# Patient Record
Sex: FEMALE | Race: WHITE | NOT HISPANIC OR LATINO | Employment: OTHER | URBAN - METROPOLITAN AREA
[De-identification: names, ages, dates, MRNs, and addresses within clinical notes are randomized per-mention and may not be internally consistent; named-entity substitution may affect disease eponyms.]

---

## 2017-01-03 ENCOUNTER — PATIENT MESSAGE (OUTPATIENT)
Dept: INTERNAL MEDICINE | Facility: CLINIC | Age: 62
End: 2017-01-03

## 2017-01-04 ENCOUNTER — OFFICE VISIT (OUTPATIENT)
Dept: INTERNAL MEDICINE | Facility: CLINIC | Age: 62
End: 2017-01-04
Payer: COMMERCIAL

## 2017-01-04 VITALS
DIASTOLIC BLOOD PRESSURE: 72 MMHG | BODY MASS INDEX: 32.63 KG/M2 | HEIGHT: 64 IN | OXYGEN SATURATION: 97 % | TEMPERATURE: 99 F | HEART RATE: 82 BPM | WEIGHT: 191.13 LBS | SYSTOLIC BLOOD PRESSURE: 120 MMHG

## 2017-01-04 DIAGNOSIS — R05.9 COUGH: Primary | ICD-10-CM

## 2017-01-04 DIAGNOSIS — J98.01 BRONCHOSPASM: ICD-10-CM

## 2017-01-04 PROCEDURE — 3074F SYST BP LT 130 MM HG: CPT | Mod: S$GLB,,, | Performed by: INTERNAL MEDICINE

## 2017-01-04 PROCEDURE — 99999 PR PBB SHADOW E&M-EST. PATIENT-LVL IV: CPT | Mod: PBBFAC,,, | Performed by: INTERNAL MEDICINE

## 2017-01-04 PROCEDURE — 99213 OFFICE O/P EST LOW 20 MIN: CPT | Mod: S$GLB,,, | Performed by: INTERNAL MEDICINE

## 2017-01-04 PROCEDURE — 1159F MED LIST DOCD IN RCRD: CPT | Mod: S$GLB,,, | Performed by: INTERNAL MEDICINE

## 2017-01-04 PROCEDURE — 3078F DIAST BP <80 MM HG: CPT | Mod: S$GLB,,, | Performed by: INTERNAL MEDICINE

## 2017-01-04 RX ORDER — PROMETHAZINE HYDROCHLORIDE AND CODEINE PHOSPHATE 6.25; 1 MG/5ML; MG/5ML
5 SOLUTION ORAL EVERY 4 HOURS PRN
Qty: 120 ML | Refills: 0 | Status: SHIPPED | OUTPATIENT
Start: 2017-01-04 | End: 2017-01-10

## 2017-01-04 RX ORDER — ALBUTEROL SULFATE 90 UG/1
2 AEROSOL, METERED RESPIRATORY (INHALATION) EVERY 6 HOURS PRN
Qty: 1 EACH | Refills: 2 | Status: SHIPPED | OUTPATIENT
Start: 2017-01-04 | End: 2018-01-11 | Stop reason: SDUPTHER

## 2017-01-08 ENCOUNTER — PATIENT MESSAGE (OUTPATIENT)
Dept: INTERNAL MEDICINE | Facility: CLINIC | Age: 62
End: 2017-01-08

## 2017-01-09 RX ORDER — AMOXICILLIN AND CLAVULANATE POTASSIUM 875; 125 MG/1; MG/1
1 TABLET, FILM COATED ORAL 2 TIMES DAILY
Qty: 20 TABLET | Refills: 0 | Status: SHIPPED | OUTPATIENT
Start: 2017-01-09 | End: 2017-01-19

## 2017-01-10 ENCOUNTER — OFFICE VISIT (OUTPATIENT)
Dept: INTERNAL MEDICINE | Facility: CLINIC | Age: 62
End: 2017-01-10
Payer: COMMERCIAL

## 2017-01-10 ENCOUNTER — PATIENT MESSAGE (OUTPATIENT)
Dept: INTERNAL MEDICINE | Facility: CLINIC | Age: 62
End: 2017-01-10

## 2017-01-10 ENCOUNTER — HOSPITAL ENCOUNTER (OUTPATIENT)
Dept: RADIOLOGY | Facility: HOSPITAL | Age: 62
Discharge: HOME | End: 2017-01-10
Attending: INTERNAL MEDICINE
Payer: COMMERCIAL

## 2017-01-10 VITALS
OXYGEN SATURATION: 97 % | HEIGHT: 64 IN | SYSTOLIC BLOOD PRESSURE: 108 MMHG | BODY MASS INDEX: 31.91 KG/M2 | HEART RATE: 80 BPM | TEMPERATURE: 99 F | WEIGHT: 186.94 LBS | DIASTOLIC BLOOD PRESSURE: 74 MMHG

## 2017-01-10 DIAGNOSIS — Z72.820 SLEEP DEPRIVATION: ICD-10-CM

## 2017-01-10 DIAGNOSIS — R05.9 COUGH: ICD-10-CM

## 2017-01-10 DIAGNOSIS — R25.1 TREMORS OF NERVOUS SYSTEM: ICD-10-CM

## 2017-01-10 DIAGNOSIS — R05.9 COUGH: Primary | ICD-10-CM

## 2017-01-10 DIAGNOSIS — F41.9 ANXIETY DISORDER, UNSPECIFIED TYPE: ICD-10-CM

## 2017-01-10 DIAGNOSIS — J32.9 SINUSITIS, UNSPECIFIED CHRONICITY, UNSPECIFIED LOCATION: ICD-10-CM

## 2017-01-10 DIAGNOSIS — T50.905A MEDICATION SIDE EFFECT, INITIAL ENCOUNTER: ICD-10-CM

## 2017-01-10 DIAGNOSIS — J38.5 LARYNGOSPASM: ICD-10-CM

## 2017-01-10 PROCEDURE — 1159F MED LIST DOCD IN RCRD: CPT | Mod: S$GLB,,, | Performed by: INTERNAL MEDICINE

## 2017-01-10 PROCEDURE — 3074F SYST BP LT 130 MM HG: CPT | Mod: S$GLB,,, | Performed by: INTERNAL MEDICINE

## 2017-01-10 PROCEDURE — 99999 PR PBB SHADOW E&M-EST. PATIENT-LVL V: CPT | Mod: PBBFAC,,, | Performed by: INTERNAL MEDICINE

## 2017-01-10 PROCEDURE — 71020 XR CHEST PA AND LATERAL: CPT | Mod: 26,,, | Performed by: RADIOLOGY

## 2017-01-10 PROCEDURE — 99214 OFFICE O/P EST MOD 30 MIN: CPT | Mod: S$GLB,,, | Performed by: INTERNAL MEDICINE

## 2017-01-10 PROCEDURE — 3078F DIAST BP <80 MM HG: CPT | Mod: S$GLB,,, | Performed by: INTERNAL MEDICINE

## 2017-01-10 PROCEDURE — 71020 XR CHEST PA AND LATERAL: CPT | Mod: TC

## 2017-01-10 NOTE — PROGRESS NOTES
Subjective:       Patient ID: Tiarra Norton is a 61 y.o. female.    Chief Complaint: Follow-up (Cough )    HPI   contuinued sinus congestion and cough.  Lots of drainage.  Trouble sleeping and a fine tremor x 1 week.  Tremor has made her anxious.  She is out of phenergan/cpdeine and albuterol.    She c/o wheezing, sob, chest tightness.    Lightheaded.  Sinus presure and cough.  Subjective fever.  Hurts all over.    Head congestion persists, but green drainage persists.  Some wheezing when supine.  Still whistleing at night.  Temp to 100.4.        Review of Systems   Constitutional: Negative for fever and unexpected weight change.   HENT: Positive for congestion, postnasal drip, rhinorrhea and sinus pressure.    Eyes: Negative for pain, discharge and visual disturbance.   Respiratory: Positive for cough and wheezing. Negative for chest tightness and shortness of breath.    Cardiovascular: Negative for chest pain and leg swelling.   Gastrointestinal: Negative for abdominal pain, constipation, diarrhea and nausea.   Genitourinary: Negative for difficulty urinating, dysuria and hematuria.   Skin: Negative for rash.   Neurological: Negative for headaches.   Psychiatric/Behavioral: Negative for dysphoric mood and sleep disturbance. The patient is not nervous/anxious.        Objective:      Physical Exam   Constitutional: She is oriented to person, place, and time. She appears well-developed and well-nourished. She appears distressed (looks exhausted and anxious).   HENT:   Head: Normocephalic and atraumatic.   Mouth/Throat: Oropharynx is clear and moist. No oropharyngeal exudate.   Tm's clear   Neck: Neck supple.   Cardiovascular: Normal rate and regular rhythm.    Pulmonary/Chest: Effort normal and breath sounds normal. No respiratory distress. She has no wheezes. She has no rales.   Intermittent croupy cough   Lymphadenopathy:     She has no cervical adenopathy.   Neurological: She is alert and oriented to person,  place, and time. Coordination (mild hand tremor bilat) abnormal.   Psychiatric: She has a normal mood and affect. Her behavior is normal.       Assessment:       1. Cough    2. Sinusitis, unspecified chronicity, unspecified location    3. Tremors of nervous system - due to excessive use of albuterol and possible phenergan   4. Medication side effect, initial encounter    5. Anxiety disorder, unspecified type    6. Laryngospasm    7. Sleep deprivation        Plan:       Tiarra was seen today for follow-up.    Diagnoses and all orders for this visit:    Cough  -     X-Ray Chest PA And Lateral; Future    Sinusitis, unspecified chronicity, unspecified location    Tremors of nervous system    Medication side effect, initial encounter    Anxiety disorder, unspecified type    Laryngospasm    Sleep deprivation    Stop phenergan/codeine and albuterol     Will notify Dr Clemens, her psychiatrist, of over use of xanax and uncontrolled anxiety.  She should call him also.      25 min spent with pt and , more than half in counseling.      Reassurance.  RTC 48 h.

## 2017-01-10 NOTE — MR AVS SNAPSHOT
[facility]  [address]  [facility] [address]  Phone: [phone]  Fax: [phone]                  Tiarra Norton   1/10/2017 3:00 PM   Office Visit    Description:  Female : 1955   Provider:  Kaykay Perales MD   Department:  [facility]           Reason for Visit     Follow-up           Diagnoses this Visit        Comments    Cough    -  Primary            To Do List           Future Appointments        Provider Department Dept Phone    1/10/2017 4:00 PM [facility] [phone]    2017 10:40 AM Kaykay Perales MD [facility] [phone]      Goals (5 Years of Data)     None      [facility]     [facility] Care [facility] -  Assistance  Registered nurses in the [facility] provide clinical advisement, health education, appointment booking, and other advisory services.  Call for this free service at [phone].             Medications           Message regarding Medications     Verify the changes and/or additions to your medication regime listed below are the same as discussed with your clinician today.  If any of these changes or additions are incorrect, please notify your healthcare provider.        STOP taking these medications     promethazine-codeine 6.25-10 mg/5 ml (PHENERGAN WITH CODEINE) 6.25-10 mg/5 mL syrup Take 5 mLs by mouth every 4 (four) hours as needed for Cough.           Verify that the below list of medications is an accurate representation of the medications you are currently taking.  If none reported, the list may be blank. If incorrect, please contact your healthcare provider. Carry this list with you in case of emergency.           Current Medications     albuterol 90 mcg/actuation inhaler Inhale 2 puffs into the lungs every 6 (six) hours as needed for Wheezing.    alprazolam (XANAX) 2 MG Tab Take 2 mg by mouth once daily.     amlodipine  "(NORVASC) 5 MG tablet Take 1 tablet (5 mg total) by mouth once daily.    amoxicillin-clavulanate 875-125mg (AUGMENTIN) 875-125 mg per tablet Take 1 tablet by mouth 2 (two) times daily.    aspirin 325 MG tablet Take 1 tablet (325 mg total) by mouth 2 (two) times daily.    buPROPion (WELLBUTRIN XL) 150 MG TB24 tablet Take 150 mg by mouth once daily.     ergocalciferol (VITAMIN D2) 50,000 unit Cap Take 1 capsule (50,000 Units total) by mouth every 30 days.    levocetirizine (XYZAL) 5 MG tablet Take 1 tablet (5 mg total) by mouth every evening.    levothyroxine (SYNTHROID) 112 MCG tablet take 1 tablet by mouth every morning    lisinopril (PRINIVIL,ZESTRIL) 40 MG tablet take 1 tablet by mouth once daily    methylPREDNISolone (MEDROL, HERNANDO,) 4 mg tablet use as directed    oxycodone-acetaminophen (PERCOCET)  mg per tablet Take 1 tablet by mouth every 4 (four) hours as needed for Pain.    pravastatin (PRAVACHOL) 20 MG tablet Take 20 mg by mouth once daily.    promethazine (PHENERGAN) 25 MG tablet Take 1 tablet (25 mg total) by mouth every 6 (six) hours as needed for Nausea.    RESTASIS 0.05 % ophthalmic emulsion Place 1 drop into both eyes 2 (two) times daily.    REXULTI 0.5 mg Tab     sumatriptan (IMITREX) 20 mg/actuation nasal spray     trazodone (DESYREL) 100 MG tablet take 1 to 2 tablets by mouth at bedtime for sleep    venlafaxine (EFFEXOR-XR) 150 MG Cp24 1 tab po q day    venlafaxine (EFFEXOR-XR) 75 MG 24 hr capsule Take 75 mg by mouth nightly.            Clinical Reference Information           Vital Signs - Last Recorded  Most recent update: 1/10/2017  2:59 PM by Gene Espinoza MA    BP Pulse Temp Ht Wt SpO2    108/74 (BP Location: Left arm, Patient Position: Sitting, BP Method: Manual) 80 99.4 °F (37.4 °C) (Oral) 5' 4" (1.626 m) 84.8 kg (186 lb 15.2 oz) 97%    BMI                32.09 kg/m2          Blood Pressure          Most Recent Value    BP  108/74      Allergies as of 1/10/2017     No Known Allergies "      Immunizations Administered on Date of Encounter - 1/10/2017     None      Orders Placed During Today's Visit     Future Labs/Procedures Expected by Expires    X-Ray Chest PA And Lateral  1/10/2017 1/10/2018

## 2017-01-11 ENCOUNTER — TELEPHONE (OUTPATIENT)
Dept: INTERNAL MEDICINE | Facility: CLINIC | Age: 62
End: 2017-01-11

## 2017-01-11 NOTE — TELEPHONE ENCOUNTER
----- Message from Nikki Palomo sent at 1/10/2017  4:41 PM CST -----  Contact: dr guerra [phone]  Dr calderon called in regards to a missed call from the office.      Please advise

## 2017-01-12 ENCOUNTER — OFFICE VISIT (OUTPATIENT)
Dept: INTERNAL MEDICINE | Facility: CLINIC | Age: 62
End: 2017-01-12
Payer: COMMERCIAL

## 2017-01-12 VITALS
OXYGEN SATURATION: 98 % | BODY MASS INDEX: 31.91 KG/M2 | TEMPERATURE: 98 F | HEART RATE: 79 BPM | SYSTOLIC BLOOD PRESSURE: 108 MMHG | DIASTOLIC BLOOD PRESSURE: 80 MMHG | HEIGHT: 64 IN | WEIGHT: 186.94 LBS

## 2017-01-12 DIAGNOSIS — R25.1 TREMOR: ICD-10-CM

## 2017-01-12 DIAGNOSIS — F43.20 ADJUSTMENT DISORDER, UNSPECIFIED TYPE: ICD-10-CM

## 2017-01-12 DIAGNOSIS — J20.8 VIRAL BRONCHITIS: ICD-10-CM

## 2017-01-12 DIAGNOSIS — J98.01 BRONCHOSPASM: ICD-10-CM

## 2017-01-12 DIAGNOSIS — J32.9 SINUSITIS, UNSPECIFIED CHRONICITY, UNSPECIFIED LOCATION: Primary | ICD-10-CM

## 2017-01-12 PROCEDURE — 3079F DIAST BP 80-89 MM HG: CPT | Mod: S$GLB,,, | Performed by: INTERNAL MEDICINE

## 2017-01-12 PROCEDURE — 99213 OFFICE O/P EST LOW 20 MIN: CPT | Mod: S$GLB,,, | Performed by: INTERNAL MEDICINE

## 2017-01-12 PROCEDURE — 99999 PR PBB SHADOW E&M-EST. PATIENT-LVL IV: CPT | Mod: PBBFAC,,, | Performed by: INTERNAL MEDICINE

## 2017-01-12 PROCEDURE — 3074F SYST BP LT 130 MM HG: CPT | Mod: S$GLB,,, | Performed by: INTERNAL MEDICINE

## 2017-01-12 PROCEDURE — 1159F MED LIST DOCD IN RCRD: CPT | Mod: S$GLB,,, | Performed by: INTERNAL MEDICINE

## 2017-01-12 RX ORDER — PREDNISONE 20 MG/1
TABLET ORAL
Qty: 10 TABLET | Refills: 0 | Status: SHIPPED | OUTPATIENT
Start: 2017-01-12 | End: 2018-01-11

## 2017-01-12 NOTE — PROGRESS NOTES
Subjective:       Patient ID: Tiarra Norton is a 61 y.o. female.    Chief Complaint: Follow-up (Cough)    HPI   Cough and tremors better.  Finally slept last night.   Wheezing persists, but better, worse at night.  She refilled xanax, on time.  She didn't call her psychiatrist, but has appt with him soon.  Continues with green nasal drainage.  Augmentin started a few days ago.  She has compeleted course of Doxy, medrol dose pack.  Side effects with albuterol (tremor).    She is frustrated by her continued weakness, cough, and inability to return to work as .    Cxr last visit - clear.  Review of Systems   Constitutional: Positive for chills and fever.   HENT: Positive for ear pain, postnasal drip, rhinorrhea and sore throat.    Respiratory: Positive for cough, shortness of breath and wheezing.    Cardiovascular: Positive for chest pain.   Musculoskeletal: Positive for myalgias.   Skin: Negative for rash.   Allergic/Immunologic: Negative for environmental allergies.   Neurological: Positive for headaches.       Objective:      Physical Exam   Constitutional: She is oriented to person, place, and time. She appears well-developed and well-nourished. No distress.   HENT:   Head: Normocephalic and atraumatic.   Mouth/Throat: Oropharynx is clear and moist. No oropharyngeal exudate.   Tm's clear   Neck: Neck supple.   Cardiovascular: Normal rate and regular rhythm.    Pulmonary/Chest: Effort normal. No respiratory distress. She has wheezes (expiratory, with forced expiration only). She has no rales.   Lymphadenopathy:     She has no cervical adenopathy.   Neurological: She is alert and oriented to person, place, and time.   Psychiatric: Her behavior is normal.   Depressed affect       Assessment:       1. Sinusitis, unspecified chronicity, unspecified location    2. Viral bronchitis    3. Bronchospasm    4. Adjustment disorder, unspecified type    5. Tremor                   - resolved.  Plan:       Tiarra was  seen today for follow-up.    Diagnoses and all orders for this visit:    Sinusitis, unspecified chronicity, unspecified location    Viral bronchitis    Bronchospasm    Adjustment disorder, unspecified type    Tremor    Other orders  -     predniSONE (DELTASONE) 20 MG tablet; 2 tabs po q day x 5 days.  -     ipratropium (ATROVENT HFA) 17 mcg/actuation inhaler; 2 puffs tid       As still with cough and bronchospasm will tx with prednisone and atrovent.  Albuterol ineffective and caused tremor.      Long discussion about limitations of treatment.      She recently had pneumovax and has had fluvax.      Self care:  Rest, fluids, control anxiety.  F/u with her  psychiatrist.      Complete augmentin course.      Call if no better.

## 2017-01-13 RX ORDER — TRAZODONE HYDROCHLORIDE 100 MG/1
TABLET ORAL
Qty: 180 TABLET | Refills: 3 | Status: SHIPPED | OUTPATIENT
Start: 2017-01-13 | End: 2018-03-28 | Stop reason: SDUPTHER

## 2017-01-29 RX ORDER — AMLODIPINE BESYLATE 5 MG/1
TABLET ORAL
Qty: 90 TABLET | Refills: 3 | Status: SHIPPED | OUTPATIENT
Start: 2017-01-29 | End: 2018-01-11 | Stop reason: SDUPTHER

## 2017-01-29 RX ORDER — PRAVASTATIN SODIUM 20 MG/1
TABLET ORAL
Qty: 90 TABLET | Refills: 3 | Status: SHIPPED | OUTPATIENT
Start: 2017-01-29 | End: 2018-01-11 | Stop reason: SDUPTHER

## 2017-02-01 ENCOUNTER — PATIENT MESSAGE (OUTPATIENT)
Dept: INTERNAL MEDICINE | Facility: CLINIC | Age: 62
End: 2017-02-01

## 2017-02-21 ENCOUNTER — PATIENT MESSAGE (OUTPATIENT)
Dept: INTERNAL MEDICINE | Facility: CLINIC | Age: 62
End: 2017-02-21

## 2017-03-07 RX ORDER — ERGOCALCIFEROL 1.25 MG/1
CAPSULE ORAL
Qty: 3 CAPSULE | Refills: 3 | Status: SHIPPED | OUTPATIENT
Start: 2017-03-07 | End: 2018-01-11 | Stop reason: SDUPTHER

## 2017-06-07 ENCOUNTER — DOCUMENTATION ONLY (OUTPATIENT)
Dept: RESEARCH | Facility: HOSPITAL | Age: 62
End: 2017-06-07

## 2017-06-07 NOTE — RESEARCH
Documentation of Informed Consent Obtained for Research by Non-[facility] Personnel    Study: PROmoting Successful Weight Loss in Primary CarE in [place] (PROP)  IRB Number: #[id number]; [facility] IRB ID: [id number]  : Ian Man, PhD.  Coordinating Site: [facility] Co-Investigators: Josephine Albarran MD and Elpidio Roberts MD  [facility] Approval Date: 11/6/15  This study is reviewed and acknowledged by the [facility] IRB. The IRB of Record is the [facility] ([facility]) IRB.    Is the volunteer currently enrolled in any other research trials (per Epic)? [ ] Yes  [X] No  [facility] staff administering informed consent: Beti Meléndez  Date:   5/8/2017  Does the volunteer agree to participate in the trial? [X] Yes  [ ] No  If no, document the reason here:       [X] I acknowledge that I have reviewed the informed consent form and the volunteer signed and dated the signature section of the consent forms.    Name: ([facility] personnel reviewing consent form):           Dimas Del Toro  Review and Scan Date (if different than date of note): 6/6/2017, 6/7/2017  Comments: See  for Consent Forms    [facility]-trained recruiters will obtain informed consent form all participants as well as HIPAA authorization.  Study protocol states all questions and concerns are clarified before any forms are signed. The following elements of the informed consent document were to be discussed:  · What you should know about a research study (e.g. purpose of the consent form; right to refuse or agree and change your mind later; participation is voluntary)?  · Who is doing the study?    · Where is the study being conducted?    · What is the purpose of this study?  · Who is eligible to participate in the study?     · What will happen to you if you take part in the study (e.g. Screening, Measurement visits, Lifestyle change meetings activities)?  · What  are the possible risks and discomforts? Benefits?  · If you do not want to take part in the study, are there other choices?  · If you have any questions or problems, whom can you call?  · What information will be kept private?  · Can your taking part in the study end early?  · What if information becomes available that might affect your decision to stay in the study?  · What charges will you have to pay? What payment will you receive?  · Will you be compensated for a study-related injury or medical illness?

## 2017-08-07 ENCOUNTER — PATIENT MESSAGE (OUTPATIENT)
Dept: INTERNAL MEDICINE | Facility: CLINIC | Age: 62
End: 2017-08-07

## 2017-10-31 RX ORDER — SUMATRIPTAN 20 MG/1
SPRAY NASAL
Qty: 1 EACH | Refills: 11 | Status: SHIPPED | OUTPATIENT
Start: 2017-10-31 | End: 2019-01-21 | Stop reason: SDUPTHER

## 2017-12-21 ENCOUNTER — PATIENT MESSAGE (OUTPATIENT)
Dept: INTERNAL MEDICINE | Facility: CLINIC | Age: 62
End: 2017-12-21

## 2017-12-21 ENCOUNTER — PATIENT MESSAGE (OUTPATIENT)
Dept: OBSTETRICS AND GYNECOLOGY | Facility: CLINIC | Age: 62
End: 2017-12-21

## 2018-01-03 DIAGNOSIS — Z00.00 ROUTINE GENERAL MEDICAL EXAMINATION AT A HEALTH CARE FACILITY: Primary | ICD-10-CM

## 2018-01-11 ENCOUNTER — CLINICAL SUPPORT (OUTPATIENT)
Dept: INTERNAL MEDICINE | Facility: CLINIC | Age: 63
End: 2018-01-11
Payer: COMMERCIAL

## 2018-01-11 ENCOUNTER — HOSPITAL ENCOUNTER (OUTPATIENT)
Dept: RADIOLOGY | Facility: HOSPITAL | Age: 63
Discharge: HOME | End: 2018-01-11
Attending: INTERNAL MEDICINE
Payer: COMMERCIAL

## 2018-01-11 ENCOUNTER — HOSPITAL ENCOUNTER (OUTPATIENT)
Dept: CARDIOLOGY | Facility: CLINIC | Age: 63
Discharge: HOME | End: 2018-01-11
Payer: COMMERCIAL

## 2018-01-11 ENCOUNTER — OFFICE VISIT (OUTPATIENT)
Dept: INTERNAL MEDICINE | Facility: CLINIC | Age: 63
End: 2018-01-11
Payer: COMMERCIAL

## 2018-01-11 VITALS
TEMPERATURE: 99 F | HEART RATE: 77 BPM | OXYGEN SATURATION: 97 % | DIASTOLIC BLOOD PRESSURE: 66 MMHG | HEIGHT: 64 IN | SYSTOLIC BLOOD PRESSURE: 108 MMHG | BODY MASS INDEX: 32.58 KG/M2 | WEIGHT: 190.81 LBS

## 2018-01-11 VITALS — WEIGHT: 186 LBS | HEIGHT: 64 IN | BODY MASS INDEX: 31.76 KG/M2

## 2018-01-11 DIAGNOSIS — G43.909 MIGRAINE WITHOUT STATUS MIGRAINOSUS, NOT INTRACTABLE, UNSPECIFIED MIGRAINE TYPE: ICD-10-CM

## 2018-01-11 DIAGNOSIS — Z00.00 ROUTINE GENERAL MEDICAL EXAMINATION AT A HEALTH CARE FACILITY: ICD-10-CM

## 2018-01-11 DIAGNOSIS — I10 ESSENTIAL HYPERTENSION: ICD-10-CM

## 2018-01-11 DIAGNOSIS — E03.4 HYPOTHYROIDISM DUE TO ACQUIRED ATROPHY OF THYROID: ICD-10-CM

## 2018-01-11 DIAGNOSIS — Z00.00 ROUTINE GENERAL MEDICAL EXAMINATION AT A HEALTH CARE FACILITY: Primary | ICD-10-CM

## 2018-01-11 DIAGNOSIS — Z00.00 ANNUAL PHYSICAL EXAM: Primary | ICD-10-CM

## 2018-01-11 DIAGNOSIS — K76.0 FATTY LIVER: ICD-10-CM

## 2018-01-11 DIAGNOSIS — R19.7 INTERMITTENT DIARRHEA: ICD-10-CM

## 2018-01-11 DIAGNOSIS — E78.5 HYPERLIPIDEMIA, UNSPECIFIED HYPERLIPIDEMIA TYPE: ICD-10-CM

## 2018-01-11 DIAGNOSIS — F33.41 RECURRENT MAJOR DEPRESSIVE DISORDER, IN PARTIAL REMISSION: ICD-10-CM

## 2018-01-11 DIAGNOSIS — R74.8 LIVER ENZYME ELEVATION: ICD-10-CM

## 2018-01-11 DIAGNOSIS — Z12.31 VISIT FOR SCREENING MAMMOGRAM: ICD-10-CM

## 2018-01-11 LAB
ALBUMIN SERPL BCP-MCNC: 3.7 G/DL
ALP SERPL-CCNC: 99 U/L
ALT SERPL W/O P-5'-P-CCNC: 100 U/L
ANION GAP SERPL CALC-SCNC: 9 MMOL/L
AST SERPL-CCNC: 66 U/L
BILIRUB SERPL-MCNC: 0.4 MG/DL
BUN SERPL-MCNC: 11 MG/DL
CALCIUM SERPL-MCNC: 10.9 MG/DL
CHLORIDE SERPL-SCNC: 104 MMOL/L
CHOLEST SERPL-MCNC: 204 MG/DL
CHOLEST/HDLC SERPL: 3.8 {RATIO}
CO2 SERPL-SCNC: 27 MMOL/L
CREAT SERPL-MCNC: 0.9 MG/DL
ERYTHROCYTE [DISTWIDTH] IN BLOOD BY AUTOMATED COUNT: 12.6 %
EST. GFR  (AFRICAN AMERICAN): >60 ML/MIN/1.73 M^2
EST. GFR  (NON AFRICAN AMERICAN): >60 ML/MIN/1.73 M^2
ESTIMATED AVG GLUCOSE: 105 MG/DL
GLUCOSE SERPL-MCNC: 103 MG/DL
HBA1C MFR BLD HPLC: 5.3 %
HCT VFR BLD AUTO: 37.9 %
HDLC SERPL-MCNC: 53 MG/DL
HDLC SERPL: 26 %
HGB BLD-MCNC: 12.2 G/DL
LDLC SERPL CALC-MCNC: 120.2 MG/DL
MCH RBC QN AUTO: 30 PG
MCHC RBC AUTO-ENTMCNC: 32.2 G/DL
MCV RBC AUTO: 93 FL
NONHDLC SERPL-MCNC: 151 MG/DL
PLATELET # BLD AUTO: 239 K/UL
PMV BLD AUTO: 10.2 FL
POTASSIUM SERPL-SCNC: 4.4 MMOL/L
PROT SERPL-MCNC: 7 G/DL
RBC # BLD AUTO: 4.06 M/UL
SODIUM SERPL-SCNC: 140 MMOL/L
TRIGL SERPL-MCNC: 154 MG/DL
TSH SERPL DL<=0.005 MIU/L-ACNC: 1.03 UIU/ML
WBC # BLD AUTO: 5.45 K/UL

## 2018-01-11 PROCEDURE — 80061 LIPID PANEL: CPT

## 2018-01-11 PROCEDURE — 80053 COMPREHEN METABOLIC PANEL: CPT

## 2018-01-11 PROCEDURE — 77067 SCR MAMMO BI INCL CAD: CPT | Mod: 26,,, | Performed by: RADIOLOGY

## 2018-01-11 PROCEDURE — 84443 ASSAY THYROID STIM HORMONE: CPT

## 2018-01-11 PROCEDURE — 93000 ELECTROCARDIOGRAM COMPLETE: CPT | Mod: S$GLB,,, | Performed by: INTERNAL MEDICINE

## 2018-01-11 PROCEDURE — 83036 HEMOGLOBIN GLYCOSYLATED A1C: CPT

## 2018-01-11 PROCEDURE — 85027 COMPLETE CBC AUTOMATED: CPT

## 2018-01-11 PROCEDURE — 99999 PR PBB SHADOW E&M-EST. PATIENT-LVL III: CPT | Mod: PBBFAC,,, | Performed by: INTERNAL MEDICINE

## 2018-01-11 PROCEDURE — 36415 COLL VENOUS BLD VENIPUNCTURE: CPT

## 2018-01-11 PROCEDURE — 77067 SCR MAMMO BI INCL CAD: CPT | Mod: TC

## 2018-01-11 PROCEDURE — 99396 PREV VISIT EST AGE 40-64: CPT | Mod: S$GLB,,, | Performed by: INTERNAL MEDICINE

## 2018-01-11 PROCEDURE — 77063 BREAST TOMOSYNTHESIS BI: CPT | Mod: 26,,, | Performed by: RADIOLOGY

## 2018-01-11 RX ORDER — ALBUTEROL SULFATE 90 UG/1
2 AEROSOL, METERED RESPIRATORY (INHALATION) EVERY 6 HOURS PRN
Qty: 1 EACH | Refills: 2 | Status: SHIPPED | OUTPATIENT
Start: 2018-01-11 | End: 2018-07-24

## 2018-01-11 RX ORDER — BUPROPION HYDROCHLORIDE 300 MG/1
300 TABLET ORAL DAILY
COMMUNITY
End: 2022-02-02

## 2018-01-11 RX ORDER — ERGOCALCIFEROL 1.25 MG/1
CAPSULE ORAL
Qty: 3 CAPSULE | Refills: 3 | Status: SHIPPED | OUTPATIENT
Start: 2018-01-11 | End: 2019-01-21

## 2018-01-11 RX ORDER — LEVOTHYROXINE SODIUM 112 UG/1
112 TABLET ORAL EVERY MORNING
Qty: 90 TABLET | Refills: 3 | Status: SHIPPED | OUTPATIENT
Start: 2018-01-11 | End: 2018-08-27 | Stop reason: SDUPTHER

## 2018-01-11 RX ORDER — OXYCODONE AND ACETAMINOPHEN 10; 325 MG/1; MG/1
1 TABLET ORAL EVERY 4 HOURS PRN
Qty: 60 TABLET | Refills: 0 | Status: SHIPPED | OUTPATIENT
Start: 2018-01-11 | End: 2019-01-21 | Stop reason: SDUPTHER

## 2018-01-11 RX ORDER — PRAVASTATIN SODIUM 20 MG/1
20 TABLET ORAL DAILY
Qty: 90 TABLET | Refills: 3 | Status: SHIPPED | OUTPATIENT
Start: 2018-01-11 | End: 2018-03-15 | Stop reason: SDUPTHER

## 2018-01-11 RX ORDER — LISINOPRIL 40 MG/1
40 TABLET ORAL DAILY
Qty: 90 TABLET | Refills: 3 | Status: SHIPPED | OUTPATIENT
Start: 2018-01-11 | End: 2018-03-26 | Stop reason: SDUPTHER

## 2018-01-11 RX ORDER — AMLODIPINE BESYLATE 5 MG/1
5 TABLET ORAL DAILY
Qty: 90 TABLET | Refills: 3 | Status: SHIPPED | OUTPATIENT
Start: 2018-01-11 | End: 2018-04-09 | Stop reason: SDUPTHER

## 2018-01-11 RX ORDER — PROMETHAZINE HYDROCHLORIDE 25 MG/1
25 TABLET ORAL EVERY 6 HOURS PRN
Qty: 60 TABLET | Refills: 1 | Status: SHIPPED | OUTPATIENT
Start: 2018-01-11 | End: 2019-01-21 | Stop reason: SDUPTHER

## 2018-01-11 NOTE — PROGRESS NOTES
Subjective:       Patient ID: Tiarra Norton is a 62 y.o. female.    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  [facility] in [place], [place].    Long h/o htn, hyperlipidemia, hypothyroidism. Vit D defic.    Migraines stable -twice a week to monthly.  Takes phenergan and oxycodone as needed.    Continues with chronic diarrhea, attributed to IBS.    Depression persists, but improved.  Insomnia controlled with desyrel.    lft's elevated again.  Evaluation several years ago.  bx showed minimal fatty liver, as did u/s.  Enzymes spontaneously normalized.  Review of Systems   Constitutional: Negative for fever and unexpected weight change.   HENT: Negative for congestion and postnasal drip.    Respiratory: Negative for chest tightness, shortness of breath and wheezing.    Cardiovascular: Positive for chest pain (only with panic). Negative for leg swelling.   Gastrointestinal: Positive for diarrhea. Negative for abdominal pain, anal bleeding, constipation, nausea and vomiting.   Genitourinary: Negative for dysuria and urgency.   Musculoskeletal: Positive for arthralgias (r knee, manageable.).   Skin: Negative for rash.   Neurological: Positive for headaches (migraines).   Psychiatric/Behavioral: Positive for dysphoric mood and sleep disturbance (controlled with trazadone.). The patient is nervous/anxious.        Objective:      Physical Exam   Constitutional: She is oriented to person, place, and time. She appears well-developed and well-nourished.   Eyes: No scleral icterus.   Neck: No JVD present. No thyromegaly present.   Cardiovascular: Normal rate, regular rhythm and normal heart sounds.    Pulmonary/Chest: Effort normal and breath sounds normal. No respiratory distress. She has no wheezes. She has no rales.   Abdominal: Soft. She exhibits no mass. There is no tenderness.   Musculoskeletal: She exhibits no edema.   Neurological: She is  alert and oriented to person, place, and time.   Psychiatric: She has a normal mood and affect. Her behavior is normal.       Results for orders placed or performed in visit on 01/11/18   Comprehensive metabolic panel   Result Value Ref Range    Sodium 140 136 - 145 mmol/L    Potassium 4.4 3.5 - 5.1 mmol/L    Chloride 104 95 - 110 mmol/L    CO2 27 23 - 29 mmol/L    Glucose 103 70 - 110 mg/dL    BUN, Bld 11 8 - 23 mg/dL    Creatinine 0.9 0.5 - 1.4 mg/dL    Calcium 10.9 (H) 8.7 - 10.5 mg/dL    Total Protein 7.0 6.0 - 8.4 g/dL    Albumin 3.7 3.5 - 5.2 g/dL    Total Bilirubin 0.4 0.1 - 1.0 mg/dL    Alkaline Phosphatase 99 55 - 135 U/L    AST 66 (H) 10 - 40 U/L     (H) 10 - 44 U/L    Anion Gap 9 8 - 16 mmol/L    eGFR if African American >60.0 >60 mL/min/1.73 m^2    eGFR if non African American >60.0 >60 mL/min/1.73 m^2   CBC Without Differential   Result Value Ref Range    WBC 5.45 3.90 - 12.70 K/uL    RBC 4.06 4.00 - 5.40 M/uL    Hemoglobin 12.2 12.0 - 16.0 g/dL    Hematocrit 37.9 37.0 - 48.5 %    MCV 93 82 - 98 fL    MCH 30.0 27.0 - 31.0 pg    MCHC 32.2 32.0 - 36.0 g/dL    RDW 12.6 11.5 - 14.5 %    Platelets 239 150 - 350 K/uL    MPV 10.2 9.2 - 12.9 fL   Lipid panel   Result Value Ref Range    Cholesterol 204 (H) 120 - 199 mg/dL    Triglycerides 154 (H) 30 - 150 mg/dL    HDL 53 40 - 75 mg/dL    LDL Cholesterol 120.2 63.0 - 159.0 mg/dL    HDL/Chol Ratio 26.0 20.0 - 50.0 %    Total Cholesterol/HDL Ratio 3.8 2.0 - 5.0    Non-HDL Cholesterol 151 mg/dL   TSH   Result Value Ref Range    TSH 1.026 0.400 - 4.000 uIU/mL   Hemoglobin A1c   Result Value Ref Range    Hemoglobin A1C 5.3 4.0 - 5.6 %    Estimated Avg Glucose 105 68 - 131 mg/dL     Assessment:       1. Annual physical exam    2. Migraine without status migrainosus, not intractable, unspecified migraine type    3. Intermittent diarrhea    4. Hypothyroidism due to acquired atrophy of thyroid    5. Hyperlipidemia, unspecified hyperlipidemia type    6. Essential  hypertension    7. Liver enzyme elevation    8. Fatty liver    9. Recurrent major depressive disorder, in partial remission        Plan:       Tiarra was seen today for [facility].    Diagnoses and all orders for this visit:    Annual physical exam    Migraine without status migrainosus, not intractable, unspecified migraine type    Intermittent diarrhea    Hypothyroidism due to acquired atrophy of thyroid    Hyperlipidemia, unspecified hyperlipidemia type    Essential hypertension    Liver enzyme elevation    Fatty liver    Recurrent major depressive disorder, in partial remission       Exercise regularly.  Weight loss diet reviewed.

## 2018-01-11 NOTE — LETTER
2018    Tiarra Norton  [address] [facility] [address]             [facility] Medicine  [address] [facility] [address]  Phone: [phone]  Fax: [phone] Dear Ms. Norton:    Thank you for allowing me to serve you and perform your Executive Health exam on 2018.  This letter will serve a brief summary of the history, physical findings, and laboratory/studies performed and recommendations at that time.    Reason for Visit: Executive Health Preventive Physical Examination    Past Medical History:   Diagnosis Date    Cataract     Depression     Diabetes mellitus     gestational diabetes 21 years ago    Hyperlipidemia     Hypertension     Thyroid disease        Past Surgical History:   Procedure Laterality Date    ADENOIDECTOMY       SECTION      CHOLECYSTECTOMY      COLONOSCOPY N/A 2016    Procedure: COLONOSCOPY;  Surgeon: Heri Segura MD;  Location: [address];  Service: Endoscopy;  Laterality: N/A;    EYE SURGERY      cataract resection right    r hand surgery      TONSILLECTOMY      TOTAL KNEE ARTHROPLASTY      partial knee replacement    TUBAL LIGATION         Family History   Problem Relation Age of Onset    Hypertension Mother     Irritable bowel syndrome Mother     Heart disease Father      mi    Hypertension Father     Cancer Father      prostate    Alcohol abuse Sister     Depression Sister     Irritable bowel syndrome Sister     Alcohol abuse Sister     Ovarian cancer Maternal Aunt     Breast cancer Paternal Aunt     Melanoma Neg Hx     Colon cancer Neg Hx     Stomach cancer Neg Hx     Esophageal cancer Neg Hx     Liver disease Neg Hx     Crohn's disease Neg Hx     Ulcerative colitis Neg Hx     Celiac disease Neg Hx        Social History     Social History    Marital status:      Spouse name: N/A    Number of children: 1    Years of education: N/A     Occupational History       [employer]     Social History Main Topics    Smoking status: Never Smoker    Smokeless tobacco: Never Used    Alcohol use 4.2 oz/week     7 Glasses of wine per week    Drug use: No    Sexual activity: Yes     Partners: Male     Other Topics Concern    Not on file     Social History Narrative    To retire feb 2018.  After then plans to exercise.      Works with with a  - 1-2 times a week.             Review of patient's allergies indicates:  No Known Allergies      Current Outpatient Prescriptions:     buPROPion (WELLBUTRIN XL) 300 MG 24 hr tablet, Take 300 mg by mouth once daily., Disp: , Rfl:     RESTASIS 0.05 % ophthalmic emulsion, Place 1 drop into both eyes 2 (two) times daily., Disp: , Rfl: 0    trazodone (DESYREL) 100 MG tablet, take 1 to 2 tablets by mouth at bedtime for sleep, Disp: 180 tablet, Rfl: 3    venlafaxine (EFFEXOR-XR) 150 MG Cp24, 1 tab po q day (Patient taking differently: Take 150 mg by mouth once daily. 150 mg morning and 50 mg at night), Disp: 30 capsule, Rfl: 11    venlafaxine (EFFEXOR-XR) 75 MG 24 hr capsule, Take 75 mg by mouth nightly. , Disp: , Rfl: 0    albuterol 90 mcg/actuation inhaler, Inhale 2 puffs into the lungs every 6 (six) hours as needed for Wheezing., Disp: 1 each, Rfl: 2    alprazolam (XANAX) 2 MG Tab, Take 2 mg by mouth once daily. , Disp: , Rfl: 0    amLODIPine (NORVASC) 5 MG tablet, Take 1 tablet (5 mg total) by mouth once daily., Disp: 90 tablet, Rfl: 3    aspirin 325 MG tablet, Take 1 tablet (325 mg total) by mouth 2 (two) times daily. (Patient taking differently: Take 325 mg by mouth once. ), Disp: 60 tablet, Rfl: 0    ergocalciferol (ERGOCALCIFEROL) 50,000 unit Cap, take 1 capsule by mouth every 30 DAYS, Disp: 3 capsule, Rfl: 3    ipratropium (ATROVENT HFA) 17 mcg/actuation inhaler, 2 puffs tid, Disp: 1 Inhaler, Rfl: 1    levothyroxine (SYNTHROID) 112 MCG tablet, Take 1 tablet (112 mcg total) by mouth every morning., Disp: 90 tablet, Rfl: 3     lisinopril (PRINIVIL,ZESTRIL) 40 MG tablet, Take 1 tablet (40 mg total) by mouth once daily., Disp: 90 tablet, Rfl: 3    oxyCODONE-acetaminophen (PERCOCET)  mg per tablet, Take 1 tablet by mouth every 4 (four) hours as needed for Pain., Disp: 60 tablet, Rfl: 0    pravastatin (PRAVACHOL) 20 MG tablet, Take 1 tablet (20 mg total) by mouth once daily., Disp: 90 tablet, Rfl: 3    promethazine (PHENERGAN) 25 MG tablet, Take 1 tablet (25 mg total) by mouth every 6 (six) hours as needed for Nausea., Disp: 60 tablet, Rfl: 1    REXULTI 0.5 mg Tab, , Disp: , Rfl: 0    SUMAtriptan (IMITREX) 20 mg/actuation nasal spray, instill 1 spray in 1 nostril ONCE, Disp: 1 each, Rfl: 11     Review of Systems  Review of Systems - positive for chronic diarrhea and depression.  Your migraines frequency has been stable at twice a week to once a month.  Your reported chest pain when feeling panic. R knee pain is manageable.      Physical Exam:  General: General appearance: alert, well appearing, and in no distress.   Skin: Skin exam - normal coloration and turgor, no rashes, no suspicious skin lesions noted.  HEENT: Ears - bilateral TM's and external ear canals normal. , ENT exam reveals - ENT exam normal, no neck nodes or sinus tenderness.   Lungs: Chest: clear to auscultation, no wheezes, rales or rhonchi, symmetric air entry.   Heart: CVS exam: normal rate, regular rhythm, normal S1, S2, no murmurs, rubs, clicks or gallops.   Extremities: Exam of extremities: peripheral pulses normal, no pedal edema, no clubbing or cyanosis    Labs:  Results for orders placed or performed in visit on 01/11/18   Comprehensive metabolic panel   Result Value Ref Range    Sodium 140 136 - 145 mmol/L    Potassium 4.4 3.5 - 5.1 mmol/L    Chloride 104 95 - 110 mmol/L    CO2 27 23 - 29 mmol/L    Glucose 103 70 - 110 mg/dL    BUN, Bld 11 8 - 23 mg/dL    Creatinine 0.9 0.5 - 1.4 mg/dL    Calcium 10.9 (H) 8.7 - 10.5 mg/dL    Total Protein 7.0 6.0 - 8.4 g/dL     Albumin 3.7 3.5 - 5.2 g/dL    Total Bilirubin 0.4 0.1 - 1.0 mg/dL    Alkaline Phosphatase 99 55 - 135 U/L    AST 66 (H) 10 - 40 U/L     (H) 10 - 44 U/L    Anion Gap 9 8 - 16 mmol/L    eGFR if African American >60.0 >60 mL/min/1.73 m^2    eGFR if non African American >60.0 >60 mL/min/1.73 m^2   CBC Without Differential   Result Value Ref Range    WBC 5.45 3.90 - 12.70 K/uL    RBC 4.06 4.00 - 5.40 M/uL    Hemoglobin 12.2 12.0 - 16.0 g/dL    Hematocrit 37.9 37.0 - 48.5 %    MCV 93 82 - 98 fL    MCH 30.0 27.0 - 31.0 pg    MCHC 32.2 32.0 - 36.0 g/dL    RDW 12.6 11.5 - 14.5 %    Platelets 239 150 - 350 K/uL    MPV 10.2 9.2 - 12.9 fL   Lipid panel   Result Value Ref Range    Cholesterol 204 (H) 120 - 199 mg/dL    Triglycerides 154 (H) 30 - 150 mg/dL    HDL 53 40 - 75 mg/dL    LDL Cholesterol 120.2 63.0 - 159.0 mg/dL    HDL/Chol Ratio 26.0 20.0 - 50.0 %    Total Cholesterol/HDL Ratio 3.8 2.0 - 5.0    Non-HDL Cholesterol 151 mg/dL   TSH   Result Value Ref Range    TSH 1.026 0.400 - 4.000 uIU/mL   Hemoglobin A1c   Result Value Ref Range    Hemoglobin A1C 5.3 4.0 - 5.6 %    Estimated Avg Glucose 105 68 - 131 mg/dL        Assessment/Recommendations:  Routine Health Maintenance    We reviewed your labs, which were all normal except for mild liver enzyme elevations.  The cause for this is unknown.  We discussed the importance of rechecking these levels in a few weeks.  You anticipated having labs drawn at your upcoming visit to the [facility].  Please forward those labs to me.    Your EKG showed nonspecific T wave abnormalities.  I reviewed your ekgs going back to 2011 and you have had similar changes in the past, and a negative stress test in 2014, so I do not think any further evaluation is warranted at this time.  Let me know if you wave worrisome chest pain or shortness of breath.     I look forward to seeing you again next year.  Please contact me should you have any questions or  concerns regarding physical findings, or my recommendations.    If you have any questions or concerns, please don't hesitate to call.    Sincerely,    Kaykay Perales MD

## 2018-01-12 PROBLEM — F33.41 RECURRENT MAJOR DEPRESSIVE DISORDER, IN PARTIAL REMISSION: Status: ACTIVE | Noted: 2018-01-12

## 2018-01-19 ENCOUNTER — PATIENT MESSAGE (OUTPATIENT)
Dept: INTERNAL MEDICINE | Facility: CLINIC | Age: 63
End: 2018-01-19

## 2018-01-23 ENCOUNTER — PATIENT MESSAGE (OUTPATIENT)
Dept: INTERNAL MEDICINE | Facility: CLINIC | Age: 63
End: 2018-01-23

## 2018-01-24 ENCOUNTER — TELEPHONE (OUTPATIENT)
Dept: OBSTETRICS AND GYNECOLOGY | Facility: CLINIC | Age: 63
End: 2018-01-24

## 2018-01-24 ENCOUNTER — OFFICE VISIT (OUTPATIENT)
Dept: OBSTETRICS AND GYNECOLOGY | Facility: CLINIC | Age: 63
End: 2018-01-24
Payer: COMMERCIAL

## 2018-01-24 VITALS
SYSTOLIC BLOOD PRESSURE: 132 MMHG | BODY MASS INDEX: 32.74 KG/M2 | DIASTOLIC BLOOD PRESSURE: 80 MMHG | HEIGHT: 64 IN | WEIGHT: 191.81 LBS

## 2018-01-24 DIAGNOSIS — Z79.890 HORMONE REPLACEMENT THERAPY (HRT): ICD-10-CM

## 2018-01-24 DIAGNOSIS — Z12.4 PAP SMEAR FOR CERVICAL CANCER SCREENING: ICD-10-CM

## 2018-01-24 DIAGNOSIS — Z01.419 WELL WOMAN EXAM WITH ROUTINE GYNECOLOGICAL EXAM: Primary | ICD-10-CM

## 2018-01-24 PROCEDURE — 99999 PR PBB SHADOW E&M-EST. PATIENT-LVL III: CPT | Mod: PBBFAC,,, | Performed by: OBSTETRICS & GYNECOLOGY

## 2018-01-24 PROCEDURE — 88175 CYTOPATH C/V AUTO FLUID REDO: CPT

## 2018-01-24 PROCEDURE — 99396 PREV VISIT EST AGE 40-64: CPT | Mod: S$GLB,,, | Performed by: OBSTETRICS & GYNECOLOGY

## 2018-01-24 NOTE — PROGRESS NOTES
Subjective:       Patient ID: Tiarra Norton is a 62 y.o. female.    Chief Complaint:  Well Woman      History of Present Illness  HPI  This 62 yr old P2 female is here for routine exam.  She is due for pap and mammogram done this month and phill. She again has issues with libido and we discussed and will call in vaginal testosterone cream to [facility].  She used injections in the past and did not work    GYN & OB History  No LMP recorded. Patient is postmenopausal.   Date of Last Pap: 2014    OB History    Para Term  AB Living   2 2 2         SAB TAB Ectopic Multiple Live Births                  # Outcome Date GA Lbr Karl/2nd Weight Sex Delivery Anes PTL Lv   2 Term            1 Term                   Review of Systems  Review of Systems   Constitutional: Negative for chills and fever.   Respiratory: Negative for shortness of breath.    Cardiovascular: Negative for chest pain.   Gastrointestinal: Negative for abdominal pain, nausea and vomiting.   Genitourinary: Negative for difficulty urinating, dyspareunia, genital sores, menstrual problem, pelvic pain, vaginal bleeding, vaginal discharge and vaginal pain.   Skin: Negative for wound.   Hematological: Negative for adenopathy.           Objective:    Physical Exam:   Constitutional: She is oriented to person, place, and time. She appears well-developed and well-nourished.    HENT:   Head: Normocephalic.    Eyes: EOM are normal.    Neck: Normal range of motion.    Cardiovascular: Normal rate.     Pulmonary/Chest: Effort normal. She exhibits no mass and no tenderness. Right breast exhibits no inverted nipple, no mass, no skin change and no tenderness. Left breast exhibits no inverted nipple, no mass, no skin change and no tenderness.        Abdominal: Soft. She exhibits no distension. There is no tenderness.     Genitourinary: Uterus normal. There is no rash, tenderness or lesion on the right labia. There is no rash, tenderness or lesion on the  left labia. Uterus is not tender. Cervix is normal. Right adnexum displays no mass, no tenderness and no fullness. Left adnexum displays no mass, no tenderness and no fullness. Cervix exhibits no discharge.   Genitourinary Comments: Vaginal atrophy           Musculoskeletal: Normal range of motion.       Neurological: She is alert and oriented to person, place, and time.    Skin: Skin is warm and dry.    Psychiatric: She has a normal mood and affect.          Assessment:        1. Well woman exam with routine gynecological exam    2. Pap smear for cervical cancer screening    3. Hormone replacement therapy (HRT)               Plan:      Pap today  Mammogram yearly  Will call in testosterone cream to [facility] and follow up labs in 4 weeks.

## 2018-01-24 NOTE — TELEPHONE ENCOUNTER
----- Message from Nataliia Judd MD sent at 1/24/2018  3:52 PM CST -----  Please call into [facility]  Testosterone 20 mg /gm  Apply 1/4 gm per vagina Qhs  Disp enough for month with 5 refils.  thanks

## 2018-02-08 ENCOUNTER — PATIENT MESSAGE (OUTPATIENT)
Dept: INTERNAL MEDICINE | Facility: CLINIC | Age: 63
End: 2018-02-08

## 2018-02-14 ENCOUNTER — PATIENT MESSAGE (OUTPATIENT)
Dept: INTERNAL MEDICINE | Facility: CLINIC | Age: 63
End: 2018-02-14

## 2018-02-16 ENCOUNTER — PATIENT MESSAGE (OUTPATIENT)
Dept: INTERNAL MEDICINE | Facility: CLINIC | Age: 63
End: 2018-02-16

## 2018-02-21 ENCOUNTER — LAB VISIT (OUTPATIENT)
Dept: LAB | Facility: HOSPITAL | Age: 63
End: 2018-02-21
Attending: OBSTETRICS & GYNECOLOGY
Payer: COMMERCIAL

## 2018-02-21 DIAGNOSIS — Z79.890 HORMONE REPLACEMENT THERAPY (HRT): ICD-10-CM

## 2018-02-21 PROCEDURE — 84402 ASSAY OF FREE TESTOSTERONE: CPT

## 2018-02-21 PROCEDURE — 36415 COLL VENOUS BLD VENIPUNCTURE: CPT | Mod: PO

## 2018-02-22 ENCOUNTER — PATIENT MESSAGE (OUTPATIENT)
Dept: INTERNAL MEDICINE | Facility: CLINIC | Age: 63
End: 2018-02-22

## 2018-02-23 LAB — TESTOST FREE SERPL-MCNC: 5 PG/ML

## 2018-03-02 ENCOUNTER — PATIENT MESSAGE (OUTPATIENT)
Dept: INTERNAL MEDICINE | Facility: CLINIC | Age: 63
End: 2018-03-02

## 2018-03-06 ENCOUNTER — PATIENT MESSAGE (OUTPATIENT)
Dept: OBSTETRICS AND GYNECOLOGY | Facility: CLINIC | Age: 63
End: 2018-03-06

## 2018-03-06 DIAGNOSIS — Z78.0 MENOPAUSE: Primary | ICD-10-CM

## 2018-03-07 ENCOUNTER — TELEPHONE (OUTPATIENT)
Dept: OBSTETRICS AND GYNECOLOGY | Facility: CLINIC | Age: 63
End: 2018-03-07

## 2018-03-07 NOTE — TELEPHONE ENCOUNTER
----- Message from Nataliia Judd MD sent at 3/6/2018  5:27 PM CST -----  Please call this pt for lab in 6 weeks. thanks

## 2018-03-15 RX ORDER — ZIPRASIDONE HYDROCHLORIDE 20 MG/1
20 CAPSULE ORAL 2 TIMES DAILY
Qty: 60 CAPSULE | Refills: 11 | Status: CANCELLED | OUTPATIENT
Start: 2018-03-15 | End: 2019-03-15

## 2018-03-15 RX ORDER — TRAZODONE HYDROCHLORIDE 100 MG/1
TABLET ORAL
Qty: 180 TABLET | Refills: 3 | Status: CANCELLED | OUTPATIENT
Start: 2018-03-15

## 2018-03-15 RX ORDER — BUPROPION HYDROCHLORIDE 300 MG/1
300 TABLET ORAL DAILY
Status: CANCELLED | OUTPATIENT
Start: 2018-03-15

## 2018-03-15 RX ORDER — PRAVASTATIN SODIUM 20 MG/1
20 TABLET ORAL DAILY
Qty: 90 TABLET | Refills: 3 | Status: SHIPPED | OUTPATIENT
Start: 2018-03-15 | End: 2018-03-26 | Stop reason: SDUPTHER

## 2018-03-15 RX ORDER — VENLAFAXINE HYDROCHLORIDE 75 MG/1
75 CAPSULE, EXTENDED RELEASE ORAL NIGHTLY
Refills: 0 | Status: CANCELLED | OUTPATIENT
Start: 2018-03-15

## 2018-03-15 RX ORDER — ALPRAZOLAM 2 MG/1
2 TABLET ORAL DAILY
Refills: 0 | Status: CANCELLED | OUTPATIENT
Start: 2018-03-15

## 2018-03-15 RX ORDER — VENLAFAXINE HYDROCHLORIDE 150 MG/1
CAPSULE, EXTENDED RELEASE ORAL
Qty: 30 CAPSULE | Refills: 11 | Status: CANCELLED | OUTPATIENT
Start: 2018-03-15

## 2018-03-25 ENCOUNTER — PATIENT MESSAGE (OUTPATIENT)
Dept: OBSTETRICS AND GYNECOLOGY | Facility: CLINIC | Age: 63
End: 2018-03-25

## 2018-03-26 RX ORDER — LISINOPRIL 40 MG/1
40 TABLET ORAL DAILY
Qty: 90 TABLET | Refills: 3 | Status: SHIPPED | OUTPATIENT
Start: 2018-03-26 | End: 2018-03-28 | Stop reason: SDUPTHER

## 2018-03-26 RX ORDER — VENLAFAXINE HYDROCHLORIDE 150 MG/1
CAPSULE, EXTENDED RELEASE ORAL
Qty: 90 CAPSULE | Refills: 3 | Status: CANCELLED | OUTPATIENT
Start: 2018-03-26

## 2018-03-26 RX ORDER — BUPROPION HYDROCHLORIDE 300 MG/1
300 TABLET ORAL DAILY
Qty: 90 TABLET | Refills: 3 | Status: CANCELLED | OUTPATIENT
Start: 2018-03-26

## 2018-03-26 RX ORDER — PRAVASTATIN SODIUM 20 MG/1
20 TABLET ORAL DAILY
Qty: 90 TABLET | Refills: 3 | Status: SHIPPED | OUTPATIENT
Start: 2018-03-26 | End: 2018-07-24

## 2018-03-26 RX ORDER — TRAZODONE HYDROCHLORIDE 100 MG/1
TABLET ORAL
Qty: 180 TABLET | Refills: 3 | Status: CANCELLED | OUTPATIENT
Start: 2018-03-26

## 2018-03-26 RX ORDER — ZIPRASIDONE HYDROCHLORIDE 20 MG/1
20 CAPSULE ORAL 2 TIMES DAILY
Qty: 180 CAPSULE | Refills: 3 | Status: CANCELLED | OUTPATIENT
Start: 2018-03-26 | End: 2019-03-26

## 2018-03-26 RX ORDER — VENLAFAXINE HYDROCHLORIDE 75 MG/1
75 CAPSULE, EXTENDED RELEASE ORAL NIGHTLY
Qty: 90 CAPSULE | Refills: 3 | Status: CANCELLED | OUTPATIENT
Start: 2018-03-26

## 2018-03-27 ENCOUNTER — PATIENT MESSAGE (OUTPATIENT)
Dept: INTERNAL MEDICINE | Facility: CLINIC | Age: 63
End: 2018-03-27

## 2018-03-28 RX ORDER — ZIPRASIDONE HYDROCHLORIDE 20 MG/1
20 CAPSULE ORAL 2 TIMES DAILY
Qty: 180 CAPSULE | Refills: 3 | Status: CANCELLED | OUTPATIENT
Start: 2018-03-28 | End: 2019-03-28

## 2018-03-28 RX ORDER — VENLAFAXINE HYDROCHLORIDE 75 MG/1
75 CAPSULE, EXTENDED RELEASE ORAL NIGHTLY
Qty: 90 CAPSULE | Refills: 3 | Status: CANCELLED | OUTPATIENT
Start: 2018-03-28

## 2018-03-28 RX ORDER — BUPROPION HYDROCHLORIDE 300 MG/1
300 TABLET ORAL DAILY
Qty: 90 TABLET | Refills: 3 | Status: CANCELLED | OUTPATIENT
Start: 2018-03-28

## 2018-03-28 RX ORDER — LISINOPRIL 40 MG/1
40 TABLET ORAL DAILY
Qty: 90 TABLET | Refills: 3 | Status: SHIPPED | OUTPATIENT
Start: 2018-03-28 | End: 2019-01-21 | Stop reason: SDUPTHER

## 2018-03-28 RX ORDER — PRAVASTATIN SODIUM 40 MG/1
40 TABLET ORAL DAILY
Qty: 90 TABLET | Refills: 3 | Status: SHIPPED | OUTPATIENT
Start: 2018-03-28 | End: 2019-01-21

## 2018-03-28 RX ORDER — TRAZODONE HYDROCHLORIDE 100 MG/1
TABLET ORAL
Qty: 180 TABLET | Refills: 3 | Status: CANCELLED | OUTPATIENT
Start: 2018-03-28

## 2018-03-28 RX ORDER — VENLAFAXINE HYDROCHLORIDE 150 MG/1
CAPSULE, EXTENDED RELEASE ORAL
Qty: 90 CAPSULE | Refills: 3 | Status: CANCELLED | OUTPATIENT
Start: 2018-03-28

## 2018-03-28 RX ORDER — TRAZODONE HYDROCHLORIDE 100 MG/1
TABLET ORAL
Qty: 180 TABLET | Refills: 3 | Status: SHIPPED | OUTPATIENT
Start: 2018-03-28 | End: 2019-02-04 | Stop reason: SDUPTHER

## 2018-04-08 ENCOUNTER — PATIENT MESSAGE (OUTPATIENT)
Dept: INTERNAL MEDICINE | Facility: CLINIC | Age: 63
End: 2018-04-08

## 2018-04-09 ENCOUNTER — OFFICE VISIT (OUTPATIENT)
Dept: DERMATOLOGY | Facility: CLINIC | Age: 63
End: 2018-04-09

## 2018-04-09 ENCOUNTER — OFFICE VISIT (OUTPATIENT)
Dept: DERMATOLOGY | Facility: CLINIC | Age: 63
End: 2018-04-09
Payer: MEDICARE

## 2018-04-09 DIAGNOSIS — B35.1 ONYCHOMYCOSIS: ICD-10-CM

## 2018-04-09 DIAGNOSIS — L60.3 ONYCHODYSTROPHY: ICD-10-CM

## 2018-04-09 DIAGNOSIS — L82.1 SEBORRHEIC KERATOSES: Primary | ICD-10-CM

## 2018-04-09 DIAGNOSIS — Z41.1 ELECTIVE PROCEDURE FOR UNACCEPTABLE COSMETIC APPEARANCE: Primary | ICD-10-CM

## 2018-04-09 PROCEDURE — 99214 OFFICE O/P EST MOD 30 MIN: CPT | Mod: S$GLB,,, | Performed by: DERMATOLOGY

## 2018-04-09 PROCEDURE — 99499 UNLISTED E&M SERVICE: CPT | Mod: CSM,S$GLB,, | Performed by: DERMATOLOGY

## 2018-04-09 PROCEDURE — 87101 SKIN FUNGI CULTURE: CPT

## 2018-04-09 PROCEDURE — 17110 DESTRUCTION B9 LES UP TO 14: CPT | Mod: CSM,S$GLB,, | Performed by: DERMATOLOGY

## 2018-04-09 RX ORDER — ZIPRASIDONE HYDROCHLORIDE 20 MG/1
40 CAPSULE ORAL 2 TIMES DAILY
COMMUNITY
Start: 2018-04-05 | End: 2020-10-20

## 2018-04-09 RX ORDER — VENLAFAXINE HYDROCHLORIDE 75 MG/1
TABLET, EXTENDED RELEASE ORAL
Refills: 0 | COMMUNITY
Start: 2018-01-16 | End: 2018-07-24 | Stop reason: SDUPTHER

## 2018-04-09 RX ORDER — LEVOTHYROXINE SODIUM 125 UG/1
TABLET ORAL
COMMUNITY
Start: 2018-04-02 | End: 2018-07-24 | Stop reason: SDUPTHER

## 2018-04-09 RX ORDER — HYDROQUINONE 40 MG/G
CREAM TOPICAL
Refills: 1 | COMMUNITY
Start: 2018-01-16 | End: 2018-07-24

## 2018-04-09 RX ORDER — AMLODIPINE BESYLATE 5 MG/1
5 TABLET ORAL DAILY
Qty: 90 TABLET | Refills: 3 | Status: SHIPPED | OUTPATIENT
Start: 2018-04-09 | End: 2019-01-21 | Stop reason: SDUPTHER

## 2018-04-09 RX ORDER — RIFAXIMIN 550 MG/1
TABLET ORAL
Refills: 0 | COMMUNITY
Start: 2018-02-26 | End: 2018-07-24

## 2018-04-09 NOTE — PROGRESS NOTES
Patient here for cosmetic removal of seborrheic keratoses on posterior neck and upper back.    Cryosurgery procedure note:  Verbal consent obtained from patient. Liquid nitrogen cryosurgery applied to 3 lesion(s) to produce a freeze injury. Counseled patient that blisters may form and instructed patient on wound care with gentle cleansing and use of Vaseline ointment to keep moist until healed. Handout was provided, and patient was instructed to return to clinic in 1-2 months if lesions do not completely resolve.

## 2018-04-09 NOTE — PROGRESS NOTES
Subjective:       Patient ID:  Tiarra Norton is a 63 y.o. female who presents for   Chief Complaint   Patient presents with    Fungus     toenails    Growth     neck and upper back     Fungus  - Initial  Affected locations: right toes and left toes  Duration: 6 months  Signs / symptoms: asymptomatic  Severity: moderate  Timing: constant  Aggravated by: nothing  Treatments tried: Jublia x 3 months; tried and failed 2 courses of PO Lamisil.  Improvement on treatment: no relief (worsening)    Growth  - Initial  Affected locations: neck and back  Duration: 4 months  Signs / symptoms: itching  Severity: moderate  Timing: constant  Aggravated by: nothing  Relieving factors/Treatments tried: nothing  Improvement on treatment: no relief        Review of Systems   Constitutional: Positive for weight loss (due to special diet). Negative for fever, chills, weight gain (due to special diet), fatigue, night sweats and malaise.   Skin: Positive for daily sunscreen use (in daily moisturizer). Negative for recent sunburn.   Hematologic/Lymphatic: Does not bruise/bleed easily.        Objective:    Physical Exam   Constitutional: She appears well-developed and well-nourished.   Neurological: She is alert and oriented to person, place, and time.   Psychiatric: She has a normal mood and affect.   Skin:   Areas Examined (abnormalities noted in diagram):   Head / Face Inspection Performed  Neck Inspection Performed  Chest / Axilla Inspection Performed  Back Inspection Performed  RLE Inspected  LLE Inspection Performed  Nails and Digits Inspection Performed                  Diagram Legend     Erythematous scaling macule/papule c/w actinic keratosis       Vascular papule c/w angioma      Pigmented verrucoid papule/plaque c/w seborrheic keratosis      Yellow umbilicated papule c/w sebaceous hyperplasia      Irregularly shaped tan macule c/w lentigo     1-2 mm smooth white papules consistent with Milia      Movable subcutaneous cyst  with punctum c/w epidermal inclusion cyst      Subcutaneous movable cyst c/w pilar cyst      Firm pink to brown papule c/w dermatofibroma      Pedunculated fleshy papule(s) c/w skin tag(s)      Evenly pigmented macule c/w junctional nevus     Mildly variegated pigmented, slightly irregular-bordered macule c/w mildly atypical nevus      Flesh colored to evenly pigmented papule c/w intradermal nevus       Pink pearly papule/plaque c/w basal cell carcinoma      Erythematous hyperkeratotic cursted plaque c/w SCC      Surgical scar with no sign of skin cancer recurrence      Open and closed comedones      Inflammatory papules and pustules      Verrucoid papule consistent consistent with wart     Erythematous eczematous patches and plaques     Dystrophic onycholytic nail with subungual debris c/w onychomycosis     Umbilicated papule    Erythematous-base heme-crusted tan verrucoid plaque consistent with inflamed seborrheic keratosis     Erythematous Silvery Scaling Plaque c/w Psoriasis     See annotation      Assessment / Plan:        Seborrheic keratoses  These are benign, inherited growths without a malignant potential. Reassurance given to patient. No treatment is necessary. Handout was provided.   Treatment of benign, asymptomatic lesions may be considered cosmetic. Warned about risk of hypo- or hyperpigmentation with treatment and risk of recurrence.    Onychodystrophy + suspected Onychomycosis  Fungal culture was performed today on affected nails.  Continue Jublia for now until fungal culture results.  Discussed that she may need itraconazole, since she previously failed PO Lamisil.    Follow-up in about 4 weeks (around 5/7/2018).

## 2018-04-12 ENCOUNTER — PATIENT MESSAGE (OUTPATIENT)
Dept: DERMATOLOGY | Facility: CLINIC | Age: 63
End: 2018-04-12

## 2018-05-10 LAB — FUNGUS BLD CULT: NORMAL

## 2018-05-11 ENCOUNTER — PATIENT MESSAGE (OUTPATIENT)
Dept: DERMATOLOGY | Facility: CLINIC | Age: 63
End: 2018-05-11

## 2018-05-14 ENCOUNTER — PATIENT MESSAGE (OUTPATIENT)
Dept: INTERNAL MEDICINE | Facility: CLINIC | Age: 63
End: 2018-05-14

## 2018-06-04 ENCOUNTER — LAB VISIT (OUTPATIENT)
Dept: LAB | Facility: HOSPITAL | Age: 63
End: 2018-06-04
Attending: PREVENTIVE MEDICINE
Payer: MEDICARE

## 2018-06-04 DIAGNOSIS — I51.9 MYXEDEMA HEART DISEASE: Primary | ICD-10-CM

## 2018-06-04 DIAGNOSIS — E03.9 MYXEDEMA HEART DISEASE: Primary | ICD-10-CM

## 2018-06-04 LAB
T3FREE SERPL-MCNC: 2.1 PG/ML
T4 FREE SERPL-MCNC: 0.95 NG/DL
TSH SERPL DL<=0.005 MIU/L-ACNC: 2.81 UIU/ML

## 2018-06-04 PROCEDURE — 84439 ASSAY OF FREE THYROXINE: CPT

## 2018-06-04 PROCEDURE — 84481 FREE ASSAY (FT-3): CPT

## 2018-06-04 PROCEDURE — 36415 COLL VENOUS BLD VENIPUNCTURE: CPT

## 2018-06-04 PROCEDURE — 80053 COMPREHEN METABOLIC PANEL: CPT

## 2018-06-04 PROCEDURE — 84443 ASSAY THYROID STIM HORMONE: CPT

## 2018-06-06 LAB
ALBUMIN SERPL BCP-MCNC: 4.3 G/DL
ALP SERPL-CCNC: 92 U/L
ALT SERPL W/O P-5'-P-CCNC: 23 U/L
ANION GAP SERPL CALC-SCNC: 13 MMOL/L
AST SERPL-CCNC: 26 U/L
BILIRUB SERPL-MCNC: 0.4 MG/DL
BUN SERPL-MCNC: 10 MG/DL
CALCIUM SERPL-MCNC: 9.9 MG/DL
CHLORIDE SERPL-SCNC: 106 MMOL/L
CO2 SERPL-SCNC: 23 MMOL/L
CREAT SERPL-MCNC: 1.1 MG/DL
EST. GFR  (AFRICAN AMERICAN): >60 ML/MIN/1.73 M^2
EST. GFR  (NON AFRICAN AMERICAN): 52.8 ML/MIN/1.73 M^2
GLUCOSE SERPL-MCNC: 82 MG/DL
POTASSIUM SERPL-SCNC: 4.6 MMOL/L
PROT SERPL-MCNC: 7.5 G/DL
SODIUM SERPL-SCNC: 142 MMOL/L

## 2018-06-11 ENCOUNTER — OFFICE VISIT (OUTPATIENT)
Dept: DERMATOLOGY | Facility: CLINIC | Age: 63
End: 2018-06-11
Payer: MEDICARE

## 2018-06-11 DIAGNOSIS — L81.4 LENTIGO: ICD-10-CM

## 2018-06-11 DIAGNOSIS — L60.3 ONYCHODYSTROPHY: Primary | ICD-10-CM

## 2018-06-11 DIAGNOSIS — L82.0 INFLAMED SEBORRHEIC KERATOSIS: ICD-10-CM

## 2018-06-11 PROCEDURE — 87101 SKIN FUNGI CULTURE: CPT

## 2018-06-11 PROCEDURE — 99213 OFFICE O/P EST LOW 20 MIN: CPT | Mod: 25,S$GLB,, | Performed by: DERMATOLOGY

## 2018-06-11 PROCEDURE — 17110 DESTRUCTION B9 LES UP TO 14: CPT | Mod: S$GLB,,, | Performed by: DERMATOLOGY

## 2018-06-11 NOTE — PROGRESS NOTES
Subjective:       Patient ID:  Tiarra Norton is a 63 y.o. female who presents for   Chief Complaint   Patient presents with    Nail Problem     toes, not better    Lesion     right ankle, 6 months, itchy, scaly     Pt is here today for follow up on fungus on her toes. Pt states that her toes are the same. She also complains of lesion to her right ankle that has been present for about 6 months. She states that the lesion is itchy. No prev TX      Nail Problem  - Follow-up  Diagnosis: onychodystrophy.  Symptom course: stable  Currently using: nothing, but previously used Jublia for almost a year with no change.  Affected locations: right toes and left toes  Signs / symptoms: asymptomatic  Severity: mild to moderate    Lesion  - Initial  Affected locations: right ankle  Duration: 6 months  Signs / symptoms: itching  Severity: mild  Timing: constant  Aggravated by: nothing  Relieving factors/Treatments tried: nothing  Improvement on treatment: no relief      Review of Systems   Skin: Positive for itching. Negative for rash.   Hematologic/Lymphatic: Does not bruise/bleed easily.        Objective:    Physical Exam   Constitutional: She appears well-developed and well-nourished. No distress.   Neurological: She is alert and oriented to person, place, and time. She is not disoriented.   Psychiatric: She has a normal mood and affect.   Skin:   Areas Examined (abnormalities noted in diagram):   Head / Face Inspection Performed  Neck Inspection Performed  Chest / Axilla Inspection Performed  RLE Inspected  LLE Inspection Performed  Nails and Digits Inspection Performed                  Diagram Legend     Erythematous scaling macule/papule c/w actinic keratosis       Vascular papule c/w angioma      Pigmented verrucoid papule/plaque c/w seborrheic keratosis      Yellow umbilicated papule c/w sebaceous hyperplasia      Irregularly shaped tan macule c/w lentigo     1-2 mm smooth white papules consistent with Milia       Movable subcutaneous cyst with punctum c/w epidermal inclusion cyst      Subcutaneous movable cyst c/w pilar cyst      Firm pink to brown papule c/w dermatofibroma      Pedunculated fleshy papule(s) c/w skin tag(s)      Evenly pigmented macule c/w junctional nevus     Mildly variegated pigmented, slightly irregular-bordered macule c/w mildly atypical nevus      Flesh colored to evenly pigmented papule c/w intradermal nevus       Pink pearly papule/plaque c/w basal cell carcinoma      Erythematous hyperkeratotic cursted plaque c/w SCC      Surgical scar with no sign of skin cancer recurrence      Open and closed comedones      Inflammatory papules and pustules      Verrucoid papule consistent consistent with wart     Erythematous eczematous patches and plaques     Dystrophic onycholytic nail with subungual debris c/w onychomycosis     Umbilicated papule    Erythematous-base heme-crusted tan verrucoid plaque consistent with inflamed seborrheic keratosis     Erythematous Silvery Scaling Plaque c/w Psoriasis     See annotation      Assessment / Plan:        Onychodystrophy  -     Fungal culture , skin, hair, or nails    Inflamed seborrheic keratosis  Cryosurgery procedure note:  Risk, benefits, and alternatives of cryosurgery are discussed with the patient, including risk of hypo- or hyperpigmentation, scar, infection, recurrence, and need for additional treatment of site. Verbal consent obtained from patient. Liquid nitrogen cryosurgery applied to 2 lesion(s) to produce a freeze injury. Counseled patient that blisters may form and instructed patient on wound care with gentle cleansing and use of Vaseline ointment to keep moist until healed. Handout was provided, and patient was instructed to return to clinic in 1-2 months if lesions do not completely resolve.    Lentigo  These are benign sun spots which should be monitored for changes. Patient instructed in importance of daily broad-spectrum sunscreen use with SPF of  at least 30. Sun avoidance and topical protection/protective clothing discussed.    Follow-up in about 4 weeks (around 7/9/2018).

## 2018-07-11 ENCOUNTER — PATIENT MESSAGE (OUTPATIENT)
Dept: DERMATOLOGY | Facility: CLINIC | Age: 63
End: 2018-07-11

## 2018-07-12 LAB — FUNGUS BLD CULT: NORMAL

## 2018-07-13 ENCOUNTER — LAB VISIT (OUTPATIENT)
Dept: LAB | Facility: HOSPITAL | Age: 63
End: 2018-07-13
Attending: PREVENTIVE MEDICINE
Payer: MEDICARE

## 2018-07-13 DIAGNOSIS — E78.00 PURE HYPERCHOLESTEROLEMIA: Primary | ICD-10-CM

## 2018-07-13 DIAGNOSIS — E03.9 PRIMARY HYPOTHYROIDISM: ICD-10-CM

## 2018-07-13 LAB
CRP SERPL-MCNC: 4.52 MG/L
T3FREE SERPL-MCNC: 2.1 PG/ML
T4 FREE SERPL-MCNC: 0.97 NG/DL
TSH SERPL DL<=0.005 MIU/L-ACNC: 1.09 UIU/ML

## 2018-07-13 PROCEDURE — 36415 COLL VENOUS BLD VENIPUNCTURE: CPT

## 2018-07-13 PROCEDURE — 84439 ASSAY OF FREE THYROXINE: CPT

## 2018-07-13 PROCEDURE — 84481 FREE ASSAY (FT-3): CPT

## 2018-07-13 PROCEDURE — 84443 ASSAY THYROID STIM HORMONE: CPT

## 2018-07-13 PROCEDURE — 86141 C-REACTIVE PROTEIN HS: CPT

## 2018-07-16 ENCOUNTER — PROCEDURE VISIT (OUTPATIENT)
Dept: DERMATOLOGY | Facility: CLINIC | Age: 63
End: 2018-07-16

## 2018-07-16 DIAGNOSIS — L82.1 SEBORRHEIC KERATOSES: Primary | ICD-10-CM

## 2018-07-16 PROCEDURE — 99499 UNLISTED E&M SERVICE: CPT | Mod: S$GLB,,, | Performed by: DERMATOLOGY

## 2018-07-16 NOTE — PROGRESS NOTES
Subjective:       Patient ID:  Tiarra Norton is a 63 y.o. female who presents for   Chief Complaint   Patient presents with    Growth     Patient presents today for trial of Eskata for the treatment of seborrheic keratoses on her trunk and extremities.        Review of Systems     Objective:    Physical Exam   Constitutional: She appears well-developed and well-nourished. No distress.   Neurological: She is alert and oriented to person, place, and time. She is not disoriented.   Psychiatric: She has a normal mood and affect.   Skin:   Areas Examined (abnormalities noted in diagram):   Head / Face Inspection Performed  Neck Inspection Performed  Chest / Axilla Inspection Performed  Abdomen Inspection Performed  Genitals / Buttocks / Groin Inspection Performed  Back Inspection Performed  RUE Inspected  LUE Inspection Performed  RLE Inspected  LLE Inspection Performed              Diagram Legend     Erythematous scaling macule/papule c/w actinic keratosis       Vascular papule c/w angioma      Pigmented verrucoid papule/plaque c/w seborrheic keratosis      Yellow umbilicated papule c/w sebaceous hyperplasia      Irregularly shaped tan macule c/w lentigo     1-2 mm smooth white papules consistent with Milia      Movable subcutaneous cyst with punctum c/w epidermal inclusion cyst      Subcutaneous movable cyst c/w pilar cyst      Firm pink to brown papule c/w dermatofibroma      Pedunculated fleshy papule(s) c/w skin tag(s)      Evenly pigmented macule c/w junctional nevus     Mildly variegated pigmented, slightly irregular-bordered macule c/w mildly atypical nevus      Flesh colored to evenly pigmented papule c/w intradermal nevus       Pink pearly papule/plaque c/w basal cell carcinoma      Erythematous hyperkeratotic cursted plaque c/w SCC      Surgical scar with no sign of skin cancer recurrence      Open and closed comedones      Inflammatory papules and pustules      Verrucoid papule consistent consistent  with wart     Erythematous eczematous patches and plaques     Dystrophic onycholytic nail with subungual debris c/w onychomycosis     Umbilicated papule    Erythematous-base heme-crusted tan verrucoid plaque consistent with inflamed seborrheic keratosis     Erythematous Silvery Scaling Plaque c/w Psoriasis     See annotation      Assessment / Plan:        Seborrheic keratoses    Procedure note for destruction via application of Eskata:    Discussed risks, benefits, alternatives, and side effects of Eskata, including redness, swelling, stinging, crusting, discoloration, ulceration, scarring, incomplete removal, and recurrence. Verbal consent was obtained.   5 lesions were cleaned with alcohol, followed by the application of Eskata x 3-4 passes. No complications. Patient tolerated the procedure well.  Areas were dressed with Vaseline jelly and bandage, and wound care was discussed.    Eskata NDC: 50908-594-01    Follow-up in about 2 weeks (around 7/30/2018) for or sooner if symptoms worsening or not improving.

## 2018-07-19 ENCOUNTER — PATIENT MESSAGE (OUTPATIENT)
Dept: OBSTETRICS AND GYNECOLOGY | Facility: CLINIC | Age: 63
End: 2018-07-19

## 2018-07-19 ENCOUNTER — TELEPHONE (OUTPATIENT)
Dept: OBSTETRICS AND GYNECOLOGY | Facility: CLINIC | Age: 63
End: 2018-07-19

## 2018-07-19 NOTE — TELEPHONE ENCOUNTER
----- Message from Dorene Carson sent at 7/19/2018 10:22 AM CDT -----            Name of Who is Calling: Josy([facility])      What is the request in detail:Pharmacist states the pt was trying to get them to do a testosterone compound for one percent instead of two percent. Please call to discuss.      Can the clinic reply by [facility]: no      What Number to Call Back if not in [facility]: [phone]

## 2018-07-23 ENCOUNTER — TELEPHONE (OUTPATIENT)
Dept: INTERNAL MEDICINE | Facility: CLINIC | Age: 63
End: 2018-07-23

## 2018-07-23 NOTE — TELEPHONE ENCOUNTER
----- Message from Ayla Roman sent at 7/23/2018  4:31 PM CDT -----  Contact: pt [phone]  Pt was stung by a wasp about a week ago around her eyebrow and she states that it is still tinder and would like to talk to someone about this. Please advise.

## 2018-07-23 NOTE — TELEPHONE ENCOUNTER
Called and spoke to pt. Pt stated she got stung by a wasp about a week ago on her eye brow, was swollen for a few days and went down, pt is still having pain in the area and pt states the bone on eyebrow is tender. Pls advise what pt should do. Should she come in or sched for optho or with you?

## 2018-07-23 NOTE — TELEPHONE ENCOUNTER
----- Message from Ayla Roman sent at 7/23/2018  4:31 PM CDT -----  Contact: pt [phone]  Pt was stung by a wasp about a week ago around her eyebrow and she states that it is still tinder and would like to talk to someone about this. Please advise.

## 2018-07-23 NOTE — TELEPHONE ENCOUNTER
Pt states she was stung by a wasp a week ago on her right eyebrow. Pt states it is still swollen, really sore and itching her. Pt denies any other symptoms but would like to know what PCP recommends. Pt would like to know if pcp suggest a cream that can be used or Should she be seen by optometry? (her vision is not affected, just swollen and sore)  Please advise

## 2018-07-24 ENCOUNTER — OFFICE VISIT (OUTPATIENT)
Dept: INTERNAL MEDICINE | Facility: CLINIC | Age: 63
End: 2018-07-24
Payer: MEDICARE

## 2018-07-24 VITALS
HEART RATE: 73 BPM | WEIGHT: 184.5 LBS | SYSTOLIC BLOOD PRESSURE: 126 MMHG | DIASTOLIC BLOOD PRESSURE: 72 MMHG | OXYGEN SATURATION: 97 % | HEIGHT: 63 IN | BODY MASS INDEX: 32.69 KG/M2

## 2018-07-24 DIAGNOSIS — E03.4 HYPOTHYROIDISM DUE TO ACQUIRED ATROPHY OF THYROID: ICD-10-CM

## 2018-07-24 DIAGNOSIS — T63.481A INSECT STINGS, ACCIDENTAL OR UNINTENTIONAL, INITIAL ENCOUNTER: Primary | ICD-10-CM

## 2018-07-24 DIAGNOSIS — M89.8X9 BONE PAIN: ICD-10-CM

## 2018-07-24 PROCEDURE — 99999 PR PBB SHADOW E&M-EST. PATIENT-LVL IV: CPT | Mod: PBBFAC,,, | Performed by: INTERNAL MEDICINE

## 2018-07-24 PROCEDURE — 99213 OFFICE O/P EST LOW 20 MIN: CPT | Mod: S$GLB,,, | Performed by: INTERNAL MEDICINE

## 2018-07-24 PROCEDURE — 3078F DIAST BP <80 MM HG: CPT | Mod: CPTII,S$GLB,, | Performed by: INTERNAL MEDICINE

## 2018-07-24 PROCEDURE — 3074F SYST BP LT 130 MM HG: CPT | Mod: CPTII,S$GLB,, | Performed by: INTERNAL MEDICINE

## 2018-07-24 PROCEDURE — 3008F BODY MASS INDEX DOCD: CPT | Mod: CPTII,S$GLB,, | Performed by: INTERNAL MEDICINE

## 2018-07-24 NOTE — PROGRESS NOTES
Subjective:       Patient ID: Tiarra Norton is a 63 y.o. female.    Chief Complaint: Insect Bite (over right eye)    HPI   Was stung over R eyebrow about 1 week ago. r side of face wnet numb and was puffy for 1-2 days.  Are of sting is irritated by swim goggles.  Itching has resolved.      She is seeing a NP who has incr synthroid to 325 mcg daily? Since end of February.  She had a TSH this month  which was normal.      Also taking q evail, gluatgenics powder, strovera, healthy nickolas, optique eye crops, ultrclear renew protein powder, advaclear,  Vit D 5000 iu dialy, b supreme, cod liver oil.    New psychiatrist is Rahel Prabhakar.    Review of Systems   Constitutional: Negative for fever and unexpected weight change.   HENT: Negative for congestion and postnasal drip.    Eyes: Negative for pain, discharge and visual disturbance.   Respiratory: Negative for cough, chest tightness, shortness of breath and wheezing.    Cardiovascular: Negative for chest pain and leg swelling.   Gastrointestinal: Negative for abdominal pain, constipation, diarrhea and nausea.   Genitourinary: Negative for difficulty urinating, dysuria and hematuria.   Skin: Negative for rash.   Neurological: Negative for headaches.   Psychiatric/Behavioral: Negative for dysphoric mood and sleep disturbance. The patient is not nervous/anxious.        Objective:      Physical Exam   Constitutional: She is oriented to person, place, and time. She appears well-developed and well-nourished. No distress.   Neurological: She is alert and oriented to person, place, and time.   Skin:   Tiny tash r lower brow where she was stung.  Area of pain superior orbital bone - no significant tenderness; no erythema, swelling.         Assessment:         Tiarra was seen today for insect bite.    Diagnoses and all orders for this visit:    Insect stings, accidental or unintentional, initial encounter    Bone pain       - may be due to googles  Hypothyroidism due to  acquired atrophy of thyroid      Plan:       Tiarra was seen today for insect bite.    Diagnoses and all orders for this visit:    Insect stings, accidental or unintentional, initial encounter    Bone pain    Hypothyroidism due to acquired atrophy of thyroid         Call  Nataliia Santana [phone] - to verify synthroid dose.  She may be at risk for iatrogenic hyperthyroidism.

## 2018-08-22 ENCOUNTER — PATIENT MESSAGE (OUTPATIENT)
Dept: INTERNAL MEDICINE | Facility: CLINIC | Age: 63
End: 2018-08-22

## 2018-08-24 ENCOUNTER — PATIENT MESSAGE (OUTPATIENT)
Dept: INTERNAL MEDICINE | Facility: CLINIC | Age: 63
End: 2018-08-24

## 2018-08-27 RX ORDER — LEVOTHYROXINE SODIUM 137 UG/1
137 TABLET ORAL EVERY MORNING
Qty: 30 TABLET | Refills: 0 | Status: SHIPPED | OUTPATIENT
Start: 2018-08-27 | End: 2019-01-21 | Stop reason: SDUPTHER

## 2018-10-22 ENCOUNTER — LAB VISIT (OUTPATIENT)
Dept: LAB | Facility: HOSPITAL | Age: 63
End: 2018-10-22
Payer: MEDICARE

## 2018-10-22 DIAGNOSIS — R94.5 NONSPECIFIC ABNORMAL RESULTS OF LIVER FUNCTION STUDY: ICD-10-CM

## 2018-10-22 DIAGNOSIS — E78.00 PURE HYPERCHOLESTEROLEMIA: Primary | ICD-10-CM

## 2018-10-22 DIAGNOSIS — R79.89 HYPOURICEMIA: ICD-10-CM

## 2018-10-22 LAB
ALBUMIN SERPL BCP-MCNC: 3.8 G/DL
ALP SERPL-CCNC: 87 U/L
ALT SERPL W/O P-5'-P-CCNC: 35 U/L
ANION GAP SERPL CALC-SCNC: 8 MMOL/L
AST SERPL-CCNC: 28 U/L
BILIRUB SERPL-MCNC: 0.4 MG/DL
BUN SERPL-MCNC: 14 MG/DL
CALCIUM SERPL-MCNC: 9.9 MG/DL
CHLORIDE SERPL-SCNC: 103 MMOL/L
CO2 SERPL-SCNC: 27 MMOL/L
CREAT SERPL-MCNC: 0.9 MG/DL
CRP SERPL-MCNC: 4.08 MG/L
EST. GFR  (AFRICAN AMERICAN): >60 ML/MIN/1.73 M^2
EST. GFR  (NON AFRICAN AMERICAN): >60 ML/MIN/1.73 M^2
ESTIMATED AVG GLUCOSE: 111 MG/DL
GLUCOSE SERPL-MCNC: 122 MG/DL
HBA1C MFR BLD HPLC: 5.5 %
INSULIN COLLECTION INTERVAL: NORMAL
INSULIN SERPL-ACNC: 19.4 UU/ML
POTASSIUM SERPL-SCNC: 4.7 MMOL/L
PROT SERPL-MCNC: 7 G/DL
SODIUM SERPL-SCNC: 138 MMOL/L
T3FREE SERPL-MCNC: 2.1 PG/ML
T4 FREE SERPL-MCNC: 0.78 NG/DL
TSH SERPL DL<=0.005 MIU/L-ACNC: 3.35 UIU/ML

## 2018-10-22 PROCEDURE — 36415 COLL VENOUS BLD VENIPUNCTURE: CPT | Mod: HCNC

## 2018-10-22 PROCEDURE — 80053 COMPREHEN METABOLIC PANEL: CPT | Mod: HCNC

## 2018-10-22 PROCEDURE — 83036 HEMOGLOBIN GLYCOSYLATED A1C: CPT | Mod: HCNC

## 2018-10-22 PROCEDURE — 84439 ASSAY OF FREE THYROXINE: CPT | Mod: HCNC

## 2018-10-22 PROCEDURE — 83525 ASSAY OF INSULIN: CPT | Mod: HCNC

## 2018-10-22 PROCEDURE — 84443 ASSAY THYROID STIM HORMONE: CPT | Mod: HCNC

## 2018-10-22 PROCEDURE — 86141 C-REACTIVE PROTEIN HS: CPT | Mod: HCNC

## 2018-10-22 PROCEDURE — 84481 FREE ASSAY (FT-3): CPT | Mod: HCNC

## 2018-11-19 ENCOUNTER — PATIENT MESSAGE (OUTPATIENT)
Dept: INTERNAL MEDICINE | Facility: CLINIC | Age: 63
End: 2018-11-19

## 2018-11-30 ENCOUNTER — TELEPHONE (OUTPATIENT)
Dept: DERMATOLOGY | Facility: CLINIC | Age: 63
End: 2018-11-30

## 2018-11-30 NOTE — TELEPHONE ENCOUNTER
L/M on pt.'s cell, she's to call to schedule.    ----- Message from Arminda Mattson sent at 11/30/2018 12:59 PM CST -----  Contact: MARION NIETO [[id number]]            Name of Who is Calling: MARION NIETO [[id number]]    What is the request in detail: Patient called for an appointment she would like to be seen as soon as possible.       Can the clinic reply by [facility]: no      What Number to Call Back if not in [facility]: [phone]

## 2018-12-03 ENCOUNTER — OFFICE VISIT (OUTPATIENT)
Dept: DERMATOLOGY | Facility: CLINIC | Age: 63
End: 2018-12-03
Payer: MEDICARE

## 2018-12-03 DIAGNOSIS — L82.0 INFLAMED SEBORRHEIC KERATOSIS: Primary | ICD-10-CM

## 2018-12-03 DIAGNOSIS — R58 BLEEDING: ICD-10-CM

## 2018-12-03 PROCEDURE — 99499 UNLISTED E&M SERVICE: CPT | Mod: S$GLB,,, | Performed by: DERMATOLOGY

## 2018-12-03 PROCEDURE — 17110 DESTRUCTION B9 LES UP TO 14: CPT | Mod: S$GLB,,, | Performed by: DERMATOLOGY

## 2018-12-03 NOTE — PROGRESS NOTES
Subjective:       Patient ID:  Tiarra Norton is a 63 y.o. female who presents for   Chief Complaint   Patient presents with    Lesion     left hairline. 3 months      Pt is here today with a c/o of lesion to her left hairline. Pt states that she noticed lesion a few months ago and it has been growing. Pt also states that lesion bleeds when she brushes her hair.       Lesion  - Initial  Affected locations: face (left hairline)  Duration: 3 months  Signs / symptoms: growing and bleeding  Severity: mild  Timing: constant  Aggravated by: friction (hair brush)  Relieving factors/Treatments tried: nothing      Review of Systems   Skin: Negative for itching and rash.        Objective:    Physical Exam   Constitutional: She appears well-developed and well-nourished. No distress.   Neurological: She is alert and oriented to person, place, and time. She is not disoriented.   Psychiatric: She has a normal mood and affect.   Skin:   Areas Examined (abnormalities noted in diagram):   Head / Face Inspection Performed              Diagram Legend     Erythematous scaling macule/papule c/w actinic keratosis       Vascular papule c/w angioma      Pigmented verrucoid papule/plaque c/w seborrheic keratosis      Yellow umbilicated papule c/w sebaceous hyperplasia      Irregularly shaped tan macule c/w lentigo     1-2 mm smooth white papules consistent with Milia      Movable subcutaneous cyst with punctum c/w epidermal inclusion cyst      Subcutaneous movable cyst c/w pilar cyst      Firm pink to brown papule c/w dermatofibroma      Pedunculated fleshy papule(s) c/w skin tag(s)      Evenly pigmented macule c/w junctional nevus     Mildly variegated pigmented, slightly irregular-bordered macule c/w mildly atypical nevus      Flesh colored to evenly pigmented papule c/w intradermal nevus       Pink pearly papule/plaque c/w basal cell carcinoma      Erythematous hyperkeratotic cursted plaque c/w SCC      Surgical scar with no sign  of skin cancer recurrence      Open and closed comedones      Inflammatory papules and pustules      Verrucoid papule consistent consistent with wart     Erythematous eczematous patches and plaques     Dystrophic onycholytic nail with subungual debris c/w onychomycosis     Umbilicated papule    Erythematous-base heme-crusted tan verrucoid plaque consistent with inflamed seborrheic keratosis     Erythematous Silvery Scaling Plaque c/w Psoriasis     See annotation      Assessment / Plan:        Inflamed seborrheic keratosis  Bleeding  Cryosurgery procedure note:  Risk, benefits, and alternatives of cryosurgery are discussed with the patient, including risk of hypo- or hyperpigmentation, scar, infection, recurrence, and need for additional treatment of site. Verbal consent obtained from patient. Liquid nitrogen cryosurgery applied to 1 lesion(s) to produce a freeze injury. Counseled patient that blisters may form and instructed patient on wound care with gentle cleansing and use of Vaseline ointment to keep moist until healed. Handout was provided, and patient was instructed to return to clinic in 1-2 months if lesions do not completely resolve.    Follow-up if symptoms worsen or fail to improve.

## 2019-01-08 ENCOUNTER — TELEPHONE (OUTPATIENT)
Dept: ADMINISTRATIVE | Facility: HOSPITAL | Age: 64
End: 2019-01-08

## 2019-01-08 DIAGNOSIS — E03.9 HYPOTHYROIDISM, UNSPECIFIED TYPE: ICD-10-CM

## 2019-01-08 DIAGNOSIS — E78.5 HYPERLIPIDEMIA, UNSPECIFIED HYPERLIPIDEMIA TYPE: ICD-10-CM

## 2019-01-08 DIAGNOSIS — Z00.00 HEALTH MAINTENANCE EXAMINATION: Primary | ICD-10-CM

## 2019-01-08 DIAGNOSIS — I10 HYPERTENSION, UNSPECIFIED TYPE: ICD-10-CM

## 2019-01-10 ENCOUNTER — TELEPHONE (OUTPATIENT)
Dept: INTERNAL MEDICINE | Facility: CLINIC | Age: 64
End: 2019-01-10

## 2019-01-10 DIAGNOSIS — E03.9 HYPOTHYROIDISM, UNSPECIFIED TYPE: Primary | ICD-10-CM

## 2019-01-14 ENCOUNTER — LAB VISIT (OUTPATIENT)
Dept: LAB | Facility: HOSPITAL | Age: 64
End: 2019-01-14
Attending: INTERNAL MEDICINE
Payer: MEDICARE

## 2019-01-14 DIAGNOSIS — E78.5 HYPERLIPIDEMIA, UNSPECIFIED HYPERLIPIDEMIA TYPE: ICD-10-CM

## 2019-01-14 DIAGNOSIS — I10 HYPERTENSION, UNSPECIFIED TYPE: ICD-10-CM

## 2019-01-14 DIAGNOSIS — E03.9 HYPOTHYROIDISM, UNSPECIFIED TYPE: ICD-10-CM

## 2019-01-14 DIAGNOSIS — Z00.00 HEALTH MAINTENANCE EXAMINATION: ICD-10-CM

## 2019-01-14 LAB
ALBUMIN SERPL BCP-MCNC: 4 G/DL
ALP SERPL-CCNC: 96 U/L
ALT SERPL W/O P-5'-P-CCNC: 126 U/L
ANION GAP SERPL CALC-SCNC: 8 MMOL/L
AST SERPL-CCNC: 52 U/L
BASOPHILS # BLD AUTO: 0.07 K/UL
BASOPHILS NFR BLD: 1.3 %
BILIRUB SERPL-MCNC: 0.4 MG/DL
BUN SERPL-MCNC: 10 MG/DL
CALCIUM SERPL-MCNC: 9.8 MG/DL
CHLORIDE SERPL-SCNC: 107 MMOL/L
CHOLEST SERPL-MCNC: 203 MG/DL
CHOLEST/HDLC SERPL: 3.4 {RATIO}
CO2 SERPL-SCNC: 26 MMOL/L
CREAT SERPL-MCNC: 1 MG/DL
DIFFERENTIAL METHOD: ABNORMAL
EOSINOPHIL # BLD AUTO: 0.3 K/UL
EOSINOPHIL NFR BLD: 5.7 %
ERYTHROCYTE [DISTWIDTH] IN BLOOD BY AUTOMATED COUNT: 12.6 %
EST. GFR  (AFRICAN AMERICAN): >60 ML/MIN/1.73 M^2
EST. GFR  (NON AFRICAN AMERICAN): 59 ML/MIN/1.73 M^2
GLUCOSE SERPL-MCNC: 123 MG/DL
HCT VFR BLD AUTO: 41.9 %
HDLC SERPL-MCNC: 59 MG/DL
HDLC SERPL: 29.1 %
HGB BLD-MCNC: 12.9 G/DL
LDLC SERPL CALC-MCNC: 102.2 MG/DL
LYMPHOCYTES # BLD AUTO: 1.9 K/UL
LYMPHOCYTES NFR BLD: 35.3 %
MCH RBC QN AUTO: 30.1 PG
MCHC RBC AUTO-ENTMCNC: 30.8 G/DL
MCV RBC AUTO: 98 FL
MONOCYTES # BLD AUTO: 0.4 K/UL
MONOCYTES NFR BLD: 7.4 %
NEUTROPHILS # BLD AUTO: 2.7 K/UL
NEUTROPHILS NFR BLD: 50.1 %
NONHDLC SERPL-MCNC: 144 MG/DL
PLATELET # BLD AUTO: 242 K/UL
PMV BLD AUTO: 9.8 FL
POTASSIUM SERPL-SCNC: 5.2 MMOL/L
PROT SERPL-MCNC: 6.7 G/DL
RBC # BLD AUTO: 4.29 M/UL
SODIUM SERPL-SCNC: 141 MMOL/L
T3 SERPL-MCNC: 83 NG/DL
TRIGL SERPL-MCNC: 209 MG/DL
TSH SERPL DL<=0.005 MIU/L-ACNC: 3.3 UIU/ML
WBC # BLD AUTO: 5.3 K/UL

## 2019-01-14 PROCEDURE — 85025 COMPLETE CBC W/AUTO DIFF WBC: CPT | Mod: HCNC

## 2019-01-14 PROCEDURE — 80053 COMPREHEN METABOLIC PANEL: CPT | Mod: HCNC

## 2019-01-14 PROCEDURE — 36415 COLL VENOUS BLD VENIPUNCTURE: CPT | Mod: HCNC

## 2019-01-14 PROCEDURE — 84480 ASSAY TRIIODOTHYRONINE (T3): CPT | Mod: HCNC

## 2019-01-14 PROCEDURE — 80061 LIPID PANEL: CPT | Mod: HCNC

## 2019-01-14 PROCEDURE — 84443 ASSAY THYROID STIM HORMONE: CPT | Mod: HCNC

## 2019-01-21 ENCOUNTER — OFFICE VISIT (OUTPATIENT)
Dept: INTERNAL MEDICINE | Facility: CLINIC | Age: 64
End: 2019-01-21
Payer: MEDICARE

## 2019-01-21 ENCOUNTER — IMMUNIZATION (OUTPATIENT)
Dept: INTERNAL MEDICINE | Facility: CLINIC | Age: 64
End: 2019-01-21
Payer: MEDICARE

## 2019-01-21 VITALS
WEIGHT: 191 LBS | DIASTOLIC BLOOD PRESSURE: 70 MMHG | HEART RATE: 79 BPM | OXYGEN SATURATION: 98 % | SYSTOLIC BLOOD PRESSURE: 134 MMHG | HEIGHT: 63 IN | BODY MASS INDEX: 33.84 KG/M2

## 2019-01-21 DIAGNOSIS — N95.9 MENOPAUSAL DISORDER: ICD-10-CM

## 2019-01-21 DIAGNOSIS — I10 ESSENTIAL HYPERTENSION: ICD-10-CM

## 2019-01-21 DIAGNOSIS — G43.909 MIGRAINE WITHOUT STATUS MIGRAINOSUS, NOT INTRACTABLE, UNSPECIFIED MIGRAINE TYPE: ICD-10-CM

## 2019-01-21 DIAGNOSIS — E78.5 HYPERLIPIDEMIA, UNSPECIFIED HYPERLIPIDEMIA TYPE: ICD-10-CM

## 2019-01-21 DIAGNOSIS — Z12.31 ENCOUNTER FOR SCREENING MAMMOGRAM FOR MALIGNANT NEOPLASM OF BREAST: ICD-10-CM

## 2019-01-21 DIAGNOSIS — Z23 NEED FOR PNEUMOCOCCAL VACCINATION: Primary | ICD-10-CM

## 2019-01-21 DIAGNOSIS — R74.8 ELEVATED LIVER ENZYMES: ICD-10-CM

## 2019-01-21 DIAGNOSIS — Z00.00 ANNUAL PHYSICAL EXAM: ICD-10-CM

## 2019-01-21 DIAGNOSIS — G47.33 OSA (OBSTRUCTIVE SLEEP APNEA): ICD-10-CM

## 2019-01-21 PROCEDURE — 3078F PR MOST RECENT DIASTOLIC BLOOD PRESSURE < 80 MM HG: ICD-10-PCS | Mod: CPTII,HCNC,S$GLB, | Performed by: INTERNAL MEDICINE

## 2019-01-21 PROCEDURE — 90662 IIV NO PRSV INCREASED AG IM: CPT | Mod: HCNC,S$GLB,, | Performed by: INTERNAL MEDICINE

## 2019-01-21 PROCEDURE — 99999 PR PBB SHADOW E&M-EST. PATIENT-LVL IV: ICD-10-PCS | Mod: PBBFAC,HCNC,, | Performed by: INTERNAL MEDICINE

## 2019-01-21 PROCEDURE — 90670 PCV13 VACCINE IM: CPT | Mod: HCNC,S$GLB,, | Performed by: INTERNAL MEDICINE

## 2019-01-21 PROCEDURE — 99397 PR PREVENTIVE VISIT,EST,65 & OVER: ICD-10-PCS | Mod: 25,HCNC,S$GLB, | Performed by: INTERNAL MEDICINE

## 2019-01-21 PROCEDURE — 90662 FLU VACCINE - HIGH DOSE (65+) PRESERVATIVE FREE IM: ICD-10-PCS | Mod: HCNC,S$GLB,, | Performed by: INTERNAL MEDICINE

## 2019-01-21 PROCEDURE — 3075F PR MOST RECENT SYSTOLIC BLOOD PRESS GE 130-139MM HG: ICD-10-PCS | Mod: CPTII,HCNC,S$GLB, | Performed by: INTERNAL MEDICINE

## 2019-01-21 PROCEDURE — 3078F DIAST BP <80 MM HG: CPT | Mod: CPTII,HCNC,S$GLB, | Performed by: INTERNAL MEDICINE

## 2019-01-21 PROCEDURE — G0009 PNEUMOCOCCAL CONJUGATE VACCINE 13-VALENT LESS THAN 5YO & GREATER THAN: ICD-10-PCS | Mod: HCNC,S$GLB,, | Performed by: INTERNAL MEDICINE

## 2019-01-21 PROCEDURE — G0008 ADMIN INFLUENZA VIRUS VAC: HCPCS | Mod: HCNC,S$GLB,, | Performed by: INTERNAL MEDICINE

## 2019-01-21 PROCEDURE — 3075F SYST BP GE 130 - 139MM HG: CPT | Mod: CPTII,HCNC,S$GLB, | Performed by: INTERNAL MEDICINE

## 2019-01-21 PROCEDURE — G0009 ADMIN PNEUMOCOCCAL VACCINE: HCPCS | Mod: HCNC,S$GLB,, | Performed by: INTERNAL MEDICINE

## 2019-01-21 PROCEDURE — 90670 PNEUMOCOCCAL CONJUGATE VACCINE 13-VALENT LESS THAN 5YO & GREATER THAN: ICD-10-PCS | Mod: HCNC,S$GLB,, | Performed by: INTERNAL MEDICINE

## 2019-01-21 PROCEDURE — 99999 PR PBB SHADOW E&M-EST. PATIENT-LVL IV: CPT | Mod: PBBFAC,HCNC,, | Performed by: INTERNAL MEDICINE

## 2019-01-21 PROCEDURE — 99397 PER PM REEVAL EST PAT 65+ YR: CPT | Mod: 25,HCNC,S$GLB, | Performed by: INTERNAL MEDICINE

## 2019-01-21 PROCEDURE — G0008 FLU VACCINE - HIGH DOSE (65+) PRESERVATIVE FREE IM: ICD-10-PCS | Mod: HCNC,S$GLB,, | Performed by: INTERNAL MEDICINE

## 2019-01-21 RX ORDER — LIOTHYRONINE SODIUM 5 UG/1
TABLET ORAL
COMMUNITY
Start: 2018-12-12 | End: 2019-01-21 | Stop reason: SDUPTHER

## 2019-01-21 RX ORDER — LISINOPRIL 40 MG/1
40 TABLET ORAL DAILY
Qty: 90 TABLET | Refills: 3 | Status: SHIPPED | OUTPATIENT
Start: 2019-01-21 | End: 2019-11-29

## 2019-01-21 RX ORDER — OXYCODONE AND ACETAMINOPHEN 10; 325 MG/1; MG/1
1 TABLET ORAL EVERY 4 HOURS PRN
Qty: 60 TABLET | Refills: 0 | Status: SHIPPED | OUTPATIENT
Start: 2019-01-21 | End: 2019-01-23 | Stop reason: SDUPTHER

## 2019-01-21 RX ORDER — AMLODIPINE BESYLATE 5 MG/1
5 TABLET ORAL DAILY
Qty: 90 TABLET | Refills: 3 | Status: SHIPPED | OUTPATIENT
Start: 2019-01-21 | End: 2020-04-08 | Stop reason: SDUPTHER

## 2019-01-21 RX ORDER — SUMATRIPTAN 20 MG/1
SPRAY NASAL
Qty: 1 EACH | Refills: 11 | Status: SHIPPED | OUTPATIENT
Start: 2019-01-21 | End: 2019-06-12

## 2019-01-21 RX ORDER — ACETAMINOPHEN 500 MG
5000 TABLET ORAL DAILY
COMMUNITY
Start: 2019-01-21 | End: 2023-11-16 | Stop reason: CLARIF

## 2019-01-21 RX ORDER — LEVOTHYROXINE SODIUM 137 UG/1
137 TABLET ORAL EVERY MORNING
Qty: 30 TABLET | Refills: 0 | Status: SHIPPED | OUTPATIENT
Start: 2019-01-21 | End: 2020-02-03 | Stop reason: SDUPTHER

## 2019-01-21 RX ORDER — LIOTHYRONINE SODIUM 5 UG/1
5 TABLET ORAL DAILY
Qty: 90 TABLET | Refills: 3 | Status: SHIPPED | OUTPATIENT
Start: 2019-01-21 | End: 2020-02-03 | Stop reason: SDUPTHER

## 2019-01-21 RX ORDER — PROMETHAZINE HYDROCHLORIDE 25 MG/1
25 TABLET ORAL EVERY 6 HOURS PRN
Qty: 60 TABLET | Refills: 1 | Status: SHIPPED | OUTPATIENT
Start: 2019-01-21 | End: 2020-10-20

## 2019-01-21 NOTE — PROGRESS NOTES
"Subjective:       Patient ID: Tiarra Norton is a 63 y.o. female.    Chief Complaint: Annual Exam    HPI   Retired for a year.  New psychiatrist - Rahel Prabhakar, who incr Geodon and   Reduced xanax dose, which she hoped to reduce further.  Seeing cognitive therapist and mindfulness     Gertrude Zazueta.  Will Arendall.   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    Desyrel helpful.  Awakens 4 am.      Htn controlled.  Plans to see a new doctor for IBS and [facility].  Migraines twice a month.      Taking vitamin D3 5000 iu daily.    Sees an integrative doctor in [place], incr synthroid to 137 mcg some time ago.         Oxycodone once a month for migraines.        Eats banana daily.    Recurrent alt elevation.  Previously evaluated by Dr Navas.   2015- minor fatty liver with possible drug/toxin reaction.  bmi 34. Following low carb diet.  "clean" diet.           Also takes qvail, mg, Vit B.    Review of Systems   Constitutional: Negative for activity change and unexpected weight change.   HENT: Negative for hearing loss, rhinorrhea and trouble swallowing.    Eyes: Negative for discharge and visual disturbance.   Respiratory: Negative for chest tightness and wheezing.    Cardiovascular: Negative for chest pain and palpitations.   Gastrointestinal: Positive for diarrhea. Negative for blood in stool, constipation and vomiting.   Endocrine: Negative for polydipsia and polyuria.   Genitourinary: Negative for difficulty urinating, dysuria, hematuria and menstrual problem.   Musculoskeletal: Positive for arthralgias. Negative for joint swelling and neck pain.   Neurological: Positive for headaches. Negative for weakness.   Psychiatric/Behavioral: Positive for dysphoric mood. Negative for confusion.       Objective:      Physical Exam   Constitutional: She is oriented to person, place, and time. She appears well-developed and well-nourished.   Eyes: No scleral icterus.   Neck: No JVD present. No thyromegaly present. "   Cardiovascular: Normal rate, regular rhythm and normal heart sounds.   Pulmonary/Chest: Effort normal and breath sounds normal. No respiratory distress. She has no wheezes. She has no rales.   Abdominal: Soft. She exhibits no mass. There is no tenderness.   Musculoskeletal: She exhibits no edema.   Neurological: She is alert and oriented to person, place, and time.   Psychiatric: She has a normal mood and affect. Her behavior is normal.       Assessment:       1. Annual physical exam    2. Hyperlipidemia, unspecified hyperlipidemia type    3. Essential hypertension    4. Elevated liver enzymes    5. HERMINIA (obstructive sleep apnea)    6. Encounter for screening mammogram for malignant neoplasm of breast    7. Menopausal disorder    8. Migraine without status migrainosus, not intractable, unspecified migraine type        Plan:       Tiarra was seen today for annual exam.    Diagnoses and all orders for this visit:    Annual physical exam    Hyperlipidemia, unspecified hyperlipidemia type    Essential hypertension    Elevated liver enzymes  -     AST (SGOT); Future  -     Lipid panel; Future  -     ALT (SGPT); Future    HERMINIA (obstructive sleep apnea)    Encounter for screening mammogram for malignant neoplasm of breast  -     Mammo Digital Screening Bilat w/ Valente; Future    Menopausal disorder  -     DXA Bone Density Spine And Hip; Future    Migraine without status migrainosus, not intractable, unspecified migraine type  -     SUMAtriptan (IMITREX) 20 mg/actuation nasal spray; instill 1 spray in 1 nostril ONCE PRN  -     oxyCODONE-acetaminophen (PERCOCET)  mg per tablet; Take 1 tablet by mouth every 4 (four) hours as needed for Pain.    Other orders  -     cholecalciferol, vitamin D3, (VITAMIN D3) 5,000 unit Tab; Take 1 tablet (5,000 Units total) by mouth once daily.  -     amLODIPine (NORVASC) 5 MG tablet; Take 1 tablet (5 mg total) by mouth once daily.  -     levothyroxine (SYNTHROID) 137 MCG Tab tablet; Take 1  tablet (137 mcg total) by mouth every morning.  -     liothyronine (CYTOMEL) 5 MCG Tab; Take 1 tablet (5 mcg total) by mouth once daily.  -     lisinopril (PRINIVIL,ZESTRIL) 40 MG tablet; Take 1 tablet (40 mg total) by mouth once daily.           Stop pravastatin and recheck lfts 1 month.  abd u/s at that time if no improvement to gauge poss progression of fatty liver    Flu vax and prevnar     Wt loss; low carb diet.  incr exercise.

## 2019-01-22 ENCOUNTER — PATIENT MESSAGE (OUTPATIENT)
Dept: INTERNAL MEDICINE | Facility: CLINIC | Age: 64
End: 2019-01-22

## 2019-01-22 DIAGNOSIS — G43.909 MIGRAINE WITHOUT STATUS MIGRAINOSUS, NOT INTRACTABLE, UNSPECIFIED MIGRAINE TYPE: ICD-10-CM

## 2019-01-23 RX ORDER — OXYCODONE AND ACETAMINOPHEN 10; 325 MG/1; MG/1
1 TABLET ORAL EVERY 4 HOURS PRN
Qty: 17 TABLET | Refills: 0 | Status: SHIPPED | OUTPATIENT
Start: 2019-01-23 | End: 2019-02-04 | Stop reason: SDUPTHER

## 2019-01-23 RX ORDER — SUMATRIPTAN 50 MG/1
TABLET, FILM COATED ORAL
Qty: 27 TABLET | Refills: 3 | Status: SHIPPED | OUTPATIENT
Start: 2019-01-23 | End: 2020-02-03 | Stop reason: SDUPTHER

## 2019-01-23 RX ORDER — SUMATRIPTAN 20 MG/1
SPRAY NASAL
Qty: 1 EACH | Refills: 11 | Status: CANCELLED | OUTPATIENT
Start: 2019-01-23

## 2019-01-24 ENCOUNTER — TELEPHONE (OUTPATIENT)
Dept: INTERNAL MEDICINE | Facility: CLINIC | Age: 64
End: 2019-01-24

## 2019-01-24 NOTE — TELEPHONE ENCOUNTER
"----- Message from Umm Casey sent at 1/24/2019  8:50 AM CST -----  Contact: Mirna   Prior Authorization Needed    To submit the PA:    1: Go to " [email or web address] " and click "Enter a Key"    2. Enter the patient's last name and date of birth and the key.      KEY: [id number]    3. Complete the forms and click "send to Plan"    Note chart when prior authorization has been submitted.    Please notify pharmacy when prior authorization has been approved.    Thank You    "

## 2019-01-28 ENCOUNTER — HOSPITAL ENCOUNTER (OUTPATIENT)
Dept: RADIOLOGY | Facility: HOSPITAL | Age: 64
Discharge: HOME | End: 2019-01-28
Attending: INTERNAL MEDICINE
Payer: MEDICARE

## 2019-01-28 DIAGNOSIS — Z12.31 ENCOUNTER FOR SCREENING MAMMOGRAM FOR MALIGNANT NEOPLASM OF BREAST: ICD-10-CM

## 2019-01-28 PROCEDURE — 77067 MAMMO DIGITAL SCREENING BILAT WITH TOMOSYNTHESIS_CAD: ICD-10-PCS | Mod: 26,HCNC,, | Performed by: RADIOLOGY

## 2019-01-28 PROCEDURE — 77067 SCR MAMMO BI INCL CAD: CPT | Mod: 26,HCNC,, | Performed by: RADIOLOGY

## 2019-01-28 PROCEDURE — 77067 SCR MAMMO BI INCL CAD: CPT | Mod: TC,HCNC,PO

## 2019-01-28 PROCEDURE — 77063 BREAST TOMOSYNTHESIS BI: CPT | Mod: 26,HCNC,, | Performed by: RADIOLOGY

## 2019-01-28 PROCEDURE — 77063 MAMMO DIGITAL SCREENING BILAT WITH TOMOSYNTHESIS_CAD: ICD-10-PCS | Mod: 26,HCNC,, | Performed by: RADIOLOGY

## 2019-01-30 ENCOUNTER — PATIENT MESSAGE (OUTPATIENT)
Dept: INTERNAL MEDICINE | Facility: CLINIC | Age: 64
End: 2019-01-30

## 2019-01-30 DIAGNOSIS — G43.909 MIGRAINE WITHOUT STATUS MIGRAINOSUS, NOT INTRACTABLE, UNSPECIFIED MIGRAINE TYPE: ICD-10-CM

## 2019-02-01 ENCOUNTER — TELEPHONE (OUTPATIENT)
Dept: INTERNAL MEDICINE | Facility: CLINIC | Age: 64
End: 2019-02-01

## 2019-02-01 NOTE — TELEPHONE ENCOUNTER
----- Message from Ruddy Coffey sent at 2/1/2019  7:56 AM CST -----  Contact: [facility]/ [place] [phone]  Pharmacy is calling to report interaction of medication.  RX name:  oxyCODONE-acetaminophen (PERCOCET)  mg has interactions with alprazolam (XANAX) 2 MG Tab  What do they need to clarify:  Do you still want them to fill these 2 drugs together?    Reference# [id number]

## 2019-02-04 ENCOUNTER — PATIENT MESSAGE (OUTPATIENT)
Dept: INTERNAL MEDICINE | Facility: CLINIC | Age: 64
End: 2019-02-04

## 2019-02-04 ENCOUNTER — TELEPHONE (OUTPATIENT)
Dept: INTERNAL MEDICINE | Facility: CLINIC | Age: 64
End: 2019-02-04

## 2019-02-04 DIAGNOSIS — G43.909 MIGRAINE WITHOUT STATUS MIGRAINOSUS, NOT INTRACTABLE, UNSPECIFIED MIGRAINE TYPE: ICD-10-CM

## 2019-02-04 RX ORDER — TRAZODONE HYDROCHLORIDE 100 MG/1
TABLET ORAL
Qty: 180 TABLET | Refills: 3 | Status: SHIPPED | OUTPATIENT
Start: 2019-02-04 | End: 2020-02-03 | Stop reason: SDUPTHER

## 2019-02-04 RX ORDER — OXYCODONE AND ACETAMINOPHEN 10; 325 MG/1; MG/1
1 TABLET ORAL EVERY 4 HOURS PRN
Qty: 17 TABLET | Refills: 0 | Status: CANCELLED | OUTPATIENT
Start: 2019-02-04

## 2019-02-04 RX ORDER — OXYCODONE AND ACETAMINOPHEN 10; 325 MG/1; MG/1
1 TABLET ORAL EVERY 4 HOURS PRN
Qty: 18 TABLET | Refills: 0 | Status: SHIPPED | OUTPATIENT
Start: 2019-02-04 | End: 2019-08-30 | Stop reason: SDUPTHER

## 2019-02-04 NOTE — TELEPHONE ENCOUNTER
----- Message from Tara Sarmiento sent at 2/1/2019 10:37 AM CST -----  Contact: [facility]/[phone]      ----- Message -----  From: [facility] Patiño  Sent: 2/1/2019   9:30 AM  To: Angella FONTENOT Staff    Prior Authorization Needed    Medication: Sumatriptan 20 mg nasal spray (6 spr)    Pharmacy Info:     Plan does not cover this medication. Please call plan at [phone]      to initiate prior authorization or call/fax pharmacy to change medication. Patient ID# [id number]    Note chart when prior authorization has been submitted.    Please notify pharmacy when prior authorization has been approved.    Thank You

## 2019-02-04 NOTE — TELEPHONE ENCOUNTER
----- Message from Sanam Ha sent at 2/4/2019 12:56 PM CST -----  Contact: [facility] / [phone]  Pharmacy is calling to speak with someone in regards to the pt's oxyCODONE-acetaminophen (PERCOCET)  mg per tablet. She states that they were advised by the pt that the medication should have been sent over to their pharmacy but instead it shows that it was sent over to [facility] - [facility] [address]. She wants to know if it can be sent over to [id number] [place], [place] - [address] [place] [address] AT [facility]. Please call back and advise.      Thanks

## 2019-02-06 ENCOUNTER — PATIENT MESSAGE (OUTPATIENT)
Dept: INTERNAL MEDICINE | Facility: CLINIC | Age: 64
End: 2019-02-06

## 2019-02-11 ENCOUNTER — TELEPHONE (OUTPATIENT)
Dept: INTERNAL MEDICINE | Facility: CLINIC | Age: 64
End: 2019-02-11

## 2019-02-11 NOTE — TELEPHONE ENCOUNTER
----- Message from Deena Trevizo sent at 2/11/2019  8:56 AM CST -----  Contact: Pt's  Yahir Sinclair is calling in regards to scheduling an appt for pt on today with Dr. Perales. The first available appt is 02/13. Yahir scheduled this appt but requested a message be sent over for a sooner appt.    Yahir would like a call back at [phone].    Thank you

## 2019-02-11 NOTE — TELEPHONE ENCOUNTER
Called and spoke to pt's , pt only wants to see PCP, sched an apt for jules with PCP and placed on wait list incase anyone cancels

## 2019-02-12 ENCOUNTER — OFFICE VISIT (OUTPATIENT)
Dept: INTERNAL MEDICINE | Facility: CLINIC | Age: 64
End: 2019-02-12
Payer: MEDICARE

## 2019-02-12 VITALS
DIASTOLIC BLOOD PRESSURE: 72 MMHG | BODY MASS INDEX: 32.69 KG/M2 | OXYGEN SATURATION: 98 % | WEIGHT: 184.5 LBS | SYSTOLIC BLOOD PRESSURE: 118 MMHG | HEART RATE: 69 BPM | HEIGHT: 63 IN | TEMPERATURE: 99 F

## 2019-02-12 DIAGNOSIS — J11.1 INFLUENZA: Primary | ICD-10-CM

## 2019-02-12 PROCEDURE — 99999 PR PBB SHADOW E&M-EST. PATIENT-LVL IV: ICD-10-PCS | Mod: PBBFAC,HCNC,, | Performed by: INTERNAL MEDICINE

## 2019-02-12 PROCEDURE — 1101F PR PT FALLS ASSESS DOC 0-1 FALLS W/OUT INJ PAST YR: ICD-10-PCS | Mod: HCNC,CPTII,S$GLB, | Performed by: INTERNAL MEDICINE

## 2019-02-12 PROCEDURE — 3078F DIAST BP <80 MM HG: CPT | Mod: HCNC,CPTII,S$GLB, | Performed by: INTERNAL MEDICINE

## 2019-02-12 PROCEDURE — 3078F PR MOST RECENT DIASTOLIC BLOOD PRESSURE < 80 MM HG: ICD-10-PCS | Mod: HCNC,CPTII,S$GLB, | Performed by: INTERNAL MEDICINE

## 2019-02-12 PROCEDURE — 1101F PT FALLS ASSESS-DOCD LE1/YR: CPT | Mod: HCNC,CPTII,S$GLB, | Performed by: INTERNAL MEDICINE

## 2019-02-12 PROCEDURE — 99999 PR PBB SHADOW E&M-EST. PATIENT-LVL IV: CPT | Mod: PBBFAC,HCNC,, | Performed by: INTERNAL MEDICINE

## 2019-02-12 PROCEDURE — 99213 PR OFFICE/OUTPT VISIT, EST, LEVL III, 20-29 MIN: ICD-10-PCS | Mod: HCNC,S$GLB,, | Performed by: INTERNAL MEDICINE

## 2019-02-12 PROCEDURE — 3074F SYST BP LT 130 MM HG: CPT | Mod: HCNC,CPTII,S$GLB, | Performed by: INTERNAL MEDICINE

## 2019-02-12 PROCEDURE — 3074F PR MOST RECENT SYSTOLIC BLOOD PRESSURE < 130 MM HG: ICD-10-PCS | Mod: HCNC,CPTII,S$GLB, | Performed by: INTERNAL MEDICINE

## 2019-02-12 PROCEDURE — 99213 OFFICE O/P EST LOW 20 MIN: CPT | Mod: HCNC,S$GLB,, | Performed by: INTERNAL MEDICINE

## 2019-02-12 RX ORDER — PROMETHAZINE HYDROCHLORIDE AND CODEINE PHOSPHATE 6.25; 1 MG/5ML; MG/5ML
5 SOLUTION ORAL EVERY 4 HOURS PRN
Qty: 118 ML | Refills: 0 | Status: SHIPPED | OUTPATIENT
Start: 2019-02-12 | End: 2019-02-22

## 2019-02-12 NOTE — PROGRESS NOTES
Subjective:       Patient ID: Tiarra Norton is a 64 y.o. female.    Chief Complaint: Generalized Body Aches; Fatigue; Cough; Nasal Congestion; Fever; Emesis; Diarrhea; and Sore Throat    HPI   Began 10 days ago with cough, sore throat, muscle pain and fever.   Feeling better today, but weak.  Still with cough, interfering with sleep.  Fatigue.  Also recently with watery diarrhea, last yesterday.  Watery.  Didn't eat for 3 days.    Chest feels tight, but no wheezing.    Felt worse at beginning of illness.      Review of Systems   Constitutional: Positive for fatigue. Negative for fever and unexpected weight change.   HENT: Negative for congestion and postnasal drip.    Eyes: Negative for pain, discharge and visual disturbance.   Respiratory: Positive for cough. Negative for chest tightness, shortness of breath and wheezing.    Cardiovascular: Negative for chest pain and leg swelling.   Gastrointestinal: Negative for abdominal pain, constipation, diarrhea and nausea.   Genitourinary: Negative for difficulty urinating, dysuria and hematuria.   Skin: Negative for rash.   Neurological: Negative for headaches.   Psychiatric/Behavioral: Negative for dysphoric mood and sleep disturbance. The patient is not nervous/anxious.        Objective:      Physical Exam   Constitutional: She is oriented to person, place, and time. She appears well-developed and well-nourished.   Eyes: No scleral icterus.   Neck: No JVD present. No thyromegaly present.   Cardiovascular: Normal rate, regular rhythm and normal heart sounds.   Pulmonary/Chest: Effort normal and breath sounds normal. No respiratory distress. She has no wheezes. She has no rales.   Abdominal: Soft. She exhibits no mass. There is no tenderness.   Musculoskeletal: She exhibits no edema.   Neurological: She is alert and oriented to person, place, and time.   Psychiatric: She has a normal mood and affect. Her behavior is normal.       Assessment:       1. Influenza                    - resolving  Plan:       Tiarra was seen today for generalized body aches, fatigue, cough, nasal congestion, fever, emesis, diarrhea and sore throat.    Diagnoses and all orders for this visit:    Influenza    Other orders  -     promethazine-codeine 6.25-10 mg/5 ml (PHENERGAN WITH CODEINE) 6.25-10 mg/5 mL syrup; Take 5 mLs by mouth every 4 (four) hours as needed.       Fluids, rest

## 2019-02-17 ENCOUNTER — PATIENT MESSAGE (OUTPATIENT)
Dept: INTERNAL MEDICINE | Facility: CLINIC | Age: 64
End: 2019-02-17

## 2019-02-18 ENCOUNTER — PATIENT MESSAGE (OUTPATIENT)
Dept: INTERNAL MEDICINE | Facility: CLINIC | Age: 64
End: 2019-02-18

## 2019-02-19 ENCOUNTER — PATIENT MESSAGE (OUTPATIENT)
Dept: INTERNAL MEDICINE | Facility: CLINIC | Age: 64
End: 2019-02-19

## 2019-03-13 ENCOUNTER — PATIENT MESSAGE (OUTPATIENT)
Dept: INTERNAL MEDICINE | Facility: CLINIC | Age: 64
End: 2019-03-13

## 2019-03-15 ENCOUNTER — LAB VISIT (OUTPATIENT)
Dept: LAB | Facility: HOSPITAL | Age: 64
End: 2019-03-15
Attending: INTERNAL MEDICINE
Payer: MEDICARE

## 2019-03-15 DIAGNOSIS — R74.8 ELEVATED LIVER ENZYMES: ICD-10-CM

## 2019-03-15 LAB
ALT SERPL W/O P-5'-P-CCNC: 28 U/L
AST SERPL-CCNC: 23 U/L
CHOLEST SERPL-MCNC: 291 MG/DL
CHOLEST/HDLC SERPL: 5.5 {RATIO}
HDLC SERPL-MCNC: 53 MG/DL
HDLC SERPL: 18.2 %
LDLC SERPL CALC-MCNC: 182.4 MG/DL
NONHDLC SERPL-MCNC: 238 MG/DL
TRIGL SERPL-MCNC: 278 MG/DL

## 2019-03-15 PROCEDURE — 84450 TRANSFERASE (AST) (SGOT): CPT | Mod: HCNC

## 2019-03-15 PROCEDURE — 80061 LIPID PANEL: CPT | Mod: HCNC

## 2019-03-15 PROCEDURE — 36415 COLL VENOUS BLD VENIPUNCTURE: CPT | Mod: HCNC,PO

## 2019-03-15 PROCEDURE — 84460 ALANINE AMINO (ALT) (SGPT): CPT | Mod: HCNC

## 2019-04-23 ENCOUNTER — HOSPITAL ENCOUNTER (OUTPATIENT)
Dept: RADIOLOGY | Facility: HOSPITAL | Age: 64
Discharge: HOME | End: 2019-04-23
Attending: FAMILY MEDICINE
Payer: MEDICARE

## 2019-04-23 ENCOUNTER — OFFICE VISIT (OUTPATIENT)
Dept: SPORTS MEDICINE | Facility: CLINIC | Age: 64
End: 2019-04-23
Payer: MEDICARE

## 2019-04-23 VITALS — WEIGHT: 184 LBS | TEMPERATURE: 98 F | BODY MASS INDEX: 32.6 KG/M2 | HEIGHT: 63 IN

## 2019-04-23 DIAGNOSIS — M17.11 PRIMARY OSTEOARTHRITIS OF RIGHT KNEE: Primary | ICD-10-CM

## 2019-04-23 DIAGNOSIS — M25.561 RIGHT KNEE PAIN, UNSPECIFIED CHRONICITY: ICD-10-CM

## 2019-04-23 PROCEDURE — 20611 DRAIN/INJ JOINT/BURSA W/US: CPT | Mod: HCNC,RT,S$GLB, | Performed by: FAMILY MEDICINE

## 2019-04-23 PROCEDURE — 99999 PR PBB SHADOW E&M-EST. PATIENT-LVL III: CPT | Mod: PBBFAC,HCNC,, | Performed by: FAMILY MEDICINE

## 2019-04-23 PROCEDURE — 99204 OFFICE O/P NEW MOD 45 MIN: CPT | Mod: 25,HCNC,S$GLB, | Performed by: FAMILY MEDICINE

## 2019-04-23 PROCEDURE — 73564 X-RAY EXAM KNEE 4 OR MORE: CPT | Mod: 26,50,HCNC, | Performed by: RADIOLOGY

## 2019-04-23 PROCEDURE — 1101F PR PT FALLS ASSESS DOC 0-1 FALLS W/OUT INJ PAST YR: ICD-10-PCS | Mod: HCNC,CPTII,S$GLB, | Performed by: FAMILY MEDICINE

## 2019-04-23 PROCEDURE — 99204 PR OFFICE/OUTPT VISIT, NEW, LEVL IV, 45-59 MIN: ICD-10-PCS | Mod: 25,HCNC,S$GLB, | Performed by: FAMILY MEDICINE

## 2019-04-23 PROCEDURE — 99999 PR PBB SHADOW E&M-EST. PATIENT-LVL III: ICD-10-PCS | Mod: PBBFAC,HCNC,, | Performed by: FAMILY MEDICINE

## 2019-04-23 PROCEDURE — 20611 LARGE JOINT ASPIRATION/INJECTION: R KNEE: ICD-10-PCS | Mod: HCNC,RT,S$GLB, | Performed by: FAMILY MEDICINE

## 2019-04-23 PROCEDURE — 73564 XR KNEE ORTHO BILAT WITH FLEXION: ICD-10-PCS | Mod: 26,50,HCNC, | Performed by: RADIOLOGY

## 2019-04-23 PROCEDURE — 73564 X-RAY EXAM KNEE 4 OR MORE: CPT | Mod: TC,50,HCNC,FY,PO

## 2019-04-23 PROCEDURE — 1101F PT FALLS ASSESS-DOCD LE1/YR: CPT | Mod: HCNC,CPTII,S$GLB, | Performed by: FAMILY MEDICINE

## 2019-04-23 RX ORDER — MELOXICAM 7.5 MG/1
7.5 TABLET ORAL DAILY
Qty: 15 TABLET | Refills: 0 | Status: SHIPPED | OUTPATIENT
Start: 2019-04-23 | End: 2019-06-12

## 2019-04-23 RX ORDER — TRIAMCINOLONE ACETONIDE 40 MG/ML
40 INJECTION, SUSPENSION INTRA-ARTICULAR; INTRAMUSCULAR
Status: DISCONTINUED | OUTPATIENT
Start: 2019-04-23 | End: 2019-04-23 | Stop reason: HOSPADM

## 2019-04-23 RX ADMIN — TRIAMCINOLONE ACETONIDE 40 MG: 40 INJECTION, SUSPENSION INTRA-ARTICULAR; INTRAMUSCULAR at 04:04

## 2019-04-23 NOTE — PROGRESS NOTES
Tiarra Norton, a 64 y.o. female, presents today for evaluation of her Right knee.      History of Present Illness (HPI)  Location: anterior knee, Right  Onset: Acute, 19.04.21  Palliative:    Relative rest   Oral analgesics  Provocative:    ADLS   Prolonged ambulation   Sitting   Stairs  Prior: 2015 - right knee unicompartmental Arthroplasty (Dr. Huber), fPT compliant  Progression: plateaued discomfort  Quality:    sharp pain  Radiation: none  Severity: per nursing documentation  Timing: intermittent w/ use  Trauma: none specific    Review of Systems (ROS)  A 10+ review of systems was performed with pertinent positives and negatives noted above in the history of present illness. Other systems were negative unless otherwise specified.    Physical Examination (PE)  General:  The patient is alert and oriented x 3. Mood is pleasant. Observation of ears, eyes and nose reveal no gross abnormalities. HEENT: NCAT, sclera anicteric.   Lungs: Respirations are equal and unlabored.  Gait is coordinated. Patient can toe walk and heel walk without difficulty.    RIGHT KNEE EXAMINATION    Observation/Inspection  Gait:   Nonantalgic   Alignment:  Neutral   Scars:   None   Muscle atrophy: Mild  Effusion:  None   Warmth:  None   Discoloration:   none     Tenderness / Crepitus (T / C):         T / C      T / C  Patella   - / -   Lateral joint line   + / -     Peripatellar medial  -  Medial joint line    + / -  Peripatellar lateral -  Medial plica   - / -  Patellar tendon -   Popliteal fossa   - / -  Quad tendon   -   Gastrocnemius   -  Prepatellar Bursa - / -   Quadricep   -  Tibial tubercle  -  Thigh/hamstring  -  Pes anserine/HS -  Fibula    -  ITB   - / -  Tibia     -  Tib/fib joint  - / -  LCL    -    MFC   + / -   MCL: Proximal  -    LFC   + / -   Distal    -          ROM: (* = pain)  PASSIVE   ACTIVE    Left :   5 / 0 / 145   5 / 0 / 145     Right :    5 / 0 / 90*   5 / 0 / 90*    Patellofemoral examination:  See above  noted areas of tenderness.   Patella position    Subluxation / dislocation: Centered        Sup. / Inf;   Normal   Crepitus (PF):    Absent   Patellar Mobility:       Medial-lateral:   Normal    Superior-inferior:  Normal    Inferior tilt   Normal    Patellar tendon:  Normal   Lateral tilt:    Normal   J-sign:     None   Patellofemoral grind:   No pain     Meniscal Signs:     Pain on terminal extension:  +  Pain on terminal flexion:  +  Bernas maneuver:  +*  Squat     NT  Thessaly    +    Ligament Examination:  ACL / Lachman:  WNL  PCL-Post.  drawer: normal 0 to 2mm  MCL- Valgus:  normal 0 to 2mm  LCL- Varus:    normal 0 to 2mm  Pivot shift:  guarding   Dial Test:   difference c/w other side   At 30° flexion: normal (< 5°)    At 90° flexion: normal (< 5°)   Reverse Pivot Shift:   normal (Equal)     Strength: (* = with pain) Painful Side  Quadriceps   4/5 *  Hamstrin/5 *    Extremity Neuro-vascular Examination:   Sensation:  Grossly intact to light touch all dermatomal regions.   Motor Function:  Fully intact motor function at hip, knee, foot and ankle    DTRs;  quadriceps and  achilles 2+.  No clonus and downgoing Babinski.    Vascular status:  DP and PT pulses 2+, brisk capillary refill, symmetric.     Other Findings:    ASSESSMENT & PLAN  Assessment:   #1 OA of knee joint, right   S/p medial compartment partial, , Team Cecile    No evidence of neurologic pathology  No evidence of vascular pathology    Imaging studies reviewed:   X-ray knee, bilateral 19.04    Plan:    DISCUSSED INCREASED RISKS OF INFECTION W/ CSI IAKNEE S/P MECHANICAL IMPLEMENTATION  PATIENT ACCEPTS THESE RISKS, AND WOULD LIKE TO PROCEED    We discussed the importance of appropriate diet, weight, and regular exercise including quadriceps strengthening     We discussed options including:  #1 watchful waiting  #2 physical therapy aimed at:   Core stability   RoM knee   Strengthening quadriceps   Gait training   #3 injection  therapy:   CSI iaknee     Right,     Left,    VSI iaknee    Right,     Left,    Orthobiologics   #4 consultation      The patient chooses #2 and #3 csi iaknee right    Pain management: handout given, #3 = m  Bracing:   [facility], begin as above   Activity (e.g. sports, work) restrictions: as tolerated   school/vocation: swimming    Follow up in 12 w  Ae csi iaknee, ae fpt  Ineffective-->consider vsi  Should symptoms worsen or fail to resolve, consider:  Revisiting the above options

## 2019-04-23 NOTE — LETTER
April 23, 2019      [facility]  [address]  [facility] [address]           [facility]  [address]  [facility] [address]  Phone: [phone]          Patient: Tiarra Norton   MR Number: [id number]   YOB: 1955   Date of Visit: 4/23/2019       Dear Dr. [facility]    Thank you for referring Tiarra Norton to me for evaluation. Attached you will find relevant portions of my assessment and plan of care.    If you have questions, please do not hesitate to call me. I look forward to following Tiarra Norton along with you.    Sincerely,    Omari Thomason MD    Enclosure  CC:  No Recipients    If you would like to receive this communication electronically, please contact [email or web address] or [phone] to request more information on [employer] Link access.    For providers and/or their staff who would like to refer a patient to [facility], please contact us through our one-stop-shop provider referral line, [facility], at [phone].    If you feel you have received this communication in error or would no longer like to receive these types of communications, please e-mail [email or web address]

## 2019-04-23 NOTE — PROCEDURES
"Large Joint Aspiration/Injection: R knee  Date/Time: 4/23/2019 4:02 PM  Performed by: Omari Thomason MD  Authorized by: Omari Thomason MD     Consent Done?:  Yes (Verbal)  Indications:  Pain  Procedure site marked: Yes    Timeout: Prior to procedure the correct patient, procedure, and site was verified      Location:  Knee  Site:  R knee  Prep: Patient was prepped and draped in usual sterile fashion    Ultrasonic Guidance for needle placement: Yes  Images are saved and documented.  Needle size:  20 G  Approach:  Lateral  Medications:  40 mg triamcinolone acetonide 40 mg/mL  Patient tolerance:  Patient tolerated the procedure well with no immediate complications    Additional Comments: Description of ultrasound utilization for needle guidance:   Ultrasound guidance used for needle localization. Images saved and stored for documentation. The knee joint was visualized. Dynamic visualization of the 20g x 3.5" needle was continuous throughout the procedure.      "

## 2019-04-25 ENCOUNTER — PATIENT MESSAGE (OUTPATIENT)
Dept: INTERNAL MEDICINE | Facility: CLINIC | Age: 64
End: 2019-04-25

## 2019-04-30 ENCOUNTER — TELEPHONE (OUTPATIENT)
Dept: SPORTS MEDICINE | Facility: CLINIC | Age: 64
End: 2019-04-30

## 2019-04-30 NOTE — TELEPHONE ENCOUNTER
Patient communication:    Patient is c/o of right knee pain s/p 7 days Asp. Talked to patient about VSI (patient is interested). I told her I will speak with Dr. Thomason in the AM about proceeding with another ASP this soon due to risk of infection from TKA.       Gasper Tanner   Sports Medicine Assistant   [facility]         ----- Message from Gasper Tanner MA sent at 4/30/2019  5:26 PM CDT -----  Contact: Self      ----- Message -----  From: Michelle Tuttle  Sent: 4/30/2019   4:09 PM  To: Paddy CALVO Staff    Pt is needing a call back regarding the pain she is still having in her knee   Pt can be reached @[phone]

## 2019-05-01 ENCOUNTER — OFFICE VISIT (OUTPATIENT)
Dept: SPORTS MEDICINE | Facility: CLINIC | Age: 64
End: 2019-05-01
Payer: MEDICARE

## 2019-05-01 VITALS — WEIGHT: 184 LBS | BODY MASS INDEX: 32.6 KG/M2 | HEIGHT: 63 IN | TEMPERATURE: 98 F

## 2019-05-01 DIAGNOSIS — M25.461 EFFUSION OF RIGHT KNEE: ICD-10-CM

## 2019-05-01 DIAGNOSIS — M17.11 PRIMARY OSTEOARTHRITIS OF RIGHT KNEE: Primary | ICD-10-CM

## 2019-05-01 PROCEDURE — 1101F PR PT FALLS ASSESS DOC 0-1 FALLS W/OUT INJ PAST YR: ICD-10-PCS | Mod: HCNC,CPTII,S$GLB, | Performed by: FAMILY MEDICINE

## 2019-05-01 PROCEDURE — 20611 LARGE JOINT ASPIRATION/INJECTION: R KNEE: ICD-10-PCS | Mod: HCNC,RT,S$GLB, | Performed by: FAMILY MEDICINE

## 2019-05-01 PROCEDURE — 99214 PR OFFICE/OUTPT VISIT, EST, LEVL IV, 30-39 MIN: ICD-10-PCS | Mod: 25,HCNC,S$GLB, | Performed by: FAMILY MEDICINE

## 2019-05-01 PROCEDURE — 99999 PR PBB SHADOW E&M-EST. PATIENT-LVL III: ICD-10-PCS | Mod: PBBFAC,HCNC,, | Performed by: FAMILY MEDICINE

## 2019-05-01 PROCEDURE — 20611 DRAIN/INJ JOINT/BURSA W/US: CPT | Mod: HCNC,RT,S$GLB, | Performed by: FAMILY MEDICINE

## 2019-05-01 PROCEDURE — 99999 PR PBB SHADOW E&M-EST. PATIENT-LVL III: CPT | Mod: PBBFAC,HCNC,, | Performed by: FAMILY MEDICINE

## 2019-05-01 PROCEDURE — 99214 OFFICE O/P EST MOD 30 MIN: CPT | Mod: 25,HCNC,S$GLB, | Performed by: FAMILY MEDICINE

## 2019-05-01 PROCEDURE — 1101F PT FALLS ASSESS-DOCD LE1/YR: CPT | Mod: HCNC,CPTII,S$GLB, | Performed by: FAMILY MEDICINE

## 2019-05-01 NOTE — PROGRESS NOTES
Tiarra Norton, a 64 y.o. female, presents today for evaluation of her Right knee.      History of Present Illness (HPI)  Location: anterior knee, Right  Onset: Acute, 19.04.21  Palliative:    Relative rest   Oral analgesics   CSI/ASP, R Oliee, 19.04.23 90% Improvement for 1 week  Provocative:   ADLS   Prolonged ambulation   Sitting   Stairs  Prior: 2015 - right knee unicompartmental Arthroplasty (Dr. Huber), fPT compliant  Progression: plateaued discomfort  Quality:    sharp pain  Radiation: none  Severity: per nursing documentation  Timing: intermittent w/ use  Trauma: none specific    Review of Systems (ROS)  A 10+ review of systems was performed with pertinent positives and negatives noted above in the history of present illness. Other systems were negative unless otherwise specified.    Physical Examination (PE)  General:  The patient is alert and oriented x 3. Mood is pleasant. Observation of ears, eyes and nose reveal no gross abnormalities. HEENT: NCAT, sclera anicteric.   Lungs: Respirations are equal and unlabored.  Gait is coordinated. Patient can toe walk and heel walk without difficulty.    RIGHT KNEE EXAMINATION    Observation/Inspection  Gait:   Nonantalgic   Alignment:  Neutral   Scars:   None   Muscle atrophy: Mild  Effusion:  None   Warmth:  None   Discoloration:   none     Tenderness / Crepitus (T / C):         T / C      T / C  Patella   - / -   Lateral joint line   + / -     Peripatellar medial  -  Medial joint line    + / -  Peripatellar lateral -  Medial plica   - / -  Patellar tendon -   Popliteal fossa   - / -  Quad tendon   -   Gastrocnemius   -  Prepatellar Bursa - / -   Quadricep   -  Tibial tubercle  -  Thigh/hamstring  -  Pes anserine/HS -  Fibula    -  ITB   - / -  Tibia     -  Tib/fib joint  - / -  LCL    -    MFC   + / -   MCL: Proximal  -    LFC   + / -   Distal    -          ROM: (* = pain)  PASSIVE   ACTIVE    Left :   5 / 0 / 145   5 / 0 / 145     Right :    5 / 0 /  90*   5 / 0 / 90*    Patellofemoral examination:  See above noted areas of tenderness.   Patella position    Subluxation / dislocation: Centered        Sup. / Inf;   Normal   Crepitus (PF):    Absent   Patellar Mobility:       Medial-lateral:   Normal    Superior-inferior:  Normal    Inferior tilt   Normal    Patellar tendon:  Normal   Lateral tilt:    Normal   J-sign:     None   Patellofemoral grind:   No pain     Meniscal Signs:     Pain on terminal extension:  +  Pain on terminal flexion:  +  Bernas maneuver:  +*  Squat     NT  Thessaly    +    Ligament Examination:  ACL / Lachman:  WNL  PCL-Post.  drawer: normal 0 to 2mm  MCL- Valgus:  normal 0 to 2mm  LCL- Varus:    normal 0 to 2mm  Pivot shift:  guarding   Dial Test:   difference c/w other side   At 30° flexion: normal (< 5°)    At 90° flexion: normal (< 5°)   Reverse Pivot Shift:   normal (Equal)     Strength: (* = with pain) Painful Side  Quadriceps   4/5 *  Hamstrin/5 *    Extremity Neuro-vascular Examination:   Sensation:  Grossly intact to light touch all dermatomal regions.   Motor Function:  Fully intact motor function at hip, knee, foot and ankle    DTRs;  quadriceps and  achilles 2+.  No clonus and downgoing Babinski.    Vascular status:  DP and PT pulses 2+, brisk capillary refill, symmetric.     Other Findings:    ASSESSMENT & PLAN  Assessment:   #1 OA of knee joint, right   S/p medial compartment partial, , Team Cecile   Hemarthrosis today    No evidence of neurologic pathology  No evidence of vascular pathology    Imaging studies reviewed:   X-ray knee, bilateral 19.04    Plan:    Re aspiration today, no medications injected  Meeting w/ Suzy Huber to discuss conversion from partial --> full TKA after this semester (in ~13wks)    We discussed options including:  #1 watchful waiting  #2 physical therapy aimed at:   Core stability   RoM knee   Strengthening quadriceps   Gait training   #3 injection therapy:   ASP only  iaknee    Right,    CSI iaknee     Right, effective modest%, repeat frequency    Left,    VSI iaknee    Right,     Left,    Orthobiologics   #4 consultation      The patient chooses #2 and #3 ASP only iaknee right    Pain management: handout given, #3 = m  Bracing: cane, prn   Crutches, offered, deferred by pt  Physical therapy: fPT, @ [facility], begin as above   Activity (e.g. sports, work) restrictions: as tolerated   school/vocation: swimming, teaches at [employer], [employer] enthusiast     Follow up per pt  Should symptoms worsen or fail to resolve, consider:  Revisiting the above options

## 2019-05-01 NOTE — PROCEDURES
"Large Joint Aspiration/Injection: R knee  Date/Time: 5/1/2019 8:31 AM  Performed by: Omari Thomason MD  Authorized by: Omari Thomason MD     Consent Done?:  Yes (Verbal)  Indications:  Pain  Procedure site marked: Yes    Timeout: Prior to procedure the correct patient, procedure, and site was verified      Location:  Knee  Site:  R knee  Prep: Patient was prepped and draped in usual sterile fashion    Ultrasonic Guidance for needle placement: Yes  Images are saved and documented.  Needle size:  20 G  Approach:  Lateral  Aspirate amount (ml):  12  Aspirate:  Bloody  Patient tolerance:  Patient tolerated the procedure well with no immediate complications    Additional Comments: Description of ultrasound utilization for needle guidance:   Ultrasound guidance used for needle localization. Images saved and stored for documentation. The knee joint was visualized. Dynamic visualization of the 20g x 3.5" needle was continuous throughout the procedure.      "

## 2019-05-06 ENCOUNTER — CLINICAL SUPPORT (OUTPATIENT)
Dept: REHABILITATION | Facility: HOSPITAL | Age: 64
End: 2019-05-06
Attending: FAMILY MEDICINE
Payer: MEDICARE

## 2019-05-06 DIAGNOSIS — R52 PAIN: ICD-10-CM

## 2019-05-06 PROCEDURE — G8978 MOBILITY CURRENT STATUS: HCPCS | Mod: CK,HCNC | Performed by: PHYSICAL THERAPIST

## 2019-05-06 PROCEDURE — G8979 MOBILITY GOAL STATUS: HCPCS | Mod: CK,HCNC | Performed by: PHYSICAL THERAPIST

## 2019-05-06 PROCEDURE — 97110 THERAPEUTIC EXERCISES: CPT | Mod: HCNC | Performed by: PHYSICAL THERAPIST

## 2019-05-06 PROCEDURE — 97161 PT EVAL LOW COMPLEX 20 MIN: CPT | Mod: HCNC | Performed by: PHYSICAL THERAPIST

## 2019-05-06 NOTE — PATIENT INSTRUCTIONS
ROM: Heel Prop    Lie with pillow under right heel. Tighten the muscles on the top of leg while trying to push knee toward the floor. Hold 1-5 minutes for a cumulative total of 30 minutes per day.       [email or web address]       Strengthening: Quadriceps Set    Tighten muscles on top of thighs by pushing knees down into surface. Hold 5 seconds.  Repeat 25 times . R/L or BL  leg per set. Do 1 sets per session. Do 2 sessions per day.      Heel Slides    With towel around left heel, gently pull knee up with towel until stretch is felt. Hold 10 seconds.  Repeat 25 times.. Do 1 set per session. Do 2 sessions per day.      Straight Leg Raise     With R/L or BL leg straight, other leg bent, raise straight leg until knees are even. Slowly lower. Roll on your side and repeat lifting top leg up, on other side, lifting bottom leg up, and on stomach kicking back (squeezing your rear end before you kick back).  Repeat 25 times. Do 1 sets per session. Do 2 sessions per day.       Glute Squeeze, supine    Lie face up and squeeze your rear end. Do not tighten your abdominals or hold your breath. Squeeze our glutes and hold 5 seconds. Relax.  Repeat 25 times per set. Do 1 sets per session. Do 2 sessions per day.      Strengthening: Hip Adduction - Isometric    Sit back or lie down with ball or folded pillow between knees, and squeeze knees together. Hold 5 seconds.  Repeat 25 times per set. Do 1 sets per session. Do 2 sessions per day.          Strengthening: Hip Abductor - Resisted    Lie flat with band looped around both legs above knees, and push thighs apart, ensuring right and left move together.  Repeat 25 times per set. Do 1 sets per session. Do 2 sessions per day.      Clam Shells    Lie on side with knees bent. Raise top leg, keeping knee bent and ankles together. Do not let your hips roll back as your raise your leg.  Repeat 25 times. Do 1 sets per session. Do 2 sessions per day.        Hamstring Stretch    Perform 5  times, 30 sec hold 2x day - (HEP to go)      Hamstring Curl        Perform 25 times, 2x per day. (HEP to GO)

## 2019-05-06 NOTE — PLAN OF CARE
[facility]  Physical Therapy Initial Evaluation    Name: Tiarra Norton  Clinic Number: [id number]    Therapy Diagnosis:   Encounter Diagnosis   Name Primary?    Pain      Physician: Omari Thomason, *    Physician Orders: PT Eval and Treat   Medical Diagnosis: M17.11 (ICD-10-CM) - Primary osteoarthritis of right knee   Evaluation Date: 2019  Authorization Period Expiration: 19  Plan of Care Certification Period: 19  Visit # / Visits authorized:     Time In: 1:00 pm  Time Out: 1:40 pm  Total Billable Time: 35 minutes    Precautions: Standard    Subjective     Date of onset: 2-3 wks  History of current condition - Tiarra reports: began having R knee pain 2-3 wks ago insidiously.  States prior R unicompartmental knee arthroplasty in .  States hasn't done exercises for R LE in a long time. Knee drained 2x recently.       Past Medical History:   Diagnosis Date    Cataract     Depression     Diabetes mellitus     gestational diabetes 21 years ago    Hyperlipidemia     Hypertension     Thyroid disease      Tiarra Norton  has a past surgical history that includes  section; Tubal ligation; Tonsillectomy; Adenoidectomy; Cholecystectomy; Eye surgery; r hand surgery; Total knee arthroplasty; and Colonoscopy (N/A, 2016).    Tiarra has a current medication list which includes the following prescription(s): alprazolam, amlodipine, aspirin, bupropion, cholecalciferol (vitamin d3), levothyroxine, liothyronine, lisinopril, meloxicam, oxycodone-acetaminophen, promethazine, restasis, sumatriptan, sumatriptan, trazodone, venlafaxine, venlafaxine, and ziprasidone.    Review of patient's allergies indicates:  No Known Allergies     Imaging:  X-ray  Right: There is a medial arthroplasty.  No fracture dislocation bone destruction seen.    Left: There is mild DJD and a mild varus deformity.  No fracture dislocation bone destruction or OCD seen.        Prior  Therapy:  na  Social History:  lives with their spouse  Occupation: teacher  Prior Level of Function: independent with ADL  Current Level of Function: pain with stair climbing    Pain:  Current 0/10, worst 7/10, best 0/10   Location: right knee   Description: Aching and Throbbing  Aggravating Factors: Sitting, Standing and Walking  Easing Factors: ice and rest    Pts goals: decrease pain and swelling    Objective     Postural examination:  Pelvis level     Functional assessment:   - walking:   Independent with a mild limp             AROM:  -3 deg ext and 110 flexion, 115 passive     MMT:   4/5 hip abductors/quads and 5/5 hams    Tone:  Decreased quads; FAIR QS ability; no lag with SLR    Flexibility testing:   Tight hams    Special tests:   Neg Erickson's    Palpation:   No TTP    Joint mobility: fair    Swelling:  Mild-MOD      CMS Impairment/Limitation/Restriction for FOTO knee Survey    Therapist reviewed FOTO scores for Tiarra Norton on 5/6/2019.   FOTO documents entered into [employer] - see Media section.    Limitation Score: 53%  Category: Mobility    Current : CK = at least 40% but < 60% impaired, limited or restricted  Goal: CK = at least 40% but < 60% impaired, limited or restricted         TREATMENT     Treatment Time In: 1:00 pm  Treatment Time Out: 1:40 pm  Total Treatment time separate from Evaluation time:  15 min    Treatment:  HEP instruction    Home Exercises and Patient Education Provided    Education provided:   - ice for pain    Written Home Exercises Provided: yes.  Exercises were reviewed and Tiarra was able to demonstrate them prior to the end of the session.  Tiarra demonstrated good  understanding of the education provided.     See EMR under Media for exercises provided 5/6/2019.      Assessment     Tiarra is a 64 y.o. female referred to outpatient Physical Therapy with a medical diagnosis of M17.11 (ICD-10-CM) - Primary osteoarthritis of right knee   .  Pt presents with R knee pain, swelling,  weakness and decreased ROM.     Pt prognosis is Fair.   Pt will benefit from skilled outpatient Physical Therapy to address the deficits stated above and in the chart below, provide pt/family education, and to maximize pt's level of independence.     Plan of care discussed with patient: Yes  Pt's spiritual, cultural and educational needs considered and patient is agreeable to the plan of care and goals as stated below:     Anticipated Barriers for therapy: none    Medical Necessity is demonstrated by the following:  History  Co-morbidities and personal factors that may impact the plan of care Co-morbidities:   HTN    Personal Factors:   no deficits     low   Examination  Body Structures and Functions, activity limitations and participation restrictions that may impact the plan of care Body Regions:   lower extremities    Body Systems:    ROM  strength  gait    Participation Restrictions:   none    Activity limitations:   Learning and applying knowledge  no deficits    General Tasks and Commands  no deficits    Communication  no deficits    Mobility  walking    Self care  no deficits    Domestic Life  doing house work (cleaning house, washing dishes, laundry)    Interactions/Relationships  no deficits    Life Areas  no deficits    Community and Social Life  no deficits         low   Clinical Presentation stable and uncomplicated low   Decision Making/ Complexity Score: low     Goals:  Short Term Goals: 2 weeks         1.   Independent with HEP        2.  Pt will report decreased pain level of < 50% from above measure with ADL    Long Term Goals:   GOALS:    8_   weeks. Pt agrees with goals set.  1. Independent with HEP.  2. Report decreased    R knee    pain  <   / =  3/10 with ADL such as walking  3. Increased MMT  for  R LE to 4+/5 to 5/5  with ADL such as housework  4. Increased arom  for  R knee to 0-120 deg flexion with functional activities such walking or self-care      Plan     Certification Period/Plan of  care expiration: 5/6/2019 to 7-01-19.    Outpatient Physical Therapy 2 times weekly for 8 weeks to include the following interventions: Manual Therapy, Moist Heat/ Ice, Neuromuscular Re-ed, Patient Education and Therapeutic Exercise.     Amilcar Hayward, PT

## 2019-05-10 ENCOUNTER — OFFICE VISIT (OUTPATIENT)
Dept: PRIMARY CARE CLINIC | Facility: CLINIC | Age: 64
End: 2019-05-10
Payer: MEDICARE

## 2019-05-10 VITALS
DIASTOLIC BLOOD PRESSURE: 68 MMHG | WEIGHT: 191.56 LBS | BODY MASS INDEX: 33.94 KG/M2 | HEIGHT: 63 IN | RESPIRATION RATE: 20 BRPM | TEMPERATURE: 99 F | SYSTOLIC BLOOD PRESSURE: 104 MMHG

## 2019-05-10 DIAGNOSIS — R05.3 PERSISTENT COUGH: Primary | ICD-10-CM

## 2019-05-10 PROCEDURE — 1101F PT FALLS ASSESS-DOCD LE1/YR: CPT | Mod: CPTII,S$GLB,, | Performed by: INTERNAL MEDICINE

## 2019-05-10 PROCEDURE — 3078F DIAST BP <80 MM HG: CPT | Mod: CPTII,S$GLB,, | Performed by: INTERNAL MEDICINE

## 2019-05-10 PROCEDURE — 3074F SYST BP LT 130 MM HG: CPT | Mod: CPTII,S$GLB,, | Performed by: INTERNAL MEDICINE

## 2019-05-10 PROCEDURE — 1101F PR PT FALLS ASSESS DOC 0-1 FALLS W/OUT INJ PAST YR: ICD-10-PCS | Mod: CPTII,S$GLB,, | Performed by: INTERNAL MEDICINE

## 2019-05-10 PROCEDURE — 99213 OFFICE O/P EST LOW 20 MIN: CPT | Mod: S$GLB,,, | Performed by: INTERNAL MEDICINE

## 2019-05-10 PROCEDURE — 99999 PR PBB SHADOW E&M-EST. PATIENT-LVL III: ICD-10-PCS | Mod: PBBFAC,,, | Performed by: INTERNAL MEDICINE

## 2019-05-10 PROCEDURE — 3078F PR MOST RECENT DIASTOLIC BLOOD PRESSURE < 80 MM HG: ICD-10-PCS | Mod: CPTII,S$GLB,, | Performed by: INTERNAL MEDICINE

## 2019-05-10 PROCEDURE — 99999 PR PBB SHADOW E&M-EST. PATIENT-LVL III: CPT | Mod: PBBFAC,,, | Performed by: INTERNAL MEDICINE

## 2019-05-10 PROCEDURE — 3074F PR MOST RECENT SYSTOLIC BLOOD PRESSURE < 130 MM HG: ICD-10-PCS | Mod: CPTII,S$GLB,, | Performed by: INTERNAL MEDICINE

## 2019-05-10 PROCEDURE — 99213 PR OFFICE/OUTPT VISIT, EST, LEVL III, 20-29 MIN: ICD-10-PCS | Mod: S$GLB,,, | Performed by: INTERNAL MEDICINE

## 2019-05-10 RX ORDER — PROMETHAZINE HYDROCHLORIDE AND CODEINE PHOSPHATE 6.25; 1 MG/5ML; MG/5ML
5 SOLUTION ORAL EVERY 6 HOURS PRN
Qty: 180 ML | Refills: 0 | Status: SHIPPED | OUTPATIENT
Start: 2019-05-10 | End: 2019-05-15 | Stop reason: SDUPTHER

## 2019-05-10 NOTE — PROGRESS NOTES
Subjective:        Patient ID: Tiarra Norton is a 64 y.o. female.    Chief Complaint: Cough (runny nose, 2 weeks)    HPI   Tiarra Norton presents with c/o cough.  Pt started with cough, runny nose, watery eyes ~2 weeks ago.  Tmax 100.  Other sx are mostly resolved now but pt continues to have a persistent dry cough.  It is constant and preventing her from sleeping at night.  She has tried OTC cough suppressants like Robitussin but nothing really helps.  She has taken promethazine/codeine in the past and that did work for her.    Review of Systems  as per HPI      Objective:        Vitals:    05/10/19 1032   BP: 104/68   Resp: 20   Temp: 98.5 °F (36.9 °C)     Physical Exam   Constitutional: She is oriented to person, place, and time. She appears well-developed and well-nourished. No distress.   HENT:   Head: Normocephalic and atraumatic.   Right Ear: External ear normal.   Left Ear: External ear normal.   Nose: Nose normal.   Mouth/Throat: Oropharynx is clear and moist. No oropharyngeal exudate.   - bilateral ear canals clear, tympanic membranes visualized - normal color and light reflex   Eyes: Conjunctivae and EOM are normal.   Neck: Neck supple.   Cardiovascular: Normal rate and regular rhythm.   Pulmonary/Chest: Effort normal and breath sounds normal. No stridor. No respiratory distress. She has no wheezes. She has no rales.   - good air movement in all lung fields  - no bronchial breath sounds or ronchi   Lymphadenopathy:     She has no cervical adenopathy.   Neurological: She is alert and oriented to person, place, and time.   Skin: Skin is warm and dry. She is not diaphoretic.   Vitals reviewed.          Assessment:         1. Persistent cough              Plan:         Tiarra was seen today for cough.    Diagnoses and all orders for this visit:    Persistent cough: counseled pt this is likely persistent cough after viral URI, which can linger for a few weeks  - rx for cough syrup  - f/u if sx get  worse or change  -     promethazine-codeine 6.25-10 mg/5 ml (PHENERGAN WITH CODEINE) 6.25-10 mg/5 mL syrup; Take 5 mLs by mouth every 6 (six) hours as needed for Cough. May cause sedation.        Follow up as needed.

## 2019-05-13 ENCOUNTER — CLINICAL SUPPORT (OUTPATIENT)
Dept: REHABILITATION | Facility: HOSPITAL | Age: 64
End: 2019-05-13
Attending: FAMILY MEDICINE
Payer: MEDICARE

## 2019-05-13 DIAGNOSIS — R52 PAIN: ICD-10-CM

## 2019-05-13 PROCEDURE — 97110 THERAPEUTIC EXERCISES: CPT | Mod: HCNC

## 2019-05-13 NOTE — PROGRESS NOTES
"                                                    Physical Therapy Daily Note     Name: Tiarra Chang [facility] Number: [id number]  Diagnosis:   Encounter Diagnosis   Name Primary?    Pain      Physician: Omari Thomason, *  Precautions: standard  Visit #: 2 of 20  PTA Visit #: 1  Time In: 2:00  Time Out: 2:44  Total Treatment Time 1:1: 23    Physician Orders: PT Eval and Treat   Medical Diagnosis: M17.11 (ICD-10-CM) - Primary osteoarthritis of right knee   Evaluation Date: 5/6/2019  Authorization Period Expiration: 12-31-19  Plan of Care Certification Period: 7-01-19        Subjective     Pt reports: R knee is sore  Pain Scale: Tiarra rates pain at R knee on a scale of 0-10 to be 5 currently.    Objective     Tiarra received individual therapeutic exercises to develop strength, endurance, ROM, flexibility and core stabilization for 44 minutes including:  Recumbent bike x 8 min  HS S 2x1'  Heel slides 20x5" hold  QS 30x 5" hold  SLR 2x10  SAQ 2x10  Bridge w/ abd 2x15 OTB  SL R hip abd 2x10  Clams 2x15    Standing TKE GTB 2x15    Tiarra received the following manual therapy techniques: NT    Written Home Exercises Provided: as per eval  Pt demo good understanding of the education provided. Tiarra demonstrated good return demonstration of activities.     Education provided re: cont HEP  Tiarra verbalized good understanding of education provided.   No spiritual or educational barriers to learning provided    Assessment     Patient tolerated nuria well w/o adverse reaction. R knee felt better after performing bike. Good quad activation w/ QS today.Cont to advance as shalini.  This is a 64 y.o. female referred to outpatient physical therapy and presents with a medical diagnosis of R knee pain and demonstrates limitations as described in the problem list. Pt prognosis is Fair. Pt will continue to benefit from skilled outpatient physical therapy to address the deficits listed in the problem list, provide pt/family education " and to maximize pt's level of independence in the home and community environment.     Goals as follows:  Short Term Goals: 2 weeks         1.   Independent with HEP        2.  Pt will report decreased pain level of < 50% from above measure with ADL     Long Term Goals:   GOALS:    8_   weeks. Pt agrees with goals set.  1. Independent with HEP.  2. Report decreased    R knee    pain  <   / =  3/10 with ADL such as walking  3. Increased MMT  for  R LE to 4+/5 to 5/5  with ADL such as housework  4. Increased arom  for  R knee to 0-120 deg flexion with functional activities such walking or self-care          Plan     Continue with established Plan of Care towards PT goals.    Therapist: Michelle Ochoa, PTA  5/13/2019

## 2019-05-15 DIAGNOSIS — R05.3 PERSISTENT COUGH: ICD-10-CM

## 2019-05-15 RX ORDER — PROMETHAZINE HYDROCHLORIDE AND CODEINE PHOSPHATE 6.25; 1 MG/5ML; MG/5ML
SOLUTION ORAL
Qty: 180 ML | Refills: 0 | Status: SHIPPED | OUTPATIENT
Start: 2019-05-15 | End: 2019-06-12

## 2019-05-16 ENCOUNTER — CLINICAL SUPPORT (OUTPATIENT)
Dept: REHABILITATION | Facility: HOSPITAL | Age: 64
End: 2019-05-16
Attending: FAMILY MEDICINE
Payer: MEDICARE

## 2019-05-16 DIAGNOSIS — R52 PAIN: ICD-10-CM

## 2019-05-16 PROCEDURE — 97110 THERAPEUTIC EXERCISES: CPT | Mod: HCNC

## 2019-05-16 NOTE — PROGRESS NOTES
"                                                    Physical Therapy Daily Note     Name: Tiarra Chang [facility] Number: [id number]  Diagnosis:   Encounter Diagnosis   Name Primary?    Pain      Physician: Omari Thomason, *  Precautions: standard  Visit #: 3 of 20  PTA Visit #: 2  Time In: 1:00  Time Out: 2:00  Total Treatment Time 1:1: 23    Physician Orders: PT Eval and Treat   Medical Diagnosis: M17.11 (ICD-10-CM) - Primary osteoarthritis of right knee   Evaluation Date: 5/6/2019  Authorization Period Expiration: 12-31-19  Plan of Care Certification Period: 7-01-19        Subjective     Pt reports: no complaints of knee pain  Pain Scale: Tiarra rates pain at R knee on a scale of 0-10 to be 0 currently.    Objective     Tiarra received individual therapeutic exercises to develop strength, endurance, ROM, flexibility and core stabilization for 46 minutes including:  Recumbent bike x 8 min  HS S 2x1'  Heel slides 20x5" hold  QS 30x 5" hold  SLR 3x10  SAQ 3x10  Bridge w/ abd 2x10 OTB  SL R hip abd 3x10  Clams 2x15    Standing TKE GTB 2x15  Step up/lat stairs 2x10  Shuttle 2 blk 2x10    Tiarra received the following manual therapy techniques: NT    Written Home Exercises Provided: as per eval  Pt demo good understanding of the education provided. Tiarra demonstrated good return demonstration of activities.     Education provided re: cont HEP  Tiarra verbalized good understanding of education provided.   No spiritual or educational barriers to learning provided    Assessment     Patient tolerated nuria well w/o adverse reaction. Added new nuria w/ good shalini. Cont to advance as shalini  This is a 64 y.o. female referred to outpatient physical therapy and presents with a medical diagnosis of R knee pain and demonstrates limitations as described in the problem list. Pt prognosis is Fair. Pt will continue to benefit from skilled outpatient physical therapy to address the deficits listed in the problem list, provide pt/family " education and to maximize pt's level of independence in the home and community environment.     Goals as follows:  Short Term Goals: 2 weeks         1.   Independent with HEP        2.  Pt will report decreased pain level of < 50% from above measure with ADL     Long Term Goals:   GOALS:    8_   weeks. Pt agrees with goals set.  1. Independent with HEP.  2. Report decreased    R knee    pain  <   / =  3/10 with ADL such as walking  3. Increased MMT  for  R LE to 4+/5 to 5/5  with ADL such as housework  4. Increased arom  for  R knee to 0-120 deg flexion with functional activities such walking or self-care          Plan     Continue with established Plan of Care towards PT goals.    Therapist: Michelle Ochoa, PTA  5/16/2019

## 2019-05-20 ENCOUNTER — CLINICAL SUPPORT (OUTPATIENT)
Dept: REHABILITATION | Facility: HOSPITAL | Age: 64
End: 2019-05-20
Attending: FAMILY MEDICINE
Payer: MEDICARE

## 2019-05-20 DIAGNOSIS — R52 PAIN: ICD-10-CM

## 2019-05-20 PROCEDURE — 97110 THERAPEUTIC EXERCISES: CPT | Mod: HCNC | Performed by: PHYSICAL THERAPIST

## 2019-05-20 NOTE — PROGRESS NOTES
"                                                    Physical Therapy Daily Note     Name: Tiarra Chang [facility] Number: [id number]  Diagnosis:   Encounter Diagnosis   Name Primary?    Pain      Physician: Omari Thomason, *  Precautions: standard  Visit #: 4 of 20  Time In: 2:00 pm  Time Out: 2:50 pm    Physician Orders: PT Eval and Treat   Medical Diagnosis: M17.11 (ICD-10-CM) - Primary osteoarthritis of right knee   Evaluation Date: 5/6/2019  Authorization Period Expiration: 12-31-19  Plan of Care Certification Period: 7-01-19        Subjective     Pt reports: R knee feels "much better"  Pain Scale: Tiarra rates pain at R knee on a scale of 0-10 to be 0 currently.    Objective     AROM is WNL R knee.  QS ability increased.    Tiarra received individual therapeutic exercises to develop strength, endurance, ROM, flexibility and core stabilization for 40 minutes including:  bike x 10 min  HS S 2x1'  Heel slides 20x5" hold  QS 30x 5" hold  SLR 3x10  SAQ 3x10  Bridge w/ abd 2x10 OTB  SL R hip abd 3x10  Clams 2x15  Standing TKE GTB 2x15  Step up/lat stairs 2x10  Shuttle 2 blk 2x10    Tiarra received the following manual therapy techniques: NT    Written Home Exercises Provided: as per eval  Pt demo good understanding of the education provided. Tiarra demonstrated good return demonstration of activities.     Education provided re: cont HEP  Tiarra verbalized good understanding of education provided.   No spiritual or educational barriers to learning provided    Assessment     Patient tolerated treatment well and without pain.  This is a 64 y.o. female referred to outpatient physical therapy and presents with a medical diagnosis of R knee pain and demonstrates limitations as described in the problem list. Pt prognosis is Fair. Pt will continue to benefit from skilled outpatient physical therapy to address the deficits listed in the problem list, provide pt/family education and to maximize pt's level of independence in " the home and community environment.     Goals as follows:  Short Term Goals: 2 weeks         1.   Independent with HEP        2.  Pt will report decreased pain level of < 50% from above measure with ADL     Long Term Goals:   GOALS:    8_   weeks. Pt agrees with goals set.  1. Independent with HEP.  2. Report decreased    R knee    pain  <   / =  3/10 with ADL such as walking  3. Increased MMT  for  R LE to 4+/5 to 5/5  with ADL such as housework  4. Increased arom  for  R knee to 0-120 deg flexion with functional activities such walking or self-care          Plan     Continue with established Plan of Care towards PT goals.    Therapist: Amilcar Hayward, PT  5/20/2019

## 2019-05-27 ENCOUNTER — CLINICAL SUPPORT (OUTPATIENT)
Dept: REHABILITATION | Facility: HOSPITAL | Age: 64
End: 2019-05-27
Attending: FAMILY MEDICINE
Payer: MEDICARE

## 2019-05-27 DIAGNOSIS — R52 PAIN: ICD-10-CM

## 2019-05-27 PROCEDURE — 97110 THERAPEUTIC EXERCISES: CPT | Mod: HCNC

## 2019-05-27 NOTE — PROGRESS NOTES
"                                                    Physical Therapy Daily Note     Name: Tiarra Chang [facility] Number: [id number]  Diagnosis:   Encounter Diagnosis   Name Primary?    Pain      Physician: Omari Thomason, *  Precautions: standard  Visit #: 5 of 20  Time In: 2:13 pm  Time Out: 3:00 pm    Physician Orders: PT Eval and Treat   Medical Diagnosis: M17.11 (ICD-10-CM) - Primary osteoarthritis of right knee   Evaluation Date: 5/6/2019  Authorization Period Expiration: 12-31-19  Plan of Care Certification Period: 7-01-19        Subjective     Pt reports: R knee is feeling better  Pain Scale: Tiarra rates pain at R knee on a scale of 0-10 to be 0 currently.    Objective     AROM is WNL R knee.  QS ability increased.    Tiarra received individual therapeutic exercises to develop strength, endurance, ROM, flexibility and core stabilization for 53 minutes including:  bike x 10 min  HS S 2x1'  Heel slides 20x5" hold  QS 30x 5" hold  SLR 3x10 #2  SAQ 3x10 #2  Bridge w/ abd 3x10: 3" hold GTB  SL R hip abd 3x10 #2  Clams 2x15 GTB  Standing TKE GTB w/ step up 4" 2x15  Lat Step up stairs 2x15  Shuttle 2 blk 3x10    Tiarra received the following manual therapy techniques: NT    Written Home Exercises Provided: as per eval  Pt demo good understanding of the education provided. Tiarra demonstrated good return demonstration of activities.     Education provided re: cont HEP  Tiarra verbalized good understanding of education provided.   No spiritual or educational barriers to learning provided    Assessment     Pt is overall feeling better. Added resistance to nuria above w/ good shalini. Cont to advance as shalini.  This is a 64 y.o. female referred to outpatient physical therapy and presents with a medical diagnosis of R knee pain and demonstrates limitations as described in the problem list. Pt prognosis is Fair. Pt will continue to benefit from skilled outpatient physical therapy to address the deficits listed in the problem " list, provide pt/family education and to maximize pt's level of independence in the home and community environment.     Goals as follows:  Short Term Goals: 2 weeks         1.   Independent with HEP        2.  Pt will report decreased pain level of < 50% from above measure with ADL     Long Term Goals:   GOALS:    8_   weeks. Pt agrees with goals set.  1. Independent with HEP.  2. Report decreased    R knee    pain  <   / =  3/10 with ADL such as walking  3. Increased MMT  for  R LE to 4+/5 to 5/5  with ADL such as housework  4. Increased arom  for  R knee to 0-120 deg flexion with functional activities such walking or self-care          Plan     Continue with established Plan of Care towards PT goals.    Therapist: Michelle Ochoa, PTA  5/27/2019

## 2019-05-30 ENCOUNTER — CLINICAL SUPPORT (OUTPATIENT)
Dept: REHABILITATION | Facility: HOSPITAL | Age: 64
End: 2019-05-30
Attending: FAMILY MEDICINE
Payer: MEDICARE

## 2019-05-30 DIAGNOSIS — R52 PAIN: ICD-10-CM

## 2019-05-30 PROCEDURE — 97110 THERAPEUTIC EXERCISES: CPT | Mod: HCNC

## 2019-05-30 NOTE — PROGRESS NOTES
"                                                    Physical Therapy Daily Note     Name: Tiarra Chang [facility] Number: [id number]  Diagnosis:   Encounter Diagnosis   Name Primary?    Pain      Physician: Omari Thomason, *  Precautions: standard  Visit #: 6 of 20  Time In: 2:00 pm  Time Out: 3:00 pm    Physician Orders: PT Eval and Treat   Medical Diagnosis: M17.11 (ICD-10-CM) - Primary osteoarthritis of right knee   Evaluation Date: 5/6/2019  Authorization Period Expiration: 12-31-19  Plan of Care Certification Period: 7-01-19        Subjective     Pt reports: knee is feeling better  Pain Scale: Tiarra rates pain at R knee on a scale of 0-10 to be 2 currently.    Objective     AROM is WNL R knee.  QS ability increased.    Tiarra received individual therapeutic exercises to develop strength, endurance, ROM, flexibility and core stabilization for 60 minutes including:  bike x 10 min  HS S 2x1'  Heel slides 20x5" hold-NT  SLR 3x10 #2 w/QS  SAQ 3x10 #2  Bridge w/ abd 3x10: 3" hold GTB  SL R hip abd 3x10 #2  Clams 2x15 GTB: 3" hold   Standing TKE GTB w/ step up 4" 2x15  Lat Step up stairs 2x15  Shuttle 2 blk 3x10  Crabwalks OTB 1 lap    Tiarra received the following manual therapy techniques: NT    Written Home Exercises Provided: as per eval  Pt demo good understanding of the education provided. Tiarra demonstrated good return demonstration of activities.     Education provided re: cont HEP  Tiarra verbalized good understanding of education provided.   No spiritual or educational barriers to learning provided    Assessment     Pt is overall feeling better. Advanced nuria as above. Hip fatigue w/ crabwalks. Cont to advance per shalini.  This is a 64 y.o. female referred to outpatient physical therapy and presents with a medical diagnosis of R knee pain and demonstrates limitations as described in the problem list. Pt prognosis is Fair. Pt will continue to benefit from skilled outpatient physical therapy to address the " deficits listed in the problem list, provide pt/family education and to maximize pt's level of independence in the home and community environment.     Goals as follows:  Short Term Goals: 2 weeks         1.   Independent with HEP        2.  Pt will report decreased pain level of < 50% from above measure with ADL     Long Term Goals:   GOALS:    8_   weeks. Pt agrees with goals set.  1. Independent with HEP.  2. Report decreased    R knee    pain  <   / =  3/10 with ADL such as walking  3. Increased MMT  for  R LE to 4+/5 to 5/5  with ADL such as housework  4. Increased arom  for  R knee to 0-120 deg flexion with functional activities such walking or self-care          Plan     Continue with established Plan of Care towards PT goals.    Therapist: Michelle Ochoa, PTA  5/30/2019

## 2019-06-12 ENCOUNTER — OFFICE VISIT (OUTPATIENT)
Dept: ORTHOPEDICS | Facility: CLINIC | Age: 64
End: 2019-06-12
Payer: MEDICARE

## 2019-06-12 VITALS — BODY MASS INDEX: 33.81 KG/M2 | HEIGHT: 63 IN | WEIGHT: 190.81 LBS

## 2019-06-12 DIAGNOSIS — M25.561 ACUTE PAIN OF RIGHT KNEE: Primary | ICD-10-CM

## 2019-06-12 DIAGNOSIS — Z96.651 S/P RIGHT UNICOMPARTMENTAL KNEE REPLACEMENT: ICD-10-CM

## 2019-06-12 DIAGNOSIS — M17.10 PATELLOFEMORAL ARTHRITIS: ICD-10-CM

## 2019-06-12 PROCEDURE — 99203 OFFICE O/P NEW LOW 30 MIN: CPT | Mod: HCNC,S$GLB,, | Performed by: ORTHOPAEDIC SURGERY

## 2019-06-12 PROCEDURE — 99999 PR PBB SHADOW E&M-EST. PATIENT-LVL III: CPT | Mod: PBBFAC,HCNC,, | Performed by: ORTHOPAEDIC SURGERY

## 2019-06-12 PROCEDURE — 99999 PR PBB SHADOW E&M-EST. PATIENT-LVL III: ICD-10-PCS | Mod: PBBFAC,HCNC,, | Performed by: ORTHOPAEDIC SURGERY

## 2019-06-12 PROCEDURE — 1101F PT FALLS ASSESS-DOCD LE1/YR: CPT | Mod: HCNC,CPTII,S$GLB, | Performed by: ORTHOPAEDIC SURGERY

## 2019-06-12 PROCEDURE — 1101F PR PT FALLS ASSESS DOC 0-1 FALLS W/OUT INJ PAST YR: ICD-10-PCS | Mod: HCNC,CPTII,S$GLB, | Performed by: ORTHOPAEDIC SURGERY

## 2019-06-12 PROCEDURE — 99203 PR OFFICE/OUTPT VISIT, NEW, LEVL III, 30-44 MIN: ICD-10-PCS | Mod: HCNC,S$GLB,, | Performed by: ORTHOPAEDIC SURGERY

## 2019-06-12 NOTE — PROGRESS NOTES
Subjective:      Patient ID: Tiarra Norton is a 64 y.o. female.    Chief Complaint: Pain of the Right Knee    HPI  Tiarra Norton is a 64 year old female here with a 6 week  history of {right knee pain. 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 Medial BASSAM by me . ^ weeks ago acute pain and effusion treated by Dr. Thomason. There was not a history of trauma.  The pain is moderate The pain is located in the global aspect of the knee.  There is is not radiation.  T There are not feelings of instability.  The knee does not give away. The pain is described as achy.  It is aggravated by standing and walking.    It is alleviated by rest. There is not numbness or tingling of the lower extremity.    The patient has not had an infection work up. There is not locking or catching.  There is not popping of the kneecap.  The patient  has tried medications and/or injections. The patient does have difficulty getting in or out of a car, getting dressed, or going up or down stairs.         Past Medical History:   Diagnosis Date    Cataract     Depression     Diabetes mellitus     gestational diabetes 21 years ago    Hyperlipidemia     Hypertension     Thyroid disease      Past Surgical History:   Procedure Laterality Date    ADENOIDECTOMY      BIOPSY LIVER AND ULTRASOUND N/A 2015    Performed by [facility] at [facility] OR 2ND FLR     SECTION      CHOLECYSTECTOMY      COLONOSCOPY N/A 2016    Performed by Heri Segura MD at [facility] ENDO (4TH FLR)    EYE SURGERY      cataract resection right    r hand surgery      REPLACEMENT-KNEE WITH [id number]-SDG-MEDIAL Right 2015    Performed by Trevin Huber MD at [facility] OR 2ND FLR    TONSILLECTOMY      TOTAL KNEE ARTHROPLASTY      partial knee replacement    TUBAL LIGATION       Family History   Problem Relation Age of Onset    Hypertension Mother     Irritable bowel syndrome Mother      Heart disease Father         mi    Hypertension Father     Cancer Father         prostate    Alcohol abuse Sister     Depression Sister     Irritable bowel syndrome Sister     Alcohol abuse Sister     Ovarian cancer Maternal Aunt     Breast cancer Paternal Aunt     Melanoma Neg Hx     Colon cancer Neg Hx     Stomach cancer Neg Hx     Esophageal cancer Neg Hx     Liver disease Neg Hx     Crohn's disease Neg Hx     Ulcerative colitis Neg Hx     Celiac disease Neg Hx      Social History     Socioeconomic History    Marital status:      Spouse name: Not on file    Number of children: 1    Years of education: Not on file    Highest education level: Not on file   Occupational History    Occupation:      Employer: [employer]   Social Needs    Financial resource strain: Not on file    Food insecurity:     Worry: Not on file     Inability: Not on file    Transportation needs:     Medical: Not on file     Non-medical: Not on file   Tobacco Use    Smoking status: Never Smoker    Smokeless tobacco: Never Used   Substance and Sexual Activity    Alcohol use: Yes     Alcohol/week: 4.2 oz     Types: 7 Glasses of wine per week    Drug use: No    Sexual activity: Yes     Partners: Male   Lifestyle    Physical activity:     Days per week: Not on file     Minutes per session: Not on file    Stress: Not on file   Relationships    Social connections:     Talks on phone: Not on file     Gets together: Not on file     Attends Mandaeism service: Not on file     Active member of club or organization: Not on file     Attends meetings of clubs or organizations: Not on file     Relationship status: Not on file   Other Topics Concern    Are you pregnant or think you may be? No    Breast-feeding No   Social History Narrative    To retire feb 2018.  After then plans to exercise.      Works with with a  - 1-2 times a week.          Current Outpatient Medications on File Prior to Visit    Medication Sig Dispense Refill    alprazolam (XANAX) 2 MG Tab Take 2 mg by mouth 2 (two) times daily as needed.   0    amLODIPine (NORVASC) 5 MG tablet Take 1 tablet (5 mg total) by mouth once daily. 90 tablet 3    buPROPion (WELLBUTRIN XL) 300 MG 24 hr tablet Take 300 mg by mouth once daily.      cholecalciferol, vitamin D3, (VITAMIN D3) 5,000 unit Tab Take 1 tablet (5,000 Units total) by mouth once daily.      levothyroxine (SYNTHROID) 137 MCG Tab tablet Take 1 tablet (137 mcg total) by mouth every morning. 30 tablet 0    liothyronine (CYTOMEL) 5 MCG Tab Take 1 tablet (5 mcg total) by mouth once daily. 90 tablet 3    lisinopril (PRINIVIL,ZESTRIL) 40 MG tablet Take 1 tablet (40 mg total) by mouth once daily. 90 tablet 3    oxyCODONE-acetaminophen (PERCOCET)  mg per tablet Take 1 tablet by mouth every 4 (four) hours as needed for Pain. 18 tablet 0    promethazine (PHENERGAN) 25 MG tablet Take 1 tablet (25 mg total) by mouth every 6 (six) hours as needed for Nausea. 60 tablet 1    sumatriptan (IMITREX) 50 MG tablet 1 tab po at start of headache.  Repeat in 2 h prn 27 tablet 3    traZODone (DESYREL) 100 MG tablet take 1 to 2 tablets by mouth at bedtime for sleep 180 tablet 3    venlafaxine (EFFEXOR-XR) 150 MG Cp24 1 tab po q day (Patient taking differently: Take 150 mg by mouth once daily. 150 mg morning and 50 mg at night) 30 capsule 11    ziprasidone (GEODON) 20 MG Cap 40 mg 2 (two) times daily.       [DISCONTINUED] aspirin 325 MG tablet Take 1 tablet (325 mg total) by mouth 2 (two) times daily. (Patient taking differently: Take 81 mg by mouth once. ) 60 tablet 0    [DISCONTINUED] meloxicam (MOBIC) 7.5 MG tablet Take 1 tablet (7.5 mg total) by mouth once daily. 15 tablet 0    [DISCONTINUED] promethazine-codeine 6.25-10 mg/5 ml (PHENERGAN WITH CODEINE) 6.25-10 mg/5 mL syrup TAKE 5 MILLILITERS BY MOUTH EVERY 6 HOURS AS NEEDED FOR COUGH- MAY CAUSE SEDATION 180 mL 0    [DISCONTINUED] SUMAtriptan  "(IMITREX) 20 mg/actuation nasal spray instill 1 spray in 1 nostril ONCE PRN 1 each 11    [DISCONTINUED] venlafaxine (EFFEXOR-XR) 75 MG 24 hr capsule Take 75 mg by mouth nightly.   0     No current facility-administered medications on file prior to visit.      Review of patient's allergies indicates:  No Known Allergies    Review of Systems   Constitution: Negative for chills, fever and night sweats.   HENT: Negative for hearing loss.    Eyes: Negative for blurred vision and double vision.   Cardiovascular: Negative for chest pain, claudication and leg swelling.   Respiratory: Negative for shortness of breath.    Endocrine: Negative for polydipsia, polyphagia and polyuria.   Hematologic/Lymphatic: Negative for adenopathy and bleeding problem. Does not bruise/bleed easily.   Skin: Negative for poor wound healing.   Musculoskeletal: Positive for joint pain and joint swelling.   Gastrointestinal: Negative for diarrhea and heartburn.   Genitourinary: Negative for bladder incontinence.   Neurological: Negative for focal weakness, headaches, numbness, paresthesias and sensory change.   Psychiatric/Behavioral: The patient is not nervous/anxious.    Allergic/Immunologic: Negative for persistent infections.         Objective:      Body mass index is 34.02 kg/m².  Vitals:    06/12/19 1412   Weight: 86.5 kg (190 lb 12.9 oz)   Height: 5' 2.8" (1.595 m)         General    Constitutional: She is oriented to person, place, and time. She appears well-developed and well-nourished.   HENT:   Head: Normocephalic and atraumatic.   Eyes: EOM are normal.   Cardiovascular: Normal rate.    Pulmonary/Chest: Effort normal.   Neurological: She is alert and oriented to person, place, and time.   Psychiatric: She has a normal mood and affect. Her behavior is normal.     General Musculoskeletal Exam   Gait: normal       Right Knee Exam     Inspection   Erythema: absent  Scars: present  Swelling: absent  Effusion: absent  Deformity: " absent  Bruising: absent    Tenderness   The patient is tender to palpation of the medial joint line and patella.    Crepitus   The patient has crepitus of the patella.    Range of Motion   Extension: 0   Flexion: 130     Tests   Ligament Examination Lachman: normal (-1 to 2mm)   MCL - Valgus: normal (0 to 2mm)  LCL - Varus: normal  Patella   Passive Patellar Tilt: neutral    Other   Sensation: normal    Left Knee Exam     Inspection   Erythema: absent  Scars: absent  Swelling: absent  Effusion: absent  Deformity: absent  Bruising: absent    Tenderness   The patient is experiencing no tenderness.     Range of Motion   Extension: 0   Flexion: 130     Tests   Stability Lachman: normal (-1 to 2mm)   MCL - Valgus: normal (0 to 2mm)  LCL - Varus: normal (0 to 2mm)  Patella   Passive Patellar Tilt: neutral    Other   Sensation: normal    Muscle Strength   Right Lower Extremity   Hip Abduction: 5/5   Quadriceps:  5/5   Hamstrin/5   Left Lower Extremity   Hip Abduction: 5/5   Quadriceps:  5/5   Hamstrin/5     Reflexes     Left Side  Quadriceps:  2+    Right Side   Quadriceps:  2+    Vascular Exam     Right Pulses  Dorsalis Pedis:      2+          Left Pulses  Dorsalis Pedis:      2+          Edema  Right Lower Leg: absent  Left Lower Leg: absent      Radiographs taken recently were reviewed by me.  There is a prosthetic replacement of the right knee(s).  Right medial uni.  Lateral patella tile and mild lateral djd.  Moderate left sised disease        Assessment:       Encounter Diagnoses   Name Primary?    Acute pain of right knee Yes    S/P right unicompartmental knee replacement     Patellofemoral arthritis           Plan:       Tiarra was seen today for pain.    Diagnoses and all orders for this visit:    Acute pain of right knee  -     Prior Authorization Order    S/P right unicompartmental knee replacement  -     Prior Authorization Order    Patellofemoral arthritis  -     Prior Authorization Order    Other  orders  -     sodium hyaluronate (EUFLEXXA) 10 mg/mL(mw 2.4 -3.6 million) Syrg 20 mg        Treatment options have been discussed.  We have decided to proceed with right knee Euflexxa injections.  The risk of pseudoseptic reactions were discussed, as was the minimal risk of infection.  Tiarra Norton will return for her first injection..

## 2019-06-17 ENCOUNTER — OFFICE VISIT (OUTPATIENT)
Dept: ORTHOPEDICS | Facility: CLINIC | Age: 64
End: 2019-06-17
Payer: MEDICARE

## 2019-06-17 VITALS — HEIGHT: 63 IN | BODY MASS INDEX: 33.4 KG/M2 | WEIGHT: 188.5 LBS

## 2019-06-17 DIAGNOSIS — M17.10 PATELLOFEMORAL ARTHRITIS: Primary | ICD-10-CM

## 2019-06-17 DIAGNOSIS — Z96.651 S/P RIGHT UNICOMPARTMENTAL KNEE REPLACEMENT: ICD-10-CM

## 2019-06-17 PROCEDURE — 99499 UNLISTED E&M SERVICE: CPT | Mod: HCNC,S$GLB,, | Performed by: PHYSICIAN ASSISTANT

## 2019-06-17 PROCEDURE — 99499 NO LOS: ICD-10-PCS | Mod: HCNC,S$GLB,, | Performed by: PHYSICIAN ASSISTANT

## 2019-06-17 PROCEDURE — 20610 DRAIN/INJ JOINT/BURSA W/O US: CPT | Mod: HCNC,RT,S$GLB, | Performed by: PHYSICIAN ASSISTANT

## 2019-06-17 PROCEDURE — 20610 PR DRAIN/INJECT LARGE JOINT/BURSA: ICD-10-PCS | Mod: HCNC,RT,S$GLB, | Performed by: PHYSICIAN ASSISTANT

## 2019-06-17 PROCEDURE — 99999 PR PBB SHADOW E&M-EST. PATIENT-LVL III: CPT | Mod: PBBFAC,HCNC,, | Performed by: PHYSICIAN ASSISTANT

## 2019-06-17 PROCEDURE — 99999 PR PBB SHADOW E&M-EST. PATIENT-LVL III: ICD-10-PCS | Mod: PBBFAC,HCNC,, | Performed by: PHYSICIAN ASSISTANT

## 2019-06-17 RX ORDER — CEPHALEXIN 250 MG/1
250 CAPSULE ORAL 4 TIMES DAILY
COMMUNITY
End: 2020-02-03

## 2019-06-17 NOTE — PROGRESS NOTES
Tiarra Norton is a 64 y.o. year old, she is here today for her 1st Euflexxa injection for degenerative changes of her right knee. she was last seen and treated in the clinic on 6/12/19. There has been no significant change in her medical status since her last visit. No Fever, chills, malaise, or unexplained weight change.      Allergies, Medications, past medical and surgical history were reviewed .    Examination of the knee demonstrates  No evidence of edema, erythema , echymosis strength and range of motion are unchanged from previous visit.    The injection site was identified and the skin was prepared with a betadine solution. The  right knee joint was injected with 2 ml of Euflexxa solution under sterile technique. Tiarra Norton tolerated the procedure well, she was advised to rest the knee today, ice and elevation. It may take 3-6 weeks following the last injection to get the full benefit of the medication.  I will see her back in 1 week for her 2nd Euflexxa injection in her right knee. Sooner if she has any problems or concerns.

## 2019-06-17 NOTE — LETTER
June 17, 2019      Trevin Huber MD  [address]  [facility] [address]           [facility]  [address], [address]  [facility] [address]  Phone: [phone]          Patient: Tiarra Nroton   MR Number: [id number]   YOB: 1955   Date of Visit: 6/17/2019       Dear Dr. Trevin Huber:    Thank you for referring Tiarra Norton to me for evaluation. Attached you will find relevant portions of my assessment and plan of care.    If you have questions, please do not hesitate to call me. I look forward to following Tiarra Norton along with you.    Sincerely,    Nemesio Gonzalez PA-C    Enclosure  CC:  No Recipients    If you would like to receive this communication electronically, please contact [email or web address] or [phone] to request more information on [facility] access.    For providers and/or their staff who would like to refer a patient to [facility], please contact us through our one-stop-shop provider referral line, [facility], at [phone].    If you feel you have received this communication in error or would no longer like to receive these types of communications, please e-mail [email or web address]

## 2019-06-24 ENCOUNTER — OFFICE VISIT (OUTPATIENT)
Dept: ORTHOPEDICS | Facility: CLINIC | Age: 64
End: 2019-06-24
Payer: MEDICARE

## 2019-06-24 VITALS
SYSTOLIC BLOOD PRESSURE: 124 MMHG | WEIGHT: 188 LBS | HEIGHT: 63 IN | BODY MASS INDEX: 33.31 KG/M2 | HEART RATE: 72 BPM | DIASTOLIC BLOOD PRESSURE: 76 MMHG

## 2019-06-24 DIAGNOSIS — Z96.651 S/P RIGHT UNICOMPARTMENTAL KNEE REPLACEMENT: Primary | ICD-10-CM

## 2019-06-24 DIAGNOSIS — M17.10 PATELLOFEMORAL ARTHRITIS: ICD-10-CM

## 2019-06-24 PROCEDURE — 99499 UNLISTED E&M SERVICE: CPT | Mod: HCNC,S$GLB,, | Performed by: PHYSICIAN ASSISTANT

## 2019-06-24 PROCEDURE — 99999 PR PBB SHADOW E&M-EST. PATIENT-LVL III: CPT | Mod: PBBFAC,HCNC,, | Performed by: PHYSICIAN ASSISTANT

## 2019-06-24 PROCEDURE — 20610 PR DRAIN/INJECT LARGE JOINT/BURSA: ICD-10-PCS | Mod: HCNC,RT,S$GLB, | Performed by: PHYSICIAN ASSISTANT

## 2019-06-24 PROCEDURE — 20610 DRAIN/INJ JOINT/BURSA W/O US: CPT | Mod: HCNC,RT,S$GLB, | Performed by: PHYSICIAN ASSISTANT

## 2019-06-24 PROCEDURE — 99499 NO LOS: ICD-10-PCS | Mod: HCNC,S$GLB,, | Performed by: PHYSICIAN ASSISTANT

## 2019-06-24 PROCEDURE — 99999 PR PBB SHADOW E&M-EST. PATIENT-LVL III: ICD-10-PCS | Mod: PBBFAC,HCNC,, | Performed by: PHYSICIAN ASSISTANT

## 2019-06-24 NOTE — LETTER
June 24, 2019      Trevin Huber MD  [address]  [facility] [address]           [facility]  [address]  [facility] [address]  Phone: [phone]          Patient: Tiarra Norton   MR Number: [id number]   YOB: 1955   Date of Visit: 6/24/2019       Dear Dr. Trevin Huber:    Thank you for referring Tiarra Norton to me for evaluation. Attached you will find relevant portions of my assessment and plan of care.    If you have questions, please do not hesitate to call me. I look forward to following Tiarra Norton along with you.    Sincerely,    Nemesio Gonzalez PA-C    Enclosure  CC:  No Recipients    If you would like to receive this communication electronically, please contact [email or web address] or [phone] to request more information on [facility] Link access.    For providers and/or their staff who would like to refer a patient to [facility], please contact us through our one-stop-shop provider referral line, [facility] , at [phone].    If you feel you have received this communication in error or would no longer like to receive these types of communications, please e-mail [email or web address]

## 2019-06-24 NOTE — PROGRESS NOTES
Tiarra Norton is a 64 y.o. year old, she is here today for her 2nd Euflexxa injection for degenerative changes of her right knee. she was last seen and treated in the clinic on 6/17/2019. There has been no significant change in her medical status since her last visit. No Fever, chills, malaise, or unexplained weight change.      Allergies, Medications, past medical and surgical history were reviewed .    Examination of the knee demonstrates  No evidence of edema, erythema , echymosis strength and range of motion are unchanged from previous visit.    The injection site was identified and the skin was prepared with a betadine solution. The  right knee joint was injected with 2 ml of Euflexxa solution under sterile technique. Tiarra Norton tolerated the procedure well, she was advised to rest the knee today, ice and elevation. It may take 3-6 weeks following the last injection to get the full benefit of the medication.  I will see her back in 1 week for her 3rd Euflexxa injection in her right knee. Sooner if she has any problems or concerns.

## 2019-07-01 ENCOUNTER — OFFICE VISIT (OUTPATIENT)
Dept: ORTHOPEDICS | Facility: CLINIC | Age: 64
End: 2019-07-01
Payer: MEDICARE

## 2019-07-01 VITALS
HEIGHT: 63 IN | WEIGHT: 188 LBS | DIASTOLIC BLOOD PRESSURE: 76 MMHG | BODY MASS INDEX: 33.31 KG/M2 | HEART RATE: 86 BPM | SYSTOLIC BLOOD PRESSURE: 123 MMHG

## 2019-07-01 DIAGNOSIS — Z96.651 S/P RIGHT UNICOMPARTMENTAL KNEE REPLACEMENT: Primary | ICD-10-CM

## 2019-07-01 DIAGNOSIS — M17.10 PATELLOFEMORAL ARTHRITIS: ICD-10-CM

## 2019-07-01 PROCEDURE — 99499 NO LOS: ICD-10-PCS | Mod: HCNC,S$GLB,, | Performed by: PHYSICIAN ASSISTANT

## 2019-07-01 PROCEDURE — 99499 UNLISTED E&M SERVICE: CPT | Mod: HCNC,S$GLB,, | Performed by: PHYSICIAN ASSISTANT

## 2019-07-01 PROCEDURE — 20610 PR DRAIN/INJECT LARGE JOINT/BURSA: ICD-10-PCS | Mod: HCNC,RT,S$GLB, | Performed by: PHYSICIAN ASSISTANT

## 2019-07-01 PROCEDURE — 20610 DRAIN/INJ JOINT/BURSA W/O US: CPT | Mod: HCNC,RT,S$GLB, | Performed by: PHYSICIAN ASSISTANT

## 2019-07-01 PROCEDURE — 99999 PR PBB SHADOW E&M-EST. PATIENT-LVL III: CPT | Mod: PBBFAC,HCNC,, | Performed by: PHYSICIAN ASSISTANT

## 2019-07-01 PROCEDURE — 99999 PR PBB SHADOW E&M-EST. PATIENT-LVL III: ICD-10-PCS | Mod: PBBFAC,HCNC,, | Performed by: PHYSICIAN ASSISTANT

## 2019-07-01 NOTE — PROGRESS NOTES
Tiarra Norton is a 64 y.o. year old, she is here today for her 3rd Euflexxa injection for degenerative changes of her right knee. she was last seen and treated in the clinic on 6/24/2019. There has been no significant change in her medical status since her last visit. No Fever, chills, malaise, or unexplained weight change.      Allergies, Medications, past medical and surgical history were reviewed .    Examination of the knee demonstrates  No evidence of edema, erythema , echymosis strength and range of motion are unchanged from previous visit.    The injection site was identified and the skin was prepared with a betadine solution. The  right knee joint was injected with 2 ml of Euflexxa solution under sterile technique. Tiarra Norton tolerated the procedure well, she was advised to rest the knee today, ice and elevation. It may take 3-6 weeks following the last injection to get the full benefit of the medication.  I will see her back in 6 months. Sooner if she has any problems or concerns.

## 2019-07-01 NOTE — LETTER
July 1, 2019      Trevin Huber MD  [address]  [facility] [address]           [facility]  [address], [address]  [facility] [address]  Phone: [phone]          Patient: Tiarra Norton   MR Number: [id number]   YOB: 1955   Date of Visit: 7/1/2019       Dear Dr. Trevin Huber:    Thank you for referring Tiarra Norton to me for evaluation. Attached you will find relevant portions of my assessment and plan of care.    If you have questions, please do not hesitate to call me. I look forward to following Tiarra Norton along with you.    Sincerely,    Nemesio Gonzalez PA-C    Enclosure  CC:  No Recipients    If you would like to receive this communication electronically, please contact [email or web address] or [phone] to request more information on [employer] Link access.    For providers and/or their staff who would like to refer a patient to [facility], please contact us through our one-stop-shop provider referral line, [facility], at [phone].    If you feel you have received this communication in error or would no longer like to receive these types of communications, please e-mail [email or web address]

## 2019-07-05 ENCOUNTER — PATIENT MESSAGE (OUTPATIENT)
Dept: INTERNAL MEDICINE | Facility: CLINIC | Age: 64
End: 2019-07-05

## 2019-07-17 ENCOUNTER — TELEPHONE (OUTPATIENT)
Dept: ORTHOPEDICS | Facility: CLINIC | Age: 64
End: 2019-07-17

## 2019-07-17 DIAGNOSIS — M51.36 DDD (DEGENERATIVE DISC DISEASE), LUMBAR: Primary | ICD-10-CM

## 2019-07-23 ENCOUNTER — OFFICE VISIT (OUTPATIENT)
Dept: ORTHOPEDICS | Facility: CLINIC | Age: 64
End: 2019-07-23
Payer: MEDICARE

## 2019-07-23 ENCOUNTER — HOSPITAL ENCOUNTER (OUTPATIENT)
Dept: RADIOLOGY | Facility: HOSPITAL | Age: 64
Discharge: HOME | End: 2019-07-23
Attending: PHYSICIAN ASSISTANT
Payer: MEDICARE

## 2019-07-23 VITALS — WEIGHT: 189.81 LBS | BODY MASS INDEX: 32.41 KG/M2 | HEIGHT: 64 IN

## 2019-07-23 DIAGNOSIS — M51.36 DDD (DEGENERATIVE DISC DISEASE), LUMBAR: Primary | ICD-10-CM

## 2019-07-23 DIAGNOSIS — M41.9 SCOLIOSIS, UNSPECIFIED SCOLIOSIS TYPE, UNSPECIFIED SPINAL REGION: ICD-10-CM

## 2019-07-23 DIAGNOSIS — M51.36 DDD (DEGENERATIVE DISC DISEASE), LUMBAR: ICD-10-CM

## 2019-07-23 PROCEDURE — 1101F PT FALLS ASSESS-DOCD LE1/YR: CPT | Mod: HCNC,CPTII,S$GLB, | Performed by: PHYSICIAN ASSISTANT

## 2019-07-23 PROCEDURE — 99214 PR OFFICE/OUTPT VISIT, EST, LEVL IV, 30-39 MIN: ICD-10-PCS | Mod: HCNC,S$GLB,, | Performed by: PHYSICIAN ASSISTANT

## 2019-07-23 PROCEDURE — 1101F PR PT FALLS ASSESS DOC 0-1 FALLS W/OUT INJ PAST YR: ICD-10-PCS | Mod: HCNC,CPTII,S$GLB, | Performed by: PHYSICIAN ASSISTANT

## 2019-07-23 PROCEDURE — 72100 X-RAY EXAM L-S SPINE 2/3 VWS: CPT | Mod: 26,HCNC,, | Performed by: RADIOLOGY

## 2019-07-23 PROCEDURE — 72120 X-RAY BEND ONLY L-S SPINE: CPT | Mod: 26,HCNC,, | Performed by: RADIOLOGY

## 2019-07-23 PROCEDURE — 72100 XR LUMBAR SPINE AP AND LAT WITH FLEX/EXT: ICD-10-PCS | Mod: 26,HCNC,, | Performed by: RADIOLOGY

## 2019-07-23 PROCEDURE — 72120 X-RAY BEND ONLY L-S SPINE: CPT | Mod: TC,HCNC

## 2019-07-23 PROCEDURE — 99999 PR PBB SHADOW E&M-EST. PATIENT-LVL III: CPT | Mod: PBBFAC,HCNC,, | Performed by: PHYSICIAN ASSISTANT

## 2019-07-23 PROCEDURE — 99214 OFFICE O/P EST MOD 30 MIN: CPT | Mod: HCNC,S$GLB,, | Performed by: PHYSICIAN ASSISTANT

## 2019-07-23 PROCEDURE — 72120 XR LUMBAR SPINE AP AND LAT WITH FLEX/EXT: ICD-10-PCS | Mod: 26,HCNC,, | Performed by: RADIOLOGY

## 2019-07-23 PROCEDURE — 99999 PR PBB SHADOW E&M-EST. PATIENT-LVL III: ICD-10-PCS | Mod: PBBFAC,HCNC,, | Performed by: PHYSICIAN ASSISTANT

## 2019-07-23 RX ORDER — MELOXICAM 15 MG/1
15 TABLET ORAL DAILY
Qty: 30 TABLET | Refills: 0 | Status: SHIPPED | OUTPATIENT
Start: 2019-07-23 | End: 2019-08-17 | Stop reason: SDUPTHER

## 2019-07-23 RX ORDER — METHOCARBAMOL 750 MG/1
750 TABLET, FILM COATED ORAL 3 TIMES DAILY
Qty: 60 TABLET | Refills: 0 | Status: SHIPPED | OUTPATIENT
Start: 2019-07-23 | End: 2019-08-09 | Stop reason: SDUPTHER

## 2019-07-23 NOTE — PROGRESS NOTES
DATE: 2019  PATIENT: Tiarra Norton    Supervising Physician: Fadi Stone M.D.    CHIEF COMPLAINT: back pain    HISTORY:  Tiarra Norton is a 64 y.o. female here for initial evaluation of low back pain (Back - 7). The pain has been present for about a year. The patient describes the pain as spasms.  The pain is worse with bending, leaning forward and walking and improved by sitting. There is no associated numbness and tingling. There is no subjective weakness. Prior treatments have included heat, OTC NSAIDs, but no PT, ESIs or surgery.    The patient denies myelopathic symptoms such as handwriting changes or difficulty with buttons/coins/keys. Denies perineal paresthesias, bowel/bladder dysfunction.    PAST MEDICAL/SURGICAL HISTORY:  Past Medical History:   Diagnosis Date    Cataract     Depression     Diabetes mellitus     gestational diabetes 21 years ago    Hyperlipidemia     Hypertension     Thyroid disease      Past Surgical History:   Procedure Laterality Date    ADENOIDECTOMY      BIOPSY LIVER AND ULTRASOUND N/A 2015    Performed by [facility] Provider at [facility] OR 2ND FLR     SECTION      CHOLECYSTECTOMY      COLONOSCOPY N/A 2016    Performed by Heri Segura MD at [facility] ENDO (4TH FLR)    EYE SURGERY      cataract resection right    r hand surgery      REPLACEMENT-KNEE WITH [id number]-VIE-MEDIAL Right 2015    Performed by Trevin Huber MD at [facility] OR 2ND FLR    TONSILLECTOMY      TOTAL KNEE ARTHROPLASTY      partial knee replacement    TUBAL LIGATION         Current Medications:   Current Outpatient Medications:     alprazolam (XANAX) 2 MG Tab, Take 2 mg by mouth 2 (two) times daily as needed. , Disp: , Rfl: 0    amLODIPine (NORVASC) 5 MG tablet, Take 1 tablet (5 mg total) by mouth once daily., Disp: 90 tablet, Rfl: 3    buPROPion (WELLBUTRIN XL) 300 MG 24 hr tablet, Take 300 mg by mouth once daily., Disp: , Rfl:     cephALEXin  (KEFLEX) 250 MG capsule, Take 250 mg by mouth 4 (four) times daily., Disp: , Rfl:     cholecalciferol, vitamin D3, (VITAMIN D3) 5,000 unit Tab, Take 1 tablet (5,000 Units total) by mouth once daily., Disp: , Rfl:     levothyroxine (SYNTHROID) 137 MCG Tab tablet, Take 1 tablet (137 mcg total) by mouth every morning., Disp: 30 tablet, Rfl: 0    liothyronine (CYTOMEL) 5 MCG Tab, Take 1 tablet (5 mcg total) by mouth once daily., Disp: 90 tablet, Rfl: 3    lisinopril (PRINIVIL,ZESTRIL) 40 MG tablet, Take 1 tablet (40 mg total) by mouth once daily., Disp: 90 tablet, Rfl: 3    oxyCODONE-acetaminophen (PERCOCET)  mg per tablet, Take 1 tablet by mouth every 4 (four) hours as needed for Pain., Disp: 18 tablet, Rfl: 0    promethazine (PHENERGAN) 25 MG tablet, Take 1 tablet (25 mg total) by mouth every 6 (six) hours as needed for Nausea., Disp: 60 tablet, Rfl: 1    sumatriptan (IMITREX) 50 MG tablet, 1 tab po at start of headache.  Repeat in 2 h prn, Disp: 27 tablet, Rfl: 3    traZODone (DESYREL) 100 MG tablet, take 1 to 2 tablets by mouth at bedtime for sleep, Disp: 180 tablet, Rfl: 3    venlafaxine (EFFEXOR-XR) 150 MG Cp24, 1 tab po q day (Patient taking differently: Take 150 mg by mouth once daily. 150 mg morning and 50 mg at night), Disp: 30 capsule, Rfl: 11    ziprasidone (GEODON) 20 MG Cap, 40 mg 2 (two) times daily. , Disp: , Rfl:     meloxicam (MOBIC) 15 MG tablet, Take 1 tablet (15 mg total) by mouth once daily., Disp: 30 tablet, Rfl: 0    methocarbamol (ROBAXIN) 750 MG Tab, Take 1 tablet (750 mg total) by mouth 3 (three) times daily. As needed for muscle spasms for 60 doses, Disp: 60 tablet, Rfl: 0    Current Facility-Administered Medications:     sodium hyaluronate (EUFLEXXA) 10 mg/mL(mw 2.4 -3.6 million) Syrg 20 mg, 20 mg, Intra-articular, Weekly, Trevin Huber MD, 20 mg at 07/01/19 1012    Social History:   Social History     Socioeconomic History    Marital status:      Spouse  name: Not on file    Number of children: 1    Years of education: Not on file    Highest education level: Not on file   Occupational History    Occupation:      Employer: [employer]   Social Needs    Financial resource strain: Not on file    Food insecurity:     Worry: Not on file     Inability: Not on file    Transportation needs:     Medical: Not on file     Non-medical: Not on file   Tobacco Use    Smoking status: Never Smoker    Smokeless tobacco: Never Used   Substance and Sexual Activity    Alcohol use: Yes     Alcohol/week: 4.2 oz     Types: 7 Glasses of wine per week    Drug use: No    Sexual activity: Yes     Partners: Male   Lifestyle    Physical activity:     Days per week: Not on file     Minutes per session: Not on file    Stress: Not on file   Relationships    Social connections:     Talks on phone: Not on file     Gets together: Not on file     Attends [employer] service: Not on file     Active member of club or organization: Not on file     Attends meetings of clubs or organizations: Not on file     Relationship status: Not on file   Other Topics Concern    Are you pregnant or think you may be? No    Breast-feeding No   Social History Narrative    To retire feb 2018.  After then plans to exercise.      Works with with a  - 1-2 times a week.            REVIEW OF SYSTEMS:  Constitution: Negative. Negative for chills, fever and night sweats.   Cardiovascular: Negative for chest pain and syncope.   Respiratory: Negative for cough and shortness of breath.   Gastrointestinal: See HPI. Negative for nausea/vomiting. Negative for abdominal pain.  Genitourinary: See HPI. Negative for discoloration or dysuria.  Skin: Negative for dry skin, itching and rash.   Hematologic/Lymphatic: Negative for bleeding problem. Does not bruise/bleed easily.   Musculoskeletal: Negative for falls and muscle weakness.   Neurological: See HPI. No seizures.   Endocrine: Negative for polydipsia,  "polyphagia and polyuria.   Allergic/Immunologic: Negative for hives and persistent infections.    PHYSICAL EXAMINATION:    Ht 5' 3.5" (1.613 m)   Wt 86.1 kg (189 lb 13.1 oz)   BMI 33.10 kg/m²     General: The patient is a very pleasant 64 y.o. female in no apparent distress, the patient is oriented to person, place and time.   Psych: Normal mood and affect  HEENT: Vision grossly intact, hearing intact to the spoken word.  Lungs: Respirations unlabored.  Gait: Normal station and gait, no difficulty with toe or heel walk.   Skin: Dorsal lumbar skin negative for rashes, lesions, hairy patches and surgical scars.  Range of motion: Lumbar range of motion is acceptable. There is mild lumbar tenderness to palpation.  Spinal Balance: Global saggital and coronal spinal balance acceptable, no significant for scoliosis and kyphosis.  Musculoskeletal: No pain with the range of motion of the bilateral hips. No trochanteric tenderness to palpation.  Vascular: Bilateral lower extremities warm and well perfused, Dorsalis pedis pulses 2+ bilaterally.  Neurological: Normal strength and tone in all major motor groups in the bilateral lower extremities. Normal sensation to light touch in the L2-S1 dermatomes bilaterally.  Deep tendon reflexes symmetric 2+ in the bilateral lower extremities.  Negative Babinski bilaterally.  Straight leg raise negative bilaterally.     IMAGING:   Today I personally reviewed AP, Lat and Flex/Ex upright L-spine films that demonstrate degenerative scoliosis with severe disc degeneration at L1/2/3.       Body mass index is 33.1 kg/m².    Hemoglobin A1C   Date Value Ref Range Status   10/22/2018 5.5 4.0 - 5.6 % Final     Comment:     ADA Screening Guidelines:  5.7-6.4%  Consistent with prediabetes  >or=6.5%  Consistent with diabetes  High levels of fetal hemoglobin interfere with the HbA1C  assay. Heterozygous hemoglobin variants (HbS, HgC, etc)do  not significantly interfere with this assay.   However, " presence of multiple variants may affect accuracy.     01/11/2018 5.3 4.0 - 5.6 % Final     Comment:     According to ADA guidelines, hemoglobin A1c <7.0% represents  optimal control in non-pregnant diabetic patients. Different  metrics may apply to specific patient populations.   Standards of Medical Care in Diabetes-2016.  For the purpose of screening for the presence of diabetes:  <5.7%     Consistent with the absence of diabetes  5.7-6.4%  Consistent with increasing risk for diabetes   (prediabetes)  >or=6.5%  Consistent with diabetes  Currently, no consensus exists for use of hemoglobin A1c  for diagnosis of diabetes for children.  This Hemoglobin A1c assay has significant interference with fetal   hemoglobin   (HbF). The results are invalid for patients with abnormal amounts of   HbF,   including those with known Hereditary Persistence   of Fetal Hemoglobin. Heterozygous hemoglobin variants (HbAS, HbAC,   HbAD, HbAE, HbA2) do not significantly interfere with this assay;   however, presence of multiple variants in a sample may impact the %   interference.     11/15/2016 5.5 4.5 - 6.2 % Final     Comment:     According to ADA guidelines, hemoglobin A1C <7.0% represents  optimal control in non-pregnant diabetic patients.  Different  metrics may apply to specific populations.   Standards of Medical Care in Diabetes - 2016.  For the purpose of screening for the presence of diabetes:  <5.7%     Consistent with the absence of diabetes  5.7-6.4%  Consistent with increasing risk for diabetes   (prediabetes)  >or=6.5%  Consistent with diabetes  Currently no consensus exists for use of hemoglobin A1C  for diagnosis of diabetes for children.           ASSESSMENT/PLAN:    Tiarra was seen today for low-back pain.    Diagnoses and all orders for this visit:    DDD (degenerative disc disease), lumbar    Scoliosis, unspecified scoliosis type, unspecified spinal region    Other orders  -     meloxicam (MOBIC) 15 MG tablet; Take  1 tablet (15 mg total) by mouth once daily.  -     methocarbamol (ROBAXIN) 750 MG Tab; Take 1 tablet (750 mg total) by mouth 3 (three) times daily. As needed for muscle spasms for 60 doses        The patient is having low back pain without radicular or myelopathic symptoms.  All treatment options discussed.  She would like to try some medications and home exercises.  She was given a handout with strengthening and stretching exercises to be done at home.  Prescriptions sent to the pharmacy.  She is moving her [place] to [place] next week.  She will follow up when she returns if she would like to go to physical therapy or discuss getting an MRI.       Follow up if symptoms worsen or fail to improve.

## 2019-08-09 ENCOUNTER — PATIENT MESSAGE (OUTPATIENT)
Dept: ORTHOPEDICS | Facility: CLINIC | Age: 64
End: 2019-08-09

## 2019-08-09 RX ORDER — METHOCARBAMOL 750 MG/1
750 TABLET, FILM COATED ORAL 3 TIMES DAILY
Qty: 60 TABLET | Refills: 0 | Status: SHIPPED | OUTPATIENT
Start: 2019-08-09 | End: 2019-08-30 | Stop reason: SDUPTHER

## 2019-08-19 RX ORDER — MELOXICAM 15 MG/1
TABLET ORAL
Qty: 30 TABLET | Refills: 0 | Status: SHIPPED | OUTPATIENT
Start: 2019-08-19 | End: 2020-02-03

## 2019-08-29 ENCOUNTER — PATIENT MESSAGE (OUTPATIENT)
Dept: INTERNAL MEDICINE | Facility: CLINIC | Age: 64
End: 2019-08-29

## 2019-08-29 DIAGNOSIS — G43.909 MIGRAINE WITHOUT STATUS MIGRAINOSUS, NOT INTRACTABLE, UNSPECIFIED MIGRAINE TYPE: ICD-10-CM

## 2019-08-30 RX ORDER — SUMATRIPTAN SUCCINATE 4 MG/.5ML
INJECTION, SOLUTION SUBCUTANEOUS
Qty: 1 EACH | Refills: 2 | Status: SHIPPED | OUTPATIENT
Start: 2019-08-30 | End: 2020-02-03

## 2019-08-30 RX ORDER — OXYCODONE AND ACETAMINOPHEN 10; 325 MG/1; MG/1
1 TABLET ORAL EVERY 6 HOURS PRN
Qty: 30 TABLET | Refills: 0 | Status: SHIPPED | OUTPATIENT
Start: 2019-08-30 | End: 2020-09-16

## 2019-08-30 RX ORDER — METHOCARBAMOL 750 MG/1
750 TABLET, FILM COATED ORAL 3 TIMES DAILY
Qty: 60 TABLET | Refills: 0 | Status: SHIPPED | OUTPATIENT
Start: 2019-08-30 | End: 2019-10-10 | Stop reason: SDUPTHER

## 2019-09-03 ENCOUNTER — PATIENT MESSAGE (OUTPATIENT)
Dept: INTERNAL MEDICINE | Facility: CLINIC | Age: 64
End: 2019-09-03

## 2019-09-03 ENCOUNTER — PATIENT MESSAGE (OUTPATIENT)
Dept: ORTHOPEDICS | Facility: CLINIC | Age: 64
End: 2019-09-03

## 2019-09-03 DIAGNOSIS — Z96.651 S/P RIGHT UNICOMPARTMENTAL KNEE REPLACEMENT: Primary | ICD-10-CM

## 2019-09-03 NOTE — PROGRESS NOTES
Patient would like to attend PT for right knee pain with hx of right knee unicompartmental replacement in 2015.

## 2019-09-27 ENCOUNTER — CLINICAL SUPPORT (OUTPATIENT)
Dept: REHABILITATION | Facility: HOSPITAL | Age: 64
End: 2019-09-27
Payer: MEDICARE

## 2019-09-27 DIAGNOSIS — M25.561 ACUTE PAIN OF RIGHT KNEE: ICD-10-CM

## 2019-09-27 PROCEDURE — 97161 PT EVAL LOW COMPLEX 20 MIN: CPT | Mod: HCNC,PO

## 2019-09-29 PROBLEM — M25.561 ACUTE PAIN OF RIGHT KNEE: Status: ACTIVE | Noted: 2019-09-29

## 2019-09-29 NOTE — PLAN OF CARE
[facility]  Physical Therapy Initial Evaluation    Name: Tiarra Norton  Clinic Number: [id number]    Therapy Diagnosis:   Encounter Diagnosis   Name Primary?    Acute pain of right knee      Physician: Nemesio Gonzalez PA*    Physician Orders: PT Eval and Treat   Medical Diagnosis from Referral: s/p R unicompartmental knee replacement   Evaluation Date: 2019  Authorization Period Expiration: 2019  Plan of Care Expiration: 2019  Visit # / Visits authorized:     Time In: 11:00am  Time Out: 11:30am  Total Billable Time: 0 minutes    Precautions: Standard    Subjective   Date of onset: couple weeks ago   History of current condition - Tiarra reports: that she recently moved her son up to [facility] and did a ton of walking. She states that at the time of return she was having a bit of pain in the R knee. She says that since then the pain has resolved and she thinks it was due to the increase in activity. She states that she had recently did a bout of therapy at the [facility] and found that it helped. Pt states that she lost her HEP papers that were given to her by the [facility] and would appreciate another one.       Medical History:   Past Medical History:   Diagnosis Date    Cataract     Depression     Diabetes mellitus     gestational diabetes 21 years ago    Hyperlipidemia     Hypertension     Thyroid disease        Surgical History:   Tiarra Norton  has a past surgical history that includes  section; Tubal ligation; Tonsillectomy; Adenoidectomy; Cholecystectomy; Eye surgery; r hand surgery; Total knee arthroplasty; and Colonoscopy (N/A, 2016).    Medications:   Tiarra has a current medication list which includes the following prescription(s): alprazolam, amlodipine, bupropion, cephalexin, cholecalciferol (vitamin d3), levothyroxine, liothyronine, lisinopril, meloxicam, oxycodone-acetaminophen, promethazine, sumatriptan,  sumatriptan succinate, trazodone, venlafaxine, and ziprasidone, and the following Facility-Administered Medications: sodium hyaluronate (euflexxa).    Allergies:   Review of patient's allergies indicates:  No Known Allergies     Imaging, none recently     Prior Therapy: yes, May at [facility]   Social History:  lives with their family  Occupation: retired   Prior Level of Function: independent   Current Level of Function: independent     Pain:  Current 0/10, worst 6/10, best 0/10   Location: right knee   Description: Aching  Aggravating Factors: Walking long distances   Easing Factors: rest    Pts goals: N/A    Objective   N/A due to PT and pt decision to eval and d/c with HEP       Home Exercises and Patient Education Provided    Education provided:   - Yes, HEP     Written Home Exercises Provided: yes.  Exercises were reviewed and Tiarra was able to demonstrate them prior to the end of the session.  Tiarra demonstrated good  understanding of the education provided.     See EMR under Patient Instructions for exercises provided 9/27/2019.    Assessment   Tiarra is a 64 y.o. female referred to outpatient Physical Therapy with a medical diagnosis of s/p R TKA. Pt presents with no complaints, with resolved pain since initial referral was given. Pt was given the option of one f/u appt to make sure that the HEP was still being performed correctly. PT and pt agreed upon discharge from PT services.     Pt prognosis is Excellent.   Pt will benefit from skilled outpatient Physical Therapy to address the deficits stated above and in the chart below, provide pt/family education, and to maximize pt's level of independence.     Plan of care discussed with patient: Yes  Pt's spiritual, cultural and educational needs considered and patient is agreeable to the plan of care and goals as stated below:     Anticipated Barriers for therapy: none    Medical Necessity is demonstrated by the following  History  Co-morbidities and personal  factors that may impact the plan of care Co-morbidities:   anxiety and HTN    Personal Factors:   no deficits     moderate   Examination  Body Structures and Functions, activity limitations and participation restrictions that may impact the plan of care Body Regions:   lower extremities    Body Systems:    none    Participation Restrictions:   none    Activity limitations:   Learning and applying knowledge  no deficits    General Tasks and Commands  no deficits    Communication  no deficits    Mobility  no deficits    Self care  no deficits    Domestic Life  no deficits    Interactions/Relationships  no deficits    Life Areas  no deficits    Community and Social Life  no deficits         low   Clinical Presentation stable and uncomplicated low   Decision Making/ Complexity Score: low     CMS Impairment/Limitation/Restriction for FOTO K Survey    Therapist reviewed FOTO scores for Tiarra Norton on 9/27/2019.   FOTO documents entered into [email or web address] - see Media section.    Limitation Score: 50%  Category: Mobility    Current : CK = at least 40% but < 60% impaired, limited or restricted  Goal: CK = at least 40% but < 60% impaired, limited or restricted         Goals: In three weeks   1. Pt will be independent with HEP upon single f/u visit.   2. Pt will report 0/10 pain in the R knee during daily activities   3. Pt FOTO score will improve by 5 % from initial eval to show improved perceived function  Plan   Plan of care Certification: 9/27/2019 to 12/27/2019.    Outpatient Physical Therapy 1 time in 3 weeks  to include the following interventions: Patient Education and Therapeutic Exercise.     Oneil Prakash, PT    Nemesio Gonzalez PA-C  9/30/19

## 2019-10-11 RX ORDER — METHOCARBAMOL 750 MG/1
TABLET, FILM COATED ORAL
Qty: 60 TABLET | Refills: 0 | Status: SHIPPED | OUTPATIENT
Start: 2019-10-11 | End: 2020-02-03

## 2019-10-16 ENCOUNTER — DOCUMENTATION ONLY (OUTPATIENT)
Dept: REHABILITATION | Facility: HOSPITAL | Age: 64
End: 2019-10-16

## 2019-10-16 PROBLEM — M25.561 ACUTE PAIN OF RIGHT KNEE: Status: RESOLVED | Noted: 2019-09-29 | Resolved: 2019-10-16

## 2019-10-16 NOTE — PROGRESS NOTES
Pt did not show up for her single scheduled follow up and will be discharged from therapy at this time.

## 2019-10-24 ENCOUNTER — PATIENT MESSAGE (OUTPATIENT)
Dept: INTERNAL MEDICINE | Facility: CLINIC | Age: 64
End: 2019-10-24

## 2019-10-24 DIAGNOSIS — Z86.010 HISTORY OF ADENOMATOUS POLYP OF COLON: ICD-10-CM

## 2019-10-24 DIAGNOSIS — F32.A DEPRESSION, UNSPECIFIED DEPRESSION TYPE: Primary | ICD-10-CM

## 2019-10-25 ENCOUNTER — PATIENT MESSAGE (OUTPATIENT)
Dept: INTERNAL MEDICINE | Facility: CLINIC | Age: 64
End: 2019-10-25

## 2019-10-29 ENCOUNTER — PATIENT MESSAGE (OUTPATIENT)
Dept: INTERNAL MEDICINE | Facility: CLINIC | Age: 64
End: 2019-10-29

## 2019-10-30 DIAGNOSIS — Z12.11 SPECIAL SCREENING FOR MALIGNANT NEOPLASMS, COLON: Primary | ICD-10-CM

## 2019-10-30 RX ORDER — POLYETHYLENE GLYCOL 3350, SODIUM SULFATE ANHYDROUS, SODIUM BICARBONATE, SODIUM CHLORIDE, POTASSIUM CHLORIDE 236; 22.74; 6.74; 5.86; 2.97 G/4L; G/4L; G/4L; G/4L; G/4L
4 POWDER, FOR SOLUTION ORAL ONCE
Qty: 4000 ML | Refills: 0 | Status: SHIPPED | OUTPATIENT
Start: 2019-10-30 | End: 2019-10-30

## 2019-11-01 ENCOUNTER — CLINICAL SUPPORT (OUTPATIENT)
Dept: REHABILITATION | Facility: HOSPITAL | Age: 64
End: 2019-11-01
Payer: MEDICARE

## 2019-11-01 DIAGNOSIS — R52 PAIN: ICD-10-CM

## 2019-11-01 PROCEDURE — G8980 MOBILITY D/C STATUS: HCPCS | Mod: CJ,HCNC,PO

## 2019-11-01 PROCEDURE — G8979 MOBILITY GOAL STATUS: HCPCS | Mod: CJ,HCNC,PO

## 2019-11-01 PROCEDURE — 97110 THERAPEUTIC EXERCISES: CPT | Mod: HCNC,PO

## 2019-11-01 NOTE — PROGRESS NOTES
Physical Therapy DISCHARGE NOTE     Name: Tiarra Norton  Clinic Number: [id number]    Therapy Diagnosis:   Encounter Diagnosis   Name Primary?    Pain      Physician: Nemesio Gonzalez PA*    Visit Date: 11/1/2019    Physician Orders: PT Eval and Treat   Medical Diagnosis from Referral: s/p R unicompartmental knee replacement   Evaluation Date: 9/27/2019  Authorization Period Expiration: 12/31/2019  Plan of Care Expiration: 12/27/2019  Visit # / Visits authorized: 2/ 20     Time In: 11:00am  Time Out: 12:00pm  Total Billable Time: 30 minutes     Precautions: Standard    Subjective     Pt reports: that she had to cancel her last appt and then rescheduled for this one.She states she has been doing exercises  She was compliant with home exercise program.  Response to previous treatment: no adverse effects  Functional change: none    Pain: 1/10 attributes it to the cold weather   Location: right knee      Objective     Pt gross strength and functional mobility:   4+/5 globally ; pt performs all transfers and bed mobility without pain    Gait: no abnormalities with gait     Improved hamstring length (minimal tightness)     Tiarra received therapeutic exercises to develop strength, endurance, ROM, flexibility and core stabilization for 60 minutes including:    Supine:  - Hamstring stretch 3x30 seconds each   - Bridges with BTB 3x10  - Straight Leg Raises 3x10 with pause on way down   - Clamshells SIdelying BTB 3x10    Standing/Machines:  - Calf Stretch 3x30 seconds each   - Monster walks 3 laps BTB  - Lateral walks BTB 3 laps   - Heel Raises 3x10  - Mini Squats 2x10    CMS Impairment/Limitation/Restriction for FOTO Knee Survey    Therapist reviewed FOTO scores for Tiarra Norton on 11/1/2019.   FOTO documents entered into [employer] - see Media section.    Intake Limitation Score: 50%  Discharge Limitation Score: 40%    Category: Mobility    Goal: CK = at least 40% but < 60% impaired, limited or restricted  Discharge:  CK = at least 40% but < 60% impaired, limited or restricted         Home Exercises Provided and Patient Education Provided     Education provided:   - HEP     Written Home Exercises Provided: yes.  Exercises were reviewed and Tiarra was able to demonstrate them prior to the end of the session.  Tiarra demonstrated good  understanding of the education provided.     See EMR under Patient Instructions for exercises provided initial eval and additional from 11/1.    Assessment     Pt presents to therapy for final discharge. Pt met 3/3 goals and showed 10% improvement on FOTO self perceived limitation score. Pt able to perform HEP independently, and is challenged appropriately. Pt also experiences 0/10 pain during all performed exercises.       Tiarra is progressing well towards her goals.   Pt prognosis is Excellent.     Pt will continue to benefit from skilled outpatient physical therapy to address the deficits listed in the problem list box on initial evaluation, provide pt/family education and to maximize pt's level of independence in the home and community environment.     Pt's spiritual, cultural and educational needs considered and pt agreeable to plan of care and goals.     Anticipated barriers to physical therapy: none     Goals: In three weeks   1. Pt will be independent with HEP upon single f/u visit. (MET 11/1)  2. Pt will report 0/10 pain in the R knee during daily activities. (MET 11/1)  3. Pt FOTO score will improve by 5 % from initial eval to show improved perceived function. (MET 11/1)    Plan     Pt, due to the above improvements in function and achievement of goals, will be discharged today from skilled PT services.     Oneil Prakash, PT

## 2019-11-27 ENCOUNTER — PATIENT MESSAGE (OUTPATIENT)
Dept: INTERNAL MEDICINE | Facility: CLINIC | Age: 64
End: 2019-11-27

## 2019-11-27 DIAGNOSIS — E78.5 HYPERLIPIDEMIA, UNSPECIFIED HYPERLIPIDEMIA TYPE: ICD-10-CM

## 2019-11-27 DIAGNOSIS — E03.9 HYPOTHYROIDISM, UNSPECIFIED TYPE: ICD-10-CM

## 2019-11-27 DIAGNOSIS — I10 ESSENTIAL HYPERTENSION: Primary | ICD-10-CM

## 2019-11-29 RX ORDER — VALSARTAN 320 MG/1
320 TABLET ORAL DAILY
Qty: 90 TABLET | Refills: 3 | Status: SHIPPED | OUTPATIENT
Start: 2019-11-29 | End: 2020-02-03 | Stop reason: SDUPTHER

## 2019-12-01 ENCOUNTER — PATIENT MESSAGE (OUTPATIENT)
Dept: INTERNAL MEDICINE | Facility: CLINIC | Age: 64
End: 2019-12-01

## 2019-12-03 ENCOUNTER — PATIENT MESSAGE (OUTPATIENT)
Dept: INTERNAL MEDICINE | Facility: CLINIC | Age: 64
End: 2019-12-03

## 2019-12-03 DIAGNOSIS — R11.2 NAUSEA AND VOMITING, INTRACTABILITY OF VOMITING NOT SPECIFIED, UNSPECIFIED VOMITING TYPE: Primary | ICD-10-CM

## 2019-12-05 ENCOUNTER — PATIENT MESSAGE (OUTPATIENT)
Dept: DERMATOLOGY | Facility: CLINIC | Age: 64
End: 2019-12-05

## 2019-12-06 ENCOUNTER — HOSPITAL ENCOUNTER (OUTPATIENT)
Facility: HOSPITAL | Age: 64
Discharge: HOME | End: 2019-12-06
Attending: INTERNAL MEDICINE | Admitting: INTERNAL MEDICINE
Payer: MEDICARE

## 2019-12-06 ENCOUNTER — ANESTHESIA (OUTPATIENT)
Dept: ENDOSCOPY | Facility: HOSPITAL | Age: 64
End: 2019-12-06
Payer: MEDICARE

## 2019-12-06 ENCOUNTER — ANESTHESIA EVENT (OUTPATIENT)
Dept: ENDOSCOPY | Facility: HOSPITAL | Age: 64
End: 2019-12-06
Payer: MEDICARE

## 2019-12-06 VITALS
RESPIRATION RATE: 14 BRPM | OXYGEN SATURATION: 99 % | HEART RATE: 72 BPM | SYSTOLIC BLOOD PRESSURE: 136 MMHG | TEMPERATURE: 97 F | BODY MASS INDEX: 31.54 KG/M2 | WEIGHT: 178 LBS | HEIGHT: 63 IN | DIASTOLIC BLOOD PRESSURE: 72 MMHG

## 2019-12-06 DIAGNOSIS — D12.6 COLON ADENOMAS: ICD-10-CM

## 2019-12-06 PROCEDURE — 88305 TISSUE EXAM BY PATHOLOGIST: CPT | Mod: HCNC | Performed by: PATHOLOGY

## 2019-12-06 PROCEDURE — 45385 PR COLONOSCOPY,REMV LESN,SNARE: ICD-10-PCS | Mod: PT,HCNC,, | Performed by: INTERNAL MEDICINE

## 2019-12-06 PROCEDURE — 88305 TISSUE EXAM BY PATHOLOGIST: CPT | Mod: 26,HCNC,, | Performed by: PATHOLOGY

## 2019-12-06 PROCEDURE — E9220 PRA ENDO ANESTHESIA: HCPCS | Mod: PT,HCNC,, | Performed by: NURSE ANESTHETIST, CERTIFIED REGISTERED

## 2019-12-06 PROCEDURE — 63600175 PHARM REV CODE 636 W HCPCS: Mod: HCNC | Performed by: INTERNAL MEDICINE

## 2019-12-06 PROCEDURE — 37000009 HC ANESTHESIA EA ADD 15 MINS: Mod: HCNC | Performed by: INTERNAL MEDICINE

## 2019-12-06 PROCEDURE — 27201089 HC SNARE, DISP (ANY): Mod: HCNC | Performed by: INTERNAL MEDICINE

## 2019-12-06 PROCEDURE — 45385 COLONOSCOPY W/LESION REMOVAL: CPT | Mod: HCNC | Performed by: INTERNAL MEDICINE

## 2019-12-06 PROCEDURE — E9220 PRA ENDO ANESTHESIA: ICD-10-PCS | Mod: PT,HCNC,, | Performed by: NURSE ANESTHETIST, CERTIFIED REGISTERED

## 2019-12-06 PROCEDURE — 63600175 PHARM REV CODE 636 W HCPCS: Mod: HCNC | Performed by: NURSE ANESTHETIST, CERTIFIED REGISTERED

## 2019-12-06 PROCEDURE — 88305 TISSUE EXAM BY PATHOLOGIST: ICD-10-PCS | Mod: 26,HCNC,, | Performed by: PATHOLOGY

## 2019-12-06 PROCEDURE — 45385 COLONOSCOPY W/LESION REMOVAL: CPT | Mod: PT,HCNC,, | Performed by: INTERNAL MEDICINE

## 2019-12-06 PROCEDURE — 37000008 HC ANESTHESIA 1ST 15 MINUTES: Mod: HCNC | Performed by: INTERNAL MEDICINE

## 2019-12-06 RX ORDER — SODIUM CHLORIDE 9 MG/ML
INJECTION, SOLUTION INTRAVENOUS CONTINUOUS
Status: DISCONTINUED | OUTPATIENT
Start: 2019-12-06 | End: 2019-12-06 | Stop reason: HOSPADM

## 2019-12-06 RX ORDER — PROPOFOL 10 MG/ML
VIAL (ML) INTRAVENOUS CONTINUOUS PRN
Status: DISCONTINUED | OUTPATIENT
Start: 2019-12-06 | End: 2019-12-06

## 2019-12-06 RX ORDER — SODIUM CHLORIDE 0.9 % (FLUSH) 0.9 %
10 SYRINGE (ML) INJECTION
Status: DISCONTINUED | OUTPATIENT
Start: 2019-12-06 | End: 2019-12-06 | Stop reason: HOSPADM

## 2019-12-06 RX ORDER — PROPOFOL 10 MG/ML
VIAL (ML) INTRAVENOUS
Status: DISCONTINUED | OUTPATIENT
Start: 2019-12-06 | End: 2019-12-06

## 2019-12-06 RX ORDER — LIDOCAINE HCL/PF 100 MG/5ML
SYRINGE (ML) INTRAVENOUS
Status: DISCONTINUED | OUTPATIENT
Start: 2019-12-06 | End: 2019-12-06

## 2019-12-06 RX ADMIN — SODIUM CHLORIDE: 0.9 INJECTION, SOLUTION INTRAVENOUS at 10:12

## 2019-12-06 RX ADMIN — PROPOFOL 70 MG: 10 INJECTION, EMULSION INTRAVENOUS at 11:12

## 2019-12-06 RX ADMIN — LIDOCAINE HYDROCHLORIDE 40 MG: 20 INJECTION, SOLUTION INTRAVENOUS at 11:12

## 2019-12-06 RX ADMIN — PROPOFOL 150 MCG/KG/MIN: 10 INJECTION, EMULSION INTRAVENOUS at 11:12

## 2019-12-06 NOTE — ANESTHESIA POSTPROCEDURE EVALUATION
Anesthesia Post Evaluation    Patient: Tiarra Norton    Procedure(s) Performed: Procedure(s) (LRB):  COLONOSCOPY (N/A)    Final Anesthesia Type: general    Patient location during evaluation: GI PACU  Patient participation: Yes- Able to Participate  Level of consciousness: awake and alert  Post-procedure vital signs: reviewed and stable  Pain management: adequate  Airway patency: patent    PONV status at discharge: No PONV  Anesthetic complications: no      Cardiovascular status: blood pressure returned to baseline  Respiratory status: unassisted  Hydration status: euvolemic  Follow-up not needed.          Vitals Value Taken Time   /72 12/6/2019 12:06 PM   Temp 36.3 °C (97.4 °F) 12/6/2019 12:06 PM   Pulse 72 12/6/2019 12:06 PM   Resp 14 12/6/2019 12:06 PM   SpO2 99 % 12/6/2019 12:06 PM         No case tracking events are documented in the log.      Pain/Arianne Score: Arianne Score: 10 (12/6/2019 12:06 PM)

## 2019-12-06 NOTE — H&P
[facility]  History & Physical    Subjective:      Chief Complaint/Reason for Admission:    Colonoscopy    Tiarra Norton is a 64 y.o. female.    Past Medical History:   Diagnosis Date    Cataract     Depression     Diabetes mellitus     gestational diabetes 21 years ago    Hyperlipidemia     Hypertension     Thyroid disease      Past Surgical History:   Procedure Laterality Date    ADENOIDECTOMY       SECTION      CHOLECYSTECTOMY      COLONOSCOPY N/A 2016    Procedure: COLONOSCOPY;  Surgeon: Heri Segura MD;  Location: [address]);  Service: Endoscopy;  Laterality: N/A;    EYE SURGERY      cataract resection right    r hand surgery      TONSILLECTOMY      TOTAL KNEE ARTHROPLASTY      partial knee replacement    TUBAL LIGATION       Family History   Problem Relation Age of Onset    Hypertension Mother     Irritable bowel syndrome Mother     Heart disease Father         mi    Hypertension Father     Cancer Father         prostate    Alcohol abuse Sister     Depression Sister     Irritable bowel syndrome Sister     Alcohol abuse Sister     Ovarian cancer Maternal Aunt     Breast cancer Paternal Aunt     Melanoma Neg Hx     Colon cancer Neg Hx     Stomach cancer Neg Hx     Esophageal cancer Neg Hx     Liver disease Neg Hx     Crohn's disease Neg Hx     Ulcerative colitis Neg Hx     Celiac disease Neg Hx      Social History     Tobacco Use    Smoking status: Never Smoker    Smokeless tobacco: Never Used   Substance Use Topics    Alcohol use: Yes     Alcohol/week: 7.0 standard drinks     Types: 7 Glasses of wine per week    Drug use: No       Facility-Administered Medications Prior to Admission   Medication    sodium hyaluronate (EUFLEXXA) 10 mg/mL(mw 2.4 -3.6 million) Syrg 20 mg     PTA Medications   Medication Sig    alprazolam (XANAX) 2 MG Tab Take 2 mg by mouth 2 (two) times daily as needed.     amLODIPine (NORVASC) 5 MG tablet  Take 1 tablet (5 mg total) by mouth once daily.    buPROPion (WELLBUTRIN XL) 300 MG 24 hr tablet Take 300 mg by mouth once daily.    cholecalciferol, vitamin D3, (VITAMIN D3) 5,000 unit Tab Take 1 tablet (5,000 Units total) by mouth once daily.    levothyroxine (SYNTHROID) 137 MCG Tab tablet Take 1 tablet (137 mcg total) by mouth every morning.    liothyronine (CYTOMEL) 5 MCG Tab Take 1 tablet (5 mcg total) by mouth once daily.    meloxicam (MOBIC) 15 MG tablet TAKE 1 TABLET(15 MG) BY MOUTH EVERY DAY    methocarbamol (ROBAXIN) 750 MG Tab TAKE 1 TABLET(750 MG) BY MOUTH THREE TIMES DAILY FOR 60 DOSES AS NEEDED FOR MUSCLE SPASMS    traZODone (DESYREL) 100 MG tablet take 1 to 2 tablets by mouth at bedtime for sleep    valsartan (DIOVAN) 320 MG tablet Take 1 tablet (320 mg total) by mouth once daily.    venlafaxine (EFFEXOR-XR) 150 MG Cp24 1 tab po q day (Patient taking differently: Take 150 mg by mouth once daily. 150 mg morning and 50 mg at night)    ziprasidone (GEODON) 20 MG Cap 40 mg 2 (two) times daily.     cephALEXin (KEFLEX) 250 MG capsule Take 250 mg by mouth 4 (four) times daily.    oxyCODONE-acetaminophen (PERCOCET)  mg per tablet Take 1 tablet by mouth every 6 (six) hours as needed for Pain.    promethazine (PHENERGAN) 25 MG tablet Take 1 tablet (25 mg total) by mouth every 6 (six) hours as needed for Nausea.    sumatriptan (IMITREX) 50 MG tablet 1 tab po at start of headache.  Repeat in 2 h prn    SUMAtriptan succinate (IMITREX STATDOSE PEN) 4 mg/0.5 mL PnIj One injection at onset of migraine     Review of patient's allergies indicates:  No Known Allergies     Review of Systems   Constitutional: Negative for chills, fever and weight loss.   Respiratory: Negative for sputum production, shortness of breath and wheezing.    Cardiovascular: Negative for chest pain.   Gastrointestinal: Negative for abdominal pain, blood in stool, diarrhea and melena.       Objective:      Vital Signs (Most  Recent)  Temp: 98.8 °F (37.1 °C) (12/06/19 0948)  Pulse: 78 (12/06/19 0948)  Resp: 18 (12/06/19 0948)  BP: 120/77 (12/06/19 0948)  SpO2: 97 % (12/06/19 0948)    Vital Signs Range (Last 24H):  Temp:  [98.8 °F (37.1 °C)]   Pulse:  [78]   Resp:  [18]   BP: (120)/(77)   SpO2:  [97 %]     Physical Exam   Constitutional: She is oriented to person, place, and time. She appears well-developed and well-nourished.   Cardiovascular: Normal rate.   Pulmonary/Chest: Effort normal.   Abdominal: Soft.   Neurological: She is alert and oriented to person, place, and time.   Skin: Skin is warm and dry.   Psychiatric: She has a normal mood and affect. Her behavior is normal. Judgment and thought content normal.     .     Assessment:      Active Hospital Problems    Diagnosis  POA    Colon adenomas [D12.6]  Yes      Resolved Hospital Problems   No resolved problems to display.       Plan:    Direct surveillance colonoscopy for history of adenomas.

## 2019-12-06 NOTE — [employer] PATIENT INSTRUCTIONS
Discharge Summary/Instructions after an Endoscopic Procedure  Patient Name: Tiarra Norton  Patient MRN: [id number]  Patient YOB: 1955 Friday, December 06, 2019  Joe Pitts MD  RESTRICTIONS:  During your procedure today, you received medications for sedation.  These   medications may affect your judgment, balance and coordination.  Therefore,   for 24 hours, you have the following restrictions:   - DO NOT drive a car, operate machinery, make legal/financial decisions,   sign important papers or drink alcohol.    ACTIVITY:  Today: no heavy lifting, straining or running due to procedural   sedation/anesthesia.  The following day: return to full activity including work.  DIET:  Eat and drink normally unless instructed otherwise.     TREATMENT FOR COMMON SIDE EFFECTS:  - Mild abdominal pain, nausea, belching, bloating or excessive gas:  rest,   eat lightly and use a heating pad.  - Sore Throat: treat with throat lozenges and/or gargle with warm salt   water.  - Because air was used during the procedure, expelling large amounts of air   from your rectum or belching is normal.  - If a bowel prep was taken, you may not have a bowel movement for 1-3 days.    This is normal.  SYMPTOMS TO WATCH FOR AND REPORT TO YOUR PHYSICIAN:  1. Abdominal pain or bloating, other than gas cramps.  2. Chest pain.  3. Back pain.  4. Signs of infection such as: chills or fever occurring within 24 hours   after the procedure.  5. Rectal bleeding, which would show as bright red, maroon, or black stools.   (A tablespoon of blood from the rectum is not serious, especially if   hemorrhoids are present.)  6. Vomiting.  7. Weakness or dizziness.  GO DIRECTLY TO THE NEAREST EMERGENCY ROOM IF YOU HAVE ANY OF THE FOLLOWING:      Difficulty breathing              Chills and/or fever over 101 F   Persistent vomiting and/or vomiting blood   Severe abdominal pain   Severe chest pain   Black, tarry stools   Bleeding- more than one  tablespoon   Any other symptom or condition that you feel may need urgent attention  Your doctor recommends these additional instructions:  If any biopsies were taken, your doctors clinic will contact you in 1 to 2   weeks with any results.  - Discharge patient to home.   - Await pathology results.   - Telephone endoscopist for pathology results in 2 weeks.   - Repeat colonoscopy in 5 years for surveillance based on pathology results.     - The findings and recommendations were discussed with the patient.   - Return to referring physician.  For questions, problems or results please call your physician - Joe Pitts MD at Work:  [phone].  [facility] [place], EMERGENCY ROOM PHONE NUMBER: [phone]  IF A COMPLICATION OR EMERGENCY SITUATION ARISES AND YOU ARE UNABLE TO REACH   YOUR PHYSICIAN - GO DIRECTLY TO THE EMERGENCY ROOM.  Joe Pitts MD  12/6/2019 11:34:12 AM  This report has been verified and signed electronically.  [employer]

## 2019-12-06 NOTE — TRANSFER OF CARE
"Anesthesia Transfer of Care Note    Patient: Tiarra Norton    Procedure(s) Performed: Procedure(s) (LRB):  COLONOSCOPY (N/A)    Patient location: PACU    Anesthesia Type: general    Transport from OR: Transported from OR on room air with adequate spontaneous ventilation    Post pain: adequate analgesia    Post assessment: no apparent anesthetic complications    Post vital signs: stable    Level of consciousness: awake    Nausea/Vomiting: no nausea/vomiting    Complications: none    Transfer of care protocol was followed      Last vitals:   Visit Vitals  /84   Pulse 71   Temp 36.3 °C (97.3 °F)   Resp 20   Ht 5' 3" (1.6 m)   Wt 80.7 kg (178 lb)   SpO2 98%   Breastfeeding? No   BMI 31.53 kg/m²     "

## 2019-12-06 NOTE — ANESTHESIA PREPROCEDURE EVALUATION
12/06/2019  Tiarra Norton is a 64 y.o., female.    Anesthesia Evaluation    I have reviewed the Patient Summary Reports.     I have reviewed the Medications.     Review of Systems  Anesthesia Hx:   Denies Personal Hx of Anesthesia complications.   Hematology/Oncology:  Hematology Normal   Oncology Normal     EENT/Dental:EENT/Dental Normal   Cardiovascular:   Hypertension    Pulmonary:   Sleep Apnea    Renal/:  Renal/ Normal     Musculoskeletal:   Arthritis     Neurological:   Headaches    Endocrine:   Diabetes Hypothyroidism    Dermatological:  Skin Normal    Psych:   Psychiatric History          Physical Exam  General:  Well nourished    Airway/Jaw/Neck:  Airway Findings: Mouth Opening: Normal Tongue: Normal  General Airway Assessment: Adult  Mallampati: II  Jaw/Neck Findings:     Neck ROM: Normal ROM     Eyes/Ears/Nose:  EYES/EARS/NOSE FINDINGS: Normal   Dental:  Dental Findings: In tact   Chest/Lungs:  Chest/Lungs Clear    Heart/Vascular:  Heart Findings: Normal Heart murmur: negative Vascular Findings: Normal    Abdomen:  Abdomen Findings: Normal    Musculoskeletal:  Musculoskeletal Findings: Normal   Skin:  Skin Findings: Normal    Mental Status:  Mental Status Findings: Normal        Anesthesia Plan  Type of Anesthesia, risks & benefits discussed:  Anesthesia Type:  general  Patient's Preference:   Intra-op Monitoring Plan: standard ASA monitors  Intra-op Monitoring Plan Comments:   Post Op Pain Control Plan: multimodal analgesia  Post Op Pain Control Plan Comments:   Induction:   IV  Beta Blocker:  Patient is not currently on a Beta-Blocker (No further documentation required).       Informed Consent: Patient understands risks and agrees with Anesthesia plan.  Questions answered. Anesthesia consent signed with patient.  ASA Score: 2     Day of Surgery Review of History & Physical:    H&P update  referred to the provider.         Ready For Surgery From Anesthesia Perspective.

## 2019-12-11 ENCOUNTER — PATIENT MESSAGE (OUTPATIENT)
Dept: INTERNAL MEDICINE | Facility: CLINIC | Age: 64
End: 2019-12-11

## 2019-12-12 ENCOUNTER — TELEPHONE (OUTPATIENT)
Dept: GASTROENTEROLOGY | Facility: CLINIC | Age: 64
End: 2019-12-12

## 2019-12-12 ENCOUNTER — PATIENT MESSAGE (OUTPATIENT)
Dept: INTERNAL MEDICINE | Facility: CLINIC | Age: 64
End: 2019-12-12

## 2019-12-12 NOTE — TELEPHONE ENCOUNTER
MA spoke to pt,     Pt id requesting to be seen by Dr. Rivas. Offer pt 1/6/20 at 8 am. Pt confirmed apt and thank MA     Message routed to Luisa to schedule (7daychange) slot     ----- Message from Paulette Jackson sent at 12/12/2019  2:04 PM CST -----  Contact: patient   [phone]-please call above patient being referred to office call number in message thanks.

## 2019-12-13 ENCOUNTER — TELEPHONE (OUTPATIENT)
Dept: ENDOSCOPY | Facility: HOSPITAL | Age: 64
End: 2019-12-13

## 2019-12-16 ENCOUNTER — PATIENT MESSAGE (OUTPATIENT)
Dept: INTERNAL MEDICINE | Facility: CLINIC | Age: 64
End: 2019-12-16

## 2019-12-16 LAB
FINAL PATHOLOGIC DIAGNOSIS: NORMAL
GROSS: NORMAL

## 2019-12-17 ENCOUNTER — PATIENT MESSAGE (OUTPATIENT)
Dept: INTERNAL MEDICINE | Facility: CLINIC | Age: 64
End: 2019-12-17

## 2020-01-02 ENCOUNTER — PATIENT OUTREACH (OUTPATIENT)
Dept: ADMINISTRATIVE | Facility: OTHER | Age: 65
End: 2020-01-02

## 2020-01-06 ENCOUNTER — OFFICE VISIT (OUTPATIENT)
Dept: GASTROENTEROLOGY | Facility: CLINIC | Age: 65
End: 2020-01-06
Payer: MEDICARE

## 2020-01-06 VITALS
BODY MASS INDEX: 31.25 KG/M2 | SYSTOLIC BLOOD PRESSURE: 126 MMHG | WEIGHT: 176.38 LBS | HEIGHT: 63 IN | DIASTOLIC BLOOD PRESSURE: 88 MMHG | HEART RATE: 82 BPM

## 2020-01-06 DIAGNOSIS — K58.0 IRRITABLE BOWEL SYNDROME WITH DIARRHEA: Primary | ICD-10-CM

## 2020-01-06 DIAGNOSIS — R10.13 EPIGASTRIC PAIN: ICD-10-CM

## 2020-01-06 PROCEDURE — 99214 OFFICE O/P EST MOD 30 MIN: CPT | Mod: HCNC,S$GLB,, | Performed by: INTERNAL MEDICINE

## 2020-01-06 PROCEDURE — 3074F SYST BP LT 130 MM HG: CPT | Mod: HCNC,CPTII,S$GLB, | Performed by: INTERNAL MEDICINE

## 2020-01-06 PROCEDURE — 1101F PT FALLS ASSESS-DOCD LE1/YR: CPT | Mod: HCNC,CPTII,S$GLB, | Performed by: INTERNAL MEDICINE

## 2020-01-06 PROCEDURE — 99999 PR PBB SHADOW E&M-EST. PATIENT-LVL III: ICD-10-PCS | Mod: PBBFAC,HCNC,, | Performed by: INTERNAL MEDICINE

## 2020-01-06 PROCEDURE — 1126F PR PAIN SEVERITY QUANTIFIED, NO PAIN PRESENT: ICD-10-PCS | Mod: HCNC,S$GLB,, | Performed by: INTERNAL MEDICINE

## 2020-01-06 PROCEDURE — 3079F DIAST BP 80-89 MM HG: CPT | Mod: HCNC,CPTII,S$GLB, | Performed by: INTERNAL MEDICINE

## 2020-01-06 PROCEDURE — 1126F AMNT PAIN NOTED NONE PRSNT: CPT | Mod: HCNC,S$GLB,, | Performed by: INTERNAL MEDICINE

## 2020-01-06 PROCEDURE — 1159F MED LIST DOCD IN RCRD: CPT | Mod: HCNC,S$GLB,, | Performed by: INTERNAL MEDICINE

## 2020-01-06 PROCEDURE — 1159F PR MEDICATION LIST DOCUMENTED IN MEDICAL RECORD: ICD-10-PCS | Mod: HCNC,S$GLB,, | Performed by: INTERNAL MEDICINE

## 2020-01-06 PROCEDURE — 3079F PR MOST RECENT DIASTOLIC BLOOD PRESSURE 80-89 MM HG: ICD-10-PCS | Mod: HCNC,CPTII,S$GLB, | Performed by: INTERNAL MEDICINE

## 2020-01-06 PROCEDURE — 3074F PR MOST RECENT SYSTOLIC BLOOD PRESSURE < 130 MM HG: ICD-10-PCS | Mod: HCNC,CPTII,S$GLB, | Performed by: INTERNAL MEDICINE

## 2020-01-06 PROCEDURE — 1101F PR PT FALLS ASSESS DOC 0-1 FALLS W/OUT INJ PAST YR: ICD-10-PCS | Mod: HCNC,CPTII,S$GLB, | Performed by: INTERNAL MEDICINE

## 2020-01-06 PROCEDURE — 99214 PR OFFICE/OUTPT VISIT, EST, LEVL IV, 30-39 MIN: ICD-10-PCS | Mod: HCNC,S$GLB,, | Performed by: INTERNAL MEDICINE

## 2020-01-06 PROCEDURE — 99999 PR PBB SHADOW E&M-EST. PATIENT-LVL III: CPT | Mod: PBBFAC,HCNC,, | Performed by: INTERNAL MEDICINE

## 2020-01-06 NOTE — PROGRESS NOTES
Subjective:       Patient ID: Tiarra Norton is a 64 y.o. female.    Chief Complaint: Nausea and Abdominal Pain    HPI  Review of Systems   Musculoskeletal: Positive for back pain.   Psychiatric/Behavioral: Positive for dysphoric mood. The patient is nervous/anxious.        Objective:      Physical Exam   Abdominal: Soft. Normal appearance and bowel sounds are normal. She exhibits no shifting dullness, no distension, no fluid wave, no abdominal bruit and no mass. There is no hepatosplenomegaly. There is no tenderness.       Assessment:       1. Irritable bowel syndrome with diarrhea    2. Epigastric pain        Plan:         Tiarra norton is a 64-year-old lady but as a new patient but in actuality this is an established patient.  She has recently had a colonoscopy.  She was seen in our department over 3 years ago for irritable bowel syndrome diarrhea predominant.  Back then she was tried on a number of things the same which seemed to help her including rifaximin, Colestid, Bentyl, fiber, and probiotics.  She was ruled out for celiac disease in ruled out for microscopic colitis in 2016.    She comes in today and there has been a change in her symptoms.  Her chief complaint now is that of epigastric pain followed by nausea and vomiting. This been present for about 9 months.  It is focal epigastric pain without radiation.  When she has an episode it can last a half a day.  The typical pattern is she wakes up in the morning with that and it can happen even without eating breakfast.  Typically when that happens she does not throw up old food.  It never wakes her up in the middle the night.  There is no particular pattern happens 1 to 3 times a week.  Later that day she can't eat in a normal fashion.  Phenergan does not seem to help.  No particular foods have led to these episodes.  She feels like she has lost possibly 10 or 15 lb this past year.    She continues with the diarrhea.  Actually the diarrhea has not been  present since her diarrhea last month.  But in general over the past 3 years she has continued to have it.  The diarrhea actually can wake her up at night which is atypical.  Rarely does she complained of bloating or gas.  It is variable in its intensity.    Assessment  1.  IBS with diarrhea.  This is a longstanding symptom. I am  going get a lactulose breath test to confirm whether bacterial overgrowth is present.  I am anticipating the diarrhea will return.  We might consider cholestyramine as opposed to Colestid in the future.  She has remote history of cholecystectomy.  I clearly can't use viberzi because of the cholecystectomy.    2.  Epigastric pain. These are unusual episodes.  I would expect persistent symptoms with peptic ulcer disease. But I think we need to rule this out.  Also any sort of pancreatic inflammation I would anticipate more persistent symptoms.  Sphincter of Oddi dysfunction could cause this.  I recommended we get labs immediately after an episode pain. I would like to do an EGD.  If all that is negative I am considering a CT of the abdomen because of the weight loss.    30 min appointment greater half time face-to-face counseling

## 2020-01-06 NOTE — LETTER
January 6, 2020      Kaykay Perales MD  [address]  [place] [address]           [facility]  [address]  [place] [address]  Phone: [phone]  Fax: [phone]          Patient: Tiarra Norton   MR Number: [id number]   YOB: 1955   Date of Visit: 1/6/2020       Dear Dr. Kaykay Perales:    Thank you for referring Tiarra Norton to me for evaluation. Attached you will find relevant portions of my assessment and plan of care.    If you have questions, please do not hesitate to call me. I look forward to following Tiarra Norton along with you.    Sincerely,    Tolu Rivas MD    Enclosure  CC:  No Recipients    If you would like to receive this communication electronically, please contact [email or web address] or [phone] to request more information on [facility] access.    For providers and/or their staff who would like to refer a patient to [facility], please contact us through our one-stop-shop provider referral line, [facility] [place], at [phone].    If you feel you have received this communication in error or would no longer like to receive these types of communications, please e-mail [email or web address]

## 2020-01-07 ENCOUNTER — TELEPHONE (OUTPATIENT)
Dept: GASTROENTEROLOGY | Facility: CLINIC | Age: 65
End: 2020-01-07

## 2020-01-07 NOTE — TELEPHONE ENCOUNTER
Spoke with patient.  Scheduled for Lactulose Breath test on 1/13 for 7:30.  Confirmation mailed.  Instructions previously given to patient.

## 2020-01-07 NOTE — TELEPHONE ENCOUNTER
Spoke with patient.  Aware the blood work  ordered needs to be done when she is having pain.  Aware she is to call me to set up lab work.  She would like to have labs done at [facility].

## 2020-01-07 NOTE — TELEPHONE ENCOUNTER
----- Message from Paulette Jackson sent at 1/7/2020  8:13 AM CST -----  Contact: patient  [phone]-please call above patient at number in message need to schedule breath test waiting on a call from the nurse thanks.

## 2020-01-07 NOTE — TELEPHONE ENCOUNTER
----- Message from Esperanza Cordova sent at 1/7/2020  8:41 AM CST -----  Contact: pt: [phone]  Pt called to speak with MA, has a question     Please contact pt: [phone]

## 2020-01-13 ENCOUNTER — TELEPHONE (OUTPATIENT)
Dept: GASTROENTEROLOGY | Facility: CLINIC | Age: 65
End: 2020-01-13

## 2020-01-13 NOTE — TELEPHONE ENCOUNTER
----- Message from Radha Flores sent at 1/13/2020  8:17 AM CST -----  Contact: pt #[phone]  Pt is calling regarding labs. Please call

## 2020-01-13 NOTE — TELEPHONE ENCOUNTER
Spoke with patient.  Scheduled for Lactulose Breath test on 1/17 for 8:30.  Verbal and written instructions reviewed with patient.  Confirmation mailed.

## 2020-01-15 ENCOUNTER — LAB VISIT (OUTPATIENT)
Dept: LAB | Facility: HOSPITAL | Age: 65
End: 2020-01-15
Attending: INTERNAL MEDICINE
Payer: MEDICARE

## 2020-01-15 DIAGNOSIS — R10.13 EPIGASTRIC PAIN: ICD-10-CM

## 2020-01-15 LAB
ALBUMIN SERPL BCP-MCNC: 4.1 G/DL (ref 3.5–5.2)
ALP SERPL-CCNC: 86 U/L (ref 55–135)
ALT SERPL W/O P-5'-P-CCNC: 29 U/L (ref 10–44)
AMYLASE SERPL-CCNC: 46 U/L (ref 20–110)
ANION GAP SERPL CALC-SCNC: 9 MMOL/L (ref 8–16)
AST SERPL-CCNC: 23 U/L (ref 10–40)
BASOPHILS # BLD AUTO: 0.1 K/UL (ref 0–0.2)
BASOPHILS NFR BLD: 1.7 % (ref 0–1.9)
BILIRUB SERPL-MCNC: 0.3 MG/DL (ref 0.1–1)
BUN SERPL-MCNC: 10 MG/DL (ref 8–23)
CALCIUM SERPL-MCNC: 9.5 MG/DL (ref 8.7–10.5)
CHLORIDE SERPL-SCNC: 104 MMOL/L (ref 95–110)
CO2 SERPL-SCNC: 25 MMOL/L (ref 23–29)
CREAT SERPL-MCNC: 0.9 MG/DL (ref 0.5–1.4)
DIFFERENTIAL METHOD: ABNORMAL
EOSINOPHIL # BLD AUTO: 0 K/UL (ref 0–0.5)
EOSINOPHIL NFR BLD: 0 % (ref 0–8)
ERYTHROCYTE [DISTWIDTH] IN BLOOD BY AUTOMATED COUNT: 12 % (ref 11.5–14.5)
EST. GFR  (AFRICAN AMERICAN): >60 ML/MIN/1.73 M^2
EST. GFR  (NON AFRICAN AMERICAN): >60 ML/MIN/1.73 M^2
GLUCOSE SERPL-MCNC: 94 MG/DL (ref 70–110)
HCT VFR BLD AUTO: 43.5 % (ref 37–48.5)
HGB BLD-MCNC: 13.7 G/DL (ref 12–16)
IMM GRANULOCYTES # BLD AUTO: 0.01 K/UL (ref 0–0.04)
IMM GRANULOCYTES NFR BLD AUTO: 0.2 % (ref 0–0.5)
LIPASE SERPL-CCNC: 21 U/L (ref 4–60)
LYMPHOCYTES # BLD AUTO: 2.4 K/UL (ref 1–4.8)
LYMPHOCYTES NFR BLD: 39.8 % (ref 18–48)
MCH RBC QN AUTO: 30.4 PG (ref 27–31)
MCHC RBC AUTO-ENTMCNC: 31.5 G/DL (ref 32–36)
MCV RBC AUTO: 97 FL (ref 82–98)
MONOCYTES # BLD AUTO: 0.4 K/UL (ref 0.3–1)
MONOCYTES NFR BLD: 6.4 % (ref 4–15)
NEUTROPHILS # BLD AUTO: 3.1 K/UL (ref 1.8–7.7)
NEUTROPHILS NFR BLD: 51.9 % (ref 38–73)
NRBC BLD-RTO: 0 /100 WBC
PLATELET # BLD AUTO: 313 K/UL (ref 150–350)
PMV BLD AUTO: 10.6 FL (ref 9.2–12.9)
POTASSIUM SERPL-SCNC: 4.1 MMOL/L (ref 3.5–5.1)
PROT SERPL-MCNC: 7 G/DL (ref 6–8.4)
RBC # BLD AUTO: 4.5 M/UL (ref 4–5.4)
SODIUM SERPL-SCNC: 138 MMOL/L (ref 136–145)
WBC # BLD AUTO: 5.96 K/UL (ref 3.9–12.7)

## 2020-01-15 PROCEDURE — 83690 ASSAY OF LIPASE: CPT | Mod: HCNC

## 2020-01-15 PROCEDURE — 36415 COLL VENOUS BLD VENIPUNCTURE: CPT | Mod: HCNC,PN

## 2020-01-15 PROCEDURE — 85025 COMPLETE CBC W/AUTO DIFF WBC: CPT | Mod: HCNC

## 2020-01-15 PROCEDURE — 82150 ASSAY OF AMYLASE: CPT | Mod: HCNC

## 2020-01-15 PROCEDURE — 80053 COMPREHEN METABOLIC PANEL: CPT | Mod: HCNC

## 2020-01-16 ENCOUNTER — PATIENT MESSAGE (OUTPATIENT)
Dept: ENDOSCOPY | Facility: HOSPITAL | Age: 65
End: 2020-01-16

## 2020-01-17 ENCOUNTER — LAB VISIT (OUTPATIENT)
Dept: LAB | Facility: HOSPITAL | Age: 65
End: 2020-01-17
Attending: INTERNAL MEDICINE
Payer: MEDICARE

## 2020-01-17 DIAGNOSIS — K58.0 IRRITABLE BOWEL SYNDROME WITH DIARRHEA: ICD-10-CM

## 2020-01-17 LAB
BREATH H2 1.5H P LAC: 47 PPM
BREATH H2 1H P LAC: 10 PPM
BREATH H2 30M P LAC: 9 PPM
BREATH H2 P 10 G LACTULOSE PO: 5 PPM
BREATH H2 P 10 G LACTULOSE PO: 7 PPM
BREATH H2 PRE LAC: 2 PPM
LACTULOSE BT COL PERIOD: NORMAL HR
LACTULOSE BT INTERPRETATION: NORMAL

## 2020-01-17 PROCEDURE — 91065 BREATH HYDROGEN/METHANE TEST: CPT | Mod: HCNC

## 2020-01-18 ENCOUNTER — PATIENT MESSAGE (OUTPATIENT)
Dept: ENDOSCOPY | Facility: HOSPITAL | Age: 65
End: 2020-01-18

## 2020-01-20 ENCOUNTER — PATIENT MESSAGE (OUTPATIENT)
Dept: ENDOSCOPY | Facility: HOSPITAL | Age: 65
End: 2020-01-20

## 2020-01-20 ENCOUNTER — PATIENT OUTREACH (OUTPATIENT)
Dept: ADMINISTRATIVE | Facility: HOSPITAL | Age: 65
End: 2020-01-20

## 2020-01-21 ENCOUNTER — PATIENT MESSAGE (OUTPATIENT)
Dept: ADMINISTRATIVE | Facility: HOSPITAL | Age: 65
End: 2020-01-21

## 2020-01-27 ENCOUNTER — PATIENT OUTREACH (OUTPATIENT)
Dept: ADMINISTRATIVE | Facility: OTHER | Age: 65
End: 2020-01-27

## 2020-01-27 ENCOUNTER — PATIENT OUTREACH (OUTPATIENT)
Dept: ADMINISTRATIVE | Facility: HOSPITAL | Age: 65
End: 2020-01-27

## 2020-01-27 ENCOUNTER — LAB VISIT (OUTPATIENT)
Dept: LAB | Facility: HOSPITAL | Age: 65
End: 2020-01-27
Attending: INTERNAL MEDICINE
Payer: MEDICARE

## 2020-01-27 DIAGNOSIS — Z12.39 SCREENING FOR BREAST CANCER: Primary | ICD-10-CM

## 2020-01-27 DIAGNOSIS — E03.9 HYPOTHYROIDISM, UNSPECIFIED TYPE: ICD-10-CM

## 2020-01-27 DIAGNOSIS — E78.5 HYPERLIPIDEMIA, UNSPECIFIED HYPERLIPIDEMIA TYPE: ICD-10-CM

## 2020-01-27 DIAGNOSIS — Z12.31 ENCOUNTER FOR SCREENING MAMMOGRAM FOR BREAST CANCER: ICD-10-CM

## 2020-01-27 LAB
ALBUMIN SERPL BCP-MCNC: 3.9 G/DL (ref 3.5–5.2)
ALP SERPL-CCNC: 84 U/L (ref 55–135)
ALT SERPL W/O P-5'-P-CCNC: 21 U/L (ref 10–44)
ANION GAP SERPL CALC-SCNC: 9 MMOL/L (ref 8–16)
AST SERPL-CCNC: 20 U/L (ref 10–40)
BASOPHILS # BLD AUTO: 0.1 K/UL (ref 0–0.2)
BASOPHILS NFR BLD: 1.5 % (ref 0–1.9)
BILIRUB SERPL-MCNC: 0.3 MG/DL (ref 0.1–1)
BUN SERPL-MCNC: 13 MG/DL (ref 8–23)
CALCIUM SERPL-MCNC: 9.7 MG/DL (ref 8.7–10.5)
CHLORIDE SERPL-SCNC: 106 MMOL/L (ref 95–110)
CHOLEST SERPL-MCNC: 243 MG/DL (ref 120–199)
CHOLEST/HDLC SERPL: 4.7 {RATIO} (ref 2–5)
CO2 SERPL-SCNC: 27 MMOL/L (ref 23–29)
CREAT SERPL-MCNC: 0.9 MG/DL (ref 0.5–1.4)
DIFFERENTIAL METHOD: ABNORMAL
EOSINOPHIL # BLD AUTO: 0 K/UL (ref 0–0.5)
EOSINOPHIL NFR BLD: 0 % (ref 0–8)
ERYTHROCYTE [DISTWIDTH] IN BLOOD BY AUTOMATED COUNT: 12.5 % (ref 11.5–14.5)
EST. GFR  (AFRICAN AMERICAN): >60 ML/MIN/1.73 M^2
EST. GFR  (NON AFRICAN AMERICAN): >60 ML/MIN/1.73 M^2
GLUCOSE SERPL-MCNC: 112 MG/DL (ref 70–110)
HCT VFR BLD AUTO: 42.2 % (ref 37–48.5)
HDLC SERPL-MCNC: 52 MG/DL (ref 40–75)
HDLC SERPL: 21.4 % (ref 20–50)
HGB BLD-MCNC: 13 G/DL (ref 12–16)
IMM GRANULOCYTES # BLD AUTO: 0.01 K/UL (ref 0–0.04)
IMM GRANULOCYTES NFR BLD AUTO: 0.2 % (ref 0–0.5)
LDLC SERPL CALC-MCNC: 156 MG/DL (ref 63–159)
LYMPHOCYTES # BLD AUTO: 2.9 K/UL (ref 1–4.8)
LYMPHOCYTES NFR BLD: 43.8 % (ref 18–48)
MCH RBC QN AUTO: 30.2 PG (ref 27–31)
MCHC RBC AUTO-ENTMCNC: 30.8 G/DL (ref 32–36)
MCV RBC AUTO: 98 FL (ref 82–98)
MONOCYTES # BLD AUTO: 0.5 K/UL (ref 0.3–1)
MONOCYTES NFR BLD: 7.3 % (ref 4–15)
NEUTROPHILS # BLD AUTO: 3.1 K/UL (ref 1.8–7.7)
NEUTROPHILS NFR BLD: 47.2 % (ref 38–73)
NONHDLC SERPL-MCNC: 191 MG/DL
NRBC BLD-RTO: 0 /100 WBC
PLATELET # BLD AUTO: 254 K/UL (ref 150–350)
PMV BLD AUTO: 10.8 FL (ref 9.2–12.9)
POTASSIUM SERPL-SCNC: 4.6 MMOL/L (ref 3.5–5.1)
PROT SERPL-MCNC: 6.8 G/DL (ref 6–8.4)
RBC # BLD AUTO: 4.3 M/UL (ref 4–5.4)
SODIUM SERPL-SCNC: 142 MMOL/L (ref 136–145)
T3 SERPL-MCNC: 73 NG/DL (ref 60–180)
TRIGL SERPL-MCNC: 175 MG/DL (ref 30–150)
TSH SERPL DL<=0.005 MIU/L-ACNC: 0.53 UIU/ML (ref 0.4–4)
WBC # BLD AUTO: 6.56 K/UL (ref 3.9–12.7)

## 2020-01-27 PROCEDURE — 80061 LIPID PANEL: CPT | Mod: HCNC

## 2020-01-27 PROCEDURE — 84480 ASSAY TRIIODOTHYRONINE (T3): CPT | Mod: HCNC

## 2020-01-27 PROCEDURE — 36415 COLL VENOUS BLD VENIPUNCTURE: CPT | Mod: HCNC,PN

## 2020-01-27 PROCEDURE — 84443 ASSAY THYROID STIM HORMONE: CPT | Mod: HCNC

## 2020-01-27 PROCEDURE — 80053 COMPREHEN METABOLIC PANEL: CPT | Mod: HCNC

## 2020-01-27 PROCEDURE — 85025 COMPLETE CBC W/AUTO DIFF WBC: CPT | Mod: HCNC

## 2020-01-27 NOTE — PROGRESS NOTES
Care Everywhere: n/a  Immunization: updated  Health Maintenance: updated  Media Review: n/a  Legacy Review: n/a  Order placed: n/a  Upcoming appts:jennifer méndez 2/3/2020

## 2020-02-03 ENCOUNTER — PATIENT MESSAGE (OUTPATIENT)
Dept: PHARMACY | Facility: CLINIC | Age: 65
End: 2020-02-03

## 2020-02-03 ENCOUNTER — IMMUNIZATION (OUTPATIENT)
Dept: PHARMACY | Facility: CLINIC | Age: 65
End: 2020-02-03

## 2020-02-03 ENCOUNTER — HOSPITAL ENCOUNTER (OUTPATIENT)
Dept: RADIOLOGY | Facility: HOSPITAL | Age: 65
Discharge: HOME | End: 2020-02-03
Attending: INTERNAL MEDICINE
Payer: MEDICARE

## 2020-02-03 ENCOUNTER — IMMUNIZATION (OUTPATIENT)
Dept: PHARMACY | Facility: CLINIC | Age: 65
End: 2020-02-03
Payer: MEDICARE

## 2020-02-03 ENCOUNTER — OFFICE VISIT (OUTPATIENT)
Dept: INTERNAL MEDICINE | Facility: CLINIC | Age: 65
End: 2020-02-03
Payer: MEDICARE

## 2020-02-03 VITALS
OXYGEN SATURATION: 97 % | DIASTOLIC BLOOD PRESSURE: 78 MMHG | HEART RATE: 84 BPM | HEIGHT: 63 IN | BODY MASS INDEX: 32.27 KG/M2 | SYSTOLIC BLOOD PRESSURE: 128 MMHG | WEIGHT: 182.13 LBS

## 2020-02-03 DIAGNOSIS — E78.5 HYPERLIPIDEMIA, UNSPECIFIED HYPERLIPIDEMIA TYPE: ICD-10-CM

## 2020-02-03 DIAGNOSIS — I10 ESSENTIAL HYPERTENSION: ICD-10-CM

## 2020-02-03 DIAGNOSIS — D12.6 COLON ADENOMAS: ICD-10-CM

## 2020-02-03 DIAGNOSIS — G43.909 MIGRAINE WITHOUT STATUS MIGRAINOSUS, NOT INTRACTABLE, UNSPECIFIED MIGRAINE TYPE: ICD-10-CM

## 2020-02-03 DIAGNOSIS — R19.7 INTERMITTENT DIARRHEA: ICD-10-CM

## 2020-02-03 DIAGNOSIS — Z00.00 ANNUAL PHYSICAL EXAM: Primary | ICD-10-CM

## 2020-02-03 DIAGNOSIS — F33.41 RECURRENT MAJOR DEPRESSIVE DISORDER, IN PARTIAL REMISSION: ICD-10-CM

## 2020-02-03 DIAGNOSIS — Z12.31 ENCOUNTER FOR SCREENING MAMMOGRAM FOR BREAST CANCER: ICD-10-CM

## 2020-02-03 DIAGNOSIS — E03.4 HYPOTHYROIDISM DUE TO ACQUIRED ATROPHY OF THYROID: ICD-10-CM

## 2020-02-03 PROCEDURE — 77063 MAMMO DIGITAL SCREENING BILAT WITH TOMOSYNTHESIS_CAD: ICD-10-PCS | Mod: 26,HCNC,, | Performed by: RADIOLOGY

## 2020-02-03 PROCEDURE — 99999 PR PBB SHADOW E&M-EST. PATIENT-LVL III: ICD-10-PCS | Mod: PBBFAC,HCNC,, | Performed by: INTERNAL MEDICINE

## 2020-02-03 PROCEDURE — 77067 SCR MAMMO BI INCL CAD: CPT | Mod: TC,HCNC

## 2020-02-03 PROCEDURE — 3078F DIAST BP <80 MM HG: CPT | Mod: HCNC,CPTII,S$GLB, | Performed by: INTERNAL MEDICINE

## 2020-02-03 PROCEDURE — 77067 MAMMO DIGITAL SCREENING BILAT WITH TOMOSYNTHESIS_CAD: ICD-10-PCS | Mod: 26,HCNC,, | Performed by: RADIOLOGY

## 2020-02-03 PROCEDURE — 3074F SYST BP LT 130 MM HG: CPT | Mod: HCNC,CPTII,S$GLB, | Performed by: INTERNAL MEDICINE

## 2020-02-03 PROCEDURE — 3078F PR MOST RECENT DIASTOLIC BLOOD PRESSURE < 80 MM HG: ICD-10-PCS | Mod: HCNC,CPTII,S$GLB, | Performed by: INTERNAL MEDICINE

## 2020-02-03 PROCEDURE — 99397 PER PM REEVAL EST PAT 65+ YR: CPT | Mod: HCNC,S$GLB,, | Performed by: INTERNAL MEDICINE

## 2020-02-03 PROCEDURE — 77063 BREAST TOMOSYNTHESIS BI: CPT | Mod: 26,HCNC,, | Performed by: RADIOLOGY

## 2020-02-03 PROCEDURE — 99999 PR PBB SHADOW E&M-EST. PATIENT-LVL III: CPT | Mod: PBBFAC,HCNC,, | Performed by: INTERNAL MEDICINE

## 2020-02-03 PROCEDURE — 99499 UNLISTED E&M SERVICE: CPT | Mod: HCNC,S$GLB,, | Performed by: INTERNAL MEDICINE

## 2020-02-03 PROCEDURE — 99499 RISK ADDL DX/OHS AUDIT: ICD-10-PCS | Mod: HCNC,S$GLB,, | Performed by: INTERNAL MEDICINE

## 2020-02-03 PROCEDURE — 77067 SCR MAMMO BI INCL CAD: CPT | Mod: 26,HCNC,, | Performed by: RADIOLOGY

## 2020-02-03 PROCEDURE — 99397 PR PREVENTIVE VISIT,EST,65 & OVER: ICD-10-PCS | Mod: HCNC,S$GLB,, | Performed by: INTERNAL MEDICINE

## 2020-02-03 PROCEDURE — 3074F PR MOST RECENT SYSTOLIC BLOOD PRESSURE < 130 MM HG: ICD-10-PCS | Mod: HCNC,CPTII,S$GLB, | Performed by: INTERNAL MEDICINE

## 2020-02-03 RX ORDER — LIOTHYRONINE SODIUM 5 UG/1
5 TABLET ORAL DAILY
Qty: 90 TABLET | Refills: 3 | Status: SHIPPED | OUTPATIENT
Start: 2020-02-03 | End: 2020-11-18 | Stop reason: SDUPTHER

## 2020-02-03 RX ORDER — ONDANSETRON 4 MG/1
4 TABLET, FILM COATED ORAL EVERY 8 HOURS PRN
Qty: 30 TABLET | Refills: 1 | Status: SHIPPED | OUTPATIENT
Start: 2020-02-03 | End: 2020-10-27

## 2020-02-03 RX ORDER — VENLAFAXINE HYDROCHLORIDE 150 MG/1
CAPSULE, EXTENDED RELEASE ORAL
Qty: 30 CAPSULE | Refills: 11
Start: 2020-02-03 | End: 2021-08-05 | Stop reason: SDUPTHER

## 2020-02-03 RX ORDER — TRAZODONE HYDROCHLORIDE 100 MG/1
TABLET ORAL
Qty: 180 TABLET | Refills: 3 | Status: SHIPPED | OUTPATIENT
Start: 2020-02-03 | End: 2021-01-22

## 2020-02-03 RX ORDER — VALSARTAN 320 MG/1
320 TABLET ORAL DAILY
Qty: 90 TABLET | Refills: 3 | Status: SHIPPED | OUTPATIENT
Start: 2020-02-03 | End: 2021-01-21

## 2020-02-03 RX ORDER — SUMATRIPTAN 50 MG/1
TABLET, FILM COATED ORAL
Qty: 27 TABLET | Refills: 3 | Status: SHIPPED | OUTPATIENT
Start: 2020-02-03 | End: 2021-01-27 | Stop reason: SDUPTHER

## 2020-02-03 RX ORDER — ATORVASTATIN CALCIUM 10 MG/1
10 TABLET, FILM COATED ORAL DAILY
Qty: 90 TABLET | Refills: 3 | Status: SHIPPED | OUTPATIENT
Start: 2020-02-03 | End: 2020-11-18 | Stop reason: SDUPTHER

## 2020-02-03 RX ORDER — HYDROCODONE BITARTRATE AND ACETAMINOPHEN 5; 325 MG/1; MG/1
TABLET ORAL
Qty: 30 TABLET | Refills: 0 | Status: SHIPPED | OUTPATIENT
Start: 2020-02-03 | End: 2020-05-01 | Stop reason: SDUPTHER

## 2020-02-03 RX ORDER — LEVOTHYROXINE SODIUM 137 UG/1
137 TABLET ORAL EVERY MORNING
Qty: 30 TABLET | Refills: 3 | Status: SHIPPED | OUTPATIENT
Start: 2020-02-03 | End: 2020-05-07 | Stop reason: SDUPTHER

## 2020-02-03 NOTE — PROGRESS NOTES
Subjective:       Patient ID: Tiarra Norton is a 65 y.o. female.    Chief Complaint: Annual Exam    HPI   GI note reviewed.  EGD upcoming.  Prominent symptom now is nausea.  She has taken phenergan several times, but causes fatigue.  Diarrhea has improved.    Seeing Dr Prabhakar.  Tx with transcranial magnetic stimulation.  Continues to see therapist.  Lacking motivation     Sleeping well most nights with half a trazadone.       Migraines twice a month at a minimium.    No longer seeing integrative doctor in [place].  Also was seeing Dr Guerrero at [facility].      She sees DR Judd in gyn        Review of Systems   Constitutional: Negative for activity change and unexpected weight change.   HENT: Negative for hearing loss, rhinorrhea and trouble swallowing.    Eyes: Negative for discharge and visual disturbance.   Respiratory: Negative for chest tightness and wheezing.    Cardiovascular: Negative for chest pain and palpitations.   Gastrointestinal: Positive for diarrhea and vomiting. Negative for blood in stool and constipation.   Endocrine: Negative for polydipsia and polyuria.   Genitourinary: Negative for difficulty urinating, dysuria, hematuria and menstrual problem.   Musculoskeletal: Positive for arthralgias and joint swelling (knee pain, now improved.). Negative for neck pain.   Neurological: Positive for headaches. Negative for weakness.   Psychiatric/Behavioral: Positive for dysphoric mood. Negative for confusion.       Objective:      Physical Exam   Constitutional: She is oriented to person, place, and time. She appears well-developed and well-nourished. No distress.   HENT:   Head: Normocephalic and atraumatic.   Right Ear: External ear normal.   Left Ear: External ear normal.   Nose: Nose normal.   Mouth/Throat: Oropharynx is clear and moist.   Eyes: Pupils are equal, round, and reactive to light. Conjunctivae and EOM are normal. Right eye exhibits no discharge. Left eye exhibits no discharge. No scleral icterus.    Neck: Normal range of motion. Neck supple. No JVD present. No thyromegaly present.   Cardiovascular: Normal rate, regular rhythm and normal heart sounds. Exam reveals no gallop.   No murmur heard.  Pulmonary/Chest: Effort normal and breath sounds normal. No respiratory distress. She has no wheezes. She has no rales.   Abdominal: Soft. Bowel sounds are normal. She exhibits no distension and no mass. There is no tenderness. There is no rebound and no guarding.   Musculoskeletal: Normal range of motion. She exhibits no edema or tenderness.   Lymphadenopathy:     She has no cervical adenopathy.   Neurological: She is alert and oriented to person, place, and time. No cranial nerve deficit. Coordination normal.   Skin: Skin is warm and dry. No rash noted.   Psychiatric: She has a normal mood and affect. Her behavior is normal. Judgment and thought content normal.       Results for orders placed or performed in visit on 01/27/20   CBC auto differential   Result Value Ref Range    WBC 6.56 3.90 - 12.70 K/uL    RBC 4.30 4.00 - 5.40 M/uL    Hemoglobin 13.0 12.0 - 16.0 g/dL    Hematocrit 42.2 37.0 - 48.5 %    Mean Corpuscular Volume 98 82 - 98 fL    Mean Corpuscular Hemoglobin 30.2 27.0 - 31.0 pg    Mean Corpuscular Hemoglobin Conc 30.8 (L) 32.0 - 36.0 g/dL    RDW 12.5 11.5 - 14.5 %    Platelets 254 150 - 350 K/uL    MPV 10.8 9.2 - 12.9 fL    Immature Granulocytes 0.2 0.0 - 0.5 %    Gran # (ANC) 3.1 1.8 - 7.7 K/uL    Immature Grans (Abs) 0.01 0.00 - 0.04 K/uL    Lymph # 2.9 1.0 - 4.8 K/uL    Mono # 0.5 0.3 - 1.0 K/uL    Eos # 0.0 0.0 - 0.5 K/uL    Baso # 0.10 0.00 - 0.20 K/uL    nRBC 0 0 /100 WBC    Gran% 47.2 38.0 - 73.0 %    Lymph% 43.8 18.0 - 48.0 %    Mono% 7.3 4.0 - 15.0 %    Eosinophil% 0.0 0.0 - 8.0 %    Basophil% 1.5 0.0 - 1.9 %    Differential Method Automated    Comprehensive metabolic panel   Result Value Ref Range    Sodium 142 136 - 145 mmol/L    Potassium 4.6 3.5 - 5.1 mmol/L    Chloride 106 95 - 110 mmol/L     CO2 27 23 - 29 mmol/L    Glucose 112 (H) 70 - 110 mg/dL    BUN, Bld 13 8 - 23 mg/dL    Creatinine 0.9 0.5 - 1.4 mg/dL    Calcium 9.7 8.7 - 10.5 mg/dL    Total Protein 6.8 6.0 - 8.4 g/dL    Albumin 3.9 3.5 - 5.2 g/dL    Total Bilirubin 0.3 0.1 - 1.0 mg/dL    Alkaline Phosphatase 84 55 - 135 U/L    AST 20 10 - 40 U/L    ALT 21 10 - 44 U/L    Anion Gap 9 8 - 16 mmol/L    eGFR if African American >60.0 >60 mL/min/1.73 m^2    eGFR if non African American >60.0 >60 mL/min/1.73 m^2   Lipid panel   Result Value Ref Range    Cholesterol 243 (H) 120 - 199 mg/dL    Triglycerides 175 (H) 30 - 150 mg/dL    HDL 52 40 - 75 mg/dL    LDL Cholesterol 156.0 63.0 - 159.0 mg/dL    Hdl/Cholesterol Ratio 21.4 20.0 - 50.0 %    Total Cholesterol/HDL Ratio 4.7 2.0 - 5.0    Non-HDL Cholesterol 191 mg/dL   TSH   Result Value Ref Range    TSH 0.528 0.400 - 4.000 uIU/mL   T3   Result Value Ref Range    T3, Total 73 60 - 180 ng/dL   The 10-year ASCVD risk score (Aracelis BLANCO Jr., et al., 2013) is: 10.2%    Values used to calculate the score:      Age: 65 years      Sex: Female      Is Non- : No      Diabetic: No      Tobacco smoker: No      Systolic Blood Pressure: 128 mmHg      Is BP treated: Yes      HDL Cholesterol: 52 mg/dL      Total Cholesterol: 243 mg/dL  Assessment:       1. Annual physical exam    2. Hyperlipidemia, unspecified hyperlipidemia type    3. Migraine without status migrainosus, not intractable, unspecified migraine type    4. Intermittent diarrhea    5. Hypothyroidism due to acquired atrophy of thyroid    6. Essential hypertension    7. Colon adenomas    8. Recurrent major depressive disorder, in partial remission        Plan:       Tiarra was seen today for annual exam.    Diagnoses and all orders for this visit:    Annual physical exam    Hyperlipidemia, unspecified hyperlipidemia type  -     Lipid panel; Future  -     AST (SGOT); Future  -     ALT (SGPT); Future    Migraine without status migrainosus, not  intractable, unspecified migraine type    Intermittent diarrhea    Hypothyroidism due to acquired atrophy of thyroid    Essential hypertension    Colon adenomas    Recurrent major depressive disorder, in partial remission    Other orders  -     venlafaxine (EFFEXOR-XR) 150 MG Cp24; 1 tab po q day  -     atorvastatin (LIPITOR) 10 MG tablet; Take 1 tablet (10 mg total) by mouth once daily.         pneumo 23, shingles    Start atorvastatin     Labs 4 weeks - watch liver closely.    Zofran trial, instead of phenergan     F/U GI    Encouraged to exercise.

## 2020-02-03 NOTE — H&P (VIEW-ONLY)
Subjective:       Patient ID: Tiarra Norton is a 65 y.o. female.    Chief Complaint: Annual Exam    HPI   GI note reviewed.  EGD upcoming.  Prominent symptom now is nausea.  She has taken phenergan several times, but causes fatigue.  Diarrhea has improved.    Seeing Dr Prabhakar.  Tx with transcranial magnetic stimulation.  Continues to see therapist.  Lacking motivation     Sleeping well most nights with half a trazadone.       Migraines twice a month at a minimium.    No longer seeing integrative doctor in [place].  Also was seeing [place] Guerrero [place] [facility]      She sees DR Judd in gyn        Review of Systems   Constitutional: Negative for activity change and unexpected weight change.   HENT: Negative for hearing loss, rhinorrhea and trouble swallowing.    Eyes: Negative for discharge and visual disturbance.   Respiratory: Negative for chest tightness and wheezing.    Cardiovascular: Negative for chest pain and palpitations.   Gastrointestinal: Positive for diarrhea and vomiting. Negative for blood in stool and constipation.   Endocrine: Negative for polydipsia and polyuria.   Genitourinary: Negative for difficulty urinating, dysuria, hematuria and menstrual problem.   Musculoskeletal: Positive for arthralgias and joint swelling (knee pain, now improved.). Negative for neck pain.   Neurological: Positive for headaches. Negative for weakness.   Psychiatric/Behavioral: Positive for dysphoric mood. Negative for confusion.       Objective:      Physical Exam   Constitutional: She is oriented to person, place, and time. She appears well-developed and well-nourished. No distress.   HENT:   Head: Normocephalic and atraumatic.   Right Ear: External ear normal.   Left Ear: External ear normal.   Nose: Nose normal.   Mouth/Throat: Oropharynx is clear and moist.   Eyes: Pupils are equal, round, and reactive to light. Conjunctivae and EOM are normal. Right eye exhibits no discharge. Left eye exhibits no discharge. No scleral icterus.    Neck: Normal range of motion. Neck supple. No JVD present. No thyromegaly present.   Cardiovascular: Normal rate, regular rhythm and normal heart sounds. Exam reveals no gallop.   No murmur heard.  Pulmonary/Chest: Effort normal and breath sounds normal. No respiratory distress. She has no wheezes. She has no rales.   Abdominal: Soft. Bowel sounds are normal. She exhibits no distension and no mass. There is no tenderness. There is no rebound and no guarding.   Musculoskeletal: Normal range of motion. She exhibits no edema or tenderness.   Lymphadenopathy:     She has no cervical adenopathy.   Neurological: She is alert and oriented to person, place, and time. No cranial nerve deficit. Coordination normal.   Skin: Skin is warm and dry. No rash noted.   Psychiatric: She has a normal mood and affect. Her behavior is normal. Judgment and thought content normal.       Results for orders placed or performed in visit on 01/27/20   CBC auto differential   Result Value Ref Range    WBC 6.56 3.90 - 12.70 K/uL    RBC 4.30 4.00 - 5.40 M/uL    Hemoglobin 13.0 12.0 - 16.0 g/dL    Hematocrit 42.2 37.0 - 48.5 %    Mean Corpuscular Volume 98 82 - 98 fL    Mean Corpuscular Hemoglobin 30.2 27.0 - 31.0 pg    Mean Corpuscular Hemoglobin Conc 30.8 (L) 32.0 - 36.0 g/dL    RDW 12.5 11.5 - 14.5 %    Platelets 254 150 - 350 K/uL    MPV 10.8 9.2 - 12.9 fL    Immature Granulocytes 0.2 0.0 - 0.5 %    Gran # (ANC) 3.1 1.8 - 7.7 K/uL    Immature Grans (Abs) 0.01 0.00 - 0.04 K/uL    Lymph # 2.9 1.0 - 4.8 K/uL    Mono # 0.5 0.3 - 1.0 K/uL    Eos # 0.0 0.0 - 0.5 K/uL    Baso # 0.10 0.00 - 0.20 K/uL    nRBC 0 0 /100 WBC    Gran% 47.2 38.0 - 73.0 %    Lymph% 43.8 18.0 - 48.0 %    Mono% 7.3 4.0 - 15.0 %    Eosinophil% 0.0 0.0 - 8.0 %    Basophil% 1.5 0.0 - 1.9 %    Differential Method Automated    Comprehensive metabolic panel   Result Value Ref Range    Sodium 142 136 - 145 mmol/L    Potassium 4.6 3.5 - 5.1 mmol/L    Chloride 106 95 - 110 mmol/L     CO2 27 23 - 29 mmol/L    Glucose 112 (H) 70 - 110 mg/dL    BUN, Bld 13 8 - 23 mg/dL    Creatinine 0.9 0.5 - 1.4 mg/dL    Calcium 9.7 8.7 - 10.5 mg/dL    Total Protein 6.8 6.0 - 8.4 g/dL    Albumin 3.9 3.5 - 5.2 g/dL    Total Bilirubin 0.3 0.1 - 1.0 mg/dL    Alkaline Phosphatase 84 55 - 135 U/L    AST 20 10 - 40 U/L    ALT 21 10 - 44 U/L    Anion Gap 9 8 - 16 mmol/L    eGFR if African American >60.0 >60 mL/min/1.73 m^2    eGFR if non African American >60.0 >60 mL/min/1.73 m^2   Lipid panel   Result Value Ref Range    Cholesterol 243 (H) 120 - 199 mg/dL    Triglycerides 175 (H) 30 - 150 mg/dL    HDL 52 40 - 75 mg/dL    LDL Cholesterol 156.0 63.0 - 159.0 mg/dL    Hdl/Cholesterol Ratio 21.4 20.0 - 50.0 %    Total Cholesterol/HDL Ratio 4.7 2.0 - 5.0    Non-HDL Cholesterol 191 mg/dL   TSH   Result Value Ref Range    TSH 0.528 0.400 - 4.000 uIU/mL   T3   Result Value Ref Range    T3, Total 73 60 - 180 ng/dL   The 10-year ASCVD risk score (Aracelis BLANCO Jr., et al., 2013) is: 10.2%    Values used to calculate the score:      Age: 65 years      Sex: Female      Is Non- : No      Diabetic: No      Tobacco smoker: No      Systolic Blood Pressure: 128 mmHg      Is BP treated: Yes      HDL Cholesterol: 52 mg/dL      Total Cholesterol: 243 mg/dL  Assessment:       1. Annual physical exam    2. Hyperlipidemia, unspecified hyperlipidemia type    3. Migraine without status migrainosus, not intractable, unspecified migraine type    4. Intermittent diarrhea    5. Hypothyroidism due to acquired atrophy of thyroid    6. Essential hypertension    7. Colon adenomas    8. Recurrent major depressive disorder, in partial remission        Plan:       Tiarra was seen today for annual exam.    Diagnoses and all orders for this visit:    Annual physical exam    Hyperlipidemia, unspecified hyperlipidemia type  -     Lipid panel; Future  -     AST (SGOT); Future  -     ALT (SGPT); Future    Migraine without status migrainosus, not  intractable, unspecified migraine type    Intermittent diarrhea    Hypothyroidism due to acquired atrophy of thyroid    Essential hypertension    Colon adenomas    Recurrent major depressive disorder, in partial remission    Other orders  -     venlafaxine (EFFEXOR-XR) 150 MG Cp24; 1 tab po q day  -     atorvastatin (LIPITOR) 10 MG tablet; Take 1 tablet (10 mg total) by mouth once daily.         pneumo 23, shingles    Start atorvastatin     Labs 4 weeks - watch liver closely.    Zofran trial, instead of phenergan     F/U GI    Encouraged to exercise.

## 2020-02-05 ENCOUNTER — PATIENT MESSAGE (OUTPATIENT)
Dept: ENDOSCOPY | Facility: HOSPITAL | Age: 65
End: 2020-02-05

## 2020-02-13 ENCOUNTER — ANESTHESIA EVENT (OUTPATIENT)
Dept: ENDOSCOPY | Facility: HOSPITAL | Age: 65
End: 2020-02-13
Payer: MEDICARE

## 2020-02-13 ENCOUNTER — HOSPITAL ENCOUNTER (OUTPATIENT)
Facility: HOSPITAL | Age: 65
Discharge: HOME | End: 2020-02-13
Attending: INTERNAL MEDICINE | Admitting: INTERNAL MEDICINE
Payer: MEDICARE

## 2020-02-13 ENCOUNTER — ANESTHESIA (OUTPATIENT)
Dept: ENDOSCOPY | Facility: HOSPITAL | Age: 65
End: 2020-02-13
Payer: MEDICARE

## 2020-02-13 VITALS
OXYGEN SATURATION: 96 % | WEIGHT: 175 LBS | TEMPERATURE: 98 F | DIASTOLIC BLOOD PRESSURE: 81 MMHG | HEIGHT: 63 IN | BODY MASS INDEX: 31.01 KG/M2 | RESPIRATION RATE: 16 BRPM | SYSTOLIC BLOOD PRESSURE: 131 MMHG | HEART RATE: 77 BPM

## 2020-02-13 DIAGNOSIS — R10.9 ABDOMINAL PAIN: ICD-10-CM

## 2020-02-13 DIAGNOSIS — R10.84 ABDOMINAL PAIN, GENERALIZED: Primary | ICD-10-CM

## 2020-02-13 DIAGNOSIS — R63.4 UNINTENTIONAL WEIGHT LOSS: ICD-10-CM

## 2020-02-13 PROCEDURE — 88312 SPECIAL STAINS GROUP 1: CPT | Mod: HCNC | Performed by: PATHOLOGY

## 2020-02-13 PROCEDURE — E9220 PRA ENDO ANESTHESIA: ICD-10-PCS | Mod: HCNC,,, | Performed by: NURSE ANESTHETIST, CERTIFIED REGISTERED

## 2020-02-13 PROCEDURE — E9220 PRA ENDO ANESTHESIA: HCPCS | Mod: HCNC,,, | Performed by: NURSE ANESTHETIST, CERTIFIED REGISTERED

## 2020-02-13 PROCEDURE — 27201012 HC FORCEPS, HOT/COLD, DISP: Mod: HCNC | Performed by: INTERNAL MEDICINE

## 2020-02-13 PROCEDURE — 88305 TISSUE EXAM BY PATHOLOGIST: CPT | Mod: 26,HCNC,, | Performed by: PATHOLOGY

## 2020-02-13 PROCEDURE — 43239 EGD BIOPSY SINGLE/MULTIPLE: CPT | Mod: HCNC,,, | Performed by: INTERNAL MEDICINE

## 2020-02-13 PROCEDURE — 88305 TISSUE EXAM BY PATHOLOGIST: ICD-10-PCS | Mod: 26,HCNC,, | Performed by: PATHOLOGY

## 2020-02-13 PROCEDURE — 43239 PR EGD, FLEX, W/BIOPSY, SGL/MULTI: ICD-10-PCS | Mod: HCNC,,, | Performed by: INTERNAL MEDICINE

## 2020-02-13 PROCEDURE — 63600175 PHARM REV CODE 636 W HCPCS: Mod: HCNC | Performed by: NURSE ANESTHETIST, CERTIFIED REGISTERED

## 2020-02-13 PROCEDURE — 88342 CHG IMMUNOCYTOCHEMISTRY: ICD-10-PCS | Mod: 26,HCNC,, | Performed by: PATHOLOGY

## 2020-02-13 PROCEDURE — 88312 PR  SPECIAL STAINS,GROUP I: ICD-10-PCS | Mod: 26,HCNC,, | Performed by: PATHOLOGY

## 2020-02-13 PROCEDURE — 25000003 PHARM REV CODE 250: Mod: HCNC | Performed by: NURSE ANESTHETIST, CERTIFIED REGISTERED

## 2020-02-13 PROCEDURE — 43239 EGD BIOPSY SINGLE/MULTIPLE: CPT | Mod: HCNC | Performed by: INTERNAL MEDICINE

## 2020-02-13 PROCEDURE — 63600175 PHARM REV CODE 636 W HCPCS: Mod: HCNC | Performed by: INTERNAL MEDICINE

## 2020-02-13 PROCEDURE — 88342 IMHCHEM/IMCYTCHM 1ST ANTB: CPT | Mod: HCNC | Performed by: PATHOLOGY

## 2020-02-13 PROCEDURE — 88305 TISSUE EXAM BY PATHOLOGIST: CPT | Mod: 59,HCNC | Performed by: PATHOLOGY

## 2020-02-13 PROCEDURE — 37000008 HC ANESTHESIA 1ST 15 MINUTES: Mod: HCNC | Performed by: INTERNAL MEDICINE

## 2020-02-13 PROCEDURE — 37000009 HC ANESTHESIA EA ADD 15 MINS: Mod: HCNC | Performed by: INTERNAL MEDICINE

## 2020-02-13 PROCEDURE — 88342 IMHCHEM/IMCYTCHM 1ST ANTB: CPT | Mod: 26,HCNC,, | Performed by: PATHOLOGY

## 2020-02-13 PROCEDURE — 88312 SPECIAL STAINS GROUP 1: CPT | Mod: 26,HCNC,, | Performed by: PATHOLOGY

## 2020-02-13 RX ORDER — PROPOFOL 10 MG/ML
VIAL (ML) INTRAVENOUS
Status: DISCONTINUED | OUTPATIENT
Start: 2020-02-13 | End: 2020-02-13

## 2020-02-13 RX ORDER — PROPOFOL 10 MG/ML
VIAL (ML) INTRAVENOUS CONTINUOUS PRN
Status: DISCONTINUED | OUTPATIENT
Start: 2020-02-13 | End: 2020-02-13

## 2020-02-13 RX ORDER — SODIUM CHLORIDE 9 MG/ML
INJECTION, SOLUTION INTRAVENOUS CONTINUOUS
Status: DISCONTINUED | OUTPATIENT
Start: 2020-02-13 | End: 2020-02-13 | Stop reason: HOSPADM

## 2020-02-13 RX ORDER — GLYCOPYRROLATE 0.2 MG/ML
INJECTION INTRAMUSCULAR; INTRAVENOUS
Status: DISCONTINUED | OUTPATIENT
Start: 2020-02-13 | End: 2020-02-13

## 2020-02-13 RX ORDER — LIDOCAINE HYDROCHLORIDE 20 MG/ML
INJECTION INTRAVENOUS
Status: DISCONTINUED | OUTPATIENT
Start: 2020-02-13 | End: 2020-02-13

## 2020-02-13 RX ADMIN — PROPOFOL 70 MG: 10 INJECTION, EMULSION INTRAVENOUS at 10:02

## 2020-02-13 RX ADMIN — SODIUM CHLORIDE: 0.9 INJECTION, SOLUTION INTRAVENOUS at 10:02

## 2020-02-13 RX ADMIN — PROPOFOL 200 MCG/KG/MIN: 10 INJECTION, EMULSION INTRAVENOUS at 10:02

## 2020-02-13 RX ADMIN — LIDOCAINE HYDROCHLORIDE 100 MG: 20 INJECTION, SOLUTION INTRAVENOUS at 10:02

## 2020-02-13 RX ADMIN — GLYCOPYRROLATE 0.2 MG: 0.2 INJECTION, SOLUTION INTRAMUSCULAR; INTRAVENOUS at 10:02

## 2020-02-13 NOTE — ANESTHESIA PREPROCEDURE EVALUATION
02/13/2020  Tiarra Norton is a 65 y.o., female scheduled for EGD.     Anesthesia Evaluation    I have reviewed the Patient Summary Reports.     I have reviewed the Medications.     Review of Systems  Anesthesia Hx:  No problems with previous Anesthesia  History of prior surgery of interest to airway management or planning:  Denies Personal Hx of Anesthesia complications.   Hematology/Oncology:  Hematology Normal   Oncology Normal     EENT/Dental:EENT/Dental Normal   Cardiovascular:   Hypertension    Pulmonary:   Sleep Apnea    Renal/:  Renal/ Normal     Musculoskeletal:   Arthritis     Neurological:   Headaches    Endocrine:   Diabetes Hypothyroidism    Dermatological:  Skin Normal    Psych:   Psychiatric History          Physical Exam  General:  Well nourished    Airway/Jaw/Neck:  Airway Findings: Mouth Opening: Normal Tongue: Normal  General Airway Assessment: Adult  Mallampati: II  Jaw/Neck Findings:     Neck ROM: Normal ROM     Eyes/Ears/Nose:  EYES/EARS/NOSE FINDINGS: Normal   Dental:  Dental Findings: In tact   Chest/Lungs:  Chest/Lungs Clear    Heart/Vascular:  Heart Findings: Normal Heart murmur: negative Vascular Findings: Normal    Abdomen:  Abdomen Findings: Normal    Musculoskeletal:  Musculoskeletal Findings: Normal   Skin:  Skin Findings: Normal    Mental Status:  Mental Status Findings: Normal        Anesthesia Plan  Type of Anesthesia, risks & benefits discussed:  Anesthesia Type:  general  Patient's Preference:   Intra-op Monitoring Plan: standard ASA monitors  Intra-op Monitoring Plan Comments:   Post Op Pain Control Plan: multimodal analgesia  Post Op Pain Control Plan Comments:   Induction:   IV  Beta Blocker:  Patient is not currently on a Beta-Blocker (No further documentation required).       Informed Consent: Patient understands risks and agrees with Anesthesia plan.   Questions answered. Anesthesia consent signed with patient.  ASA Score: 2     Day of Surgery Review of History & Physical: I have interviewed and examined the patient. I have reviewed the patient's H&P dated:  There are no significant changes.  H&P update referred to the provider.         Ready For Surgery From Anesthesia Perspective.

## 2020-02-13 NOTE — DISCHARGE INSTRUCTIONS

## 2020-02-13 NOTE — ANESTHESIA POSTPROCEDURE EVALUATION
Anesthesia Post Evaluation    Patient: Tiarra Norton    Procedure(s) Performed: Procedure(s) (LRB):  EGD (ESOPHAGOGASTRODUODENOSCOPY) (N/A)    Final Anesthesia Type: general    Patient location during evaluation: GI PACU  Patient participation: Yes- Able to Participate  Level of consciousness: awake and alert and oriented  Post-procedure vital signs: reviewed and stable  Pain management: adequate  Airway patency: patent    PONV status at discharge: No PONV  Anesthetic complications: no      Cardiovascular status: blood pressure returned to baseline and hemodynamically stable  Respiratory status: unassisted  Hydration status: euvolemic  Follow-up not needed.          Vitals Value Taken Time   /81 2/13/2020 11:01 AM   Temp 36.7 °C (98.1 °F) 2/13/2020 10:36 AM   Pulse 77 2/13/2020 11:01 AM   Resp 16 2/13/2020 11:01 AM   SpO2 96 % 2/13/2020 11:01 AM         Event Time     Out of Recovery 11:06:32          Pain/Arianne Score: Arianne Score: 10 (2/13/2020 10:53 AM)

## 2020-02-13 NOTE — [employer] PATIENT INSTRUCTIONS
Discharge Summary/Instructions after an Endoscopic Procedure  Patient Name: Tiarra Norton  Patient MRN: [id number]  Patient YOB: 1955 Thursday, February 13, 2020  Tolu Rivas MD  RESTRICTIONS:  During your procedure today, you received medications for sedation.  These   medications may affect your judgment, balance and coordination.  Therefore,   for 24 hours, you have the following restrictions:   - DO NOT drive a car, operate machinery, make legal/financial decisions,   sign important papers or drink alcohol.    ACTIVITY:  Today: no heavy lifting, straining or running due to procedural   sedation/anesthesia.  The following day: return to full activity including work.  DIET:  Eat and drink normally unless instructed otherwise.     TREATMENT FOR COMMON SIDE EFFECTS:  - Mild abdominal pain, nausea, belching, bloating or excessive gas:  rest,   eat lightly and use a heating pad.  - Sore Throat: treat with throat lozenges and/or gargle with warm salt   water.  - Because air was used during the procedure, expelling large amounts of air   from your rectum or belching is normal.  - If a bowel prep was taken, you may not have a bowel movement for 1-3 days.    This is normal.  SYMPTOMS TO WATCH FOR AND REPORT TO YOUR PHYSICIAN:  1. Abdominal pain or bloating, other than gas cramps.  2. Chest pain.  3. Back pain.  4. Signs of infection such as: chills or fever occurring within 24 hours   after the procedure.  5. Rectal bleeding, which would show as bright red, maroon, or black stools.   (A tablespoon of blood from the rectum is not serious, especially if   hemorrhoids are present.)  6. Vomiting.  7. Weakness or dizziness.  GO DIRECTLY TO THE NEAREST EMERGENCY ROOM IF YOU HAVE ANY OF THE FOLLOWING:      Difficulty breathing              Chills and/or fever over 101 F   Persistent vomiting and/or vomiting blood   Severe abdominal pain   Severe chest pain   Black, tarry stools   Bleeding- more than one tablespoon   Any  other symptom or condition that you feel may need urgent attention  Your doctor recommends these additional instructions:  If any biopsies were taken, your doctors clinic will contact you in 1 to 2   weeks with any results.  - Patient has a contact number available for emergencies.  The signs and   symptoms of potential delayed complications were discussed with the   patient.  Return to normal activities tomorrow.  Written discharge   instructions were provided to the patient.   - Discharge patient to home (ambulatory).   - Resume previous diet.   - Continue present medications.   - Await pathology results.   - Perform CT scan (computed tomography) of the abdomen with contrast at   appointment to be scheduled.  For questions, problems or results please call your physician - Tolu Rivas MD at Work:  [phone].  [facility] [place], EMERGENCY ROOM PHONE NUMBER: [phone]  IF A COMPLICATION OR EMERGENCY SITUATION ARISES AND YOU ARE UNABLE TO REACH   YOUR PHYSICIAN - GO DIRECTLY TO THE EMERGENCY ROOM.  Tolu Rivas MD  2/13/2020 10:37:16 AM  This report has been verified and signed electronically.  [employer]

## 2020-02-13 NOTE — TRANSFER OF CARE
"Anesthesia Transfer of Care Note    Patient: Tiarra Norton    Procedure(s) Performed: Procedure(s) (LRB):  EGD (ESOPHAGOGASTRODUODENOSCOPY) (N/A)    Patient location: PACU    Anesthesia Type: general    Transport from OR: Transported from OR on room air with adequate spontaneous ventilation    Post pain: adequate analgesia    Post assessment: no apparent anesthetic complications and tolerated procedure well    Post vital signs: stable    Level of consciousness: sedated and responds to stimulation    Nausea/Vomiting: no nausea/vomiting    Complications: none    Transfer of care protocol was followed      Last vitals:   Visit Vitals  /65 (BP Location: Left arm, Patient Position: Lying)   Pulse 78   Temp 36.7 °C (98.1 °F) (Oral)   Resp 16   Ht 5' 3" (1.6 m)   Wt 79.4 kg (175 lb)   SpO2 95%   Breastfeeding? No   BMI 31.00 kg/m²     "

## 2020-02-20 ENCOUNTER — PATIENT MESSAGE (OUTPATIENT)
Dept: GASTROENTEROLOGY | Facility: CLINIC | Age: 65
End: 2020-02-20

## 2020-02-20 ENCOUNTER — TELEPHONE (OUTPATIENT)
Dept: ENDOSCOPY | Facility: HOSPITAL | Age: 65
End: 2020-02-20

## 2020-02-20 LAB
COMMENT: NORMAL
FINAL PATHOLOGIC DIAGNOSIS: NORMAL
GROSS: NORMAL
MICROSCOPIC EXAM: NORMAL

## 2020-02-21 NOTE — TELEPHONE ENCOUNTER
Spoke with patient. CT scheduled for tomorrow at 12:15. Informed patient that Dr Rivas out of office today and will return on Monday. Instructed patient that I will message Dr Rivas to review biopsy results on Monday and then we will call her with results. Patient verbalized understanding.

## 2020-02-22 ENCOUNTER — HOSPITAL ENCOUNTER (OUTPATIENT)
Dept: RADIOLOGY | Facility: HOSPITAL | Age: 65
Discharge: HOME | End: 2020-02-22
Attending: INTERNAL MEDICINE
Payer: MEDICARE

## 2020-02-22 DIAGNOSIS — R63.4 UNINTENTIONAL WEIGHT LOSS: ICD-10-CM

## 2020-02-22 PROCEDURE — 74177 CT ABD & PELVIS W/CONTRAST: CPT | Mod: TC,HCNC

## 2020-02-22 PROCEDURE — 74177 CT ABDOMEN PELVIS WITH CONTRAST: ICD-10-PCS | Mod: 26,HCNC,, | Performed by: RADIOLOGY

## 2020-02-22 PROCEDURE — 74177 CT ABD & PELVIS W/CONTRAST: CPT | Mod: 26,HCNC,, | Performed by: RADIOLOGY

## 2020-02-22 PROCEDURE — 25500020 PHARM REV CODE 255: Mod: HCNC | Performed by: INTERNAL MEDICINE

## 2020-02-22 RX ADMIN — IOHEXOL 15 ML: 350 INJECTION, SOLUTION INTRAVENOUS at 01:02

## 2020-02-22 RX ADMIN — IOHEXOL 75 ML: 350 INJECTION, SOLUTION INTRAVENOUS at 01:02

## 2020-02-28 ENCOUNTER — PATIENT MESSAGE (OUTPATIENT)
Dept: INTERNAL MEDICINE | Facility: CLINIC | Age: 65
End: 2020-02-28

## 2020-02-28 DIAGNOSIS — L60.0 INGROWN NAIL: Primary | ICD-10-CM

## 2020-03-01 ENCOUNTER — PATIENT MESSAGE (OUTPATIENT)
Dept: PODIATRY | Facility: CLINIC | Age: 65
End: 2020-03-01

## 2020-03-03 ENCOUNTER — TELEPHONE (OUTPATIENT)
Dept: PODIATRY | Facility: CLINIC | Age: 65
End: 2020-03-03

## 2020-03-03 ENCOUNTER — OFFICE VISIT (OUTPATIENT)
Dept: PODIATRY | Facility: CLINIC | Age: 65
End: 2020-03-03
Payer: MEDICARE

## 2020-03-03 VITALS
HEART RATE: 73 BPM | HEIGHT: 63 IN | WEIGHT: 175.25 LBS | DIASTOLIC BLOOD PRESSURE: 61 MMHG | SYSTOLIC BLOOD PRESSURE: 120 MMHG | BODY MASS INDEX: 31.05 KG/M2

## 2020-03-03 DIAGNOSIS — M79.675 TOE PAIN, BILATERAL: ICD-10-CM

## 2020-03-03 DIAGNOSIS — M79.674 TOE PAIN, BILATERAL: ICD-10-CM

## 2020-03-03 DIAGNOSIS — L03.039 PARONYCHIA OF TOE, UNSPECIFIED LATERALITY: Primary | ICD-10-CM

## 2020-03-03 DIAGNOSIS — L60.0 INGROWN NAIL: ICD-10-CM

## 2020-03-03 PROCEDURE — 3074F PR MOST RECENT SYSTOLIC BLOOD PRESSURE < 130 MM HG: ICD-10-PCS | Mod: HCNC,CPTII,S$GLB, | Performed by: PODIATRIST

## 2020-03-03 PROCEDURE — 99203 OFFICE O/P NEW LOW 30 MIN: CPT | Mod: HCNC,S$GLB,, | Performed by: PODIATRIST

## 2020-03-03 PROCEDURE — 1101F PR PT FALLS ASSESS DOC 0-1 FALLS W/OUT INJ PAST YR: ICD-10-PCS | Mod: HCNC,CPTII,S$GLB, | Performed by: PODIATRIST

## 2020-03-03 PROCEDURE — 3074F SYST BP LT 130 MM HG: CPT | Mod: HCNC,CPTII,S$GLB, | Performed by: PODIATRIST

## 2020-03-03 PROCEDURE — 3078F PR MOST RECENT DIASTOLIC BLOOD PRESSURE < 80 MM HG: ICD-10-PCS | Mod: HCNC,CPTII,S$GLB, | Performed by: PODIATRIST

## 2020-03-03 PROCEDURE — 1125F AMNT PAIN NOTED PAIN PRSNT: CPT | Mod: HCNC,S$GLB,, | Performed by: PODIATRIST

## 2020-03-03 PROCEDURE — 1125F PR PAIN SEVERITY QUANTIFIED, PAIN PRESENT: ICD-10-PCS | Mod: HCNC,S$GLB,, | Performed by: PODIATRIST

## 2020-03-03 PROCEDURE — 1101F PT FALLS ASSESS-DOCD LE1/YR: CPT | Mod: HCNC,CPTII,S$GLB, | Performed by: PODIATRIST

## 2020-03-03 PROCEDURE — 3078F DIAST BP <80 MM HG: CPT | Mod: HCNC,CPTII,S$GLB, | Performed by: PODIATRIST

## 2020-03-03 PROCEDURE — 1159F PR MEDICATION LIST DOCUMENTED IN MEDICAL RECORD: ICD-10-PCS | Mod: HCNC,S$GLB,, | Performed by: PODIATRIST

## 2020-03-03 PROCEDURE — 1159F MED LIST DOCD IN RCRD: CPT | Mod: HCNC,S$GLB,, | Performed by: PODIATRIST

## 2020-03-03 PROCEDURE — 99999 PR PBB SHADOW E&M-EST. PATIENT-LVL III: CPT | Mod: PBBFAC,HCNC,, | Performed by: PODIATRIST

## 2020-03-03 PROCEDURE — 99203 PR OFFICE/OUTPT VISIT, NEW, LEVL III, 30-44 MIN: ICD-10-PCS | Mod: HCNC,S$GLB,, | Performed by: PODIATRIST

## 2020-03-03 PROCEDURE — 99999 PR PBB SHADOW E&M-EST. PATIENT-LVL III: ICD-10-PCS | Mod: PBBFAC,HCNC,, | Performed by: PODIATRIST

## 2020-03-03 RX ORDER — TOBRAMYCIN 3 MG/ML
SOLUTION/ DROPS OPHTHALMIC
Qty: 5 ML | Refills: 3 | Status: SHIPPED | OUTPATIENT
Start: 2020-03-03 | End: 2020-09-29

## 2020-03-03 NOTE — LETTER
March 3, 2020      Kaykay Perales MD  [address] [facility] [address]           [facility]  [address] [address]  Phone: [phone]  Fax: [phone]          Patient: Tiarra Norton   MR Number: [id number]   YOB: 1955   Date of Visit: 3/3/2020       Dear Dr. Kaykay Perales:    Thank you for referring iTarra Norton to me for evaluation. Attached you will find relevant portions of my assessment and plan of care.    If you have questions, please do not hesitate to call me. I look forward to following Tiarra Norton along with you.    Sincerely,    Darrick Cox, DPESTELLA    Enclosure  CC:  No Recipients    If you would like to receive this communication electronically, please contact [email or web address] or [phone] to request more information on [employer] access.    For providers and/or their staff who would like to refer a patient to [facility], please contact us through our one-stop-shop provider referral line, [facility], at [phone].    If you feel you have received this communication in error or would no longer like to receive these types of communications, please e-mail [email or web address]

## 2020-03-03 NOTE — TELEPHONE ENCOUNTER
----- Message from Guzman Rivas sent at 3/3/2020 10:57 AM CST -----  Contact: Mirna Borrero   Name of Who is Calling: Mirna Borrero      What is the request in detail:Mirna Borrero  Is requesting a call back in regards to RX tobramycin sulfate 0.3% (TOBREX) 0.3 % ophthalmic solution  Being used for eyes and not foot ..  Please contact to further discuss and advise      Can the clinic reply by [facility]: no     What Number [facility] Back [facility]: Mirna Borrero   [phone]

## 2020-03-03 NOTE — PROGRESS NOTES
Subjective:      Patient ID: Tiarra Norton is a 65 y.o. female.    Chief Complaint: Ingrown Toenail (bilateral great toes)    Short deep throbbing pain at both sides of both great toenails.  Gradual onset worsening over the past few days.  Neither improved nor worsened over the past few  days.by increased soft tissues pressure and ambulation.  No prior medical treatment. No self-treatment.  Denies trauma,and surgery both feet..    Review of Systems   Constitution: Negative for chills, diaphoresis, fever, malaise/fatigue and night sweats.   Cardiovascular: Negative for claudication, cyanosis, leg swelling and syncope.   Skin: Positive for nail changes. Negative for color change, dry skin, rash, suspicious lesions and unusual hair distribution.   Musculoskeletal: Negative for falls, joint pain, joint swelling, muscle cramps, muscle weakness and stiffness.   Gastrointestinal: Negative for constipation, diarrhea, nausea and vomiting.   Neurological: Negative for brief paralysis, disturbances in coordination, focal weakness, numbness, paresthesias, sensory change and tremors.           Objective:      Physical Exam   Constitutional: She is oriented to person, place, and time. She appears well-developed and well-nourished. She is cooperative. No distress.   Cardiovascular:   Pulses:       Popliteal pulses are 2+ on the right side, and 2+ on the left side.        Dorsalis pedis pulses are 2+ on the right side, and 2+ on the left side.        Posterior tibial pulses are 2+ on the right side, and 2+ on the left side.   Capillary refill 3 seconds all toes/distal feet, all toes/both feet warm to touch.      Negative lymphadenopathy bilateral popliteal fossa and tarsal tunnel.      Negavie lower extremity edema bilateral.     Musculoskeletal:        Right ankle: She exhibits normal range of motion, no swelling, no ecchymosis, no deformity, no laceration and normal pulse. Achilles tendon normal. Achilles tendon exhibits no  pain, no defect and normal Vega's test results.   Normal angle, base, station of gait. All ten toes without clubbing, cyanosis, or signs of ischemia.  No pain to palpation bilateral lower extremities.  Range of motion, stability, muscle strength, and muscle tone normal bilateral feet and legs.    Lymphadenopathy: No inguinal adenopathy noted on the right or left side.   Negative lymphadenopathy bilateral popliteal fossa and tarsal tunnel.    Negative lymphangitic streaking bilateral feet/ankles/legs.   Neurological: She is alert and oriented to person, place, and time. She has normal strength. She displays no atrophy and no tremor. No sensory deficit. She exhibits normal muscle tone. Gait normal.   Reflex Scores:       Patellar reflexes are 2+ on the right side and 2+ on the left side.       Achilles reflexes are 2+ on the right side and 2+ on the left side.  Negative tinel sign to percussion sural, superficial peroneal, deep peroneal, saphenous, and posterior tibial nerves right and left ankles and feet.     Skin: Skin is warm, dry and intact. Capillary refill takes 2 to 3 seconds. No abrasion, no bruising, no burn, no ecchymosis, no laceration, no lesion and no rash noted. She is not diaphoretic. No cyanosis or erythema. No pallor. Nails show no clubbing.     Skin is normal age and health appropriate color, turgor, texture, and temperature bilateral lower extremities without ulceration, hyperpigmentation, discoloration, masses nodules or cords palpated.  No ecchymosis, erythema, edema, or cardinal signs of infection bilateral lower extremities.    Visible and palpable ingrowth of toenail bilateral borders left and right hallux with pain to palpation, and focal localized erythema and edema,  without ulceration, drainage, pus, tracking, fluctuance, malodor, or cardinal signs infection.     Psychiatric: She has a normal mood and affect.             Assessment:       Encounter Diagnoses   Name Primary?    Ingrown  nail     Paronychia of toe, unspecified laterality Yes    Toe pain, bilateral          Plan:       Tiarra was seen today for ingrown toenail.    Diagnoses and all orders for this visit:    Paronychia of toe, unspecified laterality    Ingrown nail  -     Ambulatory referral/consult to Podiatry    Toe pain, bilateral    Other orders  -     tobramycin sulfate 0.3% (TOBREX) 0.3 % ophthalmic solution; 1-2 drops topically twice daily to affected toe(s).      I counseled the patient on her conditions, their implications and medical management.    Cover both hallux toenails with Band-Aid dressings changing daily until completely resolved.    Discussed conservative treatment with shoes of adequate dimensions, material, and style to alleviate symptoms and delay or prevent surgical intervention.     Utilizing sterile toenail clippers I aggressively debrided the offending nail border approximately 3 mm from its edge and carried the nail plate incision down at an angle in order to wedge out the offending cryptotic portion of the nail plate. The offending border was then removed in toto. The area was cleansed with alcohol. Patient tolerated the procedure well and related significant relief.          No follow-ups on file.

## 2020-03-03 NOTE — TELEPHONE ENCOUNTER
Informed pt pharmacy that Dr. Cox does want the Torbramycin 0.3% to be used for the toes. She voiced understanding and call ended.

## 2020-03-24 ENCOUNTER — PATIENT MESSAGE (OUTPATIENT)
Dept: GASTROENTEROLOGY | Facility: CLINIC | Age: 65
End: 2020-03-24

## 2020-03-24 ENCOUNTER — PATIENT MESSAGE (OUTPATIENT)
Dept: ADMINISTRATIVE | Facility: OTHER | Age: 65
End: 2020-03-24

## 2020-03-27 RX ORDER — METHOCARBAMOL 750 MG/1
TABLET, FILM COATED ORAL
Qty: 60 TABLET | Refills: 0 | OUTPATIENT
Start: 2020-03-27

## 2020-03-30 ENCOUNTER — PATIENT MESSAGE (OUTPATIENT)
Dept: GASTROENTEROLOGY | Facility: CLINIC | Age: 65
End: 2020-03-30

## 2020-04-06 ENCOUNTER — OFFICE VISIT (OUTPATIENT)
Dept: GASTROENTEROLOGY | Facility: CLINIC | Age: 65
End: 2020-04-06
Payer: MEDICARE

## 2020-04-06 DIAGNOSIS — K58.0 IRRITABLE BOWEL SYNDROME WITH DIARRHEA: Primary | ICD-10-CM

## 2020-04-06 PROCEDURE — 1101F PR PT FALLS ASSESS DOC 0-1 FALLS W/OUT INJ PAST YR: ICD-10-PCS | Mod: HCNC,CPTII,95, | Performed by: INTERNAL MEDICINE

## 2020-04-06 PROCEDURE — 99499 UNLISTED E&M SERVICE: CPT | Mod: HCNC,95,, | Performed by: INTERNAL MEDICINE

## 2020-04-06 PROCEDURE — 99213 PR OFFICE/OUTPT VISIT, EST, LEVL III, 20-29 MIN: ICD-10-PCS | Mod: HCNC,95,, | Performed by: INTERNAL MEDICINE

## 2020-04-06 PROCEDURE — 1159F PR MEDICATION LIST DOCUMENTED IN MEDICAL RECORD: ICD-10-PCS | Mod: HCNC,95,, | Performed by: INTERNAL MEDICINE

## 2020-04-06 PROCEDURE — 1159F MED LIST DOCD IN RCRD: CPT | Mod: HCNC,95,, | Performed by: INTERNAL MEDICINE

## 2020-04-06 PROCEDURE — 99213 OFFICE O/P EST LOW 20 MIN: CPT | Mod: HCNC,95,, | Performed by: INTERNAL MEDICINE

## 2020-04-06 PROCEDURE — 99499 RISK ADDL DX/OHS AUDIT: ICD-10-PCS | Mod: HCNC,95,, | Performed by: INTERNAL MEDICINE

## 2020-04-06 PROCEDURE — 1101F PT FALLS ASSESS-DOCD LE1/YR: CPT | Mod: HCNC,CPTII,95, | Performed by: INTERNAL MEDICINE

## 2020-04-06 NOTE — LETTER
April 6, 2020      Kaykay Perales MD  [address]  [place] [address]           [facility]  [address]  [place] [address]  Phone: [phone]  Fax: [phone]          Patient: Tiarra Norton   MR Number: [id number]   YOB: 1955   Date of Visit: 4/6/2020       Dear Dr. Kaykay Perales:    Thank you for referring Tiarra Norton to me for evaluation. Attached you will find relevant portions of my assessment and plan of care.    If you have questions, please do not hesitate to call me. I look forward to following Tiarra Norton along with you.    Sincerely,    Tolu Rivas MD    Enclosure  CC:  No Recipients    If you would like to receive this communication electronically, please contact [email or web address] or [phone] to request more information on [employer] access.    For providers and/or their staff who would like to refer a patient to [facility], please contact us through our one-stop-shop provider referral line, [facility], at [phone].    If you feel you have received this communication in error or would no longer like to receive these types of communications, please e-mail [email or web address]

## 2020-04-06 NOTE — PROGRESS NOTES
The patient location is: home  The chief complaint leading to consultation is: IBS  Visit type: Virtual visit with synchronous audio and video  Total time spent with patient: 10 min  Each patient to whom he or she provides medical services by telemedicine is:  (1) informed of the relationship between the physician and patient and the respective role of any other health care provider with respect to management of the patient; and (2) notified that he or she may decline to receive medical services by telemedicine and may withdraw from such care at any time.    Notes:  Tiarra is seen today as a followup tele med visit.  I saw her for the 1st time in January.  My impression was IBS diarrhea predominant.  Previously she had trials of rifaximin Bentyl and Colestid.  She had a new epigastric pain therefore I ordered a CT scan which was essentially normal.  She also sees a holistic medicine doctor who ran on abundance of stool test on her.  The stool test suggested increased fat and I believe reduced it last days.  That would be compatible with pancreatic insufficiency.  Based on that I started her on Creon.  Also she was given a very restrictive functional diet.  She is avoiding all fruit and carbohydrates.  She has a possibly 1 month program of GI detoxification along with some specialized protein shakes.    She reports that she is doing better.  She is definitely having less diarrhea there has been no abdominal pain.    She has some generalized anxiety about the COVID-19 situation.  We talked about general measures that can hopefully reduce spread or chances of catching it.  She does have the ability to be able to talk to her therapist and she does have an upcoming session scheduled.    I asked her to stay in touch with me.    Total time 18 min with greater than half face-to-face counseling.  Answers for HPI/ROS submitted by the patient on 4/2/2020   fever: No  chills: No  weight loss: Yes  eye pain: Yes  eye redness:  Yes  trouble swallowing: No  chest pain: No  shortness of breath: No  cough: No  rash: No  abdominal pain: No  nausea: No  vomiting: No  Feeling depressed?: Yes  nervous/ anxious: Yes  heartburn: No  Joint pain? : Yes  dysuria: No  hematuria: No  back pain: Yes

## 2020-04-08 ENCOUNTER — PATIENT MESSAGE (OUTPATIENT)
Dept: INTERNAL MEDICINE | Facility: CLINIC | Age: 65
End: 2020-04-08

## 2020-04-08 ENCOUNTER — IMMUNIZATION (OUTPATIENT)
Dept: PHARMACY | Facility: CLINIC | Age: 65
End: 2020-04-08
Payer: MEDICARE

## 2020-04-08 RX ORDER — AMLODIPINE BESYLATE 5 MG/1
5 TABLET ORAL DAILY
Qty: 90 TABLET | Refills: 3 | Status: SHIPPED | OUTPATIENT
Start: 2020-04-08 | End: 2021-01-27 | Stop reason: SDUPTHER

## 2020-04-09 ENCOUNTER — PATIENT MESSAGE (OUTPATIENT)
Dept: INTERNAL MEDICINE | Facility: CLINIC | Age: 65
End: 2020-04-09

## 2020-04-15 ENCOUNTER — PATIENT MESSAGE (OUTPATIENT)
Dept: GASTROENTEROLOGY | Facility: CLINIC | Age: 65
End: 2020-04-15

## 2020-04-20 ENCOUNTER — PATIENT MESSAGE (OUTPATIENT)
Dept: INTERNAL MEDICINE | Facility: CLINIC | Age: 65
End: 2020-04-20

## 2020-04-22 ENCOUNTER — PATIENT MESSAGE (OUTPATIENT)
Dept: ADMINISTRATIVE | Facility: OTHER | Age: 65
End: 2020-04-22

## 2020-04-22 ENCOUNTER — PATIENT MESSAGE (OUTPATIENT)
Dept: INTERNAL MEDICINE | Facility: CLINIC | Age: 65
End: 2020-04-22

## 2020-04-24 ENCOUNTER — PATIENT MESSAGE (OUTPATIENT)
Dept: INTERNAL MEDICINE | Facility: CLINIC | Age: 65
End: 2020-04-24

## 2020-04-27 ENCOUNTER — PATIENT OUTREACH (OUTPATIENT)
Dept: OTHER | Facility: OTHER | Age: 65
End: 2020-04-27

## 2020-04-27 NOTE — LETTER
April 27, 2020     Tiarra Norton  [address] [facility] [address]       Dear Tiarra,    Welcome to [facility]! Our goal is to make care effective, proactive and convenient by using data you send us from home to better treat your chronic conditions.              My name is Patricia Lowe, and I am your dedicated Digital Medicine clinician. As an expert in medication management, I will help ensure that the medications you are taking continue to provide the intended benefits and help you reach your goals. You can reach me directly at [phone] or by sending me a message directly through your [facility] account.      I am Desirae Serra and I will be your health . My job is to help you identify lifestyle changes to improve your disease control. We will talk about nutrition, exercise, and other ways you may be able to adjust your current habits to better your health. Additionally, we will help ensure you are completing the tests and screenings that are necessary to help manage your conditions. You can reach me directly at [phone] or by sending me a message directly through your [facility] account.    Most importantly, YOU are at the center of this team. Together, we will work to improve your overall health and encourage you to meet your goals for a healthier lifestyle.     What we expect from YOU:  · Please take frequent home blood pressure measurements. We ask that you take at least 1 blood pressure reading per week, but more information will better help us get you know you. Be sure you rest for a few minutes before taking the reading in a quiet, comfortable place.     Be available to receive phone calls or Beelinet messages, when appropriate, from your care team. Please let us know if there are any specific days or times that work best for us to reach you via phone.     Complete routine tests and screenings. Dont worry, we will help keep you on track!           What you should  expect from your Digital Medicine Care Team:   We will work with you to create a personalized plan of care and provide you with encouragement and education, including regarding lifestyle changes, that could help you manage your disease states.     We will adjust your current medications, if needed, and continue to monitor your long-term progress.     We will provide you and your physician with monthly progress reports after you have been in the program for more than 30 days.     We will send you reminders through [employer] and text messages to help ensure you do not miss any testing deadlines to help manage your disease states.    You will be able to reach us by phone or through your [facility] account by clicking our names under Care Team on the right side of the home screen.    I look forward to working with you to achieve your blood pressure goals!    We look forward to working with you to help manage your health,    Sincerely,    Your Digital Medicine Team    Please visit our websites to learn more:   · Hypertension: [email or web address]      Remember, we are not available for emergencies. If you have an emergency, please contact your doctors office directly or call [facility] ([phone] or [phone]) or [phone].

## 2020-04-27 NOTE — PROGRESS NOTES
Digital Medicine: Health  Introduction    Introduced Tiarrauri Norton to Digital Medicine. Discussed health  role and recommended lifestyle modifications.    Pt is concerned that BP monitor is not holding a charge. She charged it yesterday and it is now blinking green. I encouraged her to try taking a reading and to contact me if she is unable or the monitor turns red.     Encouraged frequent readings. Pt is amenable.     The history is provided by the patient.     HYPERTENSION  Our goal is to get BP to consistently below 130/80mmHg and make the process convenient so patient can avoid extra trips to the office. Getting your blood pressure below 130/80mmHg (definition of control) will reduce your risk for heart attack, kidney failure, stroke and death (as well as kidney failure, eye disease, & dementia)      Reviewed that the Digital Medicine care team - consisting of a clinician and a health  - will follow the most current evidence-based national guidelines for treating your condition.  The health  will focus on lifestyle modifications and motivation while the clinician will focus on medication therapy.  The care team will review all data on a regular basis and reach out as needed.      Explained that one of the key parts of the program is communication with the care team.  Asked patient to respond to outreach attempts and complete questionnaires.  Stressed importance of medication adherence.    Reviewed non-pharmacologic therapies and impact on BP.      Explained that we expect patient to obtain several blood pressures per week at random times of day.  Instructed patient not to allow anyone else to use phone and monitoring device.  Confirmed appropriate BP monitoring technique.      Explained to patient that the digital medicine team is not available for emergencies.  Patient will call [facility] on[facility]call ([phone] or [phone]) or [phone] if needed.      Patient's BP goal is 130/80.Patient's BP  average is 120/72 mmHg, which is above goal, per 2017 ACC/AHA Hypertension Guidelines.          Last 5 Patient Entered Readings                                      Current 30 Day Average: 120/72     Recent Readings 4/26/2020    SBP (mmHg) 120    DBP (mmHg) 72    Pulse 68            INTERVENTION(S)  reviewed monitoring technique and encouragement/support    PLAN  patient verbalizes understanding, patient amenable to changes and additional monitoring needed      There are no preventive care reminders to display for this patient.    Reviewed the importance of self-monitoring, medication adherence, and that the health  can be used as a resource for lifestyle modifications to help reduce or maintain a healthy lifestyle.    Sent link to [facility] webpages and my contact information via Pathable for future questions. Follow up scheduled.             Diet Screening       She has coffee ocassionally, no soda. Coffee causes upset stomach so she has mostly been drinking herbal tea for the past month.     She sees a functional medicine doctor. She has been on a restricted diet and has lost weight.      SDOH

## 2020-04-29 ENCOUNTER — PATIENT MESSAGE (OUTPATIENT)
Dept: OTHER | Facility: OTHER | Age: 65
End: 2020-04-29

## 2020-04-29 ENCOUNTER — PATIENT OUTREACH (OUTPATIENT)
Dept: OTHER | Facility: OTHER | Age: 65
End: 2020-04-29

## 2020-04-29 NOTE — PROGRESS NOTES
Digital Medicine: Health  Follow-Up    Pt called because the cuff is not holding a charge. She charged it last night and it  today before she could take a reading. She is agreeable to cuff exchange by mail if needed. Placed CRM. Encouraged her to call with any questions/concerns.     The history is provided by the patient.     Follow Up  Follow-up reason(s): reading review      Readings are missing.   tech support needed and cuff exchange.      Intervention/Plan    There are no preventive care reminders to display for this patient.    Last 5 Patient Entered Readings                                      Current 30 Day Average: 125/74     Recent Readings 2020    SBP (mmHg) 134 120 120    DBP (mmHg) 79 71 72    Pulse 79 70 68                  Screenings    SDOH

## 2020-04-30 ENCOUNTER — PATIENT MESSAGE (OUTPATIENT)
Dept: INTERNAL MEDICINE | Facility: CLINIC | Age: 65
End: 2020-04-30

## 2020-05-01 RX ORDER — HYDROCODONE BITARTRATE AND ACETAMINOPHEN 5; 325 MG/1; MG/1
TABLET ORAL
Qty: 30 TABLET | Refills: 0 | Status: SHIPPED | OUTPATIENT
Start: 2020-05-01 | End: 2020-09-16 | Stop reason: SDUPTHER

## 2020-05-07 ENCOUNTER — PATIENT MESSAGE (OUTPATIENT)
Dept: INTERNAL MEDICINE | Facility: CLINIC | Age: 65
End: 2020-05-07

## 2020-05-07 RX ORDER — LEVOTHYROXINE SODIUM 137 UG/1
137 TABLET ORAL EVERY MORNING
Qty: 30 TABLET | Refills: 3 | Status: SHIPPED | OUTPATIENT
Start: 2020-05-07 | End: 2020-08-11

## 2020-05-22 ENCOUNTER — PATIENT OUTREACH (OUTPATIENT)
Dept: OTHER | Facility: OTHER | Age: 65
End: 2020-05-22

## 2020-05-22 NOTE — PROGRESS NOTES
Digital Medicine: Clinician Introduction    Tiarra Notron is a 65 y.o. female who is newly enrolled in the Digital Medicine Clinic.    The following information was reviewed and updated:  Preferred pharmacy   [facility] [place] [address]. - [place], [place] - [address] [place] [id number]  Phone: [phone] Fax: [phone]    [id number] - [place], [place] - [address] [place] [address] AT [facility] & [address] [address]  Phone: [phone] Fax: [phone]    [facility] - [facility] [address] [facility]  [address] [facility] [address]  Phone: [phone] Fax: [phone]      Review of patient's allergies indicates:  No Known Allergies    Patient returned missed phone call for introduction as clinical pharmacist in the Hypertension Digital Medicine program. She reports compliance with her BP medications and has not missed any doses in the past 2 weeks. She does express a concern about her [employer] BP device. She has to keep it plugged into outlet at all times because the cuff is not holding a charge. She also states that she uses her CPAP device infrequently due to not being able to keep it on at night. When used, it is about 4-5 hours. She is also awaiting some replacement parts from the pulmonology department.     The history is provided by the patient. No  was used.     HYPERTENSION  Our goal is to get BP to consistently below 130/80mmHg and make the process convenient so patient can avoid extra trips to the office. Getting your blood pressure below 130/80mmHg (definition of control) will reduce your risk for heart attack, kidney failure, stroke and death (as well as kidney failure, eye disease, & dementia)      Reviewed non-pharmacologic therapies and impact on BP      Explained that we expect patient to obtain several blood pressures per week at random times of  day.  Instructed patient not to allow anyone else to use phone and monitoring device.  Confirmed appropriate BP monitoring technique.      Explained to patient that the digital medicine team is not available for emergencies.  Patient will call [facility] on-call ([phone] or [phone]) or [phone] if needed.    Patient's BP goal is 130/80. Patients BP average is 121/73 mmHg, which is above goal, per 2017 ACC/AHA Hypertension Guidelines.      Med Review complete.    Allergies reviewed.      Last 5 Patient Entered Readings                                      Current 30 Day Average: 121/73     Recent Readings 5/22/2020 5/21/2020 5/20/2020 5/17/2020 5/14/2020    SBP (mmHg) 111 107 113 121 128    DBP (mmHg) 71 69 73 76 76    Pulse 80 77 64 66 64            INTERVENTION(S)  encouragement/support    PLAN  patient verbalizes understanding, Health  follow up and continue monitoring    Current BP avg of 121/73 meets BP goal of <130/80.  Reviewed readings: trending downwards, recently well-controlled.   Request Health  follow-up on BP device.     Continue current regimen.    Follow-up in 6 weeks.       There are no preventive care reminders to display for this patient.    Current Medication Regimen:  Hypertension Medications             amLODIPine (NORVASC) 5 MG tablet Take 1 tablet (5 mg total) by mouth once daily.    valsartan (DIOVAN) 320 MG tablet Take 1 tablet (320 mg total) by mouth once daily.            Reviewed the importance of self-monitoring, medication adherence, and that the health  can be used as a resource for lifestyle modifications to help reduce or maintain a healthy lifestyle.    Sent link to [facility] webpages and my contact information via Gliknikt for future questions. Follow up scheduled.             Sleep Apnea Screening  Patient previously diagnosed with HERMINIA     She reports she is currently using CPAP for 4 hour(s) per night.         Medication Adherence Screening    She did not miss a dose this month.

## 2020-05-29 ENCOUNTER — PATIENT MESSAGE (OUTPATIENT)
Dept: INTERNAL MEDICINE | Facility: CLINIC | Age: 65
End: 2020-05-29

## 2020-05-31 PROCEDURE — 99454 REM MNTR PHYSIOL PARAM 16-30: CPT | Mod: S$GLB,,, | Performed by: INTERNAL MEDICINE

## 2020-05-31 PROCEDURE — 99454 PR REMOTE MNTR, PHYS PARAM, INITIAL, EA 30 DAYS: ICD-10-PCS | Mod: S$GLB,,, | Performed by: INTERNAL MEDICINE

## 2020-06-03 ENCOUNTER — PATIENT OUTREACH (OUTPATIENT)
Dept: OTHER | Facility: OTHER | Age: 65
End: 2020-06-03

## 2020-06-03 NOTE — PROGRESS NOTES
Digital Medicine: Health  Follow-Up    Pt is doing well. She received her new cuff and it is working.    The history is provided by the patient.     Follow Up  Follow-up reason(s): routine education          INTERVENTION(S)  recommend physical activity, encouragement/support and goal setting    PLAN  patient verbalizes understanding, patient amenable to changes and additional monitoring needed      There are no preventive care reminders to display for this patient.    Last 5 Patient Entered Readings                                      Current 30 Day Average: 120/72     Recent Readings 5/29/2020 5/24/2020 5/23/2020 5/22/2020 5/21/2020    SBP (mmHg) 101 124 135 111 107    DBP (mmHg) 63 77 70 71 69    Pulse 77 72 65 80 77                      Diet Screening       She follows diet prescribed by her functional medicine doctor. This is working for her currently.    Physical Activity Screening       She has been trying to swim but pool is too crowded. She has to be there at 8 AM to get a jarrod. She has a pool at home but it's small and she prefers to swim laps.  She [place] near a park and can walk or ride her bike there. Hasn't biked in a few weeks.     Set goal to do an activity every day, rotating between swimming, biking, walking, and gardening. She will put it on her calendar to help her track.         Intervention(s): goal setting       SDOH

## 2020-06-09 ENCOUNTER — TELEPHONE (OUTPATIENT)
Dept: INTERNAL MEDICINE | Facility: CLINIC | Age: 65
End: 2020-06-09

## 2020-06-09 NOTE — TELEPHONE ENCOUNTER
Hazel is aware to fax it to [phone]   

## 2020-06-09 NOTE — TELEPHONE ENCOUNTER
----- Message from Maryam Del Rio sent at 6/9/2020  2:02 PM CDT -----  Contact: [facility]--[phone]  Needs Advice    Reason for call:      Hazel states that she faxed over a Pt Rx form for the pt  C-pep supply for 4 times to fax number [phone] and have not gotten a respond from anyone yet. Evaluation form is also being sent over as well.    Communication Preference: [facility]--[phone]    Additional Information:    Please call Hazel is requesting to have the forms signed and completed and fax to her [phone].

## 2020-06-24 ENCOUNTER — PATIENT OUTREACH (OUTPATIENT)
Dept: OTHER | Facility: OTHER | Age: 65
End: 2020-06-24

## 2020-07-08 NOTE — PROGRESS NOTES
Digital Medicine: Health  Follow-Up    The history is provided by the patient.   Follow Up  Follow-up reason(s): goal follow-up      Routine Education Topics: physical activity        INTERVENTION(S)  encouragement/support    PLAN  patient verbalizes understanding, await MD intervention and continue monitoring      There are no preventive care reminders to display for this patient.    Last 5 Patient Entered Readings                                      Current 30 Day Average: 122/75     Recent Readings 7/8/2020 7/4/2020 7/2/2020 6/24/2020 6/23/2020    SBP (mmHg) 121 118 131 126 117    DBP (mmHg) 76 79 79 71 73    Pulse 69 71 72 73 75                      Physical Activity Screening   When asked if exercising, patient responded: no    She identified the following barriers to physical activity: motivation and lack of energy    Pt states she is not motivated to exercise. She states she has been in a period of low energy lately. She is working with her doctor on this. She would like to focus on that before starting up exercise.    Intervention(s): goal tracking       SDOH

## 2020-07-09 ENCOUNTER — PATIENT MESSAGE (OUTPATIENT)
Dept: GASTROENTEROLOGY | Facility: CLINIC | Age: 65
End: 2020-07-09

## 2020-07-30 ENCOUNTER — LAB VISIT (OUTPATIENT)
Dept: LAB | Facility: HOSPITAL | Age: 65
End: 2020-07-30
Attending: INTERNAL MEDICINE
Payer: MEDICARE

## 2020-07-30 ENCOUNTER — OFFICE VISIT (OUTPATIENT)
Dept: GASTROENTEROLOGY | Facility: CLINIC | Age: 65
End: 2020-07-30
Payer: MEDICARE

## 2020-07-30 VITALS
HEIGHT: 64 IN | DIASTOLIC BLOOD PRESSURE: 72 MMHG | SYSTOLIC BLOOD PRESSURE: 109 MMHG | BODY MASS INDEX: 27.89 KG/M2 | WEIGHT: 163.38 LBS | HEART RATE: 83 BPM

## 2020-07-30 DIAGNOSIS — R11.2 NAUSEA AND VOMITING, INTRACTABILITY OF VOMITING NOT SPECIFIED, UNSPECIFIED VOMITING TYPE: ICD-10-CM

## 2020-07-30 DIAGNOSIS — R11.2 NAUSEA AND VOMITING, INTRACTABILITY OF VOMITING NOT SPECIFIED, UNSPECIFIED VOMITING TYPE: Primary | ICD-10-CM

## 2020-07-30 LAB
ALBUMIN SERPL BCP-MCNC: 4.1 G/DL (ref 3.5–5.2)
ALP SERPL-CCNC: 76 U/L (ref 55–135)
ALT SERPL W/O P-5'-P-CCNC: 24 U/L (ref 10–44)
ANION GAP SERPL CALC-SCNC: 7 MMOL/L (ref 8–16)
AST SERPL-CCNC: 28 U/L (ref 10–40)
BASOPHILS # BLD AUTO: 0.08 K/UL (ref 0–0.2)
BASOPHILS NFR BLD: 1 % (ref 0–1.9)
BILIRUB SERPL-MCNC: 0.4 MG/DL (ref 0.1–1)
BUN SERPL-MCNC: 10 MG/DL (ref 8–23)
CALCIUM SERPL-MCNC: 9.7 MG/DL (ref 8.7–10.5)
CHLORIDE SERPL-SCNC: 104 MMOL/L (ref 95–110)
CO2 SERPL-SCNC: 27 MMOL/L (ref 23–29)
CREAT SERPL-MCNC: 0.8 MG/DL (ref 0.5–1.4)
DIFFERENTIAL METHOD: NORMAL
EOSINOPHIL # BLD AUTO: 0 K/UL (ref 0–0.5)
EOSINOPHIL NFR BLD: 0 % (ref 0–8)
ERYTHROCYTE [DISTWIDTH] IN BLOOD BY AUTOMATED COUNT: 12.7 % (ref 11.5–14.5)
EST. GFR  (AFRICAN AMERICAN): >60 ML/MIN/1.73 M^2
EST. GFR  (NON AFRICAN AMERICAN): >60 ML/MIN/1.73 M^2
GLUCOSE SERPL-MCNC: 100 MG/DL (ref 70–110)
HCT VFR BLD AUTO: 43.4 % (ref 37–48.5)
HGB BLD-MCNC: 14 G/DL (ref 12–16)
IMM GRANULOCYTES # BLD AUTO: 0.01 K/UL (ref 0–0.04)
IMM GRANULOCYTES NFR BLD AUTO: 0.1 % (ref 0–0.5)
LIPASE SERPL-CCNC: 21 U/L (ref 4–60)
LYMPHOCYTES # BLD AUTO: 2.7 K/UL (ref 1–4.8)
LYMPHOCYTES NFR BLD: 31.8 % (ref 18–48)
MCH RBC QN AUTO: 29.7 PG (ref 27–31)
MCHC RBC AUTO-ENTMCNC: 32.3 G/DL (ref 32–36)
MCV RBC AUTO: 92 FL (ref 82–98)
MONOCYTES # BLD AUTO: 0.5 K/UL (ref 0.3–1)
MONOCYTES NFR BLD: 5.5 % (ref 4–15)
NEUTROPHILS # BLD AUTO: 5.2 K/UL (ref 1.8–7.7)
NEUTROPHILS NFR BLD: 61.6 % (ref 38–73)
NRBC BLD-RTO: 0 /100 WBC
PLATELET # BLD AUTO: 272 K/UL (ref 150–350)
PMV BLD AUTO: 10.3 FL (ref 9.2–12.9)
POTASSIUM SERPL-SCNC: 4.5 MMOL/L (ref 3.5–5.1)
PROT SERPL-MCNC: 7 G/DL (ref 6–8.4)
RBC # BLD AUTO: 4.71 M/UL (ref 4–5.4)
SODIUM SERPL-SCNC: 138 MMOL/L (ref 136–145)
WBC # BLD AUTO: 8.39 K/UL (ref 3.9–12.7)

## 2020-07-30 PROCEDURE — 1126F AMNT PAIN NOTED NONE PRSNT: CPT | Mod: HCNC,S$GLB,, | Performed by: INTERNAL MEDICINE

## 2020-07-30 PROCEDURE — 1159F MED LIST DOCD IN RCRD: CPT | Mod: HCNC,S$GLB,, | Performed by: INTERNAL MEDICINE

## 2020-07-30 PROCEDURE — 3008F PR BODY MASS INDEX (BMI) DOCUMENTED: ICD-10-PCS | Mod: HCNC,CPTII,S$GLB, | Performed by: INTERNAL MEDICINE

## 2020-07-30 PROCEDURE — 80053 COMPREHEN METABOLIC PANEL: CPT | Mod: HCNC

## 2020-07-30 PROCEDURE — 99999 PR PBB SHADOW E&M-EST. PATIENT-LVL V: CPT | Mod: PBBFAC,HCNC,, | Performed by: INTERNAL MEDICINE

## 2020-07-30 PROCEDURE — 99999 PR PBB SHADOW E&M-EST. PATIENT-LVL V: ICD-10-PCS | Mod: PBBFAC,HCNC,, | Performed by: INTERNAL MEDICINE

## 2020-07-30 PROCEDURE — 1126F PR PAIN SEVERITY QUANTIFIED, NO PAIN PRESENT: ICD-10-PCS | Mod: HCNC,S$GLB,, | Performed by: INTERNAL MEDICINE

## 2020-07-30 PROCEDURE — 3078F PR MOST RECENT DIASTOLIC BLOOD PRESSURE < 80 MM HG: ICD-10-PCS | Mod: HCNC,CPTII,S$GLB, | Performed by: INTERNAL MEDICINE

## 2020-07-30 PROCEDURE — 3074F PR MOST RECENT SYSTOLIC BLOOD PRESSURE < 130 MM HG: ICD-10-PCS | Mod: HCNC,CPTII,S$GLB, | Performed by: INTERNAL MEDICINE

## 2020-07-30 PROCEDURE — 99214 OFFICE O/P EST MOD 30 MIN: CPT | Mod: HCNC,S$GLB,, | Performed by: INTERNAL MEDICINE

## 2020-07-30 PROCEDURE — 3008F BODY MASS INDEX DOCD: CPT | Mod: HCNC,CPTII,S$GLB, | Performed by: INTERNAL MEDICINE

## 2020-07-30 PROCEDURE — 3074F SYST BP LT 130 MM HG: CPT | Mod: HCNC,CPTII,S$GLB, | Performed by: INTERNAL MEDICINE

## 2020-07-30 PROCEDURE — 1159F PR MEDICATION LIST DOCUMENTED IN MEDICAL RECORD: ICD-10-PCS | Mod: HCNC,S$GLB,, | Performed by: INTERNAL MEDICINE

## 2020-07-30 PROCEDURE — 83690 ASSAY OF LIPASE: CPT | Mod: HCNC

## 2020-07-30 PROCEDURE — 1101F PR PT FALLS ASSESS DOC 0-1 FALLS W/OUT INJ PAST YR: ICD-10-PCS | Mod: HCNC,CPTII,S$GLB, | Performed by: INTERNAL MEDICINE

## 2020-07-30 PROCEDURE — 85025 COMPLETE CBC W/AUTO DIFF WBC: CPT | Mod: HCNC

## 2020-07-30 PROCEDURE — 36415 COLL VENOUS BLD VENIPUNCTURE: CPT | Mod: HCNC

## 2020-07-30 PROCEDURE — 3078F DIAST BP <80 MM HG: CPT | Mod: HCNC,CPTII,S$GLB, | Performed by: INTERNAL MEDICINE

## 2020-07-30 PROCEDURE — 99214 PR OFFICE/OUTPT VISIT, EST, LEVL IV, 30-39 MIN: ICD-10-PCS | Mod: HCNC,S$GLB,, | Performed by: INTERNAL MEDICINE

## 2020-07-30 PROCEDURE — 1101F PT FALLS ASSESS-DOCD LE1/YR: CPT | Mod: HCNC,CPTII,S$GLB, | Performed by: INTERNAL MEDICINE

## 2020-07-30 NOTE — PROGRESS NOTES
Subjective:       Patient ID: Tiarra Norton is a 65 y.o. female.    Chief Complaint: GI Problem (nausea/indigestion)    HPI  Review of Systems   Constitutional: Positive for activity change, appetite change and fatigue. Negative for chills, diaphoresis, fever and unexpected weight change.   HENT: Negative for nasal congestion, ear pain, mouth sores, nosebleeds, postnasal drip, rhinorrhea, sinus pressure/congestion, sore throat, trouble swallowing and voice change.    Eyes: Negative for pain.   Respiratory: Negative for cough, shortness of breath and wheezing.    Cardiovascular: Negative for chest pain, palpitations and leg swelling.   Genitourinary: Negative for difficulty urinating, dysuria, flank pain, hematuria and menstrual problem.   Musculoskeletal: Negative for arthralgias, back pain, gait problem, joint swelling, myalgias and neck pain.   Integumentary:  Negative for rash.   Neurological: Positive for headaches. Negative for dizziness, tremors, syncope and numbness.   Hematological: Negative for adenopathy. Does not bruise/bleed easily.   Psychiatric/Behavioral: Negative for agitation, behavioral problems, confusion, decreased concentration and dysphoric mood. The patient is not nervous/anxious.          Objective:      Physical Exam  Constitutional:       General: She is not in acute distress.     Appearance: She is well-developed.   HENT:      Head: Normocephalic and atraumatic.      Right Ear: External ear normal.      Left Ear: External ear normal.      Nose: Nose normal.      Mouth/Throat:      Pharynx: No oropharyngeal exudate.   Eyes:      General: No scleral icterus.     Conjunctiva/sclera: Conjunctivae normal.      Pupils: Pupils are equal, round, and reactive to light.   Neck:      Musculoskeletal: Normal range of motion and neck supple.      Thyroid: No thyromegaly.   Cardiovascular:      Rate and Rhythm: Normal rate and regular rhythm.      Heart sounds: Normal heart sounds. No murmur. No  gallop.    Pulmonary:      Effort: Pulmonary effort is normal.      Breath sounds: Normal breath sounds. No wheezing or rales.   Abdominal:      General: Abdomen is flat. Bowel sounds are normal. There is no distension.      Palpations: Abdomen is soft.      Tenderness: There is abdominal tenderness in the epigastric area.   Musculoskeletal: Normal range of motion.         General: No tenderness.   Lymphadenopathy:      Cervical: No cervical adenopathy.   Skin:     General: Skin is warm and dry.      Findings: No rash.   Neurological:      Mental Status: She is alert and oriented to person, place, and time.      Cranial Nerves: No cranial nerve deficit.   Psychiatric:         Behavior: Behavior normal.         Thought Content: Thought content normal.         Judgment: Judgment normal.         Assessment:       1. Nausea and vomiting, intractability of vomiting not specified, unspecified vomiting type        Plan:         this is a follow-up visit.  I saw her in January for IBS diarrhea predominant.  At the time she was having epigastric pain.  She has a long history of IBS and has been tried on a number of different preparations and medications.  I really EGD which was essentially normal in February abdominal CT was normal in February.    In April I had a video visit with her and I got the impression things were better and the IBS was under control.    She comes in today with a different set of symptoms.  Her symptoms now are that of nausea and indigestion.  These been present for several months they are not present every day but seem to be on most days now they can be present 1st thing in the morning.  She can wake up in the morning with these symptoms.  She will have some epigastric discomfort but not severe pain.  She feels the urge to Demarco does belch.  She possibly might have some pyrosis that time.  She describes early satiety when she tries to eat.  She tried taking Pepcid that did help very much.  The  diarrhea has resolved.  She can identify foods that make it worse.  From last year to now she lost 25 lb although she has been stable for the past few months.  There is no vomiting.    Her treatment so far has included Phenergan which does help because of sedation and Zofran which did not seem to help.    Assessment  1.  Nausea.  She feels like her depression is under control right now.  She is seeing someone and being treated for that.  Normally I would wonder whether depression is a contributing factor to this.  I do want to check labs today.  I will order a gastric emptying scan.  I do not see the need to repeat the EGD we just did 5 months ago.  From an over-the-counter standpoint she can try FD guard or armand.  And I wonder if we are to consider a CNS source for symptoms, and possibly do a head CT.  In chronic functional nausea, sometimes acupuncture can be of benefit too.    30 min appointment greater half time face-to-face counseling

## 2020-08-07 ENCOUNTER — HOSPITAL ENCOUNTER (OUTPATIENT)
Dept: RADIOLOGY | Facility: HOSPITAL | Age: 65
Discharge: HOME | End: 2020-08-07
Attending: INTERNAL MEDICINE
Payer: MEDICARE

## 2020-08-07 DIAGNOSIS — R11.2 NAUSEA AND VOMITING, INTRACTABILITY OF VOMITING NOT SPECIFIED, UNSPECIFIED VOMITING TYPE: ICD-10-CM

## 2020-08-07 PROCEDURE — 78264 GASTRIC EMPTYING IMG STUDY: CPT | Mod: TC,HCNC

## 2020-08-07 PROCEDURE — 78264 NM GASTRIC EMPTYING: ICD-10-PCS | Mod: 26,HCNC,, | Performed by: RADIOLOGY

## 2020-08-07 PROCEDURE — A9541 TC99M SULFUR COLLOID: HCPCS | Mod: HCNC

## 2020-08-07 PROCEDURE — 78264 GASTRIC EMPTYING IMG STUDY: CPT | Mod: 26,HCNC,, | Performed by: RADIOLOGY

## 2020-08-09 NOTE — TELEPHONE ENCOUNTER
Care Due:                  Date            Visit Type   Department     Provider  --------------------------------------------------------------------------------                                MYCHART                              ANNUAL                              CHECKUP/PHY  [facility] INTERNAL  Last Visit: 02-      S            TERESA CANELA  Next Visit: None Scheduled  None         None Found                                                            Last  Test          Frequency    Reason                     Performed    Due Date  --------------------------------------------------------------------------------    Vitamin D...  12 months..  cholecalciferol,.........  Not Found    Overdue    Powered by [employer]arest[employer]. Reference number: [id number]. 8/09/2020 2:41:34 AM CDT

## 2020-08-11 RX ORDER — LEVOTHYROXINE SODIUM 137 UG/1
TABLET ORAL
Qty: 90 TABLET | Refills: 1 | Status: SHIPPED | OUTPATIENT
Start: 2020-08-11 | End: 2021-01-21

## 2020-08-11 NOTE — PROGRESS NOTES
Refill Routing Note   Medication(s) are not appropriate for processing by [facility]:       - Drug-Drug Interaction (SYNTHROID AND CYTOMEL)     Medication-related problems identified: Drug-drug interaction  Medication Therapy Plan: TSH-WNL; DDI(SYNTHROID AND CYTOMEL); CDMR, DEFER TO YOU  Medication reconciliation completed: No      Automatic Epic Generated Protocol Data:        Requested Prescriptions   Pending Prescriptions Disp Refills    levothyroxine (SYNTHROID) 137 MCG Tab tablet [Pharmacy Med Name: LEVOTHYROXINE SODIUM 137 MCG Tablet] 90 tablet 1     Sig: TAKE 1 TABLET EVERY MORNING       Endocrinology:  Hypothyroid Agents Failed - 8/11/2020  9:52 AM        Failed - Manual Review: FT4 is not required if last TSH is WNL.        Passed - Patient is at least 18 years old        Passed - Office visit in past 12 months or future 90 days.     Recent Outpatient Visits            1 week ago Nausea and vomiting, intractability of vomiting not specified, unspecified vomiting type    [facility] Tolu Rivas MD    4 months ago Irritable bowel syndrome with diarrhea    [facility] Tolu Rivas MD    5 months ago Paronychia of toe, unspecified laterality    [place] - [facility] Darrick Cox DPM    6 months ago Annual physical exam    [facility] Kaykay Perales MD    7 months ago Irritable bowel syndrome with diarrhea    [facility] Tolu Rivas MD                    Passed - TSH in normal range and within 360 days     TSH   Date Value Ref Range Status   01/27/2020 0.528 0.400 - 4.000 uIU/mL Final   01/14/2019 3.299 0.400 - 4.000 uIU/mL Final   10/22/2018 3.354 0.400 - 4.000 uIU/mL Final              Passed - T4 free within 1080 days     Free T4   Date Value Ref Range Status   10/22/2018 0.78 0.71 - 1.51 ng/dL Final   07/13/2018 0.97 0.71 - 1.51 ng/dL Final   06/04/2018 0.95 0.71 - 1.51 ng/dL Final                    Appointments  past  12m or future 3m with PCP    Date Provider   Last Visit   2/3/2020 Kaykay Perales MD   Next Visit   Visit date not found Kaykay Perales MD   ED visits in past 90 days: 0     Note composed:9:56 AM 08/11/2020

## 2020-08-24 ENCOUNTER — PATIENT OUTREACH (OUTPATIENT)
Dept: OTHER | Facility: OTHER | Age: 65
End: 2020-08-24

## 2020-08-30 ENCOUNTER — PATIENT MESSAGE (OUTPATIENT)
Dept: INTERNAL MEDICINE | Facility: CLINIC | Age: 65
End: 2020-08-30

## 2020-09-03 ENCOUNTER — TELEPHONE (OUTPATIENT)
Dept: GASTROENTEROLOGY | Facility: CLINIC | Age: 65
End: 2020-09-03

## 2020-09-03 ENCOUNTER — PATIENT MESSAGE (OUTPATIENT)
Dept: GASTROENTEROLOGY | Facility: CLINIC | Age: 65
End: 2020-09-03

## 2020-09-03 NOTE — TELEPHONE ENCOUNTER
Spoke with patient.  She is aware  does not have any appointment time available during the times she requested.  Aware  has received her My Chart message she sent earlier.  Will wait for him to respond.  Mara

## 2020-09-03 NOTE — TELEPHONE ENCOUNTER
Spoke with patient.  Scheduled virtual visit to see Dr.James Rivas on 9/29 for 4:30.  Confirmation mailed.  Mara

## 2020-09-11 NOTE — PROGRESS NOTES
Digital Medicine: Health  Follow-Up    The history is provided by the patient.             Reason for review: Blood pressure at goal        Topics Covered on Call: physical activity    Additional Follow-up details: She states her digestive problems have flared up recently but she saw her doctor and thinks she is on the right track to recover.     She is no longer struggling with fatigue.     I encouraged readings at least once per week.         Diet-Not assessed          Physical Activity-Change      She added biking, gardening to Her physical activity routine.        Additional physical activity details: She rode bike once this week, volunteered at animal shelter one day, and gardened once this week. She would like to ride her bike more regularly. She set a goal for 30 minutes 3x/week.      Medication Adherence-Medication Adherence not addressed.      Substance, Sleep, Stress-Not assessed      Additional monitoring needed.  Continue current diet/physical activity routine. Increase activity by biking 3x/week.       Addressed any questions or concerns and patient has my contact information if needed prior to next outreach. Patient verbalizes understanding.      Explained the importance of self-monitoring and medication adherence. Encouraged the patient to communicate with their health  for lifestyle modifications to help improve or maintain a healthy lifestyle.            There are no preventive care reminders to display for this patient.    Last 5 Patient Entered Readings                                      Current 30 Day Average: 121/73     Recent Readings 9/8/2020 8/25/2020 8/18/2020 8/11/2020 8/9/2020    SBP (mmHg) 110 133 121 121 125    DBP (mmHg) 66 80 72 72 78    Pulse 71 69 70 73 72

## 2020-09-12 ENCOUNTER — PATIENT MESSAGE (OUTPATIENT)
Dept: INTERNAL MEDICINE | Facility: CLINIC | Age: 65
End: 2020-09-12

## 2020-09-12 DIAGNOSIS — R30.0 DYSURIA: Primary | ICD-10-CM

## 2020-09-14 ENCOUNTER — PATIENT MESSAGE (OUTPATIENT)
Dept: INTERNAL MEDICINE | Facility: CLINIC | Age: 65
End: 2020-09-14

## 2020-09-15 ENCOUNTER — OFFICE VISIT (OUTPATIENT)
Dept: INTERNAL MEDICINE | Facility: CLINIC | Age: 65
End: 2020-09-15
Payer: MEDICARE

## 2020-09-15 ENCOUNTER — PATIENT OUTREACH (OUTPATIENT)
Dept: OTHER | Facility: OTHER | Age: 65
End: 2020-09-15

## 2020-09-15 ENCOUNTER — PATIENT MESSAGE (OUTPATIENT)
Dept: INTERNAL MEDICINE | Facility: CLINIC | Age: 65
End: 2020-09-15

## 2020-09-15 VITALS
HEIGHT: 63 IN | HEART RATE: 74 BPM | WEIGHT: 160.25 LBS | SYSTOLIC BLOOD PRESSURE: 114 MMHG | DIASTOLIC BLOOD PRESSURE: 72 MMHG | OXYGEN SATURATION: 98 % | BODY MASS INDEX: 28.39 KG/M2

## 2020-09-15 DIAGNOSIS — G47.33 OSA (OBSTRUCTIVE SLEEP APNEA): ICD-10-CM

## 2020-09-15 DIAGNOSIS — K58.9 IRRITABLE BOWEL SYNDROME, UNSPECIFIED TYPE: ICD-10-CM

## 2020-09-15 DIAGNOSIS — R30.0 DYSURIA: Primary | ICD-10-CM

## 2020-09-15 LAB
BILIRUB SERPL-MCNC: ABNORMAL MG/DL
BLOOD URINE, POC: ABNORMAL
CLARITY, POC UA: ABNORMAL
COLOR, POC UA: YELLOW
GLUCOSE UR QL STRIP: NORMAL
KETONES UR QL STRIP: NEGATIVE
LEUKOCYTE ESTERASE URINE, POC: ABNORMAL
NITRITE, POC UA: NEGATIVE
PH, POC UA: 5
PROTEIN, POC: ABNORMAL
SPECIFIC GRAVITY, POC UA: 1.01
UROBILINOGEN, POC UA: NORMAL

## 2020-09-15 PROCEDURE — 3008F BODY MASS INDEX DOCD: CPT | Mod: HCNC,CPTII,S$GLB, | Performed by: INTERNAL MEDICINE

## 2020-09-15 PROCEDURE — 3074F SYST BP LT 130 MM HG: CPT | Mod: HCNC,CPTII,S$GLB, | Performed by: INTERNAL MEDICINE

## 2020-09-15 PROCEDURE — 87088 URINE BACTERIA CULTURE: CPT | Mod: HCNC

## 2020-09-15 PROCEDURE — 87077 CULTURE AEROBIC IDENTIFY: CPT | Mod: HCNC

## 2020-09-15 PROCEDURE — 99999 PR PBB SHADOW E&M-EST. PATIENT-LVL V: ICD-10-PCS | Mod: PBBFAC,HCNC,, | Performed by: INTERNAL MEDICINE

## 2020-09-15 PROCEDURE — 3078F DIAST BP <80 MM HG: CPT | Mod: HCNC,CPTII,S$GLB, | Performed by: INTERNAL MEDICINE

## 2020-09-15 PROCEDURE — 3078F PR MOST RECENT DIASTOLIC BLOOD PRESSURE < 80 MM HG: ICD-10-PCS | Mod: HCNC,CPTII,S$GLB, | Performed by: INTERNAL MEDICINE

## 2020-09-15 PROCEDURE — 87086 URINE CULTURE/COLONY COUNT: CPT | Mod: HCNC

## 2020-09-15 PROCEDURE — 99213 OFFICE O/P EST LOW 20 MIN: CPT | Mod: 25,HCNC,S$GLB, | Performed by: INTERNAL MEDICINE

## 2020-09-15 PROCEDURE — 1101F PT FALLS ASSESS-DOCD LE1/YR: CPT | Mod: HCNC,CPTII,S$GLB, | Performed by: INTERNAL MEDICINE

## 2020-09-15 PROCEDURE — 99999 PR PBB SHADOW E&M-EST. PATIENT-LVL V: CPT | Mod: PBBFAC,HCNC,, | Performed by: INTERNAL MEDICINE

## 2020-09-15 PROCEDURE — 1159F MED LIST DOCD IN RCRD: CPT | Mod: HCNC,S$GLB,, | Performed by: INTERNAL MEDICINE

## 2020-09-15 PROCEDURE — 1101F PR PT FALLS ASSESS DOC 0-1 FALLS W/OUT INJ PAST YR: ICD-10-PCS | Mod: HCNC,CPTII,S$GLB, | Performed by: INTERNAL MEDICINE

## 2020-09-15 PROCEDURE — 1126F AMNT PAIN NOTED NONE PRSNT: CPT | Mod: HCNC,S$GLB,, | Performed by: INTERNAL MEDICINE

## 2020-09-15 PROCEDURE — 3074F PR MOST RECENT SYSTOLIC BLOOD PRESSURE < 130 MM HG: ICD-10-PCS | Mod: HCNC,CPTII,S$GLB, | Performed by: INTERNAL MEDICINE

## 2020-09-15 PROCEDURE — 1126F PR PAIN SEVERITY QUANTIFIED, NO PAIN PRESENT: ICD-10-PCS | Mod: HCNC,S$GLB,, | Performed by: INTERNAL MEDICINE

## 2020-09-15 PROCEDURE — 3008F PR BODY MASS INDEX (BMI) DOCUMENTED: ICD-10-PCS | Mod: HCNC,CPTII,S$GLB, | Performed by: INTERNAL MEDICINE

## 2020-09-15 PROCEDURE — 1159F PR MEDICATION LIST DOCUMENTED IN MEDICAL RECORD: ICD-10-PCS | Mod: HCNC,S$GLB,, | Performed by: INTERNAL MEDICINE

## 2020-09-15 PROCEDURE — 81002 POCT URINE DIPSTICK WITHOUT MICROSCOPE: ICD-10-PCS | Mod: HCNC,S$GLB,, | Performed by: INTERNAL MEDICINE

## 2020-09-15 PROCEDURE — 81002 URINALYSIS NONAUTO W/O SCOPE: CPT | Mod: HCNC,S$GLB,, | Performed by: INTERNAL MEDICINE

## 2020-09-15 PROCEDURE — 99213 PR OFFICE/OUTPT VISIT, EST, LEVL III, 20-29 MIN: ICD-10-PCS | Mod: 25,HCNC,S$GLB, | Performed by: INTERNAL MEDICINE

## 2020-09-15 PROCEDURE — 87186 SC STD MICRODIL/AGAR DIL: CPT | Mod: HCNC

## 2020-09-15 NOTE — PROGRESS NOTES
Digital Medicine: Clinician Follow-Up    Called Tiarra Norton for hypertension follow-up: BP avg 121/73.    The history is provided by the patient.      Review of patient's allergies indicates:  No Known Allergies  Follow-up reason(s): routine follow up.     Hypertension    Readings are trending down   Additional Follow-up details: Patient reports doing fine. She has no questions/concerns at this time.        Last 5 Patient Entered Readings                                      Current 30 Day Average: 121/73     Recent Readings 9/8/2020 8/25/2020 8/18/2020 8/11/2020 8/9/2020    SBP (mmHg) 110 133 121 121 125    DBP (mmHg) 66 80 72 72 78    Pulse 71 69 70 73 72               Screenings    ASSESSMENT(S)  Patients BP average is 121/73 mmHg, which is at goal. Patient's BP goal is less than or equal to 130/80 per 2017 ACC/AHA Hypertension Guidelines.    Home BP readings meeting goal 67% of the time. No medication changes recommended at this time.       Hypertension Plan  Continue current therapy.  Continue current diet/physical activity routine.       Addressed patient questions and patient has my contact information if needed prior to next outreach. Patient verbalizes understanding.            There are no preventive care reminders to display for this patient.  There are no preventive care reminders to display for this patient.    Hypertension Medications             amLODIPine (NORVASC) 5 MG tablet Take 1 tablet (5 mg total) by mouth once daily.    valsartan (DIOVAN) 320 MG tablet Take 1 tablet (320 mg total) by mouth once daily.

## 2020-09-15 NOTE — PROGRESS NOTES
Subjective:       Patient ID: Tiarra Norton is a 65 y.o. female.    Chief Complaint: Urinary Frequency    HPI   C/o dysuria offa dn on for 2 months.  Urgency but also hesitancy.  Awakens 4 am to urinate.  No incontinence.  No hematuria.    Frustrated by ibs.  Alternates between diarrhea and constipation.  Would like to see nutritionist.    She would like  nasal prong mask for cpap machine, ask mask over nose is not comfortable.    Review of Systems   Constitutional: Negative for fever and unexpected weight change.   HENT: Negative for nasal congestion and postnasal drip.    Eyes: Negative for pain, discharge and visual disturbance.   Respiratory: Negative for cough, chest tightness, shortness of breath and wheezing.    Cardiovascular: Negative for chest pain and leg swelling.   Gastrointestinal: Positive for diarrhea. Negative for abdominal pain, constipation and nausea.   Genitourinary: Negative for difficulty urinating, dysuria and hematuria.   Integumentary:  Negative for rash.   Neurological: Negative for headaches.   Psychiatric/Behavioral: Negative for dysphoric mood and sleep disturbance. The patient is not nervous/anxious.          Objective:      Physical Exam  Constitutional:       Appearance: Normal appearance. She is obese.   Eyes:      General: No scleral icterus.  Neurological:      General: No focal deficit present.      Mental Status: She is alert and oriented to person, place, and time.   Psychiatric:         Mood and Affect: Mood normal.         Behavior: Behavior normal.       UA - 2+ white cells and BR.    CMP 7/30- normal bilirubin, alk phos.  Assessment:       1. Dysuria    2. Irritable bowel syndrome, unspecified type    3. HERMINIA (obstructive sleep apnea)        Plan:       Tiarra was seen today for urinary frequency.    Diagnoses and all orders for this visit:    Dysuria  -     POCT URINE DIPSTICK WITHOUT MICROSCOPE  -     Urine culture; Future    Irritable bowel syndrome, unspecified  type  -     Ambulatory referral/consult to Nutrition - [facility]; Future    HERMINIA (obstructive sleep apnea)  -     CPAP/BIPAP SUPPLIES

## 2020-09-16 ENCOUNTER — PATIENT MESSAGE (OUTPATIENT)
Dept: INTERNAL MEDICINE | Facility: CLINIC | Age: 65
End: 2020-09-16

## 2020-09-16 RX ORDER — HYDROCODONE BITARTRATE AND ACETAMINOPHEN 5; 325 MG/1; MG/1
TABLET ORAL
Qty: 30 TABLET | Refills: 0 | Status: SHIPPED | OUTPATIENT
Start: 2020-09-16 | End: 2021-01-05 | Stop reason: SDUPTHER

## 2020-09-16 RX ORDER — NITROFURANTOIN 25; 75 MG/1; MG/1
100 CAPSULE ORAL 2 TIMES DAILY
Qty: 14 CAPSULE | Refills: 0 | Status: SHIPPED | OUTPATIENT
Start: 2020-09-16 | End: 2020-09-23

## 2020-09-17 ENCOUNTER — PATIENT MESSAGE (OUTPATIENT)
Dept: INTERNAL MEDICINE | Facility: CLINIC | Age: 65
End: 2020-09-17

## 2020-09-17 ENCOUNTER — TELEPHONE (OUTPATIENT)
Dept: INTERNAL MEDICINE | Facility: CLINIC | Age: 65
End: 2020-09-17

## 2020-09-17 DIAGNOSIS — G47.33 OSA (OBSTRUCTIVE SLEEP APNEA): Primary | ICD-10-CM

## 2020-09-17 LAB — BACTERIA UR CULT: ABNORMAL

## 2020-09-17 NOTE — TELEPHONE ENCOUNTER
----- Message from Ramona Chang sent at 9/17/2020  2:26 PM CDT -----  Regarding: Ramona with [facility]  Ms. Norton called regarding her cpap supplies, but the order that was placed was for the mask only, please answer yes to all the pap supplies, as this order is good for a year.  Thank you

## 2020-09-29 ENCOUNTER — OFFICE VISIT (OUTPATIENT)
Dept: PODIATRY | Facility: CLINIC | Age: 65
End: 2020-09-29
Payer: MEDICARE

## 2020-09-29 VITALS
HEART RATE: 72 BPM | HEIGHT: 63 IN | WEIGHT: 160 LBS | BODY MASS INDEX: 28.35 KG/M2 | DIASTOLIC BLOOD PRESSURE: 68 MMHG | SYSTOLIC BLOOD PRESSURE: 139 MMHG

## 2020-09-29 DIAGNOSIS — M79.674 TOE PAIN, BILATERAL: ICD-10-CM

## 2020-09-29 DIAGNOSIS — L60.0 INGROWN NAIL: Primary | ICD-10-CM

## 2020-09-29 DIAGNOSIS — M79.675 TOE PAIN, BILATERAL: ICD-10-CM

## 2020-09-29 DIAGNOSIS — L03.039 PARONYCHIA OF TOE, UNSPECIFIED LATERALITY: ICD-10-CM

## 2020-09-29 PROCEDURE — 3078F PR MOST RECENT DIASTOLIC BLOOD PRESSURE < 80 MM HG: ICD-10-PCS | Mod: HCNC,CPTII,S$GLB, | Performed by: PODIATRIST

## 2020-09-29 PROCEDURE — 99999 PR PBB SHADOW E&M-EST. PATIENT-LVL IV: CPT | Mod: PBBFAC,HCNC,, | Performed by: PODIATRIST

## 2020-09-29 PROCEDURE — 1101F PT FALLS ASSESS-DOCD LE1/YR: CPT | Mod: HCNC,CPTII,S$GLB, | Performed by: PODIATRIST

## 2020-09-29 PROCEDURE — 3075F PR MOST RECENT SYSTOLIC BLOOD PRESS GE 130-139MM HG: ICD-10-PCS | Mod: HCNC,CPTII,S$GLB, | Performed by: PODIATRIST

## 2020-09-29 PROCEDURE — 99213 PR OFFICE/OUTPT VISIT, EST, LEVL III, 20-29 MIN: ICD-10-PCS | Mod: HCNC,S$GLB,, | Performed by: PODIATRIST

## 2020-09-29 PROCEDURE — 1125F PR PAIN SEVERITY QUANTIFIED, PAIN PRESENT: ICD-10-PCS | Mod: HCNC,S$GLB,, | Performed by: PODIATRIST

## 2020-09-29 PROCEDURE — 1159F PR MEDICATION LIST DOCUMENTED IN MEDICAL RECORD: ICD-10-PCS | Mod: HCNC,S$GLB,, | Performed by: PODIATRIST

## 2020-09-29 PROCEDURE — 99213 OFFICE O/P EST LOW 20 MIN: CPT | Mod: HCNC,S$GLB,, | Performed by: PODIATRIST

## 2020-09-29 PROCEDURE — 1101F PR PT FALLS ASSESS DOC 0-1 FALLS W/OUT INJ PAST YR: ICD-10-PCS | Mod: HCNC,CPTII,S$GLB, | Performed by: PODIATRIST

## 2020-09-29 PROCEDURE — 99999 PR PBB SHADOW E&M-EST. PATIENT-LVL IV: ICD-10-PCS | Mod: PBBFAC,HCNC,, | Performed by: PODIATRIST

## 2020-09-29 PROCEDURE — 1159F MED LIST DOCD IN RCRD: CPT | Mod: HCNC,S$GLB,, | Performed by: PODIATRIST

## 2020-09-29 PROCEDURE — 3075F SYST BP GE 130 - 139MM HG: CPT | Mod: HCNC,CPTII,S$GLB, | Performed by: PODIATRIST

## 2020-09-29 PROCEDURE — 3078F DIAST BP <80 MM HG: CPT | Mod: HCNC,CPTII,S$GLB, | Performed by: PODIATRIST

## 2020-09-29 PROCEDURE — 3008F PR BODY MASS INDEX (BMI) DOCUMENTED: ICD-10-PCS | Mod: HCNC,CPTII,S$GLB, | Performed by: PODIATRIST

## 2020-09-29 PROCEDURE — 3008F BODY MASS INDEX DOCD: CPT | Mod: HCNC,CPTII,S$GLB, | Performed by: PODIATRIST

## 2020-09-29 PROCEDURE — 1125F AMNT PAIN NOTED PAIN PRSNT: CPT | Mod: HCNC,S$GLB,, | Performed by: PODIATRIST

## 2020-09-29 RX ORDER — TOBRAMYCIN 3 MG/ML
SOLUTION/ DROPS OPHTHALMIC
Qty: 5 ML | Refills: 3 | Status: SHIPPED | OUTPATIENT
Start: 2020-09-29 | End: 2021-01-27

## 2020-09-29 NOTE — PROGRESS NOTES
Subjective:      Patient ID: Tiarra Norton is a 65 y.o. female.    Chief Complaint: Ingrown Toenail (Great toenail bilateral)    Short deep throbbing pain at both sides of both great toenails.  Gradual onset worsening over the past few days.  Neither improved nor worsened over the past few  days.by increased soft tissues pressure and ambulation.  No prior medical treatment. No self-treatment.  Denies trauma,and surgery both feet..    Review of Systems   Constitution: Negative for chills, diaphoresis, fever, malaise/fatigue and night sweats.   Cardiovascular: Negative for claudication, cyanosis, leg swelling and syncope.   Skin: Positive for nail changes. Negative for color change, dry skin, rash, suspicious lesions and unusual hair distribution.   Musculoskeletal: Negative for falls, joint pain, joint swelling, muscle cramps, muscle weakness and stiffness.   Gastrointestinal: Negative for constipation, diarrhea, nausea and vomiting.   Neurological: Negative for brief paralysis, disturbances in coordination, focal weakness, numbness, paresthesias, sensory change and tremors.           Objective:      Physical Exam  Constitutional:       General: She is not in acute distress.     Appearance: She is well-developed. She is not diaphoretic.   Cardiovascular:      Pulses:           Popliteal pulses are 2+ on the right side and 2+ on the left side.        Dorsalis pedis pulses are 2+ on the right side and 2+ on the left side.        Posterior tibial pulses are 2+ on the right side and 2+ on the left side.      Comments: Capillary refill 3 seconds all toes/distal feet, all toes/both feet warm to touch.      Negative lymphadenopathy bilateral popliteal fossa and tarsal tunnel.      Negavie lower extremity edema bilateral.    Musculoskeletal:      Right ankle: She exhibits normal range of motion, no swelling, no ecchymosis, no deformity, no laceration and normal pulse. Achilles tendon normal. Achilles tendon exhibits no  pain, no defect and normal Vega's test results.      Comments: Normal angle, base, station of gait. All ten toes without clubbing, cyanosis, or signs of ischemia.  No pain to palpation bilateral lower extremities.  Range of motion, stability, muscle strength, and muscle tone normal bilateral feet and legs.    Lymphadenopathy:      Lower Body: No right inguinal adenopathy. No left inguinal adenopathy.      Comments: Negative lymphadenopathy bilateral popliteal fossa and tarsal tunnel.    Negative lymphangitic streaking bilateral feet/ankles/legs.   Skin:     General: Skin is warm and dry.      Capillary Refill: Capillary refill takes 2 to 3 seconds.      Coloration: Skin is not pale.      Findings: No abrasion, bruising, burn, ecchymosis, erythema, laceration, lesion or rash.      Nails: There is no clubbing.        Comments:   Skin is normal age and health appropriate color, turgor, texture, and temperature bilateral lower extremities without ulceration, hyperpigmentation, discoloration, masses nodules or cords palpated.  No ecchymosis, erythema, edema, or cardinal signs of infection bilateral lower extremities.    Visible and palpable ingrowth of toenail bilateral borders left and right hallux with pain to palpation, and focal localized erythema and edema,  without ulceration, drainage, pus, tracking, fluctuance, malodor, or cardinal signs infection.     Neurological:      Mental Status: She is alert and oriented to person, place, and time.      Sensory: No sensory deficit.      Motor: No tremor, atrophy or abnormal muscle tone.      Gait: Gait normal.      Deep Tendon Reflexes:      Reflex Scores:       Patellar reflexes are 2+ on the right side and 2+ on the left side.       Achilles reflexes are 2+ on the right side and 2+ on the left side.     Comments: Negative tinel sign to percussion sural, superficial peroneal, deep peroneal, saphenous, and posterior tibial nerves right and left ankles and feet.      Psychiatric:         Behavior: Behavior is cooperative.               Assessment:       Encounter Diagnoses   Name Primary?    Ingrown nail Yes    Paronychia of toe, unspecified laterality     Toe pain, bilateral          Plan:       Tiarra was seen today for ingrown toenail.    Diagnoses and all orders for this visit:    Ingrown nail    Paronychia of toe, unspecified laterality    Toe pain, bilateral    Other orders  -     tobramycin sulfate 0.3% (TOBREX) 0.3 % ophthalmic solution; 1-2 drops topically twice daily to affected toe(s).      I counseled the patient on her conditions, their implications and medical management.    Cover both hallux toenails with Band-Aid dressings changing daily until completely resolved.    Discussed conservative treatment with shoes of adequate dimensions, material, and style to alleviate symptoms and delay or prevent surgical intervention.     Utilizing sterile toenail clippers I aggressively debrided the offending nail border approximately 3 mm from its edge and carried the nail plate incision down at an angle in order to wedge out the offending cryptotic portion of the nail plate. The offending border was then removed in toto. The area was cleansed with alcohol. Patient tolerated the procedure well and related significant relief.      tobramycin drops bid.    Follow up in about 1 week (around 10/6/2020) for matrixectomy medial and lateral left and right hallux.

## 2020-09-30 ENCOUNTER — OFFICE VISIT (OUTPATIENT)
Dept: OBSTETRICS AND GYNECOLOGY | Facility: CLINIC | Age: 65
End: 2020-09-30
Payer: MEDICARE

## 2020-09-30 ENCOUNTER — NUTRITION (OUTPATIENT)
Dept: NUTRITION | Facility: CLINIC | Age: 65
End: 2020-09-30
Payer: MEDICARE

## 2020-09-30 ENCOUNTER — PATIENT MESSAGE (OUTPATIENT)
Dept: OBSTETRICS AND GYNECOLOGY | Facility: CLINIC | Age: 65
End: 2020-09-30

## 2020-09-30 VITALS
DIASTOLIC BLOOD PRESSURE: 78 MMHG | SYSTOLIC BLOOD PRESSURE: 130 MMHG | WEIGHT: 164.25 LBS | HEIGHT: 63 IN | BODY MASS INDEX: 29.1 KG/M2

## 2020-09-30 DIAGNOSIS — N95.1 MENOPAUSAL VAGINAL DRYNESS: ICD-10-CM

## 2020-09-30 DIAGNOSIS — F52.0 HYPOACTIVE SEXUAL DESIRE DISORDER: ICD-10-CM

## 2020-09-30 DIAGNOSIS — K58.9 IRRITABLE BOWEL SYNDROME, UNSPECIFIED TYPE: Primary | ICD-10-CM

## 2020-09-30 DIAGNOSIS — Z01.419 ENCOUNTER FOR WELL WOMAN EXAM WITH ROUTINE GYNECOLOGICAL EXAM: Primary | ICD-10-CM

## 2020-09-30 DIAGNOSIS — Z71.3 NUTRITIONAL COUNSELING: ICD-10-CM

## 2020-09-30 PROCEDURE — 1126F PR PAIN SEVERITY QUANTIFIED, NO PAIN PRESENT: ICD-10-PCS | Mod: HCNC,S$GLB,, | Performed by: OBSTETRICS & GYNECOLOGY

## 2020-09-30 PROCEDURE — 97802 PR MED NUTR THER, 1ST, INDIV, EA 15 MIN: ICD-10-PCS | Mod: HCNC,S$GLB,, | Performed by: DIETITIAN, REGISTERED

## 2020-09-30 PROCEDURE — 3008F PR BODY MASS INDEX (BMI) DOCUMENTED: ICD-10-PCS | Mod: HCNC,CPTII,S$GLB, | Performed by: OBSTETRICS & GYNECOLOGY

## 2020-09-30 PROCEDURE — 1101F PR PT FALLS ASSESS DOC 0-1 FALLS W/OUT INJ PAST YR: ICD-10-PCS | Mod: HCNC,CPTII,S$GLB, | Performed by: OBSTETRICS & GYNECOLOGY

## 2020-09-30 PROCEDURE — 3078F PR MOST RECENT DIASTOLIC BLOOD PRESSURE < 80 MM HG: ICD-10-PCS | Mod: HCNC,CPTII,S$GLB, | Performed by: OBSTETRICS & GYNECOLOGY

## 2020-09-30 PROCEDURE — 1126F AMNT PAIN NOTED NONE PRSNT: CPT | Mod: HCNC,S$GLB,, | Performed by: OBSTETRICS & GYNECOLOGY

## 2020-09-30 PROCEDURE — 99999 PR PBB SHADOW E&M-EST. PATIENT-LVL IV: CPT | Mod: PBBFAC,HCNC,, | Performed by: OBSTETRICS & GYNECOLOGY

## 2020-09-30 PROCEDURE — 3075F PR MOST RECENT SYSTOLIC BLOOD PRESS GE 130-139MM HG: ICD-10-PCS | Mod: HCNC,CPTII,S$GLB, | Performed by: OBSTETRICS & GYNECOLOGY

## 2020-09-30 PROCEDURE — 1159F MED LIST DOCD IN RCRD: CPT | Mod: HCNC,S$GLB,, | Performed by: OBSTETRICS & GYNECOLOGY

## 2020-09-30 PROCEDURE — 99999 PR PBB SHADOW E&M-EST. PATIENT-LVL IV: ICD-10-PCS | Mod: PBBFAC,HCNC,, | Performed by: OBSTETRICS & GYNECOLOGY

## 2020-09-30 PROCEDURE — 3008F BODY MASS INDEX DOCD: CPT | Mod: HCNC,CPTII,S$GLB, | Performed by: OBSTETRICS & GYNECOLOGY

## 2020-09-30 PROCEDURE — 99999 PR PBB SHADOW E&M-EST. PATIENT-LVL II: ICD-10-PCS | Mod: PBBFAC,HCNC,, | Performed by: DIETITIAN, REGISTERED

## 2020-09-30 PROCEDURE — 1101F PT FALLS ASSESS-DOCD LE1/YR: CPT | Mod: HCNC,CPTII,S$GLB, | Performed by: OBSTETRICS & GYNECOLOGY

## 2020-09-30 PROCEDURE — 3075F SYST BP GE 130 - 139MM HG: CPT | Mod: HCNC,CPTII,S$GLB, | Performed by: OBSTETRICS & GYNECOLOGY

## 2020-09-30 PROCEDURE — 97802 MEDICAL NUTRITION INDIV IN: CPT | Mod: HCNC,S$GLB,, | Performed by: DIETITIAN, REGISTERED

## 2020-09-30 PROCEDURE — 1159F PR MEDICATION LIST DOCUMENTED IN MEDICAL RECORD: ICD-10-PCS | Mod: HCNC,S$GLB,, | Performed by: OBSTETRICS & GYNECOLOGY

## 2020-09-30 PROCEDURE — 3078F DIAST BP <80 MM HG: CPT | Mod: HCNC,CPTII,S$GLB, | Performed by: OBSTETRICS & GYNECOLOGY

## 2020-09-30 PROCEDURE — G0101 CA SCREEN;PELVIC/BREAST EXAM: HCPCS | Mod: HCNC,S$GLB,, | Performed by: OBSTETRICS & GYNECOLOGY

## 2020-09-30 PROCEDURE — G0101 PR CA SCREEN;PELVIC/BREAST EXAM: ICD-10-PCS | Mod: HCNC,S$GLB,, | Performed by: OBSTETRICS & GYNECOLOGY

## 2020-09-30 PROCEDURE — 99999 PR PBB SHADOW E&M-EST. PATIENT-LVL II: CPT | Mod: PBBFAC,HCNC,, | Performed by: DIETITIAN, REGISTERED

## 2020-09-30 RX ORDER — CONJUGATED ESTROGENS 0.62 MG/G
CREAM VAGINAL
Qty: 90 G | Refills: 4 | Status: SHIPPED | OUTPATIENT
Start: 2020-09-30 | End: 2022-07-14

## 2020-09-30 RX ORDER — BREMELANOTIDE 1.75 MG/.3ML
1 INJECTION SUBCUTANEOUS
Qty: 4 SYRINGE | Refills: 12 | Status: SHIPPED | OUTPATIENT
Start: 2020-09-30 | End: 2020-10-20

## 2020-09-30 RX ORDER — BREMELANOTIDE 1.75 MG/.3ML
1 INJECTION SUBCUTANEOUS
Qty: 4 SYRINGE | Refills: 12 | Status: CANCELLED | OUTPATIENT
Start: 2020-09-30

## 2020-09-30 NOTE — PROGRESS NOTES
History & Physical  Gynecology      SUBJECTIVE:     Chief Complaint: Well Woman       History of Present Illness:    Tiarra Norton is a 65 y.o. female  (C/S x1)  for annual routine Pap and checkup. No LMP recorded. Patient is postmenopausal..  She complains of a spot on her right labia.  Does not hurt.  Has grown in size.  Overall small in size.  Unsure if has skin changes.  No drainage from the area. Complains of spotting after intercourse.  Reports vaginal dryness but uses lubrication.  Discomfort with intercourse.  Also reports decreased libido.  Unsure if from discomfort from intercourse or because of just decreased libido.    denies vaginal itching or irritation.  denies vaginal discharge.    She is sexually active with 1 partner.    History of abnormal pap: No  Last Pap: (18)  Last MMG: Yes - 18  Last Colonoscopy:  Yes - 2019      Review of patient's allergies indicates:  No Known Allergies    Past Medical History:   Diagnosis Date    Cataract     Depression     Hyperlipidemia     Hypertension     IBS (irritable bowel syndrome)     Thyroid disease      Past Surgical History:   Procedure Laterality Date    ADENOIDECTOMY       SECTION      CHOLECYSTECTOMY      COLONOSCOPY N/A 2016    Procedure: COLONOSCOPY;  Surgeon: Heri Segura MD;  Location: [address]);  Service: Endoscopy;  Laterality: N/A;    COLONOSCOPY N/A 2019    Procedure: COLONOSCOPY;  Surgeon: Joe Pitts MD;  Location: [address]);  Service: Endoscopy;  Laterality: N/A;    ESOPHAGOGASTRODUODENOSCOPY N/A 2020    Procedure: EGD (ESOPHAGOGASTRODUODENOSCOPY);  Surgeon: Tolu Rivas MD;  Location: [address]);  Service: Endoscopy;  Laterality: N/A;  Pt. moved to 9:15am arrival time.. Instructed to not have anything after midnight.EC    EYE SURGERY      cataract resection right    r hand surgery      TONSILLECTOMY      TOTAL KNEE ARTHROPLASTY      partial  knee replacement    TUBAL LIGATION       OB History        2    Para   2    Term   2            AB        Living           SAB        TAB        Ectopic        Multiple        Live Births                   Family History   Problem Relation Age of Onset    Hypertension Mother     Irritable bowel syndrome Mother     Heart disease Father         mi    Hypertension Father     Cancer Father         prostate    Alcohol abuse Sister     Depression Sister     Irritable bowel syndrome Sister     Alcohol abuse Sister     Ovarian cancer Maternal Aunt     Breast cancer Paternal Aunt     Melanoma Neg Hx     Colon cancer Neg Hx     Stomach cancer Neg Hx     Esophageal cancer Neg Hx     Liver disease Neg Hx     Crohn's disease Neg Hx     Ulcerative colitis Neg Hx     Celiac disease Neg Hx      Social History     Tobacco Use    Smoking status: Never Smoker    Smokeless tobacco: Never Used   Substance Use Topics    Alcohol use: Yes     Alcohol/week: 4.0 standard drinks     Types: 4 Glasses of wine per week     Frequency: 2-3 times a week     Drinks per session: 1 or 2     Binge frequency: Never    Drug use: No       Current Outpatient Medications   Medication Sig    alprazolam (XANAX) 2 MG Tab Take 2 mg by mouth 2 (two) times daily as needed.     atorvastatin (LIPITOR) 10 MG tablet Take 1 tablet (10 mg total) by mouth once daily.    buPROPion (WELLBUTRIN XL) 300 MG 24 hr tablet Take 300 mg by mouth once daily.    cholecalciferol, vitamin D3, (VITAMIN D3) 5,000 unit Tab Take 1 tablet (5,000 Units total) by mouth once daily.    levothyroxine (SYNTHROID) 137 MCG Tab tablet TAKE 1 TABLET EVERY MORNING    liothyronine (CYTOMEL) 5 MCG Tab Take 1 tablet (5 mcg total) by mouth once daily.    lipase-protease-amylase 24,000-76,000-120,000 units (CREON) 24,000-76,000 -120,000 unit capsule Take 1 capsule by mouth 3 (three) times daily with meals.    ondansetron (ZOFRAN) 4 MG tablet Take 1 tablet (4  mg total) by mouth every 8 (eight) hours as needed for Nausea.    promethazine (PHENERGAN) 25 MG tablet Take 1 tablet (25 mg total) by mouth every 6 (six) hours as needed for Nausea.    sumatriptan (IMITREX) 50 MG tablet 1 tab po at start of headache.  Repeat in 2 h prn    tobramycin sulfate 0.3% (TOBREX) 0.3 % ophthalmic solution 1-2 drops topically twice daily to affected toe(s).    traZODone (DESYREL) 100 MG tablet take 1 to 2 tablets by mouth at bedtime for sleep    valsartan (DIOVAN) 320 MG tablet Take 1 tablet (320 mg total) by mouth once daily.    varicella-zoster gE-AS01B, PF, (SHINGRIX) 50 mcg/0.5 mL injection Inject 0.5 mLs into the muscle.    venlafaxine (EFFEXOR-XR) 150 MG Cp24 1 tab po q day    amLODIPine (NORVASC) 5 MG tablet Take 1 tablet (5 mg total) by mouth once daily. (Patient not taking: Reported on 9/29/2020)    bremelanotide (VYLEESI) 1.75 mg/0.3 mL AtIn Inject 1 Syringe into the skin as needed.    conjugated estrogens (PREMARIN) vaginal cream Place a pea-sized amount in vagina every night for 4 weeks, then use 2-3 nights a week    HYDROcodone-acetaminophen (NORCO) 5-325 mg per tablet 1-2 tabs po q 8 h prn migraine (Patient not taking: Reported on 9/29/2020)    ziprasidone (GEODON) 20 MG Cap 40 mg 2 (two) times daily.      Current Facility-Administered Medications   Medication    sodium hyaluronate (EUFLEXXA) 10 mg/mL(mw 2.4 -3.6 million) Syrg 20 mg         Review of Systems:  Review of Systems   Constitutional: Negative for activity change, appetite change, chills, fatigue, fever and unexpected weight change.   Respiratory: Negative for cough, shortness of breath and wheezing.    Cardiovascular: Negative for chest pain and leg swelling.   Gastrointestinal: Negative for abdominal pain, blood in stool, constipation, diarrhea, nausea and vomiting.   Endocrine: Negative for diabetes, hair loss and hot flashes.   Genitourinary: Positive for decreased libido, vaginal pain and vaginal  dryness. Negative for dyspareunia, dysuria, frequency, menstrual problem, pelvic pain, vaginal bleeding, vaginal discharge, postcoital bleeding and postmenopausal bleeding.   Musculoskeletal: Negative for back pain.   Integumentary:  Negative for acne, hair changes, breast mass, nipple discharge and breast skin changes.   Neurological: Negative for headaches.   Psychiatric/Behavioral: Negative for sleep disturbance. The patient is not nervous/anxious.    Breast: Negative for mass, mastodynia, nipple discharge and skin changes       OBJECTIVE:     Physical Exam:  Physical Exam  Constitutional:       Appearance: She is well-developed.   HENT:      Head: Normocephalic and atraumatic.   Eyes:      General: No scleral icterus.        Right eye: No discharge.         Left eye: No discharge.      Conjunctiva/sclera: Conjunctivae normal.   Pulmonary:      Effort: Pulmonary effort is normal.      Breath sounds: No stridor.   Chest:      Chest wall: No mass or tenderness.      Breasts: Breasts are symmetrical.         Right: No inverted nipple, mass, nipple discharge, skin change or tenderness.         Left: No inverted nipple, mass, nipple discharge, skin change or tenderness.   Abdominal:      General: There is no distension.      Palpations: Abdomen is soft.      Tenderness: There is no abdominal tenderness.   Genitourinary:     Labia:         Right: No rash, tenderness, lesion or injury.         Left: No rash, tenderness, lesion or injury.       Vagina: Normal.      Cervix: No cervical motion tenderness, discharge or friability.      Adnexa:         Right: No mass, tenderness or fullness.          Left: No mass, tenderness or fullness.        Comments: Normal external genitalia.  Normal hair distribution.  Urethral meatus normal. No cervical lesions or masses.  No vaginal bleeding noted. + vaginal atrophy.  No adnexal or uterine tenderness.  No palpable adnexal masses.  Musculoskeletal: Normal range of motion.   Skin:      General: Skin is warm and dry.   Neurological:      Mental Status: She is alert and oriented to person, place, and time.   Psychiatric:         Behavior: Behavior normal.         Thought Content: Thought content normal.         Judgment: Judgment normal.           ASSESSMENT:       ICD-10-CM ICD-9-CM    1. Encounter for well woman exam with routine gynecological exam  Z01.419 V72.31    2. Menopausal vaginal dryness  N95.1 627.2 conjugated estrogens (PREMARIN) vaginal cream   3. Hypoactive sexual desire disorder  F52.0 302.71 bremelanotide (VYLEESI) 1.75 mg/0.3 mL AtIn          Plan:      Tiarra was seen today for well woman.    Diagnoses and all orders for this visit:    Encounter for well woman exam with routine gynecological exam  - No hx of abnormal pap smears.  Pt no longer needs pap smears  - Up to date on mammogram  - Cscope up to date    Menopausal vaginal dryness  - Vaginal atrophy on exam.  Adding to discomfort during intercourse.  - Will Rx premarin  -     conjugated estrogens (PREMARIN) vaginal cream; Place a pea-sized amount in vagina every night for 4 weeks, then use 2-3 nights a week    Libido, decreased  - Hypoactive sexual desire disorder.  Talked about vyleesi as an option.   - Pt would like to try medication.  Will send Rx  -  bremelanotide (VYLEESI) 1.75 mg/0.3 mL AtIn; Inject 1 Syringe into the skin as needed.        No orders of the defined types were placed in this encounter.      Follow up in about 1 year (around 9/30/2021) for annual.    Counseling time: 30 minutes    Tiny Cook

## 2020-09-30 NOTE — LETTER
September 30, 2020      Kaykay Perales MD  [address]  [facility] [address]           [facility]  [address]  [facility] [address]  Phone: [phone]  Fax: [phone]          Patient: Tiarra Norton   MR Number: [id number]   YOB: 1955   Date of Visit: 9/30/2020       Dear Dr. Kaykay Perales:    Thank you for referring Tiarra Norton to me for evaluation. Attached you will find relevant portions of my assessment and plan of care.    If you have questions, please do not hesitate to call me. I look forward to following Tiarra Norton along with you.    Sincerely,    Tiny Cook MD    Enclosure  CC:  No Recipients    If you would like to receive this communication electronically, please contact [email or web address] or [phone] to request more information on [employer] access.    For providers and/or their staff who would like to refer a patient to [facility], please contact us through our one-stop-shop provider referral line, [facility], at [phone].    If you feel you have received this communication in error or would no longer like to receive these types of communications, please e-mail [email or web address]

## 2020-10-01 ENCOUNTER — PATIENT MESSAGE (OUTPATIENT)
Dept: OBSTETRICS AND GYNECOLOGY | Facility: CLINIC | Age: 65
End: 2020-10-01

## 2020-10-01 VITALS — BODY MASS INDEX: 28.34 KG/M2 | WEIGHT: 160 LBS

## 2020-10-01 DIAGNOSIS — N95.1 MENOPAUSAL SYMPTOMS: Primary | ICD-10-CM

## 2020-10-01 NOTE — PATIENT INSTRUCTIONS
Name:  Tiarra Norton  Date:  9/30/2020      Recommended Daily Energy Requirements:   1400 calories   105-120 grams  protein   120-130 grams carbohydrates   45 grams fat  80 fluid oz per day      Nutrition Tips & Goals:     ? Aim to eat or drink within 1-2 hours of waking; especially to contain a protein source  ? Frequent fueling: Aim for small meal or snack every 3 or so hours   ? Metabolism, energy, curb cravings  ? Emphasis on lean protein + fiber-rich carb (veg, fruit or whole grain) + plant-based fat   ? If/when choose grains and starches, choose 100% whole grains + fiber-rich starches such as legumes, quinoa, whole wheat pasta, sweet potatoes, 100% whole grain bread, etc.   ? Nutrition bars + snacks: Look for products with <7 grams added sugar and 7+ grams protein (Think Everett #7 for aisle products)    Note: One serving (15-20 grams carbs)  =  1 cup fruit, cow's milk or plain yogurt,   ½ cup cooked grains, ½ cup starchy veggies (corn, peas, potatoes), 1 slice bread      ? Hydration: Drink at least ½ of your body weight in ounces of fluids daily + addition 16-24 ounces per pound of sweat lost via exercise.  [[employer] leana for water reminder]  ? Exercise: Aim for 60 minutes most days or aim to obtain 10,000 steps per day throughout work day - ideally starting day with 30-45 minutes of exercise  ? Aim for a minimum of 7-8 hours sleep nightly  ? Keep a daily food record       Restaurant Tips:        Pick 2 out of the 5 Rule:  o Instead of eating bread (or tortilla chips), an appetizer, alcoholic drink and dessert, choose just one to have with your entrée  ? Focus on lean proteins: Refer to lean protein page in meal plan guidebook      Select items grilled, baked, broiled, braised, poached or roasted      Avoid crispy, crunchy, breaded, paneed or stuffed items      Avoid items that are cream based, au gratin or buttered      Order sauces, dressings and gravies on the side. This way  you can add 1-2 Tbsp yourself  instead of the dish being 'drowned' in the sauce  = portion control + saving calories while still enjoying the dish      Watch out for high calorie salad additives à   o A better-for-you salad consists of unlimited non-starchy veggies, lean protein and 2-3 salad additions, each additive being ~2 Tbsp (See below in notes for examples)       Hold the starchy side dish - request extra non-starchy vegetables instead      Order vegetables steamed or stir-fried instead of sautéed to avoid excess oils. Ask for olive oil on the side and drizzle over veggies instead      Don't drink your calories: avoid sugary soft drinks, juices and mixers   Note: even 100% fruit juice contains the same sugar as a soft drink     [facility] Eat Fit [facility]EN[facility] à Designed to take the guesswork out of dining out healthfully, to make the healthy choice the easy choice   www.eatfitnola.com   Eat Fit Cookbook   [facility] Eat Fit leana à Free leana for smartphones  o [employer] and [employer]ats offer Eat Fit options  o Provides a list of all Eat Fit restaurants & dishes by location  - Lists what dishes are Eat Fit at each restaurant  - Lists the nutrition facts for each Eat Fit dish  - Provides 200 + Eat Fit approved recipes  - Grocery shopping guides + community wellness resources    -Salad additives: Pick 2-3, each being ~2 Tbsp:  1. Dressing  2. Cheese  3. Nuts  4. Saxena  5. Dried fruit  6. Avocado  7. Guacamole  8. Eggs    How to make a healthy smoothie:   Choose a protein source:  o At least 6 oz Plain Greek yogurt or 2% cottage cheese  o Examples of plant-based protein powders:  - Orgain  - SunWarrior  - Stratton  - Garden of Life RAW  o 100% whey protein powder  o 2 scoops Vital Proteins Collagen Peptides   Add a piece of fruit -or- 1 cup chopped fresh or frozen unsweetened fruit   Add any non-starchy veggies   Choose a fat source (1-2 Tbsp)  o Nut Butter (except Nutella)  o ½ Avocado   o Avocado oil   o Extra Virgin Olive  Oil   Choose a liquid:  o Unsweetened almond milk  hemp milk  cashew milk  coconut milk  o Fairlife lactose free milk (skim, 1% or 2%) -or- Organic Valley ULTRA reduced fat milk (lactose free)  o Water    Healthy add-ins for extra nutritional boost:  o 1-2 Tbsp Flaxseeds or James seeds   Add ice and blend!   o To step it up a notch, use frozen PLAIN cauliflower florets in place of ice to provide more nutrients    What may cause inflammation/flare ups in our body/decrease our Energy?   White/refined carbohydrates   Sugar   Fried food   Alcohol      Sample Meal Plan:    Breakfast: 1-2 servings of carbs = 30-40 grams + at least 15 grams lean protein/heart healthy fat  ? 1 slice 100% whole grain bread (Ex: Prem)+ ½  small avocado + 1 egg  ? 1 slice 100% whole grain bread + 1-2 Tbsp PB/nut butter  ? 100% whole wheat English muffin + 2 eggs with a sprinkle of cheese on top   ? ½ to 1 cup plain cooked oatmeal + 1-2 Tbsp PB stirred in + 1 Tbsp flaxseed  ? 1 packet weight control oatmeal + ½ -1 scoop protein powder  ? Ramsey's [employer] Mill GF Oatmeal with Flax & James (grab and go oatmeal cup)  ? Evol frozen breakfast sandwich  ? Omelet: 2 eggs + sprinkle of cheese + ¼- ½ avocado + non-starchy veggies + fruit  ? High protein, fiber rich cereals: Nutritious living hi-lo, kasha go lean, Natures path optimum, special K protein    Snack: 1 serving of carbs = 15-20 grams + at least 10-15 grams lean protein/heart healthy fat  ? Needed if going >3-4 hours between meals (See below snack for options)    Lunch: 1-2 servings of carbs =20-40 grams = ½ cup to 1 cup cooked starch + palm size thickness lean protein + non-starchy veggies  ? See picture below in notes as a guide  ? Refer to meal plan guide book for list of lean proteins, whole grain carbohydrates and non-starchy veggies  ?     Snack: 1 serving of carbs = 15-20 grams + at least 10-15 grams lean protein/heart healthy fat  ? Fruit of choice (1 piece or size of a tennis  ball, i.e. orange, pear, peach, etc or 1 cup melon/berries) + protein/healthy fat:  o Nuts (~ ¼ cup)  o Nut butter (1-2 Tablespoons)  o Cheese (reduced fat, light, part-skim, 2% cheese options)  o Jerky (see grocery list for recommended brands)  o hard boiled egg/s (1 yolk)   ? Veggies + ½ cup chicken or tuna salad  ? Flavored Greek Yogurt (no added sugar): Shavon haqs triple zero, Chobani Less Sugar, Two Good,   ? Gisele Barry unsweetened no sugar added 10 gram protein yogurt (plant based yogurt option)  ? Plain Greek yogurt + Truvia or Swerve (for sweetness) + flavor ( ¾ cup fruit, 2 Tbsp granola, ¼ - ½ cup Kashi Go Lean, extracts, etc)  ? Protein bar (less than 7 grams added sugar; 10-20 grams protein; ~ 150-200 calories)  o Nature Valley Protein Chewy Bar  o Powercrunch  o Oatmega  o Rx  o Quest  o Kind PROTEIN  o EPIC bar  ? Ready to Drink Protein Drink (less than 7 grams sugar + 20-30 grams protein)   o Iconic  o Premier  o Orgain  ? 1 serving of Beanito chips + 1, 100-calorie wholly guacamole packet or 1/4 cup shredded cheese  ? Apex: 1-2 slices 100% whole grain bread + smear of lawrence + 3 oz lean protein (refer to meal plan guide book)  ? Sargento balance break  ? Any breakfast can be a snack    Dinner: Limit carbs for dinner  ? Aim for palm size/thickness lean protein + at least 1 cup non-starchy veggies + small amount of heart healthy fats  ? Kabobs, stuffed bell peppers with no rice, Cauliflower 'rice' stir vasquez  ? Carb swaps:  o House Foods Tofu Shirataki noodles (found in produce section of grocery stores)  o Spaghetti Squash  o Hearts of palm 'noodles'  o Cauliflower rice  Broccoli Rice  o Zoodles  Beet Zoodles  o Crepini mini egg white thins (protein egg wrap)

## 2020-10-01 NOTE — PROGRESS NOTES
"Nutrition Assessment for Initial Medical Nutrition Therapy    Reason for MNT visit: Pt in for education and nutrition counseling regarding IBS symptoms and desired weight loss.       ASSESSMENT    Age: 65 y.o.  Wt: Pt reports her weight being 160 lbs on her home scale  Wt Readings from Last 1 Encounters:   10/01/20 72.6 kg (160 lb)     Ht:   Ht Readings from Last 1 Encounters:   09/30/20 5' 3" (1.6 m)     BMI:   BMI Readings from Last 1 Encounters:   10/01/20 28.34 kg/m²       Clinical signs/symptoms: Pt reports having zero energy, flare ups of diarrhea or constipation with her IBS    Medical History:   Past Medical History:   Diagnosis Date    Cataract     Depression     Hyperlipidemia     Hypertension     IBS (irritable bowel syndrome)     Thyroid disease      Problem List           Resolved    HTN (hypertension)         Hypothyroid         Unspecified vitamin D deficiency         Elevated liver enzymes         Primary osteoarthritis of right knee         BMI 31.0-31.9,adult         Fatty liver         Hyperlipidemia         Migraine without status migrainosus, not intractable         Glucose intolerance (impaired glucose tolerance)         Hyponatremia         Anxiety         Essential hypertension         Fatigue         Intermittent diarrhea         HERMINIA (obstructive sleep apnea)         Decreased libido         Sleep arousal disorder         Recurrent major depressive disorder, in partial remission         Pain         Colon adenomas         Abdominal pain               Medications:   Current Outpatient Medications:     alprazolam (XANAX) 2 MG Tab, Take 2 mg by mouth 2 (two) times daily as needed. , Disp: , Rfl: 0    amLODIPine (NORVASC) 5 MG tablet, Take 1 tablet (5 mg total) by mouth once daily. (Patient not taking: Reported on 9/29/2020), Disp: 90 tablet, Rfl: 3    atorvastatin (LIPITOR) 10 MG tablet, Take 1 tablet (10 mg total) by mouth once daily., Disp: 90 tablet, Rfl: 3    bremelanotide " (VYLEESI) 1.75 mg/0.3 mL AtIn, Inject 1 Syringe into the skin as needed., Disp: 4 Syringe, Rfl: 12    buPROPion (WELLBUTRIN XL) 300 MG 24 hr tablet, Take 300 mg by mouth once daily., Disp: , Rfl:     cholecalciferol, vitamin D3, (VITAMIN D3) 5,000 unit Tab, Take 1 tablet (5,000 Units total) by mouth once daily., Disp: , Rfl:     conjugated estrogens (PREMARIN) vaginal cream, Place a pea-sized amount in vagina every night for 4 weeks, then use 2-3 nights a week, Disp: 90 g, Rfl: 4    HYDROcodone-acetaminophen (NORCO) 5-325 mg per tablet, 1-2 tabs po q 8 h prn migraine (Patient not taking: Reported on 9/29/2020), Disp: 30 tablet, Rfl: 0    levothyroxine (SYNTHROID) 137 MCG Tab tablet, TAKE 1 TABLET EVERY MORNING, Disp: 90 tablet, Rfl: 1    liothyronine (CYTOMEL) 5 MCG Tab, Take 1 tablet (5 mcg total) by mouth once daily., Disp: 90 tablet, Rfl: 3    lipase-protease-amylase 24,000-76,000-120,000 units (CREON) 24,000-76,000 -120,000 unit capsule, Take 1 capsule by mouth 3 (three) times daily with meals., Disp: 90 capsule, Rfl: 11    ondansetron (ZOFRAN) 4 MG tablet, Take 1 tablet (4 mg total) by mouth every 8 (eight) hours as needed for Nausea., Disp: 30 tablet, Rfl: 1    promethazine (PHENERGAN) 25 MG tablet, Take 1 tablet (25 mg total) by mouth every 6 (six) hours as needed for Nausea., Disp: 60 tablet, Rfl: 1    sumatriptan (IMITREX) 50 MG tablet, 1 tab po at start of headache.  Repeat in 2 h prn, Disp: 27 tablet, Rfl: 3    tobramycin sulfate 0.3% (TOBREX) 0.3 % ophthalmic solution, 1-2 drops topically twice daily to affected toe(s)., Disp: 5 mL, Rfl: 3    traZODone (DESYREL) 100 MG tablet, take 1 to 2 tablets by mouth at bedtime for sleep, Disp: 180 tablet, Rfl: 3    valsartan (DIOVAN) 320 MG tablet, Take 1 tablet (320 mg total) by mouth once daily., Disp: 90 tablet, Rfl: 3    varicella-zoster gE-AS01B, PF, (SHINGRIX) 50 mcg/0.5 mL injection, Inject 0.5 mLs into the muscle., Disp: 0.5 mL, Rfl: 1     venlafaxine (EFFEXOR-XR) 150 MG Cp24, 1 tab po q day, Disp: 30 capsule, Rfl: 11    ziprasidone (GEODON) 20 MG Cap, 40 mg 2 (two) times daily. , Disp: , Rfl:     Current Facility-Administered Medications:     sodium hyaluronate (EUFLEXXA) 10 mg/mL(mw 2.4 -3.6 million) Syrg 20 mg, 20 mg, Intra-articular, Weekly, Trevin Huber MD, 20 mg at 07/01/19 1012    Supplements: Pt reports going to an integrative doctor and she takes many supplements. Pt stated she will be sending them to me.    Food allergies  intolerances: [facility]    Labs:  Reviewed   Hemoglobin A1C   Date Value Ref Range Status   10/22/2018 5.5 4.0 - 5.6 % Final     Comment:     ADA Screening Guidelines:  5.7-6.4%  Consistent with prediabetes  >or=6.5%  Consistent with diabetes  High levels of fetal hemoglobin interfere with the HbA1C  assay. Heterozygous hemoglobin variants (HbS, HgC, etc)do  not significantly interfere with this assay.   However, presence of multiple variants may affect accuracy.     01/11/2018 5.3 4.0 - 5.6 % Final     Comment:     According to ADA guidelines, hemoglobin A1c <7.0% represents  optimal control in non-pregnant diabetic patients. Different  metrics may apply to specific patient populations.   Standards of Medical Care in Diabetes-2016.  For the purpose of screening for the presence of diabetes:  <5.7%     Consistent with the absence of diabetes  5.7-6.4%  Consistent with increasing risk for diabetes   (prediabetes)  >or=6.5%  Consistent with diabetes  Currently, no consensus exists for use of hemoglobin A1c  for diagnosis of diabetes for children.  This Hemoglobin A1c assay has significant interference with fetal   hemoglobin   (HbF). The results are invalid for patients with abnormal amounts of   HbF,   including those with known Hereditary Persistence   of Fetal Hemoglobin. Heterozygous hemoglobin variants (HbAS, HbAC,   HbAD, HbAE, HbA2) do not significantly interfere with this assay;   however, presence of multiple  variants in a sample may impact the %   interference.     11/15/2016 5.5 4.5 - 6.2 % Final     Comment:     According to ADA guidelines, hemoglobin A1C <7.0% represents  optimal control in non-pregnant diabetic patients.  Different  metrics may apply to specific populations.   Standards of Medical Care in Diabetes - 2016.  For the purpose of screening for the presence of diabetes:  <5.7%     Consistent with the absence of diabetes  5.7-6.4%  Consistent with increasing risk for diabetes   (prediabetes)  >or=6.5%  Consistent with diabetes  Currently no consensus exists for use of hemoglobin A1C  for diagnosis of diabetes for children.       Cholesterol   Date Value Ref Range Status   01/27/2020 243 (H) 120 - 199 mg/dL Final     Comment:     The National Cholesterol Education Program (NCEP) has set the  following guidelines (reference ranges) for Cholesterol:  Optimal.....................<200 mg/dL  Borderline High.............200-239 mg/dL  High........................> or = 240 mg/dL     03/15/2019 291 (H) 120 - 199 mg/dL Final     Comment:     The National Cholesterol Education Program (NCEP) has set the  following guidelines (reference ranges) for Cholesterol:  Optimal.....................<200 mg/dL  Borderline High.............200-239 mg/dL  High........................> or = 240 mg/dL     01/14/2019 203 (H) 120 - 199 mg/dL Final     Comment:     The National Cholesterol Education Program (NCEP) has set the  following guidelines (reference ranges) for Cholesterol:  Optimal.....................<200 mg/dL  Borderline High.............200-239 mg/dL  High........................> or = 240 mg/dL       Triglycerides   Date Value Ref Range Status   01/27/2020 175 (H) 30 - 150 mg/dL Final     Comment:     The National Cholesterol Education Program (NCEP) has set the  following guidelines (reference values) for triglycerides:  Normal......................<150 mg/dL  Borderline High.............150-199  mg/dL  High........................200-499 mg/dL     03/15/2019 278 (H) 30 - 150 mg/dL Final     Comment:     The National Cholesterol Education Program (NCEP) has set the  following guidelines (reference values) for triglycerides:  Normal......................<150 mg/dL  Borderline High.............150-199 mg/dL  High........................200-499 mg/dL     01/14/2019 209 (H) 30 - 150 mg/dL Final     Comment:     The National Cholesterol Education Program (NCEP) has set the  following guidelines (reference values) for triglycerides:  Normal......................<150 mg/dL  Borderline High.............150-199 mg/dL  High........................200-499 mg/dL       HDL   Date Value Ref Range Status   01/27/2020 52 40 - 75 mg/dL Final     Comment:     The National Cholesterol Education Program (NCEP) has set the  following guidelines (reference values) for HDL Cholesterol:  Low...............<40 mg/dL  Optimal...........>60 mg/dL     03/15/2019 53 40 - 75 mg/dL Final     Comment:     The National Cholesterol Education Program (NCEP) has set the  following guidelines (reference values) for HDL Cholesterol:  Low...............<40 mg/dL  Optimal...........>60 mg/dL     01/14/2019 59 40 - 75 mg/dL Final     Comment:     The National Cholesterol Education Program (NCEP) has set the  following guidelines (reference values) for HDL Cholesterol:  Low...............<40 mg/dL  Optimal...........>60 mg/dL       LDL Cholesterol   Date Value Ref Range Status   01/27/2020 156.0 63.0 - 159.0 mg/dL Final     Comment:     The National Cholesterol Education Program (NCEP) has set the  following guidelines (reference values) for LDL Cholesterol:  Optimal.......................<130 mg/dL  Borderline High...............130-159 mg/dL  High..........................160-189 mg/dL  Very High.....................>190 mg/dL     03/15/2019 182.4 (H) 63.0 - 159.0 mg/dL Final     Comment:     The National Cholesterol Education Program (NCEP) has  set the  following guidelines (reference values) for LDL Cholesterol:  Optimal.......................<130 mg/dL  Borderline High...............130-159 mg/dL  High..........................160-189 mg/dL  Very High.....................>190 mg/dL     01/14/2019 102.2 63.0 - 159.0 mg/dL Final     Comment:     The National Cholesterol Education Program (NCEP) has set the  following guidelines (reference values) for LDL Cholesterol:  Optimal.......................<130 mg/dL  Borderline High...............130-159 mg/dL  High..........................160-189 mg/dL  Very High.....................>190 mg/dL       Hdl/Cholesterol Ratio   Date Value Ref Range Status   01/27/2020 21.4 20.0 - 50.0 % Final   03/15/2019 18.2 (L) 20.0 - 50.0 % Final   01/14/2019 29.1 20.0 - 50.0 % Final     Non-HDL Cholesterol   Date Value Ref Range Status   01/27/2020 191 mg/dL Final     Comment:     Risk category and Non-HDL cholesterol goals:  Coronary heart disease (CHD)or equivalent (10-year risk of CHD >20%):  Non-HDL cholesterol goal     <130 mg/dL  Two or more CHD risk factors and 10-year risk of CHD <= 20%:  Non-HDL cholesterol goal     <160 mg/dL  0 to 1 CHD risk factor:  Non-HDL cholesterol goal     <190 mg/dL     03/15/2019 238 mg/dL Final     Comment:     Risk category and Non-HDL cholesterol goals:  Coronary heart disease (CHD)or equivalent (10-year risk of CHD >20%):  Non-HDL cholesterol goal     <130 mg/dL  Two or more CHD risk factors and 10-year risk of CHD <= 20%:  Non-HDL cholesterol goal     <160 mg/dL  0 to 1 CHD risk factor:  Non-HDL cholesterol goal     <190 mg/dL     01/14/2019 144 mg/dL Final     Comment:     Risk category and Non-HDL cholesterol goals:  Coronary heart disease (CHD)or equivalent (10-year risk of CHD >20%):  Non-HDL cholesterol goal     <130 mg/dL  Two or more CHD risk factors and 10-year risk of CHD <= 20%:  Non-HDL cholesterol goal     <160 mg/dL  0 to 1 CHD risk factor:  Non-HDL cholesterol goal     <190  mg/dL       Vitamin B-12   Date Value Ref Range Status   11/22/2015 849 210 - 950 pg/mL Final     TSH   Date Value Ref Range Status   01/27/2020 0.528 0.400 - 4.000 uIU/mL Final         Typical day recall: Reviewed and noted during consultation. Pt reported only eating one real meal. She decreased the amount of added sugar she is consuming. Pt is now retired  Wake 5:00am  5:15am- Herbal tea + stevia or a leCroix  8:00/8:30am- Bike ride or pool exercise  Read the paper/ books/ garden  8:00am Sometimes Snack/BF- fruit   11:30am- [employer] Fiber shake with almond milk, fruit, nuts  3pm- sometimes snack- dried mangos, watermelon nuts, cheese and saltines  6/6:30pm- Dinner:  cooks, gumbo, beans, chicken, salad, impossible burger, asparagus, green beans, pt tries to stay away from potatoes bc she loves them too much  7pm- sometimes snack- greek yogurt+ stevia, fruit  10pm- bed, sleeping 10:30pm    Beverages: Pt reports drinking sparkling water throughout the day 5 cans, 1-2 glasses of water. And an occasional seltzer from her soda stream    Dining out: Pt reports only dinning out 4-5 times since the pandemic     Alcohol: nonsmoker, Pt will drink on the weekends, 1 glass at home 3 days a week or if going to friends house pt will drink 2-3 glasses of wine a night    Lifestyle Influences  Support System: Self, , son in [place]- pt has a lot of stress for her son    Meal preparation/shopping: self, spouse    Current Activity Level: Pt tries to workout, reports will do better now that it is getting cooler    Patient motivation, anticipated barriers, expected compliance: Patient is motivated and has verbalized understanding and intent to comply    DIAGNOSIS   Problem: Overweight   Etiology: RT stress + excessive calories before bed  Signs/Symptoms: AEB food recall + BMI>25    INTERVENTION  Nutrition prescription: estimated energy requirements:   Calories: 1400  Protein: 105-120 g  Carbohydrates: 120-130 g  Focus on  plant-based, heart healthy fats  Total Fat: 45 g  Baseline for fluids: 80 fl ounces    Recommendations & Goals:  Patient goals and recommendations are tailored to the specific patient's needs, readiness to change, lifestyle, culture, skills, resources, & abilities. Strategies to help achieve these nutrition-related goals were discussed which can include but are not limited to SMART goal setting & mindful eating.     Aim for a minimum of 7 hours sleep   Exercise 60 minutes most days  Eat breakfast within 1-2 hours of waking up  Try not to skip any meals or snacks, not going more than 3-4 hours without eating.   At each meal and snack, try to include a source of fiber + lean protein + healthy fat.     Written Materials Provided  These resources are intended to assist the patient in making it easier to choose recommended options when eating out & to identify better-for-you brands at the grocery store:     Meal Planning Guide with recommendations discussed along with portion sizes and a customized meal plan    Eat Fit leana card as a reminder to download the leana to ones smart phone which provides: current Eat Fit partners, approved menu options, food labels for carb counting, & recipes    On-the-Go Food Guide   Brand Specific Eat Fit Grocery Product list    RD contact information- for patient to contact regarding any questions, needs, and/or concerns that may arise     MONITORING & EVALUATION    Communicated with healthcare provider    Documented plan for referral to appropriate agency/healthcare provider as needed    Comprehension: good     Motivation to change: high    Follow-up: Yes, in 1 month    Counseling time: 2 Hours

## 2020-10-06 ENCOUNTER — TELEPHONE (OUTPATIENT)
Dept: PODIATRY | Facility: CLINIC | Age: 65
End: 2020-10-06

## 2020-10-06 ENCOUNTER — OFFICE VISIT (OUTPATIENT)
Dept: PODIATRY | Facility: CLINIC | Age: 65
End: 2020-10-06
Payer: MEDICARE

## 2020-10-06 ENCOUNTER — NURSE TRIAGE (OUTPATIENT)
Dept: ADMINISTRATIVE | Facility: CLINIC | Age: 65
End: 2020-10-06

## 2020-10-06 VITALS
WEIGHT: 160 LBS | SYSTOLIC BLOOD PRESSURE: 139 MMHG | HEART RATE: 64 BPM | BODY MASS INDEX: 28.35 KG/M2 | HEIGHT: 63 IN | DIASTOLIC BLOOD PRESSURE: 64 MMHG

## 2020-10-06 DIAGNOSIS — L03.039 PARONYCHIA OF TOE, UNSPECIFIED LATERALITY: ICD-10-CM

## 2020-10-06 DIAGNOSIS — Z98.890 POST-OPERATIVE STATE: Primary | ICD-10-CM

## 2020-10-06 DIAGNOSIS — M79.675 TOE PAIN, BILATERAL: ICD-10-CM

## 2020-10-06 DIAGNOSIS — M79.674 TOE PAIN, BILATERAL: ICD-10-CM

## 2020-10-06 DIAGNOSIS — L60.0 INGROWN NAIL: Primary | ICD-10-CM

## 2020-10-06 PROCEDURE — 99499 NO LOS: ICD-10-PCS | Mod: HCNC,S$GLB,, | Performed by: PODIATRIST

## 2020-10-06 PROCEDURE — 99999 PR PBB SHADOW E&M-EST. PATIENT-LVL III: ICD-10-PCS | Mod: PBBFAC,HCNC,, | Performed by: PODIATRIST

## 2020-10-06 PROCEDURE — 99999 PR PBB SHADOW E&M-EST. PATIENT-LVL IV: ICD-10-PCS | Mod: PBBFAC,HCNC,, | Performed by: PODIATRIST

## 2020-10-06 PROCEDURE — 99499 UNLISTED E&M SERVICE: CPT | Mod: HCNC,S$GLB,, | Performed by: PODIATRIST

## 2020-10-06 PROCEDURE — 11750 NAIL REMOVAL: ICD-10-PCS | Mod: T5,HCNC,S$GLB, | Performed by: PODIATRIST

## 2020-10-06 PROCEDURE — 11750 EXCISION NAIL&NAIL MATRIX: CPT | Mod: 51,TA,HCNC,S$GLB | Performed by: PODIATRIST

## 2020-10-06 PROCEDURE — 99999 PR PBB SHADOW E&M-EST. PATIENT-LVL III: CPT | Mod: PBBFAC,HCNC,, | Performed by: PODIATRIST

## 2020-10-06 PROCEDURE — 99999 PR PBB SHADOW E&M-EST. PATIENT-LVL IV: CPT | Mod: PBBFAC,HCNC,, | Performed by: PODIATRIST

## 2020-10-06 RX ORDER — MELOXICAM 15 MG/1
15 TABLET ORAL DAILY
Qty: 30 TABLET | Refills: 0 | Status: SHIPPED | OUTPATIENT
Start: 2020-10-06 | End: 2020-10-27

## 2020-10-06 NOTE — PROGRESS NOTES
Subjective:      Patient ID: Tiarra Norton is a 65 y.o. female.    Chief Complaint: Ingrown Toenail (Bilateral brders)    Short deep throbbing pain at both sides of both great toenails.  Gradual onset worsening over the past few days.  Neither improved nor worsened over the past few  days.by increased soft tissues pressure and ambulation.  She has been applying tobramycin drops to both sides of both great toenails with mild improvement in pain. Denies trauma,and surgery both feet..    Review of Systems   Constitution: Negative for chills, diaphoresis, fever, malaise/fatigue and night sweats.   Cardiovascular: Negative for claudication, cyanosis, leg swelling and syncope.   Skin: Positive for nail changes. Negative for color change, dry skin, rash, suspicious lesions and unusual hair distribution.   Musculoskeletal: Negative for falls, joint pain, joint swelling, muscle cramps, muscle weakness and stiffness.   Gastrointestinal: Negative for constipation, diarrhea, nausea and vomiting.   Neurological: Negative for brief paralysis, disturbances in coordination, focal weakness, numbness, paresthesias, sensory change and tremors.           Objective:      Physical Exam  Constitutional:       General: She is not in acute distress.     Appearance: She is well-developed. She is not diaphoretic.   Cardiovascular:      Pulses:           Popliteal pulses are 2+ on the right side and 2+ on the left side.        Dorsalis pedis pulses are 2+ on the right side and 2+ on the left side.        Posterior tibial pulses are 2+ on the right side and 2+ on the left side.      Comments: Capillary refill 3 seconds all toes/distal feet, all toes/both feet warm to touch.      Negative lymphadenopathy bilateral popliteal fossa and tarsal tunnel.      Negavie lower extremity edema bilateral.    Musculoskeletal:      Right ankle: She exhibits normal range of motion, no swelling, no ecchymosis, no deformity, no laceration and normal pulse.  Achilles tendon normal. Achilles tendon exhibits no pain, no defect and normal Vega's test results.      Comments: Normal angle, base, station of gait. All ten toes without clubbing, cyanosis, or signs of ischemia.  No pain to palpation bilateral lower extremities.  Range of motion, stability, muscle strength, and muscle tone normal bilateral feet and legs.    Lymphadenopathy:      Lower Body: No right inguinal adenopathy. No left inguinal adenopathy.      Comments: Negative lymphadenopathy bilateral popliteal fossa and tarsal tunnel.    Negative lymphangitic streaking bilateral feet/ankles/legs.   Skin:     General: Skin is warm and dry.      Capillary Refill: Capillary refill takes 2 to 3 seconds.      Coloration: Skin is not pale.      Findings: No abrasion, bruising, burn, ecchymosis, erythema, laceration, lesion or rash.      Nails: There is no clubbing.        Comments:   Skin is normal age and health appropriate color, turgor, texture, and temperature bilateral lower extremities without ulceration, hyperpigmentation, discoloration, masses nodules or cords palpated.  No ecchymosis, erythema, edema, or cardinal signs of infection bilateral lower extremities.    Visible and palpable ingrowth of toenail bilateral borders left and right hallux with pain to palpation, and focal localized erythema and edema,  without ulceration, drainage, pus, tracking, fluctuance, malodor, or cardinal signs infection.     Neurological:      Mental Status: She is alert and oriented to person, place, and time.      Sensory: No sensory deficit.      Motor: No tremor, atrophy or abnormal muscle tone.      Gait: Gait normal.      Deep Tendon Reflexes:      Reflex Scores:       Patellar reflexes are 2+ on the right side and 2+ on the left side.       Achilles reflexes are 2+ on the right side and 2+ on the left side.     Comments: Negative tinel sign to percussion sural, superficial peroneal, deep peroneal, saphenous, and posterior  tibial nerves right and left ankles and feet.     Psychiatric:         Behavior: Behavior is cooperative.               Assessment:       Encounter Diagnoses   Name Primary?    Ingrown nail Yes    Toe pain, bilateral     Paronychia of toe, unspecified laterality          Plan:       Tiarra was seen today for ingrown toenail.    Diagnoses and all orders for this visit:    Ingrown nail  -     Nail Removal  -     Nail Removal    Toe pain, bilateral    Paronychia of toe, unspecified laterality    Nail Removal    Date/Time: 10/6/2020 8:45 AM  Performed by: Darrick Cox DPM  Authorized by: Darrick Cox DPM     Consent Done?:  Yes (Written)    Location:  Right foot  Location detail:  Right big toe  Anesthesia:  Local infiltration  Local anesthetic: lidocaine 2% without epinephrine  Anesthetic total (ml):  4  Preparation:  Skin prepped with alcohol    Amount removed:  Partial  Wedge excision of skin of nail fold: No    Nail bed sutured?: No    Nail matrix removed:  Partial  Removed nail replaced and anchored: No    Dressing applied:  4x4, antibiotic ointment and gauze roll  Patient tolerance:  Patient tolerated the procedure well with no immediate complicationsNail Removal    Date/Time: 10/6/2020 8:45 AM  Performed by: Darrick Cox DPM  Authorized by: Darrick Cox DPM     Consent Done?:  Yes (Written)    Location:  Left foot  Location detail:  Left big toe  Anesthesia:  Local infiltration  Local anesthetic: lidocaine 2% without epinephrine  Anesthetic total (ml):  4  Preparation:  Skin prepped with alcohol    Amount removed:  Partial  Wedge excision of skin of nail fold: No    Nail bed sutured?: No    Nail matrix removed:  Partial  Removed nail replaced and anchored: No    Dressing applied:  4x4, antibiotic ointment and gauze roll  Patient tolerance:  Patient tolerated the procedure well with no immediate complications          I counseled the patient on her conditions, their implications and medical  management.       Discussed conservative treatment with shoes of adequate dimensions, material, and style to alleviate symptoms and delay or prevent surgical intervention.     Dress bilateral hallux at all times with dry sterile dressing changing daily.     Discussed treatment options including anti fungal, antibiotics, observation and symptomatic management, and nail avulsion.  After discussing specifics of each treatment the patient decided to go forward with nail avulsion with phenol matrixectomy medial and lateral nail border, bilateral hallux.      Avulsion bilateral hallux, medial and lateral nail border         Follow up in about 2 weeks (around 10/20/2020) for post op.       Patient seen and assisted by resident Jerrica Issa PGY2 under the direct supervision of attending Dr. Cox.

## 2020-10-06 NOTE — TELEPHONE ENCOUNTER
patient called back, still has not heard from Dr Cox, Triage done, dispo to be seen in office, but unable to reach Dr. Cox, asked patient re his dressing instructions, states she was told not to remove it for 24 hours, advised to follow those instructions, talked about reinforcing bandage, but it is still seeping through. Advised to go to the Urgent Care for dressing assessment so to be seen today. Verb understanding.   Reason for Disposition   Dressing soaked with blood or body fluid (e.g., drainage)    Additional Information   Negative: Major abdominal surgical incision and wound gaping open with visible internal organs   Negative: Sounds like a life-threatening emergency to the triager   Negative: Bleeding from incision and won't stop after 10 minutes of direct pressure   Negative: Widespread rash and bright red, sunburn-like   Negative: Severe pain in the incision   Negative: Incision gaping open and < 2 days (48 hours) since wound re-opened   Negative: Incision gaping open and length of opening > 2 inches (5 cm)   Negative: Patient sounds very sick or weak to the triager   Negative: Sounds like a serious complication to the triager   Negative: Pus or bad-smelling fluid draining from incision   Negative: Red streak runs from the incision and longer than 1 inch (2.5 cm)   Negative: Incision looks infected (spreading redness, pain)    Protocols used: POST-OP INCISION SYMPTOMS AND TIBPDGUAX-S-EC

## 2020-10-06 NOTE — TELEPHONE ENCOUNTER
Patient had toenail removed, she is now bleeding through the bandage, would like to know what to do. Message sent to office,  state she has sent message also and attempted to reach Dr. Cox also. She has supplies but would like to speak to Dr. Cox. Will attempt to reach

## 2020-10-06 NOTE — PROGRESS NOTES
Strict through right and left hallux dressings.    Wounds clean dry clotted with good hemostasis right and left hallux medial and lateral borders of each.    Replaced dressing with triple antibiotic 4x4s 2 in gauze and Coban.  Prescribed meloxicam at patient request for pain management.  RICE p.r.n..  Activity to tolerance.  Follow-up 2 weeks sooner p.r.n.

## 2020-10-06 NOTE — TELEPHONE ENCOUNTER
Spoke with patient and she coming back in to be rebandaged.             ----- Message from She Henry sent at 10/6/2020 12:03 PM CDT -----  Contact: MARION NIETO [[id number]]  Name of Who is Calling: MARION NIETO [[id number]]      What is the request in detail: Patient would like to speak with staff states she is bleeding through bandage placed 3 hrs ago. Please call       Can the clinic reply by [facility]: no      What Number to Call Back [facility] not [facility][phone]

## 2020-10-08 ENCOUNTER — LAB VISIT (OUTPATIENT)
Dept: LAB | Facility: HOSPITAL | Age: 65
End: 2020-10-08
Attending: OBSTETRICS & GYNECOLOGY
Payer: MEDICARE

## 2020-10-08 DIAGNOSIS — N95.1 MENOPAUSAL SYMPTOMS: ICD-10-CM

## 2020-10-08 PROCEDURE — 36415 COLL VENOUS BLD VENIPUNCTURE: CPT | Mod: HCNC,PN

## 2020-10-08 PROCEDURE — 84403 ASSAY OF TOTAL TESTOSTERONE: CPT | Mod: HCNC

## 2020-10-08 PROCEDURE — 84402 ASSAY OF FREE TESTOSTERONE: CPT | Mod: HCNC

## 2020-10-09 LAB — TESTOST SERPL-MCNC: 14 NG/DL (ref 5–73)

## 2020-10-12 ENCOUNTER — PATIENT MESSAGE (OUTPATIENT)
Dept: INTERNAL MEDICINE | Facility: CLINIC | Age: 65
End: 2020-10-12

## 2020-10-12 LAB — TESTOST FREE SERPL-MCNC: 0.5 PG/ML

## 2020-10-14 ENCOUNTER — PATIENT OUTREACH (OUTPATIENT)
Dept: OTHER | Facility: OTHER | Age: 65
End: 2020-10-14

## 2020-10-14 NOTE — PROGRESS NOTES
Digital Medicine: Health  Follow-Up    The history is provided by the patient.             Reason for review: Blood pressure at goal        Topics Covered on Call: physical activity, Diet and device use    Additional Follow-up details: She states that she has been taking readings regularly and was receiving messages stating there was an issue with data transmission. We did receive most recent reading. I encouraged her to contact me if she receives this message again.             Diet-no change to diet    No change to diet.  Patient reports eating or drinking the following: She went to see a dietitian a few weeks ago but states she hasn't made changes yet but is thinking about it and preparing to do so.       Physical Activity-Change      She added biking, walking to Her physical activity routine.        Additional physical activity details: She has been biking more often and has also been volunteering more at the [facility]. She is thinking about getting a [place] bike or a bike stand so she can exercise indoors in the winter.       Medication Adherence-Medication Adherence not addressed.      Substance, Sleep, Stress-Not assessed      Additional monitoring needed.  Continue current diet/physical activity routine.       Addressed patient questions and patient has my contact information if needed prior to next outreach. Patient verbalizes understanding.      Explained the importance of self-monitoring and medication adherence. Encouraged the patient to communicate with their health  for lifestyle modifications to help improve or maintain a healthy lifestyle.               There are no preventive care reminders to display for this patient.      Last 5 Patient Entered Readings                                      Current 30 Day Average: 118/71     Recent Readings 10/13/2020 9/17/2020 9/8/2020 8/25/2020 8/18/2020    SBP (mmHg) 107 129 110 133 121    DBP (mmHg) 61 81 66 80 72    Pulse 79 72 71 69 70

## 2020-10-15 ENCOUNTER — PATIENT MESSAGE (OUTPATIENT)
Dept: OBSTETRICS AND GYNECOLOGY | Facility: CLINIC | Age: 65
End: 2020-10-15

## 2020-10-20 ENCOUNTER — OFFICE VISIT (OUTPATIENT)
Dept: CARDIOLOGY | Facility: CLINIC | Age: 65
End: 2020-10-20
Payer: MEDICARE

## 2020-10-20 ENCOUNTER — OFFICE VISIT (OUTPATIENT)
Dept: PODIATRY | Facility: CLINIC | Age: 65
End: 2020-10-20
Payer: MEDICARE

## 2020-10-20 VITALS
BODY MASS INDEX: 28.35 KG/M2 | WEIGHT: 160 LBS | DIASTOLIC BLOOD PRESSURE: 60 MMHG | SYSTOLIC BLOOD PRESSURE: 116 MMHG | HEIGHT: 63 IN | HEART RATE: 67 BPM

## 2020-10-20 VITALS
HEART RATE: 72 BPM | DIASTOLIC BLOOD PRESSURE: 57 MMHG | BODY MASS INDEX: 28.77 KG/M2 | WEIGHT: 162.38 LBS | HEIGHT: 63 IN | SYSTOLIC BLOOD PRESSURE: 116 MMHG

## 2020-10-20 DIAGNOSIS — K76.0 FATTY LIVER: ICD-10-CM

## 2020-10-20 DIAGNOSIS — E03.4 HYPOTHYROIDISM DUE TO ACQUIRED ATROPHY OF THYROID: ICD-10-CM

## 2020-10-20 DIAGNOSIS — Z98.890 POST-OPERATIVE STATE: Primary | ICD-10-CM

## 2020-10-20 DIAGNOSIS — B35.1 ONYCHOMYCOSIS DUE TO DERMATOPHYTE: ICD-10-CM

## 2020-10-20 DIAGNOSIS — I10 ESSENTIAL HYPERTENSION: Primary | ICD-10-CM

## 2020-10-20 DIAGNOSIS — R07.89 OTHER CHEST PAIN: ICD-10-CM

## 2020-10-20 DIAGNOSIS — G43.909 MIGRAINE WITHOUT STATUS MIGRAINOSUS, NOT INTRACTABLE, UNSPECIFIED MIGRAINE TYPE: ICD-10-CM

## 2020-10-20 DIAGNOSIS — R10.9 ABDOMINAL PAIN, UNSPECIFIED ABDOMINAL LOCATION: ICD-10-CM

## 2020-10-20 DIAGNOSIS — F41.9 ANXIETY: ICD-10-CM

## 2020-10-20 DIAGNOSIS — E78.2 MIXED HYPERLIPIDEMIA: ICD-10-CM

## 2020-10-20 DIAGNOSIS — R69 DIAGNOSIS DEFERRED: ICD-10-CM

## 2020-10-20 PROCEDURE — 3008F PR BODY MASS INDEX (BMI) DOCUMENTED: ICD-10-PCS | Mod: HCNC,CPTII,S$GLB, | Performed by: PODIATRIST

## 2020-10-20 PROCEDURE — 3078F PR MOST RECENT DIASTOLIC BLOOD PRESSURE < 80 MM HG: ICD-10-PCS | Mod: HCNC,CPTII,S$GLB, | Performed by: INTERNAL MEDICINE

## 2020-10-20 PROCEDURE — 93005 ELECTROCARDIOGRAM TRACING: CPT | Mod: HCNC,S$GLB,, | Performed by: INTERNAL MEDICINE

## 2020-10-20 PROCEDURE — 99999 PR PBB SHADOW E&M-EST. PATIENT-LVL V: CPT | Mod: PBBFAC,HCNC,, | Performed by: INTERNAL MEDICINE

## 2020-10-20 PROCEDURE — 1159F MED LIST DOCD IN RCRD: CPT | Mod: HCNC,S$GLB,, | Performed by: INTERNAL MEDICINE

## 2020-10-20 PROCEDURE — 1159F PR MEDICATION LIST DOCUMENTED IN MEDICAL RECORD: ICD-10-PCS | Mod: HCNC,S$GLB,, | Performed by: INTERNAL MEDICINE

## 2020-10-20 PROCEDURE — 3008F PR BODY MASS INDEX (BMI) DOCUMENTED: ICD-10-PCS | Mod: HCNC,CPTII,S$GLB, | Performed by: INTERNAL MEDICINE

## 2020-10-20 PROCEDURE — 93010 EKG 12-LEAD: ICD-10-PCS | Mod: HCNC,S$GLB,, | Performed by: INTERNAL MEDICINE

## 2020-10-20 PROCEDURE — 99213 PR OFFICE/OUTPT VISIT, EST, LEVL III, 20-29 MIN: ICD-10-PCS | Mod: HCNC,S$GLB,, | Performed by: PODIATRIST

## 2020-10-20 PROCEDURE — 3074F PR MOST RECENT SYSTOLIC BLOOD PRESSURE < 130 MM HG: ICD-10-PCS | Mod: HCNC,CPTII,S$GLB, | Performed by: INTERNAL MEDICINE

## 2020-10-20 PROCEDURE — 93005 EKG 12-LEAD: ICD-10-PCS | Mod: HCNC,S$GLB,, | Performed by: INTERNAL MEDICINE

## 2020-10-20 PROCEDURE — 99204 PR OFFICE/OUTPT VISIT, NEW, LEVL IV, 45-59 MIN: ICD-10-PCS | Mod: HCNC,S$GLB,, | Performed by: INTERNAL MEDICINE

## 2020-10-20 PROCEDURE — 1159F PR MEDICATION LIST DOCUMENTED IN MEDICAL RECORD: ICD-10-PCS | Mod: HCNC,S$GLB,, | Performed by: PODIATRIST

## 2020-10-20 PROCEDURE — 99204 OFFICE O/P NEW MOD 45 MIN: CPT | Mod: HCNC,S$GLB,, | Performed by: INTERNAL MEDICINE

## 2020-10-20 PROCEDURE — 1101F PT FALLS ASSESS-DOCD LE1/YR: CPT | Mod: HCNC,CPTII,S$GLB, | Performed by: PODIATRIST

## 2020-10-20 PROCEDURE — 3008F BODY MASS INDEX DOCD: CPT | Mod: HCNC,CPTII,S$GLB, | Performed by: INTERNAL MEDICINE

## 2020-10-20 PROCEDURE — 1126F PR PAIN SEVERITY QUANTIFIED, NO PAIN PRESENT: ICD-10-PCS | Mod: HCNC,S$GLB,, | Performed by: INTERNAL MEDICINE

## 2020-10-20 PROCEDURE — 3074F SYST BP LT 130 MM HG: CPT | Mod: HCNC,CPTII,S$GLB, | Performed by: INTERNAL MEDICINE

## 2020-10-20 PROCEDURE — 3078F DIAST BP <80 MM HG: CPT | Mod: HCNC,CPTII,S$GLB, | Performed by: PODIATRIST

## 2020-10-20 PROCEDURE — 3078F DIAST BP <80 MM HG: CPT | Mod: HCNC,CPTII,S$GLB, | Performed by: INTERNAL MEDICINE

## 2020-10-20 PROCEDURE — 3078F PR MOST RECENT DIASTOLIC BLOOD PRESSURE < 80 MM HG: ICD-10-PCS | Mod: HCNC,CPTII,S$GLB, | Performed by: PODIATRIST

## 2020-10-20 PROCEDURE — 1126F AMNT PAIN NOTED NONE PRSNT: CPT | Mod: HCNC,S$GLB,, | Performed by: INTERNAL MEDICINE

## 2020-10-20 PROCEDURE — 1159F MED LIST DOCD IN RCRD: CPT | Mod: HCNC,S$GLB,, | Performed by: PODIATRIST

## 2020-10-20 PROCEDURE — 1126F PR PAIN SEVERITY QUANTIFIED, NO PAIN PRESENT: ICD-10-PCS | Mod: HCNC,S$GLB,, | Performed by: PODIATRIST

## 2020-10-20 PROCEDURE — 99999 PR PBB SHADOW E&M-EST. PATIENT-LVL IV: CPT | Mod: PBBFAC,HCNC,, | Performed by: PODIATRIST

## 2020-10-20 PROCEDURE — 99999 PR PBB SHADOW E&M-EST. PATIENT-LVL IV: ICD-10-PCS | Mod: PBBFAC,HCNC,, | Performed by: PODIATRIST

## 2020-10-20 PROCEDURE — 1101F PR PT FALLS ASSESS DOC 0-1 FALLS W/OUT INJ PAST YR: ICD-10-PCS | Mod: HCNC,CPTII,S$GLB, | Performed by: PODIATRIST

## 2020-10-20 PROCEDURE — 3008F BODY MASS INDEX DOCD: CPT | Mod: HCNC,CPTII,S$GLB, | Performed by: PODIATRIST

## 2020-10-20 PROCEDURE — 1126F AMNT PAIN NOTED NONE PRSNT: CPT | Mod: HCNC,S$GLB,, | Performed by: PODIATRIST

## 2020-10-20 PROCEDURE — 3074F SYST BP LT 130 MM HG: CPT | Mod: HCNC,CPTII,S$GLB, | Performed by: PODIATRIST

## 2020-10-20 PROCEDURE — 1101F PT FALLS ASSESS-DOCD LE1/YR: CPT | Mod: HCNC,CPTII,S$GLB, | Performed by: INTERNAL MEDICINE

## 2020-10-20 PROCEDURE — 93010 ELECTROCARDIOGRAM REPORT: CPT | Mod: HCNC,S$GLB,, | Performed by: INTERNAL MEDICINE

## 2020-10-20 PROCEDURE — 1101F PR PT FALLS ASSESS DOC 0-1 FALLS W/OUT INJ PAST YR: ICD-10-PCS | Mod: HCNC,CPTII,S$GLB, | Performed by: INTERNAL MEDICINE

## 2020-10-20 PROCEDURE — 99999 PR PBB SHADOW E&M-EST. PATIENT-LVL V: ICD-10-PCS | Mod: PBBFAC,HCNC,, | Performed by: INTERNAL MEDICINE

## 2020-10-20 PROCEDURE — 3074F PR MOST RECENT SYSTOLIC BLOOD PRESSURE < 130 MM HG: ICD-10-PCS | Mod: HCNC,CPTII,S$GLB, | Performed by: PODIATRIST

## 2020-10-20 PROCEDURE — 99213 OFFICE O/P EST LOW 20 MIN: CPT | Mod: HCNC,S$GLB,, | Performed by: PODIATRIST

## 2020-10-20 RX ORDER — METHYLPHENIDATE HYDROCHLORIDE 20 MG/1
20 TABLET ORAL
COMMUNITY
Start: 2020-10-12 | End: 2021-01-27

## 2020-10-20 RX ORDER — CYCLOSPORINE 0.5 MG/ML
EMULSION OPHTHALMIC
COMMUNITY
Start: 2020-10-01

## 2020-10-20 RX ORDER — CICLOPIROX 80 MG/ML
SOLUTION TOPICAL NIGHTLY
Qty: 6.6 ML | Refills: 11 | Status: SHIPPED | OUTPATIENT
Start: 2020-10-20 | End: 2022-07-19

## 2020-10-20 RX ORDER — TESTOSTERONE 25 MG/2.5G
GEL TRANSDERMAL
Status: ON HOLD | COMMUNITY
Start: 2020-10-16 | End: 2021-10-19 | Stop reason: HOSPADM

## 2020-10-20 NOTE — PROGRESS NOTES
Subjective:      Patient ID: Tiarra Norton is a 65 y.o. female.    Chief Complaint: Post-op Evaluation (bilateral hallux)    Two weeks status post matricectomy medial lateral borders of the right and left hallux.  Using topical antibiotics.  Covering daily with Band-Aid dressings.  Activity to tolerance.  Denies signs of infection.    CC2:  Thick discolored misshapen toenails all ten toes.  Gradual onset, worsening over past several weeks, aggravated by increased weight bearing, shoe gear, pressure.  No previous medical treatment.  No self-treatment    Review of Systems   Constitution: Negative for chills, decreased appetite, diaphoresis, fever and malaise/fatigue.   Skin: Positive for nail changes. Negative for dry skin, poor wound healing and suspicious lesions.   Musculoskeletal: Negative for joint pain, joint swelling and stiffness.   Neurological: Negative for numbness, paresthesias, seizures and sensory change.           Objective:      Physical Exam  Constitutional:       General: She is not in acute distress.     Appearance: She is well-developed. She is not diaphoretic.   Cardiovascular:      Pulses:           Popliteal pulses are 2+ on the right side and 2+ on the left side.        Dorsalis pedis pulses are 2+ on the right side and 2+ on the left side.        Posterior tibial pulses are 2+ on the right side and 2+ on the left side.      Comments: Capillary refill 3 seconds all toes/distal feet, all toes/both feet warm to touch.      Negative lymphadenopathy bilateral popliteal fossa and tarsal tunnel.      Negavie lower extremity edema bilateral.    Musculoskeletal:      Right ankle: She exhibits normal range of motion, no swelling, no ecchymosis, no deformity, no laceration and normal pulse. Achilles tendon normal. Achilles tendon exhibits no pain, no defect and normal Vega's test results.      Comments: Normal angle, base, station of gait. All ten toes without clubbing, cyanosis, or signs of  ischemia.  No pain to palpation bilateral lower extremities.  Range of motion, stability, muscle strength, and muscle tone normal bilateral feet and legs.    Lymphadenopathy:      Lower Body: No right inguinal adenopathy. No left inguinal adenopathy.      Comments: Negative lymphadenopathy bilateral popliteal fossa and tarsal tunnel.    Negative lymphangitic streaking bilateral feet/ankles/legs.   Skin:     General: Skin is warm and dry.      Capillary Refill: Capillary refill takes 2 to 3 seconds.      Coloration: Skin is not pale.      Findings: No abrasion, bruising, burn, ecchymosis, erythema, laceration, lesion or rash.      Nails: There is no clubbing.        Comments: Surgical wounds medial lateral borders of the right and left hallux clean, granular, without ulceration, drainage, pus, tracking, fluctuance, malodor, or cardinal signs infection.     Otherwise, Skin is normal age and health appropriate color, turgor, texture, and temperature bilateral lower extremities without ulceration, hyperpigmentation, discoloration, masses nodules or cords palpated.  No ecchymosis, erythema, edema, or cardinal signs of infection bilateral lower extremities.    Toenails 1st, 2nd, 3rd, 4th, 5th  bilateral are hypertrophic thickened 2-3 mm, dystrophic, discolored tanish brown with tan, gray crumbly subungual debris.  Tender to distal nail plate pressure, without periungual skin abnormality of each.  Approximately 50% involvement of each nail.     Neurological:      Mental Status: She is alert and oriented to person, place, and time.      Sensory: No sensory deficit.      Motor: No tremor, atrophy or abnormal muscle tone.      Gait: Gait normal.      Deep Tendon Reflexes:      Reflex Scores:       Patellar reflexes are 2+ on the right side and 2+ on the left side.       Achilles reflexes are 2+ on the right side and 2+ on the left side.     Comments: Negative allodynia.   Psychiatric:         Behavior: Behavior is  cooperative.               Assessment:       Encounter Diagnoses   Name Primary?    Post-operative state Yes    Onychomycosis due to dermatophyte          Plan:       Tiarra was seen today for post-op evaluation.    Diagnoses and all orders for this visit:    Post-operative state    Onychomycosis due to dermatophyte    Other orders  -     ciclopirox (PENLAC) 8 % Soln; Apply topically nightly.      I counseled the patient on her conditions, their implications and medical management.        Discussed conservative treatment with shoes of adequate dimensions, material, and style to alleviate symptoms and delay or prevent surgical intervention.    penlac    Continue current wound care for bilateral hallux surgical wounds and to completely heal.          No follow-ups on file.

## 2020-10-20 NOTE — PROGRESS NOTES
"Subjective:   Patient ID:  Tiarra Norton is a 65 y.o. female who presents for evaulation of Chest Pain      HPI: I had a pleasure seeing this patient ) wife of another patient of mine) in .  At that similarly to today she presented with atypical chest pain.  She reports mid epigastric pain associated with nausea.  IT is relieved with belching.  There is an early satiety. Patient lost 25 lbs in the recent past.  She was evaluated by GI with normal gastric emptying, normal EGD, normal colonoscopy and normal abdominal CT. Blood work including amylase and lipase was normal.  She was advised to start PPI , but did not do it yet.    In  symptoms were similar. [facility] was negative for ischemia. We did coronary calcium score and it was "zero" indicating low probability of atherosclerotic disease.    She has no other symptoms.  Patient is sedentary, she is not a smoker, she is a social drinker.    ECG -NSR, nonspecific ST-T wave changes.      Past Medical History:   Diagnosis Date    Cataract     Depression     Hyperlipidemia     Hypertension     IBS (irritable bowel syndrome)     Thyroid disease        Past Surgical History:   Procedure Laterality Date    ADENOIDECTOMY       SECTION      CHOLECYSTECTOMY      COLONOSCOPY N/A 2016    Procedure: COLONOSCOPY;  Surgeon: Heri Segura MD;  Location: [address]);  Service: Endoscopy;  Laterality: N/A;    COLONOSCOPY N/A 2019    Procedure: COLONOSCOPY;  Surgeon: Joe Pitts MD;  Location: [address]);  Service: Endoscopy;  Laterality: N/A;    ESOPHAGOGASTRODUODENOSCOPY N/A 2020    Procedure: EGD (ESOPHAGOGASTRODUODENOSCOPY);  Surgeon: Tolu Rivas MD;  Location: [address]);  Service: Endoscopy;  Laterality: N/A;  Pt. moved to 9:15am arrival time.. Instructed to not have anything after midnight.EC    EYE SURGERY      cataract resection right    r hand surgery      TONSILLECTOMY      " TOTAL KNEE ARTHROPLASTY      partial knee replacement    TUBAL LIGATION         Social History     Socioeconomic History    Marital status:      Spouse name: Not on file    Number of children: 1    Years of education: Not on file    Highest education level: Not on file   Occupational History    Occupation:      Employer: [employer]     Comment: retired   Social Needs    Financial resource strain: Not hard at all    Food insecurity     Worry: Never true     Inability: Never true    Transportation needs     Medical: No     Non-medical: No   Tobacco Use    Smoking status: Never Smoker    Smokeless tobacco: Never Used   Substance and Sexual Activity    Alcohol use: Yes     Alcohol/week: 4.0 standard drinks     Types: 4 Glasses of wine per week     Frequency: 2-3 times a week     Drinks per session: 1 or 2     Binge frequency: Never     Comment: occ    Drug use: No    Sexual activity: Yes     Partners: Male   Lifestyle    Physical activity     Days per week: 2 days     Minutes per session: 20 min    Stress: Rather much   Relationships    Social connections     Talks on phone: More than three times a week     Gets together: Once a week     Attends [employer] service: Never     Active member of club or organization: Yes     Attends meetings of clubs or organizations: More than 4 times per year     Relationship status:    Other Topics Concern    Are you pregnant or think you may be? No    Breast-feeding No   Social History Narrative    Retired        [place].            Review of patient's allergies indicates:  No Known Allergies    Lab Results   Component Value Date     07/30/2020    K 4.5 07/30/2020     07/30/2020    CO2 27 07/30/2020    BUN 10 07/30/2020    CREATININE 0.8 07/30/2020     07/30/2020    HGBA1C 5.5 10/22/2018    AST 28 07/30/2020    ALT 24 07/30/2020    ALBUMIN 4.1 07/30/2020    PROT 7.0 07/30/2020    BILITOT 0.4 07/30/2020    WBC 8.39  "07/30/2020    HGB 14.0 07/30/2020    HCT 43.4 07/30/2020    MCV 92 07/30/2020     07/30/2020    INR 0.9 11/22/2015    TSH 0.528 01/27/2020    CHOL 243 (H) 01/27/2020    HDL 52 01/27/2020    LDLCALC 156.0 01/27/2020    TRIG 175 (H) 01/27/2020         Review of Systems   Constitution: Positive for malaise/fatigue and weight loss.   HENT: Negative.    Eyes: Negative.    Cardiovascular: Negative.  Negative for chest pain, claudication, dyspnea on exertion, irregular heartbeat, leg swelling, near-syncope, palpitations and syncope.   Respiratory: Negative.  Negative for cough, shortness of breath, snoring and wheezing.    Endocrine: Negative.  Negative for cold intolerance, heat intolerance, polydipsia, polyphagia and polyuria.   Skin: Negative.    Musculoskeletal: Positive for arthritis, back pain and joint pain.   Gastrointestinal: Positive for abdominal pain, constipation, diarrhea, heartburn and nausea.   Genitourinary: Negative.    Neurological: Positive for excessive daytime sleepiness and headaches.   Psychiatric/Behavioral: Positive for depression. The patient is nervous/anxious.        Objective:   Physical Exam   Constitutional: She is oriented to person, place, and time. She appears well-developed and well-nourished.   BP (!) 116/57   Pulse 72   Ht 5' 3" (1.6 m)   Wt 73.6 kg (162 lb 5.9 oz)   BMI 28.76 kg/m²      HENT:   Head: Normocephalic.   Eyes: Pupils are equal, round, and reactive to light.   Neck: Normal range of motion. Neck supple. Carotid bruit is not present. No thyromegaly present.   Cardiovascular: Normal rate, regular rhythm, normal heart sounds and intact distal pulses. Exam reveals no gallop and no friction rub.   No murmur heard.  Pulses:       Carotid pulses are 2+ on the right side and 2+ on the left side.       Radial pulses are 2+ on the right side and 2+ on the left side.        Femoral pulses are 2+ on the right side and 2+ on the left side.       Popliteal pulses are 2+ on the " right side and 2+ on the left side.        Dorsalis pedis pulses are 2+ on the right side and 2+ on the left side.        Posterior tibial pulses are 2+ on the right side and 2+ on the left side.   Pulmonary/Chest: Effort normal and breath sounds normal. No respiratory distress. She has no wheezes. She has no rales. She exhibits no tenderness.   Abdominal: Soft. Bowel sounds are normal.   Musculoskeletal: Normal range of motion.         General: No edema.   Neurological: She is alert and oriented to person, place, and time.   Skin: Skin is warm and dry.   Psychiatric: She has a normal mood and affect.   Nursing note and vitals reviewed.      Current Outpatient Medications   Medication Sig    alprazolam (XANAX) 2 MG Tab Take 2 mg by mouth 2 (two) times daily as needed.     amLODIPine (NORVASC) 5 MG tablet Take 1 tablet (5 mg total) by mouth once daily.    atorvastatin (LIPITOR) 10 MG tablet Take 1 tablet (10 mg total) by mouth once daily.    buPROPion (WELLBUTRIN XL) 300 MG 24 hr tablet Take 300 mg by mouth once daily.    cholecalciferol, vitamin D3, (VITAMIN D3) 5,000 unit Tab Take 1 tablet (5,000 Units total) by mouth once daily.    ciclopirox (PENLAC) 8 % Soln Apply topically nightly.    conjugated estrogens (PREMARIN) vaginal cream Place a pea-sized amount in vagina every night for 4 weeks, then use 2-3 nights a week    HYDROcodone-acetaminophen (NORCO) 5-325 mg per tablet 1-2 tabs po q 8 h prn migraine    levothyroxine (SYNTHROID) 137 MCG Tab tablet TAKE 1 TABLET EVERY MORNING    liothyronine (CYTOMEL) 5 MCG Tab Take 1 tablet (5 mcg total) by mouth once daily.    lipase-protease-amylase 24,000-76,000-120,000 units (CREON) 24,000-76,000 -120,000 unit capsule Take 1 capsule by mouth 3 (three) times daily with meals.    meloxicam (MOBIC) 15 MG tablet Take 1 tablet (15 mg total) by mouth once daily.    methylphenidate HCl (RITALIN) 20 MG tablet Take 20 mg by mouth as needed.    ondansetron (ZOFRAN) 4  MG tablet Take 1 tablet (4 mg total) by mouth every 8 (eight) hours as needed for Nausea.    RESTASIS 0.05 % ophthalmic emulsion INT 1 GTT IN OU BID    sumatriptan (IMITREX) 50 MG tablet 1 tab po at start of headache.  Repeat in 2 h prn    testosterone 1 % (25 mg/2.5gram) GlPk APPLY TWO - 3 PUMPS THREE TIMES WEEKLY    tobramycin sulfate 0.3% (TOBREX) 0.3 % ophthalmic solution 1-2 drops topically twice daily to affected toe(s).    traZODone (DESYREL) 100 MG tablet take 1 to 2 tablets by mouth at bedtime for sleep    valsartan (DIOVAN) 320 MG tablet Take 1 tablet (320 mg total) by mouth once daily.    varicella-zoster gE-AS01B, PF, (SHINGRIX) 50 mcg/0.5 mL injection Inject 0.5 mLs into the muscle.    venlafaxine (EFFEXOR-XR) 150 MG Cp24 1 tab po q day     No current facility-administered medications for this visit.        Assessment and Plan:     Problem List Items Addressed This Visit        Cardiology Problems    Hyperlipidemia    Migraine without status migrainosus, not intractable    Essential hypertension - Primary    Relevant Orders    IN OFFICE EKG 12-LEAD (to [facility])    Stress Echo Which stress agent will be used? Exercise       Other    Hypothyroid    Relevant Orders    IN OFFICE EKG 12-LEAD (to [facility])    Stress Echo Which stress agent will be used? Exercise    Fatty liver    Anxiety    Abdominal pain      Other Visit Diagnoses     Diagnosis deferred        Relevant Orders    IN OFFICE EKG 12-LEAD (to [place])          Patient's Medications   New Prescriptions    No medications on file   Previous Medications    ALPRAZOLAM (XANAX) 2 MG TAB    Take 2 mg by mouth 2 (two) times daily as needed.     AMLODIPINE (NORVASC) 5 MG TABLET    Take 1 tablet (5 mg total) by mouth once daily.    ATORVASTATIN (LIPITOR) 10 MG TABLET    Take 1 tablet (10 mg total) by mouth once daily.    BUPROPION (WELLBUTRIN XL) 300 MG 24 HR TABLET    Take 300 mg by mouth once daily.    CHOLECALCIFEROL, VITAMIN D3, (VITAMIN D3) 5,000 UNIT  TAB    Take 1 tablet (5,000 Units total) by mouth once daily.    CICLOPIROX (PENLAC) 8 % SOLN    Apply topically nightly.    CONJUGATED ESTROGENS (PREMARIN) VAGINAL CREAM    Place a pea-sized amount in vagina every night for 4 weeks, then use 2-3 nights a week    HYDROCODONE-ACETAMINOPHEN (NORCO) 5-325 MG PER TABLET    1-2 tabs po q 8 h prn migraine    LEVOTHYROXINE (SYNTHROID) 137 MCG TAB TABLET    TAKE 1 TABLET EVERY MORNING    LIOTHYRONINE (CYTOMEL) 5 MCG TAB    Take 1 tablet (5 mcg total) by mouth once daily.    LIPASE-PROTEASE-AMYLASE 24,000-76,000-120,000 UNITS (CREON) 24,000-76,000 -120,000 UNIT CAPSULE    Take 1 capsule by mouth 3 (three) times daily with meals.    MELOXICAM (MOBIC) 15 MG TABLET    Take 1 tablet (15 mg total) by mouth once daily.    METHYLPHENIDATE HCL (RITALIN) 20 MG TABLET    Take 20 mg by mouth as needed.    ONDANSETRON (ZOFRAN) 4 MG TABLET    Take 1 tablet (4 mg total) by mouth every 8 (eight) hours as needed for Nausea.    RESTASIS 0.05 % OPHTHALMIC EMULSION    INT 1 GTT IN OU BID    SUMATRIPTAN (IMITREX) 50 MG TABLET    1 tab po at start of headache.  Repeat in 2 h prn    TESTOSTERONE 1 % (25 MG/2.5GRAM) GLPK    APPLY TWO - 3 PUMPS THREE TIMES WEEKLY    TOBRAMYCIN SULFATE 0.3% (TOBREX) 0.3 % OPHTHALMIC SOLUTION    1-2 drops topically twice daily to affected toe(s).    TRAZODONE (DESYREL) 100 MG TABLET    take 1 to 2 tablets by mouth at bedtime for sleep    VALSARTAN (DIOVAN) 320 MG TABLET    Take 1 tablet (320 mg total) by mouth once daily.    VARICELLA-ZOSTER GE-AS01B, PF, (SHINGRIX) 50 MCG/0.5 ML INJECTION    Inject 0.5 mLs into the muscle.    VENLAFAXINE (EFFEXOR-XR) 150 MG CP24    1 tab po q day   Modified Medications    No medications on file   Discontinued Medications    BREMELANOTIDE (VYLEESI) 1.75 MG/0.3 ML ATIN    Inject 1 Syringe into the skin as needed.    PROMETHAZINE (PHENERGAN) 25 MG TABLET    Take 1 tablet (25 mg total) by mouth every 6 (six) hours as needed for Nausea.     ZIPRASIDONE (GEODON) 20 MG CAP    40 mg 2 (two) times daily.      Will proceed with stress echo and CD to rule out cardiac etiology of her symptoms.  Encouraged to start PPI.  In case that there might be a possibility of gi distress due to medications she is taking I also advised her to stop Lipitor for a month and reassess her symptoms, then restart and reassess again.  In the meantime follow low fat and low carb diet.  If stress echo normal follow up with PCP and cardiology as needed.  Thank you for allowing me to participate in the care of this patient. Please do not hesitate to contact me with any questions or concerns.

## 2020-10-25 ENCOUNTER — PATIENT MESSAGE (OUTPATIENT)
Dept: INTERNAL MEDICINE | Facility: CLINIC | Age: 65
End: 2020-10-25

## 2020-10-27 ENCOUNTER — OFFICE VISIT (OUTPATIENT)
Dept: INTERNAL MEDICINE | Facility: CLINIC | Age: 65
End: 2020-10-27
Payer: MEDICARE

## 2020-10-27 ENCOUNTER — PATIENT MESSAGE (OUTPATIENT)
Dept: PODIATRY | Facility: CLINIC | Age: 65
End: 2020-10-27

## 2020-10-27 ENCOUNTER — PATIENT MESSAGE (OUTPATIENT)
Dept: DERMATOLOGY | Facility: CLINIC | Age: 65
End: 2020-10-27

## 2020-10-27 VITALS
DIASTOLIC BLOOD PRESSURE: 68 MMHG | OXYGEN SATURATION: 96 % | SYSTOLIC BLOOD PRESSURE: 110 MMHG | WEIGHT: 162.25 LBS | HEIGHT: 63 IN | HEART RATE: 73 BPM | BODY MASS INDEX: 28.75 KG/M2

## 2020-10-27 DIAGNOSIS — R11.0 NAUSEA: Primary | ICD-10-CM

## 2020-10-27 DIAGNOSIS — K76.0 FATTY LIVER: ICD-10-CM

## 2020-10-27 DIAGNOSIS — F33.41 RECURRENT MAJOR DEPRESSIVE DISORDER, IN PARTIAL REMISSION: ICD-10-CM

## 2020-10-27 DIAGNOSIS — R10.13 DYSPEPSIA: ICD-10-CM

## 2020-10-27 DIAGNOSIS — I10 ESSENTIAL HYPERTENSION: ICD-10-CM

## 2020-10-27 PROCEDURE — 1101F PT FALLS ASSESS-DOCD LE1/YR: CPT | Mod: HCNC,CPTII,S$GLB, | Performed by: INTERNAL MEDICINE

## 2020-10-27 PROCEDURE — 3074F SYST BP LT 130 MM HG: CPT | Mod: HCNC,CPTII,S$GLB, | Performed by: INTERNAL MEDICINE

## 2020-10-27 PROCEDURE — 99214 OFFICE O/P EST MOD 30 MIN: CPT | Mod: HCNC,S$GLB,, | Performed by: INTERNAL MEDICINE

## 2020-10-27 PROCEDURE — 1159F PR MEDICATION LIST DOCUMENTED IN MEDICAL RECORD: ICD-10-PCS | Mod: HCNC,S$GLB,, | Performed by: INTERNAL MEDICINE

## 2020-10-27 PROCEDURE — 1159F MED LIST DOCD IN RCRD: CPT | Mod: HCNC,S$GLB,, | Performed by: INTERNAL MEDICINE

## 2020-10-27 PROCEDURE — 3078F DIAST BP <80 MM HG: CPT | Mod: HCNC,CPTII,S$GLB, | Performed by: INTERNAL MEDICINE

## 2020-10-27 PROCEDURE — 3008F PR BODY MASS INDEX (BMI) DOCUMENTED: ICD-10-PCS | Mod: HCNC,CPTII,S$GLB, | Performed by: INTERNAL MEDICINE

## 2020-10-27 PROCEDURE — 3008F BODY MASS INDEX DOCD: CPT | Mod: HCNC,CPTII,S$GLB, | Performed by: INTERNAL MEDICINE

## 2020-10-27 PROCEDURE — 1126F PR PAIN SEVERITY QUANTIFIED, NO PAIN PRESENT: ICD-10-PCS | Mod: HCNC,S$GLB,, | Performed by: INTERNAL MEDICINE

## 2020-10-27 PROCEDURE — 99214 PR OFFICE/OUTPT VISIT, EST, LEVL IV, 30-39 MIN: ICD-10-PCS | Mod: HCNC,S$GLB,, | Performed by: INTERNAL MEDICINE

## 2020-10-27 PROCEDURE — 1126F AMNT PAIN NOTED NONE PRSNT: CPT | Mod: HCNC,S$GLB,, | Performed by: INTERNAL MEDICINE

## 2020-10-27 PROCEDURE — 1101F PR PT FALLS ASSESS DOC 0-1 FALLS W/OUT INJ PAST YR: ICD-10-PCS | Mod: HCNC,CPTII,S$GLB, | Performed by: INTERNAL MEDICINE

## 2020-10-27 PROCEDURE — 3074F PR MOST RECENT SYSTOLIC BLOOD PRESSURE < 130 MM HG: ICD-10-PCS | Mod: HCNC,CPTII,S$GLB, | Performed by: INTERNAL MEDICINE

## 2020-10-27 PROCEDURE — 99999 PR PBB SHADOW E&M-EST. PATIENT-LVL IV: ICD-10-PCS | Mod: PBBFAC,HCNC,, | Performed by: INTERNAL MEDICINE

## 2020-10-27 PROCEDURE — 99999 PR PBB SHADOW E&M-EST. PATIENT-LVL IV: CPT | Mod: PBBFAC,HCNC,, | Performed by: INTERNAL MEDICINE

## 2020-10-27 PROCEDURE — 3078F PR MOST RECENT DIASTOLIC BLOOD PRESSURE < 80 MM HG: ICD-10-PCS | Mod: HCNC,CPTII,S$GLB, | Performed by: INTERNAL MEDICINE

## 2020-10-27 RX ORDER — PANTOPRAZOLE SODIUM 40 MG/1
40 TABLET, DELAYED RELEASE ORAL DAILY
Qty: 30 TABLET | Refills: 2 | Status: SHIPPED | OUTPATIENT
Start: 2020-10-27 | End: 2021-01-27 | Stop reason: SDUPTHER

## 2020-10-27 RX ORDER — PROMETHAZINE HYDROCHLORIDE 12.5 MG/1
12.5 TABLET ORAL EVERY 6 HOURS PRN
Qty: 30 TABLET | Refills: 2 | Status: SHIPPED | OUTPATIENT
Start: 2020-10-27 | End: 2021-11-08 | Stop reason: SDUPTHER

## 2020-10-27 NOTE — PROGRESS NOTES
Subjective:       Patient ID: Tiarra Norton is a 65 y.o. female.    Chief Complaint: Fatigue    HPI   Diarrhea fairly well controlled with her integrative doctor.    Few months ago had upper gi complaints.  Dr Newby has nydia managing. Severe nausea at times along with epigastric pain and feeling bloated.      She was worried about her heart, so saw Dr Kenyon, who felt heart was fine.  She suggested daily nexium x3 weeks.    She was hypnotized by therapist.      She has lost 25-30 lbs spontaneously.    We reviewed normal egd. CT - fatty liver.  Amylase, cmp, cbc normal.    .           Review of Systems   Constitutional: Negative for fever and unexpected weight change.   HENT: Negative for nasal congestion and postnasal drip.    Eyes: Negative for pain, discharge and visual disturbance.   Respiratory: Negative for cough, chest tightness, shortness of breath and wheezing.    Cardiovascular: Negative for chest pain and leg swelling.   Gastrointestinal: Negative for abdominal pain, constipation, diarrhea and nausea.   Genitourinary: Negative for difficulty urinating, dysuria and hematuria.   Integumentary:  Negative for rash.   Neurological: Negative for headaches.   Psychiatric/Behavioral: Negative for dysphoric mood and sleep disturbance. The patient is not nervous/anxious.          Objective:      Physical Exam  Constitutional:       Appearance: She is well-developed.   Eyes:      General: No scleral icterus.  Neck:      Thyroid: No thyromegaly.      Vascular: No JVD.   Cardiovascular:      Rate and Rhythm: Normal rate and regular rhythm.      Heart sounds: Normal heart sounds.   Pulmonary:      Effort: Pulmonary effort is normal. No respiratory distress.      Breath sounds: Normal breath sounds. No wheezing or rales.   Abdominal:      Palpations: Abdomen is soft. There is no mass.      Tenderness: There is abdominal tenderness (epigastric). There is no rebound.      Hernia: No hernia is present.    Neurological:      Mental Status: She is alert and oriented to person, place, and time.   Psychiatric:         Behavior: Behavior normal.         Assessment:       1. Nausea    2. Dyspepsia    3. Essential hypertension    4. Recurrent major depressive disorder, in partial remission    5. Fatty liver        Plan:       Tiarra was seen today for fatigue.    Diagnoses and all orders for this visit:    Nausea    Dyspepsia    Essential hypertension    Recurrent major depressive disorder, in partial remission    Fatty liver    Other orders  -     promethazine (PHENERGAN) 12.5 MG Tab; Take 1 tablet (12.5 mg total) by mouth every 6 (six) hours as needed.  -     pantoprazole (PROTONIX) 40 MG tablet; Take 1 tablet (40 mg total) by mouth once daily.          protonix trial    Stop supplements, as may be contributing to nausea     Reassured - stable symptoms sine beginning of year.  EGD,CT, labs all fine.  All reviewed with her.      Message progress report

## 2020-10-28 ENCOUNTER — OFFICE VISIT (OUTPATIENT)
Dept: DERMATOLOGY | Facility: CLINIC | Age: 65
End: 2020-10-28
Payer: MEDICARE

## 2020-10-28 ENCOUNTER — OFFICE VISIT (OUTPATIENT)
Dept: PODIATRY | Facility: CLINIC | Age: 65
End: 2020-10-28
Payer: MEDICARE

## 2020-10-28 VITALS
DIASTOLIC BLOOD PRESSURE: 62 MMHG | SYSTOLIC BLOOD PRESSURE: 131 MMHG | WEIGHT: 162 LBS | HEIGHT: 63 IN | HEART RATE: 77 BPM | BODY MASS INDEX: 28.7 KG/M2

## 2020-10-28 VITALS — TEMPERATURE: 98 F

## 2020-10-28 DIAGNOSIS — B07.9 VERRUCA VULGARIS: Primary | ICD-10-CM

## 2020-10-28 DIAGNOSIS — L82.0 INFLAMED SEBORRHEIC KERATOSIS: ICD-10-CM

## 2020-10-28 DIAGNOSIS — Z98.890 POST-OPERATIVE STATE: Primary | ICD-10-CM

## 2020-10-28 PROCEDURE — 99999 PR PBB SHADOW E&M-EST. PATIENT-LVL V: CPT | Mod: PBBFAC,HCNC,, | Performed by: PODIATRIST

## 2020-10-28 PROCEDURE — 99999 PR PBB SHADOW E&M-EST. PATIENT-LVL V: ICD-10-PCS | Mod: PBBFAC,HCNC,, | Performed by: PODIATRIST

## 2020-10-28 PROCEDURE — 99024 PR POST-OP FOLLOW-UP VISIT: ICD-10-PCS | Mod: HCNC,S$GLB,, | Performed by: PODIATRIST

## 2020-10-28 PROCEDURE — 99499 UNLISTED E&M SERVICE: CPT | Mod: S$GLB,,, | Performed by: DERMATOLOGY

## 2020-10-28 PROCEDURE — 99499 NO LOS: ICD-10-PCS | Mod: S$GLB,,, | Performed by: DERMATOLOGY

## 2020-10-28 PROCEDURE — 17110 DESTRUCTION B9 LES UP TO 14: CPT | Mod: S$GLB,,, | Performed by: DERMATOLOGY

## 2020-10-28 PROCEDURE — 99024 POSTOP FOLLOW-UP VISIT: CPT | Mod: HCNC,S$GLB,, | Performed by: PODIATRIST

## 2020-10-28 PROCEDURE — 17110 PR DESTRUCTION BENIGN LESIONS UP TO 14: ICD-10-PCS | Mod: S$GLB,,, | Performed by: DERMATOLOGY

## 2020-10-28 NOTE — PROGRESS NOTES
About 3 weeks status post matricectomy bilateral borders right and left hallux.  Using topical antibiotic drops.  Covering with Band-Aids daily.  Patient wearing very small pointed slip-on shoes today.  Denies signs of infection and pain.  Concern for occasional cracking and bleeding at the medial border left hallux.  Not present today.    Denies fever chills night sweats nausea vomiting and pain.  Abnormal nails postoperatively bilateral hallux.    Wounds at the medial lateral borders of the right left hallux have thin tan eschar without open skin drainage pus tracking malodor cardinal signs of infection right and left hallux at any border.  Otherwise the skin is age inappropriate normal color turgor texture and temperature bilateral feet.  DP and PT pulses are palpable bilateral capillary refills less than 3 sec all toe tips, all toes are warm.    Assessment normal healing 3 weeks postop matricectomy bilateral hallux without infection or complication apparent.    Patient continue current care until wounds heal entirely.  Activity to tolerance and shoes of choice.  Follow a p.r.n.

## 2020-10-28 NOTE — PROGRESS NOTES
Subjective:       Patient ID:  Tiarra Norton is a 65 y.o. female who presents for   Chief Complaint   Patient presents with    Spot     Spot - Initial  Affected locations: chest  Duration: 3 years  Signs / symptoms: bleeding, rough and growing  Severity: moderate  Timing: constant  Aggravated by: picking  Relieving factors/Treatments tried: nothing      Review of Systems   Constitutional: Negative for fever.   Gastrointestinal: Negative for nausea and vomiting.   Skin: Negative for rash and recent sunburn.   Hematologic/Lymphatic: Does not bruise/bleed easily.        Objective:    Physical Exam   Constitutional: She appears well-developed and well-nourished. No distress.   Neurological: She is alert and oriented to person, place, and time. She is not disoriented.   Psychiatric: She has a normal mood and affect.   Skin:   Areas Examined (abnormalities noted in diagram):   Neck Inspection Performed  Chest / Axilla Inspection Performed              Diagram Legend     Erythematous scaling macule/papule c/w actinic keratosis       Vascular papule c/w angioma      Pigmented verrucoid papule/plaque c/w seborrheic keratosis      Yellow umbilicated papule c/w sebaceous hyperplasia      Irregularly shaped tan macule c/w lentigo     1-2 mm smooth white papules consistent with Milia      Movable subcutaneous cyst with punctum c/w epidermal inclusion cyst      Subcutaneous movable cyst c/w pilar cyst      Firm pink to brown papule c/w dermatofibroma      Pedunculated fleshy papule(s) c/w skin tag(s)      Evenly pigmented macule c/w junctional nevus     Mildly variegated pigmented, slightly irregular-bordered macule c/w mildly atypical nevus      Flesh colored to evenly pigmented papule c/w intradermal nevus       Pink pearly papule/plaque c/w basal cell carcinoma      Erythematous hyperkeratotic cursted plaque c/w SCC      Surgical scar with no sign of skin cancer recurrence      Open and closed comedones       Inflammatory papules and pustules      Verrucoid papule consistent consistent with wart     Erythematous eczematous patches and plaques     Dystrophic onycholytic nail with subungual debris c/w onychomycosis     Umbilicated papule    Erythematous-base heme-crusted tan verrucoid plaque consistent with inflamed seborrheic keratosis     Erythematous Silvery Scaling Plaque c/w Psoriasis     See annotation      Assessment / Plan:        Verruca vulgaris  Inflamed seborrheic keratosis  Cryosurgery procedure note:  Risk, benefits, and alternatives of cryosurgery are discussed with the patient, including but not limited to the risks of hypopigmentation, hyperpigmentation, scar, infection, recurrence of lesion(s), development of new lesion(s), and need for additional treatment of the lesion(s). Verbal consent obtained from patient. Liquid nitrogen cryosurgery applied to 3 lesion(s) to produce a freeze injury. Counseled patient that blisters may form, and instructed patient on wound care with gentle cleansing and use of Vaseline ointment to keep moist until healed. Handout was provided, and patient was instructed to return to clinic in 1-2 months if lesions do not completely resolve.    Follow up if symptoms worsen or fail to improve.

## 2020-10-30 ENCOUNTER — HOSPITAL ENCOUNTER (OUTPATIENT)
Dept: CARDIOLOGY | Facility: HOSPITAL | Age: 65
Discharge: HOME | End: 2020-10-30
Attending: INTERNAL MEDICINE
Payer: MEDICARE

## 2020-10-30 DIAGNOSIS — E03.4 HYPOTHYROIDISM DUE TO ACQUIRED ATROPHY OF THYROID: ICD-10-CM

## 2020-10-30 DIAGNOSIS — I10 ESSENTIAL HYPERTENSION: ICD-10-CM

## 2020-10-30 LAB
ASCENDING AORTA: 3.01 CM
CV ECHO LV RWT: 0.29 CM
CV STRESS BASE HR: 75 BPM
DIASTOLIC BLOOD PRESSURE: 72 MMHG
DOP CALC LVOT AREA: 2.8 CM2
DOP CALC LVOT DIAMETER: 1.9 CM
DOP CALC LVOT PEAK VEL: 0.95 M/S
DOP CALC LVOT STROKE VOLUME: 64.05 CM3
DOP CALCLVOT PEAK VEL VTI: 22.6 CM
E WAVE DECELERATION TIME: 228.39 MSEC
E/A RATIO: 0.73
E/E' RATIO: 7.06 M/S
ECHO LV POSTERIOR WALL: 0.62 CM (ref 0.6–1.1)
FRACTIONAL SHORTENING: 40 % (ref 28–44)
INTERVENTRICULAR SEPTUM: 0.78 CM (ref 0.6–1.1)
LA MAJOR: 4.56 CM
LA MINOR: 5.26 CM
LA WIDTH: 3.62 CM
LEFT ATRIUM SIZE: 2.86 CM
LEFT ATRIUM VOLUME: 42.99 CM3
LEFT INTERNAL DIMENSION IN SYSTOLE: 2.57 CM (ref 2.1–4)
LEFT VENTRICLE DIASTOLIC VOLUME: 82.82 ML
LEFT VENTRICLE SYSTOLIC VOLUME: 23.98 ML
LEFT VENTRICULAR INTERNAL DIMENSION IN DIASTOLE: 4.29 CM (ref 3.5–6)
LEFT VENTRICULAR MASS: 88.18 G
LV LATERAL E/E' RATIO: 6.67 M/S
LV SEPTAL E/E' RATIO: 7.5 M/S
MV PEAK A VEL: 0.82 M/S
MV PEAK E VEL: 0.6 M/S
MV STENOSIS PRESSURE HALF TIME: 66.23 MS
MV VALVE AREA P 1/2 METHOD: 3.32 CM2
OHS CV CPX 1 MINUTE RECOVERY HEART RATE: 126 BPM
OHS CV CPX 85 PERCENT MAX PREDICTED HEART RATE MALE: 125
OHS CV CPX ESTIMATED METS: 11
OHS CV CPX MAX PREDICTED HEART RATE: 147
OHS CV CPX PATIENT IS FEMALE: 1
OHS CV CPX PATIENT IS MALE: 0
OHS CV CPX PEAK DIASTOLIC BLOOD PRESSURE: 68 MMHG
OHS CV CPX PEAK HEAR RATE: 153 BPM
OHS CV CPX PEAK RATE PRESSURE PRODUCT: NORMAL
OHS CV CPX PEAK SYSTOLIC BLOOD PRESSURE: 168 MMHG
OHS CV CPX PERCENT MAX PREDICTED HEART RATE ACHIEVED: 104
OHS CV CPX RATE PRESSURE PRODUCT PRESENTING: 9975
RA MAJOR: 4.22 CM
RA PRESSURE: 3 MMHG
RA WIDTH: 3.76 CM
RV TISSUE DOPPLER FREE WALL SYSTOLIC VELOCITY 1 (APICAL 4 CHAMBER VIEW): 15.72 CM/S
SINUS: 3.28 CM
STJ: 2.86 CM
STRESS ECHO POST EXERCISE DUR MIN: 6 MINUTES
STRESS ECHO POST EXERCISE DUR SEC: 37 SECONDS
STRESS ST DEPRESSION: 1 MM
SYSTOLIC BLOOD PRESSURE: 133 MMHG
TDI LATERAL: 0.09 M/S
TDI SEPTAL: 0.08 M/S
TDI: 0.09 M/S
TRICUSPID ANNULAR PLANE SYSTOLIC EXCURSION: 1.99 CM

## 2020-10-30 PROCEDURE — 93351 STRESS ECHO (CUPID ONLY): ICD-10-PCS | Mod: 26,HCNC,, | Performed by: INTERNAL MEDICINE

## 2020-10-30 PROCEDURE — 93351 STRESS TTE COMPLETE: CPT | Mod: HCNC

## 2020-10-30 PROCEDURE — 93351 STRESS TTE COMPLETE: CPT | Mod: 26,HCNC,, | Performed by: INTERNAL MEDICINE

## 2020-11-02 ENCOUNTER — TELEPHONE (OUTPATIENT)
Dept: CARDIOLOGY | Facility: CLINIC | Age: 65
End: 2020-11-02

## 2020-11-02 NOTE — TELEPHONE ENCOUNTER
----- Message from Karen Kenyon MD sent at 11/2/2020  8:22 AM CST -----  Please inform the patient that her Stress echo was normal and the heart function was normal as well. Great results.  No other cardiac tests would be indicated at this time.

## 2020-11-05 ENCOUNTER — PATIENT MESSAGE (OUTPATIENT)
Dept: DERMATOLOGY | Facility: CLINIC | Age: 65
End: 2020-11-05

## 2020-11-12 ENCOUNTER — PATIENT MESSAGE (OUTPATIENT)
Dept: INTERNAL MEDICINE | Facility: CLINIC | Age: 65
End: 2020-11-12

## 2020-11-15 ENCOUNTER — PATIENT MESSAGE (OUTPATIENT)
Dept: INTERNAL MEDICINE | Facility: CLINIC | Age: 65
End: 2020-11-15

## 2020-11-17 ENCOUNTER — PATIENT MESSAGE (OUTPATIENT)
Dept: GASTROENTEROLOGY | Facility: CLINIC | Age: 65
End: 2020-11-17

## 2020-11-18 ENCOUNTER — PATIENT MESSAGE (OUTPATIENT)
Dept: GASTROENTEROLOGY | Facility: CLINIC | Age: 65
End: 2020-11-18

## 2020-11-18 ENCOUNTER — PATIENT MESSAGE (OUTPATIENT)
Dept: INTERNAL MEDICINE | Facility: CLINIC | Age: 65
End: 2020-11-18

## 2020-11-18 RX ORDER — LIOTHYRONINE SODIUM 5 UG/1
5 TABLET ORAL DAILY
Qty: 90 TABLET | Refills: 3 | Status: SHIPPED | OUTPATIENT
Start: 2020-11-18 | End: 2023-02-27 | Stop reason: SDUPTHER

## 2020-11-18 RX ORDER — ATORVASTATIN CALCIUM 10 MG/1
10 TABLET, FILM COATED ORAL DAILY
Qty: 90 TABLET | Refills: 3 | Status: SHIPPED | OUTPATIENT
Start: 2020-11-18 | End: 2022-02-02 | Stop reason: SDUPTHER

## 2020-11-18 NOTE — TELEPHONE ENCOUNTER
Appointment scheduled for patient to see Dr.James Rivas on 01/25/2021 for 4:30.  Confirmation mailed.   Mara

## 2020-11-18 NOTE — TELEPHONE ENCOUNTER
Care Due:                  Date            Visit Type   Department     Provider  --------------------------------------------------------------------------------                                             [facility]  Last Visit: 10-      None         MEDICINE       [facility]  Next Visit: None Scheduled  None         None Found                                                            Last  Test          Frequency    Reason                     Performed    Due Date  --------------------------------------------------------------------------------    Lipid Panel.  12 months..  atorvastatin.............  01- 01-    Powered by [employer]. Reference number: [id number]. 11/18/2020 2:56:34 PM   CST

## 2020-12-07 ENCOUNTER — PATIENT MESSAGE (OUTPATIENT)
Dept: GASTROENTEROLOGY | Facility: CLINIC | Age: 65
End: 2020-12-07

## 2020-12-07 ENCOUNTER — PATIENT MESSAGE (OUTPATIENT)
Dept: INTERNAL MEDICINE | Facility: CLINIC | Age: 65
End: 2020-12-07

## 2020-12-08 NOTE — TELEPHONE ENCOUNTER
Spoke with patient.  Scheduled to see Lashaun Rosas NP on 12/18 for 3:40.  Confirmation mailed.  Mara

## 2020-12-13 ENCOUNTER — PATIENT MESSAGE (OUTPATIENT)
Dept: INTERNAL MEDICINE | Facility: CLINIC | Age: 65
End: 2020-12-13

## 2020-12-15 ENCOUNTER — PATIENT OUTREACH (OUTPATIENT)
Dept: OTHER | Facility: OTHER | Age: 65
End: 2020-12-15

## 2020-12-15 NOTE — PROGRESS NOTES
[facility]: Health  Follow-Up    The history is provided by the patient.             Reason for review: Blood pressure at goal        Topics Covered on Call: physical activity and Diet    Additional Follow-up details: She has been struggling with IBS and dyspepsia this week and also reports having an emotional week, as her son is a photojournalist in [place] covering protests there. Also states COVID is making her weary. She has been volunteering at her local [employer] 2-3x/week which helps take her mind off things.            Diet-Change      Additional diet details: Reports eating very little due to IBS flare up. She has an upcoming appointment with gastroenterology to discuss.     Physical Activity-no change to routine  No change to exercise routine.       Additional physical activity details: Her  has put together her bike stand so she can begin biking indoors. She states she plans to find some videos to ride to. She is agreeable for me to send some, will send via [employer].         Medication Adherence-Medication Adherence not addressed.        Substance, Sleep, Stress-change  stress-assessed  Details:Patient expresses concern about her health and her son's involvement in election protests.  Intervention(s):    Sleep-not assessed  Details:  Intervention(s):    Alcohol -not assessed  Details:  Intervention(s):    Tobacco-Not Assessed  Details:  Intervention(s):          Additional monitoring needed.  Continue current diet/physical activity routine.  Provided patient education.       Addressed patient questions and patient has my contact information if needed prior to next outreach. Patient verbalizes understanding.      Explained the importance of self-monitoring and medication adherence. Encouraged the patient to communicate with their health  for lifestyle modifications to help improve or maintain a healthy lifestyle.               There are no preventive care reminders to display for  this patient.      Last 5 Patient Entered Readings                                      Current 30 Day Average: 122/71     Recent Readings 12/10/2020 12/2/2020 11/20/2020 11/17/2020 11/5/2020    SBP (mmHg) 118 115 137 118 123    DBP (mmHg) 66 74 75 70 77    Pulse 73 71 83 72 78

## 2020-12-16 ENCOUNTER — PATIENT MESSAGE (OUTPATIENT)
Dept: OTHER | Facility: OTHER | Age: 65
End: 2020-12-16

## 2020-12-18 ENCOUNTER — PATIENT MESSAGE (OUTPATIENT)
Dept: PODIATRY | Facility: CLINIC | Age: 65
End: 2020-12-18

## 2020-12-18 ENCOUNTER — OFFICE VISIT (OUTPATIENT)
Dept: GASTROENTEROLOGY | Facility: CLINIC | Age: 65
End: 2020-12-18
Payer: MEDICARE

## 2020-12-18 VITALS
DIASTOLIC BLOOD PRESSURE: 75 MMHG | SYSTOLIC BLOOD PRESSURE: 130 MMHG | HEART RATE: 65 BPM | WEIGHT: 161.38 LBS | BODY MASS INDEX: 28.59 KG/M2 | HEIGHT: 63 IN

## 2020-12-18 DIAGNOSIS — R15.1 FECAL SMEARING: Primary | ICD-10-CM

## 2020-12-18 DIAGNOSIS — K58.0 IRRITABLE BOWEL SYNDROME WITH DIARRHEA: ICD-10-CM

## 2020-12-18 DIAGNOSIS — R15.9 INCONTINENCE OF FECES, UNSPECIFIED FECAL INCONTINENCE TYPE: ICD-10-CM

## 2020-12-18 PROCEDURE — 1101F PT FALLS ASSESS-DOCD LE1/YR: CPT | Mod: HCNC,CPTII,S$GLB, | Performed by: NURSE PRACTITIONER

## 2020-12-18 PROCEDURE — 3288F PR FALLS RISK ASSESSMENT DOCUMENTED: ICD-10-PCS | Mod: HCNC,CPTII,S$GLB, | Performed by: NURSE PRACTITIONER

## 2020-12-18 PROCEDURE — 3078F DIAST BP <80 MM HG: CPT | Mod: HCNC,CPTII,S$GLB, | Performed by: NURSE PRACTITIONER

## 2020-12-18 PROCEDURE — 1126F AMNT PAIN NOTED NONE PRSNT: CPT | Mod: HCNC,S$GLB,, | Performed by: NURSE PRACTITIONER

## 2020-12-18 PROCEDURE — 1101F PR PT FALLS ASSESS DOC 0-1 FALLS W/OUT INJ PAST YR: ICD-10-PCS | Mod: HCNC,CPTII,S$GLB, | Performed by: NURSE PRACTITIONER

## 2020-12-18 PROCEDURE — 3075F SYST BP GE 130 - 139MM HG: CPT | Mod: HCNC,CPTII,S$GLB, | Performed by: NURSE PRACTITIONER

## 2020-12-18 PROCEDURE — 99215 PR OFFICE/OUTPT VISIT, EST, LEVL V, 40-54 MIN: ICD-10-PCS | Mod: HCNC,S$GLB,, | Performed by: NURSE PRACTITIONER

## 2020-12-18 PROCEDURE — 3288F FALL RISK ASSESSMENT DOCD: CPT | Mod: HCNC,CPTII,S$GLB, | Performed by: NURSE PRACTITIONER

## 2020-12-18 PROCEDURE — 99999 PR PBB SHADOW E&M-EST. PATIENT-LVL V: ICD-10-PCS | Mod: PBBFAC,HCNC,, | Performed by: NURSE PRACTITIONER

## 2020-12-18 PROCEDURE — 99999 PR PBB SHADOW E&M-EST. PATIENT-LVL V: CPT | Mod: PBBFAC,HCNC,, | Performed by: NURSE PRACTITIONER

## 2020-12-18 PROCEDURE — 1159F MED LIST DOCD IN RCRD: CPT | Mod: HCNC,S$GLB,, | Performed by: NURSE PRACTITIONER

## 2020-12-18 PROCEDURE — 3075F PR MOST RECENT SYSTOLIC BLOOD PRESS GE 130-139MM HG: ICD-10-PCS | Mod: HCNC,CPTII,S$GLB, | Performed by: NURSE PRACTITIONER

## 2020-12-18 PROCEDURE — 3008F BODY MASS INDEX DOCD: CPT | Mod: HCNC,CPTII,S$GLB, | Performed by: NURSE PRACTITIONER

## 2020-12-18 PROCEDURE — 3008F PR BODY MASS INDEX (BMI) DOCUMENTED: ICD-10-PCS | Mod: HCNC,CPTII,S$GLB, | Performed by: NURSE PRACTITIONER

## 2020-12-18 PROCEDURE — 1159F PR MEDICATION LIST DOCUMENTED IN MEDICAL RECORD: ICD-10-PCS | Mod: HCNC,S$GLB,, | Performed by: NURSE PRACTITIONER

## 2020-12-18 PROCEDURE — 3078F PR MOST RECENT DIASTOLIC BLOOD PRESSURE < 80 MM HG: ICD-10-PCS | Mod: HCNC,CPTII,S$GLB, | Performed by: NURSE PRACTITIONER

## 2020-12-18 PROCEDURE — 99215 OFFICE O/P EST HI 40 MIN: CPT | Mod: HCNC,S$GLB,, | Performed by: NURSE PRACTITIONER

## 2020-12-18 PROCEDURE — 1126F PR PAIN SEVERITY QUANTIFIED, NO PAIN PRESENT: ICD-10-PCS | Mod: HCNC,S$GLB,, | Performed by: NURSE PRACTITIONER

## 2020-12-18 RX ORDER — DICYCLOMINE HYDROCHLORIDE 20 MG/1
20 TABLET ORAL 3 TIMES DAILY
Qty: 270 TABLET | Refills: 3 | Status: SHIPPED | OUTPATIENT
Start: 2020-12-18 | End: 2021-12-18

## 2020-12-18 RX ORDER — DICYCLOMINE HYDROCHLORIDE 20 MG/1
20 TABLET ORAL 3 TIMES DAILY PRN
Qty: 90 TABLET | Refills: 3 | Status: SHIPPED | OUTPATIENT
Start: 2020-12-18 | End: 2020-12-18

## 2020-12-18 RX ORDER — CHOLESTYRAMINE 4 G/9G
4 POWDER, FOR SUSPENSION ORAL 2 TIMES DAILY
Qty: 20 PACKET | Refills: 0 | Status: SHIPPED | OUTPATIENT
Start: 2020-12-18 | End: 2020-12-28

## 2020-12-18 RX ORDER — CHOLESTYRAMINE 4 G/9G
4 POWDER, FOR SUSPENSION ORAL 2 TIMES DAILY
Qty: 180 PACKET | Refills: 3 | Status: SHIPPED | OUTPATIENT
Start: 2020-12-18 | End: 2022-08-10

## 2020-12-18 RX ORDER — INFLUENZA A VIRUS A/MICHIGAN/45/2015 X-275 (H1N1) ANTIGEN (FORMALDEHYDE INACTIVATED), INFLUENZA A VIRUS A/SINGAPORE/INFIMH-16-0019/2016 IVR-186 (H3N2) ANTIGEN (FORMALDEHYDE INACTIVATED), INFLUENZA B VIRUS B/PHUKET/3073/2013 ANTIGEN (FORMALDEHYDE INACTIVATED), AND INFLUENZA B VIRUS B/MARYLAND/15/2016 BX-69A ANTIGEN (FORMALDEHYDE INACTIVATED) 60; 60; 60; 60 UG/.7ML; UG/.7ML; UG/.7ML; UG/.7ML
INJECTION, SUSPENSION INTRAMUSCULAR
COMMUNITY
Start: 2020-09-10 | End: 2021-01-27

## 2020-12-18 RX ORDER — DICYCLOMINE HYDROCHLORIDE 20 MG/1
20 TABLET ORAL 3 TIMES DAILY
Qty: 30 TABLET | Refills: 0 | Status: SHIPPED | OUTPATIENT
Start: 2020-12-18 | End: 2020-12-28

## 2020-12-18 NOTE — PROGRESS NOTES
[facility] Consultation Note    Reason for Consult:  The primary encounter diagnosis was Fecal smearing. Diagnoses of Irritable bowel syndrome with diarrhea and Incontinence of feces, unspecified fecal incontinence type were also pertinent to this visit.    PCP:   Kaykay Perales       Referring MD:  No referring provider defined for this encounter.    Initial History of Present Illness (HPI):  This is a 65 y.o. female here for evaluation of IBS mixed.  She is an established patient of Dr. Rivas.  He has followed her for IBS with diarrhea.  Prior to that she saw all another NP in GI in 2016 for IBS with diarrhea. Very complex case. The reason she was absent from our clinic between [id number] is because she found an alternative medicine doctor that was managing her sx holisitically, this worked for a while but stopped. This is why she returned to see Dr. Rivas in early 2020.   She has predominant diarrhea, usually has about 15 BMs in a day. But these are not full BMs. Sometimes it can just be a fecal smearing. Can have mucous in the stool. No blood in the stool.   But sometime Has fecal incontinence.  She can have constipation sometimes too, this occurs a couple times every couple of weeks. She does not take anything for the constipation, she eventually returns to the diarrhea episodes.   There is cramping with a BM, and it does center around a BM. Improves a little bit with the BM.    Has dyspepsia as well. She gets epigastric discomfort, gets a knot, not relieved with a burp. Feels nauseated, does not always throw up.   No regurgitation, but does have pyrosis.     Taking creon once before dinner, has not improved anything at all.    She is curious if she has fibromyalgia. Has a lot of body aches.       Treatments tried in the past:  Creon  Rifaximin - according to Bruce note 2016 this did help her symptoms, but patient is unsure if it helped her symptoms  Josemanuel Castillo - 2016 clinic  note reports this did help with her symptoms mildly  probiotics  Fiber 9 tablets per day.      ROS:  Constitutional: No fevers, chills, No weight loss  ENT:  No sore throat, no PND  CV: No chest pain, no palpitation  Pulm: No cough, No shortness of breath, no wheezing  Ophtho: No vision changes  GI: see HPI  Derm: No rash, no itching  Heme: No easy bruising  MSK: No significant arthritis  : No dysuria, No hematuria  Neuro: No syncope, No seizure  Psych: No uncontrolled anxiety, + depression    Medical History:  has a past medical history of Cataract, Depression, Hyperlipidemia, Hypertension, IBS (irritable bowel syndrome), and Thyroid disease.    Surgical History:  has a past surgical history that includes  section; Tubal ligation; Tonsillectomy; Adenoidectomy; Cholecystectomy; Eye surgery; r hand surgery; Total knee arthroplasty; Colonoscopy (N/A, 2016); Colonoscopy (N/A, 2019); and Esophagogastroduodenoscopy (N/A, 2020).    Family History: family history includes Alcohol abuse in her sister and sister; Breast cancer in her paternal aunt; Cancer in her father; Depression in her sister; Heart attack in her father; Heart disease in her father; Heart failure in her father; Hyperlipidemia in her father and mother; Hypertension in her father and mother; Irritable bowel syndrome in her mother and sister; Ovarian cancer in her maternal aunt..     Social History:  reports that she has never smoked. She has never used smokeless tobacco. She reports current alcohol use of about 4.0 standard drinks of alcohol per week. She reports that she does not use drugs.    Review of patient's allergies indicates:  No Known Allergies    Medication List with Changes/Refills   New Medications    CHOLESTYRAMINE (QUESTRAN) 4 GRAM PACKET    Take 1 packet (4 g total) by mouth 2 (two) times daily.    CHOLESTYRAMINE (QUESTRAN) 4 GRAM PACKET    Take 1 packet (4 g total) by mouth 2 (two) times daily. for 10 days     DICYCLOMINE (BENTYL) 20 MG TABLET    Take 1 tablet (20 mg total) by mouth 3 (three) times daily. for 10 days    DICYCLOMINE (BENTYL) 20 MG TABLET    Take 1 tablet (20 mg total) by mouth 3 (three) times daily.   Current Medications    ALPRAZOLAM (XANAX) 2 MG TAB    Take 2 mg by mouth 2 (two) times daily as needed.     AMLODIPINE (NORVASC) 5 MG TABLET    Take 1 tablet (5 mg total) by mouth once daily.    ATORVASTATIN (LIPITOR) 10 MG TABLET    Take 1 tablet (10 mg total) by mouth once daily.    BUPROPION (WELLBUTRIN XL) 300 MG 24 HR TABLET    Take 300 mg by mouth once daily.    CHOLECALCIFEROL, VITAMIN D3, (VITAMIN D3) 5,000 UNIT TAB    Take 1 tablet (5,000 Units total) by mouth once daily.    CICLOPIROX (PENLAC) 8 % SOLN    Apply topically nightly.    CONJUGATED ESTROGENS (PREMARIN) VAGINAL CREAM    Place a pea-sized amount in vagina every night for 4 weeks, then use 2-3 nights a week    FLUZONE HIGHDOSE QUAD 20-21  MCG/0.7 ML SYRG    ADM 0.7ML IM UTD    HYDROCODONE-ACETAMINOPHEN (NORCO) 5-325 MG PER TABLET    1-2 tabs po q 8 h prn migraine    LEVOTHYROXINE (SYNTHROID) 137 MCG TAB TABLET    TAKE 1 TABLET EVERY MORNING    LIOTHYRONINE (CYTOMEL) 5 MCG TAB    Take 1 tablet (5 mcg total) by mouth once daily.    LIPASE-PROTEASE-AMYLASE 24,000-76,000-120,000 UNITS (CREON) 24,000-76,000 -120,000 UNIT CAPSULE    Take 1 capsule by mouth 3 (three) times daily with meals.    METHYLPHENIDATE HCL (RITALIN) 20 MG TABLET    Take 20 mg by mouth as needed.    PANTOPRAZOLE (PROTONIX) 40 MG TABLET    Take 1 tablet (40 mg total) by mouth once daily.    PROMETHAZINE (PHENERGAN) 12.5 MG TAB    Take 1 tablet (12.5 mg total) by mouth every 6 (six) hours as needed.    RESTASIS 0.05 % OPHTHALMIC EMULSION    INT 1 GTT IN OU BID    SUMATRIPTAN (IMITREX) 50 MG TABLET    1 tab po at start of headache.  Repeat in 2 h prn    TESTOSTERONE 1 % (25 MG/2.5GRAM) GLPK    APPLY TWO - 3 PUMPS THREE TIMES WEEKLY    TOBRAMYCIN SULFATE 0.3% (TOBREX) 0.3  "% OPHTHALMIC SOLUTION    1-2 drops topically twice daily to affected toe(s).    TRAZODONE (DESYREL) 100 MG TABLET    take 1 to 2 tablets by mouth at bedtime for sleep    VALSARTAN (DIOVAN) 320 MG TABLET    Take 1 tablet (320 mg total) by mouth once daily.    VARICELLA-ZOSTER GE-AS01B, PF, (SHINGRIX) 50 MCG/0.5 ML INJECTION    Inject 0.5 mLs into the muscle.    VENLAFAXINE (EFFEXOR-XR) 150 MG CP24    1 tab po q day         Objective Findings:    Vital Signs:  /75 (BP Location: Right arm)   Pulse 65   Ht 5' 3" (1.6 m)   Wt 73.2 kg (161 lb 6.4 oz)   BMI 28.59 kg/m²   Body mass index is 28.59 kg/m².    Physical Exam:  General Appearance: Well appearing, NAD noted  Eyes:    No scleral icterus  ENT:  No lesions or masses   Musculoskeletal:  No major joint deformities  Skin: No petechiae or rash on exposed skin areas  Neurologic:  Alert and oriented x4  Psychiatric:  Normal speech mentation and affect    Labs reviewed:  Lab Results   Component Value Date    WBC 8.39 07/30/2020    HGB 14.0 07/30/2020    HCT 43.4 07/30/2020     07/30/2020    CHOL 243 (H) 01/27/2020    TRIG 175 (H) 01/27/2020    HDL 52 01/27/2020    ALT 24 07/30/2020    AST 28 07/30/2020     07/30/2020    K 4.5 07/30/2020     07/30/2020    CREATININE 0.8 07/30/2020    BUN 10 07/30/2020    CO2 27 07/30/2020    TSH 0.528 01/27/2020    INR 0.9 11/22/2015    HGBA1C 5.5 10/22/2018           Imaging reviewed:  Normal gastric emptying scan August 2020    Unremarkable CT scan February 2020 for weight loss    Endoscopy reviewed:    EGD 02/2020 White plaques mucosa in the esophagus. Biopsied. WNL                        this does not look like reflux esophagitis. some                         features of stasis, but not circumferential                         - Erythematous mucosa in the stomach. Biopsied. WNL                         - Normal examined duodenum. Biopsied. WNL    Screening colonoscopy in 2019 with polyp removal  Colonoscopy for " diarrhea in 2016, biopsies negative for diarrhea. Polyps removed as well    Medical Decision Making:    Assessment:    Tiarra Norton is a 65 y.o. female here for:    1. Fecal smearing    2. Irritable bowel syndrome with diarrhea    3. Incontinence of feces, unspecified fecal incontinence type      Chronic GI symptoms that have worsened recently.  In 2016 she had negative stool studies.  Recent EGD unremarkable.  In gastric emptying scan and CT scan unremarkable. Colonoscopy for diarrhea in 2016, biopsies negative for diarrhea     Recommendations:  1. Stool studies  2. PFT for fecal incontinence  3. Bentyl 20 mg TID. I preferred to prescribe the 10 mg dose but pt persistent that she prefers 20 mg dose  4. Cholestyramine 1-2 x per day for the diarrhea  5.  Trial Heathers tummy tamers fiber and peppermint oil capsules  6.  We did discuss neuro modulating therapy today.  She is already on effects or it seems to help with her depression.  However if the above symptoms are not controlled with my recommendations today then we will try Xifaxan.  If Xifaxan does not work we may consider switching her antidepressant to a TCA such as Elavil    We had extensive discussion today discussing her previous workup and her IBS symptoms.  [place] [facility] [place] and this was discussed and detail today.    Follow up in about 8 weeks (around 2/12/2021).      Order summary:  Orders Placed This Encounter    Calprotectin, Stool    Fecal fat, qualitative    Pancreatic elastase, fecal    Ambulatory referral/consult to Physical/Occupational Therapy    dicyclomine (BENTYL) 20 mg tablet    dicyclomine (BENTYL) 20 mg tablet    cholestyramine (QUESTRAN) 4 gram packet    cholestyramine (QUESTRAN) 4 gram packet     55 MINUTES TOTAL FACE TO FACE >50% SPENT IN COUNSELING AND COORDINATION OF CARE       Thank you for allowing me to participate in the care of Tirara Norton    Lashaun Rosas, CHRISTOPHERC

## 2020-12-19 ENCOUNTER — LAB VISIT (OUTPATIENT)
Dept: LAB | Facility: HOSPITAL | Age: 65
End: 2020-12-19
Attending: INTERNAL MEDICINE
Payer: MEDICARE

## 2020-12-19 DIAGNOSIS — K58.0 IRRITABLE BOWEL SYNDROME WITH DIARRHEA: ICD-10-CM

## 2020-12-19 DIAGNOSIS — R15.1 FECAL SMEARING: ICD-10-CM

## 2020-12-19 PROCEDURE — 89125 SPECIMEN FAT STAIN: CPT | Mod: HCNC

## 2020-12-19 PROCEDURE — 82656 EL-1 FECAL QUAL/SEMIQ: CPT | Mod: HCNC

## 2020-12-19 PROCEDURE — 83993 ASSAY FOR CALPROTECTIN FECAL: CPT | Mod: HCNC

## 2020-12-20 ENCOUNTER — PATIENT MESSAGE (OUTPATIENT)
Dept: GASTROENTEROLOGY | Facility: CLINIC | Age: 65
End: 2020-12-20

## 2020-12-22 ENCOUNTER — PATIENT MESSAGE (OUTPATIENT)
Dept: GASTROENTEROLOGY | Facility: CLINIC | Age: 65
End: 2020-12-22

## 2020-12-22 ENCOUNTER — OFFICE VISIT (OUTPATIENT)
Dept: PODIATRY | Facility: CLINIC | Age: 65
End: 2020-12-22
Payer: MEDICARE

## 2020-12-22 VITALS
BODY MASS INDEX: 28.53 KG/M2 | DIASTOLIC BLOOD PRESSURE: 64 MMHG | WEIGHT: 161 LBS | SYSTOLIC BLOOD PRESSURE: 136 MMHG | HEIGHT: 63 IN | HEART RATE: 72 BPM

## 2020-12-22 DIAGNOSIS — M79.676 PAIN AROUND TOENAIL: Primary | ICD-10-CM

## 2020-12-22 LAB — FAT STL SUDAN IV STN: NORMAL

## 2020-12-22 PROCEDURE — 3075F PR MOST RECENT SYSTOLIC BLOOD PRESS GE 130-139MM HG: ICD-10-PCS | Mod: HCNC,CPTII,S$GLB, | Performed by: PODIATRIST

## 2020-12-22 PROCEDURE — 3008F PR BODY MASS INDEX (BMI) DOCUMENTED: ICD-10-PCS | Mod: HCNC,CPTII,S$GLB, | Performed by: PODIATRIST

## 2020-12-22 PROCEDURE — 1101F PR PT FALLS ASSESS DOC 0-1 FALLS W/OUT INJ PAST YR: ICD-10-PCS | Mod: HCNC,CPTII,S$GLB, | Performed by: PODIATRIST

## 2020-12-22 PROCEDURE — 1159F MED LIST DOCD IN RCRD: CPT | Mod: HCNC,S$GLB,, | Performed by: PODIATRIST

## 2020-12-22 PROCEDURE — 3075F SYST BP GE 130 - 139MM HG: CPT | Mod: HCNC,CPTII,S$GLB, | Performed by: PODIATRIST

## 2020-12-22 PROCEDURE — 3078F DIAST BP <80 MM HG: CPT | Mod: HCNC,CPTII,S$GLB, | Performed by: PODIATRIST

## 2020-12-22 PROCEDURE — 99212 OFFICE O/P EST SF 10 MIN: CPT | Mod: HCNC,S$GLB,, | Performed by: PODIATRIST

## 2020-12-22 PROCEDURE — 3078F PR MOST RECENT DIASTOLIC BLOOD PRESSURE < 80 MM HG: ICD-10-PCS | Mod: HCNC,CPTII,S$GLB, | Performed by: PODIATRIST

## 2020-12-22 PROCEDURE — 3288F FALL RISK ASSESSMENT DOCD: CPT | Mod: HCNC,CPTII,S$GLB, | Performed by: PODIATRIST

## 2020-12-22 PROCEDURE — 1125F PR PAIN SEVERITY QUANTIFIED, PAIN PRESENT: ICD-10-PCS | Mod: HCNC,S$GLB,, | Performed by: PODIATRIST

## 2020-12-22 PROCEDURE — 1125F AMNT PAIN NOTED PAIN PRSNT: CPT | Mod: HCNC,S$GLB,, | Performed by: PODIATRIST

## 2020-12-22 PROCEDURE — 99999 PR PBB SHADOW E&M-EST. PATIENT-LVL V: ICD-10-PCS | Mod: PBBFAC,HCNC,, | Performed by: PODIATRIST

## 2020-12-22 PROCEDURE — 99999 PR PBB SHADOW E&M-EST. PATIENT-LVL V: CPT | Mod: PBBFAC,HCNC,, | Performed by: PODIATRIST

## 2020-12-22 PROCEDURE — 3008F BODY MASS INDEX DOCD: CPT | Mod: HCNC,CPTII,S$GLB, | Performed by: PODIATRIST

## 2020-12-22 PROCEDURE — 3288F PR FALLS RISK ASSESSMENT DOCUMENTED: ICD-10-PCS | Mod: HCNC,CPTII,S$GLB, | Performed by: PODIATRIST

## 2020-12-22 PROCEDURE — 1101F PT FALLS ASSESS-DOCD LE1/YR: CPT | Mod: HCNC,CPTII,S$GLB, | Performed by: PODIATRIST

## 2020-12-22 PROCEDURE — 99212 PR OFFICE/OUTPT VISIT, EST, LEVL II, 10-19 MIN: ICD-10-PCS | Mod: HCNC,S$GLB,, | Performed by: PODIATRIST

## 2020-12-22 PROCEDURE — 1159F PR MEDICATION LIST DOCUMENTED IN MEDICAL RECORD: ICD-10-PCS | Mod: HCNC,S$GLB,, | Performed by: PODIATRIST

## 2020-12-22 NOTE — PROGRESS NOTES
Subjective:      Patient ID: Tiarra Norton is a 65 y.o. female.    Chief Complaint: Toe Pain (Left Great toenail)    Soreness and burning pain on the left great toenail.Gradual onset, worsening over past several weeks, aggravated by increased weight bearing, shoe gear, pressure.  No previous medical treatment.  OTC pain med not helping. Denies trauma, relates surgery 2 months or so ago on both big toenails with me here. Denies signs infection.    Review of Systems   Constitution: Negative for chills, diaphoresis, fever, malaise/fatigue and night sweats.   Cardiovascular: Negative for claudication, cyanosis, leg swelling and syncope.   Skin: Positive for nail changes. Negative for color change, dry skin, rash, suspicious lesions and unusual hair distribution.   Musculoskeletal: Negative for falls, joint pain, joint swelling, muscle cramps, muscle weakness and stiffness.   Gastrointestinal: Negative for constipation, diarrhea, nausea and vomiting.   Neurological: Negative for brief paralysis, disturbances in coordination, focal weakness, numbness, paresthesias, sensory change and tremors.           Objective:      Physical Exam  Constitutional:       General: She is not in acute distress.     Appearance: She is well-developed. She is not diaphoretic.   Cardiovascular:      Pulses:           Popliteal pulses are 2+ on the right side and 2+ on the left side.        Dorsalis pedis pulses are 2+ on the right side and 2+ on the left side.        Posterior tibial pulses are 2+ on the right side and 2+ on the left side.      Comments: Capillary refill 3 seconds all toes/distal feet, all toes/both feet warm to touch.      Negative lymphadenopathy bilateral popliteal fossa and tarsal tunnel.      Negavie lower extremity edema bilateral.    Musculoskeletal:      Right ankle: She exhibits normal range of motion, no swelling, no ecchymosis, no deformity, no laceration and normal pulse. Achilles tendon normal. Achilles  tendon exhibits no pain, no defect and normal Vega's test results.      Comments: Normal angle, base, station of gait. All ten toes without clubbing, cyanosis, or signs of ischemia.  No pain to palpation bilateral lower extremities.  Range of motion, stability, muscle strength, and muscle tone normal bilateral feet and legs.    Lymphadenopathy:      Lower Body: No right inguinal adenopathy. No left inguinal adenopathy.      Comments: Negative lymphadenopathy bilateral popliteal fossa and tarsal tunnel.    Negative lymphangitic streaking bilateral feet/ankles/legs.   Skin:     General: Skin is warm and dry.      Capillary Refill: Capillary refill takes 2 to 3 seconds.      Coloration: Skin is not pale.      Findings: No abrasion, bruising, burn, ecchymosis, erythema, laceration, lesion or rash.      Nails: There is no clubbing.        Comments:   Thick toenail with mildly incurvated edges of the mediolateral borders left hallux with mild tenderness to palpation but  without ulceration, drainage, pus, tracking, fluctuance, malodor, or cardinal signs infection.     Otherwise, Skin is normal age and health appropriate color, turgor, texture, and temperature bilateral lower extremities without ulceration, hyperpigmentation, discoloration, masses nodules or cords palpated.  No ecchymosis, erythema, edema, or cardinal signs of infection bilateral lower extremities.     Neurological:      Mental Status: She is alert and oriented to person, place, and time.      Sensory: No sensory deficit.      Motor: No tremor, atrophy or abnormal muscle tone.      Gait: Gait normal.      Deep Tendon Reflexes:      Reflex Scores:       Patellar reflexes are 2+ on the right side and 2+ on the left side.       Achilles reflexes are 2+ on the right side and 2+ on the left side.     Comments: Negative tinel sign to percussion sural, superficial peroneal, deep peroneal, saphenous, and posterior tibial nerves right and left ankles and  feet.    Negative allodynia   Psychiatric:         Behavior: Behavior is cooperative.               Assessment:       Encounter Diagnosis   Name Primary?    Pain around toenail Yes         Plan:       Tiarra was seen today for toe pain.    Diagnoses and all orders for this visit:    Pain around toenail      I counseled the patient on her conditions, their implications and medical management.        Recommend filing nail to reduce thickness, wearing shoes of adequate length with depth, soft upper, void seems over great toenails.  Recommend stretching the shoes over the area the great toenail on the top surface.    Declines discussion nail removal at this time.    P.r.n.          Follow up if symptoms worsen or fail to improve.

## 2020-12-23 LAB — ELASTASE 1, FECAL: 146 MCG/G

## 2020-12-24 ENCOUNTER — TELEPHONE (OUTPATIENT)
Dept: GASTROENTEROLOGY | Facility: CLINIC | Age: 65
End: 2020-12-24

## 2020-12-24 NOTE — TELEPHONE ENCOUNTER
MA contacted pt to give results per Lashaun ,Elastase mildly low, meaning her pancreas may not secrete enough enzymes for food absorption, she should take the creon carcamo prescribed with every meal. pt mentioned she was only taking with dinner at our visit. Pt is aware she needs to take Creon medication eith every meal and not just at dinner time.     Pt verbalized all understanding and will start Creon with every meal.          ----- Message from Lashaun Rosas NP sent at 12/24/2020  8:48 AM CST -----  She has an outside lab with this finding, so it shouldn't be new news to her. Elastase mildly low, meaning her pancreas may not secrete enough enzymes for food absorption, she should take the creon carcamo prescribed with every meal. I believe she ment_  ioned she was only taking with dinner at our visit.

## 2020-12-24 NOTE — PROGRESS NOTES
She has an outside lab with this finding, so it shouldn't be new news to her. Elastase mildly low, meaning her pancreas may not secrete enough enzymes for food absorption, she should take the creon carcamo prescribed with every meal. I believe she mentioned she was only taking with dinner at our visit.

## 2020-12-29 ENCOUNTER — TELEPHONE (OUTPATIENT)
Dept: GASTROENTEROLOGY | Facility: CLINIC | Age: 65
End: 2020-12-29

## 2020-12-29 LAB — CALPROTECTIN STL-MCNT: 56 MCG/G

## 2020-12-29 NOTE — TELEPHONE ENCOUNTER
MA contacted pt to give results per Lashaun, Her calprotectin very mildly elevated. Do not think this is clinically significant given how low of an elevation it is . Pt verbalized all understanding.

## 2020-12-29 NOTE — PROGRESS NOTES
Her calprotectin very mildly elevated. Do not think this is clinically significant given how low of an elevation it is

## 2021-01-05 ENCOUNTER — PATIENT MESSAGE (OUTPATIENT)
Dept: INTERNAL MEDICINE | Facility: CLINIC | Age: 66
End: 2021-01-05

## 2021-01-05 RX ORDER — HYDROCODONE BITARTRATE AND ACETAMINOPHEN 5; 325 MG/1; MG/1
TABLET ORAL
Qty: 30 TABLET | Refills: 0 | Status: SHIPPED | OUTPATIENT
Start: 2021-01-05 | End: 2021-10-18 | Stop reason: HOSPADM

## 2021-01-08 ENCOUNTER — CLINICAL SUPPORT (OUTPATIENT)
Dept: REHABILITATION | Facility: OTHER | Age: 66
End: 2021-01-08
Payer: MEDICARE

## 2021-01-08 DIAGNOSIS — R27.9 LACK OF COORDINATION: ICD-10-CM

## 2021-01-08 DIAGNOSIS — M79.10 MYALGIA: ICD-10-CM

## 2021-01-08 DIAGNOSIS — R15.9 INCONTINENCE OF FECES, UNSPECIFIED FECAL INCONTINENCE TYPE: ICD-10-CM

## 2021-01-08 DIAGNOSIS — K58.0 IRRITABLE BOWEL SYNDROME WITH DIARRHEA: ICD-10-CM

## 2021-01-08 DIAGNOSIS — R15.1 FECAL SMEARING: ICD-10-CM

## 2021-01-08 PROCEDURE — 97161 PT EVAL LOW COMPLEX 20 MIN: CPT | Mod: HCNC,PN

## 2021-01-08 PROCEDURE — 97112 NEUROMUSCULAR REEDUCATION: CPT | Mod: HCNC,PN

## 2021-01-08 PROCEDURE — 97530 THERAPEUTIC ACTIVITIES: CPT | Mod: HCNC,PN

## 2021-01-13 ENCOUNTER — PATIENT MESSAGE (OUTPATIENT)
Dept: PODIATRY | Facility: CLINIC | Age: 66
End: 2021-01-13

## 2021-01-13 ENCOUNTER — PATIENT MESSAGE (OUTPATIENT)
Dept: INTERNAL MEDICINE | Facility: CLINIC | Age: 66
End: 2021-01-13

## 2021-01-15 ENCOUNTER — CLINICAL SUPPORT (OUTPATIENT)
Dept: REHABILITATION | Facility: OTHER | Age: 66
End: 2021-01-15
Payer: MEDICARE

## 2021-01-15 DIAGNOSIS — M79.10 MYALGIA: Primary | ICD-10-CM

## 2021-01-15 DIAGNOSIS — R27.9 LACK OF COORDINATION: ICD-10-CM

## 2021-01-15 PROCEDURE — 97110 THERAPEUTIC EXERCISES: CPT | Mod: HCNC,PN

## 2021-01-15 PROCEDURE — 97112 NEUROMUSCULAR REEDUCATION: CPT | Mod: HCNC,PN

## 2021-01-15 PROCEDURE — 97140 MANUAL THERAPY 1/> REGIONS: CPT | Mod: HCNC,PN

## 2021-01-17 ENCOUNTER — PATIENT MESSAGE (OUTPATIENT)
Dept: GASTROENTEROLOGY | Facility: CLINIC | Age: 66
End: 2021-01-17

## 2021-01-20 DIAGNOSIS — E78.2 MIXED HYPERLIPIDEMIA: Primary | ICD-10-CM

## 2021-01-20 DIAGNOSIS — E03.4 HYPOTHYROIDISM DUE TO ACQUIRED ATROPHY OF THYROID: ICD-10-CM

## 2021-01-21 ENCOUNTER — PATIENT MESSAGE (OUTPATIENT)
Dept: REHABILITATION | Facility: OTHER | Age: 66
End: 2021-01-21

## 2021-01-22 ENCOUNTER — PATIENT MESSAGE (OUTPATIENT)
Dept: INTERNAL MEDICINE | Facility: CLINIC | Age: 66
End: 2021-01-22

## 2021-01-22 ENCOUNTER — TELEPHONE (OUTPATIENT)
Dept: INTERNAL MEDICINE | Facility: CLINIC | Age: 66
End: 2021-01-22

## 2021-01-22 DIAGNOSIS — E03.4 HYPOTHYROIDISM DUE TO ACQUIRED ATROPHY OF THYROID: Primary | ICD-10-CM

## 2021-01-22 DIAGNOSIS — E78.2 MIXED HYPERLIPIDEMIA: ICD-10-CM

## 2021-01-22 RX ORDER — TRAZODONE HYDROCHLORIDE 100 MG/1
TABLET ORAL
Qty: 180 TABLET | Refills: 3 | Status: SHIPPED | OUTPATIENT
Start: 2021-01-22 | End: 2022-02-07

## 2021-01-22 RX ORDER — LEVOTHYROXINE SODIUM 137 UG/1
TABLET ORAL
Qty: 90 TABLET | Refills: 0 | Status: SHIPPED | OUTPATIENT
Start: 2021-01-22 | End: 2021-01-27 | Stop reason: SDUPTHER

## 2021-01-22 RX ORDER — VALSARTAN 320 MG/1
TABLET ORAL
Qty: 90 TABLET | Refills: 2 | Status: SHIPPED | OUTPATIENT
Start: 2021-01-22 | End: 2021-10-21

## 2021-01-24 ENCOUNTER — PATIENT MESSAGE (OUTPATIENT)
Dept: INTERNAL MEDICINE | Facility: CLINIC | Age: 66
End: 2021-01-24

## 2021-01-25 ENCOUNTER — PATIENT MESSAGE (OUTPATIENT)
Dept: INTERNAL MEDICINE | Facility: CLINIC | Age: 66
End: 2021-01-25

## 2021-01-25 ENCOUNTER — PATIENT MESSAGE (OUTPATIENT)
Dept: GASTROENTEROLOGY | Facility: CLINIC | Age: 66
End: 2021-01-25

## 2021-01-27 ENCOUNTER — OFFICE VISIT (OUTPATIENT)
Dept: INTERNAL MEDICINE | Facility: CLINIC | Age: 66
End: 2021-01-27
Payer: MEDICARE

## 2021-01-27 ENCOUNTER — LAB VISIT (OUTPATIENT)
Dept: LAB | Facility: HOSPITAL | Age: 66
End: 2021-01-27
Attending: INTERNAL MEDICINE
Payer: MEDICARE

## 2021-01-27 VITALS
HEART RATE: 77 BPM | OXYGEN SATURATION: 98 % | HEIGHT: 63 IN | DIASTOLIC BLOOD PRESSURE: 84 MMHG | WEIGHT: 162.5 LBS | SYSTOLIC BLOOD PRESSURE: 136 MMHG | BODY MASS INDEX: 28.79 KG/M2

## 2021-01-27 DIAGNOSIS — E78.2 MIXED HYPERLIPIDEMIA: ICD-10-CM

## 2021-01-27 DIAGNOSIS — E03.4 HYPOTHYROIDISM DUE TO ACQUIRED ATROPHY OF THYROID: ICD-10-CM

## 2021-01-27 DIAGNOSIS — F33.41 RECURRENT MAJOR DEPRESSIVE DISORDER, IN PARTIAL REMISSION: ICD-10-CM

## 2021-01-27 DIAGNOSIS — K86.81 EXOCRINE PANCREATIC INSUFFICIENCY: ICD-10-CM

## 2021-01-27 DIAGNOSIS — G47.33 OSA (OBSTRUCTIVE SLEEP APNEA): ICD-10-CM

## 2021-01-27 DIAGNOSIS — K58.9 IRRITABLE BOWEL SYNDROME, UNSPECIFIED TYPE: ICD-10-CM

## 2021-01-27 DIAGNOSIS — Z00.00 ANNUAL PHYSICAL EXAM: Primary | ICD-10-CM

## 2021-01-27 DIAGNOSIS — R11.0 NAUSEA: ICD-10-CM

## 2021-01-27 DIAGNOSIS — I10 ESSENTIAL HYPERTENSION: ICD-10-CM

## 2021-01-27 LAB
CHOLEST SERPL-MCNC: 151 MG/DL (ref 120–199)
CHOLEST/HDLC SERPL: 2.6 {RATIO} (ref 2–5)
HDLC SERPL-MCNC: 58 MG/DL (ref 40–75)
HDLC SERPL: 38.4 % (ref 20–50)
LDLC SERPL CALC-MCNC: 66.6 MG/DL (ref 63–159)
NONHDLC SERPL-MCNC: 93 MG/DL
T3 SERPL-MCNC: 79 NG/DL (ref 60–180)
TRIGL SERPL-MCNC: 132 MG/DL (ref 30–150)
TSH SERPL DL<=0.005 MIU/L-ACNC: 2.51 UIU/ML (ref 0.4–4)
TSH SERPL DL<=0.005 MIU/L-ACNC: 2.51 UIU/ML (ref 0.4–4)

## 2021-01-27 PROCEDURE — 99499 RISK ADDL DX/OHS AUDIT: ICD-10-PCS | Mod: S$GLB,,, | Performed by: INTERNAL MEDICINE

## 2021-01-27 PROCEDURE — 3075F PR MOST RECENT SYSTOLIC BLOOD PRESS GE 130-139MM HG: ICD-10-PCS | Mod: CPTII,S$GLB,, | Performed by: INTERNAL MEDICINE

## 2021-01-27 PROCEDURE — 1101F PR PT FALLS ASSESS DOC 0-1 FALLS W/OUT INJ PAST YR: ICD-10-PCS | Mod: CPTII,S$GLB,, | Performed by: INTERNAL MEDICINE

## 2021-01-27 PROCEDURE — 3288F PR FALLS RISK ASSESSMENT DOCUMENTED: ICD-10-PCS | Mod: CPTII,S$GLB,, | Performed by: INTERNAL MEDICINE

## 2021-01-27 PROCEDURE — 3079F DIAST BP 80-89 MM HG: CPT | Mod: CPTII,S$GLB,, | Performed by: INTERNAL MEDICINE

## 2021-01-27 PROCEDURE — 99397 PER PM REEVAL EST PAT 65+ YR: CPT | Mod: S$GLB,,, | Performed by: INTERNAL MEDICINE

## 2021-01-27 PROCEDURE — 84480 ASSAY TRIIODOTHYRONINE (T3): CPT

## 2021-01-27 PROCEDURE — 1101F PT FALLS ASSESS-DOCD LE1/YR: CPT | Mod: CPTII,S$GLB,, | Performed by: INTERNAL MEDICINE

## 2021-01-27 PROCEDURE — 80061 LIPID PANEL: CPT

## 2021-01-27 PROCEDURE — 99999 PR PBB SHADOW E&M-EST. PATIENT-LVL III: CPT | Mod: PBBFAC,,, | Performed by: INTERNAL MEDICINE

## 2021-01-27 PROCEDURE — 3008F BODY MASS INDEX DOCD: CPT | Mod: CPTII,S$GLB,, | Performed by: INTERNAL MEDICINE

## 2021-01-27 PROCEDURE — 84443 ASSAY THYROID STIM HORMONE: CPT

## 2021-01-27 PROCEDURE — 3288F FALL RISK ASSESSMENT DOCD: CPT | Mod: CPTII,S$GLB,, | Performed by: INTERNAL MEDICINE

## 2021-01-27 PROCEDURE — 3075F SYST BP GE 130 - 139MM HG: CPT | Mod: CPTII,S$GLB,, | Performed by: INTERNAL MEDICINE

## 2021-01-27 PROCEDURE — 99499 UNLISTED E&M SERVICE: CPT | Mod: S$GLB,,, | Performed by: INTERNAL MEDICINE

## 2021-01-27 PROCEDURE — 99397 PR PREVENTIVE VISIT,EST,65 & OVER: ICD-10-PCS | Mod: S$GLB,,, | Performed by: INTERNAL MEDICINE

## 2021-01-27 PROCEDURE — 3008F PR BODY MASS INDEX (BMI) DOCUMENTED: ICD-10-PCS | Mod: CPTII,S$GLB,, | Performed by: INTERNAL MEDICINE

## 2021-01-27 PROCEDURE — 36415 COLL VENOUS BLD VENIPUNCTURE: CPT

## 2021-01-27 PROCEDURE — 99999 PR PBB SHADOW E&M-EST. PATIENT-LVL III: ICD-10-PCS | Mod: PBBFAC,,, | Performed by: INTERNAL MEDICINE

## 2021-01-27 PROCEDURE — 3079F PR MOST RECENT DIASTOLIC BLOOD PRESSURE 80-89 MM HG: ICD-10-PCS | Mod: CPTII,S$GLB,, | Performed by: INTERNAL MEDICINE

## 2021-01-27 RX ORDER — LEVOTHYROXINE SODIUM 137 UG/1
137 TABLET ORAL EVERY MORNING
Qty: 90 TABLET | Refills: 3 | Status: SHIPPED | OUTPATIENT
Start: 2021-01-27 | End: 2022-02-03 | Stop reason: SDUPTHER

## 2021-01-27 RX ORDER — PANTOPRAZOLE SODIUM 40 MG/1
TABLET, DELAYED RELEASE ORAL
Qty: 90 TABLET | Refills: 2 | Status: SHIPPED | OUTPATIENT
Start: 2021-01-27 | End: 2022-05-18 | Stop reason: SDUPTHER

## 2021-01-27 RX ORDER — PANTOPRAZOLE SODIUM 40 MG/1
40 TABLET, DELAYED RELEASE ORAL DAILY
Qty: 90 TABLET | Refills: 3 | Status: SHIPPED | OUTPATIENT
Start: 2021-01-27 | End: 2021-01-27 | Stop reason: SDUPTHER

## 2021-01-27 RX ORDER — AMLODIPINE BESYLATE 5 MG/1
5 TABLET ORAL DAILY
Qty: 90 TABLET | Refills: 3 | Status: SHIPPED | OUTPATIENT
Start: 2021-01-27 | End: 2022-11-22 | Stop reason: SDUPTHER

## 2021-01-27 RX ORDER — SUMATRIPTAN 50 MG/1
TABLET, FILM COATED ORAL
Qty: 27 TABLET | Refills: 3 | Status: SHIPPED | OUTPATIENT
Start: 2021-01-27 | End: 2022-02-02 | Stop reason: SDUPTHER

## 2021-01-27 RX ORDER — PANTOPRAZOLE SODIUM 40 MG/1
40 TABLET, DELAYED RELEASE ORAL DAILY
Qty: 90 TABLET | Refills: 3 | Status: SHIPPED | OUTPATIENT
Start: 2021-01-27 | End: 2021-01-27

## 2021-01-27 RX ORDER — PROMETHAZINE HYDROCHLORIDE 12.5 MG/1
12.5 TABLET ORAL EVERY 6 HOURS PRN
Qty: 30 TABLET | Refills: 2 | Status: CANCELLED | OUTPATIENT
Start: 2021-01-27

## 2021-02-01 ENCOUNTER — CLINICAL SUPPORT (OUTPATIENT)
Dept: REHABILITATION | Facility: OTHER | Age: 66
End: 2021-02-01
Payer: MEDICARE

## 2021-02-01 DIAGNOSIS — M79.10 MYALGIA: Primary | ICD-10-CM

## 2021-02-01 DIAGNOSIS — R27.9 LACK OF COORDINATION: ICD-10-CM

## 2021-02-01 PROCEDURE — 97140 MANUAL THERAPY 1/> REGIONS: CPT | Mod: PN

## 2021-02-01 PROCEDURE — 97110 THERAPEUTIC EXERCISES: CPT | Mod: PN

## 2021-02-01 PROCEDURE — 97112 NEUROMUSCULAR REEDUCATION: CPT | Mod: PN

## 2021-02-02 ENCOUNTER — PATIENT MESSAGE (OUTPATIENT)
Dept: REHABILITATION | Facility: OTHER | Age: 66
End: 2021-02-02

## 2021-02-02 ENCOUNTER — PATIENT MESSAGE (OUTPATIENT)
Dept: OBSTETRICS AND GYNECOLOGY | Facility: CLINIC | Age: 66
End: 2021-02-02

## 2021-02-02 DIAGNOSIS — Z12.31 VISIT FOR SCREENING MAMMOGRAM: Primary | ICD-10-CM

## 2021-02-04 ENCOUNTER — PATIENT MESSAGE (OUTPATIENT)
Dept: INTERNAL MEDICINE | Facility: CLINIC | Age: 66
End: 2021-02-04

## 2021-02-05 ENCOUNTER — CLINICAL SUPPORT (OUTPATIENT)
Dept: REHABILITATION | Facility: OTHER | Age: 66
End: 2021-02-05
Payer: MEDICARE

## 2021-02-05 DIAGNOSIS — M79.10 MYALGIA: Primary | ICD-10-CM

## 2021-02-05 DIAGNOSIS — R27.9 LACK OF COORDINATION: ICD-10-CM

## 2021-02-05 PROCEDURE — 97110 THERAPEUTIC EXERCISES: CPT | Mod: PN

## 2021-02-05 PROCEDURE — 97112 NEUROMUSCULAR REEDUCATION: CPT | Mod: PN

## 2021-02-06 ENCOUNTER — PATIENT MESSAGE (OUTPATIENT)
Dept: OBSTETRICS AND GYNECOLOGY | Facility: CLINIC | Age: 66
End: 2021-02-06

## 2021-02-07 ENCOUNTER — PATIENT MESSAGE (OUTPATIENT)
Dept: GASTROENTEROLOGY | Facility: CLINIC | Age: 66
End: 2021-02-07

## 2021-02-08 ENCOUNTER — HOSPITAL ENCOUNTER (OUTPATIENT)
Dept: RADIOLOGY | Facility: HOSPITAL | Age: 66
Discharge: HOME | End: 2021-02-08
Attending: OBSTETRICS & GYNECOLOGY
Payer: MEDICARE

## 2021-02-08 DIAGNOSIS — Z12.31 VISIT FOR SCREENING MAMMOGRAM: ICD-10-CM

## 2021-02-08 PROCEDURE — 77063 BREAST TOMOSYNTHESIS BI: CPT | Mod: 26,,, | Performed by: RADIOLOGY

## 2021-02-08 PROCEDURE — 77067 MAMMO DIGITAL SCREENING BILAT WITH TOMO: ICD-10-PCS | Mod: 26,,, | Performed by: RADIOLOGY

## 2021-02-08 PROCEDURE — 77063 MAMMO DIGITAL SCREENING BILAT WITH TOMO: ICD-10-PCS | Mod: 26,,, | Performed by: RADIOLOGY

## 2021-02-08 PROCEDURE — 77067 SCR MAMMO BI INCL CAD: CPT | Mod: 26,,, | Performed by: RADIOLOGY

## 2021-02-08 PROCEDURE — 77067 SCR MAMMO BI INCL CAD: CPT | Mod: TC,PN

## 2021-02-12 ENCOUNTER — CLINICAL SUPPORT (OUTPATIENT)
Dept: REHABILITATION | Facility: OTHER | Age: 66
End: 2021-02-12
Payer: MEDICARE

## 2021-02-12 DIAGNOSIS — M79.10 MYALGIA: Primary | ICD-10-CM

## 2021-02-12 DIAGNOSIS — R27.9 LACK OF COORDINATION: ICD-10-CM

## 2021-02-12 PROCEDURE — 97110 THERAPEUTIC EXERCISES: CPT | Mod: PN

## 2021-02-13 ENCOUNTER — PATIENT MESSAGE (OUTPATIENT)
Dept: PODIATRY | Facility: CLINIC | Age: 66
End: 2021-02-13

## 2021-02-15 ENCOUNTER — OFFICE VISIT (OUTPATIENT)
Dept: GASTROENTEROLOGY | Facility: CLINIC | Age: 66
End: 2021-02-15
Payer: MEDICARE

## 2021-02-15 VITALS
WEIGHT: 162.69 LBS | BODY MASS INDEX: 28.82 KG/M2 | SYSTOLIC BLOOD PRESSURE: 134 MMHG | DIASTOLIC BLOOD PRESSURE: 77 MMHG | HEART RATE: 64 BPM

## 2021-02-15 DIAGNOSIS — K30 FUNCTIONAL DYSPEPSIA: ICD-10-CM

## 2021-02-15 DIAGNOSIS — R10.13 EPIGASTRIC PAIN: ICD-10-CM

## 2021-02-15 DIAGNOSIS — K58.0 IRRITABLE BOWEL SYNDROME WITH DIARRHEA: ICD-10-CM

## 2021-02-15 DIAGNOSIS — R15.1 FECAL SMEARING: Primary | ICD-10-CM

## 2021-02-15 PROCEDURE — 1126F AMNT PAIN NOTED NONE PRSNT: CPT | Mod: S$GLB,,, | Performed by: NURSE PRACTITIONER

## 2021-02-15 PROCEDURE — 1126F PR PAIN SEVERITY QUANTIFIED, NO PAIN PRESENT: ICD-10-PCS | Mod: S$GLB,,, | Performed by: NURSE PRACTITIONER

## 2021-02-15 PROCEDURE — 99213 OFFICE O/P EST LOW 20 MIN: CPT | Mod: S$GLB,,, | Performed by: NURSE PRACTITIONER

## 2021-02-15 PROCEDURE — 3008F BODY MASS INDEX DOCD: CPT | Mod: CPTII,S$GLB,, | Performed by: NURSE PRACTITIONER

## 2021-02-15 PROCEDURE — 3078F DIAST BP <80 MM HG: CPT | Mod: CPTII,S$GLB,, | Performed by: NURSE PRACTITIONER

## 2021-02-15 PROCEDURE — 3078F PR MOST RECENT DIASTOLIC BLOOD PRESSURE < 80 MM HG: ICD-10-PCS | Mod: CPTII,S$GLB,, | Performed by: NURSE PRACTITIONER

## 2021-02-15 PROCEDURE — 1159F PR MEDICATION LIST DOCUMENTED IN MEDICAL RECORD: ICD-10-PCS | Mod: S$GLB,,, | Performed by: NURSE PRACTITIONER

## 2021-02-15 PROCEDURE — 1101F PR PT FALLS ASSESS DOC 0-1 FALLS W/OUT INJ PAST YR: ICD-10-PCS | Mod: CPTII,S$GLB,, | Performed by: NURSE PRACTITIONER

## 2021-02-15 PROCEDURE — 3075F PR MOST RECENT SYSTOLIC BLOOD PRESS GE 130-139MM HG: ICD-10-PCS | Mod: CPTII,S$GLB,, | Performed by: NURSE PRACTITIONER

## 2021-02-15 PROCEDURE — 99999 PR PBB SHADOW E&M-EST. PATIENT-LVL IV: ICD-10-PCS | Mod: PBBFAC,,, | Performed by: NURSE PRACTITIONER

## 2021-02-15 PROCEDURE — 99999 PR PBB SHADOW E&M-EST. PATIENT-LVL IV: CPT | Mod: PBBFAC,,, | Performed by: NURSE PRACTITIONER

## 2021-02-15 PROCEDURE — 3008F PR BODY MASS INDEX (BMI) DOCUMENTED: ICD-10-PCS | Mod: CPTII,S$GLB,, | Performed by: NURSE PRACTITIONER

## 2021-02-15 PROCEDURE — 1101F PT FALLS ASSESS-DOCD LE1/YR: CPT | Mod: CPTII,S$GLB,, | Performed by: NURSE PRACTITIONER

## 2021-02-15 PROCEDURE — 3288F FALL RISK ASSESSMENT DOCD: CPT | Mod: CPTII,S$GLB,, | Performed by: NURSE PRACTITIONER

## 2021-02-15 PROCEDURE — 3075F SYST BP GE 130 - 139MM HG: CPT | Mod: CPTII,S$GLB,, | Performed by: NURSE PRACTITIONER

## 2021-02-15 PROCEDURE — 99213 PR OFFICE/OUTPT VISIT, EST, LEVL III, 20-29 MIN: ICD-10-PCS | Mod: S$GLB,,, | Performed by: NURSE PRACTITIONER

## 2021-02-15 PROCEDURE — 1159F MED LIST DOCD IN RCRD: CPT | Mod: S$GLB,,, | Performed by: NURSE PRACTITIONER

## 2021-02-15 PROCEDURE — 3288F PR FALLS RISK ASSESSMENT DOCUMENTED: ICD-10-PCS | Mod: CPTII,S$GLB,, | Performed by: NURSE PRACTITIONER

## 2021-02-15 RX ORDER — VITAMIN B COMPLEX
1 CAPSULE ORAL DAILY
COMMUNITY
End: 2023-11-16 | Stop reason: CLARIF

## 2021-02-17 ENCOUNTER — PATIENT MESSAGE (OUTPATIENT)
Dept: GASTROENTEROLOGY | Facility: CLINIC | Age: 66
End: 2021-02-17

## 2021-02-19 ENCOUNTER — CLINICAL SUPPORT (OUTPATIENT)
Dept: REHABILITATION | Facility: OTHER | Age: 66
End: 2021-02-19
Payer: MEDICARE

## 2021-02-19 ENCOUNTER — OFFICE VISIT (OUTPATIENT)
Dept: PODIATRY | Facility: CLINIC | Age: 66
End: 2021-02-19
Payer: MEDICARE

## 2021-02-19 VITALS
HEIGHT: 63 IN | BODY MASS INDEX: 28.7 KG/M2 | HEART RATE: 74 BPM | DIASTOLIC BLOOD PRESSURE: 68 MMHG | WEIGHT: 162 LBS | SYSTOLIC BLOOD PRESSURE: 143 MMHG

## 2021-02-19 DIAGNOSIS — B35.1 ONYCHOMYCOSIS DUE TO DERMATOPHYTE: Primary | ICD-10-CM

## 2021-02-19 DIAGNOSIS — M79.10 MYALGIA: Primary | ICD-10-CM

## 2021-02-19 DIAGNOSIS — R27.9 LACK OF COORDINATION: ICD-10-CM

## 2021-02-19 PROCEDURE — 99212 OFFICE O/P EST SF 10 MIN: CPT | Mod: S$GLB,,, | Performed by: PODIATRIST

## 2021-02-19 PROCEDURE — 97140 MANUAL THERAPY 1/> REGIONS: CPT | Mod: PN

## 2021-02-19 PROCEDURE — 3288F FALL RISK ASSESSMENT DOCD: CPT | Mod: CPTII,S$GLB,, | Performed by: PODIATRIST

## 2021-02-19 PROCEDURE — 1159F MED LIST DOCD IN RCRD: CPT | Mod: S$GLB,,, | Performed by: PODIATRIST

## 2021-02-19 PROCEDURE — 97112 NEUROMUSCULAR REEDUCATION: CPT | Mod: PN

## 2021-02-19 PROCEDURE — 1126F AMNT PAIN NOTED NONE PRSNT: CPT | Mod: S$GLB,,, | Performed by: PODIATRIST

## 2021-02-19 PROCEDURE — 3077F SYST BP >= 140 MM HG: CPT | Mod: CPTII,S$GLB,, | Performed by: PODIATRIST

## 2021-02-19 PROCEDURE — 99999 PR PBB SHADOW E&M-EST. PATIENT-LVL IV: ICD-10-PCS | Mod: PBBFAC,,, | Performed by: PODIATRIST

## 2021-02-19 PROCEDURE — 1159F PR MEDICATION LIST DOCUMENTED IN MEDICAL RECORD: ICD-10-PCS | Mod: S$GLB,,, | Performed by: PODIATRIST

## 2021-02-19 PROCEDURE — 3078F PR MOST RECENT DIASTOLIC BLOOD PRESSURE < 80 MM HG: ICD-10-PCS | Mod: CPTII,S$GLB,, | Performed by: PODIATRIST

## 2021-02-19 PROCEDURE — 99212 PR OFFICE/OUTPT VISIT, EST, LEVL II, 10-19 MIN: ICD-10-PCS | Mod: S$GLB,,, | Performed by: PODIATRIST

## 2021-02-19 PROCEDURE — 1101F PT FALLS ASSESS-DOCD LE1/YR: CPT | Mod: CPTII,S$GLB,, | Performed by: PODIATRIST

## 2021-02-19 PROCEDURE — 3008F PR BODY MASS INDEX (BMI) DOCUMENTED: ICD-10-PCS | Mod: CPTII,S$GLB,, | Performed by: PODIATRIST

## 2021-02-19 PROCEDURE — 1101F PR PT FALLS ASSESS DOC 0-1 FALLS W/OUT INJ PAST YR: ICD-10-PCS | Mod: CPTII,S$GLB,, | Performed by: PODIATRIST

## 2021-02-19 PROCEDURE — 99999 PR PBB SHADOW E&M-EST. PATIENT-LVL IV: CPT | Mod: PBBFAC,,, | Performed by: PODIATRIST

## 2021-02-19 PROCEDURE — 1126F PR PAIN SEVERITY QUANTIFIED, NO PAIN PRESENT: ICD-10-PCS | Mod: S$GLB,,, | Performed by: PODIATRIST

## 2021-02-19 PROCEDURE — 3077F PR MOST RECENT SYSTOLIC BLOOD PRESSURE >= 140 MM HG: ICD-10-PCS | Mod: CPTII,S$GLB,, | Performed by: PODIATRIST

## 2021-02-19 PROCEDURE — 3288F PR FALLS RISK ASSESSMENT DOCUMENTED: ICD-10-PCS | Mod: CPTII,S$GLB,, | Performed by: PODIATRIST

## 2021-02-19 PROCEDURE — 3078F DIAST BP <80 MM HG: CPT | Mod: CPTII,S$GLB,, | Performed by: PODIATRIST

## 2021-02-19 PROCEDURE — 3008F BODY MASS INDEX DOCD: CPT | Mod: CPTII,S$GLB,, | Performed by: PODIATRIST

## 2021-03-03 ENCOUNTER — PATIENT MESSAGE (OUTPATIENT)
Dept: INTERNAL MEDICINE | Facility: CLINIC | Age: 66
End: 2021-03-03

## 2021-03-03 DIAGNOSIS — E07.89 OTHER SPECIFIED DISORDERS OF THYROID: ICD-10-CM

## 2021-03-03 DIAGNOSIS — Z01.818 PRE-OP EVALUATION: Primary | ICD-10-CM

## 2021-03-05 ENCOUNTER — PATIENT MESSAGE (OUTPATIENT)
Dept: REHABILITATION | Facility: OTHER | Age: 66
End: 2021-03-05

## 2021-03-05 ENCOUNTER — PATIENT MESSAGE (OUTPATIENT)
Dept: INTERNAL MEDICINE | Facility: CLINIC | Age: 66
End: 2021-03-05

## 2021-03-08 ENCOUNTER — PES CALL (OUTPATIENT)
Dept: ADMINISTRATIVE | Facility: CLINIC | Age: 66
End: 2021-03-08

## 2021-03-19 ENCOUNTER — PATIENT MESSAGE (OUTPATIENT)
Dept: REHABILITATION | Facility: OTHER | Age: 66
End: 2021-03-19

## 2021-03-27 ENCOUNTER — PATIENT MESSAGE (OUTPATIENT)
Dept: INTERNAL MEDICINE | Facility: CLINIC | Age: 66
End: 2021-03-27

## 2021-03-27 ENCOUNTER — PATIENT MESSAGE (OUTPATIENT)
Dept: REHABILITATION | Facility: OTHER | Age: 66
End: 2021-03-27

## 2021-03-27 DIAGNOSIS — M54.9 BACK PAIN, UNSPECIFIED BACK LOCATION, UNSPECIFIED BACK PAIN LATERALITY, UNSPECIFIED CHRONICITY: Primary | ICD-10-CM

## 2021-03-29 ENCOUNTER — CLINICAL SUPPORT (OUTPATIENT)
Dept: REHABILITATION | Facility: OTHER | Age: 66
End: 2021-03-29
Payer: MEDICARE

## 2021-03-29 DIAGNOSIS — R27.9 LACK OF COORDINATION: ICD-10-CM

## 2021-03-29 DIAGNOSIS — M79.10 MYALGIA: Primary | ICD-10-CM

## 2021-03-29 PROCEDURE — 97140 MANUAL THERAPY 1/> REGIONS: CPT | Mod: PN

## 2021-03-29 PROCEDURE — 97110 THERAPEUTIC EXERCISES: CPT | Mod: PN

## 2021-04-01 ENCOUNTER — PATIENT MESSAGE (OUTPATIENT)
Dept: REHABILITATION | Facility: OTHER | Age: 66
End: 2021-04-01

## 2021-04-09 ENCOUNTER — CLINICAL SUPPORT (OUTPATIENT)
Dept: REHABILITATION | Facility: OTHER | Age: 66
End: 2021-04-09
Payer: MEDICARE

## 2021-04-09 DIAGNOSIS — M79.10 MYALGIA: Primary | ICD-10-CM

## 2021-04-09 DIAGNOSIS — R27.9 LACK OF COORDINATION: ICD-10-CM

## 2021-04-09 PROCEDURE — 97110 THERAPEUTIC EXERCISES: CPT | Mod: PN

## 2021-04-09 PROCEDURE — 97140 MANUAL THERAPY 1/> REGIONS: CPT | Mod: PN

## 2021-04-14 ENCOUNTER — OFFICE VISIT (OUTPATIENT)
Dept: INTERNAL MEDICINE | Facility: CLINIC | Age: 66
End: 2021-04-14
Payer: MEDICARE

## 2021-04-14 ENCOUNTER — PATIENT MESSAGE (OUTPATIENT)
Dept: INTERNAL MEDICINE | Facility: CLINIC | Age: 66
End: 2021-04-14

## 2021-04-14 VITALS
HEART RATE: 64 BPM | SYSTOLIC BLOOD PRESSURE: 142 MMHG | DIASTOLIC BLOOD PRESSURE: 76 MMHG | WEIGHT: 166.13 LBS | HEIGHT: 63 IN | BODY MASS INDEX: 29.44 KG/M2

## 2021-04-14 DIAGNOSIS — F33.41 RECURRENT MAJOR DEPRESSIVE DISORDER, IN PARTIAL REMISSION: ICD-10-CM

## 2021-04-14 DIAGNOSIS — Z00.00 ENCOUNTER FOR PREVENTIVE HEALTH EXAMINATION: Primary | ICD-10-CM

## 2021-04-14 DIAGNOSIS — E03.4 HYPOTHYROIDISM DUE TO ACQUIRED ATROPHY OF THYROID: ICD-10-CM

## 2021-04-14 DIAGNOSIS — K58.0 IRRITABLE BOWEL SYNDROME WITH DIARRHEA: Primary | ICD-10-CM

## 2021-04-14 DIAGNOSIS — K58.0 IRRITABLE BOWEL SYNDROME WITH DIARRHEA: ICD-10-CM

## 2021-04-14 DIAGNOSIS — E66.3 OVERWEIGHT (BMI 25.0-29.9): ICD-10-CM

## 2021-04-14 DIAGNOSIS — R73.02 GLUCOSE INTOLERANCE (IMPAIRED GLUCOSE TOLERANCE): ICD-10-CM

## 2021-04-14 DIAGNOSIS — G47.33 OSA (OBSTRUCTIVE SLEEP APNEA): ICD-10-CM

## 2021-04-14 DIAGNOSIS — E55.9 VITAMIN D DEFICIENCY: ICD-10-CM

## 2021-04-14 DIAGNOSIS — E78.2 MIXED HYPERLIPIDEMIA: ICD-10-CM

## 2021-04-14 DIAGNOSIS — G43.909 MIGRAINE WITHOUT STATUS MIGRAINOSUS, NOT INTRACTABLE, UNSPECIFIED MIGRAINE TYPE: ICD-10-CM

## 2021-04-14 DIAGNOSIS — K86.81 EXOCRINE PANCREATIC INSUFFICIENCY: ICD-10-CM

## 2021-04-14 DIAGNOSIS — F41.9 ANXIETY: ICD-10-CM

## 2021-04-14 DIAGNOSIS — I10 ESSENTIAL HYPERTENSION: ICD-10-CM

## 2021-04-14 PROCEDURE — 99499 RISK ADDL DX/OHS AUDIT: ICD-10-PCS | Mod: S$GLB,,, | Performed by: NURSE PRACTITIONER

## 2021-04-14 PROCEDURE — 99999 PR PBB SHADOW E&M-EST. PATIENT-LVL IV: ICD-10-PCS | Mod: PBBFAC,,, | Performed by: NURSE PRACTITIONER

## 2021-04-14 PROCEDURE — 99999 PR PBB SHADOW E&M-EST. PATIENT-LVL IV: CPT | Mod: PBBFAC,,, | Performed by: NURSE PRACTITIONER

## 2021-04-14 PROCEDURE — 1101F PR PT FALLS ASSESS DOC 0-1 FALLS W/OUT INJ PAST YR: ICD-10-PCS | Mod: CPTII,S$GLB,, | Performed by: NURSE PRACTITIONER

## 2021-04-14 PROCEDURE — 1125F AMNT PAIN NOTED PAIN PRSNT: CPT | Mod: S$GLB,,, | Performed by: NURSE PRACTITIONER

## 2021-04-14 PROCEDURE — 1158F PR ADVANCE CARE PLANNING DISCUSS DOCUMENTED IN MEDICAL RECORD: ICD-10-PCS | Mod: S$GLB,,, | Performed by: NURSE PRACTITIONER

## 2021-04-14 PROCEDURE — 1158F ADVNC CARE PLAN TLK DOCD: CPT | Mod: S$GLB,,, | Performed by: NURSE PRACTITIONER

## 2021-04-14 PROCEDURE — 99499 UNLISTED E&M SERVICE: CPT | Mod: S$GLB,,, | Performed by: NURSE PRACTITIONER

## 2021-04-14 PROCEDURE — 1101F PT FALLS ASSESS-DOCD LE1/YR: CPT | Mod: CPTII,S$GLB,, | Performed by: NURSE PRACTITIONER

## 2021-04-14 PROCEDURE — 3288F FALL RISK ASSESSMENT DOCD: CPT | Mod: CPTII,S$GLB,, | Performed by: NURSE PRACTITIONER

## 2021-04-14 PROCEDURE — 3008F BODY MASS INDEX DOCD: CPT | Mod: CPTII,S$GLB,, | Performed by: NURSE PRACTITIONER

## 2021-04-14 PROCEDURE — G0439 PPPS, SUBSEQ VISIT: HCPCS | Mod: S$GLB,,, | Performed by: NURSE PRACTITIONER

## 2021-04-14 PROCEDURE — 1125F PR PAIN SEVERITY QUANTIFIED, PAIN PRESENT: ICD-10-PCS | Mod: S$GLB,,, | Performed by: NURSE PRACTITIONER

## 2021-04-14 PROCEDURE — 3288F PR FALLS RISK ASSESSMENT DOCUMENTED: ICD-10-PCS | Mod: CPTII,S$GLB,, | Performed by: NURSE PRACTITIONER

## 2021-04-14 PROCEDURE — 3008F PR BODY MASS INDEX (BMI) DOCUMENTED: ICD-10-PCS | Mod: CPTII,S$GLB,, | Performed by: NURSE PRACTITIONER

## 2021-04-14 PROCEDURE — G0439 PR MEDICARE ANNUAL WELLNESS SUBSEQUENT VISIT: ICD-10-PCS | Mod: S$GLB,,, | Performed by: NURSE PRACTITIONER

## 2021-04-15 ENCOUNTER — PATIENT MESSAGE (OUTPATIENT)
Dept: INTERNAL MEDICINE | Facility: CLINIC | Age: 66
End: 2021-04-15

## 2021-04-15 ENCOUNTER — PATIENT MESSAGE (OUTPATIENT)
Dept: GASTROENTEROLOGY | Facility: CLINIC | Age: 66
End: 2021-04-15

## 2021-04-15 RX ORDER — DIPHENOXYLATE HYDROCHLORIDE AND ATROPINE SULFATE 2.5; .025 MG/1; MG/1
TABLET ORAL
Qty: 30 TABLET | Refills: 0 | Status: SHIPPED | OUTPATIENT
Start: 2021-04-15 | End: 2021-05-17

## 2021-04-16 ENCOUNTER — PATIENT MESSAGE (OUTPATIENT)
Dept: INTERNAL MEDICINE | Facility: CLINIC | Age: 66
End: 2021-04-16

## 2021-04-19 ENCOUNTER — PATIENT MESSAGE (OUTPATIENT)
Dept: INTERNAL MEDICINE | Facility: CLINIC | Age: 66
End: 2021-04-19

## 2021-04-19 DIAGNOSIS — K58.0 IRRITABLE BOWEL SYNDROME WITH DIARRHEA: Primary | ICD-10-CM

## 2021-04-20 ENCOUNTER — OFFICE VISIT (OUTPATIENT)
Dept: GASTROENTEROLOGY | Facility: CLINIC | Age: 66
End: 2021-04-20
Payer: MEDICARE

## 2021-04-20 ENCOUNTER — PATIENT MESSAGE (OUTPATIENT)
Dept: GASTROENTEROLOGY | Facility: CLINIC | Age: 66
End: 2021-04-20

## 2021-04-20 VITALS
BODY MASS INDEX: 29.61 KG/M2 | SYSTOLIC BLOOD PRESSURE: 108 MMHG | HEIGHT: 63 IN | HEART RATE: 75 BPM | DIASTOLIC BLOOD PRESSURE: 71 MMHG | WEIGHT: 167.13 LBS

## 2021-04-20 DIAGNOSIS — K58.0 IRRITABLE BOWEL SYNDROME WITH DIARRHEA: ICD-10-CM

## 2021-04-20 PROCEDURE — 3288F PR FALLS RISK ASSESSMENT DOCUMENTED: ICD-10-PCS | Mod: CPTII,S$GLB,, | Performed by: INTERNAL MEDICINE

## 2021-04-20 PROCEDURE — 3008F BODY MASS INDEX DOCD: CPT | Mod: CPTII,S$GLB,, | Performed by: INTERNAL MEDICINE

## 2021-04-20 PROCEDURE — 99999 PR PBB SHADOW E&M-EST. PATIENT-LVL IV: CPT | Mod: PBBFAC,,, | Performed by: INTERNAL MEDICINE

## 2021-04-20 PROCEDURE — 99214 PR OFFICE/OUTPT VISIT, EST, LEVL IV, 30-39 MIN: ICD-10-PCS | Mod: S$GLB,,, | Performed by: INTERNAL MEDICINE

## 2021-04-20 PROCEDURE — 3008F PR BODY MASS INDEX (BMI) DOCUMENTED: ICD-10-PCS | Mod: CPTII,S$GLB,, | Performed by: INTERNAL MEDICINE

## 2021-04-20 PROCEDURE — 99214 OFFICE O/P EST MOD 30 MIN: CPT | Mod: S$GLB,,, | Performed by: INTERNAL MEDICINE

## 2021-04-20 PROCEDURE — 3288F FALL RISK ASSESSMENT DOCD: CPT | Mod: CPTII,S$GLB,, | Performed by: INTERNAL MEDICINE

## 2021-04-20 PROCEDURE — 1101F PR PT FALLS ASSESS DOC 0-1 FALLS W/OUT INJ PAST YR: ICD-10-PCS | Mod: CPTII,S$GLB,, | Performed by: INTERNAL MEDICINE

## 2021-04-20 PROCEDURE — 1126F PR PAIN SEVERITY QUANTIFIED, NO PAIN PRESENT: ICD-10-PCS | Mod: S$GLB,,, | Performed by: INTERNAL MEDICINE

## 2021-04-20 PROCEDURE — 99999 PR PBB SHADOW E&M-EST. PATIENT-LVL IV: ICD-10-PCS | Mod: PBBFAC,,, | Performed by: INTERNAL MEDICINE

## 2021-04-20 PROCEDURE — 1101F PT FALLS ASSESS-DOCD LE1/YR: CPT | Mod: CPTII,S$GLB,, | Performed by: INTERNAL MEDICINE

## 2021-04-20 PROCEDURE — 1159F MED LIST DOCD IN RCRD: CPT | Mod: S$GLB,,, | Performed by: INTERNAL MEDICINE

## 2021-04-20 PROCEDURE — 1126F AMNT PAIN NOTED NONE PRSNT: CPT | Mod: S$GLB,,, | Performed by: INTERNAL MEDICINE

## 2021-04-20 PROCEDURE — 1159F PR MEDICATION LIST DOCUMENTED IN MEDICAL RECORD: ICD-10-PCS | Mod: S$GLB,,, | Performed by: INTERNAL MEDICINE

## 2021-04-23 ENCOUNTER — CLINICAL SUPPORT (OUTPATIENT)
Dept: REHABILITATION | Facility: OTHER | Age: 66
End: 2021-04-23
Payer: MEDICARE

## 2021-04-23 DIAGNOSIS — R27.9 LACK OF COORDINATION: ICD-10-CM

## 2021-04-23 DIAGNOSIS — M79.10 MYALGIA: Primary | ICD-10-CM

## 2021-04-23 PROCEDURE — 97110 THERAPEUTIC EXERCISES: CPT | Mod: PN

## 2021-04-23 PROCEDURE — 97140 MANUAL THERAPY 1/> REGIONS: CPT | Mod: PN

## 2021-04-28 ENCOUNTER — PATIENT MESSAGE (OUTPATIENT)
Dept: GASTROENTEROLOGY | Facility: CLINIC | Age: 66
End: 2021-04-28

## 2021-04-30 ENCOUNTER — CLINICAL SUPPORT (OUTPATIENT)
Dept: REHABILITATION | Facility: OTHER | Age: 66
End: 2021-04-30
Payer: MEDICARE

## 2021-04-30 DIAGNOSIS — R27.9 LACK OF COORDINATION: ICD-10-CM

## 2021-04-30 DIAGNOSIS — M79.10 MYALGIA: Primary | ICD-10-CM

## 2021-04-30 PROCEDURE — 97110 THERAPEUTIC EXERCISES: CPT | Mod: PN

## 2021-04-30 PROCEDURE — 97140 MANUAL THERAPY 1/> REGIONS: CPT | Mod: PN

## 2021-05-04 ENCOUNTER — CLINICAL SUPPORT (OUTPATIENT)
Dept: REHABILITATION | Facility: OTHER | Age: 66
End: 2021-05-04
Payer: MEDICARE

## 2021-05-04 DIAGNOSIS — M25.69 DECREASED RANGE OF MOTION OF TRUNK AND BACK: ICD-10-CM

## 2021-05-04 DIAGNOSIS — R29.3 POOR POSTURE: ICD-10-CM

## 2021-05-04 DIAGNOSIS — M54.9 BACK PAIN, UNSPECIFIED BACK LOCATION, UNSPECIFIED BACK PAIN LATERALITY, UNSPECIFIED CHRONICITY: ICD-10-CM

## 2021-05-04 PROCEDURE — 97110 THERAPEUTIC EXERCISES: CPT | Mod: PN

## 2021-05-04 PROCEDURE — 97161 PT EVAL LOW COMPLEX 20 MIN: CPT | Mod: PN

## 2021-05-05 PROBLEM — R29.3 POOR POSTURE: Status: ACTIVE | Noted: 2021-05-05

## 2021-05-05 PROBLEM — M25.69 DECREASED RANGE OF MOTION OF TRUNK AND BACK: Status: ACTIVE | Noted: 2021-05-05

## 2021-05-06 ENCOUNTER — CLINICAL SUPPORT (OUTPATIENT)
Dept: REHABILITATION | Facility: OTHER | Age: 66
End: 2021-05-06
Payer: MEDICARE

## 2021-05-06 DIAGNOSIS — M25.69 DECREASED RANGE OF MOTION OF TRUNK AND BACK: ICD-10-CM

## 2021-05-06 DIAGNOSIS — R29.3 POOR POSTURE: ICD-10-CM

## 2021-05-06 PROCEDURE — 97110 THERAPEUTIC EXERCISES: CPT | Mod: PN,CQ

## 2021-05-14 ENCOUNTER — PATIENT MESSAGE (OUTPATIENT)
Dept: DERMATOLOGY | Facility: CLINIC | Age: 66
End: 2021-05-14

## 2021-05-17 ENCOUNTER — OFFICE VISIT (OUTPATIENT)
Dept: DERMATOLOGY | Facility: CLINIC | Age: 66
End: 2021-05-17
Payer: MEDICARE

## 2021-05-17 DIAGNOSIS — R20.9 DISTURBANCE OF SKIN SENSATION: ICD-10-CM

## 2021-05-17 DIAGNOSIS — L82.0 INFLAMED SEBORRHEIC KERATOSIS: Primary | ICD-10-CM

## 2021-05-17 PROCEDURE — 99499 UNLISTED E&M SERVICE: CPT | Mod: S$GLB,,, | Performed by: DERMATOLOGY

## 2021-05-17 PROCEDURE — 17110 DESTRUCTION B9 LES UP TO 14: CPT | Mod: S$GLB,,, | Performed by: DERMATOLOGY

## 2021-05-17 PROCEDURE — 1101F PT FALLS ASSESS-DOCD LE1/YR: CPT | Mod: CPTII,S$GLB,, | Performed by: DERMATOLOGY

## 2021-05-17 PROCEDURE — 99499 NO LOS: ICD-10-PCS | Mod: S$GLB,,, | Performed by: DERMATOLOGY

## 2021-05-17 PROCEDURE — 1126F AMNT PAIN NOTED NONE PRSNT: CPT | Mod: S$GLB,,, | Performed by: DERMATOLOGY

## 2021-05-17 PROCEDURE — 3288F PR FALLS RISK ASSESSMENT DOCUMENTED: ICD-10-PCS | Mod: CPTII,S$GLB,, | Performed by: DERMATOLOGY

## 2021-05-17 PROCEDURE — 1101F PR PT FALLS ASSESS DOC 0-1 FALLS W/OUT INJ PAST YR: ICD-10-PCS | Mod: CPTII,S$GLB,, | Performed by: DERMATOLOGY

## 2021-05-17 PROCEDURE — 1126F PR PAIN SEVERITY QUANTIFIED, NO PAIN PRESENT: ICD-10-PCS | Mod: S$GLB,,, | Performed by: DERMATOLOGY

## 2021-05-17 PROCEDURE — 3288F FALL RISK ASSESSMENT DOCD: CPT | Mod: CPTII,S$GLB,, | Performed by: DERMATOLOGY

## 2021-05-17 PROCEDURE — 17110 PR DESTRUCTION BENIGN LESIONS UP TO 14: ICD-10-PCS | Mod: S$GLB,,, | Performed by: DERMATOLOGY

## 2021-05-31 ENCOUNTER — TELEPHONE (OUTPATIENT)
Dept: SPORTS MEDICINE | Facility: CLINIC | Age: 66
End: 2021-05-31

## 2021-06-01 ENCOUNTER — OFFICE VISIT (OUTPATIENT)
Dept: ORTHOPEDICS | Facility: CLINIC | Age: 66
End: 2021-06-01
Payer: MEDICARE

## 2021-06-01 ENCOUNTER — HOSPITAL ENCOUNTER (OUTPATIENT)
Dept: RADIOLOGY | Facility: HOSPITAL | Age: 66
Discharge: HOME | End: 2021-06-01
Attending: NURSE PRACTITIONER
Payer: MEDICARE

## 2021-06-01 VITALS
DIASTOLIC BLOOD PRESSURE: 76 MMHG | SYSTOLIC BLOOD PRESSURE: 128 MMHG | BODY MASS INDEX: 29.72 KG/M2 | HEART RATE: 75 BPM | HEIGHT: 63 IN | WEIGHT: 167.75 LBS

## 2021-06-01 DIAGNOSIS — M25.461 PAIN AND SWELLING OF RIGHT KNEE: Primary | ICD-10-CM

## 2021-06-01 DIAGNOSIS — M25.561 ACUTE PAIN OF RIGHT KNEE: ICD-10-CM

## 2021-06-01 DIAGNOSIS — M25.561 PAIN AND SWELLING OF RIGHT KNEE: Primary | ICD-10-CM

## 2021-06-01 DIAGNOSIS — M25.561 ACUTE PAIN OF RIGHT KNEE: Primary | ICD-10-CM

## 2021-06-01 PROCEDURE — 3288F PR FALLS RISK ASSESSMENT DOCUMENTED: ICD-10-PCS | Mod: CPTII,S$GLB,, | Performed by: NURSE PRACTITIONER

## 2021-06-01 PROCEDURE — 1101F PT FALLS ASSESS-DOCD LE1/YR: CPT | Mod: CPTII,S$GLB,, | Performed by: NURSE PRACTITIONER

## 2021-06-01 PROCEDURE — 3288F FALL RISK ASSESSMENT DOCD: CPT | Mod: CPTII,S$GLB,, | Performed by: NURSE PRACTITIONER

## 2021-06-01 PROCEDURE — 73564 X-RAY EXAM KNEE 4 OR MORE: CPT | Mod: TC,RT

## 2021-06-01 PROCEDURE — 1159F MED LIST DOCD IN RCRD: CPT | Mod: S$GLB,,, | Performed by: NURSE PRACTITIONER

## 2021-06-01 PROCEDURE — 99999 PR PBB SHADOW E&M-EST. PATIENT-LVL IV: ICD-10-PCS | Mod: PBBFAC,,, | Performed by: NURSE PRACTITIONER

## 2021-06-01 PROCEDURE — 3008F PR BODY MASS INDEX (BMI) DOCUMENTED: ICD-10-PCS | Mod: CPTII,S$GLB,, | Performed by: NURSE PRACTITIONER

## 2021-06-01 PROCEDURE — 1125F AMNT PAIN NOTED PAIN PRSNT: CPT | Mod: S$GLB,,, | Performed by: NURSE PRACTITIONER

## 2021-06-01 PROCEDURE — 1125F PR PAIN SEVERITY QUANTIFIED, PAIN PRESENT: ICD-10-PCS | Mod: S$GLB,,, | Performed by: NURSE PRACTITIONER

## 2021-06-01 PROCEDURE — 3008F BODY MASS INDEX DOCD: CPT | Mod: CPTII,S$GLB,, | Performed by: NURSE PRACTITIONER

## 2021-06-01 PROCEDURE — 99214 OFFICE O/P EST MOD 30 MIN: CPT | Mod: S$GLB,,, | Performed by: NURSE PRACTITIONER

## 2021-06-01 PROCEDURE — 99214 PR OFFICE/OUTPT VISIT, EST, LEVL IV, 30-39 MIN: ICD-10-PCS | Mod: S$GLB,,, | Performed by: NURSE PRACTITIONER

## 2021-06-01 PROCEDURE — 73564 XR KNEE ORTHO RIGHT WITH FLEXION: ICD-10-PCS | Mod: 26,RT,, | Performed by: RADIOLOGY

## 2021-06-01 PROCEDURE — 73564 X-RAY EXAM KNEE 4 OR MORE: CPT | Mod: 26,RT,, | Performed by: RADIOLOGY

## 2021-06-01 PROCEDURE — 1101F PR PT FALLS ASSESS DOC 0-1 FALLS W/OUT INJ PAST YR: ICD-10-PCS | Mod: CPTII,S$GLB,, | Performed by: NURSE PRACTITIONER

## 2021-06-01 PROCEDURE — 1159F PR MEDICATION LIST DOCUMENTED IN MEDICAL RECORD: ICD-10-PCS | Mod: S$GLB,,, | Performed by: NURSE PRACTITIONER

## 2021-06-01 PROCEDURE — 99999 PR PBB SHADOW E&M-EST. PATIENT-LVL IV: CPT | Mod: PBBFAC,,, | Performed by: NURSE PRACTITIONER

## 2021-06-03 ENCOUNTER — PATIENT MESSAGE (OUTPATIENT)
Dept: PODIATRY | Facility: CLINIC | Age: 66
End: 2021-06-03

## 2021-06-03 ENCOUNTER — OFFICE VISIT (OUTPATIENT)
Dept: PODIATRY | Facility: CLINIC | Age: 66
End: 2021-06-03
Payer: MEDICARE

## 2021-06-03 ENCOUNTER — CLINICAL SUPPORT (OUTPATIENT)
Dept: REHABILITATION | Facility: OTHER | Age: 66
End: 2021-06-03
Payer: MEDICARE

## 2021-06-03 VITALS
DIASTOLIC BLOOD PRESSURE: 70 MMHG | HEART RATE: 75 BPM | HEIGHT: 63 IN | WEIGHT: 167 LBS | BODY MASS INDEX: 29.59 KG/M2 | SYSTOLIC BLOOD PRESSURE: 155 MMHG

## 2021-06-03 DIAGNOSIS — B35.1 ONYCHOMYCOSIS DUE TO DERMATOPHYTE: Primary | ICD-10-CM

## 2021-06-03 DIAGNOSIS — R29.3 POOR POSTURE: ICD-10-CM

## 2021-06-03 DIAGNOSIS — M25.69 DECREASED RANGE OF MOTION OF TRUNK AND BACK: Primary | ICD-10-CM

## 2021-06-03 PROCEDURE — 1101F PR PT FALLS ASSESS DOC 0-1 FALLS W/OUT INJ PAST YR: ICD-10-PCS | Mod: CPTII,S$GLB,, | Performed by: PODIATRIST

## 2021-06-03 PROCEDURE — 3008F PR BODY MASS INDEX (BMI) DOCUMENTED: ICD-10-PCS | Mod: CPTII,S$GLB,, | Performed by: PODIATRIST

## 2021-06-03 PROCEDURE — 3008F BODY MASS INDEX DOCD: CPT | Mod: CPTII,S$GLB,, | Performed by: PODIATRIST

## 2021-06-03 PROCEDURE — 99212 PR OFFICE/OUTPT VISIT, EST, LEVL II, 10-19 MIN: ICD-10-PCS | Mod: S$GLB,,, | Performed by: PODIATRIST

## 2021-06-03 PROCEDURE — 99999 PR PBB SHADOW E&M-EST. PATIENT-LVL IV: ICD-10-PCS | Mod: PBBFAC,,, | Performed by: PODIATRIST

## 2021-06-03 PROCEDURE — 99999 PR PBB SHADOW E&M-EST. PATIENT-LVL IV: CPT | Mod: PBBFAC,,, | Performed by: PODIATRIST

## 2021-06-03 PROCEDURE — 1159F PR MEDICATION LIST DOCUMENTED IN MEDICAL RECORD: ICD-10-PCS | Mod: S$GLB,,, | Performed by: PODIATRIST

## 2021-06-03 PROCEDURE — 97110 THERAPEUTIC EXERCISES: CPT | Mod: PN

## 2021-06-03 PROCEDURE — 3288F FALL RISK ASSESSMENT DOCD: CPT | Mod: CPTII,S$GLB,, | Performed by: PODIATRIST

## 2021-06-03 PROCEDURE — 3288F PR FALLS RISK ASSESSMENT DOCUMENTED: ICD-10-PCS | Mod: CPTII,S$GLB,, | Performed by: PODIATRIST

## 2021-06-03 PROCEDURE — 1126F PR PAIN SEVERITY QUANTIFIED, NO PAIN PRESENT: ICD-10-PCS | Mod: S$GLB,,, | Performed by: PODIATRIST

## 2021-06-03 PROCEDURE — 1101F PT FALLS ASSESS-DOCD LE1/YR: CPT | Mod: CPTII,S$GLB,, | Performed by: PODIATRIST

## 2021-06-03 PROCEDURE — 97140 MANUAL THERAPY 1/> REGIONS: CPT | Mod: PN

## 2021-06-03 PROCEDURE — 99212 OFFICE O/P EST SF 10 MIN: CPT | Mod: S$GLB,,, | Performed by: PODIATRIST

## 2021-06-03 PROCEDURE — 1159F MED LIST DOCD IN RCRD: CPT | Mod: S$GLB,,, | Performed by: PODIATRIST

## 2021-06-03 PROCEDURE — 1126F AMNT PAIN NOTED NONE PRSNT: CPT | Mod: S$GLB,,, | Performed by: PODIATRIST

## 2021-06-08 ENCOUNTER — CLINICAL SUPPORT (OUTPATIENT)
Dept: REHABILITATION | Facility: OTHER | Age: 66
End: 2021-06-08
Payer: MEDICARE

## 2021-06-08 DIAGNOSIS — R29.3 POOR POSTURE: ICD-10-CM

## 2021-06-08 DIAGNOSIS — M25.69 DECREASED RANGE OF MOTION OF TRUNK AND BACK: Primary | ICD-10-CM

## 2021-06-08 PROCEDURE — 97140 MANUAL THERAPY 1/> REGIONS: CPT | Mod: PN

## 2021-06-08 PROCEDURE — 97110 THERAPEUTIC EXERCISES: CPT | Mod: PN

## 2021-06-10 ENCOUNTER — CLINICAL SUPPORT (OUTPATIENT)
Dept: REHABILITATION | Facility: OTHER | Age: 66
End: 2021-06-10
Payer: MEDICARE

## 2021-06-10 DIAGNOSIS — M25.69 DECREASED RANGE OF MOTION OF TRUNK AND BACK: Primary | ICD-10-CM

## 2021-06-10 DIAGNOSIS — R29.3 POOR POSTURE: ICD-10-CM

## 2021-06-10 PROCEDURE — 97110 THERAPEUTIC EXERCISES: CPT | Mod: PN

## 2021-06-22 ENCOUNTER — CLINICAL SUPPORT (OUTPATIENT)
Dept: AUDIOLOGY | Facility: CLINIC | Age: 66
End: 2021-06-22
Payer: MEDICARE

## 2021-06-22 ENCOUNTER — CLINICAL SUPPORT (OUTPATIENT)
Dept: REHABILITATION | Facility: OTHER | Age: 66
End: 2021-06-22
Payer: MEDICARE

## 2021-06-22 DIAGNOSIS — H91.90 HEARING LOSS, UNSPECIFIED HEARING LOSS TYPE, UNSPECIFIED LATERALITY: ICD-10-CM

## 2021-06-22 DIAGNOSIS — M25.69 DECREASED RANGE OF MOTION OF TRUNK AND BACK: Primary | ICD-10-CM

## 2021-06-22 DIAGNOSIS — H90.3 SENSORINEURAL HEARING LOSS, BILATERAL: Primary | ICD-10-CM

## 2021-06-22 DIAGNOSIS — R29.3 POOR POSTURE: ICD-10-CM

## 2021-06-22 PROCEDURE — 92557 PR COMPREHENSIVE HEARING TEST: ICD-10-PCS | Mod: S$GLB,,, | Performed by: AUDIOLOGIST

## 2021-06-22 PROCEDURE — 92567 PR TYMPA2METRY: ICD-10-PCS | Mod: S$GLB,,, | Performed by: AUDIOLOGIST

## 2021-06-22 PROCEDURE — 97110 THERAPEUTIC EXERCISES: CPT | Mod: PN

## 2021-06-22 PROCEDURE — 92557 COMPREHENSIVE HEARING TEST: CPT | Mod: S$GLB,,, | Performed by: AUDIOLOGIST

## 2021-06-22 PROCEDURE — 92567 TYMPANOMETRY: CPT | Mod: S$GLB,,, | Performed by: AUDIOLOGIST

## 2021-06-24 ENCOUNTER — CLINICAL SUPPORT (OUTPATIENT)
Dept: REHABILITATION | Facility: OTHER | Age: 66
End: 2021-06-24
Payer: MEDICARE

## 2021-06-24 DIAGNOSIS — R29.3 POOR POSTURE: ICD-10-CM

## 2021-06-24 DIAGNOSIS — M25.69 DECREASED RANGE OF MOTION OF TRUNK AND BACK: Primary | ICD-10-CM

## 2021-06-24 PROCEDURE — 97530 THERAPEUTIC ACTIVITIES: CPT | Mod: PN

## 2021-06-24 PROCEDURE — 97110 THERAPEUTIC EXERCISES: CPT | Mod: PN

## 2021-06-25 ENCOUNTER — CLINICAL SUPPORT (OUTPATIENT)
Dept: REHABILITATION | Facility: OTHER | Age: 66
End: 2021-06-25
Payer: MEDICARE

## 2021-06-25 DIAGNOSIS — M79.10 MYALGIA: Primary | ICD-10-CM

## 2021-06-25 DIAGNOSIS — R27.9 LACK OF COORDINATION: ICD-10-CM

## 2021-06-25 PROCEDURE — 97530 THERAPEUTIC ACTIVITIES: CPT | Mod: PN

## 2021-06-25 PROCEDURE — 97140 MANUAL THERAPY 1/> REGIONS: CPT | Mod: PN

## 2021-06-25 PROCEDURE — 97110 THERAPEUTIC EXERCISES: CPT | Mod: PN

## 2021-06-29 ENCOUNTER — CLINICAL SUPPORT (OUTPATIENT)
Dept: REHABILITATION | Facility: OTHER | Age: 66
End: 2021-06-29
Payer: MEDICARE

## 2021-06-29 DIAGNOSIS — R29.3 POOR POSTURE: ICD-10-CM

## 2021-06-29 DIAGNOSIS — M25.69 DECREASED RANGE OF MOTION OF TRUNK AND BACK: Primary | ICD-10-CM

## 2021-06-29 PROCEDURE — 97110 THERAPEUTIC EXERCISES: CPT | Mod: PN

## 2021-06-29 PROCEDURE — 97530 THERAPEUTIC ACTIVITIES: CPT | Mod: PN

## 2021-06-30 ENCOUNTER — LAB VISIT (OUTPATIENT)
Dept: LAB | Facility: HOSPITAL | Age: 66
End: 2021-06-30
Attending: ORTHOPAEDIC SURGERY
Payer: MEDICARE

## 2021-06-30 ENCOUNTER — OFFICE VISIT (OUTPATIENT)
Dept: ORTHOPEDICS | Facility: CLINIC | Age: 66
End: 2021-06-30
Payer: MEDICARE

## 2021-06-30 VITALS — WEIGHT: 165.25 LBS | HEIGHT: 63 IN | BODY MASS INDEX: 29.28 KG/M2

## 2021-06-30 DIAGNOSIS — M25.561 PAIN AND SWELLING OF RIGHT KNEE: Primary | ICD-10-CM

## 2021-06-30 DIAGNOSIS — M25.461 PAIN AND SWELLING OF RIGHT KNEE: ICD-10-CM

## 2021-06-30 DIAGNOSIS — M25.461 PAIN AND SWELLING OF RIGHT KNEE: Primary | ICD-10-CM

## 2021-06-30 DIAGNOSIS — Z96.651 S/P RIGHT UNICOMPARTMENTAL KNEE REPLACEMENT: ICD-10-CM

## 2021-06-30 DIAGNOSIS — M25.561 PAIN AND SWELLING OF RIGHT KNEE: ICD-10-CM

## 2021-06-30 LAB
CRP SERPL-MCNC: 2.1 MG/L (ref 0–8.2)
ERYTHROCYTE [SEDIMENTATION RATE] IN BLOOD BY WESTERGREN METHOD: 7 MM/HR (ref 0–36)

## 2021-06-30 PROCEDURE — 1159F PR MEDICATION LIST DOCUMENTED IN MEDICAL RECORD: ICD-10-PCS | Mod: S$GLB,,, | Performed by: ORTHOPAEDIC SURGERY

## 2021-06-30 PROCEDURE — 36415 COLL VENOUS BLD VENIPUNCTURE: CPT | Performed by: ORTHOPAEDIC SURGERY

## 2021-06-30 PROCEDURE — 3008F PR BODY MASS INDEX (BMI) DOCUMENTED: ICD-10-PCS | Mod: CPTII,S$GLB,, | Performed by: ORTHOPAEDIC SURGERY

## 2021-06-30 PROCEDURE — 99999 PR PBB SHADOW E&M-EST. PATIENT-LVL IV: ICD-10-PCS | Mod: PBBFAC,,, | Performed by: ORTHOPAEDIC SURGERY

## 2021-06-30 PROCEDURE — 1126F AMNT PAIN NOTED NONE PRSNT: CPT | Mod: S$GLB,,, | Performed by: ORTHOPAEDIC SURGERY

## 2021-06-30 PROCEDURE — 85652 RBC SED RATE AUTOMATED: CPT | Performed by: ORTHOPAEDIC SURGERY

## 2021-06-30 PROCEDURE — 1126F PR PAIN SEVERITY QUANTIFIED, NO PAIN PRESENT: ICD-10-PCS | Mod: S$GLB,,, | Performed by: ORTHOPAEDIC SURGERY

## 2021-06-30 PROCEDURE — 99213 PR OFFICE/OUTPT VISIT, EST, LEVL III, 20-29 MIN: ICD-10-PCS | Mod: S$GLB,,, | Performed by: ORTHOPAEDIC SURGERY

## 2021-06-30 PROCEDURE — 3008F BODY MASS INDEX DOCD: CPT | Mod: CPTII,S$GLB,, | Performed by: ORTHOPAEDIC SURGERY

## 2021-06-30 PROCEDURE — 86140 C-REACTIVE PROTEIN: CPT | Performed by: ORTHOPAEDIC SURGERY

## 2021-06-30 PROCEDURE — 99999 PR PBB SHADOW E&M-EST. PATIENT-LVL IV: CPT | Mod: PBBFAC,,, | Performed by: ORTHOPAEDIC SURGERY

## 2021-06-30 PROCEDURE — 1159F MED LIST DOCD IN RCRD: CPT | Mod: S$GLB,,, | Performed by: ORTHOPAEDIC SURGERY

## 2021-06-30 PROCEDURE — 99213 OFFICE O/P EST LOW 20 MIN: CPT | Mod: S$GLB,,, | Performed by: ORTHOPAEDIC SURGERY

## 2021-07-01 ENCOUNTER — TELEPHONE (OUTPATIENT)
Dept: ORTHOPEDICS | Facility: CLINIC | Age: 66
End: 2021-07-01

## 2021-07-13 ENCOUNTER — PATIENT MESSAGE (OUTPATIENT)
Dept: GASTROENTEROLOGY | Facility: CLINIC | Age: 66
End: 2021-07-13

## 2021-07-14 RX ORDER — PANCRELIPASE 24000; 76000; 120000 [USP'U]/1; [USP'U]/1; [USP'U]/1
1 CAPSULE, DELAYED RELEASE PELLETS ORAL
Qty: 90 CAPSULE | Refills: 11 | Status: SHIPPED | OUTPATIENT
Start: 2021-07-14 | End: 2022-07-19

## 2021-07-15 ENCOUNTER — CLINICAL SUPPORT (OUTPATIENT)
Dept: REHABILITATION | Facility: OTHER | Age: 66
End: 2021-07-15
Payer: MEDICARE

## 2021-07-15 ENCOUNTER — PATIENT MESSAGE (OUTPATIENT)
Dept: GASTROENTEROLOGY | Facility: CLINIC | Age: 66
End: 2021-07-15

## 2021-07-15 DIAGNOSIS — M25.69 DECREASED RANGE OF MOTION OF TRUNK AND BACK: Primary | ICD-10-CM

## 2021-07-15 DIAGNOSIS — R29.3 POOR POSTURE: ICD-10-CM

## 2021-07-15 PROCEDURE — 97110 THERAPEUTIC EXERCISES: CPT | Mod: PN

## 2021-07-15 PROCEDURE — 97140 MANUAL THERAPY 1/> REGIONS: CPT | Mod: PN

## 2021-07-19 ENCOUNTER — HOSPITAL ENCOUNTER (OUTPATIENT)
Dept: RADIOLOGY | Facility: HOSPITAL | Age: 66
Discharge: HOME | End: 2021-07-19
Attending: ORTHOPAEDIC SURGERY
Payer: MEDICARE

## 2021-07-19 DIAGNOSIS — M25.461 PAIN AND SWELLING OF RIGHT KNEE: ICD-10-CM

## 2021-07-19 DIAGNOSIS — M25.561 PAIN AND SWELLING OF RIGHT KNEE: ICD-10-CM

## 2021-07-19 DIAGNOSIS — Z96.651 S/P RIGHT UNICOMPARTMENTAL KNEE REPLACEMENT: ICD-10-CM

## 2021-07-19 PROCEDURE — 73721 MRI KNEE WITHOUT CONTRAST RIGHT: ICD-10-PCS | Mod: 26,RT,, | Performed by: RADIOLOGY

## 2021-07-19 PROCEDURE — 73721 MRI JNT OF LWR EXTRE W/O DYE: CPT | Mod: 26,RT,, | Performed by: RADIOLOGY

## 2021-07-19 PROCEDURE — 73721 MRI JNT OF LWR EXTRE W/O DYE: CPT | Mod: TC,RT

## 2021-07-20 ENCOUNTER — TELEPHONE (OUTPATIENT)
Dept: ORTHOPEDICS | Facility: CLINIC | Age: 66
End: 2021-07-20

## 2021-07-23 ENCOUNTER — PATIENT MESSAGE (OUTPATIENT)
Dept: REHABILITATION | Facility: OTHER | Age: 66
End: 2021-07-23

## 2021-07-26 ENCOUNTER — PATIENT MESSAGE (OUTPATIENT)
Dept: INTERNAL MEDICINE | Facility: CLINIC | Age: 66
End: 2021-07-26

## 2021-08-01 ENCOUNTER — PATIENT MESSAGE (OUTPATIENT)
Dept: INTERNAL MEDICINE | Facility: CLINIC | Age: 66
End: 2021-08-01

## 2021-08-02 ENCOUNTER — OFFICE VISIT (OUTPATIENT)
Dept: ORTHOPEDICS | Facility: CLINIC | Age: 66
End: 2021-08-02
Payer: MEDICARE

## 2021-08-02 ENCOUNTER — TELEPHONE (OUTPATIENT)
Dept: INTERNAL MEDICINE | Facility: CLINIC | Age: 66
End: 2021-08-02

## 2021-08-02 VITALS — WEIGHT: 168.75 LBS | BODY MASS INDEX: 29.9 KG/M2 | HEIGHT: 63 IN

## 2021-08-02 DIAGNOSIS — M25.461 PAIN AND SWELLING OF RIGHT KNEE: ICD-10-CM

## 2021-08-02 DIAGNOSIS — S83.271A COMPLEX TEAR OF LATERAL MENISCUS OF RIGHT KNEE AS CURRENT INJURY, INITIAL ENCOUNTER: Primary | ICD-10-CM

## 2021-08-02 DIAGNOSIS — M25.561 PAIN AND SWELLING OF RIGHT KNEE: ICD-10-CM

## 2021-08-02 DIAGNOSIS — Z96.651 S/P RIGHT UNICOMPARTMENTAL KNEE REPLACEMENT: ICD-10-CM

## 2021-08-02 PROCEDURE — 99213 OFFICE O/P EST LOW 20 MIN: CPT | Mod: 25,S$GLB,, | Performed by: ORTHOPAEDIC SURGERY

## 2021-08-02 PROCEDURE — 20610 PR DRAIN/INJECT LARGE JOINT/BURSA: ICD-10-PCS | Mod: RT,S$GLB,, | Performed by: ORTHOPAEDIC SURGERY

## 2021-08-02 PROCEDURE — 1159F MED LIST DOCD IN RCRD: CPT | Mod: CPTII,S$GLB,, | Performed by: ORTHOPAEDIC SURGERY

## 2021-08-02 PROCEDURE — 1160F PR REVIEW ALL MEDS BY PRESCRIBER/CLIN PHARMACIST DOCUMENTED: ICD-10-PCS | Mod: CPTII,S$GLB,, | Performed by: ORTHOPAEDIC SURGERY

## 2021-08-02 PROCEDURE — 1160F RVW MEDS BY RX/DR IN RCRD: CPT | Mod: CPTII,S$GLB,, | Performed by: ORTHOPAEDIC SURGERY

## 2021-08-02 PROCEDURE — 99999 PR PBB SHADOW E&M-EST. PATIENT-LVL II: ICD-10-PCS | Mod: PBBFAC,,, | Performed by: ORTHOPAEDIC SURGERY

## 2021-08-02 PROCEDURE — 3008F BODY MASS INDEX DOCD: CPT | Mod: CPTII,S$GLB,, | Performed by: ORTHOPAEDIC SURGERY

## 2021-08-02 PROCEDURE — 3008F PR BODY MASS INDEX (BMI) DOCUMENTED: ICD-10-PCS | Mod: CPTII,S$GLB,, | Performed by: ORTHOPAEDIC SURGERY

## 2021-08-02 PROCEDURE — 1125F PR PAIN SEVERITY QUANTIFIED, PAIN PRESENT: ICD-10-PCS | Mod: CPTII,S$GLB,, | Performed by: ORTHOPAEDIC SURGERY

## 2021-08-02 PROCEDURE — 99999 PR PBB SHADOW E&M-EST. PATIENT-LVL II: CPT | Mod: PBBFAC,,, | Performed by: ORTHOPAEDIC SURGERY

## 2021-08-02 PROCEDURE — 20610 DRAIN/INJ JOINT/BURSA W/O US: CPT | Mod: RT,S$GLB,, | Performed by: ORTHOPAEDIC SURGERY

## 2021-08-02 PROCEDURE — 1159F PR MEDICATION LIST DOCUMENTED IN MEDICAL RECORD: ICD-10-PCS | Mod: CPTII,S$GLB,, | Performed by: ORTHOPAEDIC SURGERY

## 2021-08-02 PROCEDURE — 99213 PR OFFICE/OUTPT VISIT, EST, LEVL III, 20-29 MIN: ICD-10-PCS | Mod: 25,S$GLB,, | Performed by: ORTHOPAEDIC SURGERY

## 2021-08-02 PROCEDURE — 1125F AMNT PAIN NOTED PAIN PRSNT: CPT | Mod: CPTII,S$GLB,, | Performed by: ORTHOPAEDIC SURGERY

## 2021-08-02 RX ORDER — TRIAMCINOLONE ACETONIDE 40 MG/ML
40 INJECTION, SUSPENSION INTRA-ARTICULAR; INTRAMUSCULAR
Status: COMPLETED | OUTPATIENT
Start: 2021-08-02 | End: 2021-08-02

## 2021-08-02 RX ADMIN — TRIAMCINOLONE ACETONIDE 40 MG: 40 INJECTION, SUSPENSION INTRA-ARTICULAR; INTRAMUSCULAR at 10:08

## 2021-08-03 ENCOUNTER — PATIENT MESSAGE (OUTPATIENT)
Dept: INTERNAL MEDICINE | Facility: CLINIC | Age: 66
End: 2021-08-03

## 2021-08-03 ENCOUNTER — PATIENT MESSAGE (OUTPATIENT)
Dept: ORTHOPEDICS | Facility: CLINIC | Age: 66
End: 2021-08-03

## 2021-08-03 ENCOUNTER — CLINICAL SUPPORT (OUTPATIENT)
Dept: REHABILITATION | Facility: OTHER | Age: 66
End: 2021-08-03
Payer: MEDICARE

## 2021-08-03 DIAGNOSIS — R29.3 POOR POSTURE: ICD-10-CM

## 2021-08-03 DIAGNOSIS — M25.69 DECREASED RANGE OF MOTION OF TRUNK AND BACK: Primary | ICD-10-CM

## 2021-08-03 PROCEDURE — 97110 THERAPEUTIC EXERCISES: CPT | Mod: PN

## 2021-08-05 ENCOUNTER — OFFICE VISIT (OUTPATIENT)
Dept: INTERNAL MEDICINE | Facility: CLINIC | Age: 66
End: 2021-08-05
Payer: MEDICARE

## 2021-08-05 DIAGNOSIS — R19.7 DIARRHEA, UNSPECIFIED TYPE: Primary | ICD-10-CM

## 2021-08-05 DIAGNOSIS — F33.41 RECURRENT MAJOR DEPRESSIVE DISORDER, IN PARTIAL REMISSION: ICD-10-CM

## 2021-08-05 DIAGNOSIS — R68.89 WITHDRAWAL COMPLAINT: ICD-10-CM

## 2021-08-05 PROCEDURE — 1159F PR MEDICATION LIST DOCUMENTED IN MEDICAL RECORD: ICD-10-PCS | Mod: CPTII,95,, | Performed by: INTERNAL MEDICINE

## 2021-08-05 PROCEDURE — 99499 RISK ADDL DX/OHS AUDIT: ICD-10-PCS | Mod: HCNC,95,, | Performed by: INTERNAL MEDICINE

## 2021-08-05 PROCEDURE — 1159F MED LIST DOCD IN RCRD: CPT | Mod: CPTII,95,, | Performed by: INTERNAL MEDICINE

## 2021-08-05 PROCEDURE — 99213 OFFICE O/P EST LOW 20 MIN: CPT | Mod: 95,,, | Performed by: INTERNAL MEDICINE

## 2021-08-05 PROCEDURE — 1160F PR REVIEW ALL MEDS BY PRESCRIBER/CLIN PHARMACIST DOCUMENTED: ICD-10-PCS | Mod: CPTII,95,, | Performed by: INTERNAL MEDICINE

## 2021-08-05 PROCEDURE — 1160F RVW MEDS BY RX/DR IN RCRD: CPT | Mod: CPTII,95,, | Performed by: INTERNAL MEDICINE

## 2021-08-05 PROCEDURE — 99213 PR OFFICE/OUTPT VISIT, EST, LEVL III, 20-29 MIN: ICD-10-PCS | Mod: 95,,, | Performed by: INTERNAL MEDICINE

## 2021-08-05 PROCEDURE — 99499 UNLISTED E&M SERVICE: CPT | Mod: HCNC,95,, | Performed by: INTERNAL MEDICINE

## 2021-08-05 RX ORDER — VENLAFAXINE HYDROCHLORIDE 37.5 MG/1
CAPSULE, EXTENDED RELEASE ORAL
Qty: 1 CAPSULE | Refills: 0 | Status: ON HOLD
Start: 2021-08-05 | End: 2021-10-19 | Stop reason: HOSPADM

## 2021-08-10 ENCOUNTER — CLINICAL SUPPORT (OUTPATIENT)
Dept: REHABILITATION | Facility: OTHER | Age: 66
End: 2021-08-10
Payer: MEDICARE

## 2021-08-10 DIAGNOSIS — R29.3 POOR POSTURE: ICD-10-CM

## 2021-08-10 DIAGNOSIS — M25.69 DECREASED RANGE OF MOTION OF TRUNK AND BACK: Primary | ICD-10-CM

## 2021-08-10 PROCEDURE — 97110 THERAPEUTIC EXERCISES: CPT | Mod: PN

## 2021-08-17 ENCOUNTER — PATIENT MESSAGE (OUTPATIENT)
Dept: ORTHOPEDICS | Facility: CLINIC | Age: 66
End: 2021-08-17

## 2021-08-17 RX ORDER — MELOXICAM 15 MG/1
15 TABLET ORAL DAILY
Qty: 30 TABLET | Refills: 1 | Status: SHIPPED | OUTPATIENT
Start: 2021-08-17 | End: 2021-09-16

## 2021-08-22 ENCOUNTER — PATIENT MESSAGE (OUTPATIENT)
Dept: ORTHOPEDICS | Facility: CLINIC | Age: 66
End: 2021-08-22

## 2021-08-23 ENCOUNTER — PATIENT MESSAGE (OUTPATIENT)
Dept: INTERNAL MEDICINE | Facility: CLINIC | Age: 66
End: 2021-08-23

## 2021-08-23 RX ORDER — DIPHENOXYLATE HYDROCHLORIDE AND ATROPINE SULFATE 2.5; .025 MG/1; MG/1
1 TABLET ORAL 4 TIMES DAILY PRN
Qty: 40 TABLET | Refills: 0 | Status: SHIPPED | OUTPATIENT
Start: 2021-08-23 | End: 2021-10-07

## 2021-08-24 ENCOUNTER — PATIENT MESSAGE (OUTPATIENT)
Dept: REHABILITATION | Facility: OTHER | Age: 66
End: 2021-08-24

## 2021-09-08 ENCOUNTER — PATIENT OUTREACH (OUTPATIENT)
Dept: ADMINISTRATIVE | Facility: OTHER | Age: 66
End: 2021-09-08

## 2021-09-08 ENCOUNTER — OFFICE VISIT (OUTPATIENT)
Dept: ORTHOPEDICS | Facility: CLINIC | Age: 66
End: 2021-09-08
Payer: MEDICARE

## 2021-09-08 VITALS — WEIGHT: 168.31 LBS | HEIGHT: 63 IN | BODY MASS INDEX: 29.82 KG/M2

## 2021-09-08 DIAGNOSIS — M25.561 PAIN AND SWELLING OF RIGHT KNEE: ICD-10-CM

## 2021-09-08 DIAGNOSIS — S83.271A COMPLEX TEAR OF LATERAL MENISCUS OF RIGHT KNEE AS CURRENT INJURY, INITIAL ENCOUNTER: Primary | ICD-10-CM

## 2021-09-08 DIAGNOSIS — M25.461 PAIN AND SWELLING OF RIGHT KNEE: ICD-10-CM

## 2021-09-08 PROCEDURE — 4010F ACE/ARB THERAPY RXD/TAKEN: CPT | Mod: CPTII,S$GLB,, | Performed by: ORTHOPAEDIC SURGERY

## 2021-09-08 PROCEDURE — 99999 PR PBB SHADOW E&M-EST. PATIENT-LVL III: ICD-10-PCS | Mod: PBBFAC,,, | Performed by: ORTHOPAEDIC SURGERY

## 2021-09-08 PROCEDURE — 1159F MED LIST DOCD IN RCRD: CPT | Mod: CPTII,S$GLB,, | Performed by: ORTHOPAEDIC SURGERY

## 2021-09-08 PROCEDURE — 1160F RVW MEDS BY RX/DR IN RCRD: CPT | Mod: CPTII,S$GLB,, | Performed by: ORTHOPAEDIC SURGERY

## 2021-09-08 PROCEDURE — 3008F PR BODY MASS INDEX (BMI) DOCUMENTED: ICD-10-PCS | Mod: CPTII,S$GLB,, | Performed by: ORTHOPAEDIC SURGERY

## 2021-09-08 PROCEDURE — 3008F BODY MASS INDEX DOCD: CPT | Mod: CPTII,S$GLB,, | Performed by: ORTHOPAEDIC SURGERY

## 2021-09-08 PROCEDURE — 1160F PR REVIEW ALL MEDS BY PRESCRIBER/CLIN PHARMACIST DOCUMENTED: ICD-10-PCS | Mod: CPTII,S$GLB,, | Performed by: ORTHOPAEDIC SURGERY

## 2021-09-08 PROCEDURE — 4010F PR ACE/ARB THEARPY RXD/TAKEN: ICD-10-PCS | Mod: CPTII,S$GLB,, | Performed by: ORTHOPAEDIC SURGERY

## 2021-09-08 PROCEDURE — 99213 PR OFFICE/OUTPT VISIT, EST, LEVL III, 20-29 MIN: ICD-10-PCS | Mod: S$GLB,,, | Performed by: ORTHOPAEDIC SURGERY

## 2021-09-08 PROCEDURE — 99999 PR PBB SHADOW E&M-EST. PATIENT-LVL III: CPT | Mod: PBBFAC,,, | Performed by: ORTHOPAEDIC SURGERY

## 2021-09-08 PROCEDURE — 99213 OFFICE O/P EST LOW 20 MIN: CPT | Mod: S$GLB,,, | Performed by: ORTHOPAEDIC SURGERY

## 2021-09-08 PROCEDURE — 1125F AMNT PAIN NOTED PAIN PRSNT: CPT | Mod: CPTII,S$GLB,, | Performed by: ORTHOPAEDIC SURGERY

## 2021-09-08 PROCEDURE — 1159F PR MEDICATION LIST DOCUMENTED IN MEDICAL RECORD: ICD-10-PCS | Mod: CPTII,S$GLB,, | Performed by: ORTHOPAEDIC SURGERY

## 2021-09-08 PROCEDURE — 1125F PR PAIN SEVERITY QUANTIFIED, PAIN PRESENT: ICD-10-PCS | Mod: CPTII,S$GLB,, | Performed by: ORTHOPAEDIC SURGERY

## 2021-09-09 ENCOUNTER — CLINICAL SUPPORT (OUTPATIENT)
Dept: REHABILITATION | Facility: OTHER | Age: 66
End: 2021-09-09
Attending: STUDENT IN AN ORGANIZED HEALTH CARE EDUCATION/TRAINING PROGRAM
Payer: MEDICARE

## 2021-09-09 DIAGNOSIS — R27.9 LACK OF COORDINATION: ICD-10-CM

## 2021-09-09 DIAGNOSIS — M79.10 MYALGIA: Primary | ICD-10-CM

## 2021-09-09 PROCEDURE — 97140 MANUAL THERAPY 1/> REGIONS: CPT

## 2021-09-09 PROCEDURE — 97110 THERAPEUTIC EXERCISES: CPT

## 2021-09-13 ENCOUNTER — CLINICAL SUPPORT (OUTPATIENT)
Dept: REHABILITATION | Facility: OTHER | Age: 66
End: 2021-09-13
Attending: INTERNAL MEDICINE
Payer: MEDICARE

## 2021-09-13 DIAGNOSIS — R29.3 POOR POSTURE: ICD-10-CM

## 2021-09-13 DIAGNOSIS — M25.69 DECREASED RANGE OF MOTION OF TRUNK AND BACK: Primary | ICD-10-CM

## 2021-09-13 PROCEDURE — 97110 THERAPEUTIC EXERCISES: CPT

## 2021-09-14 ENCOUNTER — TELEPHONE (OUTPATIENT)
Dept: REHABILITATION | Facility: OTHER | Age: 66
End: 2021-09-14

## 2021-09-20 ENCOUNTER — CLINICAL SUPPORT (OUTPATIENT)
Dept: REHABILITATION | Facility: OTHER | Age: 66
End: 2021-09-20
Attending: INTERNAL MEDICINE
Payer: MEDICARE

## 2021-09-20 DIAGNOSIS — R27.9 LACK OF COORDINATION: ICD-10-CM

## 2021-09-20 DIAGNOSIS — M79.10 MYALGIA: Primary | ICD-10-CM

## 2021-09-20 PROCEDURE — 97110 THERAPEUTIC EXERCISES: CPT

## 2021-09-20 PROCEDURE — 97140 MANUAL THERAPY 1/> REGIONS: CPT

## 2021-09-21 ENCOUNTER — TELEPHONE (OUTPATIENT)
Dept: REHABILITATION | Facility: OTHER | Age: 66
End: 2021-09-21

## 2021-09-21 ENCOUNTER — PATIENT MESSAGE (OUTPATIENT)
Dept: REHABILITATION | Facility: OTHER | Age: 66
End: 2021-09-21

## 2021-09-23 ENCOUNTER — DOCUMENTATION ONLY (OUTPATIENT)
Dept: REHABILITATION | Facility: OTHER | Age: 66
End: 2021-09-23

## 2021-09-24 ENCOUNTER — CLINICAL SUPPORT (OUTPATIENT)
Dept: REHABILITATION | Facility: OTHER | Age: 66
End: 2021-09-24
Attending: INTERNAL MEDICINE
Payer: MEDICARE

## 2021-09-24 DIAGNOSIS — R29.3 POOR POSTURE: ICD-10-CM

## 2021-09-24 DIAGNOSIS — M25.69 DECREASED RANGE OF MOTION OF TRUNK AND BACK: Primary | ICD-10-CM

## 2021-09-24 PROCEDURE — 97140 MANUAL THERAPY 1/> REGIONS: CPT | Mod: HCNC,CQ

## 2021-09-24 PROCEDURE — 97110 THERAPEUTIC EXERCISES: CPT | Mod: HCNC,CQ

## 2021-09-27 ENCOUNTER — CLINICAL SUPPORT (OUTPATIENT)
Dept: REHABILITATION | Facility: OTHER | Age: 66
End: 2021-09-27
Attending: INTERNAL MEDICINE
Payer: MEDICARE

## 2021-09-27 DIAGNOSIS — R27.9 LACK OF COORDINATION: ICD-10-CM

## 2021-09-27 DIAGNOSIS — M79.10 MYALGIA: Primary | ICD-10-CM

## 2021-09-27 PROCEDURE — 97110 THERAPEUTIC EXERCISES: CPT | Mod: HCNC

## 2021-09-27 PROCEDURE — 97140 MANUAL THERAPY 1/> REGIONS: CPT | Mod: HCNC

## 2021-09-29 ENCOUNTER — CLINICAL SUPPORT (OUTPATIENT)
Dept: REHABILITATION | Facility: OTHER | Age: 66
End: 2021-09-29
Attending: INTERNAL MEDICINE
Payer: MEDICARE

## 2021-10-06 ENCOUNTER — CLINICAL SUPPORT (OUTPATIENT)
Dept: REHABILITATION | Facility: OTHER | Age: 66
End: 2021-10-06
Attending: INTERNAL MEDICINE
Payer: MEDICARE

## 2021-10-06 DIAGNOSIS — R29.3 POOR POSTURE: ICD-10-CM

## 2021-10-06 DIAGNOSIS — M25.69 DECREASED RANGE OF MOTION OF TRUNK AND BACK: Primary | ICD-10-CM

## 2021-10-06 PROCEDURE — 97110 THERAPEUTIC EXERCISES: CPT

## 2021-10-07 RX ORDER — DIPHENOXYLATE HYDROCHLORIDE AND ATROPINE SULFATE 2.5; .025 MG/1; MG/1
TABLET ORAL
Qty: 40 TABLET | Refills: 0 | Status: SHIPPED | OUTPATIENT
Start: 2021-10-07 | End: 2022-07-19

## 2021-10-11 ENCOUNTER — PATIENT OUTREACH (OUTPATIENT)
Dept: ADMINISTRATIVE | Facility: OTHER | Age: 66
End: 2021-10-11

## 2021-10-13 ENCOUNTER — OFFICE VISIT (OUTPATIENT)
Dept: ORTHOPEDICS | Facility: CLINIC | Age: 66
End: 2021-10-13
Payer: MEDICARE

## 2021-10-13 ENCOUNTER — TELEPHONE (OUTPATIENT)
Dept: ORTHOPEDICS | Facility: CLINIC | Age: 66
End: 2021-10-13

## 2021-10-13 VITALS — WEIGHT: 163.38 LBS | HEIGHT: 63 IN | BODY MASS INDEX: 28.95 KG/M2

## 2021-10-13 DIAGNOSIS — Z96.651 S/P RIGHT UNICOMPARTMENTAL KNEE REPLACEMENT: ICD-10-CM

## 2021-10-13 DIAGNOSIS — S83.271A COMPLEX TEAR OF LATERAL MENISCUS OF RIGHT KNEE AS CURRENT INJURY, INITIAL ENCOUNTER: Primary | ICD-10-CM

## 2021-10-13 DIAGNOSIS — Z01.818 PRE-OP TESTING: Primary | ICD-10-CM

## 2021-10-13 PROCEDURE — 4010F ACE/ARB THERAPY RXD/TAKEN: CPT | Mod: CPTII,S$GLB,, | Performed by: ORTHOPAEDIC SURGERY

## 2021-10-13 PROCEDURE — 4010F PR ACE/ARB THEARPY RXD/TAKEN: ICD-10-PCS | Mod: CPTII,S$GLB,, | Performed by: ORTHOPAEDIC SURGERY

## 2021-10-13 PROCEDURE — 1160F PR REVIEW ALL MEDS BY PRESCRIBER/CLIN PHARMACIST DOCUMENTED: ICD-10-PCS | Mod: CPTII,S$GLB,, | Performed by: ORTHOPAEDIC SURGERY

## 2021-10-13 PROCEDURE — 99214 PR OFFICE/OUTPT VISIT, EST, LEVL IV, 30-39 MIN: ICD-10-PCS | Mod: S$GLB,,, | Performed by: ORTHOPAEDIC SURGERY

## 2021-10-13 PROCEDURE — 99999 PR PBB SHADOW E&M-EST. PATIENT-LVL IV: CPT | Mod: PBBFAC,,, | Performed by: ORTHOPAEDIC SURGERY

## 2021-10-13 PROCEDURE — 1125F AMNT PAIN NOTED PAIN PRSNT: CPT | Mod: CPTII,S$GLB,, | Performed by: ORTHOPAEDIC SURGERY

## 2021-10-13 PROCEDURE — 1125F PR PAIN SEVERITY QUANTIFIED, PAIN PRESENT: ICD-10-PCS | Mod: CPTII,S$GLB,, | Performed by: ORTHOPAEDIC SURGERY

## 2021-10-13 PROCEDURE — 1159F PR MEDICATION LIST DOCUMENTED IN MEDICAL RECORD: ICD-10-PCS | Mod: CPTII,S$GLB,, | Performed by: ORTHOPAEDIC SURGERY

## 2021-10-13 PROCEDURE — 1160F RVW MEDS BY RX/DR IN RCRD: CPT | Mod: CPTII,S$GLB,, | Performed by: ORTHOPAEDIC SURGERY

## 2021-10-13 PROCEDURE — 3008F BODY MASS INDEX DOCD: CPT | Mod: CPTII,S$GLB,, | Performed by: ORTHOPAEDIC SURGERY

## 2021-10-13 PROCEDURE — 99214 OFFICE O/P EST MOD 30 MIN: CPT | Mod: S$GLB,,, | Performed by: ORTHOPAEDIC SURGERY

## 2021-10-13 PROCEDURE — 99999 PR PBB SHADOW E&M-EST. PATIENT-LVL IV: ICD-10-PCS | Mod: PBBFAC,,, | Performed by: ORTHOPAEDIC SURGERY

## 2021-10-13 PROCEDURE — 3008F PR BODY MASS INDEX (BMI) DOCUMENTED: ICD-10-PCS | Mod: CPTII,S$GLB,, | Performed by: ORTHOPAEDIC SURGERY

## 2021-10-13 PROCEDURE — 1159F MED LIST DOCD IN RCRD: CPT | Mod: CPTII,S$GLB,, | Performed by: ORTHOPAEDIC SURGERY

## 2021-10-14 ENCOUNTER — PATIENT MESSAGE (OUTPATIENT)
Dept: INTERNAL MEDICINE | Facility: CLINIC | Age: 66
End: 2021-10-14

## 2021-10-16 ENCOUNTER — LAB VISIT (OUTPATIENT)
Dept: SPORTS MEDICINE | Facility: CLINIC | Age: 66
End: 2021-10-16
Payer: MEDICARE

## 2021-10-16 DIAGNOSIS — Z01.818 PRE-OP TESTING: ICD-10-CM

## 2021-10-16 LAB
SARS-COV-2 RNA RESP QL NAA+PROBE: NOT DETECTED
SARS-COV-2- CYCLE NUMBER: NORMAL

## 2021-10-16 PROCEDURE — U0003 INFECTIOUS AGENT DETECTION BY NUCLEIC ACID (DNA OR RNA); SEVERE ACUTE RESPIRATORY SYNDROME CORONAVIRUS 2 (SARS-COV-2) (CORONAVIRUS DISEASE [COVID-19]), AMPLIFIED PROBE TECHNIQUE, MAKING USE OF HIGH THROUGHPUT TECHNOLOGIES AS DESCRIBED BY CMS-2020-01-R: HCPCS | Performed by: ORTHOPAEDIC SURGERY

## 2021-10-16 PROCEDURE — U0005 INFEC AGEN DETEC AMPLI PROBE: HCPCS | Performed by: ORTHOPAEDIC SURGERY

## 2021-10-18 ENCOUNTER — PATIENT MESSAGE (OUTPATIENT)
Dept: ADMINISTRATIVE | Facility: OTHER | Age: 66
End: 2021-10-18
Payer: MEDICARE

## 2021-10-18 ENCOUNTER — OFFICE VISIT (OUTPATIENT)
Dept: ORTHOPEDICS | Facility: CLINIC | Age: 66
End: 2021-10-18
Payer: MEDICARE

## 2021-10-18 ENCOUNTER — ANESTHESIA EVENT (OUTPATIENT)
Dept: SURGERY | Facility: HOSPITAL | Age: 66
End: 2021-10-18
Payer: MEDICARE

## 2021-10-18 ENCOUNTER — TELEPHONE (OUTPATIENT)
Dept: ORTHOPEDICS | Facility: CLINIC | Age: 66
End: 2021-10-18

## 2021-10-18 VITALS — HEIGHT: 63 IN | WEIGHT: 163.13 LBS | BODY MASS INDEX: 28.9 KG/M2

## 2021-10-18 DIAGNOSIS — Z96.651 S/P RIGHT UNICOMPARTMENTAL KNEE REPLACEMENT: ICD-10-CM

## 2021-10-18 DIAGNOSIS — M25.461 PAIN AND SWELLING OF RIGHT KNEE: ICD-10-CM

## 2021-10-18 DIAGNOSIS — S83.271A COMPLEX TEAR OF LATERAL MENISCUS OF RIGHT KNEE AS CURRENT INJURY, INITIAL ENCOUNTER: Primary | ICD-10-CM

## 2021-10-18 DIAGNOSIS — M25.561 PAIN AND SWELLING OF RIGHT KNEE: ICD-10-CM

## 2021-10-18 PROCEDURE — 3288F PR FALLS RISK ASSESSMENT DOCUMENTED: ICD-10-PCS | Mod: CPTII,S$GLB,, | Performed by: PHYSICIAN ASSISTANT

## 2021-10-18 PROCEDURE — 1159F PR MEDICATION LIST DOCUMENTED IN MEDICAL RECORD: ICD-10-PCS | Mod: CPTII,S$GLB,, | Performed by: PHYSICIAN ASSISTANT

## 2021-10-18 PROCEDURE — 3288F FALL RISK ASSESSMENT DOCD: CPT | Mod: CPTII,S$GLB,, | Performed by: PHYSICIAN ASSISTANT

## 2021-10-18 PROCEDURE — 99499 NO LOS: ICD-10-PCS | Mod: S$GLB,,, | Performed by: PHYSICIAN ASSISTANT

## 2021-10-18 PROCEDURE — 1101F PR PT FALLS ASSESS DOC 0-1 FALLS W/OUT INJ PAST YR: ICD-10-PCS | Mod: CPTII,S$GLB,, | Performed by: PHYSICIAN ASSISTANT

## 2021-10-18 PROCEDURE — 3008F PR BODY MASS INDEX (BMI) DOCUMENTED: ICD-10-PCS | Mod: CPTII,S$GLB,, | Performed by: PHYSICIAN ASSISTANT

## 2021-10-18 PROCEDURE — 99499 UNLISTED E&M SERVICE: CPT | Mod: S$GLB,,, | Performed by: PHYSICIAN ASSISTANT

## 2021-10-18 PROCEDURE — 1125F AMNT PAIN NOTED PAIN PRSNT: CPT | Mod: CPTII,S$GLB,, | Performed by: PHYSICIAN ASSISTANT

## 2021-10-18 PROCEDURE — 1160F RVW MEDS BY RX/DR IN RCRD: CPT | Mod: CPTII,S$GLB,, | Performed by: PHYSICIAN ASSISTANT

## 2021-10-18 PROCEDURE — 1160F PR REVIEW ALL MEDS BY PRESCRIBER/CLIN PHARMACIST DOCUMENTED: ICD-10-PCS | Mod: CPTII,S$GLB,, | Performed by: PHYSICIAN ASSISTANT

## 2021-10-18 PROCEDURE — 1125F PR PAIN SEVERITY QUANTIFIED, PAIN PRESENT: ICD-10-PCS | Mod: CPTII,S$GLB,, | Performed by: PHYSICIAN ASSISTANT

## 2021-10-18 PROCEDURE — 4010F ACE/ARB THERAPY RXD/TAKEN: CPT | Mod: CPTII,S$GLB,, | Performed by: PHYSICIAN ASSISTANT

## 2021-10-18 PROCEDURE — 3008F BODY MASS INDEX DOCD: CPT | Mod: CPTII,S$GLB,, | Performed by: PHYSICIAN ASSISTANT

## 2021-10-18 PROCEDURE — 1159F MED LIST DOCD IN RCRD: CPT | Mod: CPTII,S$GLB,, | Performed by: PHYSICIAN ASSISTANT

## 2021-10-18 PROCEDURE — 1101F PT FALLS ASSESS-DOCD LE1/YR: CPT | Mod: CPTII,S$GLB,, | Performed by: PHYSICIAN ASSISTANT

## 2021-10-18 PROCEDURE — 99999 PR PBB SHADOW E&M-EST. PATIENT-LVL II: CPT | Mod: PBBFAC,,, | Performed by: PHYSICIAN ASSISTANT

## 2021-10-18 PROCEDURE — 4010F PR ACE/ARB THEARPY RXD/TAKEN: ICD-10-PCS | Mod: CPTII,S$GLB,, | Performed by: PHYSICIAN ASSISTANT

## 2021-10-18 PROCEDURE — 99999 PR PBB SHADOW E&M-EST. PATIENT-LVL II: ICD-10-PCS | Mod: PBBFAC,,, | Performed by: PHYSICIAN ASSISTANT

## 2021-10-18 RX ORDER — MUPIROCIN 20 MG/G
OINTMENT TOPICAL
Status: CANCELLED | OUTPATIENT
Start: 2021-10-18

## 2021-10-18 RX ORDER — CELECOXIB 200 MG/1
200 CAPSULE ORAL DAILY
Qty: 14 CAPSULE | Refills: 0 | Status: SHIPPED | OUTPATIENT
Start: 2021-10-18 | End: 2021-11-01 | Stop reason: SDUPTHER

## 2021-10-18 RX ORDER — HYDROCODONE BITARTRATE AND ACETAMINOPHEN 5; 325 MG/1; MG/1
1 TABLET ORAL
Qty: 20 TABLET | Refills: 0 | Status: SHIPPED | OUTPATIENT
Start: 2021-10-18 | End: 2022-02-02 | Stop reason: SDUPTHER

## 2021-10-18 RX ORDER — DOCUSATE SODIUM 100 MG/1
100 CAPSULE, LIQUID FILLED ORAL 2 TIMES DAILY PRN
Qty: 28 CAPSULE | Refills: 0 | Status: SHIPPED | OUTPATIENT
Start: 2021-10-18 | End: 2021-11-02

## 2021-10-18 RX ORDER — CEFAZOLIN SODIUM 2 G/50ML
2 SOLUTION INTRAVENOUS
Status: CANCELLED | OUTPATIENT
Start: 2021-10-18

## 2021-10-18 RX ORDER — ASPIRIN 81 MG/1
81 TABLET ORAL DAILY
Qty: 30 TABLET | Refills: 0 | Status: SHIPPED | OUTPATIENT
Start: 2021-10-18 | End: 2022-02-02

## 2021-10-19 ENCOUNTER — HOSPITAL ENCOUNTER (OUTPATIENT)
Facility: HOSPITAL | Age: 66
Discharge: HOME | End: 2021-10-19
Attending: ORTHOPAEDIC SURGERY | Admitting: ORTHOPAEDIC SURGERY
Payer: MEDICARE

## 2021-10-19 ENCOUNTER — ANESTHESIA (OUTPATIENT)
Dept: SURGERY | Facility: HOSPITAL | Age: 66
End: 2021-10-19
Payer: MEDICARE

## 2021-10-19 ENCOUNTER — TELEPHONE (OUTPATIENT)
Dept: PHARMACY | Facility: CLINIC | Age: 66
End: 2021-10-19

## 2021-10-19 VITALS
WEIGHT: 163 LBS | RESPIRATION RATE: 19 BRPM | BODY MASS INDEX: 28.88 KG/M2 | HEIGHT: 63 IN | SYSTOLIC BLOOD PRESSURE: 112 MMHG | HEART RATE: 60 BPM | TEMPERATURE: 98 F | DIASTOLIC BLOOD PRESSURE: 62 MMHG | OXYGEN SATURATION: 97 %

## 2021-10-19 DIAGNOSIS — M17.11 PRIMARY OSTEOARTHRITIS OF RIGHT KNEE: Primary | ICD-10-CM

## 2021-10-19 DIAGNOSIS — S83.271A COMPLEX TEAR OF LATERAL MENISCUS OF RIGHT KNEE AS CURRENT INJURY, INITIAL ENCOUNTER: ICD-10-CM

## 2021-10-19 DIAGNOSIS — M25.461 PAIN AND SWELLING OF RIGHT KNEE: ICD-10-CM

## 2021-10-19 DIAGNOSIS — Z96.651 S/P RIGHT UNICOMPARTMENTAL KNEE REPLACEMENT: ICD-10-CM

## 2021-10-19 DIAGNOSIS — M25.561 PAIN AND SWELLING OF RIGHT KNEE: ICD-10-CM

## 2021-10-19 PROCEDURE — 29881 PR KNEE SCOPE SINGLE MENISECECTOMY: ICD-10-PCS | Mod: HCNC,RT,, | Performed by: ORTHOPAEDIC SURGERY

## 2021-10-19 PROCEDURE — 99900035 HC TECH TIME PER 15 MIN (STAT)

## 2021-10-19 PROCEDURE — 37000008 HC ANESTHESIA 1ST 15 MINUTES: Performed by: ORTHOPAEDIC SURGERY

## 2021-10-19 PROCEDURE — 63600175 PHARM REV CODE 636 W HCPCS: Performed by: ANESTHESIOLOGY

## 2021-10-19 PROCEDURE — 37000009 HC ANESTHESIA EA ADD 15 MINS: Performed by: ORTHOPAEDIC SURGERY

## 2021-10-19 PROCEDURE — 71000015 HC POSTOP RECOV 1ST HR: Performed by: ORTHOPAEDIC SURGERY

## 2021-10-19 PROCEDURE — D9220A PRA ANESTHESIA: ICD-10-PCS | Mod: CRNA,,, | Performed by: NURSE ANESTHETIST, CERTIFIED REGISTERED

## 2021-10-19 PROCEDURE — 36000710: Performed by: ORTHOPAEDIC SURGERY

## 2021-10-19 PROCEDURE — 63600175 PHARM REV CODE 636 W HCPCS: Performed by: NURSE ANESTHETIST, CERTIFIED REGISTERED

## 2021-10-19 PROCEDURE — 76942 ECHO GUIDE FOR BIOPSY: CPT | Mod: 26,,, | Performed by: ANESTHESIOLOGY

## 2021-10-19 PROCEDURE — 27201423 OPTIME MED/SURG SUP & DEVICES STERILE SUPPLY: Performed by: ORTHOPAEDIC SURGERY

## 2021-10-19 PROCEDURE — 63600175 PHARM REV CODE 636 W HCPCS: Performed by: ORTHOPAEDIC SURGERY

## 2021-10-19 PROCEDURE — 27200651 HC AIRWAY, LMA: Performed by: ANESTHESIOLOGY

## 2021-10-19 PROCEDURE — 25000003 PHARM REV CODE 250: Performed by: ANESTHESIOLOGY

## 2021-10-19 PROCEDURE — 25000003 PHARM REV CODE 250: Performed by: NURSE ANESTHETIST, CERTIFIED REGISTERED

## 2021-10-19 PROCEDURE — D9220A PRA ANESTHESIA: Mod: ANES,,, | Performed by: ANESTHESIOLOGY

## 2021-10-19 PROCEDURE — 64447 PR NERVE BLOCK INJ, ANES/STEROID, FEMORAL, INCL IMAG GUIDANCE: ICD-10-PCS | Mod: 59,RT,, | Performed by: ANESTHESIOLOGY

## 2021-10-19 PROCEDURE — 64447 NJX AA&/STRD FEMORAL NRV IMG: CPT | Mod: 59,RT,, | Performed by: ANESTHESIOLOGY

## 2021-10-19 PROCEDURE — D9220A PRA ANESTHESIA: Mod: CRNA,,, | Performed by: NURSE ANESTHETIST, CERTIFIED REGISTERED

## 2021-10-19 PROCEDURE — 25000003 PHARM REV CODE 250: Performed by: ORTHOPAEDIC SURGERY

## 2021-10-19 PROCEDURE — 36000711: Performed by: ORTHOPAEDIC SURGERY

## 2021-10-19 PROCEDURE — 63600175 PHARM REV CODE 636 W HCPCS: Performed by: PHYSICIAN ASSISTANT

## 2021-10-19 PROCEDURE — 25000003 PHARM REV CODE 250: Performed by: STUDENT IN AN ORGANIZED HEALTH CARE EDUCATION/TRAINING PROGRAM

## 2021-10-19 PROCEDURE — 71000033 HC RECOVERY, INTIAL HOUR: Performed by: ORTHOPAEDIC SURGERY

## 2021-10-19 PROCEDURE — 25000003 PHARM REV CODE 250: Performed by: PHYSICIAN ASSISTANT

## 2021-10-19 PROCEDURE — D9220A PRA ANESTHESIA: ICD-10-PCS | Mod: ANES,,, | Performed by: ANESTHESIOLOGY

## 2021-10-19 PROCEDURE — 76942 PR U/S GUIDANCE FOR NEEDLE GUIDANCE: ICD-10-PCS | Mod: 26,,, | Performed by: ANESTHESIOLOGY

## 2021-10-19 PROCEDURE — 94761 N-INVAS EAR/PLS OXIMETRY MLT: CPT

## 2021-10-19 PROCEDURE — 29881 ARTHRS KNE SRG MNISECTMY M/L: CPT | Mod: HCNC,RT,, | Performed by: ORTHOPAEDIC SURGERY

## 2021-10-19 PROCEDURE — 76942 ECHO GUIDE FOR BIOPSY: CPT | Performed by: ANESTHESIOLOGY

## 2021-10-19 RX ORDER — BUPIVACAINE HYDROCHLORIDE AND EPINEPHRINE 2.5; 5 MG/ML; UG/ML
INJECTION, SOLUTION EPIDURAL; INFILTRATION; INTRACAUDAL; PERINEURAL
Status: DISCONTINUED | OUTPATIENT
Start: 2021-10-19 | End: 2021-10-19

## 2021-10-19 RX ORDER — ONDANSETRON 4 MG/1
8 TABLET, ORALLY DISINTEGRATING ORAL EVERY 8 HOURS PRN
Status: DISCONTINUED | OUTPATIENT
Start: 2021-10-19 | End: 2021-10-19 | Stop reason: HOSPADM

## 2021-10-19 RX ORDER — EPINEPHRINE 1 MG/ML
INJECTION INTRAMUSCULAR; INTRAVENOUS; SUBCUTANEOUS
Status: DISCONTINUED | OUTPATIENT
Start: 2021-10-19 | End: 2021-10-19 | Stop reason: HOSPADM

## 2021-10-19 RX ORDER — DEXAMETHASONE SODIUM PHOSPHATE 4 MG/ML
INJECTION, SOLUTION INTRA-ARTICULAR; INTRALESIONAL; INTRAMUSCULAR; INTRAVENOUS; SOFT TISSUE
Status: DISCONTINUED | OUTPATIENT
Start: 2021-10-19 | End: 2021-10-19

## 2021-10-19 RX ORDER — MIDAZOLAM HYDROCHLORIDE 1 MG/ML
INJECTION, SOLUTION INTRAMUSCULAR; INTRAVENOUS
Status: DISCONTINUED | OUTPATIENT
Start: 2021-10-19 | End: 2021-10-19

## 2021-10-19 RX ORDER — LIDOCAINE HYDROCHLORIDE 20 MG/ML
INJECTION INTRAVENOUS
Status: DISCONTINUED | OUTPATIENT
Start: 2021-10-19 | End: 2021-10-19

## 2021-10-19 RX ORDER — SODIUM CHLORIDE 0.9 % (FLUSH) 0.9 %
3 SYRINGE (ML) INJECTION EVERY 6 HOURS
Status: DISCONTINUED | OUTPATIENT
Start: 2021-10-19 | End: 2021-10-19 | Stop reason: HOSPADM

## 2021-10-19 RX ORDER — MIDAZOLAM HYDROCHLORIDE 1 MG/ML
.5-4 INJECTION INTRAMUSCULAR; INTRAVENOUS
Status: DISCONTINUED | OUTPATIENT
Start: 2021-10-19 | End: 2021-10-19 | Stop reason: HOSPADM

## 2021-10-19 RX ORDER — CELECOXIB 200 MG/1
400 CAPSULE ORAL ONCE
Status: COMPLETED | OUTPATIENT
Start: 2021-10-19 | End: 2021-10-19

## 2021-10-19 RX ORDER — PROPOFOL 10 MG/ML
VIAL (ML) INTRAVENOUS
Status: DISCONTINUED | OUTPATIENT
Start: 2021-10-19 | End: 2021-10-19

## 2021-10-19 RX ORDER — CARBOXYMETHYLCELLULOSE SODIUM 10 MG/ML
GEL OPHTHALMIC
Status: DISCONTINUED | OUTPATIENT
Start: 2021-10-19 | End: 2021-10-19

## 2021-10-19 RX ORDER — SODIUM CHLORIDE 9 MG/ML
INJECTION, SOLUTION INTRAVENOUS CONTINUOUS
Status: DISCONTINUED | OUTPATIENT
Start: 2021-10-19 | End: 2021-10-19 | Stop reason: HOSPADM

## 2021-10-19 RX ORDER — FENTANYL CITRATE 50 UG/ML
INJECTION, SOLUTION INTRAMUSCULAR; INTRAVENOUS
Status: DISCONTINUED | OUTPATIENT
Start: 2021-10-19 | End: 2021-10-19

## 2021-10-19 RX ORDER — HALOPERIDOL 5 MG/ML
0.5 INJECTION INTRAMUSCULAR EVERY 10 MIN PRN
Status: DISCONTINUED | OUTPATIENT
Start: 2021-10-19 | End: 2021-10-19 | Stop reason: HOSPADM

## 2021-10-19 RX ORDER — HYDROCODONE BITARTRATE AND ACETAMINOPHEN 5; 325 MG/1; MG/1
1 TABLET ORAL EVERY 4 HOURS PRN
Status: DISCONTINUED | OUTPATIENT
Start: 2021-10-19 | End: 2021-10-19 | Stop reason: HOSPADM

## 2021-10-19 RX ORDER — BUPIVACAINE HYDROCHLORIDE AND EPINEPHRINE 2.5; 5 MG/ML; UG/ML
INJECTION, SOLUTION EPIDURAL; INFILTRATION; INTRACAUDAL; PERINEURAL
Status: DISCONTINUED | OUTPATIENT
Start: 2021-10-19 | End: 2021-10-19 | Stop reason: HOSPADM

## 2021-10-19 RX ORDER — FAMOTIDINE 10 MG/ML
INJECTION INTRAVENOUS
Status: DISCONTINUED | OUTPATIENT
Start: 2021-10-19 | End: 2021-10-19

## 2021-10-19 RX ORDER — METHYLPREDNISOLONE ACETATE 40 MG/ML
INJECTION, SUSPENSION INTRA-ARTICULAR; INTRALESIONAL; INTRAMUSCULAR; SOFT TISSUE
Status: DISCONTINUED | OUTPATIENT
Start: 2021-10-19 | End: 2021-10-19 | Stop reason: HOSPADM

## 2021-10-19 RX ORDER — FENTANYL CITRATE 50 UG/ML
25 INJECTION, SOLUTION INTRAMUSCULAR; INTRAVENOUS EVERY 5 MIN PRN
Status: DISCONTINUED | OUTPATIENT
Start: 2021-10-19 | End: 2021-10-19 | Stop reason: HOSPADM

## 2021-10-19 RX ORDER — OXYCODONE HYDROCHLORIDE 5 MG/1
10 TABLET ORAL
Status: DISCONTINUED | OUTPATIENT
Start: 2021-10-19 | End: 2021-10-19 | Stop reason: HOSPADM

## 2021-10-19 RX ORDER — ACETAMINOPHEN 500 MG
1000 TABLET ORAL
Status: COMPLETED | OUTPATIENT
Start: 2021-10-19 | End: 2021-10-19

## 2021-10-19 RX ORDER — MUPIROCIN 20 MG/G
OINTMENT TOPICAL
Status: DISCONTINUED | OUTPATIENT
Start: 2021-10-19 | End: 2021-10-19 | Stop reason: HOSPADM

## 2021-10-19 RX ORDER — ONDANSETRON 2 MG/ML
INJECTION INTRAMUSCULAR; INTRAVENOUS
Status: DISCONTINUED | OUTPATIENT
Start: 2021-10-19 | End: 2021-10-19

## 2021-10-19 RX ORDER — CEFAZOLIN SODIUM 1 G/3ML
2 INJECTION, POWDER, FOR SOLUTION INTRAMUSCULAR; INTRAVENOUS
Status: COMPLETED | OUTPATIENT
Start: 2021-10-19 | End: 2021-10-19

## 2021-10-19 RX ORDER — FENTANYL CITRATE 50 UG/ML
25-200 INJECTION, SOLUTION INTRAMUSCULAR; INTRAVENOUS EVERY 5 MIN PRN
Status: DISCONTINUED | OUTPATIENT
Start: 2021-10-19 | End: 2021-10-19 | Stop reason: HOSPADM

## 2021-10-19 RX ADMIN — MIDAZOLAM HYDROCHLORIDE 2 MG: 1 INJECTION, SOLUTION INTRAMUSCULAR; INTRAVENOUS at 07:10

## 2021-10-19 RX ADMIN — BUPIVACAINE HYDROCHLORIDE AND EPINEPHRINE BITARTRATE 20 ML: 2.5; .0091 INJECTION, SOLUTION EPIDURAL; INFILTRATION; INTRACAUDAL; PERINEURAL at 08:10

## 2021-10-19 RX ADMIN — PROPOFOL 160 MG: 10 INJECTION, EMULSION INTRAVENOUS at 07:10

## 2021-10-19 RX ADMIN — FENTANYL CITRATE 25 MCG: 50 INJECTION INTRAMUSCULAR; INTRAVENOUS at 09:10

## 2021-10-19 RX ADMIN — MUPIROCIN: 20 OINTMENT TOPICAL at 06:10

## 2021-10-19 RX ADMIN — SODIUM CHLORIDE: 0.9 INJECTION, SOLUTION INTRAVENOUS at 06:10

## 2021-10-19 RX ADMIN — CELECOXIB 400 MG: 200 CAPSULE ORAL at 06:10

## 2021-10-19 RX ADMIN — LIDOCAINE HYDROCHLORIDE 60 MG: 20 INJECTION, SOLUTION INTRAVENOUS at 07:10

## 2021-10-19 RX ADMIN — CARBOXYMETHYLCELLULOSE SODIUM 4 DROP: 10 GEL OPHTHALMIC at 07:10

## 2021-10-19 RX ADMIN — FAMOTIDINE 20 MG: 10 INJECTION, SOLUTION INTRAVENOUS at 07:10

## 2021-10-19 RX ADMIN — ACETAMINOPHEN 1000 MG: 500 TABLET ORAL at 06:10

## 2021-10-19 RX ADMIN — ONDANSETRON 4 MG: 2 INJECTION, SOLUTION INTRAMUSCULAR; INTRAVENOUS at 07:10

## 2021-10-19 RX ADMIN — OXYCODONE 10 MG: 5 TABLET ORAL at 09:10

## 2021-10-19 RX ADMIN — FENTANYL CITRATE 25 MCG: 50 INJECTION, SOLUTION INTRAMUSCULAR; INTRAVENOUS at 08:10

## 2021-10-19 RX ADMIN — CEFAZOLIN 2 G: 330 INJECTION, POWDER, FOR SOLUTION INTRAMUSCULAR; INTRAVENOUS at 07:10

## 2021-10-19 RX ADMIN — FENTANYL CITRATE 50 MCG: 50 INJECTION, SOLUTION INTRAMUSCULAR; INTRAVENOUS at 07:10

## 2021-10-19 RX ADMIN — DEXAMETHASONE SODIUM PHOSPHATE 8 MG: 4 INJECTION, SOLUTION INTRAMUSCULAR; INTRAVENOUS at 07:10

## 2021-10-21 ENCOUNTER — PATIENT MESSAGE (OUTPATIENT)
Dept: INTERNAL MEDICINE | Facility: CLINIC | Age: 66
End: 2021-10-21
Payer: MEDICARE

## 2021-10-21 ENCOUNTER — TELEPHONE (OUTPATIENT)
Dept: ORTHOPEDICS | Facility: CLINIC | Age: 66
End: 2021-10-21

## 2021-10-21 RX ORDER — VALSARTAN 320 MG/1
320 TABLET ORAL DAILY
Qty: 90 TABLET | Refills: 3 | Status: SHIPPED | OUTPATIENT
Start: 2021-10-21 | End: 2022-09-07

## 2021-10-24 ENCOUNTER — PATIENT MESSAGE (OUTPATIENT)
Dept: ORTHOPEDICS | Facility: CLINIC | Age: 66
End: 2021-10-24
Payer: MEDICARE

## 2021-10-25 ENCOUNTER — OFFICE VISIT (OUTPATIENT)
Dept: ORTHOPEDICS | Facility: CLINIC | Age: 66
End: 2021-10-25
Payer: MEDICARE

## 2021-10-25 VITALS — BODY MASS INDEX: 28.88 KG/M2 | HEIGHT: 63 IN | WEIGHT: 163 LBS

## 2021-10-25 DIAGNOSIS — Z98.890 STATUS POST ARTHROSCOPY OF RIGHT KNEE: Primary | ICD-10-CM

## 2021-10-25 DIAGNOSIS — Z96.651 S/P RIGHT UNICOMPARTMENTAL KNEE REPLACEMENT: ICD-10-CM

## 2021-10-25 DIAGNOSIS — S83.271A COMPLEX TEAR OF LATERAL MENISCUS OF RIGHT KNEE AS CURRENT INJURY, INITIAL ENCOUNTER: ICD-10-CM

## 2021-10-25 PROCEDURE — 1125F AMNT PAIN NOTED PAIN PRSNT: CPT | Mod: HCNC,CPTII,S$GLB, | Performed by: PHYSICIAN ASSISTANT

## 2021-10-25 PROCEDURE — 99999 PR PBB SHADOW E&M-EST. PATIENT-LVL IV: CPT | Mod: PBBFAC,HCNC,, | Performed by: PHYSICIAN ASSISTANT

## 2021-10-25 PROCEDURE — 3288F FALL RISK ASSESSMENT DOCD: CPT | Mod: HCNC,CPTII,S$GLB, | Performed by: PHYSICIAN ASSISTANT

## 2021-10-25 PROCEDURE — 1160F PR REVIEW ALL MEDS BY PRESCRIBER/CLIN PHARMACIST DOCUMENTED: ICD-10-PCS | Mod: HCNC,CPTII,S$GLB, | Performed by: PHYSICIAN ASSISTANT

## 2021-10-25 PROCEDURE — 4010F PR ACE/ARB THEARPY RXD/TAKEN: ICD-10-PCS | Mod: HCNC,CPTII,S$GLB, | Performed by: PHYSICIAN ASSISTANT

## 2021-10-25 PROCEDURE — 1101F PT FALLS ASSESS-DOCD LE1/YR: CPT | Mod: HCNC,CPTII,S$GLB, | Performed by: PHYSICIAN ASSISTANT

## 2021-10-25 PROCEDURE — 1159F MED LIST DOCD IN RCRD: CPT | Mod: HCNC,CPTII,S$GLB, | Performed by: PHYSICIAN ASSISTANT

## 2021-10-25 PROCEDURE — 99024 POSTOP FOLLOW-UP VISIT: CPT | Mod: HCNC,S$GLB,, | Performed by: PHYSICIAN ASSISTANT

## 2021-10-25 PROCEDURE — 1101F PR PT FALLS ASSESS DOC 0-1 FALLS W/OUT INJ PAST YR: ICD-10-PCS | Mod: HCNC,CPTII,S$GLB, | Performed by: PHYSICIAN ASSISTANT

## 2021-10-25 PROCEDURE — 1125F PR PAIN SEVERITY QUANTIFIED, PAIN PRESENT: ICD-10-PCS | Mod: HCNC,CPTII,S$GLB, | Performed by: PHYSICIAN ASSISTANT

## 2021-10-25 PROCEDURE — 1160F RVW MEDS BY RX/DR IN RCRD: CPT | Mod: HCNC,CPTII,S$GLB, | Performed by: PHYSICIAN ASSISTANT

## 2021-10-25 PROCEDURE — 3008F BODY MASS INDEX DOCD: CPT | Mod: HCNC,CPTII,S$GLB, | Performed by: PHYSICIAN ASSISTANT

## 2021-10-25 PROCEDURE — 3008F PR BODY MASS INDEX (BMI) DOCUMENTED: ICD-10-PCS | Mod: HCNC,CPTII,S$GLB, | Performed by: PHYSICIAN ASSISTANT

## 2021-10-25 PROCEDURE — 99999 PR PBB SHADOW E&M-EST. PATIENT-LVL IV: ICD-10-PCS | Mod: PBBFAC,HCNC,, | Performed by: PHYSICIAN ASSISTANT

## 2021-10-25 PROCEDURE — 3288F PR FALLS RISK ASSESSMENT DOCUMENTED: ICD-10-PCS | Mod: HCNC,CPTII,S$GLB, | Performed by: PHYSICIAN ASSISTANT

## 2021-10-25 PROCEDURE — 1159F PR MEDICATION LIST DOCUMENTED IN MEDICAL RECORD: ICD-10-PCS | Mod: HCNC,CPTII,S$GLB, | Performed by: PHYSICIAN ASSISTANT

## 2021-10-25 PROCEDURE — 99024 PR POST-OP FOLLOW-UP VISIT: ICD-10-PCS | Mod: HCNC,S$GLB,, | Performed by: PHYSICIAN ASSISTANT

## 2021-10-25 PROCEDURE — 4010F ACE/ARB THERAPY RXD/TAKEN: CPT | Mod: HCNC,CPTII,S$GLB, | Performed by: PHYSICIAN ASSISTANT

## 2021-10-25 RX ORDER — ACETAMINOPHEN AND CODEINE PHOSPHATE 300; 30 MG/1; MG/1
1 TABLET ORAL EVERY 6 HOURS PRN
Qty: 28 TABLET | Refills: 0 | Status: SHIPPED | OUTPATIENT
Start: 2021-10-25 | End: 2022-02-02

## 2021-11-01 ENCOUNTER — OFFICE VISIT (OUTPATIENT)
Dept: ORTHOPEDICS | Facility: CLINIC | Age: 66
End: 2021-11-01
Payer: MEDICARE

## 2021-11-01 VITALS — BODY MASS INDEX: 28.12 KG/M2 | WEIGHT: 158.69 LBS | HEIGHT: 63 IN

## 2021-11-01 DIAGNOSIS — Z98.890 STATUS POST ARTHROSCOPY OF RIGHT KNEE: Primary | ICD-10-CM

## 2021-11-01 PROCEDURE — 1126F AMNT PAIN NOTED NONE PRSNT: CPT | Mod: HCNC,CPTII,S$GLB, | Performed by: PHYSICIAN ASSISTANT

## 2021-11-01 PROCEDURE — 1101F PT FALLS ASSESS-DOCD LE1/YR: CPT | Mod: HCNC,CPTII,S$GLB, | Performed by: PHYSICIAN ASSISTANT

## 2021-11-01 PROCEDURE — 3008F BODY MASS INDEX DOCD: CPT | Mod: HCNC,CPTII,S$GLB, | Performed by: PHYSICIAN ASSISTANT

## 2021-11-01 PROCEDURE — 4010F ACE/ARB THERAPY RXD/TAKEN: CPT | Mod: HCNC,CPTII,S$GLB, | Performed by: PHYSICIAN ASSISTANT

## 2021-11-01 PROCEDURE — 3288F FALL RISK ASSESSMENT DOCD: CPT | Mod: HCNC,CPTII,S$GLB, | Performed by: PHYSICIAN ASSISTANT

## 2021-11-01 PROCEDURE — 1159F MED LIST DOCD IN RCRD: CPT | Mod: HCNC,CPTII,S$GLB, | Performed by: PHYSICIAN ASSISTANT

## 2021-11-01 PROCEDURE — 99999 PR PBB SHADOW E&M-EST. PATIENT-LVL IV: CPT | Mod: PBBFAC,HCNC,, | Performed by: PHYSICIAN ASSISTANT

## 2021-11-01 PROCEDURE — 99024 PR POST-OP FOLLOW-UP VISIT: ICD-10-PCS | Mod: HCNC,S$GLB,, | Performed by: PHYSICIAN ASSISTANT

## 2021-11-01 PROCEDURE — 1126F PR PAIN SEVERITY QUANTIFIED, NO PAIN PRESENT: ICD-10-PCS | Mod: HCNC,CPTII,S$GLB, | Performed by: PHYSICIAN ASSISTANT

## 2021-11-01 PROCEDURE — 3288F PR FALLS RISK ASSESSMENT DOCUMENTED: ICD-10-PCS | Mod: HCNC,CPTII,S$GLB, | Performed by: PHYSICIAN ASSISTANT

## 2021-11-01 PROCEDURE — 4010F PR ACE/ARB THEARPY RXD/TAKEN: ICD-10-PCS | Mod: HCNC,CPTII,S$GLB, | Performed by: PHYSICIAN ASSISTANT

## 2021-11-01 PROCEDURE — 1160F PR REVIEW ALL MEDS BY PRESCRIBER/CLIN PHARMACIST DOCUMENTED: ICD-10-PCS | Mod: HCNC,CPTII,S$GLB, | Performed by: PHYSICIAN ASSISTANT

## 2021-11-01 PROCEDURE — 1101F PR PT FALLS ASSESS DOC 0-1 FALLS W/OUT INJ PAST YR: ICD-10-PCS | Mod: HCNC,CPTII,S$GLB, | Performed by: PHYSICIAN ASSISTANT

## 2021-11-01 PROCEDURE — 1159F PR MEDICATION LIST DOCUMENTED IN MEDICAL RECORD: ICD-10-PCS | Mod: HCNC,CPTII,S$GLB, | Performed by: PHYSICIAN ASSISTANT

## 2021-11-01 PROCEDURE — 99999 PR PBB SHADOW E&M-EST. PATIENT-LVL IV: ICD-10-PCS | Mod: PBBFAC,HCNC,, | Performed by: PHYSICIAN ASSISTANT

## 2021-11-01 PROCEDURE — 1160F RVW MEDS BY RX/DR IN RCRD: CPT | Mod: HCNC,CPTII,S$GLB, | Performed by: PHYSICIAN ASSISTANT

## 2021-11-01 PROCEDURE — 3008F PR BODY MASS INDEX (BMI) DOCUMENTED: ICD-10-PCS | Mod: HCNC,CPTII,S$GLB, | Performed by: PHYSICIAN ASSISTANT

## 2021-11-01 PROCEDURE — 99024 POSTOP FOLLOW-UP VISIT: CPT | Mod: HCNC,S$GLB,, | Performed by: PHYSICIAN ASSISTANT

## 2021-11-01 RX ORDER — DEXTROMETHORPHAN HYDROBROMIDE, GUAIFENESIN 5; 100 MG/5ML; MG/5ML
650 LIQUID ORAL
Qty: 30 TABLET | Refills: 0 | Status: SHIPPED | OUTPATIENT
Start: 2021-11-01 | End: 2022-02-02

## 2021-11-01 RX ORDER — CELECOXIB 200 MG/1
200 CAPSULE ORAL 2 TIMES DAILY
Qty: 28 CAPSULE | Refills: 0 | Status: SHIPPED | OUTPATIENT
Start: 2021-11-01 | End: 2021-11-15

## 2021-11-02 ENCOUNTER — IMMUNIZATION (OUTPATIENT)
Dept: PHARMACY | Facility: CLINIC | Age: 66
End: 2021-11-02
Payer: MEDICARE

## 2021-11-02 ENCOUNTER — PATIENT MESSAGE (OUTPATIENT)
Dept: ORTHOPEDICS | Facility: CLINIC | Age: 66
End: 2021-11-02
Payer: MEDICARE

## 2021-11-02 DIAGNOSIS — Z23 NEED FOR VACCINATION: Primary | ICD-10-CM

## 2021-11-03 ENCOUNTER — PATIENT MESSAGE (OUTPATIENT)
Dept: ORTHOPEDICS | Facility: CLINIC | Age: 66
End: 2021-11-03
Payer: MEDICARE

## 2021-11-03 DIAGNOSIS — Z96.651 S/P RIGHT UNICOMPARTMENTAL KNEE REPLACEMENT: ICD-10-CM

## 2021-11-03 DIAGNOSIS — Z98.890 STATUS POST ARTHROSCOPY OF RIGHT KNEE: ICD-10-CM

## 2021-11-03 DIAGNOSIS — M17.11 PRIMARY OSTEOARTHRITIS OF RIGHT KNEE: Primary | ICD-10-CM

## 2021-11-04 ENCOUNTER — PATIENT MESSAGE (OUTPATIENT)
Dept: ORTHOPEDICS | Facility: CLINIC | Age: 66
End: 2021-11-04
Payer: MEDICARE

## 2021-11-08 ENCOUNTER — PATIENT MESSAGE (OUTPATIENT)
Dept: INTERNAL MEDICINE | Facility: CLINIC | Age: 66
End: 2021-11-08
Payer: MEDICARE

## 2021-11-08 RX ORDER — PROMETHAZINE HYDROCHLORIDE 12.5 MG/1
12.5 TABLET ORAL EVERY 6 HOURS PRN
Qty: 30 TABLET | Refills: 2 | Status: SHIPPED | OUTPATIENT
Start: 2021-11-08 | End: 2022-02-02 | Stop reason: SDUPTHER

## 2021-11-10 ENCOUNTER — PATIENT MESSAGE (OUTPATIENT)
Dept: ORTHOPEDICS | Facility: CLINIC | Age: 66
End: 2021-11-10
Payer: MEDICARE

## 2021-11-10 ENCOUNTER — PATIENT MESSAGE (OUTPATIENT)
Dept: INTERNAL MEDICINE | Facility: CLINIC | Age: 66
End: 2021-11-10
Payer: MEDICARE

## 2021-11-12 ENCOUNTER — PATIENT MESSAGE (OUTPATIENT)
Dept: INTERNAL MEDICINE | Facility: CLINIC | Age: 66
End: 2021-11-12
Payer: MEDICARE

## 2021-11-22 ENCOUNTER — CLINICAL SUPPORT (OUTPATIENT)
Dept: REHABILITATION | Facility: OTHER | Age: 66
End: 2021-11-22
Attending: INTERNAL MEDICINE
Payer: MEDICARE

## 2021-11-22 DIAGNOSIS — M25.69 DECREASED RANGE OF MOTION OF TRUNK AND BACK: Primary | ICD-10-CM

## 2021-11-22 DIAGNOSIS — R29.3 POOR POSTURE: ICD-10-CM

## 2021-11-22 PROCEDURE — 97110 THERAPEUTIC EXERCISES: CPT | Mod: HCNC

## 2021-11-26 ENCOUNTER — CLINICAL SUPPORT (OUTPATIENT)
Dept: REHABILITATION | Facility: OTHER | Age: 66
End: 2021-11-26
Attending: INTERNAL MEDICINE
Payer: MEDICARE

## 2021-11-26 DIAGNOSIS — R29.3 POOR POSTURE: ICD-10-CM

## 2021-11-26 DIAGNOSIS — M25.69 DECREASED RANGE OF MOTION OF TRUNK AND BACK: Primary | ICD-10-CM

## 2021-11-26 PROCEDURE — 97110 THERAPEUTIC EXERCISES: CPT | Mod: HCNC

## 2021-12-01 DIAGNOSIS — M25.561 RIGHT KNEE PAIN, UNSPECIFIED CHRONICITY: Primary | ICD-10-CM

## 2021-12-06 ENCOUNTER — HOSPITAL ENCOUNTER (OUTPATIENT)
Dept: RADIOLOGY | Facility: HOSPITAL | Age: 66
Discharge: HOME | End: 2021-12-06
Attending: ORTHOPAEDIC SURGERY
Payer: MEDICARE

## 2021-12-06 ENCOUNTER — OFFICE VISIT (OUTPATIENT)
Dept: ORTHOPEDICS | Facility: CLINIC | Age: 66
End: 2021-12-06
Payer: MEDICARE

## 2021-12-06 VITALS — WEIGHT: 158.75 LBS | BODY MASS INDEX: 28.13 KG/M2 | HEIGHT: 63 IN

## 2021-12-06 DIAGNOSIS — M25.561 RIGHT KNEE PAIN, UNSPECIFIED CHRONICITY: ICD-10-CM

## 2021-12-06 DIAGNOSIS — Z98.890 STATUS POST ARTHROSCOPY OF RIGHT KNEE: Primary | ICD-10-CM

## 2021-12-06 PROCEDURE — 4010F ACE/ARB THERAPY RXD/TAKEN: CPT | Mod: HCNC,CPTII,S$GLB, | Performed by: ORTHOPAEDIC SURGERY

## 2021-12-06 PROCEDURE — 73560 X-RAY EXAM OF KNEE 1 OR 2: CPT | Mod: 26,HCNC,LT, | Performed by: RADIOLOGY

## 2021-12-06 PROCEDURE — 99999 PR PBB SHADOW E&M-EST. PATIENT-LVL IV: ICD-10-PCS | Mod: PBBFAC,HCNC,, | Performed by: ORTHOPAEDIC SURGERY

## 2021-12-06 PROCEDURE — 73560 XR KNEE ORTHO RIGHT: ICD-10-PCS | Mod: 26,HCNC,LT, | Performed by: RADIOLOGY

## 2021-12-06 PROCEDURE — 4010F PR ACE/ARB THEARPY RXD/TAKEN: ICD-10-PCS | Mod: HCNC,CPTII,S$GLB, | Performed by: ORTHOPAEDIC SURGERY

## 2021-12-06 PROCEDURE — 99024 PR POST-OP FOLLOW-UP VISIT: ICD-10-PCS | Mod: HCNC,S$GLB,, | Performed by: ORTHOPAEDIC SURGERY

## 2021-12-06 PROCEDURE — 73562 X-RAY EXAM OF KNEE 3: CPT | Mod: 26,HCNC,RT, | Performed by: RADIOLOGY

## 2021-12-06 PROCEDURE — 99024 POSTOP FOLLOW-UP VISIT: CPT | Mod: HCNC,S$GLB,, | Performed by: ORTHOPAEDIC SURGERY

## 2021-12-06 PROCEDURE — 73560 X-RAY EXAM OF KNEE 1 OR 2: CPT | Mod: TC,HCNC,LT

## 2021-12-06 PROCEDURE — 99999 PR PBB SHADOW E&M-EST. PATIENT-LVL IV: CPT | Mod: PBBFAC,HCNC,, | Performed by: ORTHOPAEDIC SURGERY

## 2021-12-06 PROCEDURE — 73562 XR KNEE ORTHO RIGHT: ICD-10-PCS | Mod: 26,HCNC,RT, | Performed by: RADIOLOGY

## 2021-12-06 RX ORDER — MELOXICAM 15 MG/1
15 TABLET ORAL DAILY
Qty: 30 TABLET | Refills: 1 | Status: SHIPPED | OUTPATIENT
Start: 2021-12-06 | End: 2022-01-05

## 2021-12-08 ENCOUNTER — CLINICAL SUPPORT (OUTPATIENT)
Dept: REHABILITATION | Facility: OTHER | Age: 66
End: 2021-12-08
Attending: INTERNAL MEDICINE
Payer: MEDICARE

## 2021-12-08 DIAGNOSIS — R29.3 POOR POSTURE: ICD-10-CM

## 2021-12-08 DIAGNOSIS — M25.69 DECREASED RANGE OF MOTION OF TRUNK AND BACK: Primary | ICD-10-CM

## 2021-12-08 PROCEDURE — 97110 THERAPEUTIC EXERCISES: CPT | Mod: HCNC

## 2021-12-09 ENCOUNTER — OFFICE VISIT (OUTPATIENT)
Dept: ORTHOPEDICS | Facility: CLINIC | Age: 66
End: 2021-12-09
Payer: MEDICARE

## 2021-12-09 ENCOUNTER — HOSPITAL ENCOUNTER (OUTPATIENT)
Dept: RADIOLOGY | Facility: HOSPITAL | Age: 66
Discharge: HOME | End: 2021-12-09
Attending: ORTHOPAEDIC SURGERY
Payer: MEDICARE

## 2021-12-09 VITALS — WEIGHT: 152 LBS | HEIGHT: 63 IN | BODY MASS INDEX: 26.93 KG/M2

## 2021-12-09 DIAGNOSIS — M47.816 LUMBAR SPONDYLOSIS: Primary | ICD-10-CM

## 2021-12-09 DIAGNOSIS — M41.50 DEGENERATIVE SCOLIOSIS: ICD-10-CM

## 2021-12-09 DIAGNOSIS — M51.36 DDD (DEGENERATIVE DISC DISEASE), LUMBAR: ICD-10-CM

## 2021-12-09 DIAGNOSIS — M43.10 DEGENERATIVE SPONDYLOLISTHESIS: ICD-10-CM

## 2021-12-09 PROCEDURE — 72110 XR LUMBAR SPINE AP AND LAT WITH FLEX/EXT: ICD-10-PCS | Mod: 26,HCNC,, | Performed by: RADIOLOGY

## 2021-12-09 PROCEDURE — 99999 PR PBB SHADOW E&M-EST. PATIENT-LVL IV: ICD-10-PCS | Mod: PBBFAC,HCNC,, | Performed by: ORTHOPAEDIC SURGERY

## 2021-12-09 PROCEDURE — 72110 X-RAY EXAM L-2 SPINE 4/>VWS: CPT | Mod: TC,HCNC

## 2021-12-09 PROCEDURE — 4010F PR ACE/ARB THEARPY RXD/TAKEN: ICD-10-PCS | Mod: HCNC,CPTII,S$GLB, | Performed by: ORTHOPAEDIC SURGERY

## 2021-12-09 PROCEDURE — 99213 OFFICE O/P EST LOW 20 MIN: CPT | Mod: HCNC,24,S$GLB, | Performed by: ORTHOPAEDIC SURGERY

## 2021-12-09 PROCEDURE — 72110 X-RAY EXAM L-2 SPINE 4/>VWS: CPT | Mod: 26,HCNC,, | Performed by: RADIOLOGY

## 2021-12-09 PROCEDURE — 99213 PR OFFICE/OUTPT VISIT, EST, LEVL III, 20-29 MIN: ICD-10-PCS | Mod: HCNC,24,S$GLB, | Performed by: ORTHOPAEDIC SURGERY

## 2021-12-09 PROCEDURE — 99999 PR PBB SHADOW E&M-EST. PATIENT-LVL IV: CPT | Mod: PBBFAC,HCNC,, | Performed by: ORTHOPAEDIC SURGERY

## 2021-12-09 PROCEDURE — 4010F ACE/ARB THERAPY RXD/TAKEN: CPT | Mod: HCNC,CPTII,S$GLB, | Performed by: ORTHOPAEDIC SURGERY

## 2021-12-09 RX ORDER — CYCLOBENZAPRINE HCL 10 MG
10 TABLET ORAL 3 TIMES DAILY PRN
Qty: 60 TABLET | Refills: 1 | Status: SHIPPED | OUTPATIENT
Start: 2021-12-09 | End: 2022-02-02

## 2021-12-10 ENCOUNTER — CLINICAL SUPPORT (OUTPATIENT)
Dept: REHABILITATION | Facility: OTHER | Age: 66
End: 2021-12-10
Attending: INTERNAL MEDICINE
Payer: MEDICARE

## 2021-12-10 ENCOUNTER — PATIENT MESSAGE (OUTPATIENT)
Dept: ORTHOPEDICS | Facility: CLINIC | Age: 66
End: 2021-12-10
Payer: MEDICARE

## 2021-12-10 ENCOUNTER — PATIENT MESSAGE (OUTPATIENT)
Dept: INTERNAL MEDICINE | Facility: CLINIC | Age: 66
End: 2021-12-10
Payer: MEDICARE

## 2021-12-10 DIAGNOSIS — R29.3 POOR POSTURE: ICD-10-CM

## 2021-12-10 DIAGNOSIS — M25.69 DECREASED RANGE OF MOTION OF TRUNK AND BACK: Primary | ICD-10-CM

## 2021-12-10 PROCEDURE — 97110 THERAPEUTIC EXERCISES: CPT | Mod: HCNC

## 2021-12-12 ENCOUNTER — PATIENT MESSAGE (OUTPATIENT)
Dept: ORTHOPEDICS | Facility: CLINIC | Age: 66
End: 2021-12-12
Payer: MEDICARE

## 2021-12-13 ENCOUNTER — PATIENT MESSAGE (OUTPATIENT)
Dept: INTERNAL MEDICINE | Facility: CLINIC | Age: 66
End: 2021-12-13
Payer: MEDICARE

## 2021-12-16 ENCOUNTER — CLINICAL SUPPORT (OUTPATIENT)
Dept: REHABILITATION | Facility: OTHER | Age: 66
End: 2021-12-16
Attending: INTERNAL MEDICINE
Payer: MEDICARE

## 2021-12-16 DIAGNOSIS — R29.3 POOR POSTURE: ICD-10-CM

## 2021-12-16 DIAGNOSIS — M25.69 DECREASED RANGE OF MOTION OF TRUNK AND BACK: Primary | ICD-10-CM

## 2021-12-16 PROCEDURE — 97110 THERAPEUTIC EXERCISES: CPT | Mod: HCNC,CQ

## 2021-12-17 ENCOUNTER — PATIENT MESSAGE (OUTPATIENT)
Dept: INTERNAL MEDICINE | Facility: CLINIC | Age: 66
End: 2021-12-17
Payer: MEDICARE

## 2021-12-18 ENCOUNTER — PATIENT MESSAGE (OUTPATIENT)
Dept: INTERNAL MEDICINE | Facility: CLINIC | Age: 66
End: 2021-12-18
Payer: MEDICARE

## 2021-12-21 ENCOUNTER — TELEPHONE (OUTPATIENT)
Dept: ORTHOPEDICS | Facility: CLINIC | Age: 66
End: 2021-12-21
Payer: MEDICARE

## 2021-12-28 ENCOUNTER — CLINICAL SUPPORT (OUTPATIENT)
Dept: REHABILITATION | Facility: OTHER | Age: 66
End: 2021-12-28
Attending: INTERNAL MEDICINE
Payer: MEDICARE

## 2021-12-28 DIAGNOSIS — R29.3 POOR POSTURE: ICD-10-CM

## 2021-12-28 DIAGNOSIS — M25.69 DECREASED RANGE OF MOTION OF TRUNK AND BACK: Primary | ICD-10-CM

## 2021-12-28 PROCEDURE — 97110 THERAPEUTIC EXERCISES: CPT | Mod: HCNC,CQ

## 2022-01-03 ENCOUNTER — CLINICAL SUPPORT (OUTPATIENT)
Dept: REHABILITATION | Facility: OTHER | Age: 67
End: 2022-01-03
Attending: INTERNAL MEDICINE
Payer: MEDICARE

## 2022-01-03 DIAGNOSIS — M25.69 DECREASED RANGE OF MOTION OF TRUNK AND BACK: Primary | ICD-10-CM

## 2022-01-03 DIAGNOSIS — R29.3 POOR POSTURE: ICD-10-CM

## 2022-01-03 PROCEDURE — 97110 THERAPEUTIC EXERCISES: CPT | Mod: HCNC

## 2022-01-03 NOTE — PROGRESS NOTES
"[facility] [facility]      Name: Tiarra Norton  Clinic Number: [id number]    Therapy Diagnosis:   Encounter Diagnoses   Name Primary?    Decreased range of motion of trunk and back Yes    Poor posture      Physician: Kaykay Perales MD    Visit Date: 1/3/2022    Physician Orders:PT EVAL and treat  Medical Diagnosis from Referral: M54.9 (ICD-10-CM) - Back pain, unspecified back location, unspecified back pain laterality, unspecified chronicity  Evaluation Date: 2021  Authorization Period Expiration: 2022  Plan of Care Expiration:  2022  Visit # / Visits authorized: HB visit 10/20         Time In: 110 pm  Time Out: 210 pm  Total Billable Time: 50 minutes     Precautions: Standard     Pattern of pain determined: Pattern 1 PEP    Subjective   Tiarra reports feeling "ok", back pain is somewhat better but still present. She states that prone press ups are helpful, reports even better feel today after cueing for keeping juany down and for lock/blow/sag at end range.     Patient reports tolerating previous visit well /c no c/o of LB discomfort but did have some soreness.   Patient reports their pain to be 3/10 on a 0-10 scale with 0 being no pain and 10 being the worst pain imaginable.  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  Patients goals: to improve the pain when completing     Objective     Baseline IM Testing Results:   Date of testin2021  ROM 0-42 deg   Max Peak Torque 110    Min Peak Torque 25    Flex/Ext Ratio 4:1   % below normative data -36%     Midpoint IM Testing Results:   Date of testin/3/2022  ROM 0-54 deg   Max Peak Torque 131    Min Peak Torque 59    Flex/Ext Ratio 2.5   % above normative data  % gain from initial 3  67       Lumbar FOTO  Initial: 28%  Visit 10: 26%  Goal:  25%       Treatment    Pt was instructed in and performed the following:     Tiarra received therapeutic exercises to " "develop/improved posture, cardiovascular endurance, muscular endurance, lumbar/cervical ROM, strength and muscular endurance for 45 minutes including the following exercises:     LTR x10   Supine HS stretch with belt 2 x 30" ea cue for straight knee   Supine figure four + LTR (glute med stretch) 2x30" ea  SLR with TBall for TA brace 2x15 B  PPT x10 5" hold cue for dec BLE use  Bridge /c GTB hip AB   Prone Press ups x10 (lock, block, sag at end range)    Not performed 1/3/2022 :  Supine piriformis stretch 3 x 30" ea     90/90 heel taps 5x2 B significant cueing to maintain PPT  WILLIE 3 min  DKTCx10  SKTC 10" hold x20 sec  BKFO YTB with PPT 2x10 B  Clamshells OTB x15 5" hold   90/90 heel taps 5x2 B-significant cueing to maintain PPT  Cat cow x10  tk pose 20"  Prone alt UE/LE extension 2x10  HL pilates ring abduction 10" hold x1.5 min  PPT with march x20   Anti-rotations with TrA 10x OTB  Ab bracing with PB 10" hold x10  SLR with PB ab brace 2x15 B  Cat/cow x 10   [facility] 1/3/2022   Visit Number 10   VAS Pain Rating 2   Time 5   Lumbar Stretches - Slouch Overcorrection -   Extension in Lying 10   Extension in Standing -   Flexion in Lying 10   Manual Therapy -   Lumbar Extension Seat Pad -   Femur Restraint -   Top Dead Center -   Counterweight -   Lumbar Flexion 54   Lumbar Extension 0   Lumbar Peak Torque 131   Min Torque 59   Test Percent Below Normative Data -   Test Percent Gain in Strength from Initial  67   Lumbar Weight -   Repetitions -   Rating of Perceived Exertion -   Ice - Z Lie (in min.) 5       Peripheral muscle strengthening which included 1 set of 15-20 repetitions at a slow, controlled 10-13 second per rep pace focused on strengthening supporting musculature for improved body mechanics and functional mobility.  Pt and therapist focused on proper form during treatment to ensure optimal strengthening of each targeted muscle group.  Machines were utilized including torso rotation, leg " extension, leg curl, chest press, upright row. Tricep extension, bicep curl, leg press, and hip abduction added visit 3    Tiarra received the following manual therapy techniques:  were applied to the: B QL for minutes 00        Home Exercises Provided and Patient Education Provided   Home exercises include:    DKTC, SKTC, LTR, Supine HS stretch with belt, Supine piriformis stretch, PPT, Standing ext, Bridge, BKFO YTB with PPT    Written Home Exercises Provided: Patient instructed to cont prior HEP.  Exercises were reviewed and Tiarra was able to demonstrate them prior to the end of the session.  Tiarra demonstrated good  understanding of the education provided.     See EMR under Patient Instructions for exercises provided prior visit.          Assessment   Patient has attended 10 visits at [facility] which included MD evaluation, PT evaluation with isometric testing, and physical therapy treatment including HEP instruction, education, aerobic work, dynamic strengthening on med ex equipment for the spine, and whole body strengthening on med ex equipment with increasing weight loads.  Patient  is demonstrating increased ability to reduce symptoms, improved posture, improved   ROM, and improved   strength as follows:    -Improved posture,  better using lumbar roll  -Improved lumbar ROM,  initially on med ex test 0-42 and   currently 0-54  -Improved strength at each test point on lumbar med ex IM test with   67% average improvement noted with Reduced pain  Noted by patient  -Initial outcome tool score 72 and current outcome tool score  74 indicating reduced pain and improved function          Patient is making good progress towards established goals.  Pt will continue to benefit from skilled outpatient physical therapy to address the deficits stated in the impairment chart, provide pt/family education and to maximize pt's level of independence in the home and community environment.     Anticipated Barriers for  therapy: chronicity of condition  Pt's spiritual, cultural and educational needs considered and pt agreeable to plan of care and goals as stated below:   :            GOALS: Pt is in agreement with the following goals.     Short term goals:  6 weeks or 10 visits   1.  Pt will demonstrate increased lumbar ROM by at least 6 degrees from the initial ROM value with improvements noted in functional ROM and ability to perform ADLs.  (MET)  2.  Pt will demonstrate increased MedX average isometric strength value  by 20% from initial test resulting in improved ability to perform bending, lifting, and carrying activities safely, confidently.  (MET)  3.  Patient report a reduction in worst pain score by 1-2 points for improved tolerance for walking long distances.  (MET)  4.  Pt able to perform HEP correctly with minimal cueing or supervision from therapist to encourage independent management of symptoms. (MET)        Long term goals: 10 weeks or 20 visits   1. Pt will demonstrate increased lumbar ROM by at least 9 degrees from initial ROM value, resulting in improved ability to perform functional fwd bending while standing and sitting. (approp and ongoing)  2. Pt will demonstrate increased MedX average isometric strength value  by 40% from initial test resulting in improved ability to perform bending, lifting, and carrying activities safely, confidently.  (approp and ongoing)  3. Pt to demonstrate ability to independently control and reduce their pain through posture positioning and mechanical movements throughout a typical day.  (approp and ongoing)           Plan    Continue with established Plan of Care towards established PT goals.        Eric Mckinnon, PT  1/3/2022    [facility]      Name: Tiarra Chang [facility] Number: [id number]    Therapy Diagnosis:   Encounter Diagnoses   Name Primary?    Decreased range of motion of trunk and back Yes    Poor posture      Physician: Angella  "Kaykay VELAZQUEZ MD    Visit Date: 1/3/2022    Physician Orders:PT EVAL and treat  Medical Diagnosis from Referral: M54.9 (ICD-10-CM) - Back pain, unspecified back location, unspecified back pain laterality, unspecified chronicity  Evaluation Date: 2021  Authorization Period Expiration: 2022  Plan of Care Expiration:  Extend to 2022  Visit # / Visits authorized:  ( visit 8)         Time In: 9:15 am  Time Out: 10:05 am  Total Billable Time: 45 minutes     Precautions: Standard     Pattern of pain determined: Pattern 1 PEP    Subjective   Tiarra reports feeling "ok"  She is reporting similar lower back pain.    Patient reports tolerating previous visit well /c no c/o of LB discomfort but did have some soreness.   Patient reports their pain to be 5/10 on a 0-10 scale with 0 being no pain and 10 being the worst pain imaginable.  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  Patients goals: to improve the pain when completing     Objective     Baseline IM Testing Results:   Date of testin2021  ROM 0-42 deg   Max Peak Torque 110    Min Peak Torque 25    Flex/Ext Ratio 4:1   % below normative data -36%       Lumbar FOTO  Eval 21  37%  06/10/21 33%   Visit 6:   Goal:  28%       Treatment    Pt was instructed in and performed the following:     Tiarra received therapeutic exercises to develop/improved posture, cardiovascular endurance, muscular endurance, lumbar/cervical ROM, strength and muscular endurance for 45 minutes including the following exercises:     LTR x10   Supine HS stretch with belt 2 x 30" ea cue for straight knee   Supine piriformis stretch 3 x 30" ea  ("ankle on knee" per pt)   SLR with TBall for TA brace 2x15 B  PPT x10 5" hold cue for dec BLE use  Bridge /c GTB hip AB at apex  x10,3"  Prone Press ups x10    Not performed 1/3/2022 :  90/90 heel taps 5x2 B significant cueing to maintain PPT  WILLIE 3 min  DKTCx10  SKTC 10" hold x20 " "sec  BKFO YTB with PPT 2x10 B  Clamshells OTB x15 5" hold   90/90 heel taps 5x2 B-significant cueing to maintain PPT  Cat cow x10  tk pose 20"  Prone alt UE/LE extension 2x10  HL pilates ring abduction 10" hold x1.5 min  PPT with march x20   Anti-rotations with TrA 10x OTB  Ab bracing with PB 10" hold x10  SLR with PB ab brace 2x15 B  Cat/cow x 10     [facility] - [facility] 12/16/2021   Visit Number 8   VAS Pain Rating 5   Time 5   Lumbar Stretches - Slouch -   Extension in Lying 10   Extension in Standing -   Flexion in Lying 10   Manual Therapy -   Lumbar Extension - Seat Pad -   Femur Restraint -   Top Dead Center -   Counterweight -   Lumbar Flexion -   Lumbar Extension -   Lumbar Peak Torque -   Lumbar Weight 62   Repetitions 20   Rating of Perceived Exertion 4       Peripheral muscle strengthening which included 1 set of 15-20 repetitions at a slow, controlled 10-13 second per rep pace focused on strengthening supporting musculature for improved body mechanics and functional mobility.  Pt and therapist focused on proper form during treatment to ensure optimal strengthening of each targeted muscle group.  Machines were utilized including torso rotation, leg extension, leg curl, chest press, upright row. Tricep extension, bicep curl, leg press, and hip abduction added visit 3    Tiarra received the following manual therapy techniques:  were applied to the: B QL for minutes 00        Home Exercises Provided and Patient Education Provided   Home exercises include:    DKTCx10  SKTC 10" hold x2 min  LTR x10   Supine HS stretch with belt 2 x 30" ea  Supine piriformis stretch 3 x 30" ea  PPT x10 5" hold  Standing ext 10 x 5 sec  WILLIE 3 min  Bridge x10,3"  BKFO YTB with PPT 2x10 B      Written Home Exercises Provided: Patient instructed to cont prior HEP.  Exercises were reviewed and Tiarra was able to demonstrate them prior to the end of the session.  Tiarra demonstrated good  understanding of the education " provided.     See EMR under Patient Instructions for exercises provided prior visit.          Assessment   Tiarra returned reporting no change in lower back pain.  Therex continued with Tiarra needing minimal verbal cues to ensure proper form.  Medx lumbar extension resistance remained at 62#.  She completed 20 reps at a RPE of 4/10.  Plan to increase resistance by 5-10% next visit.    Patient is making good progress towards established goals.  Pt will continue to benefit from skilled outpatient physical therapy to address the deficits stated in the impairment chart, provide pt/family education and to maximize pt's level of independence in the home and community environment.     Anticipated Barriers for therapy: chronicity of condition  Pt's spiritual, cultural and educational needs considered and pt agreeable to plan of care and goals as stated below:   :            GOALS: Pt is in agreement with the following goals.     Short term goals:  6 weeks or 10 visits   1.  Pt will demonstrate increased lumbar ROM by at least 6 degrees from the initial ROM value with improvements noted in functional ROM and ability to perform ADLs.  (approp and ongoing)  2.  Pt will demonstrate increased MedX average isometric strength value  by 20% from initial test resulting in improved ability to perform bending, lifting, and carrying activities safely, confidently.  (approp and ongoing)  3.  Patient report a reduction in worst pain score by 1-2 points for improved tolerance for walking long distances.  (approp and ongoing)  4.  Pt able to perform HEP correctly with minimal cueing or supervision from therapist to encourage independent management of symptoms. (approp and ongoing)        Long term goals: 10 weeks or 20 visits   1. Pt will demonstrate increased lumbar ROM by at least 9 degrees from initial ROM value, resulting in improved ability to perform functional fwd bending while standing and sitting. (approp and ongoing)  2. Pt will  demonstrate increased MedX average isometric strength value  by 40% from initial test resulting in improved ability to perform bending, lifting, and carrying activities safely, confidently.  (approp and ongoing)  3. Pt to demonstrate ability to independently control and reduce their pain through posture positioning and mechanical movements throughout a typical day.  (approp and ongoing)           Plan    Continue with established Plan of Care towards established PT goals.        Eric Mckinnon, PT  1/3/2022    [facility]      Name: Tiarra Chang [facility]  Clinic Number: [id number]    Therapy Diagnosis:   Encounter Diagnoses   Name Primary?    Decreased range of motion of trunk and back Yes    Poor posture      Physician: Kaykay Perales MD    Visit Date: 1/3/2022    Physician Orders:PT EVAL and treat  Medical Diagnosis from Referral: M54.9 (ICD-10-CM) - Back pain, unspecified back location, unspecified back pain laterality, unspecified chronicity  Evaluation Date: 2021  Authorization Period Expiration: 2022  Plan of Care Expiration:  Extend to 2022  Visit # / Visits authorized:  ( visit 9)       Time In: 2:00 pm  Time Out: 3:00 pm  Total Billable Time: 50 minutes     Precautions: Standard     Pattern of pain determined: Pattern 1 PEP    Subjective   Tiarra reports continued lower back pain and voices some frustration re: chronicity of symptoms. She reports increased pain with forward flexion activities. Also, c/o generalized fatigue today.    Patient reports tolerating previous visit with minimal muscular soreness.   Patient reports their pain to be 5/10 on a 0-10 scale with 0 being no pain and 10 being the worst pain imaginable.   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  Patients goals: to improve the pain when completing     Objective     Baseline IM Testing Results:   Date of testin2021  ROM 0-42 deg  "  Max Peak Torque 110    Min Peak Torque 25    Flex/Ext Ratio 4:1   % below normative data -36%       Lumbar FOTO  Eval 05/04/21  37%  06/10/21 33%   Visit 6:   Goal:  28%       Treatment    Pt was instructed in and performed the following:     Tiarra received therapeutic exercises to develop/improved posture, cardiovascular endurance, muscular endurance, lumbar/cervical ROM, strength and muscular endurance for 45 minutes including the following exercises:     LTR x10   Supine HS stretch with belt 2 x 30" ea cue for straight knee   Supine piriformis stretch 3 x 30" ea  ("ankle on knee" per pt)   SLR with TBall for TA brace x 15 B  PPT x15 5" hold cue for dec BLE use  +PPT with BKFO RTB x 10 each  Bridge /c BTB hip AB at apex  x10,3"  Prone Press ups x10  EIS x 10    Not performed 1/3/2022 :  90/90 heel taps 5x2 B significant cueing to maintain PPT  WILLIE 3 min  DKTCx10  SKTC 10" hold x20 sec  BKFO YTB with PPT 2x10 B  Clamshells OTB x15 5" hold   90/90 heel taps 5x2 B-significant cueing to maintain PPT  Cat cow x10  tk pose 20"  Prone alt UE/LE extension 2x10  HL pilates ring abduction 10" hold x1.5 min  PPT with march x20   Anti-rotations with TrA 10x OTB  Ab bracing with PB 10" hold x10  SLR with PB ab brace 2x15 B  Cat/cow x 10     [facility] 12/28/2021   Visit Number 9   VAS Pain Rating 5   Time 5   Lumbar Stretches - Slouch Overcorrection -   Extension in Lying 10   Extension in Standing 10   Flexion in Lying 10   Manual Therapy 5   Lumbar Extension Seat Pad -   Femur Restraint -   Top Dead Center -   Counterweight -   Lumbar Flexion -   Lumbar Extension -   Lumbar Peak Torque -   Min Torque -   Test Percent Below Normative Data -   Lumbar Weight 65   Repetitions 15   Rating of Perceived Exertion 4   Ice - Z Lie (in min.) 5     Peripheral muscle strengthening which included 1 set of 15-20 repetitions at a slow, controlled 10-13 second per rep pace focused on strengthening supporting musculature for " "improved body mechanics and functional mobility.  Pt and therapist focused on proper form during treatment to ensure optimal strengthening of each targeted muscle group.  Machines were utilized including torso rotation, leg extension, leg curl, chest press, upright row. Tricep extension, bicep curl, leg press, and hip abduction added visit 3    Tiarra received the following manual therapy techniques:  STM applied to the (B) lumbar paraspinals x 5 minutes.      Home Exercises Provided and Patient Education Provided   Home exercises include:    DKTCx10  SKTC 10" hold x2 min  LTR x10   Supine HS stretch with belt 2 x 30" ea  Supine piriformis stretch 3 x 30" ea  PPT x10 5" hold  Standing ext 10 x 5 sec  WILLIE 3 min  Bridge x10,3"  BKFO YTB with PPT 2x10 B    Written Home Exercises Provided: Patient instructed to cont prior HEP. Added PPT with BKFO with RTB today.  Exercises were reviewed and Tiarra was able to demonstrate them prior to the end of the session.  Tiarra demonstrated good  understanding of the education provided.     See EMR under Patient Instructions for exercises provided prior visit.      Assessment   Tiarra returned with continued c/o lower back pain along with increased c/o fatigue today. She was able to perform ex's/activities without an exacerbation of symptoms today including a 5% increase on the Lumbar MedX machine to 65 ft/lbs completing 15 reps with a RPE of 4/10.  Will look to increase reps for this next visit. Patient noting fair relief of symptoms with the addition of STM to lumbar paraspinals today.       Patient is making Fair/good progress towards established goals.  Pt will continue to benefit from skilled outpatient physical therapy to address the deficits stated in the impairment chart, provide pt/family education and to maximize pt's level of independence in the home and community environment.     Anticipated Barriers for therapy: chronicity of condition  Pt's spiritual, cultural and " educational needs considered and pt agreeable to plan of care and goals as stated below:      GOALS: Pt is in agreement with the following goals.     Short term goals:  6 weeks or 10 visits   1.  Pt will demonstrate increased lumbar ROM by at least 6 degrees from the initial ROM value with improvements noted in functional ROM and ability to perform ADLs.  (MET)  2.  Pt will demonstrate increased MedX average isometric strength value  by 20% from initial test resulting in improved ability to perform bending, lifting, and carrying activities safely, confidently.  (MET)  3.  Patient report a reduction in worst pain score by 1-2 points for improved tolerance for walking long distances.  (MET)  4.  Pt able to perform HEP correctly with minimal cueing or supervision from therapist to encourage independent management of symptoms. (MET)      Long term goals: 10 weeks or 20 visits   1. Pt will demonstrate increased lumbar ROM by at least 9 degrees from initial ROM value, resulting in improved ability to perform functional fwd bending while standing and sitting. (MET)  2. Pt will demonstrate increased MedX average isometric strength value  by 40% from initial test resulting in improved ability to perform bending, lifting, and carrying activities safely, confidently.  (MET)  2a. Pt will demonstrate increased MedX average isometric strength value  by 20% from midpoint test resulting in improved ability to perform bending, lifting, and carrying activities safely, confidently.  (appropr and ongoing)  3. Pt to demonstrate ability to independently control and reduce their pain through posture positioning and mechanical movements throughout a typical day.  (MET)    Plan    Continue with established Plan of Care towards established PT goals.      Eric Mckinnon, PT  1/3/2022

## 2022-01-04 ENCOUNTER — PATIENT MESSAGE (OUTPATIENT)
Dept: DERMATOLOGY | Facility: CLINIC | Age: 67
End: 2022-01-04
Payer: MEDICARE

## 2022-01-04 ENCOUNTER — TELEPHONE (OUTPATIENT)
Dept: INTERNAL MEDICINE | Facility: CLINIC | Age: 67
End: 2022-01-04
Payer: MEDICARE

## 2022-01-04 NOTE — TELEPHONE ENCOUNTER
----- Message from Amarilys Celeste sent at 12/31/2021 10:06 AM CST -----  Regarding: [facility] appointment  Contact: [phone]  Calling in regards to rescheduling appointment. Please call

## 2022-01-11 ENCOUNTER — PATIENT MESSAGE (OUTPATIENT)
Dept: ORTHOPEDICS | Facility: CLINIC | Age: 67
End: 2022-01-11
Payer: MEDICARE

## 2022-01-11 ENCOUNTER — CLINICAL SUPPORT (OUTPATIENT)
Dept: REHABILITATION | Facility: OTHER | Age: 67
End: 2022-01-11
Attending: INTERNAL MEDICINE
Payer: MEDICARE

## 2022-01-11 ENCOUNTER — OFFICE VISIT (OUTPATIENT)
Dept: ORTHOPEDICS | Facility: CLINIC | Age: 67
End: 2022-01-11
Payer: MEDICARE

## 2022-01-11 DIAGNOSIS — M47.816 LUMBAR SPONDYLOSIS: Primary | ICD-10-CM

## 2022-01-11 DIAGNOSIS — R29.3 POOR POSTURE: ICD-10-CM

## 2022-01-11 DIAGNOSIS — M54.9 DORSALGIA, UNSPECIFIED: ICD-10-CM

## 2022-01-11 DIAGNOSIS — M25.69 DECREASED RANGE OF MOTION OF TRUNK AND BACK: Primary | ICD-10-CM

## 2022-01-11 DIAGNOSIS — M43.10 DEGENERATIVE SPONDYLOLISTHESIS: ICD-10-CM

## 2022-01-11 DIAGNOSIS — M25.561 RIGHT KNEE PAIN, UNSPECIFIED CHRONICITY: Primary | ICD-10-CM

## 2022-01-11 DIAGNOSIS — M51.36 DDD (DEGENERATIVE DISC DISEASE), LUMBAR: ICD-10-CM

## 2022-01-11 DIAGNOSIS — M41.50 DEGENERATIVE SCOLIOSIS: ICD-10-CM

## 2022-01-11 PROCEDURE — 1125F PR PAIN SEVERITY QUANTIFIED, PAIN PRESENT: ICD-10-PCS | Mod: HCNC,CPTII,S$GLB, | Performed by: ORTHOPAEDIC SURGERY

## 2022-01-11 PROCEDURE — 1125F AMNT PAIN NOTED PAIN PRSNT: CPT | Mod: HCNC,CPTII,S$GLB, | Performed by: ORTHOPAEDIC SURGERY

## 2022-01-11 PROCEDURE — 99214 OFFICE O/P EST MOD 30 MIN: CPT | Mod: HCNC,24,S$GLB, | Performed by: ORTHOPAEDIC SURGERY

## 2022-01-11 PROCEDURE — 1160F PR REVIEW ALL MEDS BY PRESCRIBER/CLIN PHARMACIST DOCUMENTED: ICD-10-PCS | Mod: HCNC,CPTII,S$GLB, | Performed by: ORTHOPAEDIC SURGERY

## 2022-01-11 PROCEDURE — 1160F RVW MEDS BY RX/DR IN RCRD: CPT | Mod: HCNC,CPTII,S$GLB, | Performed by: ORTHOPAEDIC SURGERY

## 2022-01-11 PROCEDURE — 1101F PR PT FALLS ASSESS DOC 0-1 FALLS W/OUT INJ PAST YR: ICD-10-PCS | Mod: HCNC,CPTII,S$GLB, | Performed by: ORTHOPAEDIC SURGERY

## 2022-01-11 PROCEDURE — 3288F FALL RISK ASSESSMENT DOCD: CPT | Mod: HCNC,CPTII,S$GLB, | Performed by: ORTHOPAEDIC SURGERY

## 2022-01-11 PROCEDURE — 1159F PR MEDICATION LIST DOCUMENTED IN MEDICAL RECORD: ICD-10-PCS | Mod: HCNC,CPTII,S$GLB, | Performed by: ORTHOPAEDIC SURGERY

## 2022-01-11 PROCEDURE — 3288F PR FALLS RISK ASSESSMENT DOCUMENTED: ICD-10-PCS | Mod: HCNC,CPTII,S$GLB, | Performed by: ORTHOPAEDIC SURGERY

## 2022-01-11 PROCEDURE — 97110 THERAPEUTIC EXERCISES: CPT | Mod: HCNC

## 2022-01-11 PROCEDURE — 99999 PR PBB SHADOW E&M-EST. PATIENT-LVL IV: CPT | Mod: PBBFAC,HCNC,, | Performed by: ORTHOPAEDIC SURGERY

## 2022-01-11 PROCEDURE — 99999 PR PBB SHADOW E&M-EST. PATIENT-LVL IV: ICD-10-PCS | Mod: PBBFAC,HCNC,, | Performed by: ORTHOPAEDIC SURGERY

## 2022-01-11 PROCEDURE — 99214 PR OFFICE/OUTPT VISIT, EST, LEVL IV, 30-39 MIN: ICD-10-PCS | Mod: HCNC,24,S$GLB, | Performed by: ORTHOPAEDIC SURGERY

## 2022-01-11 PROCEDURE — 1101F PT FALLS ASSESS-DOCD LE1/YR: CPT | Mod: HCNC,CPTII,S$GLB, | Performed by: ORTHOPAEDIC SURGERY

## 2022-01-11 PROCEDURE — 1159F MED LIST DOCD IN RCRD: CPT | Mod: HCNC,CPTII,S$GLB, | Performed by: ORTHOPAEDIC SURGERY

## 2022-01-11 NOTE — PROGRESS NOTES
DATE: 1/11/2022  PATIENT: Tiarra Norton    Attending Physician: Tyler Jacobs M.D.    HISTORY:  Tiarra Norton is a 66 y.o. female who returns to me today for LBP FU after PT. Patient still has pain (9/10). Medications are helping a bit. She is not having any leg pain. Pain is worse with walking and better with sitting.    PMH/PSH/FamHx/SocHx:  Unchanged from prior visit    ROS:  Positive for LBP  Denies perineal paresthesias, bowel or bladder incontinence    EXAM:  There were no vitals taken for this visit.    My physical examination was notable for the following findings: motor 5/5 BLE; SILT    IMAGING:  Previous XRs showed lumbar spondylosis and degenerative scoliosis measuring 24degrees. L4-5 anterolisthesis measuring 6mm.     ASSESSMENT/PLAN:  Patient has degenerative scoliosis. I will order a lumbar MRI to evaluate stenosis and a standing scoliosis XR. She will continue therapy and medications. Patient will FU in 2 weeks for imaging review.    Tyler Jacobs MD  Orthopaedic Spine Surgeon  Department of Orthopaedic Surgery  [phone]

## 2022-01-11 NOTE — PROGRESS NOTES
"[facility]      Name: Tiarra Norton  Clinic Number: [id number]    Therapy Diagnosis:   Encounter Diagnoses   Name Primary?    Decreased range of motion of trunk and back Yes    Poor posture      Physician: Kaykay Perales MD    Visit Date: 2022    Physician Orders:PT EVAL and treat  Medical Diagnosis from Referral: M54.9 (ICD-10-CM) - Back pain, unspecified back location, unspecified back pain laterality, unspecified chronicity  Evaluation Date: 2021  Authorization Period Expiration: 2022  Plan of Care Expiration:  2022  Visit # / Visits authorized: HB visit          Time In: 1215 pm  Time Out: 115 pm  Total Billable Time: 50 minutes     Precautions: Standard     Pattern of pain determined: Pattern 1 PEP    Subjective   Tiarra reports feeling very frustrated; she had a follow up with her ortho MD and states that he told her "there's a curviture in my spine and there's nothing to be done about it." Eventually was in better spirits by end of session and feeling less overall pain.     Patient reports tolerating previous visit well /c no c/o of LB discomfort but did have some soreness.   Patient reports their pain to be 3/10 on a 0-10 scale with 0 being no pain and 10 being the worst pain imaginable.  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  Patients goals: to improve the pain when completing     Objective     Baseline IM Testing Results:   Date of testin2021  ROM 0-42 deg   Max Peak Torque 110    Min Peak Torque 25    Flex/Ext Ratio 4:1   % below normative data -36%     Midpoint IM Testing Results:   Date of testin/3/2022  ROM 0-54 deg   Max Peak Torque 131    Min Peak Torque 59    Flex/Ext Ratio 2.5   % above normative data  % gain from initial 3  67       Lumbar FOTO  Initial: 28%  Visit 10: 26%  Goal:  25%       Treatment    Pt was instructed in and performed the following:     Tiarra " "received therapeutic exercises to develop/improved posture, cardiovascular endurance, muscular endurance, lumbar/cervical ROM, strength and muscular endurance for 45 minutes including the following exercises:     LTR x10   Supine figure four + LTR (glute med stretch) 2x30" ea  SLR with TBall for TA brace 2x15 B  PPT x10 5"  Standing L sidebend stretch c/UE support on mirror 5x10" leaning left only  Prone Press ups x10 (lock, block, sag at end range)    Not performed 1/11/2022 :  Bridge /c GTB hip AB   Supine HS stretch with belt 2 x 30" ea  Supine piriformis stretch 3 x 30" ea     90/90 heel taps 5x2 B significant cueing to maintain PPT  WILLIE 3 min  DKTCx10  SKTC 10" hold x20 sec  BKFO YTB with PPT 2x10 B  Clamshells OTB x15 5" hold   90/90 heel taps 5x2 B-significant cueing to maintain PPT  Cat cow x10  tk pose 20"  Prone alt UE/LE extension 2x10  HL pilates ring abduction 10" hold x1.5 min  PPT with march x20   Anti-rotations with TrA 10x OTB  Ab bracing with PB 10" hold x10  SLR with PB ab brace 2x15 B  Cat/cow x 10     [facility] - Short 1/11/2022   Visit Number 11   VAS Pain Rating 3   Time 5   Lumbar Stretches - Slouch -   Extension in Lying -   Extension in Standing -   Flexion in Lying 10   Manual Therapy -   Lumbar Extension - Seat Pad -   Femur Restraint -   Top Dead Center -   Counterweight -   Lumbar Flexion -   Lumbar Extension -   Lumbar Peak Torque -   Lumbar Weight 65   Repetitions 20   Rating of Perceived Exertion 3           Peripheral muscle strengthening which included 1 set of 15-20 repetitions at a slow, controlled 10-13 second per rep pace focused on strengthening supporting musculature for improved body mechanics and functional mobility.  Pt and therapist focused on proper form during treatment to ensure optimal strengthening of each targeted muscle group.  Machines were utilized including torso rotation, leg extension, leg curl, chest press, upright row. Tricep extension, bicep " curl, leg press, and hip abduction added visit 3    Tiarra received the following manual therapy techniques:  were applied to the: B QL for minutes 00        Home Exercises Provided and Patient Education Provided   Home exercises include:    DKTC, SKTC, LTR, Supine HS stretch with belt, Supine piriformis stretch, PPT, Standing ext, Bridge, BKFO YTB with PPT    Written Home Exercises Provided: Patient instructed to cont prior HEP.  Exercises were reviewed and Tiarra was able to demonstrate them prior to the end of the session.  Tiarra demonstrated good  understanding of the education provided.     See EMR under Patient Instructions for exercises provided prior visit.          Assessment   Tiarra came to therapy with significant frustrations that her ortho doctor told her that there is an unfixable curviture in her spine and that she's not sure why she is still coming to therapy. After discussion, it was noted that her imaging shows a R sidebend/curviture right around the T12/L1 level where she feels her pain, and adding a standing sidebend stretch produced both significant stretch and then noticeable relief of her usual pain during the session. Discussed that while there is no surgical intervention for this, now that we are aware of this imbalance it can be more specifically targeted with treatments to see how much more benefit can be realized from therapy. She felt much better by the end of her session, including completing 20 repetitions at her previous resistance of 65 ft/lb and reporting only a 3/10 RPE afterward. Will progress resistance next session pending response to treatment, but focus on L sidebending exercises and treatments to address focal point of midback pain should be beneficial.      Patient is making good progress towards established goals.  Pt will continue to benefit from skilled outpatient physical therapy to address the deficits stated in the impairment chart, provide pt/family education and to  maximize pt's level of independence in the home and community environment.     Anticipated Barriers for therapy: chronicity of condition  Pt's spiritual, cultural and educational needs considered and pt agreeable to plan of care and goals as stated below:   :            GOALS: Pt is in agreement with the following goals.     Short term goals:  6 weeks or 10 visits   1.  Pt will demonstrate increased lumbar ROM by at least 6 degrees from the initial ROM value with improvements noted in functional ROM and ability to perform ADLs.  (MET)  2.  Pt will demonstrate increased MedX average isometric strength value  by 20% from initial test resulting in improved ability to perform bending, lifting, and carrying activities safely, confidently.  (MET)  3.  Patient report a reduction in worst pain score by 1-2 points for improved tolerance for walking long distances.  (MET)  4.  Pt able to perform HEP correctly with minimal cueing or supervision from therapist to encourage independent management of symptoms. (MET)        Long term goals: 10 weeks or 20 visits   1. Pt will demonstrate increased lumbar ROM by at least 9 degrees from initial ROM value, resulting in improved ability to perform functional fwd bending while standing and sitting. (approp and ongoing)  2. Pt will demonstrate increased MedX average isometric strength value  by 40% from initial test resulting in improved ability to perform bending, lifting, and carrying activities safely, confidently.  (approp and ongoing)  3. Pt to demonstrate ability to independently control and reduce their pain through posture positioning and mechanical movements throughout a typical day.  (approp and ongoing)           Plan    Continue with established Plan of Care towards established PT goals.        Eric Mckinnon, PT  1/11/2022

## 2022-01-13 ENCOUNTER — CLINICAL SUPPORT (OUTPATIENT)
Dept: REHABILITATION | Facility: OTHER | Age: 67
End: 2022-01-13
Attending: INTERNAL MEDICINE
Payer: MEDICARE

## 2022-01-13 DIAGNOSIS — R29.3 POOR POSTURE: ICD-10-CM

## 2022-01-13 DIAGNOSIS — M25.69 DECREASED RANGE OF MOTION OF TRUNK AND BACK: Primary | ICD-10-CM

## 2022-01-13 PROCEDURE — 97110 THERAPEUTIC EXERCISES: CPT | Mod: HCNC

## 2022-01-13 NOTE — PROGRESS NOTES
[facility]      Name: Tiarra Chang [facility]  Clinic Number: [id number]    Therapy Diagnosis:   Encounter Diagnoses   Name Primary?    Decreased range of motion of trunk and back Yes    Poor posture      Physician: Kaykay Perales MD    Visit Date: 2022    Physician Orders:PT EVAL and treat  Medical Diagnosis from Referral: M54.9 (ICD-10-CM) - Back pain, unspecified back location, unspecified back pain laterality, unspecified chronicity  Evaluation Date: 2021  Authorization Period Expiration: 2022  Plan of Care Expiration:  2022  Visit # / Visits authorized:          Time In: 1215 pm  Time Out: 115 pm  Total Billable Time: 50 minutes     Precautions: Standard     Pattern of pain determined: Pattern 1 PEP    Subjective   Tiarra reports feeling better since adding the standing sidebend stretch at home, the typical sorespot in the area of her thoracolumbar junction is feeling notably less painful overall though still present.     Patient reports tolerating previous visit well /c no c/o of LB discomfort but did have some soreness.   Patient reports their pain to be 3/10 on a 0-10 scale with 0 being no pain and 10 being the worst pain imaginable.  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  Patients goals: to improve the pain when completing     Objective     Baseline IM Testing Results:   Date of testin2021  ROM 0-42 deg   Max Peak Torque 110    Min Peak Torque 25    Flex/Ext Ratio 4:1   % below normative data -36%     Midpoint IM Testing Results:   Date of testin/3/2022  ROM 0-54 deg   Max Peak Torque 131    Min Peak Torque 59    Flex/Ext Ratio 2.5   % above normative data  % gain from initial 3  67       Lumbar FOTO  Initial: 28%  Visit 10: 26%  Goal:  25%       Treatment    Pt was instructed in and performed the following:     Tiarra received therapeutic exercises to develop/improved posture,  "cardiovascular endurance, muscular endurance, lumbar/cervical ROM, strength and muscular endurance for 45 minutes including the following exercises:     LTR x10   Supine figure four + LTR (glute med stretch) 2x30" ea  SLR with TBall for TA brace 2x15 B  PPT x10 5"  Standing L sidebend stretch c/UE support on mirror 5x10" leaning left only  Prone Press ups x10 (lock, block, sag at end range)    Not performed 1/13/2022 :  Bridge /c GTB hip AB    90/90 heel taps 5x2 B   DKTCx10  BKFO YTB with PPT 2x10 B  Clamshells OTB x15 5" hold   90/90 heel taps 5x2 B  tk pose 20"  HL pilates ring abduction 10" hold x1.5 min  PPT with march x20   Anti-rotations with TrA 10x OTB  Ab bracing with PB 10" hold x10  SLR with PB ab brace 2x15 B  Cat/cow x 10   [facility] - Short 1/13/2022   Visit Number 12   VAS Pain Rating 2   Time 5   Lumbar Stretches - Slouch -   Extension in Lying -   Extension in Standing -   Flexion in Lying 10   Manual Therapy -   Lumbar Extension - Seat Pad -   Femur Restraint -   Top Dead Center -   Counterweight -   Lumbar Flexion -   Lumbar Extension -   Lumbar Peak Torque -   Lumbar Weight 68   Repetitions 17   Rating of Perceived Exertion 4               Peripheral muscle strengthening which included 1 set of 15-20 repetitions at a slow, controlled 10-13 second per rep pace focused on strengthening supporting musculature for improved body mechanics and functional mobility.  Pt and therapist focused on proper form during treatment to ensure optimal strengthening of each targeted muscle group.  Machines were utilized including torso rotation, leg extension, leg curl, chest press, upright row. Tricep extension, bicep curl, leg press, and hip abduction added visit 3    Tiarra received the following manual therapy techniques:  were applied to the: B QL for minutes 00        Home Exercises Provided and Patient Education Provided   Home exercises include:    DKTC, SKTC, LTR, Supine HS stretch with belt, " Supine piriformis stretch, PPT, Standing ext, Bridge, BKFO YTB with PPT    Written Home Exercises Provided: Patient instructed to cont prior HEP.  Exercises were reviewed and Tiarra was able to demonstrate them prior to the end of the session.  Tiarra demonstrated good  understanding of the education provided.     See EMR under Patient Instructions for exercises provided prior visit.          Assessment   Tiarra came to therapy reporting continued but slightly improved pain in her typical TLJ, states stretch at home seems to be helping. She was able to progress MedX resistance to 68 ft/lb and completed 17 repetitions and reporting 4/10 RPE afterward. Will progress per protocol as tolerated.      Patient is making good progress towards established goals.  Pt will continue to benefit from skilled outpatient physical therapy to address the deficits stated in the impairment chart, provide pt/family education and to maximize pt's level of independence in the home and community environment.     Anticipated Barriers for therapy: chronicity of condition  Pt's spiritual, cultural and educational needs considered and pt agreeable to plan of care and goals as stated below:   :            GOALS: Pt is in agreement with the following goals.     Short term goals:  6 weeks or 10 visits   1.  Pt will demonstrate increased lumbar ROM by at least 6 degrees from the initial ROM value with improvements noted in functional ROM and ability to perform ADLs.  (MET)  2.  Pt will demonstrate increased MedX average isometric strength value  by 20% from initial test resulting in improved ability to perform bending, lifting, and carrying activities safely, confidently.  (MET)  3.  Patient report a reduction in worst pain score by 1-2 points for improved tolerance for walking long distances.  (MET)  4.  Pt able to perform HEP correctly with minimal cueing or supervision from therapist to encourage independent management of symptoms.  (MET)        Long term goals: 10 weeks or 20 visits   1. Pt will demonstrate increased lumbar ROM by at least 9 degrees from initial ROM value, resulting in improved ability to perform functional fwd bending while standing and sitting. (approp and ongoing)  2. Pt will demonstrate increased MedX average isometric strength value  by 40% from initial test resulting in improved ability to perform bending, lifting, and carrying activities safely, confidently.  (approp and ongoing)  3. Pt to demonstrate ability to independently control and reduce their pain through posture positioning and mechanical movements throughout a typical day.  (approp and ongoing)           Plan    Continue with established Plan of Care towards established PT goals.      Eric Mckinnon, PT, DPT  1/13/2022

## 2022-01-15 ENCOUNTER — PATIENT MESSAGE (OUTPATIENT)
Dept: INTERNAL MEDICINE | Facility: CLINIC | Age: 67
End: 2022-01-15
Payer: MEDICARE

## 2022-01-17 ENCOUNTER — PATIENT MESSAGE (OUTPATIENT)
Dept: ORTHOPEDICS | Facility: CLINIC | Age: 67
End: 2022-01-17
Payer: MEDICARE

## 2022-01-18 NOTE — TELEPHONE ENCOUNTER
She wants to see an orthopedist with interest in scoliosis.  Could you find out who that would be?

## 2022-01-20 ENCOUNTER — CLINICAL SUPPORT (OUTPATIENT)
Dept: REHABILITATION | Facility: OTHER | Age: 67
End: 2022-01-20
Attending: INTERNAL MEDICINE
Payer: MEDICARE

## 2022-01-20 DIAGNOSIS — M25.69 DECREASED RANGE OF MOTION OF TRUNK AND BACK: Primary | ICD-10-CM

## 2022-01-20 DIAGNOSIS — R29.3 POOR POSTURE: ICD-10-CM

## 2022-01-20 PROCEDURE — 97110 THERAPEUTIC EXERCISES: CPT | Mod: HCNC,CQ

## 2022-01-20 NOTE — PROGRESS NOTES
[facility]      Name: Tiarra Chang [facility]  Clinic Number: [id number]    Therapy Diagnosis:   Encounter Diagnoses   Name Primary?    Decreased range of motion of trunk and back Yes    Poor posture      Physician: Kaykay Perales MD    Visit Date: 2022    Physician Orders:PT EVAL and treat  Medical Diagnosis from Referral: M54.9 (ICD-10-CM) - Back pain, unspecified back location, unspecified back pain laterality, unspecified chronicity  Evaluation Date: 2021  Authorization Period Expiration: 2022  Plan of Care Expiration:  2022  Visit # / Visits authorized:          Time In: 2:10 pm  Time Out: 3:05 pm  Total Billable Time: 50 minutes     Precautions: Standard     Pattern of pain determined: Pattern 1 PEP    Subjective   Tiarra reports she experienced an increase in lower back soreness and discomfort this morning.  She stated she was on her heating pad on morning to help relieve the pain.     Patient reports tolerating previous visit well /c no c/o of LB discomfort but did have some soreness.   Patient reports their pain to be 3/10 on a 0-10 scale with 0 being no pain and 10 being the worst pain imaginable.  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  Patients goals: to improve the pain when completing     Objective     Baseline IM Testing Results:   Date of testin2021  ROM 0-42 deg   Max Peak Torque 110    Min Peak Torque 25    Flex/Ext Ratio 4:1   % below normative data -36%     Midpoint IM Testing Results:   Date of testin/3/2022  ROM 0-54 deg   Max Peak Torque 131    Min Peak Torque 59    Flex/Ext Ratio 2.5   % above normative data  % gain from initial 3  67       Lumbar FOTO  Initial: 28%  Visit 10: 26%  Goal:  25%       Treatment    Pt was instructed in and performed the following:     Tiarra received therapeutic exercises to develop/improved posture, cardiovascular endurance, muscular  "endurance, lumbar/cervical ROM, strength and muscular endurance for 50 minutes including the following exercises:     LTR x10   Supine figure four + LTR (glute med stretch) 2x30" ea  PPT x10 5"  Bridge /c GTB hip AB  x10  BKFO w/GTB x10 ea  Standing L sidebend stretch c/UE support on mirror 5x10" leaning left only  Prone Press ups x10 (lock, block, sag at end range)    Not performed 1/20/2022 :  90/90 heel taps 5x2 B   SLR with TBall for TA brace 2x15 B  DKTCx10  Clamshells OTB x15 5" hold   90/90 heel taps 5x2 B  tk pose 20"  HL pilates ring abduction 10" hold x1.5 min  PPT with march x20   Anti-rotations with TrA 10x OTB  Ab bracing with PB 10" hold x10  SLR with PB ab brace 2x15 B  Cat/cow x 10     [facility] - Short 1/20/2022   Visit Number 13   VAS Pain Rating 2   Time 5   Lumbar Stretches - Slouch -   Extension in Lying 10   Extension in Standing -   Flexion in Lying 10   Manual Therapy -   Lumbar Extension - Seat Pad -   Femur Restraint -   Top Dead Center -   Counterweight -   Lumbar Flexion -   Lumbar Extension -   Lumbar Peak Torque -   Lumbar Weight 68   Repetitions 20   Rating of Perceived Exertion 7         Peripheral muscle strengthening which included 1 set of 15-20 repetitions at a slow, controlled 10-13 second per rep pace focused on strengthening supporting musculature for improved body mechanics and functional mobility.  Pt and therapist focused on proper form during treatment to ensure optimal strengthening of each targeted muscle group.  Machines were utilized including torso rotation, leg extension, leg curl, chest press, upright row. Tricep extension, bicep curl, leg press, and hip abduction added visit 3    Tiarra received the following manual therapy techniques:  were applied to the: B QL for minutes 00        Home Exercises Provided and Patient Education Provided   Home exercises include:    DKTC, SKTC, LTR, Supine HS stretch with belt, Supine piriformis stretch, PPT, Standing " ext, Bridge, BKFO YTB with PPT    Written Home Exercises Provided: Patient instructed to cont prior HEP.  Exercises were reviewed and Tiarra was able to demonstrate them prior to the end of the session.  Tiarra demonstrated good  understanding of the education provided.     See EMR under Patient Instructions for exercises provided prior visit.          Assessment   Tiarra returned reporting she experienced increased pain and soreness this morning that she was able to relieve by laying on a heating pad.  Treatment continued to focus on lumbopelvic mobility and core and hip strengthening.  Bridge and BKFO exercises reincorporated into treatment today to challenge hip and core musculature.  Medx resistance remained at 68#.  She completed 20 reps at a RPE of 7/10.  Verbal cues needed to decrease LE activation on Medx.  Consider not increasing resistance next visit due to pt's RPE.       Patient is making good progress towards established goals.  Pt will continue to benefit from skilled outpatient physical therapy to address the deficits stated in the impairment chart, provide pt/family education and to maximize pt's level of independence in the home and community environment.     Anticipated Barriers for therapy: chronicity of condition  Pt's spiritual, cultural and educational needs considered and pt agreeable to plan of care and goals as stated below:   :            GOALS: Pt is in agreement with the following goals.     Short term goals:  6 weeks or 10 visits   1.  Pt will demonstrate increased lumbar ROM by at least 6 degrees from the initial ROM value with improvements noted in functional ROM and ability to perform ADLs.  (MET)  2.  Pt will demonstrate increased MedX average isometric strength value  by 20% from initial test resulting in improved ability to perform bending, lifting, and carrying activities safely, confidently.  (MET)  3.  Patient report a reduction in worst pain score by 1-2 points for improved  tolerance for walking long distances.  (MET)  4.  Pt able to perform HEP correctly with minimal cueing or supervision from therapist to encourage independent management of symptoms. (MET)        Long term goals: 10 weeks or 20 visits   1. Pt will demonstrate increased lumbar ROM by at least 9 degrees from initial ROM value, resulting in improved ability to perform functional fwd bending while standing and sitting. (approp and ongoing)  2. Pt will demonstrate increased MedX average isometric strength value  by 40% from initial test resulting in improved ability to perform bending, lifting, and carrying activities safely, confidently.  (approp and ongoing)  3. Pt to demonstrate ability to independently control and reduce their pain through posture positioning and mechanical movements throughout a typical day.  (approp and ongoing)           Plan    Continue with established Plan of Care towards established PT goals.      Virgil Escoto, PTA,   1/20/2022

## 2022-01-21 ENCOUNTER — HOSPITAL ENCOUNTER (OUTPATIENT)
Dept: RADIOLOGY | Facility: HOSPITAL | Age: 67
Discharge: HOME | End: 2022-01-21
Attending: ORTHOPAEDIC SURGERY
Payer: MEDICARE

## 2022-01-21 DIAGNOSIS — M54.9 DORSALGIA, UNSPECIFIED: ICD-10-CM

## 2022-01-21 DIAGNOSIS — M41.50 DEGENERATIVE SCOLIOSIS: ICD-10-CM

## 2022-01-21 PROCEDURE — 72148 MRI LUMBAR SPINE WITHOUT CONTRAST: ICD-10-PCS | Mod: 26,HCNC,, | Performed by: RADIOLOGY

## 2022-01-21 PROCEDURE — 72082 XR SCOLIOSIS COMPLETE: ICD-10-PCS | Mod: 26,HCNC,, | Performed by: RADIOLOGY

## 2022-01-21 PROCEDURE — 72082 X-RAY EXAM ENTIRE SPI 2/3 VW: CPT | Mod: 26,HCNC,, | Performed by: RADIOLOGY

## 2022-01-21 PROCEDURE — 72148 MRI LUMBAR SPINE W/O DYE: CPT | Mod: TC,HCNC

## 2022-01-21 PROCEDURE — 72082 X-RAY EXAM ENTIRE SPI 2/3 VW: CPT | Mod: TC,HCNC

## 2022-01-21 PROCEDURE — 72148 MRI LUMBAR SPINE W/O DYE: CPT | Mod: 26,HCNC,, | Performed by: RADIOLOGY

## 2022-01-25 ENCOUNTER — OFFICE VISIT (OUTPATIENT)
Dept: ORTHOPEDICS | Facility: CLINIC | Age: 67
End: 2022-01-25
Payer: MEDICARE

## 2022-01-25 ENCOUNTER — OFFICE VISIT (OUTPATIENT)
Dept: DERMATOLOGY | Facility: CLINIC | Age: 67
End: 2022-01-25
Payer: MEDICARE

## 2022-01-25 VITALS — WEIGHT: 154.31 LBS | BODY MASS INDEX: 27.34 KG/M2 | HEIGHT: 63 IN

## 2022-01-25 DIAGNOSIS — M41.50 DEGENERATIVE SCOLIOSIS: Primary | ICD-10-CM

## 2022-01-25 DIAGNOSIS — I78.1 TELANGIECTASIA: ICD-10-CM

## 2022-01-25 DIAGNOSIS — L82.0 INFLAMED SEBORRHEIC KERATOSIS: Primary | ICD-10-CM

## 2022-01-25 DIAGNOSIS — L59.0 ERYTHEMA AB IGNE: ICD-10-CM

## 2022-01-25 DIAGNOSIS — L82.1 SEBORRHEIC KERATOSIS: ICD-10-CM

## 2022-01-25 DIAGNOSIS — L81.4 LENTIGINES: ICD-10-CM

## 2022-01-25 DIAGNOSIS — L57.0 ACTINIC KERATOSIS: ICD-10-CM

## 2022-01-25 PROCEDURE — 1159F PR MEDICATION LIST DOCUMENTED IN MEDICAL RECORD: ICD-10-PCS | Mod: HCNC,CPTII,S$GLB, | Performed by: ORTHOPAEDIC SURGERY

## 2022-01-25 PROCEDURE — 99214 OFFICE O/P EST MOD 30 MIN: CPT | Mod: HCNC,GC,S$GLB, | Performed by: ORTHOPAEDIC SURGERY

## 2022-01-25 PROCEDURE — 3008F PR BODY MASS INDEX (BMI) DOCUMENTED: ICD-10-PCS | Mod: HCNC,CPTII,S$GLB, | Performed by: ORTHOPAEDIC SURGERY

## 2022-01-25 PROCEDURE — 3288F PR FALLS RISK ASSESSMENT DOCUMENTED: ICD-10-PCS | Mod: CPTII,S$GLB,, | Performed by: DERMATOLOGY

## 2022-01-25 PROCEDURE — 99214 PR OFFICE/OUTPT VISIT, EST, LEVL IV, 30-39 MIN: ICD-10-PCS | Mod: HCNC,GC,S$GLB, | Performed by: ORTHOPAEDIC SURGERY

## 2022-01-25 PROCEDURE — 1125F AMNT PAIN NOTED PAIN PRSNT: CPT | Mod: HCNC,CPTII,S$GLB, | Performed by: ORTHOPAEDIC SURGERY

## 2022-01-25 PROCEDURE — 17003 DESTRUCT PREMALG LES 2-14: CPT | Mod: 59,S$GLB,, | Performed by: DERMATOLOGY

## 2022-01-25 PROCEDURE — 3288F FALL RISK ASSESSMENT DOCD: CPT | Mod: HCNC,CPTII,S$GLB, | Performed by: ORTHOPAEDIC SURGERY

## 2022-01-25 PROCEDURE — 1101F PT FALLS ASSESS-DOCD LE1/YR: CPT | Mod: CPTII,S$GLB,, | Performed by: DERMATOLOGY

## 2022-01-25 PROCEDURE — 17110 DESTRUCTION B9 LES UP TO 14: CPT | Mod: S$GLB,,, | Performed by: DERMATOLOGY

## 2022-01-25 PROCEDURE — 3008F BODY MASS INDEX DOCD: CPT | Mod: HCNC,CPTII,S$GLB, | Performed by: ORTHOPAEDIC SURGERY

## 2022-01-25 PROCEDURE — 99214 OFFICE O/P EST MOD 30 MIN: CPT | Mod: 25,S$GLB,, | Performed by: DERMATOLOGY

## 2022-01-25 PROCEDURE — 17000 DESTRUCT PREMALG LESION: CPT | Mod: 59,S$GLB,, | Performed by: DERMATOLOGY

## 2022-01-25 PROCEDURE — 1126F AMNT PAIN NOTED NONE PRSNT: CPT | Mod: CPTII,S$GLB,, | Performed by: DERMATOLOGY

## 2022-01-25 PROCEDURE — 17003 DESTRUCTION, PREMALIGNANT LESIONS; SECOND THROUGH 14 LESIONS: ICD-10-PCS | Mod: 59,S$GLB,, | Performed by: DERMATOLOGY

## 2022-01-25 PROCEDURE — 1125F PR PAIN SEVERITY QUANTIFIED, PAIN PRESENT: ICD-10-PCS | Mod: HCNC,CPTII,S$GLB, | Performed by: ORTHOPAEDIC SURGERY

## 2022-01-25 PROCEDURE — 1126F PR PAIN SEVERITY QUANTIFIED, NO PAIN PRESENT: ICD-10-PCS | Mod: CPTII,S$GLB,, | Performed by: DERMATOLOGY

## 2022-01-25 PROCEDURE — 1101F PT FALLS ASSESS-DOCD LE1/YR: CPT | Mod: HCNC,CPTII,S$GLB, | Performed by: ORTHOPAEDIC SURGERY

## 2022-01-25 PROCEDURE — 17000 PR DESTRUCTION(LASER SURGERY,CRYOSURGERY,CHEMOSURGERY),PREMALIGNANT LESIONS,FIRST LESION: ICD-10-PCS | Mod: 59,S$GLB,, | Performed by: DERMATOLOGY

## 2022-01-25 PROCEDURE — 1160F PR REVIEW ALL MEDS BY PRESCRIBER/CLIN PHARMACIST DOCUMENTED: ICD-10-PCS | Mod: HCNC,CPTII,S$GLB, | Performed by: ORTHOPAEDIC SURGERY

## 2022-01-25 PROCEDURE — 1101F PR PT FALLS ASSESS DOC 0-1 FALLS W/OUT INJ PAST YR: ICD-10-PCS | Mod: CPTII,S$GLB,, | Performed by: DERMATOLOGY

## 2022-01-25 PROCEDURE — 1159F MED LIST DOCD IN RCRD: CPT | Mod: HCNC,CPTII,S$GLB, | Performed by: ORTHOPAEDIC SURGERY

## 2022-01-25 PROCEDURE — 99999 PR PBB SHADOW E&M-EST. PATIENT-LVL IV: ICD-10-PCS | Mod: PBBFAC,HCNC,, | Performed by: ORTHOPAEDIC SURGERY

## 2022-01-25 PROCEDURE — 99214 PR OFFICE/OUTPT VISIT, EST, LEVL IV, 30-39 MIN: ICD-10-PCS | Mod: 25,S$GLB,, | Performed by: DERMATOLOGY

## 2022-01-25 PROCEDURE — 1160F RVW MEDS BY RX/DR IN RCRD: CPT | Mod: HCNC,CPTII,S$GLB, | Performed by: ORTHOPAEDIC SURGERY

## 2022-01-25 PROCEDURE — 17110 PR DESTRUCTION BENIGN LESIONS UP TO 14: ICD-10-PCS | Mod: S$GLB,,, | Performed by: DERMATOLOGY

## 2022-01-25 PROCEDURE — 99999 PR PBB SHADOW E&M-EST. PATIENT-LVL IV: CPT | Mod: PBBFAC,HCNC,, | Performed by: ORTHOPAEDIC SURGERY

## 2022-01-25 PROCEDURE — 1101F PR PT FALLS ASSESS DOC 0-1 FALLS W/OUT INJ PAST YR: ICD-10-PCS | Mod: HCNC,CPTII,S$GLB, | Performed by: ORTHOPAEDIC SURGERY

## 2022-01-25 PROCEDURE — 3288F FALL RISK ASSESSMENT DOCD: CPT | Mod: CPTII,S$GLB,, | Performed by: DERMATOLOGY

## 2022-01-25 PROCEDURE — 3288F PR FALLS RISK ASSESSMENT DOCUMENTED: ICD-10-PCS | Mod: HCNC,CPTII,S$GLB, | Performed by: ORTHOPAEDIC SURGERY

## 2022-01-25 RX ORDER — METHYLPHENIDATE HYDROCHLORIDE 20 MG/1
30 TABLET ORAL 2 TIMES DAILY
COMMUNITY
Start: 2021-10-06 | End: 2022-02-02

## 2022-01-25 RX ORDER — CICLOPIROX OLAMINE 7.7 MG/G
CREAM TOPICAL
COMMUNITY
Start: 2021-10-26 | End: 2022-07-19

## 2022-01-25 RX ORDER — CEFAZOLIN SODIUM 1 G/3ML
INJECTION, POWDER, FOR SOLUTION INTRAMUSCULAR; INTRAVENOUS
COMMUNITY
Start: 2021-10-19 | End: 2022-02-02

## 2022-01-25 RX ORDER — TRIAMCINOLONE ACETONIDE 1 MG/G
CREAM TOPICAL
Qty: 80 G | Refills: 1 | Status: SHIPPED | OUTPATIENT
Start: 2022-01-25 | End: 2022-07-19

## 2022-01-25 RX ORDER — ONDANSETRON 4 MG/1
TABLET, ORALLY DISINTEGRATING ORAL
COMMUNITY
Start: 2021-10-06 | End: 2022-02-02

## 2022-01-25 RX ORDER — ONDANSETRON HYDROCHLORIDE 8 MG/1
TABLET, FILM COATED ORAL
COMMUNITY
Start: 2021-12-21 | End: 2022-02-02 | Stop reason: SDUPTHER

## 2022-01-25 NOTE — PROGRESS NOTES
"DATE: 1/25/2022  PATIENT: Tiarra Norton    Attending Physician: Tyler Jacobs M.D.    HISTORY:  Tiarra Norton is a 66 y.o. female who returns to me today for LBP FU and MRI review. Patient still has pain (9/10). Medications aren't helping but PT does. She does PT twice weekly. She is not having any leg pain. Pain is worse with walking and better with sitting. She does not want any surgery.    PMH/PSH/FamHx/SocHx:  Unchanged from prior visit    ROS:  Positive for LBP  Denies perineal paresthesias, bowel or bladder incontinence    EXAM:  Ht 5' 3" (1.6 m)   Wt 70 kg (154 lb 5.2 oz)   BMI 27.34 kg/m²     My physical examination was notable for the following findings: motor 5/5 BLE; SILT    IMAGING:  Previous XRs showed lumbar spondylosis and degenerative scoliosis measuring 24degrees. L4-5 anterolisthesis measuring 6mm.     MRI lumbar showed no significant central or foraminal stenosis.    Standing scoliosis XR reviewed - thoracolumbar levoscoliosis approximately 26 degrees     ASSESSMENT/PLAN:  Patient has LBP in setting of degenerative scoliosis. She has no sagittal imbalance. She will continue therapy and medications. We will refer her to Pain Management. I educated the patient on the importance of core/back strengthening, correct posture, bending/lifting ergonomics, and low-impact aerobic exercises (walking, elliptical, and aquatherapy). She will FU prn.    I have personally examined the patient and agree with the above plan.    Tyler Jacobs MD  Orthopaedic Spine Surgeon  Department of Orthopaedic Surgery  [phone]        "

## 2022-01-25 NOTE — PROGRESS NOTES
Patient Information  Name: Tiarra Norton  : 1955  MRN: [id number]     Referring Physician:  No ref. provider found   Primary Care Physician:  Kaykay Perales MD   Date of Visit: 2022      Subjective:     History of Present lllness:    Tiarra Norton is a 66 y.o. female who presents with a chief complaint of growths and rash.  Location: back  Duration: 1 month  Signs/Symptoms: rough, itching, raised   Relieving factors/Prior treatments: cerave cream, has been using the heating pad a lot due to back pain    She also complains of redness of her face and brown spots on her face, hands, and arms.    Patient was last seen: 2021.  Prior notes by myself reviewed.   Clinical documentation obtained by nursing staff reviewed.    Review of Systems   Constitutional: Positive for fatigue. Negative for fever, chills and malaise.   Respiratory: Negative for cough.    Skin: Positive for itching and dry skin. Negative for rash.   Hematologic/Lymphatic: Negative for adenopathy.   Allergic/Immunologic: Positive for environmental allergies.       Objective:   Physical Exam   Constitutional: She appears well-developed and well-nourished. No distress.   Neurological: She is alert and oriented to person, place, and time. She is not disoriented.   Psychiatric: She exhibits a depressed mood.   Skin:   Areas Examined (abnormalities noted in diagram):   Scalp / Hair Palpated and Inspected  Head / Face Inspection Performed  Neck Inspection Performed  Chest / Axilla Inspection Performed  Abdomen Inspection Performed  Back Inspection Performed  RUE Inspected  LUE Inspection Performed                 Diagram Legend     Erythematous scaling macule/papule c/w actinic keratosis       Vascular papule c/w angioma      Pigmented verrucoid papule/plaque c/w seborrheic keratosis      Yellow umbilicated papule c/w sebaceous hyperplasia      Irregularly shaped tan macule c/w lentigo     1-2 mm smooth white papules consistent with  Milia      Movable subcutaneous cyst with punctum c/w epidermal inclusion cyst      Subcutaneous movable cyst c/w pilar cyst      Firm pink to brown papule c/w dermatofibroma      Pedunculated fleshy papule(s) c/w skin tag(s)      Evenly pigmented macule c/w junctional nevus     Mildly variegated pigmented, slightly irregular-bordered macule c/w mildly atypical nevus      Flesh colored to evenly pigmented papule c/w intradermal nevus       Pink pearly papule/plaque c/w basal cell carcinoma      Erythematous hyperkeratotic cursted plaque c/w SCC      Surgical scar with no sign of skin cancer recurrence      Open and closed comedones      Inflammatory papules and pustules      Verrucoid papule consistent consistent with wart     Erythematous eczematous patches and plaques     Dystrophic onycholytic nail with subungual debris c/w onychomycosis     Umbilicated papule    Erythematous-base heme-crusted tan verrucoid plaque consistent with inflamed seborrheic keratosis     Erythematous Silvery Scaling Plaque c/w Psoriasis     See annotation    No images are attached to the encounter or orders placed in the encounter.      [] Data reviewed  [] Prior external notes reviewed  [] Independent review of test  [] Management discussed with another provider  [] Independent historian    Assessment / Plan:        Inflamed seborrheic keratosis  Cryosurgery procedure note:  Risk, benefits, and alternatives of cryosurgery are discussed with the patient, including but not limited to the risks of hypopigmentation, hyperpigmentation, scar, infection, recurrence of lesion(s), development of new lesion(s), and need for additional treatment of the lesion(s). Verbal consent obtained from patient. Liquid nitrogen cryosurgery applied to 3 lesion(s) to produce a freeze injury. Counseled patient that blisters may form, and instructed patient on wound care with gentle cleansing and use of Vaseline ointment to keep moist until healed. Handout was  provided, and patient was instructed to return to clinic in 1-2 months if lesions do not completely resolve.    Actinic keratosis  Cryosurgery procedure note:  Risk, benefits, and alternatives of cryosurgery are discussed with the patient, including but not limited to the risks of hypopigmentation, hyperpigmentation, scar, infection, recurrence of lesion(s), development of new lesion(s), and need for additional treatment of the lesion(s). Verbal consent obtained from patient. Liquid nitrogen cryosurgery applied to 3 lesion(s) to produce a freeze injury. Counseled patient that blisters may form, and instructed patient on wound care with gentle cleansing and use of Vaseline ointment to keep moist until healed. Handout was provided, and patient was instructed to return to clinic in 1-2 months if lesions do not completely resolve.    Erythema ab igne  - chronic problem, not at treatment goal  Discussed the diagnosis, prognosis, and treatment options. The most important thing is to avoid chronic heat on the area.   -     triamcinolone acetonide 0.1% (KENALOG) 0.1 % cream; Apply to affected areas of back BID prn irritation. Do not use on face, underarms, or groin.  Dispense: 80 g; Refill: 1  Use Rx cream only when itching or irritated. Do not use it to treat the discoloration. The discoloration of the skin may take years to fade.  Side effects from the overuse of topical steroids include thinning of skin, easy tearing/bruising of skin, stretch marks, spider veins, etc. Use the topical steroid no more than 2 days per week if used long-term and/or take breaks from the topical steroid, especially if any of the above side effects are noticed.    Telangiectasia  Reassurance was given to the patient. No treatment is necessary.  Recommended and discussed risks, benefits, alternatives, and side effects of pulse dye laser (PDL).    Lentigines  These are benign sun spots which should be monitored for changes. Daily sun protection  will reduce the number of new lesions.   Recommend using a broad-spectrum, water-resistant sunscreen with SPF of 30 or higher--reapply every 2 hours. Seek shade, wear sun-protective clothing, and perform regular skin self-exams.  Recommended and discussed risks, benefits, alternatives, and side effects of pulse dye laser (PDL).    Seborrheic keratosis  These are benign, inherited growths without a malignant potential. Reassurance given to patient. No treatment is necessary.    Follow up if symptoms worsen or fail to improve.      Melissa Cabrera MD, FAAD  [facility]

## 2022-01-26 ENCOUNTER — CLINICAL SUPPORT (OUTPATIENT)
Dept: REHABILITATION | Facility: OTHER | Age: 67
End: 2022-01-26
Attending: INTERNAL MEDICINE
Payer: MEDICARE

## 2022-01-26 ENCOUNTER — PATIENT MESSAGE (OUTPATIENT)
Dept: DERMATOLOGY | Facility: CLINIC | Age: 67
End: 2022-01-26
Payer: MEDICARE

## 2022-01-26 ENCOUNTER — PATIENT MESSAGE (OUTPATIENT)
Dept: ORTHOPEDICS | Facility: CLINIC | Age: 67
End: 2022-01-26
Payer: MEDICARE

## 2022-01-26 DIAGNOSIS — M25.69 DECREASED RANGE OF MOTION OF TRUNK AND BACK: Primary | ICD-10-CM

## 2022-01-26 DIAGNOSIS — R29.3 POOR POSTURE: ICD-10-CM

## 2022-01-26 NOTE — PROGRESS NOTES
[facility]      Name: Tiarra [facility]  Clinic Number: [id number]    Therapy Diagnosis:   Encounter Diagnoses   Name Primary?    Decreased range of motion of trunk and back Yes    Poor posture      Physician: Kaykay Perales MD    Visit Date: 2022    Physician Orders:PT EVAL and treat  Medical Diagnosis from Referral: M54.9 (ICD-10-CM) - Back pain, unspecified back location, unspecified back pain laterality, unspecified chronicity  Evaluation Date: 2021  Authorization Period Expiration: 2022  Plan of Care Expiration:  2022  Visit # / Visits authorized:          Time In: 3:00 pm  Time Out: 4:00 pm  Total Billable Time: 50 minutes     Precautions: Standard     Pattern of pain determined: Pattern 1 PEP    Subjective   Tiarra reports she experienced an increase in lower back soreness and discomfort this morning.  She stated she was on her heating pad on morning to help relieve the pain.     Patient reports tolerating previous visit well /c no c/o of LB discomfort but did have some soreness.   Patient reports their pain to be 3/10 on a 0-10 scale with 0 being no pain and 10 being the worst pain imaginable.  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  Patients goals: to improve the pain when completing     Objective     Baseline IM Testing Results:   Date of testin2021  ROM 0-42 deg   Max Peak Torque 110    Min Peak Torque 25    Flex/Ext Ratio 4:1   % below normative data -36%     Midpoint IM Testing Results:   Date of testin/3/2022  ROM 0-54 deg   Max Peak Torque 131    Min Peak Torque 59    Flex/Ext Ratio 2.5   % above normative data  % gain from initial 3  67       Lumbar FOTO  Initial: 28%  Visit 10: 26%  Goal:  25%       Treatment    Pt was instructed in and performed the following:     Tiarra received therapeutic exercises to develop/improved posture, cardiovascular endurance, muscular  "endurance, lumbar/cervical ROM, strength and muscular endurance for 50 minutes including the following exercises:       PPT x10 5"  Bridge /c GTB hip AB  x10  BKFO w/GTB x10 ea  Standing L sidebend stretch c/UE support on mirror 5x10" leaning left only  Standing R sag towards wall (frontal plane passive L sidebend) 5x10" ea  EIS with L lateral deviation x 10    [facility] - Short 1/26/2022   Visit Number -   VAS Pain Rating 2   Time 5   Lumbar Stretches - Slouch -   Extension in Lying -   Extension in Standing 10   Flexion in Lying 10   Manual Therapy -   Lumbar Extension - Seat Pad -   Femur Restraint -   Top Dead Center -   Counterweight -   Lumbar Flexion -   Lumbar Extension -   Lumbar Peak Torque -   Lumbar Weight 68   Repetitions 18   Rating of Perceived Exertion 5         Not performed 1/26/2022 :  Prone Press ups x10 (lock, block, sag at end range)  LTR x10   Supine figure four + LTR (glute med stretch) 2x30" ea  90/90 heel taps 5x2 B   SLR with TBall for TA brace 2x15 B  DKTCx10  Clamshells OTB x15 5" hold   90/90 heel taps 5x2 B  tk pose 20"  HL pilates ring abduction 10" hold x1.5 min  PPT with march x20   Anti-rotations with TrA 10x OTB  Ab bracing with PB 10" hold x10  SLR with PB ab brace 2x15 B  Cat/cow x 10         Peripheral muscle strengthening which included 1 set of 15-20 repetitions at a slow, controlled 10-13 second per rep pace focused on strengthening supporting musculature for improved body mechanics and functional mobility.  Pt and therapist focused on proper form during treatment to ensure optimal strengthening of each targeted muscle group.  Machines were utilized including torso rotation, leg extension, leg curl, chest press, upright row. Tricep extension, bicep curl, leg press, and hip abduction added visit 3    Tiarra received the following manual therapy techniques:  were applied to the: B QL for minutes 00        Home Exercises Provided and Patient Education Provided "   Home exercises include:    DKTC, SKTC, LTR, Supine HS stretch with belt, Supine piriformis stretch, PPT, Standing ext, Bridge, BKFO YTB with PPT    Written Home Exercises Provided: Patient instructed to cont prior HEP.  Exercises were reviewed and Tiarra was able to demonstrate them prior to the end of the session.  Tiarra demonstrated good  understanding of the education provided.     See EMR under Patient Instructions for exercises provided prior visit.          Assessment   Tiarra returned reporting continued occasional relief from sidebending activities to open up R side, included sidebend at wall and EIS with lateral deviation to give more tolerable options to her. She continues to be frustrated with lack of relief of pain so discussed plan for pain management versus alleviation and difference between gaining strength and improving overarching function and limits created by apparent scoliosis. Patient verbalized understanding but may require further follow up and education. Medx resistance remained at 68#; incorporated half-weight warmup and she still experienced initial strain but was able to complete 18 reps at a RPE of 5/10. Will continue to progress as toelrated.       Patient is making good progress towards established goals.  Pt will continue to benefit from skilled outpatient physical therapy to address the deficits stated in the impairment chart, provide pt/family education and to maximize pt's level of independence in the home and community environment.     Anticipated Barriers for therapy: chronicity of condition  Pt's spiritual, cultural and educational needs considered and pt agreeable to plan of care and goals as stated below:   :            GOALS: Pt is in agreement with the following goals.     Short term goals:  6 weeks or 10 visits   1.  Pt will demonstrate increased lumbar ROM by at least 6 degrees from the initial ROM value with improvements noted in functional ROM and ability to perform ADLs.   (MET)  2.  Pt will demonstrate increased MedX average isometric strength value  by 20% from initial test resulting in improved ability to perform bending, lifting, and carrying activities safely, confidently.  (MET)  3.  Patient report a reduction in worst pain score by 1-2 points for improved tolerance for walking long distances.  (MET)  4.  Pt able to perform HEP correctly with minimal cueing or supervision from therapist to encourage independent management of symptoms. (MET)        Long term goals: 10 weeks or 20 visits   1. Pt will demonstrate increased lumbar ROM by at least 9 degrees from initial ROM value, resulting in improved ability to perform functional fwd bending while standing and sitting. (approp and ongoing)  2. Pt will demonstrate increased MedX average isometric strength value  by 40% from initial test resulting in improved ability to perform bending, lifting, and carrying activities safely, confidently.  (approp and ongoing)  3. Pt to demonstrate ability to independently control and reduce their pain through posture positioning and mechanical movements throughout a typical day.  (approp and ongoing)           Plan    Continue with established Plan of Care towards established PT goals.      Eric Mckinnon, PT,   1/26/2022

## 2022-01-28 ENCOUNTER — CLINICAL SUPPORT (OUTPATIENT)
Dept: REHABILITATION | Facility: OTHER | Age: 67
End: 2022-01-28
Attending: INTERNAL MEDICINE
Payer: MEDICARE

## 2022-01-28 ENCOUNTER — TELEPHONE (OUTPATIENT)
Dept: PAIN MEDICINE | Facility: CLINIC | Age: 67
End: 2022-01-28
Payer: MEDICARE

## 2022-01-28 ENCOUNTER — PATIENT MESSAGE (OUTPATIENT)
Dept: ORTHOPEDICS | Facility: CLINIC | Age: 67
End: 2022-01-28
Payer: MEDICARE

## 2022-01-28 DIAGNOSIS — R29.3 POOR POSTURE: ICD-10-CM

## 2022-01-28 DIAGNOSIS — M25.69 DECREASED RANGE OF MOTION OF TRUNK AND BACK: Primary | ICD-10-CM

## 2022-01-28 PROCEDURE — 97110 THERAPEUTIC EXERCISES: CPT | Mod: HCNC

## 2022-01-28 NOTE — TELEPHONE ENCOUNTER
----- Message from Radha Kearney sent at 1/28/2022  1:27 PM CST -----  Regarding: questions and concerns  Name of Who is Calling: Tiarra           What is the request in detail: Patient would like a call back to find out will she get a cortisone injection the same day.           Can the clinic reply by [facility]: No           What Number to Call Back if not in [facility]: [phone]

## 2022-01-28 NOTE — PROGRESS NOTES
[facility]      Name: Tiarra [facility]  Clinic Number: [id number]    Therapy Diagnosis:   Encounter Diagnoses   Name Primary?    Decreased range of motion of trunk and back Yes    Poor posture      Physician: Kaykay Perales MD    Visit Date: 2022    Physician Orders:PT EVAL and treat  Medical Diagnosis from Referral: M54.9 (ICD-10-CM) - Back pain, unspecified back location, unspecified back pain laterality, unspecified chronicity  Evaluation Date: 2021  Authorization Period Expiration: 2022  Plan of Care Expiration:  2022  Visit # / Visits authorized: 15/20       Time In: 945 am  Time Out: 1045 am  Total Billable Time: 50 minutes     Precautions: Standard     Pattern of pain determined: Pattern 1 PEP    Subjective   Tiarra reports she is feeling a lot of neck tension and migraine pain with the weather of late but her mid-back pain has remained fairly stable.     Patient reports tolerating previous visit well /c no c/o of LB discomfort but did have some soreness.   Patient reports their pain to be 2/10 on a 0-10 scale with 0 being no pain and 10 being the worst pain imaginable.    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  Patients goals: to improve the pain when completing     Objective     Baseline IM Testing Results:   Date of testin2021  ROM 0-42 deg   Max Peak Torque 110    Min Peak Torque 25    Flex/Ext Ratio 4:1   % below normative data -36%     Midpoint IM Testing Results:   Date of testin/3/2022  ROM 0-54 deg   Max Peak Torque 131    Min Peak Torque 59    Flex/Ext Ratio 2.5   % above normative data  % gain from initial 3  67       Lumbar FOTO  Initial: 28%  Visit 10: 26%  Goal:  25%       Treatment    Pt was instructed in and performed the following:     Tiarra received therapeutic exercises to develop/improved posture, cardiovascular endurance, muscular  "endurance, lumbar/cervical ROM, strength and muscular endurance for 50 minutes including the following exercises:       PPT x10 5"  Bridge /c GTB hip AB  x10  BKFO w/GTB x10 ea  Standing L sidebend stretch c/UE support on mirror 5x10" leaning left only  Standing R sag towards wall (frontal plane passive L sidebend) 5x10" ea  EIS with L lateral deviation x 10  Standing glute squeeze c/steps (10 x 5 steps)    [facility] - Short 1/28/2022   Visit Number 15   VAS Pain Rating 2   Time 5   Lumbar Stretches - Slouch -   Extension in Lying -   Extension in Standing 10   Flexion in Lying 10   Manual Therapy -   Lumbar Extension - Seat Pad -   Femur Restraint -   Top Dead Center -   Counterweight -   Lumbar Flexion -   Lumbar Extension -   Lumbar Peak Torque -   Lumbar Weight 68   Repetitions 20   Rating of Perceived Exertion 3         Not performed 1/28/2022 :  Prone Press ups x10 (lock, block, sag at end range)  LTR x10   Supine figure four + LTR (glute med stretch) 2x30" ea  90/90 heel taps 5x2 B   SLR with TBall for TA brace 2x15 B  DKTCx10  Clamshells OTB x15 5" hold   90/90 heel taps 5x2 B  tk pose 20"  HL pilates ring abduction 10" hold x1.5 min  PPT with march x20   Anti-rotations with TrA 10x OTB  Ab bracing with PB 10" hold x10  SLR with PB ab brace 2x15 B  Cat/cow x 10       Peripheral muscle strengthening which included 1 set of 15-20 repetitions at a slow, controlled 10-13 second per rep pace focused on strengthening supporting musculature for improved body mechanics and functional mobility.  Pt and therapist focused on proper form during treatment to ensure optimal strengthening of each targeted muscle group.  Machines were utilized including torso rotation, leg extension, leg curl, chest press, upright row. Tricep extension, bicep curl, leg press, and hip abduction added visit 3    Tiarra received the following manual therapy techniques: nil        Home Exercises Provided and Patient Education " Provided   Home exercises include:    DKTC, SKTC, LTR, Supine HS stretch with belt, Supine piriformis stretch, PPT, Standing ext, Bridge, BKFO YTB with PPT    Written Home Exercises Provided: Patient instructed to cont prior HEP.  Exercises were reviewed and Tiarra was able to demonstrate them prior to the end of the session.  Tiarra demonstrated good  understanding of the education provided.     See EMR under Patient Instructions for exercises provided prior visit.          Assessment   Tiarra returned reporting continued occasional relief from sidebending activities to open up R side. Notable neck/migraine pain this session was significantly relieved with brief STM/TpR to suboccipitals and L UT. Medx resistance remained at 68# and this session without warmup she was able to complete 20 reps with an RPE of 3/10, seems to have been performed better when talking while exercising. Consider progressing MedX resistance next session.       Patient is making good progress towards established goals.  Pt will continue to benefit from skilled outpatient physical therapy to address the deficits stated in the impairment chart, provide pt/family education and to maximize pt's level of independence in the home and community environment.     Anticipated Barriers for therapy: chronicity of condition  Pt's spiritual, cultural and educational needs considered and pt agreeable to plan of care and goals as stated below:   :            GOALS: Pt is in agreement with the following goals.     Short term goals:  6 weeks or 10 visits   1.  Pt will demonstrate increased lumbar ROM by at least 6 degrees from the initial ROM value with improvements noted in functional ROM and ability to perform ADLs.  (MET)  2.  Pt will demonstrate increased MedX average isometric strength value  by 20% from initial test resulting in improved ability to perform bending, lifting, and carrying activities safely, confidently.  (MET)  3.  Patient report a reduction  in worst pain score by 1-2 points for improved tolerance for walking long distances.  (MET)  4.  Pt able to perform HEP correctly with minimal cueing or supervision from therapist to encourage independent management of symptoms. (MET)        Long term goals: 10 weeks or 20 visits   1. Pt will demonstrate increased lumbar ROM by at least 9 degrees from initial ROM value, resulting in improved ability to perform functional fwd bending while standing and sitting. (approp and ongoing)  2. Pt will demonstrate increased MedX average isometric strength value  by 40% from initial test resulting in improved ability to perform bending, lifting, and carrying activities safely, confidently.  (approp and ongoing)  3. Pt to demonstrate ability to independently control and reduce their pain through posture positioning and mechanical movements throughout a typical day.  (approp and ongoing)           Plan    Continue with established Plan of Care towards established PT goals.      Eric Mckinnon, PT,   1/28/2022

## 2022-01-28 NOTE — TELEPHONE ENCOUNTER
Patient was contacted and informed that she will have a consultation with  on 02/02/22 an injection will not be given at this appt. Patient verbalized understanding

## 2022-01-29 ENCOUNTER — PATIENT MESSAGE (OUTPATIENT)
Dept: INTERNAL MEDICINE | Facility: CLINIC | Age: 67
End: 2022-01-29
Payer: MEDICARE

## 2022-01-29 DIAGNOSIS — E78.2 MIXED HYPERLIPIDEMIA: ICD-10-CM

## 2022-01-29 DIAGNOSIS — Z12.31 ENCOUNTER FOR SCREENING MAMMOGRAM FOR BREAST CANCER: ICD-10-CM

## 2022-01-29 DIAGNOSIS — I10 ESSENTIAL HYPERTENSION: Primary | ICD-10-CM

## 2022-01-29 DIAGNOSIS — E03.4 HYPOTHYROIDISM DUE TO ACQUIRED ATROPHY OF THYROID: ICD-10-CM

## 2022-01-31 ENCOUNTER — CLINICAL SUPPORT (OUTPATIENT)
Dept: REHABILITATION | Facility: OTHER | Age: 67
End: 2022-01-31
Attending: INTERNAL MEDICINE
Payer: MEDICARE

## 2022-01-31 ENCOUNTER — OFFICE VISIT (OUTPATIENT)
Dept: ORTHOPEDICS | Facility: CLINIC | Age: 67
End: 2022-01-31
Payer: MEDICARE

## 2022-01-31 ENCOUNTER — TELEPHONE (OUTPATIENT)
Dept: REHABILITATION | Facility: OTHER | Age: 67
End: 2022-01-31

## 2022-01-31 VITALS — HEIGHT: 63 IN | WEIGHT: 154 LBS | BODY MASS INDEX: 27.29 KG/M2

## 2022-01-31 DIAGNOSIS — M17.11 PRIMARY OSTEOARTHRITIS OF RIGHT KNEE: Primary | ICD-10-CM

## 2022-01-31 DIAGNOSIS — R29.3 POOR POSTURE: ICD-10-CM

## 2022-01-31 DIAGNOSIS — M25.69 DECREASED RANGE OF MOTION OF TRUNK AND BACK: Primary | ICD-10-CM

## 2022-01-31 PROCEDURE — 1125F PR PAIN SEVERITY QUANTIFIED, PAIN PRESENT: ICD-10-PCS | Mod: HCNC,CPTII,S$GLB, | Performed by: ORTHOPAEDIC SURGERY

## 2022-01-31 PROCEDURE — 99999 PR PBB SHADOW E&M-EST. PATIENT-LVL IV: CPT | Mod: PBBFAC,HCNC,, | Performed by: ORTHOPAEDIC SURGERY

## 2022-01-31 PROCEDURE — 1125F AMNT PAIN NOTED PAIN PRSNT: CPT | Mod: HCNC,CPTII,S$GLB, | Performed by: ORTHOPAEDIC SURGERY

## 2022-01-31 PROCEDURE — 99024 PR POST-OP FOLLOW-UP VISIT: ICD-10-PCS | Mod: HCNC,S$GLB,, | Performed by: ORTHOPAEDIC SURGERY

## 2022-01-31 PROCEDURE — 99999 PR PBB SHADOW E&M-EST. PATIENT-LVL IV: ICD-10-PCS | Mod: PBBFAC,HCNC,, | Performed by: ORTHOPAEDIC SURGERY

## 2022-01-31 PROCEDURE — 1101F PR PT FALLS ASSESS DOC 0-1 FALLS W/OUT INJ PAST YR: ICD-10-PCS | Mod: HCNC,CPTII,S$GLB, | Performed by: ORTHOPAEDIC SURGERY

## 2022-01-31 PROCEDURE — 97530 THERAPEUTIC ACTIVITIES: CPT | Mod: HCNC

## 2022-01-31 PROCEDURE — 3288F PR FALLS RISK ASSESSMENT DOCUMENTED: ICD-10-PCS | Mod: HCNC,CPTII,S$GLB, | Performed by: ORTHOPAEDIC SURGERY

## 2022-01-31 PROCEDURE — 97110 THERAPEUTIC EXERCISES: CPT | Mod: HCNC

## 2022-01-31 PROCEDURE — 3288F FALL RISK ASSESSMENT DOCD: CPT | Mod: HCNC,CPTII,S$GLB, | Performed by: ORTHOPAEDIC SURGERY

## 2022-01-31 PROCEDURE — 1159F MED LIST DOCD IN RCRD: CPT | Mod: HCNC,CPTII,S$GLB, | Performed by: ORTHOPAEDIC SURGERY

## 2022-01-31 PROCEDURE — 1101F PT FALLS ASSESS-DOCD LE1/YR: CPT | Mod: HCNC,CPTII,S$GLB, | Performed by: ORTHOPAEDIC SURGERY

## 2022-01-31 PROCEDURE — 99024 POSTOP FOLLOW-UP VISIT: CPT | Mod: HCNC,S$GLB,, | Performed by: ORTHOPAEDIC SURGERY

## 2022-01-31 PROCEDURE — 3008F BODY MASS INDEX DOCD: CPT | Mod: HCNC,CPTII,S$GLB, | Performed by: ORTHOPAEDIC SURGERY

## 2022-01-31 PROCEDURE — 1159F PR MEDICATION LIST DOCUMENTED IN MEDICAL RECORD: ICD-10-PCS | Mod: HCNC,CPTII,S$GLB, | Performed by: ORTHOPAEDIC SURGERY

## 2022-01-31 PROCEDURE — 3008F PR BODY MASS INDEX (BMI) DOCUMENTED: ICD-10-PCS | Mod: HCNC,CPTII,S$GLB, | Performed by: ORTHOPAEDIC SURGERY

## 2022-01-31 NOTE — TELEPHONE ENCOUNTER
HC talked to Pt about getting set up for health coaching, suggested by her therapist, Yandel.  She is interested and will call back once she is done shopping.

## 2022-01-31 NOTE — PROGRESS NOTES
"Subjective:      Patient ID: Tiarra Norton is a 66 y.o. female.    Chief Complaint: Post-op Evaluation of the Right Knee    HPI  Tiarra Norton has right knee pain.  The pain is unchanged. The pain is located in the anterior aspect of the knee.  There  is not radiation.    There is not associated stiffness.   There is not catching and locking. The pain is described as achy. The pain is aggravated by activity.  It is alleviated by nothing conisistently.  Her history, medications and problem list were reviewed.    Review of Systems   Constitutional: Negative for chills, fever and night sweats.   HENT: Negative for hearing loss.    Eyes: Negative for blurred vision and double vision.   Cardiovascular: Negative for chest pain, claudication and leg swelling.   Respiratory: Negative for shortness of breath.    Endocrine: Negative for polydipsia, polyphagia and polyuria.   Hematologic/Lymphatic: Negative for adenopathy and bleeding problem. Does not bruise/bleed easily.   Skin: Negative for poor wound healing.   Gastrointestinal: Negative for diarrhea and heartburn.   Genitourinary: Negative for bladder incontinence.   Neurological: Negative for focal weakness, headaches, numbness, paresthesias and sensory change.   Psychiatric/Behavioral: The patient is not nervous/anxious.    Allergic/Immunologic: Negative for persistent infections.         Objective:      Body mass index is 27.28 kg/m².  Vitals:    01/31/22 1325   Weight: 69.8 kg (153 lb 15.9 oz)   Height: 5' 3" (1.6 m)           General    Constitutional: She is oriented to person, place, and time. She appears well-developed and well-nourished.   HENT:   Head: Normocephalic and atraumatic.   Eyes: EOM are normal.   Cardiovascular: Normal rate.    Pulmonary/Chest: Effort normal.   Neurological: She is alert and oriented to person, place, and time.   Psychiatric: She has a normal mood and affect. Her behavior is normal.     General Musculoskeletal Exam   Gait: " antalgic and abnormal       Right Knee Exam     Inspection   Erythema: absent  Scars: present  Swelling: absent  Effusion: absent  Deformity: absent  Bruising: absent    Tenderness   The patient is tender to palpation of the medial retinaculum and lateral retinaculum.    Range of Motion   Extension: 0   Flexion: 130     Tests   Ligament Examination Lachman: normal (-1 to 2mm)   MCL - Valgus: normal (0 to 2mm)  LCL - Varus: normal  Patella   Passive Patellar Tilt: neutral    Other   Sensation: normal    Left Knee Exam     Inspection   Erythema: absent  Scars: absent  Swelling: absent  Effusion: absent  Deformity: absent  Bruising: absent    Tenderness   The patient is experiencing no tenderness.     Range of Motion   Extension: 0   Flexion: 130     Tests   Stability Lachman: normal (-1 to 2mm)   MCL - Valgus: normal (0 to 2mm)  LCL - Varus: normal (0 to 2mm)  Patella   Passive Patellar Tilt: neutral    Other   Sensation: normal    Muscle Strength   Right Lower Extremity   Hip Abduction: 5/5   Quadriceps:  5/5   Hamstrin/5   Left Lower Extremity   Hip Abduction: 5/5   Quadriceps:  5/5   Hamstrin/5     Reflexes     Left Side  Quadriceps:  2+    Right Side   Quadriceps:  2+    Vascular Exam     Right Pulses  Dorsalis Pedis:      2+          Left Pulses  Dorsalis Pedis:      2+          Edema  Right Lower Leg: absent  Left Lower Leg: absent              Assessment:       Encounter Diagnosis   Name Primary?    Primary osteoarthritis of right knee Yes          Plan:       Tiarra was seen today for post-op evaluation.    Diagnoses and all orders for this visit:    Primary osteoarthritis of right knee      Treatment options have been discussed.  We have decided to proceed with Orthovisc injections.  The risk of pseudoseptic reactions were discussed, as was the minimal risk of infection.  Tiarra Norton will return for her first injection..

## 2022-01-31 NOTE — PROGRESS NOTES
[facility]      Name: Tiarra Norton  Clinic Number: [id number]    Therapy Diagnosis:   Encounter Diagnoses   Name Primary?    Decreased range of motion of trunk and back Yes    Poor posture      Physician: Kaykay Perales MD    Visit Date: 2022    Physician Orders:PT EVAL and treat  Medical Diagnosis from Referral: M54.9 (ICD-10-CM) - Back pain, unspecified back location, unspecified back pain laterality, unspecified chronicity  Evaluation Date: 2021  Authorization Period Expiration: 2022  UPDATED Plan of Care Expiration:  2022  Visit # / Visits authorized:        Time In: 900 am  Time Out: 1000 am  Total Billable Time: 50 minutes     Precautions: Standard     Pattern of pain determined: Pattern 1 PEP    Subjective   Tiarra reports she continues to be very frustrated by her ongoing pain and while her back is stronger nothing seems to be changing about her day-to-day pain. She is hoping pain management can do something for her.     Patient reports tolerating previous visit well /c no c/o of LB discomfort but did have some soreness.   Patient reports their pain to be 4/10 on a 0-10 scale with 0 being no pain and 10 being the worst pain imaginable.    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  Patients goals: to improve the pain when completing     Objective     Baseline IM Testing Results:   Date of testin2021  ROM 0-42 deg   Max Peak Torque 110    Min Peak Torque 25    Flex/Ext Ratio 4:1   % below normative data -36%     Midpoint IM Testing Results:   Date of testin/3/2022  ROM 0-54 deg   Max Peak Torque 131    Min Peak Torque 59    Flex/Ext Ratio 2.5   % above normative data  % gain from initial 3  67       Lumbar FOTO  Initial: 28%  Visit 10: 26%  Goal:  25%       Treatment    Pt was instructed in and performed the following:     Tiarra received therapeutic  "exercises to develop/improved posture, cardiovascular endurance, muscular endurance, lumbar/cervical ROM, strength and muscular endurance for 30 minutes including the following exercises:     Mat exercises held in order to discuss POC, progression, symptoms    PPT x10 5"  Bridge /c GTB hip AB  x10  BKFO w/GTB x10 ea  Standing L sidebend stretch c/UE support on mirror 5x10" leaning left only  Standing R sag towards wall (frontal plane passive L sidebend) 5x10" ea  EIS with L lateral deviation x 10  Standing glute squeeze c/steps (10 x 5 steps)  [facility] - Short 1/31/2022   Visit Number 16   VAS Pain Rating 4   Time -   Lumbar Stretches - Slouch -   Extension in Lying -   Extension in Standing -   Flexion in Lying -   Manual Therapy -   Lumbar Extension - Seat Pad -   Femur Restraint -   Top Dead Center -   Counterweight -   Lumbar Flexion -   Lumbar Extension -   Lumbar Peak Torque -   Lumbar Weight 70   Repetitions 20   Rating of Perceived Exertion 3     Not performed 1/31/2022 :  Prone Press ups x10 (lock, block, sag at end range)  LTR x10   Supine figure four + LTR (glute med stretch) 2x30" ea  90/90 heel taps 5x2 B   SLR with TBall for TA brace 2x15 B  DKTCx10  Clamshells OTB x15 5" hold   90/90 heel taps 5x2 B  tk pose 20"  HL pilates ring abduction 10" hold x1.5 min  PPT with march x20   Anti-rotations with TrA 10x OTB  Ab bracing with PB 10" hold x10  SLR with PB ab brace 2x15 B  Cat/cow x 10       Peripheral muscle strengthening which included 1 set of 15-20 repetitions at a slow, controlled 10-13 second per rep pace focused on strengthening supporting musculature for improved body mechanics and functional mobility.  Pt and therapist focused on proper form during treatment to ensure optimal strengthening of each targeted muscle group.  Machines were utilized including torso rotation, leg extension, leg curl, chest press, upright row. Tricep extension, bicep curl, leg press, and hip abduction " "added visit 3    Tiarra received the following manual therapy techniques: nil        Home Exercises Provided and Patient Education Provided   Home exercises include: DKTC, SKTC, LTR, Supine HS stretch with belt, Supine piriformis stretch, PPT, Standing ext, Bridge, BKFO YTB with PPT    Written Home Exercises Provided: Patient instructed to cont prior HEP.  Exercises were reviewed and Tiarra was able to demonstrate them prior to the end of the session.  Tiarra demonstrated good  understanding of the education provided.     See EMR under Patient Instructions for exercises provided prior visit.          Assessment   Tiarra returned reporting continued frustration regarding her pain seeming "hopeless" and admits that over the last few months she has been weaning herself off of her anti-depressant medication to see how her body responds; discussed with patient that it's possible this could change her tolerance to a lot of things including her back pain. Also discussed pain science and nature of pain, regarding both recurrence as well as unreliability of chronic pain signal as a  of whether any danger is actually occurring on a musculoskeletal level. Patient verbalized understanding but was still very resigned and agreed to schedule additional healthy back sessions only after significant encouragement; will have health  reach out to her regarding opportunity to discuss these concerns more fully. Mat exercises held for this but she completed MedX and full peripheral circuit without issue. Medx resistance was progressed slightly due to very recent decrease in difficulty with machine; 70 ft/lb was set and she was able to complete 20 reps with an RPE of 3/10. Consider progressing MedX resistance again next session.       Patient is making good progress towards established goals.  Pt will continue to benefit from skilled outpatient physical therapy to address the deficits stated in the impairment chart, provide pt/family " education and to maximize pt's level of independence in the home and community environment.     Anticipated Barriers for therapy: chronicity of condition  Pt's spiritual, cultural and educational needs considered and pt agreeable to plan of care and goals as stated below:   :            GOALS: Pt is in agreement with the following goals.     Short term goals:  6 weeks or 10 visits   1.  Pt will demonstrate increased lumbar ROM by at least 6 degrees from the initial ROM value with improvements noted in functional ROM and ability to perform ADLs.  (MET)  2.  Pt will demonstrate increased MedX average isometric strength value  by 20% from initial test resulting in improved ability to perform bending, lifting, and carrying activities safely, confidently.  (MET)  3.  Patient report a reduction in worst pain score by 1-2 points for improved tolerance for walking long distances.  (MET)  4.  Pt able to perform HEP correctly with minimal cueing or supervision from therapist to encourage independent management of symptoms. (MET)        Long term goals: 10 weeks or 20 visits   1. Pt will demonstrate increased lumbar ROM by at least 9 degrees from initial ROM value, resulting in improved ability to perform functional fwd bending while standing and sitting. (approp and ongoing)  2. Pt will demonstrate increased MedX average isometric strength value  by 40% from initial test resulting in improved ability to perform bending, lifting, and carrying activities safely, confidently.  (approp and ongoing)  3. Pt to demonstrate ability to independently control and reduce their pain through posture positioning and mechanical movements throughout a typical day.  (approp and ongoing)           Plan   Continue with established Plan of Care towards established PT goals.      Eric Mckinnon, PT,   1/31/2022

## 2022-02-02 ENCOUNTER — PATIENT MESSAGE (OUTPATIENT)
Dept: PAIN MEDICINE | Facility: OTHER | Age: 67
End: 2022-02-02
Payer: MEDICARE

## 2022-02-02 ENCOUNTER — OFFICE VISIT (OUTPATIENT)
Dept: PAIN MEDICINE | Facility: CLINIC | Age: 67
End: 2022-02-02
Payer: MEDICARE

## 2022-02-02 ENCOUNTER — LAB VISIT (OUTPATIENT)
Dept: LAB | Facility: HOSPITAL | Age: 67
End: 2022-02-02
Attending: INTERNAL MEDICINE
Payer: MEDICARE

## 2022-02-02 ENCOUNTER — OFFICE VISIT (OUTPATIENT)
Dept: INTERNAL MEDICINE | Facility: CLINIC | Age: 67
End: 2022-02-02
Payer: MEDICARE

## 2022-02-02 ENCOUNTER — TELEPHONE (OUTPATIENT)
Dept: PAIN MEDICINE | Facility: CLINIC | Age: 67
End: 2022-02-02
Payer: MEDICARE

## 2022-02-02 ENCOUNTER — TELEPHONE (OUTPATIENT)
Dept: PAIN MEDICINE | Facility: OTHER | Age: 67
End: 2022-02-02
Payer: MEDICARE

## 2022-02-02 ENCOUNTER — PATIENT MESSAGE (OUTPATIENT)
Dept: DERMATOLOGY | Facility: CLINIC | Age: 67
End: 2022-02-02
Payer: MEDICARE

## 2022-02-02 VITALS
HEART RATE: 82 BPM | DIASTOLIC BLOOD PRESSURE: 74 MMHG | OXYGEN SATURATION: 100 % | WEIGHT: 152 LBS | SYSTOLIC BLOOD PRESSURE: 152 MMHG | BODY MASS INDEX: 26.93 KG/M2 | HEIGHT: 63 IN

## 2022-02-02 VITALS
BODY MASS INDEX: 27.11 KG/M2 | SYSTOLIC BLOOD PRESSURE: 148 MMHG | RESPIRATION RATE: 18 BRPM | DIASTOLIC BLOOD PRESSURE: 81 MMHG | HEIGHT: 63 IN | HEART RATE: 93 BPM | TEMPERATURE: 98 F | WEIGHT: 153 LBS

## 2022-02-02 DIAGNOSIS — M51.36 DEGENERATIVE DISC DISEASE, LUMBAR: ICD-10-CM

## 2022-02-02 DIAGNOSIS — E66.3 OVERWEIGHT (BMI 25.0-29.9): ICD-10-CM

## 2022-02-02 DIAGNOSIS — E03.4 HYPOTHYROIDISM DUE TO ACQUIRED ATROPHY OF THYROID: ICD-10-CM

## 2022-02-02 DIAGNOSIS — G43.909 MIGRAINE WITHOUT STATUS MIGRAINOSUS, NOT INTRACTABLE, UNSPECIFIED MIGRAINE TYPE: ICD-10-CM

## 2022-02-02 DIAGNOSIS — M47.817 SPONDYLOSIS OF LUMBOSACRAL REGION WITHOUT MYELOPATHY OR RADICULOPATHY: Primary | ICD-10-CM

## 2022-02-02 DIAGNOSIS — E78.2 MIXED HYPERLIPIDEMIA: ICD-10-CM

## 2022-02-02 DIAGNOSIS — Z00.00 ANNUAL PHYSICAL EXAM: Primary | ICD-10-CM

## 2022-02-02 DIAGNOSIS — I10 ESSENTIAL HYPERTENSION: ICD-10-CM

## 2022-02-02 DIAGNOSIS — I10 PRIMARY HYPERTENSION: ICD-10-CM

## 2022-02-02 PROBLEM — R07.89 OTHER CHEST PAIN: Status: RESOLVED | Noted: 2020-10-20 | Resolved: 2022-02-02

## 2022-02-02 LAB
ALBUMIN SERPL BCP-MCNC: 4.4 G/DL (ref 3.5–5.2)
ALP SERPL-CCNC: 95 U/L (ref 55–135)
ALT SERPL W/O P-5'-P-CCNC: 17 U/L (ref 10–44)
ANION GAP SERPL CALC-SCNC: 13 MMOL/L (ref 8–16)
AST SERPL-CCNC: 22 U/L (ref 10–40)
BILIRUB SERPL-MCNC: 0.5 MG/DL (ref 0.1–1)
BUN SERPL-MCNC: 9 MG/DL (ref 8–23)
CALCIUM SERPL-MCNC: 10.5 MG/DL (ref 8.7–10.5)
CHLORIDE SERPL-SCNC: 107 MMOL/L (ref 95–110)
CHOLEST SERPL-MCNC: 177 MG/DL (ref 120–199)
CHOLEST/HDLC SERPL: 2.7 {RATIO} (ref 2–5)
CO2 SERPL-SCNC: 24 MMOL/L (ref 23–29)
CREAT SERPL-MCNC: 0.8 MG/DL (ref 0.5–1.4)
ERYTHROCYTE [DISTWIDTH] IN BLOOD BY AUTOMATED COUNT: 12.4 % (ref 11.5–14.5)
EST. GFR  (AFRICAN AMERICAN): >60 ML/MIN/1.73 M^2
EST. GFR  (NON AFRICAN AMERICAN): >60 ML/MIN/1.73 M^2
GLUCOSE SERPL-MCNC: 99 MG/DL (ref 70–110)
HCT VFR BLD AUTO: 43.4 % (ref 37–48.5)
HDLC SERPL-MCNC: 65 MG/DL (ref 40–75)
HDLC SERPL: 36.7 % (ref 20–50)
HGB BLD-MCNC: 13.4 G/DL (ref 12–16)
LDLC SERPL CALC-MCNC: 96.4 MG/DL (ref 63–159)
MCH RBC QN AUTO: 30.2 PG (ref 27–31)
MCHC RBC AUTO-ENTMCNC: 30.9 G/DL (ref 32–36)
MCV RBC AUTO: 98 FL (ref 82–98)
NONHDLC SERPL-MCNC: 112 MG/DL
PLATELET # BLD AUTO: 279 K/UL (ref 150–450)
PMV BLD AUTO: 10.1 FL (ref 9.2–12.9)
POTASSIUM SERPL-SCNC: 4.5 MMOL/L (ref 3.5–5.1)
PROT SERPL-MCNC: 7.6 G/DL (ref 6–8.4)
RBC # BLD AUTO: 4.44 M/UL (ref 4–5.4)
SODIUM SERPL-SCNC: 144 MMOL/L (ref 136–145)
T4 FREE SERPL-MCNC: 1.22 NG/DL (ref 0.71–1.51)
TRIGL SERPL-MCNC: 78 MG/DL (ref 30–150)
TSH SERPL DL<=0.005 MIU/L-ACNC: 0.15 UIU/ML (ref 0.4–4)
WBC # BLD AUTO: 6.82 K/UL (ref 3.9–12.7)

## 2022-02-02 PROCEDURE — 1101F PT FALLS ASSESS-DOCD LE1/YR: CPT | Mod: HCNC,CPTII,S$GLB, | Performed by: INTERNAL MEDICINE

## 2022-02-02 PROCEDURE — 3077F PR MOST RECENT SYSTOLIC BLOOD PRESSURE >= 140 MM HG: ICD-10-PCS | Mod: HCNC,CPTII,S$GLB, | Performed by: ANESTHESIOLOGY

## 2022-02-02 PROCEDURE — 99397 PER PM REEVAL EST PAT 65+ YR: CPT | Mod: HCNC,S$GLB,, | Performed by: INTERNAL MEDICINE

## 2022-02-02 PROCEDURE — 99999 PR PBB SHADOW E&M-EST. PATIENT-LVL IV: ICD-10-PCS | Mod: PBBFAC,HCNC,, | Performed by: INTERNAL MEDICINE

## 2022-02-02 PROCEDURE — 1101F PT FALLS ASSESS-DOCD LE1/YR: CPT | Mod: HCNC,CPTII,S$GLB, | Performed by: ANESTHESIOLOGY

## 2022-02-02 PROCEDURE — 99999 PR PBB SHADOW E&M-EST. PATIENT-LVL V: CPT | Mod: PBBFAC,HCNC,, | Performed by: ANESTHESIOLOGY

## 2022-02-02 PROCEDURE — 80061 LIPID PANEL: CPT | Mod: HCNC | Performed by: INTERNAL MEDICINE

## 2022-02-02 PROCEDURE — 99397 PR PREVENTIVE VISIT,EST,65 & OVER: ICD-10-PCS | Mod: HCNC,S$GLB,, | Performed by: INTERNAL MEDICINE

## 2022-02-02 PROCEDURE — 3077F PR MOST RECENT SYSTOLIC BLOOD PRESSURE >= 140 MM HG: ICD-10-PCS | Mod: HCNC,CPTII,S$GLB, | Performed by: INTERNAL MEDICINE

## 2022-02-02 PROCEDURE — 1125F PR PAIN SEVERITY QUANTIFIED, PAIN PRESENT: ICD-10-PCS | Mod: HCNC,CPTII,S$GLB, | Performed by: INTERNAL MEDICINE

## 2022-02-02 PROCEDURE — 99204 OFFICE O/P NEW MOD 45 MIN: CPT | Mod: HCNC,S$GLB,, | Performed by: ANESTHESIOLOGY

## 2022-02-02 PROCEDURE — 99999 PR PBB SHADOW E&M-EST. PATIENT-LVL V: ICD-10-PCS | Mod: PBBFAC,HCNC,, | Performed by: ANESTHESIOLOGY

## 2022-02-02 PROCEDURE — 1101F PR PT FALLS ASSESS DOC 0-1 FALLS W/OUT INJ PAST YR: ICD-10-PCS | Mod: HCNC,CPTII,S$GLB, | Performed by: ANESTHESIOLOGY

## 2022-02-02 PROCEDURE — 99999 PR PBB SHADOW E&M-EST. PATIENT-LVL IV: CPT | Mod: PBBFAC,HCNC,, | Performed by: INTERNAL MEDICINE

## 2022-02-02 PROCEDURE — 3079F PR MOST RECENT DIASTOLIC BLOOD PRESSURE 80-89 MM HG: ICD-10-PCS | Mod: HCNC,CPTII,S$GLB, | Performed by: ANESTHESIOLOGY

## 2022-02-02 PROCEDURE — 1125F PR PAIN SEVERITY QUANTIFIED, PAIN PRESENT: ICD-10-PCS | Mod: HCNC,CPTII,S$GLB, | Performed by: ANESTHESIOLOGY

## 2022-02-02 PROCEDURE — 1159F MED LIST DOCD IN RCRD: CPT | Mod: HCNC,CPTII,S$GLB, | Performed by: ANESTHESIOLOGY

## 2022-02-02 PROCEDURE — 84439 ASSAY OF FREE THYROXINE: CPT | Mod: HCNC | Performed by: INTERNAL MEDICINE

## 2022-02-02 PROCEDURE — 80053 COMPREHEN METABOLIC PANEL: CPT | Mod: HCNC | Performed by: INTERNAL MEDICINE

## 2022-02-02 PROCEDURE — 3008F BODY MASS INDEX DOCD: CPT | Mod: HCNC,CPTII,S$GLB, | Performed by: INTERNAL MEDICINE

## 2022-02-02 PROCEDURE — 99204 PR OFFICE/OUTPT VISIT, NEW, LEVL IV, 45-59 MIN: ICD-10-PCS | Mod: HCNC,S$GLB,, | Performed by: ANESTHESIOLOGY

## 2022-02-02 PROCEDURE — 3079F DIAST BP 80-89 MM HG: CPT | Mod: HCNC,CPTII,S$GLB, | Performed by: ANESTHESIOLOGY

## 2022-02-02 PROCEDURE — 1159F PR MEDICATION LIST DOCUMENTED IN MEDICAL RECORD: ICD-10-PCS | Mod: HCNC,CPTII,S$GLB, | Performed by: INTERNAL MEDICINE

## 2022-02-02 PROCEDURE — 1125F AMNT PAIN NOTED PAIN PRSNT: CPT | Mod: HCNC,CPTII,S$GLB, | Performed by: ANESTHESIOLOGY

## 2022-02-02 PROCEDURE — 3008F BODY MASS INDEX DOCD: CPT | Mod: HCNC,CPTII,S$GLB, | Performed by: ANESTHESIOLOGY

## 2022-02-02 PROCEDURE — 85027 COMPLETE CBC AUTOMATED: CPT | Mod: HCNC | Performed by: INTERNAL MEDICINE

## 2022-02-02 PROCEDURE — 3078F PR MOST RECENT DIASTOLIC BLOOD PRESSURE < 80 MM HG: ICD-10-PCS | Mod: HCNC,CPTII,S$GLB, | Performed by: INTERNAL MEDICINE

## 2022-02-02 PROCEDURE — 3008F PR BODY MASS INDEX (BMI) DOCUMENTED: ICD-10-PCS | Mod: HCNC,CPTII,S$GLB, | Performed by: ANESTHESIOLOGY

## 2022-02-02 PROCEDURE — 36415 COLL VENOUS BLD VENIPUNCTURE: CPT | Mod: HCNC | Performed by: INTERNAL MEDICINE

## 2022-02-02 PROCEDURE — 3288F FALL RISK ASSESSMENT DOCD: CPT | Mod: HCNC,CPTII,S$GLB, | Performed by: INTERNAL MEDICINE

## 2022-02-02 PROCEDURE — 84443 ASSAY THYROID STIM HORMONE: CPT | Mod: HCNC | Performed by: INTERNAL MEDICINE

## 2022-02-02 PROCEDURE — 1125F AMNT PAIN NOTED PAIN PRSNT: CPT | Mod: HCNC,CPTII,S$GLB, | Performed by: INTERNAL MEDICINE

## 2022-02-02 PROCEDURE — 3288F PR FALLS RISK ASSESSMENT DOCUMENTED: ICD-10-PCS | Mod: HCNC,CPTII,S$GLB, | Performed by: INTERNAL MEDICINE

## 2022-02-02 PROCEDURE — 1160F PR REVIEW ALL MEDS BY PRESCRIBER/CLIN PHARMACIST DOCUMENTED: ICD-10-PCS | Mod: HCNC,CPTII,S$GLB, | Performed by: INTERNAL MEDICINE

## 2022-02-02 PROCEDURE — 3008F PR BODY MASS INDEX (BMI) DOCUMENTED: ICD-10-PCS | Mod: HCNC,CPTII,S$GLB, | Performed by: INTERNAL MEDICINE

## 2022-02-02 PROCEDURE — 1101F PR PT FALLS ASSESS DOC 0-1 FALLS W/OUT INJ PAST YR: ICD-10-PCS | Mod: HCNC,CPTII,S$GLB, | Performed by: INTERNAL MEDICINE

## 2022-02-02 PROCEDURE — 3077F SYST BP >= 140 MM HG: CPT | Mod: HCNC,CPTII,S$GLB, | Performed by: INTERNAL MEDICINE

## 2022-02-02 PROCEDURE — 3288F FALL RISK ASSESSMENT DOCD: CPT | Mod: HCNC,CPTII,S$GLB, | Performed by: ANESTHESIOLOGY

## 2022-02-02 PROCEDURE — 3288F PR FALLS RISK ASSESSMENT DOCUMENTED: ICD-10-PCS | Mod: HCNC,CPTII,S$GLB, | Performed by: ANESTHESIOLOGY

## 2022-02-02 PROCEDURE — 3077F SYST BP >= 140 MM HG: CPT | Mod: HCNC,CPTII,S$GLB, | Performed by: ANESTHESIOLOGY

## 2022-02-02 PROCEDURE — 1159F MED LIST DOCD IN RCRD: CPT | Mod: HCNC,CPTII,S$GLB, | Performed by: INTERNAL MEDICINE

## 2022-02-02 PROCEDURE — 1159F PR MEDICATION LIST DOCUMENTED IN MEDICAL RECORD: ICD-10-PCS | Mod: HCNC,CPTII,S$GLB, | Performed by: ANESTHESIOLOGY

## 2022-02-02 PROCEDURE — 1160F RVW MEDS BY RX/DR IN RCRD: CPT | Mod: HCNC,CPTII,S$GLB, | Performed by: INTERNAL MEDICINE

## 2022-02-02 PROCEDURE — 3078F DIAST BP <80 MM HG: CPT | Mod: HCNC,CPTII,S$GLB, | Performed by: INTERNAL MEDICINE

## 2022-02-02 RX ORDER — SUMATRIPTAN 50 MG/1
TABLET, FILM COATED ORAL
Qty: 27 TABLET | Refills: 3 | Status: SHIPPED | OUTPATIENT
Start: 2022-02-02 | End: 2022-07-19 | Stop reason: SDUPTHER

## 2022-02-02 RX ORDER — PROMETHAZINE HYDROCHLORIDE 12.5 MG/1
12.5 TABLET ORAL EVERY 6 HOURS PRN
Qty: 30 TABLET | Refills: 2 | Status: SHIPPED | OUTPATIENT
Start: 2022-02-02 | End: 2022-07-19 | Stop reason: SDUPTHER

## 2022-02-02 RX ORDER — ATORVASTATIN CALCIUM 10 MG/1
10 TABLET, FILM COATED ORAL DAILY
Qty: 90 TABLET | Refills: 3 | Status: SHIPPED | OUTPATIENT
Start: 2022-02-02 | End: 2022-02-07

## 2022-02-02 RX ORDER — HYDROCODONE BITARTRATE AND ACETAMINOPHEN 5; 325 MG/1; MG/1
1 TABLET ORAL
Qty: 30 TABLET | Refills: 0 | Status: SHIPPED | OUTPATIENT
Start: 2022-02-02 | End: 2022-06-15

## 2022-02-02 NOTE — PROGRESS NOTES
Subjective:       Patient ID: Tiarra Norton is a 67 y.o. female.    Chief Complaint: Annual Exam    HPI   She has stopped all anti-depressants.  Tapering off xanax slowly.   She is depressed.  Rahel Prabhakar is her psychiatrist.    She is unsure if effexor was helpful.    Also sees a therapist weekly.    She has lost about 34-40 lbs since retiring, and about 15 lbs over last year.    Hasn't gone back to swimming, as too complicated.    Irritable bowel better.  Less diarrhea.    Takes hydrocodone for migraines prn.    Protonix for gerd prn.    htn tx with valsartan and amlodipine.    Review of Systems   Constitutional: Negative for fever and unexpected weight change.   HENT: Negative for nasal congestion and postnasal drip.    Respiratory: Negative for chest tightness, shortness of breath and wheezing.    Cardiovascular: Negative for chest pain and leg swelling.   Gastrointestinal: Negative for abdominal pain, anal bleeding, constipation, diarrhea, nausea and vomiting.   Genitourinary: Negative for dysuria and urgency.   Musculoskeletal: Positive for arthralgias and back pain.   Integumentary:  Negative for rash.   Neurological: Negative for headaches.   Psychiatric/Behavioral: Positive for dysphoric mood. Negative for sleep disturbance. The patient is not nervous/anxious.          Objective:      Physical Exam  Constitutional:       General: She is not in acute distress.     Appearance: She is well-developed and well-nourished.   HENT:      Head: Normocephalic and atraumatic.      Right Ear: External ear normal.      Left Ear: External ear normal.      Nose: Nose normal.      Mouth/Throat:      Mouth: Oropharynx is clear and moist.   Eyes:      General: No scleral icterus.        Right eye: No discharge.         Left eye: No discharge.      Extraocular Movements: EOM normal.      Conjunctiva/sclera: Conjunctivae normal.      Pupils: Pupils are equal, round, and reactive to light.   Neck:      Thyroid: No  thyromegaly.      Vascular: No JVD.   Cardiovascular:      Rate and Rhythm: Normal rate and regular rhythm.      Heart sounds: Normal heart sounds. No murmur heard.  No gallop.    Pulmonary:      Effort: Pulmonary effort is normal. No respiratory distress.      Breath sounds: Normal breath sounds. No wheezing or rales.   Abdominal:      General: Bowel sounds are normal. There is no distension.      Palpations: Abdomen is soft. There is no mass.      Tenderness: There is no abdominal tenderness. There is no guarding or rebound.   Musculoskeletal:         General: No tenderness or edema. Normal range of motion.      Cervical back: Normal range of motion and neck supple.   Lymphadenopathy:      Cervical: No cervical adenopathy.   Skin:     General: Skin is warm and dry.      Findings: No rash.   Neurological:      Mental Status: She is alert and oriented to person, place, and time.      Cranial Nerves: No cranial nerve deficit.      Coordination: Coordination normal.   Psychiatric:         Mood and Affect: Mood and affect normal.         Behavior: Behavior normal.         Thought Content: Thought content normal.         Judgment: Judgment normal.       144/72    Assessment:       Problem List Items Addressed This Visit     Overweight (BMI 25.0-29.9)    Migraine without status migrainosus, not intractable    Hyperlipidemia    HTN (hypertension)      Other Visit Diagnoses     Annual physical exam    -  Primary          Plan:       Tiarra was seen today for annual exam.    Diagnoses and all orders for this visit:    Annual physical exam    Mixed hyperlipidemia    Primary hypertension    Migraine without status migrainosus, not intractable, unspecified migraine type    Overweight (BMI 25.0-29.9)    Other orders  -     atorvastatin (LIPITOR) 10 MG tablet; Take 1 tablet (10 mg total) by mouth once daily.  -     sodium hyaluronate (orthovisc) 30 mg  -     sumatriptan (IMITREX) 50 MG tablet; 1 tab po at start of headache.   Repeat in 2 h prn  -     promethazine (PHENERGAN) 12.5 MG Tab; Take 1 tablet (12.5 mg total) by mouth every 6 (six) hours as needed (nausea).  -     HYDROcodone-acetaminophen (NORCO) 5-325 mg per tablet; Take 1 tablet by mouth every 6 to 8 hours as needed (migraine).         covid vaccines  Use cpap more regularly  Labs now  Monitor BP through dig htn  Swim!

## 2022-02-02 NOTE — TELEPHONE ENCOUNTER
----- Message from Dyllan Law sent at 2/2/2022  7:52 AM CST -----  Name of Who is Calling: MARION NIETO         What is the request in detail: The patient is calling to inform the office that she will be 10 minutes late. Please advise          Can the clinic reply by [facility]: No         What Number to [facility] Back [facility]: [phone]

## 2022-02-02 NOTE — PROGRESS NOTES
PCP: Kaykay Perales MD    REFERRING PHYSICIAN: Tyler Jacobs MD    CHIEF COMPLAINT: Lower back pain     Original HISTORY OF PRESENT ILLNESS: Tiarra Norton presents to the clinic for the evaluation of the above pain. The pain started five years ago.     Original Pain Description:  The pain is located in the lower back and does radiate up towards her upper back.The pain is described as aching, dull and sharp. Initially starts as a dull, aching pain and it gets sharp once she bends down and moves around. Typically when she walks for more than five minutes, the sharp pain radiates up her back. Exacerbating factors: Standing, Bending, Touching, Walking, Night Time, Flexing and Lifting. Mitigating factors heat, ice, laying down and massage. Symptoms interfere with daily activity and sleeping. The patient feels like symptoms have been worsening. Patient denies night fever/night sweats, urinary incontinence, bowel incontinence, significant weight loss, significant motor weakness and loss of sensations.    Original PAIN SCORES:  Best: Pain is 1  Worst: Pain is 10  Usually: Pain is 4  Current: Pain is 4    INTERVAL HISTORY:     6 weeks of Conservative therapy (PT/Chiro/Home Exercises with Dates)  Healthy back program--> has four sessions left for a total of 20 sessions   Last session was on 1/31/22   Stretches that they taught at [employer] back gives her relief      Treatments / Medications: (Ice/Heat/NSAIDS/APAP/etc):  Ice and heat which has helped      Report:   QTY  Days  Prescriber  RX #  Dispenser  Refill  Daily Dose*  Pymt Type           01/13/2022 01/13/2022   1  Alprazolam 2 Mg Tablet   70.00  30  [facility]  [id number]  Wal ([id number])  0  9.33 [place]     12/16/2021 12/14/2021   1  Alprazolam 2 Mg Tablet   75.00  30  [facility]  [id number]  Wal ([id number])  0  10.00 [place]     11/16/2021 11/16/2021   1  Alprazolam 2 Mg Tablet   90.00  30  [facility]  [id number]  Wal ([id number])  0  12.00 [place]     11/10/2021   11/09/2021   1  Alprazolam Er 2 Mg Tablet   30.00  30  [facility]  [id number]  Wal ([id number])  0  4.00 [place]     10/25/2021  10/25/2021   3  Acetaminophen-Cod #3 Tablet   28.00  7  [employer] [facility]  [id number]  [facility] ([id number])  0  18.00 MME  [place]     10/18/2021  10/18/2021   3  Hydrocodone-Acetamin 5-325 Mg   20.00  6  [employer]  [phone]  [facility] ([id number])  0  16.67 MME  [place]     10/12/2021  10/06/2021   1  Alprazolam 2 Mg Tablet   90.00  30  [facility]  [id number]  Wal ([id number])  0  12.00 [place]     10/07/2021  10/07/2021   1  Diphenoxylate-Atrop 2.5-0.025   40.00  10  No Eps  [id number]  Wal ([id number])  0  0.00 [place]     10/06/2021  10/06/2021   1  Methylphenidate 20 Mg Tablet   90.00  30  [facility]  [id number]  Wal ([id number])  0   [place]     09/13/2021 08/20/2021   1  Alprazolam 2 Mg Tablet   90.00  30  [facility]  [id number]  Wal ([id number])  0  12.00 [place]     08/23/2021 08/23/2021   1  Diphenoxylate-Atrop 2.5-0.025   40.00  10  No Eps  [id number]  Wal ([id number])  0  0.00 [place]     08/14/2021 06/17/2021   1  Alprazolam 2 Mg Tablet   90.00  30  [facility]  [id number]  Wal ([id number])  1  12.00 L[place]     07/16/2021 06/17/2021   1  Alprazolam 2 Mg Tablet   90.00  30  [facility]  [id number]  Wal ([id number])  0  12.00 L[place]     06/18/2021 06/17/2021   1  Alprazolam 2 Mg Tablet   90.00  30  [facility]  [id number]  Wal ([id number])  0  12.00 LME  [place]     05/21/2021 04/21/2021   1  Alprazolam 2 Mg Tablet                  Interventional Pain Procedures: (Previous injections)  Has gotten injections in her knee (orthovisc injections)     Past Medical History:   Diagnosis Date    Cataract     Depression     Gestational diabetes     Hyperlipidemia     Hypertension     IBS (irritable bowel syndrome)     Thyroid disease      Past Surgical History:   Procedure Laterality Date    ADENOIDECTOMY      ARTHROSCOPIC CHONDROPLASTY OF KNEE JOINT Right 10/19/2021    Procedure: ARTHROSCOPY, KNEE, WITH CHONDROPLASTY;  Surgeon:  Trevin Huber MD;  Location: [facility] OR;  Service: Orthopedics;  Laterality: Right;    ARTHROSCOPY OF KNEE Right 10/19/2021    Procedure: ARTHROSCOPY, KNEE-RIGHT;  Surgeon: Trevin Huber MD;  Location: [facility] OR;  Service: Orthopedics;  Laterality: Right;     SECTION      CHOLECYSTECTOMY      COLONOSCOPY N/A 2016    Procedure: COLONOSCOPY;  Surgeon: Heri Segura MD;  Location: [address]);  Service: Endoscopy;  Laterality: N/A;    COLONOSCOPY N/A 2019    Procedure: COLONOSCOPY;  Surgeon: Joe Pitts MD;  Location: [facility] ([address] [place] [address]);  Service: Endoscopy;  Laterality: N/A;    ESOPHAGOGASTRODUODENOSCOPY N/A 2020    Procedure: EGD (ESOPHAGOGASTRODUODENOSCOPY);  Surgeon: Tolu Rivas MD;  Location: [address]);  Service: Endoscopy;  Laterality: N/A;  Pt. moved to 9:15am arrival time.. Instructed to not have anything after midnight.EC    EYE SURGERY      cataract resection right    JOINT REPLACEMENT      partial right knee    KNEE ARTHROSCOPY W/ MENISCECTOMY Right 10/19/2021    Procedure: ARTHROSCOPY, KNEE, WITH MENISCECTOMY, PARTIAL LATERAL;  Surgeon: Trevin Huber MD;  Location: [facility];  Service: Orthopedics;  Laterality: Right;    r hand surgery      TONSILLECTOMY      TOTAL KNEE ARTHROPLASTY      partial knee replacement    TUBAL LIGATION       Social History     Socioeconomic History    Marital status:     Number of children: 1   Occupational History    Occupation:      Employer: [employer]     Comment: retired   Tobacco Use    Smoking status: Never Smoker    Smokeless tobacco: Never Used   Substance and Sexual Activity    Alcohol use: Yes     Alcohol/week: 1.0 standard drink     Types: 1 Glasses of wine per week     Comment: weekends    Drug use: No    Sexual activity: Yes     Partners: Male   Other Topics Concern    Are you pregnant or think you may be? No    Breast-feeding No   Social History Narrative     Retired        Swims.       Stationary bike.            Social Determinants of Health     Financial Resource Strain: Low Risk     Difficulty of Paying Living Expenses: Not hard at all   Food Insecurity: No Food Insecurity    Worried About Running Out of Food in the Last Year: Never true    Ran Out of Food in the Last Year: Never true   Transportation Needs: No Transportation Needs    Lack of Transportation (Medical): No    Lack of Transportation (Non-Medical): No   Physical Activity: Insufficiently Active    Days of Exercise per Week: 4 days    Minutes of Exercise per Session: 30 min   Stress: Stress Concern Present    Feeling of Stress : Very much   Social Connections: Unknown    Frequency of Communication with Friends and Family: More than three times a week    Frequency of Social Gatherings with Friends and Family: Once a week    Active Member of Clubs or Organizations: No    Attends Club or Organization Meetings: More than 4 times per year    Marital Status:    Housing Stability: Unknown    Unable to Pay for Housing in the Last Year: No    Unstable Housing in the Last Year: No     Family History   Problem Relation Age of Onset    Hypertension Mother     Irritable bowel syndrome Mother     Hyperlipidemia Mother     Heart disease Father         mi    Hypertension Father     Cancer Father         prostate    Heart attack Father     Heart failure Father     Hyperlipidemia Father     Alcohol abuse Sister     Depression Sister     Epilepsy Son     Irritable bowel syndrome Sister     Alcohol abuse Sister     Ovarian cancer Maternal Aunt     Breast cancer Paternal Aunt     Melanoma Neg Hx     Colon cancer Neg Hx     Stomach cancer Neg Hx     Esophageal cancer Neg Hx     Liver disease Neg Hx     Crohn's disease Neg Hx     Ulcerative colitis Neg Hx     Celiac disease Neg Hx     Stroke Neg Hx        Review of patient's allergies indicates:  No Known  Allergies    Current Outpatient Medications   Medication Sig    acetaminophen (TYLENOL) 650 MG TbSR Take 1 tablet (650 mg total) by mouth every 6 to 8 hours as needed (pain).    alprazolam (XANAX) 2 MG Tab Take 2 mg by mouth 2 (two) times daily as needed.     amLODIPine (NORVASC) 5 MG tablet Take 1 tablet (5 mg total) by mouth once daily.    b complex vitamins capsule Take 1 capsule by mouth once daily.    ceFAZolin (ANCEF) 1 gram injection     cholecalciferol, vitamin D3, (VITAMIN D3) 5,000 unit Tab Take 1 tablet (5,000 Units total) by mouth once daily.    ciclopirox (LOPROX) 0.77 % Crea APPLY 1 GRAM TOPICALLY TO THE AFFECTED AREA TWICE DAILY    ciclopirox (PENLAC) 8 % Soln Apply topically nightly.    conjugated estrogens (PREMARIN) vaginal cream Place a pea-sized amount in vagina every night for 4 weeks, then use 2-3 nights a week    diphenoxylate-atropine 2.5-0.025 mg (LOMOTIL) 2.5-0.025 mg per tablet TAKE 1 TABLET BY MOUTH FOUR TIMES DAILY AS NEEDED FOR DIARRHEA    ergocalciferol, vitamin D2, (VITAMIN D ORAL) Take by mouth once daily.    levothyroxine (SYNTHROID) 137 MCG Tab tablet Take 1 tablet (137 mcg total) by mouth every morning.    liothyronine (CYTOMEL) 5 MCG Tab Take 1 tablet (5 mcg total) by mouth once daily.    ondansetron (ZOFRAN) 8 MG tablet TAKE 1 TABLET BY MOUTH EVERY 6 TO 8 HOURS AS NEEDED FOR NAUSEA    ondansetron (ZOFRAN-ODT) 4 MG TbDL DISSOLVE 1 TABLET ON THE TONGUE EVERY 8 HOURS AS NEEDED FOR NAUSEA    pantoprazole (PROTONIX) 40 MG tablet TAKE 1 TABLET(40 MG) BY MOUTH EVERY DAY    promethazine (PHENERGAN) 12.5 MG Tab Take 1 tablet (12.5 mg total) by mouth every 6 (six) hours as needed.    RESTASIS 0.05 % ophthalmic emulsion INT 1 GTT IN OU BID    sumatriptan (IMITREX) 50 MG tablet 1 tab po at start of headache.  Repeat in 2 h prn    traZODone (DESYREL) 100 MG tablet TAKE 1 TO 2 TABLETS AT BEDTIME FOR SLEEP    triamcinolone acetonide 0.1% (KENALOG) 0.1 % cream Apply to  affected areas of back BID prn irritation. Do not use on face, underarms, or groin.    valsartan (DIOVAN) 320 MG tablet Take 1 tablet (320 mg total) by mouth once daily. (STOP LISINOPRIL)    acetaminophen-codeine 300-30mg (TYLENOL #3) 300-30 mg Tab Take 1 tablet by mouth every 6 (six) hours as needed (pain). (Patient not taking: Reported on 2/2/2022)    aspirin (ECOTRIN) 81 MG EC tablet Take 1 tablet (81 mg total) by mouth once daily.    atorvastatin (LIPITOR) 10 MG tablet Take 1 tablet (10 mg total) by mouth once daily.    buPROPion (WELLBUTRIN XL) 300 MG 24 hr tablet Take 300 mg by mouth once daily.    cholestyramine (QUESTRAN) 4 gram packet Take 1 packet (4 g total) by mouth 2 (two) times daily.    cyclobenzaprine (FLEXERIL) 10 MG tablet Take 1 tablet (10 mg total) by mouth 3 (three) times daily as needed. (Patient not taking: Reported on 2/2/2022)    HYDROcodone-acetaminophen (NORCO) 5-325 mg per tablet Take 1 tablet by mouth every 6 to 8 hours as needed for Pain. (Patient not taking: Reported on 2/2/2022)    lipase-protease-amylase 24,000-76,000-120,000 units (CREON) 24,000-76,000 -120,000 unit capsule Take 1 capsule by mouth 3 (three) times daily with meals. (Patient not taking: Reported on 2/2/2022)    methylphenidate HCl (RITALIN) 20 MG tablet Take 30 mg by mouth 2 (two) times daily.     Current Facility-Administered Medications   Medication    sodium hyaluronate (orthovisc) 30 mg       ROS:  GENERAL: No fever. No chills. No fatigue. Denies weight loss. Denies weight gain.  HEENT: Denies headaches. Denies vision change. Denies eye pain. Denies double vision. Denies ear pain.   CV: Denies chest pain.   PULM: Denies of shortness of breath.  GI: Denies constipation. No diarrhea. No abdominal pain. Denies nausea. Denies vomiting. No blood in stool.  HEME: Denies bleeding problems.  : Denies urgency. No painful urination. No blood in urine.  MS: Denies joint stiffness. Denies joint swelling.  Denies  "back pain.  SKIN: Denies rash.   NEURO: Denies seizures. No weakness.  PSYCH:  Denies difficulty sleeping. No anxiety. Denies depression. No suicidal thoughts.       VITALS:   Vitals:    02/02/22 0818   BP: (!) 148/81   Pulse: 93   Resp: 18   Temp: 98 °F (36.7 °C)   Weight: 69.4 kg (153 lb)   Height: 5' 3" (1.6 m)   PainSc:   5   PainLoc: Back         PHYSICAL EXAM:   GENERAL: Well appearing, in no acute distress, alert and oriented x3.  PSYCH:  Mood and affect appropriate.  SKIN: Skin color, texture, turgor normal, no rashes or lesions.  HEENT:  Normocephalic, atraumatic. Cranial nerves grossly intact.  PULM: No evidence of respiratory difficulty, symmetric chest rise.  GI:  Non-distended  BACK: Normal range of motion. Pain to palpation over the spinous processes (particularly L4-L5) . Pain reproduced with facet loading. There is no pain with palpation over the sacroiliac joints bilaterally. Straight leg raising in the supine position is negative to radicular pain.   EXTREMITIES: No deformities, edema, or skin discoloration.   MUSCULOSKELETAL: Tenderness to palpation on her lower back muscles. Bilateral upper and lower extremity strength is normal and symmetric. No atrophy is noted.    NEURO: Sensation is equal and appropriate bilaterally. Bilateral upper and lower extremity coordination and muscle stretch reflexes are physiologic and symmetric. Plantar response are downgoing.   GAIT: hansa no antalgic gait     IMAGING:      Previous XRs showed lumbar spondylosis and degenerative scoliosis measuring 24degrees. L4-5 anterolisthesis measuring 6mm.      MRI lumbar 1/21/22      T12-L1: Circumferential disc bulge and mild facet arthropathy.  No spinal canal stenosis or neural foraminal narrowing.     L1-L2: Circumferential disc bulge and mild facet arthropathy result in mild right neural foraminal narrowing.     L2-L3: Circumferential disc bulge and mild facet arthropathy. No spinal canal stenosis or neural foraminal " narrowing.     L3-L4: Circumferential disc bulge and mild facet arthropathy result in moderate spinal canal stenosis and mild left neural foraminal narrowing.     L4-L5: Circumferential disc bulge and moderate facet arthropathy result in mild spinal canal stenosis.     L5-S1: Moderate facet arthropathy.  No spinal canal stenosis or neural foraminal narrowing.    Standing scoliosis XR reviewed - thoracolumbar levoscoliosis approximately 26 degrees     ASSESSMENT: 67 y.o. year old female with pain, consistent with lumbosacral spondylosis and degenerative disc disease     DISCUSSION: Ms. Norton is a retired . She comes to us with low back pain. MRI shows facet arthropathy, moderate canal stenosis, and multilevel Modic changes T12-L3. Pain reproduced with facet loading. May require SCS for DDD.      Follow up   PLAN:  1. Schedule Bilateral  L3-L5 MBB    2. Follow up after injection via telephone       I would like to thank Tyler Jacobs MD for the opportunity to assist in the care of this patient. We had a very nice visit and I look forward to continuing their care. Please let me know if I can be of further assistance.     Tara Gibbs  02/02/2022

## 2022-02-03 ENCOUNTER — PATIENT MESSAGE (OUTPATIENT)
Dept: INTERNAL MEDICINE | Facility: CLINIC | Age: 67
End: 2022-02-03
Payer: MEDICARE

## 2022-02-03 ENCOUNTER — OFFICE VISIT (OUTPATIENT)
Dept: ORTHOPEDICS | Facility: CLINIC | Age: 67
End: 2022-02-03
Payer: MEDICARE

## 2022-02-03 VITALS — HEIGHT: 63 IN | WEIGHT: 153 LBS | BODY MASS INDEX: 27.11 KG/M2

## 2022-02-03 DIAGNOSIS — M17.11 PRIMARY OSTEOARTHRITIS OF RIGHT KNEE: Primary | ICD-10-CM

## 2022-02-03 DIAGNOSIS — E03.4 HYPOTHYROIDISM DUE TO ACQUIRED ATROPHY OF THYROID: Primary | ICD-10-CM

## 2022-02-03 PROCEDURE — 1159F MED LIST DOCD IN RCRD: CPT | Mod: HCNC,CPTII,S$GLB, | Performed by: PHYSICIAN ASSISTANT

## 2022-02-03 PROCEDURE — 99499 NO LOS: ICD-10-PCS | Mod: HCNC,S$GLB,, | Performed by: PHYSICIAN ASSISTANT

## 2022-02-03 PROCEDURE — 1159F PR MEDICATION LIST DOCUMENTED IN MEDICAL RECORD: ICD-10-PCS | Mod: HCNC,CPTII,S$GLB, | Performed by: PHYSICIAN ASSISTANT

## 2022-02-03 PROCEDURE — 3008F BODY MASS INDEX DOCD: CPT | Mod: HCNC,CPTII,S$GLB, | Performed by: PHYSICIAN ASSISTANT

## 2022-02-03 PROCEDURE — 1101F PT FALLS ASSESS-DOCD LE1/YR: CPT | Mod: HCNC,CPTII,S$GLB, | Performed by: PHYSICIAN ASSISTANT

## 2022-02-03 PROCEDURE — 1125F AMNT PAIN NOTED PAIN PRSNT: CPT | Mod: HCNC,CPTII,S$GLB, | Performed by: PHYSICIAN ASSISTANT

## 2022-02-03 PROCEDURE — 3008F PR BODY MASS INDEX (BMI) DOCUMENTED: ICD-10-PCS | Mod: HCNC,CPTII,S$GLB, | Performed by: PHYSICIAN ASSISTANT

## 2022-02-03 PROCEDURE — 99499 UNLISTED E&M SERVICE: CPT | Mod: HCNC,S$GLB,, | Performed by: PHYSICIAN ASSISTANT

## 2022-02-03 PROCEDURE — 20610 PR DRAIN/INJECT LARGE JOINT/BURSA: ICD-10-PCS | Mod: HCNC,RT,S$GLB, | Performed by: PHYSICIAN ASSISTANT

## 2022-02-03 PROCEDURE — 1160F PR REVIEW ALL MEDS BY PRESCRIBER/CLIN PHARMACIST DOCUMENTED: ICD-10-PCS | Mod: HCNC,CPTII,S$GLB, | Performed by: PHYSICIAN ASSISTANT

## 2022-02-03 PROCEDURE — 99999 PR PBB SHADOW E&M-EST. PATIENT-LVL III: CPT | Mod: PBBFAC,HCNC,, | Performed by: PHYSICIAN ASSISTANT

## 2022-02-03 PROCEDURE — 3288F PR FALLS RISK ASSESSMENT DOCUMENTED: ICD-10-PCS | Mod: HCNC,CPTII,S$GLB, | Performed by: PHYSICIAN ASSISTANT

## 2022-02-03 PROCEDURE — 20610 DRAIN/INJ JOINT/BURSA W/O US: CPT | Mod: HCNC,RT,S$GLB, | Performed by: PHYSICIAN ASSISTANT

## 2022-02-03 PROCEDURE — 1160F RVW MEDS BY RX/DR IN RCRD: CPT | Mod: HCNC,CPTII,S$GLB, | Performed by: PHYSICIAN ASSISTANT

## 2022-02-03 PROCEDURE — 99999 PR PBB SHADOW E&M-EST. PATIENT-LVL III: ICD-10-PCS | Mod: PBBFAC,HCNC,, | Performed by: PHYSICIAN ASSISTANT

## 2022-02-03 PROCEDURE — 1125F PR PAIN SEVERITY QUANTIFIED, PAIN PRESENT: ICD-10-PCS | Mod: HCNC,CPTII,S$GLB, | Performed by: PHYSICIAN ASSISTANT

## 2022-02-03 PROCEDURE — 3288F FALL RISK ASSESSMENT DOCD: CPT | Mod: HCNC,CPTII,S$GLB, | Performed by: PHYSICIAN ASSISTANT

## 2022-02-03 PROCEDURE — 1101F PR PT FALLS ASSESS DOC 0-1 FALLS W/OUT INJ PAST YR: ICD-10-PCS | Mod: HCNC,CPTII,S$GLB, | Performed by: PHYSICIAN ASSISTANT

## 2022-02-03 RX ORDER — LEVOTHYROXINE SODIUM 125 UG/1
125 TABLET ORAL EVERY MORNING
Qty: 90 TABLET | Refills: 0 | Status: SHIPPED | OUTPATIENT
Start: 2022-02-03 | End: 2022-05-15

## 2022-02-03 RX ORDER — LEVOTHYROXINE SODIUM 125 UG/1
125 TABLET ORAL EVERY MORNING
Qty: 90 TABLET | Refills: 0 | Status: SHIPPED | OUTPATIENT
Start: 2022-02-03 | End: 2022-02-03 | Stop reason: SDUPTHER

## 2022-02-03 NOTE — TELEPHONE ENCOUNTER
No new care gaps identified.  Powered by [employer]. Reference number: [id number].   2/03/2022 2:45:46 PM CST

## 2022-02-03 NOTE — PROGRESS NOTES
Tiarra Norton presents to clinic today for the first right knee  orthovisc injection.  There has been no significant change in her medical status since her last visit. No fever, chills, malaise, or unexplained weight change.    Allergies, medications, past medical and surgical history were reviewed.    Exam demonstrates there is no effusion in the  right knee, and the skin is intact.    Diagnosis: right knee osteoarthritis    After time out was performed and patient ID, side, and site were verified, the  right  knee was sterilly prepped in the standard fashion.  A 22-gauge needle was introduced into right knee joint from an ni-lateral site without complication and knee was then injected with 2 ml of orthovisc.  Sterile dressing was applied.  The patient was instructed to resume activities as tolerated and to call with any problems.     We will see Tiarra Norton back next week for the second injection.

## 2022-02-04 RX ORDER — SOLIFENACIN SUCCINATE 5 MG/1
TABLET, FILM COATED ORAL
Qty: 90 TABLET | Refills: 3 | Status: SHIPPED | OUTPATIENT
Start: 2022-02-04 | End: 2022-02-07 | Stop reason: SDUPTHER

## 2022-02-07 ENCOUNTER — PATIENT MESSAGE (OUTPATIENT)
Dept: INTERNAL MEDICINE | Facility: CLINIC | Age: 67
End: 2022-02-07
Payer: MEDICARE

## 2022-02-07 RX ORDER — SOLIFENACIN SUCCINATE 5 MG/1
TABLET, FILM COATED ORAL
Qty: 90 TABLET | Refills: 3 | Status: SHIPPED | OUTPATIENT
Start: 2022-02-07 | End: 2022-04-18 | Stop reason: SDUPTHER

## 2022-02-07 NOTE — TELEPHONE ENCOUNTER
----- Message from Jelena Feldman sent at 2/7/2022 10:54 AM CST -----  Contact: [facility]/Karen/[phone]  Requesting an RX refill or new RX.  Is this a refill or new RX: New  RX name and strength :  solifenacin (VESICARE) 5 MG tablet  Is this a 30 day or 90 day RX: 90  [id number] - [place], [place] - [address]  [address] [facility] [address] [facility] [address]  Phone: [phone] Fax: [phone]    Karen said that [employer] is unable to fill pt's rx and pt is asking if the rx can be sent to [facility]

## 2022-02-08 ENCOUNTER — HOSPITAL ENCOUNTER (OUTPATIENT)
Facility: OTHER | Age: 67
Discharge: HOME | End: 2022-02-08
Attending: ANESTHESIOLOGY | Admitting: ANESTHESIOLOGY
Payer: MEDICARE

## 2022-02-08 ENCOUNTER — TELEPHONE (OUTPATIENT)
Dept: ORTHOPEDICS | Facility: CLINIC | Age: 67
End: 2022-02-08
Payer: MEDICARE

## 2022-02-08 VITALS
OXYGEN SATURATION: 99 % | RESPIRATION RATE: 16 BRPM | TEMPERATURE: 99 F | WEIGHT: 152 LBS | HEIGHT: 63 IN | DIASTOLIC BLOOD PRESSURE: 77 MMHG | SYSTOLIC BLOOD PRESSURE: 145 MMHG | BODY MASS INDEX: 26.93 KG/M2 | HEART RATE: 66 BPM

## 2022-02-08 DIAGNOSIS — G89.29 CHRONIC PAIN: ICD-10-CM

## 2022-02-08 DIAGNOSIS — M47.816 OSTEOARTHRITIS OF LUMBAR SPINE, UNSPECIFIED SPINAL OSTEOARTHRITIS COMPLICATION STATUS: Primary | ICD-10-CM

## 2022-02-08 PROCEDURE — 64493 INJ PARAVERT F JNT L/S 1 LEV: CPT | Mod: 50,KX,HCNC | Performed by: ANESTHESIOLOGY

## 2022-02-08 PROCEDURE — 64494 INJ PARAVERT F JNT L/S 2 LEV: CPT | Mod: 50,KX,HCNC | Performed by: ANESTHESIOLOGY

## 2022-02-08 PROCEDURE — 64494 INJ PARAVERT F JNT L/S 2 LEV: CPT | Mod: 50,KX,HCNC, | Performed by: ANESTHESIOLOGY

## 2022-02-08 PROCEDURE — 64494 PR INJ DX/THER AGNT PARAVERT FACET JOINT,IMG GUIDE,LUMBAR/SAC, 2ND LEVEL: ICD-10-PCS | Mod: 50,KX,HCNC, | Performed by: ANESTHESIOLOGY

## 2022-02-08 PROCEDURE — 25000003 PHARM REV CODE 250: Mod: HCNC | Performed by: ANESTHESIOLOGY

## 2022-02-08 PROCEDURE — 64493 INJ PARAVERT F JNT L/S 1 LEV: CPT | Mod: 50,KX,HCNC, | Performed by: ANESTHESIOLOGY

## 2022-02-08 PROCEDURE — 64493 PR INJ DX/THER AGNT PARAVERT FACET JOINT,IMG GUIDE,LUMBAR/SAC,1ST LVL: ICD-10-PCS | Mod: 50,KX,HCNC, | Performed by: ANESTHESIOLOGY

## 2022-02-08 RX ORDER — BUPIVACAINE HYDROCHLORIDE 2.5 MG/ML
INJECTION, SOLUTION EPIDURAL; INFILTRATION; INTRACAUDAL
Status: DISCONTINUED | OUTPATIENT
Start: 2022-02-08 | End: 2022-02-08 | Stop reason: HOSPADM

## 2022-02-08 RX ORDER — SODIUM CHLORIDE 9 MG/ML
INJECTION, SOLUTION INTRAVENOUS CONTINUOUS
Status: DISCONTINUED | OUTPATIENT
Start: 2022-02-08 | End: 2022-02-08 | Stop reason: HOSPADM

## 2022-02-08 RX ORDER — LIDOCAINE HYDROCHLORIDE 20 MG/ML
INJECTION, SOLUTION INFILTRATION; PERINEURAL
Status: DISCONTINUED | OUTPATIENT
Start: 2022-02-08 | End: 2022-02-08 | Stop reason: HOSPADM

## 2022-02-08 NOTE — TELEPHONE ENCOUNTER
Spoke to patient in regards to rescheduling her appointment. Patient appointment has been reschedule to 02/10/2022 for 3:30 pm. Patient stated thank you. Thanks.

## 2022-02-08 NOTE — H&P
HPI  Patient presenting for Procedure(s) (LRB):  Block, Nerve MEDIAL BRANCH BLOCK BILATERAL L3,4,5 (Bilateral)     Patient on Anti-coagulation No    No health changes since previous encounter    Past Medical History:   Diagnosis Date    Cataract     Depression     Gestational diabetes     Hyperlipidemia     Hypertension     IBS (irritable bowel syndrome)     Thyroid disease      Past Surgical History:   Procedure Laterality Date    ADENOIDECTOMY      ARTHROSCOPIC CHONDROPLASTY OF KNEE JOINT Right 10/19/2021    Procedure: ARTHROSCOPY, KNEE, WITH CHONDROPLASTY;  Surgeon: Trevin Huber MD;  Location: [facility] OR;  Service: Orthopedics;  Laterality: Right;    ARTHROSCOPY OF KNEE Right 10/19/2021    Procedure: ARTHROSCOPY, KNEE-RIGHT;  Surgeon: Trevin Huber MD;  Location: [facility] OR;  Service: Orthopedics;  Laterality: Right;     SECTION      CHOLECYSTECTOMY      COLONOSCOPY N/A 2016    Procedure: COLONOSCOPY;  Surgeon: Heri Segura MD;  [facility]  Service: Endoscopy;  Laterality: N/A;    COLONOSCOPY N/A 2019    Procedure: COLONOSCOPY;  Surgeon: Joe Pitts MD;  Location: [facility] ([address] [place] [address]);  Service: Endoscopy;  Laterality: N/A;    ESOPHAGOGASTRODUODENOSCOPY N/A 2020    Procedure: EGD (ESOPHAGOGASTRODUODENOSCOPY);  Surgeon: Tolu Rivas MD;  [facility]  Service: Endoscopy;  Laterality: N/A;  Pt. moved to 9:15am arrival time.. Instructed to not have anything after midnight.EC    EYE SURGERY      cataract resection right    JOINT REPLACEMENT      partial right knee    KNEE ARTHROSCOPY W/ MENISCECTOMY Right 10/19/2021    Procedure: ARTHROSCOPY, KNEE, WITH MENISCECTOMY, PARTIAL LATERAL;  Surgeon: Trevin Huber MD;  Location: [facility] OR;  Service: Orthopedics;  Laterality: Right;    r hand surgery      TONSILLECTOMY      TOTAL KNEE ARTHROPLASTY      partial knee replacement    TUBAL LIGATION       Review of patient's  allergies indicates:  No Known Allergies   Current Facility-Administered Medications   Medication    0.9%  NaCl infusion       PMHx, PSHx, Allergies, Medications reviewed in epic    ROS negative except pain complaints in HPI    OBJECTIVE:    There were no vitals taken for this visit.    PHYSICAL EXAMINATION:    GENERAL: Well appearing, in no acute distress, alert and oriented x3.  PSYCH:  Mood and affect appropriate.  SKIN: Skin color, texture, turgor normal, no rashes or lesions which will impact the procedure.  CV: RRR with palpation of the radial artery.  PULM: No evidence of respiratory difficulty, symmetric chest rise. Clear to auscultation.  NEURO: Cranial nerves grossly intact.    Plan:    Proceed with procedure as planned Procedure(s) (LRB):  Block, Nerve MEDIAL BRANCH BLOCK BILATERAL L3,4,5 (Bilateral)    Rashid Patiño  02/08/2022

## 2022-02-08 NOTE — DISCHARGE SUMMARY
[facility] ([facility])  Discharge Note  Short Stay    Procedure(s) (LRB):  Block, Nerve MEDIAL BRANCH BLOCK BILATERAL L3,4,5 (Bilateral)    OUTCOME: Patient tolerated treatment/procedure well without complication and is now ready for discharge.    DISPOSITION: Home or Self Care    FINAL DIAGNOSIS:  Lumbosacral spondylosis    FOLLOWUP: In clinic    DISCHARGE INSTRUCTIONS:  No discharge procedures on file.     TIME SPENT ON DISCHARGE: 2 minutes

## 2022-02-08 NOTE — OP NOTE
Diagnostic Lumbar Medial Branch Block Under Fluoroscopy    The procedure, risks, benefits, and options were discussed with the patient. There are no contraindications to the procedure. The patent expressed understanding and agreed to the procedure. Informed written consent was obtained prior to the start of the procedure and can be found in the patient's chart.    PATIENT NAME: Tiarra Norton   MRN: [id number]     DATE OF PROCEDURE: 02/08/2022                                           PROCEDURE:  Diagnostic Bilateral L3, L4 and L5 Lumbar Medial Branch Block under Fluoroscopy    PRE-OP DIAGNOSIS: Spondylosis of lumbosacral region without myelopathy or radiculopathy [M47.817]    POST-OP DIAGNOSIS: Same    PHYSICIAN: Tara Gibbs MD    ASSISTANTS:     MEDICATIONS INJECTED:  Bupivicaine 0.25%    LOCAL ANESTHETIC INJECTED:   Xylocaine 2%    SEDATION: None    ESTIMATED BLOOD LOSS:  None    COMPLICATIONS:  None.    INTERVAL HISTORY: Patient has clinical and imaging findings suggestive of facet mediated pain.    TECHNIQUE: Time-out was performed to identify the patient and procedure to be performed. With the patient laying in a prone position, the surgical area was prepped and draped in the usual sterile fashion using ChloraPrep and fenestrated drape. The levels were determined under fluoroscopic guidance. Skin anesthesia was achieved by injecting Lidocaine 2% over the injection sites. A 25 gauge, 3.5 inch needle was introduced into the medial branch nerves at the junctions of the superior articular process and the transverse processes of the targeted sites using AP, lateral and/or contralateral oblique fluoroscopic imaging. After negative aspiration for blood or CSF was confirmed, 1 mL of the anesthetic listed above was then slowly injected at each site. The needles were removed and bleeding was nil. A sterile dressing was applied. No specimens collected. The patient tolerated the procedure well.     The patient was  monitored after the procedure in the recovery area. They were given post-procedure and discharge instructions to follow at home. The patient was discharged in a stable condition.    I reviewed and edited the fellow's note. I conducted my own interview and physical examination. I agree with the findings. I was present and supervising all critical portions of the procedure.    Tara Gibbs MD

## 2022-02-08 NOTE — H&P (VIEW-ONLY)
HPI  Patient presenting for Procedure(s) (LRB):  Block, Nerve MEDIAL BRANCH BLOCK BILATERAL L3,4,5 (Bilateral)     Patient on Anti-coagulation No    No health changes since previous encounter    Past Medical History:   Diagnosis Date    Cataract     Depression     Gestational diabetes     Hyperlipidemia     Hypertension     IBS (irritable bowel syndrome)     Thyroid disease      Past Surgical History:   Procedure Laterality Date    ADENOIDECTOMY      ARTHROSCOPIC CHONDROPLASTY OF KNEE JOINT Right 10/19/2021    Procedure: ARTHROSCOPY, KNEE, WITH CHONDROPLASTY;  Surgeon: Trevin Huber MD;  Location: [facility] OR;  Service: Orthopedics;  Laterality: Right;    ARTHROSCOPY OF KNEE Right 10/19/2021    Procedure: ARTHROSCOPY, KNEE-RIGHT;  Surgeon: Trevin Huber MD;  Location: [facility] OR;  Service: Orthopedics;  Laterality: Right;     SECTION      CHOLECYSTECTOMY      COLONOSCOPY N/A 2016    Procedure: COLONOSCOPY;  Surgeon: Heri Segura MD;  [facility]  Service: Endoscopy;  Laterality: N/A;    COLONOSCOPY N/A 2019    Procedure: COLONOSCOPY;  Surgeon: Joe Pitts MD;  Location: [facility] ([address] [place] [address]);  Service: Endoscopy;  Laterality: N/A;    ESOPHAGOGASTRODUODENOSCOPY N/A 2020    Procedure: EGD (ESOPHAGOGASTRODUODENOSCOPY);  Surgeon: Tolu Rivas MD;  [facility]  Service: Endoscopy;  Laterality: N/A;  Pt. moved to 9:15am arrival time.. Instructed to not have anything after midnight.EC    EYE SURGERY      cataract resection right    JOINT REPLACEMENT      partial right knee    KNEE ARTHROSCOPY W/ MENISCECTOMY Right 10/19/2021    Procedure: ARTHROSCOPY, KNEE, WITH MENISCECTOMY, PARTIAL LATERAL;  Surgeon: Trevin Huber MD;  Location: [facility] OR;  Service: Orthopedics;  Laterality: Right;    r hand surgery      TONSILLECTOMY      TOTAL KNEE ARTHROPLASTY      partial knee replacement    TUBAL LIGATION       Review of patient's  allergies indicates:  No Known Allergies   Current Facility-Administered Medications   Medication    0.9%  NaCl infusion       PMHx, PSHx, Allergies, Medications reviewed in epic    ROS negative except pain complaints in HPI    OBJECTIVE:    There were no vitals taken for this visit.    PHYSICAL EXAMINATION:    GENERAL: Well appearing, in no acute distress, alert and oriented x3.  PSYCH:  Mood and affect appropriate.  SKIN: Skin color, texture, turgor normal, no rashes or lesions which will impact the procedure.  CV: RRR with palpation of the radial artery.  PULM: No evidence of respiratory difficulty, symmetric chest rise. Clear to auscultation.  NEURO: Cranial nerves grossly intact.    Plan:    Proceed with procedure as planned Procedure(s) (LRB):  Block, Nerve MEDIAL BRANCH BLOCK BILATERAL L3,4,5 (Bilateral)    Rashid Patiño  02/08/2022

## 2022-02-08 NOTE — DISCHARGE INSTRUCTIONS
Lumbar/Cervical/Joint Medial Branch Block Pain Diary    Patient Name:  :    Pre-procedure Pain Score: _____/10    Time of injection:     Please fill out the chart below to the best of your ability. We need to know how much percentage of relief in your pain that you have while the numbing medication is active. Please be mindful that this is a diagnostic test and may not last for a long time. If you are asleep during one of the times listed below, you do not have to record that time.     Time After the   Procedure % of pain relief   achieved Pain Score (0-10)   1 hour post-procedure     2 hours post-procedure     3 hours post-procedure     4 hours post-procedure     5 hours post-procedure     6 hours post-procedure     12 hours post-procedure     24 hours post-procedure       Please make sure to return this form or information to the clinic. This information is needed to proceed with your plan of care. There are several options that you can use to send this back to us.    1. Form can be faxed to us at [phone]  2. Call us to provide the information at [phone]  3. Send the information to us through your [facility] Chart    If you have any questions about completing this diary, please call us at [phone].   Thank you for allowing us to care for you today. You may receive a survey about the care we provided. Your feedback is valuable and helps us provide excellent care throughout the community.     Home Care Instructions for Pain Management:    1. DIET:   You may resume your normal diet today.   2. BATHING:   You may shower with luke warm water. No tub baths or anything that will soak injection sites under water for the next 24 hours.  3. DRESSING:   You may remove your bandage today.   4. ACTIVITY LEVEL:   You may resume your normal activities 24 hrs after your procedure. Nothing strenuous today.  5. MEDICATIONS:   You may resume your normal medications today. To restart blood thinners, ask your  doctor.  6. DRIVING    If you have received any sedatives by mouth today, you may not drive for 12 hours.    If you have received any sedation through your IV, you may not drive for 24 hrs.   7. SPECIAL INSTRUCTIONS:   No heat to the injection site for 24 hrs including, hot bath or shower, heating pad, moist heat, or hot tubs.    Use ice pack to injection site for any pain or discomfort.  Apply ice packs for 20 minute intervals as needed.    IF you have diabetes, be sure to monitor your blood sugar more closely. IF your injection contained steroids your blood sugar levels may become higher than normal.    If you are still having pain upon discharge:  Your pain may improve over the next 48 hours. The anesthetic (numbing medication) works immediately to 48 hours. IF your injection contained a steroid (anti-inflammatory medication), it takes approximately 3 days to start feeling relief and 7-10 days to see your greatest results from the medication. It is possible you may need subsequent injections. This would be discussed at your follow up appointment with pain management or your referring doctor.    Please call the PAIN MANAGEMENT office at [phone] or ON CALL pager at [phone] if you experienced any:   -Weakness or loss of sensation  -Fever > 101.5  -Pain uncontrolled with oral medications   -Persistent nausea, vomiting, or diarrhea  -Redness or drainage from the injection sites, or any other worrisome concerns.   If physician on call was not reached or could not communicate with our office for any reason please go to the nearest emergency department.

## 2022-02-09 ENCOUNTER — TELEPHONE (OUTPATIENT)
Dept: PAIN MEDICINE | Facility: CLINIC | Age: 67
End: 2022-02-09
Payer: MEDICARE

## 2022-02-09 NOTE — TELEPHONE ENCOUNTER
Patient was contacted she had a bilateral L3,4,5 MBB listed below is her pain diary this would be the first MBB     1 hour 100% relief pain scale 0   2 hour 100% relief pain scale 0   3hour 100% relief pain scale 0  4 hour 95% relief pain scale 1  6 hour 95% relief pain scale 1   12 hour 100% relief pain scale 0   24 hour 100% relief pain scale 0

## 2022-02-09 NOTE — TELEPHONE ENCOUNTER
----- Message from Jeana Shen sent at 2022 11:07 AM CST -----  Regarding: Patient call back  Who called:MARION NIETO [[id number]]    What is the request in detail: Patient is requesting a call back. Patient would like to report her pain diary form . She would like to further discuss.   Please advise.    Can the clinic reply by [facility]? No    Best call back number: [phone]     Additional Information: She states she is on her way to a  at 2pm  so please call before

## 2022-02-10 ENCOUNTER — TELEPHONE (OUTPATIENT)
Dept: ORTHOPEDICS | Facility: CLINIC | Age: 67
End: 2022-02-10
Payer: MEDICARE

## 2022-02-10 ENCOUNTER — OFFICE VISIT (OUTPATIENT)
Dept: ORTHOPEDICS | Facility: CLINIC | Age: 67
End: 2022-02-10
Payer: MEDICARE

## 2022-02-10 ENCOUNTER — CLINICAL SUPPORT (OUTPATIENT)
Dept: REHABILITATION | Facility: OTHER | Age: 67
End: 2022-02-10
Attending: INTERNAL MEDICINE
Payer: MEDICARE

## 2022-02-10 VITALS — WEIGHT: 155.75 LBS | BODY MASS INDEX: 27.6 KG/M2 | HEIGHT: 63 IN

## 2022-02-10 DIAGNOSIS — M17.11 PRIMARY OSTEOARTHRITIS OF RIGHT KNEE: Primary | ICD-10-CM

## 2022-02-10 DIAGNOSIS — R29.898 WEAKNESS OF RIGHT LOWER EXTREMITY: ICD-10-CM

## 2022-02-10 DIAGNOSIS — M25.661 DECREASED RANGE OF MOTION (ROM) OF RIGHT KNEE: ICD-10-CM

## 2022-02-10 DIAGNOSIS — M25.561 RIGHT KNEE PAIN, UNSPECIFIED CHRONICITY: ICD-10-CM

## 2022-02-10 PROCEDURE — 99999 PR PBB SHADOW E&M-EST. PATIENT-LVL IV: CPT | Mod: PBBFAC,HCNC,, | Performed by: PHYSICIAN ASSISTANT

## 2022-02-10 PROCEDURE — 1125F PR PAIN SEVERITY QUANTIFIED, PAIN PRESENT: ICD-10-PCS | Mod: HCNC,CPTII,S$GLB, | Performed by: PHYSICIAN ASSISTANT

## 2022-02-10 PROCEDURE — 97110 THERAPEUTIC EXERCISES: CPT | Mod: HCNC

## 2022-02-10 PROCEDURE — 97140 MANUAL THERAPY 1/> REGIONS: CPT | Mod: HCNC

## 2022-02-10 PROCEDURE — 3008F PR BODY MASS INDEX (BMI) DOCUMENTED: ICD-10-PCS | Mod: HCNC,CPTII,S$GLB, | Performed by: PHYSICIAN ASSISTANT

## 2022-02-10 PROCEDURE — 99999 PR PBB SHADOW E&M-EST. PATIENT-LVL IV: ICD-10-PCS | Mod: PBBFAC,HCNC,, | Performed by: PHYSICIAN ASSISTANT

## 2022-02-10 PROCEDURE — 1101F PR PT FALLS ASSESS DOC 0-1 FALLS W/OUT INJ PAST YR: ICD-10-PCS | Mod: HCNC,CPTII,S$GLB, | Performed by: PHYSICIAN ASSISTANT

## 2022-02-10 PROCEDURE — 3288F PR FALLS RISK ASSESSMENT DOCUMENTED: ICD-10-PCS | Mod: HCNC,CPTII,S$GLB, | Performed by: PHYSICIAN ASSISTANT

## 2022-02-10 PROCEDURE — 99499 NO LOS: ICD-10-PCS | Mod: HCNC,S$GLB,, | Performed by: PHYSICIAN ASSISTANT

## 2022-02-10 PROCEDURE — 1125F AMNT PAIN NOTED PAIN PRSNT: CPT | Mod: HCNC,CPTII,S$GLB, | Performed by: PHYSICIAN ASSISTANT

## 2022-02-10 PROCEDURE — 20610 DRAIN/INJ JOINT/BURSA W/O US: CPT | Mod: HCNC,RT,S$GLB, | Performed by: PHYSICIAN ASSISTANT

## 2022-02-10 PROCEDURE — 1160F RVW MEDS BY RX/DR IN RCRD: CPT | Mod: HCNC,CPTII,S$GLB, | Performed by: PHYSICIAN ASSISTANT

## 2022-02-10 PROCEDURE — 20610 PR DRAIN/INJECT LARGE JOINT/BURSA: ICD-10-PCS | Mod: HCNC,RT,S$GLB, | Performed by: PHYSICIAN ASSISTANT

## 2022-02-10 PROCEDURE — 97161 PT EVAL LOW COMPLEX 20 MIN: CPT | Mod: HCNC

## 2022-02-10 PROCEDURE — 3008F BODY MASS INDEX DOCD: CPT | Mod: HCNC,CPTII,S$GLB, | Performed by: PHYSICIAN ASSISTANT

## 2022-02-10 PROCEDURE — 1159F MED LIST DOCD IN RCRD: CPT | Mod: HCNC,CPTII,S$GLB, | Performed by: PHYSICIAN ASSISTANT

## 2022-02-10 PROCEDURE — 1159F PR MEDICATION LIST DOCUMENTED IN MEDICAL RECORD: ICD-10-PCS | Mod: HCNC,CPTII,S$GLB, | Performed by: PHYSICIAN ASSISTANT

## 2022-02-10 PROCEDURE — 3288F FALL RISK ASSESSMENT DOCD: CPT | Mod: HCNC,CPTII,S$GLB, | Performed by: PHYSICIAN ASSISTANT

## 2022-02-10 PROCEDURE — 1160F PR REVIEW ALL MEDS BY PRESCRIBER/CLIN PHARMACIST DOCUMENTED: ICD-10-PCS | Mod: HCNC,CPTII,S$GLB, | Performed by: PHYSICIAN ASSISTANT

## 2022-02-10 PROCEDURE — 1101F PT FALLS ASSESS-DOCD LE1/YR: CPT | Mod: HCNC,CPTII,S$GLB, | Performed by: PHYSICIAN ASSISTANT

## 2022-02-10 PROCEDURE — 99499 UNLISTED E&M SERVICE: CPT | Mod: HCNC,S$GLB,, | Performed by: PHYSICIAN ASSISTANT

## 2022-02-10 NOTE — PROGRESS NOTES
Tiarra Norton presents to clinic today for the second right knee  orthovisc injection.  There has been no significant change in her medical status since her last visit. No fever, chills, malaise, or unexplained weight change.    Allergies, medications, past medical and surgical history were reviewed.    Exam demonstrates there is no effusion in the  right knee, and the skin is intact.    Diagnosis: right knee osteoarthritis    After time out was performed and patient ID, side, and site were verified, the  right  knee was sterilly prepped in the standard fashion.  A 22-gauge needle was introduced into right knee joint from an ni-lateral site without complication and knee was then injected with 2 ml of orthovisc.  Sterile dressing was applied.  The patient was instructed to resume activities as tolerated and to call with any problems.     We will see Tiarra Norton back next week for the third injection.

## 2022-02-10 NOTE — TELEPHONE ENCOUNTER
Spoke to patient in regards to message. Stated to patient that she can come in for 9:00 am this morning. Patient stated that she cannot make here for 9:00. Patient stated to keep the appointment scheduled for 3:30 pm. Stated thank you. Thanks.

## 2022-02-11 ENCOUNTER — CLINICAL SUPPORT (OUTPATIENT)
Dept: REHABILITATION | Facility: OTHER | Age: 67
End: 2022-02-11
Attending: INTERNAL MEDICINE
Payer: MEDICARE

## 2022-02-11 ENCOUNTER — TELEPHONE (OUTPATIENT)
Dept: ADMINISTRATIVE | Facility: OTHER | Age: 67
End: 2022-02-11
Payer: MEDICARE

## 2022-02-11 DIAGNOSIS — R29.3 POOR POSTURE: ICD-10-CM

## 2022-02-11 DIAGNOSIS — M25.69 DECREASED RANGE OF MOTION OF TRUNK AND BACK: Primary | ICD-10-CM

## 2022-02-11 PROCEDURE — 97110 THERAPEUTIC EXERCISES: CPT | Mod: HCNC

## 2022-02-11 NOTE — PROGRESS NOTES
[facility]      Name: [facility]  Clinic Number: [id number]    Therapy Diagnosis:   Encounter Diagnoses   Name Primary?    Decreased range of motion of trunk and back Yes    Poor posture      Physician: Kaykay Perales MD    Visit Date: 2022    Physician Orders:PT EVAL and treat  Medical Diagnosis from Referral: M54.9 (ICD-10-CM) - Back pain, unspecified back location, unspecified back pain laterality, unspecified chronicity  Evaluation Date: 2021  Authorization Period Expiration: 2022  UPDATED Plan of Care Expiration:  2022  Visit # / Visits authorized:        Time In: 900 am  Time Out: 1000 am  Total Billable Time: 50 minutes     Precautions: Standard     Pattern of pain determined: Pattern 1 PEP    Subjective   Tiarra reports she has been feeling better overall and life has been busy but she managed to survive a 13 hour car ride twice in the last 10 days without significant issue.      Patient reports tolerating previous visit well /c no c/o of LB discomfort but did have some soreness.   Patient reports their pain to be 3/10 on a 0-10 scale with 0 being no pain and 10 being the worst pain imaginable.    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  Patients goals: to improve the pain when completing     Objective     Baseline IM Testing Results:   Date of testin2021  ROM 0-42 deg   Max Peak Torque 110    Min Peak Torque 25    Flex/Ext Ratio 4:1   % below normative data -36%     Midpoint IM Testing Results:   Date of testin/3/2022  ROM 0-54 deg   Max Peak Torque 131    Min Peak Torque 59    Flex/Ext Ratio 2.5   % above normative data  % gain from initial 3  67       Lumbar FOTO  Initial: 28%  Visit 10: 26%  Goal:  25%       Treatment    Pt was instructed in and performed the following:     Tiarra received therapeutic exercises to develop/improved posture,  "cardiovascular endurance, muscular endurance, lumbar/cervical ROM, strength and muscular endurance for 50 minutes including the following exercises:     LTR x 10 ea  PPT x10 5"  Bridge /c GTB hip AB  x10  BKFO w/GTB x10 ea  Seated L sidebend stretch 10" x 5  EIS with L lateral deviation x 10    [facility] - Short 2/11/2022   Visit Number 17   VAS Pain Rating 3   Time -   Lumbar Stretches - Slouch -   Extension in Lying -   Extension in Standing 10   Flexion in Lying 10   Manual Therapy -   Lumbar Extension - Seat Pad -   Femur Restraint -   Top Dead Center -   Counterweight -   Lumbar Flexion -   Lumbar Extension -   Lumbar Peak Torque -   Lumbar Weight 70   Repetitions 20   Rating of Perceived Exertion 3       Not performed 2/11/2022 :  Standing L sidebend stretch c/UE support on mirror 5x10" leaning left only  Standing R sag towards wall (frontal plane passive L sidebend) 5x10" ea  Standing glute squeeze c/steps (10 x 5 steps)  Prone Press ups x10 (lock, block, sag at end range)  Supine figure four + LTR (glute med stretch) 2x30" ea  90/90 heel taps 5x2 B   SLR with TBall for TA brace 2x15 B          Peripheral muscle strengthening which included 1 set of 15-20 repetitions at a slow, controlled 10-13 second per rep pace focused on strengthening supporting musculature for improved body mechanics and functional mobility.  Pt and therapist focused on proper form during treatment to ensure optimal strengthening of each targeted muscle group.  Machines were utilized including torso rotation, leg extension, leg curl, chest press, upright row. Tricep extension, bicep curl, leg press, and hip abduction added visit 3    Tiarra received the following manual therapy techniques: nil        Home Exercises Provided and Patient Education Provided   Home exercises include: DKTC, SKTC, LTR, Supine HS stretch with belt, Supine piriformis stretch, PPT, Standing ext, Bridge, BKFO YTB with PPT    Written Home Exercises " Provided: Patient instructed to cont prior HEP.  Exercises were reviewed and Tiarra was able to demonstrate them prior to the end of the session.  Tiarra demonstrated good  understanding of the education provided.     See EMR under Patient Instructions for exercises provided prior visit.          Assessment   Tiarra returned feeling much better this session, is looking forward to doing PT for her knee pain once her HB episode is over. MedX settings held due to slightly longer time between sessions however at 70 ft/lb she was again able to complete 20 reps with an RPE of 3/10. Will plan to  progress MedX resistance 5-10% next session.       Patient is making good progress towards established goals.  Pt will continue to benefit from skilled outpatient physical therapy to address the deficits stated in the impairment chart, provide pt/family education and to maximize pt's level of independence in the home and community environment.     Anticipated Barriers for therapy: chronicity of condition  Pt's spiritual, cultural and educational needs considered and pt agreeable to plan of care and goals as stated below:   :            GOALS: Pt is in agreement with the following goals.     Short term goals:  6 weeks or 10 visits   1.  Pt will demonstrate increased lumbar ROM by at least 6 degrees from the initial ROM value with improvements noted in functional ROM and ability to perform ADLs.  (MET)  2.  Pt will demonstrate increased MedX average isometric strength value  by 20% from initial test resulting in improved ability to perform bending, lifting, and carrying activities safely, confidently.  (MET)  3.  Patient report a reduction in worst pain score by 1-2 points for improved tolerance for walking long distances.  (MET)  4.  Pt able to perform HEP correctly with minimal cueing or supervision from therapist to encourage independent management of symptoms. (MET)        Long term goals: 10 weeks or 20 visits   1. Pt will  demonstrate increased lumbar ROM by at least 9 degrees from initial ROM value, resulting in improved ability to perform functional fwd bending while standing and sitting. (approp and ongoing)  2. Pt will demonstrate increased MedX average isometric strength value  by 40% from initial test resulting in improved ability to perform bending, lifting, and carrying activities safely, confidently.  (approp and ongoing)  3. Pt to demonstrate ability to independently control and reduce their pain through posture positioning and mechanical movements throughout a typical day.  (approp and ongoing)           Plan   Continue with established Plan of Care towards established PT goals.      Eric Mckinnon, PT,   2/11/2022

## 2022-02-14 ENCOUNTER — HOSPITAL ENCOUNTER (OUTPATIENT)
Dept: RADIOLOGY | Facility: HOSPITAL | Age: 67
Discharge: HOME | End: 2022-02-14
Attending: INTERNAL MEDICINE
Payer: MEDICARE

## 2022-02-14 ENCOUNTER — TELEPHONE (OUTPATIENT)
Dept: INTERNAL MEDICINE | Facility: CLINIC | Age: 67
End: 2022-02-14
Payer: MEDICARE

## 2022-02-14 ENCOUNTER — PATIENT MESSAGE (OUTPATIENT)
Dept: PAIN MEDICINE | Facility: OTHER | Age: 67
End: 2022-02-14
Payer: MEDICARE

## 2022-02-14 DIAGNOSIS — Z12.31 ENCOUNTER FOR SCREENING MAMMOGRAM FOR BREAST CANCER: ICD-10-CM

## 2022-02-14 PROBLEM — M25.661 DECREASED RANGE OF MOTION (ROM) OF RIGHT KNEE: Status: ACTIVE | Noted: 2022-02-14

## 2022-02-14 PROBLEM — R29.898 WEAKNESS OF RIGHT LOWER EXTREMITY: Status: ACTIVE | Noted: 2022-02-14

## 2022-02-14 PROCEDURE — 77063 MAMMO DIGITAL SCREENING BILAT WITH TOMO: ICD-10-PCS | Mod: 26,HCNC,, | Performed by: RADIOLOGY

## 2022-02-14 PROCEDURE — 77067 MAMMO DIGITAL SCREENING BILAT WITH TOMO: ICD-10-PCS | Mod: 26,HCNC,, | Performed by: RADIOLOGY

## 2022-02-14 PROCEDURE — 77063 BREAST TOMOSYNTHESIS BI: CPT | Mod: 26,HCNC,, | Performed by: RADIOLOGY

## 2022-02-14 PROCEDURE — 77063 BREAST TOMOSYNTHESIS BI: CPT | Mod: TC,HCNC,PN

## 2022-02-14 PROCEDURE — 77067 SCR MAMMO BI INCL CAD: CPT | Mod: 26,HCNC,, | Performed by: RADIOLOGY

## 2022-02-14 NOTE — PLAN OF CARE
[facility]  Physical Therapy Initial Evaluation    Date: 2/10/2022   Name: Tiarra Norton  Clinic Number: [id number]    Therapy Diagnosis:   Encounter Diagnoses   Name Primary?    Right knee pain, unspecified chronicity     Decreased range of motion (ROM) of right knee     Weakness of right lower extremity      Physician: Trevin Huber MD    Physician Orders: PT Eval and Treat  Medical Diagnosis from Referral: M17.11 (ICD-10-CM) - Primary osteoarthritis of right knee  Evaluation Date: 2/10/2022  Authorization Period Expiration: 6/10/2022  Plan of Care Expiration: 4/10/2022  Visit # / Visits authorized: 1/ 1 (pending)   Progress Note Due: 3/10/2022  FOTO: 1/ 1    Precautions: Standard    Time In: 11:55 am   Time Out: 12:50 pm  Total Appointment Time (timed & untimed codes): 55 minutes    Subjective   Date of onset: 10+ years ago  History of current condition - Tiarra reports: she had a partial R knee replacement in 2015 with good results. A few months ago, around October, she had a meniscus repair to the same knee with minimal relief, she did not do PT after. She got a gel shot last week, has another today, and another next week. She is currently in healthy back program, just got a injection in her back this week, and it is already feeling better. The pain is on the outside of the R knee, it is described as achy and gets tired and easily. It feels weak, but she does not feel like it is ever going to give out on her. Aggravating factors include walking more than 5 blocks, stairs (up>down), squatting, kneeling, bending the knee, sit to stand, standing for more than 10 minutes, and gardening. Alleviated with ice.    Per MD:  Tiarra Norton has right knee pain.  The pain is unchanged. The pain is located in the anterior aspect of the knee.  There  is not radiation.    There is not associated stiffness.   There is not catching and locking. The pain is described as achy. The pain  "is aggravated by activity.  It is alleviated by nothing conisistently.  Her history, medications and problem list were reviewed.      PALLAVI: chronic  Any popping: no   Any Locking: no  Any buckling: no  Pain radiates: no  Pain constant or intermittent: intermittent  Any injections: some prior to TKA, currently on 3 phase gel injection    Pain:  Current 0/10, worst 8/10, best 0/10   Location: right knee   Description: Aching and Sharp  Aggravating Factors: walking more than 5 blocks, stairs (up>down), squatting, kneeling, bending the knee, sit to stand, standing for more than 10 minutes, and gardening  Easing Factors: ice    Prior Therapy: just after TKA in 2015  Social History: 3 Story lives with their spouse  Occupation: retired   Prior Level of Function: no functional limitations  Current Level of Function: increased pain and decreased tolerance to walking more than 5 blocks, stairs (up>down), squatting, kneeling, bending the knee, sit to stand, standing for more than 10 minutes, and gardening    Pts goals: "I would like to be able to go to all of 1bib fest and walk around as much as I would like"    Imaging: XR KNEE ORTHO RIGHT     CLINICAL HISTORY:  Pain in right knee     TECHNIQUE:  AP standing of both knees, Merchant views of both knees as well as a lateral view of the right knee were performed.     COMPARISON:  06/01/2021.     FINDINGS:  Right: Status post right medial compartment arthroplasty.  No radiographic evidence of complication.  No acute fracture or dislocation.  Small joint effusion.     Left: No fracture or dislocation on provided views.  Tricompartmental osteophytes with moderate medial tibiofemoral cartilage space narrowing.     Right: There is a medial joint arthroplasty.     Impression:     Postoperative changes of right medial compartment arthroplasty.  No radiographic evidence of complication.     Moderate left medial tibiofemoral cartilage space narrowing.     Medical History:   Past " Medical History:   Diagnosis Date    Cataract     Depression     Gestational diabetes     Hyperlipidemia     Hypertension     IBS (irritable bowel syndrome)     Thyroid disease        Surgical History:   Tiarra Norton  has a past surgical history that includes  section; Tubal ligation; Tonsillectomy; Adenoidectomy; Cholecystectomy; Eye surgery; r hand surgery; Total knee arthroplasty; Colonoscopy (N/A, 2016); Colonoscopy (N/A, 2019); Esophagogastroduodenoscopy (N/A, 2020); Joint replacement; Arthroscopy of knee (Right, 10/19/2021); Arthroscopic chondroplasty of knee joint (Right, 10/19/2021); Knee arthroscopy w/ meniscectomy (Right, 10/19/2021); and Injection of anesthetic agent around nerve (Bilateral, 2022).    Medications:   Tiarra has a current medication list which includes the following prescription(s): alprazolam, amlodipine, atorvastatin, b complex vitamins, cholecalciferol (vitamin d3), cholestyramine, ciclopirox, ciclopirox, premarin, diphenoxylate-atropine 2.5-0.025 mg, ergocalciferol (vitamin d2), hydrocodone-acetaminophen, levothyroxine, levothyroxine, liothyronine, creon, pantoprazole, promethazine, restasis, solifenacin, sumatriptan, trazodone, triamcinolone acetonide 0.1%, and valsartan, and the following Facility-Administered Medications: sodium hyaluronate (orthovisc).    Allergies:   Review of patient's allergies indicates:  No Known Allergies       Objective       Observation: normal alignment, scar from previous partial TKA    Posture Alignment: slouched posture;increased kyphosis;decreased lordosis    Sensation: intact to light touch      GAIT DEVIATIONS: Tiarra displays occasional unsteady gait;decreased step length;decreased weight shift;increased base of support;antalgic gait    Range of Motion:   Knee Left active Left Passive   Flexion 129 135   Extension 2 5     Knee Right active Right Passive   Flexion 123 129   Extension 0 1       Lower Extremity  "Strength   Right LE  Left LE    Knee extension: 4/5 Knee extension: 4+/5   Knee flexion: 4/5 Knee flexion: 4+/5   Hip flexion: 4/5 Hip flexion: 4+/5   Hip extension:  3+/5 Hip extension: 4-/5   Hip abduction: 3+/5 Hip abduction: 4-/5   Hip adduction: 4-/5 Hip adduction 4-/5   Ankle dorsiflexion: 5/5 Ankle dorsiflexion: 5/5   Ankle plantarflexion: 5/5 Ankle plantarflexion: 5/5       Special Tests:   Right Left   Valgus Stress Test Positive Negative   Varus Stress test Negative Negative   Lachman's test Negative Negative   Posterior Drawer Negative Negative   Anterior Drawer Negative Negative   Berna's Test Positive Negative   Apley's Compression Negative Negative   Apley's Distraction Positive Negative   Nieves's compression test Negative Negative   Patellar Grind Test Positive Negative     Step down test: Positive  Squat: Positive  Single leg balance: Positive    Joint Mobility: tibiofemoral hypomobility     Patellar sup./inf: hypermobile   Patellar med/lat: hypermobile    Palpation: tender to palpation of medial and lateral joint line and patella       Flexibility:    Ely's test: R = 90 degrees ; L = 110 degrees   Popliteal Angle: R = 30 degrees ; L = 20 degrees   Sammy's test: R = - ; L = -   Blas test: R = + ; L = +    Edema: none      CMS Impairment/Limitation/Restriction for FOTO knee Survey    Therapist reviewed FOTO scores for Tiarra Norton on 2/10/2022.   FOTO documents entered into [employer] - see Media section.    Limitation Score: 53%    Goal: 41%         TREATMENT   Treatment Time In: 12:20   Treatment Time Out: 12:45  Total Treatment time separate from Evaluation: 25 minutes    Tiarra received therapeutic exercises to develop strength, endurance, ROM and flexibility for 15 minutes including:  Quad set 10x10"  SAQ 2x10x3"  SLR 2x10x3"  VMO LAQ 2x10x3"  Hamstring stretch 2x30"  Prone quad stretch 2x30"  Clamshells RTB 2x10    Tiarra received the following manual therapy techniques: Joint mobilizations " and Soft tissue Mobilization were applied to the: R knee for 10 minutes, including:  Patellar mobs sup/inf and med/lat  STM R quad    Tiarra received coldpack for 5 minutes to R knee.    Home Exercises and Patient Education Provided    Education provided:   - HEP, POC    Written Home Exercises Provided: yes.  Exercises were reviewed and Tiarra was able to demonstrate them prior to the end of the session.  Tiarra demonstrated good  understanding of the education provided.     See EMR under Patient Instructions for exercises provided 2/10/2022.    Assessment   Tiarra is a 67 y.o. female referred to outpatient Physical Therapy with a medical diagnosis of M17.11 (ICD-10-CM) - Primary osteoarthritis of right knee. Pt presents with signs and symptoms consistent with diagnosis including decreased range of motion of R knee, weakness of R LE, decreased joint mobility, patellar hypermobility, decreased soft tissue mobility and flexibility, poor quad control, gait deviations, poor balance, and decreased functional mobility tolerance. These deficits are limiting patient in full participation and ADL's and leisure activities such as walking more than 5 blocks, stairs (up>down), squatting, kneeling, bending the knee, sit to stand, standing for more than 10 minutes, and gardening. Pt is currently in healthy back program and is scheduled to finish in 2 weeks, she will begin follow up appts for knee PT upon conclusion of healthy back program. Pt had lateral meniscus repair on 10/19, but never had full resolution of symptoms.      Pt prognosis is Good.   Pt will benefit from skilled outpatient Physical Therapy to address the deficits stated above and in the chart below, provide pt/family education, and to maximize pt's level of independence.     Plan of care discussed with patient: Yes  Pt's spiritual, cultural and educational needs considered and patient is agreeable to the plan of care and goals as stated below:     Anticipated  Barriers for therapy: none    Medical Necessity is demonstrated by the following  History  Co-morbidities and personal factors that may impact the plan of care Co-morbidities:   advanced age, anxiety, depression, high BMI, HTN, prior abdominal surgery and hypothyroidism, HERMINIA, hx of lateral meniscus tear and repair    Personal Factors:   age     low   Examination  Body Structures and Functions, activity limitations and participation restrictions that may impact the plan of care Body Regions:   back  lower extremities  trunk    Body Systems:    ROM  strength  gross coordinated movement  balance  gait  motor control  blood pressure    Participation Restrictions:   See above    Activity limitations:   Learning and applying knowledge  no deficits    General Tasks and Commands  no deficits    Communication  no deficits    Mobility  walking    Self care  no deficits    Domestic Life  doing house work (cleaning house, washing dishes, laundry)    Interactions/Relationships  no deficits    Life Areas  no deficits    Community and Social Life  no deficits         Complexity: low   Clinical Presentation stable and uncomplicated low   Decision Making/ Complexity Score: low       GOALS: Short Term Goals:  4 weeks  1.Report decreased in pain at worse less than  <   / =  5  /10  to increase tolerance for functional mobility.On going  2. Pt to improve R knee range of motion by 25% to allow for improved functional mobility to allow for improvement in IADLs. .On going  3. Increased R LE MMT 1/2 grade to increase tolerance for ADL and work activities.On going  4. Pt to report ability to walk 1 mile without increased knee pain. Ongoing  5. Pt to tolerate HEP to improve ROM and independence with ADL's.On going    Long Term Goals: 8 weeks  1.Report decreased in pain at worse less than  <   / =  3  /10  to increase tolerance for functional mobility. On going  2.Pt to improve range of motion by 75% to allow for improved functional mobility  to allow for improvement in IADLs. On going  3.Increased R LE MMT 1 grade to increase tolerance for ADL and work activities.On going  4. Pt will report 41% or less on FOTO knee survey  to demonstrate increase in LE function with every day tasks. On going  5. Pt to be Independent with HEP to improve ROM and independence with ADL's. On going    Plan   Plan of care Certification: 2/10/2022 to 4/10/2022.    Outpatient Physical Therapy 2 times weekly for 8 weeks to include the following interventions: Gait Training, Manual Therapy, Moist Heat/ Ice, Neuromuscular Re-ed, Patient Education, Therapeutic Activities and Therapeutic Exercise. Marybeth Anaya, PT      I CERTIFY THE NEED FOR THESE SERVICES FURNISHED UNDER THIS PLAN OF TREATMENT AND WHILE UNDER MY CARE   Physician's comments:     Physician's Signature: ___________________________________________________

## 2022-02-15 ENCOUNTER — HOSPITAL ENCOUNTER (OUTPATIENT)
Facility: OTHER | Age: 67
Discharge: HOME | End: 2022-02-15
Attending: ANESTHESIOLOGY | Admitting: ANESTHESIOLOGY
Payer: MEDICARE

## 2022-02-15 VITALS
RESPIRATION RATE: 16 BRPM | TEMPERATURE: 99 F | HEART RATE: 73 BPM | HEIGHT: 63 IN | SYSTOLIC BLOOD PRESSURE: 145 MMHG | WEIGHT: 153 LBS | BODY MASS INDEX: 27.11 KG/M2 | DIASTOLIC BLOOD PRESSURE: 67 MMHG | OXYGEN SATURATION: 98 %

## 2022-02-15 DIAGNOSIS — M47.819 OSTEOARTHRITIS OF SPINE WITHOUT MYELOPATHY OR RADICULOPATHY, UNSPECIFIED SPINAL REGION: Primary | ICD-10-CM

## 2022-02-15 DIAGNOSIS — G89.29 CHRONIC PAIN: ICD-10-CM

## 2022-02-15 LAB — POCT GLUCOSE: 105 MG/DL (ref 70–110)

## 2022-02-15 PROCEDURE — 64494 PR INJ DX/THER AGNT PARAVERT FACET JOINT,IMG GUIDE,LUMBAR/SAC, 2ND LEVEL: ICD-10-PCS | Mod: 50,KX,HCNC, | Performed by: ANESTHESIOLOGY

## 2022-02-15 PROCEDURE — 25000003 PHARM REV CODE 250: Mod: HCNC | Performed by: ANESTHESIOLOGY

## 2022-02-15 PROCEDURE — 64493 PR INJ DX/THER AGNT PARAVERT FACET JOINT,IMG GUIDE,LUMBAR/SAC,1ST LVL: ICD-10-PCS | Mod: 50,KX,HCNC, | Performed by: ANESTHESIOLOGY

## 2022-02-15 PROCEDURE — 64493 INJ PARAVERT F JNT L/S 1 LEV: CPT | Mod: 50,KX,HCNC | Performed by: ANESTHESIOLOGY

## 2022-02-15 PROCEDURE — 64494 INJ PARAVERT F JNT L/S 2 LEV: CPT | Mod: 50,KX,HCNC | Performed by: ANESTHESIOLOGY

## 2022-02-15 PROCEDURE — 64494 INJ PARAVERT F JNT L/S 2 LEV: CPT | Mod: 50,KX,HCNC, | Performed by: ANESTHESIOLOGY

## 2022-02-15 PROCEDURE — 64493 INJ PARAVERT F JNT L/S 1 LEV: CPT | Mod: 50,KX,HCNC, | Performed by: ANESTHESIOLOGY

## 2022-02-15 PROCEDURE — 82947 ASSAY GLUCOSE BLOOD QUANT: CPT | Mod: HCNC | Performed by: ANESTHESIOLOGY

## 2022-02-15 RX ORDER — SODIUM CHLORIDE 9 MG/ML
500 INJECTION, SOLUTION INTRAVENOUS CONTINUOUS
Status: DISCONTINUED | OUTPATIENT
Start: 2022-02-15 | End: 2022-02-15 | Stop reason: HOSPADM

## 2022-02-15 RX ORDER — LIDOCAINE HYDROCHLORIDE 20 MG/ML
INJECTION, SOLUTION INFILTRATION; PERINEURAL
Status: DISCONTINUED | OUTPATIENT
Start: 2022-02-15 | End: 2022-02-15 | Stop reason: HOSPADM

## 2022-02-15 RX ORDER — BUPIVACAINE HYDROCHLORIDE 2.5 MG/ML
INJECTION, SOLUTION EPIDURAL; INFILTRATION; INTRACAUDAL
Status: DISCONTINUED | OUTPATIENT
Start: 2022-02-15 | End: 2022-02-15 | Stop reason: HOSPADM

## 2022-02-15 NOTE — DISCHARGE SUMMARY
[facility] ([facility])  Discharge Note  Short Stay    Procedure(s) (LRB):  BLOCK, NERVE, L3*L4-L5 MEDIAL BR ANCH 2 OF 2 (Bilateral)    OUTCOME: Patient tolerated treatment/procedure well without complication and is now ready for discharge.    DISPOSITION: Home or Self Care    FINAL DIAGNOSIS:  Lumbosacral spondylosis    FOLLOWUP: In clinic    DISCHARGE INSTRUCTIONS:  No discharge procedures on file.     TIME SPENT ON DISCHARGE: 2 minutes

## 2022-02-15 NOTE — DISCHARGE INSTRUCTIONS

## 2022-02-15 NOTE — OP NOTE
Diagnostic Lumbar Medial Branch Block Under Fluoroscopy    The procedure, risks, benefits, and options were discussed with the patient. There are no contraindications to the procedure. The patent expressed understanding and agreed to the procedure. Informed written consent was obtained prior to the start of the procedure and can be found in the patient's chart.    PATIENT NAME: Tiarra Norton   MRN: [id number]     DATE OF PROCEDURE: 02/15/2022                                           PROCEDURE:  Diagnostic Bilateral L3, L4 and L5 Lumbar Medial Branch Block under Fluoroscopy    PRE-OP DIAGNOSIS: Lumbar spondylosis [M47.816]    POST-OP DIAGNOSIS: Same    PHYSICIAN: Tara Gibbs MD    ASSISTANTS: Dr. Patiño    MEDICATIONS INJECTED:  Bupivicaine 0.25%    LOCAL ANESTHETIC INJECTED:   Xylocaine 2%    SEDATION: None    ESTIMATED BLOOD LOSS:  None    COMPLICATIONS:  None.    INTERVAL HISTORY: Patient has clinical and imaging findings suggestive of facet mediated pain.    TECHNIQUE: Time-out was performed to identify the patient and procedure to be performed. With the patient laying in a prone position, the surgical area was prepped and draped in the usual sterile fashion using ChloraPrep and fenestrated drape. The levels were determined under fluoroscopic guidance. Skin anesthesia was achieved by injecting Lidocaine 2% over the injection sites. A 25 gauge, 3.5 inch needle was introduced into the medial branch nerves at the junctions of the superior articular process and the transverse processes of the targeted sites using AP, lateral and/or contralateral oblique fluoroscopic imaging. After negative aspiration for blood or CSF was confirmed, 1 mL of the anesthetic listed above was then slowly injected at each site. The needles were removed and bleeding was nil. A sterile dressing was applied. No specimens collected. The patient tolerated the procedure well.     The patient was monitored after the procedure in the recovery  area. They were given post-procedure and discharge instructions to follow at home. The patient was discharged in a stable condition.      Tara Gibbs MD

## 2022-02-16 ENCOUNTER — TELEPHONE (OUTPATIENT)
Dept: PAIN MEDICINE | Facility: CLINIC | Age: 67
End: 2022-02-16
Payer: MEDICARE

## 2022-02-16 ENCOUNTER — PATIENT MESSAGE (OUTPATIENT)
Dept: PAIN MEDICINE | Facility: CLINIC | Age: 67
End: 2022-02-16
Payer: MEDICARE

## 2022-02-16 ENCOUNTER — PATIENT MESSAGE (OUTPATIENT)
Dept: PAIN MEDICINE | Facility: OTHER | Age: 67
End: 2022-02-16
Payer: MEDICARE

## 2022-02-16 ENCOUNTER — TELEPHONE (OUTPATIENT)
Dept: ADMINISTRATIVE | Facility: OTHER | Age: 67
End: 2022-02-16
Payer: MEDICARE

## 2022-02-16 DIAGNOSIS — M47.817 SPONDYLOSIS OF LUMBOSACRAL REGION WITHOUT MYELOPATHY OR RADICULOPATHY: Primary | ICD-10-CM

## 2022-02-16 NOTE — TELEPHONE ENCOUNTER
----- Message from Ambika Ha MA sent at 2/16/2022  8:13 AM CST -----  Regarding: pt requesting a call back  Name of Who is Calling: MARION NIETO [[id number]]           What is the request in detail: pt requesting a call back needs to speak with someone in office to give an update on pain dairy            Can the clinic reply by [facility]: Y           What Number to Call Back if not in [facility]: [phone]

## 2022-02-16 NOTE — TELEPHONE ENCOUNTER
Patient had her second lumbar L3,4,5 MBB. The following was her pain diary results       9:10:  100% pain relief achieved    O pain score  10:10  100%.                                        0  11:10  100%                                           0  12:10  100%                                           0  1:10      95%                                            1  2:10     95%                                             1  8:00     100%                                           0  8:00 AM 90% (02.16.22)                     2

## 2022-02-18 ENCOUNTER — CLINICAL SUPPORT (OUTPATIENT)
Dept: REHABILITATION | Facility: OTHER | Age: 67
End: 2022-02-18
Attending: INTERNAL MEDICINE
Payer: MEDICARE

## 2022-02-18 DIAGNOSIS — R29.3 POOR POSTURE: ICD-10-CM

## 2022-02-18 DIAGNOSIS — M25.69 DECREASED RANGE OF MOTION OF TRUNK AND BACK: Primary | ICD-10-CM

## 2022-02-18 PROCEDURE — 97110 THERAPEUTIC EXERCISES: CPT | Mod: HCNC

## 2022-02-18 NOTE — PROGRESS NOTES
[facility]      Name: Tiarra [facility]  Clinic Number: [id number]    Therapy Diagnosis:   Encounter Diagnoses   Name Primary?    Decreased range of motion of trunk and back Yes    Poor posture      Physician: Kaykay Perales MD    Visit Date: 2022    Physician Orders:PT EVAL and treat  Medical Diagnosis from Referral: M54.9 (ICD-10-CM) - Back pain, unspecified back location, unspecified back pain laterality, unspecified chronicity  Evaluation Date: 2021 ([facility] year of care started 2021)  Authorization Period Expiration: 2022  UPDATED Plan of Care Expiration:  2022  Visit # / Visits authorized:        Time In: 900 am  Time Out: 1000 am  Total Billable Time: 50 minutes     Precautions: Standard     Pattern of pain determined: Pattern 1 PEP    Subjective   Tiarra reports she is now scheduled for RFA next month that she hopes will give greater relief, but overall she is doing okay.       Patient reports tolerating previous visit well /c no c/o of LB discomfort but did have some soreness.   Patient reports their pain to be 3/10 on a 0-10 scale with 0 being no pain and 10 being the worst pain imaginable.    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  Patients goals: to improve the pain when completing     Objective     Baseline IM Testing Results:   Date of testin2021  ROM 0-42 deg   Max Peak Torque 110    Min Peak Torque 25    Flex/Ext Ratio 4:1   % below normative data -36%     Midpoint IM Testing Results:   Date of testin/3/2022  ROM 0-54 deg   Max Peak Torque 131    Min Peak Torque 59    Flex/Ext Ratio 2.5   % above normative data  % gain from initial 3  67       Lumbar FOTO  Initial: 28%  Visit 10: 26%  Goal:  25%       Treatment    Pt was instructed in and performed the following:     Tiarra received therapeutic exercises to develop/improved posture,  "cardiovascular endurance, muscular endurance, lumbar/cervical ROM, strength and muscular endurance for 50 minutes including the following exercises:     LTR x 10 ea  PPT x10 5"  Supine figure four + HSS   Bridge /c GTB hip AB  x20  BKFO w/GTB x10 ea  Seated L sidebend stretch 10" x 5  EIS with L lateral deviation x 10    [facility] - Short 2/18/2022   Visit Number 18   VAS Pain Rating 3   Time -   Lumbar Stretches - Slouch -   Extension in Lying -   Extension in Standing 10   Flexion in Lying 10   Manual Therapy -   Lumbar Extension - Seat Pad -   Femur Restraint -   Top Dead Center -   Counterweight -   Lumbar Flexion -   Lumbar Extension -   Lumbar Peak Torque -   Lumbar Weight 73   Repetitions 20   Rating of Perceived Exertion 3           Not performed 2/18/2022 :  Standing L sidebend stretch c/UE support on mirror 5x10" leaning left only  Standing R sag towards wall (frontal plane passive L sidebend) 5x10" ea  Standing glute squeeze c/steps (10 x 5 steps)  Prone Press ups x10 (lock, block, sag at end range)  Supine figure four + LTR (glute med stretch) 2x30" ea  90/90 heel taps 5x2 B   SLR with TBall for TA brace 2x15 B          Peripheral muscle strengthening which included 1 set of 15-20 repetitions at a slow, controlled 10-13 second per rep pace focused on strengthening supporting musculature for improved body mechanics and functional mobility.  Pt and therapist focused on proper form during treatment to ensure optimal strengthening of each targeted muscle group.  Machines were utilized including torso rotation, leg extension, leg curl, chest press, upright row. Tricep extension, bicep curl, leg press, and hip abduction added visit 3    Tiarra received the following manual therapy techniques: nil        Home Exercises Provided and Patient Education Provided   Home exercises include: DKTC, SKTC, LTR, Supine HS stretch with belt, Supine piriformis stretch, PPT, Standing ext, Bridge, BKFO YTB with " "PPT    Written Home Exercises Provided: Patient instructed to cont prior HEP.  Exercises were reviewed and Tiarra was able to demonstrate them prior to the end of the session.  Tiarra demonstrated good  understanding of the education provided.     See EMR under Patient Instructions for exercises provided prior visit.          Assessment   Tiarra returns to therapy feeling "okay" but is still very bothered by her continued low level pain, is hoping RFA next month will provide some final relief from this. MedX resistance was progressed only 5% today due to overall symptoms but she was able to exercise at 73 ft/lb and she was again able to complete 20 reps with an RPE of 3/10. Will plan to  progress MedX resistance 5-10% next session. Also now plans to begin wellness right away and do it concurrently with her ortho PT for her Knee.      Patient is making good progress towards established goals.  Pt will continue to benefit from skilled outpatient physical therapy to address the deficits stated in the impairment chart, provide pt/family education and to maximize pt's level of independence in the home and community environment.     Anticipated Barriers for therapy: chronicity of condition  Pt's spiritual, cultural and educational needs considered and pt agreeable to plan of care and goals as stated below:   :            GOALS: Pt is in agreement with the following goals.     Short term goals:  6 weeks or 10 visits   1.  Pt will demonstrate increased lumbar ROM by at least 6 degrees from the initial ROM value with improvements noted in functional ROM and ability to perform ADLs.  (MET)  2.  Pt will demonstrate increased MedX average isometric strength value  by 20% from initial test resulting in improved ability to perform bending, lifting, and carrying activities safely, confidently.  (MET)  3.  Patient report a reduction in worst pain score by 1-2 points for improved tolerance for walking long distances.  (MET)  4.  Pt " able to perform HEP correctly with minimal cueing or supervision from therapist to encourage independent management of symptoms. (MET)        Long term goals: 10 weeks or 20 visits   1. Pt will demonstrate increased lumbar ROM by at least 9 degrees from initial ROM value, resulting in improved ability to perform functional fwd bending while standing and sitting. (approp and ongoing)  2. Pt will demonstrate increased MedX average isometric strength value  by 40% from initial test resulting in improved ability to perform bending, lifting, and carrying activities safely, confidently.  (approp and ongoing)  3. Pt to demonstrate ability to independently control and reduce their pain through posture positioning and mechanical movements throughout a typical day.  (approp and ongoing)           Plan   Continue with established Plan of Care towards established PT goals.      Eric Mckinnon, PT,   2/18/2022

## 2022-02-21 ENCOUNTER — OFFICE VISIT (OUTPATIENT)
Dept: ORTHOPEDICS | Facility: CLINIC | Age: 67
End: 2022-02-21
Payer: MEDICARE

## 2022-02-21 VITALS — BODY MASS INDEX: 26.34 KG/M2 | HEIGHT: 63 IN | WEIGHT: 148.69 LBS

## 2022-02-21 DIAGNOSIS — M17.11 PRIMARY OSTEOARTHRITIS OF RIGHT KNEE: Primary | ICD-10-CM

## 2022-02-21 PROCEDURE — 3008F PR BODY MASS INDEX (BMI) DOCUMENTED: ICD-10-PCS | Mod: HCNC,CPTII,S$GLB, | Performed by: PHYSICIAN ASSISTANT

## 2022-02-21 PROCEDURE — 1125F AMNT PAIN NOTED PAIN PRSNT: CPT | Mod: HCNC,CPTII,S$GLB, | Performed by: PHYSICIAN ASSISTANT

## 2022-02-21 PROCEDURE — 99999 PR PBB SHADOW E&M-EST. PATIENT-LVL IV: CPT | Mod: PBBFAC,HCNC,, | Performed by: PHYSICIAN ASSISTANT

## 2022-02-21 PROCEDURE — 20610 DRAIN/INJ JOINT/BURSA W/O US: CPT | Mod: HCNC,RT,S$GLB, | Performed by: PHYSICIAN ASSISTANT

## 2022-02-21 PROCEDURE — 3288F FALL RISK ASSESSMENT DOCD: CPT | Mod: HCNC,CPTII,S$GLB, | Performed by: PHYSICIAN ASSISTANT

## 2022-02-21 PROCEDURE — 3288F PR FALLS RISK ASSESSMENT DOCUMENTED: ICD-10-PCS | Mod: HCNC,CPTII,S$GLB, | Performed by: PHYSICIAN ASSISTANT

## 2022-02-21 PROCEDURE — 20610 PR DRAIN/INJECT LARGE JOINT/BURSA: ICD-10-PCS | Mod: HCNC,RT,S$GLB, | Performed by: PHYSICIAN ASSISTANT

## 2022-02-21 PROCEDURE — 1159F PR MEDICATION LIST DOCUMENTED IN MEDICAL RECORD: ICD-10-PCS | Mod: HCNC,CPTII,S$GLB, | Performed by: PHYSICIAN ASSISTANT

## 2022-02-21 PROCEDURE — 3008F BODY MASS INDEX DOCD: CPT | Mod: HCNC,CPTII,S$GLB, | Performed by: PHYSICIAN ASSISTANT

## 2022-02-21 PROCEDURE — 1160F RVW MEDS BY RX/DR IN RCRD: CPT | Mod: HCNC,CPTII,S$GLB, | Performed by: PHYSICIAN ASSISTANT

## 2022-02-21 PROCEDURE — 99499 NO LOS: ICD-10-PCS | Mod: HCNC,S$GLB,, | Performed by: PHYSICIAN ASSISTANT

## 2022-02-21 PROCEDURE — 1101F PT FALLS ASSESS-DOCD LE1/YR: CPT | Mod: HCNC,CPTII,S$GLB, | Performed by: PHYSICIAN ASSISTANT

## 2022-02-21 PROCEDURE — 1101F PR PT FALLS ASSESS DOC 0-1 FALLS W/OUT INJ PAST YR: ICD-10-PCS | Mod: HCNC,CPTII,S$GLB, | Performed by: PHYSICIAN ASSISTANT

## 2022-02-21 PROCEDURE — 1159F MED LIST DOCD IN RCRD: CPT | Mod: HCNC,CPTII,S$GLB, | Performed by: PHYSICIAN ASSISTANT

## 2022-02-21 PROCEDURE — 99499 UNLISTED E&M SERVICE: CPT | Mod: HCNC,S$GLB,, | Performed by: PHYSICIAN ASSISTANT

## 2022-02-21 PROCEDURE — 99999 PR PBB SHADOW E&M-EST. PATIENT-LVL IV: ICD-10-PCS | Mod: PBBFAC,HCNC,, | Performed by: PHYSICIAN ASSISTANT

## 2022-02-21 PROCEDURE — 1160F PR REVIEW ALL MEDS BY PRESCRIBER/CLIN PHARMACIST DOCUMENTED: ICD-10-PCS | Mod: HCNC,CPTII,S$GLB, | Performed by: PHYSICIAN ASSISTANT

## 2022-02-21 PROCEDURE — 1125F PR PAIN SEVERITY QUANTIFIED, PAIN PRESENT: ICD-10-PCS | Mod: HCNC,CPTII,S$GLB, | Performed by: PHYSICIAN ASSISTANT

## 2022-02-21 NOTE — PROGRESS NOTES
Tiarra Norton presents to clinic today for the third right knee  orthovisc injection.  There has been no significant change in her medical status since her last visit. No fever, chills, malaise, or unexplained weight change.    Allergies, medications, past medical and surgical history were reviewed.    Exam demonstrates there is no effusion in the  right knee, and the skin is intact.    Diagnosis: right knee osteoarthritis    After time out was performed and patient ID, side, and site were verified, the  right  knee was sterilly prepped in the standard fashion.  A 22-gauge needle was introduced into right knee joint from an ni-lateral site without complication and knee was then injected with 2 ml of orthovisc.  Sterile dressing was applied.  The patient was instructed to resume activities as tolerated and to call with any problems.     We will see Tiarra Norton back for follow up as needed

## 2022-03-04 ENCOUNTER — CLINICAL SUPPORT (OUTPATIENT)
Dept: REHABILITATION | Facility: OTHER | Age: 67
End: 2022-03-04
Attending: INTERNAL MEDICINE
Payer: MEDICARE

## 2022-03-04 DIAGNOSIS — R29.3 POOR POSTURE: ICD-10-CM

## 2022-03-04 DIAGNOSIS — M25.69 DECREASED RANGE OF MOTION OF TRUNK AND BACK: Primary | ICD-10-CM

## 2022-03-04 PROCEDURE — 97110 THERAPEUTIC EXERCISES: CPT | Mod: HCNC

## 2022-03-04 NOTE — PROGRESS NOTES
[facility]      Name: Tiarra Chang [facility]  Clinic Number: [id number]    Therapy Diagnosis:   Encounter Diagnoses   Name Primary?    Decreased range of motion of trunk and back Yes    Poor posture      Physician: Kaykay Perales MD    Visit Date: 3/4/2022    Physician Orders:PT EVAL and treat  Medical Diagnosis from Referral: M54.9 (ICD-10-CM) - Back pain, unspecified back location, unspecified back pain laterality, unspecified chronicity  Evaluation Date: 2021 (HB year of care started 2021)  Authorization Period Expiration: 2022  UPDATED Plan of Care Expiration:  3/28/2022  Visit # / Visits authorized:        Time In: 900 am  Time Out: 0935 am  Total Billable Time: 35 minutes     Precautions: Standard     Pattern of pain determined: Pattern 1 PEP    Subjective   Tiarra reports she is feeling okay today, has been having more issues with her IBS of late; also requested early end to session after MedX for this reason.        Patient reports tolerating previous visit well /c no c/o of LB discomfort but did have some soreness.   Patient reports their pain to be 3/10 on a 0-10 scale with 0 being no pain and 10 being the worst pain imaginable.    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  Patients goals: to improve the pain when completing     Objective     Baseline IM Testing Results:   Date of testin2021  ROM 0-42 deg   Max Peak Torque 110    Min Peak Torque 25    Flex/Ext Ratio 4:1   % below normative data -36%     Midpoint IM Testing Results:   Date of testin/3/2022  ROM 0-54 deg   Max Peak Torque 131    Min Peak Torque 59    Flex/Ext Ratio 2.5   % above normative data  % gain from initial 3  67       Lumbar FOTO  Initial: 28%  Visit 10: 26%  Goal:  25%       Treatment    Pt was instructed in and performed the following:     Tiarra received therapeutic exercises to  "develop/improved posture, cardiovascular endurance, muscular endurance, lumbar/cervical ROM, strength and muscular endurance for 50 minutes including the following exercises:     LTR x 10 ea  PPT x10 5"  Supine figure four + HSS   EIS with L lateral deviation x 10    [facility] - Short 3/4/2022   Visit Number 19   VAS Pain Rating 3   Time 5   Lumbar Stretches - Slouch -   Extension in Lying -   Extension in Standing 10   Flexion in Lying 10   Manual Therapy 5   Lumbar Extension - Seat Pad -   Femur Restraint -   Top Dead Center -   Counterweight -   Lumbar Flexion -   Lumbar Extension -   Lumbar Peak Torque -   Lumbar Weight 73   Repetitions 20   Rating of Perceived Exertion 3           Not performed 3/4/2022 :  90/90 heel taps 5x2 B   SLR with TBall for TA brace 2x15 B  Bridge /c GTB hip AB  x20  BKFO w/GTB x10 ea  Seated L sidebend stretch 10" x 5          Peripheral muscle strengthening held this session due to issues with IBS    Tiarra received the following manual therapy techniques: nil        Home Exercises Provided and Patient Education Provided   Home exercises include: DKTC, SKTC, LTR, Supine HS stretch with belt, Supine piriformis stretch, PPT, Standing ext, Bridge, BKFO YTB with PPT    Written Home Exercises Provided: Patient instructed to cont prior HEP.  Exercises were reviewed and Tiarra was able to demonstrate them prior to the end of the session.  Tiarra demonstrated good  understanding of the education provided.     See EMR under Patient Instructions for exercises provided prior visit.          Assessment   Tiarra returns to therapy feeling "okay" but is still low level pain, still waiting for upcoming RFA and moved remaining visit since it was scheduled for same day as first RFA. STM this session to bilateral paraspinals with good response, peripheral machines held due to flare-up of IBS at end of session. MedX resistance was sustained due to time since last session but she was able to " exercise at 73 ft/lb and she was again able to complete 20 reps with an RPE of 3/10. Will plan to  progress MedX resistance 5-10% next session.     Patient is making good progress towards established goals.  Pt will continue to benefit from skilled outpatient physical therapy to address the deficits stated in the impairment chart, provide pt/family education and to maximize pt's level of independence in the home and community environment.     Anticipated Barriers for therapy: chronicity of condition  Pt's spiritual, cultural and educational needs considered and pt agreeable to plan of care and goals as stated below:   :           GOALS: Pt is in agreement with the following goals.     Short term goals:  6 weeks or 10 visits   1.  Pt will demonstrate increased lumbar ROM by at least 6 degrees from the initial ROM value with improvements noted in functional ROM and ability to perform ADLs.  (MET)  2.  Pt will demonstrate increased MedX average isometric strength value  by 20% from initial test resulting in improved ability to perform bending, lifting, and carrying activities safely, confidently.  (MET)  3.  Patient report a reduction in worst pain score by 1-2 points for improved tolerance for walking long distances.  (MET)  4.  Pt able to perform HEP correctly with minimal cueing or supervision from therapist to encourage independent management of symptoms. (MET)        Long term goals: 10 weeks or 20 visits   1. Pt will demonstrate increased lumbar ROM by at least 9 degrees from initial ROM value, resulting in improved ability to perform functional fwd bending while standing and sitting. (approp and ongoing)  2. Pt will demonstrate increased MedX average isometric strength value  by 40% from initial test resulting in improved ability to perform bending, lifting, and carrying activities safely, confidently.  (approp and ongoing)  3. Pt to demonstrate ability to independently control and reduce their pain through  posture positioning and mechanical movements throughout a typical day.  (approp and ongoing)           Plan   Continue with established Plan of Care towards established PT goals.      Eric Mckinnon, PT,   3/4/2022

## 2022-03-07 ENCOUNTER — TELEPHONE (OUTPATIENT)
Dept: DERMATOLOGY | Facility: CLINIC | Age: 67
End: 2022-03-07
Payer: MEDICARE

## 2022-03-07 NOTE — TELEPHONE ENCOUNTER
Informed pt that we will not know what the laser cost will be until she is consulted, pt voiced understanding----- Message from Leonor Kwon sent at 3/7/2022 11:37 AM CST -----  Contact: [phone]  Pt is calling to get a call back or a pt portal message.    Pt would like to know how much the laser treatment is gonna cost. Something about 100 zaps is this much or 300 zaps is this much.    Pt would like a call back.    [phone]

## 2022-03-08 ENCOUNTER — HOSPITAL ENCOUNTER (OUTPATIENT)
Facility: OTHER | Age: 67
Discharge: HOME | End: 2022-03-08
Attending: ANESTHESIOLOGY | Admitting: ANESTHESIOLOGY
Payer: MEDICARE

## 2022-03-08 ENCOUNTER — PATIENT MESSAGE (OUTPATIENT)
Dept: ORTHOPEDICS | Facility: CLINIC | Age: 67
End: 2022-03-08
Payer: MEDICARE

## 2022-03-08 ENCOUNTER — PATIENT MESSAGE (OUTPATIENT)
Dept: REHABILITATION | Facility: OTHER | Age: 67
End: 2022-03-08
Payer: MEDICARE

## 2022-03-08 VITALS
WEIGHT: 146 LBS | HEIGHT: 63 IN | SYSTOLIC BLOOD PRESSURE: 135 MMHG | RESPIRATION RATE: 14 BRPM | OXYGEN SATURATION: 97 % | DIASTOLIC BLOOD PRESSURE: 77 MMHG | BODY MASS INDEX: 25.87 KG/M2 | HEART RATE: 71 BPM | TEMPERATURE: 99 F

## 2022-03-08 DIAGNOSIS — M47.9 OSTEOARTHRITIS OF SPINE, UNSPECIFIED SPINAL OSTEOARTHRITIS COMPLICATION STATUS, UNSPECIFIED SPINAL REGION: Primary | ICD-10-CM

## 2022-03-08 DIAGNOSIS — G89.29 CHRONIC PAIN: ICD-10-CM

## 2022-03-08 PROCEDURE — 25000003 PHARM REV CODE 250: Mod: HCNC | Performed by: ANESTHESIOLOGY

## 2022-03-08 PROCEDURE — 25000003 PHARM REV CODE 250: Mod: HCNC | Performed by: STUDENT IN AN ORGANIZED HEALTH CARE EDUCATION/TRAINING PROGRAM

## 2022-03-08 PROCEDURE — 64635 DESTROY LUMB/SAC FACET JNT: CPT | Mod: HCNC,RT | Performed by: ANESTHESIOLOGY

## 2022-03-08 PROCEDURE — 64636 PR DESTROY L/S FACET JNT ADDL: ICD-10-PCS | Mod: HCNC,RT,, | Performed by: ANESTHESIOLOGY

## 2022-03-08 PROCEDURE — 64635 PR DESTROY LUMB/SAC FACET JNT: ICD-10-PCS | Mod: HCNC,RT,, | Performed by: ANESTHESIOLOGY

## 2022-03-08 PROCEDURE — 64636 DESTROY L/S FACET JNT ADDL: CPT | Mod: HCNC,RT | Performed by: ANESTHESIOLOGY

## 2022-03-08 PROCEDURE — 63600175 PHARM REV CODE 636 W HCPCS: Mod: HCNC | Performed by: ANESTHESIOLOGY

## 2022-03-08 PROCEDURE — 64636 DESTROY L/S FACET JNT ADDL: CPT | Mod: HCNC,RT,, | Performed by: ANESTHESIOLOGY

## 2022-03-08 PROCEDURE — 64635 DESTROY LUMB/SAC FACET JNT: CPT | Mod: HCNC,RT,, | Performed by: ANESTHESIOLOGY

## 2022-03-08 RX ORDER — DEXAMETHASONE SODIUM PHOSPHATE 10 MG/ML
INJECTION INTRAMUSCULAR; INTRAVENOUS
Status: DISCONTINUED | OUTPATIENT
Start: 2022-03-08 | End: 2022-03-08 | Stop reason: HOSPADM

## 2022-03-08 RX ORDER — SODIUM CHLORIDE 9 MG/ML
500 INJECTION, SOLUTION INTRAVENOUS CONTINUOUS
Status: DISCONTINUED | OUTPATIENT
Start: 2022-03-08 | End: 2022-03-08 | Stop reason: HOSPADM

## 2022-03-08 RX ORDER — BUPIVACAINE HYDROCHLORIDE 2.5 MG/ML
INJECTION, SOLUTION EPIDURAL; INFILTRATION; INTRACAUDAL
Status: DISCONTINUED | OUTPATIENT
Start: 2022-03-08 | End: 2022-03-08 | Stop reason: HOSPADM

## 2022-03-08 RX ORDER — FENTANYL CITRATE 50 UG/ML
INJECTION, SOLUTION INTRAMUSCULAR; INTRAVENOUS
Status: DISCONTINUED | OUTPATIENT
Start: 2022-03-08 | End: 2022-03-08 | Stop reason: HOSPADM

## 2022-03-08 RX ORDER — MIDAZOLAM HYDROCHLORIDE 1 MG/ML
INJECTION INTRAMUSCULAR; INTRAVENOUS
Status: DISCONTINUED | OUTPATIENT
Start: 2022-03-08 | End: 2022-03-08 | Stop reason: HOSPADM

## 2022-03-08 RX ORDER — LIDOCAINE HYDROCHLORIDE 20 MG/ML
INJECTION, SOLUTION INFILTRATION; PERINEURAL
Status: DISCONTINUED | OUTPATIENT
Start: 2022-03-08 | End: 2022-03-08 | Stop reason: HOSPADM

## 2022-03-08 NOTE — OP NOTE
Therapeutic Lumbar Medial Branch Radiofrequency Ablation under Fluoroscopy     The procedure, risks, benefits, and options were discussed with the patient. There are no contraindications to the procedure. The patent expressed understanding and agreed to the procedure. Informed written consent was obtained prior to the start of the procedure and can be found in the patient's chart.        PATIENT NAME: Tiarra Norton   MRN: [id number]     DATE OF PROCEDURE: 03/08/2022     PROCEDURE:  Right L3, L4 and L5 Lumbar Radiofrequency Ablation under Fluoroscopy    PRE-OP DIAGNOSIS: Spondylosis of lumbosacral region without myelopathy or radiculopathy [M47.817]    POST-OP DIAGNOSIS: Same    PHYSICIAN: Tara Gibbs MD    ASSISTANTS: Dr. Kevin Martell     MEDICATIONS INJECTED:  Preservative-free Decadron 10mg with 9cc of Bupivicaine 0.25%    LOCAL ANESTHETIC INJECTED:   Xylocaine 2%    SEDATION:   Versed 0.5mg and Fentanyl 25mcg                                                                                                                                                                                     Conscious sedation ordered by M.D. Patient re-evaluation prior to administration of conscious sedation. No changes noted in patient's status from initial evaluation. The patient's vital signs were monitored by RN and patient remained hemodynamically stable throughout the procedure.    Event Time In   Sedation Start 1440       ESTIMATED BLOOD LOSS:  None    COMPLICATIONS:  None     INTERVAL HISTORY: Patient has clinical and imaging findings suggestive of facet mediated pain. Patients has completed 2 previous diagnostic medial branch blocks at specified levels with at least 80% relief for the expected duration of the local anesthetic utilized.    TECHNIQUE: Time-out was performed to identify the patient and procedure to be performed. With the patient laying in a prone position, the surgical area was prepped and draped in the usual  sterile fashion using ChloraPrep and fenestrated drape. The levels were determined under fluoroscopic guidance. Skin anesthesia was achieved by injecting Lidocaine 2% over the injection sites. A 18 gauge 10mm curved active tip needle was introduced to the anatomic local of the medial branch at each of the above levels using AP, lateral and/or contralateral oblique fluoroscopic imaging. Then sensory and motor testing was performed to confirm that the needle tips were in the correct location. After negative aspiration for blood or CSF was confirmed, 1 mL of the lidocaine 2% listed above was injected slowly at each site. This was followed by thermal lesioning at 80 degrees celsius for 90 seconds. That was followed by slowly injecting 1 mL of the medication mixture listed above at each site. The needles were removed and bleeding was nil. A sterile dressing was applied. No specimens collected. The patient tolerated the procedure well and did not have any procedure related motor deficit at the conclusion of the procedure.    The patient was monitored after the procedure in the recovery area. They were given post-procedure and discharge instructions to follow at home. The patient was discharged in a stable condition.    I reviewed and edited the fellow's note. I conducted my own interview and physical examination. I agree with the findings. I was present and supervising all critical portions of the procedure.    Tara Gibbs MD

## 2022-03-08 NOTE — DISCHARGE SUMMARY
[facility] ([facility]  Discharge Note  Short Stay    Procedure(s) (LRB):  RADIOFREQUENCY ABLATION, L3-L5 1 OF 2 (Right)    OUTCOME: Patient tolerated treatment/procedure well without complication and is now ready for discharge.    DISPOSITION: Home or Self Care    FINAL DIAGNOSIS:  Lumbosacral spondylosis    FOLLOWUP: In clinic    DISCHARGE INSTRUCTIONS:  No discharge procedures on file.     TIME SPENT ON DISCHARGE: 2 minutes

## 2022-03-08 NOTE — DISCHARGE INSTRUCTIONS

## 2022-03-08 NOTE — H&P (VIEW-ONLY)
HPI  Patient presenting for Procedure(s) (LRB):  RADIOFREQUENCY ABLATION, L3-L5 1 OF 2 (Right)     Patient on Anti-coagulation No    No health changes since previous encounter    Past Medical History:   Diagnosis Date    Cataract     Depression     Gestational diabetes     Hyperlipidemia     Hypertension     IBS (irritable bowel syndrome)     Thyroid disease      Past Surgical History:   Procedure Laterality Date    ADENOIDECTOMY      ARTHROSCOPIC CHONDROPLASTY OF KNEE JOINT Right 10/19/2021    Procedure: ARTHROSCOPY, KNEE, WITH CHONDROPLASTY;  Surgeon: Trevin Huber MD;  Location: [facility] OR;  Service: Orthopedics;  Laterality: Right;    ARTHROSCOPY OF KNEE Right 10/19/2021    Procedure: ARTHROSCOPY, KNEE-RIGHT;  Surgeon: Trevin Huber MD;  Location: [facility] OR;  Service: Orthopedics;  Laterality: Right;     SECTION      CHOLECYSTECTOMY      COLONOSCOPY N/A 2016    Procedure: COLONOSCOPY;  Surgeon: Heri Segura MD;  [facility]  Service: Endoscopy;  Laterality: N/A;    COLONOSCOPY N/A 2019    Procedure: COLONOSCOPY;  Surgeon: Joe Pitts MD;  [facility]  Service: Endoscopy;  Laterality: N/A;    ESOPHAGOGASTRODUODENOSCOPY N/A 2020    Procedure: EGD (ESOPHAGOGASTRODUODENOSCOPY);  Surgeon: Tolu Rivas MD;  [facility]  Service: Endoscopy;  Laterality: N/A;  Pt. moved to 9:15am arrival time.. Instructed to not have anything after midnight.EC    EYE SURGERY      cataract resection right    INJECTION OF ANESTHETIC AGENT AROUND NERVE Bilateral 2022    Procedure: Block, Nerve MEDIAL BRANCH BLOCK BILATERAL L3,4,5;  Surgeon: Tara Gibbs MD;  Location: [facility] PAIN MGT;  Service: Pain Management;  Laterality: Bilateral;    INJECTION OF ANESTHETIC AGENT AROUND NERVE Bilateral 2/15/2022    Procedure: BLOCK, NERVE, L3*L4-L5 MEDIAL BR ANCH 2 OF 2;  Surgeon: Tara Gibbs MD;  Location: [facility] PAIN [facility]GT;   Service: Pain Management;  Laterality: Bilateral;    JOINT REPLACEMENT      partial right knee    KNEE ARTHROSCOPY W/ MENISCECTOMY Right 10/19/2021    Procedure: ARTHROSCOPY, KNEE, WITH MENISCECTOMY, PARTIAL LATERAL;  Surgeon: Trevin Huber MD;  Location: [facility];  Service: Orthopedics;  Laterality: Right;    r hand surgery      TONSILLECTOMY      TOTAL KNEE ARTHROPLASTY      partial knee replacement    TUBAL LIGATION       Review of patient's allergies indicates:  No Known Allergies   No current facility-administered medications for this encounter.       PMHx, PSHx, Allergies, Medications reviewed in epic    ROS negative except pain complaints in HPI    OBJECTIVE:    There were no vitals taken for this visit.    PHYSICAL EXAMINATION:    GENERAL: Well appearing, in no acute distress, alert and oriented x3.  PSYCH:  Mood and affect appropriate.  SKIN: Skin color, texture, turgor normal, no rashes or lesions which will impact the procedure.  CV: RRR with palpation of the radial artery.  PULM: No evidence of respiratory difficulty, symmetric chest rise. Clear to auscultation.  NEURO: Cranial nerves grossly intact.    Plan:    Proceed with procedure as planned Procedure(s) (LRB):  RADIOFREQUENCY ABLATION, L3-L5 1 OF 2 (Right)    Freedom Alex  03/08/2022

## 2022-03-08 NOTE — H&P
HPI  Patient presenting for Procedure(s) (LRB):  RADIOFREQUENCY ABLATION, L3-L5 1 OF 2 (Right)     Patient on Anti-coagulation No    No health changes since previous encounter    Past Medical History:   Diagnosis Date    Cataract     Depression     Gestational diabetes     Hyperlipidemia     Hypertension     IBS (irritable bowel syndrome)     Thyroid disease      Past Surgical History:   Procedure Laterality Date    ADENOIDECTOMY      ARTHROSCOPIC CHONDROPLASTY OF KNEE JOINT Right 10/19/2021    Procedure: ARTHROSCOPY, KNEE, WITH CHONDROPLASTY;  Surgeon: Trevin Huber MD;  Location: [facility] OR;  Service: Orthopedics;  Laterality: Right;    ARTHROSCOPY OF KNEE Right 10/19/2021    Procedure: ARTHROSCOPY, KNEE-RIGHT;  Surgeon: Trevin Huber MD;  Location: [facility] OR;  Service: Orthopedics;  Laterality: Right;     SECTION      CHOLECYSTECTOMY      COLONOSCOPY N/A 2016    Procedure: COLONOSCOPY;  Surgeon: Heri Segura MD;  [facility]  Service: Endoscopy;  Laterality: N/A;    COLONOSCOPY N/A 2019    Procedure: COLONOSCOPY;  Surgeon: Joe Pitts MD;  [facility]  Service: Endoscopy;  Laterality: N/A;    ESOPHAGOGASTRODUODENOSCOPY N/A 2020    Procedure: EGD (ESOPHAGOGASTRODUODENOSCOPY);  Surgeon: Tolu Rivas MD;  [facility]  Service: Endoscopy;  Laterality: N/A;  Pt. moved to 9:15am arrival time.. Instructed to not have anything after midnight.EC    EYE SURGERY      cataract resection right    INJECTION OF ANESTHETIC AGENT AROUND NERVE Bilateral 2022    Procedure: Block, Nerve MEDIAL BRANCH BLOCK BILATERAL L3,4,5;  Surgeon: Tara Gibbs MD;  Location: [facility] PAIN MGT;  Service: Pain Management;  Laterality: Bilateral;    INJECTION OF ANESTHETIC AGENT AROUND NERVE Bilateral 2/15/2022    Procedure: BLOCK, NERVE, L3*L4-L5 MEDIAL BR ANCH 2 OF 2;  Surgeon: Tara Gibbs MD;  Location: [facility] PAIN [facility]GT;   Service: Pain Management;  Laterality: Bilateral;    JOINT REPLACEMENT      partial right knee    KNEE ARTHROSCOPY W/ MENISCECTOMY Right 10/19/2021    Procedure: ARTHROSCOPY, KNEE, WITH MENISCECTOMY, PARTIAL LATERAL;  Surgeon: Trevin Huber MD;  Location: [facility];  Service: Orthopedics;  Laterality: Right;    r hand surgery      TONSILLECTOMY      TOTAL KNEE ARTHROPLASTY      partial knee replacement    TUBAL LIGATION       Review of patient's allergies indicates:  No Known Allergies   No current facility-administered medications for this encounter.       PMHx, PSHx, Allergies, Medications reviewed in epic    ROS negative except pain complaints in HPI    OBJECTIVE:    There were no vitals taken for this visit.    PHYSICAL EXAMINATION:    GENERAL: Well appearing, in no acute distress, alert and oriented x3.  PSYCH:  Mood and affect appropriate.  SKIN: Skin color, texture, turgor normal, no rashes or lesions which will impact the procedure.  CV: RRR with palpation of the radial artery.  PULM: No evidence of respiratory difficulty, symmetric chest rise. Clear to auscultation.  NEURO: Cranial nerves grossly intact.    Plan:    Proceed with procedure as planned Procedure(s) (LRB):  RADIOFREQUENCY ABLATION, L3-L5 1 OF 2 (Right)    Freedom Alex  03/08/2022

## 2022-03-10 ENCOUNTER — CLINICAL SUPPORT (OUTPATIENT)
Dept: REHABILITATION | Facility: OTHER | Age: 67
End: 2022-03-10
Attending: INTERNAL MEDICINE
Payer: MEDICARE

## 2022-03-10 DIAGNOSIS — M25.661 DECREASED RANGE OF MOTION (ROM) OF RIGHT KNEE: Primary | ICD-10-CM

## 2022-03-10 DIAGNOSIS — R29.898 WEAKNESS OF RIGHT LOWER EXTREMITY: ICD-10-CM

## 2022-03-10 NOTE — PROGRESS NOTES
"  [facility]   Physical Therapy Treatment Note     Name: Tiarra Norton  Clinic Number: [id number]    Therapy Diagnosis:   Encounter Diagnoses   Name Primary?    Decreased range of motion (ROM) of right knee Yes    Weakness of right lower extremity      Physician: Trevin Huber MD    Visit Date: 3/10/2022    Physician Orders: PT Eval and Treat  Medical Diagnosis from Referral: M17.11 (ICD-10-CM) - Primary osteoarthritis of right knee  Evaluation Date: 2/10/2022  Authorization Period Expiration: 6/10/2022  Plan of Care Expiration: 4/10/2022  Visit # / Visits authorized: 1/ 1 (pending)   Progress Note Due: 4/10/2022  FOTO: 1/ 1    PTA Visit #: 0/5     Time In: 10:00 am  Time Out: 11:05 am  Total Billable Time: 55 minutes    SUBJECTIVE     Pt reports: her knee feels about the same, she has done her HEP a few times but not everyday.  She was compliant with home exercise program.  Response to previous treatment: some soreness  Functional change: first follow up    Pain: 5/10  Location: right medial knee      OBJECTIVE     Objective Measures updated at progress report unless specified.     Range of Motion: - reassessed 3/10/22     Knee Right active Right Passive   Flexion 128 133   Extension 2 4         Lower Extremity Strength - reassessed 3/10/22  Right LE   Left LE     Knee extension: 4/5 Knee extension: 4+/5   Knee flexion: 4/5 Knee flexion: 4+/5   Hip flexion: 4/5 Hip flexion: 4+/5   Hip extension:  3+/5 Hip extension: 4-/5   Hip abduction: 3+/5 Hip abduction: 4-/5   Hip adduction: 4-/5 Hip adduction 4-/5   Ankle dorsiflexion: 5/5 Ankle dorsiflexion: 5/5   Ankle plantarflexion: 5/5 Ankle plantarflexion: 5/5          Treatment     Tiarra received the treatments listed below:      therapeutic exercises to develop strength, endurance, ROM and flexibility for 45 minutes including:    Quad set 2x10x5"  SAQ 3# 2x10x3"  SLR with ER 2x10x3"  VMO LAQ 3# 2x10x3"  Hamstring stretch " "2x30"  Prone quad stretch 2x30"  Clamshells GTB 2x10  SL adduction R only 2x10x3"  SL abduction R only 2x10x3"  SLB on airex 3x30"  TKE GTB 20x5"  Sit to stands GTB 2x10    manual therapy techniques: Joint mobilizations and Soft tissue Mobilization were applied to the: R quad for 10 minutes, including:  IASTM R quad  Patellar mobs all planes  Ant/post tibiofemoral mobs grade III/IV    cold pack for 10 minutes to R knee.        Patient Education and Home Exercises     Home Exercises Provided and Patient Education Provided     Education provided:   - HEP, POC    Written Home Exercises Provided: Patient instructed to cont prior HEP. Exercises were reviewed and Tiarra was able to demonstrate them prior to the end of the session.  Tiarra demonstrated good  understanding of the education provided. See EMR under Patient Instructions for exercises provided during therapy sessions    ASSESSMENT     Tiarra presents today for her first follow up since initial evaluation. Due to length of time since last visit, reassessment was performed with pt demo improved passive and active ROM of R knee with flexion and extension. She demo min change in LE strength, however she admits she has not been doing as much knee HEP, because she has been focusing on her back as she is nearing the end of healthy back program. Reviewed HEP today and pt able to demo all exercises with min cueing. Continued and progressed with dynamic LE strengthening, balance, and ROM exercises with good tolerance and no adverse effects. Continue to progress as tolerated.    Tiarra Is progressing well towards her goals.   Pt prognosis is Good.     Pt will continue to benefit from skilled outpatient physical therapy to address the deficits listed in the problem list box on initial evaluation, provide pt/family education and to maximize pt's level of independence in the home and community environment.     Pt's spiritual, cultural and educational needs considered and pt " agreeable to plan of care and goals.     Anticipated barriers to physical therapy: none    GOALS: Short Term Goals:  4 weeks  1.Report decreased in pain at worse less than  <   / =  5  /10  to increase tolerance for functional mobility.On going  2. Pt to improve R knee range of motion by 25% to allow for improved functional mobility to allow for improvement in IADLs. .On going  3. Increased R LE MMT 1/2 grade to increase tolerance for ADL and work activities.On going  4. Pt to report ability to walk 1 mile without increased knee pain. Ongoing  5. Pt to tolerate HEP to improve ROM and independence with ADL's. On going     Long Term Goals: 8 weeks  1.Report decreased in pain at worse less than  <   / =  3  /10  to increase tolerance for functional mobility. On going  2.Pt to improve range of motion by 75% to allow for improved functional mobility to allow for improvement in IADLs. On going  3.Increased R LE MMT 1 grade to increase tolerance for ADL and work activities.On going  4. Pt will report 41% or less on FOTO knee survey  to demonstrate increase in LE function with every day tasks. On going  5. Pt to be Independent with HEP to improve ROM and independence with ADL's. On going    PLAN     Plan of care Certification: 2/10/2022 to 4/10/2022.    Zi Anaya, PT

## 2022-03-11 ENCOUNTER — CLINICAL SUPPORT (OUTPATIENT)
Dept: REHABILITATION | Facility: OTHER | Age: 67
End: 2022-03-11
Attending: INTERNAL MEDICINE
Payer: MEDICARE

## 2022-03-11 ENCOUNTER — PROCEDURE VISIT (OUTPATIENT)
Dept: DERMATOLOGY | Facility: CLINIC | Age: 67
End: 2022-03-11
Payer: MEDICARE

## 2022-03-11 DIAGNOSIS — H02.30: ICD-10-CM

## 2022-03-11 DIAGNOSIS — M25.69 DECREASED RANGE OF MOTION OF TRUNK AND BACK: Primary | ICD-10-CM

## 2022-03-11 DIAGNOSIS — L81.4 LENTIGINES: ICD-10-CM

## 2022-03-11 DIAGNOSIS — Z41.1 ENCOUNTER FOR COSMETIC LASER PROCEDURE: Primary | ICD-10-CM

## 2022-03-11 DIAGNOSIS — R29.3 POOR POSTURE: ICD-10-CM

## 2022-03-11 PROCEDURE — 17999 UNLISTD PX SKN MUC MEMB SUBQ: CPT | Mod: CSM,S$GLB,, | Performed by: DERMATOLOGY

## 2022-03-11 PROCEDURE — 97110 THERAPEUTIC EXERCISES: CPT

## 2022-03-11 PROCEDURE — 99499 NO LOS: ICD-10-PCS | Mod: CSM,S$GLB,, | Performed by: DERMATOLOGY

## 2022-03-11 PROCEDURE — 17999 PR V BEAM,  25-50 PULSES: ICD-10-PCS | Mod: CSM,S$GLB,, | Performed by: DERMATOLOGY

## 2022-03-11 PROCEDURE — 99499 UNLISTED E&M SERVICE: CPT | Mod: CSM,S$GLB,, | Performed by: DERMATOLOGY

## 2022-03-11 NOTE — PROGRESS NOTES
[facility]      Name: Tiarra Chang [facility]  Clinic Number: [id number]    Therapy Diagnosis:   Encounter Diagnoses   Name Primary?    Decreased range of motion of trunk and back Yes    Poor posture      Physician: Kayaky Perales MD    Visit Date: 3/11/2022    Physician Orders:PT EVAL and treat  Medical Diagnosis from Referral: M54.9 (ICD-10-CM) - Back pain, unspecified back location, unspecified back pain laterality, unspecified chronicity  Evaluation Date: 2021 ([facility] year of care started 2021)  Authorization Period Expiration: 2022  UPDATED Plan of Care Expiration:  3/28/2022  Visit # / Visits authorized:        Time In: 1245 pm  Time Out: 135 am  Total Billable Time: 50 minutes     Precautions: Standard     Pattern of pain determined: Pattern 1 PEP    Subjective   Tiarra reports she is feeling okay today, looking forward to doing wellness as well as continuing therapy for her knee.        Patient reports tolerating previous visit well /c no c/o of LB discomfort but did have some soreness.   Patient reports their pain to be 2/10 on a 0-10 scale with 0 being no pain and 10 being the worst pain imaginable.    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  Patients goals: to improve the pain when completing     Objective     Baseline IM Testing Results:   Date of testin2021  ROM 0-42 deg   Max Peak Torque 110    Min Peak Torque 25    Flex/Ext Ratio 4:1   % below normative data -36%     Midpoint IM Testing Results:   Date of testin/3/2022  ROM 0-54 deg   Max Peak Torque 131    Min Peak Torque 59    Flex/Ext Ratio 2.5   % above normative data  % gain from initial 3  67     Discharge IM Testing Results:   Date of testing: 3/11/2022  ROM 0-60 deg   Max Peak Torque 145   Min Peak Torque 101   Flex/Ext Ratio 1.5   % above normative data  % gain from initial 10  119       Lumbar  "FOTO  Initial: 28%  Visit 6: 30%  Visit 10: 26%  Discharge: 30%  Goal:  25%       Treatment    Pt was instructed in and performed the following:     Tiarra received therapeutic exercises to develop/improved posture, cardiovascular endurance, muscular endurance, lumbar/cervical ROM, strength and muscular endurance for 50 minutes including the following exercises:     LTR x 10 ea  PPT x10 5"  Supine figure four + HSS   EIS with L lateral deviation x 10    [facility] 3/11/2022   Visit Number 20   VAS Pain Rating 2   Time 5   Lumbar Stretches - Slouch Overcorrection -   Extension in Lying -   Extension in Standing 10   Flexion in Lying 10   Manual Therapy -   Lumbar Extension Seat Pad -   Femur Restraint -   Top Dead Center -   Counterweight -   Lumbar Flexion 60   Lumbar Extension 0   Lumbar Peak Torque 145   Min Torque 101   Test Percent Below Normative Data -   Test Percent Gain in Strength from Initial  119   Lumbar Weight -   Repetitions -   Rating of Perceived Exertion -   Ice - Z Lie (in min.) 5               Not performed 3/11/2022 :  90/90 heel taps 5x2 B   SLR with TBall for TA brace 2x15 B  Bridge /c GTB hip AB  x20  BKFO w/GTB x10 ea  Seated L sidebend stretch 10" x 5      Peripheral muscle strengthening which included 1 set of 15-20 repetitions at a slow, controlled 10-13 second per rep pace focused on strengthening supporting musculature for improved body mechanics and functional mobility.  Pt and therapist focused on proper form during treatment to ensure optimal strengthening of each targeted muscle group.  Machines were utilized including torso rotation, leg extension, leg curl, chest press, upright row. Tricep extension, bicep curl, leg press, and hip abduction added visit 3    Tiarra received the following manual therapy techniques: nil        Home Exercises Provided and Patient Education Provided   Home exercises include: DKTC, SKTC, LTR, Supine HS stretch with belt, Supine piriformis stretch, " PPT, Standing ext, Bridge, BKFO YTB with PPT    Written Home Exercises Provided: Patient instructed to cont prior HEP.  Exercises were reviewed and Tiarra was able to demonstrate them prior to the end of the session.  Tiarra demonstrated good  understanding of the education provided.     See EMR under Patient Instructions for exercises provided prior visit.          Assessment   Patient has attended 20 visits of the [facility] program for aerobic work, med ex isometric testing with dynamic strengthening on med ex equipment for spine, whole body strengthening on med ex equipment, HEP, education.  Patient has completed [facility] Program and is ready to be transitioned into wellness program.  Importance of wellness program, and attending 1/week to maintain strength stressed.  Importance of continuing there ex and body mech and ergonomics stressed.   Patient demonstrates improvement in ability to reduce symptoms, improved posture, improved ROM and improved strength.   Reviewed HEP, and importance of maintaining a healthy spine through continued stretching and performance of HEP, good posture, good ergonomics, good body mech with ADL, leisure, and work.  Discharge handout with HEP given, and discussed aspects of exercise program, cardio, strengthening, and stretching.    Patient expressed understanding information given.  Patient plans to attend wellness and is ready to transition to wellness.      -Improved posture, better using lumbar roll  -Improved lumbar ROM,  initially on med ex test 0-42 and   currently 0-60  -Improved strength at each test point on lumbar med ex IM test with   119% average improvement noted with Reduced pain  Noted by patient            Patient is making good progress towards established goals.  Pt will continue to benefit from skilled outpatient physical therapy to address the deficits stated in the impairment chart, provide pt/family education and to maximize pt's level of independence in the  home and community environment.     Anticipated Barriers for therapy: chronicity of condition  Pt's spiritual, cultural and educational needs considered and pt agreeable to plan of care and goals as stated below:   :           GOALS: Pt is in agreement with the following goals.     Short term goals:  6 weeks or 10 visits   1.  Pt will demonstrate increased lumbar ROM by at least 6 degrees from the initial ROM value with improvements noted in functional ROM and ability to perform ADLs.  (MET)  2.  Pt will demonstrate increased MedX average isometric strength value  by 20% from initial test resulting in improved ability to perform bending, lifting, and carrying activities safely, confidently.  (MET)  3.  Patient report a reduction in worst pain score by 1-2 points for improved tolerance for walking long distances.  (MET)  4.  Pt able to perform HEP correctly with minimal cueing or supervision from therapist to encourage independent management of symptoms. (MET)        Long term goals: 10 weeks or 20 visits   1. Pt will demonstrate increased lumbar ROM by at least 9 degrees from initial ROM value, resulting in improved ability to perform functional fwd bending while standing and sitting. (MET)  2. Pt will demonstrate increased MedX average isometric strength value  by 40% from initial test resulting in improved ability to perform bending, lifting, and carrying activities safely, confidently.  (MET)  3. Pt to demonstrate ability to independently control and reduce their pain through posture positioning and mechanical movements throughout a typical day.  (MET)           Plan   Patient is discharged from PT and will continue progress into wellness program.      Eric Mckinnon, PT,   3/11/2022

## 2022-03-11 NOTE — PROGRESS NOTES
Tiarra Norton is a 67 y.o. female who is here today for consult for laser treatment of lentigines on face and hands.   Discussed risks, benefits, alternatives, and side effects of pulse dye laser (PDL).  The patient was given the opportunity to ask any questions, and all questions were answered to the patient's satisfaction.  The patient verbalized understanding, elected to proceed, and signed a written informed consent.          She also inquired about surgery for her eyelid laxity. She has seen Dr. Adair Broussard in the past who told her that she needed a full face lift as well; however, at this time she is only interested in having her eyelids done.    Cutis laxa of lower eyelid  Cutis laxa of upper eyelid  -     Ambulatory referral/consult to ENT; Future; Expected date: 03/18/2022  Referred to Dr. Nicholas Goodwin for blepharoplasty consult.

## 2022-03-11 NOTE — PATIENT INSTRUCTIONS
"HEALTHY BACK TOOLS        KEEP YOUR SPINE FEELING FINE   HEALTHY HABITS   Do your "GO TO" stretches 2/day   Get a good night's REST   Watch your POSTURE in sitting/standing Drink PLENTY of water   Use a lumbar roll Eat LOTS of fruits & vegetables   GET UP often (walk and/or stretch) Manage your STRESS   Make your workplace IDEAL FOR YOU  Don't smoke   Lift correctly EXERCISE                           WHAT TO DO WHEN SYMPTOMS FLARE UP  Back and neck pain may occasionally flare up.  If you experience a flare   up, remember your tools. Be encouraged, by remembering that flare-ups will   usually pass.   My Tools:    ~Use your "Go To" Stretches/Positions   ~Keep Moving-pain usually gets better if you move  ~Z lie (with or without ice)  10 min several times a day until symptoms reduce  ~Slowly resume normal activities   ~Practice Deep Breathing and Relaxation techniques                                                 MY EXERCISE PLAN  GO TO STRETCHES  2/day (like brushing your teeth) STRENGTHENING  2-3 times/week CARDIO PROGRAM   min/week                               "

## 2022-03-15 ENCOUNTER — CLINICAL SUPPORT (OUTPATIENT)
Dept: REHABILITATION | Facility: OTHER | Age: 67
End: 2022-03-15
Attending: INTERNAL MEDICINE
Payer: MEDICARE

## 2022-03-15 DIAGNOSIS — M25.661 DECREASED RANGE OF MOTION (ROM) OF RIGHT KNEE: Primary | ICD-10-CM

## 2022-03-15 DIAGNOSIS — R29.898 WEAKNESS OF RIGHT LOWER EXTREMITY: ICD-10-CM

## 2022-03-15 PROCEDURE — 97110 THERAPEUTIC EXERCISES: CPT

## 2022-03-15 PROCEDURE — 97140 MANUAL THERAPY 1/> REGIONS: CPT

## 2022-03-15 NOTE — PROGRESS NOTES
" [facility]   Physical Therapy Treatment Note     Name: Tiarra Norton  Clinic Number: [id number]    Therapy Diagnosis:   Encounter Diagnoses   Name Primary?    Decreased range of motion (ROM) of right knee Yes    Weakness of right lower extremity      Physician: Trevin Huber MD    Visit Date: 3/15/2022    Physician Orders: PT Eval and Treat  Medical Diagnosis from Referral: M17.11 (ICD-10-CM) - Primary osteoarthritis of right knee  Evaluation Date: 2/10/2022  Authorization Period Expiration: 6/10/2022  Plan of Care Expiration: 4/10/2022  Visit # / Visits authorized: 3/20   Progress Note Due: 4/10/2022  FOTO: 1/ 1    PTA Visit #: 0/5     Time In: 3:00 pm  Time Out: 4:05 pm  Total Billable Time: 55 minutes    SUBJECTIVE     Pt reports: she left last visit without any pain but then was very sore the next few days. It got better on the 3rd day but then she overdid it and now its sore again    She was compliant with home exercise program.  Response to previous treatment: increased soress soreness  Functional change: no change yet    Pain: 5/10  Location: right medial knee      OBJECTIVE     Objective Measures updated at progress report unless specified.     Range of Motion: - reassessed 3/10/22     Knee Right active Right Passive   Flexion 128 133   Extension 2 4         Lower Extremity Strength - reassessed 3/10/22  Right LE   Left LE     Knee extension: 4/5 Knee extension: 4+/5   Knee flexion: 4/5 Knee flexion: 4+/5   Hip flexion: 4/5 Hip flexion: 4+/5   Hip extension:  3+/5 Hip extension: 4-/5   Hip abduction: 3+/5 Hip abduction: 4-/5   Hip adduction: 4-/5 Hip adduction 4-/5   Ankle dorsiflexion: 5/5 Ankle dorsiflexion: 5/5   Ankle plantarflexion: 5/5 Ankle plantarflexion: 5/5          Treatment     Tiarra received the treatments listed below:      therapeutic exercises to develop strength, endurance, ROM and flexibility for 45 minutes including:    Quad set 2x10x5"  SAQ 3# " "2x10x3"  SLR with ER 1# 2x10x3"  VMO LAQ 3# 2x10x3"  Hamstring stretch 2x30"  Blas stretch 2x30" - modified from prone quad stretch   Clamshells GTB 2x10  SLB 3x30"  TKE GTB 20x5"  Sit to stands GTB 2x10  Heel raises 2x15  slantboard gastroc stretch 2x30"  sidestepping RTB 2x10 steps  Step ups into SLS 8" 2x10    NP:  SL adduction R only 2x10x3"  SL abduction R only 2x10x3"    manual therapy techniques: Joint mobilizations and Soft tissue Mobilization were applied to the: R quad for 10 minutes, including:  IASTM R quad  Patellar mobs all planes  Ant/post tibiofemoral mobs grade III/IV    cold pack for 10 minutes to R knee.        Patient Education and Home Exercises     Home Exercises Provided and Patient Education Provided     Education provided:   - HEP, POC    Written Home Exercises Provided: Patient instructed to cont prior HEP. Exercises were reviewed and Tiarra was able to demonstrate them prior to the end of the session.  Tiarra demonstrated good  understanding of the education provided. See EMR under Patient Instructions for exercises provided during therapy sessions    ASSESSMENT     Tiarra presents today with continued complaints of pain. Reporting decreased tightness and soreness post manual therapy techniques. Progressed therex today with good tolerance and no adverse effects and min pain aggravation. Initiated step ups to improve stair tolerance and dynamic single leg strength. Continue to progress as tolerated.     Tiarra Is progressing well towards her goals.   Pt prognosis is Good.     Pt will continue to benefit from skilled outpatient physical therapy to address the deficits listed in the problem list box on initial evaluation, provide pt/family education and to maximize pt's level of independence in the home and community environment.     Pt's spiritual, cultural and educational needs considered and pt agreeable to plan of care and goals.     Anticipated barriers to physical therapy: none    GOALS: " Short Term Goals:  4 weeks  1.Report decreased in pain at worse less than  <   / =  5  /10  to increase tolerance for functional mobility. (not met, progressing)  2. Pt to improve R knee range of motion by 25% to allow for improved functional mobility to allow for improvement in IADLs. (not met, progressing)  3. Increased R LE MMT 1/2 grade to increase tolerance for ADL and work activities. (not met, progressing)  4. Pt to report ability to walk 1 mile without increased knee pain. (not met, progressing)  5. Pt to tolerate HEP to improve ROM and independence with ADL's. (not met, progressing)     Long Term Goals: 8 weeks  1.Report decreased in pain at worse less than  <   / =  3  /10  to increase tolerance for functional mobility. (not met, progressing)  2.Pt to improve range of motion by 75% to allow for improved functional mobility to allow for improvement in IADLs. (not met, progressing)  3.Increased R LE MMT 1 grade to increase tolerance for ADL and work activities. (not met, progressing)  4. Pt will report 41% or less on FOTO knee survey  to demonstrate increase in LE function with every day tasks. (not met, progressing)  5. Pt to be Independent with HEP to improve ROM and independence with ADL's. (not met, progressing)    PLAN     Plan of care Certification: 2/10/2022 to 4/10/2022.    Continue treatment per established plan of care.     Zi Anaya, PT

## 2022-03-16 ENCOUNTER — PATIENT MESSAGE (OUTPATIENT)
Dept: INTERNAL MEDICINE | Facility: CLINIC | Age: 67
End: 2022-03-16
Payer: MEDICARE

## 2022-03-17 ENCOUNTER — CLINICAL SUPPORT (OUTPATIENT)
Dept: REHABILITATION | Facility: OTHER | Age: 67
End: 2022-03-17
Attending: INTERNAL MEDICINE
Payer: MEDICARE

## 2022-03-17 DIAGNOSIS — M25.661 DECREASED RANGE OF MOTION (ROM) OF RIGHT KNEE: Primary | ICD-10-CM

## 2022-03-17 DIAGNOSIS — R29.898 WEAKNESS OF RIGHT LOWER EXTREMITY: ICD-10-CM

## 2022-03-17 PROCEDURE — 97110 THERAPEUTIC EXERCISES: CPT

## 2022-03-17 PROCEDURE — 97140 MANUAL THERAPY 1/> REGIONS: CPT

## 2022-03-17 NOTE — PROGRESS NOTES
"  [facility]   Physical Therapy Treatment Note     Name: Tiarra Norton  Clinic Number: [id number]    Therapy Diagnosis:   Encounter Diagnoses   Name Primary?    Decreased range of motion (ROM) of right knee Yes    Weakness of right lower extremity      Physician: Trevin Huber MD    Visit Date: 3/17/2022    Physician Orders: PT Eval and Treat  Medical Diagnosis from Referral: M17.11 (ICD-10-CM) - Primary osteoarthritis of right knee  Evaluation Date: 2/10/2022  Authorization Period Expiration: 6/10/2022  Plan of Care Expiration: 4/10/2022  Visit # / Visits authorized: 4/20   Progress Note Due: 4/10/2022  FOTO: 1/ 1    PTA Visit #: 0/5     Time In: 10:00 am  Time Out: 11:05 am  Total Billable Time: 55 minutes    SUBJECTIVE     Pt reports: continued soreness in the quad since last time, but the pain has decreased     She was compliant with home exercise program.  Response to previous treatment: increased soress soreness  Functional change: no change yet    Pain: 3/10  Location: right medial knee      OBJECTIVE     Objective Measures updated at progress report unless specified.     Range of Motion: - reassessed 3/10/22     Knee Right active Right Passive   Flexion 128 133   Extension 2 4         Lower Extremity Strength - reassessed 3/10/22  Right LE   Left LE     Knee extension: 4/5 Knee extension: 4+/5   Knee flexion: 4/5 Knee flexion: 4+/5   Hip flexion: 4/5 Hip flexion: 4+/5   Hip extension:  3+/5 Hip extension: 4-/5   Hip abduction: 3+/5 Hip abduction: 4-/5   Hip adduction: 4-/5 Hip adduction 4-/5   Ankle dorsiflexion: 5/5 Ankle dorsiflexion: 5/5   Ankle plantarflexion: 5/5 Ankle plantarflexion: 5/5          Treatment     Tiarra received the treatments listed below:      therapeutic exercises to develop strength, endurance, ROM and flexibility for 45 minutes including:    SAQ 3# 2x10x3"  SLR with ER 1# 2x10x3"  SL hip abduction 2x10  VMO LAQ 3# 2x10x3"  Hamstring stretch 2x30" " "  Prone quad stretch 2x30" thigh propped up with pillow  Clamshells GTB 2x10 - NP  SLB 3x30"  TKE GTB 20x5"  Sit to stands GTB 2x10  Heel raises 2x15  slantboard gastroc stretch 2x30"  sidestepping RTB 2x10 steps  Step ups into SLS 8" 2x10  +SL leg press 70# 2x10      manual therapy techniques: Joint mobilizations and Soft tissue Mobilization were applied to the: R quad for 10 minutes, including:  IASTM R quad  Patellar mobs all planes  Ant/post tibiofemoral mobs grade III/IV    cold pack for 10 minutes to R knee.        Patient Education and Home Exercises     Home Exercises Provided and Patient Education Provided     Education provided:   - HEP, POC    Written Home Exercises Provided: Patient instructed to cont prior HEP. Exercises were reviewed and Tiarra was able to demonstrate them prior to the end of the session.  Tiarra demonstrated good  understanding of the education provided. See EMR under Patient Instructions for exercises provided during therapy sessions    ASSESSMENT     Tiarra presents today with decreased pain levels overall. Continuation and progression of dynamic LE strengthening and flexibility exercises with good tolerance and no adverse effects. Improved quad activation noted today. Continue to progress as tolerated.       Tiarra Is progressing well towards her goals.   Pt prognosis is Good.     Pt will continue to benefit from skilled outpatient physical therapy to address the deficits listed in the problem list box on initial evaluation, provide pt/family education and to maximize pt's level of independence in the home and community environment.     Pt's spiritual, cultural and educational needs considered and pt agreeable to plan of care and goals.     Anticipated barriers to physical therapy: none    GOALS: Short Term Goals:  4 weeks  1.Report decreased in pain at worse less than  <   / =  5  /10  to increase tolerance for functional mobility. (not met, progressing)  2. Pt to improve R knee range " of motion by 25% to allow for improved functional mobility to allow for improvement in IADLs. (not met, progressing)  3. Increased R LE MMT 1/2 grade to increase tolerance for ADL and work activities. (not met, progressing)  4. Pt to report ability to walk 1 mile without increased knee pain. (not met, progressing)  5. Pt to tolerate HEP to improve ROM and independence with ADL's. (not met, progressing)     Long Term Goals: 8 weeks  1.Report decreased in pain at worse less than  <   / =  3  /10  to increase tolerance for functional mobility. (not met, progressing)  2.Pt to improve range of motion by 75% to allow for improved functional mobility to allow for improvement in IADLs. (not met, progressing)  3.Increased R LE MMT 1 grade to increase tolerance for ADL and work activities. (not met, progressing)  4. Pt will report 41% or less on FOTO knee survey  to demonstrate increase in LE function with every day tasks. (not met, progressing)  5. Pt to be Independent with HEP to improve ROM and independence with ADL's. (not met, progressing)    PLAN     Plan of care Certification: 2/10/2022 to 4/10/2022.    Continue treatment per established plan of care.     Zi Anaya, PT

## 2022-03-22 ENCOUNTER — HOSPITAL ENCOUNTER (OUTPATIENT)
Facility: OTHER | Age: 67
Discharge: HOME | End: 2022-03-22
Attending: ANESTHESIOLOGY | Admitting: ANESTHESIOLOGY
Payer: MEDICARE

## 2022-03-22 VITALS
HEART RATE: 60 BPM | HEIGHT: 63 IN | WEIGHT: 147 LBS | SYSTOLIC BLOOD PRESSURE: 128 MMHG | BODY MASS INDEX: 26.05 KG/M2 | DIASTOLIC BLOOD PRESSURE: 70 MMHG | RESPIRATION RATE: 16 BRPM | TEMPERATURE: 98 F | OXYGEN SATURATION: 100 %

## 2022-03-22 DIAGNOSIS — G89.29 CHRONIC PAIN: ICD-10-CM

## 2022-03-22 DIAGNOSIS — M47.817 OSTEOARTHRITIS OF SPINE WITHOUT MYELOPATHY OR RADICULOPATHY, LUMBOSACRAL REGION: Primary | ICD-10-CM

## 2022-03-22 PROCEDURE — 64636 PR DESTROY L/S FACET JNT ADDL: ICD-10-PCS | Mod: LT,,, | Performed by: ANESTHESIOLOGY

## 2022-03-22 PROCEDURE — 25000003 PHARM REV CODE 250: Performed by: ANESTHESIOLOGY

## 2022-03-22 PROCEDURE — 64635 DESTROY LUMB/SAC FACET JNT: CPT | Mod: LT | Performed by: ANESTHESIOLOGY

## 2022-03-22 PROCEDURE — 64636 DESTROY L/S FACET JNT ADDL: CPT | Mod: LT,,, | Performed by: ANESTHESIOLOGY

## 2022-03-22 PROCEDURE — 64635 PR DESTROY LUMB/SAC FACET JNT: ICD-10-PCS | Mod: LT,,, | Performed by: ANESTHESIOLOGY

## 2022-03-22 PROCEDURE — 63600175 PHARM REV CODE 636 W HCPCS: Performed by: ANESTHESIOLOGY

## 2022-03-22 PROCEDURE — 64636 DESTROY L/S FACET JNT ADDL: CPT | Mod: LT | Performed by: ANESTHESIOLOGY

## 2022-03-22 PROCEDURE — 64635 DESTROY LUMB/SAC FACET JNT: CPT | Mod: LT,,, | Performed by: ANESTHESIOLOGY

## 2022-03-22 RX ORDER — BUPIVACAINE HYDROCHLORIDE 2.5 MG/ML
INJECTION, SOLUTION EPIDURAL; INFILTRATION; INTRACAUDAL
Status: DISCONTINUED | OUTPATIENT
Start: 2022-03-22 | End: 2022-03-22 | Stop reason: HOSPADM

## 2022-03-22 RX ORDER — MIDAZOLAM HYDROCHLORIDE 1 MG/ML
INJECTION INTRAMUSCULAR; INTRAVENOUS
Status: DISCONTINUED | OUTPATIENT
Start: 2022-03-22 | End: 2022-03-22 | Stop reason: HOSPADM

## 2022-03-22 RX ORDER — LIDOCAINE HYDROCHLORIDE 20 MG/ML
INJECTION, SOLUTION INFILTRATION; PERINEURAL
Status: DISCONTINUED | OUTPATIENT
Start: 2022-03-22 | End: 2022-03-22 | Stop reason: HOSPADM

## 2022-03-22 RX ORDER — FENTANYL CITRATE 50 UG/ML
INJECTION, SOLUTION INTRAMUSCULAR; INTRAVENOUS
Status: DISCONTINUED | OUTPATIENT
Start: 2022-03-22 | End: 2022-03-22 | Stop reason: HOSPADM

## 2022-03-22 RX ORDER — DEXAMETHASONE SODIUM PHOSPHATE 10 MG/ML
INJECTION INTRAMUSCULAR; INTRAVENOUS
Status: DISCONTINUED | OUTPATIENT
Start: 2022-03-22 | End: 2022-03-22 | Stop reason: HOSPADM

## 2022-03-22 RX ORDER — SODIUM CHLORIDE 9 MG/ML
500 INJECTION, SOLUTION INTRAVENOUS CONTINUOUS
Status: DISCONTINUED | OUTPATIENT
Start: 2022-03-22 | End: 2022-03-22 | Stop reason: HOSPADM

## 2022-03-22 RX ADMIN — SODIUM CHLORIDE 500 ML: 9 INJECTION, SOLUTION INTRAVENOUS at 08:03

## 2022-03-22 NOTE — OP NOTE
Therapeutic Lumbar Medial Branch Radiofrequency Ablation under Fluoroscopy     The procedure, risks, benefits, and options were discussed with the patient. There are no contraindications to the procedure. The patent expressed understanding and agreed to the procedure. Informed written consent was obtained prior to the start of the procedure and can be found in the patient's chart.        PATIENT NAME: Tiarra Norton   MRN: [id number]     DATE OF PROCEDURE: 03/22/2022     PROCEDURE:  Left L3, L4 and L5 Lumbar Radiofrequency Ablation under Fluoroscopy    PRE-OP DIAGNOSIS: Spondylosis of lumbosacral region without myelopathy or radiculopathy [M47.817]    POST-OP DIAGNOSIS: Same    PHYSICIAN: Tara Gibbs MD    ASSISTANTS: none     MEDICATIONS INJECTED:  Preservative-free Decadron 10mg with 9cc of Bupivicaine 0.25%    LOCAL ANESTHETIC INJECTED:   Xylocaine 2%    SEDATION:   Versed 1mg and Fentanyl 50mcg                                                                                                                                                                                     Conscious sedation ordered by M.D. Patient re-evaluation prior to administration of conscious sedation. No changes noted in patient's status from initial evaluation. The patient's vital signs were monitored by RN and patient remained hemodynamically stable throughout the procedure.    Event Time In   Sedation Start 0900   Sedation End 0914       ESTIMATED BLOOD LOSS:  None    COMPLICATIONS:  None     INTERVAL HISTORY: Patient has clinical and imaging findings suggestive of facet mediated pain. Patients has completed 2 previous diagnostic medial branch blocks at specified levels with at least 80% relief for the expected duration of the local anesthetic utilized.    TECHNIQUE: Time-out was performed to identify the patient and procedure to be performed. With the patient laying in a prone position, the surgical area was prepped and draped in the  usual sterile fashion using ChloraPrep and fenestrated drape. The levels were determined under fluoroscopic guidance. Skin anesthesia was achieved by injecting Lidocaine 2% over the injection sites. A 18 gauge 10mm curved active tip needle was introduced to the anatomic local of the medial branch at each of the above levels using AP, lateral and/or contralateral oblique fluoroscopic imaging. Then sensory and motor testing was performed to confirm that the needle tips were in the correct location. After negative aspiration for blood or CSF was confirmed, 1 mL of the lidocaine 2% listed above was injected slowly at each site. This was followed by thermal lesioning at 80 degrees celsius for 90 seconds. Needles were rotated and depth was reconfirmed using lateral fluoroscopy. Another ablation was then performed at this location. That was followed by slowly injecting 1 mL of the medication mixture listed above at each site. The needles were removed and bleeding was nil. A sterile dressing was applied. No specimens collected. The patient tolerated the procedure well and did not have any procedure related motor deficit at the conclusion of the procedure.    The patient was monitored after the procedure in the recovery area. They were given post-procedure and discharge instructions to follow at home. The patient was discharged in a stable condition.    Tara Gibbs MD

## 2022-03-22 NOTE — DISCHARGE SUMMARY
[facility] ([facility])  Discharge Note  Short Stay    Procedure(s) (LRB):  RADIOFREQUENCY ABLATION, l3-+l5 2 of 2 (Left)    OUTCOME: Patient tolerated treatment/procedure well without complication and is now ready for discharge.    DISPOSITION: Home or Self Care    FINAL DIAGNOSIS:  Lumbosacral spondylosis    FOLLOWUP: In clinic    DISCHARGE INSTRUCTIONS:  No discharge procedures on file.     TIME SPENT ON DISCHARGE: 2 minutes

## 2022-03-22 NOTE — DISCHARGE INSTRUCTIONS

## 2022-03-23 ENCOUNTER — PATIENT MESSAGE (OUTPATIENT)
Dept: INTERNAL MEDICINE | Facility: CLINIC | Age: 67
End: 2022-03-23
Payer: MEDICARE

## 2022-03-24 ENCOUNTER — PATIENT MESSAGE (OUTPATIENT)
Dept: REHABILITATION | Facility: OTHER | Age: 67
End: 2022-03-24

## 2022-03-24 ENCOUNTER — CLINICAL SUPPORT (OUTPATIENT)
Dept: REHABILITATION | Facility: OTHER | Age: 67
End: 2022-03-24
Attending: INTERNAL MEDICINE
Payer: MEDICARE

## 2022-03-24 DIAGNOSIS — M25.661 DECREASED RANGE OF MOTION (ROM) OF RIGHT KNEE: Primary | ICD-10-CM

## 2022-03-24 DIAGNOSIS — R29.898 WEAKNESS OF RIGHT LOWER EXTREMITY: ICD-10-CM

## 2022-03-24 PROCEDURE — 97140 MANUAL THERAPY 1/> REGIONS: CPT

## 2022-03-24 PROCEDURE — 97110 THERAPEUTIC EXERCISES: CPT

## 2022-03-24 NOTE — PROGRESS NOTES
"  [facility]   Physical Therapy Treatment Note     Name: Tiarra Norton  Clinic Number: [id number]    Therapy Diagnosis:   Encounter Diagnoses   Name Primary?    Decreased range of motion (ROM) of right knee Yes    Weakness of right lower extremity      Physician: Trevin Huber MD    Visit Date: 3/24/2022    Physician Orders: PT Eval and Treat  Medical Diagnosis from Referral: M17.11 (ICD-10-CM) - Primary osteoarthritis of right knee  Evaluation Date: 2/10/2022  Authorization Period Expiration: 6/10/2022  Plan of Care Expiration: 4/10/2022  Visit # / Visits authorized: 5/20   Progress Note Due: 4/10/2022  FOTO: 1/ 1  FOTO NEXT VISIT    PTA Visit #: 0/5     Time In: 9:00 am  Time Out: 10:05 am  Total Billable Time: 55 minutes    SUBJECTIVE     Pt reports: she was very sore after doing a lot this weekend. Not as bad right now     She was compliant with home exercise program.  Response to previous treatment: increased soress soreness  Functional change: no change yet    Pain: 3/10  Location: right medial knee      OBJECTIVE     Objective Measures updated at progress report unless specified.     Range of Motion: - reassessed 3/10/22     Knee Right active Right Passive   Flexion 128 133   Extension 2 4         Lower Extremity Strength - reassessed 3/10/22  Right LE   Left LE     Knee extension: 4/5 Knee extension: 4+/5   Knee flexion: 4/5 Knee flexion: 4+/5   Hip flexion: 4/5 Hip flexion: 4+/5   Hip extension:  3+/5 Hip extension: 4-/5   Hip abduction: 3+/5 Hip abduction: 4-/5   Hip adduction: 4-/5 Hip adduction 4-/5   Ankle dorsiflexion: 5/5 Ankle dorsiflexion: 5/5   Ankle plantarflexion: 5/5 Ankle plantarflexion: 5/5          Treatment     Tiarra received the treatments listed below:      therapeutic exercises to develop strength, endurance, ROM and flexibility for 45 minutes including:    SAQ 4# 2x10x3"  SLR with ER 2# 2x10x3"  SL hip abduction 2# 2x10  VMO LAQ 5# " "2x10x3"  Hamstring stretch 2x30"   Prone quad stretch 2x30" thigh propped up with pillow  Clamshells GTB 2x10 - NP  SLB 3x30"  TKE GTB 20x5"  Sit to stands GTB 2x10  Heel raises 2x15  slantboard gastroc stretch 2x30"  sidestepping GTB 2x10 steps  Step ups into SLS 8" 2x10   SL leg press 80# 2x10        manual therapy techniques: Joint mobilizations and Soft tissue Mobilization were applied to the: R quad for 10 minutes, including:  IASTM R quad  Patellar mobs all planes  Ant/post tibiofemoral mobs grade III/IV    cold pack for 10 minutes to R knee.        Patient Education and Home Exercises     Home Exercises Provided and Patient Education Provided     Education provided:   - HEP, POC    Written Home Exercises Provided: Patient instructed to cont prior HEP. Exercises were reviewed and Tiarra was able to demonstrate them prior to the end of the session.  Tiarra demonstrated good  understanding of the education provided. See EMR under Patient Instructions for exercises provided during therapy sessions    ASSESSMENT     Tiarra presents today with continued complaints of pain. Pt reporting decreased tightness and soreness post manual therapy techniques and therex. Progressed dynamic LE strengthening and balance exercises today with good tolerance and no adverse effects. Continue to progress as tolerated. Administer FOTO next visit.     Tiarra Is progressing well towards her goals.   Pt prognosis is Good.     Pt will continue to benefit from skilled outpatient physical therapy to address the deficits listed in the problem list box on initial evaluation, provide pt/family education and to maximize pt's level of independence in the home and community environment.     Pt's spiritual, cultural and educational needs considered and pt agreeable to plan of care and goals.     Anticipated barriers to physical therapy: none    GOALS: Short Term Goals:  4 weeks  1.Report decreased in pain at worse less than  <   / =  5  /10  to " increase tolerance for functional mobility. (not met, progressing)  2. Pt to improve R knee range of motion by 25% to allow for improved functional mobility to allow for improvement in IADLs. (not met, progressing)  3. Increased R LE MMT 1/2 grade to increase tolerance for ADL and work activities. (not met, progressing)  4. Pt to report ability to walk 1 mile without increased knee pain. (not met, progressing)  5. Pt to tolerate HEP to improve ROM and independence with ADL's. (not met, progressing)     Long Term Goals: 8 weeks  1.Report decreased in pain at worse less than  <   / =  3  /10  to increase tolerance for functional mobility. (not met, progressing)  2.Pt to improve range of motion by 75% to allow for improved functional mobility to allow for improvement in IADLs. (not met, progressing)  3.Increased R LE MMT 1 grade to increase tolerance for ADL and work activities. (not met, progressing)  4. Pt will report 41% or less on FOTO knee survey  to demonstrate increase in LE function with every day tasks. (not met, progressing)  5. Pt to be Independent with HEP to improve ROM and independence with ADL's. (not met, progressing)    PLAN     Plan of care Certification: 2/10/2022 to 4/10/2022.    Continue treatment per established plan of care.     Zi Anaya, PT

## 2022-03-29 ENCOUNTER — CLINICAL SUPPORT (OUTPATIENT)
Dept: REHABILITATION | Facility: OTHER | Age: 67
End: 2022-03-29
Attending: INTERNAL MEDICINE
Payer: MEDICARE

## 2022-03-29 DIAGNOSIS — M25.661 DECREASED RANGE OF MOTION (ROM) OF RIGHT KNEE: Primary | ICD-10-CM

## 2022-03-29 DIAGNOSIS — R29.898 WEAKNESS OF RIGHT LOWER EXTREMITY: ICD-10-CM

## 2022-03-29 PROCEDURE — 97110 THERAPEUTIC EXERCISES: CPT

## 2022-03-29 PROCEDURE — 97140 MANUAL THERAPY 1/> REGIONS: CPT

## 2022-03-29 NOTE — PROGRESS NOTES
"  [facility]   Physical Therapy Treatment Note     Name: Tiarra Norton  Clinic Number: [id number]    Therapy Diagnosis:   Encounter Diagnoses   Name Primary?    Decreased range of motion (ROM) of right knee Yes    Weakness of right lower extremity      Physician: Trevin Huber MD    Visit Date: 3/29/2022    Physician Orders: PT Eval and Treat  Medical Diagnosis from Referral: M17.11 (ICD-10-CM) - Primary osteoarthritis of right knee  Evaluation Date: 2/10/2022  Authorization Period Expiration: 6/10/2022  Plan of Care Expiration: 4/10/2022  Visit # / Visits authorized: 6/20   Progress Note Due: 4/10/2022  FOTO: 2/2    PTA Visit #: 0/5     Time In: 10:00 am  Time Out: 11:05 am  Total Billable Time: 55 minutes    SUBJECTIVE     Pt reports: she was very sore after doing a lot this weekend. Not as bad right now     She was compliant with home exercise program.  Response to previous treatment: increased soress soreness  Functional change: no change yet    Pain: 3/10  Location: right medial knee      OBJECTIVE     Objective Measures updated at progress report unless specified.         Range of Motion: - reassessed 3/10/22     Knee Right active Right Passive   Flexion 128 133   Extension 2 4         Lower Extremity Strength - reassessed 3/10/22  Right LE   Left LE     Knee extension: 4/5 Knee extension: 4+/5   Knee flexion: 4/5 Knee flexion: 4+/5   Hip flexion: 4/5 Hip flexion: 4+/5   Hip extension:  3+/5 Hip extension: 4-/5   Hip abduction: 3+/5 Hip abduction: 4-/5   Hip adduction: 4-/5 Hip adduction 4-/5   Ankle dorsiflexion: 5/5 Ankle dorsiflexion: 5/5   Ankle plantarflexion: 5/5 Ankle plantarflexion: 5/5          Treatment     Tiarra received the treatments listed below:      therapeutic exercises to develop strength, endurance, ROM and flexibility for 45 minutes including:    SAQ 4# 2x10x3"  SLR with ER 3# 2x10x3"  SL hip abduction 2# 2x10  VMO LAQ 5# 2x10x3"   Hamstring stretch " "2x30"   Prone quad stretch 2x30" thigh propped up with pillow  Clamshells GTB 2x10  SLB 3x30"  +SLB + reach 2x10  TKE Blue TB 20x5"  Sit to stands L LE propped on 2" 2x10  Heel raises 2x15  slantboard gastroc stretch 2x30"  sidestepping GTB 2x10 steps  Step ups into SLS 8" w/ 10# KB in L hand 2x10    SL leg press 80# 2x10        manual therapy techniques: Joint mobilizations and Soft tissue Mobilization were applied to the: R quad for 10 minutes, including:  IASTM R quad  Patellar mobs all planes  Ant/post tibiofemoral mobs grade III/IV    cold pack for 10 minutes to R knee.        Patient Education and Home Exercises     Home Exercises Provided and Patient Education Provided     Education provided:   - HEP, POC    Written Home Exercises Provided: Patient instructed to cont prior HEP. Exercises were reviewed and Tiarra was able to demonstrate them prior to the end of the session.  Tiarra demonstrated good  understanding of the education provided. See EMR under Patient Instructions for exercises provided during therapy sessions    ASSESSMENT     Tiarra presents today with slight decreased in pain levels. Advanced therex to promote increased LE strengthening with min aggravation. Initiated dynamic single leg balance activities with good tolerance and no adverse effects. Continue to progress as tolerated. Reassess next visit. Pt demo improved FOTO score today.     Tiarra Is progressing well towards her goals.   Pt prognosis is Good.     Pt will continue to benefit from skilled outpatient physical therapy to address the deficits listed in the problem list box on initial evaluation, provide pt/family education and to maximize pt's level of independence in the home and community environment.     Pt's spiritual, cultural and educational needs considered and pt agreeable to plan of care and goals.     Anticipated barriers to physical therapy: none    GOALS: Short Term Goals:  4 weeks  1.Report decreased in pain at worse less " than  <   / =  5  /10  to increase tolerance for functional mobility. (not met, progressing)  2. Pt to improve R knee range of motion by 25% to allow for improved functional mobility to allow for improvement in IADLs. (not met, progressing)  3. Increased R LE MMT 1/2 grade to increase tolerance for ADL and work activities. (not met, progressing)  4. Pt to report ability to walk 1 mile without increased knee pain. (not met, progressing)  5. Pt to tolerate HEP to improve ROM and independence with ADL's. (not met, progressing)     Long Term Goals: 8 weeks  1.Report decreased in pain at worse less than  <   / =  3  /10  to increase tolerance for functional mobility. (not met, progressing)  2.Pt to improve range of motion by 75% to allow for improved functional mobility to allow for improvement in IADLs. (not met, progressing)  3.Increased R LE MMT 1 grade to increase tolerance for ADL and work activities. (not met, progressing)  4. Pt will report 41% or less on FOTO knee survey  to demonstrate increase in LE function with every day tasks. (not met, progressing)  5. Pt to be Independent with HEP to improve ROM and independence with ADL's. (not met, progressing)    PLAN     Plan of care Certification: 2/10/2022 to 4/10/2022.    Continue treatment per established plan of care.     Zi Anaya, PT

## 2022-03-30 ENCOUNTER — PATIENT MESSAGE (OUTPATIENT)
Dept: INTERNAL MEDICINE | Facility: CLINIC | Age: 67
End: 2022-03-30
Payer: MEDICARE

## 2022-03-31 ENCOUNTER — IMMUNIZATION (OUTPATIENT)
Dept: PHARMACY | Facility: CLINIC | Age: 67
End: 2022-03-31
Payer: MEDICARE

## 2022-03-31 ENCOUNTER — CLINICAL SUPPORT (OUTPATIENT)
Dept: REHABILITATION | Facility: OTHER | Age: 67
End: 2022-03-31
Attending: INTERNAL MEDICINE
Payer: MEDICARE

## 2022-03-31 DIAGNOSIS — Z23 NEED FOR VACCINATION: Primary | ICD-10-CM

## 2022-03-31 DIAGNOSIS — R29.898 WEAKNESS OF RIGHT LOWER EXTREMITY: ICD-10-CM

## 2022-03-31 DIAGNOSIS — M25.661 DECREASED RANGE OF MOTION (ROM) OF RIGHT KNEE: Primary | ICD-10-CM

## 2022-03-31 PROCEDURE — 97140 MANUAL THERAPY 1/> REGIONS: CPT

## 2022-03-31 PROCEDURE — 97110 THERAPEUTIC EXERCISES: CPT

## 2022-03-31 NOTE — PLAN OF CARE
Outpatient Therapy Updated Plan of Care     Visit Date: 3/31/2022    Name: Tiarra Norton  Clinic Number: [id number]    Therapy Diagnosis:   Encounter Diagnoses   Name Primary?    Decreased range of motion (ROM) of right knee Yes    Weakness of right lower extremity      Physician: Trevin Huber MD    Physician Orders: PT Eval and Treat  Medical Diagnosis from Referral: M17.11 (ICD-10-CM) - Primary osteoarthritis of right knee  Evaluation Date: 2/10/2022  Authorization Period Expiration: 6/10/2022  Plan of Care Expiration: 4/31/2022  Visit # / Visits authorized: 7/20   Progress Note Due: 4/10/2022  FOTO: 2/2    Precautions: Standard      Subjective     Update: Pt reports: she is not in any pain right now, she will still get pain with some movements but overall pain is much better      She was compliant with home exercise program.  Response to previous treatment: increased soress soreness  Functional change: no change yet     Pain: 0/10  Location: right medial knee      Objective     Update:   Range of Motion: - reassessed 3/31/22     Knee Right active Right Passive   Flexion 130 136   Extension 3 5         Lower Extremity Strength - reassessed 3/31/22  Right LE   Left LE     Knee extension: 5/5 Knee extension: 5/5   Knee flexion: 5/5 Knee flexion: 5/5   Hip flexion: 4+/5 Hip flexion: 4+/5   Hip extension:  4/5 Hip extension: 4/5   Hip abduction: 4/5 Hip abduction: 4/5   Hip adduction: 4/5 Hip adduction 4/5   Ankle dorsiflexion: 5/5 Ankle dorsiflexion: 5/5   Ankle plantarflexion: 5/5 Ankle plantarflexion: 5/5            Assessment     Update: Tiarra presents today without complaints of pain. Reassessment performed with pt demo improvements in R LE strength, R knee passive and active ROM, balance, and tolerance to gait and functional mobility activities. Increased resistance with single and bilateral leg press today with good tolerance and appropriate fatigue. Continue to progress as tolerated.      Tiarra Is  progressing well towards her goals.   Pt prognosis is Good.      Pt will continue to benefit from skilled outpatient physical therapy to address the deficits listed in the problem list box on initial evaluation, provide pt/family education and to maximize pt's level of independence in the home and community environment.      Pt's spiritual, cultural and educational needs considered and pt agreeable to plan of care and goals.     Anticipated barriers to physical therapy: none     GOALS: Short Term Goals:  4 weeks  1.Report decreased in pain at worse less than  <   / =  5  /10  to increase tolerance for functional mobility. (not met, progressing)  2. Pt to improve R knee range of motion by 25% to allow for improved functional mobility to allow for improvement in IADLs. (not met, progressing)  3. Increased R LE MMT 1/2 grade to increase tolerance for ADL and work activities. (not met, progressing)  4. Pt to report ability to walk 1 mile without increased knee pain. (not met, progressing)  5. Pt to tolerate HEP to improve ROM and independence with ADL's. (not met, progressing)     Long Term Goals: 8 weeks  1.Report decreased in pain at worse less than  <   / =  3  /10  to increase tolerance for functional mobility. (not met, progressing)  2.Pt to improve range of motion by 75% to allow for improved functional mobility to allow for improvement in IADLs. (not met, progressing)  3.Increased R LE MMT 1 grade to increase tolerance for ADL and work activities. (not met, progressing)  4. Pt will report 41% or less on FOTO knee survey  to demonstrate increase in LE function with every day tasks. (not met, progressing)  5. Pt to be Independent with HEP to improve ROM and independence with ADL's. (not met, progressing)    Reasons for Recertification of Therapy:   To allow patient to complete full allotment of visits so that they may achieve maximum therapeutic benefit      Plan     Updated Certification Period: 3/31/2022 to  "4/31/2022  Recommended Treatment Plan: 2 times per week for 4 weeks:      - Patient education  - Therapeutic exercise  - Manual therapy  - Performance testing   - Neuromuscular Re-education  - Therapeutic activity   - Modalities    Pt may be seen by PTA as part of the rehabilitation team.     Therapist: Zi Anaya, PT  3/31/2022    "I certify the need for these services furnished under this plan of treatment and while under my care."    ____________________________________  Physician/Referring Practitioner    _______________  Date of Signature    Zi Anaya, PT  3/31/2022      I CERTIFY THE NEED FOR THESE SERVICES FURNISHED UNDER THIS PLAN OF TREATMENT AND WHILE UNDER MY CARE    Physician's comments:        Physician's Signature: ___________________________________________________        "

## 2022-03-31 NOTE — PROGRESS NOTES
" [facility]   Physical Therapy Treatment Note     Name: Tiarra Norton  Clinic Number: [id number]    Therapy Diagnosis:   Encounter Diagnoses   Name Primary?    Decreased range of motion (ROM) of right knee Yes    Weakness of right lower extremity      Physician: Trevin Huber MD    Visit Date: 3/31/2022    Physician Orders: PT Eval and Treat  Medical Diagnosis from Referral: M17.11 (ICD-10-CM) - Primary osteoarthritis of right knee  Evaluation Date: 2/10/2022  Authorization Period Expiration: 6/10/2022  Plan of Care Expiration: 4/31/2022  Visit # / Visits authorized: 7/20   Progress Note Due: 4/10/2022  FOTO: 2/2    PTA Visit #: 0/5     Time In: 9:00 am  Time Out: 10:05 am  Total Billable Time: 55 minutes    SUBJECTIVE     Pt reports: she is not in any pain right now, she will still get pain with some movements but overall pain is much better     She was compliant with home exercise program.  Response to previous treatment: increased soress soreness  Functional change: no change yet    Pain: 0/10  Location: right medial knee      OBJECTIVE     Objective Measures updated at progress report unless specified.         Range of Motion: - reassessed 3/31/22     Knee Right active Right Passive   Flexion 130 136   Extension 3 5         Lower Extremity Strength - reassessed 3/31/22  Right LE   Left LE     Knee extension: 5/5 Knee extension: 5/5   Knee flexion: 5/5 Knee flexion: 5/5   Hip flexion: 4+/5 Hip flexion: 4+/5   Hip extension:  4/5 Hip extension: 4/5   Hip abduction: 4/5 Hip abduction: 4/5   Hip adduction: 4/5 Hip adduction 4/5   Ankle dorsiflexion: 5/5 Ankle dorsiflexion: 5/5   Ankle plantarflexion: 5/5 Ankle plantarflexion: 5/5          Treatment     Tiarra received the treatments listed below:      therapeutic activities  For reassessment: 10 min    therapeutic exercises to develop strength, endurance, ROM and flexibility for 35 minutes including:    SAQ 4# 2x10x3"  SLR " "with ER 3# 2x10x3"  SL hip abduction 2# 2x10  VMO LAQ 5# 2x10x3"   Hamstring stretch 2x30" - NP  Prone quad stretch 3x30" thigh propped up with pillow  Clamshells GTB 2x10 - NP  SLB 3x30" - NP  SLB + reach 2x10  TKE Blue TB 20x5"  Heel raises 2x15  sidestepping GTB 2x10 steps   Step ups into SLS 8" w/ 10# KB in L hand 2x10    SL leg press 86# 2x10  +DL leg press 146# 2x10    NP:  slantboard gastroc stretch 2x30"  Sit to stands L LE propped on 2" 2x10      manual therapy techniques: Joint mobilizations and Soft tissue Mobilization were applied to the: R quad for 10 minutes, including:  IASTM R quad  Patellar mobs all planes  Ant/post tibiofemoral mobs grade III/IV    cold pack for 10 minutes to R knee.        Patient Education and Home Exercises     Home Exercises Provided and Patient Education Provided     Education provided:   - HEP, POC    Written Home Exercises Provided: Patient instructed to cont prior HEP. Exercises were reviewed and Tiarra was able to demonstrate them prior to the end of the session.  Tiarra demonstrated good  understanding of the education provided. See EMR under Patient Instructions for exercises provided during therapy sessions    ASSESSMENT     Tiarra presents today without complaints of pain. Reassessment performed with pt demo improvements in R LE strength, R knee passive and active ROM, balance, and tolerance to gait and functional mobility activities. Increased resistance with single and bilateral leg press today with good tolerance and appropriate fatigue. Continue to progress as tolerated.     Tiarra Is progressing well towards her goals.   Pt prognosis is Good.     Pt will continue to benefit from skilled outpatient physical therapy to address the deficits listed in the problem list box on initial evaluation, provide pt/family education and to maximize pt's level of independence in the home and community environment.     Pt's spiritual, cultural and educational needs considered and pt " agreeable to plan of care and goals.     Anticipated barriers to physical therapy: none    GOALS: Short Term Goals:  4 weeks  1.Report decreased in pain at worse less than  <   / =  5  /10  to increase tolerance for functional mobility. (not met, progressing)  2. Pt to improve R knee range of motion by 25% to allow for improved functional mobility to allow for improvement in IADLs. (not met, progressing)  3. Increased R LE MMT 1/2 grade to increase tolerance for ADL and work activities. (not met, progressing)  4. Pt to report ability to walk 1 mile without increased knee pain. (not met, progressing)  5. Pt to tolerate HEP to improve ROM and independence with ADL's. (not met, progressing)     Long Term Goals: 8 weeks  1.Report decreased in pain at worse less than  <   / =  3  /10  to increase tolerance for functional mobility. (not met, progressing)  2.Pt to improve range of motion by 75% to allow for improved functional mobility to allow for improvement in IADLs. (not met, progressing)  3.Increased R LE MMT 1 grade to increase tolerance for ADL and work activities. (not met, progressing)  4. Pt will report 41% or less on FOTO knee survey  to demonstrate increase in LE function with every day tasks. (not met, progressing)  5. Pt to be Independent with HEP to improve ROM and independence with ADL's. (not met, progressing)    PLAN     Plan of care Certification: 2/10/2022 to 4/31/2022.    Continue treatment per established plan of care.     Zi Anaya, PT

## 2022-04-01 ENCOUNTER — PATIENT MESSAGE (OUTPATIENT)
Dept: PAIN MEDICINE | Facility: CLINIC | Age: 67
End: 2022-04-01
Payer: MEDICARE

## 2022-04-05 ENCOUNTER — DOCUMENTATION ONLY (OUTPATIENT)
Dept: REHABILITATION | Facility: OTHER | Age: 67
End: 2022-04-05
Payer: MEDICARE

## 2022-04-05 NOTE — PROGRESS NOTES
Pt. No showed her physical therapy appointment today. Spoke with patient on the phone and she reported being sick as the reason she did not attend. Confirmed next appointment and she expressed that she will attend.    Zi Anaya, PT  4/5/2022

## 2022-04-07 ENCOUNTER — PATIENT MESSAGE (OUTPATIENT)
Dept: DERMATOLOGY | Facility: CLINIC | Age: 67
End: 2022-04-07
Payer: MEDICARE

## 2022-04-07 ENCOUNTER — CLINICAL SUPPORT (OUTPATIENT)
Dept: REHABILITATION | Facility: OTHER | Age: 67
End: 2022-04-07
Attending: INTERNAL MEDICINE
Payer: MEDICARE

## 2022-04-07 DIAGNOSIS — R29.898 WEAKNESS OF RIGHT LOWER EXTREMITY: ICD-10-CM

## 2022-04-07 DIAGNOSIS — M25.661 DECREASED RANGE OF MOTION (ROM) OF RIGHT KNEE: Primary | ICD-10-CM

## 2022-04-07 PROCEDURE — 97110 THERAPEUTIC EXERCISES: CPT

## 2022-04-07 NOTE — PROGRESS NOTES
"    [facility]   Physical Therapy Treatment Note     Name: Tiarra Norton  Clinic Number: [id number]    Therapy Diagnosis:   Encounter Diagnoses   Name Primary?    Decreased range of motion (ROM) of right knee Yes    Weakness of right lower extremity      Physician: Kaykay Perales MD    Visit Date: 4/7/2022    Physician Orders: PT Eval and Treat  Medical Diagnosis from Referral: M17.11 (ICD-10-CM) - Primary osteoarthritis of right knee  Evaluation Date: 2/10/2022  Authorization Period Expiration: 6/10/2022  Plan of Care Expiration: 4/31/2022  Visit # / Visits authorized: 8/20   Progress Note Due: 4/31/2022  FOTO: 2/2     PTA Visit #: 0/5     Time In: 5:00 pm  Time Out: 6:05 pm  Total Billable Time: 55 minutes    SUBJECTIVE     Pt reports: the knee is feeling much better, she has no pain currently    She was compliant with home exercise program.  Response to previous treatment: increased soress soreness  Functional change: no change yet    Pain: 0/10  Location: right medial knee      OBJECTIVE     Objective Measures updated at progress report unless specified.         Range of Motion: - reassessed 3/31/22     Knee Right active Right Passive   Flexion 130 136   Extension 3 5         Lower Extremity Strength - reassessed 3/31/22  Right LE   Left LE     Knee extension: 5/5 Knee extension: 5/5   Knee flexion: 5/5 Knee flexion: 5/5   Hip flexion: 4+/5 Hip flexion: 4+/5   Hip extension:  4/5 Hip extension: 4/5   Hip abduction: 4/5 Hip abduction: 4/5   Hip adduction: 4/5 Hip adduction 4/5   Ankle dorsiflexion: 5/5 Ankle dorsiflexion: 5/5   Ankle plantarflexion: 5/5 Ankle plantarflexion: 5/5          Treatment     Tiarra received the treatments listed below:      therapeutic exercises to develop strength, endurance, ROM and flexibility for 50 minutes including:    SAQ 4# 2x10x3"  SLR with ER 3# 2x10x3"  SL hip abduction 2# 2x10  VMO LAQ 5# 2x10x3"   Hamstring stretch 2x30" - NP  Prone quad " "stretch 3x30" thigh propped up with pillow  Clamshells GTB 2x10 - NP  SLB 3x30"   SLB + reach 2x10  TKE Blue TB 20x5"  Heel raises 2x15   sidestepping GTB 2x10 steps   Fwd and sideways Step ups into SLS on Bosu 2x10    SL leg press 88# 2x10  DL leg press 150# 2x10  +knee extension with R SL ecc lowering 40# 2x8  +fwd and side half lunge x10 ea    NP:  slantboard gastroc stretch 2x30"  Sit to stands L LE propped on 2" 2x10       manual therapy techniques: Joint mobilizations and Soft tissue Mobilization were applied to the: R quad for 5 minutes, including:    Roller stick R lateral quad     cold pack for 10 minutes to R knee.        Patient Education and Home Exercises     Home Exercises Provided and Patient Education Provided     Education provided:   - HEP, POC    Written Home Exercises Provided: Patient instructed to cont prior HEP. Exercises were reviewed and Tiarra was able to demonstrate them prior to the end of the session.  Tiarra demonstrated good  understanding of the education provided. See EMR under Patient Instructions for exercises provided during therapy sessions    ASSESSMENT     Tiarra presents today with reports of no pain. Progressed therex to tolerance today, pt with no pain aggravation with addition of forward and sideways lunges, bosu step ups, or eccentric knee extension. Continue to progress as tolerated.      Tiarra Is progressing well towards her goals.   Pt prognosis is Good.     Pt will continue to benefit from skilled outpatient physical therapy to address the deficits listed in the problem list box on initial evaluation, provide pt/family education and to maximize pt's level of independence in the home and community environment.     Pt's spiritual, cultural and educational needs considered and pt agreeable to plan of care and goals.     Anticipated barriers to physical therapy: none    GOALS: Short Term Goals:  4 weeks  1.Report decreased in pain at worse less than  <   / =  5  /10  to " increase tolerance for functional mobility. (not met, progressing)  2. Pt to improve R knee range of motion by 25% to allow for improved functional mobility to allow for improvement in IADLs. (not met, progressing)  3. Increased R LE MMT 1/2 grade to increase tolerance for ADL and work activities. (not met, progressing)  4. Pt to report ability to walk 1 mile without increased knee pain. (not met, progressing)  5. Pt to tolerate HEP to improve ROM and independence with ADL's. (not met, progressing)     Long Term Goals: 8 weeks  1.Report decreased in pain at worse less than  <   / =  3  /10  to increase tolerance for functional mobility. (not met, progressing)  2.Pt to improve range of motion by 75% to allow for improved functional mobility to allow for improvement in IADLs. (not met, progressing)  3.Increased R LE MMT 1 grade to increase tolerance for ADL and work activities. (not met, progressing)  4. Pt will report 41% or less on FOTO knee survey  to demonstrate increase in LE function with every day tasks. (not met, progressing)  5. Pt to be Independent with HEP to improve ROM and independence with ADL's. (not met, progressing)    PLAN     Plan of care Certification: 2/10/2022 to 4/31/2022.    Continue treatment per established plan of care.     Zi Anaya, PT

## 2022-04-12 ENCOUNTER — OFFICE VISIT (OUTPATIENT)
Dept: OTOLARYNGOLOGY | Facility: CLINIC | Age: 67
End: 2022-04-12
Payer: MEDICARE

## 2022-04-12 VITALS
SYSTOLIC BLOOD PRESSURE: 114 MMHG | WEIGHT: 149 LBS | HEART RATE: 87 BPM | TEMPERATURE: 98 F | OXYGEN SATURATION: 98 % | RESPIRATION RATE: 14 BRPM | HEIGHT: 63 IN | BODY MASS INDEX: 26.4 KG/M2 | DIASTOLIC BLOOD PRESSURE: 73 MMHG

## 2022-04-12 DIAGNOSIS — H53.489 VISUAL FIELD CONSTRICTION, UNSPECIFIED LATERALITY: Primary | ICD-10-CM

## 2022-04-12 DIAGNOSIS — H57.813 BROW PTOSIS, BILATERAL: ICD-10-CM

## 2022-04-12 DIAGNOSIS — H02.835 DERMATOCHALASIS OF UPPER AND LOWER EYELIDS OF BOTH EYES: ICD-10-CM

## 2022-04-12 DIAGNOSIS — H02.831 DERMATOCHALASIS OF UPPER AND LOWER EYELIDS OF BOTH EYES: ICD-10-CM

## 2022-04-12 DIAGNOSIS — L98.8 FACIAL RHYTIDS: ICD-10-CM

## 2022-04-12 DIAGNOSIS — H02.834 DERMATOCHALASIS OF UPPER AND LOWER EYELIDS OF BOTH EYES: ICD-10-CM

## 2022-04-12 DIAGNOSIS — H02.832 DERMATOCHALASIS OF UPPER AND LOWER EYELIDS OF BOTH EYES: ICD-10-CM

## 2022-04-12 PROCEDURE — 3288F PR FALLS RISK ASSESSMENT DOCUMENTED: ICD-10-PCS | Mod: CPTII,S$GLB,, | Performed by: OTOLARYNGOLOGY

## 2022-04-12 PROCEDURE — 1159F PR MEDICATION LIST DOCUMENTED IN MEDICAL RECORD: ICD-10-PCS | Mod: CPTII,S$GLB,, | Performed by: OTOLARYNGOLOGY

## 2022-04-12 PROCEDURE — 3078F DIAST BP <80 MM HG: CPT | Mod: CPTII,S$GLB,, | Performed by: OTOLARYNGOLOGY

## 2022-04-12 PROCEDURE — 3078F PR MOST RECENT DIASTOLIC BLOOD PRESSURE < 80 MM HG: ICD-10-PCS | Mod: CPTII,S$GLB,, | Performed by: OTOLARYNGOLOGY

## 2022-04-12 PROCEDURE — 4010F ACE/ARB THERAPY RXD/TAKEN: CPT | Mod: CPTII,S$GLB,, | Performed by: OTOLARYNGOLOGY

## 2022-04-12 PROCEDURE — 1101F PR PT FALLS ASSESS DOC 0-1 FALLS W/OUT INJ PAST YR: ICD-10-PCS | Mod: CPTII,S$GLB,, | Performed by: OTOLARYNGOLOGY

## 2022-04-12 PROCEDURE — 1101F PT FALLS ASSESS-DOCD LE1/YR: CPT | Mod: CPTII,S$GLB,, | Performed by: OTOLARYNGOLOGY

## 2022-04-12 PROCEDURE — 3008F BODY MASS INDEX DOCD: CPT | Mod: CPTII,S$GLB,, | Performed by: OTOLARYNGOLOGY

## 2022-04-12 PROCEDURE — 3074F PR MOST RECENT SYSTOLIC BLOOD PRESSURE < 130 MM HG: ICD-10-PCS | Mod: CPTII,S$GLB,, | Performed by: OTOLARYNGOLOGY

## 2022-04-12 PROCEDURE — 1126F AMNT PAIN NOTED NONE PRSNT: CPT | Mod: CPTII,S$GLB,, | Performed by: OTOLARYNGOLOGY

## 2022-04-12 PROCEDURE — 3288F FALL RISK ASSESSMENT DOCD: CPT | Mod: CPTII,S$GLB,, | Performed by: OTOLARYNGOLOGY

## 2022-04-12 PROCEDURE — 99204 OFFICE O/P NEW MOD 45 MIN: CPT | Mod: S$GLB,,, | Performed by: OTOLARYNGOLOGY

## 2022-04-12 PROCEDURE — 1159F MED LIST DOCD IN RCRD: CPT | Mod: CPTII,S$GLB,, | Performed by: OTOLARYNGOLOGY

## 2022-04-12 PROCEDURE — 1160F PR REVIEW ALL MEDS BY PRESCRIBER/CLIN PHARMACIST DOCUMENTED: ICD-10-PCS | Mod: CPTII,S$GLB,, | Performed by: OTOLARYNGOLOGY

## 2022-04-12 PROCEDURE — 3074F SYST BP LT 130 MM HG: CPT | Mod: CPTII,S$GLB,, | Performed by: OTOLARYNGOLOGY

## 2022-04-12 PROCEDURE — 4010F PR ACE/ARB THEARPY RXD/TAKEN: ICD-10-PCS | Mod: CPTII,S$GLB,, | Performed by: OTOLARYNGOLOGY

## 2022-04-12 PROCEDURE — 99204 PR OFFICE/OUTPT VISIT, NEW, LEVL IV, 45-59 MIN: ICD-10-PCS | Mod: S$GLB,,, | Performed by: OTOLARYNGOLOGY

## 2022-04-12 PROCEDURE — 3008F PR BODY MASS INDEX (BMI) DOCUMENTED: ICD-10-PCS | Mod: CPTII,S$GLB,, | Performed by: OTOLARYNGOLOGY

## 2022-04-12 PROCEDURE — 1126F PR PAIN SEVERITY QUANTIFIED, NO PAIN PRESENT: ICD-10-PCS | Mod: CPTII,S$GLB,, | Performed by: OTOLARYNGOLOGY

## 2022-04-12 PROCEDURE — 1160F RVW MEDS BY RX/DR IN RCRD: CPT | Mod: CPTII,S$GLB,, | Performed by: OTOLARYNGOLOGY

## 2022-04-12 NOTE — PROGRESS NOTES
Subjective:     Chief Complaint: eyelid evaluation     Tiarra Norton is a 67 y.o. female who was referred to me by Dr. Melissa Cabrera in consultation for evaluation for possible blepharoplasty.    The patient presents for evaluation of aging changes or on the eyes.  She has noticed this for several years.  She has noticed excess skin and brow heaviness causing decreased visual fields.  She does have a history of amblyopia and has decreased vision in the left eye.  She does have a history of mild dry eye.  She had cataract surgery about 5 years ago.  She uses Restasis as needed which does improve her symptoms.  She does not have any history of punctal plugs or dry mouth.  She does see an outside ophthalmologist regularly.  She also reports some aging changes round lower eyelids as well as the lower face and neck.    There is not a prior history of sinonasal surgery.  She is not an active smoker.    Past Medical History  She has a past medical history of Cataract, Depression, Gestational diabetes, Hyperlipidemia, Hypertension, IBS (irritable bowel syndrome), and Thyroid disease.    Past Surgical History  She has a past surgical history that includes  section; Tubal ligation; Tonsillectomy; Adenoidectomy; Cholecystectomy; Eye surgery; r hand surgery; Total knee arthroplasty; Colonoscopy (N/A, 2016); Colonoscopy (N/A, 2019); Esophagogastroduodenoscopy (N/A, 2020); Joint replacement; Arthroscopy of knee (Right, 10/19/2021); Arthroscopic chondroplasty of knee joint (Right, 10/19/2021); Knee arthroscopy w/ meniscectomy (Right, 10/19/2021); Injection of anesthetic agent around nerve (Bilateral, 2022); Injection of anesthetic agent around nerve (Bilateral, 2/15/2022); Radiofrequency ablation (Right, 3/8/2022); and Radiofrequency ablation (Left, 3/22/2022).    Family History  Her family history includes Alcohol abuse in her sister and sister; Breast cancer in her maternal aunt and  "paternal aunt; Cancer in her father; Depression in her sister; Epilepsy in her son; Heart attack in her father; Heart disease in her father; Heart failure in her father; Hyperlipidemia in her father and mother; Hypertension in her father and mother; Irritable bowel syndrome in her mother and sister; Ovarian cancer in her maternal aunt.    Social History  She reports that she has never smoked. She has never used smokeless tobacco. She reports current alcohol use of about 3.0 standard drinks of alcohol per week. She reports that she does not use drugs.    Allergies  She has No Known Allergies.    Medications  She has a current medication list which includes the following prescription(s): alprazolam, amlodipine, atorvastatin, b complex vitamins, cholecalciferol (vitamin d3), ciclopirox, ciclopirox, premarin, diphenoxylate-atropine 2.5-0.025 mg, ergocalciferol (vitamin d2), hydrocodone-acetaminophen, levothyroxine, levothyroxine, liothyronine, creon, pantoprazole, promethazine, restasis, solifenacin, sumatriptan, trazodone, triamcinolone acetonide 0.1%, valsartan, and cholestyramine.    Review of Systems  Negative except per HPI     Objective:     /73 (BP Location: Right arm, Patient Position: Sitting, BP Method: Medium (Automatic))   Pulse 87   Temp 97.5 °F (36.4 °C) (Temporal)   Resp 14   Ht 5' 3" (1.6 m)   Wt 67.6 kg (149 lb)   SpO2 98%   BMI 26.39 kg/m²      Constitutional:   Vital signs are normal. She appears well-developed and well-nourished.     Head:  Normocephalic and atraumatic. Salivary glands normal.  Facial strength is normal.          Nose:  No mucosal edema or rhinorrhea. Turbinates normal.      Mouth/Throat  Lips, teeth, and gums normal and oropharynx normal.     Neck:  Neck normal without thyromegaly masses, asymmetry, normal tracheal structure, crepitus, and tenderness, thyroid normal and trachea normal.         Pulmonary/Chest:   Effort normal.       Procedure    None        Assessment: "     1. Visual field constriction, unspecified laterality    2. Brow ptosis, bilateral    3. Dermatochalasis of upper and lower eyelids of both eyes    4. Facial rhytids          Plan:     I had a long discussion with the patient regarding her condition and the further workup and management options.  She does have significant brow ptosis as well as upper lid dermatochalasis that appears to cause significant visual field obstruction. We will assess with a Goldmann visual field test.  We discussed the options for brow lift procedure as well as upper and lower lid blepharoplasty.  Due to her high hairline, we discussed options for trichophytic brow lift.  We discussed conservative upper lid blepharoplasty to remove the excess skin.  We also discussed transcondylar lower lid blepharoplasty with skin pinch and canthopexy.  With her mild dry eye symptoms, I recommend that she follow-up with her ophthalmologist prior to any eyelid procedure to ensure adequate tear production.  Patient is also considering a face lift.  We will send her a quote for these procedures. She will consider these options and get back to me. All questions answered and patient encouraged to call with any questions or concerns.

## 2022-04-14 ENCOUNTER — PATIENT MESSAGE (OUTPATIENT)
Dept: PAIN MEDICINE | Facility: CLINIC | Age: 67
End: 2022-04-14
Payer: MEDICARE

## 2022-04-17 ENCOUNTER — PATIENT MESSAGE (OUTPATIENT)
Dept: DERMATOLOGY | Facility: CLINIC | Age: 67
End: 2022-04-17
Payer: MEDICARE

## 2022-04-17 DIAGNOSIS — E03.4 HYPOTHYROIDISM DUE TO ACQUIRED ATROPHY OF THYROID: ICD-10-CM

## 2022-04-17 DIAGNOSIS — I10 PRIMARY HYPERTENSION: Primary | ICD-10-CM

## 2022-04-18 ENCOUNTER — PATIENT MESSAGE (OUTPATIENT)
Dept: PAIN MEDICINE | Facility: CLINIC | Age: 67
End: 2022-04-18

## 2022-04-18 ENCOUNTER — OFFICE VISIT (OUTPATIENT)
Dept: PAIN MEDICINE | Facility: CLINIC | Age: 67
End: 2022-04-18
Payer: MEDICARE

## 2022-04-18 ENCOUNTER — PATIENT MESSAGE (OUTPATIENT)
Dept: INTERNAL MEDICINE | Facility: CLINIC | Age: 67
End: 2022-04-18
Payer: MEDICARE

## 2022-04-18 DIAGNOSIS — M17.11 PRIMARY OSTEOARTHRITIS OF RIGHT KNEE: Primary | ICD-10-CM

## 2022-04-18 DIAGNOSIS — M47.817 SPONDYLOSIS OF LUMBOSACRAL REGION WITHOUT MYELOPATHY OR RADICULOPATHY: ICD-10-CM

## 2022-04-18 PROCEDURE — 1159F MED LIST DOCD IN RCRD: CPT | Mod: CPTII,95,, | Performed by: ANESTHESIOLOGY

## 2022-04-18 PROCEDURE — 99214 OFFICE O/P EST MOD 30 MIN: CPT | Mod: 95,,, | Performed by: ANESTHESIOLOGY

## 2022-04-18 PROCEDURE — 1160F PR REVIEW ALL MEDS BY PRESCRIBER/CLIN PHARMACIST DOCUMENTED: ICD-10-PCS | Mod: CPTII,95,, | Performed by: ANESTHESIOLOGY

## 2022-04-18 PROCEDURE — 4010F PR ACE/ARB THEARPY RXD/TAKEN: ICD-10-PCS | Mod: CPTII,95,, | Performed by: ANESTHESIOLOGY

## 2022-04-18 PROCEDURE — 99214 PR OFFICE/OUTPT VISIT, EST, LEVL IV, 30-39 MIN: ICD-10-PCS | Mod: 95,,, | Performed by: ANESTHESIOLOGY

## 2022-04-18 PROCEDURE — 4010F ACE/ARB THERAPY RXD/TAKEN: CPT | Mod: CPTII,95,, | Performed by: ANESTHESIOLOGY

## 2022-04-18 PROCEDURE — 1160F RVW MEDS BY RX/DR IN RCRD: CPT | Mod: CPTII,95,, | Performed by: ANESTHESIOLOGY

## 2022-04-18 PROCEDURE — 1159F PR MEDICATION LIST DOCUMENTED IN MEDICAL RECORD: ICD-10-PCS | Mod: CPTII,95,, | Performed by: ANESTHESIOLOGY

## 2022-04-18 RX ORDER — SOLIFENACIN SUCCINATE 10 MG/1
TABLET, FILM COATED ORAL
Qty: 90 TABLET | Refills: 3 | Status: SHIPPED | OUTPATIENT
Start: 2022-04-18 | End: 2022-06-15

## 2022-04-18 NOTE — PROGRESS NOTES
Chronic Pain-Tele-Medicine-Established Note (Follow up visit)      SUBJECTIVE:    PCP: Kaykay Perales MD    REFERRING PHYSICIAN: Tyler Jacobs MD    CHIEF COMPLAINT: Lower back pain       Original HISTORY OF PRESENT ILLNESS: Tiarra Norton presents to the clinic for the evaluation of the above pain. The pain started five years ago.     Original Pain Description:  The pain is located in the lower back and does radiate up towards her upper back.The pain is described as aching, dull and sharp. Initially starts as a dull, aching pain and it gets sharp once she bends down and moves around. Typically when she walks for more than five minutes, the sharp pain radiates up her back. Exacerbating factors: Standing, Bending, Touching, Walking, Night Time, Flexing and Lifting. Mitigating factors heat, ice, laying down and massage. Symptoms interfere with daily activity and sleeping. The patient feels like symptoms have been worsening. Patient denies night fever/night sweats, urinary incontinence, bowel incontinence, significant weight loss, significant motor weakness and loss of sensations.    Original PAIN SCORES:  Best: Pain is 1  Worst: Pain is 10  Usually: Pain is 4  Current: Pain is 4    INTERVAL HISTORY:  4/18/22 Interval History: Patient presents for telehealth visit for follow up following her b/l RFA at L3-L5. She reports 80% relief and is very pleased with her pain relief. Unfortunately, she is not back to doing what she wants due to right knee pain. She is seeing Dr. Huber for knee pain and is s/p right medial compartment partial knee replacement. She is currently in therapy for this. We discussed that we may be able to assist her with her knee pain as well.     6 weeks of Conservative therapy (PT/Chiro/Home Exercises with Dates)  Healthy back program--> has four sessions left for a total of 20 sessions   Last session was on 1/31/22   Stretches that they taught at [facility] gives her relief       Treatments / Medications: (Ice/Heat/NSAIDS/APAP/etc):  Ice and heat which has helped      Report: reviewed       Interventional Pain Procedures: (Previous injections)  3/22/22: RFA Left L3-L5 80% relief  3/8/22:  RFA Right L3-L5 80% relief   Has gotten injections in her knee (orthovisc injections)       Past Medical History:   Diagnosis Date    Cataract     Depression     Gestational diabetes     Hyperlipidemia     Hypertension     IBS (irritable bowel syndrome)     Thyroid disease      Past Surgical History:   Procedure Laterality Date    ADENOIDECTOMY      ARTHROSCOPIC CHONDROPLASTY OF KNEE JOINT Right 10/19/2021    Procedure: ARTHROSCOPY, KNEE, WITH CHONDROPLASTY;  Surgeon: Trevin Huber MD;  Location: [facility] OR;  Service: Orthopedics;  Laterality: Right;    ARTHROSCOPY OF KNEE Right 10/19/2021    Procedure: ARTHROSCOPY, KNEE-RIGHT;  Surgeon: Trevin Huber MD;  Location: [facility] OR;  Service: Orthopedics;  Laterality: Right;     SECTION      CHOLECYSTECTOMY      COLONOSCOPY N/A 2016    Procedure: COLONOSCOPY;  Surgeon: Heri Segura MD;  Location: [facility] ([address] [place] [address]);  Service: Endoscopy;  Laterality: N/A;    COLONOSCOPY N/A 2019    Procedure: COLONOSCOPY;  Surgeon: Joe Pitts MD;  Location: [facility] ([address] [place] [address]);  Service: Endoscopy;  Laterality: N/A;    ESOPHAGOGASTRODUODENOSCOPY N/A 2020    Procedure: EGD (ESOPHAGOGASTRODUODENOSCOPY);  Surgeon: Tolu Rivas MD;  Location: [address]);  Service: Endoscopy;  Laterality: N/A;  Pt. moved to 9:15am arrival time.. Instructed to not have anything after midnight.EC    EYE SURGERY      cataract resection right    INJECTION OF ANESTHETIC AGENT AROUND NERVE Bilateral 2022    Procedure: Block, Nerve MEDIAL BRANCH BLOCK BILATERAL L3,4,5;  Surgeon: Tara Gibbs MD;  Location: [facility] PAIN MGT;  Service: Pain Management;  Laterality: Bilateral;    INJECTION OF ANESTHETIC AGENT AROUND NERVE  Bilateral 2/15/2022    Procedure: BLOCK, NERVE, L3*L4-L5 MEDIAL BR ANCH 2 OF 2;  Surgeon: Tara Gibbs MD;  Location: [facility] [place]IN [facility];  Service: Pain Management;  Laterality: Bilateral;    JOINT REPLACEMENT      partial right knee    KNEE ARTHROSCOPY W/ MENISCECTOMY Right 10/19/2021    Procedure: ARTHROSCOPY, KNEE, WITH MENISCECTOMY, PARTIAL LATERAL;  Surgeon: Trevin Hbuer MD;  Location: [facility] OR;  Service: Orthopedics;  Laterality: Right;    r hand surgery      RADIOFREQUENCY ABLATION Right 3/8/2022    Procedure: RADIOFREQUENCY ABLATION, L3-L5 1 OF 2;  Surgeon: Tara Gibbs MD;  Location: [facility] [place]IN [facility];  Service: Pain Management;  Laterality: Right;    RADIOFREQUENCY ABLATION Left 3/22/2022    Procedure: RADIOFREQUENCY ABLATION, l3-+l5 2 of 2;  Surgeon: Tara Gibbs MD;  Location: [facility] [place]IN [facility];  Service: Pain Management;  Laterality: Left;    TONSILLECTOMY      TOTAL KNEE ARTHROPLASTY      partial knee replacement    TUBAL LIGATION       Social History     Socioeconomic History    Marital status:     Number of children: 1   Occupational History    Occupation:      Employer: [employer]     Comment: retired   Tobacco Use    Smoking status: Never Smoker    Smokeless tobacco: Never Used   Substance and Sexual Activity    Alcohol use: Yes     Alcohol/week: 3.0 standard drinks     Types: 3 Glasses of wine per week     Comment: weekends    Drug use: No    Sexual activity: Yes     Partners: Male   Other Topics Concern    Are you pregnant or think you may be? No    Breast-feeding No   Social History Narrative    Retired        Swims.       Stationary bike.            Social Determinants of Health     Financial Resource Strain: Low Risk     Difficulty of Paying Living Expenses: Not hard at all   Food Insecurity: No Food Insecurity    Worried About Running Out of Food in the Last Year: Never true    Ran Out of Food in the Last Year: Never true    Transportation Needs: No Transportation Needs    Lack of Transportation (Medical): No    Lack of Transportation (Non-Medical): No   Physical Activity: Insufficiently Active    Days of Exercise per Week: 4 days    Minutes of Exercise per Session: 30 min   Stress: Stress Concern Present    Feeling of Stress : Very much   Social Connections: Unknown    Frequency of Communication with Friends and Family: More than three times a week    Frequency of Social Gatherings with Friends and Family: Once a week    Active Member of Clubs or Organizations: No    Attends Club or Organization Meetings: More than 4 times per year    Marital Status:      Family History   Problem Relation Age of Onset    Hypertension Mother     Irritable bowel syndrome Mother     Hyperlipidemia Mother     Heart disease Father         mi    Hypertension Father     Cancer Father         prostate    Heart attack Father     Heart failure Father     Hyperlipidemia Father     Alcohol abuse Sister     Depression Sister     Epilepsy Son     Irritable bowel syndrome Sister     Alcohol abuse Sister     Ovarian cancer Maternal Aunt     Breast cancer Paternal Aunt     Breast cancer Maternal Aunt     Melanoma Neg Hx     Colon cancer Neg Hx     Stomach cancer Neg Hx     Esophageal cancer Neg Hx     Liver disease Neg Hx     Crohn's disease Neg Hx     Ulcerative colitis Neg Hx     Celiac disease Neg Hx     Stroke Neg Hx        Review of patient's allergies indicates:  No Known Allergies    Current Outpatient Medications   Medication Sig    alprazolam (XANAX) 2 MG Tab Take 2 mg by mouth 2 (two) times daily as needed.     amLODIPine (NORVASC) 5 MG tablet Take 1 tablet (5 mg total) by mouth once daily.    atorvastatin (LIPITOR) 10 MG tablet TAKE 1 TABLET EVERY DAY    b complex vitamins capsule Take 1 capsule by mouth once daily.    cholecalciferol, vitamin D3, (VITAMIN D3) 5,000 unit Tab Take 1 tablet (5,000 Units total)  by mouth once daily.    cholestyramine (QUESTRAN) 4 gram packet Take 1 packet (4 g total) by mouth 2 (two) times daily.    ciclopirox (LOPROX) 0.77 % Crea APPLY 1 GRAM TOPICALLY TO THE AFFECTED AREA TWICE DAILY    ciclopirox (PENLAC) 8 % Soln Apply topically nightly.    conjugated estrogens (PREMARIN) vaginal cream Place a pea-sized amount in vagina every night for 4 weeks, then use 2-3 nights a week    diphenoxylate-atropine 2.5-0.025 mg (LOMOTIL) 2.5-0.025 mg per tablet TAKE 1 TABLET BY MOUTH FOUR TIMES DAILY AS NEEDED FOR DIARRHEA    ergocalciferol, vitamin D2, (VITAMIN D ORAL) Take by mouth once daily.    HYDROcodone-acetaminophen (NORCO) 5-325 mg per tablet Take 1 tablet by mouth every 6 to 8 hours as needed (migraine).    levothyroxine (SYNTHROID) 125 MCG tablet Take 1 tablet (125 mcg total) by mouth every morning.    levothyroxine (SYNTHROID) 137 MCG Tab tablet TAKE 1 TABLET (137 MCG TOTAL) BY MOUTH EVERY MORNING.    liothyronine (CYTOMEL) 5 MCG Tab Take 1 tablet (5 mcg total) by mouth once daily.    lipase-protease-amylase 24,000-76,000-120,000 units (CREON) 24,000-76,000 -120,000 unit capsule Take 1 capsule by mouth 3 (three) times daily with meals.    pantoprazole (PROTONIX) 40 MG tablet TAKE 1 TABLET(40 MG) BY MOUTH EVERY DAY    promethazine (PHENERGAN) 12.5 MG Tab Take 1 tablet (12.5 mg total) by mouth every 6 (six) hours as needed (nausea).    RESTASIS 0.05 % ophthalmic emulsion INT 1 GTT IN OU BID    solifenacin (VESICARE) 10 MG tablet 1 tab po q day for bladder    sumatriptan (IMITREX) 50 MG tablet 1 tab po at start of headache.  Repeat in 2 h prn    traZODone (DESYREL) 100 MG tablet TAKE 1 TO 2 TABLETS AT BEDTIME FOR SLEEP    triamcinolone acetonide 0.1% (KENALOG) 0.1 % cream Apply to affected areas of back BID prn irritation. Do not use on face, underarms, or groin.    valsartan (DIOVAN) 320 MG tablet Take 1 tablet (320 mg total) by mouth once daily. (STOP LISINOPRIL)     No  current facility-administered medications for this visit.       REVIEW OF SYSTEMS:    GENERAL: No fever. No chills. No fatigue. Denies weight loss. Denies weight gain.  HEENT: Denies headaches. Denies vision change. Denies eye pain. Denies double vision. Denies ear pain.   CV: Denies chest pain.   PULM: Denies of shortness of breath.  GI: Denies constipation. No diarrhea. No abdominal pain. Denies nausea. Denies vomiting. No blood in stool.  HEME: Denies bleeding problems.  : Denies urgency. No painful urination. No blood in urine.  MS: Denies joint stiffness. Denies joint swelling.  Denies back pain. +Rt Knee Pain  SKIN: Denies rash.   NEURO: Denies seizures. No weakness.  PSYCH:  Denies difficulty sleeping. No anxiety. Denies depression. No suicidal thoughts.     OBJECTIVE:  PRIOR PHYSICAL EXAM:   GENERAL: Well appearing, in no acute distress, alert and oriented x3.  PSYCH:  Mood and affect appropriate.  SKIN: Skin color, texture, turgor normal, no rashes or lesions.  HEENT:  Normocephalic, atraumatic. Cranial nerves grossly intact.  PULM: No evidence of respiratory difficulty, symmetric chest rise.  GI:  Non-distended  BACK: Normal range of motion. Pain to palpation over the spinous processes (particularly L4-L5) . Pain reproduced with facet loading. There is no pain with palpation over the sacroiliac joints bilaterally. Straight leg raising in the supine position is negative to radicular pain.   EXTREMITIES: No deformities, edema, or skin discoloration.   MUSCULOSKELETAL: Tenderness to palpation on her lower back muscles. Bilateral upper and lower extremity strength is normal and symmetric. No atrophy is noted.    NEURO: Sensation is equal and appropriate bilaterally. Bilateral upper and lower extremity coordination and muscle stretch reflexes are physiologic and symmetric. Plantar response are downgoing.   GAIT: hansa no antalgic gait     IMAGING:   Previous XRs showed lumbar spondylosis and degenerative  scoliosis measuring 24degrees. L4-5 anterolisthesis measuring 6mm.      MRI lumbar 1/21/22      T12-L1: Circumferential disc bulge and mild facet arthropathy.  No spinal canal stenosis or neural foraminal narrowing.     L1-L2: Circumferential disc bulge and mild facet arthropathy result in mild right neural foraminal narrowing.     L2-L3: Circumferential disc bulge and mild facet arthropathy. No spinal canal stenosis or neural foraminal narrowing.     L3-L4: Circumferential disc bulge and mild facet arthropathy result in moderate spinal canal stenosis and mild left neural foraminal narrowing.     L4-L5: Circumferential disc bulge and moderate facet arthropathy result in mild spinal canal stenosis.     L5-S1: Moderate facet arthropathy.  No spinal canal stenosis or neural foraminal narrowing.     Standing scoliosis XR reviewed - thoracolumbar levoscoliosis approximately 26 degrees     XR KNEE ORTHO RIGHT     CLINICAL HISTORY:  Pain in right knee     TECHNIQUE:  AP standing of both knees, Merchant views of both knees as well as a lateral view of the right knee were performed.     COMPARISON:  06/01/2021.     FINDINGS:  Right: Status post right medial compartment arthroplasty.  No radiographic evidence of complication.  No acute fracture or dislocation.  Small joint effusion.     Left: No fracture or dislocation on provided views.  Tricompartmental osteophytes with moderate medial tibiofemoral cartilage space narrowing.     Right: There is a medial joint arthroplasty.     Impression:     Postoperative changes of right medial compartment arthroplasty.  No radiographic evidence of complication.     Moderate left medial tibiofemoral cartilage space narrowing.     Electronically signed by resident: Massimo Plunkett  Date:                                            12/06/2021  Time:                                           14:11     Electronically signed by: Fred Adorno MD  Date:                                             12/06/2021  Time:                                           14:20    ASSESSMENT: 67 y.o. year old female with pain, consistent with right knee osteoarthritis.     1. Spondylosis of lumbosacral region without myelopathy or radiculopathy     2. Primary osteoarthritis of right knee          DISCUSSION: Ms. Norton is a retired . She comes to us with low back pain. MRI shows facet arthropathy, moderate canal stenosis, and multilevel Modic changes T12-L3. Pain reproduced with facet loading and relieved with RFA. I was previously concerned she may require SCS due to her stenosis / Modic changes. She also has right knee pain s/p right partial knee replacement and is being treated by Dr. Huber.     PLAN:   1) Discuss knee options with Dr. Huber  2) Follow up with me if needed for right knee GNB / RFA  3) Follow up for low back pain as needed.     Tara Gibbs  04/18/2022

## 2022-04-19 ENCOUNTER — CLINICAL SUPPORT (OUTPATIENT)
Dept: REHABILITATION | Facility: OTHER | Age: 67
End: 2022-04-19
Attending: INTERNAL MEDICINE
Payer: MEDICARE

## 2022-04-19 ENCOUNTER — PATIENT MESSAGE (OUTPATIENT)
Dept: INTERNAL MEDICINE | Facility: CLINIC | Age: 67
End: 2022-04-19
Payer: MEDICARE

## 2022-04-19 DIAGNOSIS — N32.81 OVERACTIVE BLADDER: Primary | ICD-10-CM

## 2022-04-19 DIAGNOSIS — R29.898 WEAKNESS OF RIGHT LOWER EXTREMITY: ICD-10-CM

## 2022-04-19 DIAGNOSIS — M25.661 DECREASED RANGE OF MOTION (ROM) OF RIGHT KNEE: Primary | ICD-10-CM

## 2022-04-19 PROCEDURE — 97110 THERAPEUTIC EXERCISES: CPT

## 2022-04-19 NOTE — PROGRESS NOTES
"    [facility]   Physical Therapy Treatment Note     Name: Tiarra Norton  Clinic Number: [id number]    Therapy Diagnosis:   Encounter Diagnoses   Name Primary?    Decreased range of motion (ROM) of right knee Yes    Weakness of right lower extremity      Physician: Kaykay Perales MD    Visit Date: 4/19/2022    Physician Orders: PT Eval and Treat  Medical Diagnosis from Referral: M17.11 (ICD-10-CM) - Primary osteoarthritis of right knee  Evaluation Date: 2/10/2022  Authorization Period Expiration: 6/10/2022  Plan of Care Expiration: 4/31/2022   Visit # / Visits authorized: 9/20   Progress Note Due: 4/31/2022  FOTO: 2/2     PTA Visit #: 0/5     Time In: 5:00 pm  Time Out: 6:05 pm  Total Billable Time: 55 minutes    SUBJECTIVE     Pt reports: she made a twisting motion the other day and her knee has been a little sore since then    She was compliant with home exercise program.  Response to previous treatment: increased soress soreness  Functional change: no change yet    Pain: 3/10  Location: right medial knee      OBJECTIVE     Objective Measures updated at progress report unless specified.         Range of Motion: - reassessed 3/31/22     Knee Right active Right Passive   Flexion 130 136   Extension 3 5         Lower Extremity Strength - reassessed 3/31/22  Right LE   Left LE     Knee extension: 5/5 Knee extension: 5/5   Knee flexion: 5/5 Knee flexion: 5/5   Hip flexion: 4+/5 Hip flexion: 4+/5   Hip extension:  4/5 Hip extension: 4/5   Hip abduction: 4/5 Hip abduction: 4/5   Hip adduction: 4/5 Hip adduction 4/5   Ankle dorsiflexion: 5/5 Ankle dorsiflexion: 5/5   Ankle plantarflexion: 5/5 Ankle plantarflexion: 5/5          Treatment     Tiarra received the treatments listed below:      therapeutic exercises to develop strength, endurance, ROM and flexibility for 50 minutes including:    SAQ 4# 2x10x3"  SLR with ER 3# 2x10x3"  VMO LAQ 5# 2x10x3"   Hamstring stretch 2x30"   Prone quad " "stretch 3x30" thigh propped up with pillow  Clamshells GTB 2x10 - NP   SLB 3x30"    SLB + reach 2x10   TKE Blue TB 20x5"  Heel raises 2x15   sidestepping GTB 2x10 steps   Fwd and sideways Step ups into SLS on Bosu 2x10    SL leg press 88# 2x10  DL leg press 150# 2x10  knee extension with R SL ecc lowering 40# 2x8  fwd and side half lunge x10 ea  +3 way sliders x10 ea direction    NP:  slantboard gastroc stretch 2x30"  Sit to stands L LE propped on 2" 2x10   SL hip abduction 2# 2x10      manual therapy techniques: Joint mobilizations and Soft tissue Mobilization were applied to the: R quad for 5 minutes, including:    Roller stick R lateral quad     cold pack for 10 minutes to R knee.        Patient Education and Home Exercises     Home Exercises Provided and Patient Education Provided     Education provided:   - HEP, POC    Written Home Exercises Provided: Patient instructed to cont prior HEP. Exercises were reviewed and Tiarra was able to demonstrate them prior to the end of the session.  Tiarra demonstrated good  understanding of the education provided. See EMR under Patient Instructions for exercises provided during therapy sessions    ASSESSMENT     Tiarra presents today with slight increase in pain levels, however after manual techniques, stretching and therex, pt left treatment session with reports of no pain. Continuation and progression of dynamic LE strengthening and balance exercises with good tolerance and no adverse effects. Continue to progress as tolerated.     Tiarra Is progressing well towards her goals.   Pt prognosis is Good.     Pt will continue to benefit from skilled outpatient physical therapy to address the deficits listed in the problem list box on initial evaluation, provide pt/family education and to maximize pt's level of independence in the home and community environment.     Pt's spiritual, cultural and educational needs considered and pt agreeable to plan of care and goals.     Anticipated " barriers to physical therapy: none    GOALS: Short Term Goals:  4 weeks  1.Report decreased in pain at worse less than  <   / =  5  /10  to increase tolerance for functional mobility. (not met, progressing)  2. Pt to improve R knee range of motion by 25% to allow for improved functional mobility to allow for improvement in IADLs. (not met, progressing)  3. Increased R LE MMT 1/2 grade to increase tolerance for ADL and work activities. (not met, progressing)  4. Pt to report ability to walk 1 mile without increased knee pain. (not met, progressing)  5. Pt to tolerate HEP to improve ROM and independence with ADL's. (not met, progressing)     Long Term Goals: 8 weeks  1.Report decreased in pain at worse less than  <   / =  3  /10  to increase tolerance for functional mobility. (not met, progressing)  2.Pt to improve range of motion by 75% to allow for improved functional mobility to allow for improvement in IADLs. (not met, progressing)  3.Increased R LE MMT 1 grade to increase tolerance for ADL and work activities. (not met, progressing)  4. Pt will report 41% or less on FOTO knee survey  to demonstrate increase in LE function with every day tasks. (not met, progressing)  5. Pt to be Independent with HEP to improve ROM and independence with ADL's. (not met, progressing)    PLAN     Plan of care Certification: 2/10/2022 to 4/31/2022.    Continue treatment per established plan of care.     Zi Anaya, PT

## 2022-04-20 ENCOUNTER — PATIENT MESSAGE (OUTPATIENT)
Dept: INTERNAL MEDICINE | Facility: CLINIC | Age: 67
End: 2022-04-20
Payer: MEDICARE

## 2022-04-24 ENCOUNTER — PATIENT MESSAGE (OUTPATIENT)
Dept: INTERNAL MEDICINE | Facility: CLINIC | Age: 67
End: 2022-04-24
Payer: MEDICARE

## 2022-04-25 ENCOUNTER — PATIENT MESSAGE (OUTPATIENT)
Dept: PAIN MEDICINE | Facility: CLINIC | Age: 67
End: 2022-04-25
Payer: MEDICARE

## 2022-04-26 ENCOUNTER — CLINICAL SUPPORT (OUTPATIENT)
Dept: REHABILITATION | Facility: OTHER | Age: 67
End: 2022-04-26
Attending: INTERNAL MEDICINE
Payer: MEDICARE

## 2022-04-26 DIAGNOSIS — R29.898 WEAKNESS OF RIGHT LOWER EXTREMITY: ICD-10-CM

## 2022-04-26 DIAGNOSIS — M25.661 DECREASED RANGE OF MOTION (ROM) OF RIGHT KNEE: Primary | ICD-10-CM

## 2022-04-26 PROCEDURE — 97110 THERAPEUTIC EXERCISES: CPT

## 2022-04-26 NOTE — PROGRESS NOTES
"    [facility]   Physical Therapy Treatment Note     Name: Tiarra Norton  Clinic Number: [id number]    Therapy Diagnosis:   Encounter Diagnoses   Name Primary?    Decreased range of motion (ROM) of right knee Yes    Weakness of right lower extremity      Physician: Kaykay Perales MD    Visit Date: 4/26/2022    Physician Orders: PT Eval and Treat  Medical Diagnosis from Referral: M17.11 (ICD-10-CM) - Primary osteoarthritis of right knee  Evaluation Date: 2/10/2022  Authorization Period Expiration: 6/10/2022  Plan of Care Expiration: 4/31/2022   Visit # / Visits authorized: 10/20   Progress Note Due: 4/31/2022  FOTO: 2/2     PTA Visit #: 0/5     Time In: 5:00 pm  Time Out: 6:05 pm  Total Billable Time: 55 minutes    SUBJECTIVE     Pt reports: her knee is feeling much better, very little pain. Her back is still more bothersome than anything    She was compliant with home exercise program.  Response to previous treatment: increased soress soreness  Functional change: no change yet    Pain: 2/10  Location: right medial knee      OBJECTIVE     Objective Measures updated at progress report unless specified.         Range of Motion: - reassessed 3/31/22     Knee Right active Right Passive   Flexion 130 136   Extension 3 5         Lower Extremity Strength - reassessed 3/31/22  Right LE   Left LE     Knee extension: 5/5 Knee extension: 5/5   Knee flexion: 5/5 Knee flexion: 5/5   Hip flexion: 4+/5 Hip flexion: 4+/5   Hip extension:  4/5 Hip extension: 4/5   Hip abduction: 4/5 Hip abduction: 4/5   Hip adduction: 4/5 Hip adduction 4/5   Ankle dorsiflexion: 5/5 Ankle dorsiflexion: 5/5   Ankle plantarflexion: 5/5 Ankle plantarflexion: 5/5          Treatment     Tiarra received the treatments listed below:      therapeutic exercises to develop strength, endurance, ROM and flexibility for 50 minutes including:    SAQ 4# 2x10x3"  SLR with ER 4# 2x10x3"  VMO LAQ 5# 2x10x3"   Hamstring stretch 2x30" " "  Prone quad stretch 3x30" thigh propped up with pillow  Clamshells GTB 2x10   SLB 3x30"    SLB + reach 2x10   TKE Blue TB 20x5"  Heel raises 2x15   sidestepping GTB 2x10 steps   Fwd and sideways Step ups into SLS on Bosu 2x10    SL leg press 84# 2x10  DL leg press 154# 2x10  knee extension with R SL ecc lowering 42# 2x10  fwd and side half lunge x10 ea  3 way sliders x10 ea direction       NP:  slantboard gastroc stretch 2x30"  Sit to stands L LE propped on 2" 2x10   SL hip abduction 2# 2x10      manual therapy techniques: Joint mobilizations and Soft tissue Mobilization were applied to the: R quad for 5 minutes, including:    STM R quad    cold pack for 10 minutes to R knee.        Patient Education and Home Exercises     Home Exercises Provided and Patient Education Provided     Education provided:   - HEP, POC    Written Home Exercises Provided: Patient instructed to cont prior HEP. Exercises were reviewed and Tiarra was able to demonstrate them prior to the end of the session.  Tiarra demonstrated good  understanding of the education provided. See EMR under Patient Instructions for exercises provided during therapy sessions    ASSESSMENT     Tiarra presents today with minimal complaints of pain. Progressed resistance on leg press unilaterally and bilaterally with pt reporting min increased in difficulty showing improved strength. Reassess next visit and continue to progress as tolerated.     Tiarra Is progressing well towards her goals.   Pt prognosis is Good.     Pt will continue to benefit from skilled outpatient physical therapy to address the deficits listed in the problem list box on initial evaluation, provide pt/family education and to maximize pt's level of independence in the home and community environment.     Pt's spiritual, cultural and educational needs considered and pt agreeable to plan of care and goals.     Anticipated barriers to physical therapy: none    GOALS: Short Term Goals:  4 " weeks  1.Report decreased in pain at worse less than  <   / =  5  /10  to increase tolerance for functional mobility. (not met, progressing)  2. Pt to improve R knee range of motion by 25% to allow for improved functional mobility to allow for improvement in IADLs. (not met, progressing)  3. Increased R LE MMT 1/2 grade to increase tolerance for ADL and work activities. (not met, progressing)  4. Pt to report ability to walk 1 mile without increased knee pain. (not met, progressing)  5. Pt to tolerate HEP to improve ROM and independence with ADL's. (not met, progressing)     Long Term Goals: 8 weeks  1.Report decreased in pain at worse less than  <   / =  3  /10  to increase tolerance for functional mobility. (not met, progressing)  2.Pt to improve range of motion by 75% to allow for improved functional mobility to allow for improvement in IADLs. (not met, progressing)  3.Increased R LE MMT 1 grade to increase tolerance for ADL and work activities. (not met, progressing)  4. Pt will report 41% or less on FOTO knee survey  to demonstrate increase in LE function with every day tasks. (not met, progressing)  5. Pt to be Independent with HEP to improve ROM and independence with ADL's. (not met, progressing)    PLAN     Plan of care Certification: 2/10/2022 to 4/31/2022.    Continue treatment per established plan of care.     Zi Anaya, PT

## 2022-04-27 ENCOUNTER — OFFICE VISIT (OUTPATIENT)
Dept: UROLOGY | Facility: CLINIC | Age: 67
End: 2022-04-27
Payer: MEDICARE

## 2022-04-27 ENCOUNTER — PATIENT MESSAGE (OUTPATIENT)
Dept: UROLOGY | Facility: CLINIC | Age: 67
End: 2022-04-27

## 2022-04-27 VITALS
WEIGHT: 144.81 LBS | SYSTOLIC BLOOD PRESSURE: 131 MMHG | HEIGHT: 63 IN | BODY MASS INDEX: 25.66 KG/M2 | HEART RATE: 71 BPM | DIASTOLIC BLOOD PRESSURE: 76 MMHG

## 2022-04-27 DIAGNOSIS — N32.81 OVERACTIVE BLADDER: ICD-10-CM

## 2022-04-27 PROCEDURE — 99999 PR PBB SHADOW E&M-EST. PATIENT-LVL IV: ICD-10-PCS | Mod: PBBFAC,,, | Performed by: UROLOGY

## 2022-04-27 PROCEDURE — 3008F PR BODY MASS INDEX (BMI) DOCUMENTED: ICD-10-PCS | Mod: CPTII,S$GLB,, | Performed by: UROLOGY

## 2022-04-27 PROCEDURE — 1101F PR PT FALLS ASSESS DOC 0-1 FALLS W/OUT INJ PAST YR: ICD-10-PCS | Mod: CPTII,S$GLB,, | Performed by: UROLOGY

## 2022-04-27 PROCEDURE — 3075F PR MOST RECENT SYSTOLIC BLOOD PRESS GE 130-139MM HG: ICD-10-PCS | Mod: CPTII,S$GLB,, | Performed by: UROLOGY

## 2022-04-27 PROCEDURE — 1126F AMNT PAIN NOTED NONE PRSNT: CPT | Mod: CPTII,S$GLB,, | Performed by: UROLOGY

## 2022-04-27 PROCEDURE — 3075F SYST BP GE 130 - 139MM HG: CPT | Mod: CPTII,S$GLB,, | Performed by: UROLOGY

## 2022-04-27 PROCEDURE — 1159F PR MEDICATION LIST DOCUMENTED IN MEDICAL RECORD: ICD-10-PCS | Mod: CPTII,S$GLB,, | Performed by: UROLOGY

## 2022-04-27 PROCEDURE — 4010F ACE/ARB THERAPY RXD/TAKEN: CPT | Mod: CPTII,S$GLB,, | Performed by: UROLOGY

## 2022-04-27 PROCEDURE — 99204 OFFICE O/P NEW MOD 45 MIN: CPT | Mod: S$GLB,,, | Performed by: UROLOGY

## 2022-04-27 PROCEDURE — 1101F PT FALLS ASSESS-DOCD LE1/YR: CPT | Mod: CPTII,S$GLB,, | Performed by: UROLOGY

## 2022-04-27 PROCEDURE — 3288F FALL RISK ASSESSMENT DOCD: CPT | Mod: CPTII,S$GLB,, | Performed by: UROLOGY

## 2022-04-27 PROCEDURE — 3288F PR FALLS RISK ASSESSMENT DOCUMENTED: ICD-10-PCS | Mod: CPTII,S$GLB,, | Performed by: UROLOGY

## 2022-04-27 PROCEDURE — 1160F PR REVIEW ALL MEDS BY PRESCRIBER/CLIN PHARMACIST DOCUMENTED: ICD-10-PCS | Mod: CPTII,S$GLB,, | Performed by: UROLOGY

## 2022-04-27 PROCEDURE — 3008F BODY MASS INDEX DOCD: CPT | Mod: CPTII,S$GLB,, | Performed by: UROLOGY

## 2022-04-27 PROCEDURE — 3078F DIAST BP <80 MM HG: CPT | Mod: CPTII,S$GLB,, | Performed by: UROLOGY

## 2022-04-27 PROCEDURE — 99204 PR OFFICE/OUTPT VISIT, NEW, LEVL IV, 45-59 MIN: ICD-10-PCS | Mod: S$GLB,,, | Performed by: UROLOGY

## 2022-04-27 PROCEDURE — 4010F PR ACE/ARB THEARPY RXD/TAKEN: ICD-10-PCS | Mod: CPTII,S$GLB,, | Performed by: UROLOGY

## 2022-04-27 PROCEDURE — 1160F RVW MEDS BY RX/DR IN RCRD: CPT | Mod: CPTII,S$GLB,, | Performed by: UROLOGY

## 2022-04-27 PROCEDURE — 99999 PR PBB SHADOW E&M-EST. PATIENT-LVL IV: CPT | Mod: PBBFAC,,, | Performed by: UROLOGY

## 2022-04-27 PROCEDURE — 1126F PR PAIN SEVERITY QUANTIFIED, NO PAIN PRESENT: ICD-10-PCS | Mod: CPTII,S$GLB,, | Performed by: UROLOGY

## 2022-04-27 PROCEDURE — 1159F MED LIST DOCD IN RCRD: CPT | Mod: CPTII,S$GLB,, | Performed by: UROLOGY

## 2022-04-27 PROCEDURE — 3078F PR MOST RECENT DIASTOLIC BLOOD PRESSURE < 80 MM HG: ICD-10-PCS | Mod: CPTII,S$GLB,, | Performed by: UROLOGY

## 2022-04-27 NOTE — PROGRESS NOTES
[facility]      New Overactive Bladder (OAB) Note    4/27/2022    Referred by:  Kaykay Perales MD    HPI: Tiarra Norton is a very pleasant 67 y.o. female who is a new patient to our department referred for evaluation of urinary frequency of 2-3 years duration. She reports bother is associated with urinary daytime frequency (15-20x daily), with nocturia (1-2x per night) and with urgency that does not result in urinary incontinence. She reports no stress urinary incontinence associated with exertion. She does not require daily pad use.  She has decreased overall fluid intake.  She reports urinary frequency is only during the day. Patient reports urgency is independent of position.     She denies symptoms of irritative voiding including dysuria. She denies symptoms of obstructive voiding including decreased stream, hesitancy, intermittency, post void dribbling and sense of incomplete emptying. Bladder scan PVR was 0 mL. Her history includes no notation of urolithiasis, hematuria, prior pelvic surgery, previous prolapse or incontinence procedures or neurological symptoms/diagnoses. She denies all other prior pelvic surgeries or neurologic diagnoses. She does not report symptoms suggestive of advanced POP. She denies a history of constipation. She denies a history suggestive of glaucoma.  She denies a history of hypertension.     Previous incontinence therapies:   Medication:   o VESIcare 10 mg has not provided much improvement and resulted in blurred vision     A review of 10+ systems was conducted with pertinent positive and negative findings documented in HPI with all other systems reviewed and negative.    Past medical, family, surgical and social history reviewed as documented in chart with pertinent positive medical, family, surgical and social history detailed in HPI.    Exam Findings:    Const: no acute distress, conversant and alert  Eyes: anicteric, extraocular muscles intact  ENMT:  normocephalic, Nl oral membranes  Cardio: no cyanosis, nl cap refill  Pulm: no tachypnea; no resp distress  Abd: soft, no tenderness  Musc: no laceration, no tenderness  Neuro: alert; oriented x 3  Skin: warm, dry; no petichiae  Psych: no anxiety; normal speech     Assessment/Plan:    Overactive Bladder without Incontinence (new, addt'l workup): Her history is suggestive of Overactive Bladder (OAB) without predominantly Urgency Urinary Incontinence (UUI). The presence or absence of incontinence as well as the relative contribution of stress or urgency incontinence can be further clarified with a 3-day volume-based voiding/incontinence diary provided today. This will also help to screen for polyuria and help to guide us on further counseling on behavioral therapy including timed voiding and bladder training. We will review this on her return visit.      Her reported symptoms today are relatively severe and therefore, I believe it would be appropriate to continue pharmacologic therapy in addition to behavioral therapies. Given her lack of response to antimuscarinics as well as the intolerable side effects including blurred vision, I am recommending she switch to a beta-3 agonist. Myrbetriq appears to be covered on her formulary.

## 2022-04-28 ENCOUNTER — CLINICAL SUPPORT (OUTPATIENT)
Dept: REHABILITATION | Facility: OTHER | Age: 67
End: 2022-04-28
Attending: INTERNAL MEDICINE
Payer: MEDICARE

## 2022-04-28 DIAGNOSIS — M25.661 DECREASED RANGE OF MOTION (ROM) OF RIGHT KNEE: Primary | ICD-10-CM

## 2022-04-28 DIAGNOSIS — R29.898 WEAKNESS OF RIGHT LOWER EXTREMITY: ICD-10-CM

## 2022-04-28 PROCEDURE — 97110 THERAPEUTIC EXERCISES: CPT

## 2022-04-28 NOTE — PROGRESS NOTES
"  [facility]   Physical Therapy Treatment Note/Re-assessment     Name: Tiarra Norton  Clinic Number: [id number]    Therapy Diagnosis:   Encounter Diagnoses   Name Primary?    Decreased range of motion (ROM) of right knee Yes    Weakness of right lower extremity      Physician: Kaykay Perales MD    Visit Date: 4/28/2022    Physician Orders: PT Eval and Treat  Medical Diagnosis from Referral: M17.11 (ICD-10-CM) - Primary osteoarthritis of right knee  Evaluation Date: 2/10/2022  Authorization Period Expiration: 6/10/2022  Plan of Care Expiration: 6/2/2022  Visit # / Visits authorized: 11/20   Progress Note Due: 5/28/2022  FOTO: 3/3     PTA Visit #: 0/5     Time In: 11:00 am  Time Out: 12:00 pm  Total Billable Time: 55 minutes    SUBJECTIVE     Pt reports: no pain in her knee today, she is confident her knee will hold up at [employer] this weekend    She was compliant with home exercise program.  Response to previous treatment: increased soress soreness  Functional change: no change yet    Pain: 2/10  Location: right medial knee      OBJECTIVE     Objective Measures updated at progress report unless specified.         Range of Motion: - reassessed 4/28/22     Knee Right active Right Passive   Flexion 136 142   Extension 4 7         Lower Extremity Strength - reassessed 4/28/22  Right LE   Left LE     Knee extension: 5/5 Knee extension: 5/5   Knee flexion: 5/5 Knee flexion: 5/5   Hip flexion: 4+/5 Hip flexion: 4+/5   Hip extension:  4+/5 Hip extension: 4+/5   Hip abduction: 4+/5 Hip abduction: 4+/5   Hip adduction: 4/5 Hip adduction 4/5   Ankle dorsiflexion: 5/5 Ankle dorsiflexion: 5/5   Ankle plantarflexion: 5/5 Ankle plantarflexion: 5/5          Treatment     Tiarra received the treatments listed below:      therapeutic exercises to develop strength, endurance, ROM and flexibility for 50 minutes including:    SAQ 5# 2x10x3"  SLR with ER 4# 2x10x3"  VMO LAQ 5# 2x10x3"   Hamstring stretch " "2x30"   Prone quad stretch 3x30" thigh propped up with pillow  Clamshells GTB 2x10   SLB + airex 3x30"    SLB + reach 2x10   +SLB + 3 way kick x10 ea direction   TKE Blue TB 20x5"  Heel raises 2x15   sidestepping GTB 2x10 steps   Fwd and sideways Step ups into SLS on Bosu 2x10    SL leg press 84# 2x10  DL leg press 156# 2x10  knee extension with R SL ecc lowering 42# 2x10  fwd and side half lunge x10 ea  3 way sliders x10 ea direction       NP:  slantboard gastroc stretch 2x30"  Sit to stands L LE propped on 2" 2x10   SL hip abduction 2# 2x10      manual therapy techniques: Joint mobilizations and Soft tissue Mobilization were applied to the: R quad for 5 minutes, including:    STM R quad    cold pack for 5 minutes to R knee.        Patient Education and Home Exercises     Home Exercises Provided and Patient Education Provided     Education provided:   - HEP, POC    Written Home Exercises Provided: Patient instructed to cont prior HEP. Exercises were reviewed and Tiarra was able to demonstrate them prior to the end of the session.  Tiarra demonstrated good  understanding of the education provided. See EMR under Patient Instructions for exercises provided during therapy sessions    ASSESSMENT     Tiarra presents today for reassessment without complaints of pain. Reassessment was performed with pt demo gains in R knee flexion and extension active and passive ROM to functional levels. Pt also demo gains in strength, balance, and tolerance to gait and functional mobility activities. Continuation and progression of dynamic LE strengthening and balance exercise with good tolerance and no adverse effects. Continue to progress as tolerated.     Tiarra Is progressing well towards her goals.   Pt prognosis is Good.     Pt will continue to benefit from skilled outpatient physical therapy to address the deficits listed in the problem list box on initial evaluation, provide pt/family education and to maximize pt's level of " independence in the home and community environment.     Pt's spiritual, cultural and educational needs considered and pt agreeable to plan of care and goals.     Anticipated barriers to physical therapy: none    GOALS: Short Term Goals:  4 weeks  1.Report decreased in pain at worse less than  <   / =  5  /10  to increase tolerance for functional mobility. MET  2. Pt to improve R knee range of motion by 25% to allow for improved functional mobility to allow for improvement in IADLs. MET  3. Increased R LE MMT 1/2 grade to increase tolerance for ADL and work activities. MET  4. Pt to report ability to walk 1 mile without increased knee pain. (not met, progressing)  5. Pt to tolerate HEP to improve ROM and independence with ADL's. MET     Long Term Goals: 8 weeks  1.Report decreased in pain at worse less than  <   / =  3  /10  to increase tolerance for functional mobility. (not met, progressing)  2.Pt to improve range of motion by 75% to allow for improved functional mobility to allow for improvement in IADLs. (not met, progressing)  3.Increased R LE MMT 1 grade to increase tolerance for ADL and work activities. (not met, progressing)  4. Pt will report 41% or less on FOTO knee survey  to demonstrate increase in LE function with every day tasks. MET  5. Pt to be Independent with HEP to improve ROM and independence with ADL's. (not met, progressing)    PLAN     Plan of care Certification: 2/10/2022 to 6/2/2022.    Continue treatment per established plan of care.     Zi Anaya, PT

## 2022-05-03 NOTE — PLAN OF CARE
Outpatient Therapy Updated Plan of Care     Visit Date: 4/28/2022    Name: Tiarra Norton  Clinic Number: [id number]    Therapy Diagnosis:   Encounter Diagnoses   Name Primary?    Decreased range of motion (ROM) of right knee Yes    Weakness of right lower extremity      Physician: Kaykay Perales MD    Physician Orders: PT Eval and Treat  Medical Diagnosis from Referral: M17.11 (ICD-10-CM) - Primary osteoarthritis of right knee  Evaluation Date: 2/10/2022  Authorization Period Expiration: 6/10/2022  Plan of Care Expiration: 6/2/2022  Visit # / Visits authorized: 11/20   Progress Note Due: 5/28/2022    Precautions: Standard    Subjective     Update:    Pt reports: no pain in her knee today, she is confident her knee will hold up at [employer] this weekend     She was compliant with home exercise program.  Response to previous treatment: increased soress soreness  Functional change: no change yet     Pain: 0/10  Location: right medial knee      Objective     Update: Objective Measures updated at progress report unless specified.           Range of Motion: - reassessed 4/28/22     Knee Right active Right Passive   Flexion 136 142   Extension 4 7         Lower Extremity Strength - reassessed 4/28/22  Right LE   Left LE     Knee extension: 5/5 Knee extension: 5/5   Knee flexion: 5/5 Knee flexion: 5/5   Hip flexion: 4+/5 Hip flexion: 4+/5   Hip extension:  4+/5 Hip extension: 4+/5   Hip abduction: 4+/5 Hip abduction: 4+/5   Hip adduction: 4/5 Hip adduction 4/5   Ankle dorsiflexion: 5/5 Ankle dorsiflexion: 5/5   Ankle plantarflexion: 5/5 Ankle plantarflexion: 5/5            Assessment     Update: Tiarra presents today for reassessment without complaints of pain. Reassessment was performed with pt demo gains in R knee flexion and extension active and passive ROM to functional levels. Pt also demo gains in strength, balance, and tolerance to gait and functional mobility activities. Continuation and progression of  dynamic LE strengthening and balance exercise with good tolerance and no adverse effects. Continue to progress as tolerated.      Tiarra Is progressing well towards her goals.   Pt prognosis is Good.      Pt will continue to benefit from skilled outpatient physical therapy to address the deficits listed in the problem list box on initial evaluation, provide pt/family education and to maximize pt's level of independence in the home and community environment.      Pt's spiritual, cultural and educational needs considered and pt agreeable to plan of care and goals.     Anticipated barriers to physical therapy: none     GOALS: Short Term Goals:  4 weeks  1.Report decreased in pain at worse less than  <   / =  5  /10  to increase tolerance for functional mobility. MET  2. Pt to improve R knee range of motion by 25% to allow for improved functional mobility to allow for improvement in IADLs. MET  3. Increased R LE MMT 1/2 grade to increase tolerance for ADL and work activities. MET  4. Pt to report ability to walk 1 mile without increased knee pain. (not met, progressing)  5. Pt to tolerate HEP to improve ROM and independence with ADL's. MET     Long Term Goals: 8 weeks  1.Report decreased in pain at worse less than  <   / =  3  /10  to increase tolerance for functional mobility. (not met, progressing)  2.Pt to improve range of motion by 75% to allow for improved functional mobility to allow for improvement in IADLs. (not met, progressing)  3.Increased R LE MMT 1 grade to increase tolerance for ADL and work activities. (not met, progressing)  4. Pt will report 41% or less on FOTO knee survey  to demonstrate increase in LE function with every day tasks. MET  5. Pt to be Independent with HEP to improve ROM and independence with ADL's. (not met, progressing)     Reasons for Recertification of Therapy:   To allow patient to complete full allotment of visits so that they may achieve maximum therapeutic benefit      Plan  "    Updated Certification Period: 4/28/2022 to 6/2/2022  Recommended Treatment Plan: 2 times per week for 5 weeks:      - Patient education  - Therapeutic exercise  - Manual therapy  - Performance testing   - Neuromuscular Re-education  - Therapeutic activity   - Modalities    Pt may be seen by PTA as part of the rehabilitation team.     Therapist: Zi Anaya, PT  5/3/2022    "I certify the need for these services furnished under this plan of treatment and while under my care."    ____________________________________  Physician/Referring Practitioner    _______________  Date of Signature    Zi nAaya, PT  4/28/2022      I CERTIFY THE NEED FOR THESE SERVICES FURNISHED UNDER THIS PLAN OF TREATMENT AND WHILE UNDER MY CARE    Physician's comments:        Physician's Signature: ___________________________________________________      "

## 2022-05-09 ENCOUNTER — PATIENT MESSAGE (OUTPATIENT)
Dept: INTERNAL MEDICINE | Facility: CLINIC | Age: 67
End: 2022-05-09
Payer: MEDICARE

## 2022-05-10 ENCOUNTER — CLINICAL SUPPORT (OUTPATIENT)
Dept: REHABILITATION | Facility: OTHER | Age: 67
End: 2022-05-10
Attending: INTERNAL MEDICINE
Payer: MEDICARE

## 2022-05-10 ENCOUNTER — PATIENT MESSAGE (OUTPATIENT)
Dept: INTERNAL MEDICINE | Facility: CLINIC | Age: 67
End: 2022-05-10
Payer: MEDICARE

## 2022-05-10 DIAGNOSIS — M25.661 DECREASED RANGE OF MOTION (ROM) OF RIGHT KNEE: Primary | ICD-10-CM

## 2022-05-10 DIAGNOSIS — R29.898 WEAKNESS OF RIGHT LOWER EXTREMITY: ICD-10-CM

## 2022-05-10 PROCEDURE — 97110 THERAPEUTIC EXERCISES: CPT

## 2022-05-10 NOTE — PROGRESS NOTES
"  [facility]   Physical Therapy Treatment Note    Name: Tiarra Chang [facility]  Clinic Number: [id number]    Therapy Diagnosis:   Encounter Diagnoses   Name Primary?    Decreased range of motion (ROM) of right knee Yes    Weakness of right lower extremity      Physician: Kaykay Perales MD    Visit Date: 5/10/2022    Physician Orders: PT Eval and Treat  Medical Diagnosis from Referral: M17.11 (ICD-10-CM) - Primary osteoarthritis of right knee  Evaluation Date: 2/10/2022  Authorization Period Expiration: 6/10/2022  Plan of Care Expiration: 6/2/2022  Visit # / Visits authorized: 12/20   Progress Note Due: 5/28/2022  FOTO: 3/3     PTA Visit #: 0/5     Time In: 10:45 am  Time Out: 11:50 am  Total Billable Time: 55 minutes    SUBJECTIVE     Pt reports: her knee is feeling fine, her back still continues to be her biggest problem     She was compliant with home exercise program.  Response to previous treatment: increased soress soreness  Functional change: no change yet    Pain: 1/10  Location: right medial knee      OBJECTIVE     Objective Measures updated at progress report unless specified.         Range of Motion: - reassessed 4/28/22     Knee Right active Right Passive   Flexion 136 142   Extension 4 7         Lower Extremity Strength - reassessed 4/28/22  Right LE   Left LE     Knee extension: 5/5 Knee extension: 5/5   Knee flexion: 5/5 Knee flexion: 5/5   Hip flexion: 4+/5 Hip flexion: 4+/5   Hip extension:  4+/5 Hip extension: 4+/5   Hip abduction: 4+/5 Hip abduction: 4+/5   Hip adduction: 4/5 Hip adduction 4/5   Ankle dorsiflexion: 5/5 Ankle dorsiflexion: 5/5   Ankle plantarflexion: 5/5 Ankle plantarflexion: 5/5          Treatment     Tiarra received the treatments listed below:      therapeutic exercises to develop strength, endurance, ROM and flexibility for 55 minutes including:    Recumbent bike x5 min Lv 3  SAQ 5# 2x10x3"  SLR with ER 4# 2x10x3"  VMO LAQ 5# 2x10x3"   Hamstring stretch " "2x30"   Prone quad stretch 3x30" thigh propped up with pillow  Clamshells GTB 2x10   SLB + airex 3x30"    SLB + reach 2x10   SLB + 3 way kick x10 ea direction   TKE Blue TB 20x5"  Heel raises 2x15   sidestepping GTB 2x10 steps   Fwd and sideways Step ups into SLS on Bosu 2x10    SL leg press 84# 2x10  DL leg press 156# 2x10  knee extension with R SL ecc lowering 42# 2x10  fwd and side half lunge x10 ea  3 way sliders x10 ea direction       NP:  slantboard gastroc stretch 2x30"  Sit to stands L LE propped on 2" 2x10   SL hip abduction 2# 2x10      manual therapy techniques: Joint mobilizations and Soft tissue Mobilization were applied to the: R quad for 0 minutes, including:    STM R quad    cold pack for 10 minutes to R knee.        Patient Education and Home Exercises     Home Exercises Provided and Patient Education Provided     Education provided:   - HEP, POC    Written Home Exercises Provided: Patient instructed to cont prior HEP. Exercises were reviewed and Tiarra was able to demonstrate them prior to the end of the session.  Tiarra demonstrated good  understanding of the education provided. See EMR under Patient Instructions for exercises provided during therapy sessions    ASSESSMENT     Tiarra presents today with minimal complaints of pain. Continued with dynamic strengthening and balance exercises for R LE with good tolerance, no adverse effects, and no reports of pain aggravation. Pt continues to be limited with LE exercises and functional mobility due to low back symptoms. Treatment may be modified next visit to allow pt to have increase tolerance.     Tiarra Is progressing well towards her goals.   Pt prognosis is Good.     Pt will continue to benefit from skilled outpatient physical therapy to address the deficits listed in the problem list box on initial evaluation, provide pt/family education and to maximize pt's level of independence in the home and community environment.     Pt's spiritual, cultural " and educational needs considered and pt agreeable to plan of care and goals.     Anticipated barriers to physical therapy: none    GOALS: Short Term Goals:  4 weeks  1.Report decreased in pain at worse less than  <   / =  5  /10  to increase tolerance for functional mobility. MET  2. Pt to improve R knee range of motion by 25% to allow for improved functional mobility to allow for improvement in IADLs. MET  3. Increased R LE MMT 1/2 grade to increase tolerance for ADL and work activities. MET  4. Pt to report ability to walk 1 mile without increased knee pain. (not met, progressing)  5. Pt to tolerate HEP to improve ROM and independence with ADL's. MET     Long Term Goals: 8 weeks  1.Report decreased in pain at worse less than  <   / =  3  /10  to increase tolerance for functional mobility. (not met, progressing)  2.Pt to improve range of motion by 75% to allow for improved functional mobility to allow for improvement in IADLs. (not met, progressing)  3.Increased R LE MMT 1 grade to increase tolerance for ADL and work activities. (not met, progressing)  4. Pt will report 41% or less on FOTO knee survey  to demonstrate increase in LE function with every day tasks. MET  5. Pt to be Independent with HEP to improve ROM and independence with ADL's. (not met, progressing)    PLAN     Plan of care Certification: 2/10/2022 to 6/2/2022.    Continue treatment per established plan of care.     Zi Anaya, PT

## 2022-05-11 ENCOUNTER — PATIENT MESSAGE (OUTPATIENT)
Dept: UROLOGY | Facility: CLINIC | Age: 67
End: 2022-05-11
Payer: MEDICARE

## 2022-05-11 ENCOUNTER — OFFICE VISIT (OUTPATIENT)
Dept: ORTHOPEDICS | Facility: CLINIC | Age: 67
End: 2022-05-11
Payer: MEDICARE

## 2022-05-11 VITALS — BODY MASS INDEX: 26.68 KG/M2 | WEIGHT: 150.56 LBS | HEIGHT: 63 IN

## 2022-05-11 DIAGNOSIS — M41.9 SCOLIOSIS, UNSPECIFIED SCOLIOSIS TYPE, UNSPECIFIED SPINAL REGION: Primary | ICD-10-CM

## 2022-05-11 PROCEDURE — 3288F PR FALLS RISK ASSESSMENT DOCUMENTED: ICD-10-PCS | Mod: CPTII,S$GLB,, | Performed by: ORTHOPAEDIC SURGERY

## 2022-05-11 PROCEDURE — 99213 PR OFFICE/OUTPT VISIT, EST, LEVL III, 20-29 MIN: ICD-10-PCS | Mod: S$GLB,,, | Performed by: ORTHOPAEDIC SURGERY

## 2022-05-11 PROCEDURE — 4010F PR ACE/ARB THEARPY RXD/TAKEN: ICD-10-PCS | Mod: CPTII,S$GLB,, | Performed by: ORTHOPAEDIC SURGERY

## 2022-05-11 PROCEDURE — 1101F PT FALLS ASSESS-DOCD LE1/YR: CPT | Mod: CPTII,S$GLB,, | Performed by: ORTHOPAEDIC SURGERY

## 2022-05-11 PROCEDURE — 99999 PR PBB SHADOW E&M-EST. PATIENT-LVL IV: ICD-10-PCS | Mod: PBBFAC,,, | Performed by: ORTHOPAEDIC SURGERY

## 2022-05-11 PROCEDURE — 3008F BODY MASS INDEX DOCD: CPT | Mod: CPTII,S$GLB,, | Performed by: ORTHOPAEDIC SURGERY

## 2022-05-11 PROCEDURE — 99213 OFFICE O/P EST LOW 20 MIN: CPT | Mod: S$GLB,,, | Performed by: ORTHOPAEDIC SURGERY

## 2022-05-11 PROCEDURE — 1159F PR MEDICATION LIST DOCUMENTED IN MEDICAL RECORD: ICD-10-PCS | Mod: CPTII,S$GLB,, | Performed by: ORTHOPAEDIC SURGERY

## 2022-05-11 PROCEDURE — 1125F PR PAIN SEVERITY QUANTIFIED, PAIN PRESENT: ICD-10-PCS | Mod: CPTII,S$GLB,, | Performed by: ORTHOPAEDIC SURGERY

## 2022-05-11 PROCEDURE — 1101F PR PT FALLS ASSESS DOC 0-1 FALLS W/OUT INJ PAST YR: ICD-10-PCS | Mod: CPTII,S$GLB,, | Performed by: ORTHOPAEDIC SURGERY

## 2022-05-11 PROCEDURE — 3288F FALL RISK ASSESSMENT DOCD: CPT | Mod: CPTII,S$GLB,, | Performed by: ORTHOPAEDIC SURGERY

## 2022-05-11 PROCEDURE — 1159F MED LIST DOCD IN RCRD: CPT | Mod: CPTII,S$GLB,, | Performed by: ORTHOPAEDIC SURGERY

## 2022-05-11 PROCEDURE — 99999 PR PBB SHADOW E&M-EST. PATIENT-LVL IV: CPT | Mod: PBBFAC,,, | Performed by: ORTHOPAEDIC SURGERY

## 2022-05-11 PROCEDURE — 4010F ACE/ARB THERAPY RXD/TAKEN: CPT | Mod: CPTII,S$GLB,, | Performed by: ORTHOPAEDIC SURGERY

## 2022-05-11 PROCEDURE — 3008F PR BODY MASS INDEX (BMI) DOCUMENTED: ICD-10-PCS | Mod: CPTII,S$GLB,, | Performed by: ORTHOPAEDIC SURGERY

## 2022-05-11 PROCEDURE — 1125F AMNT PAIN NOTED PAIN PRSNT: CPT | Mod: CPTII,S$GLB,, | Performed by: ORTHOPAEDIC SURGERY

## 2022-05-11 RX ORDER — METHOCARBAMOL 750 MG/1
750 TABLET, FILM COATED ORAL 3 TIMES DAILY
Qty: 60 TABLET | Refills: 0 | Status: SHIPPED | OUTPATIENT
Start: 2022-05-11 | End: 2022-05-31

## 2022-05-11 NOTE — PROGRESS NOTES
DATE: 2022  PATIENT: Tiarra Norton    Attending Physician: Fadi Stone M.D.    CHIEF COMPLAINT: Low back pain    HISTORY:  Tiarra Norton is a 66 y.o. female here for initial evaluation of low back pain (Back - 8/10). The pain has been present for 3-4 years. The patient describes the pain as dull but it doesn't radiate into BLE.  The pain is worse with standing/walking/bending and improved by heat/ice. There is no associated numbness and tingling. There is no subjective weakness. Prior treatments have included mobic and PT, but no injections or surgery  Ablation: R and L Lumbar 3/4/5, had some relief.   This pain is keeping her from doing what she wants to do.       The Patient denies myelopathic symptoms such as handwriting changes or difficulty with buttons/coins/keys. Denies perineal paresthesias, bowel/bladder dysfunction.      PAST MEDICAL/SURGICAL HISTORY:  Past Medical History:   Diagnosis Date    Cataract     Depression     Gestational diabetes     Hyperlipidemia     Hypertension     IBS (irritable bowel syndrome)     Thyroid disease      Past Surgical History:   Procedure Laterality Date    ADENOIDECTOMY      ARTHROSCOPIC CHONDROPLASTY OF KNEE JOINT Right 10/19/2021    Procedure: ARTHROSCOPY, KNEE, WITH CHONDROPLASTY;  Surgeon: Trevin Huber MD;  Location: [facility] OR;  Service: Orthopedics;  Laterality: Right;    ARTHROSCOPY OF KNEE Right 10/19/2021    Procedure: ARTHROSCOPY, KNEE-RIGHT;  Surgeon: Trevin Hbuer MD;  Location: [facility] OR;  Service: Orthopedics;  Laterality: Right;     SECTION      CHOLECYSTECTOMY      COLONOSCOPY N/A 2016    Procedure: COLONOSCOPY;  Surgeon: Heri Segura MD;  Location: [address]);  Service: Endoscopy;  Laterality: N/A;    COLONOSCOPY N/A 2019    Procedure: COLONOSCOPY;  Surgeon: Joe Ptits MD;  Location: [facility] ([address] [place] [address]);  Service: Endoscopy;  Laterality: N/A;    ESOPHAGOGASTRODUODENOSCOPY N/A  2/13/2020    Procedure: EGD (ESOPHAGOGASTRODUODENOSCOPY);  Surgeon: Tolu Rivas MD;  Location: [address]);  Service: Endoscopy;  Laterality: N/A;  Pt. moved to 9:15am arrival time.. Instructed to not have anything after midnight.EC    EYE SURGERY      cataract resection right    INJECTION OF ANESTHETIC AGENT AROUND NERVE Bilateral 2/8/2022    Procedure: Block, Nerve MEDIAL BRANCH BLOCK BILATERAL L3,4,5;  Surgeon: Tara Gibbs MD;  Location: [facility];  Service: Pain Management;  Laterality: Bilateral;    INJECTION OF ANESTHETIC AGENT AROUND NERVE Bilateral 2/15/2022    Procedure: BLOCK, NERVE, L3*L4-L5 MEDIAL BR ANCH 2 OF 2;  Surgeon: Tara Gibbs MD;  Location: [facility];  Service: Pain Management;  Laterality: Bilateral;    JOINT REPLACEMENT      partial right knee    KNEE ARTHROSCOPY W/ MENISCECTOMY Right 10/19/2021    Procedure: ARTHROSCOPY, KNEE, WITH MENISCECTOMY, PARTIAL LATERAL;  Surgeon: Trevin Huber MD;  Location: [facility] OR;  Service: Orthopedics;  Laterality: Right;    r hand surgery      RADIOFREQUENCY ABLATION Right 3/8/2022    Procedure: RADIOFREQUENCY ABLATION, L3-L5 1 OF 2;  Surgeon: Tara Gibbs MD;  Location: [facility];  Service: Pain Management;  Laterality: Right;    RADIOFREQUENCY ABLATION Left 3/22/2022    Procedure: RADIOFREQUENCY ABLATION, l3-+l5 2 of 2;  Surgeon: Tara Gibbs MD;  Location: [facility];  Service: Pain Management;  Laterality: Left;    TONSILLECTOMY      TOTAL KNEE ARTHROPLASTY      partial knee replacement    TUBAL LIGATION           Current Medications:   Current Outpatient Medications:     alprazolam (XANAX) 2 MG Tab, Take 2 mg by mouth 2 (two) times daily as needed. , Disp: , Rfl: 0    amLODIPine (NORVASC) 5 MG tablet, Take 1 tablet (5 mg total) by mouth once daily., Disp: 90 tablet, Rfl: 3    atorvastatin (LIPITOR) 10 MG tablet, TAKE 1 TABLET EVERY DAY, Disp: 90 tablet, Rfl: 3    b complex vitamins  capsule, Take 1 capsule by mouth once daily., Disp: , Rfl:     cholecalciferol, vitamin D3, (VITAMIN D3) 5,000 unit Tab, Take 1 tablet (5,000 Units total) by mouth once daily., Disp: , Rfl:     ciclopirox (LOPROX) 0.77 % Crea, APPLY 1 GRAM TOPICALLY TO THE AFFECTED AREA TWICE DAILY, Disp: , Rfl:     ciclopirox (PENLAC) 8 % Soln, Apply topically nightly., Disp: 6.6 mL, Rfl: 11    conjugated estrogens (PREMARIN) vaginal cream, Place a pea-sized amount in vagina every night for 4 weeks, then use 2-3 nights a week, Disp: 90 g, Rfl: 4    diphenoxylate-atropine 2.5-0.025 mg (LOMOTIL) 2.5-0.025 mg per tablet, TAKE 1 TABLET BY MOUTH FOUR TIMES DAILY AS NEEDED FOR DIARRHEA, Disp: 40 tablet, Rfl: 0    ergocalciferol, vitamin D2, (VITAMIN D ORAL), Take by mouth once daily., Disp: , Rfl:     HYDROcodone-acetaminophen (NORCO) 5-325 mg per tablet, Take 1 tablet by mouth every 6 to 8 hours as needed (migraine)., Disp: 30 tablet, Rfl: 0    levothyroxine (SYNTHROID) 125 MCG tablet, Take 1 tablet (125 mcg total) by mouth every morning., Disp: 90 tablet, Rfl: 0    levothyroxine (SYNTHROID) 137 MCG Tab tablet, TAKE 1 TABLET (137 MCG TOTAL) BY MOUTH EVERY MORNING., Disp: 90 tablet, Rfl: 3    liothyronine (CYTOMEL) 5 MCG Tab, Take 1 tablet (5 mcg total) by mouth once daily., Disp: 90 tablet, Rfl: 3    lipase-protease-amylase 24,000-76,000-120,000 units (CREON) 24,000-76,000 -120,000 unit capsule, Take 1 capsule by mouth 3 (three) times daily with meals., Disp: 90 capsule, Rfl: 11    mirabegron (MYRBETRIQ) 50 mg Tb24, Take 1 tablet (50 mg total) by mouth once daily., Disp: 30 tablet, Rfl: 11    pantoprazole (PROTONIX) 40 MG tablet, TAKE 1 TABLET(40 MG) BY MOUTH EVERY DAY, Disp: 90 tablet, Rfl: 2    promethazine (PHENERGAN) 12.5 MG Tab, Take 1 tablet (12.5 mg total) by mouth every 6 (six) hours as needed (nausea)., Disp: 30 tablet, Rfl: 2    RESTASIS 0.05 % ophthalmic emulsion, INT 1 GTT IN OU BID, Disp: , Rfl:      sumatriptan (IMITREX) 50 MG tablet, 1 tab po at start of headache.  Repeat in 2 h prn, Disp: 27 tablet, Rfl: 3    traZODone (DESYREL) 100 MG tablet, TAKE 1 TO 2 TABLETS AT BEDTIME FOR SLEEP, Disp: 180 tablet, Rfl: 3    triamcinolone acetonide 0.1% (KENALOG) 0.1 % cream, Apply to affected areas of back BID prn irritation. Do not use on face, underarms, or groin., Disp: 80 g, Rfl: 1    valsartan (DIOVAN) 320 MG tablet, Take 1 tablet (320 mg total) by mouth once daily. (STOP LISINOPRIL), Disp: 90 tablet, Rfl: 3    cholestyramine (QUESTRAN) 4 gram packet, Take 1 packet (4 g total) by mouth 2 (two) times daily., Disp: 180 packet, Rfl: 3    solifenacin (VESICARE) 10 MG tablet, 1 tab po q day for bladder (Patient not taking: Reported on 5/11/2022), Disp: 90 tablet, Rfl: 3    Social History:   Social History     Socioeconomic History    Marital status:     Number of children: 1   Occupational History    Occupation:      Employer: [employer]     Comment: retired   Tobacco Use    Smoking status: Never Smoker    Smokeless tobacco: Never Used   Substance and Sexual Activity    Alcohol use: Yes     Alcohol/week: 3.0 standard drinks     Types: 3 Glasses of wine per week     Comment: weekends    Drug use: No    Sexual activity: Yes     Partners: Male   Other Topics Concern    Are you pregnant or think you may be? No    Breast-feeding No   Social History Narrative    Retired        Swims.       Stationary bike.            Social Determinants of Health     Financial Resource Strain: Low Risk     Difficulty of Paying Living Expenses: Not hard at all   Food Insecurity: No Food Insecurity    Worried About Running Out of Food in the Last Year: Never true    Ran Out of Food in the Last Year: Never true   Transportation Needs: No Transportation Needs    Lack of Transportation (Medical): No    Lack of Transportation (Non-Medical): No   Physical Activity: Insufficiently Active    Days of Exercise  "per Week: 4 days    Minutes of Exercise per Session: 30 min   Stress: Stress Concern Present    Feeling of Stress : Very much   Social Connections: Unknown    Frequency of Communication with Friends and Family: More than three times a week    Frequency of Social Gatherings with Friends and Family: Once a week    Active Member of Clubs or Organizations: No    Attends Club or Organization Meetings: More than 4 times per year    Marital Status:        EXAM:  Ht 5' 3" (1.6 m)   Wt 68.3 kg (150 lb 9.2 oz)   BMI 26.67 kg/m²     Lungs: Respirations unlabored.  Gait: Normal station and gait, no difficulty with toe or heel walk.   Skin: Skin on the neck, back, and axillae negative for rashes, lesions, cafe-au-lait spots, hairy patches and surgical scars.  Musculoskeletal: No pain with the range of motion of the bilateral hips. No trochanteric tenderness to palpation.  Neurological: Normal strength and tone in all major motor groups in the bilateral lower extremities. Normal ensation to light touch in the L2-S1 dermatomes bilaterally.  Deep tendon reflexes symmetric  in the bilateral lower extremities.  Negative Babinski bilaterally. Straight leg raise negative bilaterally.    IMAGING:   Today I personally reviewed PA and Lat upright Scoliosis films that demonstrate lumbar spondylosis, degenerative levoscoliosis with a 26 deg. G1 L4-5 anterolisthesis.    MRI   showing mild canal stenosis L3-L5    Body mass index is 26.67 kg/m².  Hemoglobin A1C   Date Value Ref Range Status   10/22/2018 5.5 4.0 - 5.6 % Final     Comment:     ADA Screening Guidelines:  5.7-6.4%  Consistent with prediabetes  >or=6.5%  Consistent with diabetes  High levels of fetal hemoglobin interfere with the HbA1C  assay. Heterozygous hemoglobin variants (HbS, HgC, etc)do  not significantly interfere with this assay.   However, presence of multiple variants may affect accuracy.     01/11/2018 5.3 4.0 - 5.6 % Final     Comment:     According to " ADA guidelines, hemoglobin A1c <7.0% represents  optimal control in non-pregnant diabetic patients. Different  metrics may apply to specific patient populations.   Standards of Medical Care in Diabetes-2016.  For the purpose of screening for the presence of diabetes:  <5.7%     Consistent with the absence of diabetes  5.7-6.4%  Consistent with increasing risk for diabetes   (prediabetes)  >or=6.5%  Consistent with diabetes  Currently, no consensus exists for use of hemoglobin A1c  for diagnosis of diabetes for children.  This Hemoglobin A1c assay has significant interference with fetal   hemoglobin   (HbF). The results are invalid for patients with abnormal amounts of   HbF,   including those with known Hereditary Persistence   of Fetal Hemoglobin. Heterozygous hemoglobin variants (HbAS, HbAC,   HbAD, HbAE, HbA2) do not significantly interfere with this assay;   however, presence of multiple variants in a sample may impact the %   interference.     11/15/2016 5.5 4.5 - 6.2 % Final     Comment:     According to ADA guidelines, hemoglobin A1C <7.0% represents  optimal control in non-pregnant diabetic patients.  Different  metrics may apply to specific populations.   Standards of Medical Care in Diabetes - 2016.  For the purpose of screening for the presence of diabetes:  <5.7%     Consistent with the absence of diabetes  5.7-6.4%  Consistent with increasing risk for diabetes   (prediabetes)  >or=6.5%  Consistent with diabetes  Currently no consensus exists for use of hemoglobin A1C  for diagnosis of diabetes for children.         ASSESSMENT/PLAN:  Tiarra was seen today for follow-up and pain.    Diagnoses and all orders for this visit:    Scoliosis, unspecified scoliosis type, unspecified spinal region  -     Ambulatory referral/consult to [facility]; Future    Other orders  -     methocarbamoL (ROBAXIN) 750 MG Tab; Take 1 tablet (750 mg total) by mouth 3 (three) times daily. As needed for muscle spasms for  60 doses      No follow-ups on file.  Will plan to continue therapy and start Robaxin TID.

## 2022-05-12 ENCOUNTER — PATIENT MESSAGE (OUTPATIENT)
Dept: PSYCHIATRY | Facility: CLINIC | Age: 67
End: 2022-05-12
Payer: MEDICARE

## 2022-05-12 ENCOUNTER — TELEPHONE (OUTPATIENT)
Dept: DERMATOLOGY | Facility: CLINIC | Age: 67
End: 2022-05-12
Payer: MEDICARE

## 2022-05-12 ENCOUNTER — PATIENT OUTREACH (OUTPATIENT)
Dept: ADMINISTRATIVE | Facility: OTHER | Age: 67
End: 2022-05-12
Payer: MEDICARE

## 2022-05-12 ENCOUNTER — CLINICAL SUPPORT (OUTPATIENT)
Dept: REHABILITATION | Facility: OTHER | Age: 67
End: 2022-05-12
Attending: INTERNAL MEDICINE
Payer: MEDICARE

## 2022-05-12 ENCOUNTER — PATIENT MESSAGE (OUTPATIENT)
Dept: UROLOGY | Facility: CLINIC | Age: 67
End: 2022-05-12
Payer: MEDICARE

## 2022-05-12 DIAGNOSIS — R29.898 WEAKNESS OF RIGHT LOWER EXTREMITY: ICD-10-CM

## 2022-05-12 DIAGNOSIS — M25.661 DECREASED RANGE OF MOTION (ROM) OF RIGHT KNEE: Primary | ICD-10-CM

## 2022-05-12 PROCEDURE — 97110 THERAPEUTIC EXERCISES: CPT

## 2022-05-12 NOTE — TELEPHONE ENCOUNTER
Called pt and confirmed that Jose Daniel will be treating her tomorrow. Confirmed that if she needs more than 15 minutes with him that it would be ok

## 2022-05-12 NOTE — TELEPHONE ENCOUNTER
----- Message from Anand Ray sent at 5/12/2022 11:23 AM CDT -----  Contact: @[phone]  Pt requesting a call back regarding her appt tomorrow.  Pt states she thought the visit was going to be in Dr. Cabrera office but with a third party ([facility]).  Please call to discuss further.

## 2022-05-12 NOTE — PROGRESS NOTES
"  [facility]   Physical Therapy Treatment Note    Name: Tiarra Chang [facility]  Clinic Number: [id number]    Therapy Diagnosis:   Encounter Diagnoses   Name Primary?    Decreased range of motion (ROM) of right knee Yes    Weakness of right lower extremity      Physician: Kaykay Perales MD    Visit Date: 5/12/2022    Physician Orders: PT Eval and Treat  Medical Diagnosis from Referral: M17.11 (ICD-10-CM) - Primary osteoarthritis of right knee  Evaluation Date: 2/10/2022  Authorization Period Expiration: 6/10/2022  Plan of Care Expiration: 6/2/2022  Visit # / Visits authorized: 13/20   Progress Note Due: 5/28/2022  FOTO: 3/3     PTA Visit #: 0/5     Time In: 10:45 am  Time Out: 11:50 am  Total Billable Time: 55 minutes    SUBJECTIVE     Pt reports: her knee is feeling fine, her back still continues to be her biggest problem     She was compliant with home exercise program.  Response to previous treatment: increased soress soreness  Functional change: no change yet    Pain: 1/10  Location: right medial knee      OBJECTIVE     Objective Measures updated at progress report unless specified.         Range of Motion: - reassessed 4/28/22     Knee Right active Right Passive   Flexion 136 142   Extension 4 7         Lower Extremity Strength - reassessed 4/28/22  Right LE   Left LE     Knee extension: 5/5 Knee extension: 5/5   Knee flexion: 5/5 Knee flexion: 5/5   Hip flexion: 4+/5 Hip flexion: 4+/5   Hip extension:  4+/5 Hip extension: 4+/5   Hip abduction: 4+/5 Hip abduction: 4+/5   Hip adduction: 4/5 Hip adduction 4/5   Ankle dorsiflexion: 5/5 Ankle dorsiflexion: 5/5   Ankle plantarflexion: 5/5 Ankle plantarflexion: 5/5          Treatment     Tiarra received the treatments listed below:      therapeutic exercises to develop strength, endurance, ROM and flexibility for 55 minutes including:    Recumbent bike x5 min Lv 3  SAQ 5# 2x10x3"  SLR with ER 4# 2x10x3"  VMO LAQ 5# 2x10x3"   Hamstring stretch " "2x30"   Prone quad stretch 3x30" thigh propped up with pillow  Clamshells GTB 2x10   SLB + airex 3x30"    SLB + reach 2x10   SLB + 3 way kick x10 ea direction   TKE Blue TB 20x5"  Heel raises 2x15   sidestepping GTB 2x10 steps   Fwd and sideways Step ups into SLS on Bosu 2x10    SL leg press 84# 2x10  DL leg press 156# 2x10  knee extension with R SL ecc lowering 42# 2x10  fwd and side half lunge x10 ea  3 way sliders x10 ea direction       NP:  slantboard gastroc stretch 2x30"  Sit to stands L LE propped on 2" 2x10   SL hip abduction 2# 2x10      manual therapy techniques: Joint mobilizations and Soft tissue Mobilization were applied to the: R quad for 0 minutes, including:    STM R quad    cold pack for 10 minutes to R knee.        Patient Education and Home Exercises     Home Exercises Provided and Patient Education Provided     Education provided:   - HEP, POC    Written Home Exercises Provided: Patient instructed to cont prior HEP. Exercises were reviewed and Tiarra was able to demonstrate them prior to the end of the session.  Tiarra demonstrated good  understanding of the education provided. See EMR under Patient Instructions for exercises provided during therapy sessions    ASSESSMENT     Tiarra presents today without complaints of knee pain. Pt limited with some lower extremity strengthening and balance activities today due to complaints of persistent back pain. Pt reports she is going to speak to her doctor about getting a referral for back PT since her knee is feeling better now. Able to tolerate all therex today without adverse effects and min low back pain with exercise modifications. Continue to progress as tolerated.     Tiarra Is progressing well towards her goals.   Pt prognosis is Good.     Pt will continue to benefit from skilled outpatient physical therapy to address the deficits listed in the problem list box on initial evaluation, provide pt/family education and to maximize pt's level of " independence in the home and community environment.     Pt's spiritual, cultural and educational needs considered and pt agreeable to plan of care and goals.     Anticipated barriers to physical therapy: none    GOALS: Short Term Goals:  4 weeks  1.Report decreased in pain at worse less than  <   / =  5  /10  to increase tolerance for functional mobility. MET  2. Pt to improve R knee range of motion by 25% to allow for improved functional mobility to allow for improvement in IADLs. MET  3. Increased R LE MMT 1/2 grade to increase tolerance for ADL and work activities. MET  4. Pt to report ability to walk 1 mile without increased knee pain. (not met, progressing)  5. Pt to tolerate HEP to improve ROM and independence with ADL's. MET     Long Term Goals: 8 weeks  1.Report decreased in pain at worse less than  <   / =  3  /10  to increase tolerance for functional mobility. (not met, progressing)  2.Pt to improve range of motion by 75% to allow for improved functional mobility to allow for improvement in IADLs. MET  3.Increased R LE MMT 1 grade to increase tolerance for ADL and work activities. MET  4. Pt will report 41% or less on FOTO knee survey  to demonstrate increase in LE function with every day tasks. MET  5. Pt to be Independent with HEP to improve ROM and independence with ADL's. MET    PLAN     Plan of care Certification: 2/10/2022 to 6/2/2022.    Continue treatment per established plan of care.     Zi Anaya, PT

## 2022-05-13 ENCOUNTER — PATIENT MESSAGE (OUTPATIENT)
Dept: ORTHOPEDICS | Facility: CLINIC | Age: 67
End: 2022-05-13
Payer: MEDICARE

## 2022-05-13 ENCOUNTER — TELEPHONE (OUTPATIENT)
Dept: DERMATOLOGY | Facility: CLINIC | Age: 67
End: 2022-05-13
Payer: MEDICARE

## 2022-05-13 NOTE — TELEPHONE ENCOUNTER
Refill Routing Note   Medication(s) are not appropriate for processing by [facility] for the following reason(s):      - Patient has been seen in the ED/Hospital since the last PCP visit    [facility] action(s):  Defer          Medication reconciliation completed: No     Appointments  past 12m or future 3m with PCP    Date Provider   Last Visit   2/2/2022 Kaykay Perales MD   Next Visit   Visit date not found Kaykay Perales MD   ED visits in past 90 days: 0        Note composed:3:36 PM 05/13/2022

## 2022-05-13 NOTE — TELEPHONE ENCOUNTER
No new care gaps identified.  Health system Embedded Care Gaps. Reference number: [id number]. 5/13/2022   3:34:32 PM CDT

## 2022-05-15 ENCOUNTER — TELEPHONE (OUTPATIENT)
Dept: INTERNAL MEDICINE | Facility: CLINIC | Age: 67
End: 2022-05-15
Payer: MEDICARE

## 2022-05-15 RX ORDER — LEVOTHYROXINE SODIUM 125 UG/1
TABLET ORAL
Qty: 90 TABLET | Refills: 0 | Status: SHIPPED | OUTPATIENT
Start: 2022-05-15 | End: 2022-09-02 | Stop reason: SDUPTHER

## 2022-05-17 ENCOUNTER — CLINICAL SUPPORT (OUTPATIENT)
Dept: REHABILITATION | Facility: OTHER | Age: 67
End: 2022-05-17
Attending: INTERNAL MEDICINE
Payer: MEDICARE

## 2022-05-17 DIAGNOSIS — M41.9 SCOLIOSIS, UNSPECIFIED SCOLIOSIS TYPE, UNSPECIFIED SPINAL REGION: ICD-10-CM

## 2022-05-17 DIAGNOSIS — M25.69 DECREASED RANGE OF MOTION OF TRUNK AND BACK: ICD-10-CM

## 2022-05-17 DIAGNOSIS — M41.25 OTHER IDIOPATHIC SCOLIOSIS, THORACOLUMBAR REGION: ICD-10-CM

## 2022-05-17 DIAGNOSIS — R29.898 DECREASED STRENGTH OF TRUNK AND BACK: ICD-10-CM

## 2022-05-17 PROBLEM — M25.661 DECREASED RANGE OF MOTION (ROM) OF RIGHT KNEE: Status: RESOLVED | Noted: 2022-02-14 | Resolved: 2022-05-17

## 2022-05-17 PROCEDURE — 97110 THERAPEUTIC EXERCISES: CPT

## 2022-05-17 PROCEDURE — 97140 MANUAL THERAPY 1/> REGIONS: CPT

## 2022-05-17 PROCEDURE — 97161 PT EVAL LOW COMPLEX 20 MIN: CPT

## 2022-05-17 NOTE — PLAN OF CARE
[facility]  Physical Therapy Initial Evaluation  Date: 5/17/2022   Name: Tiarra Chang [facility] Number: [id number]    Therapy Diagnosis:   Encounter Diagnoses   Name Primary?    Scoliosis, unspecified scoliosis type, unspecified spinal region     Decreased range of motion of trunk and back     Decreased strength of trunk and back     Other idiopathic scoliosis, thoracolumbar region      Physician: Kaykay Perales MD    Physician Orders: PT Eval and Treat   Medical Diagnosis from Referral: M41.9 (ICD-10-CM) - Scoliosis, unspecified scoliosis type, unspecified spinal region  Evaluation Date: 5/17/2022  Authorization Period Expiration: 12/31/2022  Plan of Care Expiration: 7/17/2022  Visit # / Visits authorized: 1/ 16   Progress Note Due: 6/17/2022  FOTO: 1/ 1    Precautions: Standard    Time In: 2:00 pm  Time Out: 3:30 pm  Total Appointment Time (timed & untimed codes): 90 minutes    Subjective   Date of onset: 1-2 years  History of current condition - Tiarra reports: she completed the healthy program last year with some benefit, but never full pain relief. She was only recently diagnosed with scoliosis even though previous imaging had showed it, it was just never confirmed. The pain is in the bilateral low back, worse on the R, sometimes radiating upwards between the shoulder blades. She describes the pain as achy, tight, and burning. The pain started a year or 2 ago with injury or incident and has progressively gotten worse over time. She reports no N/T. Alleviated with ice, heat, and somewhat with PT. Aggravating factors include forward bending, lifting, gardening, yardwork, walking more than 1/4 mile, standing more than 5 min, training her dog, laundry, twisting, reaching overhead. She previously has nerve ablation procedure with only partial short term relief.     PALLAVI: no  Any Bowel and Bladder movement issues: overactive bladder the last 3-6 months  Any falls: no  Any  "dizziness: no  Any Headache: occasional migraines  Any injection in lower back: steroid or epidural: no, only when pregnant  Pain radiates: No  Pain constant or intermitting: constant     Pain:  Current 5/10, worst 10/10, best 3/10   Location: bilateral back  and trunk  Description: Sore  Aggravating Factors: ice, heat, nerve ablation  Easing Factors: forward bending, lifting, gardening, yardwork, walking more than 1/4 mile, standing more than 5 min, training her dog, laundry, twisting, reaching overhead    Prior Therapy: Healthy back PT last year  Social History: multistory home, 0 [address], lives with their spouse  Occupation: Retired  Prior Level of Function: independent  Current Level of Function: increased pain and decreased tolerance to forward bending, lifting, gardening, yardwork, walking more than 1/4 mile, standing more than 5 min, training her dog, laundry, twisting, reaching overhead    Pts goals: "No pain"    Imaging: MRI LUMBAR SPINE WITHOUT CONTRAST     CLINICAL HISTORY:  Low back pain, symptoms persist with > 6wks conservative treatment; Dorsalgia, unspecified     TECHNIQUE:  Multiplanar, multisequence MR images were acquired from the thoracolumbar junction to the sacrum without contrast.     COMPARISON:  12/9/21.     FINDINGS:  Alignment: Levoscoliosis of the lumbar spine.  Anteroposterior alignment is maintained.     Vertebrae: Degenerative endplate changes at L1-L3.  No fracture or marrow infiltrative process.     Discs: Moderate to severe disc height loss at T11-L3.  No evidence for discitis.     Cord: Normal.  Conus terminates at L2.     Degenerative findings:     T12-L1: Circumferential disc bulge and mild facet arthropathy.  No spinal canal stenosis or neural foraminal narrowing.     L1-L2: Circumferential disc bulge and mild facet arthropathy result in mild right neural foraminal narrowing.     L2-L3: Circumferential disc bulge and mild facet arthropathy. No spinal canal stenosis or neural " foraminal narrowing.     L3-L4: Circumferential disc bulge and mild facet arthropathy result in moderate spinal canal stenosis and mild left neural foraminal narrowing.     L4-L5: Circumferential disc bulge and moderate facet arthropathy result in mild spinal canal stenosis.     L5-S1: Moderate facet arthropathy.  No spinal canal stenosis or neural foraminal narrowing.     Paraspinal muscles & soft tissues: Mild paraspinal muscle atrophy.     Impression:     1. Multilevel degenerative changes of the lumbar spine with levoscoliosis.  Mild-to-moderate spinal canal stenosis at L3-L5.       Medical History:   Past Medical History:   Diagnosis Date    Cataract     Depression     Gestational diabetes     Hyperlipidemia     Hypertension     IBS (irritable bowel syndrome)     Thyroid disease        Surgical History:   Tiarra Norton  has a past surgical history that includes  section; Tubal ligation; Tonsillectomy; Adenoidectomy; Cholecystectomy; Eye surgery; r hand surgery; Total knee arthroplasty; Colonoscopy (N/A, 2016); Colonoscopy (N/A, 2019); Esophagogastroduodenoscopy (N/A, 2020); Joint replacement; Arthroscopy of knee (Right, 10/19/2021); Arthroscopic chondroplasty of knee joint (Right, 10/19/2021); Knee arthroscopy w/ meniscectomy (Right, 10/19/2021); Injection of anesthetic agent around nerve (Bilateral, 2022); Injection of anesthetic agent around nerve (Bilateral, 2/15/2022); Radiofrequency ablation (Right, 3/8/2022); and Radiofrequency ablation (Left, 3/22/2022).    Medications:   Tiarra has a current medication list which includes the following prescription(s): alprazolam, amlodipine, atorvastatin, b complex vitamins, cholecalciferol (vitamin d3), cholestyramine, ciclopirox, ciclopirox, premarin, diphenoxylate-atropine 2.5-0.025 mg, ergocalciferol (vitamin d2), hydrocodone-acetaminophen, levothyroxine, levothyroxine, liothyronine, creon, methocarbamol, mirabegron,  pantoprazole, promethazine, restasis, solifenacin, sumatriptan, trazodone, triamcinolone acetonide 0.1%, and valsartan.    Allergies:   Review of patient's allergies indicates:  No Known Allergies       Objective       Observation: lumbar levoscoliosis    Posture Alignment: slouched posture;forward head;increased kyphosis;decreased lordosis;left scoliotic curve    Sensation: intact to light touch    GAIT DEVIATIONS: Tiarra displays antalgic gait    Lumbar Range of Motion:    %   Flexion 80     Extension 30     Left Side Bending 50   Right Side Bending 50   Left rotation   80   Right Rotation   60    *= pain with all motions      Reflexes:    Left Right   Patella Tendon absent 1+   Achilles Tendon absent absent   Babinski  (--) (--)   Clonus (--) (--)     REPEATED TEST MOVEMENTS:    Baseline symptoms:  Repeated Flexion in Standing worse   Repeated Extension in Standing worse   Repeated Flexion in lying better   Repeated Extension in lying  better       STATIC TESTS and other movements:   Baseline symptoms:  Prone lie no worse  no better   Prone lie on elbows better   Sustained prone with  using mat no worse  no better   Sustained flexion better   Sitting slouched  better   Sitting erect worse   Standing slouched better   Standing erect  worse   Lying prone in extension  better   Long sitting   worse   Other tests Repeated Test Movements:[id number]}       Passive hip ROM in degrees:     Left Right   IR WFL  WFL   ER WFL WFL     Lower Extremity Strength    Right LE  Left LE    Hip flexion: *pain 4-/5 Hip flexion: *pain 4-/5   Knee extension: 5/5 Knee extension: 5/5   Knee flexion: 4+/5 Knee flexion: 4+/5   Hip IR: 4/5 Hip IR: 4/5   Hip ER: 4/5 Hip ER: 4/5   Hip extension:  4/5 Hip extension: 4/5   Hip abduction: *pain 4-/5 Hip abduction: *pain 4-/5   Hip adduction: 3+/5 Hip adduction 3+/5   Ankle dorsiflexion: 5/5 Ankle dorsiflexion: 5/5   Ankle plantarflexion: 5/5 Ankle plantarflexion: 5/5     Special Tests:  -Quadrant  "testing: positive  -ANDREW: negative  -Scour: negative  -Repeated Flexion: positive  -Repeated Ext:positive  -Piriformis Test: negative  -Prone Instability Test: positive  -Bridge Test: negative  -Sustained extension: positive  -Heel walking: negative  -Toe walking: negative  -Hip extension movement pattern: positive  -Long sitting test:positive      SI provocation test:  Distraction test: negative  Compression test: negative  Thigh thrust test: negative  Gaenslens Right side tests: negative  Gaenslens Left side test: negative  Sacral thrust test: negative  3 out of all 6 provocation have sensitivity of .94 and specificity .78 for SIJ pathology       Neuro Dynamic Testing:    Sciatic nerve:      SLR: negative       Neural Tension:     Slump test: negative    Joint Mobility: hypomobility of lumbar spine with CPA's    Palpation: tender to palpation of bilateral lumbar paraspinals    Flexibility: decreased soft tissue flexibility and mobility   Ely's test: R = 110 degrees ; L = 110 degrees   Popliteal Angle: R = 20 degrees ; L = 20 degrees   Sammy's test: R = + ; L = +   Blas test: R = + ; L = +    PT Evaluation Completed? Yes  Discussed Plan of Care with patient: Yes        CMS Impairment/Limitation/Restriction for FOTO lumbar Survey    Therapist reviewed FOTO scores for Tiarra Norton on 5/17/2022.   FOTO documents entered into [employer] - see Media section.             TREATMENT   Treatment Time In: 3:45  Treatment Time Out: 4:20  Total Treatment time separate from Evaluation: 35 minutes    Tiarra received therapeutic exercises to develop strength, endurance, ROM, flexibility, posture and core stabilization for 20 minutes including:  LTR's on ball 20x5"  DKTC 15x5"  WILLIE 2x2'  EIL 2x10x5"  PPT 15x5"  BKFO GTB x15  Bridging GTB 2x10x3"    Tiarra received the following manual therapy techniques: dry needling were applied to the: lumbar spine for 15 minutes, including:    FDN: pt received dry needling of bilateral lumbar " paraspinals from L1-L5. (1) needle was inserted at each level on each side for a total of (10) needles. Needles were left in and intermittently manipulated every 2-3 minutes for a total of 15 minutes. Pt monitored for adverse effects, but none were noted.     Tiarra received hot pack for 10 minutes to low back in [place].      Home Exercises and Patient Education Provided    Education provided:   - HEP, POC    Written Home Exercises Provided: yes.  Exercises were reviewed and Tiarra was able to demonstrate them prior to the end of the session.  Tiarra demonstrated good  understanding of the education provided.     See EMR under Patient Instructions for exercises provided 5/17/2022.    Assessment   Tiarra is a 67 y.o. female referred to outpatient Physical Therapy with a medical diagnosis of M41.9 (ICD-10-CM) - Scoliosis, unspecified scoliosis type, unspecified spinal region. Pt presents with signs and symptoms consistent with diagnosis including left sided thoracolumbar scoliotic curve, decreased range of motion of lumbar spine, weakness of trunk and back, poor posture bilateral lower extremity weakness, decreased joint mobility, decreased soft tissue flexibility and mobility, and decreased functional mobility tolerance. These deficits are limiting patient in full participation in ADL's and leisure activities such as forward bending, lifting, gardening, yardwork, walking more than 1/4 mile, standing more than 5 min, training her dog, laundry, twisting, reaching overhead. Pt will benefit from skilled outpatient Physical Therapy to address the deficits stated above and in the chart below, provide pt/family education, and to maximize pt's level of independence.     Pt prognosis is Fair.     Plan of care discussed with patient: Yes  Pt's spiritual, cultural and educational needs considered and patient is agreeable to the plan of care and goals as stated below:     Anticipated Barriers for therapy: scoliosis, chronicity of  condition, minimal relief with healthy back program in [place]    Medical Necessity is demonstrated by the following  History  Co-morbidities and personal factors that may impact the plan of care Co-morbidities:   advanced age, anxiety, depression, high BMI, HTN and migraines, hyperlipidemia, hypothyroid, hx of L meniscus tear    Personal Factors:   age  coping style     low   Examination  Body Structures and Functions, activity limitations and participation restrictions that may impact the plan of care Body Regions:   back  lower extremities  trunk    Body Systems:    gross symmetry  ROM  strength  gross coordinated movement  balance  gait  transfers  transitions  motor control  blood pressure    Participation Restrictions:   See above    Activity limitations:   Learning and applying knowledge  no deficits    General Tasks and Commands  no deficits    Communication  no deficits    Mobility  lifting and carrying objects  walking    Self care  no deficits    Domestic Life  doing house work (cleaning house, washing dishes, laundry)  assisting others    Interactions/Relationships  no deficits    Life Areas  no deficits    Community and Social Life  no deficits         Complexity: low  See FOTO outcome assessment   Clinical Presentation stable and uncomplicated low   Decision Making/ Complexity Score: low     GOALS: Short Term Goals:  4 weeks  1. Report decreased in pain at worse less than  <   / =  4  /10  to increase tolerance for functional activities.On going  2. Pt to increase B popliteal angle by greater than > or = 5 degrees in order to improve flexibility and posture. On going  3. Increased B LE MMT 1/2  to increase tolerance for ADL and work activities.On going  4. Pt to increase B Ely's Test by greater than  > or = 5 degrees in order to improve flexibility and posture. On going  5. Pt to tolerate HEP to improve ROM and independence with ADL's.On going  6. Pt to improve range of motion by 25% to allow for  improved functional mobility to allow for improvement in IADLs. On going    Long Term Goals: 8 weeks  1. Report decreased in pain at worse less than  <   / =  2  /10  to increase tolerance for functional mobility.  On going  2 .Pt to increase B popliteal angle by greater than > or = 10 degrees in order to improve flexibility and posture. On going  3. Increased B LE MMT 1 grade to increase tolerance for ADL and work activities.On going  4. Pt will report at 44% or less limitation on FOTO Lumbar spine survey  to demonstrate decrease in disability and improvement in back pain.On going  5. Pt to be Independent with HEP to improve ROM and independence with ADL's. On going  6. Pt to increase B Ely's Test by greater than > or = 10 degrees in order to improve flexibility and posture. On going  7. Pt to demonstrate negative Prone Instability Test in order to show improved core strength for lumbar stabilization. On going  8. Pt to improve range of motion by 75% to allow for improved functional mobility to allow for improvement in IADLs. On going      Plan   Plan of care Certification: 5/17/2022 to 7/17/2022.    Outpatient Physical Therapy 2 times weekly for 8 weeks to include the following interventions: Cervical/Lumbar Traction, Gait Training, Manual Therapy, Moist Heat/ Ice, Neuromuscular Re-ed, Patient Education, Self Care, Therapeutic Activities and Therapeutic Exercise. Dry aubree Anaya PT      I CERTIFY THE NEED FOR THESE SERVICES FURNISHED UNDER THIS PLAN OF TREATMENT AND WHILE UNDER MY CARE   Physician's comments:     Physician's Signature: ___________________________________________________

## 2022-05-18 NOTE — TELEPHONE ENCOUNTER
No new care gaps identified.  [facility] Embedded Care Gaps. Reference number: [id number]. 5/18/2022   6:28:18 PM CDT

## 2022-05-19 RX ORDER — PANTOPRAZOLE SODIUM 40 MG/1
40 TABLET, DELAYED RELEASE ORAL DAILY
Qty: 90 TABLET | Refills: 2 | Status: SHIPPED | OUTPATIENT
Start: 2022-05-19 | End: 2023-07-23

## 2022-05-19 NOTE — TELEPHONE ENCOUNTER
Refill Routing Note   Medication(s) are not appropriate for processing by [facility] for the following reason(s):      - Patient has been seen in the ED/Hospital since the last PCP visit  - Required indication for medication not on problem list (GERD)    [facility] action(s):  Defer          Medication reconciliation completed: No     Appointments  past 12m or future 3m with PCP    Date Provider   Last Visit   2/2/2022 Kaykay Perales MD   Next Visit   Visit date not found Kaykay Perales MD   ED visits in past 90 days: 0        Note composed:11:24 AM 05/19/2022

## 2022-05-23 ENCOUNTER — PATIENT MESSAGE (OUTPATIENT)
Dept: ORTHOPEDICS | Facility: CLINIC | Age: 67
End: 2022-05-23
Payer: MEDICARE

## 2022-05-24 ENCOUNTER — CLINICAL SUPPORT (OUTPATIENT)
Dept: REHABILITATION | Facility: OTHER | Age: 67
End: 2022-05-24
Attending: INTERNAL MEDICINE
Payer: MEDICARE

## 2022-05-24 ENCOUNTER — PROCEDURE VISIT (OUTPATIENT)
Dept: DERMATOLOGY | Facility: CLINIC | Age: 67
End: 2022-05-24
Payer: MEDICARE

## 2022-05-24 DIAGNOSIS — M25.69 DECREASED RANGE OF MOTION OF TRUNK AND BACK: Primary | ICD-10-CM

## 2022-05-24 DIAGNOSIS — L81.4 LENTIGINES: ICD-10-CM

## 2022-05-24 DIAGNOSIS — M41.25 OTHER IDIOPATHIC SCOLIOSIS, THORACOLUMBAR REGION: ICD-10-CM

## 2022-05-24 DIAGNOSIS — Z41.1 ENCOUNTER FOR COSMETIC LASER PROCEDURE: Primary | ICD-10-CM

## 2022-05-24 DIAGNOSIS — R29.898 DECREASED STRENGTH OF TRUNK AND BACK: ICD-10-CM

## 2022-05-24 PROCEDURE — 17999 PR V BEAM,  25-50 PULSES: ICD-10-PCS | Mod: CSM,S$GLB,, | Performed by: DERMATOLOGY

## 2022-05-24 PROCEDURE — 97140 MANUAL THERAPY 1/> REGIONS: CPT

## 2022-05-24 PROCEDURE — 99499 UNLISTED E&M SERVICE: CPT | Mod: CSM,S$GLB,, | Performed by: DERMATOLOGY

## 2022-05-24 PROCEDURE — 99499 NO LOS: ICD-10-PCS | Mod: CSM,S$GLB,, | Performed by: DERMATOLOGY

## 2022-05-24 PROCEDURE — 17999 UNLISTD PX SKN MUC MEMB SUBQ: CPT | Mod: CSM,S$GLB,, | Performed by: DERMATOLOGY

## 2022-05-24 PROCEDURE — 97110 THERAPEUTIC EXERCISES: CPT

## 2022-05-24 NOTE — PROGRESS NOTES
"[facility]   Physical Therapy Treatment Note     Name: Tiarra Chang [facility] Number: [id number]    Therapy Diagnosis:   Encounter Diagnoses   Name Primary?    Decreased range of motion of trunk and back Yes    Decreased strength of trunk and back     Other idiopathic scoliosis, thoracolumbar region      Physician: Kaykay Perales MD    Visit Date: 5/24/2022    Physician Orders: PT Eval and Treat   Medical Diagnosis from Referral: M41.9 (ICD-10-CM) - Scoliosis, unspecified scoliosis type, unspecified spinal region  Evaluation Date: 5/17/2022  Authorization Period Expiration: 12/31/2022  Plan of Care Expiration: 7/17/2022  Visit # / Visits authorized: 2/ 16   Progress Note Due: 6/17/2022  FOTO: 1/ 1    PTA Visit #: 0/5     Time In: 2:00  Time Out: 3:05  Total Billable Time: 55 minutes    SUBJECTIVE     Pt reports: she felt good for a few days after last visit, but by Sunday she started to have some increased back pain.    She was compliant with home exercise program.  Response to previous treatment: decreased low back pain  Functional change: none yet    Pain: 5/10  Location: bilateral back      OBJECTIVE     Objective Measures updated at progress report unless specified.     Treatment     Tiarra received the treatments listed below:      therapeutic exercises to develop strength, endurance, ROM, flexibility, posture and core stabilization for 45 minutes including:    Recumbent bike x5' Lv 3    LTR's on ball 20x5"  DKTC 15x5"  WILLIE 2x2'  EIL 2x10x5"  PPT on ball with add towel squeeze 15x5"  Prone alt hip extension 10x3"  BKFO GTB x15  Bridging GTB 2x10x3"  Matrix trunk rotation 20# x15  Medx trunk ext 58# (5 rep warm up at 44#) (0-60 deg) x18 (20 reps NV)    manual therapy techniques: dry needling weas applied to the: low back for 10 minutes, including:    FDN: pt received dry needling of bilateral lumbar paraspinals from L1-L5. (1) needle was inserted at each level on each side for a " total of (10) needles. Needles were left in and intermittently manipulated every 2-3 minutes for a total of 15 minutes. Pt monitored for adverse effects, but none were noted.     hot pack for 10 minutes to low back.      Patient Education and Home Exercises     Home Exercises Provided and Patient Education Provided     Education provided:   - HEP, POC    Written Home Exercises Provided: Patient instructed to cont prior HEP. Exercises were reviewed and Tiarra was able to demonstrate them prior to the end of the session.  Tiarra demonstrated good  understanding of the education provided. See EMR under Patient Instructions for exercises provided during therapy sessions    ASSESSMENT     Tiarra presents today with continued complaints of pain. Dry needling again applied today with pt reporting relief and no adverse effects. Able to tolerate continuation and progression of dynamic core and lumbopelvic strengthening and stability exercises with good tolerance and min pain aggravation. Continue to progress as tolerated.     Tiarra Is progressing well towards her goals.   Pt prognosis is Fair.     Pt will continue to benefit from skilled outpatient physical therapy to address the deficits listed in the problem list box on initial evaluation, provide pt/family education and to maximize pt's level of independence in the home and community environment.     Pt's spiritual, cultural and educational needs considered and pt agreeable to plan of care and goals.     Anticipated barriers to physical therapy: scoliosis, chronicity of condition, minimal relief with healthy back program in past    GOALS: Short Term Goals:  4 weeks  1. Report decreased in pain at worse less than  <   / =  4  /10  to increase tolerance for functional activities.On going  2. Pt to increase B popliteal angle by greater than > or = 5 degrees in order to improve flexibility and posture. On going  3. Increased B LE MMT 1/2  to increase tolerance for ADL and work  activities.On going  4. Pt to increase B Ely's Test by greater than  > or = 5 degrees in order to improve flexibility and posture. On going  5. Pt to tolerate HEP to improve ROM and independence with ADL's.On going  6. Pt to improve range of motion by 25% to allow for improved functional mobility to allow for improvement in IADLs. On going     Long Term Goals: 8 weeks  1. Report decreased in pain at worse less than  <   / =  2  /10  to increase tolerance for functional mobility.  On going  2 .Pt to increase B popliteal angle by greater than > or = 10 degrees in order to improve flexibility and posture. On going  3. Increased B LE MMT 1 grade to increase tolerance for ADL and work activities.On going  4. Pt will report at 44% or less limitation on FOTO Lumbar spine survey  to demonstrate decrease in disability and improvement in back pain.On going  5. Pt to be Independent with HEP to improve ROM and independence with ADL's. On going  6. Pt to increase B Ely's Test by greater than > or = 10 degrees in order to improve flexibility and posture. On going  7. Pt to demonstrate negative Prone Instability Test in order to show improved core strength for lumbar stabilization. On going  8. Pt to improve range of motion by 75% to allow for improved functional mobility to allow for improvement in IADLs. On going       PLAN     Plan of care Certification: 5/17/2022 to 7/17/2022.    Zi Anaya, PT

## 2022-05-25 ENCOUNTER — TELEPHONE (OUTPATIENT)
Dept: UROLOGY | Facility: CLINIC | Age: 67
End: 2022-05-25

## 2022-05-25 ENCOUNTER — PATIENT MESSAGE (OUTPATIENT)
Dept: UROLOGY | Facility: CLINIC | Age: 67
End: 2022-05-25

## 2022-05-25 ENCOUNTER — OFFICE VISIT (OUTPATIENT)
Dept: UROLOGY | Facility: CLINIC | Age: 67
End: 2022-05-25
Payer: MEDICARE

## 2022-05-25 VITALS
DIASTOLIC BLOOD PRESSURE: 78 MMHG | WEIGHT: 140 LBS | BODY MASS INDEX: 24.8 KG/M2 | HEART RATE: 71 BPM | HEIGHT: 63 IN | SYSTOLIC BLOOD PRESSURE: 142 MMHG

## 2022-05-25 DIAGNOSIS — N34.2 INFECTIVE URETHRITIS: ICD-10-CM

## 2022-05-25 DIAGNOSIS — N32.81 OAB (OVERACTIVE BLADDER): Primary | ICD-10-CM

## 2022-05-25 DIAGNOSIS — R32 URINARY INCONTINENCE, UNSPECIFIED TYPE: Primary | ICD-10-CM

## 2022-05-25 PROCEDURE — 87086 URINE CULTURE/COLONY COUNT: CPT | Performed by: UROLOGY

## 2022-05-25 PROCEDURE — 99999 PR PBB SHADOW E&M-EST. PATIENT-LVL IV: ICD-10-PCS | Mod: PBBFAC,,, | Performed by: UROLOGY

## 2022-05-25 PROCEDURE — 3077F SYST BP >= 140 MM HG: CPT | Mod: CPTII,S$GLB,, | Performed by: UROLOGY

## 2022-05-25 PROCEDURE — 3008F PR BODY MASS INDEX (BMI) DOCUMENTED: ICD-10-PCS | Mod: CPTII,S$GLB,, | Performed by: UROLOGY

## 2022-05-25 PROCEDURE — 3288F PR FALLS RISK ASSESSMENT DOCUMENTED: ICD-10-PCS | Mod: CPTII,S$GLB,, | Performed by: UROLOGY

## 2022-05-25 PROCEDURE — 87088 URINE BACTERIA CULTURE: CPT | Performed by: UROLOGY

## 2022-05-25 PROCEDURE — 87147 CULTURE TYPE IMMUNOLOGIC: CPT | Performed by: UROLOGY

## 2022-05-25 PROCEDURE — 1101F PT FALLS ASSESS-DOCD LE1/YR: CPT | Mod: CPTII,S$GLB,, | Performed by: UROLOGY

## 2022-05-25 PROCEDURE — 1159F PR MEDICATION LIST DOCUMENTED IN MEDICAL RECORD: ICD-10-PCS | Mod: CPTII,S$GLB,, | Performed by: UROLOGY

## 2022-05-25 PROCEDURE — 4010F PR ACE/ARB THEARPY RXD/TAKEN: ICD-10-PCS | Mod: CPTII,S$GLB,, | Performed by: UROLOGY

## 2022-05-25 PROCEDURE — 99214 OFFICE O/P EST MOD 30 MIN: CPT | Mod: S$GLB,,, | Performed by: UROLOGY

## 2022-05-25 PROCEDURE — 1125F AMNT PAIN NOTED PAIN PRSNT: CPT | Mod: CPTII,S$GLB,, | Performed by: UROLOGY

## 2022-05-25 PROCEDURE — 1125F PR PAIN SEVERITY QUANTIFIED, PAIN PRESENT: ICD-10-PCS | Mod: CPTII,S$GLB,, | Performed by: UROLOGY

## 2022-05-25 PROCEDURE — 4010F ACE/ARB THERAPY RXD/TAKEN: CPT | Mod: CPTII,S$GLB,, | Performed by: UROLOGY

## 2022-05-25 PROCEDURE — 3077F PR MOST RECENT SYSTOLIC BLOOD PRESSURE >= 140 MM HG: ICD-10-PCS | Mod: CPTII,S$GLB,, | Performed by: UROLOGY

## 2022-05-25 PROCEDURE — 1101F PR PT FALLS ASSESS DOC 0-1 FALLS W/OUT INJ PAST YR: ICD-10-PCS | Mod: CPTII,S$GLB,, | Performed by: UROLOGY

## 2022-05-25 PROCEDURE — 99999 PR PBB SHADOW E&M-EST. PATIENT-LVL IV: CPT | Mod: PBBFAC,,, | Performed by: UROLOGY

## 2022-05-25 PROCEDURE — 1159F MED LIST DOCD IN RCRD: CPT | Mod: CPTII,S$GLB,, | Performed by: UROLOGY

## 2022-05-25 PROCEDURE — 99214 PR OFFICE/OUTPT VISIT, EST, LEVL IV, 30-39 MIN: ICD-10-PCS | Mod: S$GLB,,, | Performed by: UROLOGY

## 2022-05-25 PROCEDURE — 3078F PR MOST RECENT DIASTOLIC BLOOD PRESSURE < 80 MM HG: ICD-10-PCS | Mod: CPTII,S$GLB,, | Performed by: UROLOGY

## 2022-05-25 PROCEDURE — 3288F FALL RISK ASSESSMENT DOCD: CPT | Mod: CPTII,S$GLB,, | Performed by: UROLOGY

## 2022-05-25 PROCEDURE — 3008F BODY MASS INDEX DOCD: CPT | Mod: CPTII,S$GLB,, | Performed by: UROLOGY

## 2022-05-25 PROCEDURE — 3078F DIAST BP <80 MM HG: CPT | Mod: CPTII,S$GLB,, | Performed by: UROLOGY

## 2022-05-25 NOTE — PROGRESS NOTES
[facility]        New Overactive Bladder (OAB) Note     5/25/2022     Referred by:  Kaykay Perales MD     HPI: Tiarra Norton is a very pleasant 67 y.o. female who is a return patient to our department referred for evaluation of urinary frequency of 2-3 years duration. She reports bother is associated with urinary daytime frequency (15-20x daily), with nocturia (1-2x per night) and with urgency that does not result in urinary incontinence. She reports no stress urinary incontinence associated with exertion. She does not require daily pad use.  She has decreased overall fluid intake.  She reports urinary frequency is only during the day. Patient reports urgency is independent of position.      She denies symptoms of irritative voiding including dysuria. She denies symptoms of obstructive voiding including decreased stream, hesitancy, intermittency, post void dribbling and sense of incomplete emptying. Bladder scan PVR was 0 mL. Her history includes no notation of urolithiasis, hematuria, prior pelvic surgery, previous prolapse or incontinence procedures or neurological symptoms/diagnoses. She denies all other prior pelvic surgeries or neurologic diagnoses. She does not report symptoms suggestive of advanced POP. She denies a history of constipation. She denies a history suggestive of glaucoma.  She denies a history of hypertension.      Previous incontinence therapies:  · Medication:   ? VESIcare 10 mg has not provided much improvement and resulted in blurred vision   ? Myrbetriq 50 mg has not resulted in any change in symptoms after 6 weeks     A review of 10+ systems was conducted with pertinent positive and negative findings documented in HPI with all other systems reviewed and negative.     Past medical, family, surgical and social history reviewed as documented in chart with pertinent positive medical, family, surgical and social history detailed in HPI.     Exam Findings:     Const: no  acute distress, conversant and alert  Eyes: anicteric, extraocular muscles intact  ENMT: normocephalic, Nl oral membranes  Cardio: no cyanosis, nl cap refill  Pulm: no tachypnea; no resp distress  Abd: soft, no tenderness  Musc: no laceration, no tenderness  Neuro: alert; oriented x 3  Skin: warm, dry; no petichiae  Psych: no anxiety; normal speech      Assessment/Plan:     Overactive Bladder without Incontinence (established,not meeting goals):No improvement on two medications with intolerable side effects taking the antimuscarinic. She want to move to third line therapy in the form of intradetrusor Botox 100 units.

## 2022-05-26 LAB — BACTERIA UR CULT: ABNORMAL

## 2022-05-27 ENCOUNTER — PATIENT MESSAGE (OUTPATIENT)
Dept: SURGERY | Facility: HOSPITAL | Age: 67
End: 2022-05-27
Payer: MEDICARE

## 2022-05-27 RX ORDER — AMPICILLIN 500 MG/1
500 CAPSULE ORAL 4 TIMES DAILY
Qty: 20 CAPSULE | Refills: 0 | Status: SHIPPED | OUTPATIENT
Start: 2022-05-27 | End: 2022-06-01

## 2022-05-30 ENCOUNTER — PATIENT MESSAGE (OUTPATIENT)
Dept: ORTHOPEDICS | Facility: CLINIC | Age: 67
End: 2022-05-30
Payer: MEDICARE

## 2022-05-30 ENCOUNTER — TELEPHONE (OUTPATIENT)
Dept: OTOLARYNGOLOGY | Facility: CLINIC | Age: 67
End: 2022-05-30
Payer: MEDICARE

## 2022-05-30 NOTE — TELEPHONE ENCOUNTER
The patient was quoted for facelift, upper, and lower bleph on 4/25.  She is scheduled for [facility] Test and photos on 6/14.

## 2022-05-31 ENCOUNTER — PATIENT MESSAGE (OUTPATIENT)
Dept: SURGERY | Facility: HOSPITAL | Age: 67
End: 2022-05-31
Payer: MEDICARE

## 2022-06-02 ENCOUNTER — CLINICAL SUPPORT (OUTPATIENT)
Dept: REHABILITATION | Facility: OTHER | Age: 67
End: 2022-06-02
Attending: INTERNAL MEDICINE
Payer: MEDICARE

## 2022-06-02 ENCOUNTER — PATIENT MESSAGE (OUTPATIENT)
Dept: ORTHOPEDICS | Facility: CLINIC | Age: 67
End: 2022-06-02
Payer: MEDICARE

## 2022-06-02 DIAGNOSIS — R29.898 DECREASED STRENGTH OF TRUNK AND BACK: ICD-10-CM

## 2022-06-02 DIAGNOSIS — M25.69 DECREASED RANGE OF MOTION OF TRUNK AND BACK: Primary | ICD-10-CM

## 2022-06-02 DIAGNOSIS — M41.25 OTHER IDIOPATHIC SCOLIOSIS, THORACOLUMBAR REGION: ICD-10-CM

## 2022-06-02 PROCEDURE — 97110 THERAPEUTIC EXERCISES: CPT

## 2022-06-02 PROCEDURE — 97140 MANUAL THERAPY 1/> REGIONS: CPT

## 2022-06-02 NOTE — PROGRESS NOTES
"  [facility]   Physical Therapy Treatment Note     Name: Tiarra Chang [facility] Number: [id number]    Therapy Diagnosis:   Encounter Diagnoses   Name Primary?    Decreased range of motion of trunk and back Yes    Decreased strength of trunk and back     Other idiopathic scoliosis, thoracolumbar region      Physician: Kakyay Perales MD    Visit Date: 6/2/2022    Physician Orders: PT Eval and Treat   Medical Diagnosis from Referral: M41.9 (ICD-10-CM) - Scoliosis, unspecified scoliosis type, unspecified spinal region  Evaluation Date: 5/17/2022  Authorization Period Expiration: 12/31/2022  Plan of Care Expiration: 7/17/2022  Visit # / Visits authorized: 3/ 16   Progress Note Due: 6/17/2022  FOTO: 1/ 1    PTA Visit #: 0/5     Time In: 11:00 am  Time Out: 12:05 pm  Total Billable Time: 55 minutes    SUBJECTIVE     Pt reports: pain is better today, but there have been times between today and last visit where it has been bad    She was compliant with home exercise program.  Response to previous treatment: decreased low back pain  Functional change: none yet    Pain: 3/10  Location: bilateral back      OBJECTIVE     Objective Measures updated at progress report unless specified.     Treatment     Tiarra received the treatments listed below:      therapeutic exercises to develop strength, endurance, ROM, flexibility, posture and core stabilization for 45 minutes including:    Recumbent bike x5' Lv 3    LTR's on ball 20x5"  DKTC 15x5"  WILLIE 2x1'  EIL 2x10x5"  PPT on ball with add towel squeeze 15x5"  Prone alt hip extension 10x3"  BKFO GTB x15  Bridging GTB 2x10x3"  Open books x10   Matrix trunk rotation 20# x20 (24# next visit)  Medx trunk ext 58# (5 rep warm up at 44#) (0-60 deg) x20 (increase 10%)  cat/cow x10  bird dog x10     manual therapy techniques: dry needling weas applied to the: low back for 10 minutes, including:    FDN: pt received dry needling of bilateral lumbar paraspinals " from L1-L5. (1) needle was inserted at each level on each side for a total of (10) needles. Needles were left in and intermittently manipulated every 2-3 minutes for a total of 10 minutes. Pt monitored for adverse effects, but none were noted.     hot pack for 10 minutes to low back.      Patient Education and Home Exercises     Home Exercises Provided and Patient Education Provided     Education provided:   - HEP, POC    Written Home Exercises Provided: Patient instructed to cont prior HEP. Exercises were reviewed and Tiarra was able to demonstrate them prior to the end of the session.  Tiarra demonstrated good  understanding of the education provided. See EMR under Patient Instructions for exercises provided during therapy sessions    ASSESSMENT     Tiarra presents today with decreased pain levels. Decreased tightness and improved exercises tolerance post dry needling. Initiated quadruped stability and mobility exercises today with good tolerance and no adverse effects. Progress trunk extension and rotation resistance next visit. Continue to progress as tolerated.     Tiarra Is progressing well towards her goals.   Pt prognosis is Fair.     Pt will continue to benefit from skilled outpatient physical therapy to address the deficits listed in the problem list box on initial evaluation, provide pt/family education and to maximize pt's level of independence in the home and community environment.     Pt's spiritual, cultural and educational needs considered and pt agreeable to plan of care and goals.     Anticipated barriers to physical therapy: scoliosis, chronicity of condition, minimal relief with healthy back program in past    GOALS: Short Term Goals:  4 weeks  1. Report decreased in pain at worse less than  <   / =  4  /10  to increase tolerance for functional activities.On going  2. Pt to increase B popliteal angle by greater than > or = 5 degrees in order to improve flexibility and posture. On going  3. Increased  B LE MMT 1/2  to increase tolerance for ADL and work activities.On going  4. Pt to increase B Ely's Test by greater than  > or = 5 degrees in order to improve flexibility and posture. On going  5. Pt to tolerate HEP to improve ROM and independence with ADL's.On going  6. Pt to improve range of motion by 25% to allow for improved functional mobility to allow for improvement in IADLs. On going     Long Term Goals: 8 weeks  1. Report decreased in pain at worse less than  <   / =  2  /10  to increase tolerance for functional mobility.  On going  2 .Pt to increase B popliteal angle by greater than > or = 10 degrees in order to improve flexibility and posture. On going  3. Increased B LE MMT 1 grade to increase tolerance for ADL and work activities.On going  4. Pt will report at 44% or less limitation on FOTO Lumbar spine survey  to demonstrate decrease in disability and improvement in back pain.On going  5. Pt to be Independent with HEP to improve ROM and independence with ADL's. On going  6. Pt to increase B Ely's Test by greater than > or = 10 degrees in order to improve flexibility and posture. On going  7. Pt to demonstrate negative Prone Instability Test in order to show improved core strength for lumbar stabilization. On going  8. Pt to improve range of motion by 75% to allow for improved functional mobility to allow for improvement in IADLs. On going       PLAN     Plan of care Certification: 5/17/2022 to 7/17/2022.    Zi Anaya, PT

## 2022-06-03 ENCOUNTER — TELEPHONE (OUTPATIENT)
Dept: ORTHOPEDICS | Facility: CLINIC | Age: 67
End: 2022-06-03
Payer: MEDICARE

## 2022-06-03 DIAGNOSIS — M41.9 SCOLIOSIS, UNSPECIFIED SCOLIOSIS TYPE, UNSPECIFIED SPINAL REGION: Primary | ICD-10-CM

## 2022-06-07 ENCOUNTER — CLINICAL SUPPORT (OUTPATIENT)
Dept: REHABILITATION | Facility: OTHER | Age: 67
End: 2022-06-07
Attending: INTERNAL MEDICINE
Payer: MEDICARE

## 2022-06-07 DIAGNOSIS — M25.69 DECREASED RANGE OF MOTION OF TRUNK AND BACK: Primary | ICD-10-CM

## 2022-06-07 DIAGNOSIS — R29.898 DECREASED STRENGTH OF TRUNK AND BACK: ICD-10-CM

## 2022-06-07 DIAGNOSIS — M41.25 OTHER IDIOPATHIC SCOLIOSIS, THORACOLUMBAR REGION: ICD-10-CM

## 2022-06-07 PROCEDURE — 97140 MANUAL THERAPY 1/> REGIONS: CPT

## 2022-06-07 PROCEDURE — 97110 THERAPEUTIC EXERCISES: CPT

## 2022-06-07 NOTE — PROGRESS NOTES
"  [facility]   Physical Therapy Treatment Note     Name: Tiarra Chang [facility] Number: [id number]    Therapy Diagnosis:   Encounter Diagnoses   Name Primary?    Decreased range of motion of trunk and back Yes    Decreased strength of trunk and back     Other idiopathic scoliosis, thoracolumbar region      Physician: Kaykay Perales MD    Visit Date: 6/7/2022    Physician Orders: PT Eval and Treat   Medical Diagnosis from Referral: M41.9 (ICD-10-CM) - Scoliosis, unspecified scoliosis type, unspecified spinal region  Evaluation Date: 5/17/2022  Authorization Period Expiration: 12/31/2022  Plan of Care Expiration: 7/17/2022  Visit # / Visits authorized: 4/ 16   Progress Note Due: 6/17/2022  FOTO NV  FOTO: 1/ 1    PTA Visit #: 0/5     Time In: 3:00 pm  Time Out: 4:05 pm  Total Billable Time: 55 minutes    SUBJECTIVE     Pt reports: she had about 3 days of relief after needling, but the pain is bad today    She was compliant with home exercise program.  Response to previous treatment: decreased low back pain  Functional change: none yet    Pain: 7/10  Location: bilateral back      OBJECTIVE     Objective Measures updated at progress report unless specified.     Treatment     Tiarra received the treatments listed below:      therapeutic exercises to develop strength, endurance, ROM, flexibility, posture and core stabilization for 45 minutes including:    Recumbent bike x5' Lv 3    LTR's on ball 20x5"  DKTC 15x5"  PPT supine 90/90 on ball with add towel squeeze 15x5"  PPT + march x15  PPT + BKFO GTB x15  PPT + Bridging GTB 2x10x3"  Matrix trunk rotation 22# x15  Medx trunk ext 64# (5 rep warm up at 44#) (0-60 deg) 2x8 (increase reps next visit)  cat/cow x10  bird dog x10  Seated trunk flexion ball rollout fwd/L/R  10x5" ea    NP:   Open books x10   PPT sitting on ball with add towel squeeze 15x5"  Prone alt hip extension 10x3"  WILLIE 2x1'  EIL 2x10x5"     manual therapy techniques: dry " needling weas applied to the: low back for 10 minutes, including:    FDN: pt received dry needling of bilateral lumbar paraspinals from L1-L5. (1) needle was inserted at each level on each side for a total of (10) needles. Needles were left in and intermittently manipulated every 2-3 minutes for a total of 10 minutes. Pt monitored for adverse effects, but none were noted.     hot pack for 10 minutes to low back.      Patient Education and Home Exercises     Home Exercises Provided and Patient Education Provided     Education provided:   - HEP, POC    Written Home Exercises Provided: Patient instructed to cont prior HEP. Exercises were reviewed and Tiarra was able to demonstrate them prior to the end of the session.  Tiarra demonstrated good  understanding of the education provided. See EMR under Patient Instructions for exercises provided during therapy sessions    ASSESSMENT     Tiarra presents today with worsened pain levels. Dry needling applied today with patient reporting relief and perceived increased ROM. Pt with positive response to seated trunk flexion and seated ball rollouts, continue with this going forward and added to HEP. Progressed resistance with trunk extension and rotation strengthening with good tolerance and min pain aggravation. Continue to progress as tolerated.     Tiarra Is progressing well towards her goals.   Pt prognosis is Fair.     Pt will continue to benefit from skilled outpatient physical therapy to address the deficits listed in the problem list box on initial evaluation, provide pt/family education and to maximize pt's level of independence in the home and community environment.     Pt's spiritual, cultural and educational needs considered and pt agreeable to plan of care and goals.     Anticipated barriers to physical therapy: scoliosis, chronicity of condition, minimal relief with healthy back program in past    GOALS: Short Term Goals:  4 weeks  1. Report decreased in pain at worse  less than  <   / =  4  /10  to increase tolerance for functional activities.On going  2. Pt to increase B popliteal angle by greater than > or = 5 degrees in order to improve flexibility and posture. On going  3. Increased B LE MMT 1/2  to increase tolerance for ADL and work activities.On going  4. Pt to increase B Ely's Test by greater than  > or = 5 degrees in order to improve flexibility and posture. On going  5. Pt to tolerate HEP to improve ROM and independence with ADL's.On going  6. Pt to improve range of motion by 25% to allow for improved functional mobility to allow for improvement in IADLs. On going     Long Term Goals: 8 weeks  1. Report decreased in pain at worse less than  <   / =  2  /10  to increase tolerance for functional mobility.  On going  2 .Pt to increase B popliteal angle by greater than > or = 10 degrees in order to improve flexibility and posture. On going  3. Increased B LE MMT 1 grade to increase tolerance for ADL and work activities.On going  4. Pt will report at 44% or less limitation on FOTO Lumbar spine survey  to demonstrate decrease in disability and improvement in back pain.On going  5. Pt to be Independent with HEP to improve ROM and independence with ADL's. On going  6. Pt to increase B Ely's Test by greater than > or = 10 degrees in order to improve flexibility and posture. On going  7. Pt to demonstrate negative Prone Instability Test in order to show improved core strength for lumbar stabilization. On going  8. Pt to improve range of motion by 75% to allow for improved functional mobility to allow for improvement in IADLs. On going       PLAN     Plan of care Certification: 5/17/2022 to 7/17/2022.    Zi Anaya, PT

## 2022-06-08 ENCOUNTER — PATIENT MESSAGE (OUTPATIENT)
Dept: PAIN MEDICINE | Facility: CLINIC | Age: 67
End: 2022-06-08
Payer: MEDICARE

## 2022-06-08 ENCOUNTER — LAB VISIT (OUTPATIENT)
Dept: LAB | Facility: HOSPITAL | Age: 67
End: 2022-06-08
Attending: INTERNAL MEDICINE
Payer: MEDICARE

## 2022-06-08 DIAGNOSIS — E03.4 HYPOTHYROIDISM DUE TO ACQUIRED ATROPHY OF THYROID: ICD-10-CM

## 2022-06-08 LAB — TSH SERPL DL<=0.005 MIU/L-ACNC: 0.72 UIU/ML (ref 0.4–4)

## 2022-06-08 PROCEDURE — 36415 COLL VENOUS BLD VENIPUNCTURE: CPT | Mod: PN | Performed by: INTERNAL MEDICINE

## 2022-06-08 PROCEDURE — 84443 ASSAY THYROID STIM HORMONE: CPT | Performed by: INTERNAL MEDICINE

## 2022-06-09 ENCOUNTER — CLINICAL SUPPORT (OUTPATIENT)
Dept: REHABILITATION | Facility: OTHER | Age: 67
End: 2022-06-09
Attending: INTERNAL MEDICINE
Payer: MEDICARE

## 2022-06-09 DIAGNOSIS — M25.69 DECREASED RANGE OF MOTION OF TRUNK AND BACK: Primary | ICD-10-CM

## 2022-06-09 DIAGNOSIS — M41.25 OTHER IDIOPATHIC SCOLIOSIS, THORACOLUMBAR REGION: ICD-10-CM

## 2022-06-09 DIAGNOSIS — R29.898 DECREASED STRENGTH OF TRUNK AND BACK: ICD-10-CM

## 2022-06-09 PROCEDURE — 97110 THERAPEUTIC EXERCISES: CPT

## 2022-06-09 PROCEDURE — 97140 MANUAL THERAPY 1/> REGIONS: CPT

## 2022-06-09 NOTE — PROGRESS NOTES
"  [facility]   Physical Therapy Treatment Note     Name: Tiarra Chang [facility] Number: [id number]    Therapy Diagnosis:   Encounter Diagnoses   Name Primary?    Decreased range of motion of trunk and back Yes    Decreased strength of trunk and back     Other idiopathic scoliosis, thoracolumbar region      Physician: Kaykay Perales MD    Visit Date: 6/9/2022    Physician Orders: PT Eval and Treat   Medical Diagnosis from Referral: M41.9 (ICD-10-CM) - Scoliosis, unspecified scoliosis type, unspecified spinal region  Evaluation Date: 5/17/2022  Authorization Period Expiration: 12/31/2022  Plan of Care Expiration: 7/17/2022  Visit # / Visits authorized: 5/ 16   Progress Note Due: 6/17/2022  Reassess NV  FOTO: 2/2    PTA Visit #: 0/5     Time In: 10:00 am  Time Out: 11:05 pm  Total Billable Time: 55 minutes    SUBJECTIVE     Pt reports: she was sore yesterday after the needling, but it is much better today with less pain    She was compliant with home exercise program.  Response to previous treatment: decreased low back pain  Functional change: none yet    Pain: 4/10  Location: bilateral back      OBJECTIVE     Objective Measures updated at progress report unless specified.         Treatment     Tiarra received the treatments listed below:      therapeutic exercises to develop strength, endurance, ROM, flexibility, posture and core stabilization for 45 minutes including:    Recumbent bike x5' Lv 3    LTR's on ball 20x5"  DKTC 15x5"  PPT supine 90/90 on ball with add towel squeeze 15x5"  PPT + BKFO Blue TB x15  PPT + Bridging Blue TB 2x10x3"  Open books x10   Matrix trunk rotation 22# x15  Medx trunk ext 62# (5 rep warm up at 44#) (0-60 deg) 2x8 (increase reps next visit)  cat/cow x10   bird dog x10  Seated trunk flexion ball rollout fwd/L/R  10x5" ea  Prone alt hip extension 10x3"  +Ab brace + SLR 2x10x3"   +paloff Press in tandem stance GTB x10 ea    NP:  PPT + march x15   PPT sitting " "on ball with add towel squeeze 15x5"  WILLIE 2x1'  EIL 2x10x5"     manual therapy techniques: dry needling weas applied to the: low back for 10 minutes, including:    FDN: pt received dry needling of bilateral lumbar paraspinals from L1-L5. (1) needle was inserted at each level on each side for a total of (10) needles. Needles were left in and intermittently manipulated every 2-3 minutes for a total of 10 minutes. Pt monitored for adverse effects, but none were noted.     hot pack for 10 minutes to low back in [place].      Patient Education and Home Exercises     Home Exercises Provided and Patient Education Provided     Education provided:   - HEP, POC    Written Home Exercises Provided: Patient instructed to cont prior HEP. Exercises were reviewed and Tiarra was able to demonstrate them prior to the end of the session.  Tiarra demonstrated good  understanding of the education provided. See EMR under Patient Instructions for exercises provided during therapy sessions    ASSESSMENT     Tiarra presents today with improved pain levels. Continues to have positive response to dry needling with decreased pain, improved lumbar ROM, and improved tolerance to ADL's and functional mobility activities. Progressed therex today with good tolerance and no adverse effects or reports of pain aggravation. Continue to progress as tolerated. Reassess next visit.     Tiarra Is progressing well towards her goals.   Pt prognosis is Fair.     Pt will continue to benefit from skilled outpatient physical therapy to address the deficits listed in the problem list box on initial evaluation, provide pt/family education and to maximize pt's level of independence in the home and community environment.     Pt's spiritual, cultural and educational needs considered and pt agreeable to plan of care and goals.     Anticipated barriers to physical therapy: scoliosis, chronicity of condition, minimal relief with healthy back program in past    GOALS: Short " Term Goals:  4 weeks  1. Report decreased in pain at worse less than  <   / =  4  /10  to increase tolerance for functional activities.On going  2. Pt to increase B popliteal angle by greater than > or = 5 degrees in order to improve flexibility and posture. On going  3. Increased B LE MMT 1/2  to increase tolerance for ADL and work activities.On going  4. Pt to increase B Ely's Test by greater than  > or = 5 degrees in order to improve flexibility and posture. On going  5. Pt to tolerate HEP to improve ROM and independence with ADL's.On going  6. Pt to improve range of motion by 25% to allow for improved functional mobility to allow for improvement in IADLs. On going     Long Term Goals: 8 weeks  1. Report decreased in pain at worse less than  <   / =  2  /10  to increase tolerance for functional mobility.  On going  2 .Pt to increase B popliteal angle by greater than > or = 10 degrees in order to improve flexibility and posture. On going  3. Increased B LE MMT 1 grade to increase tolerance for ADL and work activities.On going  4. Pt will report at 44% or less limitation on FOTO Lumbar spine survey  to demonstrate decrease in disability and improvement in back pain.On going  5. Pt to be Independent with HEP to improve ROM and independence with ADL's. On going  6. Pt to increase B Ely's Test by greater than > or = 10 degrees in order to improve flexibility and posture. On going  7. Pt to demonstrate negative Prone Instability Test in order to show improved core strength for lumbar stabilization. On going  8. Pt to improve range of motion by 75% to allow for improved functional mobility to allow for improvement in IADLs. On going       PLAN     Plan of care Certification: 5/17/2022 to 7/17/2022.    Zi Anaya, PT

## 2022-06-14 ENCOUNTER — CLINICAL SUPPORT (OUTPATIENT)
Dept: OPHTHALMOLOGY | Facility: CLINIC | Age: 67
End: 2022-06-14
Attending: OTOLARYNGOLOGY
Payer: MEDICARE

## 2022-06-14 ENCOUNTER — CLINICAL SUPPORT (OUTPATIENT)
Dept: REHABILITATION | Facility: OTHER | Age: 67
End: 2022-06-14
Attending: INTERNAL MEDICINE
Payer: MEDICARE

## 2022-06-14 DIAGNOSIS — H53.489 VISUAL FIELD CONSTRICTION, UNSPECIFIED LATERALITY: ICD-10-CM

## 2022-06-14 DIAGNOSIS — M41.25 OTHER IDIOPATHIC SCOLIOSIS, THORACOLUMBAR REGION: ICD-10-CM

## 2022-06-14 DIAGNOSIS — M25.69 DECREASED RANGE OF MOTION OF TRUNK AND BACK: Primary | ICD-10-CM

## 2022-06-14 DIAGNOSIS — R29.898 DECREASED STRENGTH OF TRUNK AND BACK: ICD-10-CM

## 2022-06-14 PROCEDURE — 97140 MANUAL THERAPY 1/> REGIONS: CPT

## 2022-06-14 PROCEDURE — 97110 THERAPEUTIC EXERCISES: CPT

## 2022-06-14 NOTE — PROGRESS NOTES
[facility]   Physical Therapy Reassessment    Name: Tiarra Norton  [facility] Number: [id number]    Therapy Diagnosis:   Encounter Diagnoses   Name Primary?    Decreased range of motion of trunk and back Yes    Decreased strength of trunk and back     Other idiopathic scoliosis, thoracolumbar region      Physician: Kaykay Perales MD    Visit Date: 6/14/2022    Physician Orders: PT Eval and Treat   Medical Diagnosis from Referral: M41.9 (ICD-10-CM) - Scoliosis, unspecified scoliosis type, unspecified spinal region  Evaluation Date: 5/17/2022  Authorization Period Expiration: 12/31/2022  Plan of Care Expiration: 7/17/2022  Visit # / Visits authorized: 6/ 16   Progress Note Due: 7/14/2022  FOTO: 2/2    PTA Visit #: 0/5     Time In: 3:00 pm  Time Out: 4:05 pm  Total Billable Time: 60 minutes    SUBJECTIVE     Pt reports: her back has been feeling better with the needling, but it gets worse on days like today where she has to run around and do a lot.    She was compliant with home exercise program.  Response to previous treatment: decreased low back pain  Functional change: none yet    Pain: 4/10  Location: bilateral back      OBJECTIVE     Objective Measures updated at progress report unless specified.            Lumbar Range of Motion: - reassessed 6/14/2022    %   Flexion 90      Extension 50      Left Side Bending 60   Right Side Bending 60   Left rotation    80   Right Rotation    70    *= pain with all motions    Lower Extremity Strength - reassessed 6/14/2022     Right LE   Left LE     Hip flexion:  4/5 Hip flexion:  4/5   Knee extension: 5/5 Knee extension: 5/5   Knee flexion: 5/5 Knee flexion: 5/5   Hip IR: 4+/5 Hip IR: 4+/5   Hip ER: 4+/5 Hip ER: 4+/5   Hip extension:  4+/5 Hip extension: 4+/5   Hip abduction:  4/5 Hip abduction:  4/5   Hip adduction: 4/5 Hip adduction 4/5   Ankle dorsiflexion: 5/5 Ankle dorsiflexion: 5/5   Ankle plantarflexion: 5/5 Ankle plantarflexion: 5/5  "         Treatment     Tiarra received the treatments listed below:      therapeutic exercises to develop strength, endurance, ROM, flexibility, posture and core stabilization for 45 minutes including:    Recumbent bike x5' Lv 3    LTR's on ball 20x5"  DKTC 15x5"  PPT supine 90/90 on ball with add towel squeeze 15x5"  PPT + BKFO Blue TB x15  PPT + Bridging Blue TB 2x10x3"  Open books x10   Matrix trunk rotation 22# x15  Medx trunk ext 62# (5 rep warm up at 44#) (0-60 deg) 2x10 (increase resistance next visit)  cat/cow x10   bird dog x10  Seated trunk flexion ball rollout fwd/L/R  10x5" ea  Prone alt hip extension 10x3"   Ab brace + SLR 2# 10x3"  ea  paloff Press in tandem stance GTB x10 ea  +seated marching on ball x20     NP:  PPT + march x15   PPT sitting on ball with add towel squeeze 15x5"  WILLIE 2x1'  EIL 2x10x5"     manual therapy techniques: dry needling weas applied to the: low back for 10 minutes, including:    FDN: pt received dry needling of bilateral lumbar paraspinals from L1-L5. (1) needle was inserted at each level on each side for a total of (10) needles. Needles were left in and intermittently manipulated every 2-3 minutes for a total of 10 minutes. Pt monitored for adverse effects, but none were noted.     hot pack for 10 minutes to low back in Z-lie.      Patient Education and Home Exercises     Home Exercises Provided and Patient Education Provided     Education provided:   - HEP, POC    Written Home Exercises Provided: Patient instructed to cont prior HEP. Exercises were reviewed and Tiarra was able to demonstrate them prior to the end of the session.  Tiarra demonstrated good  understanding of the education provided. See EMR under Patient Instructions for exercises provided during therapy sessions    ASSESSMENT     Tiarra presents today for reassessment with reports from increased pain due to increased activity today. Pt demo improvements in lumbar ROM, LE strength, and core and back strength. Pt " still with end range pain with all lumbar ROM, and is still limited in ADL's and functional mobility activities due to pain levels. Continue to progress as tolerated.     Tiarra Is progressing well towards her goals.   Pt prognosis is Fair.     Pt will continue to benefit from skilled outpatient physical therapy to address the deficits listed in the problem list box on initial evaluation, provide pt/family education and to maximize pt's level of independence in the home and community environment.     Pt's spiritual, cultural and educational needs considered and pt agreeable to plan of care and goals.     Anticipated barriers to physical therapy: scoliosis, chronicity of condition, minimal relief with healthy back program in past    GOALS: Short Term Goals:  4 weeks  1. Report decreased in pain at worse less than  <   / =  4  /10  to increase tolerance for functional activities.On going  2. Pt to increase B popliteal angle by greater than > or = 5 degrees in order to improve flexibility and posture. On going  3. Increased B LE MMT 1/2  to increase tolerance for ADL and work activities. MET  4. Pt to increase B Ely's Test by greater than  > or = 5 degrees in order to improve flexibility and posture. On going  5. Pt to tolerate HEP to improve ROM and independence with ADL's.On going  6. Pt to improve range of motion by 25% to allow for improved functional mobility to allow for improvement in IADLs. MET     Long Term Goals: 8 weeks  1. Report decreased in pain at worse less than  <   / =  2  /10  to increase tolerance for functional mobility.  On going  2 .Pt to increase B popliteal angle by greater than > or = 10 degrees in order to improve flexibility and posture. On going  3. Increased B LE MMT 1 grade to increase tolerance for ADL and work activities.On going  4. Pt will report at 44% or less limitation on FOTO Lumbar spine survey  to demonstrate decrease in disability and improvement in back pain.On going  5. Pt to  be Independent with HEP to improve ROM and independence with ADL's. On going  6. Pt to increase B Ely's Test by greater than > or = 10 degrees in order to improve flexibility and posture. On going  7. Pt to demonstrate negative Prone Instability Test in order to show improved core strength for lumbar stabilization. On going  8. Pt to improve range of motion by 75% to allow for improved functional mobility to allow for improvement in IADLs. On going       PLAN     Plan of care Certification: 5/17/2022 to 7/17/2022.    Zi Anaya, PT

## 2022-06-15 ENCOUNTER — OFFICE VISIT (OUTPATIENT)
Dept: PAIN MEDICINE | Facility: CLINIC | Age: 67
End: 2022-06-15
Payer: MEDICARE

## 2022-06-15 VITALS
HEIGHT: 63 IN | BODY MASS INDEX: 25.69 KG/M2 | RESPIRATION RATE: 18 BRPM | DIASTOLIC BLOOD PRESSURE: 75 MMHG | SYSTOLIC BLOOD PRESSURE: 135 MMHG | HEART RATE: 58 BPM | WEIGHT: 145 LBS | TEMPERATURE: 97 F

## 2022-06-15 DIAGNOSIS — M47.817 LUMBOSACRAL SPONDYLOSIS WITHOUT MYELOPATHY: ICD-10-CM

## 2022-06-15 DIAGNOSIS — F13.20 BENZODIAZEPINE DEPENDENCE: ICD-10-CM

## 2022-06-15 DIAGNOSIS — M51.36 DEGENERATIVE DISC DISEASE, LUMBAR: Primary | ICD-10-CM

## 2022-06-15 PROCEDURE — 1160F PR REVIEW ALL MEDS BY PRESCRIBER/CLIN PHARMACIST DOCUMENTED: ICD-10-PCS | Mod: CPTII,S$GLB,, | Performed by: ANESTHESIOLOGY

## 2022-06-15 PROCEDURE — 1101F PT FALLS ASSESS-DOCD LE1/YR: CPT | Mod: CPTII,S$GLB,, | Performed by: ANESTHESIOLOGY

## 2022-06-15 PROCEDURE — 1159F MED LIST DOCD IN RCRD: CPT | Mod: CPTII,S$GLB,, | Performed by: ANESTHESIOLOGY

## 2022-06-15 PROCEDURE — 3008F PR BODY MASS INDEX (BMI) DOCUMENTED: ICD-10-PCS | Mod: CPTII,S$GLB,, | Performed by: ANESTHESIOLOGY

## 2022-06-15 PROCEDURE — 1159F PR MEDICATION LIST DOCUMENTED IN MEDICAL RECORD: ICD-10-PCS | Mod: CPTII,S$GLB,, | Performed by: ANESTHESIOLOGY

## 2022-06-15 PROCEDURE — 3075F SYST BP GE 130 - 139MM HG: CPT | Mod: CPTII,S$GLB,, | Performed by: ANESTHESIOLOGY

## 2022-06-15 PROCEDURE — 1101F PR PT FALLS ASSESS DOC 0-1 FALLS W/OUT INJ PAST YR: ICD-10-PCS | Mod: CPTII,S$GLB,, | Performed by: ANESTHESIOLOGY

## 2022-06-15 PROCEDURE — 99999 PR PBB SHADOW E&M-EST. PATIENT-LVL V: ICD-10-PCS | Mod: PBBFAC,,, | Performed by: ANESTHESIOLOGY

## 2022-06-15 PROCEDURE — 3008F BODY MASS INDEX DOCD: CPT | Mod: CPTII,S$GLB,, | Performed by: ANESTHESIOLOGY

## 2022-06-15 PROCEDURE — 3288F PR FALLS RISK ASSESSMENT DOCUMENTED: ICD-10-PCS | Mod: CPTII,S$GLB,, | Performed by: ANESTHESIOLOGY

## 2022-06-15 PROCEDURE — 3288F FALL RISK ASSESSMENT DOCD: CPT | Mod: CPTII,S$GLB,, | Performed by: ANESTHESIOLOGY

## 2022-06-15 PROCEDURE — 1160F RVW MEDS BY RX/DR IN RCRD: CPT | Mod: CPTII,S$GLB,, | Performed by: ANESTHESIOLOGY

## 2022-06-15 PROCEDURE — 99214 OFFICE O/P EST MOD 30 MIN: CPT | Mod: S$GLB,,, | Performed by: ANESTHESIOLOGY

## 2022-06-15 PROCEDURE — 3075F PR MOST RECENT SYSTOLIC BLOOD PRESS GE 130-139MM HG: ICD-10-PCS | Mod: CPTII,S$GLB,, | Performed by: ANESTHESIOLOGY

## 2022-06-15 PROCEDURE — 99999 PR PBB SHADOW E&M-EST. PATIENT-LVL V: CPT | Mod: PBBFAC,,, | Performed by: ANESTHESIOLOGY

## 2022-06-15 PROCEDURE — 1125F AMNT PAIN NOTED PAIN PRSNT: CPT | Mod: CPTII,S$GLB,, | Performed by: ANESTHESIOLOGY

## 2022-06-15 PROCEDURE — 4010F ACE/ARB THERAPY RXD/TAKEN: CPT | Mod: CPTII,S$GLB,, | Performed by: ANESTHESIOLOGY

## 2022-06-15 PROCEDURE — 99214 PR OFFICE/OUTPT VISIT, EST, LEVL IV, 30-39 MIN: ICD-10-PCS | Mod: S$GLB,,, | Performed by: ANESTHESIOLOGY

## 2022-06-15 PROCEDURE — 3078F PR MOST RECENT DIASTOLIC BLOOD PRESSURE < 80 MM HG: ICD-10-PCS | Mod: CPTII,S$GLB,, | Performed by: ANESTHESIOLOGY

## 2022-06-15 PROCEDURE — 3078F DIAST BP <80 MM HG: CPT | Mod: CPTII,S$GLB,, | Performed by: ANESTHESIOLOGY

## 2022-06-15 PROCEDURE — 1125F PR PAIN SEVERITY QUANTIFIED, PAIN PRESENT: ICD-10-PCS | Mod: CPTII,S$GLB,, | Performed by: ANESTHESIOLOGY

## 2022-06-15 PROCEDURE — 4010F PR ACE/ARB THEARPY RXD/TAKEN: ICD-10-PCS | Mod: CPTII,S$GLB,, | Performed by: ANESTHESIOLOGY

## 2022-06-15 RX ORDER — TRAMADOL HYDROCHLORIDE 50 MG/1
50 TABLET ORAL EVERY 12 HOURS PRN
Qty: 14 TABLET | Refills: 0 | Status: SHIPPED | OUTPATIENT
Start: 2022-06-15 | End: 2022-07-19

## 2022-06-15 NOTE — PROGRESS NOTES
Chronic Pain-Tele-Medicine-Established Note (Follow up visit)      SUBJECTIVE:    PCP: Kaykay Perales MD    REFERRING PHYSICIAN: Tyler Jacobs MD    CHIEF COMPLAINT: Lower back pain       Original HISTORY OF PRESENT ILLNESS: Tiarra Norton presents to the clinic for the evaluation of the above pain. The pain started five years ago.     Original Pain Description:  The pain is located in the lower back and does radiate up towards her upper back.The pain is described as aching, dull and sharp. Initially starts as a dull, aching pain and it gets sharp once she bends down and moves around. Typically when she walks for more than five minutes, the sharp pain radiates up her back. Exacerbating factors: Standing, Bending, Touching, Walking, Night Time, Flexing and Lifting. Mitigating factors heat, ice, laying down and massage. Symptoms interfere with daily activity and sleeping. The patient feels like symptoms have been worsening. Patient denies night fever/night sweats, urinary incontinence, bowel incontinence, significant weight loss, significant motor weakness and loss of sensations.    Original PAIN SCORES:  Best: Pain is 1  Worst: Pain is 10  Usually: Pain is 4  Current: Pain is 4    INTERVAL HISTORY:  4/18/22 Interval History: Patient presents for telehealth visit for follow up following her b/l RFA at L3-L5. She reports 80% relief and is very pleased with her pain relief. Unfortunately, she is not back to doing what she wants due to right knee pain. She is seeing Dr. Huber for knee pain and is s/p right medial compartment partial knee replacement. She is currently in therapy for this. We discussed that we may be able to assist her with her knee pain as well.     Interval History 06/15/2022: Tiarra Norton returns for follow up. She continues to feel like she has mild pain sitting or laying down, and has her worst pain with extreme leaning forward to pick something up off the floor. She also has  difficulty leaning over her handlebars to ride her bike or leaning forward to fold clothes or standing up for any period of time. So, we determined that leaning forward is her worst issue. We did previouisly do lmbb and rfa, which has helped with extension, but it would not help with leaning forward. On review of her MRI she has significant multilevel DDD with Modic changes which likely account for her pain.     6 weeks of Conservative therapy (PT/Chiro/Home Exercises with Dates)  Healthy back program--> has four sessions left for a total of 20 sessions   Last session was on 22   Stretches that they taught at [employer] gives her relief      Treatments / Medications: (Ice/Heat/NSAIDS/APAP/etc):  Ice and heat which has helped      Report: reviewed       Interventional Pain Procedures: (Previous injections)  3/22/22: RFA Left L3-L5 80% relief  3/8/22:  RFA Right L3-L5 80% relief   Has gotten injections in her knee (orthovisc injections)       Past Medical History:   Diagnosis Date    Cataract     Depression     Gestational diabetes     Hyperlipidemia     Hypertension     IBS (irritable bowel syndrome)     Thyroid disease      Past Surgical History:   Procedure Laterality Date    ADENOIDECTOMY      ARTHROSCOPIC CHONDROPLASTY OF KNEE JOINT Right 10/19/2021    Procedure: ARTHROSCOPY, KNEE, WITH CHONDROPLASTY;  Surgeon: Trevin Huber MD;  Location: [facility] OR;  Service: Orthopedics;  Laterality: Right;    ARTHROSCOPY OF KNEE Right 10/19/2021    Procedure: ARTHROSCOPY, KNEE-RIGHT;  Surgeon: Trevin Huber MD;  Location: [facility] OR;  Service: Orthopedics;  Laterality: Right;     SECTION      CHOLECYSTECTOMY      COLONOSCOPY N/A 2016    Procedure: COLONOSCOPY;  Surgeon: Heri Segura MD;  Location: [facility] ([address] [place] [address]);  Service: Endoscopy;  Laterality: N/A;    COLONOSCOPY N/A 2019    Procedure: COLONOSCOPY;  Surgeon: Joe Pitts MD;  [facility]   Service: Endoscopy;  Laterality: N/A;    ESOPHAGOGASTRODUODENOSCOPY N/A 2/13/2020    Procedure: EGD (ESOPHAGOGASTRODUODENOSCOPY);  Surgeon: Tolu Rivas MD;  Location: [address]);  Service: Endoscopy;  Laterality: N/A;  Pt. moved to 9:15am arrival time.. Instructed to not have anything after midnight.EC    EYE SURGERY      cataract resection right    INJECTION OF ANESTHETIC AGENT AROUND NERVE Bilateral 2/8/2022    Procedure: Block, Nerve MEDIAL BRANCH BLOCK BILATERAL L3,4,5;  Surgeon: Tara Gibbs MD;  Location[facility];  Service: Pain Management;  Laterality: Bilateral;    INJECTION OF ANESTHETIC AGENT AROUND NERVE Bilateral 2/15/2022    Procedure: BLOCK, NERVE, L3*L4-L5 MEDIAL BR ANCH 2 OF 2;  Surgeon: Tara Gibbs MD;  Location: [facility];  Service: Pain Management;  Laterality: Bilateral;    JOINT REPLACEMENT      partial right knee    KNEE ARTHROSCOPY W/ MENISCECTOMY Right 10/19/2021    Procedure: ARTHROSCOPY, KNEE, WITH MENISCECTOMY, PARTIAL LATERAL;  Surgeon: Trevin Huber MD;  Location: [facility];  Service: Orthopedics;  Laterality: Right;    r hand surgery      RADIOFREQUENCY ABLATION Right 3/8/2022    Procedure: RADIOFREQUENCY ABLATION, L3-L5 1 OF 2;  Surgeon: Tara Gibbs MD;  Location: [facility];  Service: Pain Management;  Laterality: Right;    RADIOFREQUENCY ABLATION Left 3/22/2022    Procedure: RADIOFREQUENCY ABLATION, l3-+l5 2 of 2;  Surgeon: Tara Gibbs MD;  Location: [facility];  Service: Pain Management;  Laterality: Left;    TONSILLECTOMY      TOTAL KNEE ARTHROPLASTY      partial knee replacement    TUBAL LIGATION       Social History     Socioeconomic History    Marital status:     Number of children: 1   Occupational History    Occupation:      Employer: [employer]     Comment: retired   Tobacco Use    Smoking status: Never Smoker    Smokeless tobacco: Never Used   Substance and Sexual Activity    Alcohol  use: Yes     Alcohol/week: 3.0 standard drinks     Types: 3 Glasses of wine per week     Comment: weekends    Drug use: No    Sexual activity: Yes     Partners: Male   Other Topics Concern    Are you pregnant or think you may be? No    Breast-feeding No   Social History Narrative    Retired        Swims.       Stationary bike.            Social Determinants of Health     Financial Resource Strain: Low Risk     Difficulty of Paying Living Expenses: Not hard at all   Food Insecurity: No Food Insecurity    Worried About Running Out of Food in the Last Year: Never true    Ran Out of Food in the Last Year: Never true   Transportation Needs: No Transportation Needs    Lack of Transportation (Medical): No    Lack of Transportation (Non-Medical): No   Physical Activity: Insufficiently Active    Days of Exercise per Week: 4 days    Minutes of Exercise per Session: 30 min   Stress: Stress Concern Present    Feeling of Stress : Very much   Social Connections: Unknown    Frequency of Communication with Friends and Family: More than three times a week    Frequency of Social Gatherings with Friends and Family: Once a week    Active Member of Clubs or Organizations: No    Attends Club or Organization Meetings: More than 4 times per year    Marital Status:      Family History   Problem Relation Age of Onset    Hypertension Mother     Irritable bowel syndrome Mother     Hyperlipidemia Mother     Heart disease Father         mi    Hypertension Father     Cancer Father         prostate    Heart attack Father     Heart failure Father     Hyperlipidemia Father     Alcohol abuse Sister     Depression Sister     Epilepsy Son     Irritable bowel syndrome Sister     Alcohol abuse Sister     Ovarian cancer Maternal Aunt     Breast cancer Paternal Aunt     Breast cancer Maternal Aunt     Melanoma Neg Hx     Colon cancer Neg Hx     Stomach cancer Neg Hx     Esophageal cancer Neg Hx      Liver disease Neg Hx     Crohn's disease Neg Hx     Ulcerative colitis Neg Hx     Celiac disease Neg Hx     Stroke Neg Hx        Review of patient's allergies indicates:  No Known Allergies    Current Outpatient Medications   Medication Sig    alprazolam (XANAX) 2 MG Tab Take 2 mg by mouth 2 (two) times daily as needed.     amLODIPine (NORVASC) 5 MG tablet Take 1 tablet (5 mg total) by mouth once daily.    atorvastatin (LIPITOR) 10 MG tablet TAKE 1 TABLET EVERY DAY    b complex vitamins capsule Take 1 capsule by mouth once daily.    cholecalciferol, vitamin D3, (VITAMIN D3) 5,000 unit Tab Take 1 tablet (5,000 Units total) by mouth once daily.    cholestyramine (QUESTRAN) 4 gram packet Take 1 packet (4 g total) by mouth 2 (two) times daily.    ciclopirox (LOPROX) 0.77 % Crea APPLY 1 GRAM TOPICALLY TO THE AFFECTED AREA TWICE DAILY    ciclopirox (PENLAC) 8 % Soln Apply topically nightly.    diphenoxylate-atropine 2.5-0.025 mg (LOMOTIL) 2.5-0.025 mg per tablet TAKE 1 TABLET BY MOUTH FOUR TIMES DAILY AS NEEDED FOR DIARRHEA    HYDROcodone-acetaminophen (NORCO) 5-325 mg per tablet Take 1 tablet by mouth every 6 to 8 hours as needed (migraine).    levothyroxine (SYNTHROID) 125 MCG tablet TAKE 1 TABLET(125 MCG) BY MOUTH EVERY MORNING    liothyronine (CYTOMEL) 5 MCG Tab Take 1 tablet (5 mcg total) by mouth once daily.    lipase-protease-amylase 24,000-76,000-120,000 units (CREON) 24,000-76,000 -120,000 unit capsule Take 1 capsule by mouth 3 (three) times daily with meals.    pantoprazole (PROTONIX) 40 MG tablet Take 1 tablet (40 mg total) by mouth once daily.    promethazine (PHENERGAN) 12.5 MG Tab Take 1 tablet (12.5 mg total) by mouth every 6 (six) hours as needed (nausea).    RESTASIS 0.05 % ophthalmic emulsion INT 1 GTT IN OU BID    sumatriptan (IMITREX) 50 MG tablet 1 tab po at start of headache.  Repeat in 2 h prn    traZODone (DESYREL) 100 MG tablet TAKE 1 TO 2 TABLETS AT BEDTIME FOR SLEEP     valsartan (DIOVAN) 320 MG tablet Take 1 tablet (320 mg total) by mouth once daily. (STOP LISINOPRIL)    conjugated estrogens (PREMARIN) vaginal cream Place a pea-sized amount in vagina every night for 4 weeks, then use 2-3 nights a week (Patient not taking: Reported on 6/15/2022)    ergocalciferol, vitamin D2, (VITAMIN D ORAL) Take by mouth once daily.    levothyroxine (SYNTHROID) 137 MCG Tab tablet TAKE 1 TABLET (137 MCG TOTAL) BY MOUTH EVERY MORNING.    mirabegron (MYRBETRIQ) 50 mg Tb24 Take 1 tablet (50 mg total) by mouth once daily. (Patient not taking: Reported on 6/15/2022)    solifenacin (VESICARE) 10 MG tablet 1 tab po q day for bladder (Patient not taking: Reported on 5/11/2022)    triamcinolone acetonide 0.1% (KENALOG) 0.1 % cream Apply to affected areas of back BID prn irritation. Do not use on face, underarms, or groin. (Patient not taking: Reported on 6/15/2022)     No current facility-administered medications for this visit.       REVIEW OF SYSTEMS:    GENERAL: No fever. No chills. No fatigue. Denies weight loss. Denies weight gain.  HEENT: Denies headaches. Denies vision change. Denies eye pain. Denies double vision. Denies ear pain.   CV: Denies chest pain.   PULM: Denies of shortness of breath.  GI: Denies constipation. No diarrhea. No abdominal pain. Denies nausea. Denies vomiting. No blood in stool.  HEME: Denies bleeding problems.  : Denies urgency. No painful urination. No blood in urine.  MS: Denies joint stiffness. Denies joint swelling.  +back pain. +Rt Knee Pain  SKIN: Denies rash.   NEURO: Denies seizures. No weakness.  PSYCH:  Denies difficulty sleeping. No anxiety. Denies depression. No suicidal thoughts.     OBJECTIVE:  PRIOR PHYSICAL EXAM:   GENERAL: Well appearing, in no acute distress, alert and oriented x3.  PSYCH:  Mood and affect appropriate.  SKIN: Skin color, texture, turgor normal, no rashes or lesions.  HEENT:  Normocephalic, atraumatic. Cranial nerves grossly  intact.  PULM: No evidence of respiratory difficulty, symmetric chest rise.  GI:  Non-distended  BACK: Normal range of motion. Pain to palpation over the spinous processes (particularly L4-L5) . Pain reproduced with facet loading. There is no pain with palpation over the sacroiliac joints bilaterally. Straight leg raising in the supine position is negative to radicular pain.   EXTREMITIES: No deformities, edema, or skin discoloration.   MUSCULOSKELETAL: Tenderness to palpation on her lower back muscles. Bilateral upper and lower extremity strength is normal and symmetric. No atrophy is noted.    NEURO: Sensation is equal and appropriate bilaterally. Bilateral upper and lower extremity coordination and muscle stretch reflexes are physiologic and symmetric. Plantar response are downgoing.   GAIT: hansa no antalgic gait     IMAGING:   Previous XRs showed lumbar spondylosis and degenerative scoliosis measuring 24degrees. L4-5 anterolisthesis measuring 6mm.      MRI lumbar 1/21/22      T12-L1: Circumferential disc bulge and mild facet arthropathy.  No spinal canal stenosis or neural foraminal narrowing.     L1-L2: Circumferential disc bulge and mild facet arthropathy result in mild right neural foraminal narrowing.     L2-L3: Circumferential disc bulge and mild facet arthropathy. No spinal canal stenosis or neural foraminal narrowing.     L3-L4: Circumferential disc bulge and mild facet arthropathy result in moderate spinal canal stenosis and mild left neural foraminal narrowing.     L4-L5: Circumferential disc bulge and moderate facet arthropathy result in mild spinal canal stenosis.     L5-S1: Moderate facet arthropathy.  No spinal canal stenosis or neural foraminal narrowing.     Standing scoliosis XR reviewed - thoracolumbar levoscoliosis approximately 26 degrees     XR KNEE ORTHO RIGHT     CLINICAL HISTORY:  Pain in right knee     TECHNIQUE:  AP standing of both knees, Merchant views of both knees as well as a  lateral view of the right knee were performed.     COMPARISON:  06/01/2021.     FINDINGS:  Right: Status post right medial compartment arthroplasty.  No radiographic evidence of complication.  No acute fracture or dislocation.  Small joint effusion.     Left: No fracture or dislocation on provided views.  Tricompartmental osteophytes with moderate medial tibiofemoral cartilage space narrowing.     Right: There is a medial joint arthroplasty.     Impression:     Postoperative changes of right medial compartment arthroplasty.  No radiographic evidence of complication.     Moderate left medial tibiofemoral cartilage space narrowing.     Electronically signed by resident: Massimo Plunkett  Date:                                            12/06/2021  Time:                                           14:11     Electronically signed by: Fred Adorno MD  Date:                                            12/06/2021  Time:                                           14:20    ASSESSMENT: 67 y.o. year old female with pain, consistent with right knee osteoarthritis.     1. Degenerative disc disease, lumbar     2. Lumbosacral spondylosis without myelopathy     3. Benzodiazepine dependence          DISCUSSION: Ms. Norton is a retired . She comes to us with low back pain. MRI shows facet arthropathy, moderate canal stenosis, and multilevel Modic changes T12-L3. Pain reproduced with facet loading and relieved with RFA. However, she continues to have unmanageable pain especially with leaning forward. Having ablated her LMBB, this is likely due to her significant DDD and Modic changes. She also has right knee pain s/p right partial knee replacement and is being treated by Dr. Huber.     OPIOID MANAGEMENT:  MME: Short Rx for Vacation  Risk:   : Reviewed today. +BZ and medical MJ  Naloxone: NA  Utox:   Violations: None  Contract:    PLAN:   1) Discussed her options today: SCS, opioids, Surgery  2) She would like to consider a  SCS trial  3) Referred to Dr. Antony for evaluation  4) Continue tapering Alprazolam. She is down to 1mg BID from 6mg/day  5) Short Rx of Tramadol for her vacation. Discussed not to take Alprazolam when taking Tramadol.   5) Will follow up in 4 weeks and consider SCS Trial    Tara Gibbs  06/15/2022

## 2022-06-16 ENCOUNTER — PATIENT MESSAGE (OUTPATIENT)
Dept: PAIN MEDICINE | Facility: CLINIC | Age: 67
End: 2022-06-16
Payer: MEDICARE

## 2022-06-16 ENCOUNTER — CLINICAL SUPPORT (OUTPATIENT)
Dept: REHABILITATION | Facility: OTHER | Age: 67
End: 2022-06-16
Attending: INTERNAL MEDICINE
Payer: MEDICARE

## 2022-06-16 ENCOUNTER — TELEPHONE (OUTPATIENT)
Dept: OTOLARYNGOLOGY | Facility: CLINIC | Age: 67
End: 2022-06-16
Payer: MEDICARE

## 2022-06-16 ENCOUNTER — PATIENT MESSAGE (OUTPATIENT)
Dept: SURGERY | Facility: HOSPITAL | Age: 67
End: 2022-06-16
Payer: MEDICARE

## 2022-06-16 DIAGNOSIS — R29.898 DECREASED STRENGTH OF TRUNK AND BACK: ICD-10-CM

## 2022-06-16 DIAGNOSIS — M41.25 OTHER IDIOPATHIC SCOLIOSIS, THORACOLUMBAR REGION: ICD-10-CM

## 2022-06-16 DIAGNOSIS — M25.69 DECREASED RANGE OF MOTION OF TRUNK AND BACK: Primary | ICD-10-CM

## 2022-06-16 PROCEDURE — 97110 THERAPEUTIC EXERCISES: CPT

## 2022-06-16 PROCEDURE — 97140 MANUAL THERAPY 1/> REGIONS: CPT

## 2022-06-16 NOTE — TELEPHONE ENCOUNTER
Problem: Infant Inpatient Plan of Care  Goal: Plan of Care Review  Outcome: Ongoing, Progressing  Goal: Patient-Specific Goal (Individualized)  Outcome: Ongoing, Progressing  Goal: Absence of Hospital-Acquired Illness or Injury  Outcome: Ongoing, Progressing  Goal: Optimal Comfort and Wellbeing  Outcome: Ongoing, Progressing  Goal: Readiness for Transition of Care  Outcome: Ongoing, Progressing      LM with the patient that we had received her Jimenez Visual Field Study results and that she had her photos done on 6/14.  I had resent the information requesting quotes for her and was trying to schedule her for a follow up to review the findings.  Told to call back at [phone] and ask for [facility] and [facility].

## 2022-06-16 NOTE — PROGRESS NOTES
[facility]   Physical Therapy Daily Treatment note    Name: Tiarra Norton  [facility] Number: [id number]    Therapy Diagnosis:   Encounter Diagnoses   Name Primary?    Decreased range of motion of trunk and back Yes    Decreased strength of trunk and back     Other idiopathic scoliosis, thoracolumbar region      Physician: Kaykay Perales MD    Visit Date: 6/16/2022    Physician Orders: PT Eval and Treat   Medical Diagnosis from Referral: M41.9 (ICD-10-CM) - Scoliosis, unspecified scoliosis type, unspecified spinal region  Evaluation Date: 5/17/2022  Authorization Period Expiration: 12/31/2022  Plan of Care Expiration: 7/17/2022  Visit # / Visits authorized: 7/ 16   Progress Note Due: 7/14/2022  FOTO: 2/2    PTA Visit #: 0/5     Time In: 8:55 am  Time Out: 10:05 am  Total Billable Time: 60 minutes    SUBJECTIVE     Pt reports: her back feels pretty good today, it was sore yesterday after the needling and exercise, but not much pain right now.    She was compliant with home exercise program.  Response to previous treatment: decreased low back pain  Functional change: none yet    Pain: 3/10  Location: bilateral back      OBJECTIVE     Objective Measures updated at progress report unless specified.            Lumbar Range of Motion: - reassessed 6/14/2022    %   Flexion 90      Extension 50      Left Side Bending 60   Right Side Bending 60   Left rotation    80   Right Rotation    70    *= pain with all motions    Lower Extremity Strength - reassessed 6/14/2022     Right LE   Left LE     Hip flexion:  4/5 Hip flexion:  4/5   Knee extension: 5/5 Knee extension: 5/5   Knee flexion: 5/5 Knee flexion: 5/5   Hip IR: 4+/5 Hip IR: 4+/5   Hip ER: 4+/5 Hip ER: 4+/5   Hip extension:  4+/5 Hip extension: 4+/5   Hip abduction:  4/5 Hip abduction:  4/5   Hip adduction: 4/5 Hip adduction 4/5   Ankle dorsiflexion: 5/5 Ankle dorsiflexion: 5/5   Ankle plantarflexion: 5/5 Ankle plantarflexion: 5/5  "         Treatment     Tiarra received the treatments listed below:      therapeutic exercises to develop strength, endurance, ROM, flexibility, posture and core stabilization for 50 minutes including:    Recumbent bike x5' Lv 3    LTR's on ball 20x5"  EIL x10  DKTC 10x5"  PPT supine with add towel squeeze 15x5"  PPT + BKFO Blue TB x15  PPT + Bridging Blue TB 2x10x3"  Bridge + leg extension x10  Open books x10   Matrix trunk rotation 24# x15  Medx trunk ext 64# (5 rep warm up at 44#) (0-65 deg) 2x8 (increase reps next visit)  cat/cow x10   bird dog x10   Seated trunk flexion ball rollout fwd/L/R  10x5" ea  +seated thoracic ext over FR 10x5"    NP:  Ab brace + SLR 2# 10x3"  ea  paloff Press in tandem stance GTB x10 ea  seated marching on ball x20   Prone alt hip extension 10x3"   PPT + march x15   PPT sitting on ball with add towel squeeze 15x5"  WILLIE 2x1'  EIL 2x10x5"     manual therapy techniques: dry needling weas applied to the: low back for 10 minutes, including:    FDN: pt received dry needling of bilateral lumbar paraspinals from T11-L3. (1) needle was inserted at each level on each side for a total of (10) needles. Needles were left in and intermittently manipulated every 2-3 minutes for a total of 10 minutes. Pt monitored for adverse effects, but none were noted.     hot pack for 10 minutes to low back in [place].      Patient Education and Home Exercises     Home Exercises Provided and Patient Education Provided     Education provided:   - HEP, POC    Written Home Exercises Provided: Patient instructed to cont prior HEP. Exercises were reviewed and Tiarra was able to demonstrate them prior to the end of the session.  Tiarra demonstrated good  understanding of the education provided. See EMR under Patient Instructions for exercises provided during therapy sessions    ASSESSMENT     Tiarra presents today with decreased pain levels. Needling applied today to lower thoracic segments due to patient reports of pain " migrating up higher up her spine. Pt with positive response to needling and reporting decreased pain after needling and therex. Progressed resistance on trunk rotation and trunk extension strengthening machines today, increase reps next visit. Continue to progress as tolerated.     Tiarra Is progressing well towards her goals.   Pt prognosis is Fair.     Pt will continue to benefit from skilled outpatient physical therapy to address the deficits listed in the problem list box on initial evaluation, provide pt/family education and to maximize pt's level of independence in the home and community environment.     Pt's spiritual, cultural and educational needs considered and pt agreeable to plan of care and goals.     Anticipated barriers to physical therapy: scoliosis, chronicity of condition, minimal relief with healthy back program in past    GOALS: Short Term Goals:  4 weeks  1. Report decreased in pain at worse less than  <   / =  4  /10  to increase tolerance for functional activities.On going  2. Pt to increase B popliteal angle by greater than > or = 5 degrees in order to improve flexibility and posture. On going  3. Increased B LE MMT 1/2  to increase tolerance for ADL and work activities. MET  4. Pt to increase B Ely's Test by greater than  > or = 5 degrees in order to improve flexibility and posture. On going  5. Pt to tolerate HEP to improve ROM and independence with ADL's.On going  6. Pt to improve range of motion by 25% to allow for improved functional mobility to allow for improvement in IADLs. MET     Long Term Goals: 8 weeks  1. Report decreased in pain at worse less than  <   / =  2  /10  to increase tolerance for functional mobility.  On going  2 .Pt to increase B popliteal angle by greater than > or = 10 degrees in order to improve flexibility and posture. On going  3. Increased B LE MMT 1 grade to increase tolerance for ADL and work activities.On going  4. Pt will report at 44% or less limitation on  FOTO Lumbar spine survey  to demonstrate decrease in disability and improvement in back pain.On going  5. Pt to be Independent with HEP to improve ROM and independence with ADL's. On going  6. Pt to increase B Ely's Test by greater than > or = 10 degrees in order to improve flexibility and posture. On going  7. Pt to demonstrate negative Prone Instability Test in order to show improved core strength for lumbar stabilization. On going  8. Pt to improve range of motion by 75% to allow for improved functional mobility to allow for improvement in IADLs. On going       PLAN     Plan of care Certification: 5/17/2022 to 7/17/2022.    Zi Anaya, PT

## 2022-06-17 ENCOUNTER — PATIENT MESSAGE (OUTPATIENT)
Dept: INTERNAL MEDICINE | Facility: CLINIC | Age: 67
End: 2022-06-17
Payer: MEDICARE

## 2022-06-17 ENCOUNTER — TELEPHONE (OUTPATIENT)
Dept: UROLOGY | Facility: CLINIC | Age: 67
End: 2022-06-17
Payer: MEDICARE

## 2022-06-17 ENCOUNTER — PATIENT MESSAGE (OUTPATIENT)
Dept: UROLOGY | Facility: CLINIC | Age: 67
End: 2022-06-17
Payer: MEDICARE

## 2022-06-17 NOTE — TELEPHONE ENCOUNTER
Called pt to confirm arrival time of 945am for procedure on 6-21. Gave pt NPO instructions and gave pt opportunity to ask questions. Pt verbalized understanding.     Pt was informed that only 1 person would be allowed to accompany them the morning of surgery.  Pt verbalized understanding.

## 2022-06-19 ENCOUNTER — PATIENT MESSAGE (OUTPATIENT)
Dept: INTERNAL MEDICINE | Facility: CLINIC | Age: 67
End: 2022-06-19
Payer: MEDICARE

## 2022-06-20 NOTE — PRE-PROCEDURE INSTRUCTIONS
PreOp Instructions given:   - Verbal medication information (what to hold and what to take)   - NPO guidelines   - Arrival place directions given; time to be given the day before procedure by the   Surgeon's Office [address] [facility] [place], [place]  - Bathing with antibacterial soap   - Don't wear any jewelry or bring any valuables AM of surgery   - No makeup or moisturizer to face   - No perfume/cologne, powder, lotions or aftershave   Pt. verbalized understanding.   Pt denies any h/o Anesthesia/Sedation complications or side effects.

## 2022-06-21 ENCOUNTER — HOSPITAL ENCOUNTER (OUTPATIENT)
Facility: HOSPITAL | Age: 67
Discharge: HOME | End: 2022-06-21
Attending: UROLOGY | Admitting: UROLOGY
Payer: MEDICARE

## 2022-06-21 ENCOUNTER — ANESTHESIA (OUTPATIENT)
Dept: SURGERY | Facility: HOSPITAL | Age: 67
End: 2022-06-21
Payer: MEDICARE

## 2022-06-21 ENCOUNTER — PATIENT MESSAGE (OUTPATIENT)
Dept: SURGERY | Facility: HOSPITAL | Age: 67
End: 2022-06-21
Payer: MEDICARE

## 2022-06-21 ENCOUNTER — ANESTHESIA EVENT (OUTPATIENT)
Dept: SURGERY | Facility: HOSPITAL | Age: 67
End: 2022-06-21
Payer: MEDICARE

## 2022-06-21 VITALS
BODY MASS INDEX: 24.98 KG/M2 | SYSTOLIC BLOOD PRESSURE: 151 MMHG | RESPIRATION RATE: 16 BRPM | WEIGHT: 141 LBS | OXYGEN SATURATION: 100 % | HEART RATE: 55 BPM | TEMPERATURE: 99 F | DIASTOLIC BLOOD PRESSURE: 70 MMHG

## 2022-06-21 DIAGNOSIS — N32.81 OAB (OVERACTIVE BLADDER): Primary | ICD-10-CM

## 2022-06-21 PROCEDURE — 71000044 HC DOSC ROUTINE RECOVERY FIRST HOUR: Performed by: UROLOGY

## 2022-06-21 PROCEDURE — 87086 URINE CULTURE/COLONY COUNT: CPT | Performed by: UROLOGY

## 2022-06-21 PROCEDURE — 37000008 HC ANESTHESIA 1ST 15 MINUTES: Performed by: UROLOGY

## 2022-06-21 PROCEDURE — 36000706: Performed by: UROLOGY

## 2022-06-21 PROCEDURE — D9220A PRA ANESTHESIA: Mod: ANES,,, | Performed by: STUDENT IN AN ORGANIZED HEALTH CARE EDUCATION/TRAINING PROGRAM

## 2022-06-21 PROCEDURE — 37000009 HC ANESTHESIA EA ADD 15 MINS: Performed by: UROLOGY

## 2022-06-21 PROCEDURE — 25000003 PHARM REV CODE 250: Performed by: NURSE ANESTHETIST, CERTIFIED REGISTERED

## 2022-06-21 PROCEDURE — D9220A PRA ANESTHESIA: Mod: CRNA,,, | Performed by: NURSE ANESTHETIST, CERTIFIED REGISTERED

## 2022-06-21 PROCEDURE — 63600175 PHARM REV CODE 636 W HCPCS: Performed by: STUDENT IN AN ORGANIZED HEALTH CARE EDUCATION/TRAINING PROGRAM

## 2022-06-21 PROCEDURE — D9220A PRA ANESTHESIA: ICD-10-PCS | Mod: CRNA,,, | Performed by: NURSE ANESTHETIST, CERTIFIED REGISTERED

## 2022-06-21 PROCEDURE — 71000015 HC POSTOP RECOV 1ST HR: Performed by: UROLOGY

## 2022-06-21 PROCEDURE — 52287 CYSTOSCOPY CHEMODENERVATION: CPT | Mod: ,,, | Performed by: UROLOGY

## 2022-06-21 PROCEDURE — D9220A PRA ANESTHESIA: ICD-10-PCS | Mod: ANES,,, | Performed by: STUDENT IN AN ORGANIZED HEALTH CARE EDUCATION/TRAINING PROGRAM

## 2022-06-21 PROCEDURE — 63600175 PHARM REV CODE 636 W HCPCS: Mod: JG | Performed by: UROLOGY

## 2022-06-21 PROCEDURE — 52287 PR CYSTOURETHROSCOPY WITH INJ FOR CHEMODENERVATION: ICD-10-PCS | Mod: ,,, | Performed by: UROLOGY

## 2022-06-21 PROCEDURE — 63600175 PHARM REV CODE 636 W HCPCS: Performed by: NURSE ANESTHETIST, CERTIFIED REGISTERED

## 2022-06-21 PROCEDURE — 36000707: Performed by: UROLOGY

## 2022-06-21 RX ORDER — SULFAMETHOXAZOLE AND TRIMETHOPRIM 800; 160 MG/1; MG/1
1 TABLET ORAL 2 TIMES DAILY
Qty: 4 TABLET | Refills: 0 | Status: SHIPPED | OUTPATIENT
Start: 2022-06-21 | End: 2022-06-23

## 2022-06-21 RX ORDER — OXYCODONE HYDROCHLORIDE 5 MG/1
5 TABLET ORAL EVERY 6 HOURS PRN
Qty: 10 TABLET | Refills: 0 | Status: SHIPPED | OUTPATIENT
Start: 2022-06-21 | End: 2022-07-25

## 2022-06-21 RX ORDER — CEFAZOLIN SODIUM/WATER 2 G/20 ML
2 SYRINGE (ML) INTRAVENOUS
Status: COMPLETED | OUTPATIENT
Start: 2022-06-21 | End: 2022-06-21

## 2022-06-21 RX ORDER — PROPOFOL 10 MG/ML
VIAL (ML) INTRAVENOUS CONTINUOUS PRN
Status: DISCONTINUED | OUTPATIENT
Start: 2022-06-21 | End: 2022-06-21

## 2022-06-21 RX ORDER — LIDOCAINE HCL/PF 100 MG/5ML
SYRINGE (ML) INTRAVENOUS
Status: DISCONTINUED | OUTPATIENT
Start: 2022-06-21 | End: 2022-06-21

## 2022-06-21 RX ORDER — MIDAZOLAM HYDROCHLORIDE 1 MG/ML
INJECTION, SOLUTION INTRAMUSCULAR; INTRAVENOUS
Status: DISCONTINUED | OUTPATIENT
Start: 2022-06-21 | End: 2022-06-21

## 2022-06-21 RX ORDER — SODIUM CHLORIDE 0.9 % (FLUSH) 0.9 %
10 SYRINGE (ML) INJECTION
Status: CANCELLED | OUTPATIENT
Start: 2022-06-21

## 2022-06-21 RX ADMIN — SODIUM CHLORIDE: 0.9 INJECTION, SOLUTION INTRAVENOUS at 10:06

## 2022-06-21 RX ADMIN — MIDAZOLAM HYDROCHLORIDE 2 MG: 1 INJECTION, SOLUTION INTRAMUSCULAR; INTRAVENOUS at 10:06

## 2022-06-21 RX ADMIN — Medication 2 G: at 10:06

## 2022-06-21 RX ADMIN — LIDOCAINE HYDROCHLORIDE 60 MG: 20 INJECTION, SOLUTION INTRAVENOUS at 10:06

## 2022-06-21 RX ADMIN — PROPOFOL 200 MCG/KG/MIN: 10 INJECTION, EMULSION INTRAVENOUS at 10:06

## 2022-06-21 NOTE — OP NOTE
[facility]  Operative Note    Date: 06/21/2022    Pre-Op Diagnosis: Overactive bladder    Post-Op Diagnosis: same    Procedure(s) Performed:   1.  Cystoscopy with bladder botox injection    Specimen(s): none    Staff Surgeon:  Lenny Moore MD    Assistant Surgeon: MD VINCENZO Quevedo MD      Anesthesia: General mask inhalational anesthesia    Indications: Tiarra Norton is a 67 y.o. female with oab failure of OAB medications    Findings: Normal bladder mucosa. Some trabeculations. No lesions or masses. 100u botox injected    Estimated Blood Loss: min    Drains: none    Procedure in Detail:  After informed consent was obtained the patient was brought to the cystoscopy suite and placed in the supine position.  SCDs were applied and working.  Anesthesia was administered.  When the patient was adequately sedated she was placed in the dorsal lithotomy position and prepped and draped in the usual sterile fashion.      A rigid cystoscope in a 22 Fr sheath was introduced into the patients's bladder via the urethra.  This passed easily.  Formal cystoscopy was performed which revealed the ureteral orifices in their normal anatomic position bilaterally.  No bladder masses, trabeculations, stones or diverticuli were seen.      100 units of botox was injected into the detrusor muscle throughout the bladder.  Good wheals were raised.  The patient's bladder was drained and the cystoscope was removed.     The patient tolerated the procedure well and was transferred to the recovery room in stable condition.      Disposition:  The patient will follow up with Dr. Moore in 6 weeks.  She was given prescriptions for bactrim and oxycodone post-operatively.  She knows how to self-cath should she have any issues with retention.      Percy Estrada MD

## 2022-06-21 NOTE — PATIENT INSTRUCTIONS
Post Cystoscopy Instructions  Do not strain to have a bowel movement  No strenuous exercise x 7 days  No driving while you are on narcotic pain medications or if your sears  catheter is in place    You can expect:  To pass stone fragments if you had a stone procedure  Have pain when you void from your stent if you have a stent in place  See blood in your urine if you have a stent in place    If you have a catheter, please return to the ER if your catheter stops draining or you are having abdominal pain.    Call the doctor if:  Temperature is greater than 101F  Persistent vomiting and inability to keep food down  Inability to void if you do not have a catheter

## 2022-06-21 NOTE — DISCHARGE SUMMARY
[facility] (1st Fl)  Discharge Note  Short Stay    Procedure(s):  BOTULINUM TOXIN INJECTION 100 units  CYSTOSCOPY    OUTCOME: Patient tolerated treatment/procedure well without complication and is now ready for discharge.    DISPOSITION: Home or Self Care    FINAL DIAGNOSIS:  OAB    FOLLOWUP: In clinic    DISCHARGE INSTRUCTIONS:    Discharge Procedure Orders   Notify your health care provider if you experience any of the following:  temperature >100.4     Notify your health care provider if you experience any of the following:  persistent nausea and vomiting or diarrhea     Notify your health care provider if you experience any of the following:  severe uncontrolled pain     Notify your health care provider if you experience any of the following:  redness, tenderness, or signs of infection (pain, swelling, redness, odor or green/yellow discharge around incision site)     Notify your health care provider if you experience any of the following:  worsening rash     Notify your health care provider if you experience any of the following:  persistent dizziness, light-headedness, or visual disturbances         Clinical Reference Documents Added to Patient Instructions       Document    CYSTOSCOPY DISCHARGE INSTRUCTIONS (ENGLISH)          TIME SPENT ON DISCHARGE: 10 minutes

## 2022-06-21 NOTE — PLAN OF CARE
Patient tolerating oral liquids without difficulty. No apparent s&s of distress noted at this time, no complaints voiced at this time. Discharge instructions reviewed with patient/family/friend with good verbal feedback received. Patient ready for discharge.Pt confirms all personal items in her possesion

## 2022-06-21 NOTE — ANESTHESIA POSTPROCEDURE EVALUATION
Anesthesia Post Evaluation    Patient: Tiarra Norton    Procedure(s) Performed: Procedure(s):  BOTULINUM TOXIN INJECTION 100 units  CYSTOSCOPY    Final Anesthesia Type: general      Patient location during evaluation: PACU  Patient participation: Yes- Able to Participate  Level of consciousness: awake and alert  Post-procedure vital signs: reviewed and stable  Pain management: adequate  Airway patency: patent    PONV status at discharge: No PONV  Anesthetic complications: no      Cardiovascular status: stable  Respiratory status: spontaneous ventilation and face mask  Hydration status: euvolemic  Follow-up not needed.          Vitals Value Taken Time   /73 06/21/22 1146   Temp 36.6 °C (97.9 °F) 06/21/22 1110   Pulse 54 06/21/22 1151   Resp 20 06/21/22 1145   SpO2 100 % 06/21/22 1151   Vitals shown include unvalidated device data.      No case tracking events are documented in the log.      Pain/Arianne Score: Arianne Score: 10 (6/21/2022 12:00 PM)

## 2022-06-22 LAB — BACTERIA UR CULT: NO GROWTH

## 2022-06-23 ENCOUNTER — DOCUMENTATION ONLY (OUTPATIENT)
Dept: PSYCHIATRY | Facility: CLINIC | Age: 67
End: 2022-06-23
Payer: MEDICARE

## 2022-06-23 ENCOUNTER — PATIENT MESSAGE (OUTPATIENT)
Dept: PSYCHIATRY | Facility: CLINIC | Age: 67
End: 2022-06-23
Payer: MEDICARE

## 2022-06-23 ENCOUNTER — TELEPHONE (OUTPATIENT)
Dept: OTOLARYNGOLOGY | Facility: CLINIC | Age: 67
End: 2022-06-23
Payer: MEDICARE

## 2022-06-23 ENCOUNTER — OFFICE VISIT (OUTPATIENT)
Dept: PSYCHIATRY | Facility: CLINIC | Age: 67
End: 2022-06-23
Payer: MEDICARE

## 2022-06-23 DIAGNOSIS — F41.9 ANXIETY: ICD-10-CM

## 2022-06-23 DIAGNOSIS — M47.817 LUMBOSACRAL SPONDYLOSIS WITHOUT MYELOPATHY: ICD-10-CM

## 2022-06-23 DIAGNOSIS — M51.36 DEGENERATIVE DISC DISEASE, LUMBAR: ICD-10-CM

## 2022-06-23 DIAGNOSIS — F33.0 MAJOR DEPRESSIVE DISORDER, RECURRENT, MILD: ICD-10-CM

## 2022-06-23 DIAGNOSIS — Z01.818 PREOPERATIVE EVALUATION TO RULE OUT SURGICAL CONTRAINDICATION: Primary | ICD-10-CM

## 2022-06-23 DIAGNOSIS — F51.01 PRIMARY INSOMNIA: ICD-10-CM

## 2022-06-23 PROCEDURE — 96138 PR PSYCH/NEUROPSYCH TEST ADMIN/SCORING, BY TECH, 2+ TESTS, 1ST 30 MIN: ICD-10-PCS | Mod: 95,,, | Performed by: PSYCHOLOGIST

## 2022-06-23 PROCEDURE — 96131 PR PSYCHOLOGIC TEST EVAL SVCS, EA ADDTL HR: ICD-10-PCS | Mod: 95,,, | Performed by: PSYCHOLOGIST

## 2022-06-23 PROCEDURE — 96130 PSYCL TST EVAL PHYS/QHP 1ST: CPT | Mod: 95,,, | Performed by: PSYCHOLOGIST

## 2022-06-23 PROCEDURE — 90791 PSYCH DIAGNOSTIC EVALUATION: CPT | Mod: 95,,, | Performed by: PSYCHOLOGIST

## 2022-06-23 PROCEDURE — 4010F ACE/ARB THERAPY RXD/TAKEN: CPT | Mod: CPTII,95,, | Performed by: PSYCHOLOGIST

## 2022-06-23 PROCEDURE — 96130 PR PSYCHOLOGIC TEST EVAL SVCS, 1ST HR: ICD-10-PCS | Mod: 95,,, | Performed by: PSYCHOLOGIST

## 2022-06-23 PROCEDURE — 96139 PSYCL/NRPSYC TST TECH EA: CPT | Mod: 95,,, | Performed by: PSYCHOLOGIST

## 2022-06-23 PROCEDURE — 96138 PSYCL/NRPSYC TECH 1ST: CPT | Mod: 95,,, | Performed by: PSYCHOLOGIST

## 2022-06-23 PROCEDURE — 96139 PR PSYCH/NEUROPSYCH TEST ADMIN/SCORING, BY TECH, 2+ TESTS, EA ADDTL 30 MIN: ICD-10-PCS | Mod: 95,,, | Performed by: PSYCHOLOGIST

## 2022-06-23 PROCEDURE — 4010F PR ACE/ARB THEARPY RXD/TAKEN: ICD-10-PCS | Mod: CPTII,95,, | Performed by: PSYCHOLOGIST

## 2022-06-23 PROCEDURE — 96131 PSYCL TST EVAL PHYS/QHP EA: CPT | Mod: 95,,, | Performed by: PSYCHOLOGIST

## 2022-06-23 PROCEDURE — 90791 PR PSYCHIATRIC DIAGNOSTIC EVALUATION: ICD-10-PCS | Mod: 95,,, | Performed by: PSYCHOLOGIST

## 2022-06-23 NOTE — LETTER
June 23, 2022        CORIN SHI STAFF             [facility] [address]  [address]  [facility] [address]  Phone: [phone]   Patient: Tiarra Norton   MR Number: [id number]   YOB: 1955   Date of Visit: 6/23/2022       Dear  Staff:    Thank you for referring Tiarra Norton to me for evaluation. Below are the relevant portions of my assessment and plan of care.    Please see Report (Psych Testing Note) in Epic as needed.  Overall, Ms. Norton may be considered a suitable candidate to proceed with SCS from a psychological perspective.  Although there are mild to moderate testing risks, there are also multiple protective and mitigating factors to improve her candidacy for treatment.  She is highly motivated to improve pain, quality of life, and mental well-being.  Regarding SCS, Ms. Norton has adequate and appropriate knowledge and expectations regarding surgery, and is motivated and willing to engage in behaviors to achieve successful outcomes.  She is recommended to continue with her current mental health regimens, promote mitigating strategies, and attend CBTi while moving forward with SCS.       If you have questions, please do not hesitate to call me.    Sincerely,      Marisa Antony, PhD

## 2022-06-23 NOTE — PROGRESS NOTES
Overall, Ms. Norton may be considered a suitable candidate to proceed with SCS from a psychological perspective.  Although there are mild to moderate testing risks, there are also multiple protective and mitigating factors to improve her candidacy for treatment.  She is highly motivated to improve pain, quality of life, and mental well-being.  Regarding SCS, Ms. Norton has adequate and appropriate knowledge and expectations regarding surgery, and is motivated and willing to engage in behaviors to achieve successful outcomes.  She is recommended to continue with her current mental health regimens, promote mitigating strategies, and attend CBTi while moving forward with SCS.

## 2022-06-23 NOTE — PSYCH TESTING
[facility] [address] [place]  [address]  [phone]    REPORT OF PRESURGICAL PSYCHOLOGICAL EVALUATION    NAME: Tiarra Norton  MRN: [id number]  : 1955    REFERRED BY:  Tara Gibbs M.D.    REASON FOR REFERRAL:  Psychological Evaluation prior to surgical implantation of a spinal cord stimulator (SCS) to address chronic pain    EVALUATED BY:  Marisa Antony, Ph.D., Clinical Psychologist  MATHEW SanchezSMarkus, Psychometrician    DATES OF EVALUATION:  2022, 2022    EVALUATION PROCEDURES AND TIMES:  Conducted by Psychologist (1 hour, 45 minutes):  Integration of patient data, interpretation of standardized test results and clinical data, clinical decision-making, treatment planning and report, and interactive feedback to the patient  CPT Codes:  58349 - 1 hour; 52281 - 1 hour  Conducted by Technician (1 hour, 30 minutes):  Psychological test administration and scoring by technician, two or more tests, any method:  [place] Multiphasic Personality Inventory - 3 (MMPI-3); Pain and Impairment Relationship Scale (PAIRS); Fields Pain Catastrophizing Scale (PCS)  CPT Codes:  11686 - 30 minutes; 34042 - 30 minutes, 61444 - 30 minutes    EVALUATION FINDINGS:  The diagnostic interview revealed that Ms. Tiarra Norton is a 67-year-old female referred for Psychological Evaluation prior to surgical implantation of a spinal cord stimulator (SCS) to address chronic pain in her lower back.  Her pain has been present for the past two to three years without sufficient relief despite multiple pain management treatments.  Her activities are greatly impacted by pain.  Ms. Norton is now interested in a trial of SCS.  She understood risks of surgery and indicated no significant concerns.  She has reasonable expectations for SCS and will consider other treatment options in the future if it is ineffective.    Regarding mental health history, Ms. Norton acknowledged a history of outpatient  pharmacotherapy and psychotherapy for depression, anxiety, and insomnia.  She continues to attend treatment with benefit, but notes symptoms of depression and insomnia that have worsened with pain.  She denied major psychosocial stressors besides pain.  She has multiple protective factors (good social support, adaptive coping, engagement in recreation, financial stability, involvement in consistent psychiatric treatment) that help her manage stress and pain.  She denied problems with substance abuse, suicidal or homicidal ideation, psychotic symptoms, and cognitive functioning.      The medical record also revealed the following diagnoses relevant to this evaluation:  Degenerative disc disease, lumbar; Lumbosacral spondylosis without myelopathy; Benzodiazepine dependence.  Regarding this last diagnosis, Ms. Norton is working on weaning off Xanax with her psychiatrist and denies medication misuse.    TEST DATA:  All tests were administered according to standardized procedures and were selected based on the reason for referral.  Effort on all tests was satisfactory to produce valid results.    MMPI-3.  The MMPI-3 provides an assessment of personality and psychopathology with specific evaluation of psychosocial risk factors associated with outcomes of spinal cord stimulation.  Ms. lBue produced a valid and interpretable MMPI-3 profile, answering items relevantly based on content. Therefore, her responses should be considered a relatively accurate reflection of her current psychological functioning.  TEST RESULTS.  Ms. Norton reports emotional issues and somatic complaints.  There were no indications of disordered thinking, behavioral problems, or interpersonal problems.  She reports a below average level of impulsivity, and enjoys social situations and events.   Emotional.  She reports an above average level of stress.  She also endorses feelings of self-doubt and may have a tendency to ruminate or feel  insecure.   Somatic.  She reports poor health and feeling weak or tired.  She is likely preoccupied with poor health.  GROUP COMPARISONS.  Compared to spine surgery candidates, Ms. Norton reports greater emotional distress, demoralization, poor health preoccupation, stress, self-doubt, anxiety, and negative emotions.  Only results that are bolded are at a clinically significant level and included in Test Results above.  RISK ASSESSMENTS.  The following likelihoods are based on test results and research, and are correlational rather than causational.  Patients with similar profiles have heightened risks of reporting adverse postsurgical outcomes, specifically due to elevations in stress and self-doubt.  Post-surgery, she is at risk of reporting greater perceptions of disability, unmet expectations with SCS, and dissatisfaction with SCS.    PAIRS and PCS.  The PAIRS and PCS reveal beliefs and attitudes related to pain that may impact outcomes of spinal cord stimulation.  Elevated scores indicate the need to query the ability to cope with the pain experience, high levels of emotional distress when pain occurs, and a negative mental set and persistent attention brought to bear during the experience of pain.    The PAIRS indicated non-significant complications related to perceptions of impairment with a total score of 72 (a score over 75 is clinically significant).  These results do NOT suggest a tendency toward negative beliefs and attitudes about the ability to function and enjoy life despite discomfort from pain, which is more likely to contribute to greater functional impairments.  The PCS indicated minimal catastrophizing of pain with a total score of 30, which is in the 75th percentile (a score over 30 is in the 75th percentile and is clinically significant).  Her subscale scores indicated minimal Rumination (58th percentile), significant Magnification (79th percentile), and significant Helplessness (87th  percentile).  These results suggest a tendency toward elevated pain perception and increased perception of the seriousness of pain, and pessimism toward one's ability to cope with the pain experience.  Catastrophic thinking is a risk factor for chronicity.      FEEDBACK.  Ms. Norton was provided with test results, and offered the opportunity to respond to feedback and clarify results if needed.  She acknowledged the test results as being largely consistent with her overall functioning and well-being.  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  This was seen in the clinical interview when she denied exercise but then noted walking her dog every day and occasionally exercising in the pool.  She recognized that her limitations due to pain are highly correlated with testing reports of heightened stress, preoccupation with health, and pain catastrophizing.  She was very open to feedback and recommendations regarding her results, and appeared very motivated to use strategies to mitigate risks.  She was specifically encouraged to build self-compassion and esteem, engage in strengths-training, and practice self-care.  She will work on promoting these areas with her psychotherapist as well.    DIAGNOSTIC IMPRESSIONS:    ICD-10-CM ICD-9-CM   1. Preoperative evaluation to rule out surgical contraindication  Z01.818 V72.83   2. Degenerative disc disease, lumbar  M51.36 722.52   3. Lumbosacral spondylosis without myelopathy  M47.817 721.3   4. Major depressive disorder, recurrent, mild  F33.0 296.31   5. Anxiety  F41.9 300.00   6. Primary insomnia  F51.01 307.42       SUMMARY AND RECOMMENDATIONS:  Ms. Norton has a long history of severe pain and is pursuing the spinal cord stimulator (SCS) in an effort to improve pain and quality of life.  Test results revealed mild to moderate risks for adverse postsurgical  outcomes due to heightened levels of stress, self-doubt, and pain catastrophizing.  She was very open to feedback and recommendations regarding these results, and understood risks associated with her profile (greater reports of disability and dissatisfaction with SCS).  She identified multiple protective factors and mitigating strategies to reduce testing risks.  She appeared highly motivated to improve candidacy for SCS and seems trustworthy to implement mitigating strategies, especially with the help of her psychotherapist.    Ms. Knott testing profile was largely consistent with her reports in the clinical interview.  In the clinical interview, she acknowledged a history of outpatient pharmacotherapy and psychotherapy for depression, anxiety, and insomnia.  She has attended treatment consistently with benefit, but reports continued depressive symptoms and insomnia that have worsened due to chronic pain.  Ms. Norton has multiple protective factors (good social support, adaptive coping, engagement in recreation, financial stability, involvement in consistent psychiatric treatment) and mitigating strategies that will help reduce risks identified with testing.  Additionally, she will attend CBTi in the [facility] while concurrently seeking SCS to improve sleep (which has already been scheduled to start in July 2022).    Overall, Ms. Norton may be considered a suitable candidate to proceed with SCS from a psychological perspective.  Although there are mild to moderate testing risks, there are also multiple protective and mitigating factors to improve her candidacy for treatment.  She is highly motivated to improve pain, quality of life, and mental well-being.  Regarding SCS, Ms. Norton has adequate and appropriate knowledge and expectations regarding surgery, and is motivated and willing to engage in behaviors to achieve successful outcomes.  She is recommended to continue with her current mental health regimens,  promote mitigating strategies, and attend CBTi while moving forward with SCS.        Report and interpretation and coding were completed on 06/23/2022.              Marisa Antony, PhD  Clinical Psychologist

## 2022-06-23 NOTE — TELEPHONE ENCOUNTER
----- Message from Rosalio Monae sent at 6/20/2022  4:31 PM CDT -----  Regarding: RE: Quote  Shmuel Hemphill,     The pt has been quoted again.    Thanks,  Rosalio   ----- Message -----  From: Kanchan Whyte RN  Sent: 6/16/2022   8:47 AM CDT  To: Rosalio Monae  Subject: Quote                                            Good morning.  I was wondering if you could sent a quote for this patient for a facelift, browlift, lower bleph.  She had a Jimenez Field Study test and not sure if an upper bleph will be covered by insurance so if a quote is needed for that also in case it is not covered.  Thanks.

## 2022-06-23 NOTE — H&P
[facility]        History and Physical     6/21/2022     Referred by:  Kaykay Perales MD     HPI: Tiarra Norton is a very pleasant 67 y.o. female who is a return patient to our department referred for evaluation of urinary frequency of 2-3 years duration. She reports bother is associated with urinary daytime frequency (15-20x daily), with nocturia (1-2x per night) and with urgency that does not result in urinary incontinence. She reports no stress urinary incontinence associated with exertion. She does not require daily pad use.  She has decreased overall fluid intake.  She reports urinary frequency is only during the day. Patient reports urgency is independent of position.      She denies symptoms of irritative voiding including dysuria. She denies symptoms of obstructive voiding including decreased stream, hesitancy, intermittency, post void dribbling and sense of incomplete emptying. Bladder scan PVR was 0 mL. Her history includes no notation of urolithiasis, hematuria, prior pelvic surgery, previous prolapse or incontinence procedures or neurological symptoms/diagnoses. She denies all other prior pelvic surgeries or neurologic diagnoses. She does not report symptoms suggestive of advanced POP. She denies a history of constipation. She denies a history suggestive of glaucoma.  She denies a history of hypertension.      Previous incontinence therapies:  · Medication:   ? VESIcare 10 mg has not provided much improvement and resulted in blurred vision   ? Myrbetriq 50 mg has not resulted in any change in symptoms after 6 weeks     A review of 10+ systems was conducted with pertinent positive and negative findings documented in HPI with all other systems reviewed and negative.     Past medical, family, surgical and social history reviewed as documented in chart with pertinent positive medical, family, surgical and social history detailed in HPI.     Exam Findings:     Const: no acute  distress, conversant and alert  Eyes: anicteric, extraocular muscles intact  ENMT: normocephalic, Nl oral membranes  Cardio: no cyanosis, nl cap refill  Pulm: no tachypnea; no resp distress  Abd: soft, no tenderness  Musc: no laceration, no tenderness  Neuro: alert; oriented x 3  Skin: warm, dry; no petichiae  Psych: no anxiety; normal speech      Assessment/Plan:     Overactive Bladder without Incontinence (established,not meeting goals):Proceed with Botox 100 units today.
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
## 2022-06-24 ENCOUNTER — OFFICE VISIT (OUTPATIENT)
Dept: ORTHOPEDICS | Facility: CLINIC | Age: 67
End: 2022-06-24
Payer: MEDICARE

## 2022-06-24 VITALS — BODY MASS INDEX: 25.78 KG/M2 | HEIGHT: 63 IN | WEIGHT: 145.5 LBS

## 2022-06-24 DIAGNOSIS — M17.11 PRIMARY OSTEOARTHRITIS OF RIGHT KNEE: Primary | ICD-10-CM

## 2022-06-24 DIAGNOSIS — Z96.651 S/P RIGHT UNICOMPARTMENTAL KNEE REPLACEMENT: ICD-10-CM

## 2022-06-24 DIAGNOSIS — M25.561 RIGHT KNEE PAIN, UNSPECIFIED CHRONICITY: ICD-10-CM

## 2022-06-24 PROCEDURE — 3008F BODY MASS INDEX DOCD: CPT | Mod: CPTII,S$GLB,, | Performed by: PHYSICIAN ASSISTANT

## 2022-06-24 PROCEDURE — 99213 PR OFFICE/OUTPT VISIT, EST, LEVL III, 20-29 MIN: ICD-10-PCS | Mod: 25,S$GLB,, | Performed by: PHYSICIAN ASSISTANT

## 2022-06-24 PROCEDURE — 1159F PR MEDICATION LIST DOCUMENTED IN MEDICAL RECORD: ICD-10-PCS | Mod: CPTII,S$GLB,, | Performed by: PHYSICIAN ASSISTANT

## 2022-06-24 PROCEDURE — 3288F FALL RISK ASSESSMENT DOCD: CPT | Mod: CPTII,S$GLB,, | Performed by: PHYSICIAN ASSISTANT

## 2022-06-24 PROCEDURE — 99999 PR PBB SHADOW E&M-EST. PATIENT-LVL III: ICD-10-PCS | Mod: PBBFAC,,, | Performed by: PHYSICIAN ASSISTANT

## 2022-06-24 PROCEDURE — 99213 OFFICE O/P EST LOW 20 MIN: CPT | Mod: 25,S$GLB,, | Performed by: PHYSICIAN ASSISTANT

## 2022-06-24 PROCEDURE — 99999 PR PBB SHADOW E&M-EST. PATIENT-LVL III: CPT | Mod: PBBFAC,,, | Performed by: PHYSICIAN ASSISTANT

## 2022-06-24 PROCEDURE — 4010F PR ACE/ARB THEARPY RXD/TAKEN: ICD-10-PCS | Mod: CPTII,S$GLB,, | Performed by: PHYSICIAN ASSISTANT

## 2022-06-24 PROCEDURE — 20610 DRAIN/INJ JOINT/BURSA W/O US: CPT | Mod: RT,S$GLB,, | Performed by: PHYSICIAN ASSISTANT

## 2022-06-24 PROCEDURE — 3008F PR BODY MASS INDEX (BMI) DOCUMENTED: ICD-10-PCS | Mod: CPTII,S$GLB,, | Performed by: PHYSICIAN ASSISTANT

## 2022-06-24 PROCEDURE — 1159F MED LIST DOCD IN RCRD: CPT | Mod: CPTII,S$GLB,, | Performed by: PHYSICIAN ASSISTANT

## 2022-06-24 PROCEDURE — 20610 PR DRAIN/INJECT LARGE JOINT/BURSA: ICD-10-PCS | Mod: RT,S$GLB,, | Performed by: PHYSICIAN ASSISTANT

## 2022-06-24 PROCEDURE — 4010F ACE/ARB THERAPY RXD/TAKEN: CPT | Mod: CPTII,S$GLB,, | Performed by: PHYSICIAN ASSISTANT

## 2022-06-24 PROCEDURE — 1125F AMNT PAIN NOTED PAIN PRSNT: CPT | Mod: CPTII,S$GLB,, | Performed by: PHYSICIAN ASSISTANT

## 2022-06-24 PROCEDURE — 3288F PR FALLS RISK ASSESSMENT DOCUMENTED: ICD-10-PCS | Mod: CPTII,S$GLB,, | Performed by: PHYSICIAN ASSISTANT

## 2022-06-24 PROCEDURE — 1125F PR PAIN SEVERITY QUANTIFIED, PAIN PRESENT: ICD-10-PCS | Mod: CPTII,S$GLB,, | Performed by: PHYSICIAN ASSISTANT

## 2022-06-24 PROCEDURE — 1101F PT FALLS ASSESS-DOCD LE1/YR: CPT | Mod: CPTII,S$GLB,, | Performed by: PHYSICIAN ASSISTANT

## 2022-06-24 PROCEDURE — 1101F PR PT FALLS ASSESS DOC 0-1 FALLS W/OUT INJ PAST YR: ICD-10-PCS | Mod: CPTII,S$GLB,, | Performed by: PHYSICIAN ASSISTANT

## 2022-06-24 RX ADMIN — TRIAMCINOLONE ACETONIDE 40 MG: 40 INJECTION, SUSPENSION INTRA-ARTICULAR; INTRAMUSCULAR at 09:06

## 2022-06-25 ENCOUNTER — PATIENT MESSAGE (OUTPATIENT)
Dept: INTERNAL MEDICINE | Facility: CLINIC | Age: 67
End: 2022-06-25
Payer: MEDICARE

## 2022-06-27 ENCOUNTER — TELEPHONE (OUTPATIENT)
Dept: OTOLARYNGOLOGY | Facility: CLINIC | Age: 67
End: 2022-06-27
Payer: MEDICARE

## 2022-06-27 RX ORDER — TRIAMCINOLONE ACETONIDE 40 MG/ML
40 INJECTION, SUSPENSION INTRA-ARTICULAR; INTRAMUSCULAR
Status: COMPLETED | OUTPATIENT
Start: 2022-06-27 | End: 2022-06-24

## 2022-06-27 NOTE — PROGRESS NOTES
"  SUBJECTIVE:     Chief Complaint : right knee pain    History of Present Illness:  Tiarra Norton is a 67 y.o. female seen in clinic today with a chief complaint of right knee pain. Pt had R MUKA by Dr. Huber in 2015. In 2021 she had lateral pain and was diagnosed with LMT. She had arthroscopy with meniscal debridement but continued to have pain. She has had injections in knee (CSI by Dr. Huber 8/2121 and Orthovisc by Alyse Mccullough 2/2022). Patient has pain in anterior knee. She is going out of town on two trips and would like an injection prior to leaving. She denies trauma, fevers/chills. No change in nature of pain since last eval by Dr. Huber.      Past Medical History:   Diagnosis Date    Cataract     Depression     Gestational diabetes     Hyperlipidemia     Hypertension     IBS (irritable bowel syndrome)     Thyroid disease      Review of Systems:  Constitutional: no fever or chills  ENT: no nasal congestion or sore throat  Respiratory: no cough or shortness of breath  Cardiovascular: no chest pain or palpitations  Gastrointestinal: no nausea or vomiting, tolerating diet  Genitourinary: no hematuria or dysuria  Integument/Breast: no rash or pruritis  Hematologic/Lymphatic: no easy bruising or lymphadenopathy  Musculoskeletal: see HPI  Neurological: no seizures or tremors  Behavioral/Psych: no auditory or visual hallucinations    OBJECTIVE:     PHYSICAL EXAM:  Height 5' 3" (1.6 m), weight 66 kg (145 lb 8 oz).   General Appearance: WDWN, NAD  Gait: Antalgic  Neuro/Psych: Mood & affect appropriate  Lungs: Respirations equal and unlabored.   CV: 2+ bilateral upper and lower extremity pulses.   Skin: Intact throughout LE  Extremities: No LE edema    Right Knee Exam  Range of Motion:0-125 active   Effusion:none  Condition of skin:intact and + scar  Location of tenderness:Lateral joint line and Patella   Strength:5 of 5 quadriceps strength and 5 of 5 hamstring strength  Stability:stable to " testing    Right Hip Examination: no pain with PROM     RADIOGRAPHS: AP, lateral and merchant knee x-rays images reviewed today by me reveal medial unicompartmental replacement, there is patellofemoral degenerative change including osteophytes     ASSESSMENT/PLAN:   Continued right knee pain  - Pt leaving town for trip. Repeat CSI. Pt will follow up with Dr. Huber if she continues to have right knee pain.     Knee Injection Procedure Note  Diagnosis: right knee degenerative arthritis  Indications: right knee pain  Procedure Details: Verbal consent was obtained for the procedure. The injection site was identified and the skin was prepared with alcohol. The right knee was injected from an anterolateral approach with 1 ml of Kenalog under sterile technique using a 22 gauge needle. The needle was removed and the area cleansed and dressed.  Complications:  Patient tolerated the procedure well.    she was advised to rest the knee today, using ice and elevation as needed for comfort and swelling.Immediate relief of the knee pain may be short lived and secondary to the lidocaine. she may have an increase in discomfort tonight followed by steady improvement over the next several days. It may take 1-2 weeks following the injection to get the full benefit of the medication.

## 2022-06-27 NOTE — TELEPHONE ENCOUNTER
Called and spoke with the patient.  She is scheduled to see Dr. Goodwin on 7/12 when I am out of town and wanted to see if she wanted to set up a tentative date.  She states she is in no hurry and would really have to think about it any way so does not want to schedule at this time.

## 2022-07-04 ENCOUNTER — PATIENT MESSAGE (OUTPATIENT)
Dept: PAIN MEDICINE | Facility: CLINIC | Age: 67
End: 2022-07-04
Payer: MEDICARE

## 2022-07-08 ENCOUNTER — PATIENT MESSAGE (OUTPATIENT)
Dept: DERMATOLOGY | Facility: CLINIC | Age: 67
End: 2022-07-08
Payer: MEDICARE

## 2022-07-08 ENCOUNTER — PATIENT MESSAGE (OUTPATIENT)
Dept: PSYCHIATRY | Facility: CLINIC | Age: 67
End: 2022-07-08
Payer: MEDICARE

## 2022-07-11 ENCOUNTER — CLINICAL SUPPORT (OUTPATIENT)
Dept: REHABILITATION | Facility: OTHER | Age: 67
End: 2022-07-11
Attending: INTERNAL MEDICINE
Payer: MEDICARE

## 2022-07-11 ENCOUNTER — PATIENT MESSAGE (OUTPATIENT)
Dept: PSYCHIATRY | Facility: CLINIC | Age: 67
End: 2022-07-11
Payer: MEDICARE

## 2022-07-11 DIAGNOSIS — M41.25 OTHER IDIOPATHIC SCOLIOSIS, THORACOLUMBAR REGION: ICD-10-CM

## 2022-07-11 DIAGNOSIS — R29.898 DECREASED STRENGTH OF TRUNK AND BACK: ICD-10-CM

## 2022-07-11 DIAGNOSIS — M25.69 DECREASED RANGE OF MOTION OF TRUNK AND BACK: Primary | ICD-10-CM

## 2022-07-11 PROCEDURE — 97140 MANUAL THERAPY 1/> REGIONS: CPT

## 2022-07-11 PROCEDURE — 97110 THERAPEUTIC EXERCISES: CPT

## 2022-07-11 NOTE — PROGRESS NOTES
[facility]   Physical Therapy Daily Treatment note    Name: Tiarra Norton  [facility] Number: [id number]    Therapy Diagnosis:   Encounter Diagnoses   Name Primary?    Decreased range of motion of trunk and back Yes    Decreased strength of trunk and back     Other idiopathic scoliosis, thoracolumbar region      Physician: Kaykay Perales MD    Visit Date: 7/11/2022    Physician Orders: PT Eval and Treat   Medical Diagnosis from Referral: M41.9 (ICD-10-CM) - Scoliosis, unspecified scoliosis type, unspecified spinal region  Evaluation Date: 5/17/2022  Authorization Period Expiration: 12/31/2022  Plan of Care Expiration: 7/17/2022  Visit # / Visits authorized: 8/ 16     Progress Note Due: 7/14/2022  FOTO: 2/2    PTA Visit #: 0/5     Time In: 10 am  Time Out: 11 am  Total Billable Time: 60 minutes    SUBJECTIVE     Pt reports: that her back pain is significantly worse than what it was prior to her vacation. She reports she was given a back brace by her MD which helped her walk in [place] She did discuss with pain management getting a stimulator put into her back.     She was compliant with home exercise program.  Response to previous treatment: decreased low back pain  Functional change: none yet    Pain: 8/10  Location: bilateral back      OBJECTIVE     Objective Measures updated at progress report unless specified.            Lumbar Range of Motion: - reassessed 6/14/2022    %   Flexion 90      Extension 50      Left Side Bending 60   Right Side Bending 60   Left rotation    80   Right Rotation    70    *= pain with all motions    Lower Extremity Strength - reassessed 6/14/2022     Right LE   Left LE     Hip flexion:  4/5 Hip flexion:  4/5   Knee extension: 5/5 Knee extension: 5/5   Knee flexion: 5/5 Knee flexion: 5/5   Hip IR: 4+/5 Hip IR: 4+/5   Hip ER: 4+/5 Hip ER: 4+/5   Hip extension:  4+/5 Hip extension: 4+/5   Hip abduction:  4/5 Hip abduction:  4/5   Hip adduction: 4/5 Hip  "adduction 4/5   Ankle dorsiflexion: 5/5 Ankle dorsiflexion: 5/5   Ankle plantarflexion: 5/5 Ankle plantarflexion: 5/5          Treatment     Tiarra received the treatments listed below:      therapeutic exercises to develop strength, endurance, ROM, flexibility, posture and core stabilization for 45 minutes including:    Recumbent bike x5' Lv 3    LTR's on ball 20x5"  EIL x10 5"  DKTC 10x5"  PPT supine with add towel squeeze 15x5"  PPT + BKFO Blue TB x15  PPT + Bridging Blue TB 2x10 x3"  Bridge + leg extension x10  Open books x15  Matrix trunk rotation 24# x15  Medx trunk ext 64# (5 rep warm up at 44#) (0-65 deg) 2x10 (increase reps next visit)  cat/cow x10   bird dog x10   Seated trunk flexion ball rollout fwd/L/R  10x5" ea  +seated thoracic ext over FR 10x5"    NP:  Ab brace + SLR 2# 10x3"  ea  paloff Press in tandem stance GTB x10 ea  seated marching on ball x20   Prone alt hip extension 10x3"   PPT + march x15   PPT sitting on ball with add towel squeeze 15x5"  WILLIE 2x1'  EIL 2x10x5"     manual therapy techniques: dry needling weas applied to the: low back for 15 minutes, including:    FDN: pt received dry needling of bilateral lumbar paraspinals from T11-L3. (1) needle was inserted at each level on each side for a total of (10) needles. Needles were left in and intermittently manipulated every 5 minutes for a total of 15 minutes. Pt monitored for adverse effects, but none were noted.     hot pack for 10 minutes to low back in [place].      Patient Education and Home Exercises     Home Exercises Provided and Patient Education Provided     Education provided:   - HEP, POC    Written Home Exercises Provided: Patient instructed to cont prior HEP. Exercises were reviewed and Tiarra was able to demonstrate them prior to the end of the session.  Tiarra demonstrated good  understanding of the education provided. See EMR under Patient Instructions for exercises provided during therapy sessions    ASSESSMENT     Tiarra presents " today with overall increased.  Pt with positive response to needling and reporting decreased pain after needling and therex. She reports that overall the needling will help for a day or so, but no long term effects. Discussed discontinuing at 12 visits after reaching max potential benefit from needling. Possibly a good candidate for acupuncture. Progressed repetitions on trunk rotation and trunk extension strengthening machines today. Continue to progress as tolerated.     Tiarra Is progressing well towards her goals.   Pt prognosis is Fair.     Pt will continue to benefit from skilled outpatient physical therapy to address the deficits listed in the problem list box on initial evaluation, provide pt/family education and to maximize pt's level of independence in the home and community environment.     Pt's spiritual, cultural and educational needs considered and pt agreeable to plan of care and goals.     Anticipated barriers to physical therapy: scoliosis, chronicity of condition, minimal relief with healthy back program in past    GOALS: Short Term Goals:  4 weeks  1. Report decreased in pain at worse less than  <   / =  4  /10  to increase tolerance for functional activities.On going  2. Pt to increase B popliteal angle by greater than > or = 5 degrees in order to improve flexibility and posture. On going  3. Increased B LE MMT 1/2  to increase tolerance for ADL and work activities. MET  4. Pt to increase B Ely's Test by greater than  > or = 5 degrees in order to improve flexibility and posture. On going  5. Pt to tolerate HEP to improve ROM and independence with ADL's.On going  6. Pt to improve range of motion by 25% to allow for improved functional mobility to allow for improvement in IADLs. MET     Long Term Goals: 8 weeks  1. Report decreased in pain at worse less than  <   / =  2  /10  to increase tolerance for functional mobility.  On going  2 .Pt to increase B popliteal angle by greater than > or = 10  degrees in order to improve flexibility and posture. On going  3. Increased B LE MMT 1 grade to increase tolerance for ADL and work activities.On going  4. Pt will report at 44% or less limitation on FOTO Lumbar spine survey  to demonstrate decrease in disability and improvement in back pain.On going  5. Pt to be Independent with HEP to improve ROM and independence with ADL's. On going  6. Pt to increase B Ely's Test by greater than > or = 10 degrees in order to improve flexibility and posture. On going  7. Pt to demonstrate negative Prone Instability Test in order to show improved core strength for lumbar stabilization. On going  8. Pt to improve range of motion by 75% to allow for improved functional mobility to allow for improvement in IADLs. On going       PLAN     Plan of care Certification: 5/17/2022 to 7/17/2022.    Alvin Bedoya, PT

## 2022-07-12 ENCOUNTER — PATIENT MESSAGE (OUTPATIENT)
Dept: PAIN MEDICINE | Facility: CLINIC | Age: 67
End: 2022-07-12
Payer: MEDICARE

## 2022-07-12 ENCOUNTER — OFFICE VISIT (OUTPATIENT)
Dept: OTOLARYNGOLOGY | Facility: CLINIC | Age: 67
End: 2022-07-12
Payer: MEDICARE

## 2022-07-12 DIAGNOSIS — H53.002 AMBLYOPIA OF LEFT EYE: ICD-10-CM

## 2022-07-12 DIAGNOSIS — H53.489 VISUAL FIELD CONSTRICTION, UNSPECIFIED LATERALITY: ICD-10-CM

## 2022-07-12 DIAGNOSIS — H02.831 DERMATOCHALASIS OF UPPER AND LOWER EYELIDS OF BOTH EYES: ICD-10-CM

## 2022-07-12 DIAGNOSIS — H02.834 DERMATOCHALASIS OF UPPER AND LOWER EYELIDS OF BOTH EYES: ICD-10-CM

## 2022-07-12 DIAGNOSIS — H02.832 DERMATOCHALASIS OF UPPER AND LOWER EYELIDS OF BOTH EYES: ICD-10-CM

## 2022-07-12 DIAGNOSIS — H02.835 DERMATOCHALASIS OF UPPER AND LOWER EYELIDS OF BOTH EYES: ICD-10-CM

## 2022-07-12 DIAGNOSIS — L98.8 FACIAL RHYTIDS: ICD-10-CM

## 2022-07-12 DIAGNOSIS — H57.813 BROW PTOSIS, BILATERAL: Primary | ICD-10-CM

## 2022-07-12 PROCEDURE — 99213 PR OFFICE/OUTPT VISIT, EST, LEVL III, 20-29 MIN: ICD-10-PCS | Mod: S$GLB,,, | Performed by: OTOLARYNGOLOGY

## 2022-07-12 PROCEDURE — 4010F ACE/ARB THERAPY RXD/TAKEN: CPT | Mod: CPTII,S$GLB,, | Performed by: OTOLARYNGOLOGY

## 2022-07-12 PROCEDURE — 4010F PR ACE/ARB THEARPY RXD/TAKEN: ICD-10-PCS | Mod: CPTII,S$GLB,, | Performed by: OTOLARYNGOLOGY

## 2022-07-12 PROCEDURE — 99213 OFFICE O/P EST LOW 20 MIN: CPT | Mod: S$GLB,,, | Performed by: OTOLARYNGOLOGY

## 2022-07-13 ENCOUNTER — OFFICE VISIT (OUTPATIENT)
Dept: ORTHOPEDICS | Facility: CLINIC | Age: 67
End: 2022-07-13
Payer: MEDICARE

## 2022-07-13 ENCOUNTER — TELEPHONE (OUTPATIENT)
Dept: OPHTHALMOLOGY | Facility: CLINIC | Age: 67
End: 2022-07-13
Payer: MEDICARE

## 2022-07-13 VITALS — WEIGHT: 132.63 LBS | HEIGHT: 63 IN | BODY MASS INDEX: 23.5 KG/M2

## 2022-07-13 DIAGNOSIS — M41.9 SCOLIOSIS, UNSPECIFIED SCOLIOSIS TYPE, UNSPECIFIED SPINAL REGION: Primary | ICD-10-CM

## 2022-07-13 PROCEDURE — 1101F PT FALLS ASSESS-DOCD LE1/YR: CPT | Mod: CPTII,S$GLB,, | Performed by: ORTHOPAEDIC SURGERY

## 2022-07-13 PROCEDURE — 3288F PR FALLS RISK ASSESSMENT DOCUMENTED: ICD-10-PCS | Mod: CPTII,S$GLB,, | Performed by: ORTHOPAEDIC SURGERY

## 2022-07-13 PROCEDURE — 4010F PR ACE/ARB THEARPY RXD/TAKEN: ICD-10-PCS | Mod: CPTII,S$GLB,, | Performed by: ORTHOPAEDIC SURGERY

## 2022-07-13 PROCEDURE — 3008F PR BODY MASS INDEX (BMI) DOCUMENTED: ICD-10-PCS | Mod: CPTII,S$GLB,, | Performed by: ORTHOPAEDIC SURGERY

## 2022-07-13 PROCEDURE — 1125F PR PAIN SEVERITY QUANTIFIED, PAIN PRESENT: ICD-10-PCS | Mod: CPTII,S$GLB,, | Performed by: ORTHOPAEDIC SURGERY

## 2022-07-13 PROCEDURE — 1101F PR PT FALLS ASSESS DOC 0-1 FALLS W/OUT INJ PAST YR: ICD-10-PCS | Mod: CPTII,S$GLB,, | Performed by: ORTHOPAEDIC SURGERY

## 2022-07-13 PROCEDURE — 1159F PR MEDICATION LIST DOCUMENTED IN MEDICAL RECORD: ICD-10-PCS | Mod: CPTII,S$GLB,, | Performed by: ORTHOPAEDIC SURGERY

## 2022-07-13 PROCEDURE — 99999 PR PBB SHADOW E&M-EST. PATIENT-LVL III: CPT | Mod: PBBFAC,,, | Performed by: ORTHOPAEDIC SURGERY

## 2022-07-13 PROCEDURE — 4010F ACE/ARB THERAPY RXD/TAKEN: CPT | Mod: CPTII,S$GLB,, | Performed by: ORTHOPAEDIC SURGERY

## 2022-07-13 PROCEDURE — 3008F BODY MASS INDEX DOCD: CPT | Mod: CPTII,S$GLB,, | Performed by: ORTHOPAEDIC SURGERY

## 2022-07-13 PROCEDURE — 99999 PR PBB SHADOW E&M-EST. PATIENT-LVL III: ICD-10-PCS | Mod: PBBFAC,,, | Performed by: ORTHOPAEDIC SURGERY

## 2022-07-13 PROCEDURE — 1125F AMNT PAIN NOTED PAIN PRSNT: CPT | Mod: CPTII,S$GLB,, | Performed by: ORTHOPAEDIC SURGERY

## 2022-07-13 PROCEDURE — 99214 PR OFFICE/OUTPT VISIT, EST, LEVL IV, 30-39 MIN: ICD-10-PCS | Mod: S$GLB,,, | Performed by: ORTHOPAEDIC SURGERY

## 2022-07-13 PROCEDURE — 1159F MED LIST DOCD IN RCRD: CPT | Mod: CPTII,S$GLB,, | Performed by: ORTHOPAEDIC SURGERY

## 2022-07-13 PROCEDURE — 3288F FALL RISK ASSESSMENT DOCD: CPT | Mod: CPTII,S$GLB,, | Performed by: ORTHOPAEDIC SURGERY

## 2022-07-13 PROCEDURE — 99214 OFFICE O/P EST MOD 30 MIN: CPT | Mod: S$GLB,,, | Performed by: ORTHOPAEDIC SURGERY

## 2022-07-13 RX ORDER — PREGABALIN 50 MG/1
50 CAPSULE ORAL 3 TIMES DAILY
Qty: 60 CAPSULE | Refills: 0 | Status: SHIPPED | OUTPATIENT
Start: 2022-07-13 | End: 2022-07-19

## 2022-07-13 NOTE — TELEPHONE ENCOUNTER
Got a ref for [facility] [place] called pt to [facility] pt declined stated that is not what she wants.

## 2022-07-13 NOTE — PROGRESS NOTES
Subjective:     Chief Complaint: eyelid evaluation     Tiarra Norotn is a 67 y.o. female who was referred to me by Dr. Melissa Cabrera in consultation for evaluation for possible blepharoplasty.    The patient presents for evaluation of aging changes or on the eyes.  She has noticed this for several years.  She has noticed excess skin and brow heaviness causing decreased visual fields.  She does have a history of amblyopia and has decreased vision in the left eye.  She does have a history of mild dry eye.  She had cataract surgery about 5 years ago.  She uses Restasis as needed which does improve her symptoms.  She does not have any history of punctal plugs or dry mouth.  She does see an outside ophthalmologist regularly.  She also reports some aging changes round lower eyelids as well as the lower face and neck.    There is not a prior history of sinonasal surgery.  She is not an active smoker.    Today (2022): Patient returns for follow up visit. Patient reports no new changes.     Past Medical History  She has a past medical history of Cataract, Depression, Gestational diabetes, Hyperlipidemia, Hypertension, IBS (irritable bowel syndrome), and Thyroid disease.    Past Surgical History  She has a past surgical history that includes  section; Tubal ligation; Tonsillectomy; Adenoidectomy; Cholecystectomy; Eye surgery; r hand surgery; Total knee arthroplasty; Colonoscopy (N/A, 2016); Colonoscopy (N/A, 2019); Esophagogastroduodenoscopy (N/A, 2020); Joint replacement; Arthroscopy of knee (Right, 10/19/2021); Arthroscopic chondroplasty of knee joint (Right, 10/19/2021); Knee arthroscopy w/ meniscectomy (Right, 10/19/2021); Injection of anesthetic agent around nerve (Bilateral, 2022); Injection of anesthetic agent around nerve (Bilateral, 2/15/2022); Radiofrequency ablation (Right, 3/8/2022); Radiofrequency ablation (Left, 3/22/2022); Injection of botulinum toxin type A  (6/21/2022); and Cystoscopy (6/21/2022).    Family History  Her family history includes Alcohol abuse in her sister and sister; Breast cancer in her maternal aunt and paternal aunt; Cancer in her father; Depression in her sister; Epilepsy in her son; Heart attack in her father; Heart disease in her father; Heart failure in her father; Hyperlipidemia in her father and mother; Hypertension in her father and mother; Irritable bowel syndrome in her mother and sister; Ovarian cancer in her maternal aunt.    Social History  She reports that she has never smoked. She has never used smokeless tobacco. She reports current alcohol use of about 3.0 standard drinks of alcohol per week. She reports that she does not use drugs.    Allergies  She has No Known Allergies.    Medications  She has a current medication list which includes the following prescription(s): alprazolam, amlodipine, atorvastatin, b complex vitamins, cholecalciferol (vitamin d3), cholestyramine, ciclopirox, ciclopirox, premarin, diphenoxylate-atropine 2.5-0.025 mg, levothyroxine, liothyronine, creon, mirabegron, oxycodone, pantoprazole, pregabalin, promethazine, restasis, sumatriptan, tramadol, trazodone, triamcinolone acetonide 0.1%, and valsartan.    Review of Systems  Negative except per HPI     Objective:     There were no vitals taken for this visit.     Constitutional:   Vital signs are normal. She appears well-developed and well-nourished.     Head:  Normocephalic and atraumatic. Salivary glands normal.  Facial strength is normal.          Nose:  No mucosal edema or rhinorrhea. Turbinates normal.      Mouth/Throat  Lips, teeth, and gums normal and oropharynx normal.     Neck:  Neck normal without thyromegaly masses, asymmetry, normal tracheal structure, crepitus, and tenderness, thyroid normal and trachea normal.         Pulmonary/Chest:   Effort normal.       Procedure    None                  Assessment:     1. Brow ptosis, bilateral    2.  Dermatochalasis of upper and lower eyelids of both eyes    3. Visual field constriction, unspecified laterality    4. Amblyopia of left eye    5. Facial rhytids          Plan:     I had a long discussion with the patient regarding her condition and the further workup and management options.  She does have significant brow ptosis as well as upper lid dermatochalasis that is cause significant visual field obstruction.   We discussed the options for brow lift procedure as well as upper and lower lid blepharoplasty.  Due to her high hairline, we discussed options for trichophytic brow lift.  We discussed conservative upper lid blepharoplasty to remove the excess skin.  We also discussed transconjunctival lower lid blepharoplasty with skin pinch + canthopexy. She does have dry eye symptoms and discussed risk of increased dryness postoperatively. She has seen her local ophthalmologist who stated she produces enough tears for blepharoplasty. She also does have vision loss in left eye; discussed the rare but serious risk of vision loss with eyelid surgery. She is interested in less invasive/shorter recovery options. She may be a candidate for browpexy at the time of upper/lower bleph but this is not a procedure I commonly perform. Will place referral to Dr. Corley, occuloplastic surgery, for evaluation.  Patient is also considering a face lift but does not want to perform at the same time as eyelid surgery. We will send her a quote for these procedures. She will consider these options and get back to me. All questions answered and patient encouraged to call with any questions or concerns.

## 2022-07-13 NOTE — PROGRESS NOTES
DATE: 2022  PATIENT: Tiarra Norton    Attending Physician: Fadi Stone M.D.    CHIEF COMPLAINT: Low back pain    HISTORY:  Tiarra Norton is a 66 y.o. female here for initial evaluation of low back pain (Back - 8/10). The pain has been present for 3-4 years. The patient describes the pain as dull but it doesn't radiate into BLE.  The pain is worse with standing/walking/bending and improved by heat/ice. There is no associated numbness and tingling. There is no subjective weakness. Prior treatments have included mobic and PT, but no injections or surgery  Ablation: R and L Lumbar 3/4/5, had some relief.   This pain is keeping her from doing what she wants to do.     Interval history 22: Patient returns for fu of therapy. Reports minimal improvement in her symptoms. Patient no longer able to garden. She is miserable and extremely frustrated with her lack of progress. Pain is in the mid to low back, no radiculopathy. Pain management discussing SCS.       The Patient denies myelopathic symptoms such as handwriting changes or difficulty with buttons/coins/keys. Denies perineal paresthesias, bowel/bladder dysfunction.      PAST MEDICAL/SURGICAL HISTORY:  Past Medical History:   Diagnosis Date    Cataract     Depression     Gestational diabetes     Hyperlipidemia     Hypertension     IBS (irritable bowel syndrome)     Thyroid disease      Past Surgical History:   Procedure Laterality Date    ADENOIDECTOMY      ARTHROSCOPIC CHONDROPLASTY OF KNEE JOINT Right 10/19/2021    Procedure: ARTHROSCOPY, KNEE, WITH CHONDROPLASTY;  Surgeon: Trevin Huber MD;  Location: [facility] OR;  Service: Orthopedics;  Laterality: Right;    ARTHROSCOPY OF KNEE Right 10/19/2021    Procedure: ARTHROSCOPY, KNEE-RIGHT;  Surgeon: Trevin Huber MD;  Location: [facility] OR;  Service: Orthopedics;  Laterality: Right;     SECTION      CHOLECYSTECTOMY      COLONOSCOPY N/A 2016    Procedure: COLONOSCOPY;   Surgeon: Heri Segura MD;  Location: [facility] (4TH FLR);  Service: Endoscopy;  Laterality: N/A;    COLONOSCOPY N/A 12/6/2019    Procedure: COLONOSCOPY;  Surgeon: Joe Pitts MD;  Location: [facility] (4TH FLR);  Service: Endoscopy;  Laterality: N/A;    CYSTOSCOPY  6/21/2022    Procedure: CYSTOSCOPY;  Surgeon: Lenny Moore MD;  [address]  Service: Urology;;    ESOPHAGOGASTRODUODENOSCOPY N/A 2/13/2020    Procedure: EGD (ESOPHAGOGASTRODUODENOSCOPY);  Surgeon: Tolu Rivas MD;  Location: [facility] (4TH FLR);  Service: Endoscopy;  Laterality: N/A;  Pt. moved to 9:15am arrival time.. Instructed to not have anything after midnight.EC    EYE SURGERY      cataract resection right    INJECTION OF ANESTHETIC AGENT AROUND NERVE Bilateral 2/8/2022    Procedure: Block, Nerve MEDIAL BRANCH BLOCK BILATERAL L3,4,5;  Surgeon: Tara Gibbs MD;  [facility]: [facility];  Service: Pain Management;  Laterality: Bilateral;    INJECTION OF ANESTHETIC AGENT AROUND NERVE Bilateral 2/15/2022    Procedure: BLOCK, NERVE, L3*L4-L5 MEDIAL BR ANCH 2 OF 2;  Surgeon: Tara Gibbs MD;  [facility]: [facility];  Service: Pain Management;  Laterality: Bilateral;    INJECTION OF BOTULINUM TOXIN TYPE A  6/21/2022    Procedure: BOTULINUM TOXIN INJECTION 100 units;  Surgeon: Lenny Moore MD;  [address]  Service: Urology;;    JOINT REPLACEMENT      partial right knee    KNEE ARTHROSCOPY W/ MENISCECTOMY Right 10/19/2021    Procedure: ARTHROSCOPY, KNEE, WITH MENISCECTOMY, PARTIAL LATERAL;  Surgeon: Trevin Huber MD;  Location: [facility];  Service: Orthopedics;  Laterality: Right;    r hand surgery      RADIOFREQUENCY ABLATION Right 3/8/2022    Procedure: RADIOFREQUENCY ABLATION, L3-L5 1 OF 2;  Surgeon: Tara Gibbs MD;  [facility];  Service: Pain Management;  Laterality: Right;    RADIOFREQUENCY ABLATION Left 3/22/2022    Procedure: RADIOFREQUENCY  ABLATION, l3-+l5 2 of 2;  Surgeon: Tara Gibbs MD;  Location: [facility];  Service: Pain Management;  Laterality: Left;    TONSILLECTOMY      TOTAL KNEE ARTHROPLASTY      partial knee replacement    TUBAL LIGATION           Current Medications:   Current Outpatient Medications:     alprazolam (XANAX) 2 MG Tab, Take 1 mg by mouth 3 (three) times daily as needed., Disp: , Rfl: 0    amLODIPine (NORVASC) 5 MG tablet, Take 1 tablet (5 mg total) by mouth once daily., Disp: 90 tablet, Rfl: 3    atorvastatin (LIPITOR) 10 MG tablet, TAKE 1 TABLET EVERY DAY, Disp: 90 tablet, Rfl: 3    b complex vitamins capsule, Take 1 capsule by mouth once daily., Disp: , Rfl:     cholecalciferol, vitamin D3, (VITAMIN D3) 5,000 unit Tab, Take 1 tablet (5,000 Units total) by mouth once daily., Disp: , Rfl:     cholestyramine (QUESTRAN) 4 gram packet, Take 1 packet (4 g total) by mouth 2 (two) times daily., Disp: 180 packet, Rfl: 3    ciclopirox (LOPROX) 0.77 % Crea, APPLY 1 GRAM TOPICALLY TO THE AFFECTED AREA TWICE DAILY, Disp: , Rfl:     ciclopirox (PENLAC) 8 % Soln, Apply topically nightly., Disp: 6.6 mL, Rfl: 11    conjugated estrogens (PREMARIN) vaginal cream, Place a pea-sized amount in vagina every night for 4 weeks, then use 2-3 nights a week (Patient not taking: No sig reported), Disp: 90 g, Rfl: 4    diphenoxylate-atropine 2.5-0.025 mg (LOMOTIL) 2.5-0.025 mg per tablet, TAKE 1 TABLET BY MOUTH FOUR TIMES DAILY AS NEEDED FOR DIARRHEA, Disp: 40 tablet, Rfl: 0    levothyroxine (SYNTHROID) 125 MCG tablet, TAKE 1 TABLET(125 MCG) BY MOUTH EVERY MORNING, Disp: 90 tablet, Rfl: 0    liothyronine (CYTOMEL) 5 MCG Tab, Take 1 tablet (5 mcg total) by mouth once daily., Disp: 90 tablet, Rfl: 3    lipase-protease-amylase 24,000-76,000-120,000 units (CREON) 24,000-76,000 -120,000 unit capsule, Take 1 capsule by mouth 3 (three) times daily with meals., Disp: 90 capsule, Rfl: 11    mirabegron (MYRBETRIQ) 50 mg Tb24, Take 1  tablet (50 mg total) by mouth once daily. (Patient not taking: No sig reported), Disp: 30 tablet, Rfl: 11    oxyCODONE (ROXICODONE) 5 MG immediate release tablet, Take 1 tablet (5 mg total) by mouth every 6 (six) hours as needed for Pain., Disp: 10 tablet, Rfl: 0    pantoprazole (PROTONIX) 40 MG tablet, Take 1 tablet (40 mg total) by mouth once daily., Disp: 90 tablet, Rfl: 2    promethazine (PHENERGAN) 12.5 MG Tab, Take 1 tablet (12.5 mg total) by mouth every 6 (six) hours as needed (nausea)., Disp: 30 tablet, Rfl: 2    RESTASIS 0.05 % ophthalmic emulsion, INT 1 GTT IN OU BID, Disp: , Rfl:     sumatriptan (IMITREX) 50 MG tablet, 1 tab po at start of headache.  Repeat in 2 h prn, Disp: 27 tablet, Rfl: 3    traMADoL (ULTRAM) 50 mg tablet, Take 1 tablet (50 mg total) by mouth every 12 (twelve) hours as needed for Pain., Disp: 14 tablet, Rfl: 0    traZODone (DESYREL) 100 MG tablet, TAKE 1 TO 2 TABLETS AT BEDTIME FOR SLEEP, Disp: 180 tablet, Rfl: 3    triamcinolone acetonide 0.1% (KENALOG) 0.1 % cream, Apply to affected areas of back BID prn irritation. Do not use on face, underarms, or groin., Disp: 80 g, Rfl: 1    valsartan (DIOVAN) 320 MG tablet, Take 1 tablet (320 mg total) by mouth once daily. (STOP LISINOPRIL), Disp: 90 tablet, Rfl: 3    Social History:   Social History     Socioeconomic History    Marital status:     Number of children: 1   Occupational History    Occupation:      Employer: [employer]     Comment: retired   Tobacco Use    Smoking status: Never Smoker    Smokeless tobacco: Never Used   Substance and Sexual Activity    Alcohol use: Yes     Alcohol/week: 3.0 standard drinks     Types: 3 Glasses of wine per week     Comment: weekends    Drug use: No    Sexual activity: Yes     Partners: Male   Other Topics Concern    Are you pregnant or think you may be? No    Breast-feeding No   Social History Narrative    Retired        Swims.       Stationary bike.         "    Social Determinants of Health     Financial Resource Strain: Low Risk     Difficulty of Paying Living Expenses: Not hard at all   Food Insecurity: No Food Insecurity    Worried About Running Out of Food in the Last Year: Never true    Ran Out of Food in the Last Year: Never true   Transportation Needs: No Transportation Needs    Lack of Transportation (Medical): No    Lack of Transportation (Non-Medical): No   Physical Activity: Insufficiently Active    Days of Exercise per Week: 4 days    Minutes of Exercise per Session: 30 min   Stress: Stress Concern Present    Feeling of Stress : Very much   Social Connections: Unknown    Frequency of Communication with Friends and Family: More than three times a week    Frequency of Social Gatherings with Friends and Family: Once a week    Active Member of Clubs or Organizations: No    Attends Club or Organization Meetings: More than 4 times per year    Marital Status:        EXAM:  Ht 5' 3" (1.6 m)   Wt 60.2 kg (132 lb 9.7 oz)   BMI 23.49 kg/m²     Lungs: Respirations unlabored.  Gait: Normal station and gait, no difficulty with toe or heel walk.   Skin: Skin on the neck, back, and axillae negative for rashes, lesions, cafe-au-lait spots, hairy patches and surgical scars.  Musculoskeletal: No pain with the range of motion of the bilateral hips. No trochanteric tenderness to palpation.  Neurological: Normal strength and tone in all major motor groups in the bilateral lower extremities. Normal ensation to light touch in the L2-S1 dermatomes bilaterally.  Deep tendon reflexes symmetric  in the bilateral lower extremities.  Negative Babinski bilaterally. Straight leg raise negative bilaterally.    IMAGING:   Today I personally reviewed PA and Lat upright Scoliosis films that demonstrate lumbar spondylosis, degenerative levoscoliosis with a 26 deg. G1 L4-5 anterolisthesis.    MRI   showing mild canal stenosis L3-L5     Body mass index is 23.49 " kg/m².  Hemoglobin A1C   Date Value Ref Range Status   10/22/2018 5.5 4.0 - 5.6 % Final     Comment:     ADA Screening Guidelines:  5.7-6.4%  Consistent with prediabetes  >or=6.5%  Consistent with diabetes  High levels of fetal hemoglobin interfere with the HbA1C  assay. Heterozygous hemoglobin variants (HbS, HgC, etc)do  not significantly interfere with this assay.   However, presence of multiple variants may affect accuracy.     01/11/2018 5.3 4.0 - 5.6 % Final     Comment:     According to ADA guidelines, hemoglobin A1c <7.0% represents  optimal control in non-pregnant diabetic patients. Different  metrics may apply to specific patient populations.   Standards of Medical Care in Diabetes-2016.  For the purpose of screening for the presence of diabetes:  <5.7%     Consistent with the absence of diabetes  5.7-6.4%  Consistent with increasing risk for diabetes   (prediabetes)  >or=6.5%  Consistent with diabetes  Currently, no consensus exists for use of hemoglobin A1c  for diagnosis of diabetes for children.  This Hemoglobin A1c assay has significant interference with fetal   hemoglobin   (HbF). The results are invalid for patients with abnormal amounts of   HbF,   including those with known Hereditary Persistence   of Fetal Hemoglobin. Heterozygous hemoglobin variants (HbAS, HbAC,   HbAD, HbAE, HbA2) do not significantly interfere with this assay;   however, presence of multiple variants in a sample may impact the %   interference.     11/15/2016 5.5 4.5 - 6.2 % Final     Comment:     According to ADA guidelines, hemoglobin A1C <7.0% represents  optimal control in non-pregnant diabetic patients.  Different  metrics may apply to specific populations.   Standards of Medical Care in Diabetes - 2016.  For the purpose of screening for the presence of diabetes:  <5.7%     Consistent with the absence of diabetes  5.7-6.4%  Consistent with increasing risk for diabetes   (prediabetes)  >or=6.5%  Consistent with  diabetes  Currently no consensus exists for use of hemoglobin A1C  for diagnosis of diabetes for children.         ASSESSMENT/PLAN:  Tiarra was seen today for follow-up and pain.    Diagnoses and all orders for this visit:    Scoliosis, unspecified scoliosis type, unspecified spinal region    Other orders  -     pregabalin (LYRICA) 50 MG capsule; Take 1 capsule (50 mg total) by mouth 3 (three) times daily.      I will add Lyrica for now, and see how things go with this and pain management.  I do not recommend surgical intervention at this time.

## 2022-07-14 ENCOUNTER — TELEPHONE (OUTPATIENT)
Dept: OPHTHALMOLOGY | Facility: CLINIC | Age: 67
End: 2022-07-14
Payer: MEDICARE

## 2022-07-14 ENCOUNTER — PATIENT MESSAGE (OUTPATIENT)
Dept: OTOLARYNGOLOGY | Facility: CLINIC | Age: 67
End: 2022-07-14
Payer: MEDICARE

## 2022-07-14 ENCOUNTER — PATIENT MESSAGE (OUTPATIENT)
Dept: ORTHOPEDICS | Facility: CLINIC | Age: 67
End: 2022-07-14
Payer: MEDICARE

## 2022-07-14 NOTE — TELEPHONE ENCOUNTER
----- Message from Nicholas Goodwin MD sent at 7/14/2022  8:37 AM CDT -----  Regarding: referral  Hi, I placed a referral for Dr. Corley. This is a message I just received from the patient:    Dr. Goodwin, would you please ask your colleague who specializes in eye surgery to have his staff call me again about scheduling an appointment?  Someone called me yesterday when I was in a busy store.  I couldn't hear the caller clearly and didn't recognize the phone number as an [employer] number and misunderstood who she was and why she was calling.  Thinking she was a , I told her I wasn't interested.  When I realized my mistake, I wasn't able to call her back (I don't know the doctor's name).     Thanks for your help      Do you mind helping this patient set up a consultation with Dr. Corley?  Thanks,  Nicholas Goodwin

## 2022-07-15 ENCOUNTER — PES CALL (OUTPATIENT)
Dept: ADMINISTRATIVE | Facility: CLINIC | Age: 67
End: 2022-07-15
Payer: MEDICARE

## 2022-07-18 ENCOUNTER — PES CALL (OUTPATIENT)
Dept: ADMINISTRATIVE | Facility: CLINIC | Age: 67
End: 2022-07-18
Payer: MEDICARE

## 2022-07-18 ENCOUNTER — PATIENT MESSAGE (OUTPATIENT)
Dept: REHABILITATION | Facility: OTHER | Age: 67
End: 2022-07-18

## 2022-07-18 ENCOUNTER — PATIENT MESSAGE (OUTPATIENT)
Dept: DERMATOLOGY | Facility: CLINIC | Age: 67
End: 2022-07-18
Payer: MEDICARE

## 2022-07-18 ENCOUNTER — CLINICAL SUPPORT (OUTPATIENT)
Dept: REHABILITATION | Facility: OTHER | Age: 67
End: 2022-07-18
Attending: INTERNAL MEDICINE
Payer: MEDICARE

## 2022-07-18 DIAGNOSIS — M25.69 DECREASED RANGE OF MOTION OF TRUNK AND BACK: Primary | ICD-10-CM

## 2022-07-18 DIAGNOSIS — R29.898 DECREASED STRENGTH OF TRUNK AND BACK: ICD-10-CM

## 2022-07-18 DIAGNOSIS — M41.25 OTHER IDIOPATHIC SCOLIOSIS, THORACOLUMBAR REGION: ICD-10-CM

## 2022-07-18 PROBLEM — F51.01 PRIMARY INSOMNIA: Status: ACTIVE | Noted: 2022-07-18

## 2022-07-18 PROCEDURE — 97110 THERAPEUTIC EXERCISES: CPT

## 2022-07-18 NOTE — PROGRESS NOTES
Virtual Visit  The patient location is: Home ([place])  The chief complaint leading to consultation is: Insomnia    Visit type: audiovisual    Face to Face time with patient: 45 minutes  50 minutes of total time spent on the encounter, which includes face to face time and non-face to face time preparing to see the patient (eg, review of tests), Obtaining and/or reviewing separately obtained history, Documenting clinical information in the electronic or other health record, Independently interpreting results (not separately reported) and communicating results to the patient/family/caregiver, or Care coordination (not separately reported).     Each patient to whom he or she provides medical services by telemedicine is:  (1) informed of the relationship between the physician and patient and the respective role of any other health care provider with respect to management of the patient; and (2) notified that he or she may decline to receive medical services by telemedicine and may withdraw from such care at any time.    Notes: N/A      [facility]  Individual Psychotherapy (PhD/LCSW)    7/19/2022    Site:  [facility]         Therapeutic Intervention: Met with patient.  Outpatient - Insight oriented psychotherapy 45 min - CPT code 33018 and Outpatient - Behavior modifying psychotherapy 45 min - CPT code 67132    Chief complaint/reason for encounter: sleep     Interval history and content of current session:  Ms. Tiarra Norton arrived on time for her scheduled appointment.  She takes two 100 mg tablets of trazodone each night.  She will take 1.5 tablets starting this week.    Cognitive Behavioral Therapy for Insomnia (CBT-i), Session #1  Session Focus:  Introduction to CBT-i  Sleep and Insomnia Basic Concepts  Sleep medications  Sleep Diary & CBT-i   MAT:  24 (severe insomnia)     Practice Assignments:  1) Review treatment materials as needed.  2) Complete the Sleep Diary every day.  Fill it out within two  hours of getting up.    Treatment plan:  · Target symptoms: insomnia  · Why chosen therapy is appropriate versus another modality: relevant to diagnosis, evidence based practice  · Outcome monitoring methods: self-report, checklist/rating scale  · Therapeutic intervention type: insight oriented psychotherapy, behavior modifying psychotherapy    Risk parameters:  Patient reports no suicidal ideation  Patient reports no homicidal ideation  Patient reports no self-injurious behavior  Patient reports no violent behavior    Verbal deficits: None    Patient's response to intervention:  The patient's response to intervention is accepting.    Progress toward goals and other mental status changes:  The patient's progress toward goals is fair .    Diagnosis:     ICD-10-CM ICD-9-CM   1. Major depressive disorder, recurrent, mild  F33.0 296.31   2. Primary insomnia  F51.01 307.42   3. Anxiety  F41.9 300.00       Plan:  individual psychotherapy    Return to clinic: 1 week    Length of Service (minutes): 45

## 2022-07-18 NOTE — PROGRESS NOTES
[facility]   Physical Therapy Daily Treatment note    Name: Tiarra Chang [facility] Number: [id number]    Therapy Diagnosis:   Encounter Diagnoses   Name Primary?    Decreased range of motion of trunk and back Yes    Decreased strength of trunk and back     Other idiopathic scoliosis, thoracolumbar region      Physician: Kaykay Perales MD    Visit Date: 7/18/2022    Physician Orders: PT Eval and Treat   Medical Diagnosis from Referral: M41.9 (ICD-10-CM) - Scoliosis, unspecified scoliosis type, unspecified spinal region  Evaluation Date: 5/17/2022  Authorization Period Expiration: 12/31/2022  Plan of Care Expiration: 7/17/2022- D/c 7/18/2022  Visit # / Visits authorized: 9/ 16     Progress Note Due:   FOTO: 2/2    PTA Visit #: 0/5     Time In: 10 am  Time Out: 11 am  Total Billable Time: 60 minutes    SUBJECTIVE     Pt reports: that her back pain has not changed, and is still in significant pain that is constant through the day. She reports frustrations as she has not made progress with pain levels.    She was compliant with home exercise program.  Response to previous treatment: decreased low back pain  Functional change: none yet    Pain: 8/10  Location: bilateral back      OBJECTIVE     Objective Measures updated at progress report unless specified.            Lumbar Range of Motion: - reassessed 7/18/2022    %   Flexion 90      Extension 50      Left Side Bending 60   Right Side Bending 60   Left rotation    80   Right Rotation    70    *= pain with all motions    Lower Extremity Strength - reassessed 7/18/2022     Right LE   Left LE     Hip flexion:  4+/5 Hip flexion:  4+/5   Knee extension: 5/5 Knee extension: 5/5   Knee flexion: 5/5 Knee flexion: 5/5   Hip IR: 4+/5 Hip IR: 4+/5   Hip ER: 4+/5 Hip ER: 4+/5   Hip extension:  4+/5 Hip extension: 4+/5   Hip abduction:  4+/5 Hip abduction:  4+/5   Hip adduction: 4+/5 Hip adduction 4+/5   Ankle dorsiflexion: 5/5 Ankle  "dorsiflexion: 5/5   Ankle plantarflexion: 5/5 Ankle plantarflexion: 5/5          Treatment     Tiarra received the treatments listed below:      therapeutic exercises to develop strength, endurance, ROM, flexibility, posture and core stabilization for 60 minutes including:    Reassessment    LTR's on ball 20x5"  EIL x10 5"  DKTC 10x5"  PPT supine with add towel squeeze 15x5"  PPT + BKFO Blue TB x15  PPT + Bridging Blue TB 2x10 x3"  Bridge + leg extension x10  Open books x15  Matrix trunk rotation 24# x20  Medx trunk ext 64# (5 rep warm up at 44#) (0-65 deg) x10 (not able to tolerate full reps this visit)  cat/cow x10   bird dog x10   Seated trunk flexion ball rollout fwd/L/R  10x5" ea  +seated thoracic ext over FR 10x5"    NP:  Ab brace + SLR 2# 10x3"  ea  paloff Press in tandem stance GTB x10 ea  seated marching on ball x20   Prone alt hip extension 10x3"   PPT + march x15   PPT sitting on ball with add towel squeeze 15x5"  WILLIE 2x1'  EIL 2x10x5"     manual therapy techniques: dry needling weas applied to the: low back for 00 minutes, including:    FDN: pt received dry needling of bilateral lumbar paraspinals from T11-L3. (1) needle was inserted at each level on each side for a total of (10) needles. Needles were left in and intermittently manipulated every 5 minutes for a total of 15 minutes. Pt monitored for adverse effects, but none were noted.     hot pack for 10 minutes to low back in [place].      Patient Education and Home Exercises     Home Exercises Provided and Patient Education Provided     Education provided:   - HEP, POC    Written Home Exercises Provided: Patient instructed to cont prior HEP. Exercises were reviewed and Tiarra was able to demonstrate them prior to the end of the session.  Tiarra demonstrated good  understanding of the education provided. See EMR under Patient Instructions for exercises provided during therapy sessions    ASSESSMENT     Tiarra presents today with cont reports of overall " "increased. Reassessment completed at this visit. At this point she does not demo significant improvements in strength or ROM despite consistent therapy to address LBP. She reports some short term relief in pain after completing "go to" stretches during the day. She reports that this does not have a lasting effect on pain levels. At this point she has met 3/6 of short term goals set at evaluation. At this time PT is recommending discharge from skilled physical therapy services 2/2 to lack of progress and cont'd high pain levels. Pt with education on pt to cont to complete stretches at home and to cont to walk and complete HEP at home. Pt with upcoming appointments with pain management and plans to get a spine stimulator for pain. Recommending that she trial this option during PT break. Pt was given information on acupuncture services available with [facility]. Pt may be appropriate for FRP in the future.     Tiarra Is progressing well towards her goals.   Pt prognosis is Fair.        Pt's spiritual, cultural and educational needs considered and pt agreeable to plan of care and goals.     Anticipated barriers to physical therapy: scoliosis, chronicity of condition, minimal relief with healthy back program in past    GOALS: Short Term Goals:  4 weeks  1. Report decreased in pain at worse less than  <   / =  4  /10  to increase tolerance for functional activities. not met  2. Pt to increase B popliteal angle by greater than > or = 5 degrees in order to improve flexibility and posture. Not met  3. Increased B LE MMT 1/2  to increase tolerance for ADL and work activities. MET  4. Pt to increase B Ely's Test by greater than  > or = 5 degrees in order to improve flexibility and posture. Not met  5. Pt to tolerate HEP to improve ROM and independence with ADL's.On MET  6. Pt to improve range of motion by 25% to allow for improved functional mobility to allow for improvement in IADLs. MET     Long Term Goals: 8 weeks  1. Report " decreased in pain at worse less than  <   / =  2  /10  to increase tolerance for functional mobility.  not met  2 .Pt to increase B popliteal angle by greater than > or = 10 degrees in order to improve flexibility and posture. Not met  3. Increased B LE MMT 1 grade to increase tolerance for ADL and work activities. Met  4. Pt will report at 44% or less limitation on FOTO Lumbar spine survey  to demonstrate decrease in disability and improvement in back pain.  Not met  5. Pt to be Independent with HEP to improve ROM and independence with ADL's. Met  6. Pt to increase B Ely's Test by greater than > or = 10 degrees in order to improve flexibility and posture. Not Met  7. Pt to demonstrate negative Prone Instability Test in order to show improved core strength for lumbar stabilization. Not met  8. Pt to improve range of motion by 75% to allow for improved functional mobility to allow for improvement in IADLs. Not met       PLAN     Plan of care Certification: 5/17/2022 to 7/17/2022.  Pt to d/c from skilled physical therapy services at this time.     Alvin Bedoya, PT, DPT  7/18/2022

## 2022-07-19 ENCOUNTER — PATIENT MESSAGE (OUTPATIENT)
Dept: INTERNAL MEDICINE | Facility: CLINIC | Age: 67
End: 2022-07-19
Payer: MEDICARE

## 2022-07-19 ENCOUNTER — OFFICE VISIT (OUTPATIENT)
Dept: PSYCHIATRY | Facility: CLINIC | Age: 67
End: 2022-07-19
Payer: MEDICARE

## 2022-07-19 DIAGNOSIS — F33.0 MAJOR DEPRESSIVE DISORDER, RECURRENT, MILD: Primary | ICD-10-CM

## 2022-07-19 DIAGNOSIS — F41.9 ANXIETY: ICD-10-CM

## 2022-07-19 DIAGNOSIS — F51.01 PRIMARY INSOMNIA: ICD-10-CM

## 2022-07-19 PROCEDURE — 4010F ACE/ARB THERAPY RXD/TAKEN: CPT | Mod: CPTII,95,, | Performed by: PSYCHOLOGIST

## 2022-07-19 PROCEDURE — 90834 PR PSYCHOTHERAPY W/PATIENT, 45 MIN: ICD-10-PCS | Mod: 95,,, | Performed by: PSYCHOLOGIST

## 2022-07-19 PROCEDURE — 4010F PR ACE/ARB THEARPY RXD/TAKEN: ICD-10-PCS | Mod: CPTII,95,, | Performed by: PSYCHOLOGIST

## 2022-07-19 PROCEDURE — 90834 PSYTX W PT 45 MINUTES: CPT | Mod: 95,,, | Performed by: PSYCHOLOGIST

## 2022-07-19 RX ORDER — SUMATRIPTAN 50 MG/1
TABLET, FILM COATED ORAL
Qty: 27 TABLET | Refills: 3 | Status: SHIPPED | OUTPATIENT
Start: 2022-07-19 | End: 2023-02-27 | Stop reason: SDUPTHER

## 2022-07-19 RX ORDER — PROMETHAZINE HYDROCHLORIDE 12.5 MG/1
12.5 TABLET ORAL EVERY 6 HOURS PRN
Qty: 30 TABLET | Refills: 2 | Status: SHIPPED | OUTPATIENT
Start: 2022-07-19 | End: 2023-04-22 | Stop reason: SDUPTHER

## 2022-07-19 NOTE — TELEPHONE ENCOUNTER
No new care gaps identified.  [facility] Embedded Care Gaps. Reference number: [id number]. 7/19/2022   10:24:47 AM PREETHIT

## 2022-07-20 ENCOUNTER — PATIENT MESSAGE (OUTPATIENT)
Dept: INTERNAL MEDICINE | Facility: CLINIC | Age: 67
End: 2022-07-20
Payer: MEDICARE

## 2022-07-20 RX ORDER — HYDROCODONE BITARTRATE AND ACETAMINOPHEN 5; 325 MG/1; MG/1
1 TABLET ORAL
Qty: 30 TABLET | Refills: 0 | Status: SHIPPED | OUTPATIENT
Start: 2022-07-20 | End: 2022-10-07 | Stop reason: SDUPTHER

## 2022-07-20 NOTE — TELEPHONE ENCOUNTER
Patient is requesting for Hydrocodone be filled for her migraines. States she takes this as a back up when Imitrex doesn't work.

## 2022-07-21 ENCOUNTER — CLINICAL SUPPORT (OUTPATIENT)
Dept: REHABILITATION | Facility: HOSPITAL | Age: 67
End: 2022-07-21
Payer: MEDICARE

## 2022-07-21 DIAGNOSIS — G89.29 CHRONIC RIGHT-SIDED LOW BACK PAIN WITHOUT SCIATICA: ICD-10-CM

## 2022-07-21 DIAGNOSIS — M54.50 CHRONIC RIGHT-SIDED LOW BACK PAIN WITHOUT SCIATICA: ICD-10-CM

## 2022-07-21 DIAGNOSIS — M54.9 MID BACK PAIN ON RIGHT SIDE: Primary | ICD-10-CM

## 2022-07-21 PROCEDURE — 97810 ACUP 1/> WO ESTIM 1ST 15 MIN: CPT

## 2022-07-21 PROCEDURE — 97811 ACUP 1/> W/O ESTIM EA ADD 15: CPT

## 2022-07-21 NOTE — PROGRESS NOTES
"  Acupuncture Evaluation Note     Name: Tiarra Norton  Clinic Number: [id number]    TCM Diagnosis: Qi and Blood stagnation in Right Mid Back,and Right Lower Back  Physician: Self, Aaareferral    Date of Service: 7/21/2022     Medical Diagnosis: Right Mid Back Pain and Right Lower Back Pain    Evaluation Date: 7/21/2022    Plan of Care Certification Period: 12  Visit #/Visits authorized: 1/ 12    Precautions: Standard    Subjective     Chief Concern: Right Mid Back Pain and Right Lower Back Pain    Onset:  Right Mid Back Pain for 1 year, Right Lower Back Pain for 4 years                  Progression since onset:  has worsened    Aggravating Factors:  movement   Relieving Factors:  rest    Quality:  Aching and Sharp    Radiation: None    Severity:  8    Frequency:  continuously    Time:      Treatments Tried:  Therapy     Diet/Taste/Fluids:  in general, a "healthy" diet  /is drinking plenty of fluids/denies use    Digestion:  WNL    Head and Body: WNL    Sleep: has Insomnia    Exercise:  Doing some exercise    Energy Levels:  7/10    Emotional Symptoms:  Anxiety    Women's Symptoms:  None    Objective     Tongue:  Red with white fur    Pulse:  Deep weak    Observation:  Looks normal    Treatment     Treatment: Increasing Qi and Blood  in Right Mid Back,and Right Lower Back    Acupuncture points used:      Right: Anh on right mid back + 6, Anh on right lower back +4     Auricular Treatment: None    NEEDLES IN: 10    NEEDLES OUT: 10    NEEDLES W/O STIM  AT: 9:10    NEEDLES W/O STIM REMOVED AT: 9:50 AM    Modalities:   None    Assessment     1. Patient felt very good     Cause of Condition: Scoliosis    Associated Risk Factors:  None    Traditional Chinese Medicine Diagnosis:  Qi and Blood stagnation in Right Middle Back,and Right Lower Back    Patient prognosis is Excellent.     Patient will continue to benefit from acupuncture treatment to address the deficits listed in the problem list box on initial evaluation, " provide patient family education and to maximize pt's level of independence in the home and community environment.     Patient's spiritual, cultural and educational needs considered and pt agreeable to plan of care and goals.     Anticipated barriers to treatment: None    Plan     Recommend 1 sessions per week for 12 sessions then re-assess.      Recommendations:  PT    Education:  Patient does understand the nature of acupuncture treatment

## 2022-07-22 ENCOUNTER — PATIENT MESSAGE (OUTPATIENT)
Dept: SPINE | Facility: CLINIC | Age: 67
End: 2022-07-22
Payer: MEDICARE

## 2022-07-25 ENCOUNTER — OFFICE VISIT (OUTPATIENT)
Dept: PAIN MEDICINE | Facility: CLINIC | Age: 67
End: 2022-07-25
Payer: MEDICARE

## 2022-07-25 ENCOUNTER — PATIENT MESSAGE (OUTPATIENT)
Dept: PAIN MEDICINE | Facility: CLINIC | Age: 67
End: 2022-07-25

## 2022-07-25 VITALS
TEMPERATURE: 98 F | SYSTOLIC BLOOD PRESSURE: 118 MMHG | WEIGHT: 145.94 LBS | DIASTOLIC BLOOD PRESSURE: 65 MMHG | HEIGHT: 63 IN | BODY MASS INDEX: 25.86 KG/M2 | HEART RATE: 60 BPM

## 2022-07-25 DIAGNOSIS — M51.36 DEGENERATIVE DISC DISEASE, LUMBAR: Primary | ICD-10-CM

## 2022-07-25 DIAGNOSIS — M47.817 LUMBOSACRAL SPONDYLOSIS WITHOUT MYELOPATHY: ICD-10-CM

## 2022-07-25 PROCEDURE — 99999 PR PBB SHADOW E&M-EST. PATIENT-LVL IV: ICD-10-PCS | Mod: PBBFAC,,, | Performed by: ANESTHESIOLOGY

## 2022-07-25 PROCEDURE — 1101F PR PT FALLS ASSESS DOC 0-1 FALLS W/OUT INJ PAST YR: ICD-10-PCS | Mod: CPTII,S$GLB,, | Performed by: ANESTHESIOLOGY

## 2022-07-25 PROCEDURE — 3074F SYST BP LT 130 MM HG: CPT | Mod: CPTII,S$GLB,, | Performed by: ANESTHESIOLOGY

## 2022-07-25 PROCEDURE — 3074F PR MOST RECENT SYSTOLIC BLOOD PRESSURE < 130 MM HG: ICD-10-PCS | Mod: CPTII,S$GLB,, | Performed by: ANESTHESIOLOGY

## 2022-07-25 PROCEDURE — 1159F MED LIST DOCD IN RCRD: CPT | Mod: CPTII,S$GLB,, | Performed by: ANESTHESIOLOGY

## 2022-07-25 PROCEDURE — 3078F DIAST BP <80 MM HG: CPT | Mod: CPTII,S$GLB,, | Performed by: ANESTHESIOLOGY

## 2022-07-25 PROCEDURE — 1125F PR PAIN SEVERITY QUANTIFIED, PAIN PRESENT: ICD-10-PCS | Mod: CPTII,S$GLB,, | Performed by: ANESTHESIOLOGY

## 2022-07-25 PROCEDURE — 3008F BODY MASS INDEX DOCD: CPT | Mod: CPTII,S$GLB,, | Performed by: ANESTHESIOLOGY

## 2022-07-25 PROCEDURE — 1160F PR REVIEW ALL MEDS BY PRESCRIBER/CLIN PHARMACIST DOCUMENTED: ICD-10-PCS | Mod: CPTII,S$GLB,, | Performed by: ANESTHESIOLOGY

## 2022-07-25 PROCEDURE — 3078F PR MOST RECENT DIASTOLIC BLOOD PRESSURE < 80 MM HG: ICD-10-PCS | Mod: CPTII,S$GLB,, | Performed by: ANESTHESIOLOGY

## 2022-07-25 PROCEDURE — 4010F ACE/ARB THERAPY RXD/TAKEN: CPT | Mod: CPTII,S$GLB,, | Performed by: ANESTHESIOLOGY

## 2022-07-25 PROCEDURE — 99999 PR PBB SHADOW E&M-EST. PATIENT-LVL IV: CPT | Mod: PBBFAC,,, | Performed by: ANESTHESIOLOGY

## 2022-07-25 PROCEDURE — 1125F AMNT PAIN NOTED PAIN PRSNT: CPT | Mod: CPTII,S$GLB,, | Performed by: ANESTHESIOLOGY

## 2022-07-25 PROCEDURE — 4010F PR ACE/ARB THEARPY RXD/TAKEN: ICD-10-PCS | Mod: CPTII,S$GLB,, | Performed by: ANESTHESIOLOGY

## 2022-07-25 PROCEDURE — 3288F PR FALLS RISK ASSESSMENT DOCUMENTED: ICD-10-PCS | Mod: CPTII,S$GLB,, | Performed by: ANESTHESIOLOGY

## 2022-07-25 PROCEDURE — 1159F PR MEDICATION LIST DOCUMENTED IN MEDICAL RECORD: ICD-10-PCS | Mod: CPTII,S$GLB,, | Performed by: ANESTHESIOLOGY

## 2022-07-25 PROCEDURE — 3288F FALL RISK ASSESSMENT DOCD: CPT | Mod: CPTII,S$GLB,, | Performed by: ANESTHESIOLOGY

## 2022-07-25 PROCEDURE — 99213 PR OFFICE/OUTPT VISIT, EST, LEVL III, 20-29 MIN: ICD-10-PCS | Mod: S$GLB,,, | Performed by: ANESTHESIOLOGY

## 2022-07-25 PROCEDURE — 1160F RVW MEDS BY RX/DR IN RCRD: CPT | Mod: CPTII,S$GLB,, | Performed by: ANESTHESIOLOGY

## 2022-07-25 PROCEDURE — 3008F PR BODY MASS INDEX (BMI) DOCUMENTED: ICD-10-PCS | Mod: CPTII,S$GLB,, | Performed by: ANESTHESIOLOGY

## 2022-07-25 PROCEDURE — 1101F PT FALLS ASSESS-DOCD LE1/YR: CPT | Mod: CPTII,S$GLB,, | Performed by: ANESTHESIOLOGY

## 2022-07-25 PROCEDURE — 99213 OFFICE O/P EST LOW 20 MIN: CPT | Mod: S$GLB,,, | Performed by: ANESTHESIOLOGY

## 2022-07-25 NOTE — PROGRESS NOTES
Virtual Visit  The patient location is: Home ([place])  The chief complaint leading to consultation is: Insomnia    Visit type: audiovisual    Face to Face time with patient: 55 minutes  60 minutes of total time spent on the encounter, which includes face to face time and non-face to face time preparing to see the patient (eg, review of tests), Obtaining and/or reviewing separately obtained history, Documenting clinical information in the electronic or other health record, Independently interpreting results (not separately reported) and communicating results to the patient/family/caregiver, or Care coordination (not separately reported).     Each patient to whom he or she provides medical services by telemedicine is:  (1) informed of the relationship between the physician and patient and the respective role of any other health care provider with respect to management of the patient; and (2) notified that he or she may decline to receive medical services by telemedicine and may withdraw from such care at any time.    Notes: N/A      [facility]  Individual Psychotherapy (PhD/LCSW)    7/26/2022    Site:  [facility]         Therapeutic Intervention: Met with patient.  Outpatient - Insight oriented psychotherapy 60 min - CPT code 55166 and Outpatient - Behavior modifying psychotherapy 45 min - CPT code 84071    Chief complaint/reason for encounter: sleep     Interval history and content of current session:  Ms. Tiarra Norton arrived on time for her scheduled appointment.  She takes two 100 mg tablets of trazodone each night.  She will take 1.5 tablets starting this week (she forgot to do it last week).    Cognitive Behavioral Therapy for Insomnia (CBT-i), Session #2  Sleep Diary completed?  Yes  # of days completed:  7    Session Focus:  Summarize and graph Sleep Diary  SE:  77%  TST:  4.9 hours  TIB:  6.4 hours  Introduce Stimulus Control and Sleep Hygiene  Introduce Sleep Restriction  Prescribed TTB:  11:30  PM  Prescribed TOB:  5:00 AM    Practice Assignments:  1) Review treatment materials as needed.  2) Complete the Sleep Diary every day.  Fill it out within two hours of getting up.  3) Implement Stimulus Control and Sleep Hygiene strategies  4) Implement Sleep Restriction/Compression    Treatment plan:  · Target symptoms: insomnia  · Why chosen therapy is appropriate versus another modality: relevant to diagnosis, evidence based practice  · Outcome monitoring methods: self-report, checklist/rating scale  · Therapeutic intervention type: insight oriented psychotherapy, behavior modifying psychotherapy    Risk parameters:  Patient reports no suicidal ideation  Patient reports no homicidal ideation  Patient reports no self-injurious behavior  Patient reports no violent behavior    Verbal deficits: None    Patient's response to intervention:  The patient's response to intervention is accepting.    Progress toward goals and other mental status changes:  The patient's progress toward goals is fair .    Diagnosis:     ICD-10-CM ICD-9-CM   1. Primary insomnia  F51.01 307.42   2. Major depressive disorder, recurrent, mild  F33.0 296.31   3. Anxiety  F41.9 300.00       Plan:  individual psychotherapy    Return to clinic: 1 week    Length of Service (minutes): 45

## 2022-07-25 NOTE — PROGRESS NOTES
Chronic Pain-Tele-Medicine-Established Note (Follow up visit)      SUBJECTIVE:    PCP: Kaykay Perales MD    REFERRING PHYSICIAN: Tyler Jacobs MD    CHIEF COMPLAINT: Lower back pain       Original HISTORY OF PRESENT ILLNESS: Tiarra Norton presents to the clinic for the evaluation of the above pain. The pain started five years ago.     Original Pain Description:  The pain is located in the lower back and does radiate up towards her upper back.The pain is described as aching, dull and sharp. Initially starts as a dull, aching pain and it gets sharp once she bends down and moves around. Typically when she walks for more than five minutes, the sharp pain radiates up her back. Exacerbating factors: Standing, Bending, Touching, Walking, Night Time, Flexing and Lifting. Mitigating factors heat, ice, laying down and massage. Symptoms interfere with daily activity and sleeping. The patient feels like symptoms have been worsening. Patient denies night fever/night sweats, urinary incontinence, bowel incontinence, significant weight loss, significant motor weakness and loss of sensations.    Original PAIN SCORES:  Best: Pain is 1  Worst: Pain is 10  Usually: Pain is 4  Current: Pain is 4    INTERVAL HISTORY:  4/18/22 Interval History: Patient presents for telehealth visit for follow up following her b/l RFA at L3-L5. She reports 80% relief and is very pleased with her pain relief. Unfortunately, she is not back to doing what she wants due to right knee pain. She is seeing Dr. Huber for knee pain and is s/p right medial compartment partial knee replacement. She is currently in therapy for this. We discussed that we may be able to assist her with her knee pain as well.     Interval History 6/15/22: Tiarra Norton returns for follow up. She continues to feel like she has mild pain sitting or laying down, and has her worst pain with extreme leaning forward to pick something up off the floor. She also has difficulty  leaning over her handlebars to ride her bike or leaning forward to fold clothes or standing up for any period of time. So, we determined that leaning forward is her worst issue. We did previouisly do lmbb and rfa, which has helped with extension, but it would not help with leaning forward. On review of her MRI she has significant multilevel DDD with Modic changes which likely account for her pain.     Interval History 7/25/22: Tiarra Norton returns for follow up. We previously have been discussing treatments for her low back pain that did not resolve with RFA. At our last visit I referred her to Dr. Antony for clearance for a SCS trial. She was considered to be a reasonable candidate. However, in the meantime, her PT referred her to acupuncture with Dr. Jacobs. She had one treatment and already notes 50% relief. She notes that she still has pain, especially with leaning forward, but feels that it is much better controlled. Their plan is one treatment per week but is approved for 20 sessions.     6 weeks of Conservative therapy (PT/Chiro/Home Exercises with Dates)  Healthy back program--> has four sessions left for a total of 20 sessions   Last session was on 1/31/22   Stretches that they taught at [email or web address] gives her relief      Treatments / Medications: (Ice/Heat/NSAIDS/APAP/etc):  Ice and heat which has helped      Report: reviewed       Interventional Pain Procedures: (Previous injections)  3/22/22: RFA Left L3-L5 80% relief  3/8/22:  RFA Right L3-L5 80% relief   Has gotten injections in her knee (orthovisc injections)       Past Medical History:   Diagnosis Date    Cataract     Depression     Gestational diabetes     Hyperlipidemia     Hypertension     IBS (irritable bowel syndrome)     Thyroid disease      Past Surgical History:   Procedure Laterality Date    ADENOIDECTOMY      ARTHROSCOPIC CHONDROPLASTY OF KNEE JOINT Right 10/19/2021    Procedure: ARTHROSCOPY, KNEE, WITH CHONDROPLASTY;  Surgeon:  Trevin Huber MD;  Location: [facility] OR;  Service: Orthopedics;  Laterality: Right;    ARTHROSCOPY OF KNEE Right 10/19/2021    Procedure: ARTHROSCOPY, KNEE-RIGHT;  Surgeon: Trevin Huber MD;  Location: [facility] OR;  Service: Orthopedics;  Laterality: Right;     SECTION      CHOLECYSTECTOMY      COLONOSCOPY N/A 2016    Procedure: COLONOSCOPY;  Surgeon: Heri Segura MD;  Location: [facility] (4TH FLR);  Service: Endoscopy;  Laterality: N/A;    COLONOSCOPY N/A 2019    Procedure: COLONOSCOPY;  Surgeon: Joe Pitts MD;  Location: [facility] ENDO (4TH FLR);  Service: Endoscopy;  Laterality: N/A;    CYSTOSCOPY  2022    Procedure: CYSTOSCOPY;  Surgeon: Lenny Moore MD;  Location: [address];  Service: Urology;;    ESOPHAGOGASTRODUODENOSCOPY N/A 2020    Procedure: EGD (ESOPHAGOGASTRODUODENOSCOPY);  Surgeon: Tolu Rivas MD;  [facility]  Service: Endoscopy;  Laterality: N/A;  Pt. moved to 9:15am arrival time.. Instructed to not have anything after midnight.EC    EYE SURGERY      cataract resection right    INJECTION OF ANESTHETIC AGENT AROUND NERVE Bilateral 2022    Procedure: Block, Nerve MEDIAL BRANCH BLOCK BILATERAL L3,4,5;  Surgeon: Tara Gibbs MD;  Location: [facility] PAIN MGT;  Service: Pain Management;  Laterality: Bilateral;    INJECTION OF ANESTHETIC AGENT AROUND NERVE Bilateral 2/15/2022    Procedure: BLOCK, NERVE, L3*L4-L5 MEDIAL BR ANCH 2 OF 2;  Surgeon: Tara Gibbs MD;  Location: [facility] PAIN MGT;  Service: Pain Management;  Laterality: Bilateral;    INJECTION OF BOTULINUM TOXIN TYPE A  2022    Procedure: BOTULINUM TOXIN INJECTION 100 units;  Surgeon: Lenny Moore MD;  Location: [address];  Service: Urology;;    JOINT REPLACEMENT      partial right knee    KNEE ARTHROSCOPY W/ MENISCECTOMY Right 10/19/2021    Procedure: ARTHROSCOPY, KNEE, WITH MENISCECTOMY, PARTIAL LATERAL;  Surgeon: Trevin Huber  MD;  Location: [facility] OR;  Service: Orthopedics;  Laterality: Right;    r hand surgery      RADIOFREQUENCY ABLATION Right 3/8/2022    Procedure: RADIOFREQUENCY ABLATION, L3-L5 1 OF 2;  Surgeon: Tara Gibbs MD;  Location: [facility] PAIN T;  Service: Pain Management;  Laterality: Right;    RADIOFREQUENCY ABLATION Left 3/22/2022    Procedure: RADIOFREQUENCY ABLATION, l3-+l5 2 of 2;  Surgeon: Tara Gibbs MD;  Location: [facility] PAIN MGT;  Service: Pain Management;  Laterality: Left;    TONSILLECTOMY      TOTAL KNEE ARTHROPLASTY      partial knee replacement    TUBAL LIGATION       Social History     Socioeconomic History    Marital status:     Number of children: 1   Occupational History    Occupation:      Employer: [employer]     Comment: retired   Tobacco Use    Smoking status: Never Smoker    Smokeless tobacco: Never Used   Substance and Sexual Activity    Alcohol use: Yes     Alcohol/week: 3.0 standard drinks     Types: 3 Glasses of wine per week     Comment: weekends    Drug use: No    Sexual activity: Yes     Partners: Male   Other Topics Concern    Are you pregnant or think you may be? No    Breast-feeding No   Social History Narrative    Retired        Swims.       Stationary bike.            Social Determinants of Health     Financial Resource Strain: Low Risk     Difficulty of Paying Living Expenses: Not hard at all   Food Insecurity: No Food Insecurity    Worried About Running Out of Food in the Last Year: Never true    Ran Out of Food in the Last Year: Never true   Transportation Needs: No Transportation Needs    Lack of Transportation (Medical): No    Lack of Transportation (Non-Medical): No   Physical Activity: Insufficiently Active    Days of Exercise per Week: 4 days    Minutes of Exercise per Session: 30 min   Stress: Stress Concern Present    Feeling of Stress : Very much   Social Connections: Unknown    Frequency of Communication with Friends and  Family: More than three times a week    Frequency of Social Gatherings with Friends and Family: Once a week    Active Member of Clubs or Organizations: No    Attends Club or Organization Meetings: More than 4 times per year    Marital Status:      Family History   Problem Relation Age of Onset    Hypertension Mother     Irritable bowel syndrome Mother     Hyperlipidemia Mother     Heart disease Father         mi    Hypertension Father     Cancer Father         prostate    Heart attack Father     Heart failure Father     Hyperlipidemia Father     Alcohol abuse Sister     Depression Sister     Epilepsy Son     Irritable bowel syndrome Sister     Alcohol abuse Sister     Ovarian cancer Maternal Aunt     Breast cancer Paternal Aunt     Breast cancer Maternal Aunt     Melanoma Neg Hx     Colon cancer Neg Hx     Stomach cancer Neg Hx     Esophageal cancer Neg Hx     Liver disease Neg Hx     Crohn's disease Neg Hx     Ulcerative colitis Neg Hx     Celiac disease Neg Hx     Stroke Neg Hx        Review of patient's allergies indicates:  No Known Allergies    Current Outpatient Medications   Medication Sig    alprazolam (XANAX) 2 MG Tab Take 1 mg by mouth 3 (three) times daily as needed.    amLODIPine (NORVASC) 5 MG tablet Take 1 tablet (5 mg total) by mouth once daily.    atorvastatin (LIPITOR) 10 MG tablet TAKE 1 TABLET EVERY DAY    b complex vitamins capsule Take 1 capsule by mouth once daily.    cholecalciferol, vitamin D3, (VITAMIN D3) 5,000 unit Tab Take 1 tablet (5,000 Units total) by mouth once daily.    HYDROcodone-acetaminophen (NORCO) 5-325 mg per tablet Take 1 tablet by mouth every 6 to 8 hours as needed (migraine).    levothyroxine (SYNTHROID) 125 MCG tablet TAKE 1 TABLET(125 MCG) BY MOUTH EVERY MORNING    liothyronine (CYTOMEL) 5 MCG Tab Take 1 tablet (5 mcg total) by mouth once daily.    pantoprazole (PROTONIX) 40 MG tablet Take 1 tablet (40 mg total) by mouth once  daily.    promethazine (PHENERGAN) 12.5 MG Tab Take 1 tablet (12.5 mg total) by mouth every 6 (six) hours as needed (nausea).    RESTASIS 0.05 % ophthalmic emulsion INT 1 GTT IN OU BID    sumatriptan (IMITREX) 50 MG tablet 1 tab po at start of headache.  Repeat in 2 h prn    traZODone (DESYREL) 100 MG tablet TAKE 1 TO 2 TABLETS AT BEDTIME FOR SLEEP    valsartan (DIOVAN) 320 MG tablet Take 1 tablet (320 mg total) by mouth once daily. (STOP LISINOPRIL)    cholestyramine (QUESTRAN) 4 gram packet Take 1 packet (4 g total) by mouth 2 (two) times daily.    oxyCODONE (ROXICODONE) 5 MG immediate release tablet Take 1 tablet (5 mg total) by mouth every 6 (six) hours as needed for Pain.    TRINTELLIX 10 mg Tab Take 1 tablet by mouth once daily.     No current facility-administered medications for this visit.       REVIEW OF SYSTEMS:    GENERAL: No fever. No chills. No fatigue. Denies weight loss. Denies weight gain.  HEENT: Denies headaches. Denies vision change. Denies eye pain. Denies double vision. Denies ear pain.   CV: Denies chest pain.   PULM: Denies of shortness of breath.  GI: Denies constipation. No diarrhea. No abdominal pain. Denies nausea. Denies vomiting. No blood in stool.  HEME: Denies bleeding problems.  : Denies urgency. No painful urination. No blood in urine.  MS: Denies joint stiffness. Denies joint swelling.  +back pain. +Rt Knee Pain  SKIN: Denies rash.   NEURO: Denies seizures. No weakness.  PSYCH:  Denies difficulty sleeping. No anxiety. Denies depression. No suicidal thoughts.     OBJECTIVE:  PRIOR PHYSICAL EXAM:   GENERAL: Well appearing, in no acute distress, alert and oriented x3.  PSYCH:  Mood and affect appropriate.  SKIN: Skin color, texture, turgor normal, no rashes or lesions.  HEENT:  Normocephalic, atraumatic. Cranial nerves grossly intact.  PULM: No evidence of respiratory difficulty, symmetric chest rise.  GI:  Non-distended  BACK: Normal range of motion. Pain to palpation over  the spinous processes (particularly L4-L5) . Minimal pain with facet loading. There is no pain with palpation over the sacroiliac joints bilaterally. Straight leg raising in the supine position is negative to radicular pain.   EXTREMITIES: No deformities, edema, or skin discoloration.   MUSCULOSKELETAL: Tenderness to palpation on her lower back muscles. Bilateral upper and lower extremity strength is normal and symmetric. No atrophy is noted.    NEURO: Sensation is equal and appropriate bilaterally. Bilateral upper and lower extremity coordination and muscle stretch reflexes are physiologic and symmetric. Plantar response are downgoing.   GAIT: hansa no antalgic gait     IMAGING:   Previous XRs showed lumbar spondylosis and degenerative scoliosis measuring 24degrees. L4-5 anterolisthesis measuring 6mm.      MRI lumbar 1/21/22      T12-L1: Circumferential disc bulge and mild facet arthropathy.  No spinal canal stenosis or neural foraminal narrowing.     L1-L2: Circumferential disc bulge and mild facet arthropathy result in mild right neural foraminal narrowing.     L2-L3: Circumferential disc bulge and mild facet arthropathy. No spinal canal stenosis or neural foraminal narrowing.     L3-L4: Circumferential disc bulge and mild facet arthropathy result in moderate spinal canal stenosis and mild left neural foraminal narrowing.     L4-L5: Circumferential disc bulge and moderate facet arthropathy result in mild spinal canal stenosis.     L5-S1: Moderate facet arthropathy.  No spinal canal stenosis or neural foraminal narrowing.     Standing scoliosis XR reviewed - thoracolumbar levoscoliosis approximately 26 degrees     XR KNEE ORTHO RIGHT     CLINICAL HISTORY:  Pain in right knee     TECHNIQUE:  AP standing of both knees, Merchant views of both knees as well as a lateral view of the right knee were performed.     COMPARISON:  06/01/2021.     FINDINGS:  Right: Status post right medial compartment arthroplasty.  No  radiographic evidence of complication.  No acute fracture or dislocation.  Small joint effusion.     Left: No fracture or dislocation on provided views.  Tricompartmental osteophytes with moderate medial tibiofemoral cartilage space narrowing.     Right: There is a medial joint arthroplasty.     Impression:     Postoperative changes of right medial compartment arthroplasty.  No radiographic evidence of complication.     Moderate left medial tibiofemoral cartilage space narrowing.     Electronically signed by resident: Massimo Plunkett  Date:                                            12/06/2021  Time:                                           14:11     Electronically signed by: Fred Adorno MD  Date:                                            12/06/2021  Time:                                           14:20    ASSESSMENT: 67 y.o. year old female with pain, consistent with right knee osteoarthritis.     No diagnosis found.     DISCUSSION: Ms. Norton is a retired . She comes to us with low back pain. MRI shows facet arthropathy, moderate canal stenosis, and multilevel Modic changes T12-L3. Pain reproduced with facet loading and relieved with RFA. However, she continues to have unmanageable pain especially with leaning forward. Having ablated her LMBB, this is likely due to her significant DDD and Modic changes. She also has right knee pain s/p right partial knee replacement and is being treated by Dr. Huber. Dr. Antony found her to be an acceptable candidate for SCS, but she is currently 50% better with acupuncture.     OPIOID MANAGEMENT:  MME: Short Rx for Vacation  Risk:   : Reviewed today. +BZ and medical MJ  Naloxone: NA  Utox:   Violations: None  Contract:    PLAN:   1) Continue acupuncture with Dr. Jacobs  2) Reconsider SCS as needed  3) Schedule follow up in 2 months to assess her pain control.       Tara Gibbs  07/25/2022

## 2022-07-26 ENCOUNTER — OFFICE VISIT (OUTPATIENT)
Dept: PSYCHIATRY | Facility: CLINIC | Age: 67
End: 2022-07-26
Payer: MEDICARE

## 2022-07-26 ENCOUNTER — PATIENT MESSAGE (OUTPATIENT)
Dept: ADMINISTRATIVE | Facility: CLINIC | Age: 67
End: 2022-07-26
Payer: MEDICARE

## 2022-07-26 ENCOUNTER — TELEPHONE (OUTPATIENT)
Dept: ADMINISTRATIVE | Facility: CLINIC | Age: 67
End: 2022-07-26
Payer: MEDICARE

## 2022-07-26 DIAGNOSIS — F51.01 PRIMARY INSOMNIA: Primary | ICD-10-CM

## 2022-07-26 DIAGNOSIS — F41.9 ANXIETY: ICD-10-CM

## 2022-07-26 DIAGNOSIS — F33.0 MAJOR DEPRESSIVE DISORDER, RECURRENT, MILD: ICD-10-CM

## 2022-07-26 PROCEDURE — 4010F ACE/ARB THERAPY RXD/TAKEN: CPT | Mod: CPTII,95,, | Performed by: PSYCHOLOGIST

## 2022-07-26 PROCEDURE — 90837 PR PSYCHOTHERAPY W/PATIENT, 60 MIN: ICD-10-PCS | Mod: 95,,, | Performed by: PSYCHOLOGIST

## 2022-07-26 PROCEDURE — 4010F PR ACE/ARB THEARPY RXD/TAKEN: ICD-10-PCS | Mod: CPTII,95,, | Performed by: PSYCHOLOGIST

## 2022-07-26 PROCEDURE — 90837 PSYTX W PT 60 MINUTES: CPT | Mod: 95,,, | Performed by: PSYCHOLOGIST

## 2022-07-27 ENCOUNTER — PATIENT MESSAGE (OUTPATIENT)
Dept: PSYCHIATRY | Facility: CLINIC | Age: 67
End: 2022-07-27
Payer: MEDICARE

## 2022-07-28 ENCOUNTER — PATIENT MESSAGE (OUTPATIENT)
Dept: PSYCHIATRY | Facility: CLINIC | Age: 67
End: 2022-07-28
Payer: MEDICARE

## 2022-07-28 ENCOUNTER — CLINICAL SUPPORT (OUTPATIENT)
Dept: REHABILITATION | Facility: HOSPITAL | Age: 67
End: 2022-07-28
Payer: MEDICARE

## 2022-07-28 DIAGNOSIS — M54.50 CHRONIC BILATERAL LOW BACK PAIN WITHOUT SCIATICA: ICD-10-CM

## 2022-07-28 DIAGNOSIS — G89.29 MID BACK PAIN, CHRONIC: Primary | ICD-10-CM

## 2022-07-28 DIAGNOSIS — G89.29 CHRONIC BILATERAL LOW BACK PAIN WITHOUT SCIATICA: ICD-10-CM

## 2022-07-28 DIAGNOSIS — M54.9 MID BACK PAIN, CHRONIC: Primary | ICD-10-CM

## 2022-07-28 PROCEDURE — 97810 ACUP 1/> WO ESTIM 1ST 15 MIN: CPT

## 2022-07-28 PROCEDURE — 97811 ACUP 1/> W/O ESTIM EA ADD 15: CPT

## 2022-07-28 NOTE — PROGRESS NOTES
Acupuncture Treatment Note     Name: Tiarra Chang [facility] Number: [id number]    Traditional Chinese Medicine Diagnosis: Qi and Blood stagnation in Mid Back, and Lower Back     Physician: Self, Aaareferral    Date of Service: 7/28/2022     Medical Diagnosis: Pain in Mid Back, and in Lower Back     Evaluation Date: 7/21/2022    Plan of Care Certification Period: 12  Visit #/Visits authorized: 2/ 12     Precautions: Standard    Subjective     Chief Complaint:  Pain in Mid Back, and in Lower Back     Response to Previous Treatment:  Very good    Quality of Symptoms (Better/Worse):  Better    Other Condition/Symptoms:  None    Objective      New Findings:  None    Treatment Principles:  Increasing Qi and Blood  in  Mid Back, and  Lower Back    Acupuncture Points:     Bilateral:  Anh on right mid back + 10, Anh on right lower back +8     NEEDLES W/O STIM  AT: 10:00 AM    NEEDLES W/O STIM REMOVED AT: 10:30 AM    #NEEDLES IN: 36    #NEEDLES OUT: 36    Other Traditional Chinese Medicine Modalities:  None    Recommendations:  PT    Education:  Patient is aware of cumulative effect of acupuncture      Assessment      Analysis of Treatment:  Patient felt very good    Pt prognosis is Good.     Patient will continue to benefit from acupuncture treatment to address the deficits listed in the problem list box on initial evaluation, provide patient family education and to maximize pt's level of independence in the home and community environment.     Patient's spiritual, cultural and educational needs considered and pt agreeable to plan of care and goals.     Anticipated barriers to treatment: None    Plan     Recommend   1-2     /week for 12   treatments and re-assess.

## 2022-07-31 ENCOUNTER — PATIENT MESSAGE (OUTPATIENT)
Dept: OBSTETRICS AND GYNECOLOGY | Facility: CLINIC | Age: 67
End: 2022-07-31
Payer: MEDICARE

## 2022-08-01 ENCOUNTER — PATIENT MESSAGE (OUTPATIENT)
Dept: INTERNAL MEDICINE | Facility: CLINIC | Age: 67
End: 2022-08-01
Payer: MEDICARE

## 2022-08-01 ENCOUNTER — PATIENT MESSAGE (OUTPATIENT)
Dept: REHABILITATION | Facility: OTHER | Age: 67
End: 2022-08-01
Payer: MEDICARE

## 2022-08-01 ENCOUNTER — PATIENT MESSAGE (OUTPATIENT)
Dept: UROLOGY | Facility: CLINIC | Age: 67
End: 2022-08-01
Payer: MEDICARE

## 2022-08-02 ENCOUNTER — PATIENT MESSAGE (OUTPATIENT)
Dept: ORTHOPEDICS | Facility: CLINIC | Age: 67
End: 2022-08-02
Payer: MEDICARE

## 2022-08-03 ENCOUNTER — PATIENT MESSAGE (OUTPATIENT)
Dept: ORTHOPEDICS | Facility: CLINIC | Age: 67
End: 2022-08-03
Payer: MEDICARE

## 2022-08-03 DIAGNOSIS — Z96.651 S/P RIGHT UNICOMPARTMENTAL KNEE REPLACEMENT: Primary | ICD-10-CM

## 2022-08-03 DIAGNOSIS — M17.11 PRIMARY OSTEOARTHRITIS OF RIGHT KNEE: ICD-10-CM

## 2022-08-04 ENCOUNTER — PATIENT MESSAGE (OUTPATIENT)
Dept: ORTHOPEDICS | Facility: CLINIC | Age: 67
End: 2022-08-04
Payer: MEDICARE

## 2022-08-04 ENCOUNTER — PATIENT MESSAGE (OUTPATIENT)
Dept: REHABILITATION | Facility: OTHER | Age: 67
End: 2022-08-04
Payer: MEDICARE

## 2022-08-04 ENCOUNTER — CLINICAL SUPPORT (OUTPATIENT)
Dept: REHABILITATION | Facility: HOSPITAL | Age: 67
End: 2022-08-04
Payer: MEDICARE

## 2022-08-04 DIAGNOSIS — G89.29 MID BACK PAIN, CHRONIC: Primary | ICD-10-CM

## 2022-08-04 DIAGNOSIS — M54.9 MID BACK PAIN, CHRONIC: Primary | ICD-10-CM

## 2022-08-04 PROCEDURE — 97811 ACUP 1/> W/O ESTIM EA ADD 15: CPT

## 2022-08-04 PROCEDURE — 97810 ACUP 1/> WO ESTIM 1ST 15 MIN: CPT

## 2022-08-04 NOTE — PROGRESS NOTES
Acupuncture Treatment Note     Name: Tiarra Chang [facility] Number: [id number]    Traditional Chinese Medicine Diagnosis: Qi and Blood stagnation in Mid Back  Physician: Self, Aaareferral    Date of Service: 8/4/2022     Medical Diagnosis: Pain in Mid Back    Evaluation Date: 7/21/2022    Plan of Care Certification Period: 12  Visit #/Visits authorized: 3 / 12     Precautions: Standard    Subjective     Chief Complaint:  Pain in Mid Back,     Response to Previous Treatment:  Very good    Quality of Symptoms (Better/Worse):  Better    Other Condition/Symptoms:  None    Objective      New Findings:  None    Treatment Principles:  Increasing Qi and Blood  in  Mid Back    Acupuncture Points:     Bilateral:  Anh on right mid back + 12,    NEEDLES W/O STIM  AT: 10:00 AM    NEEDLES W/O STIM REMOVED AT: 10:30 AM    #NEEDLES IN: 24    #NEEDLES OUT: 24    Other Traditional Chinese Medicine Modalities:  None    Recommendations:  PT    Education:  Patient is aware of cumulative effect of acupuncture      Assessment      Analysis of Treatment:  Patient felt very good    Pt prognosis is Good.     Patient will continue to benefit from acupuncture treatment to address the deficits listed in the problem list box on initial evaluation, provide patient family education and to maximize pt's level of independence in the home and community environment.     Patient's spiritual, cultural and educational needs considered and pt agreeable to plan of care and goals.     Anticipated barriers to treatment: None    Plan     Recommend   1-2     /week for 12   treatments and re-assess.

## 2022-08-05 ENCOUNTER — PATIENT MESSAGE (OUTPATIENT)
Dept: ORTHOPEDICS | Facility: CLINIC | Age: 67
End: 2022-08-05
Payer: MEDICARE

## 2022-08-06 ENCOUNTER — PATIENT MESSAGE (OUTPATIENT)
Dept: UROLOGY | Facility: CLINIC | Age: 67
End: 2022-08-06
Payer: MEDICARE

## 2022-08-06 ENCOUNTER — PATIENT MESSAGE (OUTPATIENT)
Dept: PAIN MEDICINE | Facility: CLINIC | Age: 67
End: 2022-08-06
Payer: MEDICARE

## 2022-08-06 ENCOUNTER — PATIENT MESSAGE (OUTPATIENT)
Dept: INTERNAL MEDICINE | Facility: CLINIC | Age: 67
End: 2022-08-06
Payer: MEDICARE

## 2022-08-08 ENCOUNTER — PATIENT MESSAGE (OUTPATIENT)
Dept: ORTHOPEDICS | Facility: CLINIC | Age: 67
End: 2022-08-08
Payer: MEDICARE

## 2022-08-08 ENCOUNTER — TELEPHONE (OUTPATIENT)
Dept: ORTHOPEDICS | Facility: CLINIC | Age: 67
End: 2022-08-08
Payer: MEDICARE

## 2022-08-08 NOTE — PROGRESS NOTES
Virtual Visit  The patient location is: Home ([place])  The chief complaint leading to consultation is: Insomnia    Visit type: audiovisual    Face to Face time with patient: 55 minutes  60 minutes of total time spent on the encounter, which includes face to face time and non-face to face time preparing to see the patient (eg, review of tests), Obtaining and/or reviewing separately obtained history, Documenting clinical information in the electronic or other health record, Independently interpreting results (not separately reported) and communicating results to the patient/family/caregiver, or Care coordination (not separately reported).     Each patient to whom he or she provides medical services by telemedicine is:  (1) informed of the relationship between the physician and patient and the respective role of any other health care provider with respect to management of the patient; and (2) notified that he or she may decline to receive medical services by telemedicine and may withdraw from such care at any time.    Notes: N/A      [facility]  Individual Psychotherapy (PhD/LCSW)    8/9/2022    Site:  [facility]         Therapeutic Intervention: Met with patient.  Outpatient - Insight oriented psychotherapy 60 min - CPT code 46307 and Outpatient - Behavior modifying psychotherapy 60 min - CPT code 72788    Chief complaint/reason for encounter: sleep     Interval history and content of current session:  Ms. Tiarra Norton arrived on time for her scheduled appointment.  She takes two 100 mg tablets of trazodone each night.  She will take 1 tablets starting this week.    Cognitive Behavioral Therapy for Insomnia (CBT-i), Session #3  Sleep Diary completed?  Yes  # of days completed:  14  Week 1:  SE:  66.8%  TST:  4 hours  TIB:  6 hours    Session Focus:  Summarize and graph Sleep Diary  Discuss Sleep Restriction/Compression  Prescribed TTB:  10:30 PM  Prescribed TOB:  4:00 AM  Review progress with Stimulus  Control and Sleep Hygiene  Introduce Relaxation Training     Practice Assignments:  1) Review treatment materials as needed.  2) Complete the Sleep Diary every day.  Fill it out within two hours of getting up.  3) Implement Stimulus Control and Sleep Hygiene strategies  4) Implement Sleep Restriction/Compression  5) Practice Relaxation Training at least 20 minutes per day    Treatment plan:  · Target symptoms: insomnia  · Why chosen therapy is appropriate versus another modality: relevant to diagnosis, evidence based practice  · Outcome monitoring methods: self-report, checklist/rating scale  · Therapeutic intervention type: insight oriented psychotherapy, behavior modifying psychotherapy    Risk parameters:  Patient reports no suicidal ideation  Patient reports no homicidal ideation  Patient reports no self-injurious behavior  Patient reports no violent behavior    Verbal deficits: None    Patient's response to intervention:  The patient's response to intervention is accepting.    Progress toward goals and other mental status changes:  The patient's progress toward goals is fair .    Diagnosis:     ICD-10-CM ICD-9-CM   1. Primary insomnia  F51.01 307.42   2. Anxiety  F41.9 300.00   3. Major depressive disorder, recurrent, mild  F33.0 296.31       Plan:  individual psychotherapy    Return to clinic: 1 week    Length of Service (minutes): 60

## 2022-08-08 NOTE — TELEPHONE ENCOUNTER
Contacted the patient to move her up on the schedule as she is having excruciating pain. She is scheduled for 9/19.

## 2022-08-09 ENCOUNTER — PATIENT MESSAGE (OUTPATIENT)
Dept: PSYCHIATRY | Facility: CLINIC | Age: 67
End: 2022-08-09
Payer: MEDICARE

## 2022-08-09 ENCOUNTER — OFFICE VISIT (OUTPATIENT)
Dept: PSYCHIATRY | Facility: CLINIC | Age: 67
End: 2022-08-09
Payer: MEDICARE

## 2022-08-09 DIAGNOSIS — F33.0 MAJOR DEPRESSIVE DISORDER, RECURRENT, MILD: ICD-10-CM

## 2022-08-09 DIAGNOSIS — F41.9 ANXIETY: ICD-10-CM

## 2022-08-09 DIAGNOSIS — F51.01 PRIMARY INSOMNIA: Primary | ICD-10-CM

## 2022-08-09 PROCEDURE — 4010F PR ACE/ARB THEARPY RXD/TAKEN: ICD-10-PCS | Mod: CPTII,95,, | Performed by: PSYCHOLOGIST

## 2022-08-09 PROCEDURE — 4010F ACE/ARB THERAPY RXD/TAKEN: CPT | Mod: CPTII,95,, | Performed by: PSYCHOLOGIST

## 2022-08-09 PROCEDURE — 90837 PR PSYCHOTHERAPY W/PATIENT, 60 MIN: ICD-10-PCS | Mod: 95,,, | Performed by: PSYCHOLOGIST

## 2022-08-09 PROCEDURE — 90837 PSYTX W PT 60 MINUTES: CPT | Mod: 95,,, | Performed by: PSYCHOLOGIST

## 2022-08-10 ENCOUNTER — PATIENT MESSAGE (OUTPATIENT)
Dept: INTERNAL MEDICINE | Facility: CLINIC | Age: 67
End: 2022-08-10

## 2022-08-10 ENCOUNTER — OFFICE VISIT (OUTPATIENT)
Dept: INTERNAL MEDICINE | Facility: CLINIC | Age: 67
End: 2022-08-10
Payer: MEDICARE

## 2022-08-10 ENCOUNTER — PATIENT MESSAGE (OUTPATIENT)
Dept: DERMATOLOGY | Facility: CLINIC | Age: 67
End: 2022-08-10
Payer: MEDICARE

## 2022-08-10 ENCOUNTER — LAB VISIT (OUTPATIENT)
Dept: LAB | Facility: HOSPITAL | Age: 67
End: 2022-08-10
Attending: INTERNAL MEDICINE
Payer: MEDICARE

## 2022-08-10 VITALS
OXYGEN SATURATION: 100 % | HEIGHT: 63 IN | WEIGHT: 138.44 LBS | SYSTOLIC BLOOD PRESSURE: 124 MMHG | DIASTOLIC BLOOD PRESSURE: 74 MMHG | BODY MASS INDEX: 24.53 KG/M2 | HEART RATE: 66 BPM

## 2022-08-10 DIAGNOSIS — I10 HYPERTENSION, UNSPECIFIED TYPE: ICD-10-CM

## 2022-08-10 DIAGNOSIS — F33.0 MAJOR DEPRESSIVE DISORDER, RECURRENT, MILD: ICD-10-CM

## 2022-08-10 DIAGNOSIS — M54.9 BACK PAIN, UNSPECIFIED BACK LOCATION, UNSPECIFIED BACK PAIN LATERALITY, UNSPECIFIED CHRONICITY: ICD-10-CM

## 2022-08-10 DIAGNOSIS — E03.8 OTHER SPECIFIED HYPOTHYROIDISM: ICD-10-CM

## 2022-08-10 DIAGNOSIS — F51.01 PRIMARY INSOMNIA: ICD-10-CM

## 2022-08-10 DIAGNOSIS — R63.4 UNINTENDED WEIGHT LOSS: ICD-10-CM

## 2022-08-10 DIAGNOSIS — R63.4 UNINTENDED WEIGHT LOSS: Primary | ICD-10-CM

## 2022-08-10 DIAGNOSIS — F41.9 ANXIETY: ICD-10-CM

## 2022-08-10 LAB
ALBUMIN SERPL BCP-MCNC: 4.1 G/DL (ref 3.5–5.2)
ALP SERPL-CCNC: 76 U/L (ref 55–135)
ALT SERPL W/O P-5'-P-CCNC: 21 U/L (ref 10–44)
ANION GAP SERPL CALC-SCNC: 10 MMOL/L (ref 8–16)
AST SERPL-CCNC: 19 U/L (ref 10–40)
BILIRUB SERPL-MCNC: 0.7 MG/DL (ref 0.1–1)
BUN SERPL-MCNC: 8 MG/DL (ref 8–23)
CALCIUM SERPL-MCNC: 9.9 MG/DL (ref 8.7–10.5)
CHLORIDE SERPL-SCNC: 103 MMOL/L (ref 95–110)
CO2 SERPL-SCNC: 25 MMOL/L (ref 23–29)
CREAT SERPL-MCNC: 0.8 MG/DL (ref 0.5–1.4)
ERYTHROCYTE [DISTWIDTH] IN BLOOD BY AUTOMATED COUNT: 12.3 % (ref 11.5–14.5)
EST. GFR  (NO RACE VARIABLE): >60 ML/MIN/1.73 M^2
GLUCOSE SERPL-MCNC: 84 MG/DL (ref 70–110)
HCT VFR BLD AUTO: 39.3 % (ref 37–48.5)
HGB BLD-MCNC: 13.2 G/DL (ref 12–16)
MCH RBC QN AUTO: 30.6 PG (ref 27–31)
MCHC RBC AUTO-ENTMCNC: 33.6 G/DL (ref 32–36)
MCV RBC AUTO: 91 FL (ref 82–98)
PLATELET # BLD AUTO: 284 K/UL (ref 150–450)
PMV BLD AUTO: 9.9 FL (ref 9.2–12.9)
POTASSIUM SERPL-SCNC: 4 MMOL/L (ref 3.5–5.1)
PROT SERPL-MCNC: 7 G/DL (ref 6–8.4)
RBC # BLD AUTO: 4.31 M/UL (ref 4–5.4)
SODIUM SERPL-SCNC: 138 MMOL/L (ref 136–145)
TSH SERPL DL<=0.005 MIU/L-ACNC: 0.75 UIU/ML (ref 0.4–4)
WBC # BLD AUTO: 6.6 K/UL (ref 3.9–12.7)

## 2022-08-10 PROCEDURE — 80053 COMPREHEN METABOLIC PANEL: CPT | Performed by: INTERNAL MEDICINE

## 2022-08-10 PROCEDURE — 99999 PR PBB SHADOW E&M-EST. PATIENT-LVL IV: CPT | Mod: PBBFAC,,, | Performed by: INTERNAL MEDICINE

## 2022-08-10 PROCEDURE — 3008F BODY MASS INDEX DOCD: CPT | Mod: CPTII,S$GLB,, | Performed by: INTERNAL MEDICINE

## 2022-08-10 PROCEDURE — 99397 PER PM REEVAL EST PAT 65+ YR: CPT | Mod: S$GLB,,, | Performed by: INTERNAL MEDICINE

## 2022-08-10 PROCEDURE — 36415 COLL VENOUS BLD VENIPUNCTURE: CPT | Performed by: INTERNAL MEDICINE

## 2022-08-10 PROCEDURE — 99999 PR PBB SHADOW E&M-EST. PATIENT-LVL IV: ICD-10-PCS | Mod: PBBFAC,,, | Performed by: INTERNAL MEDICINE

## 2022-08-10 PROCEDURE — 1160F PR REVIEW ALL MEDS BY PRESCRIBER/CLIN PHARMACIST DOCUMENTED: ICD-10-PCS | Mod: CPTII,S$GLB,, | Performed by: INTERNAL MEDICINE

## 2022-08-10 PROCEDURE — 1160F RVW MEDS BY RX/DR IN RCRD: CPT | Mod: CPTII,S$GLB,, | Performed by: INTERNAL MEDICINE

## 2022-08-10 PROCEDURE — 1125F AMNT PAIN NOTED PAIN PRSNT: CPT | Mod: CPTII,S$GLB,, | Performed by: INTERNAL MEDICINE

## 2022-08-10 PROCEDURE — 3078F PR MOST RECENT DIASTOLIC BLOOD PRESSURE < 80 MM HG: ICD-10-PCS | Mod: CPTII,S$GLB,, | Performed by: INTERNAL MEDICINE

## 2022-08-10 PROCEDURE — 3074F PR MOST RECENT SYSTOLIC BLOOD PRESSURE < 130 MM HG: ICD-10-PCS | Mod: CPTII,S$GLB,, | Performed by: INTERNAL MEDICINE

## 2022-08-10 PROCEDURE — 4010F PR ACE/ARB THEARPY RXD/TAKEN: ICD-10-PCS | Mod: CPTII,S$GLB,, | Performed by: INTERNAL MEDICINE

## 2022-08-10 PROCEDURE — 3008F PR BODY MASS INDEX (BMI) DOCUMENTED: ICD-10-PCS | Mod: CPTII,S$GLB,, | Performed by: INTERNAL MEDICINE

## 2022-08-10 PROCEDURE — 99397 PR PREVENTIVE VISIT,EST,65 & OVER: ICD-10-PCS | Mod: S$GLB,,, | Performed by: INTERNAL MEDICINE

## 2022-08-10 PROCEDURE — 3288F PR FALLS RISK ASSESSMENT DOCUMENTED: ICD-10-PCS | Mod: CPTII,S$GLB,, | Performed by: INTERNAL MEDICINE

## 2022-08-10 PROCEDURE — 1101F PR PT FALLS ASSESS DOC 0-1 FALLS W/OUT INJ PAST YR: ICD-10-PCS | Mod: CPTII,S$GLB,, | Performed by: INTERNAL MEDICINE

## 2022-08-10 PROCEDURE — 85027 COMPLETE CBC AUTOMATED: CPT | Performed by: INTERNAL MEDICINE

## 2022-08-10 PROCEDURE — 84443 ASSAY THYROID STIM HORMONE: CPT | Performed by: INTERNAL MEDICINE

## 2022-08-10 PROCEDURE — 1125F PR PAIN SEVERITY QUANTIFIED, PAIN PRESENT: ICD-10-PCS | Mod: CPTII,S$GLB,, | Performed by: INTERNAL MEDICINE

## 2022-08-10 PROCEDURE — 3288F FALL RISK ASSESSMENT DOCD: CPT | Mod: CPTII,S$GLB,, | Performed by: INTERNAL MEDICINE

## 2022-08-10 PROCEDURE — 1159F MED LIST DOCD IN RCRD: CPT | Mod: CPTII,S$GLB,, | Performed by: INTERNAL MEDICINE

## 2022-08-10 PROCEDURE — 1159F PR MEDICATION LIST DOCUMENTED IN MEDICAL RECORD: ICD-10-PCS | Mod: CPTII,S$GLB,, | Performed by: INTERNAL MEDICINE

## 2022-08-10 PROCEDURE — 1101F PT FALLS ASSESS-DOCD LE1/YR: CPT | Mod: CPTII,S$GLB,, | Performed by: INTERNAL MEDICINE

## 2022-08-10 PROCEDURE — 3078F DIAST BP <80 MM HG: CPT | Mod: CPTII,S$GLB,, | Performed by: INTERNAL MEDICINE

## 2022-08-10 PROCEDURE — 4010F ACE/ARB THERAPY RXD/TAKEN: CPT | Mod: CPTII,S$GLB,, | Performed by: INTERNAL MEDICINE

## 2022-08-10 PROCEDURE — 3074F SYST BP LT 130 MM HG: CPT | Mod: CPTII,S$GLB,, | Performed by: INTERNAL MEDICINE

## 2022-08-10 NOTE — PROGRESS NOTES
"Subjective:       Patient ID: Tiarra Norton is a 67 y.o. female.    Chief Complaint: Follow-up    HPI   She is concerned about her weight loss.    She is down to xanax 3 mg daily.  She is eating much less.  She has stopped all antidepressants.   However remains depressed and anxious.  In therapy, and also see a psychiatrist.         She c/o severe back pain, which adds to her depression.  She is considering spinal stimulator.    To get a 2nd opinion from Dr Bee.       Accupuncture gives only fleeting results.  Dr Stone did not rec'd surgery for scoliosis.  She could not tolerate Lyrica.      She has been approved for spinal stimulator.           She is getting CBT from MEHRAN Antony for insomnia.          Wt is 138,   145 in June.  150 in May.  She had been in high 140's for a while previously.    She has lost 12 lbs over last year.  2/20 was 182.             Also with knee pain.  S/o partial replacement.            Chronic depression. Intolerant of "all"  SSRi's.        Her diarrhea is a little better.  She eats less b/c she dreads inducing diarrhea with eating.  Review of Systems   Constitutional: Negative for fever and unexpected weight change.   HENT: Negative for nasal congestion and postnasal drip.    Respiratory: Negative for chest tightness, shortness of breath and wheezing.    Cardiovascular: Negative for chest pain and leg swelling.   Gastrointestinal: Positive for diarrhea. Negative for abdominal pain, anal bleeding, constipation, nausea and vomiting.   Genitourinary: Negative for dysuria and urgency.   Musculoskeletal: Positive for arthralgias and back pain.   Integumentary:  Negative for rash.   Neurological: Negative for headaches.   Psychiatric/Behavioral: Positive for dysphoric mood and sleep disturbance. The patient is nervous/anxious.          Objective:      Physical Exam  Constitutional:       General: She is not in acute distress.     Appearance: She is well-developed. She is not " ill-appearing, toxic-appearing or diaphoretic.   Eyes:      General: No scleral icterus.  Neck:      Thyroid: No thyromegaly.      Vascular: No JVD.   Cardiovascular:      Rate and Rhythm: Normal rate and regular rhythm.      Heart sounds: Normal heart sounds.   Pulmonary:      Effort: Pulmonary effort is normal. No respiratory distress.      Breath sounds: Normal breath sounds. No stridor. No wheezing or rales.   Abdominal:      Palpations: Abdomen is soft. There is no mass.      Tenderness: There is no abdominal tenderness. There is no guarding or rebound.   Musculoskeletal:      Right lower leg: No edema.      Left lower leg: No edema.   Neurological:      Mental Status: She is alert and oriented to person, place, and time.      Comments: Depressed affect.   Psychiatric:         Behavior: Behavior normal.         Assessment:       Problem List Items Addressed This Visit     Primary insomnia    Major depressive disorder, recurrent, mild    Hypothyroid    HTN (hypertension)    Back pain    Anxiety      Other Visit Diagnoses     Unintended weight loss    -  Primary    Relevant Orders    TSH    CBC Without Differential    Comprehensive Metabolic Panel    CT Abdomen Pelvis  Without Contrast    CT Chest With Contrast          Plan:       Tiarra was seen today for follow-up.    Diagnoses and all orders for this visit:    Unintended weight loss  -     TSH; Future  -     CBC Without Differential; Future  -     Comprehensive Metabolic Panel; Future  -     CT Abdomen Pelvis  Without Contrast; Future  -     CT Chest With Contrast; Future    Hypertension, unspecified type    Other specified hypothyroidism    Anxiety    Major depressive disorder, recurrent, mild    Primary insomnia    Back pain, unspecified back location, unspecified back pain laterality, unspecified chronicity         Reassurance and encouragement.

## 2022-08-11 ENCOUNTER — CLINICAL SUPPORT (OUTPATIENT)
Dept: REHABILITATION | Facility: HOSPITAL | Age: 67
End: 2022-08-11
Payer: MEDICARE

## 2022-08-11 DIAGNOSIS — G89.29 MID BACK PAIN, CHRONIC: Primary | ICD-10-CM

## 2022-08-11 DIAGNOSIS — M54.9 MID BACK PAIN, CHRONIC: Primary | ICD-10-CM

## 2022-08-11 PROCEDURE — 97811 ACUP 1/> W/O ESTIM EA ADD 15: CPT

## 2022-08-11 PROCEDURE — 97810 ACUP 1/> WO ESTIM 1ST 15 MIN: CPT

## 2022-08-11 NOTE — PROGRESS NOTES
Acupuncture Treatment Note     Name: Tiarra Chang [facility] Number: [id number]    Traditional Chinese Medicine Diagnosis: Qi and Blood stagnation in Mid Back  Physician: Self, Aaareferral    Date of Service: 8/11/2022     Medical Diagnosis: Pain in Mid Back    Evaluation Date: 7/21/2022    Plan of Care Certification Period: 12  Visit #/Visits authorized: 4 / 12     Precautions: Standard    Subjective     Chief Complaint:  Pain in Mid Back,     Response to Previous Treatment:  Very good    Quality of Symptoms (Better/Worse):  Better    Other Condition/Symptoms:  None    Objective      New Findings:  None    Treatment Principles:  Increasing Qi and Blood  in  Mid Back    Acupuncture Points:     Bilateral:  Anh on right mid back + 6,    NEEDLES W/O STIM  AT: 10:00 AM    NEEDLES W/O STIM REMOVED AT: 10:30 AM    #NEEDLES IN: 12    #NEEDLES OUT: 12    Other Traditional Chinese Medicine Modalities:  None    Recommendations:  PT    Education:  Patient is aware of cumulative effect of acupuncture      Assessment      Analysis of Treatment:  Patient felt very good    Pt prognosis is Good.     Patient will continue to benefit from acupuncture treatment to address the deficits listed in the problem list box on initial evaluation, provide patient family education and to maximize pt's level of independence in the home and community environment.     Patient's spiritual, cultural and educational needs considered and pt agreeable to plan of care and goals.     Anticipated barriers to treatment: None    Plan     Recommend   1-2     /week for 12   treatments and re-assess.

## 2022-08-12 ENCOUNTER — PATIENT MESSAGE (OUTPATIENT)
Dept: REHABILITATION | Facility: OTHER | Age: 67
End: 2022-08-12
Payer: MEDICARE

## 2022-08-15 ENCOUNTER — CLINICAL SUPPORT (OUTPATIENT)
Dept: REHABILITATION | Facility: HOSPITAL | Age: 67
End: 2022-08-15
Payer: MEDICARE

## 2022-08-15 ENCOUNTER — PES CALL (OUTPATIENT)
Dept: ADMINISTRATIVE | Facility: CLINIC | Age: 67
End: 2022-08-15
Payer: MEDICARE

## 2022-08-15 DIAGNOSIS — G89.29 MID BACK PAIN, CHRONIC: Primary | ICD-10-CM

## 2022-08-15 DIAGNOSIS — M54.9 MID BACK PAIN, CHRONIC: Primary | ICD-10-CM

## 2022-08-15 PROCEDURE — 97810 ACUP 1/> WO ESTIM 1ST 15 MIN: CPT | Mod: PN

## 2022-08-15 PROCEDURE — 97811 ACUP 1/> W/O ESTIM EA ADD 15: CPT | Mod: PN

## 2022-08-15 NOTE — PROGRESS NOTES
Acupuncture Treatment Note     Name: Tiarra Chang [facility] Number: [id number]    Traditional Chinese Medicine Diagnosis: Qi and Blood stagnation in Mid Back  Physician: Self, Aaareferral    Date of Service: 8/15/2022     Medical Diagnosis: Pain in Mid Back    Evaluation Date: 7/21/2022    Plan of Care Certification Period: 12  Visit #/Visits authorized: 5 / 12     Precautions: Standard    Subjective     Chief Complaint:  Pain in Mid Back,     Response to Previous Treatment:  good    Quality of Symptoms (Better/Worse):  Better    Other Condition/Symptoms:  None    Objective      New Findings:  None    Treatment Principles:  Increasing Qi and Blood  in  Mid Back    Acupuncture Points:     Bilateral:  Anh on mid back + 10,    NEEDLES W/O STIM  AT: 1:10 PM    NEEDLES W/O STIM REMOVED AT: 1:50 PM    #NEEDLES IN: 20    #NEEDLES OUT: 20    Other Traditional Chinese Medicine Modalities:  None    Recommendations:  PT    Education:  Patient is aware of cumulative effect of acupuncture      Assessment      Analysis of Treatment:  Patient felt very good    Pt prognosis is Good.     Patient will continue to benefit from acupuncture treatment to address the deficits listed in the problem list box on initial evaluation, provide patient family education and to maximize pt's level of independence in the home and community environment.     Patient's spiritual, cultural and educational needs considered and pt agreeable to plan of care and goals.     Anticipated barriers to treatment: None    Plan     Recommend   1-2     /week for 12   treatments and re-assess.

## 2022-08-16 ENCOUNTER — OFFICE VISIT (OUTPATIENT)
Dept: PSYCHIATRY | Facility: CLINIC | Age: 67
End: 2022-08-16
Payer: MEDICARE

## 2022-08-16 DIAGNOSIS — F33.0 MAJOR DEPRESSIVE DISORDER, RECURRENT, MILD: ICD-10-CM

## 2022-08-16 DIAGNOSIS — F51.01 PRIMARY INSOMNIA: Primary | ICD-10-CM

## 2022-08-16 DIAGNOSIS — F41.9 ANXIETY: ICD-10-CM

## 2022-08-16 PROCEDURE — 4010F ACE/ARB THERAPY RXD/TAKEN: CPT | Mod: CPTII,95,, | Performed by: PSYCHOLOGIST

## 2022-08-16 PROCEDURE — 4010F PR ACE/ARB THEARPY RXD/TAKEN: ICD-10-PCS | Mod: CPTII,95,, | Performed by: PSYCHOLOGIST

## 2022-08-16 PROCEDURE — 90837 PR PSYCHOTHERAPY W/PATIENT, 60 MIN: ICD-10-PCS | Mod: 95,,, | Performed by: PSYCHOLOGIST

## 2022-08-16 PROCEDURE — 90837 PSYTX W PT 60 MINUTES: CPT | Mod: 95,,, | Performed by: PSYCHOLOGIST

## 2022-08-17 ENCOUNTER — PATIENT MESSAGE (OUTPATIENT)
Dept: PAIN MEDICINE | Facility: CLINIC | Age: 67
End: 2022-08-17
Payer: MEDICARE

## 2022-08-19 ENCOUNTER — CLINICAL SUPPORT (OUTPATIENT)
Dept: REHABILITATION | Facility: HOSPITAL | Age: 67
End: 2022-08-19
Payer: MEDICARE

## 2022-08-19 ENCOUNTER — TELEPHONE (OUTPATIENT)
Dept: PAIN MEDICINE | Facility: CLINIC | Age: 67
End: 2022-08-19
Payer: MEDICARE

## 2022-08-19 DIAGNOSIS — G89.29 MID BACK PAIN, CHRONIC: Primary | ICD-10-CM

## 2022-08-19 DIAGNOSIS — M54.9 MID BACK PAIN, CHRONIC: Primary | ICD-10-CM

## 2022-08-19 PROCEDURE — 97810 ACUP 1/> WO ESTIM 1ST 15 MIN: CPT

## 2022-08-19 PROCEDURE — 97811 ACUP 1/> W/O ESTIM EA ADD 15: CPT

## 2022-08-19 NOTE — TELEPHONE ENCOUNTER
Staff spoke with patient and confirmed appt on 8/22/22 @ 2:30 pm with  on the 9th floor [address]. Pt verbalized understanding.

## 2022-08-19 NOTE — PROGRESS NOTES
Virtual Visit  The patient location is: Home ([place])  The chief complaint leading to consultation is: Insomnia    Visit type: audiovisual    Face to Face time with patient: 55 minutes  60 minutes of total time spent on the encounter, which includes face to face time and non-face to face time preparing to see the patient (eg, review of tests), Obtaining and/or reviewing separately obtained history, Documenting clinical information in the electronic or other health record, Independently interpreting results (not separately reported) and communicating results to the patient/family/caregiver, or Care coordination (not separately reported).     Each patient to whom he or she provides medical services by telemedicine is:  (1) informed of the relationship between the physician and patient and the respective role of any other health care provider with respect to management of the patient; and (2) notified that he or she may decline to receive medical services by telemedicine and may withdraw from such care at any time.    Notes: N/A      [facility]  Individual Psychotherapy (PhD/LCSW)    8/23/2022    Site:  [facility]         Therapeutic Intervention: Met with patient.  Outpatient - Insight oriented psychotherapy 60 min - CPT code 95317 and Outpatient - Behavior modifying psychotherapy 60 min - CPT code 16503    Chief complaint/reason for encounter: sleep     Interval history and content of current session:  Ms. Tiarra Norton arrived on time for her scheduled appointment.  She has been taking 1.5 of the 100 mg tablets of trazodone each night.  She will take 1 tablet starting tonight.    Cognitive Behavioral Therapy for Insomnia (CBT-i), Session #5  Sleep Diary completed?  Yes  # of days completed:  7  SE:  88.8%  TST:  5.3 hours  TIB:  6 hours    Session Focus:  Summarize and graph Sleep Diary  Discuss Sleep Restriction/Compression  Prescribed TTB:  10:00 PM  Prescribed TOB:  4:00 AM  Review progress with Stimulus  Control and Sleep Hygiene  Review progress with Relaxation Training  Review Cognitive Restructuring     Practice Assignment:  1) Review treatment materials as needed.  2) Complete the Sleep Diary every day.  Fill it out within two hours of getting up.  3) Implement Stimulus Control and Sleep Hygiene strategies  4) Implement Sleep Restriction/Compression  5) Practice Relaxation Training at least 20 minutes per day  6) Practice Cognitive Restructuring    Treatment plan:  · Target symptoms: insomnia  · Why chosen therapy is appropriate versus another modality: relevant to diagnosis, evidence based practice  · Outcome monitoring methods: self-report, checklist/rating scale  · Therapeutic intervention type: insight oriented psychotherapy, behavior modifying psychotherapy    Risk parameters:  Patient reports no suicidal ideation  Patient reports no homicidal ideation  Patient reports no self-injurious behavior  Patient reports no violent behavior    Verbal deficits: None    Patient's response to intervention:  The patient's response to intervention is accepting.    Progress toward goals and other mental status changes:  The patient's progress toward goals is fair .    Diagnosis:     ICD-10-CM ICD-9-CM   1. Primary insomnia  F51.01 307.42   2. Major depressive disorder, recurrent, mild  F33.0 296.31   3. Anxiety  F41.9 300.00       Plan:  individual psychotherapy    Return to clinic: 1 week    Length of Service (minutes): 60

## 2022-08-19 NOTE — PROGRESS NOTES
Acupuncture Treatment Note     Name: Tiarra Chang [facility] Number: [id number]    Traditional Chinese Medicine Diagnosis: Qi and Blood stagnation in Mid Back  Physician: Self, Aaareferral    Date of Service: 8/19/2022     Medical Diagnosis: Pain in Mid Back    Evaluation Date: 7/21/2022    Plan of Care Certification Period: 12  Visit #/Visits authorized: 6 / 12     Precautions: Standard    Subjective     Chief Complaint:  Pain in Mid Back,     Response to Previous Treatment:  good    Quality of Symptoms (Better/Worse):  Better    Other Condition/Symptoms:  None    Objective      New Findings:  None    Treatment Principles:  Increasing Qi and Blood  in  Mid Back    Acupuncture Points:     Bilateral:  Anh on mid back + 21,    NEEDLES W/O STIM  AT: 9:10 AM    NEEDLES W/O STIM REMOVED AT: 9:40 PM    #NEEDLES IN: 21    #NEEDLES OUT: 21    Other Traditional Chinese Medicine Modalities:  None    Recommendations:  PT    Education:  Patient is aware of cumulative effect of acupuncture      Assessment      Analysis of Treatment:  Patient felt very good    Pt prognosis is Good.     Patient will continue to benefit from acupuncture treatment to address the deficits listed in the problem list box on initial evaluation, provide patient family education and to maximize pt's level of independence in the home and community environment.     Patient's spiritual, cultural and educational needs considered and pt agreeable to plan of care and goals.     Anticipated barriers to treatment: None    Plan     Recommend   1-2     /week for 12   treatments and re-assess.

## 2022-08-20 ENCOUNTER — HOSPITAL ENCOUNTER (OUTPATIENT)
Dept: RADIOLOGY | Facility: HOSPITAL | Age: 67
Discharge: HOME | End: 2022-08-20
Attending: INTERNAL MEDICINE
Payer: MEDICARE

## 2022-08-20 DIAGNOSIS — R63.4 UNINTENDED WEIGHT LOSS: ICD-10-CM

## 2022-08-20 PROCEDURE — 25500020 PHARM REV CODE 255: Performed by: INTERNAL MEDICINE

## 2022-08-20 PROCEDURE — 74177 CT CHEST ABDOMEN PELVIS WITH CONTRAST (XPD): ICD-10-PCS | Mod: 26,,, | Performed by: RADIOLOGY

## 2022-08-20 PROCEDURE — 71260 CT THORAX DX C+: CPT | Mod: 26,,, | Performed by: RADIOLOGY

## 2022-08-20 PROCEDURE — A9698 NON-RAD CONTRAST MATERIALNOC: HCPCS | Performed by: INTERNAL MEDICINE

## 2022-08-20 PROCEDURE — 71260 CT CHEST ABDOMEN PELVIS WITH CONTRAST (XPD): ICD-10-PCS | Mod: 26,,, | Performed by: RADIOLOGY

## 2022-08-20 PROCEDURE — 74177 CT ABD & PELVIS W/CONTRAST: CPT | Mod: TC

## 2022-08-20 PROCEDURE — 71260 CT THORAX DX C+: CPT | Mod: TC

## 2022-08-20 PROCEDURE — 74177 CT ABD & PELVIS W/CONTRAST: CPT | Mod: 26,,, | Performed by: RADIOLOGY

## 2022-08-20 RX ADMIN — BARIUM SULFATE 450 ML: 20 SUSPENSION ORAL at 11:08

## 2022-08-20 RX ADMIN — IOHEXOL 100 ML: 350 INJECTION, SOLUTION INTRAVENOUS at 11:08

## 2022-08-22 ENCOUNTER — CLINICAL SUPPORT (OUTPATIENT)
Dept: REHABILITATION | Facility: HOSPITAL | Age: 67
End: 2022-08-22
Payer: MEDICARE

## 2022-08-22 ENCOUNTER — OFFICE VISIT (OUTPATIENT)
Dept: PAIN MEDICINE | Facility: CLINIC | Age: 67
End: 2022-08-22
Payer: MEDICARE

## 2022-08-22 VITALS
HEIGHT: 63 IN | DIASTOLIC BLOOD PRESSURE: 77 MMHG | HEART RATE: 72 BPM | SYSTOLIC BLOOD PRESSURE: 133 MMHG | RESPIRATION RATE: 18 BRPM | BODY MASS INDEX: 25.01 KG/M2 | WEIGHT: 141.13 LBS

## 2022-08-22 DIAGNOSIS — M54.9 MID BACK PAIN, CHRONIC: Primary | ICD-10-CM

## 2022-08-22 DIAGNOSIS — M51.36 DEGENERATIVE DISC DISEASE, LUMBAR: ICD-10-CM

## 2022-08-22 DIAGNOSIS — G89.29 MID BACK PAIN, CHRONIC: Primary | ICD-10-CM

## 2022-08-22 DIAGNOSIS — M48.061 SPINAL STENOSIS OF LUMBAR REGION WITHOUT NEUROGENIC CLAUDICATION: ICD-10-CM

## 2022-08-22 DIAGNOSIS — G89.29 OTHER CHRONIC PAIN: Primary | ICD-10-CM

## 2022-08-22 PROCEDURE — 97811 ACUP 1/> W/O ESTIM EA ADD 15: CPT | Mod: PN

## 2022-08-22 PROCEDURE — 99999 PR PBB SHADOW E&M-EST. PATIENT-LVL III: CPT | Mod: PBBFAC,,, | Performed by: ANESTHESIOLOGY

## 2022-08-22 PROCEDURE — 3288F FALL RISK ASSESSMENT DOCD: CPT | Mod: CPTII,S$GLB,, | Performed by: ANESTHESIOLOGY

## 2022-08-22 PROCEDURE — 1159F PR MEDICATION LIST DOCUMENTED IN MEDICAL RECORD: ICD-10-PCS | Mod: CPTII,S$GLB,, | Performed by: ANESTHESIOLOGY

## 2022-08-22 PROCEDURE — 99214 OFFICE O/P EST MOD 30 MIN: CPT | Mod: S$GLB,,, | Performed by: ANESTHESIOLOGY

## 2022-08-22 PROCEDURE — 3075F SYST BP GE 130 - 139MM HG: CPT | Mod: CPTII,S$GLB,, | Performed by: ANESTHESIOLOGY

## 2022-08-22 PROCEDURE — 99214 PR OFFICE/OUTPT VISIT, EST, LEVL IV, 30-39 MIN: ICD-10-PCS | Mod: S$GLB,,, | Performed by: ANESTHESIOLOGY

## 2022-08-22 PROCEDURE — 99999 PR PBB SHADOW E&M-EST. PATIENT-LVL III: ICD-10-PCS | Mod: PBBFAC,,, | Performed by: ANESTHESIOLOGY

## 2022-08-22 PROCEDURE — 1159F MED LIST DOCD IN RCRD: CPT | Mod: CPTII,S$GLB,, | Performed by: ANESTHESIOLOGY

## 2022-08-22 PROCEDURE — 3008F BODY MASS INDEX DOCD: CPT | Mod: CPTII,S$GLB,, | Performed by: ANESTHESIOLOGY

## 2022-08-22 PROCEDURE — 1125F AMNT PAIN NOTED PAIN PRSNT: CPT | Mod: CPTII,S$GLB,, | Performed by: ANESTHESIOLOGY

## 2022-08-22 PROCEDURE — 3078F DIAST BP <80 MM HG: CPT | Mod: CPTII,S$GLB,, | Performed by: ANESTHESIOLOGY

## 2022-08-22 PROCEDURE — 3075F PR MOST RECENT SYSTOLIC BLOOD PRESS GE 130-139MM HG: ICD-10-PCS | Mod: CPTII,S$GLB,, | Performed by: ANESTHESIOLOGY

## 2022-08-22 PROCEDURE — 97810 ACUP 1/> WO ESTIM 1ST 15 MIN: CPT | Mod: PN

## 2022-08-22 PROCEDURE — 1125F PR PAIN SEVERITY QUANTIFIED, PAIN PRESENT: ICD-10-PCS | Mod: CPTII,S$GLB,, | Performed by: ANESTHESIOLOGY

## 2022-08-22 PROCEDURE — 1101F PR PT FALLS ASSESS DOC 0-1 FALLS W/OUT INJ PAST YR: ICD-10-PCS | Mod: CPTII,S$GLB,, | Performed by: ANESTHESIOLOGY

## 2022-08-22 PROCEDURE — 4010F ACE/ARB THERAPY RXD/TAKEN: CPT | Mod: CPTII,S$GLB,, | Performed by: ANESTHESIOLOGY

## 2022-08-22 PROCEDURE — 3288F PR FALLS RISK ASSESSMENT DOCUMENTED: ICD-10-PCS | Mod: CPTII,S$GLB,, | Performed by: ANESTHESIOLOGY

## 2022-08-22 PROCEDURE — 4010F PR ACE/ARB THEARPY RXD/TAKEN: ICD-10-PCS | Mod: CPTII,S$GLB,, | Performed by: ANESTHESIOLOGY

## 2022-08-22 PROCEDURE — 3008F PR BODY MASS INDEX (BMI) DOCUMENTED: ICD-10-PCS | Mod: CPTII,S$GLB,, | Performed by: ANESTHESIOLOGY

## 2022-08-22 PROCEDURE — 3078F PR MOST RECENT DIASTOLIC BLOOD PRESSURE < 80 MM HG: ICD-10-PCS | Mod: CPTII,S$GLB,, | Performed by: ANESTHESIOLOGY

## 2022-08-22 PROCEDURE — 1101F PT FALLS ASSESS-DOCD LE1/YR: CPT | Mod: CPTII,S$GLB,, | Performed by: ANESTHESIOLOGY

## 2022-08-22 NOTE — PROGRESS NOTES
Acupuncture Treatment Note     Name: Tiarra Chang [facility] Number: [id number]    Traditional Chinese Medicine Diagnosis: Qi and Blood stagnation in Mid Back  Physician: Self, Aaareferral    Date of Service: 8/22/2022     Medical Diagnosis: Pain in Mid Back    Evaluation Date: 7/21/2022    Plan of Care Certification Period: 12  Visit #/Visits authorized: 7 / 12     Precautions: Standard    Subjective     Chief Complaint:  Pain in Mid Back,     Response to Previous Treatment:  good    Quality of Symptoms (Better/Worse):  Better    Other Condition/Symptoms:  None    Objective      New Findings:  None    Treatment Principles:  Increasing Qi and Blood  in  Mid Back    Acupuncture Points:     Bilateral:  Anh on mid back + 27,    NEEDLES W/O STIM  AT: 10:10 AM    NEEDLES W/O STIM REMOVED AT: 11:00 PM    #NEEDLES IN: 27    #NEEDLES OUT: 27    Other Traditional Chinese Medicine Modalities:  None    Recommendations:  PT    Education:  Patient is aware of cumulative effect of acupuncture      Assessment      Analysis of Treatment:  Patient felt very good    Pt prognosis is Good.     Patient will continue to benefit from acupuncture treatment to address the deficits listed in the problem list box on initial evaluation, provide patient family education and to maximize pt's level of independence in the home and community environment.     Patient's spiritual, cultural and educational needs considered and pt agreeable to plan of care and goals.     Anticipated barriers to treatment: None    Plan     Recommend   1-2     /week for 12   treatments and re-assess.

## 2022-08-22 NOTE — PROGRESS NOTES
Chronic Pain-Tele-Medicine-Established Note (Follow up visit)      SUBJECTIVE:    PCP: Kaykay Perales MD    REFERRING PHYSICIAN: Tyler Jacobs MD    CHIEF COMPLAINT: Lower back pain       Original HISTORY OF PRESENT ILLNESS: Tiarra Norton presents to the clinic for the evaluation of the above pain. The pain started five years ago.     Original Pain Description:  The pain is located in the lower back and does radiate up towards her upper back.The pain is described as aching, dull and sharp. Initially starts as a dull, aching pain and it gets sharp once she bends down and moves around. Typically when she walks for more than five minutes, the sharp pain radiates up her back. Exacerbating factors: Standing, Bending, Touching, Walking, Night Time, Flexing and Lifting. Mitigating factors heat, ice, laying down and massage. Symptoms interfere with daily activity and sleeping. The patient feels like symptoms have been worsening. Patient denies night fever/night sweats, urinary incontinence, bowel incontinence, significant weight loss, significant motor weakness and loss of sensations.    Original PAIN SCORES:  Best: Pain is 1  Worst: Pain is 10  Usually: Pain is 4  Current: Pain is 4    INTERVAL HISTORY:  4/18/22 Interval History: Patient presents for telehealth visit for follow up following her b/l RFA at L3-L5. She reports 80% relief and is very pleased with her pain relief. Unfortunately, she is not back to doing what she wants due to right knee pain. She is seeing Dr. Huber for knee pain and is s/p right medial compartment partial knee replacement. She is currently in therapy for this. We discussed that we may be able to assist her with her knee pain as well.     Interval History 6/15/22: Tiarra Norton returns for follow up. She continues to feel like she has mild pain sitting or laying down, and has her worst pain with extreme leaning forward to pick something up off the floor. She also has difficulty  leaning over her handlebars to ride her bike or leaning forward to fold clothes or standing up for any period of time. So, we determined that leaning forward is her worst issue. We did previouisly do lmbb and rfa, which has helped with extension, but it would not help with leaning forward. On review of her MRI she has significant multilevel DDD with Modic changes which likely account for her pain.     Interval History 7/25/22: Tiarra Norton returns for follow up. We previously have been discussing treatments for her low back pain that did not resolve with RFA. At our last visit I referred her to Dr. Antony for clearance for a SCS trial. She was considered to be a reasonable candidate. However, in the meantime, her PT referred her to acupuncture with Dr. Jacobs. She had one treatment and already notes 50% relief. She notes that she still has pain, especially with leaning forward, but feels that it is much better controlled. Their plan is one treatment per week but is approved for 20 sessions.     Interval History 8/22/22: Ms. Norton returns for follow up on her low back pain. At our last visit she felt cured by acupuncture. Unfortunately, this only lasted for 24 hours. She returns today looking for other options to make life livable. Patient claims all of her pain in the low back and middle back. We discussed that this will work best for her low back pain.       6 weeks of Conservative therapy (PT/Chiro/Home Exercises with Dates)  [facility] program--> has four sessions left for a total of 20 sessions   Last session was on 1/31/22   Stretches that they taught at health back gives her relief      Treatments / Medications: (Ice/Heat/NSAIDS/APAP/etc):  Ice and heat which has helped      Report: reviewed       Interventional Pain Procedures: (Previous injections)  3/22/22: RFA Left L3-L5 80% relief  3/8/22:  RFA Right L3-L5 80% relief   Has gotten injections in her knee (orthovisc injections)       Past Medical  History:   Diagnosis Date    Cataract     Depression     Gestational diabetes     Hyperlipidemia     Hypertension     IBS (irritable bowel syndrome)     Thyroid disease      Past Surgical History:   Procedure Laterality Date    ADENOIDECTOMY      ARTHROSCOPIC CHONDROPLASTY OF KNEE JOINT Right 10/19/2021    Procedure: ARTHROSCOPY, KNEE, WITH CHONDROPLASTY;  Surgeon: Trevin Huber MD;  Location: [facility] OR;  Service: Orthopedics;  Laterality: Right;    ARTHROSCOPY OF KNEE Right 10/19/2021    Procedure: ARTHROSCOPY, KNEE-RIGHT;  Surgeon: Trevin Huber MD;  Location: [facility] OR;  Service: Orthopedics;  Laterality: Right;     SECTION      CHOLECYSTECTOMY      COLONOSCOPY N/A 2016    Procedure: COLONOSCOPY;  Surgeon: Heri Segura MD;  Location: [facility] (4TH FLR);  Service: Endoscopy;  Laterality: N/A;    COLONOSCOPY N/A 2019    Procedure: COLONOSCOPY;  Surgeon: Joe Pitts MD;  Location: [facility] (4TH FLR);  Service: Endoscopy;  Laterality: N/A;    CYSTOSCOPY  2022    Procedure: CYSTOSCOPY;  Surgeon: Lenny Moore MD;  Location: [facility] 1ST FLR;  Service: Urology;;    ESOPHAGOGASTRODUODENOSCOPY N/A 2020    Procedure: EGD (ESOPHAGOGASTRODUODENOSCOPY);  Surgeon: Tolu Rivas MD;  Location: [facility] (4TH FLR);  Service: Endoscopy;  Laterality: N/A;  Pt. moved to 9:15am arrival time.. Instructed to not have anything after midnight.EC    EYE SURGERY      cataract resection right    INJECTION OF ANESTHETIC AGENT AROUND NERVE Bilateral 2022    Procedure: Block, Nerve MEDIAL BRANCH BLOCK BILATERAL L3,4,5;  Surgeon: Tara Gibbs MD;  [place] [facility] [place];  Service: Pain Management;  Laterality: Bilateral;    INJECTION OF ANESTHETIC AGENT AROUND NERVE Bilateral 2/15/2022    Procedure: BLOCK, NERVE, L3*L4-L5 MEDIAL BR ANCH 2 OF 2;  Surgeon: Tara Gibbs MD;  [place] [facility] [place];  Service: Pain Management;  Laterality: Bilateral;     INJECTION OF BOTULINUM TOXIN TYPE A  6/21/2022    Procedure: BOTULINUM TOXIN INJECTION 100 units;  Surgeon: Lenny Moore MD;  [facility];  Service: Urology;;    JOINT REPLACEMENT      partial right knee    KNEE ARTHROSCOPY W/ MENISCECTOMY Right 10/19/2021    Procedure: ARTHROSCOPY, KNEE, WITH MENISCECTOMY, PARTIAL LATERAL;  Surgeon: Trevin Huber MD;  Location: [facility] OR;  Service: Orthopedics;  Laterality: Right;    r hand surgery      RADIOFREQUENCY ABLATION Right 3/8/2022    Procedure: RADIOFREQUENCY ABLATION, L3-L5 1 OF 2;  Surgeon: Tara Gibbs MD;  Location: [facility] PAIN MGT;  Service: Pain Management;  Laterality: Right;    RADIOFREQUENCY ABLATION Left 3/22/2022    Procedure: RADIOFREQUENCY ABLATION, l3-+l5 2 of 2;  Surgeon: Tara Gibbs MD;  Location: [facility] PAIN MGT;  Service: Pain Management;  Laterality: Left;    TONSILLECTOMY      TOTAL KNEE ARTHROPLASTY      partial knee replacement    TUBAL LIGATION       Social History     Socioeconomic History    Marital status:     Number of children: 1   Occupational History    Occupation:      Employer: [employer]     Comment: retired   Tobacco Use    Smoking status: Never Smoker    Smokeless tobacco: Never Used   Substance and Sexual Activity    Alcohol use: Yes     Alcohol/week: 3.0 standard drinks     Types: 3 Glasses of wine per week     Comment: weekends    Drug use: No    Sexual activity: Yes     Partners: Male   Other Topics Concern    Are you pregnant or think you may be? No    Breast-feeding No   Social History Narrative    Retired        [employer].       Stationary bike.            Social Determinants of Health     Financial Resource Strain: Low Risk     Difficulty of Paying Living Expenses: Not hard at all   Food Insecurity: No Food Insecurity    Worried About Running Out of Food in the Last Year: Never true    Ran Out of Food in the Last Year: Never true   Transportation Needs:  No Transportation Needs    Lack of Transportation (Medical): No    Lack of Transportation (Non-Medical): No   Physical Activity: Insufficiently Active    Days of Exercise per Week: 4 days    Minutes of Exercise per Session: 20 min   Stress: Stress Concern Present    Feeling of Stress : Very much   Social Connections: Unknown    Frequency of Communication with Friends and Family: Twice a week    Active Member of Clubs or Organizations: No    Attends Club or Organization Meetings: Never    Marital Status:    Housing Stability: Low Risk     Unable to Pay for Housing in the Last Year: No    Number of Places Lived in the Last Year: 1    Unstable Housing in the Last Year: No     Family History   Problem Relation Age of Onset    Hypertension Mother     Irritable bowel syndrome Mother     Hyperlipidemia Mother     Heart disease Father         mi    Hypertension Father     Cancer Father         prostate    Heart attack Father     Heart failure Father     Hyperlipidemia Father     Alcohol abuse Sister     Depression Sister     Epilepsy Son     Irritable bowel syndrome Sister     Alcohol abuse Sister     Ovarian cancer Maternal Aunt     Breast cancer Paternal Aunt     Breast cancer Maternal Aunt     Melanoma Neg Hx     Colon cancer Neg Hx     Stomach cancer Neg Hx     Esophageal cancer Neg Hx     Liver disease Neg Hx     Crohn's disease Neg Hx     Ulcerative colitis Neg Hx     Celiac disease Neg Hx     Stroke Neg Hx        Review of patient's allergies indicates:  No Known Allergies    Current Outpatient Medications   Medication Sig    alprazolam (XANAX) 2 MG Tab Take 1 mg by mouth 3 (three) times daily as needed.    amLODIPine (NORVASC) 5 MG tablet Take 1 tablet (5 mg total) by mouth once daily.    atorvastatin (LIPITOR) 10 MG tablet TAKE 1 TABLET EVERY DAY    b complex vitamins capsule Take 1 capsule by mouth once daily.    cholecalciferol, vitamin D3, (VITAMIN D3) 5,000 unit  Tab Take 1 tablet (5,000 Units total) by mouth once daily.    conjugated estrogens (PREMARIN) vaginal cream Place 0.5 g vaginally twice a week. Place a pea-sized amount in vagina every night for 4 weeks, then use 2-3 nights a week    HYDROcodone-acetaminophen (NORCO) 5-325 mg per tablet Take 1 tablet by mouth every 6 to 8 hours as needed (migraine).    levothyroxine (SYNTHROID) 125 MCG tablet TAKE 1 TABLET(125 MCG) BY MOUTH EVERY MORNING    liothyronine (CYTOMEL) 5 MCG Tab Take 1 tablet (5 mcg total) by mouth once daily.    pantoprazole (PROTONIX) 40 MG tablet Take 1 tablet (40 mg total) by mouth once daily.    promethazine (PHENERGAN) 12.5 MG Tab Take 1 tablet (12.5 mg total) by mouth every 6 (six) hours as needed (nausea).    RESTASIS 0.05 % ophthalmic emulsion INT 1 GTT IN OU BID    sumatriptan (IMITREX) 50 MG tablet 1 tab po at start of headache.  Repeat in 2 h prn    traZODone (DESYREL) 100 MG tablet TAKE 1 TO 2 TABLETS AT BEDTIME FOR SLEEP    valsartan (DIOVAN) 320 MG tablet Take 1 tablet (320 mg total) by mouth once daily. (STOP LISINOPRIL)     No current facility-administered medications for this visit.       REVIEW OF SYSTEMS:    GENERAL: No fever. No chills. No fatigue. Denies weight loss. Denies weight gain.  HEENT: Denies headaches. Denies vision change. Denies eye pain. Denies double vision. Denies ear pain.   CV: Denies chest pain.   PULM: Denies of shortness of breath.  GI: Denies constipation. No diarrhea. No abdominal pain. Denies nausea. Denies vomiting. No blood in stool.  HEME: Denies bleeding problems.  : Denies urgency. No painful urination. No blood in urine.  MS: Denies joint stiffness. Denies joint swelling.  +back pain. +Rt Knee Pain  SKIN: Denies rash.   NEURO: Denies seizures. No weakness.  PSYCH:  Denies difficulty sleeping. No anxiety. Denies depression. No suicidal thoughts.     OBJECTIVE:  PRIOR PHYSICAL EXAM:   GENERAL: Well appearing, in no acute distress, alert and  oriented x3.  PSYCH:  Mood and affect appropriate.  SKIN: Skin color, texture, turgor normal, no rashes or lesions.  HEENT:  Normocephalic, atraumatic. Cranial nerves grossly intact.  PULM: No evidence of respiratory difficulty, symmetric chest rise.  GI:  Non-distended  BACK: Normal range of motion. Pain to palpation over the spinous processes (particularly L4-L5) . Moderate pain with facet loading. There is no pain with palpation over the sacroiliac joints bilaterally. Straight leg raising in the supine position is negative to radicular pain.   EXTREMITIES: No deformities, edema, or skin discoloration.   MUSCULOSKELETAL: Tenderness to palpation on her lower back muscles. Bilateral upper and lower extremity strength is normal and symmetric. No atrophy is noted.    NEURO: Sensation is equal and appropriate bilaterally. Bilateral upper and lower extremity coordination and muscle stretch reflexes are physiologic and symmetric. Plantar response are downgoing.   GAIT: hansa no antalgic gait     IMAGING:   Previous XRs showed lumbar spondylosis and degenerative scoliosis measuring 24degrees. L4-5 anterolisthesis measuring 6mm.      MRI lumbar 1/21/22      T12-L1: Circumferential disc bulge and mild facet arthropathy.  No spinal canal stenosis or neural foraminal narrowing.     L1-L2: Circumferential disc bulge and mild facet arthropathy result in mild right neural foraminal narrowing.     L2-L3: Circumferential disc bulge and mild facet arthropathy. No spinal canal stenosis or neural foraminal narrowing.     L3-L4: Circumferential disc bulge and mild facet arthropathy result in moderate spinal canal stenosis and mild left neural foraminal narrowing.     L4-L5: Circumferential disc bulge and moderate facet arthropathy result in mild spinal canal stenosis.     L5-S1: Moderate facet arthropathy.  No spinal canal stenosis or neural foraminal narrowing.     Standing scoliosis XR reviewed - thoracolumbar levoscoliosis  approximately 26 degrees     XR KNEE ORTHO RIGHT     CLINICAL HISTORY:  Pain in right knee     TECHNIQUE:  AP standing of both knees, Merchant views of both knees as well as a lateral view of the right knee were performed.     COMPARISON:  06/01/2021.     FINDINGS:  Right: Status post right medial compartment arthroplasty.  No radiographic evidence of complication.  No acute fracture or dislocation.  Small joint effusion.     Left: No fracture or dislocation on provided views.  Tricompartmental osteophytes with moderate medial tibiofemoral cartilage space narrowing.     Right: There is a medial joint arthroplasty.     Impression:     Postoperative changes of right medial compartment arthroplasty.  No radiographic evidence of complication.     Moderate left medial tibiofemoral cartilage space narrowing.     Electronically signed by resident: Massimo Plunkett  Date:                                            12/06/2021  Time:                                           14:11     Electronically signed by: Fred Adorno MD  Date:                                            12/06/2021  Time:                                           14:20    ASSESSMENT: 67 y.o. year old female with pain, consistent with right knee osteoarthritis.     1. Other chronic pain  Procedure Order to Pain Management   2. Spinal stenosis of lumbar region without neurogenic claudication  Procedure Order to Pain Management   3. Degenerative disc disease, lumbar  Procedure Order to Pain Management        DISCUSSION: Ms. Norton is a retired . She comes to us with low back pain. MRI shows facet arthropathy, moderate canal stenosis, and multilevel Modic changes T12-L3. Pain reproduced with facet loading and relieved with RFA. However, she continues to have unmanageable pain especially with leaning forward. Having ablated her LMBB, this is likely due to her significant DDD and Modic changes. She also has right knee pain s/p right partial knee  replacement and is being treated by Dr. Huber. Dr. Antony found her to be an acceptable candidate for SCS.    OPIOID MANAGEMENT:  MME: Short Rx for Vacation  Risk:   : Reviewed today. +BZ and medical MJ  Naloxone: NA  Utox:   Violations: None  Contract:    PLAN:   1) Scheduled Spinal Cord Stimulator- Nevro trial implant.   2) Scheduled f/u appointment for 1 week after trial implant for removal and to discuss pain relief  3) Will move forward with SCS implant if relief is adequate and procedure is indicated  4) Consider oral pain medication if trial is inadequate in providing relief   5) Continue acupuncture with Dr. Arlene Agudelo, MS- IV  08/22/2022

## 2022-08-23 ENCOUNTER — TELEPHONE (OUTPATIENT)
Dept: DERMATOLOGY | Facility: CLINIC | Age: 67
End: 2022-08-23

## 2022-08-23 ENCOUNTER — OFFICE VISIT (OUTPATIENT)
Dept: PSYCHIATRY | Facility: CLINIC | Age: 67
End: 2022-08-23
Payer: MEDICARE

## 2022-08-23 DIAGNOSIS — F51.01 PRIMARY INSOMNIA: Primary | ICD-10-CM

## 2022-08-23 DIAGNOSIS — F33.0 MAJOR DEPRESSIVE DISORDER, RECURRENT, MILD: ICD-10-CM

## 2022-08-23 DIAGNOSIS — F41.9 ANXIETY: ICD-10-CM

## 2022-08-23 PROCEDURE — 90837 PSYTX W PT 60 MINUTES: CPT | Mod: 95,,, | Performed by: PSYCHOLOGIST

## 2022-08-23 PROCEDURE — 4010F PR ACE/ARB THEARPY RXD/TAKEN: ICD-10-PCS | Mod: CPTII,95,, | Performed by: PSYCHOLOGIST

## 2022-08-23 PROCEDURE — 90837 PR PSYCHOTHERAPY W/PATIENT, 60 MIN: ICD-10-PCS | Mod: 95,,, | Performed by: PSYCHOLOGIST

## 2022-08-23 PROCEDURE — 4010F ACE/ARB THERAPY RXD/TAKEN: CPT | Mod: CPTII,95,, | Performed by: PSYCHOLOGIST

## 2022-08-23 NOTE — TELEPHONE ENCOUNTER
Appt rescheduled for pt----- Message from Tiny Hannon sent at 8/23/2022  1:39 PM CDT -----  Type: Requesting to speak with nurse         Who Called: PT  Regarding: pt needing to reschedule appt please   Would the patient rather a call back or a response via [facility]? Call back  Best Call Back Number: [phone]  Additional Information: n/a

## 2022-08-24 ENCOUNTER — TELEPHONE (OUTPATIENT)
Dept: ADMINISTRATIVE | Facility: CLINIC | Age: 67
End: 2022-08-24
Payer: MEDICARE

## 2022-08-25 ENCOUNTER — TELEPHONE (OUTPATIENT)
Dept: DERMATOLOGY | Facility: CLINIC | Age: 67
End: 2022-08-25
Payer: MEDICARE

## 2022-08-29 ENCOUNTER — CLINICAL SUPPORT (OUTPATIENT)
Dept: REHABILITATION | Facility: HOSPITAL | Age: 67
End: 2022-08-29
Payer: MEDICARE

## 2022-08-29 DIAGNOSIS — G89.29 MID BACK PAIN, CHRONIC: Primary | ICD-10-CM

## 2022-08-29 DIAGNOSIS — M54.9 MID BACK PAIN, CHRONIC: Primary | ICD-10-CM

## 2022-08-29 PROCEDURE — 97810 ACUP 1/> WO ESTIM 1ST 15 MIN: CPT | Mod: PN

## 2022-08-29 PROCEDURE — 97811 ACUP 1/> W/O ESTIM EA ADD 15: CPT | Mod: PN

## 2022-08-29 NOTE — PROGRESS NOTES
Virtual Visit  The patient location is: Home ([place])  The chief complaint leading to consultation is: Insomnia    Visit type: audiovisual    Face to Face time with patient: 45 minutes  50 minutes of total time spent on the encounter, which includes face to face time and non-face to face time preparing to see the patient (eg, review of tests), Obtaining and/or reviewing separately obtained history, Documenting clinical information in the electronic or other health record, Independently interpreting results (not separately reported) and communicating results to the patient/family/caregiver, or Care coordination (not separately reported).     Each patient to whom he or she provides medical services by telemedicine is:  (1) informed of the relationship between the physician and patient and the respective role of any other health care provider with respect to management of the patient; and (2) notified that he or she may decline to receive medical services by telemedicine and may withdraw from such care at any time.    Notes: N/A      [facility]  Individual Psychotherapy (PhD/LCSW)    8/30/2022    Site:  [facility]         Therapeutic Intervention: Met with patient.  Outpatient - Insight oriented psychotherapy 45 min - CPT code 79545 and Outpatient - Behavior modifying psychotherapy 45 min - CPT code 66035    Chief complaint/reason for encounter: sleep     Interval history and content of current session:  Ms. Tiarra Norton arrived on time for her scheduled appointment.  She has been taking 1 of the 100 mg tablets of trazodone each night.      Session 6  Cognitive Behavioral Therapy for Insomnia (CBT-i), Session #6  Sleep Diary completed?  Yes  # of days completed:  7  SE:  82%  TST:  4.9 hours  TIB:  6 hours    Session Focus:  Summarize and graph Sleep Diary  Discuss Sleep Restriction/Compression  Review progress with Stimulus Control and Sleep Hygiene  Review progress with Relaxation Training  Review Cognitive  Restructuring  Discuss Relapse Prevention  Re-administered  and MAT  MAT:      Treatment plan:  Target symptoms:  insomnia  Why chosen therapy is appropriate versus another modality: relevant to diagnosis, evidence based practice  Outcome monitoring methods: self-report, checklist/rating scale  Therapeutic intervention type: insight oriented psychotherapy, behavior modifying psychotherapy    Risk parameters:  Patient reports no suicidal ideation  Patient reports no homicidal ideation  Patient reports no self-injurious behavior  Patient reports no violent behavior    Verbal deficits: None    Patient's response to intervention:  The patient's response to intervention is accepting.    Progress toward goals and other mental status changes:  The patient's progress toward goals is fair .    Diagnosis:     ICD-10-CM ICD-9-CM   1. Primary insomnia  F51.01 307.42   2. Anxiety  F41.9 300.00   3. Major depressive disorder, recurrent, mild  F33.0 296.31       Plan:  Return to referring provider    Return to clinic: as needed    Length of Service (minutes): 45

## 2022-08-29 NOTE — PROGRESS NOTES
Acupuncture Treatment Note     Name: Tiarra Chang [facility] Number: [id number]    Traditional Chinese Medicine Diagnosis: Qi and Blood stagnation in Mid Back  Physician: Self, Aaareferral    Date of Service: 8/29/2022     Medical Diagnosis: Pain in Mid Back    Evaluation Date: 7/21/2022    Plan of Care Certification Period: 12  Visit #/Visits authorized: 8 / 12     Precautions: Standard    Subjective     Chief Complaint:  Pain in Mid Back,     Response to Previous Treatment:  good    Quality of Symptoms (Better/Worse):  Better    Other Condition/Symptoms:  None    Objective      New Findings:  None    Treatment Principles:  Increasing Qi and Blood  in  Mid Back    Acupuncture Points:     Bilateral:  Anh on mid back + 30,    NEEDLES W/O STIM  AT: 11:10 AM    NEEDLES W/O STIM REMOVED AT: 11:40 AM    #NEEDLES IN: 30    #NEEDLES OUT: 30    Other Traditional Chinese Medicine Modalities:  None    Recommendations:  PT    Education:  Patient is aware of cumulative effect of acupuncture      Assessment      Analysis of Treatment:  Patient felt very good    Pt prognosis is Good.     Patient will continue to benefit from acupuncture treatment to address the deficits listed in the problem list box on initial evaluation, provide patient family education and to maximize pt's level of independence in the home and community environment.     Patient's spiritual, cultural and educational needs considered and pt agreeable to plan of care and goals.     Anticipated barriers to treatment: None    Plan     Recommend   1-2     /week for 12   treatments and re-assess.

## 2022-08-30 ENCOUNTER — PATIENT MESSAGE (OUTPATIENT)
Dept: INTERNAL MEDICINE | Facility: CLINIC | Age: 67
End: 2022-08-30
Payer: MEDICARE

## 2022-08-30 ENCOUNTER — OFFICE VISIT (OUTPATIENT)
Dept: PSYCHIATRY | Facility: CLINIC | Age: 67
End: 2022-08-30
Payer: MEDICARE

## 2022-08-30 DIAGNOSIS — F41.9 ANXIETY: ICD-10-CM

## 2022-08-30 DIAGNOSIS — F33.0 MAJOR DEPRESSIVE DISORDER, RECURRENT, MILD: ICD-10-CM

## 2022-08-30 DIAGNOSIS — F51.01 PRIMARY INSOMNIA: Primary | ICD-10-CM

## 2022-08-30 PROCEDURE — 90834 PSYTX W PT 45 MINUTES: CPT | Mod: 95,,, | Performed by: PSYCHOLOGIST

## 2022-08-30 PROCEDURE — 90834 PR PSYCHOTHERAPY W/PATIENT, 45 MIN: ICD-10-PCS | Mod: 95,,, | Performed by: PSYCHOLOGIST

## 2022-08-30 PROCEDURE — 4010F ACE/ARB THERAPY RXD/TAKEN: CPT | Mod: CPTII,95,, | Performed by: PSYCHOLOGIST

## 2022-08-30 PROCEDURE — 4010F PR ACE/ARB THEARPY RXD/TAKEN: ICD-10-PCS | Mod: CPTII,95,, | Performed by: PSYCHOLOGIST

## 2022-08-31 ENCOUNTER — PATIENT MESSAGE (OUTPATIENT)
Dept: PAIN MEDICINE | Facility: CLINIC | Age: 67
End: 2022-08-31
Payer: MEDICARE

## 2022-09-02 ENCOUNTER — CLINICAL SUPPORT (OUTPATIENT)
Dept: REHABILITATION | Facility: HOSPITAL | Age: 67
End: 2022-09-02
Payer: MEDICARE

## 2022-09-02 ENCOUNTER — PATIENT MESSAGE (OUTPATIENT)
Dept: INTERNAL MEDICINE | Facility: CLINIC | Age: 67
End: 2022-09-02
Payer: MEDICARE

## 2022-09-02 DIAGNOSIS — G89.29 MID BACK PAIN, CHRONIC: Primary | ICD-10-CM

## 2022-09-02 DIAGNOSIS — M54.9 MID BACK PAIN, CHRONIC: Primary | ICD-10-CM

## 2022-09-02 PROCEDURE — 97810 ACUP 1/> WO ESTIM 1ST 15 MIN: CPT

## 2022-09-02 PROCEDURE — 97811 ACUP 1/> W/O ESTIM EA ADD 15: CPT

## 2022-09-02 RX ORDER — LEVOTHYROXINE SODIUM 125 UG/1
TABLET ORAL
Qty: 90 TABLET | Refills: 0 | Status: SHIPPED | OUTPATIENT
Start: 2022-09-02 | End: 2022-09-02 | Stop reason: SDUPTHER

## 2022-09-02 RX ORDER — LEVOTHYROXINE SODIUM 125 UG/1
TABLET ORAL
Qty: 90 TABLET | Refills: 0 | Status: SHIPPED | OUTPATIENT
Start: 2022-09-02 | End: 2022-12-14

## 2022-09-02 NOTE — TELEPHONE ENCOUNTER
No new care gaps identified.  [facility] Embedded Care Gaps. Reference number: [id number]. 9/02/2022   9:59:52 AM PREETHIT

## 2022-09-02 NOTE — PROGRESS NOTES
Acupuncture Treatment Note     Name: Tiarra Chang [facility] Number: [id number]    Traditional Chinese Medicine Diagnosis: Qi and Blood stagnation in Mid Back  Physician: Self, Aaareferral    Date of Service: 9/2/2022     Medical Diagnosis: Pain in Mid Back    Evaluation Date: 7/21/2022    Plan of Care Certification Period: 12  Visit #/Visits authorized: 9 / 12     Precautions: Standard    Subjective     Chief Complaint:  Pain in Mid Back,     Response to Previous Treatment:  good    Quality of Symptoms (Better/Worse):  Better    Other Condition/Symptoms:  None    Objective      New Findings:  None    Treatment Principles:  Increasing Qi and Blood  in  Mid Back    Acupuncture Points:     Bilateral:  Anh on mid back + 28    NEEDLES W/O STIM  AT: 9:00 AM    NEEDLES W/O STIM REMOVED AT: 9:30 AM    #NEEDLES IN: 28    #NEEDLES OUT: 28    Other Traditional Chinese Medicine Modalities:  None    Recommendations:  PT    Education:  Patient is aware of cumulative effect of acupuncture      Assessment      Analysis of Treatment:  Patient felt very good    Pt prognosis is Good.     Patient will continue to benefit from acupuncture treatment to address the deficits listed in the problem list box on initial evaluation, provide patient family education and to maximize pt's level of independence in the home and community environment.     Patient's spiritual, cultural and educational needs considered and pt agreeable to plan of care and goals.     Anticipated barriers to treatment: None    Plan     Recommend   1-2     /week for 12   treatments and re-assess.

## 2022-09-06 ENCOUNTER — PATIENT MESSAGE (OUTPATIENT)
Dept: REHABILITATION | Facility: OTHER | Age: 67
End: 2022-09-06
Payer: MEDICARE

## 2022-09-07 ENCOUNTER — PATIENT MESSAGE (OUTPATIENT)
Dept: PAIN MEDICINE | Facility: CLINIC | Age: 67
End: 2022-09-07
Payer: MEDICARE

## 2022-09-07 ENCOUNTER — TELEPHONE (OUTPATIENT)
Dept: PAIN MEDICINE | Facility: CLINIC | Age: 67
End: 2022-09-07
Payer: MEDICARE

## 2022-09-09 ENCOUNTER — PATIENT MESSAGE (OUTPATIENT)
Dept: INTERNAL MEDICINE | Facility: CLINIC | Age: 67
End: 2022-09-09
Payer: MEDICARE

## 2022-09-09 ENCOUNTER — CLINICAL SUPPORT (OUTPATIENT)
Dept: REHABILITATION | Facility: HOSPITAL | Age: 67
End: 2022-09-09
Payer: MEDICARE

## 2022-09-09 DIAGNOSIS — M54.9 MID BACK PAIN, CHRONIC: Primary | ICD-10-CM

## 2022-09-09 DIAGNOSIS — G89.29 MID BACK PAIN, CHRONIC: Primary | ICD-10-CM

## 2022-09-09 PROCEDURE — 97810 ACUP 1/> WO ESTIM 1ST 15 MIN: CPT

## 2022-09-09 PROCEDURE — 97811 ACUP 1/> W/O ESTIM EA ADD 15: CPT

## 2022-09-09 NOTE — PROGRESS NOTES
Acupuncture Treatment Note     Name: Tiarra Chang [facility] Number: [id number]    Traditional Chinese Medicine Diagnosis: Qi and Blood stagnation in Mid Back  Physician: Self, Aaareferral    Date of Service: 9/9/2022     Medical Diagnosis: Pain in Mid Back    Evaluation Date: 7/21/2022    Plan of Care Certification Period: 12  Visit #/Visits authorized: 10/ 12     Precautions: Standard    Subjective     Chief Complaint:  Pain in Mid Back,     Response to Previous Treatment:  good    Quality of Symptoms (Better/Worse):  Better    Other Condition/Symptoms:  None    Objective      New Findings:  None    Treatment Principles:  Increasing Qi and Blood  in  Mid Back    Acupuncture Points:     Bilateral:  Anh on mid back + 14  NEEDLES W/O STIM  AT: 1:20 PM    NEEDLES W/O STIM REMOVED AT: 1:50 PM    #NEEDLES IN: 14    #NEEDLES OUT: 14    Other Traditional Chinese Medicine Modalities:  None    Recommendations:  PT    Education:  Patient is aware of cumulative effect of acupuncture      Assessment      Analysis of Treatment:  Patient felt very good    Pt prognosis is Good.     Patient will continue to benefit from acupuncture treatment to address the deficits listed in the problem list box on initial evaluation, provide patient family education and to maximize pt's level of independence in the home and community environment.     Patient's spiritual, cultural and educational needs considered and pt agreeable to plan of care and goals.     Anticipated barriers to treatment: None    Plan     Recommend   1-2     /week for 12   treatments and re-assess.

## 2022-09-12 ENCOUNTER — PATIENT MESSAGE (OUTPATIENT)
Dept: REHABILITATION | Facility: HOSPITAL | Age: 67
End: 2022-09-12
Payer: MEDICARE

## 2022-09-14 ENCOUNTER — OFFICE VISIT (OUTPATIENT)
Dept: PAIN MEDICINE | Facility: OTHER | Age: 67
End: 2022-09-14
Payer: MEDICARE

## 2022-09-14 DIAGNOSIS — G89.4 CHRONIC PAIN DISORDER: Primary | ICD-10-CM

## 2022-09-14 DIAGNOSIS — Z78.9 DECREASED ACTIVITIES OF DAILY LIVING (ADL): ICD-10-CM

## 2022-09-14 DIAGNOSIS — M47.817 SPONDYLOSIS OF LUMBOSACRAL REGION WITHOUT MYELOPATHY OR RADICULOPATHY: ICD-10-CM

## 2022-09-14 DIAGNOSIS — M54.9 INTRACTABLE BACK PAIN: ICD-10-CM

## 2022-09-14 DIAGNOSIS — M51.36 DEGENERATIVE DISC DISEASE, LUMBAR: ICD-10-CM

## 2022-09-14 DIAGNOSIS — Z74.09 IMPAIRED MOBILITY AND ENDURANCE: ICD-10-CM

## 2022-09-14 PROCEDURE — 99215 OFFICE O/P EST HI 40 MIN: CPT | Mod: ,,, | Performed by: NURSE PRACTITIONER

## 2022-09-14 PROCEDURE — 1160F PR REVIEW ALL MEDS BY PRESCRIBER/CLIN PHARMACIST DOCUMENTED: ICD-10-PCS | Mod: CPTII,,, | Performed by: NURSE PRACTITIONER

## 2022-09-14 PROCEDURE — 1159F MED LIST DOCD IN RCRD: CPT | Mod: CPTII,,, | Performed by: NURSE PRACTITIONER

## 2022-09-14 PROCEDURE — 99215 PR OFFICE/OUTPT VISIT, EST, LEVL V, 40-54 MIN: ICD-10-PCS | Mod: ,,, | Performed by: NURSE PRACTITIONER

## 2022-09-14 PROCEDURE — 4010F PR ACE/ARB THEARPY RXD/TAKEN: ICD-10-PCS | Mod: CPTII,,, | Performed by: NURSE PRACTITIONER

## 2022-09-14 PROCEDURE — 1159F PR MEDICATION LIST DOCUMENTED IN MEDICAL RECORD: ICD-10-PCS | Mod: CPTII,,, | Performed by: NURSE PRACTITIONER

## 2022-09-14 PROCEDURE — 1160F RVW MEDS BY RX/DR IN RCRD: CPT | Mod: CPTII,,, | Performed by: NURSE PRACTITIONER

## 2022-09-14 PROCEDURE — 4010F ACE/ARB THERAPY RXD/TAKEN: CPT | Mod: CPTII,,, | Performed by: NURSE PRACTITIONER

## 2022-09-14 NOTE — PROGRESS NOTES
Functional Restoration Program    Initial Medical Screening Visit Note    Subjective:       Chief Complaint Requiring Rehabilitation: Chronic Pain    Consulted by: Fanny Patel      HPI:     Tiarra Norton is a 67 y.o. female who presents today for the Functional Restoration Program Medical Screening Visit. Tiarra Norton was referred by Self, Aaareferral with associated diagnosis of chronic pain disorder and intractable low back pain.    Her back pain began several years ago but worsened significantly since last summer. There was no inciting event. She reports low back pain without radicular leg pain. Her pain is worse with activity. She is frustrated with her continued pain. She is currently seeing Dr. Gibbs in Pain Management. She is to be scheduled for SCS trial. She has tried RFA with some relief. She has completed [facility] with limited long term benefit. She is currently participating in acupuncture with short term relief. She has seen Spine Surgery in the past who did not recommend surgical intervention. See below for further details.     Chronic Pain History:      Ambulatory status:  Fully ambulatory      Balance problems?  None       Fainting/Syncope/POTS?  No       Physical Therapy?  Completed [facility] in July      Exercise Habits?  Stretch   Walk the dogs  Occasionally swims    Alternative/Complementary Therapies (massage, yoga, kacey chi, acupuncture, guided imagery, chiropractic care, hypnosis, biofeedback, herbs, supplements, dietary approaches)?  Acupuncture- short term relief  Meditation       Current pain medications:  None currently    Short supply of Tramadol for a trip which provided relief.    Pain management injections:  Lumbar RFA in March 2022- some relief      Relevant surgeries:  None      Any upcoming surgeries or procedures? Possible SCS trial       Working/Employed?  Retired       Sleep: difficulty staying asleep      HERMINIA? Diagnosed with mild HERMINIA in the  past, no CPAP     Sleep Aids? Trazodone      Mental Health Hx/Tx  Depression, anxiety (xanax)   Sees therapist, psychiatry     Stress/Stress Mgmt comments: meditation, relaxation breathing,      Inpatient Psychiatric Tx? no    SI? No       Social Hx/Connections:      One son- 28 years olf    Volunteer at [employer]      Health Habits:      Smoking Status: no      Alcohol use: yes      Illegal/illicit drug use: no      Substance abuse hx?: no      15. Aside from what has been discussed, do you have any other medical issues that you or your doctors would consider unstable at this time?   None        Past Medical History:   Diagnosis Date    Cataract     Depression     Gestational diabetes     Hyperlipidemia     Hypertension     IBS (irritable bowel syndrome)     Thyroid disease        Past Surgical History:   Procedure Laterality Date    ADENOIDECTOMY      ARTHROSCOPIC CHONDROPLASTY OF KNEE JOINT Right 10/19/2021    Procedure: ARTHROSCOPY, KNEE, WITH CHONDROPLASTY;  Surgeon: Trevin Huber MD;  Location: [facility];  Service: Orthopedics;  Laterality: Right;    ARTHROSCOPY OF KNEE Right 10/19/2021    Procedure: ARTHROSCOPY, KNEE-RIGHT;  Surgeon: Trevin Huber MD;  Location: [facility] OR;  Service: Orthopedics;  Laterality: Right;     SECTION      CHOLECYSTECTOMY      COLONOSCOPY N/A 2016    Procedure: COLONOSCOPY;  Surgeon: Heri Segura MD;  [facility] ([address] [place] [address]);  Service: Endoscopy;  Laterality: N/A;    COLONOSCOPY N/A 2019    Procedure: COLONOSCOPY;  Surgeon: Joe Pitts MD;  [address]  Service: Endoscopy;  Laterality: N/A;    CYSTOSCOPY  2022    Procedure: CYSTOSCOPY;  Surgeon: Lenny Moore MD;  [address];  Service: Urology;;    ESOPHAGOGASTRODUODENOSCOPY N/A 2020    Procedure: EGD (ESOPHAGOGASTRODUODENOSCOPY);  Surgeon: Tolu Rivas MD;  [facility] (4TH FLR);  Service: Endoscopy;  Laterality: N/A;   Pt. moved to 9:15am arrival time.. Instructed to not have anything after midnight.EC    EYE SURGERY      cataract resection right    INJECTION OF ANESTHETIC AGENT AROUND NERVE Bilateral 2/8/2022    Procedure: Block, Nerve MEDIAL BRANCH BLOCK BILATERAL L3,4,5;  Surgeon: Tara Gibbs MD;  Location: [facility];  Service: Pain Management;  Laterality: Bilateral;    INJECTION OF ANESTHETIC AGENT AROUND NERVE Bilateral 2/15/2022    Procedure: BLOCK, NERVE, L3*L4-L5 MEDIAL BR ANCH 2 OF 2;  Surgeon: Tara Gibbs MD;  Location: [facility];  Service: Pain Management;  Laterality: Bilateral;    INJECTION OF BOTULINUM TOXIN TYPE A  6/21/2022    Procedure: BOTULINUM TOXIN INJECTION 100 units;  Surgeon: Lenny Moore MD;  Location: [address];  Service: Urology;;    JOINT REPLACEMENT      partial right knee    KNEE ARTHROSCOPY W/ MENISCECTOMY Right 10/19/2021    Procedure: ARTHROSCOPY, KNEE, WITH MENISCECTOMY, PARTIAL LATERAL;  Surgeon: Tervin Huber MD;  Location: [facility];  Service: Orthopedics;  Laterality: Right;    r hand surgery      RADIOFREQUENCY ABLATION Right 3/8/2022    Procedure: RADIOFREQUENCY ABLATION, L3-L5 1 OF 2;  Surgeon: Tara Gibbs MD;  Location: [facility];  Service: Pain Management;  Laterality: Right;    RADIOFREQUENCY ABLATION Left 3/22/2022    Procedure: RADIOFREQUENCY ABLATION, l3-+l5 2 of 2;  Surgeon: Tara Gibbs MD;  Location: [facility];  Service: Pain Management;  Laterality: Left;    TONSILLECTOMY      TOTAL KNEE ARTHROPLASTY      partial knee replacement    TUBAL LIGATION         Review of patient's allergies indicates:  No Known Allergies    Current Outpatient Medications   Medication Sig Dispense Refill    alprazolam (XANAX) 2 MG Tab Take 1 mg by mouth 3 (three) times daily as needed.  0    amLODIPine (NORVASC) 5 MG tablet Take 1 tablet (5 mg total) by mouth once daily. 90 tablet 3    atorvastatin (LIPITOR) 10 MG tablet TAKE 1 TABLET EVERY  DAY 90 tablet 3    b complex vitamins capsule Take 1 capsule by mouth once daily.      cholecalciferol, vitamin D3, (VITAMIN D3) 5,000 unit Tab Take 1 tablet (5,000 Units total) by mouth once daily.      conjugated estrogens (PREMARIN) vaginal cream Place 0.5 g vaginally twice a week. Place a pea-sized amount in vagina every night for 4 weeks, then use 2-3 nights a week 1 applicator 1    HYDROcodone-acetaminophen (NORCO) 5-325 mg per tablet Take 1 tablet by mouth every 6 to 8 hours as needed (migraine). 30 tablet 0    levothyroxine (SYNTHROID) 125 MCG tablet TAKE 1 TABLET(125 MCG) BY MOUTH EVERY MORNING Strength: 125 mcg 90 tablet 0    liothyronine (CYTOMEL) 5 MCG Tab Take 1 tablet (5 mcg total) by mouth once daily. 90 tablet 3    pantoprazole (PROTONIX) 40 MG tablet Take 1 tablet (40 mg total) by mouth once daily. 90 tablet 2    promethazine (PHENERGAN) 12.5 MG Tab Take 1 tablet (12.5 mg total) by mouth every 6 (six) hours as needed (nausea). 30 tablet 2    RESTASIS 0.05 % ophthalmic emulsion INT 1 GTT IN OU BID      sumatriptan (IMITREX) 50 MG tablet 1 tab po at start of headache.  Repeat in 2 h prn 27 tablet 3    traZODone (DESYREL) 100 MG tablet TAKE 1 TO 2 TABLETS AT BEDTIME FOR SLEEP 180 tablet 3    valsartan (DIOVAN) 320 MG tablet TAKE 1 TABLET ONE TIME DAILY (STOP LISINOPRIL) 90 tablet 3     No current facility-administered medications for this visit.       Family History   Problem Relation Age of Onset    Hypertension Mother     Irritable bowel syndrome Mother     Hyperlipidemia Mother     Heart disease Father         mi    Hypertension Father     Cancer Father         prostate    Heart attack Father     Heart failure Father     Hyperlipidemia Father     Alcohol abuse Sister     Depression Sister     Epilepsy Son     Irritable bowel syndrome Sister     Alcohol abuse Sister     Ovarian cancer Maternal Aunt     Breast cancer Paternal Aunt     Breast cancer Maternal Aunt     Melanoma Neg Hx     Colon cancer  Neg Hx     Stomach cancer Neg Hx     Esophageal cancer Neg Hx     Liver disease Neg Hx     Crohn's disease Neg Hx     Ulcerative colitis Neg Hx     Celiac disease Neg Hx     Stroke Neg Hx        Social History     Socioeconomic History    Marital status:     Number of children: 1   Occupational History    Occupation:      Employer: [employer]     Comment: retired   Tobacco Use    Smoking status: Never    Smokeless tobacco: Never   Substance and Sexual Activity    Alcohol use: Yes     Alcohol/week: 3.0 standard drinks     Types: 3 Glasses of wine per week     Comment: weekends    Drug use: No    Sexual activity: Yes     Partners: Male   Other Topics Concern    Are you pregnant or think you may be? No    Breast-feeding No   Social History Narrative    Retired        Swims.       Stationary bike.            Social Determinants of Health     Financial Resource Strain: Low Risk     Difficulty of Paying Living Expenses: Not hard at all   Food Insecurity: No Food Insecurity    Worried About Running Out of Food in the Last Year: Never true    Ran Out of Food in the Last Year: Never true   Transportation Needs: No Transportation Needs    Lack of Transportation (Medical): No    Lack of Transportation (Non-Medical): No   Physical Activity: Insufficiently Active    Days of Exercise per Week: 4 days    Minutes of Exercise per Session: 20 min   Stress: Stress Concern Present    Feeling of Stress : Very much   Social Connections: Unknown    Frequency of Communication with Friends and Family: Twice a week    Active Member of Clubs or Organizations: No    Attends Club or Organization Meetings: Never    Marital Status:    Housing Stability: Low Risk     Unable to Pay for Housing in the Last Year: No    Number of Places Lived in the Last Year: 1    Unstable Housing in the Last Year: No              Objective:        GEN: Well developed, well nourished. No acute distress. Fully alert, oriented, and  appropriate. Speech normal marley.   PSYCH: Normal affect. Thought content appropriate.  CHEST: Breathing symmetric. No audible wheezing.  SKIN: Warm, dry. No rash or discoloration on exposed areas.   NEURO/MUSCULOSKELETAL:  Cervical: ROM full and painless;   Lumbar: ROM limited and painful. TTP lumbar musculature; 5/5 LE strength bilaterally;              Imaging:      MRI Lumbar Spine 1/21/2022:  COMPARISON:  12/9/21.     FINDINGS:  Alignment: Levoscoliosis of the lumbar spine.  Anteroposterior alignment is maintained.     Vertebrae: Degenerative endplate changes at L1-L3.  No fracture or marrow infiltrative process.     Discs: Moderate to severe disc height loss at T11-L3.  No evidence for discitis.     Cord: Normal.  Conus terminates at L2.     Degenerative findings:     T12-L1: Circumferential disc bulge and mild facet arthropathy.  No spinal canal stenosis or neural foraminal narrowing.     L1-L2: Circumferential disc bulge and mild facet arthropathy result in mild right neural foraminal narrowing.     L2-L3: Circumferential disc bulge and mild facet arthropathy. No spinal canal stenosis or neural foraminal narrowing.     L3-L4: Circumferential disc bulge and mild facet arthropathy result in moderate spinal canal stenosis and mild left neural foraminal narrowing.     L4-L5: Circumferential disc bulge and moderate facet arthropathy result in mild spinal canal stenosis.     L5-S1: Moderate facet arthropathy.  No spinal canal stenosis or neural foraminal narrowing.     Paraspinal muscles & soft tissues: Mild paraspinal muscle atrophy.     Impression:     1. Multilevel degenerative changes of the lumbar spine with levoscoliosis.  Mild-to-moderate spinal canal stenosis at L3-L5.       Assessment:     Encounter Diagnoses   Name Primary?    Chronic pain disorder Yes    Intractable back pain     Spondylosis of lumbosacral region without myelopathy or radiculopathy     Degenerative disc disease, lumbar     Impaired  mobility and endurance     Decreased activities of daily living (ADL)        Plan:     Diagnoses and all orders for this visit:    Chronic pain disorder    Intractable back pain    Spondylosis of lumbosacral region without myelopathy or radiculopathy    Degenerative disc disease, lumbar    Impaired mobility and endurance    Decreased activities of daily living (ADL)       Tiarra Norton is a 67 y.o. female with the above diagnoses.      Education about pain and the chronic pain cycle was provided today. Discussed the importance of multimodal and multidisciplinary management of chronic pain with a focus on both pain relief and function. Discussed how our team provides education and training aimed at improving physical function, emotional health, stress and pain coping skills.Treatment is designed to build confidence in physical activity and ADLs and in your ability to control and manage your pain.     Recommend proceeding with PT/OT screening visit for further evaluation of personal goals, functional status and limitations prior to enrollment in the program.     She is frustrated with lack of response for scheduling team regarding SCS trial. I will send staff a message asking to call patient. Pending scheduling of SCS, we may need to push back FRP cohort enrollment.     I spent a total of 60 minutes with the patient, and greater than 50% of the time was spent in counseling and education.     The above plan and management options were discussed at length with patient. Patient is in agreement with the above and verbalized understanding. It will be communicated with the referring physician via electronic record, fax, or mail.    Marlene Thorne NP  09/14/2022

## 2022-09-15 ENCOUNTER — PATIENT MESSAGE (OUTPATIENT)
Dept: PAIN MEDICINE | Facility: OTHER | Age: 67
End: 2022-09-15
Payer: MEDICARE

## 2022-09-15 ENCOUNTER — IMMUNIZATION (OUTPATIENT)
Dept: PHARMACY | Facility: CLINIC | Age: 67
End: 2022-09-15
Payer: MEDICARE

## 2022-09-15 DIAGNOSIS — Z23 NEED FOR VACCINATION: Primary | ICD-10-CM

## 2022-09-15 DIAGNOSIS — M48.061 SPINAL STENOSIS OF LUMBAR REGION WITHOUT NEUROGENIC CLAUDICATION: ICD-10-CM

## 2022-09-15 DIAGNOSIS — G89.4 CHRONIC PAIN SYNDROME: Primary | ICD-10-CM

## 2022-09-16 ENCOUNTER — CLINICAL SUPPORT (OUTPATIENT)
Dept: REHABILITATION | Facility: HOSPITAL | Age: 67
End: 2022-09-16
Payer: MEDICARE

## 2022-09-16 DIAGNOSIS — M54.9 MID BACK PAIN, CHRONIC: Primary | ICD-10-CM

## 2022-09-16 DIAGNOSIS — G89.29 MID BACK PAIN, CHRONIC: Primary | ICD-10-CM

## 2022-09-16 PROCEDURE — 97811 ACUP 1/> W/O ESTIM EA ADD 15: CPT

## 2022-09-16 PROCEDURE — 97810 ACUP 1/> WO ESTIM 1ST 15 MIN: CPT

## 2022-09-16 NOTE — PROGRESS NOTES
Acupuncture Treatment Note     Name: Tiarra Chang [facility] Number: [id number]    Traditional Chinese Medicine Diagnosis: Qi and Blood stagnation in Mid Back  Physician: Self, Aaareferral    Date of Service: 9/16/2022     Medical Diagnosis: Pain in Mid Back    Evaluation Date: 7/21/2022    Plan of Care Certification Period: 12  Visit #/Visits authorized: 11/ 12     Precautions: Standard    Subjective     Chief Complaint:  Pain in Mid Back,     Response to Previous Treatment:  good    Quality of Symptoms (Better/Worse):  Better    Other Condition/Symptoms:  None    Objective      New Findings:  None    Treatment Principles:  Increasing Qi and Blood  in  Mid Back    Acupuncture Points:     Bilateral:  Anh on mid back + 12    NEEDLES W/O STIM  AT: 9:45 AM    NEEDLES W/O STIM REMOVED AT: 10:15 AM    #NEEDLES IN: 12    #NEEDLES OUT: 12    Other Traditional Chinese Medicine Modalities:  None    Recommendations:  PT    Education:  Patient is aware of cumulative effect of acupuncture      Assessment      Analysis of Treatment:  Patient felt very good    Pt prognosis is Good.     Patient will continue to benefit from acupuncture treatment to address the deficits listed in the problem list box on initial evaluation, provide patient family education and to maximize pt's level of independence in the home and community environment.     Patient's spiritual, cultural and educational needs considered and pt agreeable to plan of care and goals.     Anticipated barriers to treatment: None    Plan     Recommend   1-2     /week for 12   treatments and re-assess.

## 2022-09-19 ENCOUNTER — PATIENT MESSAGE (OUTPATIENT)
Dept: REHABILITATION | Facility: HOSPITAL | Age: 67
End: 2022-09-19
Payer: MEDICARE

## 2022-09-19 ENCOUNTER — TELEPHONE (OUTPATIENT)
Dept: PAIN MEDICINE | Facility: CLINIC | Age: 67
End: 2022-09-19
Payer: MEDICARE

## 2022-09-19 NOTE — TELEPHONE ENCOUNTER
This message is for patient in regards to his/her appointment 09/20/22 at 1:00 p.m.   With MIGUEL Minor.        [facility] Policy: For the safety of all patients and staff members.   During this visit we're informing all patients and visitors to wear face mask at all time here at [facility].  If you have any questions or concerns please contact [phone]

## 2022-09-20 ENCOUNTER — PATIENT MESSAGE (OUTPATIENT)
Dept: PAIN MEDICINE | Facility: OTHER | Age: 67
End: 2022-09-20
Payer: MEDICARE

## 2022-09-20 ENCOUNTER — PATIENT MESSAGE (OUTPATIENT)
Dept: PAIN MEDICINE | Facility: CLINIC | Age: 67
End: 2022-09-20
Payer: MEDICARE

## 2022-09-21 ENCOUNTER — PATIENT MESSAGE (OUTPATIENT)
Dept: PAIN MEDICINE | Facility: CLINIC | Age: 67
End: 2022-09-21

## 2022-09-21 ENCOUNTER — PATIENT MESSAGE (OUTPATIENT)
Dept: PAIN MEDICINE | Facility: CLINIC | Age: 67
End: 2022-09-21
Payer: MEDICARE

## 2022-09-21 ENCOUNTER — TELEPHONE (OUTPATIENT)
Dept: PAIN MEDICINE | Facility: CLINIC | Age: 67
End: 2022-09-21
Payer: MEDICARE

## 2022-09-21 ENCOUNTER — PATIENT MESSAGE (OUTPATIENT)
Dept: PAIN MEDICINE | Facility: OTHER | Age: 67
End: 2022-09-21
Payer: MEDICARE

## 2022-09-21 ENCOUNTER — OFFICE VISIT (OUTPATIENT)
Dept: PAIN MEDICINE | Facility: CLINIC | Age: 67
End: 2022-09-21
Payer: MEDICARE

## 2022-09-21 DIAGNOSIS — M51.36 DDD (DEGENERATIVE DISC DISEASE), LUMBAR: ICD-10-CM

## 2022-09-21 DIAGNOSIS — M47.817 SPONDYLOSIS OF LUMBOSACRAL REGION WITHOUT MYELOPATHY OR RADICULOPATHY: ICD-10-CM

## 2022-09-21 DIAGNOSIS — G89.4 CHRONIC PAIN SYNDROME: Primary | ICD-10-CM

## 2022-09-21 DIAGNOSIS — M79.18 MYOFASCIAL PAIN: ICD-10-CM

## 2022-09-21 PROCEDURE — 1160F RVW MEDS BY RX/DR IN RCRD: CPT | Mod: CPTII,95,, | Performed by: NURSE PRACTITIONER

## 2022-09-21 PROCEDURE — 99499 UNLISTED E&M SERVICE: CPT | Mod: HCNC,95,, | Performed by: NURSE PRACTITIONER

## 2022-09-21 PROCEDURE — 1159F PR MEDICATION LIST DOCUMENTED IN MEDICAL RECORD: ICD-10-PCS | Mod: CPTII,95,, | Performed by: NURSE PRACTITIONER

## 2022-09-21 PROCEDURE — 1159F MED LIST DOCD IN RCRD: CPT | Mod: CPTII,95,, | Performed by: NURSE PRACTITIONER

## 2022-09-21 PROCEDURE — 99214 OFFICE O/P EST MOD 30 MIN: CPT | Mod: 95,,, | Performed by: NURSE PRACTITIONER

## 2022-09-21 PROCEDURE — 99214 PR OFFICE/OUTPT VISIT, EST, LEVL IV, 30-39 MIN: ICD-10-PCS | Mod: 95,,, | Performed by: NURSE PRACTITIONER

## 2022-09-21 PROCEDURE — 4010F PR ACE/ARB THEARPY RXD/TAKEN: ICD-10-PCS | Mod: CPTII,95,, | Performed by: NURSE PRACTITIONER

## 2022-09-21 PROCEDURE — 99499 RISK ADDL DX/OHS AUDIT: ICD-10-PCS | Mod: HCNC,95,, | Performed by: NURSE PRACTITIONER

## 2022-09-21 PROCEDURE — 4010F ACE/ARB THERAPY RXD/TAKEN: CPT | Mod: CPTII,95,, | Performed by: NURSE PRACTITIONER

## 2022-09-21 PROCEDURE — 1160F PR REVIEW ALL MEDS BY PRESCRIBER/CLIN PHARMACIST DOCUMENTED: ICD-10-PCS | Mod: CPTII,95,, | Performed by: NURSE PRACTITIONER

## 2022-09-21 RX ORDER — TIZANIDINE 4 MG/1
4 TABLET ORAL 3 TIMES DAILY PRN
Qty: 30 TABLET | Refills: 1 | Status: SHIPPED | OUTPATIENT
Start: 2022-09-21 | End: 2022-10-01

## 2022-09-21 NOTE — PROGRESS NOTES
Chronic Pain-Established Visit  Chronic Pain-Tele-Medicine-Established Note (Follow up visit)        The patient location is: Home  The chief complaint leading to consultation is: pain  Visit type: Virtual visit with synchronous audio and video  Total time spent with patient: 25 min  Each patient to whom he or she provides medical services by telemedicine is:  (1) informed of the relationship between the physician and patient and the respective role of any other health care provider with respect to management of the patient; and (2) notified that he or she may decline to receive medical services by telemedicine and may withdraw from such care at any time.        SUBJECTIVE:    PCP: Kaykay Perales MD    REFERRING PHYSICIAN: Tyler Jacobs MD    CHIEF COMPLAINT: Lower back pain       Original HISTORY OF PRESENT ILLNESS: Tiarra Norton presents to the clinic for the evaluation of the above pain. The pain started five years ago.     Original Pain Description:  The pain is located in the lower back and does radiate up towards her upper back.The pain is described as aching, dull and sharp. Initially starts as a dull, aching pain and it gets sharp once she bends down and moves around. Typically when she walks for more than five minutes, the sharp pain radiates up her back. Exacerbating factors: Standing, Bending, Touching, Walking, Night Time, Flexing and Lifting. Mitigating factors heat, ice, laying down and massage. Symptoms interfere with daily activity and sleeping. The patient feels like symptoms have been worsening. Patient denies night fever/night sweats, urinary incontinence, bowel incontinence, significant weight loss, significant motor weakness and loss of sensations.    Original PAIN SCORES:  Best: Pain is 1  Worst: Pain is 10  Usually: Pain is 4  Current: Pain is 4    INTERVAL HISTORY:  4/18/22 Interval History: Patient presents for telehealth visit for follow up following her b/l RFA at L3-L5. She  reports 80% relief and is very pleased with her pain relief. Unfortunately, she is not back to doing what she wants due to right knee pain. She is seeing Dr. Huber for knee pain and is s/p right medial compartment partial knee replacement. She is currently in therapy for this. We discussed that we may be able to assist her with her knee pain as well.     Interval History 6/15/22: Tiarra Norton returns for follow up. She continues to feel like she has mild pain sitting or laying down, and has her worst pain with extreme leaning forward to pick something up off the floor. She also has difficulty leaning over her handlebars to ride her bike or leaning forward to fold clothes or standing up for any period of time. So, we determined that leaning forward is her worst issue. We did previouisly do lmbb and rfa, which has helped with extension, but it would not help with leaning forward. On review of her MRI she has significant multilevel DDD with Modic changes which likely account for her pain.     Interval History 7/25/22: Tiarra Norton returns for follow up. We previously have been discussing treatments for her low back pain that did not resolve with RFA. At our last visit I referred her to Dr. Antony for clearance for a SCS trial. She was considered to be a reasonable candidate. However, in the meantime, her PT referred her to acupuncture with Dr. Jacobs. She had one treatment and already notes 50% relief. She notes that she still has pain, especially with leaning forward, but feels that it is much better controlled. Their plan is one treatment per week but is approved for 20 sessions.     Interval History 8/22/22: Ms. Norton returns for follow up on her low back pain. At our last visit she felt cured by acupuncture. Unfortunately, this only lasted for 24 hours. She returns today looking for other options to make life livable. Patient claims all of her pain in the low back and middle back. We discussed that this  will work best for her low back pain.     Interval History 9/21/2022:  The patient returns to clinic today for follow up of back pain via virtual visit. She received a letter from [facility] denying SCS trial. She is frustrated by this. She continues to report low back pain. She denies any radicular leg pain. Her pain is worse with bending and activity. She recently underwent med screening for the Functional Restoration program. She is interested in pursuing this. She has tried Gabapentin and Lyrica in the past with side effects. She denies any other health changes. Her pain today is 7/10.     6 weeks of Conservative therapy (PT/Chiro/Home Exercises with Dates)  Healthy back program--> has four sessions left for a total of 20 sessions   Last session was on 1/31/22   Stretches that they taught at [employer] gives her relief       Medications:   None currently  Ice and heat which has helped   Tried Gabapentin and Lyrica- made her loopy      Report: reviewed       Interventional Pain Procedures:   2/8/2022- Bilateral L3,4,5 MBB  2/15/2022- Bilateral L3,4,5 MBB  3/8/2022- Right L3,4,5 RFA- 80% relief for a few months  3/22/2022- Left L3,4,5 RFA- 80% relief for a few months    Imaging:  MRI Lumbar Spine 1/21/2022:  COMPARISON:  12/9/21.     FINDINGS:  Alignment: Levoscoliosis of the lumbar spine.  Anteroposterior alignment is maintained.     Vertebrae: Degenerative endplate changes at L1-L3.  No fracture or marrow infiltrative process.     Discs: Moderate to severe disc height loss at T11-L3.  No evidence for discitis.     Cord: Normal.  Conus terminates at L2.     Degenerative findings:     T12-L1: Circumferential disc bulge and mild facet arthropathy.  No spinal canal stenosis or neural foraminal narrowing.     L1-L2: Circumferential disc bulge and mild facet arthropathy result in mild right neural foraminal narrowing.     L2-L3: Circumferential disc bulge and mild facet arthropathy. No spinal canal stenosis or neural  foraminal narrowing.     L3-L4: Circumferential disc bulge and mild facet arthropathy result in moderate spinal canal stenosis and mild left neural foraminal narrowing.     L4-L5: Circumferential disc bulge and moderate facet arthropathy result in mild spinal canal stenosis.     L5-S1: Moderate facet arthropathy.  No spinal canal stenosis or neural foraminal narrowing.     Paraspinal muscles & soft tissues: Mild paraspinal muscle atrophy.     Impression:     1. Multilevel degenerative changes of the lumbar spine with levoscoliosis.  Mild-to-moderate spinal canal stenosis at L3-L5.      Past Medical History:   Diagnosis Date    Cataract     Depression     Gestational diabetes     Hyperlipidemia     Hypertension     IBS (irritable bowel syndrome)     Thyroid disease      Past Surgical History:   Procedure Laterality Date    ADENOIDECTOMY      ARTHROSCOPIC CHONDROPLASTY OF KNEE JOINT Right 10/19/2021    Procedure: ARTHROSCOPY, KNEE, WITH CHONDROPLASTY;  Surgeon: Trevin Huber MD;  Location: [facility] OR;  Service: Orthopedics;  Laterality: Right;    ARTHROSCOPY OF KNEE Right 10/19/2021    Procedure: ARTHROSCOPY, KNEE-RIGHT;  Surgeon: Trevin Huber MD;  Location: [facility] OR;  Service: Orthopedics;  Laterality: Right;     SECTION      CHOLECYSTECTOMY      COLONOSCOPY N/A 2016    Procedure: COLONOSCOPY;  Surgeon: Heri Segura MD;  Location: [facility] (4TH FLR);  Service: Endoscopy;  Laterality: N/A;    COLONOSCOPY N/A 2019    Procedure: COLONOSCOPY;  Surgeon: Joe Pitts MD;  Location: [facility] (4TH FLR);  Service: Endoscopy;  Laterality: N/A;    CYSTOSCOPY  2022    Procedure: CYSTOSCOPY;  Surgeon: Lenny Moore MD;  Location: [facility] 1ST FLR;  Service: Urology;;    ESOPHAGOGASTRODUODENOSCOPY N/A 2020    Procedure: EGD (ESOPHAGOGASTRODUODENOSCOPY);  Surgeon: Tolu Rivas MD;  Location: [facility] (4TH FLR);  Service: Endoscopy;  Laterality: N/A;  Pt. moved to 9:15am  arrival time.. Instructed to not have anything after midnight.EC    EYE SURGERY      cataract resection right    INJECTION OF ANESTHETIC AGENT AROUND NERVE Bilateral 2/8/2022    Procedure: Block, Nerve MEDIAL BRANCH BLOCK BILATERAL L3,4,5;  Surgeon: Tara Gibbs MD;  Location: [facility] PAIN MGT;  Service: Pain Management;  Laterality: Bilateral;    INJECTION OF ANESTHETIC AGENT AROUND NERVE Bilateral 2/15/2022    Procedure: BLOCK, NERVE, L3*L4-L5 MEDIAL BR ANCH 2 OF 2;  Surgeon: Tara Gibbs MD;  Location: [facility] PAIN MGT;  Service: Pain Management;  Laterality: Bilateral;    INJECTION OF BOTULINUM TOXIN TYPE A  6/21/2022    Procedure: BOTULINUM TOXIN INJECTION 100 units;  Surgeon: Lenny Moore MD;  Location: [address];  Service: Urology;;    JOINT REPLACEMENT      partial right knee    KNEE ARTHROSCOPY W/ MENISCECTOMY Right 10/19/2021    Procedure: ARTHROSCOPY, KNEE, WITH MENISCECTOMY, PARTIAL LATERAL;  Surgeon: Trevin Huber MD;  Location: [facility];  Service: Orthopedics;  Laterality: Right;    r hand surgery      RADIOFREQUENCY ABLATION Right 3/8/2022    Procedure: RADIOFREQUENCY ABLATION, L3-L5 1 OF 2;  Surgeon: Tara Gibbs MD;  Location: [facility] PAIN MGT;  Service: Pain Management;  Laterality: Right;    RADIOFREQUENCY ABLATION Left 3/22/2022    Procedure: RADIOFREQUENCY ABLATION, l3-+l5 2 of 2;  Surgeon: Tara Gibbs MD;  Location: [facility] PAIN MGT;  Service: Pain Management;  Laterality: Left;    TONSILLECTOMY      TOTAL KNEE ARTHROPLASTY      partial knee replacement    TUBAL LIGATION       Social History     Socioeconomic History    Marital status:     Number of children: 1   Occupational History    Occupation:      Employer: [employer]     Comment: retired   Tobacco Use    Smoking status: Never    Smokeless tobacco: Never   Substance and Sexual Activity    Alcohol use: Yes     Alcohol/week: 3.0 standard drinks     Types: 3 Glasses of wine per week      Comment: weekends    Drug use: No    Sexual activity: Yes     Partners: Male   Other Topics Concern    Are you pregnant or think you may be? No    Breast-feeding No   Social History Narrative    Retired        Swims.       Stationary bike.            Social Determinants of Health     Financial Resource Strain: Low Risk     Difficulty of Paying Living Expenses: Not hard at all   Food Insecurity: No Food Insecurity    Worried About Running Out of Food in the Last Year: Never true    Ran Out of Food in the Last Year: Never true   Transportation Needs: No Transportation Needs    Lack of Transportation (Medical): No    Lack of Transportation (Non-Medical): No   Physical Activity: Insufficiently Active    Days of Exercise per Week: 4 days    Minutes of Exercise per Session: 20 min   Stress: Stress Concern Present    Feeling of Stress : Very much   Social Connections: Unknown    Frequency of Communication with Friends and Family: Twice a week    Active Member of Clubs or Organizations: No    Attends Club or Organization Meetings: Never    Marital Status:    Housing Stability: Low Risk     Unable to Pay for Housing in the Last Year: No    Number of Places Lived in the Last Year: 1    Unstable Housing in the Last Year: No     Family History   Problem Relation Age of Onset    Hypertension Mother     Irritable bowel syndrome Mother     Hyperlipidemia Mother     Heart disease Father         mi    Hypertension Father     Cancer Father         prostate    Heart attack Father     Heart failure Father     Hyperlipidemia Father     Alcohol abuse Sister     Depression Sister     Epilepsy Son     Irritable bowel syndrome Sister     Alcohol abuse Sister     Ovarian cancer Maternal Aunt     Breast cancer Paternal Aunt     Breast cancer Maternal Aunt     Melanoma Neg Hx     Colon cancer Neg Hx     Stomach cancer Neg Hx     Esophageal cancer Neg Hx     Liver disease Neg Hx     Crohn's disease Neg Hx     Ulcerative colitis  Neg Hx     Celiac disease Neg Hx     Stroke Neg Hx        Review of patient's allergies indicates:  No Known Allergies    Current Outpatient Medications   Medication Sig    alprazolam (XANAX) 2 MG Tab Take 1 mg by mouth 3 (three) times daily as needed.    amLODIPine (NORVASC) 5 MG tablet Take 1 tablet (5 mg total) by mouth once daily.    atorvastatin (LIPITOR) 10 MG tablet TAKE 1 TABLET EVERY DAY    b complex vitamins capsule Take 1 capsule by mouth once daily.    cholecalciferol, vitamin D3, (VITAMIN D3) 5,000 unit Tab Take 1 tablet (5,000 Units total) by mouth once daily.    conjugated estrogens (PREMARIN) vaginal cream Place 0.5 g vaginally twice a week. Place a pea-sized amount in vagina every night for 4 weeks, then use 2-3 nights a week    HYDROcodone-acetaminophen (NORCO) 5-325 mg per tablet Take 1 tablet by mouth every 6 to 8 hours as needed (migraine).    levothyroxine (SYNTHROID) 125 MCG tablet TAKE 1 TABLET(125 MCG) BY MOUTH EVERY MORNING Strength: 125 mcg    liothyronine (CYTOMEL) 5 MCG Tab Take 1 tablet (5 mcg total) by mouth once daily.    pantoprazole (PROTONIX) 40 MG tablet Take 1 tablet (40 mg total) by mouth once daily.    promethazine (PHENERGAN) 12.5 MG Tab Take 1 tablet (12.5 mg total) by mouth every 6 (six) hours as needed (nausea).    RESTASIS 0.05 % ophthalmic emulsion INT 1 GTT IN OU BID    sumatriptan (IMITREX) 50 MG tablet 1 tab po at start of headache.  Repeat in 2 h prn    traZODone (DESYREL) 100 MG tablet TAKE 1 TO 2 TABLETS AT BEDTIME FOR SLEEP    valsartan (DIOVAN) 320 MG tablet TAKE 1 TABLET ONE TIME DAILY (STOP LISINOPRIL)     No current facility-administered medications for this visit.       REVIEW OF SYSTEMS:    GENERAL: No fever. No chills. No fatigue. Denies weight loss. Denies weight gain.  HEENT: Denies headaches. Denies vision change. Denies eye pain. Denies double vision. Denies ear pain.   CV: Denies chest pain. HTN  PULM: Denies of shortness of breath.  GI: Denies  constipation. No diarrhea. No abdominal pain. Denies nausea. Denies vomiting. No blood in stool.  HEME: Denies bleeding problems.  : Denies urgency. No painful urination. No blood in urine.  MS: See HPI  SKIN: Denies rash.   NEURO: Denies seizures. No weakness.  ENDO: Thyroid disorder.   PSYCH:  Depression    OBJECTIVE:     Exam limited due to virtual visit:  GENERAL: Well appearing, in no acute distress, alert and oriented x3.  PSYCH:  Mood and affect appropriate.        Previous physical exam:  PHYSICAL EXAM:   GENERAL: Well appearing, in no acute distress, alert and oriented x3.  PSYCH:  Mood and affect appropriate.  SKIN: Skin color, texture, turgor normal, no rashes or lesions.  HEENT:  Normocephalic, atraumatic. Cranial nerves grossly intact.  PULM: No evidence of respiratory difficulty, symmetric chest rise.  GI:  Non-distended  BACK: Normal range of motion. Pain to palpation over the spinous processes (particularly L4-L5) . Moderate pain with facet loading. There is no pain with palpation over the sacroiliac joints bilaterally. Straight leg raising in the supine position is negative to radicular pain.   EXTREMITIES: No deformities, edema, or skin discoloration.   MUSCULOSKELETAL: Tenderness to palpation on her lower back muscles. Bilateral upper and lower extremity strength is normal and symmetric. No atrophy is noted.    NEURO: Sensation is equal and appropriate bilaterally. Bilateral upper and lower extremity coordination and muscle stretch reflexes are physiologic and symmetric. Plantar response are downgoing.   GAIT: hansa no antalgic gait         ASSESSMENT: 67 y.o. year old female with low back pain, consistent with the followin. Chronic pain syndrome        2. Spondylosis of lumbosacral region without myelopathy or radiculopathy        3. DDD (degenerative disc disease), lumbar        4. Myofascial pain  tiZANidine (ZANAFLEX) 4 MG tablet               PLAN:     - Previous imaging was  reviewed and discussed with the patient today.    - She has had short term relief with RFA.     - SCS trial denied by insurance. I will reach out to research to see if she is a candidate for [place] study.     - Consider Intracept.     - Trial Zanaflex 4 mg TID PRN muscle pain.     - Consider Cymbalta in the future.     - Continue screening process for the Functional Restoration program.     - RTC PRN, will contact her regarding research study.     - Dr. Gibbs was consulted on the patient and agrees with this plan.    The above plan and management options were discussed at length with patient. Patient is in agreement with the above and verbalized understanding.     Marlene Thorne NP  09/21/2022      I spent a total of 30 minutes on the day of the visit.  This includes face to face time and non-face to face time preparing to see the patient by reviewing previous labs/imaging, obtaining and/or reviewing separately obtained history, documenting clinical information in the electronic or other health record, independently interpreting results and communicating results to the patient/family/caregiver.

## 2022-09-22 ENCOUNTER — PATIENT MESSAGE (OUTPATIENT)
Dept: PAIN MEDICINE | Facility: CLINIC | Age: 67
End: 2022-09-22
Payer: MEDICARE

## 2022-09-22 ENCOUNTER — CLINICAL SUPPORT (OUTPATIENT)
Dept: REHABILITATION | Facility: OTHER | Age: 67
End: 2022-09-22
Attending: PHYSICIAN ASSISTANT
Payer: MEDICARE

## 2022-09-22 DIAGNOSIS — R29.898 WEAKNESS OF RIGHT LOWER EXTREMITY: ICD-10-CM

## 2022-09-22 DIAGNOSIS — M25.661 DECREASED RANGE OF MOTION (ROM) OF RIGHT KNEE: Primary | ICD-10-CM

## 2022-09-22 DIAGNOSIS — Z74.09 IMPAIRED FUNCTIONAL MOBILITY, BALANCE, GAIT, AND ENDURANCE: ICD-10-CM

## 2022-09-22 PROCEDURE — 97161 PT EVAL LOW COMPLEX 20 MIN: CPT

## 2022-09-22 PROCEDURE — 97140 MANUAL THERAPY 1/> REGIONS: CPT

## 2022-09-22 PROCEDURE — 97110 THERAPEUTIC EXERCISES: CPT

## 2022-09-23 ENCOUNTER — PATIENT MESSAGE (OUTPATIENT)
Dept: REHABILITATION | Facility: HOSPITAL | Age: 67
End: 2022-09-23
Payer: MEDICARE

## 2022-09-23 ENCOUNTER — CLINICAL SUPPORT (OUTPATIENT)
Dept: REHABILITATION | Facility: OTHER | Age: 67
End: 2022-09-23
Payer: MEDICARE

## 2022-09-23 DIAGNOSIS — F40.298 FEAR OF PAIN: ICD-10-CM

## 2022-09-23 DIAGNOSIS — Z78.9 ALTERATION IN PERFORMANCE OF ACTIVITIES OF DAILY LIVING: ICD-10-CM

## 2022-09-23 DIAGNOSIS — Z74.09 IMPAIRED FUNCTIONAL MOBILITY AND ACTIVITY TOLERANCE: ICD-10-CM

## 2022-09-23 DIAGNOSIS — R29.3 POSTURE IMBALANCE: ICD-10-CM

## 2022-09-23 DIAGNOSIS — G89.4 CHRONIC PAIN DISORDER: ICD-10-CM

## 2022-09-23 DIAGNOSIS — M62.81 WEAKNESS OF TRUNK MUSCULATURE: ICD-10-CM

## 2022-09-23 DIAGNOSIS — Z74.09 IMPAIRED MOBILITY AND ENDURANCE: ICD-10-CM

## 2022-09-23 DIAGNOSIS — Z74.09 IMPAIRED FUNCTIONAL MOBILITY, BALANCE, GAIT, AND ENDURANCE: ICD-10-CM

## 2022-09-23 PROCEDURE — 97162 PT EVAL MOD COMPLEX 30 MIN: CPT

## 2022-09-23 PROCEDURE — 97110 THERAPEUTIC EXERCISES: CPT

## 2022-09-23 PROCEDURE — 97530 THERAPEUTIC ACTIVITIES: CPT

## 2022-09-23 PROCEDURE — 97166 OT EVAL MOD COMPLEX 45 MIN: CPT

## 2022-09-23 NOTE — PROGRESS NOTES
[facility]  [facility]   Occupational Therapy Initial Evaluation  NOTE: This is a duplicate copy utilized for reassessment purposes & continuity of care.  See pt's plan of care for co-signed copy.    Encounter Date: 9/23/2022  Name: Tiarra Norton  Clinic Number: [id number]    Therapy Diagnosis:   Encounter Diagnoses   Name Primary?    Chronic pain disorder     Impaired mobility and endurance     Impaired functional mobility and activity tolerance     Fear of pain     Alteration in performance of activities of daily living        Physician: Self, Aaareferral    Physician Orders: eval and treat; FRP  Medical Diagnosis: Chronic pain disorder, Impaired mobility and endurance  Evaluation Date: 9/23/2022  Insurance Authorization Period Expiration: 9/14/2023  Plan of Care Certification Period: 12/9/2022  Visit # / Visits authorized: 1/1  FOTO completed: 1/3  FOTO at [facility] 9/23/22: 37% limitation    Precautions: Standard    Time In:1:00 PM  Time Out: 2:00 PM  Total Appointment Time (timed & untimed codes): 60 minutes    Subjective   Date of onset: gradual onset over the years, exacerbation this summer 2022  History of current condition, based on chart review and Tiarra's report: last year I could pull weeds without a whole lot of distress and this year I cannot. The pain is located in the lower back with some upper back pain 2/2 scoliosis, but low back is limiting. Completed healthy back program in jan 2022. She has had continued OP ortho PT, acupuncture, ablastin, attempted pain medication, has not tolerated. She received a letter from [employer] denying SCS trial. She is frustrated by this. She was told [facility] may have a SCS trial she could participate in which she is interested in. She continues to report low back pain. She denies any radicular leg pain. Her pain is worse with bending and activity. Reports she may have to get full knee replacement eventually but no upcoming  "procedures planned.     Tiarra Statement: "my therapist and I discussed last week that hes going to start helping me separate my pain from my sufferring"    Prior Level of Function: independent with all of the below activities, reports more endurance and strength  Prior Therapy: I can not walk very far, or stand very long, I feel like nette seen my last jazz fest which is my favorite festival, gardening, biking, bending, laundry, sweep/vacuum, loading .     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 And I attribute my depression and anxiety to them, but since detention its gotten better and I see a therapist 1x/ week"    Disturbed sleep: yes "oh I don't sleep well but I just finished a 6 week course about insomnia with the doctor here. I wouldn't say my sleep improved but i'm less worried about it"     Exercise: "I do my back stretches every morning and my knee exercises". Previously exercised 3-4x/week on treadmill or bike or walking    Medical History:   Past Medical History:   Diagnosis Date    Cataract     Depression     Gestational diabetes     Hyperlipidemia     Hypertension     IBS (irritable bowel syndrome)     Thyroid disease         Surgical History:   Tiarra  has a past surgical history that includes  section; Tubal ligation; Tonsillectomy; Adenoidectomy; Cholecystectomy; Eye surgery; r hand surgery; Total knee arthroplasty; Colonoscopy (N/A, 2016); Colonoscopy (N/A, 2019); Esophagogastroduodenoscopy (N/A, 2020); Joint replacement; Arthroscopy of knee (Right, 10/19/2021); Arthroscopic chondroplasty of knee joint (Right, 10/19/2021); Knee arthroscopy w/ meniscectomy (Right, 10/19/2021); Injection of anesthetic agent around nerve (Bilateral, 2022); Injection of anesthetic agent around nerve (Bilateral, 2/15/2022); Radiofrequency ablation (Right, 3/8/2022); Radiofrequency ablation (Left, 3/22/2022); Injection of botulinum " "toxin type A (2022); and Cystoscopy (2022).    Medications:   Tiarra has a current medication list which includes the following prescription(s): alprazolam, amlodipine, atorvastatin, b complex vitamins, cholecalciferol (vitamin d3), conjugated estrogens, hydrocodone-acetaminophen, levothyroxine, liothyronine, pantoprazole, promethazine, restasis, sumatriptan, tizanidine, trazodone, and valsartan.    Allergies:   Review of patient's allergies indicates:  No Known Allergies    Occupational Profile  Social Hx: Retired  lives with their spouse and small dog  Home access: 3 story house, no GLORIA/threshold steps, rails inside home   Family dynamic: 1 sister in [place], brother-in-law in [place] and mother in law in [facility] living home, son in [place]. "My  is supportive finally. He used to give me a really hard time"   DME: straight cane collection from father and father-in-law and used pose partial knee replacement  Driving status: yes  Occupations/hobbies/homemaking: "I spend most of my time reading, watch a lot of TV, float around in the pool"  Working presently: retired    Activities of Daily Living Checklist  Personal Care: 2022 Homemakin2022   Dressing Upper Body:  3 Meal preparation: 2   Dressing Lower Body:  3 Kitchen Cleanup: 2   Bathing:  3 Childcare:  na   Hair Care:  3 Grocery shoppin   Sleep:  2 Vacuuming/moppin     Emptying trash/ garbage bag: 3   General Mobility:   Bed making changing:  3   Sitting:  3 Laundry  2   Bendin     Getting in/out of bed:  3 Home Maintenance:    Standin Mowing, rakin   Walkin Gardenin   Twistin Home Repairs:  0   Liftin Paintin         Getting around:       Getting in and out of car:  3     Buckling up you seat belt:  3     Opening heavy doors:  2     Climbing/descending stairs:  3     Key to score:   0: Unable to do the activity  1: Can do the activity with great difficulty  2: Can do the activity with " "little difficulty    3: No problem with activity  N/A: This is not an activity I do  H/T: Have not tried yet           Pain:  Pain Related Behaviors Observed: yes   Functional Pain Scale Rating 0-10: within the last 30 days  4/10 current  8/10 at worst  2/10 at best  Location: low back and R knee  Description: dull tightness, sharp pain, achey, mornings and evenings  Functional Exacerbations: overdoing it, standing, walking, bending  Easing Factors: back stretches, heating pad, acupuncture, cold pack    Patient Specific Personal Functional Long Term Goals:   Vocational: "na (retired)"    Recreational : "traveling, biking, gardening"   Daily Living Activities: "laundry, keeping up with kitchen, grocery shopping"     Objective     COGNITIVE Exam  Oriented: Person, Place, Time, and Situation  Behaviors: Follows multistep  commands  Follows Commands/attention: Follows multistep  commands  Communication: clear/fluent  Memory: No Deficits noted  Safety awareness/insight to disability: guarded movement   Coping skills/emotional control: Appropriate to situation    Active ROM:     UE PRERNA HUITRON Comments   Hands WFL WFL    Wrist WFL WFL    Elbows WFL WFL    Shoulders WFL WFL      Dominant hand: right    Strength - Manual Muscle Testing   Evaluation [place] FRP  2 Month Follow Up   Motion Tested          R L R L R L   Upper Extremity         Shoulder Flexion 5/5 5/5       Shoulder Extension 5/5 5/5       Shoulder Abduction 4+/5 4+/5       Shoulder IR 5/5 4+/5       Shoulder ER 5/5 4/5       Elbow Flexion 4+/5 4+/5       Elbow Extension 4/5 4/5          Strength (in pounds, rung II, average of three trials)   9/23/22   Left hand 53   Right hand 67 lbs   [norms for women aged 65-69: R=49.6 +/-9.7; L=41.0 +/-8.2 (Larry et al, 1985)]     Balance  5 times sit-stand  (adults 18-63 y/o) 9/23/2022   >12 sec= fall risk for general elderly  >16 sec= fall risk for Parkinson's disease  >10 sec= balance/vestibular dysfunction (<60 " y/o)  >14.2 sec= balance/vestibular dysfunction (>59 y/o)  >12 sec= fall risk for CVA 9.96 seconds    (without UE used to push up from chair)     Endurance Testing: Modified Ramp Treadmill Test   GAP Re-assessment Follow-up   Minutes Completed  3     % Grades  10 %     Speed (mph)  1.7 mph     METS 4     HR 96 bpm     Beena Rating Perceived Exertion Scale   4     Reason for stop point: Fatigue, Increased discomfort, Fear of increased pain    Functional Strength Test:  Pile Testing: Lifting  (Pounds/Heart rate)   GAP/Eval (#/HR/BEENA)  Reassessment     Follow-up   Appointment    Repetitive Floor to Waist      12/84/4          Repetitive Waist to Shoulder    10/93/4              1- Time Maximum     20/88/3           Carry-2 Handed (40ft)     17/80/3           Work demand Level     Light           Reason for Stop point: Increased time required for completing repetitions, BEENA scale rating beyond recommended levels, Fatigue, Increased discomfort, Fear of increased pain. During physical testing, participation level was consistent.    Limitation/Restriction for FOTO NOC Survey    Therapist reviewed FOTO scores for Tiarra Norton on 9/23/2022.   FOTO documents entered into [employer] - see Media section.    Limitation Score: 37%       No environmental, cultural, spiritual, developmental or education needs expressed or noted.    Treatment   Home Exercises and Patient Education Provided    Education provided:   - Ms. Norton received education regarding proper posture and body mechanics. She received education regarding anticipated muscular soreness following lift testing and strategies for management were reviewed with patient including stretching, using ice, scheduled rest.  - Functional pain scale  - BEENA rate of perceived exertion scale.   beena scale, pain cycle, z-lie, functional lifting mechanics, pursed lip and diaphragmatic breathing techniques, and received education on the Functional Restoration Program. Details of the  program were discussed.     Written Home Exercises Provided: yes.  Exercises were reviewed and Tiarra was able to demonstrate them prior to the end of the session.    Tiarra demonstrated good  understanding of the education provided.     Tiarra given handout re: beena scale, pain cycle, z-lie, functional lifting mechanics, pursed lip and diaphragmatic breathing techniques, and received education on the Functional Restoration Program. Details of the program were discussed.     Assessment     Tiarra appears to be a good candidate for the Functional Restoration Program. She presents to session (current steps being taken, motivation etc.), and exhibits pain avoidance behavior. Due to the chronic nature of her issues and various co morbidities including anxiety, major depressive disorder, HTN, meniscus tear, back pain, sleep arousal disorder etc., she presents with an unstable clinical presentation which may limit her progress, but with good motivation to make behavioral changes. Tiarra with mild affect. She was able to participate in all aspects of assessment. Discussed with Tiarra how goal of chronic pain education program is to provide tools, education and training aimed at improving physical functioning, emotional health, stress and pain coping skills.The program is designed to build confidence in physical activity and improve independence with ADLs and IADLs and in your ability to control and manage your pain.  Tiarra voiced understanding.    Tiarra is unable to fully participate in community work, recreational, and home-based activities because of decreased functional  strength, decreased  AROM, decreased endurance, limited positional tolerance, fear of re-injury, and fear of increased pain. She will benefit from participating in a conditioning  program designed  to increase functional strength, flexibility, and endurance in order to meet functional goals.     Tiarra's prognosis is Good due to good personal goals and good  response to all feedback and education. She will benefit from skilled outpatient Occupational Therapy to address the limitations and goals stated above and in the chart below, provide patient/family education, and to maximize patient's level of functional independence.    Plan of care discussed with patient: Yes  Pt's spiritual, cultural and educational needs considered and patient is agreeable to the plan of care and goals as stated below:     Medical necessity is demonstrated by the following problem list.     Profile and History Assessment of Occupational Performance Level of Clinical Decision Making Complexity Score   Occupational Profile:   Tiarra Norton is a 67 y.o. female who lives with their spouse and is retired Tiarra Norton has difficulty with  ADLs and IADLs as listed previously, which  affecting her daily functional abilities. Her main goal for therapy is improved tolerance to attend festivals, do laundry and travel.    Comorbidities:    has a past medical history of Cataract, Depression, Gestational diabetes, Hyperlipidemia, Hypertension, IBS (irritable bowel syndrome), and Thyroid disease.    Medical and Therapy History Review:   Brief Performance Deficits    Physical:  Joint Stability  Muscle Power/Strength  Muscle Endurance   Strength  Pain    Cognitive:  Safety Awareness/Insight to Disability    Psychosocial:   Pain avoidance, social isolation   Habits  Routines  Family Support  Group Participation Clinical Decision Making:  low    Assessment Process:  Problem-Focused Assessments    Modification/Need for Assistance:  Not Necessary    Intervention Selection:  Limited Treatment Options       moderate  Based on PMHX, co morbidities , data from assessments and functional level of assistance required with task and clinical presentation directly impacting function.           Goals: While working towards the specific patient expressed long term goals listed, Ms. Norton will accomplish the  following short-term goals, to be met in 5 weeks:     Ms. Norton will demonstrate proper body mechanics and breathing techniques during functional lifting tasks to increase independence with home management tasks, like laundry, dishes, mopping.  Ms. Norton will demonstrate physical capacities consistent with a Medium (#50 max, frequent lifting/carrying #25, 3.5 METS)  demand level, as needed to perform activities to be successful in roles of life, regarding Household Management and Community Participation.   Ms. Norton will demonstrate increased functional strength by lifting 25 lbs waist to shoulder to meet demand of work/home routine, including lifting groceries, placing items in kitchen cabinets, loading washing machine, and/or managing suitcase when traveling.    Ms. Norton will demonstrate understanding of and report incorporation of energy conservation and pacing strategies during daily habits, roles, and routines to improve tolerance for work and home demands.   Ms. Norton will increase positional tolerance to the level needed for work, home, and community activities, including standing to complete meal preparation, sitting during , attending neighborhood meetings, waiting at transportation hub or in queue for restaurant and/or movies, ability to attend Jehovah's witness, festivals, concerts, as evidenced by having a METS level of 5.   Ms. Norton to tolerate Home Activity Plan (HAP)/ Home Exercise Program (HEP) to promote safety and independence with ADLs, as noted by completing with pain report of <4/10 post activity.    Ms. Norton will implement self-care strategies during activity participation to control pain.     Ms. Norton will demonstrate the use of mindfulness, stress management, and coping strategies for improved participation in daily routine.    Ms. Norton shows improved perception of own abilities as evidenced by FOTO limitation scores.     Patient Specific Functional Scale   Activity  Performance  Satisfaction     Pull weeds in the garden     2. Laundry     3. Walking tolerance - festivals/ traveling     4. Biking/ Regular exercise     5. [employer]/ grocery shopping                Total Score       Patient Specific Activity Scoring Scheme for Performance    0        1        2        3        4        5        6        7        8        9        10    Unable                                                                                     Able to perform  To perform                                                                              activity at same  Activity                                                                                    level as before                                                                     Total Score = sum of the activity performance scores / number of activities  Minimum detectable change (90%CI) for average score = 2 points  Minimum detectable change (90%CI) for single activity score  = 3 points     The PDL listed above is based on the physical performance screening test performed. More comprehensive physical testing integrated with clinical findings would be required to derived an accurate work capacity.      Plan   Plan of care certification: 10/10/2022 to 12/9/2022    Outpatient occupational therapy, 3 times a week for 5 weeks to include the following interventions: Patient Education, Self- Care/Home-management strategies, Therapeutic Activities, Therapeutic Exercise and Therapeutic interventions that focus on cognitive function and compensatory strategies to manage the performance activities.    Radha Skelton, OT, ANDREW, 9/23/2022  Occupational Therapy

## 2022-09-23 NOTE — PLAN OF CARE
"[facility]  [facility]   Occupational Therapy Initial Evaluation    Encounter Date: 9/23/2022  Name: Tiarra Norton  Clinic Number: [id number]    Therapy Diagnosis:   Encounter Diagnoses   Name Primary?    Chronic pain disorder     Impaired mobility and endurance     Impaired functional mobility and activity tolerance     Fear of pain     Alteration in performance of activities of daily living        Physician: Self, Aaareferral    Physician Orders: eval and treat; FRP  Medical Diagnosis: Chronic pain disorder, Impaired mobility and endurance  Evaluation Date: 9/23/2022  Insurance Authorization Period Expiration: 9/14/2023  Plan of Care Certification Period: 12/9/2022  Visit # / Visits authorized: 1/1  FOTO completed: 1/3  FOTO at Eval 9/23/22: 37% limitation    Precautions: Standard    Time In:1:00 PM  Time Out: 2:00 PM  Total Appointment Time (timed & untimed codes): 60 minutes    Subjective   Date of onset: gradual onset over the years, exacerbation this summer 2022  History of current condition, based on chart review and Tiarra's report: last year I could pull weeds without a whole lot of distress and this year I cannot. The pain is located in the lower back with some upper back pain 2/2 scoliosis, but low back is limiting. Completed healthy back program in jan 2022. She has had continued OP ortho PT, acupuncture, ablastin, attempted pain medication, has not tolerated. She received a letter from [employer] denying SCS trial. She is frustrated by this. She was told [facility] may have a SCS trial she could participate in which she is interested in. She continues to report low back pain. She denies any radicular leg pain. Her pain is worse with bending and activity. Reports she may have to get full knee replacement eventually but no upcoming procedures planned.     Tiarra Statement: "my therapist and I discussed last week that hes going to start helping me separate my pain " "from my sufferring"    Prior Level of Function: independent with all of the below activities, reports more endurance and strength  Prior Therapy: I can not walk very far, or stand very long, I feel like nette seen my last jazz fest which is my favorite festival, gardening, biking, bending, laundry, sweep/vacuum, loading .     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 And I attribute my depression and anxiety to them, but since shelter its gotten better and I see a therapist 1x/ week"    Disturbed sleep: yes "oh I don't sleep well but I just finished a 6 week course about insomnia with the doctor here. I wouldn't say my sleep improved but i'm less worried about it"     Exercise: "I do my back stretches every morning and my knee exercises". Previously exercised 3-4x/week on treadmill or bike or walking    Medical History:   Past Medical History:   Diagnosis Date    Cataract     Depression     Gestational diabetes     Hyperlipidemia     Hypertension     IBS (irritable bowel syndrome)     Thyroid disease         Surgical History:   Tiarra  has a past surgical history that includes  section; Tubal ligation; Tonsillectomy; Adenoidectomy; Cholecystectomy; Eye surgery; r hand surgery; Total knee arthroplasty; Colonoscopy (N/A, 2016); Colonoscopy (N/A, 2019); Esophagogastroduodenoscopy (N/A, 2020); Joint replacement; Arthroscopy of knee (Right, 10/19/2021); Arthroscopic chondroplasty of knee joint (Right, 10/19/2021); Knee arthroscopy w/ meniscectomy (Right, 10/19/2021); Injection of anesthetic agent around nerve (Bilateral, 2022); Injection of anesthetic agent around nerve (Bilateral, 2/15/2022); Radiofrequency ablation (Right, 3/8/2022); Radiofrequency ablation (Left, 3/22/2022); Injection of botulinum toxin type A (2022); and Cystoscopy (2022).    Medications:   Tiarra has a current medication list which includes the following " "prescription(s): alprazolam, amlodipine, atorvastatin, b complex vitamins, cholecalciferol (vitamin d3), conjugated estrogens, hydrocodone-acetaminophen, levothyroxine, liothyronine, pantoprazole, promethazine, restasis, sumatriptan, tizanidine, trazodone, and valsartan.    Allergies:   Review of patient's allergies indicates:  No Known Allergies    Occupational Profile  Social Hx: Retired  lives with their spouse and small dog  Home access: 3 story house, no GLORIA/threshold steps, rails inside home   Family dynamic: 1 sister in [place], brother-in-law in [place] and mother in law in [facility], son in [place]. "My  is supportive finally. He used to give me a really hard time"   DME: straight cane collection from father and father-in-law and used pose partial knee replacement  Driving status: yes  Occupations/hobbies/homemaking: "I spend most of my time reading, watch a lot of TV, float around in the pool"  Working presently: retired    Activities of Daily Living Checklist  Personal Care: 2022 Homemakin2022   Dressing Upper Body:  3 Meal preparation: 2   Dressing Lower Body:  3 Kitchen Cleanup: 2   Bathing:  3 Childcare:  na   Hair Care:  3 Grocery shoppin   Sleep:  2 Vacuuming/moppin     Emptying trash/ garbage bag: 3   General Mobility:   Bed making changing:  3   Sitting:  3 Laundry  2   Bendin     Getting in/out of bed:  3 Home Maintenance:    Standin Mowing, rakin   Walkin Gardenin   Twistin Home Repairs:  0   Liftin Paintin         Getting around:       Getting in and out of car:  3     Buckling up you seat belt:  3     Opening heavy doors:  2     Climbing/descending stairs:  3     Key to score:   0: Unable to do the activity  1: Can do the activity with great difficulty  2: Can do the activity with little difficulty    3: No problem with activity  N/A: This is not an activity I do  H/T: Have not tried yet           Pain:  Pain " "Related Behaviors Observed: yes   Functional Pain Scale Rating 0-10: within the last 30 days  4/10 current  8/10 at worst  2/10 at best  Location: low back and R knee  Description: dull tightness, sharp pain, achey, mornings and evenings  Functional Exacerbations: overdoing it, standing, walking, bending  Easing Factors: back stretches, heating pad, acupuncture, cold pack    Patient Specific Personal Functional Long Term Goals:   Vocational: "na (retired)"    Recreational : "traveling, biking, gardening"   Daily Living Activities: "laundry, keeping up with kitchen, grocery shopping"     Objective     COGNITIVE Exam  Oriented: Person, Place, Time, and Situation  Behaviors: Follows multistep  commands  Follows Commands/attention: Follows multistep  commands  Communication: clear/fluent  Memory: No Deficits noted  Safety awareness/insight to disability: guarded movement   Coping skills/emotional control: Appropriate to situation    Active ROM:     UE RUE LUE Comments   Hands WFL WFL    Wrist WFL WFL    Elbows WFL WFL    Shoulders WFL WFL      Dominant hand: right    Strength - Manual Muscle Testing   Evaluation Steger FRP  2 Month Follow Up   Motion Tested          R L R L R L   Upper Extremity         Shoulder Flexion 5/5 5/5       Shoulder Extension 5/5 5/5       Shoulder Abduction 4+/5 4+/5       Shoulder IR 5/5 4+/5       Shoulder ER 5/5 4/5       Elbow Flexion 4+/5 4+/5       Elbow Extension 4/5 4/5          Strength (in pounds, rung II, average of three trials)   9/23/22   Left hand 53   Right hand 67 lbs   [norms for women aged 65-69: R=49.6 +/-9.7; L=41.0 +/-8.2 (Larry et al, 1985)]     Balance  5 times sit-stand  (adults 18-65 y/o) 9/23/2022   >12 sec= fall risk for general elderly  >16 sec= fall risk for Parkinson's disease  >10 sec= balance/vestibular dysfunction (<61 y/o)  >14.2 sec= balance/vestibular dysfunction (>61 y/o)  >12 sec= fall risk for CVA 9.96 seconds    (without UE used to push up " from chair)     Endurance Testing: Modified Ramp Treadmill Test   GAP Re-assessment Follow-up   Minutes Completed  3     % Grades  10 %     Speed (mph)  1.7 mph     METS 4     HR 96 bpm     Beena Rating Perceived Exertion Scale   4     Reason for stop point: Fatigue, Increased discomfort, Fear of increased pain    Functional Strength Test:  Pile Testing: Lifting  (Pounds/Heart rate)   GAP/Eval (#/HR/BEENA)  Reassessment     Follow-up   Appointment    Repetitive Floor to Waist      12/84/4          Repetitive Waist to Shoulder    10/93/4              1- Time Maximum     20/88/3           Carry-2 Handed (40ft)     17/80/3           Work demand Level     Light           Reason for Stop point: Increased time required for completing repetitions, BEENA scale rating beyond recommended levels, Fatigue, Increased discomfort, Fear of increased pain. During physical testing, participation level was consistent.    Limitation/Restriction for FOTO NOC Survey    Therapist reviewed FOTO scores for Tiarra Norton on 9/23/2022.   FOTO documents entered into [employer] - see Media section.    Limitation Score: 37%       No environmental, cultural, spiritual, developmental or education needs expressed or noted.    Treatment   Home Exercises and Patient Education Provided    Education provided:   - Ms. Norton received education regarding proper posture and body mechanics. She received education regarding anticipated muscular soreness following lift testing and strategies for management were reviewed with patient including stretching, using ice, scheduled rest.  - Functional pain scale  - BEENA rate of perceived exertion scale.   beena scale, pain cycle, z-lie, functional lifting mechanics, pursed lip and diaphragmatic breathing techniques, and received education on the [facility] Program. Details of the program were discussed.     Written Home Exercises Provided: yes.  Exercises were reviewed and Tiarra was able to demonstrate them  prior to the end of the session.    Tiarra demonstrated good  understanding of the education provided.     Tiarra given handout re: beena scale, pain cycle, z-lie, functional lifting mechanics, pursed lip and diaphragmatic breathing techniques, and received education on the Functional [facility] Program. Details of the program were discussed.     Assessment     Tiarra appears to be a good candidate for the Functional [facility]oration Program. She presents to session (current steps being taken, motivation etc.), and exhibits pain avoidance behavior. Due to the chronic nature of her issues and various co morbidities including anxiety, major depressive disorder, HTN, meniscus tear, back pain, sleep arousal disorder etc., she presents with an unstable clinical presentation which may limit her progress, but with good motivation to make behavioral changes. Tiarra with mild affect. She was able to participate in all aspects of assessment. Discussed with Tirara how goal of chronic pain education program is to provide tools, education and training aimed at improving physical functioning, emotional health, stress and pain coping skills.The program is designed to build confidence in physical activity and improve independence with ADLs and IADLs and in your ability to control and manage your pain.  Tiarra voiced understanding.    Tiarra is unable to fully participate in community work, recreational, and home-based activities because of decreased functional  strength, decreased  AROM, decreased endurance, limited positional tolerance, fear of re-injury, and fear of increased pain. She will benefit from participating in a conditioning  program designed  to increase functional strength, flexibility, and endurance in order to meet functional goals.     Tiarra's prognosis is Good due to good personal goals and good response to all feedback and education. She will benefit from skilled outpatient Occupational Therapy to address the limitations and  goals stated above and in the chart below, provide patient/family education, and to maximize patient's level of functional independence.    Plan of care discussed with patient: Yes  Pt's spiritual, cultural and educational needs considered and patient is agreeable to the plan of care and goals as stated below:     Medical necessity is demonstrated by the following problem list.     Profile and History Assessment of Occupational Performance Level of Clinical Decision Making Complexity Score   Occupational Profile:   Tiarra Norton is a 67 y.o. female who lives with their spouse and is retired Tiarra Norton has difficulty with  ADLs and IADLs as listed previously, which  affecting her daily functional abilities. Her main goal for therapy is improved tolerance to attend festivals, do laundry and travel.    Comorbidities:    has a past medical history of Cataract, Depression, Gestational diabetes, Hyperlipidemia, Hypertension, IBS (irritable bowel syndrome), and Thyroid disease.    Medical and Therapy History Review:   Brief Performance Deficits    Physical:  Joint Stability  Muscle Power/Strength  Muscle Endurance   Strength  Pain    Cognitive:  Safety Awareness/Insight to Disability    Psychosocial:   Pain avoidance, social isolation   Habits  Routines  Family Support  Group Participation Clinical Decision Making:  low    Assessment Process:  Problem-Focused Assessments    Modification/Need for Assistance:  Not Necessary    Intervention Selection:  Limited Treatment Options       moderate  Based on PMHX, co morbidities , data from assessments and functional level of assistance required with task and clinical presentation directly impacting function.           Goals: While working towards the specific patient expressed long term goals listed, Ms. Norton will accomplish the following short-term goals, to be met in 5 weeks:     Ms. Norton will demonstrate proper body mechanics and breathing techniques during  functional lifting tasks to increase independence with home management tasks, like laundry, dishes, mopping.  Ms. Norton will demonstrate physical capacities consistent with a Medium (#50 max, frequent lifting/carrying #25, 3.5 METS)  demand level, as needed to perform activities to be successful in roles of life, regarding Household Management and Community Participation.   Ms. Norton will demonstrate increased functional strength by lifting 25 lbs waist to shoulder to meet demand of work/home routine, including lifting groceries, placing items in kitchen cabinets, loading washing machine, and/or managing suitcase when traveling.    Ms. Norton will demonstrate understanding of and report incorporation of energy conservation and pacing strategies during daily habits, roles, and routines to improve tolerance for work and home demands.   Ms. Norton will increase positional tolerance to the level needed for work, home, and community activities, including standing to complete meal preparation, sitting during , attending neighborhood meetings, waiting at transportation hub or in queue for restaurant and/or movies, ability to attend [employer], festivals, concerts, as evidenced by having a METS level of 5.   Ms. Norton to tolerate Home Activity Plan (HAP)/ Home Exercise Program (HEP) to promote safety and independence with ADLs, as noted by completing with pain report of <4/10 post activity.    Ms. Norton will implement self-care strategies during activity participation to control pain.     Ms. Norton will demonstrate the use of mindfulness, stress management, and coping strategies for improved participation in daily routine.    Ms. Norton shows improved perception of own abilities as evidenced by FOTO limitation scores.     Patient Specific Functional Scale   Activity  Performance Satisfaction     Pull weeds in the garden     2. Laundry     3. Walking tolerance - festivals/ traveling     4. Biking/ Regular exercise     5.  [employer]/ grocery shopping                Total Score       Patient Specific Activity Scoring Scheme for Performance    0        1        2        3        4        5        6        7        8        9        10    Unable                                                                                     Able to perform  To perform                                                                              activity at same  Activity                                                                                    level as before                                                                     Total Score = sum of the activity performance scores / number of activities  Minimum detectable change (90%CI) for average score = 2 points  Minimum detectable change (90%CI) for single activity score  = 3 points     The PDL listed above is based on the physical performance screening test performed. More comprehensive physical testing integrated with clinical findings would be required to derived an accurate work capacity.      Plan   Plan of care certification: 10/10/2022 to 12/9/2022    Outpatient occupational therapy, 3 times a week for 5 weeks to include the following interventions: Patient Education, Self- Care/Home-management strategies, Therapeutic Activities, Therapeutic Exercise and Therapeutic interventions that focus on cognitive function and compensatory strategies to manage the performance activities.    Radha Skelton OT, ANDREW, 9/23/2022  Occupational Therapy

## 2022-09-23 NOTE — PLAN OF CARE
"      [facility]  Functional Restoration Program  Physical Therapy Initial Evaluation    Date: 9/23/2022   Name: Tiarra Norton  Clinic Number: [id number]    Therapy Diagnosis:   Encounter Diagnoses   Name Primary?    Chronic pain disorder     Impaired mobility and endurance     Impaired functional mobility, balance, gait, and endurance     Posture imbalance     Weakness of trunk musculature            Physician: Self, Aaareferral    Physician Orders: PT Eval and Treat   Medical Diagnosis from Referral:   G89.4 (ICD-10-CM) - Chronic pain disorder   Z74.09 (ICD-10-CM) - Impaired mobility and endurance     Evaluation Date: 9/23/2022  Authorization Period Expiration:  09/14/23  Plan of Care Expiration: 12/09/22  Visit # / Visits authorized: 01/ 01  FOTO: 01/ 03     Precautions: Standard    Time In: 2:00 pm  Time Out: 3:00 pm   Total Appointment Time (timed & untimed codes): 60 minutes      Subjective     Date of onset: LBP 5 yr ago with inc discomfort this past summer.   Patient reported history of current condition - Tiarra reports LBP having recent inc in intensity /s noted exacerbating incident.  Patient report sig stiffness in AM /c some receding thru the day until inc at EOD.  Patient describe LB sx as dull tightness that can progress to a sharpness.  Pt deny N/T BLE.  Patient report dec standing and amb tolerance, dec tolerance to performing household chores (laundry, sweep).  Patient clarify that bending is primary movement exacerbating LB discomfort.  Patient report frustration and functional decline including not attending her favorite festival- [employer] and dec her ability to garden ("I was pulling weeds last summer but not now").  Patient report poor sleep quality but notes completing a sleep program and feeling less worry about her sleep issues but not experiencing any change in sleep quality.  Patient report heat pad and exercises from prior PT provide only temporary relief.  " "Patient report pain meds are not offering relief.     Patient note she is presently in PT for R knee pain including stretches and strengthening focus.  Patient clarify that knee discomfort is chronic, had presented prior to LBP and believes it is not influenced by LB.        PMHx: Patient report completing HB Jan 2022, continue PT for LB /c ortho patient, RFA /c min relief. Patient report recent insurance denial for SCS and patient notes frustration at continue pain presentation.  Patient also clarify Spine Surgery does not recommend surgical intervention.  Patient note potential for participation   PMHx indicate patient is currently seeing psychiatry/therapy for depression/anxiety.         Patient's FRP goals: "I want to get back to doing what I used to do"  gardening, walking for distance, attending festivals, folding clothes       Per MD report   Her back pain began several years ago but worsened significantly since last summer. There was no inciting event. She reports low back pain without radicular leg pain. Her pain is worse with activity. She is frustrated with her continued pain. She is currently seeing Dr. Gibbs in Pain Management. She is to be scheduled for SCS trial. She has tried RFA with some relief. She has completed [employer] [facility] with limited long term benefit. She is currently participating in acupuncture with short term relief. She has seen Spine Surgery in the past who did not recommend surgical intervention. See below for further details.       Imaging:  Per MD note:   MRI Lumbar Spine 1/21/2022:  COMPARISON:  12/9/21.  Impression:     1. Multilevel degenerative changes of the lumbar spine with levoscoliosis.  Mild-to-moderate spinal canal stenosis at L3-L5.    Prior Therapy: PT for LB min relief, PT for knee mod relief  Social History: lives in house, threshold to enter,  lives /c spouse  Occupation: Retired    Prior Level of Function: Ind /c ADLs attending [employer], [employer], TM/bike 3 " "x wk   Current Level of Function: Ind /c ADLs difficulty standing, walking, bending, household chores      Pain:      Current 4/10, worst 8/10 at EOD,  best 2/10   Location: bilateral lumbar, R knee  Description: Aching and Dull tightness progressing to sharp pain especially when bending   Aggravating Factors: Standing, Bending, and Walking  Easing Factors: exercises from PT for LB, knee /c acute relief, accupuncture temp relief, heat pad      Patient's FRP goals: "I want to get back to doing what I used to do"  gardening, walking for distance, attending festivals, folding clothes     Medical History:   Past Medical History:   Diagnosis Date    Cataract     Depression     Gestational diabetes     Hyperlipidemia     Hypertension     IBS (irritable bowel syndrome)     Thyroid disease        Surgical History:   Tiarra Norton  has a past surgical history that includes  section; Tubal ligation; Tonsillectomy; Adenoidectomy; Cholecystectomy; Eye surgery; r hand surgery; Total knee arthroplasty; Colonoscopy (N/A, 2016); Colonoscopy (N/A, 2019); Esophagogastroduodenoscopy (N/A, 2020); Joint replacement; Arthroscopy of knee (Right, 10/19/2021); Arthroscopic chondroplasty of knee joint (Right, 10/19/2021); Knee arthroscopy w/ meniscectomy (Right, 10/19/2021); Injection of anesthetic agent around nerve (Bilateral, 2022); Injection of anesthetic agent around nerve (Bilateral, 2/15/2022); Radiofrequency ablation (Right, 3/8/2022); Radiofrequency ablation (Left, 3/22/2022); Injection of botulinum toxin type A (2022); and Cystoscopy (2022).    Medications:   Tiarra has a current medication list which includes the following prescription(s): alprazolam, amlodipine, atorvastatin, b complex vitamins, cholecalciferol (vitamin d3), conjugated estrogens, hydrocodone-acetaminophen, levothyroxine, liothyronine, pantoprazole, promethazine, restasis, sumatriptan, tizanidine, trazodone, and " valsartan.    Allergies:   Review of patient's allergies indicates:  No Known Allergies     Objective     Postural examination:  Levoscoliosis of the lumbar spine  Seated: slouched, legs crossed, inc thoracic kyphosis  Standing: Narrow KAYCEE, inc thoracic kyphosis     Range of Motion - Cervical   AROM AROM AROM    Evaluation Reassessment  Reassessment   Flexion 50 °  °   Extension 52 °  °   Side bending Right 70 °  °   Side bending Left 65 °  °   Rotation Right 30 °  °   Rotation Left 35 °  °     Range of Motion - Lumbar         ROM Loss ROM Loss ROM Loss   Flexion moderate loss dec curve reversal, elicit cavitation upon ascending     Extension major loss     Side bending Right minimal loss     Side bending Left minimal loss     Rotation Right within functional limits     Rotation Left within functional limits       Strength Testing   Evaluation Reassessment Reassessment   Motion Tested         Lower Extremity R L R L R L   Hip Flexion 4/5 4/5       Hip Extension 4/5 4/5       Hip Abduction 4-/5 4-/5       Hip IR 4-/5 4-/5       Knee Extension 5/5 5/5       Knee Flexion 5/5 5/5         Functional Testing     Evaluation Reassessment  Reassessment   Timed Up and Go 6.24 sec sec sec   Single Limb Stance R LE > 30 sec sec sec   Single Limb Stance L LE > 30 sec sec sec   SLS EC R LE  5.46 sec     SLS EC L LE 19.56 (after trial of 2.4, 3.5 sec)      2 Minute Walk Test 543.8 ft = 165.75 m feet feet   6 Minute Walk Test 1705 ft = 519.79 m  feet feet   Self Selected Walking Speed 1.44 m/sec m/sec m/sec         GAIT:  Assistive Device used: none  Level of Assistance: independent  Patient displays the following gait deviations:  dec pelvic rotation, slight dec hip ext, slight dec R stance time.     Minimum standards/norms:    TUG:  < 13.5 seconds/ Males 7.3 sec, Females 8.1 sec  SLS:  26-29 sec  Ages 20-69  Self Selected Walking Speed:  .4-.8m/sec  Limited community ambulator                                                   .8-  1.2  Community ambulator                                                    1.2 m/sec and above  Able to safely cross streets        Limitation/Restriction for FOTO LUMBAR Survey    Therapist reviewed FOTO scores for Tiarra Norton on 9/23/2022.   FOTO documents entered into [employer] - see Media section.    Limitation Score: 53%      Goal:  44%       TREATMENT     Total Treatment time (time-based codes) separate from Evaluation: 25 minutes     therapeutic exercises to improve functional performance for 15 minutes, includin    HEP demonstrate include  Supine   LTR x 5   DKTC x 3 5 sec hold   PPT x 4   Bridges x 2  Patient verbalize knee R knee HEP using handouts /c additional visual instruction    Prone   WILLIE 2 min -> Press ups x 10     Patient note sx end range pain but sx improvement as return to neutral       Tiarra received the treatments listed below:      therapeutic activities to improve functional performance for 10 minutes, including:  PT education:  Physical & psychological viscous pain cycles (see patient instructions 9/23/2022)  Role of consistent activity (graded exposure) to break the physical cycle  Importance of education & behavioral changes that promote intrinsic patient motivation to help break the psychological cycle  Impact on pt's functional goals when breaking each one of these cycles.    Impact central sensitization has on the sensory system in the chronic pain population      PATIENT EDUCATION AND HOME EXERCISES     Education provided:   - pain cycle (see patient instruction)     Written Home Exercises Provided: Patient instructed to cont prior HEP from R knee PT.  Exercises were reviewed and Tiarra was able to demonstrate them prior to the end of the session.  Tiarra demonstrated fair  understanding of the education provided. See EMR under Patient Instructions for information provided during therapy sessions.    ASSESSMENT   Tiarra is a 67 y.o. female referred to outpatient Physical Therapy with  "a medical diagnosis of chronic pain disorder & Z74.09 (ICD-10-CM) - Impaired mobility and endurance.  Pt presents with pain, weakness, impaired mobility, decreased AROM, mild impaired balance, postural deficits, gait deviations, decreased endurance, fear of pain w/ central sensitization, increased psychosocial concerns, & inability to perform ADL's as before due to current functional status. Pt's increased psychosocial concerns w/ central sensitization present an unstable clinical presentation which may limit progress, but the intrinsic motivation to improve will assist.  Although patient demonstrate willingness to complete all requested activities, patient /c nticipatory pain verbiage.  Also patient may benefit from education on "boom/bust cycle" recognition for improved activity pacing at home.   Tx today include patient demonstrate recent R knee HEP and HEP from HB.  Patient complete /c safe technique and, at this time, patient advised to continue HEP for R knee to prepare body for FRP exercises. Also recommend continue to explore potential extension directional preference to address noted lumbar mobility deficit.     Based on the above history and physical examination an active physical therapy program within a multi-disciplinary setting is recommended.  Pt will benefit from skilled outpatient physical therapy to address the deficits listed below in the chart, provide pt/family education and to maximize pt's level of independence in the home and community environment.     Plan of care discussed with patient: Yes  Pt's spiritual, cultural and educational needs considered and patient is agreeable to the plan of care and goals as stated below:     Pt prognosis is Fair.   Anticipated Barriers for therapy: chronicity of conditions, psychosocial concerns     Medical Necessity is demonstrated by the following  History  Co-morbidities and personal factors that may impact the plan of care Co-morbidities:   anxiety, " coping style/mechanism, depression, and difficulty sleeping, prior knee surgery, scoliosis    Personal Factors:   coping style     high   Examination  Body Structures and Functions, activity limitations and participation restrictions that may impact the plan of care Body Regions:   back  lower extremities  trunk    Body Systems:    gross symmetry  ROM  strength  transitions  motor control    Participation Restrictions:   none    Activity limitations:   Learning and applying knowledge  no deficits    General Tasks and Commands  no deficits    Communication  no deficits    Mobility  lifting and carrying objects  walking    Self care  washing oneself (bathing, drying, washing hands)  caring for body parts (brushing teeth, shaving, grooming)  toileting  dressing    Domestic Life  shopping  cooking  doing house work (cleaning house, washing dishes, laundry)    Interactions/Relationships  no deficits    Life Areas  no deficits    Community and Social Life  community life  recreation and leisure         high   Clinical Presentation evolving clinical presentation with changing clinical characteristics moderate   Decision Making/ Complexity Score: moderate       GOALS: Pt is in agreement with the following goals:      Goal Progress Towards Goal   Short Term: In 3 weeks, pt will:    Verbalize & demonstrate good understanding of resisted training with 3-1-3 second rep for akdgxrlwlx-eofseggkg-vwvawotbr contraction to encourage full muscle recruitment for strength & endurance Progress towards goal: initiated 9/23/2022   Verbalize & demonstrate good understanding of home stretch routine to improve AROM, help decrease pain & improve mobility Progress towards goal: initiated 9/23/2022   Demonstrate proper supine or seated diaphragmatic breathing with decreased chest excursion & emphasis on full abdominal excursion & increased expiration time to promote muscle relaxation & increased parasympathetic activity to improve patient  tolerance to activities Progress towards goal: initiated 9/23/2022   Demonstrate proper static standing posture in frontal & sagittal plane per PT visual assessment to decrease postural strain in ADLs Progress towards goal: initiated 9/23/2022   Demonstrate good recall of names of resisted exercises w/ minimal to moderate verbal cues to promote independence w/ exercise at the end of the program Progress towards goal: initiated 9/23/2022   Verbalize plan for continuing resisted exercises w/ specific location (i.e. commercial gym, home, community fitness center) indicating preference for machine-based or free-weight exercises to incorporate all major muscle groups to maintain & progress therapeutic functional improvements Progress towards goal: initiated 9/23/2022           Long Term: In 8 weeks, pt will:    Verbalize good understanding of activities planning/scheduling (stretching, mindfulness, resisted training, & cardio) prescribed by PT/OT following the end of the program to sustain & progress functional improvements Progress towards goal: initiated 9/23/2022   Be independent w/ selected resisted training w/ minimal to no cuing while performing to continue w/ resisted strengthening independently at the end of the program Progress towards goal: initiated 9/23/2022   Improve 2 Minute Walk Test (MWT) to 640 feet and 6 MWT to 1900 feet or greater to improve gait speed and mobility Progress towards goal: initiated 9/23/2022   Perform single leg stance with EC for 20 seconds or greater bilaterally to improve safety and balance in ADLs Progress towards goal: initiated 9/23/2022   Score 44 % or less on LUMBAR FOTO to indicate significant functional improvements in impaired functions secondary to low back pain Progress towards goal: initiated 9/23/2022   Patient will report ability to complete folding full load of clothes /s inc discomfort for inc tolerance to household chores Progress towards goal: initiated 9/23/2022              PLAN   Plan of care Certification: 9/23/2022 to 12/09/22.    Outpatient Physical Therapy 3 times weekly for 8 weeks to include the following interventions: Gait Training, Manual Therapy, Neuromuscular Re-ed, Patient Education, Self Care, Therapeutic Activities, and Therapeutic Exercise.     Frank Malik, PT

## 2022-09-23 NOTE — PROGRESS NOTES
"NOTE: This is a duplicate copy utilized for reassessment purposes & continuity of care.  See pt's plan of care for co-signed copy.      [facility]  Functional Restoration Program  Physical Therapy Initial Evaluation    Date: 9/23/2022   Name: Tiarra Norton  [facility] Number: [id number]    Therapy Diagnosis:   Encounter Diagnoses   Name Primary?    Chronic pain disorder     Impaired mobility and endurance     Impaired functional mobility, balance, gait, and endurance     Posture imbalance     Weakness of trunk musculature            Physician: Self, Aaareferral    Physician Orders: PT Eval and Treat   Medical Diagnosis from Referral:   G89.4 (ICD-10-CM) - Chronic pain disorder   Z74.09 (ICD-10-CM) - Impaired mobility and endurance     Evaluation Date: 9/23/2022  Authorization Period Expiration:  09/14/23  Plan of Care Expiration: 12/09/22  Visit # / Visits authorized: 01/ 01  FOTO: 01/ 03     Precautions: Standard    Time In: 2:00 pm  Time Out: 3:00 pm   Total Appointment Time (timed & untimed codes): 60 minutes      Subjective     Date of onset: LBP 5 yr ago with inc discomfort this past summer.   Patient reported history of current condition - Tiarra reports LBP having recent inc in intensity /s noted exacerbating incident.  Patient report sig stiffness in AM /c some receding thru the day until inc at EOD.  Patient describe LB sx as dull tightness that can progress to a sharpness.  Pt deny N/T BLE.  Patient report dec standing and amb tolerance, dec tolerance to performing household chores (laundry, sweep).  Patient clarify that bending is primary movement exacerbating LB discomfort.  Patient report frustration and functional decline including not attending her favorite festival- [employer] and dec her ability to garden ("I was pulling weeds last summer but not now").  Patient report poor sleep quality but notes completing a sleep program and feeling less worry about her sleep issues but not " "experiencing any change in sleep quality.  Patient report heat pad and exercises from prior PT provide only temporary relief.  Patient report pain meds are not offering relief.     Patient note she is presently in PT for R knee pain including stretches and strengthening focus.  Patient clarify that knee discomfort is chronic, had presented prior to LBP and believes it is not influenced by LB.        PMHx: Patient report completing HB Jan 2022, continue PT for LB /c ortho patient, RFA /c min relief. Patient report recent insurance denial for SCS and patient notes frustration at continue pain presentation.  Patient also clarify Spine Surgery does not recommend surgical intervention.  Patient note potential for participation   PMHx indicate patient is currently seeing psychiatry/therapy for depression/anxiety.         Patient's FRP goals: "I want to get back to doing what I used to do"  gardening, walking for distance, attending festivals, folding clothes       Per MD report   Her back pain began several years ago but worsened significantly since last summer. There was no inciting event. She reports low back pain without radicular leg pain. Her pain is worse with activity. She is frustrated with her continued pain. She is currently seeing Dr. Gibbs in Pain Management. She is to be scheduled for SCS trial. She has tried RFA with some relief. She has completed [employer] [facility] with limited long term benefit. She is currently participating in acupuncture with short term relief. She has seen Spine Surgery in the past who did not recommend surgical intervention. See below for further details.       Imaging:  Per MD note:   MRI Lumbar Spine 1/21/2022:  COMPARISON:  12/9/21.  Impression:     1. Multilevel degenerative changes of the lumbar spine with levoscoliosis.  Mild-to-moderate spinal canal stenosis at L3-L5.    Prior Therapy: PT for LB min relief, PT for knee mod relief  Social History: lives in house, threshold to enter,  " "lives /c spouse  Occupation: Retired    Prior Level of Function: Ind /c ADLs attending [employer], gardening, TM/bike 3 x wk   Current Level of Function: Ind /c ADLs difficulty standing, walking, bending, household chores      Pain:      Current 4/10, worst 8/10 at EOD,  best 2/10   Location: bilateral lumbar, R knee  Description: Aching and Dull tightness progressing to sharp pain especially when bending   Aggravating Factors: Standing, Bending, and Walking  Easing Factors: exercises from PT for LB, knee /c acute relief, accupuncture temp relief, heat pad      Patient's FRP goals: "I want to get back to doing what I used to do"  gardening, walking for distance, attending festivals, folding clothes     Medical History:   Past Medical History:   Diagnosis Date    Cataract     Depression     Gestational diabetes     Hyperlipidemia     Hypertension     IBS (irritable bowel syndrome)     Thyroid disease        Surgical History:   Tiarra Norton  has a past surgical history that includes  section; Tubal ligation; Tonsillectomy; Adenoidectomy; Cholecystectomy; Eye surgery; r hand surgery; Total knee arthroplasty; Colonoscopy (N/A, 2016); Colonoscopy (N/A, 2019); Esophagogastroduodenoscopy (N/A, 2020); Joint replacement; Arthroscopy of knee (Right, 10/19/2021); Arthroscopic chondroplasty of knee joint (Right, 10/19/2021); Knee arthroscopy w/ meniscectomy (Right, 10/19/2021); Injection of anesthetic agent around nerve (Bilateral, 2022); Injection of anesthetic agent around nerve (Bilateral, 2/15/2022); Radiofrequency ablation (Right, 3/8/2022); Radiofrequency ablation (Left, 3/22/2022); Injection of botulinum toxin type A (2022); and Cystoscopy (2022).    Medications:   Tiarra has a current medication list which includes the following prescription(s): alprazolam, amlodipine, atorvastatin, b complex vitamins, cholecalciferol (vitamin d3), conjugated estrogens, " hydrocodone-acetaminophen, levothyroxine, liothyronine, pantoprazole, promethazine, restasis, sumatriptan, tizanidine, trazodone, and valsartan.    Allergies:   Review of patient's allergies indicates:  No Known Allergies     Objective     Postural examination:  Levoscoliosis of the lumbar spine  Seated: slouched, legs crossed, inc thoracic kyphosis  Standing: Narrow KAYCEE, inc thoracic kyphosis     Range of Motion - Cervical   AROM AROM AROM    Evaluation Reassessment  Reassessment   Flexion 50 °  °   Extension 52 °  °   Side bending Right 70 °  °   Side bending Left 65 °  °   Rotation Right 30 °  °   Rotation Left 35 °  °     Range of Motion - Lumbar         ROM Loss ROM Loss ROM Loss   Flexion moderate loss dec curve reversal, elicit cavitation upon ascending     Extension major loss     Side bending Right minimal loss     Side bending Left minimal loss     Rotation Right within functional limits     Rotation Left within functional limits       Strength Testing   Evaluation Reassessment Reassessment   Motion Tested         Lower Extremity R L R L R L   Hip Flexion 4/5 4/5       Hip Extension 4/5 4/5       Hip Abduction 4-/5 4-/5       Hip IR 4-/5 4-/5       Knee Extension 5/5 5/5       Knee Flexion 5/5 5/5         Functional Testing     Evaluation Reassessment  Reassessment   Timed Up and Go 6.24 sec sec sec   Single Limb Stance R LE > 30 sec sec sec   Single Limb Stance L LE > 30 sec sec sec   SLS EC R LE  5.46 sec     SLS EC L LE 19.56 (after trial of 2.4, 3.5 sec)      2 Minute Walk Test 543.8 ft = 165.75 m feet feet   6 Minute Walk Test 1705 ft = 519.79 m  feet feet   Self Selected Walking Speed 1.44 m/sec m/sec m/sec         GAIT:  Assistive Device used: none  Level of Assistance: independent  Patient displays the following gait deviations:  dec pelvic rotation, slight dec hip ext, slight dec R stance time.     Minimum standards/norms:    TUG:  < 13.5 seconds/ Males 7.3 sec, Females 8.1 sec  SLS:  26-29 sec   Ages 20-69  Self Selected Walking Speed:  .4-.8m/sec  Limited community ambulator                                                   .8- 1.2  Community ambulator                                                    1.2 m/sec and above  Able to safely cross streets        Limitation/Restriction for FOTO LUMBAR Survey    Therapist reviewed FOTO scores for Tiarra Norton on 9/23/2022.   FOTO documents entered into [employer] - see Media section.    Limitation Score: 53%      Goal:  44%       TREATMENT     Total Treatment time (time-based codes) separate from Evaluation: 25 minutes     therapeutic exercises to improve functional performance for 15 minutes, includin    HEP demonstrate include  Supine   LTR x 5   DKTC x 3 5 sec hold   PPT x 4   Bridges x 2  Patient verbalize knee R knee HEP using handouts /c additional visual instruction    Prone   WILLIE 2 min -> Press ups x 10     Patient note sx end range pain but sx improvement as return to neutral       Tiarra received the treatments listed below:      therapeutic activities to improve functional performance for 10 minutes, including:  PT education:  Physical & psychological viscous pain cycles (see patient instructions 9/23/2022)  Role of consistent activity (graded exposure) to break the physical cycle  Importance of education & behavioral changes that promote intrinsic patient motivation to help break the psychological cycle  Impact on pt's functional goals when breaking each one of these cycles.    Impact central sensitization has on the sensory system in the chronic pain population      PATIENT EDUCATION AND HOME EXERCISES     Education provided:   - pain cycle (see patient instruction)     Written Home Exercises Provided: Patient instructed to cont prior HEP from R knee PT.  Exercises were reviewed and Tiarra was able to demonstrate them prior to the end of the session.  Tiarra demonstrated fair  understanding of the education provided. See EMR under Patient Instructions  "for information provided during therapy sessions.    ASSESSMENT   Tiarra is a 67 y.o. female referred to outpatient Physical Therapy with a medical diagnosis of chronic pain disorder & Z74.09 (ICD-10-CM) - Impaired mobility and endurance.  Pt presents with pain, weakness, impaired mobility, decreased AROM, mild impaired balance, postural deficits, gait deviations, decreased endurance, fear of pain w/ central sensitization, increased psychosocial concerns, & inability to perform ADL's as before due to current functional status. Pt's increased psychosocial concerns w/ central sensitization present an unstable clinical presentation which may limit progress, but the intrinsic motivation to improve will assist.  Although patient demonstrate willingness to complete all requested activities, patient /c nticipatory pain verbiage.  Also patient may benefit from education on "boom/bust cycle" recognition for improved activity pacing at home.   Tx today include patient demonstrate recent R knee HEP and HEP from HB.  Patient complete /c safe technique and, at this time, patient advised to continue HEP for R knee to prepare body for FRP exercises. Also recommend continue to explore potential extension directional preference to address noted lumbar mobility deficit.     Based on the above history and physical examination an active physical therapy program within a multi-disciplinary setting is recommended.  Pt will benefit from skilled outpatient physical therapy to address the deficits listed below in the chart, provide pt/family education and to maximize pt's level of independence in the home and community environment.     Plan of care discussed with patient: Yes  Pt's spiritual, cultural and educational needs considered and patient is agreeable to the plan of care and goals as stated below:     Pt prognosis is Fair.   Anticipated Barriers for therapy: chronicity of conditions, psychosocial concerns     Medical Necessity is " demonstrated by the following  History  Co-morbidities and personal factors that may impact the plan of care Co-morbidities:   anxiety, coping style/mechanism, depression, and difficulty sleeping, prior knee surgery, scoliosis    Personal Factors:   coping style     high   Examination  Body Structures and Functions, activity limitations and participation restrictions that may impact the plan of care Body Regions:   back  lower extremities  trunk    Body Systems:    gross symmetry  ROM  strength  transitions  motor control    Participation Restrictions:   none    Activity limitations:   Learning and applying knowledge  no deficits    General Tasks and Commands  no deficits    Communication  no deficits    Mobility  lifting and carrying objects  walking    Self care  washing oneself (bathing, drying, washing hands)  caring for body parts (brushing teeth, shaving, grooming)  toileting  dressing    Domestic Life  shopping  cooking  doing house work (cleaning house, washing dishes, laundry)    Interactions/Relationships  no deficits    Life Areas  no deficits    Community and Social Life  community life  recreation and leisure         high   Clinical Presentation evolving clinical presentation with changing clinical characteristics moderate   Decision Making/ Complexity Score: moderate       GOALS: Pt is in agreement with the following goals:      Goal Progress Towards Goal   Short Term: In 3 weeks, pt will:    Verbalize & demonstrate good understanding of resisted training with 3-1-3 second rep for [id number] contraction to encourage full muscle recruitment for strength & endurance Progress towards goal: initiated 9/23/2022   Verbalize & demonstrate good understanding of home stretch routine to improve AROM, help decrease pain & improve mobility Progress towards goal: initiated 9/23/2022   Demonstrate proper supine or seated diaphragmatic breathing with decreased chest excursion & emphasis on full  abdominal excursion & increased expiration time to promote muscle relaxation & increased parasympathetic activity to improve patient tolerance to activities Progress towards goal: initiated 9/23/2022   Demonstrate proper static standing posture in frontal & sagittal plane per PT visual assessment to decrease postural strain in ADLs Progress towards goal: initiated 9/23/2022   Demonstrate good recall of names of resisted exercises w/ minimal to moderate verbal cues to promote independence w/ exercise at the end of the program Progress towards goal: initiated 9/23/2022   Verbalize plan for continuing resisted exercises w/ specific location (i.e. commercial gym, home, community fitness center) indicating preference for machine-based or free-weight exercises to incorporate all major muscle groups to maintain & progress therapeutic functional improvements Progress towards goal: initiated 9/23/2022           Long Term: In 8 weeks, pt will:    Verbalize good understanding of activities planning/scheduling (stretching, mindfulness, resisted training, & cardio) prescribed by PT/OT following the end of the program to sustain & progress functional improvements Progress towards goal: initiated 9/23/2022   Be independent w/ selected resisted training w/ minimal to no cuing while performing to continue w/ resisted strengthening independently at the end of the program Progress towards goal: initiated 9/23/2022   Improve 2 Minute Walk Test (MWT) to 640 feet and 6 MWT to 1900 feet or greater to improve gait speed and mobility Progress towards goal: initiated 9/23/2022   Perform single leg stance with EC for 20 seconds or greater bilaterally to improve safety and balance in ADLs Progress towards goal: initiated 9/23/2022   Score 44 % or less on LUMBAR FOTO to indicate significant functional improvements in impaired functions secondary to low back pain Progress towards goal: initiated 9/23/2022   Patient will report ability to  complete folding full load of clothes /s inc discomfort for inc tolerance to household chores Progress towards goal: initiated 9/23/2022             PLAN   Plan of care Certification: 9/23/2022 to 12/09/22.    Outpatient Physical Therapy 3 times weekly for 8 weeks to include the following interventions: Gait Training, Manual Therapy, Neuromuscular Re-ed, Patient Education, Self Care, Therapeutic Activities, and Therapeutic Exercise.     Frank Malik, PT

## 2022-09-26 ENCOUNTER — PATIENT MESSAGE (OUTPATIENT)
Dept: REHABILITATION | Facility: OTHER | Age: 67
End: 2022-09-26
Payer: MEDICARE

## 2022-09-26 NOTE — PLAN OF CARE
[facility]  Physical Therapy Initial Evaluation    Date: 9/22/2022   Name: Tiarra Norton  Clinic Number: [id number]    Therapy Diagnosis:   Encounter Diagnoses   Name Primary?    Decreased range of motion (ROM) of right knee Yes    Weakness of right lower extremity     Impaired functional mobility, balance, gait, and endurance      Physician: Lashell Munoz PA-C    Physician Orders: PT Eval and Treat   Medical Diagnosis from Referral:   Z96.651 (ICD-10-CM) - S/P right unicompartmental knee replacement   M17.11 (ICD-10-CM) - Primary osteoarthritis of right knee     Evaluation Date: 9/22/2022  Authorization Period Expiration: 12/31/2022  Plan of Care Expiration: 11/22/2022  Visit # / Visits authorized: 1/ 1 (pending)   Progress Note Due: 10/22/2022  FOTO: 1/ 1    Precautions: Standard    Time In: 12:00  Time Out: 1:00  Total Appointment Time (timed & untimed codes): 60 minutes    Subjective   Date of onset: 5+ years  History of current condition - Tiarra reports: she has been having continued knee pain since she completed her last bout of knee therapy. She received partial TKA in 2015. She had significant reduction in her pain after completing knee therapy but only for about a month. The pain is located on the inside of the knee with no radiation and she describes it as constant. Aggravating walking, standing, sit to stand, stairs, bending the knee, kneeling, yardwork, and household chores. Alleviated with ice, no relief with NSAIDs.    PALLAVI: chronic   Any popping: yes, clicking  Any Locking: no  Any buckling: no  Pain radiates: no  Pain constant or intermittent: no  Any injections: gel shots    Pain:  Current 2/10, worst 8/10, best 2/10   Location: right knee   Description: Aching and Sharp  Aggravating Factors: walking, standing, sit to stand, stairs, bending the knee, kneeling, yardwork, and household chores  Easing Factors: ice    Prior Therapy: yes with good relief  Social  "History: lives with their spouse in multistory home  Occupation: retired  Prior Level of Function: independent  Current Level of Function: increased pain and decreased tolerance to walking, standing, sit to stand, stairs, bending the knee, kneeling, yardwork, and household chores    Pts goals: "Get the pain better so that I can do the things I used to be able to do."    Imaging: XR KNEE ORTHO RIGHT     CLINICAL HISTORY:  Pain in right knee     TECHNIQUE:  AP standing of both knees, Merchant views of both knees as well as a lateral view of the right knee were performed.     COMPARISON:  2021.     FINDINGS:  Right: Status post right medial compartment arthroplasty.  No radiographic evidence of complication.  No acute fracture or dislocation.  Small joint effusion.     Left: No fracture or dislocation on provided views.  Tricompartmental osteophytes with moderate medial tibiofemoral cartilage space narrowing.     Right: There is a medial joint arthroplasty.     Impression:     Postoperative changes of right medial compartment arthroplasty.  No radiographic evidence of complication.     Moderate left medial tibiofemoral cartilage space narrowing.     Medical History:   Past Medical History:   Diagnosis Date    Cataract     Depression     Gestational diabetes     Hyperlipidemia     Hypertension     IBS (irritable bowel syndrome)     Thyroid disease        Surgical History:   Tiarra Norton  has a past surgical history that includes  section; Tubal ligation; Tonsillectomy; Adenoidectomy; Cholecystectomy; Eye surgery; r hand surgery; Total knee arthroplasty; Colonoscopy (N/A, 2016); Colonoscopy (N/A, 2019); Esophagogastroduodenoscopy (N/A, 2020); Joint replacement; Arthroscopy of knee (Right, 10/19/2021); Arthroscopic chondroplasty of knee joint (Right, 10/19/2021); Knee arthroscopy w/ meniscectomy (Right, 10/19/2021); Injection of anesthetic agent around nerve (Bilateral, 2022); " Injection of anesthetic agent around nerve (Bilateral, 2/15/2022); Radiofrequency ablation (Right, 3/8/2022); Radiofrequency ablation (Left, 3/22/2022); Injection of botulinum toxin type A (6/21/2022); and Cystoscopy (6/21/2022).    Medications:   Tiarra has a current medication list which includes the following prescription(s): alprazolam, amlodipine, atorvastatin, b complex vitamins, cholecalciferol (vitamin d3), conjugated estrogens, hydrocodone-acetaminophen, levothyroxine, liothyronine, pantoprazole, promethazine, restasis, sumatriptan, tizanidine, trazodone, and valsartan.    Allergies:   Review of patient's allergies indicates:  No Known Allergies       Objective       Observation:    Alignment: normal    Wound/ incision: old incision from partial TKA in 2015, well healed      Sensation: intact to light touch, absent over patella    DTR: 2+ patella     GAIT DEVIATIONS: Tiarra displays occasional unsteady gait;decreased step length;increased base of support;decreased weight shift;antalgic gait    Range of Motion:   Knee Left active Left Passive   Flexion 128 133   Extension 3 5     Knee Right active Right Passive   Flexion 123 127   Extension 2 4       Lower Extremity Strength   Right LE  Left LE    Knee extension: 4/5 Knee extension: 5/5   Knee flexion: 4/5 Knee flexion: 4+/5   Hip flexion: 4+/5 Hip flexion: 4+/5   Hip extension:  4/5 Hip extension: 4/5   Hip abduction: 4-/5 Hip abduction: 4-/5   Hip adduction: 4-/5 Hip adduction 4-/5   Ankle dorsiflexion: 5/5 Ankle dorsiflexion: 5/5   Ankle plantarflexion: 5/5 Ankle plantarflexion: 5/5       Special Tests:   Right Left   Valgus Stress Test Positive Negative   Varus Stress test Positive Negative   Lachman's test Negative Negative   Posterior Drawer Negative Negative   Anterior Drawer Negative Negative   Berna's Test Positive Negative   Apley's Compression Negative Negative   Apley's Distraction Negative Negative   Nieves's compression test Negative Negative  "  Patellar Grind Test Positive Negative     Step down test: Positive  Squat: Positive  Single leg balance: Positive    Joint Mobility: hypomobility of R tibiofemoral joint     Patellar sup./inf: hypermobile   Patellar med/lat: hypermobile    Palpation: tender to palpation of   Patella:   Joint line:   MCL   Patellar tendon:     Quad contraction: decreased quad activation R vs L    Flexibility:    Ely's test: R = 110 degrees ; L = 120 degrees   Popliteal Angle: R = 30 degrees ; L = 20 degrees   Sammy's test: R = - ; L = -   Blas test: R = + ; L = +    Edema:     Girth Measurement Joint line 5 cm below 10 cm above   Left 37 cm 32 cm 42 cm   Right 38 cm 33 cm 43 cm       CMS Impairment/Limitation/Restriction for FOTO knee Survey    Therapist reviewed FOTO scores for Tiarra Norton on 9/22/2022.   FOTO documents entered into EPIC - see Media section.    Limitation Score: 59%    Goal: 47%         TREATMENT   Treatment Time In: 12:30  Treatment Time Out: 1:00  Total Treatment time separate from Evaluation: 30 minutes    Tiarra received therapeutic exercises to develop strength, endurance, ROM, and flexibility for 15 minutes including:  Quad set 10x10"  SAQ 3# 10x5"  SAW 3# w/ ER 10x5"  SLR 3# 10x3"  SLR w/ ER 3# 10x3"  SLR add 10x3"  SLR abd 10x3"  TKE GTB 20x3"  VMO LAQ 10x5"    Tiarra received the following manual therapy techniques: dry needling with E-stim were applied to the: R knee  for 10 minutes, including:    FDN: Pt received a semi-standardized dry needling protocol for knee osteoarthritis. 1 needle was inserted into each of the following muscles and was manipulated and wound up:  VMO  VLL  Lateral patellar sulcus  Medial patellar sulcus  Quad tendon  Patella tendon    E-stim was applied via 2 channels from VLL to lateral patellar sulcus and from VMO to medial patellar suclus at 5-6 Hz and 3-6 mA to tolerance for 10 minutes      Home Exercises and Patient Education Provided    Education provided:   - HEP, " POC    Written Home Exercises Provided: yes.  Exercises were reviewed and Tiarra was able to demonstrate them prior to the end of the session.  Tiarra demonstrated good  understanding of the education provided.     See EMR under Patient Instructions for exercises provided 9/22/2022.    Assessment   Tiarra is a 67 y.o. female referred to outpatient Physical Therapy with a medical diagnosis of Z96.651 (ICD-10-CM) - S/P right unicompartmental knee replacement and M17.11 (ICD-10-CM) - Primary osteoarthritis of right knee. Pt presents with signs and symptoms consistent with diagnosis including decreased range of motion of R knee, weakness of R LE, decreased quad set, decreased joint mobility, patellar hypermobility, and decreased soft tissue flexibility and mobility, poor balance, gait deviations, and decreased functional mobility tolerance. These deficits are limiting patient in full particiaption in ADL;s and leisure activities such as walking, standing, sit to stand, stairs, bending the knee, kneeling, yardwork, and household chores. Pt received dry needling with e-stim according to knee osteoarthritis protocol. Pt reported decreased pain after application, assess long term response at next followup. Pt to complete functional restoration program over the next 2 months prior to returning to knee therapy. She will be reassessed at that time    Pt prognosis is Good.   Pt will benefit from skilled outpatient Physical Therapy to address the deficits stated above and in the chart below, provide pt/family education, and to maximize pt's level of independence.     Plan of care discussed with patient: Yes  Pt's spiritual, cultural and educational needs considered and patient is agreeable to the plan of care and goals as stated below:     Anticipated Barriers for therapy: chronicity of condition, lateral meniscus tear    Medical Necessity is demonstrated by the following  History  Co-morbidities and personal factors that may  impact the plan of care Co-morbidities:   advanced age, anxiety, depression, difficulty sleeping, and chronic back pain, migraines, HTN, hypothyoridism, hx of lateral meniscus tear, HERMINIA, insomnia    Personal Factors:   age  attitudes     low   Examination  Body Structures and Functions, activity limitations and participation restrictions that may impact the plan of care Body Regions:   back  lower extremities  trunk    Body Systems:    gross symmetry  ROM  strength  gross coordinated movement  balance  gait  motor control  blood pressure    Participation Restrictions:   See above    Activity limitations:   Learning and applying knowledge  no deficits    General Tasks and Commands  no deficits    Communication  no deficits    Mobility  lifting and carrying objects  walking    Self care  no deficits    Domestic Life  doing house work (cleaning house, washing dishes, laundry)  assisting others    Interactions/Relationships  no deficits    Life Areas  no deficits    Community and Social Life  no deficits         Complexity: low   Clinical Presentation stable and uncomplicated low   Decision Making/ Complexity Score: low       GOALS: Short Term Goals:  4 weeks  1.Report decreased in pain at worse less than  <   / =  4  /10  to increase tolerance for functional mobility.On going  2. Pt to improve R knee range of motion by 25% to allow for improved functional mobility to allow for improvement in IADLs. .On going  3. Increased R LE MMT 1/2 grade to increase tolerance for ADL and work activities.On going  4. Pt to report ability to walk a half mile without increased pain  5. Pt to tolerate HEP to improve ROM and independence with ADL's.On going    Long Term Goals: 8 weeks  1.Report decreased in pain at worse less than  <   / =  2  /10  to increase tolerance for functional mobility. On going  2.Pt to improve range of motion by 75% to allow for improved functional mobility to allow for improvement in IADLs. On  going  3.Increased R LE MMT 1 grade to increase tolerance for ADL and work activities.On going  4. Pt will report 47% or less on FOTO knee survey  to demonstrate increase in LE function with every day tasks. On going  5. Pt to be Independent with HEP to improve ROM and independence with ADL's. On going    Plan   Plan of care Certification: 9/22/2022 to 11/22/2022.    Outpatient Physical Therapy 2 times weekly for 8 weeks to include the following interventions: Gait Training, Manual Therapy, Moist Heat/ Ice, Neuromuscular Re-ed, Patient Education, Self Care, Therapeutic Activities, and Therapeutic Exercise. Marybeth Anaya, PT      I CERTIFY THE NEED FOR THESE SERVICES FURNISHED UNDER THIS PLAN OF TREATMENT AND WHILE UNDER MY CARE   Physician's comments:     Physician's Signature: ___________________________________________________

## 2022-09-27 ENCOUNTER — TELEPHONE (OUTPATIENT)
Dept: DERMATOLOGY | Facility: CLINIC | Age: 67
End: 2022-09-27
Payer: MEDICARE

## 2022-09-27 ENCOUNTER — PATIENT MESSAGE (OUTPATIENT)
Dept: PAIN MEDICINE | Facility: OTHER | Age: 67
End: 2022-09-27
Payer: MEDICARE

## 2022-09-27 NOTE — TELEPHONE ENCOUNTER
----- Message from Larissa Paniagua sent at 9/27/2022  8:44 AM CDT -----  Contact: pt  Pt calling to schedule appt , pt has a wart under her eye she wants removed , please call       Confirmed patient's contact info below:  Contact Name: Tiarra Norton  Phone Number: [phone]

## 2022-09-28 ENCOUNTER — PATIENT MESSAGE (OUTPATIENT)
Dept: PAIN MEDICINE | Facility: OTHER | Age: 67
End: 2022-09-28
Payer: MEDICARE

## 2022-09-28 ENCOUNTER — TELEPHONE (OUTPATIENT)
Dept: PAIN MEDICINE | Facility: CLINIC | Age: 67
End: 2022-09-28
Payer: MEDICARE

## 2022-09-28 NOTE — TELEPHONE ENCOUNTER
----- Message from Sara Fuchs sent at 9/28/2022  8:04 AM CDT -----  Regarding: CAll BAck  Name of Who is Calling:MARION NIETO [[id number]]              What is the request in detail: Patient requesting a call back Unsure if she needs to come in for her appointment today 09- states her insurance denied her procedure 10-. Please assist              Can the clinic reply by [facility]: No              What Number to Call Back if not in [facility]:[phone]

## 2022-09-28 NOTE — TELEPHONE ENCOUNTER
Staff spoke with pt in regards to her message. Pt stated she had spoken with Ana and had gotten the situation straighten out. Staff demonstrated understanding and confirmed. SG

## 2022-09-30 ENCOUNTER — TELEPHONE (OUTPATIENT)
Dept: PAIN MEDICINE | Facility: CLINIC | Age: 67
End: 2022-09-30
Payer: MEDICARE

## 2022-10-01 ENCOUNTER — PATIENT MESSAGE (OUTPATIENT)
Dept: PAIN MEDICINE | Facility: CLINIC | Age: 67
End: 2022-10-01
Payer: MEDICARE

## 2022-10-03 ENCOUNTER — TELEPHONE (OUTPATIENT)
Dept: PAIN MEDICINE | Facility: CLINIC | Age: 67
End: 2022-10-03
Payer: MEDICARE

## 2022-10-03 NOTE — TELEPHONE ENCOUNTER
Staff called pt informing pt that we will forward the message over to the provider for further review. Pt verbalized understanding and confirmed sG

## 2022-10-03 NOTE — TELEPHONE ENCOUNTER
----- Message from Caio Rivas sent at 10/3/2022 10:39 AM CDT -----  Pt would like to be called back regarding cancelling her 10/11/22 post-op appt    Pt can be reached at [phone].    Thanks

## 2022-10-03 NOTE — TELEPHONE ENCOUNTER
----- Message from Omid Brady sent at 10/3/2022  3:14 PM CDT -----    Name of Who is Calling: MARION NIETO [[id number]]      What is the request in detail: Pt is requesting a call back to see if she needs to have X-ray's done and what for.      Can the clinic reply by [facility]: NO      What Number to Call Back if not in [facility]: [phone]

## 2022-10-04 ENCOUNTER — PATIENT MESSAGE (OUTPATIENT)
Dept: SPINE | Facility: CLINIC | Age: 67
End: 2022-10-04
Payer: MEDICARE

## 2022-10-04 ENCOUNTER — TELEPHONE (OUTPATIENT)
Dept: SPINE | Facility: CLINIC | Age: 67
End: 2022-10-04
Payer: MEDICARE

## 2022-10-04 NOTE — TELEPHONE ENCOUNTER
Staff called pt and confirmed that pt receive the message in regards to the X-ray being canceled. Pt understood. SG

## 2022-10-04 NOTE — TELEPHONE ENCOUNTER
----- Message from Ese Dominguez sent at 10/4/2022  9:45 AM CDT -----  Type:  Patient Returning Call    Who Called:     Who Left Message for Patient:     Does the patient know what this is regarding?: missed call     Best Call Back Number:  [phone]    Additional Information:

## 2022-10-06 ENCOUNTER — DOCUMENTATION ONLY (OUTPATIENT)
Dept: RESEARCH | Facility: OTHER | Age: 67
End: 2022-10-06
Payer: MEDICARE

## 2022-10-06 ENCOUNTER — PATIENT MESSAGE (OUTPATIENT)
Dept: PAIN MEDICINE | Facility: CLINIC | Age: 67
End: 2022-10-06
Payer: MEDICARE

## 2022-10-06 ENCOUNTER — PATIENT MESSAGE (OUTPATIENT)
Dept: REHABILITATION | Facility: OTHER | Age: 67
End: 2022-10-06
Payer: MEDICARE

## 2022-10-06 NOTE — PROGRESS NOTES
Study: [facility] - BRUNO Study: A Randomized Controlled Study to Evaluate the Safety and Effectiveness of [employer] Spinal Cord Stimulation (SCS) Systems in the Treatment of Chronic Low Back and/or Leg Pain with No Prior Surgeries  IRB/Protocol #: [id number]/  : Obed Gasca MD  Treating Physician: Dr. Shoaib Aguirre MD  Site Number: [id number]        Subject Number: 011    Screening Visit     Informed Consent:  Ms.Laura Norton was approached for participation in the [facility] BRUNO Study and received a copy of the Informed Consent (IC) (IRB approved 05/05/2022), allowing the patient time to independently read and review the IC. Patient was contacted as follow-up and communicated interest in moving forward and a visit was scheduled. The patient arrived at the Clinical Trials Unit where the Informed Consent (IC) was discussed extensively. No study related procedures were performed before the IC was signed.     Individuals present for discussion included subject and Nena Prajapati.     The IC was read/reviewed thoroughly, and ample time was provided for questions and answers; the PI was also available at the time of the consent, if needed.       While reviewing the IC, emphasis was placed on:  The purpose of the study, qualifications to participate, and subjects responsibilities associated to the study  Study design, follow-up schedule, and tests or procedures done at each visit  The known Risk, Benefits, Alternative Treatments, Compensation and Costs  The protection of their confidentiality and their HIPPA rights as well as their accessibility to clincaltrials.org and the IRB  That participation in the research trial is voluntary and patient may withdraw at any time.     The subject was offered additional time to independently read and review the IC and if they had any questions. All questions were answered to the subject's satisfaction prior to obtaining IC. The subject expressed a clear  understanding of all items identified and adequately summarized the purpose of the study, the risks associated with the study, and all procedures, testing, and follow-ups associated with the study. The subject agreed to participate voluntarily. The consent was signed by the patient, counter signed by me, and a copy of the executed IC was provided to the subject for their records.       The original consent was scanned into the electronic medical record (EPIC) and filed into the subject's corresponding source binder. No study related procedures were performed before the IC was signed.     INC/EXC REVIEW and Confirmation:     During this visit the Sub-PI, Dr. Aguirre fully screened the subject for eligibility via EMR and patient reporting met with patient). By signing this visit note, Dr. Aguirre confirms that that he has reviewed all of the protocol F, Inclusion (IC1-IC13) and Exclusion (EC1 - EC27) criteria (except those that relate to Baseline confirmation) and believes the patient meets these criteria.     In addition, the following items/tasks were also confirmed and/or completed:  It is confirmed that the subject l completed psychological evaluation on 06 JUN 2022.    It was confirmed that the patient is taking Opioid prescriptions, so would be identified as meeting the criteria of stable opioid medications for 30 days prior to informed consent.   An MRI will be completed and uploaded to the EDC as a de-identified DICOM file.    Following the visit, all required eligibility documentation was collected, redacted, and will be uploaded to the EDC.     The optional QLINIQL pranay was not uploaded to the subjects phone per the [employer] Mobile Pranay instructions . T     To close the visit, the visit was documented in [employer]. Awaiting eligibility from [employer].    There were no protocol deviation or adverse advents to report for this visit.

## 2022-10-06 NOTE — PROGRESS NOTES
Eligibility Inclusion - Screening                        Inclusion Criteria     Please indicate whether the following inclusion criteria have been met:     IC1. Chronic low back pain, with or without leg pain, for at least 6 months with low back pain greater or equal to leg pain  IC2. Received at least 90 days of documented pain management care to address the primary pain complaint, prior to Screening (e.g. medication, physical therapy)  IC4. Diagnoses of axial lumbar degenerative disc disease with/without lumbar spinal stenosis  IC5. If taking prescription opioids for primary chronic pain complaint (low back and/or leg pain), must have been on a stable prescription (same drug(s) and dose(s)) 30 days prior to Screening  IC6. Consumed an average total daily morphine equivalent of ? 120 mg during the 30 days prior to Screening  IC7. Willing and able to comply with all protocol-required procedures and assessments/evaluations (e.g. willing to comply with opioid prescription lock from the Opioid Medication Lock Visit through the 3 Month Visit.  IC9. Eligible candidate for SCS from a psychological and psychiatric standpoint as determined within 180 days prior to Baseline Visit, per sites routine screening process  IC10. 22 years of age or older when written informed consent is obtained  IC11. Able to independently read and complete all questionnaires and assessments provided in English  For IC12, select YES if subject is male  IC12. If female of childbearing potential: not pregnant, as evidenced by a negative pregnancy test at Screening  IC13. Subject signed a valid, IRB-approved informed consent form (ICF) provided in English     Clinical Exclusion Criteria     Please indicate whether the following exclusion criteria have been met:  EC1. Average leg pain intensity is greater than average low back pain intensity as reported during Baseline Period (VRS)  EC2. Chronic leg pain with numbness and/or weakness  EC3. Patient  exhibits catastrophizing based on physician evaluation (e.g., average overall daily pain intensity of 10 on a 0-10 numerical rating scale, every day during the 7 days prior to Screening, based on patient recall)  EC4. Neurogenic claudication secondary to lumbar spinal stenosis  EC5. Primary pain complaint of vascular origin (e.g. peripheral vascular disease)  EC6. Clinical evidence of progressive neurologic pathology, mechanical instability or other spinal pathology that mandates surgical intervention  EC7. Spinal pain secondary to neoplasm, infection, autoimmune disorder with spinal involvement, or a spinal metabolic disorder  EC8. Visceral pain referred to the low back and/or legs  EC 9. Protocol F and earlier: Any pain-related diagnosis or medical/psychological condition that, in the clinicians best judgment, might confound reporting of study outcomes (e.g. pelvic pain, anginal pain, chronic migraine, brain or spinal cord tumor, Nasim-Danlos syndromes) EC 9. Protocol G and later: Any pain-related diagnosis or medical/psychological condition that, in the clinicians best judgment, might confound reporting of study outcomes (e.g. pelvic pain, anginal pain, chronic migraine, brain or spinal cord tumor, scoliosis, Nasim-Danlos syndromes)  EC10. Current diagnosis of fibromyalgia  EC11. Require implantation of lead(s) in the cervical epidural space  EC12. Current systemic infection, or local infection in close proximity to anticipated surgical field, at Screening  EC13. High surgical risk  EC14. Body mass index ? 45 at Screening  EC15. Terminal illness with anticipated survival <12 months  EC16. Current condition associated with risk of immunocompromise that might increase risk of infection during study duration  EC17. Currently on any anticoagulant medications that cannot be discontinued during perioperative period  EC18. Significant cognitive impairment at Screening that, in the opinion of the Investigator, would  reasonably be expected to impair the study candidates ability to assess pain intensity  EC19. Participating (or intends to participate) in another drug or device clinical trial that may influence the data that will be collected for this study  EC20. Protocol F and earlier: Previous spinal cord stimulation trial or is already implanted with an active implantable device(s) (e.g. pacemaker, drug pump, implantable pulse generator) EC20. Protocol G and later: Previous failed spinal cord stimulation trial or is already implanted with an active implantable device(s) (e.g. pacemaker, drug pump, implantable pulse generator)  EC21. Previous spinal surgery below the cervical region, where spinal surgery is defined as intradiscal interventions, discectomy, single or multi-level fusion, or decompression procedures (e.g. laminectomy, laminotomy, foraminotomy)  EC22. A female who is breastfeeding  EC23. A female of childbearing potential planning to get pregnant during the course of the study or not using adequate contraception  EC24. Plan to receive any massage or manipulation directly over the leads or by the anticipated location of the IPG or treatments that involve sudden jerking motions of the torso at any time during the course of the study  EC25. A condition currently requiring or likely to require the use of diathermy and/or MRI not approved per applicable device IFU  EC26. Any injury or medical/psychological condition that might be significantly exacerbated by the implant surgery or the presence of an implantable stimulator or otherwise compromise subject safety  EC27. Currently involved in ongoing litigation and/or an injury claim or workers compensation claim

## 2022-10-07 ENCOUNTER — PATIENT MESSAGE (OUTPATIENT)
Dept: INTERNAL MEDICINE | Facility: CLINIC | Age: 67
End: 2022-10-07
Payer: MEDICARE

## 2022-10-07 RX ORDER — HYDROCODONE BITARTRATE AND ACETAMINOPHEN 5; 325 MG/1; MG/1
1 TABLET ORAL
Qty: 30 TABLET | Refills: 0 | Status: SHIPPED | OUTPATIENT
Start: 2022-10-07 | End: 2022-11-07 | Stop reason: SDUPTHER

## 2022-10-07 NOTE — TELEPHONE ENCOUNTER
No new care gaps identified.  [facility] Embedded Care Gaps. Reference number: [id number]. 10/07/2022   11:19:07 AM PREETHIT

## 2022-10-13 ENCOUNTER — HOSPITAL ENCOUNTER (OUTPATIENT)
Dept: RADIOLOGY | Facility: OTHER | Age: 67
Discharge: HOME | End: 2022-10-13
Attending: ANESTHESIOLOGY
Payer: MEDICARE

## 2022-10-13 ENCOUNTER — CLINICAL SUPPORT (OUTPATIENT)
Dept: REHABILITATION | Facility: OTHER | Age: 67
End: 2022-10-13
Attending: ANESTHESIOLOGY
Payer: MEDICARE

## 2022-10-13 DIAGNOSIS — M25.661 DECREASED RANGE OF MOTION (ROM) OF RIGHT KNEE: ICD-10-CM

## 2022-10-13 DIAGNOSIS — M54.9 DORSALGIA, UNSPECIFIED: ICD-10-CM

## 2022-10-13 DIAGNOSIS — R29.898 WEAKNESS OF RIGHT LOWER EXTREMITY: ICD-10-CM

## 2022-10-13 DIAGNOSIS — Z74.09 IMPAIRED FUNCTIONAL MOBILITY, BALANCE, GAIT, AND ENDURANCE: Primary | ICD-10-CM

## 2022-10-13 PROCEDURE — 97140 MANUAL THERAPY 1/> REGIONS: CPT

## 2022-10-13 PROCEDURE — 97110 THERAPEUTIC EXERCISES: CPT

## 2022-10-13 PROCEDURE — 72148 MRI LUMBAR SPINE WITHOUT CONTRAST: ICD-10-PCS | Mod: 26,,, | Performed by: INTERNAL MEDICINE

## 2022-10-13 PROCEDURE — 72148 MRI LUMBAR SPINE W/O DYE: CPT | Mod: TC

## 2022-10-13 PROCEDURE — 72148 MRI LUMBAR SPINE W/O DYE: CPT | Mod: 26,,, | Performed by: INTERNAL MEDICINE

## 2022-10-13 NOTE — PROGRESS NOTES
"[facility]   Physical Therapy Treatment Note     Name: Tiarra Chang [facility] Number: [id number]    Therapy Diagnosis:   Encounter Diagnoses   Name Primary?    Impaired functional mobility, balance, gait, and endurance Yes    Decreased range of motion (ROM) of right knee     Weakness of right lower extremity      Physician: Tara Gibbs MD    Visit Date: 10/13/2022    Physician Orders: PT Eval and Treat   Medical Diagnosis from Referral:   Z96.651 (ICD-10-CM) - S/P right unicompartmental knee replacement   M17.11 (ICD-10-CM) - Primary osteoarthritis of right knee      Evaluation Date: 9/22/2022  Authorization Period Expiration: 12/31/2022  Plan of Care Expiration: 11/22/2022  Visit # / Visits authorized: 2/12  Progress Note Due: 10/22/2022  FOTO: 1/ 1      PTA Visit #: 0/5     Time In: 2:00 pm  Time Out: 3:00 pm  Total Billable Time: 60 minutes    SUBJECTIVE     Pt reports: she felt good for a few days after the evaluation, but today it is hurting with almost all activity.  She was compliant with home exercise program.  Response to previous treatment: decreased pain  Functional change: none yet, first follow up    Pain: 6/10  Location: right knee      OBJECTIVE     Objective Measures updated at progress report unless specified.     Treatment     Tiarra received the treatments listed below:      therapeutic exercises to develop strength, endurance, ROM, and flexibility for 45 minutes including:    Recumbent bike x5' Lv 4  Quad set 10x10"  SAQ 3# 2x10x5"  SAQ>SLR w/ ER 3# 2x10x3"  SLR add 2x10x3"  SLR abd 3# 2x10x3"  TKE GTB 20x3"  VMO LAQ 4# 2x10x5"  Leg press 120# 2x10  SL leg press R only 60# 2x10    manual therapy techniques: dry needling were applied to the: R knee for 10 minutes, including:  FDN: Pt received a semi-standardized dry needling protocol for knee osteoarthritis. 1 needle was inserted into each of the following muscles and was manipulated and wound " up:  VMO  VLL  Lateral patellar sulcus  Medial patellar sulcus  Quad tendon  Patella tendon     E-stim was applied via 2 channels from VLL to lateral patellar sulcus and from VMO to medial patellar suclus at 5-6 Hz and 3-6 mA to tolerance for 10 minutes    hot pack for 5 minutes to R knee.      Patient Education and Home Exercises     Home Exercises Provided and Patient Education Provided     Education provided:   - HEP, POC    Written Home Exercises Provided: Patient instructed to cont prior HEP. Exercises were reviewed and Tiarra was able to demonstrate them prior to the end of the session.  Tiarra demonstrated good  understanding of the education provided. See EMR under Patient Instructions for exercises provided during therapy sessions    ASSESSMENT     Tiarra presents today for her first follow-up since initial evaluation. She reports unchanged pain levels. Reviewed HEP with pt demo good form with all exercises with min cueing needed. Progressed dynamic LE strengthening exercises with good tolerance and no adverse effects. Dry needling applied today with pt reporting immediate relief and able to ambulate with reports of no pain. Assess long term response and effectiveness at next follow-up. Continue to progress as tolerated.     Tiarra Is progressing well towards her goals.   Pt prognosis is Fair.     Pt will continue to benefit from skilled outpatient physical therapy to address the deficits listed in the problem list box on initial evaluation, provide pt/family education and to maximize pt's level of independence in the home and community environment.     Pt's spiritual, cultural and educational needs considered and pt agreeable to plan of care and goals.     Anticipated barriers to physical therapy: chronicity of condition, lateral meniscus tear    GOALS: Short Term Goals:  4 weeks  1.Report decreased in pain at worse less than  <   / =  4  /10  to increase tolerance for functional mobility.On going  2. Pt to  improve R knee range of motion by 25% to allow for improved functional mobility to allow for improvement in IADLs. .On going  3. Increased R LE MMT 1/2 grade to increase tolerance for ADL and work activities.On going  4. Pt to report ability to walk a half mile without increased pain  5. Pt to tolerate HEP to improve ROM and independence with ADL's.On going     Long Term Goals: 8 weeks  1.Report decreased in pain at worse less than  <   / =  2  /10  to increase tolerance for functional mobility. On going  2.Pt to improve range of motion by 75% to allow for improved functional mobility to allow for improvement in IADLs. On going  3.Increased R LE MMT 1 grade to increase tolerance for ADL and work activities.On going  4. Pt will report 47% or less on FOTO knee survey  to demonstrate increase in LE function with every day tasks. On going  5. Pt to be Independent with HEP to improve ROM and independence with ADL's. On going    PLAN     Plan of care Certification: 9/22/2022 to 11/22/2022.    Zi Anaya, PT

## 2022-10-14 ENCOUNTER — OFFICE VISIT (OUTPATIENT)
Dept: PAIN MEDICINE | Facility: CLINIC | Age: 67
End: 2022-10-14
Payer: MEDICARE

## 2022-10-14 ENCOUNTER — PATIENT MESSAGE (OUTPATIENT)
Dept: REHABILITATION | Facility: OTHER | Age: 67
End: 2022-10-14
Payer: MEDICARE

## 2022-10-14 VITALS
WEIGHT: 137.38 LBS | DIASTOLIC BLOOD PRESSURE: 77 MMHG | SYSTOLIC BLOOD PRESSURE: 117 MMHG | TEMPERATURE: 98 F | HEIGHT: 63 IN | HEART RATE: 76 BPM | BODY MASS INDEX: 24.34 KG/M2

## 2022-10-14 DIAGNOSIS — G89.4 CHRONIC PAIN SYNDROME: ICD-10-CM

## 2022-10-14 DIAGNOSIS — K83.8 DILATED BILE DUCT: ICD-10-CM

## 2022-10-14 DIAGNOSIS — M47.817 SPONDYLOSIS OF LUMBOSACRAL REGION WITHOUT MYELOPATHY OR RADICULOPATHY: ICD-10-CM

## 2022-10-14 DIAGNOSIS — M51.36 DDD (DEGENERATIVE DISC DISEASE), LUMBAR: Primary | ICD-10-CM

## 2022-10-14 PROCEDURE — 1160F PR REVIEW ALL MEDS BY PRESCRIBER/CLIN PHARMACIST DOCUMENTED: ICD-10-PCS | Mod: CPTII,S$GLB,, | Performed by: ANESTHESIOLOGY

## 2022-10-14 PROCEDURE — 99499 UNLISTED E&M SERVICE: CPT | Mod: HCNC,S$GLB,, | Performed by: ANESTHESIOLOGY

## 2022-10-14 PROCEDURE — 3078F DIAST BP <80 MM HG: CPT | Mod: CPTII,S$GLB,, | Performed by: ANESTHESIOLOGY

## 2022-10-14 PROCEDURE — 3078F PR MOST RECENT DIASTOLIC BLOOD PRESSURE < 80 MM HG: ICD-10-PCS | Mod: CPTII,S$GLB,, | Performed by: ANESTHESIOLOGY

## 2022-10-14 PROCEDURE — 99999 PR PBB SHADOW E&M-EST. PATIENT-LVL III: ICD-10-PCS | Mod: PBBFAC,,, | Performed by: ANESTHESIOLOGY

## 2022-10-14 PROCEDURE — 4010F ACE/ARB THERAPY RXD/TAKEN: CPT | Mod: CPTII,S$GLB,, | Performed by: ANESTHESIOLOGY

## 2022-10-14 PROCEDURE — 3288F FALL RISK ASSESSMENT DOCD: CPT | Mod: CPTII,S$GLB,, | Performed by: ANESTHESIOLOGY

## 2022-10-14 PROCEDURE — 1125F PR PAIN SEVERITY QUANTIFIED, PAIN PRESENT: ICD-10-PCS | Mod: CPTII,S$GLB,, | Performed by: ANESTHESIOLOGY

## 2022-10-14 PROCEDURE — 99214 OFFICE O/P EST MOD 30 MIN: CPT | Mod: S$GLB,,, | Performed by: ANESTHESIOLOGY

## 2022-10-14 PROCEDURE — 99999 PR PBB SHADOW E&M-EST. PATIENT-LVL III: CPT | Mod: PBBFAC,,, | Performed by: ANESTHESIOLOGY

## 2022-10-14 PROCEDURE — 1159F MED LIST DOCD IN RCRD: CPT | Mod: CPTII,S$GLB,, | Performed by: ANESTHESIOLOGY

## 2022-10-14 PROCEDURE — 99214 PR OFFICE/OUTPT VISIT, EST, LEVL IV, 30-39 MIN: ICD-10-PCS | Mod: S$GLB,,, | Performed by: ANESTHESIOLOGY

## 2022-10-14 PROCEDURE — 99499 RISK ADDL DX/OHS AUDIT: ICD-10-PCS | Mod: HCNC,S$GLB,, | Performed by: ANESTHESIOLOGY

## 2022-10-14 PROCEDURE — 1101F PR PT FALLS ASSESS DOC 0-1 FALLS W/OUT INJ PAST YR: ICD-10-PCS | Mod: CPTII,S$GLB,, | Performed by: ANESTHESIOLOGY

## 2022-10-14 PROCEDURE — 1125F AMNT PAIN NOTED PAIN PRSNT: CPT | Mod: CPTII,S$GLB,, | Performed by: ANESTHESIOLOGY

## 2022-10-14 PROCEDURE — 1159F PR MEDICATION LIST DOCUMENTED IN MEDICAL RECORD: ICD-10-PCS | Mod: CPTII,S$GLB,, | Performed by: ANESTHESIOLOGY

## 2022-10-14 PROCEDURE — 1160F RVW MEDS BY RX/DR IN RCRD: CPT | Mod: CPTII,S$GLB,, | Performed by: ANESTHESIOLOGY

## 2022-10-14 PROCEDURE — 4010F PR ACE/ARB THEARPY RXD/TAKEN: ICD-10-PCS | Mod: CPTII,S$GLB,, | Performed by: ANESTHESIOLOGY

## 2022-10-14 PROCEDURE — 3288F PR FALLS RISK ASSESSMENT DOCUMENTED: ICD-10-PCS | Mod: CPTII,S$GLB,, | Performed by: ANESTHESIOLOGY

## 2022-10-14 PROCEDURE — 1101F PT FALLS ASSESS-DOCD LE1/YR: CPT | Mod: CPTII,S$GLB,, | Performed by: ANESTHESIOLOGY

## 2022-10-14 PROCEDURE — 3074F SYST BP LT 130 MM HG: CPT | Mod: CPTII,S$GLB,, | Performed by: ANESTHESIOLOGY

## 2022-10-14 PROCEDURE — 3074F PR MOST RECENT SYSTOLIC BLOOD PRESSURE < 130 MM HG: ICD-10-PCS | Mod: CPTII,S$GLB,, | Performed by: ANESTHESIOLOGY

## 2022-10-14 NOTE — PROGRESS NOTES
Chronic Pain-Established Visit         SUBJECTIVE:    PCP: Kaykay Perales MD    REFERRING PHYSICIAN: Tyler Jacobs MD    CHIEF COMPLAINT: Lower back pain       Original HISTORY OF PRESENT ILLNESS: Tiarra Norton presents to the clinic for the evaluation of the above pain. The pain started five years ago.     Original Pain Description:  The pain is located in the lower back and does radiate up towards her upper back.The pain is described as aching, dull and sharp. Initially starts as a dull, aching pain and it gets sharp once she bends down and moves around. Typically when she walks for more than five minutes, the sharp pain radiates up her back. Exacerbating factors: Standing, Bending, Touching, Walking, Night Time, Flexing and Lifting. Mitigating factors heat, ice, laying down and massage. Symptoms interfere with daily activity and sleeping. The patient feels like symptoms have been worsening. Patient denies night fever/night sweats, urinary incontinence, bowel incontinence, significant weight loss, significant motor weakness and loss of sensations.    Original PAIN SCORES:  Best: Pain is 1  Worst: Pain is 10  Usually: Pain is 4  Current: Pain is 4    INTERVAL HISTORY:  4/18/22 Interval History: Patient presents for telehealth visit for follow up following her b/l RFA at L3-L5. She reports 80% relief and is very pleased with her pain relief. Unfortunately, she is not back to doing what she wants due to right knee pain. She is seeing Dr. Huber for knee pain and is s/p right medial compartment partial knee replacement. She is currently in therapy for this. We discussed that we may be able to assist her with her knee pain as well.     Interval History 6/15/22: Tiarra Norton returns for follow up. She continues to feel like she has mild pain sitting or laying down, and has her worst pain with extreme leaning forward to pick something up off the floor. She also has difficulty leaning over her handlebars  to ride her bike or leaning forward to fold clothes or standing up for any period of time. So, we determined that leaning forward is her worst issue. We did previouisly do lmbb and rfa, which has helped with extension, but it would not help with leaning forward. On review of her MRI she has significant multilevel DDD with Modic changes which likely account for her pain.     Interval History 7/25/22: Tiarra Norton returns for follow up. We previously have been discussing treatments for her low back pain that did not resolve with RFA. At our last visit I referred her to Dr. Antony for clearance for a SCS trial. She was considered to be a reasonable candidate. However, in the meantime, her PT referred her to acupuncture with Dr. Jacobs. She had one treatment and already notes 50% relief. She notes that she still has pain, especially with leaning forward, but feels that it is much better controlled. Their plan is one treatment per week but is approved for 20 sessions.     Interval History 8/22/22: Ms. Norton returns for follow up on her low back pain. At our last visit she felt cured by acupuncture. Unfortunately, this only lasted for 24 hours. She returns today looking for other options to make life livable. Patient claims all of her pain in the low back and middle back. We discussed that this will work best for her low back pain.     Interval History 9/21/2022:  The patient returns to clinic today for follow up of back pain via virtual visit. She received a letter from [facility] denying SCS trial. She is frustrated by this. She continues to report low back pain. She denies any radicular leg pain. Her pain is worse with bending and activity. She recently underwent med screening for the Functional Restoration program. She is interested in pursuing this. She has tried Gabapentin and Lyrica in the past with side effects. She denies any other health changes. Her pain today is 7/10.     Interval History 10/14/22: Mrs. Norton  presents today for follow up of chronic low back pain and to discuss the Cortez trial. She had her lumbar MRI done last night without significant changes to her spine. She did have increased dilation of her bile duct. Her pain today is the same in character and severity as during her last visit with us and she rates it currently at a 7/10. She continues to do her stretches on her own which have helped some. She presents with some questions regarding the trial.     6 weeks of Conservative therapy (PT/Chiro/Home Exercises with Dates)  Healthy back program--> has four sessions left for a total of 20 sessions   Last session was on 1/31/22   Stretches that they taught at [employer] gives her relief       Medications:   None currently  Ice and heat which has helped   Tried Gabapentin and Lyrica- made her loopy      Report: reviewed       Interventional Pain Procedures:   2/8/2022- Bilateral L3,4,5 MBB  2/15/2022- Bilateral L3,4,5 MBB  3/8/2022- Right L3,4,5 RFA- 80% relief for a few months  3/22/2022- Left L3,4,5 RFA- 80% relief for a few months    Imaging:  10/13/22: MRI LUMBAR SPINE WITHOUT CONTRAST     CLINICAL HISTORY:  Low back pain, symptoms persist with > 6wks conservative treatment; Dorsalgia, unspecified     TECHNIQUE:  Multiplanar, multisequence MR images were acquired from the thoracolumbar junction to the sacrum without the administration of contrast.     COMPARISON:  Lumbar spine MRI 01/21/2022; CT abdomen pelvis 08/20/2022     FINDINGS:  Lumbar levoscoliosis centered at L2.  Minimal anterolisthesis at L4-5.     No compression fractures.  No marrow replacing lesions.     Multilevel degenerative changes with disc desiccation and disc space narrowing, described in detail below.  Discogenic endplate edema at T12-L1.     The conus medullaris has a normal appearance and terminates at the L1 level.  Cauda equina nerve roots are unremarkable.  No epidural mass or collection.     The common duct is dilated up to  12 mm, previously 9 mm on 08/20/2022 CT.  Mild prominence of the main pancreatic duct up to 4 mm, which is new.  No definite filling defect in the bile ducts.  Paraspinal muscle atrophy.     SIGNIFICANT FINDINGS BY LEVEL:     T12-L1: Disc bulge.  Bilateral facet arthropathy and ligamentum flavum thickening.  Mild canal stenosis.  Mild bilateral foraminal stenosis.     L1-2: Disc bulge.  Bilateral facet arthropathy and ligamentum flavum thickening.  Mild canal stenosis.  Mild right foraminal stenosis.     L2-3: Disc bulge.  Bilateral facet arthropathy and ligamentum flavum thickening.  Mild canal stenosis.  Moderate right mild left foraminal stenosis.     L3-4: Disc bulge.  Bilateral facet arthropathy and ligamentum flavum thickening.  Small bilateral facet joint effusions/synovitis.  Moderate canal stenosis with partial effacement of the thecal sac and crowding of the cauda equina nerve roots.  Mild right and moderate left foraminal stenosis.     L4-5: Disc bulge with posterior disc uncovering.  Bilateral facet arthropathy and ligamentum flavum thickening.  Small right facet joint effusion/synovitis.  Mild canal stenosis.  Mild bilateral foraminal stenosis.     L5-S1: Disc bulge.  Bilateral facet arthropathy and ligamentum flavum thickening.  No significant canal stenosis.  Mild left foraminal stenosis.     Impression:     Lumbar levoscoliosis and multilevel degenerative changes as detailed above.  No significant changes from 01/21/2022.     Increased biliary ductal dilatation up to 12 mm, greater than expected after cholecystectomy.  In addition, the main pancreatic duct is mildly prominent up to 4 mm, which is new.  If LFTs are abnormal, consider MRI/MRCP or ERCP for further evaluation.      Past Medical History:   Diagnosis Date    Cataract     Depression     Gestational diabetes     Hyperlipidemia     Hypertension     IBS (irritable bowel syndrome)     Thyroid disease      Past Surgical History:   Procedure  Laterality Date    ADENOIDECTOMY      ARTHROSCOPIC CHONDROPLASTY OF KNEE JOINT Right 10/19/2021    Procedure: ARTHROSCOPY, KNEE, WITH CHONDROPLASTY;  Surgeon: Trevin Huber MD;  Location: [facility] OR;  Service: Orthopedics;  Laterality: Right;    ARTHROSCOPY OF KNEE Right 10/19/2021    Procedure: ARTHROSCOPY, KNEE-RIGHT;  Surgeon: Trevin Huber MD;  Location: [facility] OR;  Service: Orthopedics;  Laterality: Right;     SECTION      CHOLECYSTECTOMY      COLONOSCOPY N/A 2016    Procedure: COLONOSCOPY;  Surgeon: Heri Segura MD;  Location: [facility] (4TH FLR);  Service: Endoscopy;  Laterality: N/A;    COLONOSCOPY N/A 2019    Procedure: COLONOSCOPY;  Surgeon: Joe Pitts MD;  Location: [facility] (4TH FLR);  Service: Endoscopy;  Laterality: N/A;    CYSTOSCOPY  2022    Procedure: CYSTOSCOPY;  Surgeon: Lenny Moore MD;  Location: [facility] OR [address] [place] [address];  Service: Urology;;    ESOPHAGOGASTRODUODENOSCOPY N/A 2020    Procedure: EGD (ESOPHAGOGASTRODUODENOSCOPY);  Surgeon: Tolu Rivas MD;  Location: [facility] (4TH FLR);  Service: Endoscopy;  Laterality: N/A;  Pt. moved to 9:15am arrival time.. Instructed to not have anything after midnight.EC    EYE SURGERY      cataract resection right    INJECTION OF ANESTHETIC AGENT AROUND NERVE Bilateral 2022    Procedure: Block, Nerve MEDIAL BRANCH BLOCK BILATERAL L3,4,5;  Surgeon: Tara Gibbs MD;  [facility]: [facility];  Service: Pain Management;  Laterality: Bilateral;    INJECTION OF ANESTHETIC AGENT AROUND NERVE Bilateral 2/15/2022    Procedure: BLOCK, NERVE, L3*L4-L5 MEDIAL BR ANCH 2 OF 2;  Surgeon: Tara Gibbs MD;  [facility];  Service: Pain Management;  Laterality: Bilateral;    INJECTION OF BOTULINUM TOXIN TYPE A  2022    Procedure: BOTULINUM TOXIN INJECTION 100 units;  Surgeon: Lenny Moore MD;  [facility];  Service: Urology;;    JOINT REPLACEMENT      partial right knee     KNEE ARTHROSCOPY W/ MENISCECTOMY Right 10/19/2021    Procedure: ARTHROSCOPY, KNEE, WITH MENISCECTOMY, PARTIAL LATERAL;  Surgeon: Trevin Huber MD;  Location: [facility] OR;  Service: Orthopedics;  Laterality: Right;    r hand surgery      RADIOFREQUENCY ABLATION Right 3/8/2022    Procedure: RADIOFREQUENCY ABLATION, L3-L5 1 OF 2;  Surgeon: Tara Gibbs MD;  Location: [facility] [place]GT;  Service: Pain Management;  Laterality: Right;    RADIOFREQUENCY ABLATION Left 3/22/2022    Procedure: RADIOFREQUENCY ABLATION, l3-+l5 2 of 2;  Surgeon: Tara Gibbs MD;  Location: [facility] [place]GT;  Service: Pain Management;  Laterality: Left;    TONSILLECTOMY      TOTAL KNEE ARTHROPLASTY      partial knee replacement    TUBAL LIGATION       Social History     Socioeconomic History    Marital status:     Number of children: 1   Occupational History    Occupation:      Employer: [employer]     Comment: retired   Tobacco Use    Smoking status: Never    Smokeless tobacco: Never   Substance and Sexual Activity    Alcohol use: Yes     Alcohol/week: 3.0 standard drinks     Types: 3 Glasses of wine per week     Comment: weekends    Drug use: No    Sexual activity: Yes     Partners: Male   Other Topics Concern    Are you pregnant or think you may be? No    Breast-feeding No   Social History Narrative    Retired        Swims.       Stationary bike.            Social Determinants of Health     Financial Resource Strain: Low Risk     Difficulty of Paying Living Expenses: Not hard at all   Food Insecurity: No Food Insecurity    Worried About Running Out of Food in the Last Year: Never true    Ran Out of Food in the Last Year: Never true   Transportation Needs: No Transportation Needs    Lack of Transportation (Medical): No    Lack of Transportation (Non-Medical): No   Physical Activity: Insufficiently Active    Days of Exercise per Week: 4 days    Minutes of Exercise per Session: 20 min   Stress: Stress Concern  Present    Feeling of Stress : Very much   Social Connections: Unknown    Frequency of Communication with Friends and Family: Twice a week    Active Member of Clubs or Organizations: No    Attends Club or Organization Meetings: Never    Marital Status:    Housing Stability: Low Risk     Unable to Pay for Housing in the Last Year: No    Number of Places Lived in the Last Year: 1    Unstable Housing in the Last Year: No     Family History   Problem Relation Age of Onset    Hypertension Mother     Irritable bowel syndrome Mother     Hyperlipidemia Mother     Heart disease Father         mi    Hypertension Father     Cancer Father         prostate    Heart attack Father     Heart failure Father     Hyperlipidemia Father     Alcohol abuse Sister     Depression Sister     Epilepsy Son     Irritable bowel syndrome Sister     Alcohol abuse Sister     Ovarian cancer Maternal Aunt     Breast cancer Paternal Aunt     Breast cancer Maternal Aunt     Melanoma Neg Hx     Colon cancer Neg Hx     Stomach cancer Neg Hx     Esophageal cancer Neg Hx     Liver disease Neg Hx     Crohn's disease Neg Hx     Ulcerative colitis Neg Hx     Celiac disease Neg Hx     Stroke Neg Hx        Review of patient's allergies indicates:  No Known Allergies    Current Outpatient Medications   Medication Sig    alprazolam (XANAX) 2 MG Tab Take 1 mg by mouth 3 (three) times daily as needed.    amLODIPine (NORVASC) 5 MG tablet Take 1 tablet (5 mg total) by mouth once daily.    atorvastatin (LIPITOR) 10 MG tablet TAKE 1 TABLET EVERY DAY    b complex vitamins capsule Take 1 capsule by mouth once daily.    cholecalciferol, vitamin D3, (VITAMIN D3) 5,000 unit Tab Take 1 tablet (5,000 Units total) by mouth once daily.    conjugated estrogens (PREMARIN) vaginal cream Place 0.5 g vaginally twice a week. Place a pea-sized amount in vagina every night for 4 weeks, then use 2-3 nights a week    HYDROcodone-acetaminophen (NORCO) 5-325 mg per tablet Take 1  tablet by mouth every 6 to 8 hours as needed (migraine).    levothyroxine (SYNTHROID) 125 MCG tablet TAKE 1 TABLET(125 MCG) BY MOUTH EVERY MORNING Strength: 125 mcg    liothyronine (CYTOMEL) 5 MCG Tab Take 1 tablet (5 mcg total) by mouth once daily.    pantoprazole (PROTONIX) 40 MG tablet Take 1 tablet (40 mg total) by mouth once daily.    promethazine (PHENERGAN) 12.5 MG Tab Take 1 tablet (12.5 mg total) by mouth every 6 (six) hours as needed (nausea).    RESTASIS 0.05 % ophthalmic emulsion INT 1 GTT IN OU BID    sumatriptan (IMITREX) 50 MG tablet 1 tab po at start of headache.  Repeat in 2 h prn    traZODone (DESYREL) 100 MG tablet TAKE 1 TO 2 TABLETS AT BEDTIME FOR SLEEP    valsartan (DIOVAN) 320 MG tablet TAKE 1 TABLET ONE TIME DAILY (STOP LISINOPRIL)     No current facility-administered medications for this visit.       REVIEW OF SYSTEMS:    GENERAL: No fever. No chills. No fatigue. Denies weight loss. Denies weight gain.  HEENT: Denies headaches. Denies vision change. Denies eye pain. Denies double vision. Denies ear pain.   CV: Denies chest pain. HTN  PULM: Denies of shortness of breath.  GI: Denies constipation. No diarrhea. No abdominal pain. Denies nausea. Denies vomiting. No blood in stool.  HEME: Denies bleeding problems.  : Denies urgency. No painful urination. No blood in urine.  MS: See HPI  SKIN: Denies rash.   NEURO: Denies seizures. No weakness.  ENDO: Thyroid disorder.   PSYCH:  Depression    OBJECTIVE:   PHYSICAL EXAM:   GENERAL: Well appearing, in no acute distress, alert and oriented x3.  PSYCH:  Mood and affect appropriate.  SKIN: Skin color, texture, turgor normal, no rashes or lesions.  HEENT:  Normocephalic, atraumatic. Cranial nerves grossly intact.  PULM: No evidence of respiratory difficulty, symmetric chest rise.  GI:  Non-distended  BACK: Normal range of motion. Pain with palpation of bilateral lower lumbar facets. Moderate pain with facet loading. There is no pain with palpation  over the sacroiliac joints bilaterally. Straight leg raising in the supine position is negative to radicular pain.   EXTREMITIES: No deformities, edema, or skin discoloration.   MUSCULOSKELETAL: Tenderness to palpation on her lower back muscles. Bilateral upper and lower extremity strength is normal and symmetric. No atrophy is noted.    NEURO: Sensation is equal and appropriate bilaterally. Bilateral upper and lower extremity coordination and muscle stretch reflexes are physiologic and symmetric. Plantar response are downgoing.   GAIT: normal, no antalgic gait         ASSESSMENT: 67 y.o. year old female with low back pain, consistent with the followin. DDD (degenerative disc disease), lumbar        2. Chronic pain syndrome        3. Spondylosis of lumbosacral region without myelopathy or radiculopathy        4. 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 Norton is a retired . She comes to us with low back pain. MRI shows facet arthropathy, moderate canal stenosis, and multilevel Modic changes T12-L3. Pain reproduced with facet loading and relieved with RFA. However, she continues to have unmanageable pain especially with leaning forward. Having ablated her LMBB, this is likely due to her significant DDD and Modic changes. She also has right knee pain s/p right partial knee replacement and is being treated by Dr. Huber. Dr. Antony found her to be an acceptable candidate for SCS.    PLAN:   1) Updated MRI imaging was reviewed and discussed with the patient today. Will order CMP due to incidental finding of dilated bile duct on MRI and will refer to GI if elevated LFT's.    2) SCS trial denied by insurance. Proceed with Cortez trial   3) Can consider Intracept in the future if necessary  4) Consider Functional Restoration program after implant  5) Will follow up after trial or with CMP results     The above plan and management options  were discussed at length with patient. Patient is in agreement with the above and verbalized understanding.     Bernard Guevara DO  10/14/2022

## 2022-10-19 ENCOUNTER — PATIENT MESSAGE (OUTPATIENT)
Dept: INTERNAL MEDICINE | Facility: CLINIC | Age: 67
End: 2022-10-19
Payer: MEDICARE

## 2022-10-19 NOTE — TELEPHONE ENCOUNTER
Called and spoke to the pt. The pr denies any SOB and/or chest pains. I reminded the pt to hold the statin for 10 days. The pt expressed understanding.

## 2022-10-20 ENCOUNTER — DOCUMENTATION ONLY (OUTPATIENT)
Dept: RESEARCH | Facility: OTHER | Age: 67
End: 2022-10-20
Payer: MEDICARE

## 2022-10-20 ENCOUNTER — TELEPHONE (OUTPATIENT)
Dept: RESEARCH | Facility: OTHER | Age: 67
End: 2022-10-20
Payer: MEDICARE

## 2022-10-26 ENCOUNTER — DOCUMENTATION ONLY (OUTPATIENT)
Dept: RESEARCH | Facility: OTHER | Age: 67
End: 2022-10-26

## 2022-10-26 ENCOUNTER — RESEARCH ENCOUNTER (OUTPATIENT)
Dept: RESEARCH | Facility: OTHER | Age: 67
End: 2022-10-26
Payer: MEDICARE

## 2022-10-26 NOTE — PROGRESS NOTES
Study: Wave Writer- BRUNO Study: A Randomized Controlled Study to Evaluate the Safety and Effectiveness of [employer] Spinal Cord Stimulation (SCS) Systems in the Treatment of Chronic Low Back and/or Leg Pain with No Prior Surgeries  IRB/Protocol #: [id number]/  : Obed Gasca MD  Treating Physician: Shoaib Aguirre  Site Number: [id number]        Subject Number: [id number]     Opioid Medication Lock     Contacted and had visit with Subject 011 on 26 OCT 2022 to review and complete medication lock visit. Reviewed subjects medication. Subject prescribed Hydrocodone/Acetaminophen primarily for treatment of migraines, may be stated elsewhere in EMR for other chronic pain but primarily taken for migraines. Subject 011 was informed not to make any changes in Opioid medication dose/route/frequency until Baseline/Randomization visit. Opioid and Concomitant medications were added to EDC.     Adverse events was reviewed with subject. Subject reported developed Covid symptoms 10/18/22, subject stated tested + for Covid on 10/19/22, will complete adverse event form in EDC. This event did not result in a neurological deficit and the severity is mild. No protocol deviation to report.

## 2022-10-27 ENCOUNTER — PATIENT MESSAGE (OUTPATIENT)
Dept: RESEARCH | Facility: OTHER | Age: 67
End: 2022-10-27
Payer: MEDICARE

## 2022-11-01 ENCOUNTER — PATIENT MESSAGE (OUTPATIENT)
Dept: PAIN MEDICINE | Facility: CLINIC | Age: 67
End: 2022-11-01
Payer: MEDICARE

## 2022-11-01 ENCOUNTER — TELEPHONE (OUTPATIENT)
Dept: DERMATOLOGY | Facility: CLINIC | Age: 67
End: 2022-11-01
Payer: MEDICARE

## 2022-11-07 ENCOUNTER — OFFICE VISIT (OUTPATIENT)
Dept: SPINE | Facility: CLINIC | Age: 67
End: 2022-11-07
Payer: MEDICARE

## 2022-11-07 VITALS
WEIGHT: 138.88 LBS | HEART RATE: 83 BPM | BODY MASS INDEX: 24.61 KG/M2 | OXYGEN SATURATION: 99 % | TEMPERATURE: 98 F | RESPIRATION RATE: 18 BRPM | DIASTOLIC BLOOD PRESSURE: 86 MMHG | SYSTOLIC BLOOD PRESSURE: 133 MMHG | HEIGHT: 63 IN

## 2022-11-07 DIAGNOSIS — F11.90 CHRONIC, CONTINUOUS USE OF OPIOIDS: ICD-10-CM

## 2022-11-07 DIAGNOSIS — M51.36 DDD (DEGENERATIVE DISC DISEASE), LUMBAR: Primary | ICD-10-CM

## 2022-11-07 DIAGNOSIS — M47.816 LUMBAR FACET ARTHROPATHY: ICD-10-CM

## 2022-11-07 PROCEDURE — 3288F FALL RISK ASSESSMENT DOCD: CPT | Mod: CPTII,S$GLB,, | Performed by: ANESTHESIOLOGY

## 2022-11-07 PROCEDURE — 1101F PT FALLS ASSESS-DOCD LE1/YR: CPT | Mod: CPTII,S$GLB,, | Performed by: ANESTHESIOLOGY

## 2022-11-07 PROCEDURE — 3075F PR MOST RECENT SYSTOLIC BLOOD PRESS GE 130-139MM HG: ICD-10-PCS | Mod: CPTII,S$GLB,, | Performed by: ANESTHESIOLOGY

## 2022-11-07 PROCEDURE — 1159F PR MEDICATION LIST DOCUMENTED IN MEDICAL RECORD: ICD-10-PCS | Mod: CPTII,S$GLB,, | Performed by: ANESTHESIOLOGY

## 2022-11-07 PROCEDURE — 3075F SYST BP GE 130 - 139MM HG: CPT | Mod: CPTII,S$GLB,, | Performed by: ANESTHESIOLOGY

## 2022-11-07 PROCEDURE — 1101F PR PT FALLS ASSESS DOC 0-1 FALLS W/OUT INJ PAST YR: ICD-10-PCS | Mod: CPTII,S$GLB,, | Performed by: ANESTHESIOLOGY

## 2022-11-07 PROCEDURE — 1159F MED LIST DOCD IN RCRD: CPT | Mod: CPTII,S$GLB,, | Performed by: ANESTHESIOLOGY

## 2022-11-07 PROCEDURE — 1125F AMNT PAIN NOTED PAIN PRSNT: CPT | Mod: CPTII,S$GLB,, | Performed by: ANESTHESIOLOGY

## 2022-11-07 PROCEDURE — 99499 RISK ADDL DX/OHS AUDIT: ICD-10-PCS | Mod: HCNC,S$GLB,, | Performed by: ANESTHESIOLOGY

## 2022-11-07 PROCEDURE — 3079F DIAST BP 80-89 MM HG: CPT | Mod: CPTII,S$GLB,, | Performed by: ANESTHESIOLOGY

## 2022-11-07 PROCEDURE — 80326 AMPHETAMINES 5 OR MORE: CPT | Performed by: STUDENT IN AN ORGANIZED HEALTH CARE EDUCATION/TRAINING PROGRAM

## 2022-11-07 PROCEDURE — 3288F PR FALLS RISK ASSESSMENT DOCUMENTED: ICD-10-PCS | Mod: CPTII,S$GLB,, | Performed by: ANESTHESIOLOGY

## 2022-11-07 PROCEDURE — 99214 PR OFFICE/OUTPT VISIT, EST, LEVL IV, 30-39 MIN: ICD-10-PCS | Mod: S$GLB,,, | Performed by: ANESTHESIOLOGY

## 2022-11-07 PROCEDURE — 3079F PR MOST RECENT DIASTOLIC BLOOD PRESSURE 80-89 MM HG: ICD-10-PCS | Mod: CPTII,S$GLB,, | Performed by: ANESTHESIOLOGY

## 2022-11-07 PROCEDURE — 99999 PR PBB SHADOW E&M-EST. PATIENT-LVL IV: ICD-10-PCS | Mod: PBBFAC,,, | Performed by: ANESTHESIOLOGY

## 2022-11-07 PROCEDURE — 4010F PR ACE/ARB THEARPY RXD/TAKEN: ICD-10-PCS | Mod: CPTII,S$GLB,, | Performed by: ANESTHESIOLOGY

## 2022-11-07 PROCEDURE — 99499 UNLISTED E&M SERVICE: CPT | Mod: HCNC,S$GLB,, | Performed by: ANESTHESIOLOGY

## 2022-11-07 PROCEDURE — 99214 OFFICE O/P EST MOD 30 MIN: CPT | Mod: S$GLB,,, | Performed by: ANESTHESIOLOGY

## 2022-11-07 PROCEDURE — 3008F PR BODY MASS INDEX (BMI) DOCUMENTED: ICD-10-PCS | Mod: CPTII,S$GLB,, | Performed by: ANESTHESIOLOGY

## 2022-11-07 PROCEDURE — 4010F ACE/ARB THERAPY RXD/TAKEN: CPT | Mod: CPTII,S$GLB,, | Performed by: ANESTHESIOLOGY

## 2022-11-07 PROCEDURE — 1125F PR PAIN SEVERITY QUANTIFIED, PAIN PRESENT: ICD-10-PCS | Mod: CPTII,S$GLB,, | Performed by: ANESTHESIOLOGY

## 2022-11-07 PROCEDURE — 3008F BODY MASS INDEX DOCD: CPT | Mod: CPTII,S$GLB,, | Performed by: ANESTHESIOLOGY

## 2022-11-07 PROCEDURE — 99999 PR PBB SHADOW E&M-EST. PATIENT-LVL IV: CPT | Mod: PBBFAC,,, | Performed by: ANESTHESIOLOGY

## 2022-11-07 RX ORDER — HYDROCODONE BITARTRATE AND ACETAMINOPHEN 5; 325 MG/1; MG/1
1 TABLET ORAL
Qty: 30 TABLET | Refills: 0 | Status: SHIPPED | OUTPATIENT
Start: 2022-11-07 | End: 2023-04-14

## 2022-11-07 RX ORDER — NALOXONE HYDROCHLORIDE 4 MG/.1ML
1 SPRAY NASAL ONCE
Qty: 1 EACH | Refills: 0 | Status: SHIPPED | OUTPATIENT
Start: 2022-11-07 | End: 2022-11-07

## 2022-11-07 NOTE — PROGRESS NOTES
Chronic Pain-Established Visit         SUBJECTIVE:    PCP: Kaykay Perales MD    REFERRING PHYSICIAN: Tyler Jacobs MD    CHIEF COMPLAINT: Lower back pain       Original HISTORY OF PRESENT ILLNESS: Tiarra Norton presents to the clinic for the evaluation of the above pain. The pain started five years ago.     Original Pain Description:  The pain is located in the lower back and does radiate up towards her upper back.The pain is described as aching, dull and sharp. Initially starts as a dull, aching pain and it gets sharp once she bends down and moves around. Typically when she walks for more than five minutes, the sharp pain radiates up her back. Exacerbating factors: Standing, Bending, Touching, Walking, Night Time, Flexing and Lifting. Mitigating factors heat, ice, laying down and massage. Symptoms interfere with daily activity and sleeping. The patient feels like symptoms have been worsening. Patient denies night fever/night sweats, urinary incontinence, bowel incontinence, significant weight loss, significant motor weakness and loss of sensations.    Original PAIN SCORES:  Best: Pain is 1  Worst: Pain is 10  Usually: Pain is 4  Current: Pain is 4    INTERVAL HISTORY:  4/18/22 Interval History: Patient presents for telehealth visit for follow up following her b/l RFA at L3-L5. She reports 80% relief and is very pleased with her pain relief. Unfortunately, she is not back to doing what she wants due to right knee pain. She is seeing Dr. Huber for knee pain and is s/p right medial compartment partial knee replacement. She is currently in therapy for this. We discussed that we may be able to assist her with her knee pain as well.     Interval History 6/15/22: Tiarra Norton returns for follow up. She continues to feel like she has mild pain sitting or laying down, and has her worst pain with extreme leaning forward to pick something up off the floor. She also has difficulty leaning over her  handlebars to ride her bike or leaning forward to fold clothes or standing up for any period of time. So, we determined that leaning forward is her worst issue. We did previouisly do lmbb and rfa, which has helped with extension, but it would not help with leaning forward. On review of her MRI she has significant multilevel DDD with Modic changes which likely account for her pain.     Interval History 7/25/22: Tiarra Norton returns for follow up. We previously have been discussing treatments for her low back pain that did not resolve with RFA. At our last visit I referred her to Dr. Antony for clearance for a SCS trial. She was considered to be a reasonable candidate. However, in the meantime, her PT referred her to acupuncture with Dr. Jacobs. She had one treatment and already notes 50% relief. She notes that she still has pain, especially with leaning forward, but feels that it is much better controlled. Their plan is one treatment per week but is approved for 20 sessions.     Interval History 8/22/22: Ms. Norton returns for follow up on her low back pain. At our last visit she felt cured by acupuncture. Unfortunately, this only lasted for 24 hours. She returns today looking for other options to make life livable. Patient claims all of her pain in the low back and middle back. We discussed that this will work best for her low back pain.     Interval History 9/21/2022:  The patient returns to clinic today for follow up of back pain via virtual visit. She received a letter from [facility] denying SCS trial. She is frustrated by this. She continues to report low back pain. She denies any radicular leg pain. Her pain is worse with bending and activity. She recently underwent med screening for the Functional Restoration program. She is interested in pursuing this. She has tried Gabapentin and Lyrica in the past with side effects. She denies any other health changes. Her pain today is 7/10.     Interval History 10/14/22: Mrs.  Errol presents today for follow up of chronic low back pain and to discuss the Cortez trial. She had her lumbar MRI done last night without significant changes to her spine. She did have increased dilation of her bile duct. Her pain today is the same in character and severity as during her last visit with us and she rates it currently at a 7/10. She continues to do her stretches on her own which have helped some. She presents with some questions regarding the trial.     Interval History 11/7/22: Mrs. Norton presents today for follow up of chronic low back pain. Currently in [facility] trial. Patient states her back pain is unchanged. Currently a 6/10, midline, low back pain that extends across her low back. Constant, aching pain that is sometimes sharp and grabbing in character. Denies radiating symptoms. Patient requesting script for Norco. Patient was prescribed Norco by PCP for migraines. States that a 30 count script would last her 6 months as she only needed it sparing ly for migraines when her first line migraine medication didn't work. Patient reports medication was also helping with her back pain and was hoping to have it prescribed to her now for back pain. Norco was included in her accepted list of medications to continue in her Cortez trial.     6 weeks of Conservative therapy (PT/Chiro/Home Exercises with Dates)  Healthy back program--> has four sessions left for a total of 20 sessions   Last session was on 1/31/22   Stretches that they taught at [employer] back gives her relief      Medications:   Norco 5-325  Xanax 2mg TID PRN  Trazadone 100mg QHS  Ice and heat which has helped   Tried Gabapentin and Lyrica- made her loopy      Report: reviewed       Interventional Pain Procedures:   2/8/2022- Bilateral L3,4,5 MBB  2/15/2022- Bilateral L3,4,5 MBB  3/8/2022- Right L3,4,5 RFA- 80% relief for a few months  3/22/2022- Left L3,4,5 RFA- 80% relief for a few months    Imaging:  10/13/22: MRI LUMBAR SPINE  WITHOUT CONTRAST     CLINICAL HISTORY:  Low back pain, symptoms persist with > 6wks conservative treatment; Dorsalgia, unspecified     TECHNIQUE:  Multiplanar, multisequence MR images were acquired from the thoracolumbar junction to the sacrum without the administration of contrast.     COMPARISON:  Lumbar spine MRI 01/21/2022; CT abdomen pelvis 08/20/2022     FINDINGS:  Lumbar levoscoliosis centered at L2.  Minimal anterolisthesis at L4-5.     No compression fractures.  No marrow replacing lesions.     Multilevel degenerative changes with disc desiccation and disc space narrowing, described in detail below.  Discogenic endplate edema at T12-L1.     The conus medullaris has a normal appearance and terminates at the L1 level.  Cauda equina nerve roots are unremarkable.  No epidural mass or collection.     The common duct is dilated up to 12 mm, previously 9 mm on 08/20/2022 CT.  Mild prominence of the main pancreatic duct up to 4 mm, which is new.  No definite filling defect in the bile ducts.  Paraspinal muscle atrophy.     SIGNIFICANT FINDINGS BY LEVEL:     T12-L1: Disc bulge.  Bilateral facet arthropathy and ligamentum flavum thickening.  Mild canal stenosis.  Mild bilateral foraminal stenosis.     L1-2: Disc bulge.  Bilateral facet arthropathy and ligamentum flavum thickening.  Mild canal stenosis.  Mild right foraminal stenosis.     L2-3: Disc bulge.  Bilateral facet arthropathy and ligamentum flavum thickening.  Mild canal stenosis.  Moderate right mild left foraminal stenosis.     L3-4: Disc bulge.  Bilateral facet arthropathy and ligamentum flavum thickening.  Small bilateral facet joint effusions/synovitis.  Moderate canal stenosis with partial effacement of the thecal sac and crowding of the cauda equina nerve roots.  Mild right and moderate left foraminal stenosis.     L4-5: Disc bulge with posterior disc uncovering.  Bilateral facet arthropathy and ligamentum flavum thickening.  Small right facet joint  effusion/synovitis.  Mild canal stenosis.  Mild bilateral foraminal stenosis.     L5-S1: Disc bulge.  Bilateral facet arthropathy and ligamentum flavum thickening.  No significant canal stenosis.  Mild left foraminal stenosis.     Impression:     Lumbar levoscoliosis and multilevel degenerative changes as detailed above.  No significant changes from 2022.     Increased biliary ductal dilatation up to 12 mm, greater than expected after cholecystectomy.  In addition, the main pancreatic duct is mildly prominent up to 4 mm, which is new.  If LFTs are abnormal, consider MRI/MRCP or ERCP for further evaluation.      Past Medical History:   Diagnosis Date    Cataract     Depression     Gestational diabetes     Hyperlipidemia     Hypertension     IBS (irritable bowel syndrome)     Thyroid disease      Past Surgical History:   Procedure Laterality Date    ADENOIDECTOMY      ARTHROSCOPIC CHONDROPLASTY OF KNEE JOINT Right 10/19/2021    Procedure: ARTHROSCOPY, KNEE, WITH CHONDROPLASTY;  Surgeon: Trevin Huber MD;  Location: [facility] OR;  Service: Orthopedics;  Laterality: Right;    ARTHROSCOPY OF KNEE Right 10/19/2021    Procedure: ARTHROSCOPY, KNEE-RIGHT;  Surgeon: Trevin Huber MD;  Location: [facility] OR;  Service: Orthopedics;  Laterality: Right;     SECTION      CHOLECYSTECTOMY      COLONOSCOPY N/A 2016    Procedure: COLONOSCOPY;  Surgeon: Heri Segura MD;  Location: [facility] (4TH FLR);  Service: Endoscopy;  Laterality: N/A;    COLONOSCOPY N/A 2019    Procedure: COLONOSCOPY;  Surgeon: Joe Pitts MD;  Location: [facility] (4TH FLR);  Service: Endoscopy;  Laterality: N/A;    CYSTOSCOPY  2022    Procedure: CYSTOSCOPY;  Surgeon: Lenny Moore MD;  Location: [facility] 1ST FLR;  Service: Urology;;    ESOPHAGOGASTRODUODENOSCOPY N/A 2020    Procedure: EGD (ESOPHAGOGASTRODUODENOSCOPY);  Surgeon: Tolu Rivas MD;  Location: [facility] (4TH FLR);  Service: Endoscopy;  Laterality:  N/A;  Pt. moved to 9:15am arrival time.. Instructed to not have anything after midnight.EC    EYE SURGERY      cataract resection right    INJECTION OF ANESTHETIC AGENT AROUND NERVE Bilateral 2/8/2022    Procedure: Block, Nerve MEDIAL BRANCH BLOCK BILATERAL L3,4,5;  Surgeon: Tara Gibbs MD;  Location: [facility] PAIN MGT;  Service: Pain Management;  Laterality: Bilateral;    INJECTION OF ANESTHETIC AGENT AROUND NERVE Bilateral 2/15/2022    Procedure: BLOCK, NERVE, L3*L4-L5 MEDIAL BR ANCH 2 OF 2;  Surgeon: Tara Gibbs MD;  Location: [facility] PAIN MGT;  Service: Pain Management;  Laterality: Bilateral;    INJECTION OF BOTULINUM TOXIN TYPE A  6/21/2022    Procedure: BOTULINUM TOXIN INJECTION 100 units;  Surgeon: Lenny Moore MD;  Location: [address];  Service: Urology;;    JOINT REPLACEMENT      partial right knee    KNEE ARTHROSCOPY W/ MENISCECTOMY Right 10/19/2021    Procedure: ARTHROSCOPY, KNEE, WITH MENISCECTOMY, PARTIAL LATERAL;  Surgeon: Trevin Huber MD;  Location: [facility];  Service: Orthopedics;  Laterality: Right;    r hand surgery      RADIOFREQUENCY ABLATION Right 3/8/2022    Procedure: RADIOFREQUENCY ABLATION, L3-L5 1 OF 2;  Surgeon: Tara Gibbs MD;  Location: [facility] PAIN MGT;  Service: Pain Management;  Laterality: Right;    RADIOFREQUENCY ABLATION Left 3/22/2022    Procedure: RADIOFREQUENCY ABLATION, l3-+l5 2 of 2;  Surgeon: Tara Gibbs MD;  Location: [facility] PAIN MGT;  Service: Pain Management;  Laterality: Left;    TONSILLECTOMY      TOTAL KNEE ARTHROPLASTY      partial knee replacement    TUBAL LIGATION       Social History     Socioeconomic History    Marital status:     Number of children: 1   Occupational History    Occupation:      Employer: [employer]     Comment: retired   Tobacco Use    Smoking status: Never    Smokeless tobacco: Never   Substance and Sexual Activity    Alcohol use: Yes     Alcohol/week: 3.0 standard drinks     Types: 3 Glasses of  wine per week     Comment: weekends    Drug use: No    Sexual activity: Yes     Partners: Male   Other Topics Concern    Are you pregnant or think you may be? No    Breast-feeding No   Social History Narrative    Retired        Swims.       Stationary bike.            Social Determinants of Health     Financial Resource Strain: Low Risk     Difficulty of Paying Living Expenses: Not hard at all   Food Insecurity: No Food Insecurity    Worried About Running Out of Food in the Last Year: Never true    Ran Out of Food in the Last Year: Never true   Transportation Needs: No Transportation Needs    Lack of Transportation (Medical): No    Lack of Transportation (Non-Medical): No   Physical Activity: Insufficiently Active    Days of Exercise per Week: 4 days    Minutes of Exercise per Session: 20 min   Stress: Stress Concern Present    Feeling of Stress : Very much   Social Connections: Unknown    Frequency of Communication with Friends and Family: Twice a week    Active Member of Clubs or Organizations: No    Attends Club or Organization Meetings: Never    Marital Status:    Housing Stability: Low Risk     Unable to Pay for Housing in the Last Year: No    Number of Places Lived in the Last Year: 1    Unstable Housing in the Last Year: No     Family History   Problem Relation Age of Onset    Hypertension Mother     Irritable bowel syndrome Mother     Hyperlipidemia Mother     Heart disease Father         mi    Hypertension Father     Cancer Father         prostate    Heart attack Father     Heart failure Father     Hyperlipidemia Father     Alcohol abuse Sister     Depression Sister     Epilepsy Son     Irritable bowel syndrome Sister     Alcohol abuse Sister     Ovarian cancer Maternal Aunt     Breast cancer Paternal Aunt     Breast cancer Maternal Aunt     Melanoma Neg Hx     Colon cancer Neg Hx     Stomach cancer Neg Hx     Esophageal cancer Neg Hx     Liver disease Neg Hx     Crohn's disease Neg Hx      Ulcerative colitis Neg Hx     Celiac disease Neg Hx     Stroke Neg Hx        Review of patient's allergies indicates:  No Known Allergies    Current Outpatient Medications   Medication Sig    alprazolam (XANAX) 2 MG Tab Take 1 mg by mouth 3 (three) times daily as needed.    amLODIPine (NORVASC) 5 MG tablet Take 1 tablet (5 mg total) by mouth once daily.    atorvastatin (LIPITOR) 10 MG tablet TAKE 1 TABLET EVERY DAY    b complex vitamins capsule Take 1 capsule by mouth once daily.    cholecalciferol, vitamin D3, (VITAMIN D3) 5,000 unit Tab Take 1 tablet (5,000 Units total) by mouth once daily.    conjugated estrogens (PREMARIN) vaginal cream Place 0.5 g vaginally twice a week. Place a pea-sized amount in vagina every night for 4 weeks, then use 2-3 nights a week    levothyroxine (SYNTHROID) 125 MCG tablet TAKE 1 TABLET(125 MCG) BY MOUTH EVERY MORNING Strength: 125 mcg    liothyronine (CYTOMEL) 5 MCG Tab Take 1 tablet (5 mcg total) by mouth once daily.    pantoprazole (PROTONIX) 40 MG tablet Take 1 tablet (40 mg total) by mouth once daily.    promethazine (PHENERGAN) 12.5 MG Tab Take 1 tablet (12.5 mg total) by mouth every 6 (six) hours as needed (nausea).    RESTASIS 0.05 % ophthalmic emulsion INT 1 GTT IN OU BID    sumatriptan (IMITREX) 50 MG tablet 1 tab po at start of headache.  Repeat in 2 h prn    traZODone (DESYREL) 100 MG tablet TAKE 1 TO 2 TABLETS AT BEDTIME FOR SLEEP    valsartan (DIOVAN) 320 MG tablet TAKE 1 TABLET ONE TIME DAILY (STOP LISINOPRIL)    HYDROcodone-acetaminophen (NORCO) 5-325 mg per tablet Take 1 tablet by mouth every 24 hours as needed for Pain.    naloxone (NARCAN) 4 mg/actuation Spry 1 spray (4 mg total) by Nasal route once. for 1 dose     No current facility-administered medications for this visit.       REVIEW OF SYSTEMS:    GENERAL: No fever. No chills. No fatigue. Denies weight loss. Denies weight gain.  HEENT: Denies headaches. Denies vision change. Denies eye pain. Denies double  vision. Denies ear pain.   CV: Denies chest pain. HTN  PULM: Denies of shortness of breath.  GI: Denies constipation. No diarrhea. No abdominal pain. Denies nausea. Denies vomiting. No blood in stool.  HEME: Denies bleeding problems.  : Denies urgency. No painful urination. No blood in urine.  MS: See HPI  SKIN: Denies rash.   NEURO: Denies seizures. No weakness.  ENDO: Thyroid disorder.   PSYCH:  Depression    OBJECTIVE:   PHYSICAL EXAM:   GENERAL: Well appearing, in no acute distress, alert and oriented x3.  PSYCH:  Mood and affect appropriate.  SKIN: Skin color, texture, turgor normal, no rashes or lesions.  HEENT:  Normocephalic, atraumatic. Cranial nerves grossly intact.  PULM: No evidence of respiratory difficulty, symmetric chest rise.  GI:  Non-distended  BACK: Normal range of motion. Pain with palpation of bilateral lower lumbar facets. Moderate pain with facet loading. There is no pain with palpation over the sacroiliac joints bilaterally. Straight leg raising in the supine position is negative to radicular pain.   EXTREMITIES: No deformities, edema, or skin discoloration.   MUSCULOSKELETAL: Tenderness to palpation on her lower back muscles. Bilateral upper and lower extremity strength is normal and symmetric. No atrophy is noted.    NEURO: Sensation is equal and appropriate bilaterally. Bilateral upper and lower extremity coordination and muscle stretch reflexes are physiologic and symmetric. Plantar response are downgoing.   GAIT: normal, no antalgic gait         ASSESSMENT: 67 y.o. year old female with low back pain, consistent with the followin. DDD (degenerative disc disease), lumbar  HYDROcodone-acetaminophen (NORCO) 5-325 mg per tablet      2. Chronic, continuous use of opioids  Pain Clinic Drug Screen    HYDROcodone-acetaminophen (NORCO) 5-325 mg per tablet    naloxone (NARCAN) 4 mg/actuation Spry      3. Lumbar facet arthropathy            DISCUSSION: Ms. Norton is a retired . She  comes to us with low back pain. MRI shows facet arthropathy, moderate canal stenosis, and multilevel Modic changes T12-L3. Pain reproduced with facet loading and relieved with RFA. However, she continues to have unmanageable pain especially with leaning forward. Having ablated her LMBB, this is likely due to her significant DDD and Modic changes. She also has right knee pain s/p right partial knee replacement and is being treated by Dr. Huber. Dr. Antony found her to be an acceptable candidate for SCS.    OPIOID MANAGEMENT: Dr. Perales has asked us to manage her opioids. We have agreed to continue her low dose opioids after discussing the dangers of combining this with BZ or sleep aid. She reports an Rx every 6 months, it appears it was prescribed one month ago.   MME: <5 + Benzo + Sleep Aid  Risk: High  : Reviewed today  Naloxone: 11/7/22  Utox: 11/7/22  Violations: None  Contract: Pending Utox    PLAN:   1) Imaging reviewed   2) SCS trial denied by insurance. Proceed with Cortez trial   3) Can consider Intracept in the future if necessary  4) Consider Functional Restoration program after implant  5) UDS today, will prescribe 30 Norco 5-325 for patient to use for back pain only. Detailed discussion with patient regarding effects of pain medicine and to not take them concurrently with anxiety or sleeping medicine.  6) Naloxone prescribed due to other medications she is prescribed  7) Will follow up after trial\  8) Plan is to DC opioids with the assistance of SCS    The above plan and management options were discussed at length with patient. Patient is in agreement with the above and verbalized understanding.     Tara Gibbs MD  11/07/2022

## 2022-11-08 ENCOUNTER — PATIENT MESSAGE (OUTPATIENT)
Dept: INTERNAL MEDICINE | Facility: CLINIC | Age: 67
End: 2022-11-08
Payer: MEDICARE

## 2022-11-08 ENCOUNTER — PATIENT MESSAGE (OUTPATIENT)
Dept: SPINE | Facility: CLINIC | Age: 67
End: 2022-11-08
Payer: MEDICARE

## 2022-11-08 DIAGNOSIS — K86.89 DILATION OF PANCREATIC DUCT: Primary | ICD-10-CM

## 2022-11-08 DIAGNOSIS — U07.1 COVID: ICD-10-CM

## 2022-11-08 DIAGNOSIS — U07.1 COVID-19: Primary | ICD-10-CM

## 2022-11-10 ENCOUNTER — TELEPHONE (OUTPATIENT)
Dept: GASTROENTEROLOGY | Facility: CLINIC | Age: 67
End: 2022-11-10
Payer: MEDICARE

## 2022-11-10 NOTE — TELEPHONE ENCOUNTER
----- Message from Whitley Leon MA sent at 11/10/2022  3:23 PM CST -----  I rescheduled to January  ----- Message -----  From: Yajaira Tenorio MA  Sent: 11/10/2022   2:37 PM CST  To: Whitley Leon MA      ----- Message -----  From: Rubnia Perez  Sent: 11/10/2022  12:51 PM CST  To: [facility] Clinical Staff    Tiarra Norton calling regarding Appointment Access  (message) for Pancreatitis/Pancreatic, PT is scheduled on 12/05/22 and due to insurance changes she will need to be scheduled in January , call back [phone]

## 2022-11-10 NOTE — TELEPHONE ENCOUNTER
See message below.   Mara   Patient is a 35y old  Male who presents with a chief complaint of Spinal Cord Compression (15 Oct 2021 16:03)      SUBJECTIVE / OVERNIGHT EVENTS:  Pt seen and examined at bedside. No acute events overnight.  Pt denies cp, palpitations, sob, abd pain, N/V, fever, chills.    ROS:  All other review of systems negative    Allergies    No Known Allergies    Intolerances        MEDICATIONS  (STANDING):  acetaminophen   Tablet .. 650 milliGRAM(s) Oral every 12 hours  bictegravir 50 mG/emtricitabine 200 mG/tenofovir alafenamide 25 mG (BIKTARVY) 1 Tablet(s) Oral daily  bisacodyl Suppository 10 milliGRAM(s) Rectal daily  cefuroxime   Tablet 500 milliGRAM(s) Oral every 12 hours  enoxaparin Injectable 40 milliGRAM(s) SubCutaneous at bedtime  senna 2 Tablet(s) Oral at bedtime  sodium chloride 0.9%. 1000 milliLiter(s) (75 mL/Hr) IV Continuous <Continuous>    MEDICATIONS  (PRN):  HYDROmorphone   Tablet 2 milliGRAM(s) Oral every 8 hours PRN Severe Pain (7 - 10)  methocarbamol 500 milliGRAM(s) Oral every 8 hours PRN Muscle Spasm  pregabalin 50 milliGRAM(s) Oral every 8 hours PRN Pain  traZODone 25 milliGRAM(s) Oral at bedtime PRN insomnia      Vital Signs Last 24 Hrs  T(C): 36.6 (15 Oct 2021 20:48), Max: 36.6 (15 Oct 2021 20:48)  T(F): 97.8 (15 Oct 2021 20:48), Max: 97.8 (15 Oct 2021 20:48)  HR: 73 (15 Oct 2021 20:48) (73 - 73)  BP: 124/82 (15 Oct 2021 20:48) (124/82 - 124/82)  BP(mean): --  RR: 17 (15 Oct 2021 20:48) (17 - 17)  SpO2: 99% (15 Oct 2021 20:48) (99% - 99%)  CAPILLARY BLOOD GLUCOSE        I&O's Summary      PHYSICAL EXAM:  GENERAL: NAD, well-developed  CHEST/LUNG: Clear to auscultation bilaterally; No wheeze, nonlabored breathing  HEART: Regular rate and rhythm; No murmurs, rubs, or gallops  ABDOMEN: Soft, Nontender, Nondistended; Bowel sounds present  EXTREMITIES:  2+ Peripheral Pulses, No clubbing, cyanosis, or edema  NEUROLOGY: AAOx3, RLE weakness  PSYCH: calm, appropriate mood    LABS:                    RADIOLOGY & ADDITIONAL TESTS:  Results Reviewed:   Imaging Personally Reviewed:  Electrocardiogram Personally Reviewed:    COORDINATION OF CARE:  Care Discussed with Consultants/Other Providers [Y/N]:  Prior or Outpatient Records Reviewed [Y/N]:

## 2022-11-11 ENCOUNTER — TELEPHONE (OUTPATIENT)
Dept: PAIN MEDICINE | Facility: CLINIC | Age: 67
End: 2022-11-11
Payer: MEDICARE

## 2022-11-11 ENCOUNTER — RESEARCH ENCOUNTER (OUTPATIENT)
Dept: RESEARCH | Facility: OTHER | Age: 67
End: 2022-11-11
Payer: MEDICARE

## 2022-11-11 DIAGNOSIS — M51.36 DDD (DEGENERATIVE DISC DISEASE), LUMBAR: ICD-10-CM

## 2022-11-11 DIAGNOSIS — G89.4 CHRONIC PAIN SYNDROME: Primary | ICD-10-CM

## 2022-11-11 DIAGNOSIS — M47.817 LUMBOSACRAL SPONDYLOSIS WITHOUT MYELOPATHY: ICD-10-CM

## 2022-11-11 NOTE — PROGRESS NOTES
Study: Wave Writer- BRUNO Study: A Randomized Controlled Study to Evaluate the Safety and Effectiveness of BHR Group Spinal Cord Stimulation (SCS) Systems in the Treatment of Chronic Low Back and/or Leg Pain with No Prior Surgeries  IRB/Protocol #: [id number]/  : Obed Gasca MD  Treating Physician: Shoaib Aguirre  Site Number: [id number]       Subject Number: [id number]        Date: 11 Nov 2022     Baseline/Radominization:  The subject came to [facility] for Baseline/randomization . The Patient's eligibility requirements were confirmed by Jeannette Gomez.  The subject's information including medical history, demographics and JERZY was entered in the EDC.  Reviewed the subject's opioid pain medications and confirmed no changes since Med lock on 26 Oct 2022 . Subject was randomized to SCS Arm. The following questionnaire/assessments were completed using ipad/patient Nextnav.  Jeannette Gomez was unable to log into the iPad to complete Pain Intensity VRS questionnaire. Therefore, this assessment was completed using paper questionnaire and was filed in the subjects binder as a source record.         Demographics  BMI  Medical History  Pain Medication Log   Prior Back Procedures       Questionnaires   Numeric Rating Scale (NRS)  Oswestry Disability Index (PREMA)  SF-36 Health Survey  [id number]  PSQI  Sleep Quality Index  Pain Intensity VRS     Adverse event was reported in the EDC. No protocol deviations occurred. The subject's visit has been entered into [employer]. Funds of $100 was added to subject's clincard. All worksheets and questionnaires completed thus far have been entered and filed in binder.      Drysol Counseling:  I discussed with the patient the risks of drysol/aluminum chloride including but not limited to skin rash, itching, irritation, burning.

## 2022-11-11 NOTE — PROGRESS NOTES
Study: Wave Writer- BRUNO Study: A Randomized Controlled Study to Evaluate the Safety and Effectiveness of [employer] Spinal Cord Stimulation (SCS) Systems in the Treatment of Chronic Low Back and/or Leg Pain with No Prior Surgeries  IRB/Protocol #: [id number]/  : Obed Gasca MD  Treating Physician: Shoaib Aguirre  Site Number: [id number]       Subject Number: [id number]        Date: 11 Nov 2022     Baseline/Radominization:  The subject came to [facility] for Baseline/randomization . The Patient's eligibility requirements were confirmed by Jeannette Gomez.  The subject's information including medical history, demographics and JERZY was entered in the EDC.  Reviewed the subject's opioid pain medications and confirmed no changes since Med lock on 26 Oct 2022 . Subject was randomized to SCS Arm. The following questionnaire/assessments were completed using ipad/patient [employer].  Jeannette Gomez was unable to log into the iPad to complete Pain Intensity VRS questionnaire. Therefore, this assessment was completed using paper questionnaire and was filed in the subjects binder as a source record.         Demographics  BMI  Medical History  Pain Medication Log   Prior Back Procedures       Questionnaires   Numeric Rating Scale (NRS)  Oswestry Disability Index (PREMA)  SF-36 Health Survey  EQ-5D-5L  PSQI  Sleep Quality Index  Pain Intensity VRS     Adverse event was reported in the EDC. No protocol deviations occurred. The subject's visit has been entered into [email or web address]. Funds of $100 was added to subject's clincard. All worksheets and questionnaires completed thus far have been entered and filed in binder.

## 2022-11-11 NOTE — TELEPHONE ENCOUNTER
----- Message from Nena Prajapati sent at 11/11/2022 10:53 AM CST -----  Regarding: Randomized to SCS  Kurtis Aguirre:    This patient was randomized to SCS today 11 Nov 2022. Please drop order for spinal cord stimulator.    Shira, would you please advise when you have determined the date?    Respectfully,  Nena

## 2022-11-11 NOTE — PROGRESS NOTES
Study: Wave Writer- BRUNO Study: A Randomized Controlled Study to Evaluate the Safety and Effectiveness of [employer] Spinal Cord Stimulation (SCS) Systems in the Treatment of Chronic Low Back and/or Leg Pain with No Prior Surgeries  IRB/Protocol #: [id number]/  : Obed Gasca MD  Treating Physician: Shoaib Aguirre  Site Number: [id number]       Subject Number: [id number]      Date: 11 Nov 2022     Baseline/Radominization:  The subject came to [facility] for Baseline/randomization . The Patient's eligibility requirements were confirmed by Jeannette Gomez.  The subject's information including medical history, demographics and JERZY was entered in the EDC.  Reviewed the subject's opioid pain medications and confirmed no changes since Med lock on 26 Oct 2022 . Subject was randomized to SCS Arm. The following questionnaire/assessments were completed using ipad/patient [employer].  Jeannette Gomez was unable to log into the iPad to complete Pain Intensity VRS questionnaire. Therefore, this assessment was completed using paper questionnaire and was filed in the subjects binder as a source record.         Demographics  BMI  Medical History  Pain Medication Log   Prior Back Procedures       Questionnaires   Numeric Rating Scale (NRS)  Oswestry Disability Index (PREMA)  SF-36 Health Survey  EQ-5D-5L  PSQI  Sleep Quality Index  Pain Intensity VRS     Adverse event was reported in the EDC. No protocol deviations occurred. The subject's visit has been entered into [employer]. Funds of $100 was added to subject's clincard. All worksheets and questionnaires completed thus far have been entered and filed in binder.

## 2022-11-12 LAB
6MAM UR QL: NOT DETECTED
7AMINOCLONAZEPAM UR QL: NOT DETECTED
A-OH ALPRAZ UR QL: PRESENT
ALPHA-OH-MIDAZOLAM: NOT DETECTED
ALPRAZ UR QL: PRESENT
AMPHET UR QL SCN: NOT DETECTED
ANNOTATION COMMENT IMP: NORMAL
ANNOTATION COMMENT IMP: NORMAL
BARBITURATES UR QL: NOT DETECTED
BUPRENORPHINE UR QL: NOT DETECTED
BZE UR QL: NOT DETECTED
CARBOXYTHC UR QL: NOT DETECTED
CARISOPRODOL UR QL: NOT DETECTED
CLONAZEPAM UR QL: NOT DETECTED
CODEINE UR QL: NOT DETECTED
CREAT UR-MCNC: 58.6 MG/DL (ref 20–400)
DIAZEPAM UR QL: NOT DETECTED
ETHYL GLUCURONIDE UR QL: PRESENT
FENTANYL UR QL: NOT DETECTED
GABAPENTIN: PRESENT
HYDROCODONE UR QL: NOT DETECTED
HYDROMORPHONE UR QL: NOT DETECTED
LORAZEPAM UR QL: NOT DETECTED
MDA UR QL: NOT DETECTED
MDEA UR QL: NOT DETECTED
MDMA UR QL: NOT DETECTED
ME-PHENIDATE UR QL: PRESENT
METHADONE UR QL: NOT DETECTED
METHAMPHET UR QL: NOT DETECTED
MIDAZOLAM UR QL SCN: NOT DETECTED
MORPHINE UR QL: NOT DETECTED
NALOXONE: NOT DETECTED
NORBUPRENORPHINE UR QL CFM: NOT DETECTED
NORDIAZEPAM UR QL: NOT DETECTED
NORFENTANYL UR QL: NOT DETECTED
NORHYDROCODONE UR QL CFM: NOT DETECTED
NORMEPERIDINE UR QL CFM: NOT DETECTED
NOROXYCODONE UR QL CFM: NOT DETECTED
NOROXYMORPHONE UR QL SCN: NOT DETECTED
OXAZEPAM UR QL: NOT DETECTED
OXYCODONE UR QL: NOT DETECTED
OXYMORPHONE UR QL: NOT DETECTED
PATHOLOGY STUDY: NORMAL
PCP UR QL: NOT DETECTED
PHENTERMINE UR QL: NOT DETECTED
PREGABALIN: NOT DETECTED
SERVICE CMNT-IMP: NORMAL
TAPENTADOL UR QL SCN: NOT DETECTED
TAPENTADOL UR QL SCN: NOT DETECTED
TEMAZEPAM UR QL: NOT DETECTED
TRAMADOL UR QL: NOT DETECTED
ZOLPIDEM METABOLITE: NOT DETECTED
ZOLPIDEM UR QL: NOT DETECTED

## 2022-11-17 DIAGNOSIS — M47.817 LUMBOSACRAL SPONDYLOSIS WITHOUT MYELOPATHY: ICD-10-CM

## 2022-11-17 DIAGNOSIS — G89.4 CHRONIC PAIN SYNDROME: Primary | ICD-10-CM

## 2022-11-22 ENCOUNTER — PATIENT MESSAGE (OUTPATIENT)
Dept: INTERNAL MEDICINE | Facility: CLINIC | Age: 67
End: 2022-11-22
Payer: MEDICARE

## 2022-11-22 RX ORDER — AMLODIPINE BESYLATE 5 MG/1
5 TABLET ORAL DAILY
Qty: 90 TABLET | Refills: 3 | Status: SHIPPED | OUTPATIENT
Start: 2022-11-22 | End: 2023-02-27 | Stop reason: SDUPTHER

## 2022-11-22 NOTE — TELEPHONE ENCOUNTER
Care Due:                  Date            Visit Type   Department     Provider  --------------------------------------------------------------------------------                                MYCHART                              FOLLOWUP/OF  [facility] INTERNAL  Last Visit: 08-      FICE VISIT   MEDICINE       DEX CANELA  Next Visit: None Scheduled  None         None Found                                                            Last  Test          Frequency    Reason                     Performed    Due Date  --------------------------------------------------------------------------------    Lipid Panel.  12 months..  atorvastatin.............  02- 01-    [facility] Embedded Care Gaps. Reference number: [id number]. 11/22/2022   1:35:22 PM CST

## 2022-12-01 ENCOUNTER — PATIENT MESSAGE (OUTPATIENT)
Dept: INTERNAL MEDICINE | Facility: CLINIC | Age: 67
End: 2022-12-01
Payer: MEDICARE

## 2022-12-05 ENCOUNTER — PATIENT MESSAGE (OUTPATIENT)
Dept: INTERNAL MEDICINE | Facility: CLINIC | Age: 67
End: 2022-12-05
Payer: MEDICARE

## 2022-12-05 RX ORDER — TRAZODONE HYDROCHLORIDE 100 MG/1
TABLET ORAL
Qty: 180 TABLET | Refills: 3 | Status: SHIPPED | OUTPATIENT
Start: 2022-12-05 | End: 2023-02-27 | Stop reason: SDUPTHER

## 2022-12-05 NOTE — TELEPHONE ENCOUNTER
Pt requesting refill, med pended    No sig available, please review.     LOV with Kaykay Perales MD , 8/10/2022

## 2022-12-05 NOTE — TELEPHONE ENCOUNTER
No new care gaps identified.  [facility] Embedded Care Gaps. Reference number: [id number]. 12/05/2022   11:28:08 AM CST

## 2022-12-09 ENCOUNTER — DOCUMENTATION ONLY (OUTPATIENT)
Dept: REHABILITATION | Facility: OTHER | Age: 67
End: 2022-12-09
Payer: MEDICARE

## 2022-12-09 PROBLEM — F40.298 FEAR OF PAIN: Status: RESOLVED | Noted: 2022-09-23 | Resolved: 2022-12-09

## 2022-12-09 PROBLEM — Z74.09 IMPAIRED FUNCTIONAL MOBILITY AND ACTIVITY TOLERANCE: Status: RESOLVED | Noted: 2022-09-23 | Resolved: 2022-12-09

## 2022-12-09 PROBLEM — Z78.9 ALTERATION IN PERFORMANCE OF ACTIVITIES OF DAILY LIVING: Status: RESOLVED | Noted: 2022-09-23 | Resolved: 2022-12-09

## 2022-12-09 NOTE — PROGRESS NOTES
Pt participated in PT evaluation only & then reported she is going into a spinal cord stimulator clinical trial, so she requests discharge. Pt discharged from PT per her request.    Radu Don, PT

## 2022-12-21 PROBLEM — G89.4 CHRONIC PAIN SYNDROME: Status: ACTIVE | Noted: 2022-12-21

## 2022-12-22 ENCOUNTER — HOSPITAL ENCOUNTER (OUTPATIENT)
Facility: OTHER | Age: 67
Discharge: HOME | End: 2022-12-22
Attending: ANESTHESIOLOGY | Admitting: ANESTHESIOLOGY
Payer: MEDICARE

## 2022-12-22 ENCOUNTER — DOCUMENTATION ONLY (OUTPATIENT)
Dept: RESEARCH | Facility: OTHER | Age: 67
End: 2022-12-22

## 2022-12-22 VITALS
RESPIRATION RATE: 16 BRPM | TEMPERATURE: 99 F | BODY MASS INDEX: 24.63 KG/M2 | HEART RATE: 54 BPM | DIASTOLIC BLOOD PRESSURE: 80 MMHG | WEIGHT: 139 LBS | SYSTOLIC BLOOD PRESSURE: 140 MMHG | OXYGEN SATURATION: 99 % | HEIGHT: 63 IN

## 2022-12-22 DIAGNOSIS — G89.4 CHRONIC PAIN SYNDROME: Primary | ICD-10-CM

## 2022-12-22 DIAGNOSIS — G89.29 CHRONIC PAIN: ICD-10-CM

## 2022-12-22 PROCEDURE — 63650 IMPLANT NEUROELECTRODES: CPT | Mod: HCNC,,, | Performed by: ANESTHESIOLOGY

## 2022-12-22 PROCEDURE — C1778 LEAD, NEUROSTIMULATOR: HCPCS | Mod: HCNC | Performed by: ANESTHESIOLOGY

## 2022-12-22 PROCEDURE — 63650 PR PERCUT IMPLNT NEUROELECT,EPIDURAL: ICD-10-PCS | Mod: HCNC,,, | Performed by: ANESTHESIOLOGY

## 2022-12-22 PROCEDURE — 63600175 PHARM REV CODE 636 W HCPCS: Mod: HCNC | Performed by: ANESTHESIOLOGY

## 2022-12-22 PROCEDURE — 63600175 PHARM REV CODE 636 W HCPCS: Mod: HCNC | Performed by: EMERGENCY MEDICINE

## 2022-12-22 PROCEDURE — 63650 IMPLANT NEUROELECTRODES: CPT | Mod: HCNC | Performed by: ANESTHESIOLOGY

## 2022-12-22 PROCEDURE — 25000003 PHARM REV CODE 250: Mod: HCNC | Performed by: ANESTHESIOLOGY

## 2022-12-22 RX ORDER — SODIUM CHLORIDE 9 MG/ML
500 INJECTION, SOLUTION INTRAVENOUS CONTINUOUS
Status: ACTIVE | OUTPATIENT
Start: 2022-12-22

## 2022-12-22 RX ORDER — LIDOCAINE HYDROCHLORIDE 20 MG/ML
INJECTION, SOLUTION INFILTRATION; PERINEURAL
Status: DISCONTINUED | OUTPATIENT
Start: 2022-12-22 | End: 2022-12-22 | Stop reason: HOSPADM

## 2022-12-22 RX ORDER — CEFAZOLIN SODIUM 1 G/3ML
2 INJECTION, POWDER, FOR SOLUTION INTRAMUSCULAR; INTRAVENOUS
Status: COMPLETED | OUTPATIENT
Start: 2022-12-22 | End: 2022-12-22

## 2022-12-22 RX ORDER — BUPIVACAINE HYDROCHLORIDE 5 MG/ML
INJECTION, SOLUTION EPIDURAL; INTRACAUDAL
Status: DISCONTINUED | OUTPATIENT
Start: 2022-12-22 | End: 2022-12-22 | Stop reason: HOSPADM

## 2022-12-22 RX ORDER — MIDAZOLAM HYDROCHLORIDE 1 MG/ML
INJECTION INTRAMUSCULAR; INTRAVENOUS
Status: DISCONTINUED | OUTPATIENT
Start: 2022-12-22 | End: 2022-12-22 | Stop reason: HOSPADM

## 2022-12-22 RX ORDER — FENTANYL CITRATE 50 UG/ML
INJECTION, SOLUTION INTRAMUSCULAR; INTRAVENOUS
Status: DISCONTINUED | OUTPATIENT
Start: 2022-12-22 | End: 2022-12-22 | Stop reason: HOSPADM

## 2022-12-22 RX ADMIN — CEFAZOLIN 2 G: 1 INJECTION, POWDER, FOR SOLUTION INTRAMUSCULAR; INTRAVENOUS at 01:12

## 2022-12-22 NOTE — OP NOTE
Spinal Cord Stimulator Trial Fluoroscopic Guidance    The procedure, risks, benefits, and options were discussed with the patient. There are no contraindications to the procedure. The patent expressed understanding and agreed to the procedure. Informed written consent was obtained prior to the start of the procedure and can be found in the patient's chart.    PATIENT NAME: Tiarra Norton   MRN: [id number]     DATE OF PROCEDURE: 12/22/2022    PROCEDURE:   1) Placement of Octad Electrode by 2   2) Intraoperative and Postoperative Programming, Simple   3) Spinal Cord Stimulator Trial     PRE-OP DIAGNOSIS: Chronic pain syndrome [G89.4]  Lumbosacral spondylosis without myelopathy [M47.817] Chronic pain syndrome [G89.4]    POST-OP DIAGNOSIS: Chronic pain syndrome [G89.4]  Lumbosacral spondylosis without myelopathy [M47.817]    PHYSICIAN: Shoaib Aguirre MD    ASSISTANTS: Adelfo Zamarripa MD     MEDICATIONS INJECTED: Bupivacaine 0.25%     LOCAL ANESTHETIC INJECTED: Xylocaine 2%     SEDATION: Versed 2mg and Fentanyl 150 mcg                                                                                                                                                                                     Conscious sedation ordered by M.D. Patient re-evaluation prior to administration of conscious sedation. No changes noted in patient's status from initial evaluation. The patient's vital signs were monitored by RN and patient remained hemodynamically stable throughout the procedure.    Event Time In   Sedation Start 1325   Sedation End 1414       ESTIMATED BLOOD LOSS: None    COMPLICATIONS: None    TECHNIQUE: Time-out was performed to identify the patient and procedure to be performed. The patient was placed in the prone position on the OR table, the area overlying the  lumbar spine was prepped and draped in usual sterile fashion using chlorhexidine. The entry site of the  lumbar spine was identified at the T12/L1 interspace under  fluoroscopy guidance. The entry site was anesthetized with Lidocaine 2% and Bupivacaine 0.5% followed by a 25 gauge, 1.5 inch needle to anesthetize the deeper tissue up to the supraspinous ligament. A 14 gauge, 3.5 inch Tuohy needle was then advanced to contact the right T12/l1 lamina.  It was walked off in a superior medial direction until it entered the epidural space using loss of resistance to air. The spinal cord stimulator lead was advanced through the Tuohy needle and directed to rest the tip at the T7 vertebral body.  The second lead placement was repeated in detail for the same side to insert a second lead within the epidural space to reside adjacent to the first lead.  The patient was allowed to fully awaken from anesthesia.  Testing was carried out by the device representative under the guidance of Shoaib Aguirre MD. The patient reported having sensation in the areas of usual pain. Once the leads were adjusted to by within the proper positioning, needles were withdrawn leaving the leads in place and were then secured to the patients skin using Stay-Fix adhesive bandages and tegaderm. The patients back was cleaned. No specimens collected. The patient tolerated the procedure well.     The patient was monitored after the procedure in the recovery area. They were given post-procedure and discharge instructions to follow at home. The patient was discharged in a stable condition.    Shoaib Aguirre MD

## 2022-12-22 NOTE — DISCHARGE SUMMARY
Discharge Note  Short Stay      SUMMARY     Admit Date: 12/22/2022    Attending Physician: Shoaib Aguirre      Discharge Physician: Shoaib Aguirre      Discharge Date: 12/22/2022 2:18 PM    Procedure(s) (LRB):  SPINAL CORD STIMULATOR [facility]    Final Diagnosis: Chronic pain syndrome [G89.4]  Lumbosacral spondylosis without myelopathy [M47.817]    Disposition: Home or self care    Patient Instructions:   Current Discharge Medication List        CONTINUE these medications which have NOT CHANGED    Details   alprazolam (XANAX) 2 MG Tab Take 1 mg by mouth 3 (three) times daily as needed.  Refills: 0      amLODIPine (NORVASC) 5 MG tablet Take 1 tablet (5 mg total) by mouth once daily.  Qty: 90 tablet, Refills: 3    Comments: .      atorvastatin (LIPITOR) 10 MG tablet TAKE 1 TABLET EVERY DAY  Qty: 90 tablet, Refills: 3      b complex vitamins capsule Take 1 capsule by mouth once daily.      cholecalciferol, vitamin D3, (VITAMIN D3) 5,000 unit Tab Take 1 tablet (5,000 Units total) by mouth once daily.      conjugated estrogens (PREMARIN) vaginal cream Place 0.5 g vaginally twice a week. Place a pea-sized amount in vagina every night for 4 weeks, then use 2-3 nights a week  Qty: 1 applicator, Refills: 1      HYDROcodone-acetaminophen (NORCO) 5-325 mg per tablet Take 1 tablet by mouth every 24 hours as needed for Pain.  Qty: 30 tablet, Refills: 0    Comments: Greater than 7 day supply is medically necessary.  Associated Diagnoses: DDD (degenerative disc disease), lumbar; Chronic, continuous use of opioids      levothyroxine (SYNTHROID) 125 MCG tablet TAKE 1 TABLET BY MOUTH EVERY MORNING  Qty: 90 tablet, Refills: 2      liothyronine (CYTOMEL) 5 MCG Tab Take 1 tablet (5 mcg total) by mouth once daily.  Qty: 90 tablet, Refills: 3      pantoprazole (PROTONIX) 40 MG tablet Take 1 tablet (40 mg total) by mouth once daily.  Qty: 90 tablet, Refills: 2    Comments: Please inactivate all prior scripts with  same name and strength including on holds.      promethazine (PHENERGAN) 12.5 MG Tab Take 1 tablet (12.5 mg total) by mouth every 6 (six) hours as needed (nausea).  Qty: 30 tablet, Refills: 2      RESTASIS 0.05 % ophthalmic emulsion INT 1 GTT IN OU BID      sumatriptan (IMITREX) 50 MG tablet 1 tab po at start of headache.  Repeat in 2 h prn  Qty: 27 tablet, Refills: 3      traZODone (DESYREL) 100 MG tablet 1-2 tabs po q hs for sleep  Qty: 180 tablet, Refills: 3      valsartan (DIOVAN) 320 MG tablet TAKE 1 TABLET ONE TIME DAILY (STOP LISINOPRIL)  Qty: 90 tablet, Refills: 3                 Discharge Diagnosis: Chronic pain syndrome [G89.4]  Lumbosacral spondylosis without myelopathy [M47.817]  Condition on Discharge: Stable with no complications to procedure   Diet on Discharge: Same as before.  Activity: as per instruction sheet.  Discharge to: Home with a responsible adult.  Follow up: 2-4 weeks

## 2022-12-22 NOTE — H&P
HPI  Patient presenting for Procedure(s) (LRB):  SPINAL CORD STIMULATOR TRIAL [employer] (N/A) for chronic back pain.    Patient on Anti-coagulation No    No health changes since previous encounter    Past Medical History:   Diagnosis Date    Cataract     Depression     Gestational diabetes     Hyperlipidemia     Hypertension     IBS (irritable bowel syndrome)     Thyroid disease      Past Surgical History:   Procedure Laterality Date    ADENOIDECTOMY      ARTHROSCOPIC CHONDROPLASTY OF KNEE JOINT Right 10/19/2021    Procedure: ARTHROSCOPY, KNEE, WITH CHONDROPLASTY;  Surgeon: Trevin Huber MD;  Location: [facility] OR;  Service: Orthopedics;  Laterality: Right;    ARTHROSCOPY OF KNEE Right 10/19/2021    Procedure: ARTHROSCOPY, KNEE-RIGHT;  Surgeon: Trevin Huber MD;  Location: [facility] OR;  Service: Orthopedics;  Laterality: Right;     SECTION      CHOLECYSTECTOMY      COLONOSCOPY N/A 2016    Procedure: COLONOSCOPY;  Surgeon: Heri Segura MD;  [facility]  Service: Endoscopy;  Laterality: N/A;    COLONOSCOPY N/A 2019    Procedure: COLONOSCOPY;  Surgeon: Joe Pitts MD;  Location: [facility] ([address] [place] [address]);  Service: Endoscopy;  Laterality: N/A;    CYSTOSCOPY  2022    Procedure: CYSTOSCOPY;  Surgeon: Lenny Moore MD;  [address]  Service: Urology;;    ESOPHAGOGASTRODUODENOSCOPY N/A 2020    Procedure: EGD (ESOPHAGOGASTRODUODENOSCOPY);  Surgeon: Tolu Rivas MD;  [facility]  Service: Endoscopy;  Laterality: N/A;  Pt. moved to 9:15am arrival time.. Instructed to not have anything after midnight.EC    EYE SURGERY      cataract resection right    INJECTION OF ANESTHETIC AGENT AROUND NERVE Bilateral 2022    Procedure: Block, Nerve MEDIAL BRANCH BLOCK BILATERAL L3,4,5;  Surgeon: Tara Gibbs MD;  Location: [facility] PAIN MGT;  Service: Pain Management;  Laterality: Bilateral;    INJECTION OF ANESTHETIC AGENT  "AROUND NERVE Bilateral 2/15/2022    Procedure: BLOCK, NERVE, L3*L4-L5 MEDIAL BR ANCH 2 OF 2;  Surgeon: Tara Gibbs MD;  Location: [facility] PAIN MGT;  Service: Pain Management;  Laterality: Bilateral;    INJECTION OF BOTULINUM TOXIN TYPE A  6/21/2022    Procedure: BOTULINUM TOXIN INJECTION 100 units;  Surgeon: Lenny Moore MD;  [facility];  Service: Urology;;    JOINT REPLACEMENT      partial right knee    KNEE ARTHROSCOPY W/ MENISCECTOMY Right 10/19/2021    Procedure: ARTHROSCOPY, KNEE, WITH MENISCECTOMY, PARTIAL LATERAL;  Surgeon: Trevin Huber MD;  Location[facility] OR;  Service: Orthopedics;  Laterality: Right;    r hand surgery      RADIOFREQUENCY ABLATION Right 3/8/2022    Procedure: RADIOFREQUENCY ABLATION, L3-L5 1 OF 2;  Surgeon: Tara Gibbs MD;  Location: [facility] PAIN MGT;  Service: Pain Management;  Laterality: Right;    RADIOFREQUENCY ABLATION Left 3/22/2022    Procedure: RADIOFREQUENCY ABLATION, l3-+l5 2 of 2;  Surgeon: Tara Gibbs MD;  Location: [facility] PAIN MGT;  Service: Pain Management;  Laterality: Left;    TONSILLECTOMY      TOTAL KNEE ARTHROPLASTY      partial knee replacement    TUBAL LIGATION       Review of patient's allergies indicates:  No Known Allergies   Current Facility-Administered Medications   Medication    0.9%  NaCl infusion    ceFAZolin injection 2 g       PMHx, PSHx, Allergies, Medications reviewed in epic    ROS negative except pain complaints in HPI    OBJECTIVE:    /62 (BP Location: Right arm, Patient Position: Lying)   Pulse (!) 55   Temp 98.5 °F (36.9 °C) (Oral)   Resp 16   Ht 5' 3" (1.6 m)   Wt 63 kg (139 lb)   SpO2 97%   BMI 24.62 kg/m²     PHYSICAL EXAMINATION:    GENERAL: Well appearing, in no acute distress, alert and oriented x3.  PSYCH:  Mood and affect appropriate.  SKIN: Skin color, texture, turgor normal, no rashes or lesions which will impact the procedure.  CV: RRR with palpation of the radial artery.  PULM: No " evidence of respiratory difficulty, symmetric chest rise. Clear to auscultation.  NEURO: Cranial nerves grossly intact.    Plan:    Proceed with procedure as planned Procedure(s) (LRB):  SPINAL CORD STIMULATOR TRIAL [facility] (N/A)    Girish Cardona  12/22/2022

## 2022-12-22 NOTE — DISCHARGE INSTRUCTIONS
Thank you for allowing us to care for you today. You may receive a survey about the care we provided. Your feedback is valuable and helps us provide excellent care throughout the community.     Home Care Instructions Pain Management:    1. DIET:   You may resume your normal diet today.   2. BATHING:   Sponge baths ONLY. No showers or tub baths with Spinal Cord Stimulator (SCS) in.   3. DRESSING:   Do not remove bandage. Doctor will remove at follow up visit. You can reinforce edges with band aids or skin tape if they start to come up.  4. ACTIVITY LEVEL:   Nothing strenuous with SCS in place. No bending of the back, no lifting anything heavier than a gallon of milk.    5. MEDICATIONS:   You may resume your normal medications today. DO NOT take any blood thinners with SCS in place.    6. SPECIAL INSTRUCTIONS:   No heat or ice  to the injection site while SCS is in place     If you have received any sedation through your IV, you may not drive for 24 hrs.     Please call the PAIN MANAGEMENT office at [phone] or ON CALL pager at [phone] if you experienced any:   1. Redness or swelling around the injection site.  2. Fever of 101 degrees or more  3. Drainage (pus) from the injection site.  4. For any continuous bleeding (some dried blood over the incision is normal.)    FOR EMERGENCIES:   If any unusual problems or difficulties occur during clinic hours, call [phone] or [phone].     Thank you for allowing us to care for you today. You may receive a survey about the care we provided. Your feedback is valuable and helps us provide excellent care throughout the community.     Home Care Instructions Pain Management:    1. DIET:   You may resume your normal diet today.   2. BATHING:   Sponge baths ONLY. No showers or tub baths with Spinal Cord Stimulator (SCS) in.   3. DRESSING:   Do not remove bandage. Doctor will remove at follow up visit. You can reinforce edges with band aids or skin tape if they start to  come up.  4. ACTIVITY LEVEL:   Nothing strenuous with SCS in place. No bending of the back, no lifting anything heavier than a gallon of milk.    5. MEDICATIONS:   You may resume your normal medications today. DO NOT take any blood thinners with SCS in place.    6. SPECIAL INSTRUCTIONS:   No heat or ice  to the injection site while SCS is in place     If you have received any sedation through your IV, you may not drive for 24 hrs.     Please call the PAIN MANAGEMENT office at [phone] or ON CALL pager at [phone] if you experienced any:   1. Redness or swelling around the injection site.  2. Fever of 101 degrees or more  3. Drainage (pus) from the injection site.  4. For any continuous bleeding (some dried blood over the incision is normal.)    FOR EMERGENCIES:   If any unusual problems or difficulties occur during clinic hours, call [phone] or [phone].  Adult Procedural Sedation Instructions    Recovery After Procedural Sedation (Adult)  You have been given medicine by vein to make you sleep during your surgery. This may have included both a pain medicine and sleeping medicine. Most of the effects have worn off. But you may still have some drowsiness for the next 6 to 8 hours.  Home care  Follow these guidelines when you get home:  For the next 8 hours, you should be watched by a responsible adult. This person should make sure your condition is not getting worse.  Don't drink any alcohol for the next 24 hours.  Don't drive, operate dangerous machinery, or make important business or personal decisions during the next 24 hours.  Note: Your healthcare provider may tell you not to take any medicine by mouth for pain or sleep in the next 4 hours. These medicines may react with the medicines you were given in the hospital. This could cause a much stronger response than usual.  Follow-up care  Follow up with your healthcare provider if you are not alert and back to your usual level of activity within 12  hours.  When to seek medical advice  Call your healthcare provider right away if any of these occur:  Drowsiness gets worse  Weakness or dizziness gets worse  Repeated vomiting  You can't be awakened   Date Last Reviewed: 10/18/2016  © 0989-9462 [employer] [address] [place] [address], [place] [address]. All rights reserved. This information is not intended as a substitute for professional medical care. Always follow your healthcare professional's instructions.

## 2022-12-26 ENCOUNTER — TELEPHONE (OUTPATIENT)
Dept: RESEARCH | Facility: OTHER | Age: 67
End: 2022-12-26
Payer: MEDICARE

## 2022-12-29 ENCOUNTER — HOSPITAL ENCOUNTER (OUTPATIENT)
Dept: RADIOLOGY | Facility: OTHER | Age: 67
Discharge: HOME | End: 2022-12-29
Attending: ANESTHESIOLOGY
Payer: MEDICARE

## 2022-12-29 ENCOUNTER — OFFICE VISIT (OUTPATIENT)
Dept: PAIN MEDICINE | Facility: CLINIC | Age: 67
End: 2022-12-29
Payer: MEDICARE

## 2022-12-29 ENCOUNTER — DOCUMENTATION ONLY (OUTPATIENT)
Dept: RESEARCH | Facility: OTHER | Age: 67
End: 2022-12-29
Payer: MEDICARE

## 2022-12-29 VITALS
BODY MASS INDEX: 24.62 KG/M2 | HEIGHT: 63 IN | DIASTOLIC BLOOD PRESSURE: 74 MMHG | SYSTOLIC BLOOD PRESSURE: 133 MMHG | HEART RATE: 54 BPM

## 2022-12-29 DIAGNOSIS — M48.061 SPINAL STENOSIS OF LUMBAR REGION WITHOUT NEUROGENIC CLAUDICATION: ICD-10-CM

## 2022-12-29 DIAGNOSIS — M51.36 DDD (DEGENERATIVE DISC DISEASE), LUMBAR: ICD-10-CM

## 2022-12-29 DIAGNOSIS — M47.817 SPONDYLOSIS OF LUMBOSACRAL REGION WITHOUT MYELOPATHY OR RADICULOPATHY: ICD-10-CM

## 2022-12-29 DIAGNOSIS — G89.29 OTHER CHRONIC PAIN: Primary | ICD-10-CM

## 2022-12-29 DIAGNOSIS — G89.4 CHRONIC PAIN SYNDROME: ICD-10-CM

## 2022-12-29 DIAGNOSIS — M47.817 LUMBOSACRAL SPONDYLOSIS WITHOUT MYELOPATHY: ICD-10-CM

## 2022-12-29 PROCEDURE — 99999 PR PBB SHADOW E&M-EST. PATIENT-LVL III: CPT | Mod: PBBFAC,HCNC,, | Performed by: NURSE PRACTITIONER

## 2022-12-29 PROCEDURE — 3078F PR MOST RECENT DIASTOLIC BLOOD PRESSURE < 80 MM HG: ICD-10-PCS | Mod: HCNC,CPTII,S$GLB, | Performed by: NURSE PRACTITIONER

## 2022-12-29 PROCEDURE — 72100 X-RAY EXAM L-S SPINE 2/3 VWS: CPT | Mod: TC,HCNC,FY

## 2022-12-29 PROCEDURE — 1101F PT FALLS ASSESS-DOCD LE1/YR: CPT | Mod: HCNC,CPTII,S$GLB, | Performed by: NURSE PRACTITIONER

## 2022-12-29 PROCEDURE — 3008F BODY MASS INDEX DOCD: CPT | Mod: HCNC,CPTII,S$GLB, | Performed by: NURSE PRACTITIONER

## 2022-12-29 PROCEDURE — 3288F PR FALLS RISK ASSESSMENT DOCUMENTED: ICD-10-PCS | Mod: HCNC,CPTII,S$GLB, | Performed by: NURSE PRACTITIONER

## 2022-12-29 PROCEDURE — 3008F PR BODY MASS INDEX (BMI) DOCUMENTED: ICD-10-PCS | Mod: HCNC,CPTII,S$GLB, | Performed by: NURSE PRACTITIONER

## 2022-12-29 PROCEDURE — 3288F FALL RISK ASSESSMENT DOCD: CPT | Mod: HCNC,CPTII,S$GLB, | Performed by: NURSE PRACTITIONER

## 2022-12-29 PROCEDURE — 99024 POSTOP FOLLOW-UP VISIT: CPT | Mod: HCNC,S$GLB,, | Performed by: NURSE PRACTITIONER

## 2022-12-29 PROCEDURE — 3075F PR MOST RECENT SYSTOLIC BLOOD PRESS GE 130-139MM HG: ICD-10-PCS | Mod: HCNC,CPTII,S$GLB, | Performed by: NURSE PRACTITIONER

## 2022-12-29 PROCEDURE — 1125F PR PAIN SEVERITY QUANTIFIED, PAIN PRESENT: ICD-10-PCS | Mod: HCNC,CPTII,S$GLB, | Performed by: NURSE PRACTITIONER

## 2022-12-29 PROCEDURE — 4010F PR ACE/ARB THEARPY RXD/TAKEN: ICD-10-PCS | Mod: HCNC,CPTII,S$GLB, | Performed by: NURSE PRACTITIONER

## 2022-12-29 PROCEDURE — 72070 X-RAY EXAM THORAC SPINE 2VWS: CPT | Mod: 26,HCNC,, | Performed by: RADIOLOGY

## 2022-12-29 PROCEDURE — 99999 PR PBB SHADOW E&M-EST. PATIENT-LVL III: ICD-10-PCS | Mod: PBBFAC,HCNC,, | Performed by: NURSE PRACTITIONER

## 2022-12-29 PROCEDURE — 1160F PR REVIEW ALL MEDS BY PRESCRIBER/CLIN PHARMACIST DOCUMENTED: ICD-10-PCS | Mod: HCNC,CPTII,S$GLB, | Performed by: NURSE PRACTITIONER

## 2022-12-29 PROCEDURE — 1101F PR PT FALLS ASSESS DOC 0-1 FALLS W/OUT INJ PAST YR: ICD-10-PCS | Mod: HCNC,CPTII,S$GLB, | Performed by: NURSE PRACTITIONER

## 2022-12-29 PROCEDURE — 99024 PR POST-OP FOLLOW-UP VISIT: ICD-10-PCS | Mod: HCNC,S$GLB,, | Performed by: NURSE PRACTITIONER

## 2022-12-29 PROCEDURE — 3078F DIAST BP <80 MM HG: CPT | Mod: HCNC,CPTII,S$GLB, | Performed by: NURSE PRACTITIONER

## 2022-12-29 PROCEDURE — 99499 UNLISTED E&M SERVICE: CPT | Mod: S$GLB,,, | Performed by: NURSE PRACTITIONER

## 2022-12-29 PROCEDURE — 99499 RISK ADDL DX/OHS AUDIT: ICD-10-PCS | Mod: S$GLB,,, | Performed by: NURSE PRACTITIONER

## 2022-12-29 PROCEDURE — 72070 XR THORACIC SPINE AP LATERAL: ICD-10-PCS | Mod: 26,HCNC,, | Performed by: RADIOLOGY

## 2022-12-29 PROCEDURE — 72070 X-RAY EXAM THORAC SPINE 2VWS: CPT | Mod: TC,HCNC,FY

## 2022-12-29 PROCEDURE — 4010F ACE/ARB THERAPY RXD/TAKEN: CPT | Mod: HCNC,CPTII,S$GLB, | Performed by: NURSE PRACTITIONER

## 2022-12-29 PROCEDURE — 72100 XR LUMBAR SPINE AP AND LATERAL: ICD-10-PCS | Mod: 26,HCNC,, | Performed by: RADIOLOGY

## 2022-12-29 PROCEDURE — 1160F RVW MEDS BY RX/DR IN RCRD: CPT | Mod: HCNC,CPTII,S$GLB, | Performed by: NURSE PRACTITIONER

## 2022-12-29 PROCEDURE — 72100 X-RAY EXAM L-S SPINE 2/3 VWS: CPT | Mod: 26,HCNC,, | Performed by: RADIOLOGY

## 2022-12-29 PROCEDURE — 1125F AMNT PAIN NOTED PAIN PRSNT: CPT | Mod: HCNC,CPTII,S$GLB, | Performed by: NURSE PRACTITIONER

## 2022-12-29 PROCEDURE — 1159F PR MEDICATION LIST DOCUMENTED IN MEDICAL RECORD: ICD-10-PCS | Mod: HCNC,CPTII,S$GLB, | Performed by: NURSE PRACTITIONER

## 2022-12-29 PROCEDURE — 3075F SYST BP GE 130 - 139MM HG: CPT | Mod: HCNC,CPTII,S$GLB, | Performed by: NURSE PRACTITIONER

## 2022-12-29 PROCEDURE — 1159F MED LIST DOCD IN RCRD: CPT | Mod: HCNC,CPTII,S$GLB, | Performed by: NURSE PRACTITIONER

## 2022-12-29 NOTE — PROGRESS NOTES
Chronic Pain-Established Visit         SUBJECTIVE:    PCP: Kaykay Perales MD    REFERRING PHYSICIAN: Tyler Jacobs MD    CHIEF COMPLAINT: Lower back pain       Original HISTORY OF PRESENT ILLNESS: Tiarra Norton presents to the clinic for the evaluation of the above pain. The pain started five years ago.     Original Pain Description:  The pain is located in the lower back and does radiate up towards her upper back.The pain is described as aching, dull and sharp. Initially starts as a dull, aching pain and it gets sharp once she bends down and moves around. Typically when she walks for more than five minutes, the sharp pain radiates up her back. Exacerbating factors: Standing, Bending, Touching, Walking, Night Time, Flexing and Lifting. Mitigating factors heat, ice, laying down and massage. Symptoms interfere with daily activity and sleeping. The patient feels like symptoms have been worsening. Patient denies night fever/night sweats, urinary incontinence, bowel incontinence, significant weight loss, significant motor weakness and loss of sensations.    Original PAIN SCORES:  Best: Pain is 1  Worst: Pain is 10  Usually: Pain is 4  Current: Pain is 4    INTERVAL HISTORY:  4/18/22 Interval History: Patient presents for telehealth visit for follow up following her b/l RFA at L3-L5. She reports 80% relief and is very pleased with her pain relief. Unfortunately, she is not back to doing what she wants due to right knee pain. She is seeing Dr. Huber for knee pain and is s/p right medial compartment partial knee replacement. She is currently in therapy for this. We discussed that we may be able to assist her with her knee pain as well.     Interval History 6/15/22: Tiarra Norton returns for follow up. She continues to feel like she has mild pain sitting or laying down, and has her worst pain with extreme leaning forward to pick something up off the floor. She also has difficulty leaning over her handlebars  to ride her bike or leaning forward to fold clothes or standing up for any period of time. So, we determined that leaning forward is her worst issue. We did previouisly do lmbb and rfa, which has helped with extension, but it would not help with leaning forward. On review of her MRI she has significant multilevel DDD with Modic changes which likely account for her pain.     Interval History 7/25/22: Tiarra Norton returns for follow up. We previously have been discussing treatments for her low back pain that did not resolve with RFA. At our last visit I referred her to Dr. Antony for clearance for a SCS trial. She was considered to be a reasonable candidate. However, in the meantime, her PT referred her to acupuncture with Dr. Jacobs. She had one treatment and already notes 50% relief. She notes that she still has pain, especially with leaning forward, but feels that it is much better controlled. Their plan is one treatment per week but is approved for 20 sessions.     Interval History 8/22/22: Ms. Norton returns for follow up on her low back pain. At our last visit she felt cured by acupuncture. Unfortunately, this only lasted for 24 hours. She returns today looking for other options to make life livable. Patient claims all of her pain in the low back and middle back. We discussed that this will work best for her low back pain.     Interval History 9/21/2022:  The patient returns to clinic today for follow up of back pain via virtual visit. She received a letter from [place] denying SCS trial. She is frustrated by this. She continues to report low back pain. She denies any radicular leg pain. Her pain is worse with bending and activity. She recently underwent med screening for the [facility] program. She is interested in pursuing this. She has tried Gabapentin and Lyrica in the past with side effects. She denies any other health changes. Her pain today is 7/10.     Interval History 10/14/22: Mrs. Norton  presents today for follow up of chronic low back pain and to discuss the [place] trial. She had her lumbar MRI done last night without significant changes to her spine. She did have increased dilation of her bile duct. Her pain today is the same in character and severity as during her last visit with us and she rates it currently at a 7/10. She continues to do her stretches on her own which have helped some. She presents with some questions regarding the trial.     Interval History 12/29/2022:  The patient returns for post op appointment. She is s/p lumbar [place] SCS trial with 90% relief. She has had very minimal back pain during the trial period. She says that she was more active over this time due to the Christmas holiday and had minimal symptoms. She is very happy with the relief. She would like to move forward with implant. She is part of [facility] study. No fever or malaise during trial period. Her pain today is 1/10.    6 weeks of Conservative therapy (PT/Chiro/Home Exercises with Dates)  Healthy back program--> has four sessions left for a total of 20 sessions   Last session was on 1/31/22   Stretches that they taught at [facility] gives her relief       Medications:   None currently  Ice and heat which has helped   Tried Gabapentin and Lyrica- made her loopy      Report: reviewed       Interventional Pain Procedures:   2/8/2022- Bilateral L3,4,5 MBB  2/15/2022- Bilateral L3,4,5 MBB  3/8/2022- Right L3,4,5 RFA- 80% relief for a few months  3/22/2022- Left L3,4,5 RFA- 80% relief for a few months  12/22/22 SCS trial- 90% relief    Imaging:  10/13/22: MRI LUMBAR SPINE WITHOUT CONTRAST     CLINICAL HISTORY:  Low back pain, symptoms persist with > 6wks conservative treatment; Dorsalgia, unspecified     TECHNIQUE:  Multiplanar, multisequence MR images were acquired from the thoracolumbar junction to the sacrum without the administration of contrast.     COMPARISON:  Lumbar spine MRI 01/21/2022; CT abdomen pelvis  08/20/2022     FINDINGS:  Lumbar levoscoliosis centered at L2.  Minimal anterolisthesis at L4-5.     No compression fractures.  No marrow replacing lesions.     Multilevel degenerative changes with disc desiccation and disc space narrowing, described in detail below.  Discogenic endplate edema at T12-L1.     The conus medullaris has a normal appearance and terminates at the L1 level.  Cauda equina nerve roots are unremarkable.  No epidural mass or collection.     The common duct is dilated up to 12 mm, previously 9 mm on 08/20/2022 CT.  Mild prominence of the main pancreatic duct up to 4 mm, which is new.  No definite filling defect in the bile ducts.  Paraspinal muscle atrophy.     SIGNIFICANT FINDINGS BY LEVEL:     T12-L1: Disc bulge.  Bilateral facet arthropathy and ligamentum flavum thickening.  Mild canal stenosis.  Mild bilateral foraminal stenosis.     L1-2: Disc bulge.  Bilateral facet arthropathy and ligamentum flavum thickening.  Mild canal stenosis.  Mild right foraminal stenosis.     L2-3: Disc bulge.  Bilateral facet arthropathy and ligamentum flavum thickening.  Mild canal stenosis.  Moderate right mild left foraminal stenosis.     L3-4: Disc bulge.  Bilateral facet arthropathy and ligamentum flavum thickening.  Small bilateral facet joint effusions/synovitis.  Moderate canal stenosis with partial effacement of the thecal sac and crowding of the cauda equina nerve roots.  Mild right and moderate left foraminal stenosis.     L4-5: Disc bulge with posterior disc uncovering.  Bilateral facet arthropathy and ligamentum flavum thickening.  Small right facet joint effusion/synovitis.  Mild canal stenosis.  Mild bilateral foraminal stenosis.     L5-S1: Disc bulge.  Bilateral facet arthropathy and ligamentum flavum thickening.  No significant canal stenosis.  Mild left foraminal stenosis.     Impression:     Lumbar levoscoliosis and multilevel degenerative changes as detailed above.  No significant changes  from 2022.     Increased biliary ductal dilatation up to 12 mm, greater than expected after cholecystectomy.  In addition, the main pancreatic duct is mildly prominent up to 4 mm, which is new.  If LFTs are abnormal, consider MRI/MRCP or ERCP for further evaluation.      Past Medical History:   Diagnosis Date    Cataract     Depression     Gestational diabetes     Hyperlipidemia     Hypertension     IBS (irritable bowel syndrome)     Thyroid disease      Past Surgical History:   Procedure Laterality Date    ADENOIDECTOMY      ARTHROSCOPIC CHONDROPLASTY OF KNEE JOINT Right 10/19/2021    Procedure: ARTHROSCOPY, KNEE, WITH CHONDROPLASTY;  Surgeon: Trevin Huber MD;  Location: [facility] OR;  Service: Orthopedics;  Laterality: Right;    ARTHROSCOPY OF KNEE Right 10/19/2021    Procedure: ARTHROSCOPY, KNEE-RIGHT;  Surgeon: Trevin Huber MD;  Location: [facility] OR;  Service: Orthopedics;  Laterality: Right;     SECTION      CHOLECYSTECTOMY      COLONOSCOPY N/A 2016    Procedure: COLONOSCOPY;  Surgeon: Heri Segura MD;  Location: [facility] (4TH FLR);  Service: Endoscopy;  Laterality: N/A;    COLONOSCOPY N/A 2019    Procedure: COLONOSCOPY;  Surgeon: Joe Pitts MD;  Location: [facility] (4TH FLR);  Service: Endoscopy;  Laterality: N/A;    CYSTOSCOPY  2022    Procedure: CYSTOSCOPY;  Surgeon: Lenny Moore MD;  Location: [address] FLR;  Service: Urology;;    ESOPHAGOGASTRODUODENOSCOPY N/A 2020    Procedure: EGD (ESOPHAGOGASTRODUODENOSCOPY);  Surgeon: Tolu Rivas MD;  Location: [facility] (4TH FLR);  Service: Endoscopy;  Laterality: N/A;  Pt. moved to 9:15am arrival time.. Instructed to not have anything after midnight.EC    EYE SURGERY      cataract resection right    INJECTION OF ANESTHETIC AGENT AROUND NERVE Bilateral 2022    Procedure: Block, Nerve MEDIAL BRANCH BLOCK BILATERAL L3,4,5;  Surgeon: Tara Gibbs MD;  Location: [facility] PAIN MGT;  Service: Pain  Management;  Laterality: Bilateral;    INJECTION OF ANESTHETIC AGENT AROUND NERVE Bilateral 2/15/2022    Procedure: BLOCK, NERVE, L3*L4-L5 MEDIAL BR ANCH 2 OF 2;  Surgeon: Tara Gibbs MD;  Location: [facility] PAIN MGT;  Service: Pain Management;  Laterality: Bilateral;    INJECTION OF BOTULINUM TOXIN TYPE A  6/21/2022    Procedure: BOTULINUM TOXIN INJECTION 100 units;  Surgeon: Lenny Moore MD;  Location: [facility] OR [address] [place] [address];  Service: Urology;;    JOINT REPLACEMENT      partial right knee    KNEE ARTHROSCOPY W/ MENISCECTOMY Right 10/19/2021    Procedure: ARTHROSCOPY, KNEE, WITH MENISCECTOMY, PARTIAL LATERAL;  Surgeon: Trevin Huber MD;  Location: [facility] OR;  Service: Orthopedics;  Laterality: Right;    r hand surgery      RADIOFREQUENCY ABLATION Right 3/8/2022    Procedure: RADIOFREQUENCY ABLATION, L3-L5 1 OF 2;  Surgeon: Tara Gibbs MD;  Location: [facility] PAIN MGT;  Service: Pain Management;  Laterality: Right;    RADIOFREQUENCY ABLATION Left 3/22/2022    Procedure: RADIOFREQUENCY ABLATION, l3-+l5 2 of 2;  Surgeon: Tara Gibbs MD;  Location: [facility] PAIN MGT;  Service: Pain Management;  Laterality: Left;    TONSILLECTOMY      TOTAL KNEE ARTHROPLASTY      partial knee replacement    TRIAL OF SPINAL CORD NERVE STIMULATOR N/A 12/22/2022    Procedure: SPINAL CORD STIMULATOR TRIAL [employer];  Surgeon: Shoaib Aguirre MD;  Location: [facility] PAIN MGT;  Service: Pain Management;  Laterality: N/A;    TUBAL LIGATION       Social History     Socioeconomic History    Marital status:     Number of children: 1   Occupational History    Occupation:      Employer: [employer]     Comment: retired   Tobacco Use    Smoking status: Never    Smokeless tobacco: Never   Substance and Sexual Activity    Alcohol use: Yes     Alcohol/week: 3.0 standard drinks     Types: 3 Glasses of wine per week     Comment: weekends    Drug use: No    Sexual activity: Yes     Partners: Male   Other Topics  Concern    Are you pregnant or think you may be? No    Breast-feeding No   Social History Narrative    Retired        Swims.       Stationary bike.            Social Determinants of Health     Financial Resource Strain: Low Risk     Difficulty of Paying Living Expenses: Not hard at all   Food Insecurity: No Food Insecurity    Worried About Running Out of Food in the Last Year: Never true    Ran Out of Food in the Last Year: Never true   Transportation Needs: No Transportation Needs    Lack of Transportation (Medical): No    Lack of Transportation (Non-Medical): No   Physical Activity: Insufficiently Active    Days of Exercise per Week: 4 days    Minutes of Exercise per Session: 20 min   Stress: Stress Concern Present    Feeling of Stress : Very much   Social Connections: Unknown    Frequency of Communication with Friends and Family: Twice a week    Active Member of Clubs or Organizations: No    Attends Club or Organization Meetings: Never    Marital Status:    Housing Stability: Low Risk     Unable to Pay for Housing in the Last Year: No    Number of Places Lived in the Last Year: 1    Unstable Housing in the Last Year: No     Family History   Problem Relation Age of Onset    Hypertension Mother     Irritable bowel syndrome Mother     Hyperlipidemia Mother     Heart disease Father         mi    Hypertension Father     Cancer Father         prostate    Heart attack Father     Heart failure Father     Hyperlipidemia Father     Alcohol abuse Sister     Depression Sister     Epilepsy Son     Irritable bowel syndrome Sister     Alcohol abuse Sister     Ovarian cancer Maternal Aunt     Breast cancer Paternal Aunt     Breast cancer Maternal Aunt     Melanoma Neg Hx     Colon cancer Neg Hx     Stomach cancer Neg Hx     Esophageal cancer Neg Hx     Liver disease Neg Hx     Crohn's disease Neg Hx     Ulcerative colitis Neg Hx     Celiac disease Neg Hx     Stroke Neg Hx        Review of patient's allergies  indicates:  No Known Allergies    Current Outpatient Medications   Medication Sig    alprazolam (XANAX) 2 MG Tab Take 1 mg by mouth 3 (three) times daily as needed.    amLODIPine (NORVASC) 5 MG tablet Take 1 tablet (5 mg total) by mouth once daily.    atorvastatin (LIPITOR) 10 MG tablet TAKE 1 TABLET EVERY DAY    b complex vitamins capsule Take 1 capsule by mouth once daily.    cholecalciferol, vitamin D3, (VITAMIN D3) 5,000 unit Tab Take 1 tablet (5,000 Units total) by mouth once daily.    conjugated estrogens (PREMARIN) vaginal cream Place 0.5 g vaginally twice a week. Place a pea-sized amount in vagina every night for 4 weeks, then use 2-3 nights a week    HYDROcodone-acetaminophen (NORCO) 5-325 mg per tablet Take 1 tablet by mouth every 24 hours as needed for Pain.    levothyroxine (SYNTHROID) 125 MCG tablet TAKE 1 TABLET BY MOUTH EVERY MORNING    liothyronine (CYTOMEL) 5 MCG Tab Take 1 tablet (5 mcg total) by mouth once daily.    pantoprazole (PROTONIX) 40 MG tablet Take 1 tablet (40 mg total) by mouth once daily.    promethazine (PHENERGAN) 12.5 MG Tab Take 1 tablet (12.5 mg total) by mouth every 6 (six) hours as needed (nausea).    RESTASIS 0.05 % ophthalmic emulsion INT 1 GTT IN OU BID    sumatriptan (IMITREX) 50 MG tablet 1 tab po at start of headache.  Repeat in 2 h prn    traZODone (DESYREL) 100 MG tablet 1-2 tabs po q hs for sleep    valsartan (DIOVAN) 320 MG tablet TAKE 1 TABLET ONE TIME DAILY (STOP LISINOPRIL)     No current facility-administered medications for this visit.     Facility-Administered Medications Ordered in Other Visits   Medication    0.9%  NaCl infusion       REVIEW OF SYSTEMS:    GENERAL: No fever. No chills. No fatigue. Denies weight loss. Denies weight gain.  HEENT: Denies headaches. Denies vision change. Denies eye pain. Denies double vision. Denies ear pain.   CV: Denies chest pain. HTN  PULM: Denies of shortness of breath.  GI: Denies constipation. No diarrhea. No abdominal pain.  Denies nausea. Denies vomiting. No blood in stool.  HEME: Denies bleeding problems.  : Denies urgency. No painful urination. No blood in urine.  MS: See HPI  SKIN: Denies rash.   NEURO: Denies seizures. No weakness.  ENDO: Thyroid disorder.   PSYCH:  Depression    OBJECTIVE:   PHYSICAL EXAM:   GENERAL: Well appearing, in no acute distress, alert and oriented x3.  PSYCH:  Mood and affect appropriate.  SKIN: Skin color, texture, turgor normal, no rashes or lesions. SCS site without drainage or signs of infection.  Leads removed without difficulty with tips intact. Suture removed intact.  Area cleaned with alcohol and Neosporin applied.  GAIT: normal, no antalgic gait     ASSESSMENT: 67 y.o. year old female with low back pain, consistent with the followin. Other chronic pain        2. DDD (degenerative disc disease), lumbar        3. Spondylosis of lumbosacral region without myelopathy or radiculopathy        4. Spinal stenosis of lumbar region without neurogenic claudication            PLAN:     - Pt is s/p successful lumbar SCS trial with [employer] by Dr. Aguirre. No complications. 90% relief. She is part of [facility] study.    - Schedule for SCS implant with Dr. Aguirre. Will update research team. They met with her today. Tristen from [employer] also met with patient today. All concerns adressed.    - F/U 1 week after implant.    The above plan and management options were discussed at length with patient. Patient is in agreement with the above and verbalized understanding.     Jeannette Gomez, MIGUEL  2022

## 2022-12-29 NOTE — H&P (VIEW-ONLY)
Chronic Pain-Established Visit         SUBJECTIVE:    PCP: Kaykay Perales MD    REFERRING PHYSICIAN: Tyler Jacobs MD    CHIEF COMPLAINT: Lower back pain       Original HISTORY OF PRESENT ILLNESS: Tiarra Norton presents to the clinic for the evaluation of the above pain. The pain started five years ago.     Original Pain Description:  The pain is located in the lower back and does radiate up towards her upper back.The pain is described as aching, dull and sharp. Initially starts as a dull, aching pain and it gets sharp once she bends down and moves around. Typically when she walks for more than five minutes, the sharp pain radiates up her back. Exacerbating factors: Standing, Bending, Touching, Walking, Night Time, Flexing and Lifting. Mitigating factors heat, ice, laying down and massage. Symptoms interfere with daily activity and sleeping. The patient feels like symptoms have been worsening. Patient denies night fever/night sweats, urinary incontinence, bowel incontinence, significant weight loss, significant motor weakness and loss of sensations.    Original PAIN SCORES:  Best: Pain is 1  Worst: Pain is 10  Usually: Pain is 4  Current: Pain is 4    INTERVAL HISTORY:  4/18/22 Interval History: Patient presents for telehealth visit for follow up following her b/l RFA at L3-L5. She reports 80% relief and is very pleased with her pain relief. Unfortunately, she is not back to doing what she wants due to right knee pain. She is seeing Dr. Huber for knee pain and is s/p right medial compartment partial knee replacement. She is currently in therapy for this. We discussed that we may be able to assist her with her knee pain as well.     Interval History 6/15/22: Tiarra Norton returns for follow up. She continues to feel like she has mild pain sitting or laying down, and has her worst pain with extreme leaning forward to pick something up off the floor. She also has difficulty leaning over her handlebars  to ride her bike or leaning forward to fold clothes or standing up for any period of time. So, we determined that leaning forward is her worst issue. We did previouisly do lmbb and rfa, which has helped with extension, but it would not help with leaning forward. On review of her MRI she has significant multilevel DDD with Modic changes which likely account for her pain.     Interval History 7/25/22: Tiarra Norton returns for follow up. We previously have been discussing treatments for her low back pain that did not resolve with RFA. At our last visit I referred her to Dr. Antony for clearance for a SCS trial. She was considered to be a reasonable candidate. However, in the meantime, her PT referred her to acupuncture with Dr. Jacobs. She had one treatment and already notes 50% relief. She notes that she still has pain, especially with leaning forward, but feels that it is much better controlled. Their plan is one treatment per week but is approved for 20 sessions.     Interval History 8/22/22: Ms. Norton returns for follow up on her low back pain. At our last visit she felt cured by acupuncture. Unfortunately, this only lasted for 24 hours. She returns today looking for other options to make life livable. Patient claims all of her pain in the low back and middle back. We discussed that this will work best for her low back pain.     Interval History 9/21/2022:  The patient returns to clinic today for follow up of back pain via virtual visit. She received a letter from [facility] denying SCS trial. She is frustrated by this. She continues to report low back pain. She denies any radicular leg pain. Her pain is worse with bending and activity. She recently underwent med screening for the [employer] program. She is interested in pursuing this. She has tried Gabapentin and Lyrica in the past with side effects. She denies any other health changes. Her pain today is 7/10.     Interval History 10/14/22: Mrs. Norton  presents today for follow up of chronic low back pain and to discuss the [place] trial. She had her lumbar MRI done last night without significant changes to her spine. She did have increased dilation of her bile duct. Her pain today is the same in character and severity as during her last visit with us and she rates it currently at a 7/10. She continues to do her stretches on her own which have helped some. She presents with some questions regarding the trial.     Interval History 12/29/2022:  The patient returns for post op appointment. She is s/p lumbar [place] SCS trial with 90% relief. She has had very minimal back pain during the trial period. She says that she was more active over this time due to the Christmas holiday and had minimal symptoms. She is very happy with the relief. She would like to move forward with implant. She is part of Cortez study. No fever or malaise during trial period. Her pain today is 1/10.    6 weeks of Conservative therapy (PT/Chiro/Home Exercises with Dates)  Healthy back program--> has four sessions left for a total of 20 sessions   Last session was on 1/31/22   Stretches that they taught at [employer] gives her relief       Medications:   None currently  Ice and heat which has helped   Tried Gabapentin and Lyrica- made her loopy      Report: reviewed       Interventional Pain Procedures:   2/8/2022- Bilateral L3,4,5 MBB  2/15/2022- Bilateral L3,4,5 MBB  3/8/2022- Right L3,4,5 RFA- 80% relief for a few months  3/22/2022- Left L3,4,5 RFA- 80% relief for a few months  12/22/22 SCS trial- 90% relief    Imaging:  10/13/22: MRI LUMBAR SPINE WITHOUT CONTRAST     CLINICAL HISTORY:  Low back pain, symptoms persist with > 6wks conservative treatment; Dorsalgia, unspecified     TECHNIQUE:  Multiplanar, multisequence MR images were acquired from the thoracolumbar junction to the sacrum without the administration of contrast.     COMPARISON:  Lumbar spine MRI 01/21/2022; CT abdomen pelvis  08/20/2022     FINDINGS:  Lumbar levoscoliosis centered at L2.  Minimal anterolisthesis at L4-5.     No compression fractures.  No marrow replacing lesions.     Multilevel degenerative changes with disc desiccation and disc space narrowing, described in detail below.  Discogenic endplate edema at T12-L1.     The conus medullaris has a normal appearance and terminates at the L1 level.  Cauda equina nerve roots are unremarkable.  No epidural mass or collection.     The common duct is dilated up to 12 mm, previously 9 mm on 08/20/2022 CT.  Mild prominence of the main pancreatic duct up to 4 mm, which is new.  No definite filling defect in the bile ducts.  Paraspinal muscle atrophy.     SIGNIFICANT FINDINGS BY LEVEL:     T12-L1: Disc bulge.  Bilateral facet arthropathy and ligamentum flavum thickening.  Mild canal stenosis.  Mild bilateral foraminal stenosis.     L1-2: Disc bulge.  Bilateral facet arthropathy and ligamentum flavum thickening.  Mild canal stenosis.  Mild right foraminal stenosis.     L2-3: Disc bulge.  Bilateral facet arthropathy and ligamentum flavum thickening.  Mild canal stenosis.  Moderate right mild left foraminal stenosis.     L3-4: Disc bulge.  Bilateral facet arthropathy and ligamentum flavum thickening.  Small bilateral facet joint effusions/synovitis.  Moderate canal stenosis with partial effacement of the thecal sac and crowding of the cauda equina nerve roots.  Mild right and moderate left foraminal stenosis.     L4-5: Disc bulge with posterior disc uncovering.  Bilateral facet arthropathy and ligamentum flavum thickening.  Small right facet joint effusion/synovitis.  Mild canal stenosis.  Mild bilateral foraminal stenosis.     L5-S1: Disc bulge.  Bilateral facet arthropathy and ligamentum flavum thickening.  No significant canal stenosis.  Mild left foraminal stenosis.     Impression:     Lumbar levoscoliosis and multilevel degenerative changes as detailed above.  No significant changes  from 2022.     Increased biliary ductal dilatation up to 12 mm, greater than expected after cholecystectomy.  In addition, the main pancreatic duct is mildly prominent up to 4 mm, which is new.  If LFTs are abnormal, consider MRI/MRCP or ERCP for further evaluation.      Past Medical History:   Diagnosis Date    Cataract     Depression     Gestational diabetes     Hyperlipidemia     Hypertension     IBS (irritable bowel syndrome)     Thyroid disease      Past Surgical History:   Procedure Laterality Date    ADENOIDECTOMY      ARTHROSCOPIC CHONDROPLASTY OF KNEE JOINT Right 10/19/2021    Procedure: ARTHROSCOPY, KNEE, WITH CHONDROPLASTY;  Surgeon: Trevin Huber MD;  Location: [facility] OR;  Service: Orthopedics;  Laterality: Right;    ARTHROSCOPY OF KNEE Right 10/19/2021    Procedure: ARTHROSCOPY, KNEE-RIGHT;  Surgeon: Trevin Huber MD;  Location: [facility] OR;  Service: Orthopedics;  Laterality: Right;     SECTION      CHOLECYSTECTOMY      COLONOSCOPY N/A 2016    Procedure: COLONOSCOPY;  Surgeon: Heri Segura MD;  Location: [facility] (4TH FLR);  Service: Endoscopy;  Laterality: N/A;    COLONOSCOPY N/A 2019    Procedure: COLONOSCOPY;  Surgeon: Joe Pitts MD;  Location: [facility] (4TH FLR);  Service: Endoscopy;  Laterality: N/A;    CYSTOSCOPY  2022    Procedure: CYSTOSCOPY;  Surgeon: Lenny Moore MD;  Location: [address] FLR;  Service: Urology;;    ESOPHAGOGASTRODUODENOSCOPY N/A 2020    Procedure: EGD (ESOPHAGOGASTRODUODENOSCOPY);  Surgeon: Tolu Rivas MD;  Location: [facility] (4TH FLR);  Service: Endoscopy;  Laterality: N/A;  Pt. moved to 9:15am arrival time.. Instructed to not have anything after midnight.EC    EYE SURGERY      cataract resection right    INJECTION OF ANESTHETIC AGENT AROUND NERVE Bilateral 2022    Procedure: Block, Nerve MEDIAL BRANCH BLOCK BILATERAL L3,4,5;  Surgeon: Tara Gibbs MD;  Location: [facility] PAIN MGT;  Service: Pain  Management;  Laterality: Bilateral;    INJECTION OF ANESTHETIC AGENT AROUND NERVE Bilateral 2/15/2022    Procedure: BLOCK, NERVE, L3*L4-L5 MEDIAL BR ANCH 2 OF 2;  Surgeon: Tara Gibbs MD;  Location: [facility] PAIN MGT;  Service: Pain Management;  Laterality: Bilateral;    INJECTION OF BOTULINUM TOXIN TYPE A  6/21/2022    Procedure: BOTULINUM TOXIN INJECTION 100 units;  Surgeon: Lenny Moore MD;  Location: [facility] OR [address] [place] [address];  Service: Urology;;    JOINT REPLACEMENT      partial right knee    KNEE ARTHROSCOPY W/ MENISCECTOMY Right 10/19/2021    Procedure: ARTHROSCOPY, KNEE, WITH MENISCECTOMY, PARTIAL LATERAL;  Surgeon: Trevin Huber MD;  Location: [facility] OR;  Service: Orthopedics;  Laterality: Right;    r hand surgery      RADIOFREQUENCY ABLATION Right 3/8/2022    Procedure: RADIOFREQUENCY ABLATION, L3-L5 1 OF 2;  Surgeon: Tara Gibbs MD;  Location: [facility] PAIN MGT;  Service: Pain Management;  Laterality: Right;    RADIOFREQUENCY ABLATION Left 3/22/2022    Procedure: RADIOFREQUENCY ABLATION, l3-+l5 2 of 2;  Surgeon: Tara Gibbs MD;  Location: [facility] PAIN MGT;  Service: Pain Management;  Laterality: Left;    TONSILLECTOMY      TOTAL KNEE ARTHROPLASTY      partial knee replacement    TRIAL OF SPINAL CORD NERVE STIMULATOR N/A 12/22/2022    Procedure: SPINAL CORD STIMULATOR TRIAL [employer];  Surgeon: Shoaib Aguirre MD;  Location: [facility] PAIN MGT;  Service: Pain Management;  Laterality: N/A;    TUBAL LIGATION       Social History     Socioeconomic History    Marital status:     Number of children: 1   Occupational History    Occupation:      Employer: [employer]     Comment: retired   Tobacco Use    Smoking status: Never    Smokeless tobacco: Never   Substance and Sexual Activity    Alcohol use: Yes     Alcohol/week: 3.0 standard drinks     Types: 3 Glasses of wine per week     Comment: weekends    Drug use: No    Sexual activity: Yes     Partners: Male   Other Topics  Concern    Are you pregnant or think you may be? No    Breast-feeding No   Social History Narrative    Retired        Swims.       Stationary bike.            Social Determinants of Health     Financial Resource Strain: Low Risk     Difficulty of Paying Living Expenses: Not hard at all   Food Insecurity: No Food Insecurity    Worried About Running Out of Food in the Last Year: Never true    Ran Out of Food in the Last Year: Never true   Transportation Needs: No Transportation Needs    Lack of Transportation (Medical): No    Lack of Transportation (Non-Medical): No   Physical Activity: Insufficiently Active    Days of Exercise per Week: 4 days    Minutes of Exercise per Session: 20 min   Stress: Stress Concern Present    Feeling of Stress : Very much   Social Connections: Unknown    Frequency of Communication with Friends and Family: Twice a week    Active Member of Clubs or Organizations: No    Attends Club or Organization Meetings: Never    Marital Status:    Housing Stability: Low Risk     Unable to Pay for Housing in the Last Year: No    Number of Places Lived in the Last Year: 1    Unstable Housing in the Last Year: No     Family History   Problem Relation Age of Onset    Hypertension Mother     Irritable bowel syndrome Mother     Hyperlipidemia Mother     Heart disease Father         mi    Hypertension Father     Cancer Father         prostate    Heart attack Father     Heart failure Father     Hyperlipidemia Father     Alcohol abuse Sister     Depression Sister     Epilepsy Son     Irritable bowel syndrome Sister     Alcohol abuse Sister     Ovarian cancer Maternal Aunt     Breast cancer Paternal Aunt     Breast cancer Maternal Aunt     Melanoma Neg Hx     Colon cancer Neg Hx     Stomach cancer Neg Hx     Esophageal cancer Neg Hx     Liver disease Neg Hx     Crohn's disease Neg Hx     Ulcerative colitis Neg Hx     Celiac disease Neg Hx     Stroke Neg Hx        Review of patient's allergies  indicates:  No Known Allergies    Current Outpatient Medications   Medication Sig    alprazolam (XANAX) 2 MG Tab Take 1 mg by mouth 3 (three) times daily as needed.    amLODIPine (NORVASC) 5 MG tablet Take 1 tablet (5 mg total) by mouth once daily.    atorvastatin (LIPITOR) 10 MG tablet TAKE 1 TABLET EVERY DAY    b complex vitamins capsule Take 1 capsule by mouth once daily.    cholecalciferol, vitamin D3, (VITAMIN D3) 5,000 unit Tab Take 1 tablet (5,000 Units total) by mouth once daily.    conjugated estrogens (PREMARIN) vaginal cream Place 0.5 g vaginally twice a week. Place a pea-sized amount in vagina every night for 4 weeks, then use 2-3 nights a week    HYDROcodone-acetaminophen (NORCO) 5-325 mg per tablet Take 1 tablet by mouth every 24 hours as needed for Pain.    levothyroxine (SYNTHROID) 125 MCG tablet TAKE 1 TABLET BY MOUTH EVERY MORNING    liothyronine (CYTOMEL) 5 MCG Tab Take 1 tablet (5 mcg total) by mouth once daily.    pantoprazole (PROTONIX) 40 MG tablet Take 1 tablet (40 mg total) by mouth once daily.    promethazine (PHENERGAN) 12.5 MG Tab Take 1 tablet (12.5 mg total) by mouth every 6 (six) hours as needed (nausea).    RESTASIS 0.05 % ophthalmic emulsion INT 1 GTT IN OU BID    sumatriptan (IMITREX) 50 MG tablet 1 tab po at start of headache.  Repeat in 2 h prn    traZODone (DESYREL) 100 MG tablet 1-2 tabs po q hs for sleep    valsartan (DIOVAN) 320 MG tablet TAKE 1 TABLET ONE TIME DAILY (STOP LISINOPRIL)     No current facility-administered medications for this visit.     Facility-Administered Medications Ordered in Other Visits   Medication    0.9%  NaCl infusion       REVIEW OF SYSTEMS:    GENERAL: No fever. No chills. No fatigue. Denies weight loss. Denies weight gain.  HEENT: Denies headaches. Denies vision change. Denies eye pain. Denies double vision. Denies ear pain.   CV: Denies chest pain. HTN  PULM: Denies of shortness of breath.  GI: Denies constipation. No diarrhea. No abdominal pain.  Denies nausea. Denies vomiting. No blood in stool.  HEME: Denies bleeding problems.  : Denies urgency. No painful urination. No blood in urine.  MS: See HPI  SKIN: Denies rash.   NEURO: Denies seizures. No weakness.  ENDO: Thyroid disorder.   PSYCH:  Depression    OBJECTIVE:   PHYSICAL EXAM:   GENERAL: Well appearing, in no acute distress, alert and oriented x3.  PSYCH:  Mood and affect appropriate.  SKIN: Skin color, texture, turgor normal, no rashes or lesions. SCS site without drainage or signs of infection.  Leads removed without difficulty with tips intact. Suture removed intact.  Area cleaned with alcohol and Neosporin applied.  GAIT: normal, no antalgic gait     ASSESSMENT: 67 y.o. year old female with low back pain, consistent with the followin. Other chronic pain        2. DDD (degenerative disc disease), lumbar        3. Spondylosis of lumbosacral region without myelopathy or radiculopathy        4. Spinal stenosis of lumbar region without neurogenic claudication            PLAN:     - Pt is s/p successful lumbar SCS trial with [employer] by Dr. Aguirre. No complications. 90% relief. She is part of [place] study.    - Schedule for SCS implant with Dr. Aguirre. Will update research team. They met with her today. Tristen from [employer] also met with patient today. All concerns adressed.    - F/U 1 week after implant.    The above plan and management options were discussed at length with patient. Patient is in agreement with the above and verbalized understanding.     Jeannette Gomez, MIGUEL  2022

## 2022-12-29 NOTE — PROGRESS NOTES
Study: Wave Writer- BRUNO Study: A Randomized Controlled Study to Evaluate the Safety and Effectiveness of E[employer]sual Scientific Spinal Cord Stimulation (SCS) Systems in the Treatment of Chronic Low Back and/or Leg Pain with No Prior Surgeries  IRB/Protocol #: [id number]/  : Obed Gasca MD  Treating Physician: Shoaib Aguirre MD  Site Number: [id number]        Subject Number: G011     Lead Pull/End of Trial:   The subject came to [facility] and met with Tristen Hall ([facility] rep) for lead pull/end of trial. Subject stated 90% overall relief, and pain score of 3. There were no changes in concomitant medications,no protocol deviations or adverse events to report.

## 2023-01-04 ENCOUNTER — PATIENT MESSAGE (OUTPATIENT)
Dept: INTERNAL MEDICINE | Facility: CLINIC | Age: 68
End: 2023-01-04
Payer: MEDICARE

## 2023-01-04 ENCOUNTER — PATIENT MESSAGE (OUTPATIENT)
Dept: PAIN MEDICINE | Facility: OTHER | Age: 68
End: 2023-01-04
Payer: MEDICARE

## 2023-01-04 ENCOUNTER — PATIENT MESSAGE (OUTPATIENT)
Dept: PAIN MEDICINE | Facility: CLINIC | Age: 68
End: 2023-01-04
Payer: MEDICARE

## 2023-01-04 DIAGNOSIS — G89.4 CHRONIC PAIN SYNDROME: Primary | ICD-10-CM

## 2023-01-04 DIAGNOSIS — M47.817 LUMBOSACRAL SPONDYLOSIS WITHOUT MYELOPATHY: ICD-10-CM

## 2023-01-06 ENCOUNTER — ANESTHESIA EVENT (OUTPATIENT)
Dept: SURGERY | Facility: OTHER | Age: 68
End: 2023-01-06

## 2023-01-06 ENCOUNTER — HOSPITAL ENCOUNTER (OUTPATIENT)
Dept: PREADMISSION TESTING | Facility: OTHER | Age: 68
Discharge: HOME | End: 2023-01-06
Attending: ANESTHESIOLOGY
Payer: MEDICARE

## 2023-01-06 VITALS
RESPIRATION RATE: 18 BRPM | WEIGHT: 139 LBS | TEMPERATURE: 98 F | BODY MASS INDEX: 24.63 KG/M2 | HEART RATE: 57 BPM | HEIGHT: 63 IN | OXYGEN SATURATION: 98 % | DIASTOLIC BLOOD PRESSURE: 60 MMHG | SYSTOLIC BLOOD PRESSURE: 126 MMHG

## 2023-01-06 RX ORDER — HYDROXYZINE PAMOATE 50 MG/1
50 CAPSULE ORAL NIGHTLY PRN
COMMUNITY
Start: 2022-12-20 | End: 2023-05-14

## 2023-01-06 RX ORDER — ESCITALOPRAM OXALATE 5 MG/1
5 TABLET ORAL
COMMUNITY
Start: 2022-12-13

## 2023-01-06 RX ORDER — ACETAMINOPHEN 500 MG
1000 TABLET ORAL
Status: CANCELLED | OUTPATIENT
Start: 2023-01-06 | End: 2023-01-06

## 2023-01-06 RX ORDER — TIZANIDINE 4 MG/1
4 TABLET ORAL
COMMUNITY
Start: 2022-10-03 | End: 2023-01-06 | Stop reason: CLARIF

## 2023-01-06 RX ORDER — LIDOCAINE HYDROCHLORIDE 10 MG/ML
0.5 INJECTION, SOLUTION EPIDURAL; INFILTRATION; INTRACAUDAL; PERINEURAL ONCE
Status: CANCELLED | OUTPATIENT
Start: 2023-01-06 | End: 2023-01-06

## 2023-01-06 RX ORDER — SODIUM CHLORIDE, SODIUM LACTATE, POTASSIUM CHLORIDE, CALCIUM CHLORIDE 600; 310; 30; 20 MG/100ML; MG/100ML; MG/100ML; MG/100ML
INJECTION, SOLUTION INTRAVENOUS CONTINUOUS
Status: CANCELLED | OUTPATIENT
Start: 2023-01-06

## 2023-01-06 RX ORDER — GABAPENTIN 100 MG/1
100 CAPSULE ORAL NIGHTLY
COMMUNITY
Start: 2022-09-02 | End: 2023-01-06 | Stop reason: CLARIF

## 2023-01-06 NOTE — DISCHARGE INSTRUCTIONS
Information to Prepare you for your Surgery    PRE-ADMIT TESTING -  [phone]    [address] [facility]          Your surgery has been scheduled at [facility]. We are pleased to have the opportunity to serve you. For Further Information please call [phone].    On the day of surgery please report to the Information Desk on the 1st floor.    CONTACT YOUR PHYSICIAN'S OFFICE THE DAY PRIOR TO YOUR SURGERY TO OBTAIN YOUR ARRIVAL TIME.     The evening before surgery do not eat anything after 9 p.m. ( this includes hard candy, chewing gum and mints).  You may only have GATORADE, POWERADE AND WATER  from 9 p.m. until you leave your home.   DO NOT DRINK ANY LIQUIDS ON THE WAY TO THE HOSPITAL.      Why does your anesthesiologist allow you to drink Gatorade/Powerade before surgery?  Gatorade/Powerade helps to increase your comfort before surgery and to decrease your nausea after surgery. The carbohydrates in Gatorade/Powerade help reduce your body's stress response to surgery.  If you are a diabetic-drink only water prior to surgery.      Current Visitor policy(12/27/2021) - Patients may have 2 visitors pre and post procedure. Only 2 visitors will be allowed in the Surgical building with the patient. No one under the age of 12 will be allowed into the facility.    SPECIAL MEDICATION INSTRUCTIONS: TAKE medications checked off by the Anesthesiologist on your Medication List.    Angiogram Patients: Take medications as instructed by your physician, including aspirin.     Surgery Patients:    If you take ASPIRIN - Your PHYSICIAN/SURGEON will need to inform you IF/OR when you need to stop taking aspirin prior to your surgery.     The week prior to surgery do not ot take any medications containing IBUPROFEN or NSAIDS ( Advil, Motrin, Goodys, BC, Aleve, Naproxen etc) If you are not sure if you should take a medicine please call your surgeon's office.  Ok to take  Tylenol    Do Not Wear any make-up (especially eye make-up) to surgery. Please remove any false eyelashes or eyelash extensions. If you arrive the day of surgery with makeup/eyelashes on you will be required to remove prior to surgery. (There is a risk of corneal abrasions if eye makeup/eyelash extensions are not removed)      Leave all valuables at home.   Do Not wear any jewelry or watches, including any metal in body piercings. Jewelry must be removed prior to coming to the hospital.  There is a possibility that rings that are unable to be removed may be cut off if they are on the surgical extremity.    Please remove all hair extensions, wigs, clips and any other metal accessories/ ornaments from your hair.  These items may pose a flammable/fire risk in Surgery and must be removed.    Do not shave your surgical area at least 5 days prior to your surgery. The surgical prep will be performed at the hospital according to Infection Control regulations.    Contact Lens must be removed before surgery. Either do not wear the contact lens or bring a case and solution for storage.  Please bring a container for eyeglasses or dentures as required.  Bring any paperwork your physician has provided, such as consent forms,  history and physicals, doctor's orders, etc.   Bring comfortable clothes that are loose fitting to wear upon discharge. Take into consideration the type of surgery being performed.  Maintain your diet as advised per your physician the day prior to surgery.      Adequate rest the night before surgery is advised.   Park in the Parking lot behind the hospital or in the [facility] across the street from the parking lot. Parking is complimentary.  If you will be discharged the same day as your procedure, please arrange for a responsible adult to drive you home or to accompany you if traveling by taxi.   YOU WILL NOT BE PERMITTED TO DRIVE OR TO LEAVE THE HOSPITAL ALONE AFTER SURGERY.   If you are  being discharged the same day, it is strongly recommended that you arrange for someone to remain with you for the first 24 hrs following your surgery.    The Surgeon will speak to your family/visitor after your surgery regarding the outcome of your surgery and post op care.  The Surgeon may speak to you after your surgery, but there is a possibility you may not remember the details.  Please check with your family members regarding the conversation with the Surgeon.    We strongly recommend whoever is bringing you home be present for discharge instructions.  This will ensure a thorough understanding for your post op home care.    ALL CHILDREN MUST ALWAYS BE ACCOMPANIED BY AN ADULT.    Visitors-Refer to current Visitor policy handouts.    Thank you for your cooperation.  The Staff of [facility].            Bathing Instructions with Hibiclens    Shower the evening before and morning of your procedure with Chlorhexidine (Hibiclens)  do not use Chlorhexidine on your face or genitals. Do not get in your eyes.  Wash your face with water and your regular face wash/soap  Use your regular shampoo  Apply Chlorhexidine (Hibiclens) directly on your skin or on a wet washcloth and wash gently. When showering: Move away from the shower stream when applying Chlorhexidine (Hibiclens) to avoid rinsing off too soon.  Rinse thoroughly with warm water  Do not dilute Chlorhexidine (Hibiclens)   Dry off as usual, do not use any deodorant, powder, body lotions, perfume, after shave or cologne.

## 2023-01-06 NOTE — ANESTHESIA PREPROCEDURE EVALUATION
01/06/2023  Tiarra Norton is a 67 y.o., female.      Pre-op Assessment    I have reviewed the Patient Summary Reports.     I have reviewed the Nursing Notes. I have reviewed the NPO Status.   I have reviewed the Medications.     Review of Systems  Anesthesia Hx:  Denies Family Hx of Anesthesia complications.   Denies Personal Hx of Anesthesia complications.   Social:  Non-Smoker    Hematology/Oncology:  Hematology Normal   Oncology Normal     EENT/Dental:EENT/Dental Normal   Cardiovascular:   Hypertension    Pulmonary:   Sleep Apnea    Renal/:  Renal/ Normal     Hepatic/GI:   Liver Disease,    Musculoskeletal:   Arthritis     Neurological:   Headaches    Endocrine:   Hypothyroidism    Dermatological:  Skin Normal    Psych:   Psychiatric History          Physical Exam  General: Well nourished, Cooperative, Alert and Oriented    Airway:  Mallampati: II   Mouth Opening: Normal  TM Distance: Normal  Tongue: Normal  Neck ROM: Normal ROM    Dental:  Intact        Anesthesia Plan  Type of Anesthesia, risks & benefits discussed:    Anesthesia Type: Gen Natural Airway  Intra-op Monitoring Plan: Standard ASA Monitors  Post Op Pain Control Plan: multimodal analgesia  Induction:  IV  Informed Consent: Informed consent signed with the Patient and all parties understand the risks and agree with anesthesia plan.  All questions answered.   ASA Score: 3    Ready For Surgery From Anesthesia Perspective.     .

## 2023-01-09 ENCOUNTER — ANESTHESIA (OUTPATIENT)
Dept: SURGERY | Facility: OTHER | Age: 68
End: 2023-01-09

## 2023-01-09 ENCOUNTER — HOSPITAL ENCOUNTER (OUTPATIENT)
Facility: OTHER | Age: 68
Discharge: HOME | End: 2023-01-09
Attending: ANESTHESIOLOGY | Admitting: ANESTHESIOLOGY

## 2023-01-09 ENCOUNTER — DOCUMENTATION ONLY (OUTPATIENT)
Dept: RESEARCH | Facility: OTHER | Age: 68
End: 2023-01-09
Payer: MEDICARE

## 2023-01-09 DIAGNOSIS — M54.9 BACK PAIN, UNSPECIFIED BACK LOCATION, UNSPECIFIED BACK PAIN LATERALITY, UNSPECIFIED CHRONICITY: Primary | ICD-10-CM

## 2023-01-09 DIAGNOSIS — G89.4 CHRONIC PAIN SYNDROME: ICD-10-CM

## 2023-01-09 DIAGNOSIS — M51.36 DDD (DEGENERATIVE DISC DISEASE), LUMBAR: ICD-10-CM

## 2023-01-09 DIAGNOSIS — G89.4 CHRONIC PAIN DISORDER: ICD-10-CM

## 2023-01-09 PROCEDURE — 63685 INS/RPLC SPI NPG/RCVR POCKET: CPT | Mod: 59,,, | Performed by: ANESTHESIOLOGY

## 2023-01-09 PROCEDURE — 36000707: Performed by: ANESTHESIOLOGY

## 2023-01-09 PROCEDURE — 71000015 HC POSTOP RECOV 1ST HR: Performed by: ANESTHESIOLOGY

## 2023-01-09 PROCEDURE — 25000003 PHARM REV CODE 250: Performed by: ANESTHESIOLOGY

## 2023-01-09 PROCEDURE — 71000016 HC POSTOP RECOV ADDL HR: Performed by: ANESTHESIOLOGY

## 2023-01-09 PROCEDURE — C1778 LEAD, NEUROSTIMULATOR: HCPCS | Performed by: ANESTHESIOLOGY

## 2023-01-09 PROCEDURE — 63650 IMPLANT NEUROELECTRODES: CPT | Mod: ,,, | Performed by: ANESTHESIOLOGY

## 2023-01-09 PROCEDURE — C1787 PATIENT PROGR, NEUROSTIM: HCPCS | Performed by: ANESTHESIOLOGY

## 2023-01-09 PROCEDURE — 36000706: Performed by: ANESTHESIOLOGY

## 2023-01-09 PROCEDURE — 37000008 HC ANESTHESIA 1ST 15 MINUTES: Performed by: ANESTHESIOLOGY

## 2023-01-09 PROCEDURE — C1820 GENERATOR NEURO RECHG BAT SY: HCPCS | Performed by: ANESTHESIOLOGY

## 2023-01-09 PROCEDURE — 63600175 PHARM REV CODE 636 W HCPCS: Performed by: ANESTHESIOLOGY

## 2023-01-09 PROCEDURE — 63600175 PHARM REV CODE 636 W HCPCS: Performed by: STUDENT IN AN ORGANIZED HEALTH CARE EDUCATION/TRAINING PROGRAM

## 2023-01-09 PROCEDURE — 25000003 PHARM REV CODE 250: Performed by: STUDENT IN AN ORGANIZED HEALTH CARE EDUCATION/TRAINING PROGRAM

## 2023-01-09 PROCEDURE — 63685 PR IMPLANT SPINAL NEUROSTIM/RECEIVER: ICD-10-PCS | Mod: 59,,, | Performed by: ANESTHESIOLOGY

## 2023-01-09 PROCEDURE — 27201423 OPTIME MED/SURG SUP & DEVICES STERILE SUPPLY: Performed by: ANESTHESIOLOGY

## 2023-01-09 PROCEDURE — 63650 PR PERCUT IMPLNT NEUROELECT,EPIDURAL: ICD-10-PCS | Mod: ,,, | Performed by: ANESTHESIOLOGY

## 2023-01-09 PROCEDURE — 37000009 HC ANESTHESIA EA ADD 15 MINS: Performed by: ANESTHESIOLOGY

## 2023-01-09 PROCEDURE — C1713 ANCHOR/SCREW BN/BN,TIS/BN: HCPCS | Performed by: ANESTHESIOLOGY

## 2023-01-09 DEVICE — LEAD KIT 8 CONTACT LINEAR 50CM: Type: IMPLANTABLE DEVICE | Site: BACK | Status: FUNCTIONAL

## 2023-01-09 RX ORDER — HYDROMORPHONE HYDROCHLORIDE 2 MG/ML
0.4 INJECTION, SOLUTION INTRAMUSCULAR; INTRAVENOUS; SUBCUTANEOUS EVERY 5 MIN PRN
Status: CANCELLED | OUTPATIENT
Start: 2023-01-09

## 2023-01-09 RX ORDER — SODIUM CHLORIDE, SODIUM LACTATE, POTASSIUM CHLORIDE, CALCIUM CHLORIDE 600; 310; 30; 20 MG/100ML; MG/100ML; MG/100ML; MG/100ML
INJECTION, SOLUTION INTRAVENOUS CONTINUOUS
Status: DISCONTINUED | OUTPATIENT
Start: 2023-01-09 | End: 2023-01-09 | Stop reason: HOSPADM

## 2023-01-09 RX ORDER — LIDOCAINE HYDROCHLORIDE 10 MG/ML
0.5 INJECTION, SOLUTION EPIDURAL; INFILTRATION; INTRACAUDAL; PERINEURAL ONCE
Status: DISCONTINUED | OUTPATIENT
Start: 2023-01-09 | End: 2023-01-09 | Stop reason: HOSPADM

## 2023-01-09 RX ORDER — BACITRACIN ZINC 500 UNIT/G
OINTMENT (GRAM) TOPICAL
Status: DISCONTINUED | OUTPATIENT
Start: 2023-01-09 | End: 2023-01-09 | Stop reason: HOSPADM

## 2023-01-09 RX ORDER — ACETAMINOPHEN 500 MG
1000 TABLET ORAL
Status: COMPLETED | OUTPATIENT
Start: 2023-01-09 | End: 2023-01-09

## 2023-01-09 RX ORDER — OXYCODONE HYDROCHLORIDE 5 MG/1
5 TABLET ORAL
Status: CANCELLED | OUTPATIENT
Start: 2023-01-09

## 2023-01-09 RX ORDER — SODIUM CHLORIDE 9 MG/ML
500 INJECTION, SOLUTION INTRAVENOUS CONTINUOUS
Status: DISCONTINUED | OUTPATIENT
Start: 2023-01-09 | End: 2023-01-09 | Stop reason: HOSPADM

## 2023-01-09 RX ORDER — FENTANYL CITRATE 50 UG/ML
INJECTION, SOLUTION INTRAMUSCULAR; INTRAVENOUS
Status: DISCONTINUED | OUTPATIENT
Start: 2023-01-09 | End: 2023-01-09

## 2023-01-09 RX ORDER — SODIUM CHLORIDE 0.9 % (FLUSH) 0.9 %
3 SYRINGE (ML) INJECTION
Status: CANCELLED | OUTPATIENT
Start: 2023-01-09

## 2023-01-09 RX ORDER — VANCOMYCIN HYDROCHLORIDE 1 G/20ML
INJECTION, POWDER, LYOPHILIZED, FOR SOLUTION INTRAVENOUS
Status: DISCONTINUED | OUTPATIENT
Start: 2023-01-09 | End: 2023-01-09 | Stop reason: HOSPADM

## 2023-01-09 RX ORDER — KETAMINE HCL IN 0.9 % NACL 50 MG/5 ML
SYRINGE (ML) INTRAVENOUS
Status: DISCONTINUED | OUTPATIENT
Start: 2023-01-09 | End: 2023-01-09

## 2023-01-09 RX ORDER — ONDANSETRON 2 MG/ML
4 INJECTION INTRAMUSCULAR; INTRAVENOUS DAILY PRN
Status: CANCELLED | OUTPATIENT
Start: 2023-01-09

## 2023-01-09 RX ORDER — SULFAMETHOXAZOLE AND TRIMETHOPRIM 400; 80 MG/1; MG/1
1 TABLET ORAL 2 TIMES DAILY
Qty: 14 TABLET | Refills: 0 | Status: SHIPPED | OUTPATIENT
Start: 2023-01-09 | End: 2023-01-16

## 2023-01-09 RX ORDER — MEPERIDINE HYDROCHLORIDE 25 MG/ML
12.5 INJECTION INTRAMUSCULAR; INTRAVENOUS; SUBCUTANEOUS ONCE AS NEEDED
Status: CANCELLED | OUTPATIENT
Start: 2023-01-09 | End: 2023-01-10

## 2023-01-09 RX ORDER — DEXMEDETOMIDINE HYDROCHLORIDE 100 UG/ML
INJECTION, SOLUTION INTRAVENOUS
Status: DISCONTINUED | OUTPATIENT
Start: 2023-01-09 | End: 2023-01-09

## 2023-01-09 RX ORDER — BUPIVACAINE HYDROCHLORIDE AND EPINEPHRINE 2.5; 5 MG/ML; UG/ML
INJECTION, SOLUTION EPIDURAL; INFILTRATION; INTRACAUDAL; PERINEURAL
Status: DISCONTINUED | OUTPATIENT
Start: 2023-01-09 | End: 2023-01-09 | Stop reason: HOSPADM

## 2023-01-09 RX ORDER — MIDAZOLAM HYDROCHLORIDE 1 MG/ML
INJECTION INTRAMUSCULAR; INTRAVENOUS
Status: DISCONTINUED | OUTPATIENT
Start: 2023-01-09 | End: 2023-01-09

## 2023-01-09 RX ORDER — HYDROCODONE BITARTRATE AND ACETAMINOPHEN 5; 325 MG/1; MG/1
1 TABLET ORAL EVERY 8 HOURS PRN
Qty: 21 TABLET | Refills: 0 | Status: SHIPPED | OUTPATIENT
Start: 2023-01-09 | End: 2023-01-16

## 2023-01-09 RX ORDER — SODIUM CHLORIDE 9 MG/ML
INJECTION, SOLUTION INTRAVENOUS CONTINUOUS
Status: DISCONTINUED | OUTPATIENT
Start: 2023-01-09 | End: 2023-01-09 | Stop reason: HOSPADM

## 2023-01-09 RX ORDER — LIDOCAINE HYDROCHLORIDE 10 MG/ML
INJECTION, SOLUTION EPIDURAL; INFILTRATION; INTRACAUDAL; PERINEURAL
Status: DISCONTINUED | OUTPATIENT
Start: 2023-01-09 | End: 2023-01-09 | Stop reason: HOSPADM

## 2023-01-09 RX ADMIN — FENTANYL CITRATE 50 MCG: 50 INJECTION, SOLUTION INTRAMUSCULAR; INTRAVENOUS at 01:01

## 2023-01-09 RX ADMIN — Medication 20 MG: at 01:01

## 2023-01-09 RX ADMIN — MIDAZOLAM HYDROCHLORIDE 1 MG: 1 INJECTION, SOLUTION INTRAMUSCULAR; INTRAVENOUS at 02:01

## 2023-01-09 RX ADMIN — ACETAMINOPHEN 1000 MG: 500 TABLET, FILM COATED ORAL at 11:01

## 2023-01-09 RX ADMIN — DEXMEDETOMIDINE HYDROCHLORIDE 10 MCG: 100 INJECTION, SOLUTION, CONCENTRATE INTRAVENOUS at 01:01

## 2023-01-09 RX ADMIN — Medication 20 MG: at 02:01

## 2023-01-09 RX ADMIN — SODIUM CHLORIDE, SODIUM LACTATE, POTASSIUM CHLORIDE, AND CALCIUM CHLORIDE: .6; .31; .03; .02 INJECTION, SOLUTION INTRAVENOUS at 01:01

## 2023-01-09 RX ADMIN — Medication 10 MG: at 01:01

## 2023-01-09 RX ADMIN — GLYCOPYRROLATE 0.4 MG: 0.2 INJECTION, SOLUTION INTRAMUSCULAR; INTRAVITREAL at 01:01

## 2023-01-09 RX ADMIN — CEFAZOLIN 2 G: 2 INJECTION, POWDER, FOR SOLUTION INTRAMUSCULAR; INTRAVENOUS at 01:01

## 2023-01-09 RX ADMIN — FENTANYL CITRATE 50 MCG: 50 INJECTION, SOLUTION INTRAMUSCULAR; INTRAVENOUS at 02:01

## 2023-01-09 RX ADMIN — MIDAZOLAM HYDROCHLORIDE 2 MG: 1 INJECTION, SOLUTION INTRAMUSCULAR; INTRAVENOUS at 02:01

## 2023-01-09 RX ADMIN — MIDAZOLAM HYDROCHLORIDE 1 MG: 1 INJECTION, SOLUTION INTRAMUSCULAR; INTRAVENOUS at 01:01

## 2023-01-09 RX ADMIN — FENTANYL CITRATE 100 MCG: 50 INJECTION, SOLUTION INTRAMUSCULAR; INTRAVENOUS at 01:01

## 2023-01-09 NOTE — OP NOTE
Spinal cord stimulator implant     Preoperative diagnosis: Chronic pain syndrome [G89.4]  Lumbosacral spondylosis without myelopathy [M47.817]     Postoperative diagnosis: Chronic pain syndrome [G89.4]  Lumbosacral spondylosis without myelopathy [M47.817]     Procedure: 1) placement of Octad electrode x2   2) placement of pulse generator 3) intraoperative and post operative programming simple     Surgeon: Shoaib Aguirre MD    Assistant: Bernard Guevara DO    EBL: Minimal     Complications: None     Specimens: None     Anesthesia: MAC    Antibiotic: 2 Gm Ancefv IV    Description of procedure:     After written consent was obtained the patient was brought into the OR and placed in the prone position with all pressure points padded appropriately. The area overlying the skin of the back was prepped and draped in usual sterile fashion using chlorhexidine with an Ioban dressing over the skin. A time out was performed and there was confirmation of preoperative antibiotics.     Fluoroscopy was used to identify the T12/L1 intralaminar space this area was marked and an approximately 6 cm incision was planned and this area. The tract was anesthetized at the level of the skin and deeper tissues leading to the prevertebral fascia with 20 mL of a equal mixture of 0.25% bupivacaine   +1% lidocaine with 1:200,000 epinephrine through a 25-gauge needle. This was followed with the skin incision with a 10 blade scalpel, followed by sharp and blunt dissection to the prevertebral fascia using cautery for hemostasis.  A modified Touhy needle was then advanced under fluoroscopy and walked off of the lamina at L1 on each side followed by loss of resistance to air to enter the epidural space. Once the epidural space was entered the octad electrode was advanced under live fluoroscopy in the AP and lateral view to the T7 level  in the posterior aspect of the epidural space. The electrodes were then connected with the  and  intraoperative programming was performed to confirm that the electrodes were placed appropriately. After there was confirmation of the appropriate placement the modified Touhy needles were withdrawn and the stylette were removed from the electrodes. The electrodes were then anchored into place using an anchor provided by the stimulator company and the anchor was secured to the prevertebral fascia using fixate suture. A pulse generator pocket was created on the left side superior to the iliac crest. An approximately 6 cm incision was planned the area overlying the skin was anesthetized with an additional 10 mL of local anesthetic to a 25 gauge needle. This was followed by incising the skin with a 10blade scalpel and sharp and blunt dissection to create a pocket approximately the size of the pulse generator for the spinal cord stimulator system approximately 1 inch deep to the skin. Electrocautery was used for hemostasis. The tunneler provided by the company was used to create a tunnel between the midline incision and a pulse generator pocket the electrodes were then threaded through with a stress relief loop being maintained in the midline incision. The electrodes were connected to the battery and the battery was tested so that the entire system was confirmed to be working appropriately. Both sites were irrigated with bacitracin solution and there was care to confirm hemostasis. Both incisions were then closed with interrupted 2-0 Vicryl followed by 4-0 Monocryl at the skin.  This was followed by bacitracin ointment being placed over the staples after wet and dry cleaning. Dressing was placed over the skin and an abdominal binder was placed around the patient.     The patient was taken to recovery in stable condition and the stimulator was programmed postoperatively to capture all the usual painful areas.     The patient was discharged from the hospital in stable condition.      DO CHARO Morales have reviewed  the notes, assessments, and/or procedures performed by fellow, I concur with her/his documentation of Tiarra Norton.    Shoaib Aguirre MD

## 2023-01-09 NOTE — PLAN OF CARE
Tiarra Bernardo Norton has met all discharge criteria from Phase II. Vital Signs are stable, ambulating  without difficulty. Discharge instructions given, patient verbalized understanding. Discharged from facility via wheelchair in stable condition.

## 2023-01-09 NOTE — DISCHARGE SUMMARY
Discharge Note  Short Stay      SUMMARY     Admit Date: 1/9/2023    Attending Physician: Bernard Guevara      Discharge Physician: Bernard Guevara      Discharge Date: 1/9/2023 3:38 PM    Procedure(s) (LRB):  SPINAL CORD STIMULATOR IMPLANT [place] [facility] (N/A)    Final Diagnosis: Chronic pain syndrome [G89.4]  Lumbosacral spondylosis without myelopathy [M47.817]    Disposition: Home or self care    Patient Instructions:   Current Discharge Medication List        START taking these medications    Details   !! HYDROcodone-acetaminophen (NORCO) 5-325 mg per tablet Take 1 tablet by mouth every 8 (eight) hours as needed for Pain.  Qty: 21 tablet, Refills: 0    Comments: Quantity prescribed more than 7 day supply? No      sulfamethoxazole-trimethoprim 400-80mg (BACTRIM,SEPTRA) 400-80 mg per tablet Take 1 tablet by mouth 2 (two) times daily. for 7 days  Qty: 14 tablet, Refills: 0       !! - Potential duplicate medications found. Please discuss with provider.        CONTINUE these medications which have NOT CHANGED    Details   alprazolam (XANAX) 2 MG Tab Take 1 mg by mouth 3 (three) times daily as needed.  Refills: 0      amLODIPine (NORVASC) 5 MG tablet Take 1 tablet (5 mg total) by mouth once daily.  Qty: 90 tablet, Refills: 3    Comments: .      atorvastatin (LIPITOR) 10 MG tablet TAKE 1 TABLET EVERY DAY  Qty: 90 tablet, Refills: 3      b complex vitamins capsule Take 1 capsule by mouth once daily.      cholecalciferol, vitamin D3, (VITAMIN D3) 5,000 unit Tab Take 1 tablet (5,000 Units total) by mouth once daily.      conjugated estrogens (PREMARIN) vaginal cream Place 0.5 g vaginally twice a week. Place a pea-sized amount in vagina every night for 4 weeks, then use 2-3 nights a week  Qty: 1 applicator, Refills: 1      EScitalopram oxalate (LEXAPRO) 5 MG Tab Take 5 mg by mouth.      !! HYDROcodone-acetaminophen (NORCO) 5-325 mg per tablet Take 1 tablet by mouth every 24 hours as needed for Pain.  Qty: 30 tablet,  Refills: 0    Comments: Greater than 7 day supply is medically necessary.  Associated Diagnoses: DDD (degenerative disc disease), lumbar; Chronic, continuous use of opioids      hydrOXYzine pamoate (VISTARIL) 50 MG Cap Take 50 mg by mouth nightly as needed.      levothyroxine (SYNTHROID) 125 MCG tablet TAKE 1 TABLET BY MOUTH EVERY MORNING  Qty: 90 tablet, Refills: 2      liothyronine (CYTOMEL) 5 MCG Tab Take 1 tablet (5 mcg total) by mouth once daily.  Qty: 90 tablet, Refills: 3      pantoprazole (PROTONIX) 40 MG tablet Take 1 tablet (40 mg total) by mouth once daily.  Qty: 90 tablet, Refills: 2    Comments: Please inactivate all prior scripts with same name and strength including on holds.      promethazine (PHENERGAN) 12.5 MG Tab Take 1 tablet (12.5 mg total) by mouth every 6 (six) hours as needed (nausea).  Qty: 30 tablet, Refills: 2      RESTASIS 0.05 % ophthalmic emulsion INT 1 GTT IN OU BID      sumatriptan (IMITREX) 50 MG tablet 1 tab po at start of headache.  Repeat in 2 h prn  Qty: 27 tablet, Refills: 3      traZODone (DESYREL) 100 MG tablet 1-2 tabs po q hs for sleep  Qty: 180 tablet, Refills: 3      valsartan (DIOVAN) 320 MG tablet TAKE 1 TABLET ONE TIME DAILY (STOP LISINOPRIL)  Qty: 90 tablet, Refills: 3       !! - Potential duplicate medications found. Please discuss with provider.              Discharge Diagnosis: Chronic pain syndrome [G89.4]  Lumbosacral spondylosis without myelopathy [M47.817]  Condition on Discharge: Stable with no complications to procedure   Diet on Discharge: Same as before.  Activity: as per instruction sheet.  Discharge to: Home with a responsible adult.  Follow up: 2-4 weeks

## 2023-01-09 NOTE — PATIENT INSTRUCTIONS
Please call for excessive pain, fever, chills, bleeding, weakness, or any other concern. No heavy lifting and no shower until follow up appointment.

## 2023-01-09 NOTE — INTERVAL H&P NOTE
The patient has been examined and the H&P has been reviewed:    I concur with the findings and no changes have occurred since H&P was written.    Procedure risks, benefits and alternative options discussed and understood by patient/family.          There are no hospital problems to display for this patient.     eyeglasses

## 2023-01-09 NOTE — ANESTHESIA POSTPROCEDURE EVALUATION
Anesthesia Post Evaluation    Patient: Tiarra Norton    Procedure(s) Performed: Procedure(s) (LRB):  SPINAL CORD STIMULATOR IMPLANT [facility] (N/A)    Final Anesthesia Type: MAC      Patient location during evaluation: [facility]  Patient participation: Yes- Able to Participate  Level of consciousness: awake and alert  Post-procedure vital signs: reviewed and stable  Pain management: adequate  Airway patency: patent    PONV status at discharge: No PONV  Anesthetic complications: no      Cardiovascular status: blood pressure returned to baseline  Respiratory status: unassisted and spontaneous ventilation  Hydration status: euvolemic  Follow-up not needed.          Vitals Value Taken Time   /80 01/09/23 1527   Temp na 01/09/23 1527   Pulse 67 01/09/23 1527   Resp 18 01/09/23 1527   SpO2 100 01/09/23 1527         No case tracking events are documented in the log.      Pain/Arianne Score: Pain Rating Prior to Med Admin: 6 (preop) (1/9/2023 11:29 AM)

## 2023-01-10 VITALS
DIASTOLIC BLOOD PRESSURE: 90 MMHG | OXYGEN SATURATION: 98 % | RESPIRATION RATE: 16 BRPM | SYSTOLIC BLOOD PRESSURE: 172 MMHG | HEART RATE: 58 BPM | TEMPERATURE: 98 F | WEIGHT: 139 LBS | BODY MASS INDEX: 24.62 KG/M2

## 2023-01-11 ENCOUNTER — PATIENT MESSAGE (OUTPATIENT)
Dept: PAIN MEDICINE | Facility: CLINIC | Age: 68
End: 2023-01-11
Payer: MEDICARE

## 2023-01-13 ENCOUNTER — TELEPHONE (OUTPATIENT)
Dept: PAIN MEDICINE | Facility: CLINIC | Age: 68
End: 2023-01-13
Payer: MEDICARE

## 2023-01-13 NOTE — TELEPHONE ENCOUNTER
This message is for patient in regards to his/her appointment 01/17/23 at 1:40 pm   With Aziza Griggs.      [facility] Policy: For the safety of all patients and staff members.   During this visit we're informing all patients and visitors to wear face mask at all time here at [facility].  If you have any questions or concerns please contact [phone]

## 2023-01-17 ENCOUNTER — DOCUMENTATION ONLY (OUTPATIENT)
Dept: RESEARCH | Facility: OTHER | Age: 68
End: 2023-01-17
Payer: MEDICARE

## 2023-01-17 ENCOUNTER — OFFICE VISIT (OUTPATIENT)
Dept: PAIN MEDICINE | Facility: CLINIC | Age: 68
End: 2023-01-17
Payer: MEDICARE

## 2023-01-17 VITALS
HEIGHT: 63 IN | WEIGHT: 138.88 LBS | SYSTOLIC BLOOD PRESSURE: 123 MMHG | DIASTOLIC BLOOD PRESSURE: 71 MMHG | TEMPERATURE: 99 F | HEART RATE: 74 BPM | RESPIRATION RATE: 18 BRPM | BODY MASS INDEX: 24.61 KG/M2

## 2023-01-17 DIAGNOSIS — M48.061 SPINAL STENOSIS OF LUMBAR REGION WITHOUT NEUROGENIC CLAUDICATION: ICD-10-CM

## 2023-01-17 DIAGNOSIS — G89.4 CHRONIC PAIN SYNDROME: Primary | ICD-10-CM

## 2023-01-17 DIAGNOSIS — M47.817 SPONDYLOSIS OF LUMBOSACRAL REGION WITHOUT MYELOPATHY OR RADICULOPATHY: ICD-10-CM

## 2023-01-17 DIAGNOSIS — M51.36 DDD (DEGENERATIVE DISC DISEASE), LUMBAR: ICD-10-CM

## 2023-01-17 PROCEDURE — 99024 PR POST-OP FOLLOW-UP VISIT: ICD-10-PCS | Mod: POP,,, | Performed by: NURSE PRACTITIONER

## 2023-01-17 PROCEDURE — 99999 PR PBB SHADOW E&M-EST. PATIENT-LVL IV: CPT | Mod: PBBFAC,,, | Performed by: NURSE PRACTITIONER

## 2023-01-17 PROCEDURE — 99214 OFFICE O/P EST MOD 30 MIN: CPT | Mod: PBBFAC | Performed by: NURSE PRACTITIONER

## 2023-01-17 PROCEDURE — 99999 PR PBB SHADOW E&M-EST. PATIENT-LVL IV: ICD-10-PCS | Mod: PBBFAC,,, | Performed by: NURSE PRACTITIONER

## 2023-01-17 PROCEDURE — 99024 POSTOP FOLLOW-UP VISIT: CPT | Mod: POP,,, | Performed by: NURSE PRACTITIONER

## 2023-01-17 NOTE — PROGRESS NOTES
Chronic Pain-Established Visit         SUBJECTIVE:    PCP: Kaykay Perales MD    REFERRING PHYSICIAN: Tyler Jacobs MD    CHIEF COMPLAINT: Lower back pain       Original HISTORY OF PRESENT ILLNESS: Tiarra Norton presents to the clinic for the evaluation of the above pain. The pain started five years ago.     Original Pain Description:  The pain is located in the lower back and does radiate up towards her upper back.The pain is described as aching, dull and sharp. Initially starts as a dull, aching pain and it gets sharp once she bends down and moves around. Typically when she walks for more than five minutes, the sharp pain radiates up her back. Exacerbating factors: Standing, Bending, Touching, Walking, Night Time, Flexing and Lifting. Mitigating factors heat, ice, laying down and massage. Symptoms interfere with daily activity and sleeping. The patient feels like symptoms have been worsening. Patient denies night fever/night sweats, urinary incontinence, bowel incontinence, significant weight loss, significant motor weakness and loss of sensations.    Original PAIN SCORES:  Best: Pain is 1  Worst: Pain is 10  Usually: Pain is 4  Current: Pain is 4    INTERVAL HISTORY:  4/18/22 Interval History: Patient presents for telehealth visit for follow up following her b/l RFA at L3-L5. She reports 80% relief and is very pleased with her pain relief. Unfortunately, she is not back to doing what she wants due to right knee pain. She is seeing Dr. Huber for knee pain and is s/p right medial compartment partial knee replacement. She is currently in therapy for this. We discussed that we may be able to assist her with her knee pain as well.     Interval History 6/15/22: Tiarra Norton returns for follow up. She continues to feel like she has mild pain sitting or laying down, and has her worst pain with extreme leaning forward to pick something up off the floor. She also has difficulty leaning over her handlebars  to ride her bike or leaning forward to fold clothes or standing up for any period of time. So, we determined that leaning forward is her worst issue. We did previouisly do lmbb and rfa, which has helped with extension, but it would not help with leaning forward. On review of her MRI she has significant multilevel DDD with Modic changes which likely account for her pain.     Interval History 7/25/22: Tiarra Norton returns for follow up. We previously have been discussing treatments for her low back pain that did not resolve with RFA. At our last visit I referred her to Dr. Antony for clearance for a SCS trial. She was considered to be a reasonable candidate. However, in the meantime, her PT referred her to acupuncture with Dr. Jacobs. She had one treatment and already notes 50% relief. She notes that she still has pain, especially with leaning forward, but feels that it is much better controlled. Their plan is one treatment per week but is approved for 20 sessions.     Interval History 8/22/22: Ms. Norton returns for follow up on her low back pain. At our last visit she felt cured by acupuncture. Unfortunately, this only lasted for 24 hours. She returns today looking for other options to make life livable. Patient claims all of her pain in the low back and middle back. We discussed that this will work best for her low back pain.     Interval History 9/21/2022:  The patient returns to clinic today for follow up of back pain via virtual visit. She received a letter from [facility] denying SCS trial. She is frustrated by this. She continues to report low back pain. She denies any radicular leg pain. Her pain is worse with bending and activity. She recently underwent med screening for the [employer] program. She is interested in pursuing this. She has tried Gabapentin and Lyrica in the past with side effects. She denies any other health changes. Her pain today is 7/10.     Interval History 10/14/22: Mrs. Norton  presents today for follow up of chronic low back pain and to discuss the [place] trial. She had her lumbar MRI done last night without significant changes to her spine. She did have increased dilation of her bile duct. Her pain today is the same in character and severity as during her last visit with us and she rates it currently at a 7/10. She continues to do her stretches on her own which have helped some. She presents with some questions regarding the trial.     Interval History 12/29/2022:  The patient returns for post op appointment. She is s/p lumbar [employer] SCS trial with 90% relief. She has had very minimal back pain during the trial period. She says that she was more active over this time due to the Christmas holiday and had minimal symptoms. She is very happy with the relief. She would like to move forward with implant. She is part of Cortez study. No fever or malaise during trial period. Her pain today is 1/10.    Interval History 1/17/2023:  The patient returns to clinic today for post op. She is s/p [employer] SCS implant on 1/9/2022. She reports benefit with the device at this time. She is meeting with the representatives today. She does report soreness to her incisions. She denies any fever, chills, or drainage. She did have issues with her dressing staying on. She has completed her antibiotics. She denies any other health changes. Her pain today is 2/10.    6 weeks of Conservative therapy (PT/Chiro/Home Exercises with Dates)  Health[facility] back program--> has four sessions left for a total of 20 sessions   Last session was on 1/31/22   Stretches that they taught at health back gives her relief       Medications:   None currently  Ice and heat which has helped   Tried Gabapentin and Lyrica- made her loopy      Report: reviewed       Interventional Pain Procedures:   2/8/2022- Bilateral L3,4,5 MBB  2/15/2022- Bilateral L3,4,5 MBB  3/8/2022- Right L3,4,5 RFA- 80% relief for a few months  3/22/2022- Left  L3,4,5 RFA- 80% relief for a few months  12/22/22 SCS trial- 90% relief  1/9/2023- [employer] SCS implant.     Imaging:  10/13/22: MRI LUMBAR SPINE WITHOUT CONTRAST     CLINICAL HISTORY:  Low back pain, symptoms persist with > 6wks conservative treatment; Dorsalgia, unspecified     TECHNIQUE:  Multiplanar, multisequence MR images were acquired from the thoracolumbar junction to the sacrum without the administration of contrast.     COMPARISON:  Lumbar spine MRI 01/21/2022; CT abdomen pelvis 08/20/2022     FINDINGS:  Lumbar levoscoliosis centered at L2.  Minimal anterolisthesis at L4-5.     No compression fractures.  No marrow replacing lesions.     Multilevel degenerative changes with disc desiccation and disc space narrowing, described in detail below.  Discogenic endplate edema at T12-L1.     The conus medullaris has a normal appearance and terminates at the L1 level.  Cauda equina nerve roots are unremarkable.  No epidural mass or collection.     The common duct is dilated up to 12 mm, previously 9 mm on 08/20/2022 CT.  Mild prominence of the main pancreatic duct up to 4 mm, which is new.  No definite filling defect in the bile ducts.  Paraspinal muscle atrophy.     SIGNIFICANT FINDINGS BY LEVEL:     T12-L1: Disc bulge.  Bilateral facet arthropathy and ligamentum flavum thickening.  Mild canal stenosis.  Mild bilateral foraminal stenosis.     L1-2: Disc bulge.  Bilateral facet arthropathy and ligamentum flavum thickening.  Mild canal stenosis.  Mild right foraminal stenosis.     L2-3: Disc bulge.  Bilateral facet arthropathy and ligamentum flavum thickening.  Mild canal stenosis.  Moderate right mild left foraminal stenosis.     L3-4: Disc bulge.  Bilateral facet arthropathy and ligamentum flavum thickening.  Small bilateral facet joint effusions/synovitis.  Moderate canal stenosis with partial effacement of the thecal sac and crowding of the cauda equina nerve roots.  Mild right and moderate left  foraminal stenosis.     L4-5: Disc bulge with posterior disc uncovering.  Bilateral facet arthropathy and ligamentum flavum thickening.  Small right facet joint effusion/synovitis.  Mild canal stenosis.  Mild bilateral foraminal stenosis.     L5-S1: Disc bulge.  Bilateral facet arthropathy and ligamentum flavum thickening.  No significant canal stenosis.  Mild left foraminal stenosis.     Impression:     Lumbar levoscoliosis and multilevel degenerative changes as detailed above.  No significant changes from 2022.     Increased biliary ductal dilatation up to 12 mm, greater than expected after cholecystectomy.  In addition, the main pancreatic duct is mildly prominent up to 4 mm, which is new.  If LFTs are abnormal, consider MRI/MRCP or ERCP for further evaluation.      Past Medical History:   Diagnosis Date    Cataract     Depression     Gestational diabetes     Hyperlipidemia     Hypertension     IBS (irritable bowel syndrome)     Thyroid disease      Past Surgical History:   Procedure Laterality Date    ADENOIDECTOMY      ARTHROSCOPIC CHONDROPLASTY OF KNEE JOINT Right 10/19/2021    Procedure: ARTHROSCOPY, KNEE, WITH CHONDROPLASTY;  Surgeon: Trevin Huber MD;  Location: [facility] OR;  Service: Orthopedics;  Laterality: Right;    ARTHROSCOPY OF KNEE Right 10/19/2021    Procedure: ARTHROSCOPY, KNEE-RIGHT;  Surgeon: Trevin Huber MD;  Location: [facility] OR;  Service: Orthopedics;  Laterality: Right;     SECTION      CHOLECYSTECTOMY      COLONOSCOPY N/A 2016    Procedure: COLONOSCOPY;  Surgeon: Heri Segura MD;  Location: [facility] (4TH FLR);  Service: Endoscopy;  Laterality: N/A;    COLONOSCOPY N/A 2019    Procedure: COLONOSCOPY;  Surgeon: Joe Pitts MD;  Location: [facility] ENDO (4TH FLR);  Service: Endoscopy;  Laterality: N/A;    CYSTOSCOPY  2022    Procedure: CYSTOSCOPY;  Surgeon: Lenny Moore MD;  Location: [facility] OR [facility] FLR;  Service: Urology;;     ESOPHAGOGASTRODUODENOSCOPY N/A 2/13/2020    Procedure: EGD (ESOPHAGOGASTRODUODENOSCOPY);  Surgeon: Tolu Rivas MD;  Location: [facility] (4TH FLR);  Service: Endoscopy;  Laterality: N/A;  Pt. moved to 9:15am arrival time.. Instructed to not have anything after midnight.EC    EYE SURGERY      cataract resection right    INJECTION OF ANESTHETIC AGENT AROUND NERVE Bilateral 2/8/2022    Procedure: Block, Nerve MEDIAL BRANCH BLOCK BILATERAL L3,4,5;  Surgeon: Tara Gibbs MD;  Location: [facility] [place] PAIN MGT;  Service: Pain Management;  Laterality: Bilateral;    INJECTION OF ANESTHETIC AGENT AROUND NERVE Bilateral 2/15/2022    Procedure: BLOCK, NERVE, L3*L4-L5 MEDIAL BR ANCH 2 OF 2;  Surgeon: Tara Gibbs MD;  Location: [facility] [place] PAIN MGT;  Service: Pain Management;  Laterality: Bilateral;    INJECTION OF BOTULINUM TOXIN TYPE A  6/21/2022    Procedure: BOTULINUM TOXIN INJECTION 100 units;  Surgeon: Lenny Moore MD;  Location: [facility] 1ST FLR;  Service: Urology;;    INSERTION, NEUROSTIMULATOR, SPINAL CORD N/A 1/9/2023    Procedure: SPINAL CORD STIMULATOR IMPLANT Deep-Secure;  Surgeon: Shoaib Aguirre MD;  Location: [facility];  Service: Pain Management;  Laterality: N/A;    JOINT REPLACEMENT      partial right knee    KNEE ARTHROSCOPY W/ MENISCECTOMY Right 10/19/2021    Procedure: ARTHROSCOPY, KNEE, WITH MENISCECTOMY, PARTIAL LATERAL;  Surgeon: Trevin Huber MD;  Location: [facility] OR;  Service: Orthopedics;  Laterality: Right;    r hand surgery      RADIOFREQUENCY ABLATION Right 3/8/2022    Procedure: RADIOFREQUENCY ABLATION, L3-L5 1 OF 2;  Surgeon: Tara Gibbs MD;  Location: [facility] [place] PAIN MGT;  Service: Pain Management;  Laterality: Right;    RADIOFREQUENCY ABLATION Left 3/22/2022    Procedure: RADIOFREQUENCY ABLATION, l3-+l5 2 of 2;  Surgeon: Tara Gibbs MD;  Location: [facility] [place] PAIN MGT;  Service: Pain Management;  Laterality: Left;    TONSILLECTOMY      TOTAL KNEE ARTHROPLASTY      partial knee  replacement    TRIAL OF SPINAL CORD NERVE STIMULATOR N/A 12/22/2022    Procedure: SPINAL CORD STIMULATOR TRIAL [facility] RESEARCH;  Surgeon: Shoaib Aguirre MD;  Location: [facility];  Service: Pain Management;  Laterality: N/A;    TUBAL LIGATION       Social History     Socioeconomic History    Marital status:     Number of children: 1   Occupational History    Occupation:      Employer: [employer]     Comment: retired   Tobacco Use    Smoking status: Never    Smokeless tobacco: Never   Substance and Sexual Activity    Alcohol use: Yes     Alcohol/week: 3.0 standard drinks     Types: 3 Glasses of wine per week     Comment: weekends    Drug use: No    Sexual activity: Yes     Partners: Male   Other Topics Concern    Are you pregnant or think you may be? No    Breast-feeding No   Social History Narrative    Retired        Swims.       Stationary bike.            Social Determinants of Health     Financial Resource Strain: Low Risk     Difficulty of Paying Living Expenses: Not hard at all   Food Insecurity: No Food Insecurity    Worried About Running Out of Food in the Last Year: Never true    Ran Out of Food in the Last Year: Never true   Transportation Needs: No Transportation Needs    Lack of Transportation (Medical): No    Lack of Transportation (Non-Medical): No   Physical Activity: Insufficiently Active    Days of Exercise per Week: 4 days    Minutes of Exercise per Session: 20 min   Stress: Stress Concern Present    Feeling of Stress : Very much   Social Connections: Unknown    Frequency of Communication with Friends and Family: Twice a week    Active Member of Clubs or Organizations: No    Attends Club or Organization Meetings: Never    Marital Status:    Housing Stability: Low Risk     Unable to Pay for Housing in the Last Year: No    Number of Places Lived in the Last Year: 1    Unstable Housing in the Last Year: No     Family History   Problem Relation Age of Onset     Hypertension Mother     Irritable bowel syndrome Mother     Hyperlipidemia Mother     Heart disease Father         mi    Hypertension Father     Cancer Father         prostate    Heart attack Father     Heart failure Father     Hyperlipidemia Father     Alcohol abuse Sister     Depression Sister     Epilepsy Son     Irritable bowel syndrome Sister     Alcohol abuse Sister     Ovarian cancer Maternal Aunt     Breast cancer Paternal Aunt     Breast cancer Maternal Aunt     Melanoma Neg Hx     Colon cancer Neg Hx     Stomach cancer Neg Hx     Esophageal cancer Neg Hx     Liver disease Neg Hx     Crohn's disease Neg Hx     Ulcerative colitis Neg Hx     Celiac disease Neg Hx     Stroke Neg Hx        Review of patient's allergies indicates:  No Known Allergies    Current Outpatient Medications   Medication Sig    alprazolam (XANAX) 2 MG Tab Take 1 mg by mouth 3 (three) times daily as needed.    amLODIPine (NORVASC) 5 MG tablet Take 1 tablet (5 mg total) by mouth once daily. (Patient taking differently: Take 5 mg by mouth once daily. am)    atorvastatin (LIPITOR) 10 MG tablet TAKE 1 TABLET EVERY DAY    b complex vitamins capsule Take 1 capsule by mouth once daily.    cholecalciferol, vitamin D3, (VITAMIN D3) 5,000 unit Tab Take 1 tablet (5,000 Units total) by mouth once daily.    conjugated estrogens (PREMARIN) vaginal cream Place 0.5 g vaginally twice a week. Place a pea-sized amount in vagina every night for 4 weeks, then use 2-3 nights a week    EScitalopram oxalate (LEXAPRO) 5 MG Tab Take 5 mg by mouth.    hydrOXYzine pamoate (VISTARIL) 50 MG Cap Take 50 mg by mouth nightly as needed.    levothyroxine (SYNTHROID) 125 MCG tablet TAKE 1 TABLET BY MOUTH EVERY MORNING    liothyronine (CYTOMEL) 5 MCG Tab Take 1 tablet (5 mcg total) by mouth once daily.    pantoprazole (PROTONIX) 40 MG tablet Take 1 tablet (40 mg total) by mouth once daily.    RESTASIS 0.05 % ophthalmic emulsion INT 1 GTT IN OU BID    sumatriptan (IMITREX)  "50 MG tablet 1 tab po at start of headache.  Repeat in 2 h prn    traZODone (DESYREL) 100 MG tablet 1-2 tabs po q hs for sleep    valsartan (DIOVAN) 320 MG tablet TAKE 1 TABLET ONE TIME DAILY (STOP LISINOPRIL)    HYDROcodone-acetaminophen (NORCO) 5-325 mg per tablet Take 1 tablet by mouth every 24 hours as needed for Pain. (Patient not taking: Reported on 2023)    promethazine (PHENERGAN) 12.5 MG Tab Take 1 tablet (12.5 mg total) by mouth every 6 (six) hours as needed (nausea). (Patient not taking: Reported on 2023)     No current facility-administered medications for this visit.     Facility-Administered Medications Ordered in Other Visits   Medication    0.9%  NaCl infusion       REVIEW OF SYSTEMS:    GENERAL: No fever. No chills. No fatigue. Denies weight loss. Denies weight gain.  HEENT: Denies headaches. Denies vision change. Denies eye pain. Denies double vision. Denies ear pain.   CV: Denies chest pain. HTN  PULM: Denies of shortness of breath.  GI: Denies constipation. No diarrhea. No abdominal pain. Denies nausea. Denies vomiting. No blood in stool.  HEME: Denies bleeding problems.  : Denies urgency. No painful urination. No blood in urine.  MS: See HPI  SKIN: Denies rash.   NEURO: Denies seizures. No weakness.  ENDO: Thyroid disorder.   PSYCH:  Depression    OBJECTIVE:     Vitals:    23 1355   BP: 123/71   Pulse: 74   Resp: 18   Temp: 99.1 °F (37.3 °C)   Weight: 63 kg (138 lb 14.2 oz)   Height: 5' 3" (1.6 m)   PainSc:   2   PainLoc: Back        PHYSICAL EXAM:   GENERAL: Well appearing, in no acute distress, alert and oriented x3.  PSYCH:  Mood and affect appropriate.  SKIN: Skin color, texture, turgor normal, no rashes or lesions. SCS site without drainage or signs of infection.  Area cleaned with alcohol and Neosporin applied.  GAIT: normal- ambulates without assistance.           ASSESSMENT: 67 y.o. year old female with low back pain, consistent with the followin. Chronic pain " syndrome        2. Spondylosis of lumbosacral region without myelopathy or radiculopathy        3. Spinal stenosis of lumbar region without neurogenic claudication        4. DDD (degenerative disc disease), lumbar              PLAN:     - She is s/p lumbar [employer] SCS implant. Incisions well approximated.     - She did meet with representatives today for programming.     - She may discontinue abdominal binder. Apply Bacitracin to incisions for 3 days. She may shower, no tub soaks.     - Notify clinic for any fever, chills, or drainage.     - Continue activity restrictions.     - RTC in 2 weeks for wound check.     The above plan and management options were discussed at length with patient. Patient is in agreement with the above and verbalized understanding.     Marlene Thorne NP  01/17/2023

## 2023-01-18 NOTE — PROGRESS NOTES
Study: [employer]- BRUNO Study: A Randomized Controlled Study to Evaluate the Safety and Effectiveness of [employer] Spinal Cord Stimulation (SCS) Systems in the Treatment of Chronic Low Back and/or Leg Pain with No Prior Surgeries  IRB/Protocol #: [id number]/  : Obed Gasca MD  Treating Physician: Shoaib Aguirre MD  Site Number: [id number]        Subject Number: G011     Unscheduled Visit:   The subject came to [facility] and met with Akila [place]cc[place]. There were no changes in concomitant medications,no protocol deviations or adverse events to report. Payment of $50 was added to subject's ClinCard.

## 2023-01-30 ENCOUNTER — PATIENT MESSAGE (OUTPATIENT)
Dept: OBSTETRICS AND GYNECOLOGY | Facility: CLINIC | Age: 68
End: 2023-01-30
Payer: MEDICARE

## 2023-01-30 DIAGNOSIS — Z12.31 ENCOUNTER FOR SCREENING MAMMOGRAM FOR MALIGNANT NEOPLASM OF BREAST: Primary | ICD-10-CM

## 2023-01-31 ENCOUNTER — OFFICE VISIT (OUTPATIENT)
Dept: PAIN MEDICINE | Facility: CLINIC | Age: 68
End: 2023-01-31
Payer: MEDICARE

## 2023-01-31 VITALS
TEMPERATURE: 98 F | RESPIRATION RATE: 19 BRPM | HEART RATE: 57 BPM | DIASTOLIC BLOOD PRESSURE: 72 MMHG | HEIGHT: 63 IN | BODY MASS INDEX: 24.61 KG/M2 | SYSTOLIC BLOOD PRESSURE: 136 MMHG | WEIGHT: 138.88 LBS

## 2023-01-31 DIAGNOSIS — M47.817 SPONDYLOSIS OF LUMBOSACRAL REGION WITHOUT MYELOPATHY OR RADICULOPATHY: ICD-10-CM

## 2023-01-31 DIAGNOSIS — G89.4 CHRONIC PAIN SYNDROME: Primary | ICD-10-CM

## 2023-01-31 PROCEDURE — 99999 PR PBB SHADOW E&M-EST. PATIENT-LVL IV: ICD-10-PCS | Mod: PBBFAC,,, | Performed by: NURSE PRACTITIONER

## 2023-01-31 PROCEDURE — 99213 PR OFFICE/OUTPT VISIT, EST, LEVL III, 20-29 MIN: ICD-10-PCS | Mod: S$PBB,,, | Performed by: NURSE PRACTITIONER

## 2023-01-31 PROCEDURE — 99213 OFFICE O/P EST LOW 20 MIN: CPT | Mod: S$PBB,,, | Performed by: NURSE PRACTITIONER

## 2023-01-31 PROCEDURE — 99999 PR PBB SHADOW E&M-EST. PATIENT-LVL IV: CPT | Mod: PBBFAC,,, | Performed by: NURSE PRACTITIONER

## 2023-01-31 PROCEDURE — 99214 OFFICE O/P EST MOD 30 MIN: CPT | Mod: PBBFAC | Performed by: NURSE PRACTITIONER

## 2023-01-31 NOTE — PROGRESS NOTES
Chronic Pain-Established Visit         SUBJECTIVE:    PCP: Kaykay Perales MD    REFERRING PHYSICIAN: Tyler Jacobs MD    CHIEF COMPLAINT: Lower back pain       Original HISTORY OF PRESENT ILLNESS: Tiarra Norton presents to the clinic for the evaluation of the above pain. The pain started five years ago.     Original Pain Description:  The pain is located in the lower back and does radiate up towards her upper back.The pain is described as aching, dull and sharp. Initially starts as a dull, aching pain and it gets sharp once she bends down and moves around. Typically when she walks for more than five minutes, the sharp pain radiates up her back. Exacerbating factors: Standing, Bending, Touching, Walking, Night Time, Flexing and Lifting. Mitigating factors heat, ice, laying down and massage. Symptoms interfere with daily activity and sleeping. The patient feels like symptoms have been worsening. Patient denies night fever/night sweats, urinary incontinence, bowel incontinence, significant weight loss, significant motor weakness and loss of sensations.    Original PAIN SCORES:  Best: Pain is 1  Worst: Pain is 10  Usually: Pain is 4  Current: Pain is 4    INTERVAL HISTORY:  4/18/22 Interval History: Patient presents for telehealth visit for follow up following her b/l RFA at L3-L5. She reports 80% relief and is very pleased with her pain relief. Unfortunately, she is not back to doing what she wants due to right knee pain. She is seeing Dr. Huber for knee pain and is s/p right medial compartment partial knee replacement. She is currently in therapy for this. We discussed that we may be able to assist her with her knee pain as well.     Interval History 6/15/22: Tiarra Norton returns for follow up. She continues to feel like she has mild pain sitting or laying down, and has her worst pain with extreme leaning forward to pick something up off the floor. She also has difficulty leaning over her handlebars  to ride her bike or leaning forward to fold clothes or standing up for any period of time. So, we determined that leaning forward is her worst issue. We did previouisly do lmbb and rfa, which has helped with extension, but it would not help with leaning forward. On review of her MRI she has significant multilevel DDD with Modic changes which likely account for her pain.     Interval History 7/25/22: Tiarra Norton returns for follow up. We previously have been discussing treatments for her low back pain that did not resolve with RFA. At our last visit I referred her to Dr. Antony for clearance for a SCS trial. She was considered to be a reasonable candidate. However, in the meantime, her PT referred her to acupuncture with Dr. Jacobs. She had one treatment and already notes 50% relief. She notes that she still has pain, especially with leaning forward, but feels that it is much better controlled. Their plan is one treatment per week but is approved for 20 sessions.     Interval History 8/22/22: Ms. Norton returns for follow up on her low back pain. At our last visit she felt cured by acupuncture. Unfortunately, this only lasted for 24 hours. She returns today looking for other options to make life livable. Patient claims all of her pain in the low back and middle back. We discussed that this will work best for her low back pain.     Interval History 9/21/2022:  The patient returns to clinic today for follow up of back pain via virtual visit. She received a letter from [facility] denying SCS trial. She is frustrated by this. She continues to report low back pain. She denies any radicular leg pain. Her pain is worse with bending and activity. She recently underwent med screening for the Functional [employer] program. She is interested in pursuing this. She has tried Gabapentin and Lyrica in the past with side effects. She denies any other health changes. Her pain today is 7/10.     Interval History 10/14/22: Mrs. Norton  presents today for follow up of chronic low back pain and to discuss the Cortez trial. She had her lumbar MRI done last night without significant changes to her spine. She did have increased dilation of her bile duct. Her pain today is the same in character and severity as during her last visit with us and she rates it currently at a 7/10. She continues to do her stretches on her own which have helped some. She presents with some questions regarding the trial.     Interval History 12/29/2022:  The patient returns for post op appointment. She is s/p lumbar [employer] SCS trial with 90% relief. She has had very minimal back pain during the trial period. She says that she was more active over this time due to the Christmas holiday and had minimal symptoms. She is very happy with the relief. She would like to move forward with implant. She is part of Cortez study. No fever or malaise during trial period. Her pain today is 1/10.    Interval History 1/17/2023:  The patient returns to clinic today for post op. She is s/p [employer] SCS implant on 1/9/2022. She reports benefit with the device at this time. She is meeting with the representatives today. She does report soreness to her incisions. She denies any fever, chills, or drainage. She did have issues with her dressing staying on. She has completed her antibiotics. She denies any other health changes. Her pain today is 2/10.    Interval History 1/31/2023:  The patient returns to clinic today for wound check. She reports benefit with [employer] SCS. She is in contact with representatives for programming. She reports intermittent muscle soreness into her legs. She does have an upcoming appointment with representatives for programming. She denies any fever, chills, or drainage. She continues to follow her activity restrictions. She denies any other health changes. Her pain today is 3/10.    6 weeks of Conservative therapy (PT/Chiro/Home Exercises with  Dates)  Healthy back program--> has four sessions left for a total of 20 sessions   Last session was on 1/31/22   Stretches that they taught at [employer] back gives her relief       Medications:   None currently  Ice and heat which has helped   Tried Gabapentin and Lyrica- made her loopy      Report: reviewed       Interventional Pain Procedures:   2/8/2022- Bilateral L3,4,5 MBB  2/15/2022- Bilateral L3,4,5 MBB  3/8/2022- Right L3,4,5 RFA- 80% relief for a few months  3/22/2022- Left L3,4,5 RFA- 80% relief for a few months  12/22/22 SCS trial- 90% relief  1/9/2023- [employer] SCS implant.     Imaging:  10/13/22: MRI LUMBAR SPINE WITHOUT CONTRAST     CLINICAL HISTORY:  Low back pain, symptoms persist with > 6wks conservative treatment; Dorsalgia, unspecified     TECHNIQUE:  Multiplanar, multisequence MR images were acquired from the thoracolumbar junction to the sacrum without the administration of contrast.     COMPARISON:  Lumbar spine MRI 01/21/2022; CT abdomen pelvis 08/20/2022     FINDINGS:  Lumbar levoscoliosis centered at L2.  Minimal anterolisthesis at L4-5.     No compression fractures.  No marrow replacing lesions.     Multilevel degenerative changes with disc desiccation and disc space narrowing, described in detail below.  Discogenic endplate edema at T12-L1.     The conus medullaris has a normal appearance and terminates at the L1 level.  Cauda equina nerve roots are unremarkable.  No epidural mass or collection.     The common duct is dilated up to 12 mm, previously 9 mm on 08/20/2022 CT.  Mild prominence of the main pancreatic duct up to 4 mm, which is new.  No definite filling defect in the bile ducts.  Paraspinal muscle atrophy.     SIGNIFICANT FINDINGS BY LEVEL:     T12-L1: Disc bulge.  Bilateral facet arthropathy and ligamentum flavum thickening.  Mild canal stenosis.  Mild bilateral foraminal stenosis.     L1-2: Disc bulge.  Bilateral facet arthropathy and ligamentum flavum thickening.   Mild canal stenosis.  Mild right foraminal stenosis.     L2-3: Disc bulge.  Bilateral facet arthropathy and ligamentum flavum thickening.  Mild canal stenosis.  Moderate right mild left foraminal stenosis.     L3-4: Disc bulge.  Bilateral facet arthropathy and ligamentum flavum thickening.  Small bilateral facet joint effusions/synovitis.  Moderate canal stenosis with partial effacement of the thecal sac and crowding of the cauda equina nerve roots.  Mild right and moderate left foraminal stenosis.     L4-5: Disc bulge with posterior disc uncovering.  Bilateral facet arthropathy and ligamentum flavum thickening.  Small right facet joint effusion/synovitis.  Mild canal stenosis.  Mild bilateral foraminal stenosis.     L5-S1: Disc bulge.  Bilateral facet arthropathy and ligamentum flavum thickening.  No significant canal stenosis.  Mild left foraminal stenosis.     Impression:     Lumbar levoscoliosis and multilevel degenerative changes as detailed above.  No significant changes from 2022.     Increased biliary ductal dilatation up to 12 mm, greater than expected after cholecystectomy.  In addition, the main pancreatic duct is mildly prominent up to 4 mm, which is new.  If LFTs are abnormal, consider MRI/MRCP or ERCP for further evaluation.      Past Medical History:   Diagnosis Date    Cataract     Depression     Gestational diabetes     Hyperlipidemia     Hypertension     IBS (irritable bowel syndrome)     Thyroid disease      Past Surgical History:   Procedure Laterality Date    ADENOIDECTOMY      ARTHROSCOPIC CHONDROPLASTY OF KNEE JOINT Right 10/19/2021    Procedure: ARTHROSCOPY, KNEE, WITH CHONDROPLASTY;  Surgeon: Trevin Huber MD;  Location: [facility] [place] OR;  Service: Orthopedics;  Laterality: Right;    ARTHROSCOPY OF KNEE Right 10/19/2021    Procedure: ARTHROSCOPY, KNEE-RIGHT;  Surgeon: Trevin Huber MD;  Location: [facility] [place] OR;  Service: Orthopedics;  Laterality: Right;     SECTION       CHOLECYSTECTOMY      COLONOSCOPY N/A 4/11/2016    Procedure: COLONOSCOPY;  Surgeon: Heri Segura MD;  Location: [facility] ENDO (4TH FLR);  Service: Endoscopy;  Laterality: N/A;    COLONOSCOPY N/A 12/6/2019    Procedure: COLONOSCOPY;  Surgeon: Joe Pitts MD;  Location: [facility] ENDO (4TH FLR);  Service: Endoscopy;  Laterality: N/A;    CYSTOSCOPY  6/21/2022    Procedure: CYSTOSCOPY;  Surgeon: Lenny Moore MD;  Location: [facility] 1ST FLR;  Service: Urology;;    ESOPHAGOGASTRODUODENOSCOPY N/A 2/13/2020    Procedure: EGD (ESOPHAGOGASTRODUODENOSCOPY);  Surgeon: Tolu Rivas MD;  Location: [facility] ENDO (4TH FLR);  Service: Endoscopy;  Laterality: N/A;  Pt. moved to 9:15am arrival time.. Instructed to not have anything after midnight.EC    EYE SURGERY      cataract resection right    INJECTION OF ANESTHETIC AGENT AROUND NERVE Bilateral 2/8/2022    Procedure: Block, Nerve MEDIAL BRANCH BLOCK BILATERAL L3,4,5;  Surgeon: Tara Gibbs MD;  Location: [facility] PAIN MGT;  Service: Pain Management;  Laterality: Bilateral;    INJECTION OF ANESTHETIC AGENT AROUND NERVE Bilateral 2/15/2022    Procedure: BLOCK, NERVE, L3*L4-L5 MEDIAL BR ANCH 2 OF 2;  Surgeon: Tara Gibbs MD;  Location: [facility] PAIN MGT;  Service: Pain Management;  Laterality: Bilateral;    INJECTION OF BOTULINUM TOXIN TYPE A  6/21/2022    Procedure: BOTULINUM TOXIN INJECTION 100 units;  Surgeon: Lenny Moore MD;  Location: [facility] 1ST FLR;  Service: Urology;;    INSERTION, NEUROSTIMULATOR, SPINAL CORD N/A 1/9/2023    Procedure: SPINAL CORD STIMULATOR IMPLANT First Class EV Conversions;  Surgeon: Shoaib Aguirre MD;  Location: [facility];  Service: Pain Management;  Laterality: N/A;    JOINT REPLACEMENT      partial right knee    KNEE ARTHROSCOPY W/ MENISCECTOMY Right 10/19/2021    Procedure: ARTHROSCOPY, KNEE, WITH MENISCECTOMY, PARTIAL LATERAL;  Surgeon: Trevin Huber MD;  Location: [facility];  Service: Orthopedics;  Laterality: Right;    r hand  surgery      RADIOFREQUENCY ABLATION Right 3/8/2022    Procedure: RADIOFREQUENCY ABLATION, L3-L5 1 OF 2;  Surgeon: Tara Gibbs MD;  Location: [facility] PAIN MGT;  Service: Pain Management;  Laterality: Right;    RADIOFREQUENCY ABLATION Left 3/22/2022    Procedure: RADIOFREQUENCY ABLATION, l3-+l5 2 of 2;  Surgeon: Tara Gibbs MD;  Location: [facility] PAIN MGT;  Service: Pain Management;  Laterality: Left;    TONSILLECTOMY      TOTAL KNEE ARTHROPLASTY      partial knee replacement    TRIAL OF SPINAL CORD NERVE STIMULATOR N/A 12/22/2022    Procedure: SPINAL CORD STIMULATOR TRIAL Code Rebel;  Surgeon: Shoaib Aguirre MD;  Location: [facility] PAIN MGT;  Service: Pain Management;  Laterality: N/A;    TUBAL LIGATION       Social History     Socioeconomic History    Marital status:     Number of children: 1   Occupational History    Occupation:      Employer: [employer]     Comment: retired   Tobacco Use    Smoking status: Never    Smokeless tobacco: Never   Substance and Sexual Activity    Alcohol use: Yes     Alcohol/week: 3.0 standard drinks     Types: 3 Glasses of wine per week     Comment: weekends    Drug use: No    Sexual activity: Yes     Partners: Male   Other Topics Concern    Are you pregnant or think you may be? No    Breast-feeding No   Social History Narrative    Retired        Swims.       Stationary bike.            Social Determinants of Health     Financial Resource Strain: Low Risk     Difficulty of Paying Living Expenses: Not hard at all   Food Insecurity: No Food Insecurity    Worried About Running Out of Food in the Last Year: Never true    Ran Out of Food in the Last Year: Never true   Transportation Needs: No Transportation Needs    Lack of Transportation (Medical): No    Lack of Transportation (Non-Medical): No   Physical Activity: Insufficiently Active    Days of Exercise per Week: 4 days    Minutes of Exercise per Session: 20 min   Stress: Stress Concern Present     Feeling of Stress : Very much   Social Connections: Unknown    Frequency of Communication with Friends and Family: Twice a week    Active Member of Clubs or Organizations: No    Attends Club or Organization Meetings: Never    Marital Status:    Housing Stability: Low Risk     Unable to Pay for Housing in the Last Year: No    Number of Places Lived in the Last Year: 1    Unstable Housing in the Last Year: No     Family History   Problem Relation Age of Onset    Hypertension Mother     Irritable bowel syndrome Mother     Hyperlipidemia Mother     Heart disease Father         mi    Hypertension Father     Cancer Father         prostate    Heart attack Father     Heart failure Father     Hyperlipidemia Father     Alcohol abuse Sister     Depression Sister     Epilepsy Son     Irritable bowel syndrome Sister     Alcohol abuse Sister     Ovarian cancer Maternal Aunt     Breast cancer Paternal Aunt     Breast cancer Maternal Aunt     Melanoma Neg Hx     Colon cancer Neg Hx     Stomach cancer Neg Hx     Esophageal cancer Neg Hx     Liver disease Neg Hx     Crohn's disease Neg Hx     Ulcerative colitis Neg Hx     Celiac disease Neg Hx     Stroke Neg Hx        Review of patient's allergies indicates:  No Known Allergies    Current Outpatient Medications   Medication Sig    alprazolam (XANAX) 2 MG Tab Take 1 mg by mouth 3 (three) times daily as needed.    amLODIPine (NORVASC) 5 MG tablet Take 1 tablet (5 mg total) by mouth once daily. (Patient taking differently: Take 5 mg by mouth once daily. am)    atorvastatin (LIPITOR) 10 MG tablet TAKE 1 TABLET EVERY DAY    b complex vitamins capsule Take 1 capsule by mouth once daily.    cholecalciferol, vitamin D3, (VITAMIN D3) 5,000 unit Tab Take 1 tablet (5,000 Units total) by mouth once daily.    conjugated estrogens (PREMARIN) vaginal cream Place 0.5 g vaginally twice a week. Place a pea-sized amount in vagina every night for 4 weeks, then use 2-3 nights a week     "EScitalopram oxalate (LEXAPRO) 5 MG Tab Take 5 mg by mouth.    HYDROcodone-acetaminophen (NORCO) 5-325 mg per tablet Take 1 tablet by mouth every 24 hours as needed for Pain.    hydrOXYzine pamoate (VISTARIL) 50 MG Cap Take 50 mg by mouth nightly as needed.    levothyroxine (SYNTHROID) 125 MCG tablet TAKE 1 TABLET BY MOUTH EVERY MORNING    liothyronine (CYTOMEL) 5 MCG Tab Take 1 tablet (5 mcg total) by mouth once daily.    pantoprazole (PROTONIX) 40 MG tablet Take 1 tablet (40 mg total) by mouth once daily.    promethazine (PHENERGAN) 12.5 MG Tab Take 1 tablet (12.5 mg total) by mouth every 6 (six) hours as needed (nausea).    RESTASIS 0.05 % ophthalmic emulsion INT 1 GTT IN OU BID    sumatriptan (IMITREX) 50 MG tablet 1 tab po at start of headache.  Repeat in 2 h prn    traZODone (DESYREL) 100 MG tablet 1-2 tabs po q hs for sleep    valsartan (DIOVAN) 320 MG tablet TAKE 1 TABLET ONE TIME DAILY (STOP LISINOPRIL)     No current facility-administered medications for this visit.     Facility-Administered Medications Ordered in Other Visits   Medication    0.9%  NaCl infusion       REVIEW OF SYSTEMS:    GENERAL: No fever. No chills. No fatigue. Denies weight loss. Denies weight gain.  HEENT: Denies headaches. Denies vision change. Denies eye pain. Denies double vision. Denies ear pain.   CV: Denies chest pain. HTN  PULM: Denies of shortness of breath.  GI: Denies constipation. No diarrhea. No abdominal pain. Denies nausea. Denies vomiting. No blood in stool.  HEME: Denies bleeding problems.  : Denies urgency. No painful urination. No blood in urine.  MS: See HPI  SKIN: Denies rash.   NEURO: Denies seizures. No weakness.  ENDO: Thyroid disorder.   PSYCH:  Depression    OBJECTIVE:     Vitals:    01/31/23 0957   BP: 136/72   Pulse: (!) 57   Resp: 19   Temp: 97.5 °F (36.4 °C)   TempSrc: Oral   Weight: 63 kg (138 lb 14.2 oz)   Height: 5' 3" (1.6 m)   PainSc:   3   PainLoc: Back        PHYSICAL EXAM:   GENERAL: Well " appearing, in no acute distress, alert and oriented x3.  PSYCH:  Mood and affect appropriate.  SKIN: Skin color, texture, turgor normal, no rashes or lesions. SCS site without drainage or signs of infection.  Suture trimmed to battery site.   GAIT: normal- ambulates without assistance.             ASSESSMENT: 67 y.o. year old female with low back pain, consistent with the followin. Chronic pain syndrome        2. Spondylosis of lumbosacral region without myelopathy or radiculopathy              PLAN:     - She is s/p lumbar [employer] SCS implant. Incisions well approximated.     - She is in contact with representatives for programming.     - Notify clinic for any fever, chills, or drainage.     - Continue activity restrictions.     - RTC in 3 weeks for wound check.     The above plan and management options were discussed at length with patient. Patient is in agreement with the above and verbalized understanding.     Marlene Thorne NP  2023

## 2023-02-03 ENCOUNTER — PATIENT MESSAGE (OUTPATIENT)
Dept: OBSTETRICS AND GYNECOLOGY | Facility: CLINIC | Age: 68
End: 2023-02-03
Payer: MEDICARE

## 2023-02-07 ENCOUNTER — PATIENT MESSAGE (OUTPATIENT)
Dept: ORTHOPEDICS | Facility: CLINIC | Age: 68
End: 2023-02-07
Payer: MEDICARE

## 2023-02-07 ENCOUNTER — PATIENT MESSAGE (OUTPATIENT)
Dept: INTERNAL MEDICINE | Facility: CLINIC | Age: 68
End: 2023-02-07
Payer: MEDICARE

## 2023-02-08 ENCOUNTER — TELEPHONE (OUTPATIENT)
Dept: GASTROENTEROLOGY | Facility: CLINIC | Age: 68
End: 2023-02-08
Payer: MEDICARE

## 2023-02-08 NOTE — PROGRESS NOTES
Study: [employer]- BRUNO Study: A Randomized Controlled Study to Evaluate the Safety and Effectiveness of [employer] Spinal Cord Stimulation (SCS) Systems in the Treatment of Chronic Low Back and/or Leg Pain with No Prior Surgeries  IRB/Protocol #: [id number]/  : Obed Gasca MD  Treating Physician: Shoaib Roche MD  Site Number: [id number]        Subject Number: [id number]     SCS Permanent Implant:  The subject came to [facility] and met with Dr. Shoaib Aguirre, Akila Baker ([facility]), Tristen Olsen and Angelica Gilman ([facility]) for SCS permanent implant.  There were no changes in concomitant medications,no protocol deviations or adverse events to report.     To end visit subject added to On[employer] and a payment of 100 dollars was added to the subject's ClinCard.

## 2023-02-08 NOTE — TELEPHONE ENCOUNTER
----- Message from Deb Whatley sent at 2/8/2023 10:25 AM CST -----  Contact: pt  Pt requesting call back RE: pt would like to know how to enter the clinical trial for IBS, please call with information      Confirmed contact below:  Contact Name:Tiarra Errol  Phone Number: [phone]

## 2023-02-09 ENCOUNTER — DOCUMENTATION ONLY (OUTPATIENT)
Dept: RESEARCH | Facility: OTHER | Age: 68
End: 2023-02-09

## 2023-02-09 ENCOUNTER — OFFICE VISIT (OUTPATIENT)
Dept: ORTHOPEDICS | Facility: CLINIC | Age: 68
End: 2023-02-09
Payer: MEDICARE

## 2023-02-09 ENCOUNTER — HOSPITAL ENCOUNTER (OUTPATIENT)
Dept: RADIOLOGY | Facility: HOSPITAL | Age: 68
Discharge: HOME | End: 2023-02-09
Attending: ORTHOPAEDIC SURGERY
Payer: MEDICARE

## 2023-02-09 ENCOUNTER — TELEPHONE (OUTPATIENT)
Dept: RESEARCH | Facility: OTHER | Age: 68
End: 2023-02-09

## 2023-02-09 VITALS — BODY MASS INDEX: 24.61 KG/M2 | WEIGHT: 138.88 LBS | HEIGHT: 63 IN

## 2023-02-09 DIAGNOSIS — M17.11 PRIMARY OSTEOARTHRITIS OF RIGHT KNEE: ICD-10-CM

## 2023-02-09 DIAGNOSIS — Z96.659 HISTORY OF PARTIAL KNEE REPLACEMENT: ICD-10-CM

## 2023-02-09 DIAGNOSIS — M25.561 RIGHT KNEE PAIN, UNSPECIFIED CHRONICITY: Primary | ICD-10-CM

## 2023-02-09 DIAGNOSIS — M25.561 RIGHT KNEE PAIN, UNSPECIFIED CHRONICITY: ICD-10-CM

## 2023-02-09 PROCEDURE — 99999 PR PBB SHADOW E&M-EST. PATIENT-LVL III: ICD-10-PCS | Mod: PBBFAC,,, | Performed by: ORTHOPAEDIC SURGERY

## 2023-02-09 PROCEDURE — 73562 X-RAY EXAM OF KNEE 3: CPT | Mod: TC,RT

## 2023-02-09 PROCEDURE — 73562 X-RAY EXAM OF KNEE 3: CPT | Mod: 26,RT,, | Performed by: RADIOLOGY

## 2023-02-09 PROCEDURE — 99213 OFFICE O/P EST LOW 20 MIN: CPT | Mod: PBBFAC | Performed by: ORTHOPAEDIC SURGERY

## 2023-02-09 PROCEDURE — 99999 PR PBB SHADOW E&M-EST. PATIENT-LVL III: CPT | Mod: PBBFAC,,, | Performed by: ORTHOPAEDIC SURGERY

## 2023-02-09 PROCEDURE — 99213 OFFICE O/P EST LOW 20 MIN: CPT | Mod: S$PBB,,, | Performed by: ORTHOPAEDIC SURGERY

## 2023-02-09 PROCEDURE — 99213 PR OFFICE/OUTPT VISIT, EST, LEVL III, 20-29 MIN: ICD-10-PCS | Mod: S$PBB,,, | Performed by: ORTHOPAEDIC SURGERY

## 2023-02-09 PROCEDURE — 73562 XR KNEE 3 VIEW RIGHT: ICD-10-PCS | Mod: 26,RT,, | Performed by: RADIOLOGY

## 2023-02-09 RX ORDER — METHYLPREDNISOLONE 4 MG/1
TABLET ORAL
Qty: 1 EACH | Refills: 0 | Status: SHIPPED | OUTPATIENT
Start: 2023-02-09 | End: 2023-02-27

## 2023-02-09 NOTE — TELEPHONE ENCOUNTER
"----- Message from Shoaib Aguirre MD sent at 2/9/2023 12:25 PM CST -----  Regarding: RE: New AE?  Chronic pain syndrome as a new diagnosis is not and AE.      ----- Message -----  From: Obed Gasca MD  Sent: 2/9/2023   9:05 AM CST  To: Nena Prajapati, Shoaib Aguirre MD  Subject: RE: New AE?                                      Dr DALLAS can help with that   ----- Message -----  From: Nena Prajapati  Sent: 2/9/2023   8:03 AM CST  To: Obed Gasca MD  Subject: New AE?                                          Dr. Gasca:    The monitor has a question regarding subject 011:    On 12/21/22 a new problem was added to problem list, is this considered an AE? Please advise.    "On 21Dec2022 - a new problem was added to EMR "Problems List"- Chronic Pain Syndrome. New AE?"      Respectfully,  Nena"

## 2023-02-09 NOTE — PROGRESS NOTES
Study: Wave Writer- BRUNO Study: A Randomized Controlled Study to Evaluate the Safety and Effectiveness of [employer] Spinal Cord Stimulation (SCS) Systems in the Treatment of Chronic Low Back and/or Leg Pain with No Prior Surgeries  IRB/Protocol #: [id number]/  : Obed Gasca MD  Treating Physician: Shoaib Aguirre MD  Site Number: [id number]        Subject Number: [id number]        1 Month F/U:  The subject came to [facility] for 1 month visit. Reviewed the subject's opioid pain medications and confirmed no changes.  Reminded subject of medications lock requirements.  Adverse event reporting was reviewed with the subject and there were no adverse events or protocol deviations to report at this time.The subject's visit has been entered into OnCore.      Subject then met with Niko Cali, program parameters were changed to optimize subjects pain levels.

## 2023-02-11 NOTE — PROGRESS NOTES
Subjective:      Patient ID: Tiarra Norton is a 68 y.o. female.    Chief Complaint:   Pain of the Right Knee    HPI  She comes in complaining of right knee pain.  She had a partial knee what by Dr. Huber in 2015, but he was not available to see her.  She squatted down and twisted and heard a pop in the knee a few days ago.  However, she has had chronic pain and in fact has had pain management put in a spinal cord stimulator for her back pain.  Her knee pain continues to limit her activities.  She had to stop doing physical therapy.  Pain is 6/10 currently, but gets worse when she does more standing and walking.  No fever, chills, remote infections.  No groin pain, distal numbness or tingling.  No fever, chills, remote infections.      Objective:        Ortho/SPM Exam    On exam, the scar is well healed without redness.  She has tenderness along the lateral joint line and a little boggy synovitis which is tender in the suprapatellar pouch.  There is no effusion.  Active range of motion is 0-115° without crepitus.  No instability to varus/valgus stress in extension or flexion.  No excessive sagittal plane instability.  Calves soft, nontender.  Distal neurovascular intact.        IMAGING:  Weightbearing x-rays show well-fixed and positioned medial unicompartmental arthroplasty without signs of loosening or other complications.  There is progressive arthritic change in the lateral and patellofemoral compartments.  Assessment:   Painful right knee unicompartmental arthroplasty      Plan:   She may just have some inflammation and synovitis so we will try a Medrol Dosepak.  We did discuss the possibility of revision to a total knee, but she did not want to pursue that further at this time.  She will think that over and let us know what she would like to do going forward.  She will let me know if the Medrol is not helpful for her.    The patient's pathophysiology was explained in detail with reference to x-rays,  models, other visual aids as appropriate.  Treatment options were discussed in detail.  Questions were invited and answered to the patient's satisfaction.      Bishnu Hills MD  Orthopedic Surgery

## 2023-02-12 ENCOUNTER — PATIENT MESSAGE (OUTPATIENT)
Dept: ORTHOPEDICS | Facility: CLINIC | Age: 68
End: 2023-02-12
Payer: MEDICARE

## 2023-02-13 ENCOUNTER — PATIENT MESSAGE (OUTPATIENT)
Dept: INTERNAL MEDICINE | Facility: CLINIC | Age: 68
End: 2023-02-13
Payer: MEDICARE

## 2023-02-15 PROBLEM — Z96.659 HISTORY OF PARTIAL KNEE REPLACEMENT: Status: ACTIVE | Noted: 2023-02-15

## 2023-02-16 ENCOUNTER — TELEPHONE (OUTPATIENT)
Dept: PAIN MEDICINE | Facility: CLINIC | Age: 68
End: 2023-02-16
Payer: MEDICARE

## 2023-02-16 ENCOUNTER — HOSPITAL ENCOUNTER (OUTPATIENT)
Dept: RADIOLOGY | Facility: HOSPITAL | Age: 68
Discharge: HOME | End: 2023-02-16
Attending: OBSTETRICS & GYNECOLOGY
Payer: MEDICARE

## 2023-02-16 DIAGNOSIS — Z12.31 ENCOUNTER FOR SCREENING MAMMOGRAM FOR MALIGNANT NEOPLASM OF BREAST: ICD-10-CM

## 2023-02-16 PROCEDURE — 77067 MAMMO DIGITAL SCREENING BILAT WITH TOMO: ICD-10-PCS | Mod: 26,GA,, | Performed by: RADIOLOGY

## 2023-02-16 PROCEDURE — 77067 SCR MAMMO BI INCL CAD: CPT | Mod: 26,GA,, | Performed by: RADIOLOGY

## 2023-02-16 PROCEDURE — 77067 SCR MAMMO BI INCL CAD: CPT | Mod: GA,TC,PN

## 2023-02-16 PROCEDURE — 77063 MAMMO DIGITAL SCREENING BILAT WITH TOMO: ICD-10-PCS | Mod: 26,GA,, | Performed by: RADIOLOGY

## 2023-02-16 PROCEDURE — 77063 BREAST TOMOSYNTHESIS BI: CPT | Mod: 26,GA,, | Performed by: RADIOLOGY

## 2023-02-16 NOTE — TELEPHONE ENCOUNTER
"This message is for patient in regards to his/her appointment 02/17/23 at 9:00a       [facility] Policy: For the safety of all patients and staff members.     Patient Visitor policy: Due to social distancing and limited seating staff are requesting patient to arrive to their schedule appointments on time.     Upon arriving to facility; patients are required to wear a face mask, if patient do not have a face mask one will be provided. Upon arriving patient we ask patients to contact clinic at this number [phone] to notify staff that they have arrived or they may do so by utilizing the Mobile checked in [facility](if patient have patient portal; clinical staff will send a message through there letting them know it's okay to proceed to their visit). Staff will give the patient the "okay" to come up or wait inside their vehicle until clinic is ready for patient to be seen by Marlene Thorne Np. If you have any questions or concerns please contact [phone]      Patient verbalized and confirmed appt  "

## 2023-02-17 ENCOUNTER — TELEPHONE (OUTPATIENT)
Dept: RESEARCH | Facility: OTHER | Age: 68
End: 2023-02-17
Payer: MEDICARE

## 2023-02-17 ENCOUNTER — OFFICE VISIT (OUTPATIENT)
Dept: PAIN MEDICINE | Facility: CLINIC | Age: 68
End: 2023-02-17
Payer: MEDICARE

## 2023-02-17 VITALS
BODY MASS INDEX: 24.6 KG/M2 | HEIGHT: 63 IN | DIASTOLIC BLOOD PRESSURE: 76 MMHG | SYSTOLIC BLOOD PRESSURE: 122 MMHG | HEART RATE: 84 BPM

## 2023-02-17 DIAGNOSIS — G89.4 CHRONIC PAIN SYNDROME: Primary | ICD-10-CM

## 2023-02-17 DIAGNOSIS — M51.36 DDD (DEGENERATIVE DISC DISEASE), LUMBAR: ICD-10-CM

## 2023-02-17 DIAGNOSIS — M48.061 SPINAL STENOSIS OF LUMBAR REGION WITHOUT NEUROGENIC CLAUDICATION: ICD-10-CM

## 2023-02-17 DIAGNOSIS — M47.817 SPONDYLOSIS OF LUMBOSACRAL REGION WITHOUT MYELOPATHY OR RADICULOPATHY: ICD-10-CM

## 2023-02-17 PROCEDURE — 99999 PR PBB SHADOW E&M-EST. PATIENT-LVL IV: CPT | Mod: PBBFAC,,, | Performed by: NURSE PRACTITIONER

## 2023-02-17 PROCEDURE — 99214 OFFICE O/P EST MOD 30 MIN: CPT | Mod: PBBFAC | Performed by: NURSE PRACTITIONER

## 2023-02-17 PROCEDURE — 99213 OFFICE O/P EST LOW 20 MIN: CPT | Mod: S$PBB,,, | Performed by: NURSE PRACTITIONER

## 2023-02-17 PROCEDURE — 99999 PR PBB SHADOW E&M-EST. PATIENT-LVL IV: ICD-10-PCS | Mod: PBBFAC,,, | Performed by: NURSE PRACTITIONER

## 2023-02-17 PROCEDURE — 99213 PR OFFICE/OUTPT VISIT, EST, LEVL III, 20-29 MIN: ICD-10-PCS | Mod: S$PBB,,, | Performed by: NURSE PRACTITIONER

## 2023-02-17 NOTE — PROGRESS NOTES
Chronic Pain-Established Visit         SUBJECTIVE:    PCP: Kaykay Perales MD    REFERRING PHYSICIAN: Tyler Jacobs MD    CHIEF COMPLAINT: Lower back pain       Original HISTORY OF PRESENT ILLNESS: Tiarra Norton presents to the clinic for the evaluation of the above pain. The pain started five years ago.     Original Pain Description:  The pain is located in the lower back and does radiate up towards her upper back.The pain is described as aching, dull and sharp. Initially starts as a dull, aching pain and it gets sharp once she bends down and moves around. Typically when she walks for more than five minutes, the sharp pain radiates up her back. Exacerbating factors: Standing, Bending, Touching, Walking, Night Time, Flexing and Lifting. Mitigating factors heat, ice, laying down and massage. Symptoms interfere with daily activity and sleeping. The patient feels like symptoms have been worsening. Patient denies night fever/night sweats, urinary incontinence, bowel incontinence, significant weight loss, significant motor weakness and loss of sensations.    Original PAIN SCORES:  Best: Pain is 1  Worst: Pain is 10  Usually: Pain is 4  Current: Pain is 4    INTERVAL HISTORY:  4/18/22 Interval History: Patient presents for telehealth visit for follow up following her b/l RFA at L3-L5. She reports 80% relief and is very pleased with her pain relief. Unfortunately, she is not back to doing what she wants due to right knee pain. She is seeing Dr. Huber for knee pain and is s/p right medial compartment partial knee replacement. She is currently in therapy for this. We discussed that we may be able to assist her with her knee pain as well.     Interval History 6/15/22: Tiarra Norton returns for follow up. She continues to feel like she has mild pain sitting or laying down, and has her worst pain with extreme leaning forward to pick something up off the floor. She also has difficulty leaning over her handlebars  to ride her bike or leaning forward to fold clothes or standing up for any period of time. So, we determined that leaning forward is her worst issue. We did previouisly do lmbb and rfa, which has helped with extension, but it would not help with leaning forward. On review of her MRI she has significant multilevel DDD with Modic changes which likely account for her pain.     Interval History 7/25/22: Tiarra Norton returns for follow up. We previously have been discussing treatments for her low back pain that did not resolve with RFA. At our last visit I referred her to Dr. Antony for clearance for a SCS trial. She was considered to be a reasonable candidate. However, in the meantime, her PT referred her to acupuncture with Dr. Jacobs. She had one treatment and already notes 50% relief. She notes that she still has pain, especially with leaning forward, but feels that it is much better controlled. Their plan is one treatment per week but is approved for 20 sessions.     Interval History 8/22/22: Ms. Norton returns for follow up on her low back pain. At our last visit she felt cured by acupuncture. Unfortunately, this only lasted for 24 hours. She returns today looking for other options to make life livable. Patient claims all of her pain in the low back and middle back. We discussed that this will work best for her low back pain.     Interval History 9/21/2022:  The patient returns to clinic today for follow up of back pain via virtual visit. She received a letter from [facility] denying SCS trial. She is frustrated by this. She continues to report low back pain. She denies any radicular leg pain. Her pain is worse with bending and activity. She recently underwent med screening for the [employer] program. She is interested in pursuing this. She has tried Gabapentin and Lyrica in the past with side effects. She denies any other health changes. Her pain today is 7/10.     Interval History 10/14/22: Mrs. Norton  presents today for follow up of chronic low back pain and to discuss the Cortez trial. She had her lumbar MRI done last night without significant changes to her spine. She did have increased dilation of her bile duct. Her pain today is the same in character and severity as during her last visit with us and she rates it currently at a 7/10. She continues to do her stretches on her own which have helped some. She presents with some questions regarding the trial.     Interval History 12/29/2022:  The patient returns for post op appointment. She is s/p lumbar [employer] SCS trial with 90% relief. She has had very minimal back pain during the trial period. She says that she was more active over this time due to the Christmas holiday and had minimal symptoms. She is very happy with the relief. She would like to move forward with implant. She is part of Cortez study. No fever or malaise during trial period. Her pain today is 1/10.    Interval History 1/17/2023:  The patient returns to clinic today for post op. She is s/p [employer] SCS implant on 1/9/2022. She reports benefit with the device at this time. She is meeting with the representatives today. She does report soreness to her incisions. She denies any fever, chills, or drainage. She did have issues with her dressing staying on. She has completed her antibiotics. She denies any other health changes. Her pain today is 2/10.    Interval History 1/31/2023:  The patient returns to clinic today for wound check. She reports benefit with [employer] SCS. She is in contact with representatives for programming. She reports intermittent muscle soreness into her legs. She does have an upcoming appointment with representatives for programming. She denies any fever, chills, or drainage. She continues to follow her activity restrictions. She denies any other health changes. Her pain today is 3/10.    Interval History 2/17/2023:  The patient returns to clinic today for  follow up of back pain. She reports benefit with [employer] SCS. She is in contact with representatives for programming. She is very happy with relief. She denies any fever, chills, or drainage. She continues to follow her activity restrictions. She denies any other health changes. Her pain today is 2/10.    6 weeks of Conservative therapy (PT/Chiro/Home Exercises with Dates)  Healthy back program--> has four sessions left for a total of 20 sessions   Last session was on 1/31/22   Stretches that they taught at [employer] back gives her relief       Medications:   None currently  Ice and heat which has helped   Tried Gabapentin and Lyrica- made her loopy      Report: reviewed       Interventional Pain Procedures:   2/8/2022- Bilateral L3,4,5 MBB  2/15/2022- Bilateral L3,4,5 MBB  3/8/2022- Right L3,4,5 RFA- 80% relief for a few months  3/22/2022- Left L3,4,5 RFA- 80% relief for a few months  12/22/22 SCS trial- 90% relief  1/9/2023- [employer] SCS implant.     Imaging:  10/13/22: MRI LUMBAR SPINE WITHOUT CONTRAST     CLINICAL HISTORY:  Low back pain, symptoms persist with > 6wks conservative treatment; Dorsalgia, unspecified     TECHNIQUE:  Multiplanar, multisequence MR images were acquired from the thoracolumbar junction to the sacrum without the administration of contrast.     COMPARISON:  Lumbar spine MRI 01/21/2022; CT abdomen pelvis 08/20/2022     FINDINGS:  Lumbar levoscoliosis centered at L2.  Minimal anterolisthesis at L4-5.     No compression fractures.  No marrow replacing lesions.     Multilevel degenerative changes with disc desiccation and disc space narrowing, described in detail below.  Discogenic endplate edema at T12-L1.     The conus medullaris has a normal appearance and terminates at the L1 level.  Cauda equina nerve roots are unremarkable.  No epidural mass or collection.     The common duct is dilated up to 12 mm, previously 9 mm on 08/20/2022 CT.  Mild prominence of the main  pancreatic duct up to 4 mm, which is new.  No definite filling defect in the bile ducts.  Paraspinal muscle atrophy.     SIGNIFICANT FINDINGS BY LEVEL:     T12-L1: Disc bulge.  Bilateral facet arthropathy and ligamentum flavum thickening.  Mild canal stenosis.  Mild bilateral foraminal stenosis.     L1-2: Disc bulge.  Bilateral facet arthropathy and ligamentum flavum thickening.  Mild canal stenosis.  Mild right foraminal stenosis.     L2-3: Disc bulge.  Bilateral facet arthropathy and ligamentum flavum thickening.  Mild canal stenosis.  Moderate right mild left foraminal stenosis.     L3-4: Disc bulge.  Bilateral facet arthropathy and ligamentum flavum thickening.  Small bilateral facet joint effusions/synovitis.  Moderate canal stenosis with partial effacement of the thecal sac and crowding of the cauda equina nerve roots.  Mild right and moderate left foraminal stenosis.     L4-5: Disc bulge with posterior disc uncovering.  Bilateral facet arthropathy and ligamentum flavum thickening.  Small right facet joint effusion/synovitis.  Mild canal stenosis.  Mild bilateral foraminal stenosis.     L5-S1: Disc bulge.  Bilateral facet arthropathy and ligamentum flavum thickening.  No significant canal stenosis.  Mild left foraminal stenosis.     Impression:     Lumbar levoscoliosis and multilevel degenerative changes as detailed above.  No significant changes from 01/21/2022.     Increased biliary ductal dilatation up to 12 mm, greater than expected after cholecystectomy.  In addition, the main pancreatic duct is mildly prominent up to 4 mm, which is new.  If LFTs are abnormal, consider MRI/MRCP or ERCP for further evaluation.      Past Medical History:   Diagnosis Date    Cataract     Depression     Gestational diabetes     Hyperlipidemia     Hypertension     IBS (irritable bowel syndrome)     Thyroid disease      Past Surgical History:   Procedure Laterality Date    ADENOIDECTOMY      ARTHROSCOPIC CHONDROPLASTY OF KNEE  JOINT Right 10/19/2021    Procedure: ARTHROSCOPY, KNEE, WITH CHONDROPLASTY;  Surgeon: Trevin Huber MD;  Location: [facility] OR;  Service: Orthopedics;  Laterality: Right;    ARTHROSCOPY OF KNEE Right 10/19/2021    Procedure: ARTHROSCOPY, KNEE-RIGHT;  Surgeon: Trevin Huber MD;  Location: [facility] OR;  Service: Orthopedics;  Laterality: Right;     SECTION      CHOLECYSTECTOMY      COLONOSCOPY N/A 2016    Procedure: COLONOSCOPY;  Surgeon: Heri Segura MD;  Location: [facility] (4TH FLR);  Service: Endoscopy;  Laterality: N/A;    COLONOSCOPY N/A 2019    Procedure: COLONOSCOPY;  Surgeon: Joe Pitts MD;  [facility]  Service: Endoscopy;  Laterality: N/A;    CYSTOSCOPY  2022    Procedure: CYSTOSCOPY;  Surgeon: Lenny Moore MD;  Location: [facility] OR [address] [place] [address];  Service: Urology;;    ESOPHAGOGASTRODUODENOSCOPY N/A 2020    Procedure: EGD (ESOPHAGOGASTRODUODENOSCOPY);  Surgeon: Tolu Rivas MD;  [facility]  Service: Endoscopy;  Laterality: N/A;  Pt. moved to 9:15am arrival time.. Instructed to not have anything after midnight.EC    EYE SURGERY      cataract resection right    INJECTION OF ANESTHETIC AGENT AROUND NERVE Bilateral 2022    Procedure: Block, Nerve MEDIAL BRANCH BLOCK BILATERAL L3,4,5;  Surgeon: Tara Gibbs MD;  Location: [facility] PAIN MGT;  Service: Pain Management;  Laterality: Bilateral;    INJECTION OF ANESTHETIC AGENT AROUND NERVE Bilateral 2/15/2022    Procedure: BLOCK, NERVE, L3*L4-L5 MEDIAL BR ANCH 2 OF 2;  Surgeon: Tara Gibbs MD;  Location: [facility] PAIN MGT;  Service: Pain Management;  Laterality: Bilateral;    INJECTION OF BOTULINUM TOXIN TYPE A  2022    Procedure: BOTULINUM TOXIN INJECTION 100 units;  Surgeon: Lenny Moore MD;  [facility];  Service: Urology;;    INSERTION, NEUROSTIMULATOR, SPINAL CORD N/A 2023    Procedure: SPINAL CORD STIMULATOR IMPLANT [email or web address];   Surgeon: Shoaib Aguirre MD;  Location: [facility] OR;  Service: Pain Management;  Laterality: N/A;    JOINT REPLACEMENT      partial right knee    KNEE ARTHROSCOPY W/ MENISCECTOMY Right 10/19/2021    Procedure: ARTHROSCOPY, KNEE, WITH MENISCECTOMY, PARTIAL LATERAL;  Surgeon: Trevin Huber MD;  Location: [facility] OR;  Service: Orthopedics;  Laterality: Right;    r hand surgery      RADIOFREQUENCY ABLATION Right 3/8/2022    Procedure: RADIOFREQUENCY ABLATION, L3-L5 1 OF 2;  Surgeon: Tara Gibbs MD;  Location: [facility] PAIN MGT;  Service: Pain Management;  Laterality: Right;    RADIOFREQUENCY ABLATION Left 3/22/2022    Procedure: RADIOFREQUENCY ABLATION, l3-+l5 2 of 2;  Surgeon: Tara Gibbs MD;  Location: [facility] PAIN MGT;  Service: Pain Management;  Laterality: Left;    TONSILLECTOMY      TOTAL KNEE ARTHROPLASTY      partial knee replacement    TRIAL OF SPINAL CORD NERVE STIMULATOR N/A 12/22/2022    Procedure: SPINAL CORD STIMULATOR TRIAL [employer];  Surgeon: Shoaib Aguirre MD;  Location: [facility] PAIN MGT;  Service: Pain Management;  Laterality: N/A;    TUBAL LIGATION       Social History     Socioeconomic History    Marital status:     Number of children: 1   Occupational History    Occupation:      Employer: [employer]     Comment: retired   Tobacco Use    Smoking status: Never    Smokeless tobacco: Never   Substance and Sexual Activity    Alcohol use: Yes     Alcohol/week: 3.0 standard drinks     Types: 3 Glasses of wine per week     Comment: weekends    Drug use: No    Sexual activity: Yes     Partners: Male   Other Topics Concern    Are you pregnant or think you may be? No    Breast-feeding No   Social History Narrative    Retired        Swims.       Stationary bike.            Social Determinants of Health     Financial Resource Strain: Low Risk     Difficulty of Paying Living Expenses: Not hard at all   Food Insecurity: No Food Insecurity    Worried About Running Out of  Food in the Last Year: Never true    Ran Out of Food in the Last Year: Never true   Transportation Needs: No Transportation Needs    Lack of Transportation (Medical): No    Lack of Transportation (Non-Medical): No   Physical Activity: Insufficiently Active    Days of Exercise per Week: 4 days    Minutes of Exercise per Session: 20 min   Stress: Stress Concern Present    Feeling of Stress : Very much   Social Connections: Unknown    Frequency of Communication with Friends and Family: Twice a week    Active Member of Clubs or Organizations: No    Attends Club or Organization Meetings: Never    Marital Status:    Housing Stability: Low Risk     Unable to Pay for Housing in the Last Year: No    Number of Places Lived in the Last Year: 1    Unstable Housing in the Last Year: No     Family History   Problem Relation Age of Onset    Hypertension Mother     Irritable bowel syndrome Mother     Hyperlipidemia Mother     Heart disease Father         mi    Hypertension Father     Cancer Father         prostate    Heart attack Father     Heart failure Father     Hyperlipidemia Father     Alcohol abuse Sister     Depression Sister     Epilepsy Son     Irritable bowel syndrome Sister     Alcohol abuse Sister     Ovarian cancer Maternal Aunt     Breast cancer Paternal Aunt     Breast cancer Maternal Aunt     Melanoma Neg Hx     Colon cancer Neg Hx     Stomach cancer Neg Hx     Esophageal cancer Neg Hx     Liver disease Neg Hx     Crohn's disease Neg Hx     Ulcerative colitis Neg Hx     Celiac disease Neg Hx     Stroke Neg Hx        Review of patient's allergies indicates:  No Known Allergies    Current Outpatient Medications   Medication Sig    alprazolam (XANAX) 2 MG Tab Take 1 mg by mouth 3 (three) times daily as needed.    amLODIPine (NORVASC) 5 MG tablet Take 1 tablet (5 mg total) by mouth once daily. (Patient taking differently: Take 5 mg by mouth once daily. am)    atorvastatin (LIPITOR) 10 MG tablet TAKE 1 TABLET  EVERY DAY    b complex vitamins capsule Take 1 capsule by mouth once daily.    cholecalciferol, vitamin D3, (VITAMIN D3) 5,000 unit Tab Take 1 tablet (5,000 Units total) by mouth once daily.    EScitalopram oxalate (LEXAPRO) 5 MG Tab Take 5 mg by mouth.    HYDROcodone-acetaminophen (NORCO) 5-325 mg per tablet Take 1 tablet by mouth every 24 hours as needed for Pain.    hydrOXYzine pamoate (VISTARIL) 50 MG Cap Take 50 mg by mouth nightly as needed.    levothyroxine (SYNTHROID) 125 MCG tablet TAKE 1 TABLET BY MOUTH EVERY MORNING    liothyronine (CYTOMEL) 5 MCG Tab Take 1 tablet (5 mcg total) by mouth once daily.    methylPREDNISolone (MEDROL DOSEPACK) 4 mg tablet use as directed    pantoprazole (PROTONIX) 40 MG tablet Take 1 tablet (40 mg total) by mouth once daily.    promethazine (PHENERGAN) 12.5 MG Tab Take 1 tablet (12.5 mg total) by mouth every 6 (six) hours as needed (nausea).    RESTASIS 0.05 % ophthalmic emulsion INT 1 GTT IN OU BID    sumatriptan (IMITREX) 50 MG tablet 1 tab po at start of headache.  Repeat in 2 h prn    traZODone (DESYREL) 100 MG tablet 1-2 tabs po q hs for sleep    valsartan (DIOVAN) 320 MG tablet TAKE 1 TABLET ONE TIME DAILY (STOP LISINOPRIL)    conjugated estrogens (PREMARIN) vaginal cream Place 0.5 g vaginally twice a week. Place a pea-sized amount in vagina every night for 4 weeks, then use 2-3 nights a week (Patient not taking: Reported on 2/9/2023)     No current facility-administered medications for this visit.     Facility-Administered Medications Ordered in Other Visits   Medication    0.9%  NaCl infusion       REVIEW OF SYSTEMS:    GENERAL: No fever. No chills. No fatigue. Denies weight loss. Denies weight gain.  HEENT: Denies headaches. Denies vision change. Denies eye pain. Denies double vision. Denies ear pain.   CV: Denies chest pain. HTN  PULM: Denies of shortness of breath.  GI: Denies constipation. No diarrhea. No abdominal pain. Denies nausea. Denies vomiting. No blood in  "stool.  HEME: Denies bleeding problems.  : Denies urgency. No painful urination. No blood in urine.  MS: See HPI  SKIN: Denies rash.   NEURO: Denies seizures. No weakness.  ENDO: Thyroid disorder.   PSYCH:  Depression    OBJECTIVE:     Vitals:    23 0859   BP: 122/76   Pulse: 84   Height: 5' 3" (1.6 m)   PainSc:   2        PHYSICAL EXAM:   GENERAL: Well appearing, in no acute distress, alert and oriented x3.  PSYCH:  Mood and affect appropriate.  SKIN: Skin color, texture, turgor normal, no rashes or lesions. SCS site without drainage or signs of infection. Edges well approximated.   GAIT: normal- ambulates without assistance.               ASSESSMENT: 68 y.o. year old female with low back pain, consistent with the followin. Chronic pain syndrome        2. Spondylosis of lumbosacral region without myelopathy or radiculopathy        3. Spinal stenosis of lumbar region without neurogenic claudication        4. DDD (degenerative disc disease), lumbar              PLAN:     - She is s/p lumbar [employer] SCS implant. Incisions well healed.     - She is in contact with representatives for programming.     - She may discontinue activity restrictions on 2023.    - RTC PRN.    The above plan and management options were discussed at length with patient. Patient is in agreement with the above and verbalized understanding.     Marlene Thorne NP  2023                      "

## 2023-02-17 NOTE — TELEPHONE ENCOUNTER
----- Message from Shoaib Aguirre MD sent at 2/17/2023 10:10 AM CST -----  Regarding: RE: Subject 011  It's EL.      ----- Message -----  From: Nena Prajapati  Sent: 2/17/2023  10:05 AM CST  To: Shoaib Aguirre MD  Subject: RE: Subject 011                                  Hi Dr. Aguirre:    Just to confirm this is a Moderate AE or EL?    Nena  ----- Message -----  From: Shoaib Aguirre MD  Sent: 2/16/2023   2:16 PM CST  To: Nena Prajapati  Subject: RE: Subject 011                                  This is moderate EL and not related      ----- Message -----  From: Nena Prajapati  Sent: 2/16/2023   1:39 PM CST  To: Shoaib Aguirre MD  Subject: Subject 011                                      Subject reported during 1 month visit with KATHLEEN Toribio and myself last week [id number] that her right knee had been bothering her and pt wanted to see her ortho doctor regarding it. Please see ortho visit provider progress note from that date. Subject was called again today for routine follow up phone call between Akila Cali (KATHLEEN) and myself, and informed staff that pt was prescribed a Medrol Dosepak methylPREDNIsolone 4mg to treat the inflammation in the right knee.    Please advise if this should be reported as a new adverse event as well as the level of severity.    Thank you,  Nena           no

## 2023-02-17 NOTE — TELEPHONE ENCOUNTER
----- Message from Shoaib Aguirre MD sent at 2/16/2023  2:16 PM CST -----  Regarding: RE: Subject 011  This is moderate EL and not related      ----- Message -----  From: Nena Prajapati  Sent: 2/16/2023   1:39 PM CST  To: Shoaib Aguirre MD  Subject: Subject 011                                      Subject reported during 1 month visit with KATHLEEN Toribio and myself last week [id number] that her right knee had been bothering her and pt wanted to see her ortho doctor regarding it. Please see ortho visit provider progress note from that date. Subject was called again today for routine follow up phone call between Akila Cali (KATHLEEN) and myself, and informed staff that pt was prescribed a Medrol Dosepak methylPREDNIsolone 4mg to treat the inflammation in the right knee.    Please advise if this should be reported as a new adverse event as well as the level of severity.    Thank you,  Nena

## 2023-02-24 ENCOUNTER — PATIENT MESSAGE (OUTPATIENT)
Dept: PSYCHIATRY | Facility: CLINIC | Age: 68
End: 2023-02-24
Payer: MEDICARE

## 2023-02-26 ENCOUNTER — PATIENT MESSAGE (OUTPATIENT)
Dept: ORTHOPEDICS | Facility: CLINIC | Age: 68
End: 2023-02-26
Payer: MEDICARE

## 2023-02-27 ENCOUNTER — OFFICE VISIT (OUTPATIENT)
Dept: INTERNAL MEDICINE | Facility: CLINIC | Age: 68
End: 2023-02-27
Payer: MEDICARE

## 2023-02-27 VITALS
WEIGHT: 143.31 LBS | OXYGEN SATURATION: 97 % | HEART RATE: 67 BPM | HEIGHT: 63 IN | BODY MASS INDEX: 25.39 KG/M2 | SYSTOLIC BLOOD PRESSURE: 124 MMHG | DIASTOLIC BLOOD PRESSURE: 64 MMHG

## 2023-02-27 DIAGNOSIS — I70.0 AORTIC ATHEROSCLEROSIS: ICD-10-CM

## 2023-02-27 DIAGNOSIS — B99.9 INFECTION: Primary | ICD-10-CM

## 2023-02-27 DIAGNOSIS — F13.20 BENZODIAZEPINE DEPENDENCE: ICD-10-CM

## 2023-02-27 DIAGNOSIS — E78.5 HYPERLIPIDEMIA, UNSPECIFIED HYPERLIPIDEMIA TYPE: Primary | ICD-10-CM

## 2023-02-27 DIAGNOSIS — F33.0 MAJOR DEPRESSIVE DISORDER, RECURRENT, MILD: ICD-10-CM

## 2023-02-27 DIAGNOSIS — Z91.89 AT RISK FOR DIABETES MELLITUS: ICD-10-CM

## 2023-02-27 PROCEDURE — 99999 PR PBB SHADOW E&M-EST. PATIENT-LVL III: ICD-10-PCS | Mod: PBBFAC,,, | Performed by: INTERNAL MEDICINE

## 2023-02-27 PROCEDURE — 99214 PR OFFICE/OUTPT VISIT, EST, LEVL IV, 30-39 MIN: ICD-10-PCS | Mod: S$PBB,,, | Performed by: INTERNAL MEDICINE

## 2023-02-27 PROCEDURE — 99999 PR PBB SHADOW E&M-EST. PATIENT-LVL III: CPT | Mod: PBBFAC,,, | Performed by: INTERNAL MEDICINE

## 2023-02-27 PROCEDURE — 99214 OFFICE O/P EST MOD 30 MIN: CPT | Mod: S$PBB,,, | Performed by: INTERNAL MEDICINE

## 2023-02-27 PROCEDURE — 99213 OFFICE O/P EST LOW 20 MIN: CPT | Mod: PBBFAC | Performed by: INTERNAL MEDICINE

## 2023-02-27 RX ORDER — LIOTHYRONINE SODIUM 5 UG/1
5 TABLET ORAL DAILY
Qty: 90 TABLET | Refills: 3 | Status: SHIPPED | OUTPATIENT
Start: 2023-02-27

## 2023-02-27 RX ORDER — METHYLPREDNISOLONE 4 MG/1
TABLET ORAL
Qty: 1 EACH | Refills: 0 | Status: SHIPPED | OUTPATIENT
Start: 2023-02-27 | End: 2023-03-20

## 2023-02-27 RX ORDER — TRAZODONE HYDROCHLORIDE 100 MG/1
TABLET ORAL
Qty: 180 TABLET | Refills: 3 | Status: SHIPPED | OUTPATIENT
Start: 2023-02-27

## 2023-02-27 RX ORDER — ATORVASTATIN CALCIUM 10 MG/1
10 TABLET, FILM COATED ORAL DAILY
Qty: 90 TABLET | Refills: 3 | Status: SHIPPED | OUTPATIENT
Start: 2023-02-27

## 2023-02-27 RX ORDER — SUMATRIPTAN 50 MG/1
TABLET, FILM COATED ORAL
Qty: 27 TABLET | Refills: 3 | Status: SHIPPED | OUTPATIENT
Start: 2023-02-27 | End: 2023-03-17 | Stop reason: SDUPTHER

## 2023-02-27 RX ORDER — AMLODIPINE BESYLATE 5 MG/1
5 TABLET ORAL DAILY
Qty: 90 TABLET | Refills: 3 | Status: SHIPPED | OUTPATIENT
Start: 2023-02-27 | End: 2024-03-18 | Stop reason: SDUPTHER

## 2023-02-27 RX ORDER — LEVOTHYROXINE SODIUM 125 UG/1
TABLET ORAL
Qty: 90 TABLET | Refills: 2 | Status: SHIPPED | OUTPATIENT
Start: 2023-02-27

## 2023-02-27 RX ORDER — VALSARTAN 320 MG/1
320 TABLET ORAL DAILY
Qty: 90 TABLET | Refills: 3 | Status: SHIPPED | OUTPATIENT
Start: 2023-02-27

## 2023-02-27 NOTE — PROGRESS NOTES
Subjective:       Patient ID: Tiarra Norton is a 68 y.o. female.    Chief Complaint: Annual Exam    HPI  Recent spinal cord stimulator, which has helped pain on R.      However painful on L , where inserted.    8/10, including knee.     She needs knee replaced, but has to wait to April.      She is unable to have PT.      She is in a clinical trial extending for 2 years, so she cannot take analgesics.      Major depression.       Misses son.        Good friend .       Seeing therapist weekly and a psychiatrist.        Sleep is better since stimulator.   Takes desyrel nightly.    IBS is terrible.     She wonders if  hypnosis was an option.    She had been worried about wt loss, but has gained 5 lbs.    Htn well controlled            Review of Systems   Constitutional:  Negative for fever and unexpected weight change.   HENT:  Negative for nasal congestion and postnasal drip.    Respiratory:  Negative for chest tightness, shortness of breath and wheezing.    Cardiovascular:  Negative for chest pain and leg swelling.   Gastrointestinal:  Negative for abdominal pain, anal bleeding, constipation, diarrhea, nausea and vomiting.   Genitourinary:  Negative for dysuria and urgency.   Musculoskeletal:  Positive for arthralgias and back pain.   Integumentary:  Negative for rash.   Neurological:  Negative for headaches.   Psychiatric/Behavioral:  Positive for dysphoric mood and sleep disturbance. The patient is not nervous/anxious.        Objective:      Physical Exam  Constitutional:       General: She is not in acute distress.     Appearance: She is well-developed.   HENT:      Head: Normocephalic and atraumatic.      Right Ear: External ear normal.      Left Ear: External ear normal.      Nose: Nose normal.   Eyes:      General: No scleral icterus.        Right eye: No discharge.         Left eye: No discharge.      Conjunctiva/sclera: Conjunctivae normal.      Pupils: Pupils are equal, round, and reactive to light.    Neck:      Thyroid: No thyromegaly.      Vascular: No JVD.   Cardiovascular:      Rate and Rhythm: Normal rate and regular rhythm.      Heart sounds: Normal heart sounds. No murmur heard.    No gallop.   Pulmonary:      Effort: Pulmonary effort is normal. No respiratory distress.      Breath sounds: Normal breath sounds. No wheezing or rales.   Abdominal:      General: Bowel sounds are normal. There is no distension.      Palpations: Abdomen is soft. There is no mass.      Tenderness: There is no abdominal tenderness. There is no guarding or rebound.   Musculoskeletal:         General: No tenderness. Normal range of motion.      Cervical back: Normal range of motion and neck supple.   Lymphadenopathy:      Cervical: No cervical adenopathy.   Skin:     General: Skin is warm and dry.      Findings: No rash.   Neurological:      Mental Status: She is alert and oriented to person, place, and time.      Cranial Nerves: No cranial nerve deficit.      Coordination: Coordination normal.   Psychiatric:         Behavior: Behavior normal.         Thought Content: Thought content normal.         Judgment: Judgment normal.         Assessment:       Problem List Items Addressed This Visit       Hyperlipidemia - Primary    Relevant Orders    Lipid Panel     Other Visit Diagnoses       At risk for diabetes mellitus                Plan:       Tiarra was seen today for annual exam.    Diagnoses and all orders for this visit:    Hyperlipidemia, unspecified hyperlipidemia type  -     Lipid Panel; Future    At risk for diabetes mellitus    Other orders  -     amLODIPine (NORVASC) 5 MG tablet; Take 1 tablet (5 mg total) by mouth once daily.  -     atorvastatin (LIPITOR) 10 MG tablet; Take 1 tablet (10 mg total) by mouth once daily.  -     levothyroxine (SYNTHROID) 125 MCG tablet; TAKE 1 TABLET BY MOUTH EVERY MORNING  -     liothyronine (CYTOMEL) 5 MCG Tab; Take 1 tablet (5 mcg total) by mouth once daily.  -     sumatriptan (IMITREX) 50  MG tablet; 1 tab po at start of headache.  Repeat in 2 h prn  -     traZODone (DESYREL) 100 MG tablet; 1-2 tabs po q hs for sleep  -     valsartan (DIOVAN) 320 MG tablet; Take 1 tablet (320 mg total) by mouth once daily.           Back to the pool  Psychotx with CHAD Antony.  Reassurance    Addendum- EKG- NSSTT wave changes as she has had in past.  Neg stress echo 10/20.      May labs WNL    She is cleared for anesthesia and surgery.

## 2023-03-01 ENCOUNTER — LAB VISIT (OUTPATIENT)
Dept: LAB | Facility: HOSPITAL | Age: 68
End: 2023-03-01
Attending: INTERNAL MEDICINE
Payer: MEDICARE

## 2023-03-01 ENCOUNTER — TELEPHONE (OUTPATIENT)
Dept: ORTHOPEDICS | Facility: CLINIC | Age: 68
End: 2023-03-01
Payer: MEDICARE

## 2023-03-01 DIAGNOSIS — B99.9 INFECTION: ICD-10-CM

## 2023-03-01 DIAGNOSIS — E78.5 HYPERLIPIDEMIA, UNSPECIFIED HYPERLIPIDEMIA TYPE: ICD-10-CM

## 2023-03-01 LAB
CHOLEST SERPL-MCNC: 202 MG/DL (ref 120–199)
CHOLEST/HDLC SERPL: 2.4 {RATIO} (ref 2–5)
CRP SERPL-MCNC: 0.8 MG/L (ref 0–8.2)
ERYTHROCYTE [SEDIMENTATION RATE] IN BLOOD BY PHOTOMETRIC METHOD: <2 MM/HR (ref 0–36)
HDLC SERPL-MCNC: 83 MG/DL (ref 40–75)
HDLC SERPL: 41.1 % (ref 20–50)
LDLC SERPL CALC-MCNC: 103.4 MG/DL (ref 63–159)
NONHDLC SERPL-MCNC: 119 MG/DL
TRIGL SERPL-MCNC: 78 MG/DL (ref 30–150)

## 2023-03-01 PROCEDURE — 85652 RBC SED RATE AUTOMATED: CPT | Performed by: ORTHOPAEDIC SURGERY

## 2023-03-01 PROCEDURE — 86140 C-REACTIVE PROTEIN: CPT | Performed by: ORTHOPAEDIC SURGERY

## 2023-03-01 PROCEDURE — 36415 COLL VENOUS BLD VENIPUNCTURE: CPT | Mod: PN | Performed by: INTERNAL MEDICINE

## 2023-03-01 PROCEDURE — 80061 LIPID PANEL: CPT | Performed by: INTERNAL MEDICINE

## 2023-03-01 NOTE — TELEPHONE ENCOUNTER
Spoke with patient who is aware of the information below.   ----- Message from Bishnu Hills MD sent at 3/1/2023  2:26 PM CST -----  Let her know her labs showed no signs of infection  ----- Message -----  From: Jose Miguel, [facility]iz Lab Interface  Sent: 3/1/2023   1:22 PM CST  To: Bishnu Hills MD

## 2023-03-06 ENCOUNTER — PATIENT MESSAGE (OUTPATIENT)
Dept: PSYCHIATRY | Facility: CLINIC | Age: 68
End: 2023-03-06
Payer: MEDICARE

## 2023-03-16 ENCOUNTER — PATIENT MESSAGE (OUTPATIENT)
Dept: INTERNAL MEDICINE | Facility: CLINIC | Age: 68
End: 2023-03-16
Payer: MEDICARE

## 2023-03-16 ENCOUNTER — DOCUMENTATION ONLY (OUTPATIENT)
Dept: RESEARCH | Facility: OTHER | Age: 68
End: 2023-03-16

## 2023-03-16 ENCOUNTER — PATIENT MESSAGE (OUTPATIENT)
Dept: PAIN MEDICINE | Facility: CLINIC | Age: 68
End: 2023-03-16
Payer: MEDICARE

## 2023-03-16 ENCOUNTER — PATIENT MESSAGE (OUTPATIENT)
Dept: ORTHOPEDICS | Facility: CLINIC | Age: 68
End: 2023-03-16
Payer: MEDICARE

## 2023-03-16 NOTE — PROGRESS NOTES
Study: Wave Writer- BRUNO Study: A Randomized Controlled Study to Evaluate the Safety and Effectiveness of [employer] Spinal Cord Stimulation (SCS) Systems in the Treatment of Chronic Low Back and/or Leg Pain with No Prior Surgeries  IRB/Protocol #: [id number]/  : Obed Gasca MD  Treating Physician: Shoaib Aguirre MD  Site Number: [id number]        Subject Number: [id number]    2 Month Post Activation Visit    The subject came in for their 2 month post activation visit. Reviewed the subject's opioid pain medications, confirmed no changes.. Patient met with KATHLEEN Cali. The device was programmed as needed. Adverse event reporting was reviewed with the subject and there were no adverse events or protocol deviations to report at this time.

## 2023-03-16 NOTE — TELEPHONE ENCOUNTER
Phoned patient and discussed her pain. She reports muscle pain and weakness near her battery site. Will discuss with research team due to medication lock. Will contact her back. She verbalized understanding.

## 2023-03-17 ENCOUNTER — PATIENT MESSAGE (OUTPATIENT)
Dept: PAIN MEDICINE | Facility: CLINIC | Age: 68
End: 2023-03-17
Payer: MEDICARE

## 2023-03-17 RX ORDER — SUMATRIPTAN 50 MG/1
TABLET, FILM COATED ORAL
Qty: 36 TABLET | Refills: 3 | Status: SHIPPED | OUTPATIENT
Start: 2023-03-17

## 2023-03-17 NOTE — TELEPHONE ENCOUNTER
No new care gaps identified.  [facility] Embedded Care Gaps. Reference number: [id number]. 3/17/2023   7:21:05 AM PREETHIT

## 2023-03-19 ENCOUNTER — PATIENT MESSAGE (OUTPATIENT)
Dept: INTERNAL MEDICINE | Facility: CLINIC | Age: 68
End: 2023-03-19
Payer: MEDICARE

## 2023-03-20 ENCOUNTER — PATIENT MESSAGE (OUTPATIENT)
Dept: INTERNAL MEDICINE | Facility: CLINIC | Age: 68
End: 2023-03-20
Payer: MEDICARE

## 2023-03-21 ENCOUNTER — OFFICE VISIT (OUTPATIENT)
Dept: PAIN MEDICINE | Facility: CLINIC | Age: 68
End: 2023-03-21
Payer: MEDICARE

## 2023-03-21 VITALS
OXYGEN SATURATION: 100 % | DIASTOLIC BLOOD PRESSURE: 82 MMHG | HEART RATE: 63 BPM | WEIGHT: 141 LBS | TEMPERATURE: 98 F | RESPIRATION RATE: 18 BRPM | BODY MASS INDEX: 24.98 KG/M2 | HEIGHT: 63 IN | SYSTOLIC BLOOD PRESSURE: 136 MMHG

## 2023-03-21 DIAGNOSIS — M47.816 LUMBAR SPONDYLOSIS: ICD-10-CM

## 2023-03-21 DIAGNOSIS — G89.4 CHRONIC PAIN SYNDROME: Primary | ICD-10-CM

## 2023-03-21 DIAGNOSIS — M48.061 SPINAL STENOSIS OF LUMBAR REGION WITHOUT NEUROGENIC CLAUDICATION: ICD-10-CM

## 2023-03-21 PROCEDURE — 99999 PR PBB SHADOW E&M-EST. PATIENT-LVL IV: CPT | Mod: PBBFAC,,, | Performed by: NURSE PRACTITIONER

## 2023-03-21 PROCEDURE — 99214 OFFICE O/P EST MOD 30 MIN: CPT | Mod: PBBFAC | Performed by: NURSE PRACTITIONER

## 2023-03-21 PROCEDURE — 99213 PR OFFICE/OUTPT VISIT, EST, LEVL III, 20-29 MIN: ICD-10-PCS | Mod: S$PBB,,, | Performed by: NURSE PRACTITIONER

## 2023-03-21 PROCEDURE — 99999 PR PBB SHADOW E&M-EST. PATIENT-LVL IV: ICD-10-PCS | Mod: PBBFAC,,, | Performed by: NURSE PRACTITIONER

## 2023-03-21 PROCEDURE — 99213 OFFICE O/P EST LOW 20 MIN: CPT | Mod: S$PBB,,, | Performed by: NURSE PRACTITIONER

## 2023-03-21 RX ORDER — TIZANIDINE 4 MG/1
4 TABLET ORAL EVERY 8 HOURS PRN
Qty: 30 TABLET | Refills: 0 | Status: SHIPPED | OUTPATIENT
Start: 2023-03-21 | End: 2023-03-24

## 2023-03-21 NOTE — PROGRESS NOTES
Chronic Pain-Established Visit         SUBJECTIVE:    PCP: Kaykay Perales MD    REFERRING PHYSICIAN: Tyler Jacobs MD    CHIEF COMPLAINT: Lower back pain       Original HISTORY OF PRESENT ILLNESS: Tiarra Norton presents to the clinic for the evaluation of the above pain. The pain started five years ago.     Original Pain Description:  The pain is located in the lower back and does radiate up towards her upper back.The pain is described as aching, dull and sharp. Initially starts as a dull, aching pain and it gets sharp once she bends down and moves around. Typically when she walks for more than five minutes, the sharp pain radiates up her back. Exacerbating factors: Standing, Bending, Touching, Walking, Night Time, Flexing and Lifting. Mitigating factors heat, ice, laying down and massage. Symptoms interfere with daily activity and sleeping. The patient feels like symptoms have been worsening. Patient denies night fever/night sweats, urinary incontinence, bowel incontinence, significant weight loss, significant motor weakness and loss of sensations.    Original PAIN SCORES:  Best: Pain is 1  Worst: Pain is 10  Usually: Pain is 4  Current: Pain is 4    INTERVAL HISTORY:  4/18/22 Interval History: Patient presents for telehealth visit for follow up following her b/l RFA at L3-L5. She reports 80% relief and is very pleased with her pain relief. Unfortunately, she is not back to doing what she wants due to right knee pain. She is seeing Dr. Hubre for knee pain and is s/p right medial compartment partial knee replacement. She is currently in therapy for this. We discussed that we may be able to assist her with her knee pain as well.     Interval History 6/15/22: Tiarra Norton returns for follow up. She continues to feel like she has mild pain sitting or laying down, and has her worst pain with extreme leaning forward to pick something up off the floor. She also has difficulty leaning over her handlebars  to ride her bike or leaning forward to fold clothes or standing up for any period of time. So, we determined that leaning forward is her worst issue. We did previouisly do lmbb and rfa, which has helped with extension, but it would not help with leaning forward. On review of her MRI she has significant multilevel DDD with Modic changes which likely account for her pain.     Interval History 7/25/22: Tiarra Norton returns for follow up. We previously have been discussing treatments for her low back pain that did not resolve with RFA. At our last visit I referred her to Dr. Antony for clearance for a SCS trial. She was considered to be a reasonable candidate. However, in the meantime, her PT referred her to acupuncture with Dr. Jacobs. She had one treatment and already notes 50% relief. She notes that she still has pain, especially with leaning forward, but feels that it is much better controlled. Their plan is one treatment per week but is approved for 20 sessions.     Interval History 8/22/22: Ms. Norton returns for follow up on her low back pain. At our last visit she felt cured by acupuncture. Unfortunately, this only lasted for 24 hours. She returns today looking for other options to make life livable. Patient claims all of her pain in the low back and middle back. We discussed that this will work best for her low back pain.     Interval History 9/21/2022:  The patient returns to clinic today for follow up of back pain via virtual visit. She received a letter from [facility] denying SCS trial. She is frustrated by this. She continues to report low back pain. She denies any radicular leg pain. Her pain is worse with bending and activity. She recently underwent med screening for the [employer] program. She is interested in pursuing this. She has tried Gabapentin and Lyrica in the past with side effects. She denies any other health changes. Her pain today is 7/10.     Interval History 10/14/22: Mrs. Norton  presents today for follow up of chronic low back pain and to discuss the Cortez trial. She had her lumbar MRI done last night without significant changes to her spine. She did have increased dilation of her bile duct. Her pain today is the same in character and severity as during her last visit with us and she rates it currently at a 7/10. She continues to do her stretches on her own which have helped some. She presents with some questions regarding the trial.     Interval History 12/29/2022:  The patient returns for post op appointment. She is s/p lumbar [employer] SCS trial with 90% relief. She has had very minimal back pain during the trial period. She says that she was more active over this time due to the Christmas holiday and had minimal symptoms. She is very happy with the relief. She would like to move forward with implant. She is part of Cortez study. No fever or malaise during trial period. Her pain today is 1/10.    Interval History 1/17/2023:  The patient returns to clinic today for post op. She is s/p [employer] SCS implant on 1/9/2022. She reports benefit with the device at this time. She is meeting with the representatives today. She does report soreness to her incisions. She denies any fever, chills, or drainage. She did have issues with her dressing staying on. She has completed her antibiotics. She denies any other health changes. Her pain today is 2/10.    Interval History 1/31/2023:  The patient returns to clinic today for wound check. She reports benefit with [employer] SCS. She is in contact with representatives for programming. She reports intermittent muscle soreness into her legs. She does have an upcoming appointment with representatives for programming. She denies any fever, chills, or drainage. She continues to follow her activity restrictions. She denies any other health changes. Her pain today is 3/10.    Interval History 2/17/2023:  The patient returns to clinic today for  follow up of back pain. She reports benefit with [employer] SCS. She is in contact with representatives for programming. She is very happy with relief. She denies any fever, chills, or drainage. She continues to follow her activity restrictions. She denies any other health changes. Her pain today is 2/10.    Interval History 3/21/2023:  The patient presents to discuss pain to IPG site. She says that it has been present since surgery and has gradually worsened. She says that it feels like a tight and weak muscle. She has not tried any topical medications or muscle relaxants. No fever or malaise. She has not noticed any redness or swelling to the site. Her original pain has significantly improved from SCS implant. She is part of a research study. Her pain today is 6/10.    6 weeks of Conservative therapy (PT/Chiro/Home Exercises with Dates)  Healthy back program--> has four sessions left for a total of 20 sessions   Last session was on 1/31/22   Stretches that they taught at [employer] gives her relief       Medications:   None currently  Ice and heat which has helped   Tried Gabapentin and Lyrica- made her loopy      Report: reviewed       Interventional Pain Procedures:   2/8/2022- Bilateral L3,4,5 MBB  2/15/2022- Bilateral L3,4,5 MBB  3/8/2022- Right L3,4,5 RFA- 80% relief for a few months  3/22/2022- Left L3,4,5 RFA- 80% relief for a few months  12/22/22 SCS trial- 90% relief  1/9/2023- [employer] SCS implant.     Imaging:  10/13/22: MRI LUMBAR SPINE WITHOUT CONTRAST     CLINICAL HISTORY:  Low back pain, symptoms persist with > 6wks conservative treatment; Dorsalgia, unspecified     TECHNIQUE:  Multiplanar, multisequence MR images were acquired from the thoracolumbar junction to the sacrum without the administration of contrast.     COMPARISON:  Lumbar spine MRI 01/21/2022; CT abdomen pelvis 08/20/2022     FINDINGS:  Lumbar levoscoliosis centered at L2.  Minimal anterolisthesis at L4-5.     No  compression fractures.  No marrow replacing lesions.     Multilevel degenerative changes with disc desiccation and disc space narrowing, described in detail below.  Discogenic endplate edema at T12-L1.     The conus medullaris has a normal appearance and terminates at the L1 level.  Cauda equina nerve roots are unremarkable.  No epidural mass or collection.     The common duct is dilated up to 12 mm, previously 9 mm on 08/20/2022 CT.  Mild prominence of the main pancreatic duct up to 4 mm, which is new.  No definite filling defect in the bile ducts.  Paraspinal muscle atrophy.     SIGNIFICANT FINDINGS BY LEVEL:     T12-L1: Disc bulge.  Bilateral facet arthropathy and ligamentum flavum thickening.  Mild canal stenosis.  Mild bilateral foraminal stenosis.     L1-2: Disc bulge.  Bilateral facet arthropathy and ligamentum flavum thickening.  Mild canal stenosis.  Mild right foraminal stenosis.     L2-3: Disc bulge.  Bilateral facet arthropathy and ligamentum flavum thickening.  Mild canal stenosis.  Moderate right mild left foraminal stenosis.     L3-4: Disc bulge.  Bilateral facet arthropathy and ligamentum flavum thickening.  Small bilateral facet joint effusions/synovitis.  Moderate canal stenosis with partial effacement of the thecal sac and crowding of the cauda equina nerve roots.  Mild right and moderate left foraminal stenosis.     L4-5: Disc bulge with posterior disc uncovering.  Bilateral facet arthropathy and ligamentum flavum thickening.  Small right facet joint effusion/synovitis.  Mild canal stenosis.  Mild bilateral foraminal stenosis.     L5-S1: Disc bulge.  Bilateral facet arthropathy and ligamentum flavum thickening.  No significant canal stenosis.  Mild left foraminal stenosis.     Impression:     Lumbar levoscoliosis and multilevel degenerative changes as detailed above.  No significant changes from 01/21/2022.     Increased biliary ductal dilatation up to 12 mm, greater than expected after  cholecystectomy.  In addition, the main pancreatic duct is mildly prominent up to 4 mm, which is new.  If LFTs are abnormal, consider MRI/MRCP or ERCP for further evaluation.      Past Medical History:   Diagnosis Date    Cataract     Depression     Gestational diabetes     Hyperlipidemia     Hypertension     IBS (irritable bowel syndrome)     Thyroid disease      Past Surgical History:   Procedure Laterality Date    ADENOIDECTOMY      ARTHROSCOPIC CHONDROPLASTY OF KNEE JOINT Right 10/19/2021    Procedure: ARTHROSCOPY, KNEE, WITH CHONDROPLASTY;  Surgeon: Trevin Huber MD;  Location: [facility] OR;  Service: Orthopedics;  Laterality: Right;    ARTHROSCOPY OF KNEE Right 10/19/2021    Procedure: ARTHROSCOPY, KNEE-RIGHT;  Surgeon: Trevin Huber MD;  Location: [facility] OR;  Service: Orthopedics;  Laterality: Right;     SECTION      CHOLECYSTECTOMY      COLONOSCOPY N/A 2016    Procedure: COLONOSCOPY;  Surgeon: Heri Segura MD;  Location: [facility] (4TH FLR);  Service: Endoscopy;  Laterality: N/A;    COLONOSCOPY N/A 2019    Procedure: COLONOSCOPY;  Surgeon: Joe Pitts MD;  Location: [facility] ([address] [place] [address]);  Service: Endoscopy;  Laterality: N/A;    CYSTOSCOPY  2022    Procedure: CYSTOSCOPY;  Surgeon: Lenny Moore MD;  [address];  Service: Urology;;    ESOPHAGOGASTRODUODENOSCOPY N/A 2020    Procedure: EGD (ESOPHAGOGASTRODUODENOSCOPY);  Surgeon: Tolu Rivas MD;  [facility]  Service: Endoscopy;  Laterality: N/A;  Pt. moved to 9:15am arrival time.. Instructed to not have anything after midnight.EC    EYE SURGERY      cataract resection right    INJECTION OF ANESTHETIC AGENT AROUND NERVE Bilateral 2022    Procedure: Block, Nerve MEDIAL BRANCH BLOCK BILATERAL L3,4,5;  Surgeon: Tara Gibbs MD;  Location: [facility] PAIN MGT;  Service: Pain Management;  Laterality: Bilateral;    INJECTION OF ANESTHETIC AGENT AROUND NERVE Bilateral 2/15/2022     Procedure: BLOCK, NERVE, L3*L4-L5 MEDIAL BR ANCH 2 OF 2;  Surgeon: Tara Gibbs MD;  Location: [facility] PAIN MGT;  Service: Pain Management;  Laterality: Bilateral;    INJECTION OF BOTULINUM TOXIN TYPE A  6/21/2022    Procedure: BOTULINUM TOXIN INJECTION 100 units;  Surgeon: Lenny Moore MD;  Location: [facility] OR [facility] FLR;  Service: Urology;;    INSERTION, NEUROSTIMULATOR, SPINAL CORD N/A 1/9/2023    Procedure: SPINAL CORD STIMULATOR IMPLANT [email or web address];  Surgeon: Shoaib Aguirre MD;  Location: [facility] OR;  Service: Pain Management;  Laterality: N/A;    JOINT REPLACEMENT      partial right knee    KNEE ARTHROSCOPY W/ MENISCECTOMY Right 10/19/2021    Procedure: ARTHROSCOPY, KNEE, WITH MENISCECTOMY, PARTIAL LATERAL;  Surgeon: Trevin Huber MD;  Location: [facility] OR;  Service: Orthopedics;  Laterality: Right;    r hand surgery      RADIOFREQUENCY ABLATION Right 3/8/2022    Procedure: RADIOFREQUENCY ABLATION, L3-L5 1 OF 2;  Surgeon: Tara Gibbs MD;  Location: [facility] PAIN MGT;  Service: Pain Management;  Laterality: Right;    RADIOFREQUENCY ABLATION Left 3/22/2022    Procedure: RADIOFREQUENCY ABLATION, l3-+l5 2 of 2;  Surgeon: Tara iGbbs MD;  Location: [facility] PAIN MGT;  Service: Pain Management;  Laterality: Left;    TONSILLECTOMY      TOTAL KNEE ARTHROPLASTY      partial knee replacement    TRIAL OF SPINAL CORD NERVE STIMULATOR N/A 12/22/2022    Procedure: SPINAL CORD STIMULATOR TRIAL [email or web address];  Surgeon: Shoaib Aguirre MD;  Location: [facility] PAIN MGT;  Service: Pain Management;  Laterality: N/A;    TUBAL LIGATION       Social History     Socioeconomic History    Marital status:     Number of children: 1   Occupational History    Occupation:      Employer: [employer]     Comment: retired   Tobacco Use    Smoking status: Never    Smokeless tobacco: Never   Substance and Sexual Activity    Alcohol use: Yes     Alcohol/week: 3.0 standard drinks     Types: 3 Glasses of  wine per week     Comment: weekends    Drug use: No    Sexual activity: Yes     Partners: Male   Other Topics Concern    Are you pregnant or think you may be? No    Breast-feeding No   Social History Narrative    Retired        Sw[employer].       Stationary bike.            Social Determinants of Health     Financial Resource Strain: Low Risk     Difficulty of Paying Living Expenses: Not hard at all   Food Insecurity: No Food Insecurity    Worried About Running Out of Food in the Last Year: Never true    Ran Out of Food in the Last Year: Never true   Transportation Needs: No Transportation Needs    Lack of Transportation (Medical): No    Lack of Transportation (Non-Medical): No   Physical Activity: Insufficiently Active    Days of Exercise per Week: 4 days    Minutes of Exercise per Session: 20 min   Stress: Stress Concern Present    Feeling of Stress : Very much   Social Connections: Unknown    Frequency of Communication with Friends and Family: Twice a week    Active Member of Clubs or Organizations: No    Attends Club or Organization Meetings: Never    Marital Status:    Housing Stability: Low Risk     Unable to Pay for Housing in the Last Year: No    Number of Places Lived in the Last Year: 1    Unstable Housing in the Last Year: No     Family History   Problem Relation Age of Onset    Hypertension Mother     Irritable bowel syndrome Mother     Hyperlipidemia Mother     Heart disease Father         mi    Hypertension Father     Cancer Father         prostate    Heart attack Father     Heart failure Father     Hyperlipidemia Father     Alcohol abuse Sister     Depression Sister     Epilepsy Son     Irritable bowel syndrome Sister     Alcohol abuse Sister     Ovarian cancer Maternal Aunt     Breast cancer Paternal Aunt     Breast cancer Maternal Aunt     Melanoma Neg Hx     Colon cancer Neg Hx     Stomach cancer Neg Hx     Esophageal cancer Neg Hx     Liver disease Neg Hx     Crohn's disease Neg Hx      Ulcerative colitis Neg Hx     Celiac disease Neg Hx     Stroke Neg Hx        Review of patient's allergies indicates:  No Known Allergies    Current Outpatient Medications   Medication Sig    alprazolam (XANAX) 2 MG Tab Take 1 mg by mouth 3 (three) times daily as needed.    amLODIPine (NORVASC) 5 MG tablet Take 1 tablet (5 mg total) by mouth once daily.    atorvastatin (LIPITOR) 10 MG tablet Take 1 tablet (10 mg total) by mouth once daily.    b complex vitamins capsule Take 1 capsule by mouth once daily.    cholecalciferol, vitamin D3, (VITAMIN D3) 5,000 unit Tab Take 1 tablet (5,000 Units total) by mouth once daily.    conjugated estrogens (PREMARIN) vaginal cream Place 0.5 g vaginally twice a week. Place a pea-sized amount in vagina every night for 4 weeks, then use 2-3 nights a week    EScitalopram oxalate (LEXAPRO) 5 MG Tab Take 5 mg by mouth.    levothyroxine (SYNTHROID) 125 MCG tablet TAKE 1 TABLET BY MOUTH EVERY MORNING    liothyronine (CYTOMEL) 5 MCG Tab Take 1 tablet (5 mcg total) by mouth once daily.    promethazine (PHENERGAN) 12.5 MG Tab Take 1 tablet (12.5 mg total) by mouth every 6 (six) hours as needed (nausea).    RESTASIS 0.05 % ophthalmic emulsion INT 1 GTT IN OU BID    sumatriptan (IMITREX) 50 MG tablet 1 tab po at start of headache.  Repeat in 2 h prn    traZODone (DESYREL) 100 MG tablet 1-2 tabs po q hs for sleep    valsartan (DIOVAN) 320 MG tablet Take 1 tablet (320 mg total) by mouth once daily.    HYDROcodone-acetaminophen (NORCO) 5-325 mg per tablet Take 1 tablet by mouth every 24 hours as needed for Pain. (Patient not taking: Reported on 3/21/2023)    hydrOXYzine pamoate (VISTARIL) 50 MG Cap Take 50 mg by mouth nightly as needed.    pantoprazole (PROTONIX) 40 MG tablet Take 1 tablet (40 mg total) by mouth once daily. (Patient taking differently: Take 22 mg by mouth once daily.)     No current facility-administered medications for this visit.     Facility-Administered Medications Ordered in  "Other Visits   Medication    0.9%  NaCl infusion       REVIEW OF SYSTEMS:    GENERAL: No fever. No chills. No fatigue. Denies weight loss. Denies weight gain.  HEENT: Denies headaches. Denies vision change. Denies eye pain. Denies double vision. Denies ear pain.   CV: Denies chest pain. HTN  PULM: Denies of shortness of breath.  GI: Denies constipation. No diarrhea. No abdominal pain. Denies nausea. Denies vomiting. No blood in stool.  HEME: Denies bleeding problems.  : Denies urgency. No painful urination. No blood in urine.  MS: See HPI  SKIN: Denies rash.   NEURO: Denies seizures. No weakness.  ENDO: Thyroid disorder.   PSYCH:  Depression    OBJECTIVE:     Vitals:    23 0828   BP: 136/82   Pulse: 63   Resp: 18   Temp: 97.5 °F (36.4 °C)   SpO2: 100%   Weight: 64 kg (141 lb)   Height: 5' 3" (1.6 m)   PainSc:   6        PHYSICAL EXAM:   GENERAL: Well appearing, in no acute distress, alert and oriented x3.  PSYCH:  Mood and affect appropriate.  SKIN: Skin color, texture, turgor normal, no rashes or lesions. SCS site without drainage or signs of infection. Edges well approximated. TTP over IPG site.  GAIT: normal- ambulates without assistance.       ASSESSMENT: 68 y.o. year old female with low back pain, consistent with the followin. Chronic pain syndrome        2. Lumbar spondylosis        3. Spinal stenosis of lumbar region without neurogenic claudication                PLAN:     - She is s/p lumbar [employer] SCS implant. Incisions well healed. She has some pain to IPG site, likely muscular. No redness or signs of infection.    - She will apply Lidoderm patches. If limited benefit can trial Zanaflex 2-4 mg PRN.     - She is in contact with representatives for programming.     - Continue strengthening exercises.    - RTC in 3 weeks or sooner if needed.      The above plan and management options were discussed at length with patient. Patient is in agreement with the above and verbalized " understanding.     Jeannette Gomez, [facility]  03/21/2023

## 2023-03-23 ENCOUNTER — PATIENT MESSAGE (OUTPATIENT)
Dept: PAIN MEDICINE | Facility: CLINIC | Age: 68
End: 2023-03-23
Payer: MEDICARE

## 2023-03-24 ENCOUNTER — OFFICE VISIT (OUTPATIENT)
Dept: ORTHOPEDICS | Facility: CLINIC | Age: 68
End: 2023-03-24
Payer: MEDICARE

## 2023-03-24 ENCOUNTER — RESEARCH ENCOUNTER (OUTPATIENT)
Dept: RESEARCH | Facility: OTHER | Age: 68
End: 2023-03-24
Payer: MEDICARE

## 2023-03-24 VITALS — BODY MASS INDEX: 24.98 KG/M2 | WEIGHT: 141 LBS | HEIGHT: 63 IN

## 2023-03-24 DIAGNOSIS — M25.561 RIGHT KNEE PAIN, UNSPECIFIED CHRONICITY: Primary | ICD-10-CM

## 2023-03-24 DIAGNOSIS — Z96.659 HISTORY OF PARTIAL KNEE REPLACEMENT: Primary | ICD-10-CM

## 2023-03-24 PROCEDURE — 99213 OFFICE O/P EST LOW 20 MIN: CPT | Mod: PBBFAC,PN | Performed by: ORTHOPAEDIC SURGERY

## 2023-03-24 PROCEDURE — 99999 PR PBB SHADOW E&M-EST. PATIENT-LVL III: ICD-10-PCS | Mod: PBBFAC,,, | Performed by: ORTHOPAEDIC SURGERY

## 2023-03-24 PROCEDURE — 99214 OFFICE O/P EST MOD 30 MIN: CPT | Mod: S$PBB,,, | Performed by: ORTHOPAEDIC SURGERY

## 2023-03-24 PROCEDURE — 99999 PR PBB SHADOW E&M-EST. PATIENT-LVL III: CPT | Mod: PBBFAC,,, | Performed by: ORTHOPAEDIC SURGERY

## 2023-03-24 PROCEDURE — 99214 PR OFFICE/OUTPT VISIT, EST, LEVL IV, 30-39 MIN: ICD-10-PCS | Mod: S$PBB,,, | Performed by: ORTHOPAEDIC SURGERY

## 2023-03-24 RX ORDER — METHOCARBAMOL 500 MG/1
500 TABLET, FILM COATED ORAL 4 TIMES DAILY PRN
Qty: 40 TABLET | Refills: 0 | Status: SHIPPED | OUTPATIENT
Start: 2023-03-24 | End: 2023-04-14 | Stop reason: SDUPTHER

## 2023-03-24 NOTE — PROGRESS NOTES
Study: Wave Writer- BRUNO Study: A Randomized Controlled Study to Evaluate the Safety and Effectiveness of [employer] Spinal Cord Stimulation (SCS) Systems in the Treatment of Chronic Low Back and/or Leg Pain with No Prior Surgeries  IRB/Protocol #: [id number]/  : Obed Gasca MD  Treating Physician: Shoaib Aguirre MD  Site Number: 0777        Subject Number: [id number]    Treatment Lock Visit:  The subject came to [facility], met with KATHLEEN Gilman for treatment lock visit; device and programs optimized and locked. Reviewed the subject's opioid pain medications and reviewed Adverse events. Subject was seen in Pain Clinic on 3/21/23 for pain to IPG site, subject was instructed to use OTC Lidoderm patches and prescribed tiZANidine (ZANAFLEX) 4mg, if no relief received from Lidoderm patches.      There were no protocol deviations to report at this time. The subject's visit has been entered into [employer] and payment added to clinicard.

## 2023-03-24 NOTE — PROGRESS NOTES
NEW PATIENT ORTHOPAEDIC: Knee    PRIMARY CARE PHYSICIAN: Kaykay Perales MD   REFERRING PROVIDER: Aaareferral Self  No address on file     ASSESSMENT & PLAN:    Impression:  Right Knee Failed UKA  Progression of Right Knee Arthritis    Follow Up Plan: End of April for surgical consents    Surgery:    Tiarra Norton has radiograph and physical exam evidence of the aforementioned impression and wishes to pursue surgery. This patient appears to have sufficient symptoms to warrant surgical intervention and is an appropriate candidate for right Conversion UKA to Total Knee Arthroplasty as evidenced by months of unsuccessful non-operative treatment as outlined in the HPI below and progressive symptoms.    We had a lengthy discussion regarding the risk and benefit of surgery, the alternatives, limitations and personnel involved. These included but were not limited to infection, persistent pain, instability, nerve injury, blood clots, loosening and medical complications. We also discussed the pre-operative course, surgery itself and rehabilitation.    We will plan for use of [place] cemented CR components, with [facility] assistance.     Tentative Surgery Date: 5/18/23, she would like [place] and same day discharge if possible    Patients case is complicated by spinal cord stimulator trail she is currently in that prohibits earlier scheduling.  The patient has a long history of having right knee pain she had previously undergone unicompartmental knee arthroplasty in 2015 with Dr. Huber.  She had initially done well with this however began to experience lateral compartment issues and pain that workup in 2021 resulted in an arthroscopy and lateral meniscal debridement.  Based on that operative report she has wear underneath her kneecap as well as the lateral compartment.  She was seen by one of our colleagues Dr. Hills who recommended conversion from partial to total secondary to her pain complaints in intermittent effusions  that she is having.  I am seeing her today for potentially scheduling her sooner for surgery relative to time allowed it [facility] [place].  I would agree with this assessment that the a likely ongoing swelling and recurrent and consistent pain is likely secondary to the progression of her underlying arthritis that was documented on the operative report from 2021.  She has limited nonoperative options to help address with this pain as injection based treatments can increase her risk of infection.  She does have some confounding pain generators including her lumbar spine for which she is on a spinal cord stimulator.  She does report that this stimulator help significantly with her lumbar pain.  She is able to decipher in distinguish her lumbar pain from her ongoing knee pain.  She maintains good range of motion instability of her knee.  There is only a small effusion on today's exam.  She does have some medial joint line pain.  I think for her conversion would seem appropriate.  She would like to do this in May.    The patient has been ordered:  CT ([facility] Protocol)    CONSULTS:   Internal Medicine Consult for preoperative clearance.  Anesthesia for pre-surgical optimization    ACTIVE PROBLEM LIST  Patient Active Problem List   Diagnosis    HTN (hypertension)    Hypothyroid    Unspecified vitamin D deficiency    Elevated liver enzymes    Primary osteoarthritis of right knee    Fatty liver    Hyperlipidemia    Migraine without status migrainosus, not intractable    Glucose intolerance (impaired glucose tolerance)    Hyponatremia    Anxiety    Fatigue    Intermittent diarrhea    HERMINIA (obstructive sleep apnea)    Decreased libido    Sleep arousal disorder    Major depressive disorder, recurrent, mild    Pain    Colon adenomas    Abdominal pain    Exocrine pancreatic insufficiency    Irritable bowel syndrome with diarrhea    Overweight (BMI 25.0-29.9)    Poor posture    Complex tear of lateral meniscus of right knee as current  "injury    Decreased range of motion (ROM) of right knee    Weakness of right lower extremity    Decreased range of motion of trunk and back    Decreased strength of trunk and back    Idiopathic scoliosis of thoracolumbar spine    Primary insomnia    Back pain    Impaired functional mobility, balance, gait, and endurance    Posture imbalance    Weakness of trunk musculature    COVID    Chronic pain syndrome    History of partial knee replacement    Benzodiazepine dependence    Aortic atherosclerosis           SUBJECTIVE    CHIEF COMPLAINT: Knee Pain    HPI:   Tiarra Norton is a 68 y.o. female here for evaluation and management of right knee pain. There is not a specific incident that brought about this pain. she has had progressive problems with the knee(s) starting 6 months ago but is now progressing to interfere with activities which include: walking, enjoying hobbies, exercise, rising from a sitting position, getting in and out of a car, and climbing stairs     Currently the pain in the joint is rated at moderate with activity. The pain is intermittent and is located in the knee, located medially and located laterally. The pain is described as aching, sharp, and stiffness. Relieving factors include rest, prescription medication, and over the counter medication .     There is not associated Catching, Clicking, and Popping.     Tiarra Norton has no additional complaints.     Review of op report from knee scope of UKA in 2021 showed "grade 2-3 changes of the patellofemoral joint.  Some loose chondral flaps were gently debrided.  There was no pathology in either the medial or lateral gutter.  The implants appeared well positioned and well fixed.  There were in very good condition.  The ACL and PCL were intact.  Lateral compartment was entered.  She was developed and an articular cartilage flap on the lateral femoral condyle a gentle chondroplasty was performed debriding this to a stable rim.  This was not " "full-thickness cartilage loss.  She did have grade 3 changes of the articular surface of the tibia and a chondroplasty was performed here.  She also had a complex lateral meniscus tear of the posterior 3rd of the meniscus."    PROGRESSIVE SYMPTOMS:  Pain worsened by weight bearing  Pain effecting living situation    FUNCTIONAL STATUS:   Climb a flight of stairs or walk up a hill     PREVIOUS TREATMENTS:  Medical: RX NSAIDS and Narcotics  Physical Therapy: Activities Modified  and Formal Physical Therapy for 6 weeks  Previous Orthopaedic Surgery: Right knee UKA and subsequent knee arthroscopsy     REVIEW OF SYSTEMS:  PAIN ASSESSMENT:  See HPI.  MUSCULOSKELETAL: See HPI.  OTHER 10 point review of systems is negative except as stated in HPI above    PAST MEDICAL HISTORY   has a past medical history of Cataract, Depression, Gestational diabetes, Hyperlipidemia, Hypertension, IBS (irritable bowel syndrome), and Thyroid disease.     PAST SURGICAL HISTORY   has a past surgical history that includes  section; Tubal ligation; Tonsillectomy; Adenoidectomy; Cholecystectomy; Eye surgery; r hand surgery; Total knee arthroplasty; Colonoscopy (N/A, 2016); Colonoscopy (N/A, 2019); Esophagogastroduodenoscopy (N/A, 2020); Joint replacement; Arthroscopy of knee (Right, 10/19/2021); Arthroscopic chondroplasty of knee joint (Right, 10/19/2021); Knee arthroscopy w/ meniscectomy (Right, 10/19/2021); Injection of anesthetic agent around nerve (Bilateral, 2022); Injection of anesthetic agent around nerve (Bilateral, 2/15/2022); Radiofrequency ablation (Right, 3/8/2022); Radiofrequency ablation (Left, 3/22/2022); Injection of botulinum toxin type A (2022); Cystoscopy (2022); Trial of spinal cord nerve stimulator (N/A, 2022); and insertion, neurostimulator, spinal cord (N/A, 2023).     FAMILY HISTORY  family history includes Alcohol abuse in her sister and sister; Breast cancer in her maternal " aunt and paternal aunt; Cancer in her father; Depression in her sister; Epilepsy in her son; Heart attack in her father; Heart disease in her father; Heart failure in her father; Hyperlipidemia in her father and mother; Hypertension in her father and mother; Irritable bowel syndrome in her mother and sister; Ovarian cancer in her maternal aunt.     SOCIAL HISTORY   reports that she has never smoked. She has never used smokeless tobacco. She reports current alcohol use of about 3.0 standard drinks per week. She reports that she does not use drugs.     ALLERGIES   Review of patient's allergies indicates:  No Known Allergies     MEDICATIONS  Current Outpatient Medications on File Prior to Visit   Medication Sig Dispense Refill    alprazolam (XANAX) 2 MG Tab Take 1 mg by mouth 3 (three) times daily as needed.  0    amLODIPine (NORVASC) 5 MG tablet Take 1 tablet (5 mg total) by mouth once daily. 90 tablet 3    atorvastatin (LIPITOR) 10 MG tablet Take 1 tablet (10 mg total) by mouth once daily. 90 tablet 3    b complex vitamins capsule Take 1 capsule by mouth once daily.      cholecalciferol, vitamin D3, (VITAMIN D3) 5,000 unit Tab Take 1 tablet (5,000 Units total) by mouth once daily.      conjugated estrogens (PREMARIN) vaginal cream Place 0.5 g vaginally twice a week. Place a pea-sized amount in vagina every night for 4 weeks, then use 2-3 nights a week 1 applicator 1    EScitalopram oxalate (LEXAPRO) 5 MG Tab Take 5 mg by mouth.      hydrOXYzine pamoate (VISTARIL) 50 MG Cap Take 50 mg by mouth nightly as needed.      levothyroxine (SYNTHROID) 125 MCG tablet TAKE 1 TABLET BY MOUTH EVERY MORNING 90 tablet 2    liothyronine (CYTOMEL) 5 MCG Tab Take 1 tablet (5 mcg total) by mouth once daily. 90 tablet 3    pantoprazole (PROTONIX) 40 MG tablet Take 1 tablet (40 mg total) by mouth once daily. (Patient taking differently: Take 22 mg by mouth once daily.) 90 tablet 2    promethazine (PHENERGAN) 12.5 MG Tab Take 1 tablet  "(12.5 mg total) by mouth every 6 (six) hours as needed (nausea). 30 tablet 2    RESTASIS 0.05 % ophthalmic emulsion INT 1 GTT IN OU BID      sumatriptan (IMITREX) 50 MG tablet 1 tab po at start of headache.  Repeat in 2 h prn 36 tablet 3    tiZANidine (ZANAFLEX) 4 MG tablet Take 1 tablet (4 mg total) by mouth every 8 (eight) hours as needed (muscle pain). 30 tablet 0    traZODone (DESYREL) 100 MG tablet 1-2 tabs po q hs for sleep 180 tablet 3    valsartan (DIOVAN) 320 MG tablet Take 1 tablet (320 mg total) by mouth once daily. 90 tablet 3    HYDROcodone-acetaminophen (NORCO) 5-325 mg per tablet Take 1 tablet by mouth every 24 hours as needed for Pain. (Patient not taking: Reported on 3/21/2023) 30 tablet 0     Current Facility-Administered Medications on File Prior to Visit   Medication Dose Route Frequency Provider Last Rate Last Admin    0.9%  NaCl infusion  500 mL Intravenous Continuous Adelfo Zamarripa MD              PHYSICAL EXAM   height is 5' 3" (1.6 m) and weight is 64 kg (141 lb).   Body mass index is 24.98 kg/m².      All other systems deferred.  GENERAL:  No acute distress  HABITUS: Normal  GAIT: Non-antalgic  SKIN: Normal  and Well healed surgical incision    KNEE EXAM:    right:   Effusion: Minimal joint effusion  TTP: yes over Medial Joint Line and Lateral Joint Line   no crepitus with passive knee ROM  Passive ROM: Extension 0, Flexion 125  No pain with manipulation of patella  Stable to varus/valgus stress. No increased laxity to anterior/posterior drawer testing  negative Berna's test  No pain with IR/ER rotation of the hip  5/5 strength in knee flexion and extension, ankle plantarflexion and dorsiflexion  Neurovascular Status: Sensation intact to light touch in Sural, Saphenous, SPN, DPN, Tibial nerve distribution  2+ pulse DP/PT, normal capillary refill, foot has normal warmth    DATA:  Diagnostic tests reviewed for today's visit:     3v knee radiographs reveal components to be in " appropriate position with good alignment. There is no evidence of loosening or abnormal wear.

## 2023-03-26 ENCOUNTER — PATIENT MESSAGE (OUTPATIENT)
Dept: INTERNAL MEDICINE | Facility: CLINIC | Age: 68
End: 2023-03-26
Payer: MEDICARE

## 2023-03-26 DIAGNOSIS — Z01.818 PRE-OP EVALUATION: Primary | ICD-10-CM

## 2023-03-27 ENCOUNTER — PATIENT MESSAGE (OUTPATIENT)
Dept: ORTHOPEDICS | Facility: CLINIC | Age: 68
End: 2023-03-27
Payer: MEDICARE

## 2023-03-28 NOTE — TELEPHONE ENCOUNTER
Patient Instructions by Tatianna Jones, Janice Amaya at 05/14/18 10:18 AM     Author:  Tatianna Jones CMA Service:  (none) Author Type:  Certified Medical Assistant     Filed:  05/14/18 10:19 AM Encounter Date:  5/14/2018 Status:  Signed     :  Tatianna Jones, Janice Amaya (Certified Medical Assistant)            Next visit with [place] [facility] is on 11/12/2018 at  9:15 AM in [id number] [place] [address]      Fasting Labs  Please return to the lab for fasting lab work at your earliest convenience. Â· Please come fasting (at least 8 hours) to check labs. Â· Please drink plenty of water before your appointment. Â· You can take any prescribed or routine medicine with water before your appointment. Â· You can brush your teeth even if you are fasting. Gastroenterology (GI) at the [facility] or the [facility] - Phone # is [phone] to schedule an appointment w/Dr Manuel Cote. Additional Educational Resources: For additional resources regarding your symptoms, diagnosis, or further health information, please visit the Health Resources section on [employer] or the Online Health Resources section in [employer].           Revision History        User Key Date/Time User Provider Type Action    > [N/A] 05/14/18 10:19 AM Tatianna Jones, Janice Magana Xetalwill Amaya Certified Medical Assistant Sign Schedule ekg

## 2023-03-29 ENCOUNTER — TELEPHONE (OUTPATIENT)
Dept: RESEARCH | Facility: OTHER | Age: 68
End: 2023-03-29
Payer: MEDICARE

## 2023-03-29 NOTE — TELEPHONE ENCOUNTER
----- Message from Shoaib Aguirre MD sent at 3/28/2023  6:08 PM CDT -----  Regarding: RE: AE?  We can report it as a mild AE      ----- Message -----  From: Nena Prajapati  Sent: 3/24/2023  11:32 AM CDT  To: Shoaib Aguirre MD  Subject: AE?                                              Lb:    Pt had treatment lock visit today. Please advise if the pain clinic visit on 21Mar2023 for pain to IPG site is considered an AE. Patient was instructed to use OTC lidoderm patches, and if no relief , given a RX for tiZANidine 4mg.    Is this a reportable AE? If it is considered a reportable AE, please advise the severity as well.     ThanksNena

## 2023-03-31 ENCOUNTER — HOSPITAL ENCOUNTER (OUTPATIENT)
Dept: CARDIOLOGY | Facility: CLINIC | Age: 68
Discharge: HOME | End: 2023-03-31
Payer: MEDICARE

## 2023-03-31 DIAGNOSIS — Z01.818 PRE-OP EVALUATION: ICD-10-CM

## 2023-03-31 PROCEDURE — 93010 EKG 12-LEAD: ICD-10-PCS | Mod: S$PBB,,, | Performed by: INTERNAL MEDICINE

## 2023-03-31 PROCEDURE — 93010 ELECTROCARDIOGRAM REPORT: CPT | Mod: S$PBB,,, | Performed by: INTERNAL MEDICINE

## 2023-03-31 PROCEDURE — 93005 ELECTROCARDIOGRAM TRACING: CPT | Mod: PBBFAC | Performed by: INTERNAL MEDICINE

## 2023-04-04 ENCOUNTER — PATIENT MESSAGE (OUTPATIENT)
Dept: INTERNAL MEDICINE | Facility: CLINIC | Age: 68
End: 2023-04-04
Payer: MEDICARE

## 2023-04-05 ENCOUNTER — PATIENT MESSAGE (OUTPATIENT)
Dept: ORTHOPEDICS | Facility: CLINIC | Age: 68
End: 2023-04-05
Payer: MEDICARE

## 2023-04-12 ENCOUNTER — RESEARCH ENCOUNTER (OUTPATIENT)
Dept: RESEARCH | Facility: OTHER | Age: 68
End: 2023-04-12
Payer: MEDICARE

## 2023-04-12 NOTE — PROGRESS NOTES
Study: Wave Writer- BRUNO Study: A Randomized Controlled Study to Evaluate the Safety and Effectiveness of [employer] Scientific Spinal Cord Stimulation (SCS) Systems in the Treatment of Chronic Low Back and/or Leg Pain with No Prior Surgeries  IRB/Protocol #: [id number]/  : Obed Gasca MD  Treating Physician: Shoaib Aguirer MD  Site Number: [id number]        Subject Number: G011     3 Month Visit:   The subject came to [facility] for 3 month post activation visit. Present at visit were Dr. Shoaib Aguirre and Nena Prajapati. The subject's JERZY was reviewed and entered into the EDC. The subject's opioid pain medications were reviewed, confirmed no changes. The subject also reported she is unemployed and not looking for work, there were 0 ED visits, 0 out-patient hospital visits and 0 primary care visits since the last study visit. The following questionnaire and/or assessments were completed using the iPad by the patient. The investigator used back-up paper forms for the clinician assessment due to the iPad not allowing the investigator to login again.      Questionnaires   Pain Intensity (NRS)  Oswestry Disability Index (PREMA)  SF-36 V2 Health Survey  [id number]  PSQI  Patient PGIC  Patient Percent Pain Relief  Patient Treatment Satisfaction   Clinical Global Impression of Change -Clinician  Pain Intensity VRS (Follow-up)-Clinician        Subject also met with KATHLEEN Cali to discuss any adjustments to device programming.  Adverse event reporting was reviewed with the subject and there were no adverse events or protocol deviations to report at this time. A payment of $100 was added to the subject's clincard.

## 2023-04-13 ENCOUNTER — TELEPHONE (OUTPATIENT)
Dept: PAIN MEDICINE | Facility: CLINIC | Age: 68
End: 2023-04-13
Payer: MEDICARE

## 2023-04-13 DIAGNOSIS — M51.36 DDD (DEGENERATIVE DISC DISEASE), LUMBAR: Primary | ICD-10-CM

## 2023-04-13 NOTE — TELEPHONE ENCOUNTER
----- Message from Kiara Rosas MA sent at 4/13/2023  8:07 AM CDT -----  Regarding: RE: PT    ----- Message -----  From: Nena Prajapati  Sent: 4/13/2023   7:25 AM CDT  To: Timothy C[facility] Staff  Subject: PT                                               Dr. Aguirre:    As was discussed with the patient, please order the PT for the back pain.    Thank you,  Nena

## 2023-04-14 ENCOUNTER — OFFICE VISIT (OUTPATIENT)
Dept: PAIN MEDICINE | Facility: CLINIC | Age: 68
End: 2023-04-14
Payer: MEDICARE

## 2023-04-14 ENCOUNTER — OFFICE VISIT (OUTPATIENT)
Dept: OBSTETRICS AND GYNECOLOGY | Facility: CLINIC | Age: 68
End: 2023-04-14
Payer: MEDICARE

## 2023-04-14 VITALS
HEART RATE: 61 BPM | BODY MASS INDEX: 24.98 KG/M2 | HEIGHT: 63 IN | DIASTOLIC BLOOD PRESSURE: 72 MMHG | SYSTOLIC BLOOD PRESSURE: 131 MMHG

## 2023-04-14 DIAGNOSIS — Z01.419 ENCOUNTER FOR WELL WOMAN EXAM WITH ROUTINE GYNECOLOGICAL EXAM: ICD-10-CM

## 2023-04-14 DIAGNOSIS — N95.2 VAGINAL ATROPHY: Primary | ICD-10-CM

## 2023-04-14 DIAGNOSIS — M51.36 DDD (DEGENERATIVE DISC DISEASE), LUMBAR: ICD-10-CM

## 2023-04-14 DIAGNOSIS — M47.816 LUMBAR SPONDYLOSIS: ICD-10-CM

## 2023-04-14 DIAGNOSIS — G89.4 CHRONIC PAIN SYNDROME: ICD-10-CM

## 2023-04-14 DIAGNOSIS — M62.838 MUSCLE SPASM: Primary | ICD-10-CM

## 2023-04-14 PROCEDURE — 99213 PR OFFICE/OUTPT VISIT, EST, LEVL III, 20-29 MIN: ICD-10-PCS | Mod: S$PBB,,, | Performed by: NURSE PRACTITIONER

## 2023-04-14 PROCEDURE — 99999 PR PBB SHADOW E&M-EST. PATIENT-LVL III: ICD-10-PCS | Mod: PBBFAC,,, | Performed by: OBSTETRICS & GYNECOLOGY

## 2023-04-14 PROCEDURE — 99999 PR PBB SHADOW E&M-EST. PATIENT-LVL III: ICD-10-PCS | Mod: PBBFAC,,, | Performed by: NURSE PRACTITIONER

## 2023-04-14 PROCEDURE — G0101 CA SCREEN;PELVIC/BREAST EXAM: HCPCS | Mod: S$PBB,GZ,, | Performed by: OBSTETRICS & GYNECOLOGY

## 2023-04-14 PROCEDURE — 99213 OFFICE O/P EST LOW 20 MIN: CPT | Mod: PBBFAC,PN | Performed by: OBSTETRICS & GYNECOLOGY

## 2023-04-14 PROCEDURE — 99213 OFFICE O/P EST LOW 20 MIN: CPT | Mod: S$PBB,,, | Performed by: NURSE PRACTITIONER

## 2023-04-14 PROCEDURE — 99213 OFFICE O/P EST LOW 20 MIN: CPT | Mod: PBBFAC,27 | Performed by: NURSE PRACTITIONER

## 2023-04-14 PROCEDURE — 99999 PR PBB SHADOW E&M-EST. PATIENT-LVL III: CPT | Mod: PBBFAC,,, | Performed by: OBSTETRICS & GYNECOLOGY

## 2023-04-14 PROCEDURE — G0101 PR CA SCREEN;PELVIC/BREAST EXAM: ICD-10-PCS | Mod: S$PBB,GZ,, | Performed by: OBSTETRICS & GYNECOLOGY

## 2023-04-14 PROCEDURE — 99999 PR PBB SHADOW E&M-EST. PATIENT-LVL III: CPT | Mod: PBBFAC,,, | Performed by: NURSE PRACTITIONER

## 2023-04-14 RX ORDER — METHOCARBAMOL 500 MG/1
500 TABLET, FILM COATED ORAL 3 TIMES DAILY PRN
Qty: 90 TABLET | Refills: 0 | Status: SHIPPED | OUTPATIENT
Start: 2023-04-14 | End: 2023-05-14

## 2023-04-14 RX ORDER — ESTRADIOL 0.1 MG/G
CREAM VAGINAL
Qty: 30 G | Refills: 12 | Status: SHIPPED | OUTPATIENT
Start: 2023-04-14 | End: 2023-11-16 | Stop reason: CLARIF

## 2023-04-14 NOTE — PROGRESS NOTES
History & Physical  Gynecology      SUBJECTIVE:     Chief Complaint: Well Woman (Pt complain of vaginal dryness. )       History of Present Illness:    Tiarra Norton is a 68 y.o. female  (C/S x1)  for annual routine Pap and checkup. No LMP recorded. Patient is postmenopausal..  Recently had neurostimulator placed in spine for degenerative disc disease.  Pt is here with complaints of vaginal dryness.  Pt is having a lot of pain with intercourse and having bleeding.  Having trouble remembering premarin cream.  Also not covered by insurance.    denies vaginal itching or irritation.  denies vaginal discharge.    She is sexually active with 1 partner.    History of abnormal pap: No  Last Pap: 2020  Last MMG: Yes - 2023  Last Colonoscopy:  Yes - 2019, needs repeat in 5 years-- .      Review of patient's allergies indicates:  No Known Allergies    Past Medical History:   Diagnosis Date    Cataract     Depression     Gestational diabetes     Hyperlipidemia     Hypertension     IBS (irritable bowel syndrome)     Thyroid disease      Past Surgical History:   Procedure Laterality Date    ADENOIDECTOMY      ARTHROSCOPIC CHONDROPLASTY OF KNEE JOINT Right 10/19/2021    Procedure: ARTHROSCOPY, KNEE, WITH CHONDROPLASTY;  Surgeon: Trevin Huber MD;  Location: [facility] OR;  Service: Orthopedics;  Laterality: Right;    ARTHROSCOPY OF KNEE Right 10/19/2021    Procedure: ARTHROSCOPY, KNEE-RIGHT;  Surgeon: Trevin Huber MD;  Location: [facility] OR;  Service: Orthopedics;  Laterality: Right;     SECTION      CHOLECYSTECTOMY      COLONOSCOPY N/A 2016    Procedure: COLONOSCOPY;  Surgeon: Heri Segura MD;  Location: [address]);  Service: Endoscopy;  Laterality: N/A;    COLONOSCOPY N/A 2019    Procedure: COLONOSCOPY;  Surgeon: Joe Pitts MD;  Location: [facility] ([address] [place] [address]);  Service: Endoscopy;  Laterality: N/A;    CYSTOSCOPY  2022    Procedure: CYSTOSCOPY;  Surgeon: Lenny  ANNMARIE Moore MD;  Location: [facility] OR 1ST FLR;  Service: Urology;;    ESOPHAGOGASTRODUODENOSCOPY N/A 2/13/2020    Procedure: EGD (ESOPHAGOGASTRODUODENOSCOPY);  Surgeon: Tolu Rivas MD;  Location: [facility] ENDO (4TH FLR);  Service: Endoscopy;  Laterality: N/A;  Pt. moved to 9:15am arrival time.. Instructed to not have anything after midnight.EC    EYE SURGERY      cataract resection right    INJECTION OF ANESTHETIC AGENT AROUND NERVE Bilateral 2/8/2022    Procedure: Block, Nerve MEDIAL BRANCH BLOCK BILATERAL L3,4,5;  Surgeon: Tara Gibbs MD;  Location: [facility] PAIN MGT;  Service: Pain Management;  Laterality: Bilateral;    INJECTION OF ANESTHETIC AGENT AROUND NERVE Bilateral 2/15/2022    Procedure: BLOCK, NERVE, L3*L4-L5 MEDIAL BR ANCH 2 OF 2;  Surgeon: Tara Gibbs MD;  Location: [facility] PAIN MGT;  Service: Pain Management;  Laterality: Bilateral;    INJECTION OF BOTULINUM TOXIN TYPE A  6/21/2022    Procedure: BOTULINUM TOXIN INJECTION 100 units;  Surgeon: Lenny Moore MD;  Location: [facility] 1ST FLR;  Service: Urology;;    INSERTION, NEUROSTIMULATOR, SPINAL CORD N/A 1/9/2023    Procedure: SPINAL CORD STIMULATOR IMPLANT [employer];  Surgeon: Shoaib Aguirre MD;  Location: [facility];  Service: Pain Management;  Laterality: N/A;    JOINT REPLACEMENT      partial right knee    KNEE ARTHROSCOPY W/ MENISCECTOMY Right 10/19/2021    Procedure: ARTHROSCOPY, KNEE, WITH MENISCECTOMY, PARTIAL LATERAL;  Surgeon: Trevin Huber MD;  Location: [facility] OR;  Service: Orthopedics;  Laterality: Right;    r hand surgery      RADIOFREQUENCY ABLATION Right 3/8/2022    Procedure: RADIOFREQUENCY ABLATION, L3-L5 1 OF 2;  Surgeon: Tara Gibbs MD;  Location: [facility] PAIN MGT;  Service: Pain Management;  Laterality: Right;    RADIOFREQUENCY ABLATION Left 3/22/2022    Procedure: RADIOFREQUENCY ABLATION, l3-+l5 2 of 2;  Surgeon: Tara Gibbs MD;  Location: [facility] PAIN MGT;  Service: Pain Management;  Laterality:  Left;    TONSILLECTOMY      TOTAL KNEE ARTHROPLASTY      partial knee replacement    TRIAL OF SPINAL CORD NERVE STIMULATOR N/A 2022    Procedure: SPINAL CORD STIMULATOR TRIAL [facility];  Surgeon: Shoaib Aguirre MD;  Location: [facility];  Service: Pain Management;  Laterality: N/A;    TUBAL LIGATION       OB History          2    Para   2    Term   2            AB        Living             SAB        IAB        Ectopic        Multiple        Live Births                   Family History   Problem Relation Age of Onset    Hypertension Mother     Irritable bowel syndrome Mother     Hyperlipidemia Mother     Heart disease Father         mi    Hypertension Father     Cancer Father         prostate    Heart attack Father     Heart failure Father     Hyperlipidemia Father     Alcohol abuse Sister     Depression Sister     Epilepsy Son     Irritable bowel syndrome Sister     Alcohol abuse Sister     Ovarian cancer Maternal Aunt     Breast cancer Paternal Aunt     Breast cancer Maternal Aunt     Melanoma Neg Hx     Colon cancer Neg Hx     Stomach cancer Neg Hx     Esophageal cancer Neg Hx     Liver disease Neg Hx     Crohn's disease Neg Hx     Ulcerative colitis Neg Hx     Celiac disease Neg Hx     Stroke Neg Hx      Social History     Tobacco Use    Smoking status: Never    Smokeless tobacco: Never   Substance Use Topics    Alcohol use: Yes     Alcohol/week: 3.0 standard drinks     Types: 3 Glasses of wine per week     Comment: weekends    Drug use: No       Current Outpatient Medications   Medication Sig    alprazolam (XANAX) 2 MG Tab Take 1 mg by mouth 3 (three) times daily as needed.    amLODIPine (NORVASC) 5 MG tablet Take 1 tablet (5 mg total) by mouth once daily.    atorvastatin (LIPITOR) 10 MG tablet Take 1 tablet (10 mg total) by mouth once daily.    b complex vitamins capsule Take 1 capsule by mouth once daily.    cholecalciferol, vitamin D3, (VITAMIN D3) 5,000 unit Tab Take 1  tablet (5,000 Units total) by mouth once daily.    conjugated estrogens (PREMARIN) vaginal cream Place 0.5 g vaginally twice a week. Place a pea-sized amount in vagina every night for 4 weeks, then use 2-3 nights a week    EScitalopram oxalate (LEXAPRO) 5 MG Tab Take 5 mg by mouth.    levothyroxine (SYNTHROID) 125 MCG tablet TAKE 1 TABLET BY MOUTH EVERY MORNING    liothyronine (CYTOMEL) 5 MCG Tab Take 1 tablet (5 mcg total) by mouth once daily.    promethazine (PHENERGAN) 12.5 MG Tab Take 1 tablet (12.5 mg total) by mouth every 6 (six) hours as needed (nausea).    RESTASIS 0.05 % ophthalmic emulsion INT 1 GTT IN OU BID    sumatriptan (IMITREX) 50 MG tablet 1 tab po at start of headache.  Repeat in 2 h prn    traZODone (DESYREL) 100 MG tablet 1-2 tabs po q hs for sleep    valsartan (DIOVAN) 320 MG tablet Take 1 tablet (320 mg total) by mouth once daily.    estradioL (ESTRACE) 0.01 % (0.1 mg/gram) vaginal cream Place 1g daily for 2-4 weeks and then 2-3x/week afterwards    estradioL (ESTRING) 2 mg (7.5 mcg /24 hour) vaginal ring Place 2 mg vaginally every 3 (three) months.    HYDROcodone-acetaminophen (NORCO) 5-325 mg per tablet Take 1 tablet by mouth every 24 hours as needed for Pain. (Patient not taking: Reported on 3/21/2023)    hydrOXYzine pamoate (VISTARIL) 50 MG Cap Take 50 mg by mouth nightly as needed.    pantoprazole (PROTONIX) 40 MG tablet Take 1 tablet (40 mg total) by mouth once daily. (Patient taking differently: Take 22 mg by mouth once daily.)     No current facility-administered medications for this visit.     Facility-Administered Medications Ordered in Other Visits   Medication    0.9%  NaCl infusion         Review of Systems:  Review of Systems   Constitutional:  Negative for activity change, appetite change, chills, fatigue, fever and unexpected weight change.   Respiratory:  Negative for cough, shortness of breath and wheezing.    Cardiovascular:  Negative for chest pain and leg swelling.    Gastrointestinal:  Negative for abdominal pain, blood in stool, constipation, diarrhea, nausea and vomiting.   Endocrine: Negative for diabetes, hair loss and hot flashes.   Genitourinary:  Positive for decreased libido, vaginal pain and vaginal dryness. Negative for dyspareunia, dysuria, frequency, menstrual problem, pelvic pain, vaginal bleeding, vaginal discharge, postcoital bleeding and postmenopausal bleeding.   Musculoskeletal:  Negative for back pain.   Integumentary:  Negative for acne, hair changes, breast mass, nipple discharge and breast skin changes.   Neurological:  Negative for headaches.   Psychiatric/Behavioral:  Negative for sleep disturbance. The patient is not nervous/anxious.    Breast: Negative for mass, mastodynia, nipple discharge and skin changes     OBJECTIVE:     Physical Exam:  Physical Exam  Constitutional:       Appearance: She is well-developed.   HENT:      Head: Normocephalic and atraumatic.   Eyes:      General: No scleral icterus.        Right eye: No discharge.         Left eye: No discharge.      Conjunctiva/sclera: Conjunctivae normal.   Pulmonary:      Effort: Pulmonary effort is normal.      Breath sounds: No stridor.   Chest:      Chest wall: No mass or tenderness.   Breasts:     Breasts are symmetrical.      Right: No inverted nipple, mass, nipple discharge, skin change or tenderness.      Left: No inverted nipple, mass, nipple discharge, skin change or tenderness.   Abdominal:      General: There is no distension.      Palpations: Abdomen is soft.      Tenderness: There is no abdominal tenderness.   Genitourinary:     Labia:         Right: No rash, tenderness, lesion or injury.         Left: No rash, tenderness, lesion or injury.       Vagina: Normal.      Cervix: No cervical motion tenderness, discharge or friability.      Adnexa:         Right: No mass, tenderness or fullness.          Left: No mass, tenderness or fullness.        Comments: Normal external genitalia.   Normal hair distribution.  Urethral meatus normal. No cervical lesions or masses.  No vaginal bleeding noted. + vaginal atrophy.  No adnexal or uterine tenderness.  No palpable adnexal masses.  Musculoskeletal:         General: Normal range of motion.   Skin:     General: Skin is warm and dry.   Neurological:      Mental Status: She is alert and oriented to person, place, and time.   Psychiatric:         Behavior: Behavior normal.         Thought Content: Thought content normal.         Judgment: Judgment normal.         ASSESSMENT:       ICD-10-CM ICD-9-CM    1. Vaginal atrophy  N95.2 627.3 estradioL (ESTRING) 2 mg (7.5 mcg /24 hour) vaginal ring      estradioL (ESTRACE) 0.01 % (0.1 mg/gram) vaginal cream      2. Encounter for well woman exam with routine gynecological exam  Z01.419 V72.31                Plan:      Tiarra was seen today for well woman.    Diagnoses and all orders for this visit:    Encounter for well woman exam with routine gynecological exam  - No hx of abnormal pap smears.  Pt no longer needs pap smears  - Up to date on mammogram  - Cscope up to date    Menopausal vaginal dryness  - Vaginal atrophy on exam.  Adding to discomfort during intercourse.  - Discussed vaginal estrogen options.  Pt would like to try estring, as this would be easier to maintain.  However, concerned about insurance coverage.  Will send in RX for estring.  Will also send in Rx for estrace to see if this is better covered with insurance.  -     estradioL (ESTRING) 2 mg (7.5 mcg /24 hour) vaginal ring; Place 2 mg vaginally every 3 (three) months.  -     estradioL (ESTRACE) 0.01 % (0.1 mg/gram) vaginal cream; Place 1g daily for 2-4 weeks and then 2-3x/week afterwards        No orders of the defined types were placed in this encounter.      No follow-ups on file.    Counseling time: 15 minutes    Tiny Cook

## 2023-04-14 NOTE — PROGRESS NOTES
Chronic Pain-Established Visit         SUBJECTIVE:    PCP: Kaykay Perales MD    REFERRING PHYSICIAN: Tyler Jacobs MD    CHIEF COMPLAINT: Lower back pain       Original HISTORY OF PRESENT ILLNESS: Tiarra Norton presents to the clinic for the evaluation of the above pain. The pain started five years ago.     Original Pain Description:  The pain is located in the lower back and does radiate up towards her upper back.The pain is described as aching, dull and sharp. Initially starts as a dull, aching pain and it gets sharp once she bends down and moves around. Typically when she walks for more than five minutes, the sharp pain radiates up her back. Exacerbating factors: Standing, Bending, Touching, Walking, Night Time, Flexing and Lifting. Mitigating factors heat, ice, laying down and massage. Symptoms interfere with daily activity and sleeping. The patient feels like symptoms have been worsening. Patient denies night fever/night sweats, urinary incontinence, bowel incontinence, significant weight loss, significant motor weakness and loss of sensations.    Original PAIN SCORES:  Best: Pain is 1  Worst: Pain is 10  Usually: Pain is 4  Current: Pain is 4    INTERVAL HISTORY:  4/18/22 Interval History: Patient presents for telehealth visit for follow up following her b/l RFA at L3-L5. She reports 80% relief and is very pleased with her pain relief. Unfortunately, she is not back to doing what she wants due to right knee pain. She is seeing Dr. Huber for knee pain and is s/p right medial compartment partial knee replacement. She is currently in therapy for this. We discussed that we may be able to assist her with her knee pain as well.     Interval History 6/15/22: Tiarra Norton returns for follow up. She continues to feel like she has mild pain sitting or laying down, and has her worst pain with extreme leaning forward to pick something up off the floor. She also has difficulty leaning over her handlebars  to ride her bike or leaning forward to fold clothes or standing up for any period of time. So, we determined that leaning forward is her worst issue. We did previouisly do lmbb and rfa, which has helped with extension, but it would not help with leaning forward. On review of her MRI she has significant multilevel DDD with Modic changes which likely account for her pain.     Interval History 7/25/22: Tiarra Norton returns for follow up. We previously have been discussing treatments for her low back pain that did not resolve with RFA. At our last visit I referred her to Dr. Antony for clearance for a SCS trial. She was considered to be a reasonable candidate. However, in the meantime, her PT referred her to acupuncture with Dr. Jacobs. She had one treatment and already notes 50% relief. She notes that she still has pain, especially with leaning forward, but feels that it is much better controlled. Their plan is one treatment per week but is approved for 20 sessions.     Interval History 8/22/22: Ms. Norton returns for follow up on her low back pain. At our last visit she felt cured by acupuncture. Unfortunately, this only lasted for 24 hours. She returns today looking for other options to make life livable. Patient claims all of her pain in the low back and middle back. We discussed that this will work best for her low back pain.     Interval History 9/21/2022:  The patient returns to clinic today for follow up of back pain via virtual visit. She received a letter from [facility] denying SCS trial. She is frustrated by this. She continues to report low back pain. She denies any radicular leg pain. Her pain is worse with bending and activity. She recently underwent med screening for the [employer] program. She is interested in pursuing this. She has tried Gabapentin and Lyrica in the past with side effects. She denies any other health changes. Her pain today is 7/10.     Interval History 10/14/22: Mrs. Norton  presents today for follow up of chronic low back pain and to discuss the Cortez trial. She had her lumbar MRI done last night without significant changes to her spine. She did have increased dilation of her bile duct. Her pain today is the same in character and severity as during her last visit with us and she rates it currently at a 7/10. She continues to do her stretches on her own which have helped some. She presents with some questions regarding the trial.     Interval History 12/29/2022:  The patient returns for post op appointment. She is s/p lumbar [employer] SCS trial with 90% relief. She has had very minimal back pain during the trial period. She says that she was more active over this time due to the Christmas holiday and had minimal symptoms. She is very happy with the relief. She would like to move forward with implant. She is part of Cortez study. No fever or malaise during trial period. Her pain today is 1/10.    Interval History 1/17/2023:  The patient returns to clinic today for post op. She is s/p [employer] SCS implant on 1/9/2022. She reports benefit with the device at this time. She is meeting with the representatives today. She does report soreness to her incisions. She denies any fever, chills, or drainage. She did have issues with her dressing staying on. She has completed her antibiotics. She denies any other health changes. Her pain today is 2/10.    Interval History 1/31/2023:  The patient returns to clinic today for wound check. She reports benefit with [employer] SCS. She is in contact with representatives for programming. She reports intermittent muscle soreness into her legs. She does have an upcoming appointment with representatives for programming. She denies any fever, chills, or drainage. She continues to follow her activity restrictions. She denies any other health changes. Her pain today is 3/10.    Interval History 2/17/2023:  The patient returns to clinic today for  follow up of back pain. She reports benefit with [employer] SCS. She is in contact with representatives for programming. She is very happy with relief. She denies any fever, chills, or drainage. She continues to follow her activity restrictions. She denies any other health changes. Her pain today is 2/10.    Interval History 3/21/2023:  The patient presents to discuss pain to IPG site. She says that it has been present since surgery and has gradually worsened. She says that it feels like a tight and weak muscle. She has not tried any topical medications or muscle relaxants. No fever or malaise. She has not noticed any redness or swelling to the site. Her original pain has significantly improved from SCS implant. She is part of a research study. Her pain today is 6/10.    Interval History 4/14/2023:  The patient is here for follow up of back pain and pain at IPG site. She reports improvement since previous visit. She did have benefit with Lidoderm patches but had a rash so she stopped. Her pain has improved with Salon Pas patches. She is scheduled for knee replacement next month with Dr. Scott. Her pain today is 3/10.    6 weeks of Conservative therapy (PT/Chiro/Home Exercises with Dates)  Healthy back program--> has four sessions left for a total of 20 sessions   Last session was on 1/31/22   Stretches that they taught at [facility] [employer] [facility] back gives her relief       Medications:   Robaxin PRN    Ice and heat which has helped   Tried Gabapentin and Lyrica- made her loopy      Report: reviewed       Interventional Pain Procedures:   2/8/2022- Bilateral L3,4,5 MBB  2/15/2022- Bilateral L3,4,5 MBB  3/8/2022- Right L3,4,5 RFA- 80% relief for a few months  3/22/2022- Left L3,4,5 RFA- 80% relief for a few months  12/22/22 SCS trial- 90% relief  1/9/2023- [employer] SCS implant.     Imaging:  10/13/22: MRI LUMBAR SPINE WITHOUT CONTRAST     CLINICAL HISTORY:  Low back pain, symptoms persist with > 6wks conservative  treatment; Dorsalgia, unspecified     TECHNIQUE:  Multiplanar, multisequence MR images were acquired from the thoracolumbar junction to the sacrum without the administration of contrast.     COMPARISON:  Lumbar spine MRI 01/21/2022; CT abdomen pelvis 08/20/2022     FINDINGS:  Lumbar levoscoliosis centered at L2.  Minimal anterolisthesis at L4-5.     No compression fractures.  No marrow replacing lesions.     Multilevel degenerative changes with disc desiccation and disc space narrowing, described in detail below.  Discogenic endplate edema at T12-L1.     The conus medullaris has a normal appearance and terminates at the L1 level.  Cauda equina nerve roots are unremarkable.  No epidural mass or collection.     The common duct is dilated up to 12 mm, previously 9 mm on 08/20/2022 CT.  Mild prominence of the main pancreatic duct up to 4 mm, which is new.  No definite filling defect in the bile ducts.  Paraspinal muscle atrophy.     SIGNIFICANT FINDINGS BY LEVEL:     T12-L1: Disc bulge.  Bilateral facet arthropathy and ligamentum flavum thickening.  Mild canal stenosis.  Mild bilateral foraminal stenosis.     L1-2: Disc bulge.  Bilateral facet arthropathy and ligamentum flavum thickening.  Mild canal stenosis.  Mild right foraminal stenosis.     L2-3: Disc bulge.  Bilateral facet arthropathy and ligamentum flavum thickening.  Mild canal stenosis.  Moderate right mild left foraminal stenosis.     L3-4: Disc bulge.  Bilateral facet arthropathy and ligamentum flavum thickening.  Small bilateral facet joint effusions/synovitis.  Moderate canal stenosis with partial effacement of the thecal sac and crowding of the cauda equina nerve roots.  Mild right and moderate left foraminal stenosis.     L4-5: Disc bulge with posterior disc uncovering.  Bilateral facet arthropathy and ligamentum flavum thickening.  Small right facet joint effusion/synovitis.  Mild canal stenosis.  Mild bilateral foraminal stenosis.     L5-S1: Disc  bulge.  Bilateral facet arthropathy and ligamentum flavum thickening.  No significant canal stenosis.  Mild left foraminal stenosis.     Impression:     Lumbar levoscoliosis and multilevel degenerative changes as detailed above.  No significant changes from 2022.     Increased biliary ductal dilatation up to 12 mm, greater than expected after cholecystectomy.  In addition, the main pancreatic duct is mildly prominent up to 4 mm, which is new.  If LFTs are abnormal, consider MRI/MRCP or ERCP for further evaluation.      Past Medical History:   Diagnosis Date    Cataract     Depression     Gestational diabetes     Hyperlipidemia     Hypertension     IBS (irritable bowel syndrome)     Thyroid disease      Past Surgical History:   Procedure Laterality Date    ADENOIDECTOMY      ARTHROSCOPIC CHONDROPLASTY OF KNEE JOINT Right 10/19/2021    Procedure: ARTHROSCOPY, KNEE, WITH CHONDROPLASTY;  Surgeon: Trevin Huber MD;  Location: [facility] OR;  Service: Orthopedics;  Laterality: Right;    ARTHROSCOPY OF KNEE Right 10/19/2021    Procedure: ARTHROSCOPY, KNEE-RIGHT;  Surgeon: Trevin Huber MD;  Location: [facility] OR;  Service: Orthopedics;  Laterality: Right;     SECTION      CHOLECYSTECTOMY      COLONOSCOPY N/A 2016    Procedure: COLONOSCOPY;  Surgeon: Heri Segura MD;  Location: [facility] (4TH FLR);  Service: Endoscopy;  Laterality: N/A;    COLONOSCOPY N/A 2019    Procedure: COLONOSCOPY;  Surgeon: Joe Ptits MD;  Location: [facility] (4TH FLR);  Service: Endoscopy;  Laterality: N/A;    CYSTOSCOPY  2022    Procedure: CYSTOSCOPY;  Surgeon: Lenny Moore MD;  Location: [facility] OR [facility] FLR;  Service: Urology;;    ESOPHAGOGASTRODUODENOSCOPY N/A 2020    Procedure: EGD (ESOPHAGOGASTRODUODENOSCOPY);  Surgeon: Tolu Rivas MD;  Location: [facility] (4TH FLR);  Service: Endoscopy;  Laterality: N/A;  Pt. moved to 9:15am arrival time.. Instructed to not have anything after midnight.EC     EYE SURGERY      cataract resection right    INJECTION OF ANESTHETIC AGENT AROUND NERVE Bilateral 2/8/2022    Procedure: Block, Nerve MEDIAL BRANCH BLOCK BILATERAL L3,4,5;  Surgeon: Tara Gibbs MD;  Location: [facility] PAIN MGT;  Service: Pain Management;  Laterality: Bilateral;    INJECTION OF ANESTHETIC AGENT AROUND NERVE Bilateral 2/15/2022    Procedure: BLOCK, NERVE, L3*L4-L5 MEDIAL BR ANCH 2 OF 2;  Surgeon: Tara Gibbs MD;  Location: [facility] PAIN MGT;  Service: Pain Management;  Laterality: Bilateral;    INJECTION OF BOTULINUM TOXIN TYPE A  6/21/2022    Procedure: BOTULINUM TOXIN INJECTION 100 units;  Surgeon: Lenny Moore MD;  [facility];  Service: Urology;;    INSERTION, NEUROSTIMULATOR, SPINAL CORD N/A 1/9/2023    Procedure: SPINAL CORD STIMULATOR IMPLANT Togethera;  Surgeon: Shoaib Aguirre MD;  Location: [facility] OR;  Service: Pain Management;  Laterality: N/A;    JOINT REPLACEMENT      partial right knee    KNEE ARTHROSCOPY W/ MENISCECTOMY Right 10/19/2021    Procedure: ARTHROSCOPY, KNEE, WITH MENISCECTOMY, PARTIAL LATERAL;  Surgeon: Trevin Huber MD;  Location: [facility] OR;  Service: Orthopedics;  Laterality: Right;    r hand surgery      RADIOFREQUENCY ABLATION Right 3/8/2022    Procedure: RADIOFREQUENCY ABLATION, L3-L5 1 OF 2;  Surgeon: Tara Gibbs MD;  Location: [facility] PAIN MGT;  Service: Pain Management;  Laterality: Right;    RADIOFREQUENCY ABLATION Left 3/22/2022    Procedure: RADIOFREQUENCY ABLATION, l3-+l5 2 of 2;  Surgeon: Tara Gibbs MD;  Location: [facility] PAIN MGT;  Service: Pain Management;  Laterality: Left;    TONSILLECTOMY      TOTAL KNEE ARTHROPLASTY      partial knee replacement    TRIAL OF SPINAL CORD NERVE STIMULATOR N/A 12/22/2022    Procedure: SPINAL CORD STIMULATOR TRIAL Togethera;  Surgeon: Shoaib Aguirre MD;  Location: [facility] PAIN MGT;  Service: Pain Management;  Laterality: N/A;    TUBAL LIGATION       Social History      Socioeconomic History    Marital status:     Number of children: 1   Occupational History    Occupation:      Employer: [employer]     Comment: retired   Tobacco Use    Smoking status: Never    Smokeless tobacco: Never   Substance and Sexual Activity    Alcohol use: Yes     Alcohol/week: 3.0 standard drinks     Types: 3 Glasses of wine per week     Comment: weekends    Drug use: No    Sexual activity: Yes     Partners: Male   Other Topics Concern    Are you pregnant or think you may be? No    Breast-feeding No   Social History Narrative    Retired        Swims.       Stationary bike.            Social Determinants of Health     Financial Resource Strain: Low Risk     Difficulty of Paying Living Expenses: Not hard at all   Food Insecurity: No Food Insecurity    Worried About Running Out of Food in the Last Year: Never true    Ran Out of Food in the Last Year: Never true   Transportation Needs: No Transportation Needs    Lack of Transportation (Medical): No    Lack of Transportation (Non-Medical): No   Physical Activity: Insufficiently Active    Days of Exercise per Week: 4 days    Minutes of Exercise per Session: 20 min   Stress: Stress Concern Present    Feeling of Stress : Very much   Social Connections: Unknown    Frequency of Communication with Friends and Family: Twice a week    Active Member of Clubs or Organizations: No    Attends Club or Organization Meetings: Never    Marital Status:    Housing Stability: Low Risk     Unable to Pay for Housing in the Last Year: No    Number of Places Lived in the Last Year: 1    Unstable Housing in the Last Year: No     Family History   Problem Relation Age of Onset    Hypertension Mother     Irritable bowel syndrome Mother     Hyperlipidemia Mother     Heart disease Father         mi    Hypertension Father     Cancer Father         prostate    Heart attack Father     Heart failure Father     Hyperlipidemia Father     Alcohol abuse Sister      Depression Sister     Epilepsy Son     Irritable bowel syndrome Sister     Alcohol abuse Sister     Ovarian cancer Maternal Aunt     Breast cancer Paternal Aunt     Breast cancer Maternal Aunt     Melanoma Neg Hx     Colon cancer Neg Hx     Stomach cancer Neg Hx     Esophageal cancer Neg Hx     Liver disease Neg Hx     Crohn's disease Neg Hx     Ulcerative colitis Neg Hx     Celiac disease Neg Hx     Stroke Neg Hx        Review of patient's allergies indicates:  No Known Allergies    Current Outpatient Medications   Medication Sig    alprazolam (XANAX) 2 MG Tab Take 1 mg by mouth 3 (three) times daily as needed.    amLODIPine (NORVASC) 5 MG tablet Take 1 tablet (5 mg total) by mouth once daily.    atorvastatin (LIPITOR) 10 MG tablet Take 1 tablet (10 mg total) by mouth once daily.    b complex vitamins capsule Take 1 capsule by mouth once daily.    cholecalciferol, vitamin D3, (VITAMIN D3) 5,000 unit Tab Take 1 tablet (5,000 Units total) by mouth once daily.    conjugated estrogens (PREMARIN) vaginal cream Place 0.5 g vaginally twice a week. Place a pea-sized amount in vagina every night for 4 weeks, then use 2-3 nights a week    EScitalopram oxalate (LEXAPRO) 5 MG Tab Take 5 mg by mouth.    estradioL (ESTRACE) 0.01 % (0.1 mg/gram) vaginal cream Place 1g daily for 2-4 weeks and then 2-3x/week afterwards    estradioL (ESTRING) 2 mg (7.5 mcg /24 hour) vaginal ring Place 2 mg vaginally every 3 (three) months.    levothyroxine (SYNTHROID) 125 MCG tablet TAKE 1 TABLET BY MOUTH EVERY MORNING    liothyronine (CYTOMEL) 5 MCG Tab Take 1 tablet (5 mcg total) by mouth once daily.    promethazine (PHENERGAN) 12.5 MG Tab Take 1 tablet (12.5 mg total) by mouth every 6 (six) hours as needed (nausea).    RESTASIS 0.05 % ophthalmic emulsion INT 1 GTT IN OU BID    sumatriptan (IMITREX) 50 MG tablet 1 tab po at start of headache.  Repeat in 2 h prn    traZODone (DESYREL) 100 MG tablet 1-2 tabs po q hs for sleep    valsartan  "(DIOVAN) 320 MG tablet Take 1 tablet (320 mg total) by mouth once daily.    HYDROcodone-acetaminophen (NORCO) 5-325 mg per tablet Take 1 tablet by mouth every 24 hours as needed for Pain. (Patient not taking: Reported on 3/21/2023)    hydrOXYzine pamoate (VISTARIL) 50 MG Cap Take 50 mg by mouth nightly as needed.    pantoprazole (PROTONIX) 40 MG tablet Take 1 tablet (40 mg total) by mouth once daily. (Patient taking differently: Take 22 mg by mouth once daily.)     No current facility-administered medications for this visit.     Facility-Administered Medications Ordered in Other Visits   Medication    0.9%  NaCl infusion       REVIEW OF SYSTEMS:    GENERAL: No fever. No chills. No fatigue. Denies weight loss. Denies weight gain.  HEENT: Denies headaches. Denies vision change. Denies eye pain. Denies double vision. Denies ear pain.   CV: Denies chest pain. HTN  PULM: Denies of shortness of breath.  GI: Denies constipation. No diarrhea. No abdominal pain. Denies nausea. Denies vomiting. No blood in stool.  HEME: Denies bleeding problems.  : Denies urgency. No painful urination. No blood in urine.  MS: See HPI  SKIN: Denies rash.   NEURO: Denies seizures. No weakness.  ENDO: Thyroid disorder.   PSYCH:  Depression    OBJECTIVE:     Vitals:    23 1433   BP: 131/72   Pulse: 61   Height: 5' 3" (1.6 m)   PainSc:   3        PHYSICAL EXAM:   GENERAL: Well appearing, in no acute distress, alert and oriented x3.  PSYCH:  Mood and affect appropriate.  SKIN: Skin color, texture, turgor normal, no rashes or lesions. SCS site without drainage or signs of infection.   GAIT: normal- ambulates without assistance.       ASSESSMENT: 68 y.o. year old female with low back pain, consistent with the followin. Muscle spasm  methocarbamoL (ROBAXIN) 500 MG Tab      2. DDD (degenerative disc disease), lumbar        3. Chronic pain syndrome        4. Lumbar spondylosis            PLAN:     - She is s/p lumbar [employer] " SCS implant.     - She is having knee surgery next month.    - She is in contact with representatives for programming.     - Continue strengthening exercises.    - RTC as needed.      The above plan and management options were discussed at length with patient. Patient is in agreement with the above and verbalized understanding.     Jeannette Gomez, MIGUEL  04/14/2023

## 2023-04-16 ENCOUNTER — PATIENT MESSAGE (OUTPATIENT)
Dept: OBSTETRICS AND GYNECOLOGY | Facility: CLINIC | Age: 68
End: 2023-04-16
Payer: MEDICARE

## 2023-04-17 ENCOUNTER — HOSPITAL ENCOUNTER (OUTPATIENT)
Dept: RADIOLOGY | Facility: HOSPITAL | Age: 68
Discharge: HOME | End: 2023-04-17
Attending: ORTHOPAEDIC SURGERY
Payer: MEDICARE

## 2023-04-17 ENCOUNTER — OFFICE VISIT (OUTPATIENT)
Dept: ORTHOPEDICS | Facility: CLINIC | Age: 68
End: 2023-04-17
Payer: MEDICARE

## 2023-04-17 VITALS — BODY MASS INDEX: 24.98 KG/M2 | HEIGHT: 63 IN | WEIGHT: 141 LBS

## 2023-04-17 DIAGNOSIS — M25.561 RIGHT KNEE PAIN, UNSPECIFIED CHRONICITY: ICD-10-CM

## 2023-04-17 DIAGNOSIS — M17.11 PRIMARY OSTEOARTHRITIS OF RIGHT KNEE: ICD-10-CM

## 2023-04-17 DIAGNOSIS — M25.561 PAIN AND SWELLING OF RIGHT KNEE: ICD-10-CM

## 2023-04-17 DIAGNOSIS — M25.461 PAIN AND SWELLING OF RIGHT KNEE: ICD-10-CM

## 2023-04-17 DIAGNOSIS — Z96.651 S/P RIGHT UNICOMPARTMENTAL KNEE REPLACEMENT: Primary | ICD-10-CM

## 2023-04-17 PROCEDURE — 73700 CT LOWER EXTREMITY W/O DYE: CPT | Mod: TC,RT

## 2023-04-17 PROCEDURE — 73700 CT KNEE WITHOUT CONTRAST RIGHT W/MAKO PROTOCOL: ICD-10-PCS | Mod: 26,RT,, | Performed by: INTERNAL MEDICINE

## 2023-04-17 PROCEDURE — 73700 CT LOWER EXTREMITY W/O DYE: CPT | Mod: 26,RT,, | Performed by: INTERNAL MEDICINE

## 2023-04-17 PROCEDURE — 99213 PR OFFICE/OUTPT VISIT, EST, LEVL III, 20-29 MIN: ICD-10-PCS | Mod: S$PBB,,, | Performed by: ORTHOPAEDIC SURGERY

## 2023-04-17 PROCEDURE — 99213 OFFICE O/P EST LOW 20 MIN: CPT | Mod: PBBFAC,25,PN | Performed by: ORTHOPAEDIC SURGERY

## 2023-04-17 PROCEDURE — 99999 PR PBB SHADOW E&M-EST. PATIENT-LVL III: ICD-10-PCS | Mod: PBBFAC,,, | Performed by: ORTHOPAEDIC SURGERY

## 2023-04-17 PROCEDURE — 99213 OFFICE O/P EST LOW 20 MIN: CPT | Mod: S$PBB,,, | Performed by: ORTHOPAEDIC SURGERY

## 2023-04-17 PROCEDURE — 99999 PR PBB SHADOW E&M-EST. PATIENT-LVL III: CPT | Mod: PBBFAC,,, | Performed by: ORTHOPAEDIC SURGERY

## 2023-04-17 NOTE — PROGRESS NOTES
EST PATIENT ORTHOPAEDIC: Knee    PRIMARY CARE PHYSICIAN: Kaykay Perales MD   REFERRING PROVIDER: No referring provider defined for this encounter.     ASSESSMENT & PLAN:    Impression:  Right Knee Failed UKA  Progression of Right Knee Arthritis    Follow Up Plan: 3 weeks after surgery     Surgery:    Tiarra Norton has radiograph and physical exam evidence of the aforementioned impression and wishes to pursue surgery. This patient appears to have sufficient symptoms to warrant surgical intervention and is an appropriate candidate for right Conversion UKA to Total Knee Arthroplasty as evidenced by months of unsuccessful non-operative treatment as outlined in the HPI below and progressive symptoms.    We had a lengthy discussion regarding the risk and benefit of surgery, the alternatives, limitations and personnel involved. These included but were not limited to infection, persistent pain, instability, nerve injury, blood clots, loosening and medical complications. We also discussed the pre-operative course, surgery itself and rehabilitation.    Fina-operative blood management and transfusion issues were discussed, and options clearly outlined. The patient has consented to the use of the banked allogenic blood if medically necessary.    The patient has elected to schedule surgery at this time or intends to call the office with a surgical date. Shared decision making occurred while obtaining informed consent. The patient will be scheduled for a pre-operative education class at which time they will have their nasal swab completed and will be given CHG cloths along with the verbal and written instructions for their use.    We will plan for use of [place] cemented CR components, with BASSAM assistance. Will use universal tibia with nubby.     Tentative Surgery Date: 5/18/23, she would like [place] overnight ADMIT    Patients case is complicated by spinal cord stimulator trail she is currently in that prohibits earlier  scheduling.  The patient has a long history of having right knee pain she had previously undergone unicompartmental knee arthroplasty in 2015 with Dr. Huber.  She had initially done well with this however began to experience lateral compartment issues and pain that workup in 2021 resulted in an arthroscopy and lateral meniscal debridement.  Based on that operative report she has wear underneath her kneecap as well as the lateral compartment.  She was seen by one of our colleagues Dr. Hills who recommended conversion from partial to total secondary to her pain complaints in intermittent effusions that she is having.  I am seeing her today for potentially scheduling her sooner for surgery relative to time allowed it [place].  I would agree with this assessment that the a likely ongoing swelling and recurrent and consistent pain is likely secondary to the progression of her underlying arthritis that was documented on the operative report from 2021.  She has limited nonoperative options to help address with this pain as injection based treatments can increase her risk of infection.  She does have some confounding pain generators including her lumbar spine for which she is on a spinal cord stimulator.  She does report that this stimulator help significantly with her lumbar pain.  She is able to decipher in distinguish her lumbar pain from her ongoing knee pain.  She maintains good range of motion instability of her knee.  There is only a small effusion on today's exam.  She does have some medial joint line pain.  I think for her conversion would seem appropriate.  She would like to do this in May.    The patient has been ordered:  CT (BASSAM Protocol)    CONSULTS:   Internal Medicine Consult for preoperative clearance.  Anesthesia for pre-surgical optimization    ACTIVE PROBLEM LIST  Patient Active Problem List   Diagnosis    HTN (hypertension)    Hypothyroid    Unspecified vitamin D deficiency    Elevated liver  enzymes    Primary osteoarthritis of right knee    Fatty liver    Hyperlipidemia    Migraine without status migrainosus, not intractable    Glucose intolerance (impaired glucose tolerance)    Hyponatremia    Anxiety    Fatigue    Intermittent diarrhea    HERMINIA (obstructive sleep apnea)    Decreased libido    Sleep arousal disorder    Major depressive disorder, recurrent, mild    Pain    Colon adenomas    Abdominal pain    Exocrine pancreatic insufficiency    Irritable bowel syndrome with diarrhea    Overweight (BMI 25.0-29.9)    Poor posture    Complex tear of lateral meniscus of right knee as current injury    Decreased range of motion (ROM) of right knee    Weakness of right lower extremity    Decreased range of motion of trunk and back    Decreased strength of trunk and back    Idiopathic scoliosis of thoracolumbar spine    Primary insomnia    Back pain    Impaired functional mobility, balance, gait, and endurance    Posture imbalance    Weakness of trunk musculature    COVID    Chronic pain syndrome    History of partial knee replacement    Benzodiazepine dependence    Aortic atherosclerosis           SUBJECTIVE    CHIEF COMPLAINT: Knee Pain    HPI:   Tiarra Norton is a 68 y.o. female here for evaluation and management of right knee pain. There is not a specific incident that brought about this pain. she has had progressive problems with the knee(s) starting 6 months ago but is now progressing to interfere with activities which include: walking, enjoying hobbies, exercise, rising from a sitting position, getting in and out of a car, and climbing stairs     Currently the pain in the joint is rated at moderate with activity. The pain is intermittent and is located in the knee, located medially and located laterally. The pain is described as aching, sharp, and stiffness. Relieving factors include rest, prescription medication, and over the counter medication .     There is not associated Catching, Clicking, and  "Popping.     Tiarra Norton has no additional complaints.     Review of op report from knee scope of UKA in  showed "grade 2-3 changes of the patellofemoral joint.  Some loose chondral flaps were gently debrided.  There was no pathology in either the medial or lateral gutter.  The implants appeared well positioned and well fixed.  There were in very good condition.  The ACL and PCL were intact.  Lateral compartment was entered.  She was developed and an articular cartilage flap on the lateral femoral condyle a gentle chondroplasty was performed debriding this to a stable rim.  This was not full-thickness cartilage loss.  She did have grade 3 changes of the articular surface of the tibia and a chondroplasty was performed here.  She also had a complex lateral meniscus tear of the posterior 3rd of the meniscus."    23: Here for presurgical planning and signing of consents.     PROGRESSIVE SYMPTOMS:  Pain worsened by weight bearing  Pain effecting living situation    FUNCTIONAL STATUS:   Climb a flight of stairs or walk up a hill     PREVIOUS TREATMENTS:  Medical: RX NSAIDS and Narcotics  Physical Therapy: Activities Modified  and Formal Physical Therapy for 6 weeks  Previous Orthopaedic Surgery: Right knee UKA and subsequent knee arthroscopsy     REVIEW OF SYSTEMS:  PAIN ASSESSMENT:  See HPI.  MUSCULOSKELETAL: See HPI.  OTHER 10 point review of systems is negative except as stated in HPI above    PAST MEDICAL HISTORY   has a past medical history of Cataract, Depression, Gestational diabetes, Hyperlipidemia, Hypertension, IBS (irritable bowel syndrome), and Thyroid disease.     PAST SURGICAL HISTORY   has a past surgical history that includes  section; Tubal ligation; Tonsillectomy; Adenoidectomy; Cholecystectomy; Eye surgery; r hand surgery; Total knee arthroplasty; Colonoscopy (N/A, 2016); Colonoscopy (N/A, 2019); Esophagogastroduodenoscopy (N/A, 2020); Joint replacement; " Arthroscopy of knee (Right, 10/19/2021); Arthroscopic chondroplasty of knee joint (Right, 10/19/2021); Knee arthroscopy w/ meniscectomy (Right, 10/19/2021); Injection of anesthetic agent around nerve (Bilateral, 2/8/2022); Injection of anesthetic agent around nerve (Bilateral, 2/15/2022); Radiofrequency ablation (Right, 3/8/2022); Radiofrequency ablation (Left, 3/22/2022); Injection of botulinum toxin type A (6/21/2022); Cystoscopy (6/21/2022); Trial of spinal cord nerve stimulator (N/A, 12/22/2022); and insertion, neurostimulator, spinal cord (N/A, 1/9/2023).     FAMILY HISTORY  family history includes Alcohol abuse in her sister and sister; Breast cancer in her maternal aunt and paternal aunt; Cancer in her father; Depression in her sister; Epilepsy in her son; Heart attack in her father; Heart disease in her father; Heart failure in her father; Hyperlipidemia in her father and mother; Hypertension in her father and mother; Irritable bowel syndrome in her mother and sister; Ovarian cancer in her maternal aunt.     SOCIAL HISTORY   reports that she has never smoked. She has never used smokeless tobacco. She reports current alcohol use of about 3.0 standard drinks per week. She reports that she does not use drugs.     ALLERGIES   Review of patient's allergies indicates:  No Known Allergies     MEDICATIONS  Current Outpatient Medications on File Prior to Visit   Medication Sig Dispense Refill    alprazolam (XANAX) 2 MG Tab Take 1 mg by mouth 3 (three) times daily as needed.  0    amLODIPine (NORVASC) 5 MG tablet Take 1 tablet (5 mg total) by mouth once daily. 90 tablet 3    atorvastatin (LIPITOR) 10 MG tablet Take 1 tablet (10 mg total) by mouth once daily. 90 tablet 3    b complex vitamins capsule Take 1 capsule by mouth once daily.      cholecalciferol, vitamin D3, (VITAMIN D3) 5,000 unit Tab Take 1 tablet (5,000 Units total) by mouth once daily.      conjugated estrogens (PREMARIN) vaginal cream Place 0.5 g  "vaginally twice a week. Place a pea-sized amount in vagina every night for 4 weeks, then use 2-3 nights a week 1 applicator 1    EScitalopram oxalate (LEXAPRO) 5 MG Tab Take 5 mg by mouth.      estradioL (ESTRACE) 0.01 % (0.1 mg/gram) vaginal cream Place 1g daily for 2-4 weeks and then 2-3x/week afterwards 30 g 12    estradioL (ESTRING) 2 mg (7.5 mcg /24 hour) vaginal ring Place 2 mg vaginally every 3 (three) months. 1 each 4    hydrOXYzine pamoate (VISTARIL) 50 MG Cap Take 50 mg by mouth nightly as needed.      levothyroxine (SYNTHROID) 125 MCG tablet TAKE 1 TABLET BY MOUTH EVERY MORNING 90 tablet 2    liothyronine (CYTOMEL) 5 MCG Tab Take 1 tablet (5 mcg total) by mouth once daily. 90 tablet 3    methocarbamoL (ROBAXIN) 500 MG Tab Take 1 tablet (500 mg total) by mouth 3 (three) times daily as needed (muscle pain). 90 tablet 0    pantoprazole (PROTONIX) 40 MG tablet Take 1 tablet (40 mg total) by mouth once daily. (Patient taking differently: Take 22 mg by mouth once daily.) 90 tablet 2    promethazine (PHENERGAN) 12.5 MG Tab Take 1 tablet (12.5 mg total) by mouth every 6 (six) hours as needed (nausea). 30 tablet 2    RESTASIS 0.05 % ophthalmic emulsion INT 1 GTT IN OU BID      sumatriptan (IMITREX) 50 MG tablet 1 tab po at start of headache.  Repeat in 2 h prn 36 tablet 3    traZODone (DESYREL) 100 MG tablet 1-2 tabs po q hs for sleep 180 tablet 3    valsartan (DIOVAN) 320 MG tablet Take 1 tablet (320 mg total) by mouth once daily. 90 tablet 3     Current Facility-Administered Medications on File Prior to Visit   Medication Dose Route Frequency Provider Last Rate Last Admin    0.9%  NaCl infusion  500 mL Intravenous Continuous Adelfo Zamarripa MD              PHYSICAL EXAM   height is 5' 3" (1.6 m) and weight is 64 kg (141 lb).   Body mass index is 24.98 kg/m².      All other systems deferred.  GENERAL:  No acute distress  HABITUS: Normal  GAIT: Non-antalgic  SKIN: Normal  and Well healed surgical " incision    KNEE EXAM:    right:   Effusion: Minimal joint effusion  TTP: yes over Medial Joint Line and Lateral Joint Line   no crepitus with passive knee ROM  Passive ROM: Extension 0, Flexion 125  No pain with manipulation of patella  Stable to varus/valgus stress. No increased laxity to anterior/posterior drawer testing  negative Berna's test  No pain with IR/ER rotation of the hip  5/5 strength in knee flexion and extension, ankle plantarflexion and dorsiflexion  Neurovascular Status: Sensation intact to light touch in Sural, Saphenous, SPN, DPN, Tibial nerve distribution  2+ pulse DP/PT, normal capillary refill, foot has normal warmth    DATA:  Diagnostic tests reviewed for today's visit:     3v knee radiographs reveal components to be in appropriate position with good alignment. There is no evidence of loosening or abnormal wear.

## 2023-04-18 ENCOUNTER — PATIENT MESSAGE (OUTPATIENT)
Dept: PAIN MEDICINE | Facility: CLINIC | Age: 68
End: 2023-04-18
Payer: MEDICARE

## 2023-04-22 ENCOUNTER — PATIENT MESSAGE (OUTPATIENT)
Dept: INTERNAL MEDICINE | Facility: CLINIC | Age: 68
End: 2023-04-22
Payer: MEDICARE

## 2023-04-23 RX ORDER — PROMETHAZINE HYDROCHLORIDE 12.5 MG/1
12.5 TABLET ORAL EVERY 6 HOURS PRN
Qty: 30 TABLET | Refills: 2 | Status: SHIPPED | OUTPATIENT
Start: 2023-04-23 | End: 2023-07-23

## 2023-04-23 NOTE — TELEPHONE ENCOUNTER
No new care gaps identified.  [facility] Embedded Care Gaps. Reference number: [id number]. 4/22/2023   8:29:13 PM CDT

## 2023-04-24 ENCOUNTER — PATIENT MESSAGE (OUTPATIENT)
Dept: SURGERY | Facility: HOSPITAL | Age: 68
End: 2023-04-24
Payer: MEDICARE

## 2023-04-25 DIAGNOSIS — E03.9 HYPOTHYROIDISM, UNSPECIFIED TYPE: ICD-10-CM

## 2023-04-25 DIAGNOSIS — Z96.651 S/P RIGHT UNICOMPARTMENTAL KNEE REPLACEMENT: Primary | ICD-10-CM

## 2023-04-25 DIAGNOSIS — Z01.818 PRE-OP TESTING: Primary | ICD-10-CM

## 2023-04-25 DIAGNOSIS — M79.609 PAIN IN EXTREMITY, UNSPECIFIED EXTREMITY: ICD-10-CM

## 2023-04-25 NOTE — ANESTHESIA PAT ROS NOTE
04/25/2023  Tiarra Norton is a 68 y.o., female.      Pre-op Assessment     I have reviewed the Nursing Notes. I have reviewed the NPO Status.   I have reviewed the Medications.     Review of Systems  Anesthesia Hx:  No problems with previous Anesthesia  Spinal cord stimulator 1/9/2023 History of prior surgery of interest to airway management or planning:  Previous anesthesia: General, Spinal, MAC 6/11/15 undefined REPLACEMENT-KNEE WITH [id number]-MEDIAL (Right: Knee) with general anesthesia.       1/9/23 SPINAL CORD STIMULATOR IMPLANT [id number] with MAC.  Procedure performed at an [facility].  Denies Family Hx of Anesthesia complications.    Denies Personal Hx of Anesthesia complications.                    Social:  Non-Smoker, Social Alcohol Use       Hematology/Oncology:  Hematology Normal   Oncology Normal                                   EENT/Dental:  EENT/Dental Normal  Eyes: Visual Impairment   Has Bilateral and S/P Extraction - Bilateral Catarract                  Cardiovascular:  Exercise tolerance: poor   Hypertension, well controlled       Denies Angina.    denies PVD hyperlipidemia  Denies MUSTAFA.    Functional Capacity 4 METS    Aortic Stenosis (AS), mild                          Pulmonary:    Denies COPD.  Denies Asthma.   Denies Shortness of breath.  Denies Recent URI.  Sleep study confirmed apnea not severe enough to warrant CPAP               Renal/:   Denies Chronic Renal Disease. no renal calculi               Hepatic/GI:   Denies PUD.   Denies GERD.   Denies Hepatitis. CHOLECYSTECTOMY       Liver Disease, Fatty Liver        Musculoskeletal:   Musculoskeletal General/Symptoms: joint pain, low back pain. Functional capacity is ambulatory without assistance.   Joint Disease:  Arthritis, Osteoarthritis PRIMARY OSTEOARTHRITIS RIGHT KNEE S/P PARTIAL  REPLACEMENT      OSTEOARTHRITIS LEFT KNEE  Bone Disorders:    Osteoporosis   Spine Disorders:   Degenerative Joint Disease        Lumbar Spine Disorders, Lumbar Disc Disease   OB/GYN/PEDS:    TUBAL LIGATION           Neurological:  Denies TIA.  Denies CVA.   Headaches Denies Seizures.     Dx of Headaches, Migraine Headache   Osteoarthritis  Denies Peripheral Neuropathy                          Endocrine:   Hypothyroidism        Denies Obesity / BMI > 30, Denies Morbid Obesity / BMI > 40  Dermatological:  Skin Normal    Psych:  Psychiatric History anxiety depression              Physical Exam  General: Well nourished, Cooperative, Alert and Oriented    Airway:  Mouth Opening: Small, but > 3cm  Tongue: Normal  Neck ROM: Normal ROM    Dental:  Intact    Chest/Lungs:  Normal Respiratory Rate    Heart:  Rate: Normal  Rhythm: Regular Rhythm        Anesthesia Assessment: Preoperative EQUATION    Planned Procedure: Procedure(s) (LRB):  CONVERSION ARTHROPLASTY, KNEE, TOTAL, USING COMPUTER-ASSISTED NAVIGATION (Right)  Requested Anesthesia Type:Spinal  Surgeon: Virgil Scott MD  Service: Orthopedics  Known or anticipated Date of Surgery:2023    Surgeon notes: reviewed    Electronic QUestionnaire Assessment completed via nurse interview with patient.        Triage considerations:     The patient has no apparent active cardiac condition (No unstable coronary Syndrome such as severe unstable angina or recent [<1 month] myocardial infarction, decompensated CHF, severe valvular   disease or significant arrhythmia)    Previous anesthesia records:LMA General, MAC, and Spinal Anesthesia    Patient reports no personal or family history of anesthesia complications.    23  SPINAL CORD STIMULATOR IMPLANT [facility] [employer] ([facility])  MAC  Anesthesia Type    Airway:  Mallampati: II   Mouth Opening: Normal  TM Distance: Normal  Tongue: Normal  Neck ROM: Normal ROM    6/11/15    REPLACEMENT-KNEE WITH  [id number]-MEDIAL (Right: Knee)  spinal, general    Last PCP note: within 3 months , within [facility]    Dr. Perales  Subspecialty notes: Ortho  4/14/23        Pain Management (ANNMARIE RIVERA)  8/16/22  os  Neurology (Dr. Bee)  5/25/22        Urology (Dr. Moore)  4/20/21        Gastroenterology (Dr Jarvis)  10/20/20      Cardiology (Dr. Kenyon)    Other important co-morbidities: Hypothyroid, HERMINIA, and HTN, HLD, Multi level DDD ([employer] spinal cord stimulator implant), Chronic pain,  Rt knee revision,  OAB, IBS, Major Depressive disorder (mild), vit D def    Tests already available:  Available tests,  within [facility] .   3/31/23       EKG  12/29/22     Xray Thoracic Spine   and   Xray Lumbar Spine  10/13/22     MRI Lumbar Spine w/o constrast  8/20/22       CT Chest/Abd w/ constrast  8/10/22       CMP, CBC, TSH            Instructions given. (See in Nurse's note)    Optimization:  Anesthesia Preop Clinic Assessment  Indicated Will schedule POC    Medical Opinion Indicated       Sub-specialist consult indicated:   [facility] Vincent for anesthesia        Plan:    Testing:  CBC, CMP, PT/INR, TSH, and T&S   Pre-anesthesia  visit       Visit focus: possible regional anesthesia and/or nerve block      Consultation: [facility] for anesthesia optimization     Patient  has previously scheduled Medical Appointment:  5/12    Navigation: Tests Scheduled.              Consults scheduled.             Results will be tracked by Preop Clinic.     4/5/23  Kaykay Perales MD  to Tiarra Norton           3:08 PM  Tiarra - no problem with clearing y u for surgery  Kaykay    Last read by Tiarra Norton at  4:13 PM on 4/5/2023.Tiarra - no problem with clearing y u for surgery  Kaykay

## 2023-04-25 NOTE — PRE-PROCEDURE INSTRUCTIONS
Chart review; triage plan initiated.    Spoke to patient regarding upcoming scheduled surgery.  Name, , and allergies verified.  Medical, surgical, and anesthesia history reviewed.  Instructed to hold hormones, vitamins, supplements and NSAIDs for one week prior to surgery. Informed that the remaining medication instructions will be provided at the POC visit. Pt verbalized understanding.     Marissa will call pt and reschedule POC apt to be within 14 d of surg (pt will need T&S 14 d prior to surg).

## 2023-04-26 ENCOUNTER — PATIENT MESSAGE (OUTPATIENT)
Dept: SURGERY | Facility: HOSPITAL | Age: 68
End: 2023-04-26
Payer: MEDICARE

## 2023-04-28 DIAGNOSIS — M17.11 PRIMARY OSTEOARTHRITIS OF RIGHT KNEE: Primary | ICD-10-CM

## 2023-05-10 ENCOUNTER — PATIENT MESSAGE (OUTPATIENT)
Dept: SURGERY | Facility: HOSPITAL | Age: 68
End: 2023-05-10
Payer: MEDICARE

## 2023-05-10 NOTE — DISCHARGE INSTRUCTIONS
Your surgery has been scheduled for:________5/18/23__________________________________    You should report to:  ____[facility], located on the [employer] side of the first floor of the           [facility] ([phone])  ____[facility], located on the [facility] side of the            Second floor of the [facility] ([phone])  ____3rd Floor [facility] located on the [facility] side of the [facility] [phone]  _X___[facility]: located at [address] [facility] [place] [place] [address]. [address].     Please Note   Tell your doctor if you take Aspirin, products containing Aspirin, herbal medications  or blood thinners, such as Coumadin, Ticlid, or Plavix.  (Consult your provider regarding holding or stopping before surgery).  Arrange for someone to drive you home following surgery.  You will not be allowed to leave the surgical facility alone or drive yourself home following sedation and anesthesia.    Before Surgery  Stop taking all herbal medications, vitamins, and supplements 7 days prior to surgery  No Motrin/Advil (Ibuprofen) 7 days before surgery  No Aleve (Naproxen) 7 days before surgery  Stop Taking Asprin, products containing Asprin _7____days before surgery  Stop taking blood thinners_______days before surgery  No Goody's/BC  Powder 7 days before surgery  Refrain from drinking alcoholic beverages for 24hours before and after surgery  Stop or limit smoking _____7____days before surgery  You may take Tylenol for pain    Night before Surgery  Do not eat or drink after midnight  Take a shower or bath (shower is recommended).  Bathe with Hibiclens soap or an antibacterial soap from the neck down.  If not supplied by your surgeon, hibiclens soap will need to be purchased over the counter in pharmacy.  Rinse soap off thoroughly.  Shampoo your hair with your regular shampoo    The Day of  Surgery  Take another bath or shower with hibiclens or any antibacterial soap, to reduce the chance of infection.  Take heart and blood pressure medications with a small sip of water, as advised by the perioperative team.  Do not take fluid pills  You may brush your teeth and rinse your mouth, but do not swall any additional water.   Do not apply perfumes, powder, body lotions or deodorant on the day of surgery.  Nail polish should be removed.  Do not wear makeup or moisturizer  Wear comfortable clothes, such as a button front shirt and loose fitting pants.  Leave all jewelry, including body piercings, and valuables at home.    Bring any devices you will neeed after surgery such as crutches or canes.  If you have sleep apnea, please bring your CPAP machine  In the event that your physical condition changes including the onset of a cold or respiratory illness, or if you have to delay or cancel your surgery, please notify your surgeon.     Anesthesia: Regional Anesthesia    Youre scheduled for surgery. During surgery, youll receive medicine called anesthesia to keep you comfortable and pain-free. Your surgeon has decided that youll receive regional anesthesia. This sheet tells you what to expect with this type of anesthesia.  What is regional anesthesia?  Regional anesthesia numbs one region of your body. The anesthesia may be given around nerves or into veins in your arms, neck, or legs (nerve block or [place] block). Or it may be sent into the spinal fluid (spinal anesthesia) or into the space just outside the spinal fluid (epidural anesthesia). You may also be given sedatives to help you relax.  Nerve block or Lodgepole block  A small area of the body, such as an arm or leg, can be numbed using a nerve block or [place] block.  Nerve block. During a nerve block, your skin is numbed. A needle is then inserted near nerves that serve the area to be numbed. Anesthetic is sent through the needle.  IV regional or [place] block. For  this type of block, an IV line is put into a vein. The blood flow to the area to be numbed is blocked for a short time. Anesthetic is sent through the IV.  Spinal anesthesia  Spinal anesthesia numbs your body from about the waist down.  Anesthetic is injected into the spinal fluid. This is a substance that surrounds the spinal cord in your spinal column. The anesthetic blocks pain traveling from the body to the brain.  To receive the anesthetic, your skin is numbed at the injection site on your back.  A needle is then inserted into the spinal space. Anesthetic is sent into the spinal fluid through the needle.  Epidural anesthesia  Epidural anesthesia is most commonly used during childbirth and may also be used after surgical procedures of the chest, belly, and legs.  Anesthetic is injected into the epidural space. This is just outside the dural sac which contains the spinal fluid.  To receive the anesthetic, your skin is numbed at the injection site on your back.  A needle is then inserted into the epidural space. Anesthetic is sent into the epidural space through the needle.  A small flexible catheter may be attached to the needle and left in place. This allows for continuous injections or infusions of anesthetic.  Anesthesia tools and medicines that might be near you during your procedure  Local anesthetic. This medicine is given through a needle numbs one region of your body.  Electrocardiography leads (electrodes). These are used to record your heart rate and rhythm.  Blood pressure cuff. A cuff is placed on your arm to keep track of your blood pressure.  Pulse oximeter. This small clip is placed on the end of the finger. It measures your blood oxygen level.  Sedatives. These medicines may be given through an IV. They help to relax you and keep you comfortable. You may stay awake or sleep lightly.  Oxygen. You may be given oxygen through a facemask.  Risks and possible complications  Regional anesthesia carries  some risks. These include:  Nausea and vomiting  Headache  Backache  Decreased blood pressure  Allergic reaction to the anesthetic  Ongoing numbness (rare)  Irregular heartbeat (rare)  Cardiac arrest (rare)   Date Last Reviewed: 12/1/2016  © 5281-5413 [employer]. [address] [place] [id number]. All rights reserved. This information is not intended as a substitute for professional medical care. Always follow your healthcare professional's instructions.

## 2023-05-11 ENCOUNTER — PATIENT MESSAGE (OUTPATIENT)
Dept: ORTHOPEDICS | Facility: CLINIC | Age: 68
End: 2023-05-11
Payer: MEDICARE

## 2023-05-12 ENCOUNTER — HOSPITAL ENCOUNTER (OUTPATIENT)
Dept: PREADMISSION TESTING | Facility: HOSPITAL | Age: 68
Discharge: HOME | End: 2023-05-12
Attending: ORTHOPAEDIC SURGERY | Admitting: ORTHOPAEDIC SURGERY
Payer: MEDICARE

## 2023-05-12 ENCOUNTER — PATIENT MESSAGE (OUTPATIENT)
Dept: PAIN MEDICINE | Facility: CLINIC | Age: 68
End: 2023-05-12
Payer: MEDICARE

## 2023-05-12 VITALS
DIASTOLIC BLOOD PRESSURE: 70 MMHG | OXYGEN SATURATION: 97 % | HEART RATE: 63 BPM | RESPIRATION RATE: 16 BRPM | HEIGHT: 63 IN | BODY MASS INDEX: 25.34 KG/M2 | SYSTOLIC BLOOD PRESSURE: 140 MMHG | TEMPERATURE: 98 F | WEIGHT: 143 LBS

## 2023-05-13 ENCOUNTER — PATIENT MESSAGE (OUTPATIENT)
Dept: PAIN MEDICINE | Facility: CLINIC | Age: 68
End: 2023-05-13
Payer: MEDICARE

## 2023-05-15 ENCOUNTER — PATIENT MESSAGE (OUTPATIENT)
Dept: SURGERY | Facility: HOSPITAL | Age: 68
End: 2023-05-15
Payer: MEDICARE

## 2023-05-15 RX ORDER — TIZANIDINE 4 MG/1
4 TABLET ORAL EVERY 8 HOURS PRN
Qty: 90 TABLET | Refills: 2 | Status: SHIPPED | OUTPATIENT
Start: 2023-05-15 | End: 2023-05-18 | Stop reason: HOSPADM

## 2023-05-16 ENCOUNTER — PATIENT MESSAGE (OUTPATIENT)
Dept: SURGERY | Facility: HOSPITAL | Age: 68
End: 2023-05-16
Payer: MEDICARE

## 2023-05-17 ENCOUNTER — PATIENT MESSAGE (OUTPATIENT)
Dept: INTERNAL MEDICINE | Facility: CLINIC | Age: 68
End: 2023-05-17
Payer: MEDICARE

## 2023-05-17 ENCOUNTER — ANESTHESIA EVENT (OUTPATIENT)
Dept: SURGERY | Facility: HOSPITAL | Age: 68
DRG: 467 | End: 2023-05-17
Payer: MEDICARE

## 2023-05-18 ENCOUNTER — ANESTHESIA (OUTPATIENT)
Dept: SURGERY | Facility: HOSPITAL | Age: 68
DRG: 467 | End: 2023-05-18
Payer: MEDICARE

## 2023-05-18 ENCOUNTER — HOSPITAL ENCOUNTER (INPATIENT)
Facility: HOSPITAL | Age: 68
LOS: 1 days | Discharge: HOME | DRG: 467 | End: 2023-05-19
Attending: ORTHOPAEDIC SURGERY | Admitting: ORTHOPAEDIC SURGERY
Payer: MEDICARE

## 2023-05-18 DIAGNOSIS — Z96.659 FAILED TOTAL KNEE ARTHROPLASTY: ICD-10-CM

## 2023-05-18 DIAGNOSIS — Z96.659 HISTORY OF PARTIAL KNEE REPLACEMENT: ICD-10-CM

## 2023-05-18 DIAGNOSIS — T84.018A FAILED TOTAL KNEE ARTHROPLASTY: ICD-10-CM

## 2023-05-18 DIAGNOSIS — M17.11 PRIMARY OSTEOARTHRITIS OF RIGHT KNEE: Primary | ICD-10-CM

## 2023-05-18 PROCEDURE — D9220A PRA ANESTHESIA: Mod: CRNA,,, | Performed by: NURSE ANESTHETIST, CERTIFIED REGISTERED

## 2023-05-18 PROCEDURE — 64448 NJX AA&/STRD FEM NRV NFS IMG: CPT | Performed by: ANESTHESIOLOGY

## 2023-05-18 PROCEDURE — 27487 PR REVISE KNEE JOINT REPLACE,ALL PARTS: ICD-10-PCS | Mod: 52,RT,, | Performed by: ORTHOPAEDIC SURGERY

## 2023-05-18 PROCEDURE — 71000033 HC RECOVERY, INTIAL HOUR: Performed by: ORTHOPAEDIC SURGERY

## 2023-05-18 PROCEDURE — 63600175 PHARM REV CODE 636 W HCPCS: Performed by: ORTHOPAEDIC SURGERY

## 2023-05-18 PROCEDURE — 97161 PT EVAL LOW COMPLEX 20 MIN: CPT

## 2023-05-18 PROCEDURE — 37000009 HC ANESTHESIA EA ADD 15 MINS: Performed by: ORTHOPAEDIC SURGERY

## 2023-05-18 PROCEDURE — 25000003 PHARM REV CODE 250: Performed by: ANESTHESIOLOGY

## 2023-05-18 PROCEDURE — 64448 RIGHT ADDUCTOR CANAL CATHETER: ICD-10-PCS | Mod: 59,RT,, | Performed by: ANESTHESIOLOGY

## 2023-05-18 PROCEDURE — D9220A PRA ANESTHESIA: Mod: ANES,,, | Performed by: ANESTHESIOLOGY

## 2023-05-18 PROCEDURE — 94761 N-INVAS EAR/PLS OXIMETRY MLT: CPT

## 2023-05-18 PROCEDURE — 97165 OT EVAL LOW COMPLEX 30 MIN: CPT

## 2023-05-18 PROCEDURE — D9220A PRA ANESTHESIA: ICD-10-PCS | Mod: ANES,,, | Performed by: ANESTHESIOLOGY

## 2023-05-18 PROCEDURE — C1751 CATH, INF, PER/CENT/MIDLINE: HCPCS | Performed by: ANESTHESIOLOGY

## 2023-05-18 PROCEDURE — 25000003 PHARM REV CODE 250: Performed by: NURSE ANESTHETIST, CERTIFIED REGISTERED

## 2023-05-18 PROCEDURE — D9220A PRA ANESTHESIA: ICD-10-PCS | Mod: CRNA,,, | Performed by: NURSE ANESTHETIST, CERTIFIED REGISTERED

## 2023-05-18 PROCEDURE — 11000001 HC ACUTE MED/SURG PRIVATE ROOM

## 2023-05-18 PROCEDURE — 63600175 PHARM REV CODE 636 W HCPCS: Performed by: ANESTHESIOLOGY

## 2023-05-18 PROCEDURE — 36000713 HC OR TIME LEV V EA ADD 15 MIN: Performed by: ORTHOPAEDIC SURGERY

## 2023-05-18 PROCEDURE — 63600175 PHARM REV CODE 636 W HCPCS: Performed by: NURSE ANESTHETIST, CERTIFIED REGISTERED

## 2023-05-18 PROCEDURE — 37000008 HC ANESTHESIA 1ST 15 MINUTES: Performed by: ORTHOPAEDIC SURGERY

## 2023-05-18 PROCEDURE — 27201423 OPTIME MED/SURG SUP & DEVICES STERILE SUPPLY: Performed by: ORTHOPAEDIC SURGERY

## 2023-05-18 PROCEDURE — 27100025 HC TUBING, SET FLUID WARMER: Performed by: ANESTHESIOLOGY

## 2023-05-18 PROCEDURE — 27487 REVISE/REPLACE KNEE JOINT: CPT | Mod: 52,RT,, | Performed by: ORTHOPAEDIC SURGERY

## 2023-05-18 PROCEDURE — 97116 GAIT TRAINING THERAPY: CPT

## 2023-05-18 PROCEDURE — 25000003 PHARM REV CODE 250: Performed by: ORTHOPAEDIC SURGERY

## 2023-05-18 PROCEDURE — 97535 SELF CARE MNGMENT TRAINING: CPT

## 2023-05-18 PROCEDURE — 99900035 HC TECH TIME PER 15 MIN (STAT)

## 2023-05-18 PROCEDURE — 36000712 HC OR TIME LEV V 1ST 15 MIN: Performed by: ORTHOPAEDIC SURGERY

## 2023-05-18 PROCEDURE — C1713 ANCHOR/SCREW BN/BN,TIS/BN: HCPCS | Performed by: ORTHOPAEDIC SURGERY

## 2023-05-18 PROCEDURE — 25000003 PHARM REV CODE 250: Performed by: PHYSICIAN ASSISTANT

## 2023-05-18 PROCEDURE — C1776 JOINT DEVICE (IMPLANTABLE): HCPCS | Performed by: ORTHOPAEDIC SURGERY

## 2023-05-18 PROCEDURE — 71000039 HC RECOVERY, EACH ADD'L HOUR: Performed by: ORTHOPAEDIC SURGERY

## 2023-05-18 DEVICE — BASEPLATE TIB CEM PRIM SZ 3: Type: IMPLANTABLE DEVICE | Site: KNEE | Status: FUNCTIONAL

## 2023-05-18 DEVICE — CEMENT BONE SMPLX HV GENTMYCN: Type: IMPLANTABLE DEVICE | Site: KNEE | Status: FUNCTIONAL

## 2023-05-18 DEVICE — INSERT TRIATHLON X3 SZ3 9MM: Type: IMPLANTABLE DEVICE | Site: KNEE | Status: FUNCTIONAL

## 2023-05-18 DEVICE — PATELLA TRI 32X10 X3 POLYETHYL: Type: IMPLANTABLE DEVICE | Site: KNEE | Status: FUNCTIONAL

## 2023-05-18 DEVICE — FEMORAL CRUC RTN CEM SZ 3 RT: Type: IMPLANTABLE DEVICE | Site: KNEE | Status: FUNCTIONAL

## 2023-05-18 RX ORDER — ROPIVACAINE/EPI/CLONIDINE/KET 2.46-0.005
SYRINGE (ML) INJECTION
Status: DISCONTINUED | OUTPATIENT
Start: 2023-05-18 | End: 2023-05-18 | Stop reason: HOSPADM

## 2023-05-18 RX ORDER — ONDANSETRON 2 MG/ML
4 INJECTION INTRAMUSCULAR; INTRAVENOUS EVERY 8 HOURS PRN
Status: DISCONTINUED | OUTPATIENT
Start: 2023-05-18 | End: 2023-05-19 | Stop reason: HOSPADM

## 2023-05-18 RX ORDER — MUPIROCIN 20 MG/G
1 OINTMENT TOPICAL
Status: COMPLETED | OUTPATIENT
Start: 2023-05-18 | End: 2023-05-18

## 2023-05-18 RX ORDER — LEVOTHYROXINE SODIUM 125 UG/1
125 TABLET ORAL
Status: DISCONTINUED | OUTPATIENT
Start: 2023-05-19 | End: 2023-05-19 | Stop reason: HOSPADM

## 2023-05-18 RX ORDER — CELECOXIB 200 MG/1
400 CAPSULE ORAL ONCE
Status: COMPLETED | OUTPATIENT
Start: 2023-05-18 | End: 2023-05-18

## 2023-05-18 RX ORDER — FENTANYL CITRATE 50 UG/ML
25 INJECTION, SOLUTION INTRAMUSCULAR; INTRAVENOUS EVERY 5 MIN PRN
Status: DISCONTINUED | OUTPATIENT
Start: 2023-05-18 | End: 2023-05-18 | Stop reason: HOSPADM

## 2023-05-18 RX ORDER — DEXAMETHASONE SODIUM PHOSPHATE 4 MG/ML
INJECTION, SOLUTION INTRA-ARTICULAR; INTRALESIONAL; INTRAMUSCULAR; INTRAVENOUS; SOFT TISSUE
Status: DISCONTINUED | OUTPATIENT
Start: 2023-05-18 | End: 2023-05-18

## 2023-05-18 RX ORDER — METHOCARBAMOL 500 MG/1
500 TABLET, FILM COATED ORAL 4 TIMES DAILY
Qty: 40 TABLET | Refills: 0 | Status: SHIPPED | OUTPATIENT
Start: 2023-05-18 | End: 2023-05-29

## 2023-05-18 RX ORDER — METHOCARBAMOL 750 MG/1
750 TABLET, FILM COATED ORAL 3 TIMES DAILY
Status: DISCONTINUED | OUTPATIENT
Start: 2023-05-18 | End: 2023-05-19 | Stop reason: HOSPADM

## 2023-05-18 RX ORDER — VALSARTAN 160 MG/1
160 TABLET ORAL
Status: COMPLETED | OUTPATIENT
Start: 2023-05-18 | End: 2023-05-18

## 2023-05-18 RX ORDER — SODIUM CHLORIDE 9 MG/ML
INJECTION, SOLUTION INTRAVENOUS
Status: COMPLETED | OUTPATIENT
Start: 2023-05-18 | End: 2023-05-18

## 2023-05-18 RX ORDER — OXYCODONE HYDROCHLORIDE 10 MG/1
10 TABLET ORAL
Status: DISCONTINUED | OUTPATIENT
Start: 2023-05-18 | End: 2023-05-19 | Stop reason: HOSPADM

## 2023-05-18 RX ORDER — ONDANSETRON 2 MG/ML
INJECTION INTRAMUSCULAR; INTRAVENOUS
Status: DISCONTINUED | OUTPATIENT
Start: 2023-05-18 | End: 2023-05-18

## 2023-05-18 RX ORDER — ASPIRIN 81 MG/1
81 TABLET ORAL 2 TIMES DAILY
Qty: 60 TABLET | Refills: 0 | Status: SHIPPED | OUTPATIENT
Start: 2023-05-18 | End: 2023-08-11

## 2023-05-18 RX ORDER — SODIUM CHLORIDE 9 MG/ML
INJECTION, SOLUTION INTRAVENOUS CONTINUOUS
Status: DISCONTINUED | OUTPATIENT
Start: 2023-05-18 | End: 2023-05-19 | Stop reason: HOSPADM

## 2023-05-18 RX ORDER — AMLODIPINE BESYLATE 5 MG/1
5 TABLET ORAL DAILY
Status: DISCONTINUED | OUTPATIENT
Start: 2023-05-18 | End: 2023-05-19 | Stop reason: HOSPADM

## 2023-05-18 RX ORDER — ONDANSETRON 2 MG/ML
4 INJECTION INTRAMUSCULAR; INTRAVENOUS DAILY PRN
Status: DISCONTINUED | OUTPATIENT
Start: 2023-05-18 | End: 2023-05-18 | Stop reason: HOSPADM

## 2023-05-18 RX ORDER — MORPHINE SULFATE 2 MG/ML
2 INJECTION, SOLUTION INTRAMUSCULAR; INTRAVENOUS
Status: DISCONTINUED | OUTPATIENT
Start: 2023-05-18 | End: 2023-05-19 | Stop reason: HOSPADM

## 2023-05-18 RX ORDER — ROPIVACAINE HYDROCHLORIDE 5 MG/ML
INJECTION, SOLUTION EPIDURAL; INFILTRATION; PERINEURAL
Status: DISCONTINUED | OUTPATIENT
Start: 2023-05-18 | End: 2023-05-18

## 2023-05-18 RX ORDER — SODIUM CHLORIDE 0.9 % (FLUSH) 0.9 %
10 SYRINGE (ML) INJECTION
Status: DISCONTINUED | OUTPATIENT
Start: 2023-05-18 | End: 2023-05-18 | Stop reason: HOSPADM

## 2023-05-18 RX ORDER — ACETAMINOPHEN 500 MG
1000 TABLET ORAL EVERY 8 HOURS PRN
Qty: 42 TABLET | Refills: 0 | Status: SHIPPED | OUTPATIENT
Start: 2023-05-18 | End: 2023-11-16 | Stop reason: CLARIF

## 2023-05-18 RX ORDER — NALOXONE HCL 0.4 MG/ML
0.02 VIAL (ML) INJECTION
Status: DISCONTINUED | OUTPATIENT
Start: 2023-05-18 | End: 2023-05-19 | Stop reason: HOSPADM

## 2023-05-18 RX ORDER — FENTANYL CITRATE 50 UG/ML
25-200 INJECTION, SOLUTION INTRAMUSCULAR; INTRAVENOUS
Status: DISCONTINUED | OUTPATIENT
Start: 2023-05-18 | End: 2023-05-18 | Stop reason: HOSPADM

## 2023-05-18 RX ORDER — OXYCODONE HYDROCHLORIDE 5 MG/1
5 TABLET ORAL
Status: DISCONTINUED | OUTPATIENT
Start: 2023-05-18 | End: 2023-05-19 | Stop reason: HOSPADM

## 2023-05-18 RX ORDER — PREGABALIN 75 MG/1
75 CAPSULE ORAL 2 TIMES DAILY
Qty: 28 CAPSULE | Refills: 0 | Status: SHIPPED | OUTPATIENT
Start: 2023-05-18 | End: 2023-07-23

## 2023-05-18 RX ORDER — ROPIVACAINE HYDROCHLORIDE 2 MG/ML
INJECTION, SOLUTION EPIDURAL; INFILTRATION; PERINEURAL CONTINUOUS
Status: DISCONTINUED | OUTPATIENT
Start: 2023-05-18 | End: 2023-05-19 | Stop reason: HOSPADM

## 2023-05-18 RX ORDER — ATORVASTATIN CALCIUM 10 MG/1
10 TABLET, FILM COATED ORAL DAILY
Status: DISCONTINUED | OUTPATIENT
Start: 2023-05-18 | End: 2023-05-19 | Stop reason: HOSPADM

## 2023-05-18 RX ORDER — PROCHLORPERAZINE EDISYLATE 5 MG/ML
5 INJECTION INTRAMUSCULAR; INTRAVENOUS EVERY 6 HOURS PRN
Status: DISCONTINUED | OUTPATIENT
Start: 2023-05-18 | End: 2023-05-19 | Stop reason: HOSPADM

## 2023-05-18 RX ORDER — FENTANYL CITRATE 50 UG/ML
25 INJECTION, SOLUTION INTRAMUSCULAR; INTRAVENOUS EVERY 5 MIN PRN
Status: COMPLETED | OUTPATIENT
Start: 2023-05-18 | End: 2023-05-18

## 2023-05-18 RX ORDER — ASPIRIN 81 MG/1
81 TABLET ORAL 2 TIMES DAILY
Status: DISCONTINUED | OUTPATIENT
Start: 2023-05-18 | End: 2023-05-19 | Stop reason: HOSPADM

## 2023-05-18 RX ORDER — TALC
6 POWDER (GRAM) TOPICAL NIGHTLY PRN
Status: DISCONTINUED | OUTPATIENT
Start: 2023-05-18 | End: 2023-05-18 | Stop reason: HOSPADM

## 2023-05-18 RX ORDER — OXYCODONE HYDROCHLORIDE 5 MG/1
5 TABLET ORAL
Status: DISCONTINUED | OUTPATIENT
Start: 2023-05-18 | End: 2023-05-18 | Stop reason: HOSPADM

## 2023-05-18 RX ORDER — TRAZODONE HYDROCHLORIDE 50 MG/1
100 TABLET ORAL NIGHTLY
Status: DISCONTINUED | OUTPATIENT
Start: 2023-05-18 | End: 2023-05-19 | Stop reason: HOSPADM

## 2023-05-18 RX ORDER — ACETAMINOPHEN 500 MG
1000 TABLET ORAL
Status: ACTIVE | OUTPATIENT
Start: 2023-05-18 | End: 2023-05-18

## 2023-05-18 RX ORDER — ESCITALOPRAM OXALATE 5 MG/1
5 TABLET ORAL DAILY
Status: DISCONTINUED | OUTPATIENT
Start: 2023-05-19 | End: 2023-05-19 | Stop reason: HOSPADM

## 2023-05-18 RX ORDER — PROPOFOL 10 MG/ML
VIAL (ML) INTRAVENOUS CONTINUOUS PRN
Status: DISCONTINUED | OUTPATIENT
Start: 2023-05-18 | End: 2023-05-18

## 2023-05-18 RX ORDER — LIOTHYRONINE SODIUM 5 UG/1
5 TABLET ORAL DAILY
Status: DISCONTINUED | OUTPATIENT
Start: 2023-05-19 | End: 2023-05-19 | Stop reason: HOSPADM

## 2023-05-18 RX ORDER — KETAMINE HCL IN 0.9 % NACL 50 MG/5 ML
SYRINGE (ML) INTRAVENOUS
Status: DISCONTINUED | OUTPATIENT
Start: 2023-05-18 | End: 2023-05-18

## 2023-05-18 RX ORDER — CELECOXIB 200 MG/1
400 CAPSULE ORAL ONCE
Status: DISPENSED | OUTPATIENT
Start: 2023-05-18

## 2023-05-18 RX ORDER — DOCUSATE SODIUM 100 MG/1
100 CAPSULE, LIQUID FILLED ORAL 2 TIMES DAILY
Qty: 30 CAPSULE | Refills: 0 | Status: SHIPPED | OUTPATIENT
Start: 2023-05-18 | End: 2023-06-21 | Stop reason: SDUPTHER

## 2023-05-18 RX ORDER — ACETAMINOPHEN 500 MG
1000 TABLET ORAL
Status: COMPLETED | OUTPATIENT
Start: 2023-05-18 | End: 2023-05-18

## 2023-05-18 RX ORDER — AMLODIPINE BESYLATE 5 MG/1
5 TABLET ORAL ONCE
Status: COMPLETED | OUTPATIENT
Start: 2023-05-18 | End: 2023-05-18

## 2023-05-18 RX ORDER — LIDOCAINE HYDROCHLORIDE 10 MG/ML
1 INJECTION, SOLUTION EPIDURAL; INFILTRATION; INTRACAUDAL; PERINEURAL
Status: DISCONTINUED | OUTPATIENT
Start: 2023-05-18 | End: 2023-05-18 | Stop reason: HOSPADM

## 2023-05-18 RX ORDER — MIDAZOLAM HYDROCHLORIDE 1 MG/ML
.5-4 INJECTION INTRAMUSCULAR; INTRAVENOUS
Status: DISCONTINUED | OUTPATIENT
Start: 2023-05-18 | End: 2023-05-18 | Stop reason: HOSPADM

## 2023-05-18 RX ORDER — POLYETHYLENE GLYCOL 3350 17 G/17G
17 POWDER, FOR SOLUTION ORAL DAILY
Status: DISCONTINUED | OUTPATIENT
Start: 2023-05-19 | End: 2023-05-19 | Stop reason: HOSPADM

## 2023-05-18 RX ORDER — HALOPERIDOL 5 MG/ML
0.5 INJECTION INTRAMUSCULAR EVERY 10 MIN PRN
Status: DISCONTINUED | OUTPATIENT
Start: 2023-05-18 | End: 2023-05-18 | Stop reason: HOSPADM

## 2023-05-18 RX ORDER — PREGABALIN 75 MG/1
75 CAPSULE ORAL NIGHTLY
Status: DISCONTINUED | OUTPATIENT
Start: 2023-05-18 | End: 2023-05-19 | Stop reason: HOSPADM

## 2023-05-18 RX ORDER — CARBOXYMETHYLCELLULOSE SODIUM 10 MG/ML
GEL OPHTHALMIC
Status: DISCONTINUED | OUTPATIENT
Start: 2023-05-18 | End: 2023-05-18

## 2023-05-18 RX ORDER — AMOXICILLIN 250 MG
1 CAPSULE ORAL 2 TIMES DAILY
Status: DISCONTINUED | OUTPATIENT
Start: 2023-05-18 | End: 2023-05-19 | Stop reason: HOSPADM

## 2023-05-18 RX ORDER — MUPIROCIN 20 MG/G
1 OINTMENT TOPICAL 2 TIMES DAILY
Status: DISCONTINUED | OUTPATIENT
Start: 2023-05-18 | End: 2023-05-19 | Stop reason: HOSPADM

## 2023-05-18 RX ORDER — ACETAMINOPHEN 500 MG
1000 TABLET ORAL EVERY 6 HOURS
Status: DISCONTINUED | OUTPATIENT
Start: 2023-05-18 | End: 2023-05-19 | Stop reason: HOSPADM

## 2023-05-18 RX ORDER — FAMOTIDINE 20 MG/1
20 TABLET, FILM COATED ORAL 2 TIMES DAILY
Status: DISCONTINUED | OUTPATIENT
Start: 2023-05-18 | End: 2023-05-19 | Stop reason: HOSPADM

## 2023-05-18 RX ORDER — CELECOXIB 200 MG/1
200 CAPSULE ORAL 2 TIMES DAILY
Qty: 14 CAPSULE | Refills: 0 | Status: SHIPPED | OUTPATIENT
Start: 2023-05-18 | End: 2023-07-27

## 2023-05-18 RX ORDER — MIDAZOLAM HYDROCHLORIDE 1 MG/ML
INJECTION, SOLUTION INTRAMUSCULAR; INTRAVENOUS
Status: DISCONTINUED | OUTPATIENT
Start: 2023-05-18 | End: 2023-05-18

## 2023-05-18 RX ORDER — PREGABALIN 75 MG/1
75 CAPSULE ORAL
Status: COMPLETED | OUTPATIENT
Start: 2023-05-18 | End: 2023-05-18

## 2023-05-18 RX ORDER — OXYCODONE HYDROCHLORIDE 5 MG/1
5-10 TABLET ORAL EVERY 4 HOURS PRN
Qty: 40 TABLET | Refills: 0 | Status: SHIPPED | OUTPATIENT
Start: 2023-05-18 | End: 2023-05-24 | Stop reason: SDUPTHER

## 2023-05-18 RX ORDER — FENTANYL CITRATE 50 UG/ML
INJECTION, SOLUTION INTRAMUSCULAR; INTRAVENOUS
Status: DISCONTINUED | OUTPATIENT
Start: 2023-05-18 | End: 2023-05-18

## 2023-05-18 RX ORDER — LIDOCAINE HYDROCHLORIDE 20 MG/ML
INJECTION INTRAVENOUS
Status: DISCONTINUED | OUTPATIENT
Start: 2023-05-18 | End: 2023-05-18

## 2023-05-18 RX ADMIN — ACETAMINOPHEN 1000 MG: 500 TABLET ORAL at 11:05

## 2023-05-18 RX ADMIN — ACETAMINOPHEN 1000 MG: 500 TABLET ORAL at 06:05

## 2023-05-18 RX ADMIN — Medication: at 01:05

## 2023-05-18 RX ADMIN — CEFAZOLIN 2 G: 2 INJECTION, POWDER, FOR SOLUTION INTRAMUSCULAR; INTRAVENOUS at 10:05

## 2023-05-18 RX ADMIN — VANCOMYCIN HYDROCHLORIDE 1000 MG: 1 INJECTION, POWDER, LYOPHILIZED, FOR SOLUTION INTRAVENOUS at 07:05

## 2023-05-18 RX ADMIN — SENNOSIDES AND DOCUSATE SODIUM 1 TABLET: 50; 8.6 TABLET ORAL at 08:05

## 2023-05-18 RX ADMIN — CARBOXYMETHYLCELLULOSE SODIUM 2 DROP: 10 GEL OPHTHALMIC at 10:05

## 2023-05-18 RX ADMIN — FENTANYL CITRATE 25 MCG: 50 INJECTION, SOLUTION INTRAMUSCULAR; INTRAVENOUS at 01:05

## 2023-05-18 RX ADMIN — ACETAMINOPHEN 1000 MG: 500 TABLET ORAL at 07:05

## 2023-05-18 RX ADMIN — MUPIROCIN 1 G: 20 OINTMENT TOPICAL at 08:05

## 2023-05-18 RX ADMIN — ROPIVACAINE HYDROCHLORIDE 10 ML: 5 INJECTION EPIDURAL; INFILTRATION; PERINEURAL at 09:05

## 2023-05-18 RX ADMIN — OXYCODONE HYDROCHLORIDE 5 MG: 5 TABLET ORAL at 08:05

## 2023-05-18 RX ADMIN — Medication 25 MG: at 10:05

## 2023-05-18 RX ADMIN — OXYCODONE HYDROCHLORIDE 5 MG: 5 TABLET ORAL at 01:05

## 2023-05-18 RX ADMIN — MUPIROCIN 1 G: 20 OINTMENT TOPICAL at 07:05

## 2023-05-18 RX ADMIN — SODIUM CHLORIDE: 0.9 INJECTION, SOLUTION INTRAVENOUS at 10:05

## 2023-05-18 RX ADMIN — SODIUM CHLORIDE: 9 INJECTION, SOLUTION INTRAVENOUS at 01:05

## 2023-05-18 RX ADMIN — CARBOXYMETHYLCELLULOSE SODIUM 2 DROP: 10 GEL OPHTHALMIC at 12:05

## 2023-05-18 RX ADMIN — FAMOTIDINE 20 MG: 20 TABLET, FILM COATED ORAL at 08:05

## 2023-05-18 RX ADMIN — ASPIRIN 81 MG: 81 TABLET, COATED ORAL at 08:05

## 2023-05-18 RX ADMIN — FENTANYL CITRATE 50 MCG: 50 INJECTION, SOLUTION INTRAMUSCULAR; INTRAVENOUS at 10:05

## 2023-05-18 RX ADMIN — MEPIVACAINE HYDROCHLORIDE 3 ML: 15 INJECTION, SOLUTION EPIDURAL; INFILTRATION at 10:05

## 2023-05-18 RX ADMIN — LIDOCAINE HYDROCHLORIDE 20 MG: 20 INJECTION INTRAVENOUS at 10:05

## 2023-05-18 RX ADMIN — PREGABALIN 75 MG: 75 CAPSULE ORAL at 07:05

## 2023-05-18 RX ADMIN — METHOCARBAMOL 750 MG: 750 TABLET ORAL at 08:05

## 2023-05-18 RX ADMIN — FENTANYL CITRATE 50 MCG: 50 INJECTION INTRAMUSCULAR; INTRAVENOUS at 09:05

## 2023-05-18 RX ADMIN — VALSARTAN 160 MG: 160 TABLET, FILM COATED ORAL at 08:05

## 2023-05-18 RX ADMIN — CELECOXIB 400 MG: 200 CAPSULE ORAL at 07:05

## 2023-05-18 RX ADMIN — SODIUM CHLORIDE, SODIUM GLUCONATE, SODIUM ACETATE, POTASSIUM CHLORIDE, MAGNESIUM CHLORIDE, SODIUM PHOSPHATE, DIBASIC, AND POTASSIUM PHOSPHATE: .53; .5; .37; .037; .03; .012; .00082 INJECTION, SOLUTION INTRAVENOUS at 12:05

## 2023-05-18 RX ADMIN — TRANEXAMIC ACID 1000 MG: 100 INJECTION, SOLUTION INTRAVENOUS at 12:05

## 2023-05-18 RX ADMIN — CEFAZOLIN 2 G: 2 INJECTION, POWDER, FOR SOLUTION INTRAMUSCULAR; INTRAVENOUS at 06:05

## 2023-05-18 RX ADMIN — PREGABALIN 75 MG: 75 CAPSULE ORAL at 08:05

## 2023-05-18 RX ADMIN — TRAZODONE HYDROCHLORIDE 100 MG: 50 TABLET ORAL at 08:05

## 2023-05-18 RX ADMIN — OXYCODONE HYDROCHLORIDE 5 MG: 5 TABLET ORAL at 11:05

## 2023-05-18 RX ADMIN — PROPOFOL 125 MCG/KG/MIN: 10 INJECTION, EMULSION INTRAVENOUS at 10:05

## 2023-05-18 RX ADMIN — ONDANSETRON 4 MG: 2 INJECTION, SOLUTION INTRAMUSCULAR; INTRAVENOUS at 10:05

## 2023-05-18 RX ADMIN — OXYCODONE HYDROCHLORIDE 10 MG: 10 TABLET ORAL at 04:05

## 2023-05-18 RX ADMIN — AMLODIPINE BESYLATE 5 MG: 5 TABLET ORAL at 05:05

## 2023-05-18 RX ADMIN — SODIUM CHLORIDE: 9 INJECTION, SOLUTION INTRAVENOUS at 07:05

## 2023-05-18 RX ADMIN — SODIUM CHLORIDE, SODIUM GLUCONATE, SODIUM ACETATE, POTASSIUM CHLORIDE, MAGNESIUM CHLORIDE, SODIUM PHOSPHATE, DIBASIC, AND POTASSIUM PHOSPHATE: .53; .5; .37; .037; .03; .012; .00082 INJECTION, SOLUTION INTRAVENOUS at 11:05

## 2023-05-18 RX ADMIN — DEXAMETHASONE SODIUM PHOSPHATE 8 MG: 4 INJECTION, SOLUTION INTRAMUSCULAR; INTRAVENOUS at 10:05

## 2023-05-18 RX ADMIN — OXYCODONE HYDROCHLORIDE 5 MG: 5 TABLET ORAL at 12:05

## 2023-05-18 RX ADMIN — MIDAZOLAM HYDROCHLORIDE 1 MG: 1 INJECTION, SOLUTION INTRAMUSCULAR; INTRAVENOUS at 09:05

## 2023-05-18 RX ADMIN — MIDAZOLAM HYDROCHLORIDE 1 MG: 1 INJECTION, SOLUTION INTRAMUSCULAR; INTRAVENOUS at 10:05

## 2023-05-18 RX ADMIN — TRANEXAMIC ACID 1000 MG: 100 INJECTION, SOLUTION INTRAVENOUS at 10:05

## 2023-05-18 NOTE — H&P
EST PATIENT ORTHOPAEDIC: Knee    PRIMARY CARE PHYSICIAN: Kaykay Perales MD   REFERRING PROVIDER: No referring provider defined for this encounter.     ASSESSMENT & PLAN:    Impression:  Right Knee Failed UKA  Progression of Right Knee Arthritis    Follow Up Plan: 3 weeks after surgery     Surgery:    Tiarra Norton has radiograph and physical exam evidence of the aforementioned impression and wishes to pursue surgery. This patient appears to have sufficient symptoms to warrant surgical intervention and is an appropriate candidate for right Conversion UKA to Total Knee Arthroplasty as evidenced by months of unsuccessful non-operative treatment as outlined in the HPI below and progressive symptoms.    We had a lengthy discussion regarding the risk and benefit of surgery, the alternatives, limitations and personnel involved. These included but were not limited to infection, persistent pain, instability, nerve injury, blood clots, loosening and medical complications. We also discussed the pre-operative course, surgery itself and rehabilitation.    Fina-operative blood management and transfusion issues were discussed, and options clearly outlined. The patient has consented to the use of the banked allogenic blood if medically necessary.    The patient has elected to schedule surgery at this time or intends to call the office with a surgical date. Shared decision making occurred while obtaining informed consent. The patient will be scheduled for a pre-operative education class at which time they will have their nasal swab completed and will be given CHG cloths along with the verbal and written instructions for their use.    We will plan for use of [place] cemented CR components, with BASSAM assistance. Will use universal tibia with nubby.     Tentative Surgery Date: 5/18/23, she would like [place] overnight ADMIT    Patients case is complicated by spinal cord stimulator trail she is currently in that prohibits earlier  scheduling.  The patient has a long history of having right knee pain she had previously undergone unicompartmental knee arthroplasty in 2015 with Dr. Huber.  She had initially done well with this however began to experience lateral compartment issues and pain that workup in 2021 resulted in an arthroscopy and lateral meniscal debridement.  Based on that operative report she has wear underneath her kneecap as well as the lateral compartment.  She was seen by one of our colleagues Dr. Hills who recommended conversion from partial to total secondary to her pain complaints in intermittent effusions that she is having.  I am seeing her today for potentially scheduling her sooner for surgery relative to time allowed it [place].  I would agree with this assessment that the a likely ongoing swelling and recurrent and consistent pain is likely secondary to the progression of her underlying arthritis that was documented on the operative report from 2021.  She has limited nonoperative options to help address with this pain as injection based treatments can increase her risk of infection.  She does have some confounding pain generators including her lumbar spine for which she is on a spinal cord stimulator.  She does report that this stimulator help significantly with her lumbar pain.  She is able to decipher in distinguish her lumbar pain from her ongoing knee pain.  She maintains good range of motion instability of her knee.  There is only a small effusion on today's exam.  She does have some medial joint line pain.  I think for her conversion would seem appropriate.  She would like to do this in May.    The patient has been ordered:  CT (BASSAM Protocol)    CONSULTS:   Internal Medicine Consult for preoperative clearance.  Anesthesia for pre-surgical optimization    ACTIVE PROBLEM LIST  Patient Active Problem List   Diagnosis    HTN (hypertension)    Hypothyroid    Unspecified vitamin D deficiency    Elevated liver  enzymes    Primary osteoarthritis of right knee    Fatty liver    Hyperlipidemia    Migraine without status migrainosus, not intractable    Glucose intolerance (impaired glucose tolerance)    Hyponatremia    Anxiety    Fatigue    Intermittent diarrhea    HERMINIA (obstructive sleep apnea)    Decreased libido    Sleep arousal disorder    Major depressive disorder, recurrent, mild    Pain    Colon adenomas    Abdominal pain    Exocrine pancreatic insufficiency    Irritable bowel syndrome with diarrhea    Overweight (BMI 25.0-29.9)    Poor posture    Complex tear of lateral meniscus of right knee as current injury    Decreased range of motion (ROM) of right knee    Weakness of right lower extremity    Decreased range of motion of trunk and back    Decreased strength of trunk and back    Idiopathic scoliosis of thoracolumbar spine    Primary insomnia    Back pain    Impaired functional mobility, balance, gait, and endurance    Posture imbalance    Weakness of trunk musculature    COVID    Chronic pain syndrome    History of partial knee replacement    Benzodiazepine dependence    Aortic atherosclerosis           SUBJECTIVE    CHIEF COMPLAINT: Knee Pain    HPI:   Tiarra Norton is a 68 y.o. female here for evaluation and management of right knee pain. There is not a specific incident that brought about this pain. she has had progressive problems with the knee(s) starting 6 months ago but is now progressing to interfere with activities which include: walking, enjoying hobbies, exercise, rising from a sitting position, getting in and out of a car, and climbing stairs     Currently the pain in the joint is rated at moderate with activity. The pain is intermittent and is located in the knee, located medially and located laterally. The pain is described as aching, sharp, and stiffness. Relieving factors include rest, prescription medication, and over the counter medication .     There is not associated Catching, Clicking, and  "Popping.     Tiarra Norton has no additional complaints.     Review of op report from knee scope of UKA in  showed "grade 2-3 changes of the patellofemoral joint.  Some loose chondral flaps were gently debrided.  There was no pathology in either the medial or lateral gutter.  The implants appeared well positioned and well fixed.  There were in very good condition.  The ACL and PCL were intact.  Lateral compartment was entered.  She was developed and an articular cartilage flap on the lateral femoral condyle a gentle chondroplasty was performed debriding this to a stable rim.  This was not full-thickness cartilage loss.  She did have grade 3 changes of the articular surface of the tibia and a chondroplasty was performed here.  She also had a complex lateral meniscus tear of the posterior 3rd of the meniscus."    23: Here for presurgical planning and signing of consents.     PROGRESSIVE SYMPTOMS:  Pain worsened by weight bearing  Pain effecting living situation    FUNCTIONAL STATUS:   Climb a flight of stairs or walk up a hill     PREVIOUS TREATMENTS:  Medical: RX NSAIDS and Narcotics  Physical Therapy: Activities Modified  and Formal Physical Therapy for 6 weeks  Previous Orthopaedic Surgery: Right knee UKA and subsequent knee arthroscopsy     REVIEW OF SYSTEMS:  PAIN ASSESSMENT:  See HPI.  MUSCULOSKELETAL: See HPI.  OTHER 10 point review of systems is negative except as stated in HPI above    PAST MEDICAL HISTORY   has a past medical history of Cataract, Depression, Gestational diabetes, Hyperlipidemia, Hypertension, IBS (irritable bowel syndrome), and Thyroid disease.     PAST SURGICAL HISTORY   has a past surgical history that includes  section; Tubal ligation; Tonsillectomy; Adenoidectomy; Cholecystectomy; Eye surgery; r hand surgery; Total knee arthroplasty; Colonoscopy (N/A, 2016); Colonoscopy (N/A, 2019); Esophagogastroduodenoscopy (N/A, 2020); Joint replacement; " Arthroscopy of knee (Right, 10/19/2021); Arthroscopic chondroplasty of knee joint (Right, 10/19/2021); Knee arthroscopy w/ meniscectomy (Right, 10/19/2021); Injection of anesthetic agent around nerve (Bilateral, 2/8/2022); Injection of anesthetic agent around nerve (Bilateral, 2/15/2022); Radiofrequency ablation (Right, 3/8/2022); Radiofrequency ablation (Left, 3/22/2022); Injection of botulinum toxin type A (6/21/2022); Cystoscopy (6/21/2022); Trial of spinal cord nerve stimulator (N/A, 12/22/2022); and insertion, neurostimulator, spinal cord (N/A, 1/9/2023).     FAMILY HISTORY  family history includes Alcohol abuse in her sister and sister; Breast cancer in her maternal aunt and paternal aunt; Cancer in her father; Depression in her sister; Epilepsy in her son; Heart attack in her father; Heart disease in her father; Heart failure in her father; Hyperlipidemia in her father and mother; Hypertension in her father and mother; Irritable bowel syndrome in her mother and sister; Ovarian cancer in her maternal aunt.     SOCIAL HISTORY   reports that she has never smoked. She has never used smokeless tobacco. She reports current alcohol use of about 3.0 standard drinks per week. She reports that she does not use drugs.     ALLERGIES   Review of patient's allergies indicates:  No Known Allergies     MEDICATIONS  Current Facility-Administered Medications on File Prior to Encounter   Medication Dose Route Frequency Provider Last Rate Last Admin    0.9%  NaCl infusion  500 mL Intravenous Continuous Adelfo Zamarripa MD         Current Outpatient Medications on File Prior to Encounter   Medication Sig Dispense Refill    amLODIPine (NORVASC) 5 MG tablet Take 1 tablet (5 mg total) by mouth once daily. 90 tablet 3    atorvastatin (LIPITOR) 10 MG tablet Take 1 tablet (10 mg total) by mouth once daily. 90 tablet 3    b complex vitamins capsule Take 1 capsule by mouth once daily.      cholecalciferol, vitamin D3, (VITAMIN D3)  5,000 unit Tab Take 1 tablet (5,000 Units total) by mouth once daily.      conjugated estrogens (PREMARIN) vaginal cream Place 0.5 g vaginally twice a week. Place a pea-sized amount in vagina every night for 4 weeks, then use 2-3 nights a week 1 applicator 1    EScitalopram oxalate (LEXAPRO) 5 MG Tab Take 5 mg by mouth.      levothyroxine (SYNTHROID) 125 MCG tablet TAKE 1 TABLET BY MOUTH EVERY MORNING 90 tablet 2    liothyronine (CYTOMEL) 5 MCG Tab Take 1 tablet (5 mcg total) by mouth once daily. 90 tablet 3    RESTASIS 0.05 % ophthalmic emulsion INT 1 GTT IN OU BID      sumatriptan (IMITREX) 50 MG tablet 1 tab po at start of headache.  Repeat in 2 h prn 36 tablet 3    traZODone (DESYREL) 100 MG tablet 1-2 tabs po q hs for sleep 180 tablet 3    valsartan (DIOVAN) 320 MG tablet Take 1 tablet (320 mg total) by mouth once daily. 90 tablet 3    [DISCONTINUED] alprazolam (XANAX) 2 MG Tab Take 1 mg by mouth 3 (three) times daily as needed.  0    pantoprazole (PROTONIX) 40 MG tablet Take 1 tablet (40 mg total) by mouth once daily. (Patient taking differently: Take 22 mg by mouth once daily.) 90 tablet 2          PHYSICAL EXAM   vitals were not taken for this visit.   There is no height or weight on file to calculate BMI.      All other systems deferred.  GENERAL:  No acute distress  HABITUS: Normal  GAIT: Non-antalgic  SKIN: Normal  and Well healed surgical incision    KNEE EXAM:    right:   Effusion: Minimal joint effusion  TTP: yes over Medial Joint Line and Lateral Joint Line   no crepitus with passive knee ROM  Passive ROM: Extension 0, Flexion 125  No pain with manipulation of patella  Stable to varus/valgus stress. No increased laxity to anterior/posterior drawer testing  negative Berna's test  No pain with IR/ER rotation of the hip  5/5 strength in knee flexion and extension, ankle plantarflexion and dorsiflexion  Neurovascular Status: Sensation intact to light touch in Sural, Saphenous, SPN, DPN, Tibial nerve  distribution  2+ pulse DP/PT, normal capillary refill, foot has normal warmth    DATA:  Diagnostic tests reviewed for today's visit:     3v knee radiographs reveal components to be in appropriate position with good alignment. There is no evidence of loosening or abnormal wear.

## 2023-05-18 NOTE — PT/OT/SLP EVAL
Occupational Therapy Evaluation and Treatment    Name: Tiarra Norton  MRN: [id number]  Admitting Diagnosis: <principal problem not specified> Day of Surgery  Recent Surgery: Procedure(s) (LRB):  CONVERSION ARTHROPLASTY, KNEE, TOTAL, USING COMPUTER-ASSISTED NAVIGATION (Right) Day of Surgery    Recommendations:     Discharge Recommendations: home  Level of Assistance Recommended: 24 hours supervision  Discharge Equipment Recommendations: none  Barriers to discharge: None    Assessment:     Tiarra Norton is a 68 y.o. female with a medical diagnosis of <principal problem not specified>. She presents with performance deficits affecting function including impaired self care skills, impaired functional mobility, orthopedic precautions. Pt was able to perform supine/sit T/F c S and sit/stand T/F and walk to bathroom c CGA and RW.  Able to perform grooming task c set-up while standing at sink.  Pt was able to walk to chair c CGA and RW.  Pt is progressing well.    Rehab Prognosis: Good; patient would benefit from acute OT services to address these deficits and reach maximum level of function.    Plan:     Patient to be seen daily to address the above listed problems via self-care/home management, therapeutic activities, therapeutic exercises  Plan of Care Expires: 05/19/23  Plan of Care Reviewed with: patient, spouse    Subjective     Chief Complaint: R TKA  Patient Comments/Goals: I need to go to the bathroom.  Pain/Comfort:  Pain Rating 1: 8/10    Patients cultural, spiritual, Yarsani conflicts given the current situation: no    Social History:  Living Environment: Patient lives with their spouse in  a 3 story home  with number of outside stair(s): 1, bed/bath on 2 floor, walk-in shower, and built-in shower chair  Prior Level of Function: Prior to admission, patient was independent.  Roles and Routines: Patient was driving prior to admission.  Equipment Used at Home: bedside commode, walker, rolling, shower  chair  DME owned (not currently used): rolling walker and shower chair  Assistance Upon Discharge: significant other    Objective:     Communicated with RN prior to session. Patient found supine with cryotherapy, FCD, perineural catheter, peripheral IV upon OT entry to room.    General Precautions: Standard, fall   Orthopedic Precautions: RLE weight bearing as tolerated   Braces: N/A    Respiratory Status: Room air    Occupational Performance    Gait belt applied - Yes    Bed Mobility:   Supine to sit from left side of bed with supervision    Functional Mobility/Transfers:  Sit <> Stand Transfer with supervision with rolling walker  Bed <> Chair Transfer using Stand Pivot technique with supervision with rolling walker  Toilet Transfer Stand Pivot technique with supervision with rolling walker and bedside commode  Functional Mobility: Pt was able to walk to bathroom c S and RW.    Activities of Daily Living:  Grooming: modified independence to wash hands while standing at sink c RW.  Upper Body Dressing: minimum assistance to don hospital gown.  Toileting: modified independence for toilet hygiene.      Physical Exam:  Upper Extremity Range of Motion:     -       Right Upper Extremity: WFL  -       Left Upper Extremity: WFL  Upper Extremity Strength:    -       Right Upper Extremity: WFL  -       Left Upper Extremity: WFL    AMPAC 6 Click ADL:  AMPAC Total Score: 15    Treatment & Education:  Educated on the importance of mobility to maximize recovery  Educated on the importance of OOB mobility within safe range in order to decrease adverse effects of prolonged bedrest  Educated on safety with functional mobility; hand placement to ensure safe transfers to various surfaces in prep for ADLs  Educated on performing functional mobility and ADLs in adherence to orthopedic precautions  Educated on weight bearing status  Will continue to educate as needed      Patient clear to ambulate to/from bathroom with RN/PCT, assist  x1 .    Patient left up in chair with all lines intact, call button in reach, and RN notified.    GOALS:   Multidisciplinary Problems       Occupational Therapy Goals          Problem: Occupational Therapy    Goal Priority Disciplines Outcome Interventions   Occupational Therapy Goal     OT, PT/OT Ongoing, Progressing    Description: Goals to be met by: 23     Patient will increase functional independence with ADLs by performing:    UE Dressing with Modified [place].  LE Dressing with Modified [place] and Assistive Devices as needed.  Grooming while standing at sink with Modified [place].  Toileting from bedside commode with Modified [place] for hygiene and clothing management.   Bathing from  shower chair/bench with Modified [place].  Toilet transfer to bedside commode with Modified [place].                         History:     Past Medical History:   Diagnosis Date    Cataract     Depression     Gestational diabetes     Hyperlipidemia     Hypertension     IBS (irritable bowel syndrome)     Thyroid disease          Past Surgical History:   Procedure Laterality Date    ADENOIDECTOMY      ARTHROSCOPIC CHONDROPLASTY OF KNEE JOINT Right 10/19/2021    Procedure: ARTHROSCOPY, KNEE, WITH CHONDROPLASTY;  Surgeon: Trevin Huber MD;  Location: [facility] OR;  Service: Orthopedics;  Laterality: Right;    ARTHROSCOPY OF KNEE Right 10/19/2021    Procedure: ARTHROSCOPY, KNEE-RIGHT;  Surgeon: Trevin Huber MD;  Location: [facility] OR;  Service: Orthopedics;  Laterality: Right;     SECTION      CHOLECYSTECTOMY      COLONOSCOPY N/A 2016    Procedure: COLONOSCOPY;  Surgeon: Heri Segura MD;  Location: [address]);  Service: Endoscopy;  Laterality: N/A;    COLONOSCOPY N/A 2019    Procedure: COLONOSCOPY;  Surgeon: Joe Pitts MD;  Location: [address]);  Service: Endoscopy;  Laterality: N/A;    CYSTOSCOPY  2022    Procedure: CYSTOSCOPY;  Surgeon:  Lenny Moore MD;  Location: [facility] 1ST FLR;  Service: Urology;;    ESOPHAGOGASTRODUODENOSCOPY N/A 2/13/2020    Procedure: EGD (ESOPHAGOGASTRODUODENOSCOPY);  Surgeon: Tolu Rivas MD;  Location: [facility] ENDO (4TH FLR);  Service: Endoscopy;  Laterality: N/A;  Pt. moved to 9:15am arrival time.. Instructed to not have anything after midnight.EC    EYE SURGERY      cataract resection right    INJECTION OF ANESTHETIC AGENT AROUND NERVE Bilateral 2/8/2022    Procedure: Block, Nerve MEDIAL BRANCH BLOCK BILATERAL L3,4,5;  Surgeon: Tara Gibbs MD;  Location: [facility] PAIN MGT;  Service: Pain Management;  Laterality: Bilateral;    INJECTION OF ANESTHETIC AGENT AROUND NERVE Bilateral 2/15/2022    Procedure: BLOCK, NERVE, L3*L4-L5 MEDIAL BR ANCH 2 OF 2;  Surgeon: Tara Gibbs MD;  Location: [facility] PAIN MGT;  Service: Pain Management;  Laterality: Bilateral;    INJECTION OF BOTULINUM TOXIN TYPE A  6/21/2022    Procedure: BOTULINUM TOXIN INJECTION 100 units;  Surgeon: Lenny Moore MD;  Location: [facility] 1ST FLR;  Service: Urology;;    INSERTION, NEUROSTIMULATOR, SPINAL CORD N/A 1/9/2023    Procedure: SPINAL CORD STIMULATOR IMPLANT [id number];  Surgeon: Shoaib Aguirre MD;  Location: [facility] OR;  Service: Pain Management;  Laterality: N/A;    JOINT REPLACEMENT      partial right knee    KNEE ARTHROSCOPY W/ MENISCECTOMY Right 10/19/2021    Procedure: ARTHROSCOPY, KNEE, WITH MENISCECTOMY, PARTIAL LATERAL;  Surgeon: Trevin Huber MD;  Location: [facility] OR;  Service: Orthopedics;  Laterality: Right;    r hand surgery      RADIOFREQUENCY ABLATION Right 3/8/2022    Procedure: RADIOFREQUENCY ABLATION, L3-L5 1 OF 2;  Surgeon: Tara Gibbs MD;  Location: [facility] PAIN MGT;  Service: Pain Management;  Laterality: Right;    RADIOFREQUENCY ABLATION Left 3/22/2022    Procedure: RADIOFREQUENCY ABLATION, l3-+l5 2 of 2;  Surgeon: Tara Gibbs MD;  Location: [facility] PAIN MGT;  Service: Pain Management;   Laterality: Left;    TONSILLECTOMY      TOTAL KNEE ARTHROPLASTY      partial knee replacement    TRIAL OF SPINAL CORD NERVE STIMULATOR N/A 12/22/2022    Procedure: SPINAL CORD STIMULATOR TRIAL [facility];  Surgeon: Shoaib Aguirre MD;  Location: [facility];  Service: Pain Management;  Laterality: N/A;    TUBAL LIGATION         Time Tracking:     OT Date of Treatment: 05/18/23  OT Start Time: 1450  OT Stop Time: 1519  OT Total Time (min): 29 min    Billable Minutes: Evaluation 15 and Self Care/Home Management 14    5/18/2023

## 2023-05-18 NOTE — ANESTHESIA PREPROCEDURE EVALUATION
05/18/2023  Tiarra Norton is a 68 y.o., female.    Procedure Summary    Case: [id number] Date/Time: 05/18/23 1000   Procedure: CONVERSION ARTHROPLASTY, KNEE, TOTAL, USING COMPUTER-ASSISTED NAVIGATION (Right: Knee) - Same day discharge   Anesthesia type: Spinal   Diagnosis: History of partial knee replacement [Z96.659]     Patient Active Problem List   Diagnosis    HTN (hypertension)    Hypothyroid    Unspecified vitamin D deficiency    Elevated liver enzymes    Primary osteoarthritis of right knee    Fatty liver    Hyperlipidemia    Migraine without status migrainosus, not intractable    Glucose intolerance (impaired glucose tolerance)    Hyponatremia    Anxiety    Fatigue    Intermittent diarrhea    HERMINIA (obstructive sleep apnea)    Decreased libido    Sleep arousal disorder    Major depressive disorder, recurrent, mild    Pain    Colon adenomas    Abdominal pain    Exocrine pancreatic insufficiency    Irritable bowel syndrome with diarrhea    Overweight (BMI 25.0-29.9)    Poor posture    Complex tear of lateral meniscus of right knee as current injury    Decreased range of motion (ROM) of right knee    Weakness of right lower extremity    Decreased range of motion of trunk and back    Decreased strength of trunk and back    Idiopathic scoliosis of thoracolumbar spine    Primary insomnia    Back pain    Impaired functional mobility, balance, gait, and endurance    Posture imbalance    Weakness of trunk musculature    COVID    Chronic pain syndrome    History of partial knee replacement    Benzodiazepine dependence    Aortic atherosclerosis     Past Surgical History:   Procedure Laterality Date    ADENOIDECTOMY      ARTHROSCOPIC CHONDROPLASTY OF KNEE JOINT Right 10/19/2021    Procedure: ARTHROSCOPY, KNEE, WITH CHONDROPLASTY;  Surgeon: Trevin Huber MD;   Location: [facility] OR;  Service: Orthopedics;  Laterality: Right;    ARTHROSCOPY OF KNEE Right 10/19/2021    Procedure: ARTHROSCOPY, KNEE-RIGHT;  Surgeon: Trevin Huber MD;  Location: [facility] OR;  Service: Orthopedics;  Laterality: Right;     SECTION      CHOLECYSTECTOMY      COLONOSCOPY N/A 2016    Procedure: COLONOSCOPY;  Surgeon: Heri Segura MD;  Location: [facility] ENDO (4TH FLR);  Service: Endoscopy;  Laterality: N/A;    COLONOSCOPY N/A 2019    Procedure: COLONOSCOPY;  Surgeon: Joe Pitts MD;  Location: [facility] ENDO (4TH FLR);  Service: Endoscopy;  Laterality: N/A;    CYSTOSCOPY  2022    Procedure: CYSTOSCOPY;  Surgeon: Lenny Moore MD;  Location: [address];  Service: Urology;;    ESOPHAGOGASTRODUODENOSCOPY N/A 2020    Procedure: EGD (ESOPHAGOGASTRODUODENOSCOPY);  Surgeon: Tolu Rivas MD;  Location: [facility] (4TH FLR);  Service: Endoscopy;  Laterality: N/A;  Pt. moved to 9:15am arrival time.. Instructed to not have anything after midnight.EC    EYE SURGERY      cataract resection right    INJECTION OF ANESTHETIC AGENT AROUND NERVE Bilateral 2022    Procedure: Block, Nerve MEDIAL BRANCH BLOCK BILATERAL L3,4,5;  Surgeon: Tara Gibbs MD;  Location: [facility] PAIN MGT;  Service: Pain Management;  Laterality: Bilateral;    INJECTION OF ANESTHETIC AGENT AROUND NERVE Bilateral 2/15/2022    Procedure: BLOCK, NERVE, L3*L4-L5 MEDIAL BR ANCH 2 OF 2;  Surgeon: Tara Gibbs MD;  Location: [facility] PAIN MGT;  Service: Pain Management;  Laterality: Bilateral;    INJECTION OF BOTULINUM TOXIN TYPE A  2022    Procedure: BOTULINUM TOXIN INJECTION 100 units;  Surgeon: Lenny Moore MD;  Location: [address];  Service: Urology;;    INSERTION, NEUROSTIMULATOR, SPINAL CORD N/A 2023    Procedure: SPINAL CORD STIMULATOR IMPLANT [employer] RESEARCH;  Surgeon: Shoaib Aguirre MD;  Location: [facility];  Service: Pain Management;  Laterality: N/A;     JOINT REPLACEMENT      partial right knee    KNEE ARTHROSCOPY W/ MENISCECTOMY Right 10/19/2021    Procedure: ARTHROSCOPY, KNEE, WITH MENISCECTOMY, PARTIAL LATERAL;  Surgeon: Trevin Huber MD;  Location: [facility] OR;  Service: Orthopedics;  Laterality: Right;    r hand surgery      RADIOFREQUENCY ABLATION Right 3/8/2022    Procedure: RADIOFREQUENCY ABLATION, L3-L5 1 OF 2;  Surgeon: Tara Gibbs MD;  Location: [facility] PAIN MGT;  Service: Pain Management;  Laterality: Right;    RADIOFREQUENCY ABLATION Left 3/22/2022    Procedure: RADIOFREQUENCY ABLATION, l3-+l5 2 of 2;  Surgeon: Tara Gibbs MD;  Location: [facility] PAIN MGT;  Service: Pain Management;  Laterality: Left;    TONSILLECTOMY      TOTAL KNEE ARTHROPLASTY      partial knee replacement    TRIAL OF SPINAL CORD NERVE STIMULATOR N/A 12/22/2022    Procedure: SPINAL CORD STIMULATOR TRIAL [employer];  Surgeon: Shoaib Aguirre MD;  Location: [facility] PAIN MGT;  Service: Pain Management;  Laterality: N/A;    TUBAL LIGATION       Current Discharge Medication List      START taking these medications    Details   acetaminophen (TYLENOL) 500 MG tablet Take 2 tablets (1,000 mg total) by mouth every 8 (eight) hours as needed for Pain.  Qty: 42 tablet, Refills: 0      aspirin (ECOTRIN) 81 MG EC tablet Take 1 tablet (81 mg total) by mouth 2 (two) times a day.  Qty: 60 tablet, Refills: 0      celecoxib (CELEBREX) 200 MG capsule Take 1 capsule (200 mg total) by mouth 2 (two) times daily.  Qty: 14 capsule, Refills: 0      docusate sodium (COLACE) 100 MG capsule Take 1 capsule (100 mg total) by mouth 2 (two) times daily.  Qty: 30 capsule, Refills: 0      methocarbamoL (ROBAXIN) 500 MG Tab Take 1 tablet (500 mg total) by mouth 4 (four) times daily. for 10 days  Qty: 40 tablet, Refills: 0      oxyCODONE (ROXICODONE) 5 MG immediate release tablet Take 1-2 tablets (5-10 mg total) by mouth every 4 (four) hours as needed (pain).  Qty: 40 tablet, Refills: 0     Comments: Rx medically necessary, greater than 7 day supply    BEDSIDE DELIVERY AT [place], EXPECTED DISCHARGE DATE 5/19/23      pregabalin (LYRICA) 75 MG capsule Take 1 capsule (75 mg total) by mouth 2 (two) times daily. for 14 days  Qty: 28 capsule, Refills: 0         CONTINUE these medications which have NOT CHANGED    Details   amLODIPine (NORVASC) 5 MG tablet Take 1 tablet (5 mg total) by mouth once daily.  Qty: 90 tablet, Refills: 3    Comments: .      atorvastatin (LIPITOR) 10 MG tablet Take 1 tablet (10 mg total) by mouth once daily.  Qty: 90 tablet, Refills: 3      b complex vitamins capsule Take 1 capsule by mouth once daily.      cholecalciferol, vitamin D3, (VITAMIN D3) 5,000 unit Tab Take 1 tablet (5,000 Units total) by mouth once daily.      conjugated estrogens (PREMARIN) vaginal cream Place 0.5 g vaginally twice a week. Place a pea-sized amount in vagina every night for 4 weeks, then use 2-3 nights a week  Qty: 1 applicator, Refills: 1      EScitalopram oxalate (LEXAPRO) 5 MG Tab Take 5 mg by mouth.      levothyroxine (SYNTHROID) 125 MCG tablet TAKE 1 TABLET BY MOUTH EVERY MORNING  Qty: 90 tablet, Refills: 2      liothyronine (CYTOMEL) 5 MCG Tab Take 1 tablet (5 mcg total) by mouth once daily.  Qty: 90 tablet, Refills: 3      RESTASIS 0.05 % ophthalmic emulsion INT 1 GTT IN OU BID      traZODone (DESYREL) 100 MG tablet 1-2 tabs po q hs for sleep  Qty: 180 tablet, Refills: 3      valsartan (DIOVAN) 320 MG tablet Take 1 tablet (320 mg total) by mouth once daily.  Qty: 90 tablet, Refills: 3    Comments: .      estradioL (ESTRACE) 0.01 % (0.1 mg/gram) vaginal cream Place 1g daily for 2-4 weeks and then 2-3x/week afterwards  Qty: 30 g, Refills: 12    Associated Diagnoses: Vaginal atrophy      pantoprazole (PROTONIX) 40 MG tablet Take 1 tablet (40 mg total) by mouth once daily.  Qty: 90 tablet, Refills: 2    Comments: Please inactivate all prior scripts with same name and strength including on holds.       promethazine (PHENERGAN) 12.5 MG Tab Take 1 tablet (12.5 mg total) by mouth every 6 (six) hours as needed (nausea).  Qty: 30 tablet, Refills: 2      sumatriptan (IMITREX) 50 MG tablet 1 tab po at start of headache.  Repeat in 2 h prn  Qty: 36 tablet, Refills: 3         STOP taking these medications       alprazolam (XANAX) 2 MG Tab Comments:   Reason for Stopping:         tiZANidine (ZANAFLEX) 4 MG tablet Comments:   Reason for Stopping:               Pre-op Assessment    I have reviewed the Patient Summary Reports.     I have reviewed the Nursing Notes. I have reviewed the NPO Status.   I have reviewed the Medications.     Review of Systems  Anesthesia Hx:  No problems with previous Anesthesia Spinal cord stimulator 1/9/2023 History of prior surgery of interest to airway management or planning: Previous anesthesia: General, Spinal, MAC 6/11/15 undefined REPLACEMENT-KNEE WITH [id number]-RHL-MEDIAL (Right: Knee) with general anesthesia.   1/9/23 SPINAL CORD STIMULATOR IMPLANT [employer] with MAC.  Procedure performed at an [facility]. Denies Family Hx of Anesthesia complications.   Denies Personal Hx of Anesthesia complications.   Social:  Non-Smoker, Social Alcohol Use    Hematology/Oncology:  Hematology Normal   Oncology Normal     EENT/Dental:  EENT/Dental Normal Eyes: Visual Impairment Has Bilateral and S/P Extraction - Bilateral Catarract    Cardiovascular:   Exercise tolerance: poor Hypertension, well controlled  Denies Angina. denies PVD hyperlipidemia  Denies MUSTAFA.  Functional Capacity 4 METS    Pulmonary:   Denies COPD.  Denies Asthma.  Denies Shortness of breath.  Denies Recent URI. Sleep study confirmed apnea not severe enough to warrant CPAP   Renal/:   Denies Chronic Renal Disease. no renal calculi     Hepatic/GI:   Denies PUD. Denies GERD.  Denies Hepatitis. CHOLECYSTECTOMY Liver Disease, Fatty Liver    Musculoskeletal:  Musculoskeletal General/Symptoms: joint pain, low back  pain. Functional capacity is ambulatory without assistance.  Joint Disease:  Arthritis, Osteoarthritis PRIMARY OSTEOARTHRITIS RIGHT KNEE S/P PARTIAL REPLACEMENT      OSTEOARTHRITIS LEFT KNEE Bone Disorders: Osteoporosis  Spine Disorders: Degenerative Joint Disease  Lumbar Spine Disorders, Lumbar Disc Disease   OB/GYN/PEDS:    TUBAL LIGATION   Neurological:   Denies TIA. Denies CVA. Headaches Denies Seizures.  Dx of Headaches, Migraine Headache Osteoarthritis  Denies Peripheral Neuropathy    Endocrine:   Hypothyroidism  Denies Obesity / BMI > 30, Denies Morbid Obesity / BMI > 40  Dermatological:  Skin Normal    Psych:   Psychiatric History anxiety depression          Physical Exam  General: Well nourished, Cooperative, Alert and Oriented    Airway:  Mouth Opening: Small, but > 3cm  Tongue: Normal  Neck ROM: Normal ROM    Dental:  Intact    Chest/Lungs:  Normal Respiratory Rate    Heart:  Rate: Normal  Rhythm: Regular Rhythm          Anesthesia Assessment: Preoperative EQUATION    Planned Procedure: Procedure(s) (LRB):  CONVERSION ARTHROPLASTY, KNEE, TOTAL, USING COMPUTER-ASSISTED NAVIGATION (Right)  Requested Anesthesia Type:Spinal  Surgeon: Virgil Scott MD  Service: Orthopedics  Known or anticipated Date of Surgery:2023    Surgeon notes: reviewed    Electronic QUestionnaire Assessment completed via nurse interview with patient.        Triage considerations:     The patient has no apparent active cardiac condition (No unstable coronary Syndrome such as severe unstable angina or recent [<1 month] myocardial infarction, decompensated CHF, severe valvular   disease or significant arrhythmia)    Previous anesthesia records:LMA General, MAC, and Spinal Anesthesia    Patient reports no personal or family history of anesthesia complications.    23   SPINAL CORD STIMULATOR IMPLANT [employer] RESEARCH (Back)   MAC  Anesthesia Type    Airway:  Mallampati: II   Mouth Opening: Normal  TM Distance:  Normal  Tongue: Normal  Neck ROM: Normal ROM    6/11/15     REPLACEMENT-KNEE WITH [id number]-[id number]-JGE-MEDIAL (Right: Knee)   spinal, general     Last PCP note: within 3 months , within [facility]    Dr. Perales  Subspecialty notes: Ortho  4/14/23        Pain Management (ANNMARIE Gomez FNROXANNE)  8/16/22  os  Neurology (Dr. Bee)  5/25/22        Urology (Dr. Moore)  4/20/21        Gastroenterology (Dr Jarvis)  10/20/20      Cardiology (Dr. Kenyon)    Other important co-morbidities: Hypothyroid, HERMINIA, and HTN, HLD, Multi level DDD ([employer] spinal cord stimulator implant), Chronic pain,  Rt knee revision,  OAB, IBS, Major Depressive disorder (mild), vit D def    Tests already available:  Available tests,  within [facility] .   3/31/23       EKG  12/29/22     Xray Thoracic Spine   and   Xray Lumbar Spine  10/13/22     MRI Lumbar Spine w/o constrast  8/20/22       CT Chest/Abd w/ constrast  8/10/22       CMP, CBC, TSH            Instructions given. (See in Nurse's note)    Optimization:  Anesthesia Preop Clinic Assessment  Indicated Will schedule POC    Medical Opinion Indicated       Sub-specialist consult indicated:   TBCB Amarilys Vincent for anesthesia        Plan:    Testing:  CBC, CMP, PT/INR, TSH, and T&S   Pre-anesthesia  visit       Visit focus: possible regional anesthesia and/or nerve block      Consultation: BEN Panda for anesthesia optimization     Patient  has previously scheduled Medical Appointment:  5/12    Navigation: Tests Scheduled.              Consults scheduled.             Results will be tracked by Preop Clinic.     4/5/23  Kaykay Perales MD  to Tiarra Norton           3:08 PM  Tiarra - no problem with clearing y u for surgery  Kaykay    Last read by Tiarra Norton at  4:13 PM on 4/5/2023.Tiarra - no problem with clearing y u for surgery  Kaykay                    Anesthesia Plan  Type of Anesthesia, risks & benefits discussed:    Anesthesia Type: Gen Natural Airway, Spinal,  Regional  Intra-op Monitoring Plan: Standard ASA Monitors  Post Op Pain Control Plan: multimodal analgesia, peripheral nerve block and IV/PO Opioids PRN  Induction:  IV  Informed Consent: Informed consent signed with the Patient and all parties understand the risks and agree with anesthesia plan.  All questions answered.   ASA Score: 2    Ready For Surgery From Anesthesia Perspective.       .

## 2023-05-18 NOTE — PLAN OF CARE
Problem: Adult Inpatient Plan of Care  Goal: Absence of Hospital-Acquired Illness or Injury  Intervention: Identify and Manage Fall Risk  Flowsheets (Taken 5/18/2023 1704)  Safety Promotion/Fall Prevention:   assistive device/personal item within reach   in recliner, wheels locked   instructed to call staff for mobility   Fall Risk reviewed with patient/family     Problem: Adult Inpatient Plan of Care  Goal: Absence of Hospital-Acquired Illness or Injury  Intervention: Prevent and Manage VTE (Venous Thromboembolism) Risk  Flowsheets (Taken 5/18/2023 1704)  Activity Management: Ankle pumps - L1     Problem: Adult Inpatient Plan of Care  Goal: Absence of Hospital-Acquired Illness or Injury  Intervention: Prevent Infection  Flowsheets (Taken 5/18/2023 1704)  Infection Prevention: hand hygiene promoted     Problem: Pain Acute  Goal: Acceptable Pain Control and Functional Ability  Intervention: Develop Pain Management Plan  Flowsheets (Taken 5/18/2023 1704)  Pain Management Interventions:   care clustered   cold applied     Problem: Pain Acute  Goal: Acceptable Pain Control and Functional Ability  Intervention: Prevent or Manage Pain  Flowsheets (Taken 5/18/2023 1704)  Medication Review/Management: medications reviewed     Problem: Pain Acute  Goal: Acceptable Pain Control and Functional Ability  Intervention: Optimize Psychosocial Wellbeing  Flowsheets (Taken 5/18/2023 1704)  Supportive Measures: active listening utilized     Problem: Adjustment to Surgery (Knee Arthroplasty)  Goal: Optimal Coping  Intervention: Support Psychosocial Response to Surgery and Mobility Changes  Flowsheets (Taken 5/18/2023 1704)  Supportive Measures: active listening utilized     Problem: Bleeding (Knee Arthroplasty)  Goal: Absence of Bleeding  Intervention: Monitor and Manage Bleeding  Flowsheets (Taken 5/18/2023 1704)  Bleeding Management: dressing monitored     Problem: Functional Ability Impaired (Knee Arthroplasty)  Goal: Optimal  Functional Ability  Intervention: Promote Optimal Functional Status  Flowsheets (Taken 5/18/2023 1704)  Activity Management: Ankle pumps - L1     Problem: Infection (Knee Arthroplasty)  Goal: Absence of Infection Signs and Symptoms  Intervention: Prevent or Manage Infection  Flowsheets (Taken 5/18/2023 1704)  Infection Prevention: hand hygiene promoted     Problem: Respiratory Compromise (Knee Arthroplasty)  Goal: Effective Oxygenation and Ventilation  Intervention: Optimize Oxygenation and Ventilation  Flowsheets (Taken 5/18/2023 1704)  Administration (IS):   instruction provided, initial   proper technique demonstrated     Problem: Anxiety  Goal: Anxiety Reduction or Resolution  Intervention: Promote Anxiety Reduction  Flowsheets (Taken 5/18/2023 1704)  Supportive Measures: active listening utilized     Problem: Depression  Goal: Improved Mood  Intervention: Monitor and Manage Depressive Symptoms  Flowsheets (Taken 5/18/2023 1704)  Supportive Measures: active listening utilized

## 2023-05-18 NOTE — NURSING TRANSFER
Nursing Transfer Note      5/18/2023     Reason patient is being transferred: extended recovery     Transfer: PACU to Extended Recovery Suites    Transfer via stretcher    Transfer with Nimbus pump    Transported by JERMAIN Sherman and MODE Fishman    Telemetry: No    Medicines sent: none    Any special needs or follow-up needed: none    Chart send with patient: Yes    Notified: JERMAIN Blanca and , Uri    Patient reassessed at: 5/18/2023; 1400   1  Upon arrival to floor:

## 2023-05-18 NOTE — ANESTHESIA PROCEDURE NOTES
Right adductor canal catheter    Patient location during procedure: pre-op   Block not for primary anesthetic.  Reason for block: at surgeon's request and post-op pain management   Post-op Pain Location: Right knee pain   Start time: 5/18/2023 9:31 AM  Timeout: 5/18/2023 9:30 AM   End time: 5/18/2023 9:40 AM    Staffing  Authorizing Provider: Chiara Herbert MD  Performing Provider: Chiara Herbert MD    Preanesthetic Checklist  Completed: patient identified, IV checked, site marked, risks and benefits discussed, surgical consent, monitors and equipment checked, pre-op evaluation and timeout performed  Peripheral Block  Patient position: supine  Prep: ChloraPrep and site prepped and draped  Patient monitoring: heart rate, cardiac monitor, continuous pulse ox, continuous capnometry and frequent blood pressure checks  Block type: adductor canal  Laterality: right  Injection technique: continuous  Needle  Needle type: Tuohy   Needle gauge: 17 G  Needle length: 3.5 in  Needle localization: anatomical landmarks and ultrasound guidance  Catheter type: spring wound  Catheter size: 19 G  Test dose: lidocaine 1.5% with Epi 1-to-200,000 and negative   -ultrasound image captured on disc.  Assessment  Injection assessment: negative aspiration, negative parasthesia and local visualized surrounding nerve  Paresthesia pain: none  Heart rate change: no  Slow fractionated injection: yes  Pain Tolerance: comfortable throughout block and no complaints  Medications:    Medications: ropivacaine (NAROPIN) injection 0.5% - Perineural   10 mL - 5/18/2023 9:35:00 AM    Additional Notes  VSS.  [facility] RN monitoring vitals throughout procedure.  Patient tolerated procedure well.     With 10mL normal saline, 1:200,000 epi

## 2023-05-18 NOTE — ANESTHESIA PROCEDURE NOTES
Spinal    Diagnosis: Right knee osteoarthritis  Patient location during procedure: OR  Start time: 5/18/2023 10:13 AM  Timeout: 5/18/2023 10:12 AM  End time: 5/18/2023 10:18 AM    Staffing  Authorizing Provider: Chiara Herbert MD  Performing Provider: Chiara Herbert MD    Preanesthetic Checklist  Completed: patient identified, IV checked, site marked, risks and benefits discussed, surgical consent, monitors and equipment checked, pre-op evaluation and timeout performed  Spinal Block  Patient position: sitting  Prep: ChloraPrep  Patient monitoring: heart rate, cardiac monitor, continuous pulse ox, continuous capnometry and frequent blood pressure checks  Approach: left paramedian  Location: L3-4  Injection technique: single shot  CSF Fluid: clear free-flowing CSF  Needle  Needle length: 4 in  Needle localization: anatomical landmarks  Assessment  Sensory level: T8  Ease of block: easy  Patient's tolerance of the procedure: comfortable throughout block  Medications:    Medications: mepivacaine (CARBOCAINE) injection 15 mg/mL (1.5%) - Other   3 mL - 5/18/2023 10:18:00 AM

## 2023-05-18 NOTE — PLAN OF CARE
Patient tolerated PT session well. Patient ambulated 100ft and 12ft with RW and contact guard assistance. No LOB or SOB noted. Patient has an OPPT appointment on 2023.     Problem: Physical Therapy  Goal: Physical Therapy Goal  Description: Goals to be met by: 2023    Patient will increase functional independence with mobility by performin. Supine to sit with supervision  2. Sit to stand transfer with Supervision  3. Gait x300 feet with Supervision using Rolling Walker  4. Ascend/Descend 1 curb step with Stand by assistance using Rolling Walker  5. Ascend/Descend 4 steps with stand by assistance and bilateral handrails   6. Lower extremity exercise program x30 reps per handout, with supervision       Outcome: Ongoing, Progressing

## 2023-05-18 NOTE — PATIENT INSTRUCTIONS
Post-Operative Discharge Instructions: Dr. Scott    Physical Therapy  You will have home health/physical therapy working with you 2-3x a week for the first two weeks. After this, it will be necessary to begin formal outpatient physical therapy for an additional 2 weeks for hip replacements and about 4-6 weeks for knee replacements.   Knee replacements can get stiff and as such the post operative therapy is critical after a knee replacement. Range of motion exercises are not limited to therapy sessions and should be done every 1-2 hours on your own.   Hip replacements your therapy for the first month is mostly walking and gradually returning to activities as tolerated. However, you will have to wean off assistive devices and maintain hip precautions for 6 weeks post operatively.     Pain Management  Some degree of pain is normal and expected. You will improve gradually as your muscles become stronger and healing continues. This speed of recovery is different for every patient and you should not compare your recovery to another friend or relative.   You will be discharged with pain medications. You should wean off of these as tolerated by 4-6 weeks. but it is expected you will need them in the immediate post operative period and for therapy.   Pain medications can have common side effects of constipation which you should take a laxative, nausea which you should take medication with food, itching which can be alleviated with Benadryl, and confusion which you should stop taking pain medications and call our office if this occurs.   Be aware of your ingestion of Tylenol if your pain medication has this in it, you should not exceed 3000mg of Acetaminophen as this can damage your liver.   If you require a pain medication refill, you will need to contact our office at least 3 days in advance to prescription running out. Prescriptions cannot be called into a pharmacy. You will need to  a prescription in one of our  office locations depending on our clinic availability.     Swelling  You will have swelling of the post operative leg. Swelling may get worse with activity. It will likely get worse in the 2-3 days after discharge from the hospital. Typically swelling is correlated to pain. It can be helpful to elevate your extremity above the level of your heart and consistent use of ice. Elevating your extremity should not be done as to violate your hip precautions after a hip replacement, and no pillows should be placed under the knee after knee replacement surgery.   You will have compression stockings that should remain on for 3 weeks following surgery. They should only be removed for hygiene purposes. These will help with the swelling.       Anticoagulation  You will be placed on a blood thinner to prevent blood clots. You will need to take this as directed.        Wound Care  Your dressing should come off after 7 days. You may shower over this dressing but it should be covered or kept dry (mp wrap or garbage bag). Once it is removed, you may leave the wound open to air as so long there is no drainage. You can continue to shower but NO scrubbing the incision, should pat dry. NO soaking the wound in a bathtub, hot tub, pool, ocean etc. for at least 6 weeks after surgery. Your incision will likely be glued on the skin and no sutures should need to be removed post operatively.  You can begin putting on Vitamin E based lotions on the wound around 6-8 weeks post operatively when there is no remaining scabbing of the incision.   If there is any drainage post operatively you should contact our office immediately    Antibiotics  You will need to be placed on prophylactic antibiotics prior to any dental procedure or cleanings, any colonoscopy, cystoscopy, prostate or bladder surgery, kidney surgery or endoscopy. This will avoid risk of subsequent infection of your replacement. We prefer Amoxicillin 2g one hour prior to procedure.  This can be given by our office or your dentist. Although controversial, we prefer lifetime dental prophylaxis.     Other Considerations  No anti-inflammatories should be used for 3 weeks following surgery  No driving for about 2-4 weeks following surgery on the left leg and about 4-6 weeks after surgery on the right leg. You will need to be off pain medications completely. You will need to be able to perform evasive driving maneuvers without hesitation.    You can fly about 2 weeks post-operatively. However, we may recommend alterative blood thinners if this will take place.   Return to work is patient specific. If a desk job, it may be 2-4 weeks for motivated individuals. Patients in strenuous or physical jobs may be out for 12 weeks.     Follow-up appointments  Your first post operative visit will be about 3 weeks after surgery. You will have x-rays at this appointment  Your second post operative visit will be about 3 months after surgery. This will be for a clinical check only unless a reason arises for an x-ray  Your third post operative visit will be about 1 year after surgery. X-rays are taken at this time.

## 2023-05-18 NOTE — NURSING
Patient admitted to unit as ordered. Alert, verbal and oriented x 4. NS infusing at 150 mL/hr. No s/s of distress. Condition stable.  present at bedside. Dressing to right knee clean, dry and intact.

## 2023-05-18 NOTE — TRANSFER OF CARE
"Anesthesia Transfer of Care Note    Patient: Tiarra Norton    Procedure(s) Performed: Procedure(s) (LRB):  CONVERSION ARTHROPLASTY, KNEE, TOTAL, USING COMPUTER-ASSISTED NAVIGATION (Right)    Patient location: PACU    Anesthesia Type: general    Transport from OR: Transported from OR on room air with adequate spontaneous ventilation    Post pain: adequate analgesia    Post assessment: no apparent anesthetic complications    Post vital signs: stable    Level of consciousness: awake    Nausea/Vomiting: no nausea/vomiting    Complications: none    Transfer of care protocol was followed      Last vitals:   Visit Vitals  BP (!) 140/78 (BP Location: Right arm, Patient Position: Lying)   Pulse 64   Temp 36.7 °C (98.1 °F) (Oral)   Resp 18   Ht 5' 3" (1.6 m)   Wt 63 kg (139 lb)   SpO2 95%   Breastfeeding No   BMI 24.62 kg/m²     "

## 2023-05-18 NOTE — ANESTHESIA POSTPROCEDURE EVALUATION
Anesthesia Post Evaluation    Patient: Tiarra Norton    Procedure(s) Performed: Procedure(s) (LRB):  CONVERSION ARTHROPLASTY, KNEE, TOTAL, USING COMPUTER-ASSISTED NAVIGATION (Right)    Final Anesthesia Type: spinal      Patient location during evaluation: PACU  Patient participation: Yes- Able to Participate  Level of consciousness: awake and alert and oriented  Post-procedure vital signs: reviewed and stable  Pain management: adequate  Airway patency: patent    PONV status at discharge: No PONV  Anesthetic complications: no      Cardiovascular status: hemodynamically stable  Respiratory status: nasal cannula  Hydration status: euvolemic  Follow-up not needed.          Vitals Value Taken Time   /76 05/18/23 1700   Temp 36.1 °C (97 °F) 05/18/23 1700   Pulse 52 05/18/23 1700   Resp 18 05/18/23 1700   SpO2 97 % 05/18/23 1700         Event Time   Out of Recovery 14:07:00         Pain/Arianne Score: Pain Rating Prior to Med Admin: 9 (5/18/2023  4:47 PM)  Pain Rating Post Med Admin: 6 (5/18/2023  2:00 PM)  Arianne Score: 10 (5/18/2023  2:00 PM)

## 2023-05-18 NOTE — OP NOTE
OPERATIVE NOTE     PATIENT NAME: Tiarra Norton   MRN: [id number]      Surgery Date:5/18/23    Surgeon(s) and Assistant(s): Virgil Scott MD. Kayy Ochoa CST.      Procedure(s): right Conversion Total Knee Arthroplasty     BMI:  Body mass index is 24.62 kg/m².      Anesthesia:  Spinal     Attestation:   I was present for all of the critical portions of the operation and performed all critical portions.  The medical assistant performed the superficial closure under supervision, and assisted during the operation. I was immediately available for the duration of the entire case.      Preoperative Diagnosis:  right  Knee Arthritis     Postoperative Diagnosis: Same     Findings:  See Full Operative Report    Complications: None     EBL: 100 mL     Implants:   Implant Name Type Inv. Item Serial No.  Lot No. LRB No. Used Action   PIN BONE [id number] - [id number]  PIN BONE [id number]  [employer]. [id number] Right 2 Implanted and Explanted   CEMENT BONE SMPLX HV GENTMYCN - [id number]  CEMENT BONE SMPLX HV GENTMYCN  [employer]. [id number] Right 1 Implanted   CEMENT BONE SMPLX HV GENTMYCN - [id number]  CEMENT BONE SMPLX HV GENTMYCN  [employer]. [id number] Right 1 Implanted   FEMORAL CRUC RTN MADDIE SZ 3 RT - [id number]  FEMORAL CRUC RTN MADDIE SZ 3 RT  [employer]. [id number] Right 1 Implanted   BASEPLATE TIB MADDIE PRIM SZ 3 - [id number]  BASEPLATE TIB MADIDE PRIM SZ 3  [employer]. [id number] Right 1 Implanted   INSERT TRIATHLON X3 SZ3 9MM - [id number]  INSERT TRIATHLON X3 SZ3 9MM  [employer]. [id number] Right 1 Implanted   PATELLA TRI 32X10 X3 POLYETHYL - [id number]  PATELLA TRI 32X10 X3 POLYETHYL  [employer]. [id number] Right 1 Implanted       Drains: None     Problem List:   Problem List Items Addressed This Visit          Orthopedic    Primary osteoarthritis of right knee - Primary    Relevant Orders    Vital signs    Perform Vinson Agitation Sedation Scale (RASS)    Insert peripheral IV     Clip and Prep Right Knee    FOOT COMPRESSION PUMP    Place foot compression device    Document RASS    Chlorhexidine (CHG) 2% Wipes    Ortho Pathway Patient    Notify Physician - Potential Need of Opioid Reversal    Notify Anesthesiologist if patient on home insulin pump    Vital signs - notify MD    Monitor EtCO2    Oxygen Continuous    Initiate foot compression device    Transfer patient (Completed)    Vital signs    Assess pain scale    Bromage scale    Perform Vinson Agitation Sedation Scale (RASS)    Nursing to set up ETCO2 module on pump    END TIDAL CO2 MONITOR Q4H    Catheter site:  Adductor Canal    Perineural catheter intact and infusion started    Intake and output    Neurovascular checks    Polar Ice to site continuously through surgical dressing    Foot pump at all times while in bed or chair    Dressing to be changed by physician only    Notify Physician/Vital Signs Parameters    Notify MD if hemoglobin is less than 8.0    Notify Physician - Potential Need of Opioid Reversal    Diet Adult Regular (IDDSI Level 7)    Patient discharged with instruction pamphlet on Nimbus pump and emergency contact number of on call physician    Incentive spirometry    Oxygen Continuous    Activity as tolerated    Keep surgical extremity elevated (Completed)    Lifting restrictions    Weight bearing as tolerated    No driving, operating heavy equipment or signing legal documents while taking pain medication    Call MD for:  temperature >100.4    Call MD for:  persistent nausea and vomiting    Call MD for:  severe uncontrolled pain    Call MD for:  difficulty breathing, headache or visual disturbances    Call MD for:  redness, tenderness, or signs of infection (pain, swelling, redness, odor or green/yellow discharge around incision site)    Call MD for:  hives    Call MD for:  persistent dizziness or light-headedness    Call MD for:  extreme fatigue    Admit to Inpatient (Completed)    Place REHANA hose    Place sequential  compression device    X-Ray Knee 1 or 2 View Right    WALKER FOR HOME USE    3 IN 1 COMMODE FOR HOME USE    TRANSFER TUB BENCH FOR HOME USE    PT evaluate and treat    OT evaluate and treat    History of partial knee replacement    Relevant Orders    Vital signs    Perform Vinson Agitation Sedation Scale (RASS)    Insert peripheral IV    Clip and Prep Right Knee    FOOT COMPRESSION PUMP    Place foot compression device    Document RASS    Chlorhexidine (CHG) 2% Wipes    Ortho Pathway Patient    Notify Physician - Potential Need of Opioid Reversal    Notify Anesthesiologist if patient on home insulin pump    Vital signs - notify MD    Monitor EtCO2    Oxygen Continuous    Initiate foot compression device    Transfer patient (Completed)    Vital signs    Assess pain scale    Bromage scale    Perform Vinson Agitation Sedation Scale (RASS)    Nursing to set up ETCO2 module on pump    END TIDAL CO2 MONITOR Q4H    Catheter site:  Adductor Canal    Perineural catheter intact and infusion started    Intake and output    Neurovascular checks    Polar Ice to site continuously through surgical dressing    Foot pump at all times while in bed or chair    Dressing to be changed by physician only    Notify Physician/Vital Signs Parameters    Notify MD if hemoglobin is less than 8.0    Notify Physician - Potential Need of Opioid Reversal    Diet Adult Regular (IDDSI Level 7)    Patient discharged with instruction pamphlet on Nimbus pump and emergency contact number of on call physician    Incentive spirometry    Oxygen Continuous    Activity as tolerated    Keep surgical extremity elevated (Completed)    Lifting restrictions    Weight bearing as tolerated    No driving, operating heavy equipment or signing legal documents while taking pain medication    Call MD for:  temperature >100.4    Call MD for:  persistent nausea and vomiting    Call MD for:  severe uncontrolled pain    Call MD for:  difficulty breathing, headache or  visual disturbances    Call MD for:  redness, tenderness, or signs of infection (pain, swelling, redness, odor or green/yellow discharge around incision site)    Call MD for:  hives    Call MD for:  persistent dizziness or light-headedness    Call MD for:  extreme fatigue    Admit to Inpatient (Completed)    Place REHANA hose    Place sequential compression device    X-Ray Knee 1 or 2 View Right    WALKER FOR HOME USE    3 IN 1 COMMODE FOR HOME USE    TRANSFER TUB BENCH FOR HOME USE    PT evaluate and treat    OT evaluate and treat     Other Visit Diagnoses       Failed total knee arthroplasty                   OPERATIVE INDICATIONS: The patient has a long history of progressive right knee pain, arthritis, and degeneration. They had a partial knee arthroplasty done years before, initially did well but then has developed progressive lateral and PF based symptoms of the knee. Non-operative treatment have been attempted, but have not improved or controlled the symptoms and pain that occurs during normal daily activities. Knee motion has also become limited and is restricting the patient. A total knee arthroplasty was recommended. The risks, benefits and potential complications of the arthroplasty surgery were discussed with the patient in detail. Specific details of the surgical procedure, hospitalization, recovery, rehabilitation, and long-term precautions were also presented. Pre-operative teaching was provided. Implant/prosthesis selection was outlined, and the many options available were explained; the final choice will be made at the time of the procedure to match the anatomy and condition of the bone, ligaments, tendons, and muscles.      The patient was seen by Internal Medicine/Anesthesia for pre-operative optimization, and risk assessment. Fina-operative blood management and the potential for blood transfusion were discussed with risks and options clearly outlined. We discussed the risks of the procedure in  detail, which included but is not limited to infection, bleeding, scarring, injury to blood vessels, nerves, muscular structures. We discussed specifically fracture and arthrofibrotic complications. Understanding of all topics was conveyed to me by the patient pre-operatively, and patient consent was given to proceed with the total knee replacement.      OPERATIVE PROCEDURE:  The patient was identified and brought into the Operating Room by the anesthesia and nursing teams. Anesthesia was successfully performed with spinal. The patient was then positioned supine on the operating room table, and all bony prominences were padded.  Intravenous antibiotic prophylaxis with Cefazolin dosing was confirmed. A tourniquet was applied to the upper thigh. A full knee exam was done after anesthesia was in full effect.  The leg was prepped and draped in the usual sterile fashion.  A surgical time-out was performed immediately preceding the incision with all personnel in the operating room; the patient identity was again confirmed, the knee-surgical site and extremity were identified and confirmed, X-rays were reviewed, and availability of the appropriate surgical equipment was established. The knee was exsanguinated and exposed using a limited anterior-midline skin incision. 1g of TXA was administed. Dissection was carried down through skin and subcutaneous tissue to the extensor mechanism with a scalpel.  A median parapatellar arthrotomy was made to enter the knee space sharply. A large amount of normal appearing joint fluid was encountered and suctioned.  The synovium was thickened, hypertrophic, and inflamed. A partial synovectomy was performed for exposure, and the medial and lateral gutters were cleared of scar and synovial reflections. The superficial medial collateral ligament was carefully elevated off. The patellar synovial reflections were released and the patella exposed to reveal wear through the articular surface.  The trochlea demonstrated similar wear. Attention was then turned to the patella, it was measured and the posterior 9-10 mm was resected leaving a healthy remnant with greater than 11mm thickness.  The patella was then subluxed laterally. The femoral and tibial arrays were placed as well as the checkpoints.     The knee was then flexed up to 90 degrees.  Assessment of the knee joint revealed severe end-stage articular damage to the posterolateral condyle with degenerative lateral meniscal changes. The anterior cruciate ligament was found to be functionally compromised and it was excised.  Assessment of the soft tissues were made utilizing 1-6 mm feeler gauges after the tibial poly was removed. Changes to the preoperative plan were then carried out.     Reassessment of the soft tissue balance was carried out to confirm equal tension in flexion, extension, and medial/lateral balance. The robotic arm was then utilized to execute the plan. The distal femur implant was removed with straight osteotome. We began with the distal femur and chamfer cut. Then blade was swtiched and the remaining femur cuts were excuted. Then the tibia was cut to pegs of medial tibial tray, then the tray was removed with a straight osteotome. The rest of the tibia was cut. Soft tissue structures were protected with Z-retractors, ranawat. Osteophytes were then further debrided from the femur and the tibia. Degenerative meniscal remnants were excised medially and laterally. We cleaned up posterior osteophytes with curved osteotome and currette. A portion of the pain cocktail was injected into the posterior capsule. Trailing was then initated. Femur trial was placed, then the knee was then brought to extension and the appropriate sized tibial spacer block was placed. This brought the knee to full extension, mid flexion, and 130 degrees of flexion with excellent medial lateral balance. The tray was rotated so that its midpoint was at the junction  of middle one-third and lateral two-thirds of the tibial tubercle. Punches and pins fixed the trial in this position. Check points and arrays were removed. The patella was sized with the asymmetric guide, and drill holes were made. A trial button was placed and tracking of the patella and the entire knee trial was excellent; range of motion was excellent. No instability.  Patella tracked midline. The PCL was slightly tight necessitating a mild release of the PCL to balance the flexion gap.     Punches and drills were then placed through the trials to accommodate the final implants.  All trials were removed.  The wound was copiously lavaged with a pulse irrigation/suction system. This included a solution of dilute betadine. The posterior recess of the knee and areas of known bleeding were treated with the electrocautery to reduce post-operative bleeding. The cut bone surfaces were then irrigated again, suctioned, and dried. The final implants were placed, tibia followed by femur followed by patella. The final CS polyethylene was impacted into place. This was a the knees were held in extension until the cement cured. The tourniquet was released and no excessive bleeding was encountered. Synovial bleeding was further treated with the electrocautery.  The wound was again irrigated. The extensor mechanism and capsule was then anatomically closed with Stratafix suture.  Knee stability and range of motion with the capsule closed was excellent, and range of motion was 0 to 130 without excessive stress on the repair. Instrument and sponge count was completed and confirmed correct. Deep and superficial subcutaneous tissue was closed with interrupted 2-0 Vicryl suture. A running 4-0 Monocryl subcuticular stitch was used to re-approximate the skin edges. Dermabond adhesive was applied.  A sterile silver impregnated dressing was placed.  PAS stockings were placed.  The patient tolerated the procedure, was transferred from the  operating room table to a hospital bed, and taken to Recovery/PACU in stable condition.     PAIN COCKTAIL: ASHWIN Formula     POST OPERATIVE PLAN:  Patient will be transferred to the floor once in stable condition and after radiographs confirm no immediate complications. The patient will be treated with multimodal pain management protocols. They will be placed on anticoagulation of ASA 81mg to start on POD1. The patient will be weight bearing as tolerated. They will work with physical therapy, have a graduated diet, and once medially stable and safe for home can be discharged. Patient will follow up with me 3 weeks post operatively. Dressing should be removed by patient 7 days post operatively.    IMPLANTS:  Line [place] [employer]  Femur: #3 CR, cemented  Tibia: #3, cemented  Poly: 32mm CS  Patella: 9mm, cemented    Quality 431: Preventive Care And Screening: Unhealthy Alcohol Use - Screening: Patient screened for unhealthy alcohol use using a single question and scores less than 2 times per year Quality 130: Documentation Of Current Medications In The Medical Record: Current Medications Documented Detail Level: Detailed Quality 226: Preventive Care And Screening: Tobacco Use: Screening And Cessation Intervention: Patient screened for tobacco use and is an ex/non-smoker

## 2023-05-18 NOTE — PLAN OF CARE
Pre op complete. Spouse at bedside. Belongings will be placed in locker. Has walker in car. Call light given to pt.

## 2023-05-18 NOTE — PLAN OF CARE
Problem: Occupational Therapy  Goal: Occupational Therapy Goal  Description: Goals to be met by: 5/19/23     Patient will increase functional independence with ADLs by performing:    UE Dressing with Modified [place].  LE Dressing with Modified [place] and Assistive Devices as needed.  Grooming while standing at sink with Modified [place].  Toileting from bedside commode with Modified [place] for hygiene and clothing management.   Bathing from  shower chair/bench with Modified [place].  Toilet transfer to bedside commode with Modified [place].    Outcome: Ongoing, Progressing

## 2023-05-18 NOTE — PT/OT/SLP EVAL
Physical Therapy Evaluation and Treatment    Patient Name: Tiarra Norton   MRN: [id number]  Recent Surgery: Procedure(s) (LRB):  CONVERSION ARTHROPLASTY, KNEE, TOTAL, USING COMPUTER-ASSISTED NAVIGATION (Right) Day of Surgery    Recommendations:     Discharge Recommendations: outpatient PT   Discharge Equipment Recommendations: none   Barriers to discharge: None    Assessment:     Tiarra Norton is a 68 y.o. female admitted with a medical diagnosis of s/p R TKA. She presents with the following impairments/functional limitations: weakness, impaired functional mobility, gait instability, impaired balance, decreased lower extremity function, pain, decreased ROM, orthopedic precautions. Patient tolerated PT session well. Patient ambulated 100ft and 12ft with RW and contact guard assistance. No LOB or SOB noted. Patient has an OPPT appointment on 5/23/2023.     Rehab Prognosis: Good; patient would benefit from acute PT services to address these deficits and reach maximum level of function.    Plan:     During this hospitalization, patient to be seen daily to address the above listed problems via gait training, therapeutic activities, therapeutic exercises    Plan of Care Expires: 05/19/23    Subjective     Chief Complaint: Right knee pain.   Patient Comments/Goals: To take a vacation in the fall.   Pain/Comfort:  Pain Rating 1: 9/10  Location - Side 1: Right  Location 1: knee  Pain Addressed 1: Pre-medicate for activity, Reposition, Distraction    Social History:  Living Environment: Patient  lives with her  in a 2 story home with 1 step to enter. She has 14 steps with bilateral handrails to get to 2nd floor bedroom. There is a half bath on 1st floor if needed.   Prior Level of Function: Prior to admission, patient was independent for functional mobility.  Equipment Used at Home: walker, rolling, bedside commode, shower chair  Assistance Upon Discharge:      Objective:     Communicated with RN prior to  session. Patient found up in chair with peripheral IV, perineural catheter, FCD, cryotherapy (Nimbus) upon PT entry to room.  left at beginning of PT session.     General Precautions: Standard, fall   Orthopedic Precautions: RLE weight bearing as tolerated   Braces: N/A    Respiratory Status: Room air    Exams:  RLE ROM: WFL except limited at knee due to pain  RLE Strength:  appears WFL but did not formally assess due to pain and recent surgery  LLE ROM: WFL  LLE Strength: WFL  Cognitive: Patient is oriented to Person, Place, Time, Situation      Functional Mobility:  Gait belt applied - Yes  Bed Mobility  Seated in chair at start of session and returned to chair  Transfers  Sit to Stand: contact guard assistance with rolling walker x1 from bedside chair   Toilet Transfer (bedside commode over toilet): contact guard assistance with rolling walker   Gait  Patient ambulated 100ft and 12ft with rolling walker and contact guard assistance. Patient required cues for sequencing and weight bearing status to increase independence and safety.   Balance  Patient stood at the sink to wash her hands with contact guard assistance and rolling walker.     Therapeutic Activities and Exercises:  Patient educated in:  -PT role and POC  -safety with transfers including hand placement  -gait sequencing and RW management  -OOB activity to maximize recovery including ambulating with nursing staff assistance and RW while in the hospital       AM-PAC 6 CLICK MOBILITY  Total Score:17    Patient left up in chair with all lines intact, call button in reach, and RN notified.    GOALS:   Multidisciplinary Problems       Physical Therapy Goals          Problem: Physical Therapy    Goal Priority Disciplines Outcome Goal Variances Interventions   Physical Therapy Goal     PT, PT/OT Ongoing, Progressing     Description: Goals to be met by: 2023    Patient will increase functional independence with mobility by performin. Supine to  sit with supervision  2. Sit to stand transfer with Supervision  3. Gait x300 feet with Supervision using Rolling Walker  4. Ascend/Descend 1 curb step with Stand by assistance using Rolling Walker  5. Ascend/Descend 4 steps with stand by assistance and bilateral handrails   6. Lower extremity exercise program x30 reps per handout, with supervision                            History:     Past Medical History:   Diagnosis Date    Cataract     Depression     Gestational diabetes     Hyperlipidemia     Hypertension     IBS (irritable bowel syndrome)     Thyroid disease        Past Surgical History:   Procedure Laterality Date    ADENOIDECTOMY      ARTHROSCOPIC CHONDROPLASTY OF KNEE JOINT Right 10/19/2021    Procedure: ARTHROSCOPY, KNEE, WITH CHONDROPLASTY;  Surgeon: Trevin Huber MD;  Location: [facility] OR;  Service: Orthopedics;  Laterality: Right;    ARTHROSCOPY OF KNEE Right 10/19/2021    Procedure: ARTHROSCOPY, KNEE-RIGHT;  Surgeon: Trevin Huber MD;  Location: [facility] OR;  Service: Orthopedics;  Laterality: Right;     SECTION      CHOLECYSTECTOMY      COLONOSCOPY N/A 2016    Procedure: COLONOSCOPY;  Surgeon: Heri Segura MD;  [facility]  Service: Endoscopy;  Laterality: N/A;    COLONOSCOPY N/A 2019    Procedure: COLONOSCOPY;  Surgeon: Joe Pitts MD;  [facility]  Service: Endoscopy;  Laterality: N/A;    CYSTOSCOPY  2022    Procedure: CYSTOSCOPY;  Surgeon: Lenny Moore MD;  [address]  Service: Urology;;    ESOPHAGOGASTRODUODENOSCOPY N/A 2020    Procedure: EGD (ESOPHAGOGASTRODUODENOSCOPY);  Surgeon: Tolu Rivas MD;  [address]  Service: Endoscopy;  Laterality: N/A;  Pt. moved to 9:15am arrival time.. Instructed to not have anything after midnight.EC    EYE SURGERY      cataract resection right    INJECTION OF ANESTHETIC AGENT AROUND NERVE Bilateral 2022    Procedure: Block, Nerve  MEDIAL BRANCH BLOCK BILATERAL L3,4,5;  Surgeon: Tara Gibbs MD;  Location: [facility] PAIN MGT;  Service: Pain Management;  Laterality: Bilateral;    INJECTION OF ANESTHETIC AGENT AROUND NERVE Bilateral 2/15/2022    Procedure: BLOCK, NERVE, L3*L4-L5 MEDIAL BR ANCH 2 OF 2;  Surgeon: Tara Gibbs MD;  Location: [facility]IN [facility]GT;  Service: Pain Management;  Laterality: Bilateral;    INJECTION OF BOTULINUM TOXIN TYPE A  6/21/2022    Procedure: BOTULINUM TOXIN INJECTION 100 units;  Surgeon: Lenny Moore MD;  [facility];  Service: Urology;;    INSERTION, NEUROSTIMULATOR, SPINAL CORD N/A 1/9/2023    Procedure: SPINAL CORD STIMULATOR IMPLANT Geotender;  Surgeon: Shoaib Aguirre MD;  Location: [facility] OR;  Service: Pain Management;  Laterality: N/A;    JOINT REPLACEMENT      partial right knee    KNEE ARTHROSCOPY W/ MENISCECTOMY Right 10/19/2021    Procedure: ARTHROSCOPY, KNEE, WITH MENISCECTOMY, PARTIAL LATERAL;  Surgeon: Trevin Huber MD;  Location: [facility] OR;  Service: Orthopedics;  Laterality: Right;    r hand surgery      RADIOFREQUENCY ABLATION Right 3/8/2022    Procedure: RADIOFREQUENCY ABLATION, L3-L5 1 OF 2;  Surgeon: Tara Gibbs MD;  Location: [facility] PAIN MGT;  Service: Pain Management;  Laterality: Right;    RADIOFREQUENCY ABLATION Left 3/22/2022    Procedure: RADIOFREQUENCY ABLATION, l3-+l5 2 of 2;  Surgeon: Tara Gibbs MD;  Location: [facility]IN [facility]GT;  Service: Pain Management;  Laterality: Left;    TONSILLECTOMY      TOTAL KNEE ARTHROPLASTY      partial knee replacement    TRIAL OF SPINAL CORD NERVE STIMULATOR N/A 12/22/2022    Procedure: SPINAL CORD STIMULATOR TRIAL [employer] Zarth[employer];  Surgeon: Shoaib Aguirre MD;  Location: [facility] PAIN [facility]GT;  Service: Pain Management;  Laterality: N/A;    TUBAL LIGATION         Time Tracking:     PT Received On: 05/18/23  PT Start Time: 1607  PT Stop Time: 1624  PT Total Time (min): 17 min     Billable Minutes: Evaluation 8  and Gait Training 9    5/18/2023

## 2023-05-19 VITALS
HEIGHT: 63 IN | OXYGEN SATURATION: 98 % | DIASTOLIC BLOOD PRESSURE: 70 MMHG | WEIGHT: 139 LBS | SYSTOLIC BLOOD PRESSURE: 156 MMHG | TEMPERATURE: 98 F | BODY MASS INDEX: 24.63 KG/M2 | HEART RATE: 57 BPM | RESPIRATION RATE: 16 BRPM

## 2023-05-19 PROCEDURE — 97116 GAIT TRAINING THERAPY: CPT | Mod: CQ

## 2023-05-19 PROCEDURE — 97530 THERAPEUTIC ACTIVITIES: CPT

## 2023-05-19 PROCEDURE — 99900035 HC TECH TIME PER 15 MIN (STAT)

## 2023-05-19 PROCEDURE — 97535 SELF CARE MNGMENT TRAINING: CPT

## 2023-05-19 PROCEDURE — 94761 N-INVAS EAR/PLS OXIMETRY MLT: CPT

## 2023-05-19 PROCEDURE — 97110 THERAPEUTIC EXERCISES: CPT | Mod: CQ

## 2023-05-19 PROCEDURE — 94799 UNLISTED PULMONARY SVC/PX: CPT

## 2023-05-19 PROCEDURE — 63600175 PHARM REV CODE 636 W HCPCS: Performed by: ORTHOPAEDIC SURGERY

## 2023-05-19 PROCEDURE — 25000003 PHARM REV CODE 250: Performed by: ORTHOPAEDIC SURGERY

## 2023-05-19 PROCEDURE — 25000003 PHARM REV CODE 250: Performed by: PHYSICIAN ASSISTANT

## 2023-05-19 RX ORDER — CELECOXIB 200 MG/1
200 CAPSULE ORAL DAILY
Status: DISCONTINUED | OUTPATIENT
Start: 2023-05-20 | End: 2023-05-19 | Stop reason: HOSPADM

## 2023-05-19 RX ORDER — VALSARTAN 160 MG/1
160 TABLET ORAL
Status: COMPLETED | OUTPATIENT
Start: 2023-05-19 | End: 2023-05-19

## 2023-05-19 RX ADMIN — FAMOTIDINE 20 MG: 20 TABLET, FILM COATED ORAL at 09:05

## 2023-05-19 RX ADMIN — CEFAZOLIN 2 G: 2 INJECTION, POWDER, FOR SOLUTION INTRAMUSCULAR; INTRAVENOUS at 03:05

## 2023-05-19 RX ADMIN — ASPIRIN 81 MG: 81 TABLET, COATED ORAL at 09:05

## 2023-05-19 RX ADMIN — METHOCARBAMOL 750 MG: 750 TABLET ORAL at 09:05

## 2023-05-19 RX ADMIN — ACETAMINOPHEN 1000 MG: 500 TABLET ORAL at 11:05

## 2023-05-19 RX ADMIN — OXYCODONE HYDROCHLORIDE 5 MG: 5 TABLET ORAL at 06:05

## 2023-05-19 RX ADMIN — LEVOTHYROXINE SODIUM 125 MCG: 0.12 TABLET ORAL at 06:05

## 2023-05-19 RX ADMIN — OXYCODONE HYDROCHLORIDE 5 MG: 5 TABLET ORAL at 03:05

## 2023-05-19 RX ADMIN — MUPIROCIN 1 G: 20 OINTMENT TOPICAL at 09:05

## 2023-05-19 RX ADMIN — VALSARTAN 160 MG: 160 TABLET, FILM COATED ORAL at 06:05

## 2023-05-19 RX ADMIN — ACETAMINOPHEN 1000 MG: 500 TABLET ORAL at 06:05

## 2023-05-19 RX ADMIN — AMLODIPINE BESYLATE 5 MG: 5 TABLET ORAL at 09:05

## 2023-05-19 RX ADMIN — SENNOSIDES AND DOCUSATE SODIUM 1 TABLET: 50; 8.6 TABLET ORAL at 09:05

## 2023-05-19 RX ADMIN — OXYCODONE HYDROCHLORIDE 5 MG: 5 TABLET ORAL at 09:05

## 2023-05-19 RX ADMIN — POLYETHYLENE GLYCOL 3350 17 G: 17 POWDER, FOR SOLUTION ORAL at 09:05

## 2023-05-19 RX ADMIN — ESCITALOPRAM OXALATE 5 MG: 5 TABLET, FILM COATED ORAL at 09:05

## 2023-05-19 RX ADMIN — ATORVASTATIN CALCIUM 10 MG: 10 TABLET, FILM COATED ORAL at 09:05

## 2023-05-19 NOTE — DISCHARGE SUMMARY
[facility])  Orthopedics  Discharge Summary      Patient Name: Tiarra Norton  MRN: [id number]  Admission Date: 5/18/2023  Hospital Length of Stay: 1 days  Discharge Date and Time:  05/19/2023 10:18 AM  Attending Physician: Virgil Scott MD   Discharging Provider: Virgil Scott MD  Primary Care Provider: Kaykay Perales MD    HPI: Failed UKA    Procedure(s) (LRB):  CONVERSION ARTHROPLASTY, KNEE, TOTAL, USING COMPUTER-ASSISTED NAVIGATION (Right)      Hospital Course    The patient is a 67 yo female  who has been followed in clinic for right knee arthritis. It was determined she would benefit from surgery. The procedure, its risks, benefits, and potential complications were discussed in detail with the patient prior to surgery. Understanding of all topics was conveyed by the patient, and consent was given for surgery. The patient was admitted to [facility] on 5/18/2023 for right knee arthroplasty.    The procedure was tolerated well and she was sent to the post-operative recovery room in stable condition, where she also did well. Post-operative x-rays in the recovery room showed well-aligned components without evidence of complication or fracture. She was subsequently sent to his hospital room for postoperative management.  ?  Once on the floor herpostoperative course  was unremarkable and she did well. her diet was advanced which was tolerated. her pain was well controlled on multimodal pain medication; this was eventually transitioned to oral medication alone prior to discharge. She worked with Physical Therapy starting on POD#0 who recommended she be discharged home. Their dressing remained clean dry and intact throughout the hospital stay. She remained afebrile with stable vital signs throughout the stay. He/she was stable for discharge on POD#1.     Consults (From admission, onward)          Status Ordering Provider     Inpatient consult to Respiratory Care  Once        Provider:   "(Not yet assigned)    Acknowledged MARIO REYES     Inpatient consult to Respiratory Care  Once        Provider:  (Not yet assigned)    Acknowledged MARIO REYES            Significant Diagnostic Studies: No pertinent studies.    Pending Diagnostic Studies:       None          Final Active Diagnoses:    Diagnosis Date Noted POA    PRINCIPAL PROBLEM:  History of partial knee replacement [Z96.659] 02/15/2023 Not Applicable      Problems Resolved During this Admission:      Discharged Condition: good    Disposition: Home or Self Care    Follow Up:    Patient Instructions:      WALKER FOR HOME USE     Order Specific Question Answer Comments   Type of Walker: Adult (5'4"-6'6")    With wheels? Yes    Height: 5'3    Weight: 140    Length of need (1-99 months): 99    Please check all that apply: Walker will be used for gait training.      3 IN 1 COMMODE FOR HOME USE     Order Specific Question Answer Comments   Type: Standard    Height: 5'3    Weight: 140    Length of need (1-99 months): 99      TRANSFER TUB BENCH FOR HOME USE     Order Specific Question Answer Comments   Type of Transfer Tub Bench: Unpadded    Height: 5'3    Weight: 140    Length of need (1-99 months): 99      Activity as tolerated     Keep surgical extremity elevated     Lifting restrictions   Order Comments: No strenuous exercise or lifting of > 10 lbs     Weight bearing as tolerated     No driving, operating heavy equipment or signing legal documents while taking pain medication     Call MD for:  temperature >100.4     Call MD for:  persistent nausea and vomiting     Call MD for:  severe uncontrolled pain     Call MD for:  difficulty breathing, headache or visual disturbances     Call MD for:  redness, tenderness, or signs of infection (pain, swelling, redness, odor or green/yellow discharge around incision site)     Call MD for:  hives     Call MD for:  persistent dizziness or light-headedness     Call MD for:  extreme fatigue "     Medications:  Reconciled Home Medications:      Medication List        START taking these medications      acetaminophen 500 MG tablet  Commonly known as: TYLENOL  Take 2 tablets (1,000 mg total) by mouth every 8 (eight) hours as needed for Pain.     aspirin 81 MG EC tablet  Commonly known as: ECOTRIN  Take 1 tablet (81 mg total) by mouth 2 (two) times a day.     celecoxib 200 MG capsule  Commonly known as: CeleBREX  Take 1 capsule (200 mg total) by mouth 2 (two) times daily.     docusate sodium 100 MG capsule  Commonly known as: COLACE  Take 1 capsule (100 mg total) by mouth 2 (two) times daily.     methocarbamoL 500 MG Tab  Commonly known as: ROBAXIN  Take 1 tablet (500 mg total) by mouth 4 (four) times daily. for 10 days     oxyCODONE 5 MG immediate release tablet  Commonly known as: ROXICODONE  Take 1-2 tablets (5-10 mg total) by mouth every 4 (four) hours as needed (pain).     pregabalin 75 MG capsule  Commonly known as: LYRICA  Take 1 capsule (75 mg total) by mouth 2 (two) times daily. for 14 days            CONTINUE taking these medications      amLODIPine 5 MG tablet  Commonly known as: NORVASC  Take 1 tablet (5 mg total) by mouth once daily.     atorvastatin 10 MG tablet  Commonly known as: LIPITOR  Take 1 tablet (10 mg total) by mouth once daily.     b complex vitamins capsule  Take 1 capsule by mouth once daily.     cholecalciferol (vitamin D3) 125 mcg (5,000 unit) Tab  Commonly known as: VITAMIN D3  Take 1 tablet (5,000 Units total) by mouth once daily.     conjugated estrogens vaginal cream  Commonly known as: PREMARIN  Place 0.5 g vaginally twice a week. Place a pea-sized amount in vagina every night for 4 weeks, then use 2-3 nights a week     EScitalopram oxalate 5 MG Tab  Commonly known as: LEXAPRO  Take 5 mg by mouth.     estradioL 0.01 % (0.1 mg/gram) vaginal cream  Commonly known as: ESTRACE  Place 1g daily for 2-4 weeks and then 2-3x/week afterwards     levothyroxine 125 MCG tablet  Commonly  known as: SYNTHROID  TAKE 1 TABLET BY MOUTH EVERY MORNING     liothyronine 5 MCG Tab  Commonly known as: CYTOMEL  Take 1 tablet (5 mcg total) by mouth once daily.     promethazine 12.5 MG Tab  Commonly known as: PHENERGAN  Take 1 tablet (12.5 mg total) by mouth every 6 (six) hours as needed (nausea).     RESTASIS 0.05 % ophthalmic emulsion  Generic drug: cycloSPORINE  INT 1 GTT IN OU BID     sumatriptan 50 MG tablet  Commonly known as: IMITREX  1 tab po at start of headache.  Repeat in 2 h prn     traZODone 100 MG tablet  Commonly known as: DESYREL  1-2 tabs po q hs for sleep     valsartan 320 MG tablet  Commonly known as: DIOVAN  Take 1 tablet (320 mg total) by mouth once daily.            STOP taking these medications      ALPRAZolam 2 MG Tab  Commonly known as: XANAX     tiZANidine 4 MG tablet  Commonly known as: ZANAFLEX            ASK your doctor about these medications      pantoprazole 40 MG tablet  Commonly known as: PROTONIX  Take 1 tablet (40 mg total) by mouth once daily.              Virgil Scott MD  Orthopedics  [facility])

## 2023-05-19 NOTE — PLAN OF CARE
Problem: Adult Inpatient Plan of Care  Goal: Absence of Hospital-Acquired Illness or Injury  Intervention: Identify and Manage Fall Risk  Flowsheets (Taken 5/19/2023 0743)  Safety Promotion/Fall Prevention: assistive device/personal item within reach     Problem: Adult Inpatient Plan of Care  Goal: Readiness for Transition of Care  Outcome: Ongoing, Progressing     Problem: Pain Acute  Goal: Acceptable Pain Control and Functional Ability  Intervention: Develop Pain Management Plan  Flowsheets (Taken 5/19/2023 0743)  Pain Management Interventions: around-the-clock dosing utilized     Problem: Adjustment to Surgery (Knee Arthroplasty)  Goal: Optimal Coping  Intervention: Support Psychosocial Response to Surgery and Mobility Changes  Flowsheets (Taken 5/19/2023 0743)  Supportive Measures: active listening utilized     Problem: Bleeding (Knee Arthroplasty)  Goal: Absence of Bleeding  Intervention: Monitor and Manage Bleeding  Flowsheets (Taken 5/19/2023 0743)  Bleeding Management: dressing monitored     Problem: Bowel Motility Impaired (Knee Arthroplasty)  Goal: Effective Bowel Elimination  Intervention: Enhance Bowel Motility and Elimination  Flowsheets (Taken 5/19/2023 0743)  Bowel Elimination Promotion: adequate fluid intake promoted     Problem: Pain (Knee Arthroplasty)  Goal: Acceptable Pain Control  Intervention: Prevent or Manage Pain  Flowsheets (Taken 5/19/2023 0743)  Pain Management Interventions: around-the-clock dosing utilized     Plan of care reviewed with patient; verbalized understanding.   Medications reviewed and administered as ordered.  Rounding for safety and patient care per policy.   Safety precautions maintained. Patient working appropriately with PT/OT.  DME at bedside.  Call light within reach, bed wheels locked, bed in lowest position, side rails ^x2, safety maintained. NADN, Will continue monitor.

## 2023-05-19 NOTE — NURSING
Blood pressure readings reported to Dr. Herbert; she states okay to discharge patient as she is asymptomatic. Patient educated on symptoms to watch for with elevated blood pressure; patient verbalizes understanding and states she will monitor BP at home. Patient educated to see PCP in 1 week regarding elevated blood pressure.

## 2023-05-19 NOTE — PLAN OF CARE
Problem: Adult Inpatient Plan of Care  Goal: Absence of Hospital-Acquired Illness or Injury  Intervention: Identify and Manage Fall Risk  Flowsheets (Taken 5/19/2023 0348)  Safety Promotion/Fall Prevention:   assistive device/personal item within reach   side rails raised x 2   nonskid shoes/socks when out of bed   Fall Risk reviewed with patient/family     Problem: Adult Inpatient Plan of Care  Goal: Absence of Hospital-Acquired Illness or Injury  Intervention: Prevent and Manage VTE (Venous Thromboembolism) Risk  Flowsheets (Taken 5/19/2023 0348)  Activity Management: Ambulated to bathroom - L4  VTE Prevention/Management:   intravenous hydration   dorsiflexion/plantar flexion performed   remove, assess skin, and reapply foot pump  Range of Motion: active ROM (range of motion) encouraged     Problem: Adult Inpatient Plan of Care  Goal: Absence of Hospital-Acquired Illness or Injury  Intervention: Prevent Infection  Flowsheets (Taken 5/19/2023 0348)  Infection Prevention:   hand hygiene promoted   single patient room provided   rest/sleep promoted

## 2023-05-19 NOTE — ADDENDUM NOTE
Addendum  created 05/19/23 1317 by Chiara Herbert MD    Charge Capture section accepted, Cosign clinical note with attestation

## 2023-05-19 NOTE — PT/OT/SLP PROGRESS
Physical Therapy Treatment    Patient Name:  Tiarra Norton   MRN:  [id number]    Recommendations:     Discharge Recommendations: outpatient PT  Discharge Equipment Recommendations: none  Barriers to discharge: None    Assessment:     Tiarra Norton is a 68 y.o. female admitted with a medical diagnosis of <principal problem not specified>.  She presents with the following impairments/functional limitations: weakness, impaired functional mobility, gait instability, impaired balance, decreased lower extremity function, pain, decreased ROM, orthopedic precautions . Pt was motivated and cooperative with treatment session. Pt Progressing with PT Intervention. Pt Progressing with improving gait distance. Pt would continue to benefit from skilled PT to address overall functional mobility, goals , and to return to functional baseline.  Goals remain appropriate.      Rehab Prognosis: Good; patient would benefit from acute skilled PT services to address these deficits and reach maximum level of function.    Recent Surgery: Procedure(s) (LRB):  CONVERSION ARTHROPLASTY, KNEE, TOTAL, USING COMPUTER-ASSISTED NAVIGATION (Right) 1 Day Post-Op    Plan:     During this hospitalization, patient to be seen daily to address the identified rehab impairments via gait training, therapeutic activities, therapeutic exercises and progress toward the following goals:    Plan of Care Expires:  05/19/23    Subjective     Chief Complaint: R knee soreness    Pain/Comfort:  Pain Rating 1:  (not rated)  Location - Side 1: Right  Location 1: knee  Pain Addressed 1: Pre-medicate for activity, Reposition, Distraction      Objective:     Communicated with RN prior to session.  Patient found up in chair with  (cryotherapy; FCD; perineural catheter) upon PT entry to room.     General Precautions: Standard, fall  Orthopedic Precautions: RLE weight bearing as tolerated  Braces: N/A  Respiratory Status: Room air     Functional Mobility:  Bed Mobility:    "  Scooting: supervision  Supine to Sit: stand by assistance  Sit to Supine: stand by assistance  Transfers:     Sit to Stand:  supervision with rolling walker  Gait: pt ambulated with  ft x 2 with SBA for safety. Patient required cues for sequencing and weight bearing   Stairs:  Pt ascended/descended 8 stair(s) and 6" curb step with Rolling Walker with bilateral handrails with Stand-by Assistance. Vcs for seqeuncing      AM-PAC 6 CLICK MOBILITY  Turning over in bed (including adjusting bedclothes, sheets and blankets)?: 3  Sitting down on and standing up from a chair with arms (e.g., wheelchair, bedside commode, etc.): 3  Moving from lying on back to sitting on the side of the bed?: 3  Moving to and from a bed to a chair (including a wheelchair)?: 3  Need to walk in hospital room?: 3  Climbing 3-5 steps with a railing?: 3  Basic Mobility Total Score: 18       Treatment & Education:  Therapist provided instruction and educated of  patient on progress, safety,d/c,PT POC,   proper body mechanics, energy conservation, and fall prevention strategies during tasks listed above, on ice and elevation, car transfer technique      Patient  facilitated therex X 10 reps therex per TKR protocol. Patient required skilled PTA for instruction of exercises and appropriate cues to perform exercises safely, sequencing and appropriately.   Exercises performed to develop and maintain pt's strength, endurance and flexibility.  Updated white board with appropriate PT mobility information for medical team notification      Donned an extra gown   Call nursing/pct to transfer to chair/use bathroom. Pt stated understanding        Bedside table in front of patient and area set up for function, convenience, and safety. RN aware of patient's mobility needs and status. Questions/concerns addressed within PTA scope of practice; patient  with no further questions. Time was provided for active listening, discussion of health disposition, and " discussion of safe discharge. Pt?verbalized?agreement .     Patient left up in chair with all lines intact, call button in reach, and nsg notified  GOALS:   Multidisciplinary Problems       Physical Therapy Goals          Problem: Physical Therapy    Goal Priority Disciplines Outcome Goal Variances Interventions   Physical Therapy Goal     PT, PT/OT Ongoing, Progressing     Description: Goals to be met by: 2023    Patient will increase functional independence with mobility by performin. Supine to sit with supervision  2. Sit to stand transfer with Supervision  3. Gait x300 feet with Supervision using Rolling Walker  4. Ascend/Descend 1 curb step with Stand by assistance using Rolling Walker  5. Ascend/Descend 4 steps with stand by assistance and bilateral handrails   6. Lower extremity exercise program x30 reps per handout, with supervision                            Time Tracking:     PT Received On: 23  PT Start Time: 834     PT Stop Time:  902  PT Total Time (min):    28 min    Billable Minutes: Gait Training 15 and Therapeutic Exercise 13    Treatment Type: Treatment  PT/PTA: PTA     Number of PTA visits since last PT visit: 2023

## 2023-05-19 NOTE — NURSING
[facility]. Instructed to remove on day 5, May 23 at 12 noon or when pump alarms infusion complete. Demonstrated and explained how to remove catheter.Pt and pt's  verbalized understanding.  All questions answered. [facility] Pump home care instxn pamphlet provided, home care supplies provided to patient and placed in discharge folder. Encouraged to contact anesthesia using # on brochure/ on sticker on catheter site, with any questions/concerns re: pain, side effects. Instructed to call [facility] # for any pump related issues. Informed that pt/fmly will receive follow up calls from [facility].

## 2023-05-19 NOTE — PT/OT/SLP PROGRESS
Occupational Therapy   Treatment/Discharge Summary    Name: Tiarra Norton  MRN: [id number]  Admitting Diagnosis:  History of partial knee replacement  1 Day Post-Op    Recommendations:     Discharge Recommendations: home  Discharge Equipment Recommendations:  none  Barriers to discharge:  None    Assessment:     Tiarra Norton is a 68 y.o. female with a medical diagnosis of History of partial knee replacement.  She presents with s/p (R) TKA POD1. Patient completed UBD with Mod (I), LBD with SPV, grooming/hygiene with Mod (I), and toilet t/f with SPV using RW without incident. Verbal cueing required for RW management throughout. Performance deficits affecting function are impaired self care skills, impaired functional mobility, pain, decreased lower extremity function, decreased ROM, orthopedic precautions. Pt is appropriate for discharge home.    Plan:     D/C from OT    Subjective     Chief Complaint: pain  Patient/Family Comments/goals: to d/c home  Pain/Comfort:  Pain Rating 1: 6/10  Location - Side 1: Right  Location - Orientation 1: generalized  Location 1: knee  Pain Addressed 1: Pre-medicate for activity, Reposition, Distraction  Pain Rating Post-Intervention 1: 6/10    Objective:     Communicated with: RN prior to session.  Patient found up in chair with cryotherapy, FCD, telemetry, perineural catheter (nimbus) upon OT entry to room.    General Precautions: Standard, fall    Orthopedic Precautions:RLE weight bearing as tolerated  Braces: N/A  Respiratory Status: Room air     Occupational Performance:     Bed Mobility:    Not completed- pt sitting in chair and returned to chair end of session    Functional Mobility/Transfers:  Patient completed Sit <> Stand Transfer with supervision  with  rolling walker   Patient completed Bed <> Chair Transfer using Step Transfer technique with stand by assistance with rolling walker  Patient completed Toilet Transfer Step Transfer technique with stand by assistance  with  rolling walker  Functional Mobility: Pt engaged in functional mobility to simulate household/community distances bedside chair<>toilet (BSC over toilet) 10 ft + 10 ft with SBA using RW in order to maximize functional endurance and standing balance required for engagement in occupations of choice   No LOB or SOB noted  Cues for RW management with multiple attempts to abandon RW during t/f's and g/h at sink    Activities of Daily Living:  Grooming: modified independence as pt brushed hair and brushed teeth at sink with RW  Upper Body Dressing: modified independence to don overhead shirt sitting on chair  Lower Body Dressing: supervision threading b/l feet through shorts and above hips in standing with RW; SPV to doff socks and don slip on shoes      [facility] 6 Click ADL: 24    Treatment & Education:  -Pt educated to dress surgical site first and further dressing techniques s/p surgery  -Pt educated on hand placement for transfers  -Pt educated on RW placement for ADLs  -Pt educated on proper foot wear s/p surgery  -Pt educated on car transfer technique  -Pt educated on positioning of sx LE during performance of functional activities vs rest/sleep  -Pt educated on weightbearing and surgical precautions with pt verbalizing 1/1 correctly  -Pt educated to call for assistance and to transfer with hospital staff only  -Pt educated on role of OT and plan of care s/p surgery at [facility], white board updated     Patient left up in chair with all lines intact, call button in reach, and RN notified    GOALS:   Multidisciplinary Problems       Occupational Therapy Goals          Problem: Occupational Therapy    Goal Priority Disciplines Outcome Interventions   Occupational Therapy Goal     OT, PT/OT Ongoing, Progressing    Description: Goals to be met by: 5/19/23     Patient will increase functional independence with ADLs by performing:    UE Dressing with Modified [place].  LE Dressing with Modified  [place] and Assistive Devices as needed.  Grooming while standing at sink with Modified [place].  Toileting from bedside commode with Modified [place] for hygiene and clothing management.   Bathing from  shower chair/bench with Modified [place].  Toilet transfer to bedside commode with Modified [place].                         Time Tracking:     OT Date of Treatment: 05/19/23  OT Start Time: 0904  OT Stop Time: 0927  OT Total Time (min): 23 min    Billable Minutes:Self Care/Home Management 15  Therapeutic Activity 8    OT/LETI: OT          5/19/2023

## 2023-05-19 NOTE — PLAN OF CARE
Problem: Adult Inpatient Plan of Care  Goal: Plan of Care Review  5/19/2023 1257 by Erinn Apple RN  Outcome: Adequate for Care Transition  5/19/2023 0743 by Erinn Apple RN  Outcome: Ongoing, Progressing  Goal: Patient-Specific Goal (Individualized)  5/19/2023 1257 by Erinn Apple RN  Outcome: Adequate for Care Transition  5/19/2023 0743 by Erinn Apple RN  Outcome: Ongoing, Progressing  Goal: Absence of Hospital-Acquired Illness or Injury  5/19/2023 1257 by Erinn Apple RN  Outcome: Adequate for Care Transition  5/19/2023 0743 by Erinn Apple RN  Outcome: Ongoing, Progressing  Intervention: Identify and Manage Fall Risk  Flowsheets (Taken 5/19/2023 0743)  Safety Promotion/Fall Prevention: assistive device/personal item within reach  Goal: Optimal Comfort and Wellbeing  5/19/2023 1257 by Erinn Apple RN  Outcome: Adequate for Care Transition  5/19/2023 0743 by Erinn Apple RN  Outcome: Ongoing, Progressing  Goal: Readiness for Transition of Care  5/19/2023 1257 by Erinn Apple RN  Outcome: Adequate for Care Transition  5/19/2023 0743 by Erinn Apple RN  Outcome: Ongoing, Progressing     Problem: Pain Acute  Goal: Acceptable Pain Control and Functional Ability  5/19/2023 1257 by Erinn Apple RN  Outcome: Adequate for Care Transition  5/19/2023 0743 by Erinn Apple RN  Outcome: Ongoing, Progressing  Intervention: Develop Pain Management Plan  Flowsheets (Taken 5/19/2023 0743)  Pain Management Interventions: around-the-clock dosing utilized     Problem: Adjustment to Surgery (Knee Arthroplasty)  Goal: Optimal Coping  5/19/2023 1257 by Erinn Apple RN  Outcome: Adequate for Care Transition  5/19/2023 0743 by Erinn Apple RN  Outcome: Ongoing, Progressing  Intervention: Support Psychosocial Response to Surgery and Mobility Changes  Flowsheets (Taken 5/19/2023 0743)  Supportive Measures: active listening utilized     Problem: Bleeding (Knee Arthroplasty)  Goal: Absence of Bleeding  5/19/2023 1257 by  Erinn Apple RN  Outcome: Adequate for Care Transition  5/19/2023 0743 by Erinn Apple RN  Outcome: Ongoing, Progressing  Intervention: Monitor and Manage Bleeding  Flowsheets (Taken 5/19/2023 0743)  Bleeding Management: dressing monitored     Problem: Bowel Motility Impaired (Knee Arthroplasty)  Goal: Effective Bowel Elimination  5/19/2023 1257 by Erinn Apple RN  Outcome: Adequate for Care Transition  5/19/2023 0743 by Erinn Apple RN  Outcome: Ongoing, Progressing  Intervention: Enhance Bowel Motility and Elimination  Flowsheets (Taken 5/19/2023 0743)  Bowel Elimination Promotion: adequate fluid intake promoted     Problem: Fluid and Electrolyte Imbalance (Knee Arthroplasty)  Goal: Fluid and Electrolyte Balance  5/19/2023 1257 by Erinn Apple RN  Outcome: Adequate for Care Transition  5/19/2023 0743 by Erinn Apple RN  Outcome: Ongoing, Progressing     Problem: Functional Ability Impaired (Knee Arthroplasty)  Goal: Optimal Functional Ability  5/19/2023 1257 by Erinn Apple RN  Outcome: Adequate for Care Transition  5/19/2023 0743 by Erinn Apple RN  Outcome: Ongoing, Progressing     Problem: Infection (Knee Arthroplasty)  Goal: Absence of Infection Signs and Symptoms  5/19/2023 1257 by Erinn Apple RN  Outcome: Adequate for Care Transition  5/19/2023 0743 by Erinn Apple RN  Outcome: Ongoing, Progressing     Problem: Neurovascular Compromise (Knee Arthroplasty)  Goal: Intact Neurovascular Status  5/19/2023 1257 by Erinn Apple RN  Outcome: Adequate for Care Transition  5/19/2023 0743 by Erinn Apple RN  Outcome: Ongoing, Progressing     Problem: Ongoing Anesthesia Effects (Knee Arthroplasty)  Goal: Anesthesia/Sedation Recovery  5/19/2023 1257 by Erinn Apple RN  Outcome: Adequate for Care Transition  5/19/2023 0743 by Erinn Apple RN  Outcome: Ongoing, Progressing     Problem: Pain (Knee Arthroplasty)  Goal: Acceptable Pain Control  5/19/2023 1257 by Erinn Apple RN  Outcome: Adequate for Care  Transition  5/19/2023 0743 by Erinn Apple RN  Outcome: Ongoing, Progressing  Intervention: Prevent or Manage Pain  Flowsheets (Taken 5/19/2023 0743)  Pain Management Interventions: around-the-clock dosing utilized     Problem: Postoperative Nausea and Vomiting (Knee Arthroplasty)  Goal: Nausea and Vomiting Relief  5/19/2023 1257 by Erinn Apple RN  Outcome: Adequate for Care Transition  5/19/2023 0743 by Erinn Apple RN  Outcome: Ongoing, Progressing     Problem: Postoperative Urinary Retention (Knee Arthroplasty)  Goal: Effective Urinary Elimination  5/19/2023 1257 by Erinn Apple RN  Outcome: Adequate for Care Transition  5/19/2023 0743 by Erinn Apple RN  Outcome: Ongoing, Progressing     Problem: Respiratory Compromise (Knee Arthroplasty)  Goal: Effective Oxygenation and Ventilation  5/19/2023 1257 by Erinn Apple RN  Outcome: Adequate for Care Transition  5/19/2023 0743 by Erinn Apple RN  Outcome: Ongoing, Progressing     Problem: Anxiety  Goal: Anxiety Reduction or Resolution  5/19/2023 1257 by Erinn Apple RN  Outcome: Adequate for Care Transition  5/19/2023 0743 by Erinn Apple RN  Outcome: Ongoing, Progressing     Problem: Depression  Goal: Improved Mood  5/19/2023 1257 by Erinn Apple RN  Outcome: Adequate for Care Transition  5/19/2023 0743 by Erinn Apple RN  Outcome: Ongoing, Progressing     Pt discharged from unit.  Pt aaox4, vss, no s/s of distress noted.  Pt rates pain at 5/10 at this time.  Dressing to right knee remains cdi.  Discharge instructions verbally given to pt and patient's , placed in dc folder ,verbalized understanding.  Home meds and f/u appts reviewed as well.  VS obtained, IV removed from left hand w/ catheter intact, no redness or swelling to area.  Meds and Eqmt delivered to bedside prior to discharge.  Pt left unit via w/c and was accompanied to front of hospital to meet ride.  All personal belongings sent with pt.  Blue Bracelet given applied to pts wrist and  instructions given to call # on bracelet w/any surgery related issues.

## 2023-05-19 NOTE — PROGRESS NOTES
Acute Pain Service and Perioperative Surgical Home Progress Note    HPI  Tiarra Norton is a 68 y.o., female, status post right total knee arthroplasty with no acute events overnight.  Her blood pressure was slightly elevated.  She was given home amlodipine and losartan with improvement of pressure.  Pain well controlled, tolerating p.o..  Plan for discharge today.    Interval history      Surgery:  Procedure(s) (LRB):  CONVERSION ARTHROPLASTY, KNEE, TOTAL, USING COMPUTER-ASSISTED NAVIGATION (Right)    Post Op Day #: 1    Catheter type: Perineural Adductor Canal    Infusion type: Ropivacaine 0.2%, with a 4cc automatic bolus every 3 hours, combined with a 5 cc patient controlled bolus available [id number]    Problem List:  There are no hospital problems to display for this patient.      Subjective:       General appearance of alert, oriented, no complaints   Pain with rest: 2    Numbers   Pain with movement: 2    Numbers   Side Effects    1. Pruritis No    2. Nausea No    3. Motor Blockade Yes, 0=Ability to raise lower extremities off bed    4. Sedation No, 1=awake and alert    Schedule Medications:    acetaminophen  1,000 mg Oral Q6H    amLODIPine  5 mg Oral Daily    aspirin  81 mg Oral BID    atorvastatin  10 mg Oral Daily    [START ON 5/20/2023] celecoxib  200 mg Oral Daily    celecoxib  400 mg Oral Once    EScitalopram oxalate  5 mg Oral Daily    famotidine  20 mg Oral BID    levothyroxine  125 mcg Oral Before breakfast    liothyronine  5 mcg Oral Daily    methocarbamoL  750 mg Oral TID    mupirocin  1 g Nasal BID    polyethylene glycol  17 g Oral Daily    pregabalin  75 mg Oral QHS    senna-docusate 8.6-50 mg  1 tablet Oral BID    traZODone  100 mg Oral QHS    valsartan  160 mg Oral ED 1 Time        Continuous Infusions:   sodium chloride 0.9% 150 mL/hr at 05/18/23 1340    ropivacaine (PF) 2 mg/ml (0.2%)          PRN Medications:  morphine, naloxone, ondansetron, oxyCODONE, oxyCODONE, prochlorperazine        Antibiotics:  Antibiotics (From admission, onward)      Start     Stop Route Frequency Ordered    05/18/23 2100  mupirocin 2 % ointment 1 g         05/23 2059 Nasl 2 times daily 05/18/23 1459               Objective:     Catheter site clean, dry, intact          Vital Signs (Most Recent):  Temp: 97.4 °F (36.3 °C) (05/19/23 0403)  Pulse: (!) 47 (05/19/23 0414)  Resp: 16 (05/19/23 0403)  BP: (!) 151/70 (05/19/23 0403)  SpO2: 98 % (05/19/23 0403) Vital Signs Range (Last 24H):  Temp:  [97 °F (36.1 °C)-98.1 °F (36.7 °C)]   Pulse:  [46-64]   Resp:  [14-24]   BP: (123-188)/(62-88)   SpO2:  [94 %-100 %]          I & O (Last 24H):  Intake/Output Summary (Last 24 hours) at 5/19/2023 0522  Last data filed at 5/19/2023 0330  Gross per 24 hour   Intake 3060 ml   Output 4050 ml   Net -990 ml       Physical Exam:    GA: Alert, comfortable, no acute distress.   Pulmonary: Clear to auscultation. Normal RR.    Cardiac: regular rate and rhythm.      Laboratory: reviewed in chart  CBC: No results for input(s): WBC, RBC, HGB, HCT, PLT, MCV, MCH, MCHC in the last 72 hours.    BMP: No results for input(s): NA, K, CO2, CL, BUN, CREATININE, GLU, MG, PHOS, CALCIUM in the last 72 hours.    No results for input(s): PT, INR, PROTIME, APTT in the last 72 hours.          Assessment:         Pain control adequate    Plan:     1) Pain: Adductor canal perineural catheter in place and infusing. Dressing in tact.  Multimodal pain regimen ordered which includes acetaminophen, celecoxib, pregabalin, and prn oxycodone.  Will continue to monitor. Plan to discharge with Nimbus pain pump.   2) hypertension, continue current regimen and management   3) sleep apnea, no CPAP., follow-up with PCP   4) FEN/GI: Tolerating regular diet.     5) Dispo: Pt working well with PT/OT. Case management and SW following along for setting up home health and physical therapy. Plan to discharge home this am.          Evaluator Nicholas Aragon PA-C

## 2023-05-22 ENCOUNTER — TELEPHONE (OUTPATIENT)
Dept: ORTHOPEDICS | Facility: CLINIC | Age: 68
End: 2023-05-22
Payer: MEDICARE

## 2023-05-22 NOTE — ADDENDUM NOTE
Addendum  created 05/22/23 0758 by Jorden Ayers RN    Clinical Note Signed, Intraprocedure Event edited

## 2023-05-22 NOTE — TELEPHONE ENCOUNTER
called the patient today regarding her surgery with Dr. Virgil Scott. The patient had a right TKA on 5/18/23.  Pain Scale: 7 / 10  Any issues with Fever: No.  Any issues with medications (specifically DVT prophylaxis): No.  Any issues with bowel movements: no.  Passing gas: Yes.  Urination: Yes.  Constipation: No.  Completing at home exercises: Yes.  Any concerns regarding their dressing/bandage: No.  Patient confirmed first OP-PT appointment: started.  Any other concerns: No.  The patient was informed that if they have any urgent issues with their bandage, medications or any other health concerns regarding their surgery to call the 24/7 Orthopedic Post-op Hot Line at [phone]. The patient was reminded that if they have any chest pain or shortness of breath to call 911 or go to the ER.  The patient verbalized understanding and does not have any other questions

## 2023-05-22 NOTE — ANESTHESIA POST-OP PAIN MANAGEMENT
Acute Pain Service Progress Note    5/22/2023 0755    Received call from patient requesting assistance restarting her Nimbus pump. Explained to patient that the infusion is now complete and instructed to discontinue the PNC at this time. Recommended to continue taking medications prescribed by surgeon to help with pain from here on out. Verbalizes understanding. PNC removed without issue, blue tip intact. Reviewed protocol for returning nimbus pump by mail. Denies any concerns or other questions at this time.

## 2023-05-23 ENCOUNTER — CLINICAL SUPPORT (OUTPATIENT)
Dept: REHABILITATION | Facility: OTHER | Age: 68
End: 2023-05-23
Payer: MEDICARE

## 2023-05-23 ENCOUNTER — DOCUMENTATION ONLY (OUTPATIENT)
Dept: RESEARCH | Facility: OTHER | Age: 68
End: 2023-05-23
Payer: MEDICARE

## 2023-05-23 DIAGNOSIS — M25.661 DECREASED RANGE OF MOTION (ROM) OF RIGHT KNEE: ICD-10-CM

## 2023-05-23 DIAGNOSIS — Z96.651 S/P RIGHT UNICOMPARTMENTAL KNEE REPLACEMENT: ICD-10-CM

## 2023-05-23 DIAGNOSIS — R29.898 WEAKNESS OF RIGHT LOWER EXTREMITY: Primary | ICD-10-CM

## 2023-05-23 DIAGNOSIS — Z74.09 IMPAIRED FUNCTIONAL MOBILITY, BALANCE, GAIT, AND ENDURANCE: ICD-10-CM

## 2023-05-23 PROCEDURE — 97140 MANUAL THERAPY 1/> REGIONS: CPT

## 2023-05-23 PROCEDURE — 97110 THERAPEUTIC EXERCISES: CPT

## 2023-05-23 PROCEDURE — 97161 PT EVAL LOW COMPLEX 20 MIN: CPT

## 2023-05-23 NOTE — PROGRESS NOTES
Study: [employer]- BRUNO Study: A Randomized Controlled Study to Evaluate the Safety and Effectiveness of [employer] Spinal Cord Stimulation (SCS) Systems in the Treatment of Chronic Low Back and/or Leg Pain with No Prior Surgeries  IRB/Protocol #: [id number]/  : Obed Gasca MD  Treating Physician: Shoaib Aguirre MD  Site Number: [id number]        Subject Number: G011    I have updated the document in ED[facility] to reflect subject takes 5 mg primarily although prescribed up to 10mg of Oxycodone.

## 2023-05-24 ENCOUNTER — PATIENT MESSAGE (OUTPATIENT)
Dept: ORTHOPEDICS | Facility: CLINIC | Age: 68
End: 2023-05-24
Payer: MEDICARE

## 2023-05-24 ENCOUNTER — PATIENT MESSAGE (OUTPATIENT)
Dept: REHABILITATION | Facility: OTHER | Age: 68
End: 2023-05-24
Payer: MEDICARE

## 2023-05-24 ENCOUNTER — PATIENT MESSAGE (OUTPATIENT)
Dept: INTERNAL MEDICINE | Facility: CLINIC | Age: 68
End: 2023-05-24
Payer: MEDICARE

## 2023-05-24 RX ORDER — OXYCODONE HYDROCHLORIDE 5 MG/1
5-10 TABLET ORAL EVERY 4 HOURS PRN
Qty: 40 TABLET | Refills: 0 | Status: SHIPPED | OUTPATIENT
Start: 2023-05-24 | End: 2023-05-31 | Stop reason: SDUPTHER

## 2023-05-24 NOTE — PLAN OF CARE
" [facility]  Physical Therapy Initial Evaluation    Date: 5/23/2023   Name: Tiarra Norton  Clinic Number: [id number]    Therapy Diagnosis:   Encounter Diagnoses   Name Primary?    S/P right unicompartmental knee replacement     Weakness of right lower extremity Yes    Decreased range of motion (ROM) of right knee     Impaired functional mobility, balance, gait, and endurance      Physician: Virgil Scott MD    Physician Orders: PT Eval and Treat   Medical Diagnosis from Referral: Z96.651 (ICD-10-CM) - S/P right unicompartmental knee replacement  Evaluation Date: 5/23/2023  Authorization Period Expiration: 4/24/2023  Plan of Care Expiration: 8/23/2023  Visit # / Visits authorized: 1/ 1 (pending)   Progress Note Due: 6/23/2023  FOTO: 1/ 1    Precautions: Standard    Time In: 11:00 am  Time Out: 12:00 pm  Total Appointment Time (timed & untimed codes): 55 minutes    Subjective   Date of onset: 4/18/2023  History of current condition - Tiarra reports: she received R TKA on 4/18/2023. She originally had R partial TKA in 2015, but pain after that. Her pain has been controlled     Any popping: sometimes  Any Locking: no  Any buckling: no  Pain radiates: no  Pain constant or intermittent: intermittent    Pain:  Current 4/10, worst 10/10, best 4/10   Location: right knee   Description: Dull, Burning, and Tight  Aggravating Factors: Standing, Bending, Touching, Walking, Extension, Flexing, Lifting, and Getting out of bed/chair  Easing Factors: pain medication and ice    Prior Therapy: yes   Social History: lives with their spouse, 2 Story home, [address]  Occupation: Retired   Prior Level of Function: independent  Current Level of Function: increased pain with ambulation, stairs, and all functinoal mobility    Pts goals: "Get my knee as strong as possible so I don't have pain"    Imaging: XR KNEE 1 OR 2 VIEW RIGHT     CLINICAL HISTORY:  S/P TKA;     TECHNIQUE:  Two views of the right knee " were obtained, with AP and lateral projections submitted.     COMPARISON:  Comparison is made to 2023.     FINDINGS:  Postoperative changes are now identified relating to a revision right knee arthroplasty procedure, with interval conversion of the previously present medial tibiofemoral compartment hemiarthroplasty to a total knee arthroplasty.  Prostheses appear unremarkable.  No unusual postoperative findings or significant detrimental change since the preoperative examination of 2023 appreciated.     Impression:     As above     Medical History:   Past Medical History:   Diagnosis Date    Cataract     Depression     Gestational diabetes     Hyperlipidemia     Hypertension     IBS (irritable bowel syndrome)     Thyroid disease        Surgical History:   Tiarra Norton  has a past surgical history that includes  section; Tubal ligation; Tonsillectomy; Adenoidectomy; Cholecystectomy; Eye surgery; r hand surgery; Total knee arthroplasty; Colonoscopy (N/A, 2016); Colonoscopy (N/A, 2019); Esophagogastroduodenoscopy (N/A, 2020); Joint replacement; Arthroscopy of knee (Right, 10/19/2021); Arthroscopic chondroplasty of knee joint (Right, 10/19/2021); Knee arthroscopy w/ meniscectomy (Right, 10/19/2021); Injection of anesthetic agent around nerve (Bilateral, 2022); Injection of anesthetic agent around nerve (Bilateral, 2/15/2022); Radiofrequency ablation (Right, 3/8/2022); Radiofrequency ablation (Left, 3/22/2022); Injection of botulinum toxin type A (2022); Cystoscopy (2022); Trial of spinal cord nerve stimulator (N/A, 2022); insertion, neurostimulator, spinal cord (N/A, 2023); and arthroplasty, knee, total, using computer-assisted navigation (Right, 2023).    Medications:   Tiarra has a current medication list which includes the following prescription(s): acetaminophen, amlodipine, aspirin, atorvastatin, b complex vitamins, celecoxib, cholecalciferol  (vitamin d3), conjugated estrogens, docusate sodium, escitalopram oxalate, estradiol, levothyroxine, liothyronine, methocarbamol, oxycodone, pantoprazole, pregabalin, promethazine, restasis, sumatriptan, trazodone, and valsartan, and the following Facility-Administered Medications: sodium chloride 0.9% and celecoxib.    Allergies:   Review of patient's allergies indicates:  No Known Allergies       Objective       Observation: pt presents to clinic with rolling walker   Alignment: normal   Wound/ incision: covered with dressing, no signs of excess draining or leakage    Sensation: intact to light touch      GAIT DEVIATIONS: Tiarra displays occasional unsteady gait;decreased step length;decreased weight shift;antalgic gait    Range of Motion:   Knee Left active Left Passive   Flexion 129 135   Extension 2 4     Knee Right active Right Passive   Flexion 99 101   Extension (2) 0       Lower Extremity Strength   Right LE  Left LE    Knee extension: NT Knee extension: 4+/5   Knee flexion: NT Knee flexion: 4/5   Hip flexion: 4-/5 Hip flexion: 4-/5   Hip extension:  NT Hip extension: NT   Hip abduction: 3+/5 Hip abduction: 3+/5   Hip adduction: 3+/5 Hip adduction 3+/5   Ankle dorsiflexion: 5/5 Ankle dorsiflexion: 5/5   Ankle plantarflexion: 4-/5 Ankle plantarflexion: 4/5       Step down test: Positive  Squat: Positive  Single leg balance: Positive    Joint Mobility: hypomobility of tibiofemoral join     Patellar sup./inf: hypomobile   Patellar med/lat: hypomobile    Palpation: tender to palpation of all areas around knee   Quad contraction: poor quad set R vs L    Flexibility: decreased soft tissue flexibility and mobility      Popliteal Angle: R = 35 degrees ; L = 20 degrees     Edema: moderate    Girth Measurement Joint line 5 cm below 10 cm above   Left 39 cm 35 cm 42 cm   Right 45 cm 39.5 cm 48 cm     TU.01 sec    5x sit to stand: 11.11 sec  CMS Impairment/Limitation/Restriction for FOTO knee Survey    Therapist  "reviewed FOTO scores for Tiarra Norton on 5/23/2023.   FOTO documents entered into [facility] - see Media section.             TREATMENT   Treatment Time In: 11:30  Treatment Time Out: 12:00  Total Treatment time separate from Evaluation: 30 minutes    Tiarra received therapeutic exercises to develop strength, endurance, ROM, and flexibility for 15 minutes including:  Quad set towel under knee 2x10x5"  Quad set towel under heel 2x10x5"  SAQ 2x10x5"  SLR 2x10x5"  Hamstring stretch 3x30"  Ankle pumps 2x20"  Heel prop x3 min    Tiarra received the following manual therapy techniques: Joint mobilizations were applied to the: R knee for 10 minutes, including:  Gentle PROM  Patellar mobs all planes (emphasis on superior glide)    Tiarra received coldpack for 5 minutes to R knee in heel prop.    Home Exercises and Patient Education Provided    Education provided:   - HEP, POC    Written Home Exercises Provided: yes.  Exercises were reviewed and Tiarra was able to demonstrate them prior to the end of the session.  Tiarra demonstrated good  understanding of the education provided.     See EMR under Patient Instructions for exercises provided 5/23/2023.    Assessment   Tiarra is a 68 y.o. female referred to outpatient Physical Therapy with a medical diagnosis of Z96.651 (ICD-10-CM) - S/P right unicompartmental knee replacement. Pt presents with rolling walker and dressing applied, no signs of drainage. Pt received TKA with Dr. Scott on 5/18/2023, she is POD 5. She demonstrates R LE weakness, decreased R knee range of motion, joint effusion, decreased patellar mobility, decreased quad set, poor balance and gait deviations. Pt demo good ROM for this point in her rehab 0-101 deg flexion/extension. Pt with good tolerance to initiation of strenghening and ROM exercises and good from with HEP.     Pt prognosis is Good.   Pt will benefit from skilled outpatient Physical Therapy to address the deficits stated above and in the chart below, " provide pt/family education, and to maximize pt's level of independence.     Plan of care discussed with patient: Yes  Pt's spiritual, cultural and educational needs considered and patient is agreeable to the plan of care and goals as stated below:     Anticipated Barriers for therapy: none    Medical Necessity is demonstrated by the following  History  Co-morbidities and personal factors that may impact the plan of care Co-morbidities:   advanced age, anxiety, difficulty sleeping, prior abdominal surgery, prior lumbar surgery, and HTN, spinal cord stimulator, chronic low back pain, migraines, insomnia, scoliosis    Personal Factors:   age     low   Examination  Body Structures and Functions, activity limitations and participation restrictions that may impact the plan of care Body Regions:   back  lower extremities    Body Systems:    ROM  strength  balance  gait  blood pressure  edema    Participation Restrictions:   See above    Activity limitations:   Learning and applying knowledge  no deficits    General Tasks and Commands  no deficits    Communication  no deficits    Mobility  no deficits    Self care  no deficits    Domestic Life  no deficits    Interactions/Relationships  no deficits    Life Areas  no deficits    Community and Social Life  no deficits         Complexity: low   Clinical Presentation stable and uncomplicated low   Decision Making/ Complexity Score: low       GOALS: Short Term Goals:  4 weeks  1.Report decreased in pain at worse less than  <   / =  4  /10  to increase tolerance for functional mobility.On going  2. Pt to improve R knee range of motion by 25% to allow for improved functional mobility to allow for improvement in IADLs. .On going  3. Increased R LE MMT 1/2 grade to increase tolerance for ADL and work activities.On going  4. Pt to report ability to ambulate 100 ft without AD.  5. Pt to tolerate HEP to improve ROM and independence with ADL's.On going    Long Term Goals: 8  weeks  1.Report decreased in pain at worse less than  <   / =  2  /10  to increase tolerance for functional mobility. On going  2.Pt to improve range of motion by 75% to allow for improved functional mobility to allow for improvement in IADLs. On going  3.Increased R LE MMT 1 grade to increase tolerance for ADL and work activities.On going  4. Pt will report 39% on FOTO knee survey  to demonstrate increase in LE function with every day tasks. On going  5. Pt to be Independent with HEP to improve ROM and independence with ADL's. On going    Plan   Plan of care Certification: 5/23/2023 to 7/23/2023.    Outpatient Physical Therapy 3 times weekly for 8 weeks to include the following interventions: Gait Training, Manual Therapy, Moist Heat/ Ice, Neuromuscular Re-ed, Patient Education, Therapeutic Activities, and Therapeutic Exercise. Marybeth Anaya, PT      I CERTIFY THE NEED FOR THESE SERVICES FURNISHED UNDER THIS PLAN OF TREATMENT AND WHILE UNDER MY CARE   Physician's comments:     Physician's Signature: ___________________________________________________

## 2023-05-25 ENCOUNTER — PATIENT MESSAGE (OUTPATIENT)
Dept: INTERNAL MEDICINE | Facility: CLINIC | Age: 68
End: 2023-05-25
Payer: MEDICARE

## 2023-05-26 ENCOUNTER — PATIENT MESSAGE (OUTPATIENT)
Dept: INTERNAL MEDICINE | Facility: CLINIC | Age: 68
End: 2023-05-26
Payer: MEDICARE

## 2023-05-26 ENCOUNTER — CLINICAL SUPPORT (OUTPATIENT)
Dept: REHABILITATION | Facility: OTHER | Age: 68
End: 2023-05-26
Payer: MEDICARE

## 2023-05-26 DIAGNOSIS — Z74.09 IMPAIRED FUNCTIONAL MOBILITY, BALANCE, GAIT, AND ENDURANCE: ICD-10-CM

## 2023-05-26 DIAGNOSIS — R29.898 WEAKNESS OF RIGHT LOWER EXTREMITY: Primary | ICD-10-CM

## 2023-05-26 DIAGNOSIS — M25.661 DECREASED RANGE OF MOTION (ROM) OF RIGHT KNEE: ICD-10-CM

## 2023-05-26 PROCEDURE — 97110 THERAPEUTIC EXERCISES: CPT

## 2023-05-26 PROCEDURE — 97140 MANUAL THERAPY 1/> REGIONS: CPT

## 2023-05-26 NOTE — PROGRESS NOTES
"[facility]   Physical Therapy Treatment Note      Name: Tiarra Norton  Clinic Number: [id number]    Therapy Diagnosis:   Encounter Diagnoses   Name Primary?    Weakness of right lower extremity Yes    Decreased range of motion (ROM) of right knee     Impaired functional mobility, balance, gait, and endurance      Physician: Virgil Scott MD    Visit Date: 5/26/2023    Physician Orders: PT Eval and Treat   Medical Diagnosis from Referral: Z96.651 (ICD-10-CM) - S/P right unicompartmental knee replacement  Evaluation Date: 5/23/2023  Authorization Period Expiration: 4/24/2023  Plan of Care Expiration: 8/23/2023  Visit # / Visits authorized: 2/20  Progress Note Due: 6/23/2023  FOTO: 1/ 1    PTA Visit #: 0/5     Time In: 11:30 am  Time Out: 12:30 pm  Total Billable Time: 55 minutes    Subjective     Pt reports: she has been in a lot more pain since last time, her blood pressure has been spiking and dropping the last few days so she has not been doing as many exercises.    She was compliant with home exercise program.  Response to previous treatment: no adverse effects  Functional change: none yet, first follow up    Pain: 8/10  Location: right knee      Objective      Objective Measures updated at progress report unless specified.     Range of Motion: 5/26/2023    Knee Right active Right Passive   Flexion 103 106   Extension 0 1           Treatment     Tiarra received the treatments listed below:      therapeutic exercises to develop strength, endurance, ROM, and flexibility for 45 minutes including:    Recumbent bike back and forth x5'    Quad set towel under knee 3x10x5"  Quad set towel under heel 3x10x5"  SAQ 3x10x5"  SLR 3x10x5"  Hamstring stretch 3x30"  Ankle pumps 2x20"  Heel prop x3 min  Heel slides 2x10x5"  +heel raises 2x10  +slantboard gastroc stretch 2x30"  +mini squats 2x10      manual therapy techniques: Joint mobilizations and PROM were applied to the: R knee for 10 minutes, " including:  Patellar mobs all planes, emphasis on superior glide  PROM flex/ext    cold pack for 5 minutes to R knee.    Patient Education and Home Exercises       Education provided:   - HEP, POC    Written Home Exercises Provided: Patient instructed to cont prior HEP. Exercises were reviewed and Tiarra was able to demonstrate them prior to the end of the session.  Tiarra demonstrated good  understanding of the education provided. See EMR under Patient Instructions for exercises provided during therapy sessions    Assessment     Tiarra presents today for her first follow-up since initial evaluation. She reports increased pain and blood pressure fluctuations over the last few days limiting HEP participation, but was able to demo all HEP with good form and min cueing. Continuation of dynamic quad and LE strengthening and knee ROM exercises with good tolerance and no adverse effects. Continue to progress per protocol.     Tiarra Is progressing well towards her goals.   Pt prognosis is Good.     Pt will continue to benefit from skilled outpatient physical therapy to address the deficits listed in the problem list box on initial evaluation, provide pt/family education and to maximize pt's level of independence in the home and community environment.     Pt's spiritual, cultural and educational needs considered and pt agreeable to plan of care and goals.     Anticipated barriers to physical therapy: none    GOALS: Short Term Goals:  4 weeks  1.Report decreased in pain at worse less than  <   / =  4  /10  to increase tolerance for functional mobility.On going  2. Pt to improve R knee range of motion by 25% to allow for improved functional mobility to allow for improvement in IADLs. .On going  3. Increased R LE MMT 1/2 grade to increase tolerance for ADL and work activities.On going  4. Pt to report ability to ambulate 100 ft without AD.  5. Pt to tolerate HEP to improve ROM and independence with ADL's.On going     Long Term  Goals: 8 weeks  1.Report decreased in pain at worse less than  <   / =  2  /10  to increase tolerance for functional mobility. On going  2.Pt to improve range of motion by 75% to allow for improved functional mobility to allow for improvement in IADLs. On going  3.Increased R LE MMT 1 grade to increase tolerance for ADL and work activities.On going  4. Pt will report 39% on FOTO knee survey  to demonstrate increase in LE function with every day tasks. On going  5. Pt to be Independent with HEP to improve ROM and independence with ADL's. On going    Plan   Plan of care Certification: 5/23/2023 to 7/23/2023.     Continue Treatment per established POC    Zi Anaya, PT   5/26/2023

## 2023-05-29 NOTE — PLAN OF CARE
05/18/23 1710   CASON Message   Medicare Outpatient and Observation Notification regarding financial responsibility Given to patient/caregiver;Explained to patient/caregiver;Signed/date by patient/caregiver   Date CASON was signed 05/18/23   Time CASON was signed 1710

## 2023-05-29 NOTE — PLAN OF CARE
[facility])  Discharge Final Note    Primary Care Provider: Kaykay Perales MD    Expected Discharge Date: 5/19/2023    Final Discharge Note (most recent)       Final Note - 05/19/23 1159          Final Note    Anticipated Discharge Disposition Home or Self Care     Hospital Resources/Appts/Education Provided Provided patient/caregiver with written discharge plan information;Appointments scheduled by Navigator/Coordinator

## 2023-05-30 ENCOUNTER — CLINICAL SUPPORT (OUTPATIENT)
Dept: REHABILITATION | Facility: OTHER | Age: 68
End: 2023-05-30
Payer: MEDICARE

## 2023-05-30 DIAGNOSIS — R29.898 WEAKNESS OF RIGHT LOWER EXTREMITY: Primary | ICD-10-CM

## 2023-05-30 DIAGNOSIS — Z74.09 IMPAIRED FUNCTIONAL MOBILITY, BALANCE, GAIT, AND ENDURANCE: ICD-10-CM

## 2023-05-30 DIAGNOSIS — M25.661 DECREASED RANGE OF MOTION (ROM) OF RIGHT KNEE: ICD-10-CM

## 2023-05-30 PROCEDURE — 97110 THERAPEUTIC EXERCISES: CPT

## 2023-05-30 PROCEDURE — 97140 MANUAL THERAPY 1/> REGIONS: CPT

## 2023-05-30 NOTE — PROGRESS NOTES
"[facility]   Physical Therapy Treatment Note      Name: Tiarra Norton  Clinic Number: [id number]    Therapy Diagnosis:   Encounter Diagnoses   Name Primary?    Weakness of right lower extremity Yes    Decreased range of motion (ROM) of right knee     Impaired functional mobility, balance, gait, and endurance        Physician: Virgil Scott MD    Visit Date: 5/30/2023    Physician Orders: PT Eval and Treat   Medical Diagnosis from Referral: Z96.651 (ICD-10-CM) - S/P right unicompartmental knee replacement  Evaluation Date: 5/23/2023  Authorization Period Expiration: 4/24/2023  Plan of Care Expiration: 8/23/2023  Visit # / Visits authorized: 3/20  Progress Note Due: 6/23/2023  FOTO: 1/ 1    PTA Visit #: 0/5     Time In: 8:00 am  Time Out: 9:00 am  Total Billable Time: 55 minutes    Subjective     Pt reports: knee feels tight, stiff and swollen today. Still with a lot of pain, especially in the morning    She was compliant with home exercise program.  Response to previous treatment: no adverse effects  Functional change: improved walking tolerance    Pain: 7/10  Location: right knee      Objective      Objective Measures updated at progress report unless specified.     Range of Motion: 5/26/2023    Knee Right active Right Passive   Flexion 109 113   Extension 1 2      TUG:   Eval: 9.01 sec  5/30/2023:11 sec     5x sit to stand:   Eval: 11.11 sec    30 sec sit to stand:  5/30/2023: 15 reps      Treatment     Tiarra received the treatments listed below:      therapeutic exercises to develop strength, endurance, ROM, and flexibility for 45 minutes including:    Recumbent bike back and forth, progressing to full revolutions x5'    Quad set towel under knee 2x10x5"  Quad set towel under heel 2x10x5"  SAQ 1# 3x10x5"  SLR 1# 3x10x5"  +Abd SLR 2x10x3"  +Add SLR 2x10x3"  Hamstring stretch 3x30"  Ankle pumps 2x20"  Heel slides 2x10x5"  heel raises 2x10  slantboard gastroc stretch 2x30"  mini " squats 2x10      manual therapy techniques: Joint mobilizations and PROM were applied to the: R knee for 10 minutes, including:  Patellar mobs all planes, emphasis on superior glide  PROM flex/ext  STM R quad and calf    cold pack for 5 minutes to R knee.    Patient Education and Home Exercises       Education provided:   - HEP, POC    Written Home Exercises Provided: Patient instructed to cont prior HEP. Exercises were reviewed and Tiarra was able to demonstrate them prior to the end of the session.  Tiarra demonstrated good  understanding of the education provided. See EMR under Patient Instructions for exercises provided during therapy sessions    Assessment     Tiarra presents today with continued reports of high pain levels. Improved time in TUG, and 30 sec sit to stand, and improved ROM noted today. Continuation and progression of knee ROM and quad strengthening exercises with good tolerance and no adverse effects. Pt requesting to stop Abd straight leg raise on second set due to acute pain exacerbation. Continue to progress per protocol.    Tiarra Is progressing well towards her goals.   Pt prognosis is Good.     Pt will continue to benefit from skilled outpatient physical therapy to address the deficits listed in the problem list box on initial evaluation, provide pt/family education and to maximize pt's level of independence in the home and community environment.     Pt's spiritual, cultural and educational needs considered and pt agreeable to plan of care and goals.     Anticipated barriers to physical therapy: none    GOALS: Short Term Goals:  4 weeks  1.Report decreased in pain at worse less than  <   / =  4  /10  to increase tolerance for functional mobility.On going  2. Pt to improve R knee range of motion by 25% to allow for improved functional mobility to allow for improvement in IADLs. .On going  3. Increased R LE MMT 1/2 grade to increase tolerance for ADL and work activities.On going  4. Pt to report  ability to ambulate 100 ft without AD.  5. Pt to tolerate HEP to improve ROM and independence with ADL's.On going     Long Term Goals: 8 weeks  1.Report decreased in pain at worse less than  <   / =  2  /10  to increase tolerance for functional mobility. On going  2.Pt to improve range of motion by 75% to allow for improved functional mobility to allow for improvement in IADLs. On going  3.Increased R LE MMT 1 grade to increase tolerance for ADL and work activities.On going  4. Pt will report 39% on FOTO knee survey  to demonstrate increase in LE function with every day tasks. On going  5. Pt to be Independent with HEP to improve ROM and independence with ADL's. On going    Plan   Plan of care Certification: 5/23/2023 to 7/23/2023.     Continue Treatment per established POC    Zi Anaya, PT   5/30/2023

## 2023-05-31 RX ORDER — OXYCODONE HYDROCHLORIDE 5 MG/1
5-10 TABLET ORAL EVERY 4 HOURS PRN
Qty: 40 TABLET | Refills: 0 | Status: SHIPPED | OUTPATIENT
Start: 2023-05-31 | End: 2023-06-07 | Stop reason: SDUPTHER

## 2023-05-31 RX ORDER — METHOCARBAMOL 500 MG/1
500 TABLET, FILM COATED ORAL 4 TIMES DAILY
Qty: 40 TABLET | Refills: 0 | Status: SHIPPED | OUTPATIENT
Start: 2023-05-31 | End: 2023-06-11

## 2023-06-01 ENCOUNTER — CLINICAL SUPPORT (OUTPATIENT)
Dept: REHABILITATION | Facility: OTHER | Age: 68
End: 2023-06-01
Attending: ORTHOPAEDIC SURGERY
Payer: MEDICARE

## 2023-06-01 DIAGNOSIS — M25.661 DECREASED RANGE OF MOTION (ROM) OF RIGHT KNEE: ICD-10-CM

## 2023-06-01 DIAGNOSIS — R29.898 WEAKNESS OF RIGHT LOWER EXTREMITY: Primary | ICD-10-CM

## 2023-06-01 DIAGNOSIS — Z74.09 IMPAIRED FUNCTIONAL MOBILITY, BALANCE, GAIT, AND ENDURANCE: ICD-10-CM

## 2023-06-01 PROCEDURE — 97110 THERAPEUTIC EXERCISES: CPT

## 2023-06-01 PROCEDURE — 97140 MANUAL THERAPY 1/> REGIONS: CPT

## 2023-06-01 NOTE — PROGRESS NOTES
"[facility]   Physical Therapy Treatment Note      Name: Tiarra Norton  Clinic Number: [id number]    Therapy Diagnosis:   Encounter Diagnoses   Name Primary?    Weakness of right lower extremity Yes    Decreased range of motion (ROM) of right knee     Impaired functional mobility, balance, gait, and endurance        Physician: Virgil Scott MD    Visit Date: 6/1/2023    Physician Orders: PT Eval and Treat   Medical Diagnosis from Referral: Z96.651 (ICD-10-CM) - S/P right unicompartmental knee replacement  Evaluation Date: 5/23/2023  Authorization Period Expiration: 4/24/2023  Plan of Care Expiration: 8/23/2023  Visit # / Visits authorized: 4/20  Progress Note Due: 6/23/2023  FOTO: 1/ 1    PTA Visit #: 0/5     Time In: 2:00 pm  Time Out: 3:00 pm  Total Billable Time: 55 minutes    Subjective     Pt reports: knee continues to be sore, but she is able to walk a little better    She was compliant with home exercise program.  Response to previous treatment: no adverse effects  Functional change: improved walking tolerance    Pain: 6/10  Location: right knee      Objective      Objective Measures updated at progress report unless specified.     Range of Motion: 6/1/2023    Knee Right active Right Passive   Flexion 111 115   Extension 1 2      TUG:   Eval: 9.01 sec  5/30/2023:11 sec     5x sit to stand:   Eval: 11.11 sec    30 sec sit to stand:  5/30/2023: 15 reps      Treatment     Tiarra received the treatments listed below:      therapeutic exercises to develop strength, endurance, ROM, and flexibility for 45 minutes including:    Recumbent bike back and forth, progressing to full revolutions x5'    Quad set towel under knee 2x10x5"  Quad set towel under heel 2x10x5"  SAQ 1# 3x10x5"  SLR 3x10x5"  Abd SLR 2x10x3" - NP  Add SLR 2x10x3" - NP  Hamstring stretch 3x30"  Ankle pumps 2x20"  Heel slides 2x10x5"  heel raises 2x10  slantboard gastroc stretch 2x30"  +Sit to stands w/ 2 airex " "3x10  +Cone walking step overs, 5 cones back and forth 5x -emphasis on hip flexion and knee flexion w/RW  +step ups 6" into SLS 2x10x3"  TKE Red sports cord 2x12x5"      manual therapy techniques: Joint mobilizations and PROM were applied to the: R knee for 10 minutes, including:  Patellar mobs all planes, emphasis on superior glide  PROM flex/ext  STM R quad and calf    cold pack for 5 minutes to R knee.    Patient Education and Home Exercises       Education provided:   - HEP, POC    Written Home Exercises Provided: Patient instructed to cont prior HEP. Exercises were reviewed and Tiarra was able to demonstrate them prior to the end of the session.  Tiarra demonstrated good  understanding of the education provided. See EMR under Patient Instructions for exercises provided during therapy sessions    Assessment     Tiarra presents today with reports of knee pain, but at slightly improved levels from last visit. Improved knee ROM noted today. Initiated gait training exercises with walking including cone step overs, good form and no LOB. Progressed dynamic LE strengthening and ROM exercises with good tolerance and no adverse effects. Continue to progress as tolerated.     Tiarra Is progressing well towards her goals.   Pt prognosis is Good.     Pt will continue to benefit from skilled outpatient physical therapy to address the deficits listed in the problem list box on initial evaluation, provide pt/family education and to maximize pt's level of independence in the home and community environment.     Pt's spiritual, cultural and educational needs considered and pt agreeable to plan of care and goals.     Anticipated barriers to physical therapy: none    GOALS: Short Term Goals:  4 weeks  1.Report decreased in pain at worse less than  <   / =  4  /10  to increase tolerance for functional mobility.On going  2. Pt to improve R knee range of motion by 25% to allow for improved functional mobility to allow for improvement in " IADLs. .On going  3. Increased R LE MMT 1/2 grade to increase tolerance for ADL and work activities.On going  4. Pt to report ability to ambulate 100 ft without AD.  5. Pt to tolerate HEP to improve ROM and independence with ADL's.On going     Long Term Goals: 8 weeks  1.Report decreased in pain at worse less than  <   / =  2  /10  to increase tolerance for functional mobility. On going  2.Pt to improve range of motion by 75% to allow for improved functional mobility to allow for improvement in IADLs. On going  3.Increased R LE MMT 1 grade to increase tolerance for ADL and work activities.On going  4. Pt will report 39% on FOTO knee survey  to demonstrate increase in LE function with every day tasks. On going  5. Pt to be Independent with HEP to improve ROM and independence with ADL's. On going    Plan   Plan of care Certification: 5/23/2023 to 7/23/2023.     Continue Treatment per established POC    Zi Anaya, PT   6/1/2023

## 2023-06-02 ENCOUNTER — PATIENT MESSAGE (OUTPATIENT)
Dept: REHABILITATION | Facility: OTHER | Age: 68
End: 2023-06-02
Payer: MEDICARE

## 2023-06-05 ENCOUNTER — CLINICAL SUPPORT (OUTPATIENT)
Dept: REHABILITATION | Facility: OTHER | Age: 68
End: 2023-06-05
Payer: MEDICARE

## 2023-06-05 DIAGNOSIS — Z96.651 S/P RIGHT UNICOMPARTMENTAL KNEE REPLACEMENT: Primary | ICD-10-CM

## 2023-06-05 DIAGNOSIS — M25.661 DECREASED RANGE OF MOTION (ROM) OF RIGHT KNEE: ICD-10-CM

## 2023-06-05 DIAGNOSIS — Z74.09 IMPAIRED FUNCTIONAL MOBILITY, BALANCE, GAIT, AND ENDURANCE: ICD-10-CM

## 2023-06-05 DIAGNOSIS — R29.898 WEAKNESS OF RIGHT LOWER EXTREMITY: Primary | ICD-10-CM

## 2023-06-05 PROCEDURE — 97110 THERAPEUTIC EXERCISES: CPT

## 2023-06-05 PROCEDURE — 97530 THERAPEUTIC ACTIVITIES: CPT

## 2023-06-05 NOTE — PROGRESS NOTES
[facility]   Physical Therapy Treatment Note      Name: Tiarra Norton  Clinic Number: [id number]    Therapy Diagnosis:   Encounter Diagnoses   Name Primary?    Weakness of right lower extremity Yes    Decreased range of motion (ROM) of right knee     Impaired functional mobility, balance, gait, and endurance        Physician: Virgil Scott MD    Visit Date: 6/5/2023    Physician Orders: PT Eval and Treat   Medical Diagnosis from Referral: Z96.651 (ICD-10-CM) - S/P right unicompartmental knee replacement  Evaluation Date: 5/23/2023  Authorization Period Expiration: 4/24/2023  Plan of Care Expiration: 8/23/2023  Visit # / Visits authorized: 5 / 20  Progress Note Due: 6/23/2023  FOTO: 1/ 1    PTA Visit #: 0/5     Time In: 3:45 pm  Time Out: 4:45 pm  Total Billable Time:  60 minutes    Subjective     Pt reports: inc fatigue today but unable to attribute this to any specific activity/reason.  Patient note over the weekend knee /c dec max pain in knee.  Patient note improved stair ascend/descend as she uses them daily to get to bedroom.  Patient express inc interest in advancing to SPC.    Patient report next Monday 3 week f/u /c MD.  Patient intends to attend her book club on Thursday.       She was compliant with home exercise program.  Response to previous treatment: no adverse effects  Functional change: improved stair usage    Pain: 5/10  Location: right knee      Objective      Objective Measures updated at progress report unless specified.     Range of Motion: 6/1/2023    Knee Right active Right Passive R PROM 06/05/23   Flexion 111 115 115   Extension 1 2 2      TUG:   Eval: 9.01 sec  5/30/2023: 11 sec  06/05/2023:  8.67 sec /c RW,  9.41 sec /c SPC      5x sit to stand:   Eval: 11.11 sec    30 sec sit to stand:  5/30/2023: 15 reps  06/05/2023: 15 reps     Treatment     Tiarra received the treatments listed below:      therapeutic exercises to develop strength, endurance, ROM, and  "flexibility for 40 minutes including:    Recumbent bike back and forth, straight to full revolutions x5'    Supine  Quad set towel under knee x 10 x 5"  Quad set towel under heel x 10 x 5"  SAQ 2# 3x10x5" cue for controlled descent   SLR x 10 x 5", /c 1# 2 x 10 x 5   Heel slides x 20 x 5"    Standing  slantboard gastroc stretch 2x30"  Heel raises x 20 2 finger support   TKE Red sports cord x 20 3"  (2x12x5")      NP  Ankle pumps 2x20"  Hamstring stretch 3x30"  Abd SLR 2x10x3" - NP  Add SLR 2x10x3" - NP    Tiarra participated in dynamic functional therapeutic activities to improve functional performance for 15 minutes, including:    Sit to stands w/ 1 airex 2 x 10 cue for "nose over toes, push thru heel", x 10 /c LLE fwd step, x 10 RLE fwd step    Walking /c  ft cue for cane in L hand - patient demonstrate good rhythm   Cone walking step overs reciprocal,  5 cones back and forth 5x /c SPC cue on hip flexion and knee flexion, dec RLE circumduction    Step ups 8" into SLS 2x10x3"    Manual therapy techniques: Joint mobilizations and PROM were applied to the: R knee for 5 minutes, including:  Patient supine/long sitting:   Patellar mobs all planes, emphasis on superior glide  PROM flex/ext   STM R quad and calf - NP    Patient receive cold pack for 5 minutes to R knee.    Patient Education and Home Exercises       Education provided:   - HEP, POC    Written Home Exercises Provided: Patient instructed to cont prior HEP. Exercises were reviewed and Tiarra was able to demonstrate them prior to the end of the session.  Tiarra demonstrated good  understanding of the education provided. See EMR under Patient Instructions for exercises provided during therapy sessions    Assessment       Patient subjective report indicate continue improved functional activity tolerance and inc self efficacy /c return to social actviities.  Patient demonstrate improved mobility going straight into full revolutions on recumbent bike.  " Patient also demonstrate good intrinsic motivation inquiring about SPC use.  Tx continue to progress dynamic LE strengthening and R knee stability exercises including primarily SPC use. Patient demonstrate safe SPC technique and note no discomfort.  Patient advised appropriate to ween off RW as per tolerated.  Continue to progress R knee stability, ROM and RLE strength as tolerated.     Tiarra Is progressing well towards her goals.   Pt prognosis is Good.     Pt will continue to benefit from skilled outpatient physical therapy to address the deficits listed in the problem list box on initial evaluation, provide pt/family education and to maximize pt's level of independence in the home and community environment.     Pt's spiritual, cultural and educational needs considered and pt agreeable to plan of care and goals.     Anticipated barriers to physical therapy: none    GOALS: Short Term Goals:  4 weeks  1.Report decreased in pain at worse less than  <   / =  4  /10  to increase tolerance for functional mobility.On going  2. Pt to improve R knee range of motion by 25% to allow for improved functional mobility to allow for improvement in IADLs. .On going  3. Increased R LE MMT 1/2 grade to increase tolerance for ADL and work activities.On going  4. Pt to report ability to ambulate 100 ft without AD.  5. Pt to tolerate HEP to improve ROM and independence with ADL's.On going     Long Term Goals: 8 weeks  1.Report decreased in pain at worse less than  <   / =  2  /10  to increase tolerance for functional mobility. On going  2.Pt to improve range of motion by 75% to allow for improved functional mobility to allow for improvement in IADLs. On going  3.Increased R LE MMT 1 grade to increase tolerance for ADL and work activities.On going  4. Pt will report 39% on FOTO knee survey  to demonstrate increase in LE function with every day tasks. On going  5. Pt to be Independent with HEP to improve ROM and independence with  ADL's. On going    Plan   Plan of care Certification: 5/23/2023 to 7/23/2023.     Continue Treatment per established POC    Frank Malik, PT   6/5/2023

## 2023-06-06 ENCOUNTER — CLINICAL SUPPORT (OUTPATIENT)
Dept: REHABILITATION | Facility: OTHER | Age: 68
End: 2023-06-06
Payer: MEDICARE

## 2023-06-06 DIAGNOSIS — M25.661 DECREASED RANGE OF MOTION (ROM) OF RIGHT KNEE: ICD-10-CM

## 2023-06-06 DIAGNOSIS — R29.898 WEAKNESS OF RIGHT LOWER EXTREMITY: Primary | ICD-10-CM

## 2023-06-06 DIAGNOSIS — Z74.09 IMPAIRED FUNCTIONAL MOBILITY, BALANCE, GAIT, AND ENDURANCE: ICD-10-CM

## 2023-06-06 PROCEDURE — 97140 MANUAL THERAPY 1/> REGIONS: CPT

## 2023-06-06 PROCEDURE — 97530 THERAPEUTIC ACTIVITIES: CPT

## 2023-06-06 PROCEDURE — 97110 THERAPEUTIC EXERCISES: CPT

## 2023-06-06 NOTE — PROGRESS NOTES
"[facility]   Physical Therapy Treatment Note      Name: Tiarra Norton  Clinic Number: [id number]    Therapy Diagnosis:   Encounter Diagnoses   Name Primary?    Weakness of right lower extremity Yes    Decreased range of motion (ROM) of right knee     Impaired functional mobility, balance, gait, and endurance        Physician: Virgil Scott MD    Visit Date: 6/6/2023    Physician Orders: PT Eval and Treat   Medical Diagnosis from Referral: Z96.651 (ICD-10-CM) - S/P right unicompartmental knee replacement  Evaluation Date: 5/23/2023  Authorization Period Expiration: 4/24/2023  Plan of Care Expiration: 8/23/2023  Visit # / Visits authorized: 6 / 20  Progress Note Due: 6/23/2023  FOTO: 2/2    PTA Visit #: 0/5     Time In: 8:00 am  Time Out: 9:00 am  Total Billable Time:  60 minutes    Subjective     Pt reports: knee and quad are sore from going hard yesterday      She was compliant with home exercise program.  Response to previous treatment: no adverse effects  Functional change: improved stair usage    Pain: 5/10  Location: right knee      Objective      Objective Measures updated at progress report unless specified.     Range of Motion: 6/6/2023    Knee Right active Right Passive   Flexion 114 118   Extension 1 2      TUG:   Eval: 9.01 sec  5/30/2023: 11 sec  06/05/2023:  8.67 sec /c RW,  9.41 sec /c SPC      5x sit to stand:   Eval: 11.11 sec    30 sec sit to stand:  5/30/2023: 15 reps  06/05/2023: 15 reps     Treatment     Tiarra received the treatments listed below:      therapeutic exercises to develop strength, endurance, ROM, and flexibility for 30 minutes including:    Recumbent bike back and forth, straight to full revolutions x5'    Supine  Quad set towel under knee x 10 x 5"  Quad set towel under heel x 10 x 5"  SAQ 2# 3x10x5" cue for controlled descent   SLR x 10 x 5", /c 1# 2 x 10 x 5   Heel slides x 20 x 5"  Hamstring stretch 3x30"    Standing  slantboard gastroc stretch " "2x30"  Heel raises x 20 2 finger support   TKE Red sports cord x 20 3"  (2x12x5")      NP  Ankle pumps 2x20"  Abd SLR 2x10x3" - NP  Add SLR 2x10x3" - NP    Tiarra participated in dynamic functional therapeutic activities to improve functional performance for 15 minutes, including:    Sit to stands w/ 1 airex 2 x 10 cue for "nose over toes, push thru heel", x 10 /c LLE fwd step, x 10 RLE fwd step    Walking /c  ft cue for cane in L hand - patient demonstrate good rhythm   Cone walking step overs reciprocal,  5 cones back and forth 5x /c SPC cue on hip flexion and knee flexion, dec RLE circumduction    Step ups 8" into SLS 2x10x3"    Manual therapy techniques: Joint mobilizations and PROM were applied to the: R knee for 10 minutes, including:  Patient supine/long sitting:   Patellar mobs all planes, emphasis on superior glide  PROM flex/ext   STM R quad and calf     Patient receive cold pack for 5 minutes to R knee.    Patient Education and Home Exercises       Education provided:   - HEP, POC    Written Home Exercises Provided: Patient instructed to cont prior HEP. Exercises were reviewed and Tiarra was able to demonstrate them prior to the end of the session.  Tiarra demonstrated good  understanding of the education provided. See EMR under Patient Instructions for exercises provided during therapy sessions    Assessment     Tiarra presents today with reports of increased quad and knee soreness after her appointment yesterday. Continuation and progression of dynamic quad and LE strengthening and ROM exercises with good tolerance and no adverse effects. Continue to progress as tolerated in accordance with protocol.     Tiarra Is progressing well towards her goals.   Pt prognosis is Good.     Pt will continue to benefit from skilled outpatient physical therapy to address the deficits listed in the problem list box on initial evaluation, provide pt/family education and to maximize pt's level of independence in the " home and community environment.     Pt's spiritual, cultural and educational needs considered and pt agreeable to plan of care and goals.     Anticipated barriers to physical therapy: none    GOALS: Short Term Goals:  4 weeks  1.Report decreased in pain at worse less than  <   / =  4  /10  to increase tolerance for functional mobility.On going  2. Pt to improve R knee range of motion by 25% to allow for improved functional mobility to allow for improvement in IADLs. .On going  3. Increased R LE MMT 1/2 grade to increase tolerance for ADL and work activities.On going  4. Pt to report ability to ambulate 100 ft without AD.  5. Pt to tolerate HEP to improve ROM and independence with ADL's.On going     Long Term Goals: 8 weeks  1.Report decreased in pain at worse less than  <   / =  2  /10  to increase tolerance for functional mobility. On going  2.Pt to improve range of motion by 75% to allow for improved functional mobility to allow for improvement in IADLs. On going  3.Increased R LE MMT 1 grade to increase tolerance for ADL and work activities.On going  4. Pt will report 39% on FOTO knee survey  to demonstrate increase in LE function with every day tasks. On going  5. Pt to be Independent with HEP to improve ROM and independence with ADL's. On going    Plan   Plan of care Certification: 5/23/2023 to 7/23/2023.     Continue Treatment per established POC    Zi Anaya, PT   6/6/2023

## 2023-06-07 ENCOUNTER — PATIENT MESSAGE (OUTPATIENT)
Dept: ORTHOPEDICS | Facility: CLINIC | Age: 68
End: 2023-06-07
Payer: MEDICARE

## 2023-06-07 RX ORDER — OXYCODONE HYDROCHLORIDE 5 MG/1
5-10 TABLET ORAL EVERY 4 HOURS PRN
Qty: 40 TABLET | Refills: 0 | Status: SHIPPED | OUTPATIENT
Start: 2023-06-07 | End: 2023-06-14 | Stop reason: SDUPTHER

## 2023-06-08 ENCOUNTER — CLINICAL SUPPORT (OUTPATIENT)
Dept: REHABILITATION | Facility: OTHER | Age: 68
End: 2023-06-08
Payer: MEDICARE

## 2023-06-08 DIAGNOSIS — Z74.09 IMPAIRED FUNCTIONAL MOBILITY, BALANCE, GAIT, AND ENDURANCE: ICD-10-CM

## 2023-06-08 DIAGNOSIS — R29.898 WEAKNESS OF RIGHT LOWER EXTREMITY: Primary | ICD-10-CM

## 2023-06-08 DIAGNOSIS — M25.661 DECREASED RANGE OF MOTION (ROM) OF RIGHT KNEE: ICD-10-CM

## 2023-06-08 PROCEDURE — 97140 MANUAL THERAPY 1/> REGIONS: CPT

## 2023-06-08 PROCEDURE — 97110 THERAPEUTIC EXERCISES: CPT

## 2023-06-08 PROCEDURE — 97530 THERAPEUTIC ACTIVITIES: CPT

## 2023-06-08 NOTE — PROGRESS NOTES
"[facility]   Physical Therapy Treatment Note      Name: Tiarra Norton  Clinic Number: [id number]    Therapy Diagnosis:   Encounter Diagnoses   Name Primary?    Weakness of right lower extremity Yes    Decreased range of motion (ROM) of right knee     Impaired functional mobility, balance, gait, and endurance        Physician: Virgil Scott MD    Visit Date: 6/8/2023    Physician Orders: PT Eval and Treat   Medical Diagnosis from Referral: Z96.651 (ICD-10-CM) - S/P right unicompartmental knee replacement  Evaluation Date: 5/23/2023  Authorization Period Expiration: 4/24/2023  Plan of Care Expiration: 8/23/2023  Visit # / Visits authorized: 7 / 20  Progress Note Due: 6/23/2023  FOTO: 2/2    PTA Visit #: 0/5     Time In: 2:45 pm  Time Out: 3:45 pm  Total Billable Time:  45 minutes  Total Treatment Time:  60 minutes    Subjective     Pt reports: pain is about the same. It feels more stiff and tight today    She was compliant with home exercise program.  Response to previous treatment: no adverse effects  Functional change: improved stair usage    Pain: 5/10  Location: right knee      Objective      Objective Measures updated at progress report unless specified.     Range of Motion: 6/6/2023    Knee Right active Right Passive   Flexion 114 118   Extension 1 2      TUG:   Eval: 9.01 sec  5/30/2023: 11 sec  06/05/2023:  8.67 sec /c RW,  9.41 sec /c SPC      5x sit to stand:   Eval: 11.11 sec    30 sec sit to stand:  5/30/2023: 15 reps  06/05/2023: 15 reps     Treatment     Tiarra received the treatments listed below:      therapeutic exercises to develop strength, endurance, ROM, and flexibility for 30 minutes including:    Recumbent bike back and forth, straight to full revolutions x5'    Supine  Quad set towel under knee x 10 x 5"  Quad set towel under heel x 10 x 5"  SAQ 2# 3x10x5" cue for controlled descent   SLR x 10 x 5", /c 1# 2 x 10 x 5   Heel slides x 20 x 5"  Hamstring stretch " "3x30"    Standing  slantboard gastroc stretch 2x30"  Heel raises x 20 2 finger support   TKE Red sports cord x 20 3"  (2x12x5")  +SLB 4x15"      NP  Ankle pumps 2x20"  Abd SLR 2x10x3" - NP  Add SLR 2x10x3" - NP    Tiarra participated in dynamic functional therapeutic activities to improve functional performance for 15 minutes, including:    Sit to stands w/ 1 airex 2 x 10 cue for "nose over toes, push thru heel", x 10 /c LLE fwd step, x 10 RLE fwd step    Walking /c  ft cue for cane in L hand - patient demonstrate good rhythm   Cone walking step overs reciprocal,  5 cones back and forth 5x /c SPC cue on hip flexion and knee flexion, dec RLE circumduction    Step ups 8" into SLS 2x10x3"    Manual therapy techniques: Joint mobilizations and PROM were applied to the: R knee for 10 minutes, including:  Patient supine/long sitting:   Patellar mobs all planes, emphasis on superior glide  PROM flex/ext   STM R quad and calf     Patient receive cold pack for 5 minutes to R knee.    Patient Education and Home Exercises       Education provided:   - HEP, POC    Written Home Exercises Provided: Patient instructed to cont prior HEP. Exercises were reviewed and Tiarra was able to demonstrate them prior to the end of the session.  Tiarra demonstrated good  understanding of the education provided. See EMR under Patient Instructions for exercises provided during therapy sessions    Assessment     Tiarra presents today with continued complaints of pain. Minimal change in ROM compared to last visit, but improved at end of session vs beginning. Single leg balance initiated today with good tolerance, but with some increased pain. Continue to progress per TKA protocol.     Tiarra Is progressing well towards her goals.   Pt prognosis is Good.     Pt will continue to benefit from skilled outpatient physical therapy to address the deficits listed in the problem list box on initial evaluation, provide pt/family education and to maximize " pt's level of independence in the home and community environment.     Pt's spiritual, cultural and educational needs considered and pt agreeable to plan of care and goals.     Anticipated barriers to physical therapy: none    GOALS: Short Term Goals:  4 weeks  1.Report decreased in pain at worse less than  <   / =  4  /10  to increase tolerance for functional mobility.On going  2. Pt to improve R knee range of motion by 25% to allow for improved functional mobility to allow for improvement in IADLs. .On going  3. Increased R LE MMT 1/2 grade to increase tolerance for ADL and work activities.On going  4. Pt to report ability to ambulate 100 ft without AD.  5. Pt to tolerate HEP to improve ROM and independence with ADL's.On going     Long Term Goals: 8 weeks  1.Report decreased in pain at worse less than  <   / =  2  /10  to increase tolerance for functional mobility. On going  2.Pt to improve range of motion by 75% to allow for improved functional mobility to allow for improvement in IADLs. On going  3.Increased R LE MMT 1 grade to increase tolerance for ADL and work activities.On going  4. Pt will report 39% on FOTO knee survey  to demonstrate increase in LE function with every day tasks. On going  5. Pt to be Independent with HEP to improve ROM and independence with ADL's. On going    Plan   Plan of care Certification: 5/23/2023 to 7/23/2023.     Continue Treatment per established POC    Zi Anaya, PT   6/8/2023

## 2023-06-12 ENCOUNTER — OFFICE VISIT (OUTPATIENT)
Dept: ORTHOPEDICS | Facility: CLINIC | Age: 68
End: 2023-06-12
Payer: MEDICARE

## 2023-06-12 VITALS — HEIGHT: 63 IN | WEIGHT: 139 LBS | BODY MASS INDEX: 24.63 KG/M2

## 2023-06-12 DIAGNOSIS — Z96.651 STATUS POST TOTAL RIGHT KNEE REPLACEMENT: Primary | ICD-10-CM

## 2023-06-12 PROCEDURE — 99999 PR PBB SHADOW E&M-EST. PATIENT-LVL III: ICD-10-PCS | Mod: PBBFAC,,, | Performed by: ORTHOPAEDIC SURGERY

## 2023-06-12 PROCEDURE — 99024 POSTOP FOLLOW-UP VISIT: CPT | Mod: POP,,, | Performed by: ORTHOPAEDIC SURGERY

## 2023-06-12 PROCEDURE — 99999 PR PBB SHADOW E&M-EST. PATIENT-LVL III: CPT | Mod: PBBFAC,,, | Performed by: ORTHOPAEDIC SURGERY

## 2023-06-12 PROCEDURE — 99024 PR POST-OP FOLLOW-UP VISIT: ICD-10-PCS | Mod: POP,,, | Performed by: ORTHOPAEDIC SURGERY

## 2023-06-12 PROCEDURE — 99213 OFFICE O/P EST LOW 20 MIN: CPT | Mod: PBBFAC,PN | Performed by: ORTHOPAEDIC SURGERY

## 2023-06-12 NOTE — PROGRESS NOTES
Ortho Knee Follow Up Note    PCP: Kaykay Perales MD   Referring Provider: No referring provider defined for this encounter.        Assessment:  68 y.o. female status post right total Knee conversion completed on 5/18/23. Doing well, good ROM. Some continued pain. Back pain has improved since surgery. No assistive devices. In outpatient PT.     Plan:  Follow up 3 months  Future Radiographs Indicated at next visit: No    Pain Management: Wean narcotic medications as tolerated  Anticoagulation: Continue ASA for total of 4 weeks duration post operatively  Wound Care: Ok to shower. No scrubbing incision. No soaking in pools or tubs for 6 weeks post operative.     Patient Reassurance:   Post-operative course discussed with patient. Patient reassured and supported. All questions answered.    ACTIVE PROBLEM LIST  Patient Active Problem List   Diagnosis    HTN (hypertension)    Hypothyroid    Unspecified vitamin D deficiency    Elevated liver enzymes    Primary osteoarthritis of right knee    Fatty liver    Hyperlipidemia    Migraine without status migrainosus, not intractable    Glucose intolerance (impaired glucose tolerance)    Hyponatremia    Anxiety    Fatigue    Intermittent diarrhea    HERMINIA (obstructive sleep apnea)    Decreased libido    Sleep arousal disorder    Major depressive disorder, recurrent, mild    Pain    Colon adenomas    Abdominal pain    Exocrine pancreatic insufficiency    Irritable bowel syndrome with diarrhea    Overweight (BMI 25.0-29.9)    Poor posture    Complex tear of lateral meniscus of right knee as current injury    Decreased range of motion (ROM) of right knee    Weakness of right lower extremity    Decreased range of motion of trunk and back    Decreased strength of trunk and back    Idiopathic scoliosis of thoracolumbar spine    Primary insomnia    Back pain    Impaired functional mobility, balance, gait, and endurance    Posture imbalance    Weakness of trunk musculature    COVID     "Chronic pain syndrome    History of partial knee replacement    Benzodiazepine dependence    Aortic atherosclerosis           HPI:  Tiarra Norton presents today for a post-op visit.     STATUS POST:  right total Knee Primary  BMI: Body mass index is 24.62 kg/m².    Post operative recovery was complicated by: None    Patient rates his condition as improving. Pleased with surgical outcome to date.     Functional Assessment:  Started Outpatient PT  Functional Difficulties:  Interferes with sleep, Arising from chair, and Walking  Pain Medication:  Narcotic  Anticoagulation: Aspirin     EXAM:    right POST-OPERATIVE KNEE    Ht 5' 3" (1.6 m)   Wt 63 kg (139 lb)   BMI 24.62 kg/m²     Skin:  Appropriate post op appearance, No evidence of erythema, warmth, discharge, or drainage, and Incision clean/dry/intact  Range of Motion: Flexion: 115, Extension 0  Neurovascular Status: Sensation intact in Sural, Saphenous, SPN, DPN and Tibial nerve distribution. 5 out of 5 strength in hip flexion, knee flexion/extension, ankle plantarflexion/dorsiflexion. 2+ dorsalis pedis    IMAGING:  X-ray Knee:   Implants are well fixed and aligned. There is no evidence of loosening.       "

## 2023-06-13 ENCOUNTER — PATIENT MESSAGE (OUTPATIENT)
Dept: REHABILITATION | Facility: OTHER | Age: 68
End: 2023-06-13
Payer: MEDICARE

## 2023-06-14 ENCOUNTER — CLINICAL SUPPORT (OUTPATIENT)
Dept: REHABILITATION | Facility: OTHER | Age: 68
End: 2023-06-14
Payer: MEDICARE

## 2023-06-14 ENCOUNTER — PATIENT MESSAGE (OUTPATIENT)
Dept: ORTHOPEDICS | Facility: CLINIC | Age: 68
End: 2023-06-14
Payer: MEDICARE

## 2023-06-14 DIAGNOSIS — M25.661 DECREASED RANGE OF MOTION (ROM) OF RIGHT KNEE: ICD-10-CM

## 2023-06-14 DIAGNOSIS — Z74.09 IMPAIRED FUNCTIONAL MOBILITY, BALANCE, GAIT, AND ENDURANCE: ICD-10-CM

## 2023-06-14 DIAGNOSIS — R29.898 WEAKNESS OF RIGHT LOWER EXTREMITY: Primary | ICD-10-CM

## 2023-06-14 PROCEDURE — 97140 MANUAL THERAPY 1/> REGIONS: CPT

## 2023-06-14 PROCEDURE — 97530 THERAPEUTIC ACTIVITIES: CPT

## 2023-06-14 PROCEDURE — 97110 THERAPEUTIC EXERCISES: CPT

## 2023-06-14 RX ORDER — OXYCODONE HYDROCHLORIDE 5 MG/1
5-10 TABLET ORAL EVERY 6 HOURS PRN
Qty: 40 TABLET | Refills: 0 | Status: SHIPPED | OUTPATIENT
Start: 2023-06-14 | End: 2023-06-21 | Stop reason: SDUPTHER

## 2023-06-14 NOTE — PROGRESS NOTES
"[facility]   Physical Therapy Treatment Note      Name: Tiarra Norton  Clinic Number: [id number]    Therapy Diagnosis:   Encounter Diagnoses   Name Primary?    Weakness of right lower extremity Yes    Decreased range of motion (ROM) of right knee     Impaired functional mobility, balance, gait, and endurance        Physician: Virgil Scott MD    Visit Date: 6/14/2023    Physician Orders: PT Eval and Treat   Medical Diagnosis from Referral: Z96.651 (ICD-10-CM) - S/P right unicompartmental knee replacement  Evaluation Date: 5/23/2023  Authorization Period Expiration: 4/24/2023  Plan of Care Expiration: 8/23/2023  Visit # / Visits authorized: 8 / 20  Progress Note Due: 7/14/2023  FOTO: 2/2    PTA Visit #: 0/5     Time In: 9:00 am  Time Out: 10:00 am  Total Billable Time:  45 minutes  Total Treatment Time:  60 minutes    Subjective     Pt reports: she is not sure she is getting better, she feels it might be getting worse.     She was compliant with home exercise program.  Response to previous treatment: no adverse effects  Functional change: improved stair usage    Pain: 5/10  Location: right knee      Objective      Objective Measures updated at progress report unless specified.     Range of Motion: 6/14/2023    Knee Right active Right Passive   Flexion 116 120   Extension 0 1      TUG:   Eval: 9.01 sec  5/30/2023: 11 sec  06/05/2023:  8.67 sec /c RW,  9.41 sec /c SPC   6/14/2023: 7.93 sec     5x sit to stand:   Eval: 11.11 sec    30 sec sit to stand:  5/30/2023: 15 reps  06/05/2023: 15 reps   6/14/2023: 17 reps    Treatment     Tiarra received the treatments listed below:      therapeutic exercises to develop strength, endurance, ROM, and flexibility for 30 minutes including:    Recumbent bike back and forth, straight to full revolutions x5'    Supine  Quad set towel under knee x 10 x 5"  Quad set towel under heel x 10 x 5"  SAQ 3# 3x10x5" cue for controlled descent   SLR x 10 x 5", /c " "2# 2 x 10 x 5   Heel slides x 20 x 5"  Hamstring stretch 3x30"      Standing  slantboard gastroc stretch 2x30"  Heel raises x 20 2 finger support   TKE Red sports cord x 20 3"  (2x12x5")  SLB 4x20"    +Knee extension 40# 2x10  +Hamstring curl 50# 2x10  +Step ups into SLS fwd/bwd 2x8x3" ea      NP  Ankle pumps 2x20"  Abd SLR 2x10x3" - NP  Add SLR 2x10x3" - NP    Tiarra participated in dynamic functional therapeutic activities to improve functional performance for 15 minutes, including:    Sit to stands w/ 1 airex 2 x 10 cue for "nose over toes, push thru heel", x 10 /c LLE fwd step, x 10 RLE fwd step    Walking /c  ft cue for cane in L hand - patient demonstrate good rhythm   Cone walking step overs reciprocal,  5 cones back and forth 5x /c SPC cue on hip flexion and knee flexion, dec RLE circumduction    Step ups 8" into SLS 2x10x3"    Manual therapy techniques: Joint mobilizations and PROM were applied to the: R knee for 10 minutes, including:  Patient supine/long sitting:   Patellar mobs all planes, emphasis on superior glide  PROM flex/ext   STM R quad and calf     Patient receive cold pack for 5 minutes to R knee.    Patient Education and Home Exercises       Education provided:   - HEP, POC    Written Home Exercises Provided: Patient instructed to cont prior HEP. Exercises were reviewed and Tiarra was able to demonstrate them prior to the end of the session.  Tiarra demonstrated good  understanding of the education provided. See EMR under Patient Instructions for exercises provided during therapy sessions    Assessment     Tiarra presents today with reports of increased pain, but was not limited in participation in all treatments. Flexion ROM continues to improve, but pt with slight loss of extension ROM compared to last visit. Extension ROM was improved at end of session after manual techniques, flexibility stretches, and functional quad strengthening. Improved both TUG times and 30 sec sit to stand reps " today. Continue to progress as tolerated.     Tiarra Is progressing well towards her goals.   Pt prognosis is Good.     Pt will continue to benefit from skilled outpatient physical therapy to address the deficits listed in the problem list box on initial evaluation, provide pt/family education and to maximize pt's level of independence in the home and community environment.     Pt's spiritual, cultural and educational needs considered and pt agreeable to plan of care and goals.     Anticipated barriers to physical therapy: none    GOALS: Short Term Goals:  4 weeks  1.Report decreased in pain at worse less than  <   / =  4  /10  to increase tolerance for functional mobility.On going  2. Pt to improve R knee range of motion by 25% to allow for improved functional mobility to allow for improvement in IADLs. .On going  3. Increased R LE MMT 1/2 grade to increase tolerance for ADL and work activities.On going  4. Pt to report ability to ambulate 100 ft without AD.  5. Pt to tolerate HEP to improve ROM and independence with ADL's.On going     Long Term Goals: 8 weeks  1.Report decreased in pain at worse less than  <   / =  2  /10  to increase tolerance for functional mobility. On going  2.Pt to improve range of motion by 75% to allow for improved functional mobility to allow for improvement in IADLs. On going  3.Increased R LE MMT 1 grade to increase tolerance for ADL and work activities.On going  4. Pt will report 39% on FOTO knee survey  to demonstrate increase in LE function with every day tasks. On going  5. Pt to be Independent with HEP to improve ROM and independence with ADL's. On going    Plan   Plan of care Certification: 5/23/2023 to 7/23/2023.     Continue Treatment per established POC    Zi Anaya, PT   6/14/2023

## 2023-06-16 ENCOUNTER — CLINICAL SUPPORT (OUTPATIENT)
Dept: REHABILITATION | Facility: OTHER | Age: 68
End: 2023-06-16
Payer: MEDICARE

## 2023-06-16 DIAGNOSIS — Z74.09 IMPAIRED FUNCTIONAL MOBILITY, BALANCE, GAIT, AND ENDURANCE: ICD-10-CM

## 2023-06-16 DIAGNOSIS — R29.898 WEAKNESS OF RIGHT LOWER EXTREMITY: Primary | ICD-10-CM

## 2023-06-16 DIAGNOSIS — M25.661 DECREASED RANGE OF MOTION (ROM) OF RIGHT KNEE: ICD-10-CM

## 2023-06-16 PROCEDURE — 97110 THERAPEUTIC EXERCISES: CPT

## 2023-06-16 PROCEDURE — 97530 THERAPEUTIC ACTIVITIES: CPT

## 2023-06-16 PROCEDURE — 97140 MANUAL THERAPY 1/> REGIONS: CPT

## 2023-06-16 NOTE — PROGRESS NOTES
"[facility]   Physical Therapy Treatment Note      Name: Tiarra Norton  Clinic Number: [id number]    Therapy Diagnosis:   Encounter Diagnoses   Name Primary?    Weakness of right lower extremity Yes    Decreased range of motion (ROM) of right knee     Impaired functional mobility, balance, gait, and endurance        Physician: Virgil Scott MD    Visit Date: 6/16/2023    Physician Orders: PT Eval and Treat   Medical Diagnosis from Referral: Z96.651 (ICD-10-CM) - S/P right unicompartmental knee replacement  Evaluation Date: 5/23/2023  Authorization Period Expiration: 4/24/2023  Plan of Care Expiration: 8/23/2023  Visit # / Visits authorized: 9 / 20  Progress Note Due: 7/14/2023  FOTO: 2/2    PTA Visit #: 0/5     Time In: 9:00 am  Time Out: 10:00 am  Total Billable Time:  45 minutes  Total Treatment Time:  60 minutes    Subjective     Pt reports: she is not sure she is getting better, she feels it might be getting worse.     She was compliant with home exercise program.  Response to previous treatment: no adverse effects  Functional change: improved stair usage    Pain: 5/10  Location: right knee      Objective      Objective Measures updated at progress report unless specified.     Range of Motion: 6/16/2023    Knee Right active Right Passive   Flexion 116 120   Extension 1 2      TUG:   Eval: 9.01 sec  5/30/2023: 11 sec  06/05/2023:  8.67 sec /c RW,  9.41 sec /c SPC   6/14/2023: 7.93 sec     5x sit to stand:   Eval: 11.11 sec    30 sec sit to stand:  5/30/2023: 15 reps  06/05/2023: 15 reps   6/14/2023: 17 reps    Treatment     Tiarra received the treatments listed below:      therapeutic exercises to develop strength, endurance, ROM, and flexibility for 30 minutes including:    Recumbent bike back and forth, straight to full revolutions x5'    Supine  Quad set towel under knee x 10 x 5"  Quad set towel under heel x 10 x 5"  SAQ 3# 3x10x5" cue for controlled descent   SLR x 10 x 5", /c " "2# 2 x 10 x 5   Heel slides x 20 x 5"  Standing Hamstring stretch 3x30"  +Prone knee extension prop 3# x3'      Standing  slantboard gastroc stretch 2x30"  Heel raises x 20 2 finger support   TKE Red sports cord x 20 3"  (2x12x5")  SLB 4x20"    Knee extension 40# 2x10  Hamstring curl 50# 2x10  Step ups into SLS fwd/bwd 2x8x3" ea      NP  Ankle pumps 2x20"  Abd SLR 2x10x3" - NP  Add SLR 2x10x3" - NP    Tiarra participated in dynamic functional therapeutic activities to improve functional performance for 15 minutes, including:    Sit to stands w/ 1 airex 2 x 10 cue for "nose over toes, push thru heel", x 10 /c LLE fwd step, x 10 RLE fwd step    Walking /c  ft cue for cane in L hand - patient demonstrate good rhythm   Cone walking step overs reciprocal,  5 cones back and forth 5x /c SPC cue on hip flexion and knee flexion, dec RLE circumduction    Step ups 8" into SLS 2x10x3"    Manual therapy techniques: Joint mobilizations and PROM were applied to the: R knee for 10 minutes, including:  Patient supine/long sitting:   Patellar mobs all planes, emphasis on superior glide  PROM flex/ext   STM R quad and calf     Patient receive cold pack for 5 minutes to R knee.    Patient Education and Home Exercises       Education provided:   - HEP, POC    Written Home Exercises Provided: Patient instructed to cont prior HEP. Exercises were reviewed and Tiarra was able to demonstrate them prior to the end of the session.  Tiarra demonstrated good  understanding of the education provided. See EMR under Patient Instructions for exercises provided during therapy sessions    Assessment     Tiarra presents today with continued complaints of knee pain. Improved pain levels and ROM gains noted after manual therapy techniques. Continues to need cues to achieve max knee extension during exercise participation today. Modified hamstring stretch to standing for improved knee extension ROM and increased stretch, instructed to perform at home. " Continue to progress as tolerated.     Tiarra Is progressing well towards her goals.   Pt prognosis is Good.     Pt will continue to benefit from skilled outpatient physical therapy to address the deficits listed in the problem list box on initial evaluation, provide pt/family education and to maximize pt's level of independence in the home and community environment.     Pt's spiritual, cultural and educational needs considered and pt agreeable to plan of care and goals.     Anticipated barriers to physical therapy: none    GOALS: Short Term Goals:  4 weeks  1.Report decreased in pain at worse less than  <   / =  4  /10  to increase tolerance for functional mobility.On going  2. Pt to improve R knee range of motion by 25% to allow for improved functional mobility to allow for improvement in IADLs. .On going  3. Increased R LE MMT 1/2 grade to increase tolerance for ADL and work activities.On going  4. Pt to report ability to ambulate 100 ft without AD.  5. Pt to tolerate HEP to improve ROM and independence with ADL's.On going     Long Term Goals: 8 weeks  1.Report decreased in pain at worse less than  <   / =  2  /10  to increase tolerance for functional mobility. On going  2.Pt to improve range of motion by 75% to allow for improved functional mobility to allow for improvement in IADLs. On going  3.Increased R LE MMT 1 grade to increase tolerance for ADL and work activities.On going  4. Pt will report 39% on FOTO knee survey  to demonstrate increase in LE function with every day tasks. On going  5. Pt to be Independent with HEP to improve ROM and independence with ADL's. On going    Plan   Plan of care Certification: 5/23/2023 to 7/23/2023.     Continue Treatment per established POC    Zi Anaya, PT   6/16/2023

## 2023-06-21 ENCOUNTER — PATIENT MESSAGE (OUTPATIENT)
Dept: ORTHOPEDICS | Facility: CLINIC | Age: 68
End: 2023-06-21
Payer: MEDICARE

## 2023-06-21 ENCOUNTER — CLINICAL SUPPORT (OUTPATIENT)
Dept: REHABILITATION | Facility: OTHER | Age: 68
End: 2023-06-21
Payer: MEDICARE

## 2023-06-21 DIAGNOSIS — Z74.09 IMPAIRED FUNCTIONAL MOBILITY, BALANCE, GAIT, AND ENDURANCE: ICD-10-CM

## 2023-06-21 DIAGNOSIS — M25.661 DECREASED RANGE OF MOTION (ROM) OF RIGHT KNEE: ICD-10-CM

## 2023-06-21 DIAGNOSIS — R29.898 WEAKNESS OF RIGHT LOWER EXTREMITY: Primary | ICD-10-CM

## 2023-06-21 PROCEDURE — 97140 MANUAL THERAPY 1/> REGIONS: CPT

## 2023-06-21 PROCEDURE — 97110 THERAPEUTIC EXERCISES: CPT

## 2023-06-21 RX ORDER — OXYCODONE HYDROCHLORIDE 5 MG/1
5-10 TABLET ORAL EVERY 6 HOURS PRN
Qty: 40 TABLET | Refills: 0 | Status: SHIPPED | OUTPATIENT
Start: 2023-06-21 | End: 2023-06-28 | Stop reason: SDUPTHER

## 2023-06-21 RX ORDER — DOCUSATE SODIUM 100 MG/1
100 CAPSULE, LIQUID FILLED ORAL 2 TIMES DAILY
Refills: 0 | OUTPATIENT
Start: 2023-06-21

## 2023-06-21 RX ORDER — DOCUSATE SODIUM 100 MG/1
100 CAPSULE, LIQUID FILLED ORAL 2 TIMES DAILY
Qty: 30 CAPSULE | Refills: 0 | Status: SHIPPED | OUTPATIENT
Start: 2023-06-21 | End: 2023-07-05 | Stop reason: SDUPTHER

## 2023-06-21 NOTE — PROGRESS NOTES
"[facility]   Physical Therapy Treatment Note      Name: Tiarra Norton  Clinic Number: [id number]    Therapy Diagnosis:   Encounter Diagnoses   Name Primary?    Weakness of right lower extremity Yes    Decreased range of motion (ROM) of right knee     Impaired functional mobility, balance, gait, and endurance        Physician: Virgil Scott MD    Visit Date: 6/21/2023    Physician Orders: PT Eval and Treat   Medical Diagnosis from Referral: Z96.651 (ICD-10-CM) - S/P right unicompartmental knee replacement  Evaluation Date: 5/23/2023  Authorization Period Expiration: 4/24/2023  Plan of Care Expiration: 8/23/2023  Visit # / Visits authorized: 10 / 20  Progress Note Due: 7/14/2023  FOTO: 2/2    PTA Visit #: 0/5     Time In: 9:00 am  Time Out: 10:00 am  Total Billable Time:  30 minutes  Total Treatment Time:  60 minutes    Subjective     Pt reports: she feels like she is finally starting to improve somewhat. Still with a lot of pain, but not as much     She was compliant with home exercise program.  Response to previous treatment: no adverse effects  Functional change: improved stair usage    Pain: 5/10  Location: right knee      Objective      Objective Measures updated at progress report unless specified.     Range of Motion: 6/21/2023    Knee Right active Right Passive   Flexion 116 120   Extension 1 3      TUG:   Eval: 9.01 sec  5/30/2023: 11 sec  06/05/2023:  8.67 sec /c RW,  9.41 sec /c SPC   6/14/2023: 7.93 sec     5x sit to stand:   Eval: 11.11 sec    30 sec sit to stand:  5/30/2023: 15 reps  06/05/2023: 15 reps   6/14/2023: 17 reps    Treatment     Tiarra received the treatments listed below:      therapeutic exercises to develop strength, endurance, ROM, and flexibility for 30 minutes including:    Recumbent bike back and forth, straight to full revolutions x5'    Supine  Quad set towel under heel x 10 x 5"  SAQ 3# 3x10x5" cue for controlled descent   SLR x 10 x 5", /c 2# 2 x 10 " "x 5   Heel slides x 20 x 5"  Standing Hamstring stretch 3x30"  Prone knee extension prop 3# x3'    Standing  slantboard gastroc stretch 2x30"  Heel raises x 20 2 finger support   TKE Red sports cord x 20 3"  (2x12x5")  SLB 4x20"    Knee extension 40# 2x10  Hamstring curl 50# 2x10  Step ups into SLS fwd/sideways 2x8x3" ea  +Leg press SL 50# 3x10  +Leg press DL 80# 3x10      NP  Quad set towel under knee x 10 x 5"  Ankle pumps 2x20"  Abd SLR 2x10x3" - NP  Add SLR 2x10x3" - NP    Tiarra participated in dynamic functional therapeutic activities to improve functional performance for 15 minutes, including:    Sit to stands w/ 1 airex 2 x 10 cue for "nose over toes, push thru heel", x 10 /c LLE fwd step, x 10 RLE fwd step    Walking /c  ft cue for cane in L hand - patient demonstrate good rhythm   Cone walking step overs reciprocal,  5 cones back and forth 5x /c SPC cue on hip flexion and knee flexion, dec RLE circumduction    Step ups 8" into SLS 2x10x3"    Manual therapy techniques: Joint mobilizations and PROM were applied to the: R knee for 10 minutes, including:  Patient supine/long sitting:   Patellar mobs all planes, emphasis on superior glide  PROM flex/ext   STM R quad and calf     Patient receive cold pack for 5 minutes to R knee.    Patient Education and Home Exercises       Education provided:   - HEP, POC    Written Home Exercises Provided: Patient instructed to cont prior HEP. Exercises were reviewed and Tiarra was able to demonstrate them prior to the end of the session.  Tiarra demonstrated good  understanding of the education provided. See EMR under Patient Instructions for exercises provided during therapy sessions    Assessment     Tiarra presents today with reports of perceived improvement. Progressed dynamic LE strengthening with good tolerance and no adverse effects. Knee extension ROM gains noted. Continue to progress per protocol.     Tiarra Is progressing well towards her goals.   Pt prognosis is " Good.     Pt will continue to benefit from skilled outpatient physical therapy to address the deficits listed in the problem list box on initial evaluation, provide pt/family education and to maximize pt's level of independence in the home and community environment.     Pt's spiritual, cultural and educational needs considered and pt agreeable to plan of care and goals.     Anticipated barriers to physical therapy: none    GOALS: Short Term Goals:  4 weeks  1.Report decreased in pain at worse less than  <   / =  4  /10  to increase tolerance for functional mobility.On going  2. Pt to improve R knee range of motion by 25% to allow for improved functional mobility to allow for improvement in IADLs. .On going  3. Increased R LE MMT 1/2 grade to increase tolerance for ADL and work activities.On going  4. Pt to report ability to ambulate 100 ft without AD.  5. Pt to tolerate HEP to improve ROM and independence with ADL's.On going     Long Term Goals: 8 weeks  1.Report decreased in pain at worse less than  <   / =  2  /10  to increase tolerance for functional mobility. On going  2.Pt to improve range of motion by 75% to allow for improved functional mobility to allow for improvement in IADLs. On going  3.Increased R LE MMT 1 grade to increase tolerance for ADL and work activities.On going  4. Pt will report 39% on FOTO knee survey  to demonstrate increase in LE function with every day tasks. On going  5. Pt to be Independent with HEP to improve ROM and independence with ADL's. On going    Plan   Plan of care Certification: 5/23/2023 to 7/23/2023.     Continue Treatment per established POC    Zi Anaya, PT   6/21/2023

## 2023-06-22 ENCOUNTER — PATIENT MESSAGE (OUTPATIENT)
Dept: REHABILITATION | Facility: OTHER | Age: 68
End: 2023-06-22
Payer: MEDICARE

## 2023-06-26 ENCOUNTER — CLINICAL SUPPORT (OUTPATIENT)
Dept: REHABILITATION | Facility: OTHER | Age: 68
End: 2023-06-26
Payer: MEDICARE

## 2023-06-26 DIAGNOSIS — M25.661 DECREASED RANGE OF MOTION (ROM) OF RIGHT KNEE: ICD-10-CM

## 2023-06-26 DIAGNOSIS — R29.898 WEAKNESS OF RIGHT LOWER EXTREMITY: Primary | ICD-10-CM

## 2023-06-26 DIAGNOSIS — Z74.09 IMPAIRED FUNCTIONAL MOBILITY, BALANCE, GAIT, AND ENDURANCE: ICD-10-CM

## 2023-06-26 PROCEDURE — 97530 THERAPEUTIC ACTIVITIES: CPT

## 2023-06-26 PROCEDURE — 97140 MANUAL THERAPY 1/> REGIONS: CPT

## 2023-06-26 NOTE — PROGRESS NOTES
"[facility]   Physical Therapy Treatment Note      Name: Tiarra Norton  Clinic Number: [id number]    Therapy Diagnosis:   Encounter Diagnoses   Name Primary?    Weakness of right lower extremity Yes    Decreased range of motion (ROM) of right knee     Impaired functional mobility, balance, gait, and endurance        Physician: Virgil Scott MD    Visit Date: 6/26/2023    Physician Orders: PT Eval and Treat   Medical Diagnosis from Referral: Z96.651 (ICD-10-CM) - S/P right unicompartmental knee replacement  Evaluation Date: 5/23/2023  Authorization Period Expiration: 4/24/2023  Plan of Care Expiration: 8/23/2023  Visit # / Visits authorized: 11 / 20  Progress Note Due: 7/14/2023  FOTO: 2/2    PTA Visit #: 0/5     Time In: 10:00 am  Time Out: 11:00 am  Total Billable Time:  30 minutes  Total Treatment Time:  60 minutes    Subjective     Pt reports: pain continues to bother her. She is still recovering from being sick last week.     She was compliant with home exercise program.  Response to previous treatment: no adverse effects  Functional change: improved stair usage    Pain: 5/10  Location: right knee      Objective      Objective Measures updated at progress report unless specified.     Range of Motion: 6/26/2023    Knee Right active Right Passive   Flexion 117 121   Extension 0 2      TUG:   Eval: 9.01 sec  5/30/2023: 11 sec  06/05/2023:  8.67 sec /c RW,  9.41 sec /c SPC   6/14/2023: 7.93 sec     5x sit to stand:   Eval: 11.11 sec    30 sec sit to stand:  5/30/2023: 15 reps  06/05/2023: 15 reps   6/14/2023: 17 reps    Treatment     Tiarra received the treatments listed below:      therapeutic exercises to develop strength, endurance, ROM, and flexibility for 30 minutes including:    Backwards walking on treadmill x5' - emphasizing terminal knee extension    Supine  Quad set towel under heel x 10 x 5"  SAQ 3# 3x10x5" cue for controlled descent   SLR x 10 x 5", /c 2# 2 x 10 x 5   Heel " "slides x 20 x 5"  Standing Hamstring stretch 3x30"  Prone knee extension prop 3# x3'    Standing  slantboard gastroc stretch 2x30"  Heel raises x 20 2 finger support   TKE Red sports cord x 20 3"  (2x12x5")  SLB 4x20"    Knee extension 40# 2x10  Hamstring curl 50# 2x10  Step ups into SLS fwd/sideways 2x8x3" ea  Leg press SL +60# 3x10  Leg press DL +100# 3x10      NP  Recumbent bike back and forth, straight to full revolutions x5'  Quad set towel under knee x 10 x 5"  Ankle pumps 2x20"  Abd SLR 2x10x3" - NP  Add SLR 2x10x3" - NP  Step ups 8" into SLS 2x10x3"    Tiarra participated in dynamic functional therapeutic activities to improve functional performance for 15 minutes, including:    Sit to stands w/ 1 airex 2 x 10 cue for "nose over toes, push thru heel", x 10 /c LLE fwd step, x 10 RLE fwd step    Walking /c  ft cue for cane in L hand - patient demonstrate good rhythm   Cone walking step overs reciprocal,  5 cones back and forth 5x /c SPC cue on hip flexion and knee flexion, dec RLE circumduction    Stair negotiation 12 steps up and down 3x      Manual therapy techniques: Joint mobilizations and PROM were applied to the: R knee for 10 minutes, including:  Patient supine/long sitting:   Patellar mobs all planes, emphasis on superior glide  PROM flex/ext   STM R quad and calf     Patient receive cold pack for 5 minutes to R knee.    Patient Education and Home Exercises       Education provided:   - HEP, POC    Written Home Exercises Provided: Patient instructed to cont prior HEP. Exercises were reviewed and Tiarra was able to demonstrate them prior to the end of the session.  Tiarra demonstrated good  understanding of the education provided. See EMR under Patient Instructions for exercises provided during therapy sessions    Assessment     Tiarra presents today with continued complaints of pain. Improved motor control and dynamic balance noted with stair negotiation and cone step overs today. Continues to demo " limp and lack of terminal knee extension during gait. Increased resistance with unilateral and bilateral leg press with good tolerance. Continue to progress per HB protocol.     Tiarra Is progressing well towards her goals.   Pt prognosis is Good.     Pt will continue to benefit from skilled outpatient physical therapy to address the deficits listed in the problem list box on initial evaluation, provide pt/family education and to maximize pt's level of independence in the home and community environment.     Pt's spiritual, cultural and educational needs considered and pt agreeable to plan of care and goals.     Anticipated barriers to physical therapy: none    GOALS: Short Term Goals:  4 weeks  1.Report decreased in pain at worse less than  <   / =  4  /10  to increase tolerance for functional mobility.On going  2. Pt to improve R knee range of motion by 25% to allow for improved functional mobility to allow for improvement in IADLs. .On going  3. Increased R LE MMT 1/2 grade to increase tolerance for ADL and work activities.On going  4. Pt to report ability to ambulate 100 ft without AD.  5. Pt to tolerate HEP to improve ROM and independence with ADL's.On going     Long Term Goals: 8 weeks  1.Report decreased in pain at worse less than  <   / =  2  /10  to increase tolerance for functional mobility. On going  2.Pt to improve range of motion by 75% to allow for improved functional mobility to allow for improvement in IADLs. On going  3.Increased R LE MMT 1 grade to increase tolerance for ADL and work activities.On going  4. Pt will report 39% on FOTO knee survey  to demonstrate increase in LE function with every day tasks. On going  5. Pt to be Independent with HEP to improve ROM and independence with ADL's. On going    Plan   Plan of care Certification: 5/23/2023 to 7/23/2023.     Continue Treatment per established POC    Zi Anaya, PT   6/26/2023

## 2023-06-27 ENCOUNTER — CLINICAL SUPPORT (OUTPATIENT)
Dept: REHABILITATION | Facility: OTHER | Age: 68
End: 2023-06-27
Payer: MEDICARE

## 2023-06-27 DIAGNOSIS — M25.661 DECREASED RANGE OF MOTION (ROM) OF RIGHT KNEE: ICD-10-CM

## 2023-06-27 DIAGNOSIS — R29.898 WEAKNESS OF RIGHT LOWER EXTREMITY: Primary | ICD-10-CM

## 2023-06-27 DIAGNOSIS — Z74.09 IMPAIRED FUNCTIONAL MOBILITY, BALANCE, GAIT, AND ENDURANCE: ICD-10-CM

## 2023-06-27 PROCEDURE — 97140 MANUAL THERAPY 1/> REGIONS: CPT

## 2023-06-27 PROCEDURE — 97530 THERAPEUTIC ACTIVITIES: CPT

## 2023-06-27 PROCEDURE — 97110 THERAPEUTIC EXERCISES: CPT

## 2023-06-27 NOTE — PROGRESS NOTES
"[facility]   Physical Therapy Treatment Note      Name: Tiarra Norton  Clinic Number: [id number]    Therapy Diagnosis:   Encounter Diagnoses   Name Primary?    Weakness of right lower extremity Yes    Decreased range of motion (ROM) of right knee     Impaired functional mobility, balance, gait, and endurance          Physician: Virgil Scott MD    Visit Date: 6/27/2023    Physician Orders: PT Eval and Treat   Medical Diagnosis from Referral: Z96.651 (ICD-10-CM) - S/P right unicompartmental knee replacement  Evaluation Date: 5/23/2023  Authorization Period Expiration: 4/24/2023  Plan of Care Expiration: 8/23/2023  Visit # / Visits authorized: 12 / 20  Progress Note Due: 7/14/2023  FOTO: 2/2    PTA Visit #: 0/5     Time In: 2:00 pm  Time Out: 3:00 pm  Total Billable Time:  55 minutes  Total Treatment Time:  60 minutes    Subjective     Pt reports: she is walking better without a cane. Pain is still there but slowly getting better.     She was compliant with home exercise program.  Response to previous treatment: no adverse effects  Functional change: improved stair usage    Pain: 5/10  Location: right knee      Objective      Objective Measures updated at progress report unless specified.     Range of Motion: 6/27/2023    Knee Right active Right Passive   Flexion 117 121   Extension 1 3      TUG:   Eval: 9.01 sec  5/30/2023: 11 sec  06/05/2023:  8.67 sec /c RW,  9.41 sec /c SPC   6/14/2023: 7.93 sec     5x sit to stand:   Eval: 11.11 sec    30 sec sit to stand:  5/30/2023: 15 reps  06/05/2023: 15 reps   6/14/2023: 17 reps    Treatment     Tiarra received the treatments listed below:      therapeutic exercises to develop strength, endurance, ROM, and flexibility for 30 minutes including:    Backwards walking on treadmill x5' - emphasizing terminal knee extension  Quad set towel under heel x 10 x 5"  SAQ 5# 3x10x5" cue for controlled descent   SLR x 12# 2 x 10 x 5   Standing Hamstring " "stretch 3x30"  Prone knee extension prop 4# x3' -prone hamstring curls 4# 2x10  slantboard gastroc stretch 2x30"  Heel raises x 20 2 finger support   TKE Red sports cord x 20 3"  (2x12x5")  SLB 4x20"  Knee extension 40# 2x10  Hamstring curl 50# 2x10  Step ups into SLS fwd/sideways 2x8x3" ea  Leg press SL +60# 3x10  Leg press DL +100# 3x10  +eccentric fwd heel taps 4" 2x10      NP  Heel slides x 20 x 5"  Recumbent bike back and forth, straight to full revolutions x5'  Quad set towel under knee x 10 x 5"  Ankle pumps 2x20"  Abd SLR 2x10x3" - NP  Add SLR 2x10x3" - NP  Step ups 8" into SLS 2x10x3"    Tiarra participated in dynamic functional therapeutic activities to improve functional performance for 15 minutes, including:    Sit to stands w/ 1 airex 2 x 10 cue for "nose over toes, push thru heel", x 10 /c LLE fwd step, x 10 RLE fwd step    Walking /c  ft cue for cane in L hand - patient demonstrate good rhythm   Cone walking step overs reciprocal,  5 cones back and forth 5x /c SPC cue on hip flexion and knee flexion, dec RLE circumduction    Stair negotiation 12 steps up and down 3x      Manual therapy techniques: Joint mobilizations and PROM were applied to the: R knee for 10 minutes, including:  Patient supine/long sitting:   Patellar mobs all planes, emphasis on superior glide  PROM flex/ext   STM R quad and calf     Patient receive cold pack for 5 minutes to R knee.    Patient Education and Home Exercises       Education provided:   - HEP, POC    Written Home Exercises Provided: Patient instructed to cont prior HEP. Exercises were reviewed and Tiarra was able to demonstrate them prior to the end of the session.  Tiarra demonstrated good  understanding of the education provided. See EMR under Patient Instructions for exercises provided during therapy sessions    Assessment     Tiarra presents today with reports of improved pain levels. She demonstrates improved gait mechanics with less deviations and improved " tolerance to full weight bearing on R LE, this was also seen with improved tolerance to single leg balance without UE support. Initiated eccentric heel taps to improved patients ability to descend stairs. Continue to progress per protocol.     Tiarra Is progressing well towards her goals.   Pt prognosis is Good.     Pt will continue to benefit from skilled outpatient physical therapy to address the deficits listed in the problem list box on initial evaluation, provide pt/family education and to maximize pt's level of independence in the home and community environment.     Pt's spiritual, cultural and educational needs considered and pt agreeable to plan of care and goals.     Anticipated barriers to physical therapy: none    GOALS: Short Term Goals:  4 weeks  1.Report decreased in pain at worse less than  <   / =  4  /10  to increase tolerance for functional mobility.On going  2. Pt to improve R knee range of motion by 25% to allow for improved functional mobility to allow for improvement in IADLs. .On going  3. Increased R LE MMT 1/2 grade to increase tolerance for ADL and work activities.On going  4. Pt to report ability to ambulate 100 ft without AD.  5. Pt to tolerate HEP to improve ROM and independence with ADL's.On going     Long Term Goals: 8 weeks  1.Report decreased in pain at worse less than  <   / =  2  /10  to increase tolerance for functional mobility. On going  2.Pt to improve range of motion by 75% to allow for improved functional mobility to allow for improvement in IADLs. On going  3.Increased R LE MMT 1 grade to increase tolerance for ADL and work activities.On going  4. Pt will report 39% on FOTO knee survey  to demonstrate increase in LE function with every day tasks. On going  5. Pt to be Independent with HEP to improve ROM and independence with ADL's. On going    Plan   Plan of care Certification: 5/23/2023 to 7/23/2023.     Continue Treatment per established POC    Zi Anaya, PT    6/27/2023

## 2023-06-28 ENCOUNTER — PATIENT MESSAGE (OUTPATIENT)
Dept: INFECTIOUS DISEASES | Facility: CLINIC | Age: 68
End: 2023-06-28
Payer: MEDICARE

## 2023-06-28 ENCOUNTER — PATIENT MESSAGE (OUTPATIENT)
Dept: ORTHOPEDICS | Facility: CLINIC | Age: 68
End: 2023-06-28
Payer: MEDICARE

## 2023-06-28 ENCOUNTER — PATIENT MESSAGE (OUTPATIENT)
Dept: REHABILITATION | Facility: OTHER | Age: 68
End: 2023-06-28
Payer: MEDICARE

## 2023-06-28 RX ORDER — OXYCODONE HYDROCHLORIDE 5 MG/1
5-10 TABLET ORAL EVERY 6 HOURS PRN
Qty: 40 TABLET | Refills: 0 | Status: SHIPPED | OUTPATIENT
Start: 2023-06-28 | End: 2023-07-05 | Stop reason: SDUPTHER

## 2023-06-29 ENCOUNTER — CLINICAL SUPPORT (OUTPATIENT)
Dept: REHABILITATION | Facility: OTHER | Age: 68
End: 2023-06-29
Payer: MEDICARE

## 2023-06-29 DIAGNOSIS — M25.661 DECREASED RANGE OF MOTION (ROM) OF RIGHT KNEE: ICD-10-CM

## 2023-06-29 DIAGNOSIS — R29.898 WEAKNESS OF RIGHT LOWER EXTREMITY: Primary | ICD-10-CM

## 2023-06-29 DIAGNOSIS — Z74.09 IMPAIRED FUNCTIONAL MOBILITY, BALANCE, GAIT, AND ENDURANCE: ICD-10-CM

## 2023-06-29 PROCEDURE — 97530 THERAPEUTIC ACTIVITIES: CPT

## 2023-06-29 PROCEDURE — 97110 THERAPEUTIC EXERCISES: CPT

## 2023-06-29 PROCEDURE — 97140 MANUAL THERAPY 1/> REGIONS: CPT

## 2023-06-29 NOTE — PROGRESS NOTES
"[facility]   Physical Therapy Treatment Note      Name: Tiarra Norton  Clinic Number: [id number]    Therapy Diagnosis:   Encounter Diagnoses   Name Primary?    Weakness of right lower extremity Yes    Decreased range of motion (ROM) of right knee     Impaired functional mobility, balance, gait, and endurance      Physician: Virgil Scott MD    Visit Date: 6/29/2023    Physician Orders: PT Eval and Treat   Medical Diagnosis from Referral: Z96.651 (ICD-10-CM) - S/P right unicompartmental knee replacement  Evaluation Date: 5/23/2023  Authorization Period Expiration: 4/24/2023  Plan of Care Expiration: 8/23/2023  Visit # / Visits authorized: 13 / 20  Progress Note Due: 7/14/2023  FOTO: 2/2    PTA Visit #: 0/5     Time In: 2:00 pm  Time Out: 3:00 pm  Total Billable Time:  55 minutes  Total Treatment Time:  60 minutes    Subjective     Pt reports: her knee buckled on her when she was bending over yesterday. It hurt when it happened, but didn't really hurt more after a few minutes.    She was compliant with home exercise program.  Response to previous treatment: no adverse effects  Functional change: improved stair usage    Pain: 5/10  Location: right knee      Objective      Objective Measures updated at progress report unless specified.     Range of Motion: 6/27/2023    Knee Right active Right Passive   Flexion 117 121   Extension 1 3      TUG:   Eval: 9.01 sec  5/30/2023: 11 sec  06/05/2023:  8.67 sec /c RW,  9.41 sec /c SPC   6/14/2023: 7.93 sec     5x sit to stand:   Eval: 11.11 sec    30 sec sit to stand:  5/30/2023: 15 reps  06/05/2023: 15 reps   6/14/2023: 17 reps    Treatment     Tiarra received the treatments listed below:      therapeutic exercises to develop strength, endurance, ROM, and flexibility for 30 minutes including:    Backwards walking on treadmill x5' - emphasizing terminal knee extension  Quad set towel under heel x 10 x 5"  SAQ 5# 3x10x5" cue for controlled descent " "  SLR x 12# 2 x 10 x 5   Standing Hamstring stretch 2x30"  Prone knee extension prop 4# x3' -prone hamstring curls 4# 2x10  slantboard gastroc stretch 2x30"  Heel raises x 20 2 finger support   TKE Red sports cord   (2x12x5")  SLB 4x20"  Knee extension +44# 2x10  Hamstring curl +54# 2x10  Leg press SL +64# 3x10  Leg press DL +110# 3x10  eccentric fwd heel taps 4" 2x10      NP  Heel slides x 20 x 5"  Recumbent bike back and forth, straight to full revolutions x5'  Quad set towel under knee x 10 x 5"  Ankle pumps 2x20"  Abd SLR 2x10x3" - NP  Add SLR 2x10x3" - NP      Tiarra participated in dynamic functional therapeutic activities to improve functional performance for 15 minutes, including:    Sit to stands w/ 1 airex 2 x 10 cue for "nose over toes, push thru heel", x 10 /c LLE fwd step, x 10 RLE fwd step    Walking /c  ft cue for cane in L hand - patient demonstrate good rhythm   Cone walking step overs reciprocal,  5 cones back and forth 5x /c SPC cue on hip flexion and knee flexion, dec RLE circumduction    Stair negotiation 12 steps up and down 3x  Step ups 8" into SLS 2x10x3" fwd and      Manual therapy techniques: Joint mobilizations and PROM were applied to the: R knee for 10 minutes, including:  Patient supine/long sitting:   Patellar mobs all planes, emphasis on superior glide  PROM flex/ext   STM R quad and calf     Patient receive cold pack for 5 minutes to R knee.    Patient Education and Home Exercises       Education provided:   - HEP, POC    Written Home Exercises Provided: Patient instructed to cont prior HEP. Exercises were reviewed and Tiarra was able to demonstrate them prior to the end of the session.  Tiarra demonstrated good  understanding of the education provided. See EMR under Patient Instructions for exercises provided during therapy sessions    Assessment     Tiarra presents today with reports of increased pain after knee buckled on her yesterday. Examination of knee showed no signs of " hardware failure or injury. Pt was able to tolerate all previously completed exercises without adverse effects or required modifications. Pt reporting she plans to complete exercises in pool this week, discussed aquatic PT exercise program. Continue to progress as tolerated.     Tiarra Is progressing well towards her goals.   Pt prognosis is Good.     Pt will continue to benefit from skilled outpatient physical therapy to address the deficits listed in the problem list box on initial evaluation, provide pt/family education and to maximize pt's level of independence in the home and community environment.     Pt's spiritual, cultural and educational needs considered and pt agreeable to plan of care and goals.     Anticipated barriers to physical therapy: none    GOALS: Short Term Goals:  4 weeks  1.Report decreased in pain at worse less than  <   / =  4  /10  to increase tolerance for functional mobility.On going  2. Pt to improve R knee range of motion by 25% to allow for improved functional mobility to allow for improvement in IADLs. .On going  3. Increased R LE MMT 1/2 grade to increase tolerance for ADL and work activities.On going  4. Pt to report ability to ambulate 100 ft without AD.  5. Pt to tolerate HEP to improve ROM and independence with ADL's.On going     Long Term Goals: 8 weeks  1.Report decreased in pain at worse less than  <   / =  2  /10  to increase tolerance for functional mobility. On going  2.Pt to improve range of motion by 75% to allow for improved functional mobility to allow for improvement in IADLs. On going  3.Increased R LE MMT 1 grade to increase tolerance for ADL and work activities.On going  4. Pt will report 39% on FOTO knee survey  to demonstrate increase in LE function with every day tasks. On going  5. Pt to be Independent with HEP to improve ROM and independence with ADL's. On going    Plan   Plan of care Certification: 5/23/2023 to 7/23/2023.     Continue Treatment per  established POC    Zi Anaya, PT   6/29/2023

## 2023-07-05 ENCOUNTER — PATIENT MESSAGE (OUTPATIENT)
Dept: ORTHOPEDICS | Facility: CLINIC | Age: 68
End: 2023-07-05
Payer: MEDICARE

## 2023-07-05 ENCOUNTER — CLINICAL SUPPORT (OUTPATIENT)
Dept: REHABILITATION | Facility: OTHER | Age: 68
End: 2023-07-05
Payer: MEDICARE

## 2023-07-05 DIAGNOSIS — Z74.09 IMPAIRED FUNCTIONAL MOBILITY, BALANCE, GAIT, AND ENDURANCE: ICD-10-CM

## 2023-07-05 DIAGNOSIS — M25.661 DECREASED RANGE OF MOTION (ROM) OF RIGHT KNEE: ICD-10-CM

## 2023-07-05 DIAGNOSIS — R29.898 WEAKNESS OF RIGHT LOWER EXTREMITY: Primary | ICD-10-CM

## 2023-07-05 PROCEDURE — 97140 MANUAL THERAPY 1/> REGIONS: CPT

## 2023-07-05 PROCEDURE — 97110 THERAPEUTIC EXERCISES: CPT

## 2023-07-05 RX ORDER — OXYCODONE HYDROCHLORIDE 5 MG/1
5-10 TABLET ORAL EVERY 6 HOURS PRN
Qty: 40 TABLET | Refills: 0 | Status: SHIPPED | OUTPATIENT
Start: 2023-07-05 | End: 2023-07-10

## 2023-07-05 RX ORDER — DOCUSATE SODIUM 100 MG/1
100 CAPSULE, LIQUID FILLED ORAL 2 TIMES DAILY
Qty: 30 CAPSULE | Refills: 0 | Status: SHIPPED | OUTPATIENT
Start: 2023-07-05 | End: 2023-09-12

## 2023-07-05 NOTE — PROGRESS NOTES
"[facility]   Physical Therapy Treatment Note      Name: Tiarra Norton  Clinic Number: [id number]    Therapy Diagnosis:   Encounter Diagnoses   Name Primary?    Weakness of right lower extremity Yes    Decreased range of motion (ROM) of right knee     Impaired functional mobility, balance, gait, and endurance        Physician: Virgil Scott MD    Visit Date: 7/5/2023    Physician Orders: PT Eval and Treat   Medical Diagnosis from Referral: Z96.651 (ICD-10-CM) - S/P right unicompartmental knee replacement  Evaluation Date: 5/23/2023  Authorization Period Expiration: 4/24/2023  Plan of Care Expiration: 8/23/2023  Visit # / Visits authorized: 13 / 20  Progress Note Due: 8/5/2023  FOTO: 2/2    PTA Visit #: 0/5     Time In: 8:45 am  Time Out: 9:45 am  Total Billable Time:  25 minutes  Total Treatment Time:  60 minutes    Subjective     Pt reports: her knee is still sore from when it gave out on her the other day. She was able to get in the pool this weekend and do some exercises    She was compliant with home exercise program.  Response to previous treatment: no adverse effects  Functional change: improved stair usage    Pain: 5/10  Location: right knee      Objective      Objective Measures updated at progress report unless specified.     Range of Motion: 7/5/2023    Knee Right active Right Passive   Flexion 117 121   Extension 1 3      TUG:   Eval: 9.01 sec  5/30/2023: 11 sec  06/05/2023:  8.67 sec /c RW,  9.41 sec /c SPC   6/14/2023: 7.93 sec     5x sit to stand:   Eval: 11.11 sec    30 sec sit to stand:  5/30/2023: 15 reps  06/05/2023: 15 reps   6/14/2023: 17 reps    Treatment     Tiarra received the treatments listed below:      therapeutic exercises to develop strength, endurance, ROM, and flexibility for 30 minutes including:    Backwards walking on treadmill x5' - emphasizing terminal knee extension  Quad set towel under heel x 10 x 5"  SAQ 5# 3x10x5" cue for controlled descent " "  SLR x 12# 2 x 10 x 5   Standing Hamstring stretch 2x30"  Prone knee extension prop 4# x3' -prone hamstring curls 4# 2x10  slantboard gastroc stretch 2x30"  Heel raises x 20 2 finger support   TKE Red sports cord   (2x12x5")  SLB 4x20"  Knee extension +44# 2x10  Hamstring curl +54# 2x10  Leg press SL +64# 3x10  Leg press DL +110# 3x10  eccentric fwd heel taps 4" 2x10      NP  Heel slides x 20 x 5"  Recumbent bike back and forth, straight to full revolutions x5'  Quad set towel under knee x 10 x 5"  Ankle pumps 2x20"  Abd SLR 2x10x3" - NP  Add SLR 2x10x3" - NP      Tiarra participated in dynamic functional therapeutic activities to improve functional performance for 15 minutes, including:    Sit to stands w/ 1 airex 2 x 10 cue for "nose over toes, push thru heel", x 10 /c LLE fwd step, x 10 RLE fwd step    Walking /c  ft cue for cane in L hand - patient demonstrate good rhythm   Cone walking step overs reciprocal,  5 cones back and forth 5x /c SPC cue on hip flexion and knee flexion, dec RLE circumduction    Stair negotiation 12 steps up and down 3x  Step ups 8" into SLS 2x10x3" fwd and      Manual therapy techniques: Joint mobilizations and PROM were applied to the: R knee for 10 minutes, including:  Patient supine/long sitting:   Patellar mobs all planes, emphasis on superior glide  PROM flex/ext   STM R quad and calf     Patient receive cold pack for 5 minutes to R knee.    Patient Education and Home Exercises       Education provided:   - HEP, POC    Written Home Exercises Provided: Patient instructed to cont prior HEP. Exercises were reviewed and Tiarra was able to demonstrate them prior to the end of the session.  Tiarra demonstrated good  understanding of the education provided. See EMR under Patient Instructions for exercises provided during therapy sessions    Assessment     Tirara presents today with continued complaints of knee pain. Reassessed ROM with no change from last measurement last visit. " Continues to need cues for terminal knee extension during during exercises. Pt requesting to stop treatment session early due to acute onset of nausea, pt was laid down in a treatment room with ice on knee until symptoms subsided and she was able to walk out comfortably, assess at next follow up.     Tiarra Is progressing well towards her goals.   Pt prognosis is Good.     Pt will continue to benefit from skilled outpatient physical therapy to address the deficits listed in the problem list box on initial evaluation, provide pt/family education and to maximize pt's level of independence in the home and community environment.     Pt's spiritual, cultural and educational needs considered and pt agreeable to plan of care and goals.     Anticipated barriers to physical therapy: none    GOALS: Short Term Goals:  4 weeks  1.Report decreased in pain at worse less than  <   / =  4  /10  to increase tolerance for functional mobility.On going  2. Pt to improve R knee range of motion by 25% to allow for improved functional mobility to allow for improvement in IADLs. .On going  3. Increased R LE MMT 1/2 grade to increase tolerance for ADL and work activities.On going  4. Pt to report ability to ambulate 100 ft without AD.  5. Pt to tolerate HEP to improve ROM and independence with ADL's.On going     Long Term Goals: 8 weeks  1.Report decreased in pain at worse less than  <   / =  2  /10  to increase tolerance for functional mobility. On going  2.Pt to improve range of motion by 75% to allow for improved functional mobility to allow for improvement in IADLs. On going  3.Increased R LE MMT 1 grade to increase tolerance for ADL and work activities.On going  4. Pt will report 39% on FOTO knee survey  to demonstrate increase in LE function with every day tasks. On going  5. Pt to be Independent with HEP to improve ROM and independence with ADL's. On going    Plan   Plan of care Certification: 5/23/2023 to 7/23/2023.     Continue  Treatment per established POC    Zi Anaya, PT   7/5/2023

## 2023-07-07 ENCOUNTER — PATIENT MESSAGE (OUTPATIENT)
Dept: REHABILITATION | Facility: OTHER | Age: 68
End: 2023-07-07

## 2023-07-07 ENCOUNTER — PATIENT MESSAGE (OUTPATIENT)
Dept: RESEARCH | Facility: HOSPITAL | Age: 68
End: 2023-07-07
Payer: MEDICARE

## 2023-07-10 ENCOUNTER — PATIENT MESSAGE (OUTPATIENT)
Dept: REHABILITATION | Facility: OTHER | Age: 68
End: 2023-07-10
Payer: MEDICARE

## 2023-07-10 ENCOUNTER — PATIENT MESSAGE (OUTPATIENT)
Dept: ORTHOPEDICS | Facility: CLINIC | Age: 68
End: 2023-07-10
Payer: MEDICARE

## 2023-07-10 RX ORDER — HYDROCODONE BITARTRATE AND ACETAMINOPHEN 5; 325 MG/1; MG/1
1 TABLET ORAL EVERY 6 HOURS PRN
Qty: 30 TABLET | Refills: 0 | Status: SHIPPED | OUTPATIENT
Start: 2023-07-10 | End: 2023-07-27

## 2023-07-13 ENCOUNTER — PATIENT MESSAGE (OUTPATIENT)
Dept: REHABILITATION | Facility: OTHER | Age: 68
End: 2023-07-13
Payer: MEDICARE

## 2023-07-20 ENCOUNTER — CLINICAL SUPPORT (OUTPATIENT)
Dept: REHABILITATION | Facility: OTHER | Age: 68
End: 2023-07-20
Payer: MEDICARE

## 2023-07-20 ENCOUNTER — PATIENT MESSAGE (OUTPATIENT)
Dept: ORTHOPEDICS | Facility: CLINIC | Age: 68
End: 2023-07-20
Payer: MEDICARE

## 2023-07-20 DIAGNOSIS — R29.898 WEAKNESS OF RIGHT LOWER EXTREMITY: Primary | ICD-10-CM

## 2023-07-20 DIAGNOSIS — Z74.09 IMPAIRED FUNCTIONAL MOBILITY, BALANCE, GAIT, AND ENDURANCE: ICD-10-CM

## 2023-07-20 DIAGNOSIS — M25.661 DECREASED RANGE OF MOTION (ROM) OF RIGHT KNEE: ICD-10-CM

## 2023-07-20 PROCEDURE — 97530 THERAPEUTIC ACTIVITIES: CPT

## 2023-07-20 PROCEDURE — 97140 MANUAL THERAPY 1/> REGIONS: CPT

## 2023-07-20 PROCEDURE — 97110 THERAPEUTIC EXERCISES: CPT

## 2023-07-20 NOTE — PROGRESS NOTES
"[facility]   Physical Therapy Treatment Note      Name: Tiarra Norton  Clinic Number: [id number]    Therapy Diagnosis:   Encounter Diagnoses   Name Primary?    Weakness of right lower extremity Yes    Decreased range of motion (ROM) of right knee     Impaired functional mobility, balance, gait, and endurance        Physician: Virgil Scott MD    Visit Date: 7/20/2023    Physician Orders: PT Eval and Treat   Medical Diagnosis from Referral: Z96.651 (ICD-10-CM) - S/P right unicompartmental knee replacement  Evaluation Date: 5/23/2023  Authorization Period Expiration: 4/24/2023  Plan of Care Expiration: 8/23/2023  Visit # / Visits authorized: 13 / 20  Progress Note Due: 8/5/2023  FOTO: 2/2    PTA Visit #: 0/5     Time In: 12:00 pm  Time Out: 1:00 pm  Total Billable Time:  55 minutes  Total Treatment Time:  60 minutes    Subjective     Pt reports: she has not been able to come to therapy for 2 weeks because she has been sick. Her knee has been hurting a little more as a result    She was compliant with home exercise program.  Response to previous treatment: no adverse effects  Functional change: improved stair usage    Pain: 5/10  Location: right knee      Objective      Objective Measures updated at progress report unless specified.     Range of Motion: 7/5/2023    Knee Right active Right Passive   Flexion 117 121   Extension 1 3      TUG:   Eval: 9.01 sec  5/30/2023: 11 sec  06/05/2023:  8.67 sec /c RW,  9.41 sec /c SPC   6/14/2023: 7.93 sec     5x sit to stand:   Eval: 11.11 sec    30 sec sit to stand:  5/30/2023: 15 reps  06/05/2023: 15 reps   6/14/2023: 17 reps    Treatment     Tiarra received the treatments listed below:      therapeutic exercises to develop strength, endurance, ROM, and flexibility for 30 minutes including:    Backwards walking on treadmill x5' - emphasizing terminal knee extension  Quad set towel under heel x 10 x 5"  SAQ 5# 3x10x5" cue for controlled descent " "  SLR x 12# 2 x 10 x 5   Standing Hamstring stretch 2x30"  Prone knee extension prop 4# x3' -prone hamstring curls 4# 2x10  slantboard gastroc stretch 2x30"  Heel raises x 20 2 finger support   TKE Red sports cord   (2x12x5")  SLB 4x20"  Knee extension +44# 2x10  Hamstring curl +54# 2x10  Leg press SL +64# 3x10  Leg press DL +110# 3x10  eccentric fwd heel taps 4" 2x10      NP  Heel slides x 20 x 5"  Recumbent bike back and forth, straight to full revolutions x5'  Quad set towel under knee x 10 x 5"  Ankle pumps 2x20"  Abd SLR 2x10x3" - NP  Add SLR 2x10x3" - NP      Tiarra participated in dynamic functional therapeutic activities to improve functional performance for 15 minutes, including:    Sit to stands w/ 1 airex 2 x 10 cue for "nose over toes, push thru heel", x 10 /c LLE fwd step, x 10 RLE fwd step    Walking /c  ft cue for cane in L hand - patient demonstrate good rhythm   Cone walking step overs reciprocal,  5 cones back and forth 5x /c SPC cue on hip flexion and knee flexion, dec RLE circumduction    Stair negotiation 12 steps up and down 3x  Step ups 8" into SLS 2x10x3" fwd and sideways      Manual therapy techniques: Joint mobilizations and PROM were applied to the: R knee for 10 minutes, including:  Patient supine/long sitting:   Patellar mobs all planes, emphasis on superior glide  PROM flex/ext   STM R quad and calf     Patient receive cold pack for 5 minutes to R knee.    Patient Education and Home Exercises       Education provided:   - HEP, POC    Written Home Exercises Provided: Patient instructed to cont prior HEP. Exercises were reviewed and Tiarra was able to demonstrate them prior to the end of the session.  Tiarra demonstrated good  understanding of the education provided. See EMR under Patient Instructions for exercises provided during therapy sessions    Assessment     Tiarra presents today for her first follow-up in  2 weeks after being unable to come to therapy due to illness. Resumed " knee strengthening and ROM exercises with good tolerance and no adverse effects. Pt lacking terminal knee extension during gait, and strengthening activities, but able to achieve 0 deg ext while propped on mat. Continue to progress as tolerated.     Tiarra Is progressing well towards her goals.   Pt prognosis is Good.     Pt will continue to benefit from skilled outpatient physical therapy to address the deficits listed in the problem list box on initial evaluation, provide pt/family education and to maximize pt's level of independence in the home and community environment.     Pt's spiritual, cultural and educational needs considered and pt agreeable to plan of care and goals.     Anticipated barriers to physical therapy: none    GOALS: Short Term Goals:  4 weeks  1.Report decreased in pain at worse less than  <   / =  4  /10  to increase tolerance for functional mobility.On going  2. Pt to improve R knee range of motion by 25% to allow for improved functional mobility to allow for improvement in IADLs. .On going  3. Increased R LE MMT 1/2 grade to increase tolerance for ADL and work activities.On going  4. Pt to report ability to ambulate 100 ft without AD.  5. Pt to tolerate HEP to improve ROM and independence with ADL's.On going     Long Term Goals: 8 weeks  1.Report decreased in pain at worse less than  <   / =  2  /10  to increase tolerance for functional mobility. On going  2.Pt to improve range of motion by 75% to allow for improved functional mobility to allow for improvement in IADLs. On going  3.Increased R LE MMT 1 grade to increase tolerance for ADL and work activities.On going  4. Pt will report 39% on FOTO knee survey  to demonstrate increase in LE function with every day tasks. On going  5. Pt to be Independent with HEP to improve ROM and independence with ADL's. On going    Plan   Plan of care Certification: 5/23/2023 to 8/23/2023.     Continue Treatment per established POC    Zi Anaya, PT    7/20/2023

## 2023-07-21 ENCOUNTER — OFFICE VISIT (OUTPATIENT)
Dept: DERMATOLOGY | Facility: CLINIC | Age: 68
End: 2023-07-21
Payer: MEDICARE

## 2023-07-21 VITALS — BODY MASS INDEX: 24.62 KG/M2 | WEIGHT: 139 LBS

## 2023-07-21 DIAGNOSIS — L82.1 SEBORRHEIC KERATOSES: Primary | ICD-10-CM

## 2023-07-21 DIAGNOSIS — Z12.83 SKIN EXAM, SCREENING FOR CANCER: ICD-10-CM

## 2023-07-21 DIAGNOSIS — Z87.2 HISTORY OF ACTINIC KERATOSES: ICD-10-CM

## 2023-07-21 DIAGNOSIS — L81.4 LENTIGINES: ICD-10-CM

## 2023-07-21 PROCEDURE — 99213 PR OFFICE/OUTPT VISIT, EST, LEVL III, 20-29 MIN: ICD-10-PCS | Mod: S$PBB,25,, | Performed by: DERMATOLOGY

## 2023-07-21 PROCEDURE — 17110 DESTRUCTION B9 LES UP TO 14: CPT | Mod: S$PBB,,, | Performed by: DERMATOLOGY

## 2023-07-21 PROCEDURE — 99999 PR PBB SHADOW E&M-EST. PATIENT-LVL III: CPT | Mod: PBBFAC,,, | Performed by: DERMATOLOGY

## 2023-07-21 PROCEDURE — 99213 OFFICE O/P EST LOW 20 MIN: CPT | Mod: S$PBB,25,, | Performed by: DERMATOLOGY

## 2023-07-21 PROCEDURE — 17110 DESTRUCTION B9 LES UP TO 14: CPT | Mod: PBBFAC,PO | Performed by: DERMATOLOGY

## 2023-07-21 PROCEDURE — 99213 OFFICE O/P EST LOW 20 MIN: CPT | Mod: PBBFAC,25,PO | Performed by: DERMATOLOGY

## 2023-07-21 PROCEDURE — 99999 PR PBB SHADOW E&M-EST. PATIENT-LVL III: ICD-10-PCS | Mod: PBBFAC,,, | Performed by: DERMATOLOGY

## 2023-07-21 PROCEDURE — 17110 PR DESTRUCTION BENIGN LESIONS UP TO 14: ICD-10-PCS | Mod: S$PBB,,, | Performed by: DERMATOLOGY

## 2023-07-21 NOTE — PROGRESS NOTES
Subjective:      Patient ID:  Tiarra Norton is a 68 y.o. female who presents for   Chief Complaint   Patient presents with    Lesion     face    Skin Check     UBSe     Would like skin check, new spots on chest and right eyebrow do not bleed.     Lesion - Initial  Affected locations: face, left arm, right arm, chest, torso, back and abdomen  Signs / symptoms: asymptomatic    Review of Systems   Constitutional:  Negative for fever, chills, weight loss, weight gain, fatigue, night sweats and malaise.   Skin:  Positive for daily sunscreen use, activity-related sunscreen use and wears hat.   Hematologic/Lymphatic: Bruises/bleeds easily (bruises easily).     Objective:   Physical Exam   Constitutional: She appears well-developed and well-nourished. No distress.   Neurological: She is alert and oriented to person, place, and time. She is not disoriented.   Psychiatric: She has a normal mood and affect.   Skin:   Areas Examined (abnormalities noted in diagram):   Head / Face Inspection Performed  Neck Inspection Performed  Chest / Axilla Inspection Performed  Back Inspection Performed  RUE Inspected  LUE Inspection Performed               Diagram Legend     Erythematous scaling macule/papule c/w actinic keratosis       Vascular papule c/w angioma      Pigmented verrucoid papule/plaque c/w seborrheic keratosis      Yellow umbilicated papule c/w sebaceous hyperplasia      Irregularly shaped tan macule c/w lentigo     1-2 mm smooth white papules consistent with Milia      Movable subcutaneous cyst with punctum c/w epidermal inclusion cyst      Subcutaneous movable cyst c/w pilar cyst      Firm pink to brown papule c/w dermatofibroma      Pedunculated fleshy papule(s) c/w skin tag(s)      Evenly pigmented macule c/w junctional nevus     Mildly variegated pigmented, slightly irregular-bordered macule c/w mildly atypical nevus      Flesh colored to evenly pigmented papule c/w intradermal nevus       Pink pearly  "papule/plaque c/w basal cell carcinoma      Erythematous hyperkeratotic cursted plaque c/w SCC      Surgical scar with no sign of skin cancer recurrence      Open and closed comedones      Inflammatory papules and pustules      Verrucoid papule consistent consistent with wart     Erythematous eczematous patches and plaques     Dystrophic onycholytic nail with subungual debris c/w onychomycosis     Umbilicated papule    Erythematous-base heme-crusted tan verrucoid plaque consistent with inflamed seborrheic keratosis     Erythematous Silvery Scaling Plaque c/w Psoriasis     See annotation      Assessment / Plan:        Seborrheic keratoses  Brochure provided    Cryosurgery procedure note:    Verbal consent from the patient is obtained including, but not limited to, risk of hypopigmentation/hyperpigmentation, scar, recurrence of lesion. Liquid nitrogen cryosurgery is applied to 2 lesions  upper chest and right eyebrow to produce a freeze injury.       History of actinic keratoses    Lentigines  The "ABCD" rules to observe pigmented lesions were reviewed.    Can use laroche posay vit c for arms hs, sunscreen am  Lotion with retinol face hs, sunscreen am      Skin exam, screening for cancer  No lesions suspicious for malignancy noted.    Recommend daily sun protection/avoidance and use of at least SPF 30, broad spectrum sunscreen (OTC drug).                Follow up in about 1 year (around 7/21/2024).  "

## 2023-07-23 ENCOUNTER — OFFICE VISIT (OUTPATIENT)
Dept: URGENT CARE | Facility: CLINIC | Age: 68
End: 2023-07-23
Payer: MEDICARE

## 2023-07-23 VITALS
SYSTOLIC BLOOD PRESSURE: 102 MMHG | BODY MASS INDEX: 24.63 KG/M2 | WEIGHT: 139 LBS | TEMPERATURE: 98 F | HEART RATE: 67 BPM | DIASTOLIC BLOOD PRESSURE: 63 MMHG | OXYGEN SATURATION: 96 % | HEIGHT: 63 IN | RESPIRATION RATE: 16 BRPM

## 2023-07-23 DIAGNOSIS — J06.9 ACUTE URI: ICD-10-CM

## 2023-07-23 DIAGNOSIS — R05.9 COUGH, UNSPECIFIED TYPE: ICD-10-CM

## 2023-07-23 DIAGNOSIS — R09.82 PND (POST-NASAL DRIP): ICD-10-CM

## 2023-07-23 DIAGNOSIS — J02.9 SORE THROAT: Primary | ICD-10-CM

## 2023-07-23 DIAGNOSIS — J01.90 ACUTE NON-RECURRENT SINUSITIS, UNSPECIFIED LOCATION: ICD-10-CM

## 2023-07-23 LAB
CTP QC/QA: YES
MOLECULAR STREP A: NEGATIVE

## 2023-07-23 PROCEDURE — 99214 PR OFFICE/OUTPT VISIT, EST, LEVL IV, 30-39 MIN: ICD-10-PCS | Mod: S$GLB,,, | Performed by: FAMILY MEDICINE

## 2023-07-23 PROCEDURE — 87651 POCT STREP A MOLECULAR: ICD-10-PCS | Mod: QW,S$GLB,, | Performed by: FAMILY MEDICINE

## 2023-07-23 PROCEDURE — 99214 OFFICE O/P EST MOD 30 MIN: CPT | Mod: S$GLB,,, | Performed by: FAMILY MEDICINE

## 2023-07-23 PROCEDURE — 87651 STREP A DNA AMP PROBE: CPT | Mod: QW,S$GLB,, | Performed by: FAMILY MEDICINE

## 2023-07-23 RX ORDER — ALPRAZOLAM 1 MG/1
1 TABLET ORAL 3 TIMES DAILY
COMMUNITY
Start: 2023-07-17

## 2023-07-23 NOTE — PROGRESS NOTES
"Subjective:      Patient ID: Tiarra Norton is a 68 y.o. female.    Vitals:  height is 5' 3" (1.6 m) and weight is 63 kg (139 lb). Her oral temperature is 98.4 °F (36.9 °C). Her blood pressure is 102/63 and her pulse is 67. Her respiration is 16 and oxygen saturation is 96%.     Chief Complaint: Sore Throat    Patient presents with c/o cough, sore throat, running nose, sinus congestion, postnasal drip, sinus and chest congestion for 1 week. Pt denies fever, chills, CP, SOB, weakness/dizziness, N/V, diarrhea, abdominal pain, dysuria, loss of smell or taste.    Patient reports negative Covid testing at home on Monday.    Sore Throat   This is a new problem. The current episode started 1 to 4 weeks ago (1 week). The problem has been gradually worsening. Neither side of throat is experiencing more pain than the other. There has been no fever. The pain is at a severity of 8/10. Associated symptoms include congestion (off and on), coughing (dry), headaches (off and on) and trouble swallowing. Associated symptoms comments: Low grade temp last week  Body aches. She has had no exposure to strep or mono. She has tried oral narcotic analgesics (OTC Ny Quil;Zicam) for the symptoms. The treatment provided no relief.     HENT:  Positive for congestion (off and on), sore throat and trouble swallowing.    Respiratory:  Positive for cough (dry).    Neurological:  Positive for headaches (off and on).    Objective:     Physical Exam   Constitutional: She is oriented to person, place, and time. She appears well-developed. She is cooperative.  Non-toxic appearance. She does not appear ill. No distress.   HENT:   Head: Normocephalic and atraumatic.   Ears:   Right Ear: Hearing, tympanic membrane, external ear and ear canal normal. impacted cerumen  Left Ear: Hearing, tympanic membrane, external ear and ear canal normal. impacted cerumen  Nose: Congestion present. No mucosal edema, rhinorrhea or nasal deformity. No epistaxis. Right " sinus exhibits no maxillary sinus tenderness and no frontal sinus tenderness. Left sinus exhibits no maxillary sinus tenderness and no frontal sinus tenderness.   Mouth/Throat: Uvula is midline, oropharynx is clear and moist and mucous membranes are normal. No trismus in the jaw. Normal dentition. No uvula swelling. No oropharyngeal exudate or posterior oropharyngeal edema.      Comments: +ve pharyngeal erythema w/o tonsillar swelling or exudates.        Eyes: Conjunctivae and lids are normal. No scleral icterus.   Neck: Trachea normal and phonation normal. Neck supple. No edema present. No erythema present. No neck rigidity present.   Cardiovascular: Normal rate, regular rhythm, normal heart sounds and normal pulses.   No murmur heard.  Pulmonary/Chest: Effort normal and breath sounds normal. No stridor. No respiratory distress. She has no decreased breath sounds. She has no wheezes. She has no rhonchi. She has no rales.   Abdominal: Normal appearance. She exhibits no distension. There is no abdominal tenderness. There is no left CVA tenderness and no right CVA tenderness.   Musculoskeletal: Normal range of motion.         General: No deformity. Normal range of motion.      Cervical back: She exhibits no tenderness.   Lymphadenopathy:     She has no cervical adenopathy.   Neurological: She is alert and oriented to person, place, and time. She exhibits normal muscle tone. Coordination normal.   Skin: Skin is warm, dry, intact, not diaphoretic and not pale.   Psychiatric: Her speech is normal and behavior is normal. Judgment and thought content normal.   Nursing note and vitals reviewed.    Assessment:     1. Sore throat    2. Acute URI    3. Cough, unspecified type    4. PND (post-nasal drip)    5. Acute non-recurrent sinusitis, unspecified location        Plan:   Discussed results/diagnosis/plan with patient in clinic.  Throat and sinus precautions advised.  Advised to f/u with PCP within 2-5 days. ER precautions  given if symptoms get any worse. All questions answered. Patient verbally understood and agreed with treatment plan.     Sore throat  -     POCT Strep A, Molecular    Acute URI    Cough, unspecified type    PND (post-nasal drip)    Acute non-recurrent sinusitis, unspecified location

## 2023-07-24 ENCOUNTER — PATIENT MESSAGE (OUTPATIENT)
Dept: INTERNAL MEDICINE | Facility: CLINIC | Age: 68
End: 2023-07-24
Payer: MEDICARE

## 2023-07-25 ENCOUNTER — PATIENT MESSAGE (OUTPATIENT)
Dept: DERMATOLOGY | Facility: CLINIC | Age: 68
End: 2023-07-25
Payer: MEDICARE

## 2023-07-25 ENCOUNTER — PATIENT MESSAGE (OUTPATIENT)
Dept: INTERNAL MEDICINE | Facility: CLINIC | Age: 68
End: 2023-07-25
Payer: MEDICARE

## 2023-07-26 ENCOUNTER — TELEPHONE (OUTPATIENT)
Dept: URGENT CARE | Facility: CLINIC | Age: 68
End: 2023-07-26
Payer: MEDICARE

## 2023-07-26 ENCOUNTER — PATIENT MESSAGE (OUTPATIENT)
Dept: ORTHOPEDICS | Facility: CLINIC | Age: 68
End: 2023-07-26
Payer: MEDICARE

## 2023-07-26 ENCOUNTER — RESEARCH ENCOUNTER (OUTPATIENT)
Dept: RESEARCH | Facility: OTHER | Age: 68
End: 2023-07-26
Payer: MEDICARE

## 2023-07-26 RX ORDER — MELOXICAM 7.5 MG/1
7.5 TABLET ORAL DAILY
Status: CANCELLED | OUTPATIENT
Start: 2023-07-26

## 2023-07-26 RX ORDER — AMOXICILLIN AND CLAVULANATE POTASSIUM 875; 125 MG/1; MG/1
1 TABLET, FILM COATED ORAL 2 TIMES DAILY
Qty: 14 TABLET | Refills: 0 | Status: SHIPPED | OUTPATIENT
Start: 2023-07-26 | End: 2023-08-02

## 2023-07-26 NOTE — PROGRESS NOTES
Study: Wave Writer- BRUNO Study: A Randomized Controlled Study to Evaluate the Safety and Effectiveness of Cavium Spinal Cord Stimulation (SCS) Systems in the Treatment of Chronic Low Back and/or Leg Pain with No Prior Surgeries  IRB/Protocol #: [id number]/  : Obed Gasca MD  Treating Physician: Shoaib Aguirre MD  Site Number: [id number]        Subject Number: [id number]     Unscheduled Visit:  The subject visited [facility], met with Akila Cali (E) for standard reprogramming. Subject reported new battery pain. FCE added new programs to address the pain. There were no changes in concomitant medications, no protocol deviations or adverse events to report. Payment of $50 was added to subject's ClinCard.

## 2023-07-26 NOTE — TELEPHONE ENCOUNTER
Patient called and reports increasing sinus symptoms with greenish nasal discharge for the last 2 days.  Patient is requesting an antibiotic.  As discussed with patient, will Rx Augmentin.  Sinus precautions advised.

## 2023-07-27 RX ORDER — MELOXICAM 15 MG/1
15 TABLET ORAL DAILY
Qty: 90 TABLET | Refills: 0 | Status: SHIPPED | OUTPATIENT
Start: 2023-07-27 | End: 2023-09-05 | Stop reason: SDUPTHER

## 2023-07-27 RX ORDER — TRAMADOL HYDROCHLORIDE 50 MG/1
50-100 TABLET ORAL EVERY 6 HOURS PRN
Qty: 40 EACH | Refills: 0 | Status: SHIPPED | OUTPATIENT
Start: 2023-07-27 | End: 2023-08-01 | Stop reason: SDUPTHER

## 2023-08-01 ENCOUNTER — PATIENT MESSAGE (OUTPATIENT)
Dept: ORTHOPEDICS | Facility: CLINIC | Age: 68
End: 2023-08-01
Payer: MEDICARE

## 2023-08-02 ENCOUNTER — PATIENT MESSAGE (OUTPATIENT)
Dept: PAIN MEDICINE | Facility: CLINIC | Age: 68
End: 2023-08-02
Payer: MEDICARE

## 2023-08-02 ENCOUNTER — RESEARCH ENCOUNTER (OUTPATIENT)
Dept: RESEARCH | Facility: OTHER | Age: 68
End: 2023-08-02
Payer: MEDICARE

## 2023-08-02 RX ORDER — TRAMADOL HYDROCHLORIDE 50 MG/1
50-100 TABLET ORAL EVERY 6 HOURS PRN
Qty: 40 EACH | Refills: 0 | Status: SHIPPED | OUTPATIENT
Start: 2023-08-02 | End: 2023-08-09 | Stop reason: SDUPTHER

## 2023-08-02 NOTE — PROGRESS NOTES
Study: Wave Writer- BRUNO Study: A Randomized Controlled Study to Evaluate the Safety and Effectiveness of [employer] Spinal Cord Stimulation (SCS) Systems in the Treatment of Chronic Low Back and/or Leg Pain with No Prior Surgeries  IRB/Protocol #: [id number]/  : Obed Gasca MD  Treating Physician: Shoaib Aguirre MD  Site Number: [id number]        Subject Number: [id number]     6 Month Visit:   The subject came to [facility] for 6 month post activation visit.  The subject's JERZY was reviewed and entered in the EDC, the subject reported working 0 hours per week , reported 0 ED visits, 0 Out patient visits and 0 Primary Care visits. Reviewed the subject's opioid pain medications and confirmed no changes. The Clinician questionnaire/assessments were completed using PDF forms, the provider did not have the password for the iPad.        Questionnaires   Numeric Rating Scale (NRS)  Oswestry Disability Index (PREMA)  SF-36 Health Survey  EQ-5D-5L  PSQI  Sleep Quality Index  Pain Intensity VRS  Clinical Global Impression of Change -Clinician  Pain Intensity VRS (Follow-up)-Clinician     Adverse event reporting was reviewed with the subject and there were no adverse events or protocol deviations to report at this time. At the end of the visit, $100 was added to the subject's Clincard.

## 2023-08-04 ENCOUNTER — PATIENT MESSAGE (OUTPATIENT)
Dept: PAIN MEDICINE | Facility: CLINIC | Age: 68
End: 2023-08-04

## 2023-08-09 ENCOUNTER — PATIENT MESSAGE (OUTPATIENT)
Dept: ORTHOPEDICS | Facility: CLINIC | Age: 68
End: 2023-08-09
Payer: MEDICARE

## 2023-08-09 RX ORDER — TRAMADOL HYDROCHLORIDE 50 MG/1
50-100 TABLET ORAL EVERY 6 HOURS PRN
Qty: 40 EACH | Refills: 0 | Status: SHIPPED | OUTPATIENT
Start: 2023-08-09 | End: 2023-08-16 | Stop reason: SDUPTHER

## 2023-08-10 ENCOUNTER — CLINICAL SUPPORT (OUTPATIENT)
Dept: REHABILITATION | Facility: OTHER | Age: 68
End: 2023-08-10
Payer: MEDICARE

## 2023-08-10 DIAGNOSIS — Z74.09 IMPAIRED FUNCTIONAL MOBILITY, BALANCE, GAIT, AND ENDURANCE: ICD-10-CM

## 2023-08-10 DIAGNOSIS — R29.898 WEAKNESS OF RIGHT LOWER EXTREMITY: Primary | ICD-10-CM

## 2023-08-10 DIAGNOSIS — M25.661 DECREASED RANGE OF MOTION (ROM) OF RIGHT KNEE: ICD-10-CM

## 2023-08-10 PROCEDURE — 97110 THERAPEUTIC EXERCISES: CPT | Mod: CQ

## 2023-08-10 PROCEDURE — 97530 THERAPEUTIC ACTIVITIES: CPT | Mod: CQ

## 2023-08-10 NOTE — PROGRESS NOTES
"[facility]   Physical Therapy Treatment Note      Name: Tiarra Norton  Clinic Number: [id number]    Therapy Diagnosis:   Encounter Diagnoses   Name Primary?    Weakness of right lower extremity Yes    Decreased range of motion (ROM) of right knee     Impaired functional mobility, balance, gait, and endurance        Physician: Virgil Scott MD    Visit Date: 8/10/2023    Physician Orders: PT Eval and Treat   Medical Diagnosis from Referral: Z96.651 (ICD-10-CM) - S/P right unicompartmental knee replacement  Evaluation Date: 5/23/2023  Authorization Period Expiration: 4/24/2023  Plan of Care Expiration: 8/23/2023  Visit # / Visits authorized: 14 / 20  Progress Note Due: 8/5/2023  FOTO: 2/2    PTA Visit #: 1/5     Time In: 1000  Time Out: 1100  Total Billable Time:  55 minutes  Total Treatment Time:  60 minutes    Subjective     Pt reports: she is doing ok.    She was compliant with home exercise program.  Response to previous treatment: no adverse effects  Functional change: improved stair usage    Pain: 5/10  Location: right knee      Objective      Objective Measures updated at progress report unless specified.     Range of Motion: 7/5/2023    Knee Right active Right Passive   Flexion 117 121   Extension 1 3      TUG:   Eval: 9.01 sec  5/30/2023: 11 sec  06/05/2023:  8.67 sec /c RW,  9.41 sec /c SPC   6/14/2023: 7.93 sec     5x sit to stand:   Eval: 11.11 sec    30 sec sit to stand:  5/30/2023: 15 reps  06/05/2023: 15 reps   6/14/2023: 17 reps    Treatment     Tiarra received the treatments listed below:      therapeutic exercises to develop strength, endurance, ROM, and flexibility for 30 minutes including:    Backwards walking on treadmill x5' - emphasizing terminal knee extension  Quad set towel under heel x 10 x 5"  SAQ 5# 3x10x5" cue for controlled descent   SLR 2# 2 x 10 x 5   Standing Hamstring stretch 2x30"  Prone knee extension prop 4# x3' -prone hamstring curls 4# " "2x10-np  slantboard gastroc stretch 2x30"  Heel raises x 20 2 finger support   TKE Red sports cord   (2x12x5")  SLB 4x20"  Knee extension +44# 2x10 30# eccentric RLE today  Hamstring curl +54# 2x10  Leg press SL +64# 2x10; +68# 1x10  Leg press DL +110# 3x10  eccentric fwd heel taps 4" 2x10  +Side eccentric heel taps 4'' 2x10      NP  Heel slides x 20 x 5"  Recumbent bike back and forth, straight to full revolutions x5'  Quad set towel under knee x 10 x 5"  Ankle pumps 2x20"  Abd SLR 2x10x3" - NP  Add SLR 2x10x3" - NP      Tiarra participated in dynamic functional therapeutic activities to improve functional performance for 15 minutes, including:    Sit to stands w/ 1 airex 2 x 10 cue for "nose over toes, push thru heel", x 10 /c LLE fwd step, x 10 RLE fwd step    Walking /c  ft cue for cane in L hand - patient demonstrate good rhythm   Cone walking step overs reciprocal,  5 cones back and forth 5x /c SPC cue on hip flexion and knee flexion, dec RLE circumduction    Stair negotiation 12 steps up and down 3x  Step ups 8" into SLS 2x10x3" fwd and sideways      Manual therapy techniques: Joint mobilizations and PROM were applied to the: R knee for 10 minutes, including:  Patient supine/long sitting:   Patellar mobs all planes, emphasis on superior glide  PROM flex/ext   STM R quad and calf     Patient receive cold pack for 5 minutes to R knee.    Patient Education and Home Exercises       Education provided:   - HEP, POC    Written Home Exercises Provided: Patient instructed to cont prior HEP. Exercises were reviewed and Tiarra was able to demonstrate them prior to the end of the session.  Tiarra demonstrated good  understanding of the education provided. See EMR under Patient Instructions for exercises provided during therapy sessions    Assessment     Tiarra presents today for her follow up visit. Pt enters clinic ambulating independently with slight antalgic gait and decreased step length. Today's session focused " on functional, CKC quad strengthening/ control. Pt showed appropriate level of fatigue with no increase in pain.    Tiarra Is progressing well towards her goals.   Pt prognosis is Good.     Pt will continue to benefit from skilled outpatient physical therapy to address the deficits listed in the problem list box on initial evaluation, provide pt/family education and to maximize pt's level of independence in the home and community environment.     Pt's spiritual, cultural and educational needs considered and pt agreeable to plan of care and goals.     Anticipated barriers to physical therapy: none    GOALS: Short Term Goals:  4 weeks  1.Report decreased in pain at worse less than  <   / =  4  /10  to increase tolerance for functional mobility.On going  2. Pt to improve R knee range of motion by 25% to allow for improved functional mobility to allow for improvement in IADLs. .On going  3. Increased R LE MMT 1/2 grade to increase tolerance for ADL and work activities.On going  4. Pt to report ability to ambulate 100 ft without AD.  5. Pt to tolerate HEP to improve ROM and independence with ADL's.On going     Long Term Goals: 8 weeks  1.Report decreased in pain at worse less than  <   / =  2  /10  to increase tolerance for functional mobility. On going  2.Pt to improve range of motion by 75% to allow for improved functional mobility to allow for improvement in IADLs. On going  3.Increased R LE MMT 1 grade to increase tolerance for ADL and work activities.On going  4. Pt will report 39% on FOTO knee survey  to demonstrate increase in LE function with every day tasks. On going  5. Pt to be Independent with HEP to improve ROM and independence with ADL's. On going    Plan   Plan of care Certification: 5/23/2023 to 8/23/2023.     Continue Treatment per established POC    Eligio Abel, PTA   8/10/2023

## 2023-08-11 ENCOUNTER — PATIENT MESSAGE (OUTPATIENT)
Dept: PAIN MEDICINE | Facility: CLINIC | Age: 68
End: 2023-08-11

## 2023-08-11 ENCOUNTER — HOSPITAL ENCOUNTER (OUTPATIENT)
Dept: RADIOLOGY | Facility: OTHER | Age: 68
Discharge: HOME | End: 2023-08-11
Attending: NURSE PRACTITIONER
Payer: MEDICARE

## 2023-08-11 ENCOUNTER — OFFICE VISIT (OUTPATIENT)
Dept: PAIN MEDICINE | Facility: CLINIC | Age: 68
End: 2023-08-11
Payer: MEDICARE

## 2023-08-11 ENCOUNTER — TELEPHONE (OUTPATIENT)
Dept: PAIN MEDICINE | Facility: CLINIC | Age: 68
End: 2023-08-11

## 2023-08-11 VITALS
DIASTOLIC BLOOD PRESSURE: 74 MMHG | SYSTOLIC BLOOD PRESSURE: 126 MMHG | OXYGEN SATURATION: 100 % | HEART RATE: 69 BPM | RESPIRATION RATE: 18 BRPM | HEIGHT: 63 IN | BODY MASS INDEX: 24.62 KG/M2

## 2023-08-11 DIAGNOSIS — Z96.89 SPINAL CORD STIMULATOR STATUS: ICD-10-CM

## 2023-08-11 DIAGNOSIS — M79.18 MYOFASCIAL PAIN: Primary | ICD-10-CM

## 2023-08-11 DIAGNOSIS — M62.838 MUSCLE SPASM: Primary | ICD-10-CM

## 2023-08-11 DIAGNOSIS — M62.838 MUSCLE SPASM: ICD-10-CM

## 2023-08-11 DIAGNOSIS — G89.4 CHRONIC PAIN SYNDROME: ICD-10-CM

## 2023-08-11 DIAGNOSIS — M51.36 DDD (DEGENERATIVE DISC DISEASE), LUMBAR: ICD-10-CM

## 2023-08-11 DIAGNOSIS — M47.816 LUMBAR SPONDYLOSIS: ICD-10-CM

## 2023-08-11 PROCEDURE — 99213 PR OFFICE/OUTPT VISIT, EST, LEVL III, 20-29 MIN: ICD-10-PCS | Mod: S$PBB,,, | Performed by: NURSE PRACTITIONER

## 2023-08-11 PROCEDURE — 99213 OFFICE O/P EST LOW 20 MIN: CPT | Mod: S$PBB,,, | Performed by: NURSE PRACTITIONER

## 2023-08-11 PROCEDURE — 72070 X-RAY EXAM THORAC SPINE 2VWS: CPT | Mod: 26,,, | Performed by: RADIOLOGY

## 2023-08-11 PROCEDURE — 99999 PR PBB SHADOW E&M-EST. PATIENT-LVL IV: CPT | Mod: PBBFAC,,, | Performed by: NURSE PRACTITIONER

## 2023-08-11 PROCEDURE — 72070 X-RAY EXAM THORAC SPINE 2VWS: CPT | Mod: TC,FY

## 2023-08-11 PROCEDURE — 99214 OFFICE O/P EST MOD 30 MIN: CPT | Mod: PBBFAC | Performed by: NURSE PRACTITIONER

## 2023-08-11 PROCEDURE — 72070 XR THORACIC SPINE AP LATERAL: ICD-10-PCS | Mod: 26,,, | Performed by: RADIOLOGY

## 2023-08-11 PROCEDURE — 99999 PR PBB SHADOW E&M-EST. PATIENT-LVL IV: ICD-10-PCS | Mod: PBBFAC,,, | Performed by: NURSE PRACTITIONER

## 2023-08-11 NOTE — PROGRESS NOTES
Chronic Pain-Established Visit         SUBJECTIVE:    PCP: Kaykay Perales MD    REFERRING PHYSICIAN: Tyler Jacobs MD    CHIEF COMPLAINT: Lower back pain       Original HISTORY OF PRESENT ILLNESS: Tiarra Norton presents to the clinic for the evaluation of the above pain. The pain started five years ago.     Original Pain Description:  The pain is located in the lower back and does radiate up towards her upper back.The pain is described as aching, dull and sharp. Initially starts as a dull, aching pain and it gets sharp once she bends down and moves around. Typically when she walks for more than five minutes, the sharp pain radiates up her back. Exacerbating factors: Standing, Bending, Touching, Walking, Night Time, Flexing and Lifting. Mitigating factors heat, ice, laying down and massage. Symptoms interfere with daily activity and sleeping. The patient feels like symptoms have been worsening. Patient denies night fever/night sweats, urinary incontinence, bowel incontinence, significant weight loss, significant motor weakness and loss of sensations.    Original PAIN SCORES:  Best: Pain is 1  Worst: Pain is 10  Usually: Pain is 4  Current: Pain is 4    INTERVAL HISTORY:  4/18/22 Interval History: Patient presents for telehealth visit for follow up following her b/l RFA at L3-L5. She reports 80% relief and is very pleased with her pain relief. Unfortunately, she is not back to doing what she wants due to right knee pain. She is seeing Dr. Huber for knee pain and is s/p right medial compartment partial knee replacement. She is currently in therapy for this. We discussed that we may be able to assist her with her knee pain as well.     Interval History 6/15/22: Tiarra Norton returns for follow up. She continues to feel like she has mild pain sitting or laying down, and has her worst pain with extreme leaning forward to pick something up off the floor. She also has difficulty leaning over her handlebars  to ride her bike or leaning forward to fold clothes or standing up for any period of time. So, we determined that leaning forward is her worst issue. We did previouisly do lmbb and rfa, which has helped with extension, but it would not help with leaning forward. On review of her MRI she has significant multilevel DDD with Modic changes which likely account for her pain.     Interval History 7/25/22: Tiarra Norton returns for follow up. We previously have been discussing treatments for her low back pain that did not resolve with RFA. At our last visit I referred her to Dr. Antony for clearance for a SCS trial. She was considered to be a reasonable candidate. However, in the meantime, her PT referred her to acupuncture with Dr. Jacobs. She had one treatment and already notes 50% relief. She notes that she still has pain, especially with leaning forward, but feels that it is much better controlled. Their plan is one treatment per week but is approved for 20 sessions.     Interval History 8/22/22: Ms. Norton returns for follow up on her low back pain. At our last visit she felt cured by acupuncture. Unfortunately, this only lasted for 24 hours. She returns today looking for other options to make life livable. Patient claims all of her pain in the low back and middle back. We discussed that this will work best for her low back pain.     Interval History 9/21/2022:  The patient returns to clinic today for follow up of back pain via virtual visit. She received a letter from [facility] denying SCS trial. She is frustrated by this. She continues to report low back pain. She denies any radicular leg pain. Her pain is worse with bending and activity. She recently underwent med screening for the [employer] program. She is interested in pursuing this. She has tried Gabapentin and Lyrica in the past with side effects. She denies any other health changes. Her pain today is 7/10.     Interval History 10/14/22: Mrs. Norton  presents today for follow up of chronic low back pain and to discuss the [employer] trial. She had her lumbar MRI done last night without significant changes to her spine. She did have increased dilation of her bile duct. Her pain today is the same in character and severity as during her last visit with us and she rates it currently at a 7/10. She continues to do her stretches on her own which have helped some. She presents with some questions regarding the trial.     Interval History 12/29/2022:  The patient returns for post op appointment. She is s/p lumbar [employer] SCS trial with 90% relief. She has had very minimal back pain during the trial period. She says that she was more active over this time due to the Christmas holiday and had minimal symptoms. She is very happy with the relief. She would like to move forward with implant. She is part of Cortez study. No fever or malaise during trial period. Her pain today is 1/10.    Interval History 1/17/2023:  The patient returns to clinic today for post op. She is s/p [employer] SCS implant on 1/9/2022. She reports benefit with the device at this time. She is meeting with the representatives today. She does report soreness to her incisions. She denies any fever, chills, or drainage. She did have issues with her dressing staying on. She has completed her antibiotics. She denies any other health changes. Her pain today is 2/10.    Interval History 1/31/2023:  The patient returns to clinic today for wound check. She reports benefit with [employer] SCS. She is in contact with representatives for programming. She reports intermittent muscle soreness into her legs. She does have an upcoming appointment with representatives for programming. She denies any fever, chills, or drainage. She continues to follow her activity restrictions. She denies any other health changes. Her pain today is 3/10.    Interval History 2/17/2023:  The patient returns to clinic today for  follow up of back pain. She reports benefit with [employer] SCS. She is in contact with representatives for programming. She is very happy with relief. She denies any fever, chills, or drainage. She continues to follow her activity restrictions. She denies any other health changes. Her pain today is 2/10.    Interval History 3/21/2023:  The patient presents to discuss pain to IPG site. She says that it has been present since surgery and has gradually worsened. She says that it feels like a tight and weak muscle. She has not tried any topical medications or muscle relaxants. No fever or malaise. She has not noticed any redness or swelling to the site. Her original pain has significantly improved from SCS implant. She is part of a research study. Her pain today is 6/10.    Interval History 4/14/2023:  The patient is here for follow up of back pain and pain at IPG site. She reports improvement since previous visit. She did have benefit with Lidoderm patches but had a rash so she stopped. Her pain has improved with Salon Pas patches. She is scheduled for knee replacement next month with Dr. Scott. Her pain today is 3/10.    Interval History 8/11/2023:  The patient returns to clinic today for follow up of pain. She reports pain around her SCS battery site. She describes the pain as though her muscles feel weak and tired. She does report benefit with SCS. No difficulty with charging or programming. She has tried Lidoderm and Salonpas patches but these caused a rash. She is currently participating in physical therapy for her knee. She denies any other health changes. Her pain today is 4/10.    6 weeks of Conservative therapy (PT/Chiro/Home Exercises with Dates)  Healthy back program--> has four sessions left for a total of 20 sessions   Last session was on 1/31/22   Stretches that they taught at health back gives her relief       Medications:   Robaxin PRN    Ice and heat which has helped   Tried Gabapentin and  Lyrica- made her loopy      Report: reviewed       Interventional Pain Procedures:   2/8/2022- Bilateral L3,4,5 MBB  2/15/2022- Bilateral L3,4,5 MBB  3/8/2022- Right L3,4,5 RFA- 80% relief for a few months  3/22/2022- Left L3,4,5 RFA- 80% relief for a few months  12/22/22 SCS trial- 90% relief  1/9/2023- [employer] SCS implant.     Imaging:  10/13/22: MRI LUMBAR SPINE WITHOUT CONTRAST  FINDINGS:  Lumbar levoscoliosis centered at L2.  Minimal anterolisthesis at L4-5.     No compression fractures.  No marrow replacing lesions.     Multilevel degenerative changes with disc desiccation and disc space narrowing, described in detail below.  Discogenic endplate edema at T12-L1.     The conus medullaris has a normal appearance and terminates at the L1 level.  Cauda equina nerve roots are unremarkable.  No epidural mass or collection.     The common duct is dilated up to 12 mm, previously 9 mm on 08/20/2022 CT.  Mild prominence of the main pancreatic duct up to 4 mm, which is new.  No definite filling defect in the bile ducts.  Paraspinal muscle atrophy.     SIGNIFICANT FINDINGS BY LEVEL:     T12-L1: Disc bulge.  Bilateral facet arthropathy and ligamentum flavum thickening.  Mild canal stenosis.  Mild bilateral foraminal stenosis.     L1-2: Disc bulge.  Bilateral facet arthropathy and ligamentum flavum thickening.  Mild canal stenosis.  Mild right foraminal stenosis.     L2-3: Disc bulge.  Bilateral facet arthropathy and ligamentum flavum thickening.  Mild canal stenosis.  Moderate right mild left foraminal stenosis.     L3-4: Disc bulge.  Bilateral facet arthropathy and ligamentum flavum thickening.  Small bilateral facet joint effusions/synovitis.  Moderate canal stenosis with partial effacement of the thecal sac and crowding of the cauda equina nerve roots.  Mild right and moderate left foraminal stenosis.     L4-5: Disc bulge with posterior disc uncovering.  Bilateral facet arthropathy and ligamentum flavum  thickening.  Small right facet joint effusion/synovitis.  Mild canal stenosis.  Mild bilateral foraminal stenosis.     L5-S1: Disc bulge.  Bilateral facet arthropathy and ligamentum flavum thickening.  No significant canal stenosis.  Mild left foraminal stenosis.     Impression:     Lumbar levoscoliosis and multilevel degenerative changes as detailed above.  No significant changes from 2022.     Increased biliary ductal dilatation up to 12 mm, greater than expected after cholecystectomy.  In addition, the main pancreatic duct is mildly prominent up to 4 mm, which is new.  If LFTs are abnormal, consider MRI/MRCP or ERCP for further evaluation.      Past Medical History:   Diagnosis Date    Cataract     Depression     Gestational diabetes     Hyperlipidemia     Hypertension     IBS (irritable bowel syndrome)     Thyroid disease      Past Surgical History:   Procedure Laterality Date    ADENOIDECTOMY      ARTHROPLASTY, KNEE, TOTAL, USING COMPUTER-ASSISTED NAVIGATION Right 2023    Procedure: CONVERSION ARTHROPLASTY, KNEE, TOTAL, USING COMPUTER-ASSISTED NAVIGATION;  Surgeon: Virgil Scott MD;  Location: [facility] OR;  Service: Orthopedics;  Laterality: Right;  Same day discharge    ARTHROSCOPIC CHONDROPLASTY OF KNEE JOINT Right 10/19/2021    Procedure: ARTHROSCOPY, KNEE, WITH CHONDROPLASTY;  Surgeon: Trevin Huber MD;  Location: [facility] OR;  Service: Orthopedics;  Laterality: Right;    ARTHROSCOPY OF KNEE Right 10/19/2021    Procedure: ARTHROSCOPY, KNEE-RIGHT;  Surgeon: Trevin Huber MD;  Location: [facility] OR;  Service: Orthopedics;  Laterality: Right;     SECTION      CHOLECYSTECTOMY      COLONOSCOPY N/A 2016    Procedure: COLONOSCOPY;  Surgeon: Heri Segura MD;  Location: [address]);  Service: Endoscopy;  Laterality: N/A;    COLONOSCOPY N/A 2019    Procedure: COLONOSCOPY;  Surgeon: Joe Pitts MD;  Location: [facility] ([address] [place] [address]);  Service: Endoscopy;  Laterality: N/A;     CYSTOSCOPY  6/21/2022    Procedure: CYSTOSCOPY;  Surgeon: Lenny Moore MD;  Location: [facility] OR 1ST FLR;  Service: Urology;;    ESOPHAGOGASTRODUODENOSCOPY N/A 2/13/2020    Procedure: EGD (ESOPHAGOGASTRODUODENOSCOPY);  Surgeon: Tolu Rivas MD;  Location: [facility] ENDO (4TH FLR);  Service: Endoscopy;  Laterality: N/A;  Pt. moved to 9:15am arrival time.. Instructed to not have anything after midnight.EC    EYE SURGERY      cataract resection right    INJECTION OF ANESTHETIC AGENT AROUND NERVE Bilateral 2/8/2022    Procedure: Block, Nerve MEDIAL BRANCH BLOCK BILATERAL L3,4,5;  Surgeon: Tara Gibbs MD;  Location: [facility] PAIN MGT;  Service: Pain Management;  Laterality: Bilateral;    INJECTION OF ANESTHETIC AGENT AROUND NERVE Bilateral 2/15/2022    Procedure: BLOCK, NERVE, L3*L4-L5 MEDIAL BR ANCH 2 OF 2;  Surgeon: Tara Gibbs MD;  Location: [facility] PAIN MGT;  Service: Pain Management;  Laterality: Bilateral;    INJECTION OF BOTULINUM TOXIN TYPE A  6/21/2022    Procedure: BOTULINUM TOXIN INJECTION 100 units;  Surgeon: Lenny Moore MD;  Location: [facility] OR 1ST FLR;  Service: Urology;;    INSERTION, NEUROSTIMULATOR, SPINAL CORD N/A 1/9/2023    Procedure: SPINAL CORD STIMULATOR IMPLANT [employer];  Surgeon: Shoaib Aguirre MD;  Location: [facility] OR;  Service: Pain Management;  Laterality: N/A;    JOINT REPLACEMENT      partial right knee    KNEE ARTHROSCOPY W/ MENISCECTOMY Right 10/19/2021    Procedure: ARTHROSCOPY, KNEE, WITH MENISCECTOMY, PARTIAL LATERAL;  Surgeon: Trevin Huber MD;  Location: [facility] OR;  Service: Orthopedics;  Laterality: Right;    r hand surgery      RADIOFREQUENCY ABLATION Right 3/8/2022    Procedure: RADIOFREQUENCY ABLATION, L3-L5 1 OF 2;  Surgeon: Tara Gibbs MD;  Location: [facility] PAIN MGT;  Service: Pain Management;  Laterality: Right;    RADIOFREQUENCY ABLATION Left 3/22/2022    Procedure: RADIOFREQUENCY ABLATION, l3-+l5 2 of 2;  Surgeon: Tara Gibbs,  MD;  Location: [facility] PAIN MGT;  Service: Pain Management;  Laterality: Left;    TONSILLECTOMY      TOTAL KNEE ARTHROPLASTY      partial knee replacement    TRIAL OF SPINAL CORD NERVE STIMULATOR N/A 12/22/2022    Procedure: SPINAL CORD STIMULATOR TRIAL SADIQ BRUNO RESEARCH;  Surgeon: Shoaib Aguirre MD;  Location: [facility] PAIN MGT;  Service: Pain Management;  Laterality: N/A;    TUBAL LIGATION       Social History     Socioeconomic History    Marital status:     Number of children: 1   Occupational History    Occupation:      Employer: [employer]     Comment: retired   Tobacco Use    Smoking status: Never    Smokeless tobacco: Never   Substance and Sexual Activity    Alcohol use: Yes     Alcohol/week: 3.0 standard drinks of alcohol     Types: 3 Glasses of wine per week     Comment: weekends    Drug use: Yes     Types: Marijuana     Comment: occasionally    Sexual activity: Yes     Partners: Male   Other Topics Concern    Are you pregnant or think you may be? No    Breast-feeding No   Social History Narrative    Retired        Swims.       Stationary bike.            Social Determinants of Health     Financial Resource Strain: Low Risk  (7/27/2022)    Overall Financial Resource Strain (CARDIA)     Difficulty of Paying Living Expenses: Not hard at all   Food Insecurity: No Food Insecurity (7/27/2022)    Hunger Vital Sign     Worried About Running Out of Food in the Last Year: Never true     Ran Out of Food in the Last Year: Never true   Transportation Needs: No Transportation Needs (7/27/2022)    PRAPARE - Transportation     Lack of Transportation (Medical): No     Lack of Transportation (Non-Medical): No   Physical Activity: Insufficiently Active (7/27/2022)    Exercise Vital Sign     Days of Exercise per Week: 4 days     Minutes of Exercise per Session: 20 min   Stress: Stress Concern Present (7/27/2022)    [employer] - Occupational Stress Questionnaire     Feeling  of Stress : Very much   Social Connections: Unknown (7/27/2022)    Social Connection and Isolation Panel [NHANES]     Frequency of Communication with Friends and Family: Twice a week   Housing Stability: Low Risk  (7/27/2022)    Housing Stability Vital Sign     Unable to Pay for Housing in the Last Year: No     Number of Places Lived in the Last Year: 1     Unstable Housing in the Last Year: No     Family History   Problem Relation Age of Onset    Hypertension Mother     Irritable bowel syndrome Mother     Hyperlipidemia Mother     Heart disease Father         mi    Hypertension Father     Cancer Father         prostate    Heart attack Father     Heart failure Father     Hyperlipidemia Father     Alcohol abuse Sister     Depression Sister     Epilepsy Son     Irritable bowel syndrome Sister     Alcohol abuse Sister     Ovarian cancer Maternal Aunt     Breast cancer Paternal Aunt     Breast cancer Maternal Aunt     Melanoma Neg Hx     Colon cancer Neg Hx     Stomach cancer Neg Hx     Esophageal cancer Neg Hx     Liver disease Neg Hx     Crohn's disease Neg Hx     Ulcerative colitis Neg Hx     Celiac disease Neg Hx     Stroke Neg Hx        Review of patient's allergies indicates:  No Known Allergies    Current Outpatient Medications   Medication Sig    acetaminophen (TYLENOL) 500 MG tablet Take 2 tablets (1,000 mg total) by mouth every 8 (eight) hours as needed for Pain.    ALPRAZolam (XANAX) 1 MG tablet Take 1 mg by mouth 3 (three) times daily.    amLODIPine (NORVASC) 5 MG tablet Take 1 tablet (5 mg total) by mouth once daily.    atorvastatin (LIPITOR) 10 MG tablet Take 1 tablet (10 mg total) by mouth once daily.    b complex vitamins capsule Take 1 capsule by mouth once daily.    cholecalciferol, vitamin D3, (VITAMIN D3) 5,000 unit Tab Take 1 tablet (5,000 Units total) by mouth once daily.    docusate sodium (COLACE) 100 MG capsule Take 1 capsule (100 mg total) by mouth 2 (two) times daily.    EScitalopram oxalate  (LEXAPRO) 5 MG Tab Take 5 mg by mouth.    estradioL (ESTRACE) 0.01 % (0.1 mg/gram) vaginal cream Place 1g daily for 2-4 weeks and then 2-3x/week afterwards    levothyroxine (SYNTHROID) 125 MCG tablet TAKE 1 TABLET BY MOUTH EVERY MORNING    liothyronine (CYTOMEL) 5 MCG Tab Take 1 tablet (5 mcg total) by mouth once daily.    meloxicam (MOBIC) 15 MG tablet Take 1 tablet (15 mg total) by mouth once daily.    RESTASIS 0.05 % ophthalmic emulsion INT 1 GTT IN OU BID    sumatriptan (IMITREX) 50 MG tablet 1 tab po at start of headache.  Repeat in 2 h prn    traMADoL (ULTRAM) 50 mg tablet Take 1-2 tablets ( mg total) by mouth every 6 (six) hours as needed for Pain.    traZODone (DESYREL) 100 MG tablet 1-2 tabs po q hs for sleep    valsartan (DIOVAN) 320 MG tablet Take 1 tablet (320 mg total) by mouth once daily.    aspirin (ECOTRIN) 81 MG EC tablet Take 1 tablet (81 mg total) by mouth 2 (two) times a day. (Patient not taking: Reported on 7/23/2023)    conjugated estrogens (PREMARIN) vaginal cream Place 0.5 g vaginally twice a week. Place a pea-sized amount in vagina every night for 4 weeks, then use 2-3 nights a week     No current facility-administered medications for this visit.     Facility-Administered Medications Ordered in Other Visits   Medication    0.9%  NaCl infusion    celecoxib capsule 400 mg       REVIEW OF SYSTEMS:    GENERAL: No fever. No chills. No fatigue. Denies weight loss. Denies weight gain.  HEENT: Denies headaches. Denies vision change. Denies eye pain. Denies double vision. Denies ear pain.   CV: Denies chest pain. HTN  PULM: Denies of shortness of breath.  GI: Denies constipation. No diarrhea. No abdominal pain. Denies nausea. Denies vomiting. No blood in stool.  HEME: Denies bleeding problems.  : Denies urgency. No painful urination. No blood in urine.  MS: See HPI  SKIN: Denies rash.   NEURO: Denies seizures. No weakness.  ENDO: Thyroid disorder.   PSYCH:  Depression    OBJECTIVE:  "    Vitals:    23 0855   BP: 126/74   Pulse: 69   Resp: 18   SpO2: 100%   Height: 5' 3" (1.6 m)   PainSc:   4   PainLoc: Back        PHYSICAL EXAM:   GENERAL: Well appearing, in no acute distress, alert and oriented x3.  PSYCH:  Mood and affect appropriate.  SKIN: Skin color, texture, turgor normal, no rashes or lesions. SCS site well healed.   BACK: There is pain with palpation over lumbar paraspinals. There is pain with palpation around SCS battery.   MSK: 5/5 strength in right ankle with plantar and dorsiflexion. 5/5 strength in left ankle with plantar and dorsiflexion. 5/5 strength with right knee flexion and extension. 5/5 strength with left knee flexion and extension.   GAIT: normal- ambulates without assistance.       ASSESSMENT: 68 y.o. year old female with low back pain, consistent with the followin. Muscle spasm  X-Ray Thoracic Spine AP Lateral      2. Spinal cord stimulator status  X-Ray Thoracic Spine AP Lateral      3. Lumbar spondylosis        4. DDD (degenerative disc disease), lumbar        5. Chronic pain syndrome              PLAN:     - Previous imaging reviewed.     - Obtain Thoracic xray to evaluate lead placement.     - She has tried topicals for battery site pain with limited relief.     - Schedule for trigger point injection around SCS battery site in office under ultrasound.    - Consult physical therapy for lumbar/core strengthening.     - RTC 2 weeks after above procedure.     The above plan and management options were discussed at length with patient. Patient is in agreement with the above and verbalized understanding.     Marlene Thorne NP  2023                          "

## 2023-08-14 ENCOUNTER — PATIENT MESSAGE (OUTPATIENT)
Dept: PAIN MEDICINE | Facility: CLINIC | Age: 68
End: 2023-08-14
Payer: MEDICARE

## 2023-08-14 ENCOUNTER — PATIENT MESSAGE (OUTPATIENT)
Dept: REHABILITATION | Facility: OTHER | Age: 68
End: 2023-08-14
Payer: MEDICARE

## 2023-08-14 DIAGNOSIS — M47.816 LUMBAR SPONDYLOSIS: Primary | ICD-10-CM

## 2023-08-16 ENCOUNTER — PATIENT MESSAGE (OUTPATIENT)
Dept: ORTHOPEDICS | Facility: CLINIC | Age: 68
End: 2023-08-16
Payer: MEDICARE

## 2023-08-16 RX ORDER — TRAMADOL HYDROCHLORIDE 50 MG/1
50-100 TABLET ORAL EVERY 6 HOURS PRN
Qty: 40 EACH | Refills: 0 | Status: SHIPPED | OUTPATIENT
Start: 2023-08-16 | End: 2023-08-23 | Stop reason: SDUPTHER

## 2023-08-17 ENCOUNTER — CLINICAL SUPPORT (OUTPATIENT)
Dept: REHABILITATION | Facility: OTHER | Age: 68
End: 2023-08-17
Payer: MEDICARE

## 2023-08-17 DIAGNOSIS — M47.816 LUMBAR SPONDYLOSIS: ICD-10-CM

## 2023-08-17 DIAGNOSIS — Z74.09 IMPAIRED FUNCTIONAL MOBILITY, BALANCE, GAIT, AND ENDURANCE: ICD-10-CM

## 2023-08-17 DIAGNOSIS — R29.898 WEAKNESS OF RIGHT LOWER EXTREMITY: Primary | ICD-10-CM

## 2023-08-17 DIAGNOSIS — M25.661 DECREASED RANGE OF MOTION (ROM) OF RIGHT KNEE: ICD-10-CM

## 2023-08-17 PROCEDURE — 97530 THERAPEUTIC ACTIVITIES: CPT | Mod: CQ

## 2023-08-17 PROCEDURE — 97110 THERAPEUTIC EXERCISES: CPT | Mod: CQ

## 2023-08-17 NOTE — PROGRESS NOTES
"[facility]   Physical Therapy Treatment Note      Name: Tiarra Nroton  Clinic Number: [id number]    Therapy Diagnosis:   Encounter Diagnoses   Name Primary?    Weakness of right lower extremity Yes    Decreased range of motion (ROM) of right knee     Impaired functional mobility, balance, gait, and endurance        Physician: Virgil Scott MD    Visit Date: 8/17/2023    Physician Orders: PT Eval and Treat   Medical Diagnosis from Referral: Z96.651 (ICD-10-CM) - S/P right unicompartmental knee replacement  Evaluation Date: 5/23/2023  Authorization Period Expiration: 4/24/2023  Plan of Care Expiration: 8/23/2023  Visit # / Visits authorized: 15 / 20  Progress Note Due: 8/5/2023  FOTO: 2/2    PTA Visit #: 2/5     Time In: 1010  Time Out: 1110  Total Billable Time:  55 minutes  Total Treatment Time:  60 minutes    Subjective     Pt reports: she feels like she is turing a corner, knee getting better     She was compliant with home exercise program.  Response to previous treatment: no adverse effects  Functional change: improved stair usage    Pain: 5/10  Location: right knee      Objective      Objective Measures updated at progress report unless specified.     Range of Motion: 7/5/2023    Knee Right active Right Passive   Flexion 117 121   Extension 1 3      TUG:   Eval: 9.01 sec  5/30/2023: 11 sec  06/05/2023:  8.67 sec /c RW,  9.41 sec /c SPC   6/14/2023: 7.93 sec     5x sit to stand:   Eval: 11.11 sec    30 sec sit to stand:  5/30/2023: 15 reps  06/05/2023: 15 reps   6/14/2023: 17 reps    Treatment     Tiarra received the treatments listed below:      therapeutic exercises to develop strength, endurance, ROM, and flexibility for 30 minutes including:    Backwards walking on treadmill x5' - emphasizing terminal knee extension  Quad set towel under heel x 10 x 5"  SAQ 5# 3x10x5" cue for controlled descent   SLR 2# 2 x 10 x 5   +Knee Flexion at stairs 10 x 10sec hold   Standing Hamstring " "stretch 2x30"  +STS 2x10 10#kb  Prone knee extension prop 4# x3' -prone hamstring curls 4# 2x10-np  slantboard gastroc stretch 2x30"  Heel raises x 20 2 finger support   TKE Red sports cord   (2x12x5")  SLB 4x20"  Knee extension +44# 2x10 30# eccentric RLE today  Hamstring curl +60# 2x10  Leg press SL +64# 2x10; +68# 1x10  +Side stepping RTB x 2 laps   +monster walks RTB x2 laps   +[place] carry 10#kb x 2 laps   +Suitcase carry 10#kb  Leg press DL +110# 3x10  eccentric fwd heel taps 4" 2x10  +Side eccentric heel taps 4'' 2x10      NP  Heel slides x 20 x 5"  Recumbent bike back and forth, straight to full revolutions x5'  Quad set towel under knee x 10 x 5"  Ankle pumps 2x20"  Abd SLR 2x10x3" - NP  Add SLR 2x10x3" - NP      Tiarra participated in dynamic functional therapeutic activities to improve functional performance for 15 minutes, including:    Sit to stands w/ 1 airex 2 x 10 cue for "nose over toes, push thru heel", x 10 /c LLE fwd step, x 10 RLE fwd step    Walking /c  ft cue for cane in L hand - patient demonstrate good rhythm   [place] walking step overs reciprocal,  5 cones back and forth 5x /c SPC cue on hip flexion and knee flexion, dec RLE circumduction    Stair negotiation 12 steps up and down 3x  Step ups 8" into SLS 2x10x3" fwd and sideways      Manual therapy techniques: Joint mobilizations and PROM were applied to the: R knee for 10 minutes, including:  Patient supine/long sitting:   Patellar mobs all planes, emphasis on superior glide  PROM flex/ext   STM R quad and calf     Patient receive cold pack for 5 minutes to R knee.    Patient Education and Home Exercises       Education provided:   - HEP, POC    Written Home Exercises Provided: Patient instructed to cont prior HEP. Exercises were reviewed and Tiarra was able to demonstrate them prior to the end of the session.  Tiarra demonstrated good  understanding of the education provided. See EMR under Patient Instructions for exercises provided " during therapy sessions    Assessment     Tiarra presents to clinic with improved ambulation, improved step length. Increased functional strengthening today with no increase in pain. Pt requires verbal cuing for TKE with most exercises, but able to achieve. Verbal cuing also required with sit to stands for weight shift onto RLE. Progress pt as tolerated     Tiarra Is progressing well towards her goals.   Pt prognosis is Good.     Pt will continue to benefit from skilled outpatient physical therapy to address the deficits listed in the problem list box on initial evaluation, provide pt/family education and to maximize pt's level of independence in the home and community environment.     Pt's spiritual, cultural and educational needs considered and pt agreeable to plan of care and goals.     Anticipated barriers to physical therapy: none    GOALS: Short Term Goals:  4 weeks  1.Report decreased in pain at worse less than  <   / =  4  /10  to increase tolerance for functional mobility.On going  2. Pt to improve R knee range of motion by 25% to allow for improved functional mobility to allow for improvement in IADLs. .On going  3. Increased R LE MMT 1/2 grade to increase tolerance for ADL and work activities.On going  4. Pt to report ability to ambulate 100 ft without AD.  5. Pt to tolerate HEP to improve ROM and independence with ADL's.On going     Long Term Goals: 8 weeks  1.Report decreased in pain at worse less than  <   / =  2  /10  to increase tolerance for functional mobility. On going  2.Pt to improve range of motion by 75% to allow for improved functional mobility to allow for improvement in IADLs. On going  3.Increased R LE MMT 1 grade to increase tolerance for ADL and work activities.On going  4. Pt will report 39% on FOTO knee survey  to demonstrate increase in LE function with every day tasks. On going  5. Pt to be Independent with HEP to improve ROM and independence with ADL's. On going    Plan   Plan of  care Certification: 5/23/2023 to 8/23/2023.     Continue Treatment per established POC    Eligio Abel, PTA   8/17/2023

## 2023-08-23 ENCOUNTER — PATIENT MESSAGE (OUTPATIENT)
Dept: ORTHOPEDICS | Facility: CLINIC | Age: 68
End: 2023-08-23
Payer: MEDICARE

## 2023-08-23 RX ORDER — TRAMADOL HYDROCHLORIDE 50 MG/1
50-100 TABLET ORAL EVERY 8 HOURS PRN
Qty: 30 EACH | Refills: 0 | Status: SHIPPED | OUTPATIENT
Start: 2023-08-23 | End: 2023-09-12

## 2023-08-24 ENCOUNTER — CLINICAL SUPPORT (OUTPATIENT)
Dept: REHABILITATION | Facility: OTHER | Age: 68
End: 2023-08-24
Payer: MEDICARE

## 2023-08-24 DIAGNOSIS — M25.661 DECREASED RANGE OF MOTION (ROM) OF RIGHT KNEE: ICD-10-CM

## 2023-08-24 DIAGNOSIS — R29.898 WEAKNESS OF RIGHT LOWER EXTREMITY: Primary | ICD-10-CM

## 2023-08-24 DIAGNOSIS — Z74.09 IMPAIRED FUNCTIONAL MOBILITY, BALANCE, GAIT, AND ENDURANCE: ICD-10-CM

## 2023-08-24 PROCEDURE — 97530 THERAPEUTIC ACTIVITIES: CPT

## 2023-08-24 PROCEDURE — 97110 THERAPEUTIC EXERCISES: CPT

## 2023-08-24 NOTE — PROGRESS NOTES
"[facility]   Physical Therapy Progress Note      Name: Tiarra Norton  Clinic Number: [id number]    Therapy Diagnosis:   Encounter Diagnoses   Name Primary?    Weakness of right lower extremity Yes    Decreased range of motion (ROM) of right knee     Impaired functional mobility, balance, gait, and endurance        Physician: Virgil Scott MD    Visit Date: 8/24/2023    Physician Orders: PT Eval and Treat   Medical Diagnosis from Referral: Z96.651 (ICD-10-CM) - S/P right unicompartmental knee replacement  Evaluation Date: 5/23/2023  Authorization Period Expiration: 4/24/2023  Plan of Care Expiration: 8/31/2023  Visit # / Visits authorized: 18 / 20  Progress Note Due: 9/24/2023  FOTO: 2/2    PTA Visit #: 0/5     Time In: 12:00  Time Out: 1:00  Total Billable Time:  55 minutes  Total Treatment Time:  60 minutes    Subjective     Pt reports: continues to feel improvement with less pain. Able to go to the gym and so strengthening and ride the bike for 30 min with min soreness    She was compliant with home exercise program.  Response to previous treatment: no adverse effects  Functional change: improved stair usage    Pain: 2/10  Location: right knee      Objective      Objective Measures updated at progress report unless specified.     Range of Motion: 8/24/2023    Knee Right active Right Passive   Flexion 120 124   Extension 2 4      TUG:   Eval: 9.01 sec  5/30/2023: 11 sec  06/05/2023:  8.67 sec /c RW,  9.41 sec /c SPC   6/14/2023: 7.93 sec     5x sit to stand:   Eval: 11.11 sec    30 sec sit to stand:  5/30/2023: 15 reps  06/05/2023: 15 reps   6/14/2023: 17 reps    Treatment     Tiarra received the treatments listed below:      therapeutic exercises to develop strength, endurance, ROM, and flexibility for 30 minutes including:    Backwards walking on treadmill x5' - emphasizing terminal knee extension  Quad set towel under heel x 10 x 5"  SAQ 5# 3x10x5" cue for controlled descent " "  SLR 2# 2 x 10 x 5   Knee Flexion at stairs 10 x 10sec hold   Standing Hamstring stretch 2x30"  STS 2x10 10#kb  Prone knee extension prop 4# x3' -prone hamstring curls 4# 2x10-np  slantboard gastroc stretch 2x30"  Heel raises x 20 2 finger support   TKE Red sports cord   (2x12x5")  SLB 4x20"  Knee extension +44# 2x10 30# eccentric RLE today  Hamstring curl +60# 2x10  Leg press SL +64# 2x10; +68# 1x10  Side stepping RTB x 2 laps   monster walks RTB x2 laps   Leg press DL +110# 3x10  eccentric fwd heel taps 4" 2x10  Side eccentric heel taps 4'' 2x10      NP  Heel slides x 20 x 5"  Recumbent bike back and forth, straight to full revolutions x5'  Quad set towel under knee x 10 x 5"  Ankle pumps 2x20"  Abd SLR 2x10x3" - NP  Add SLR 2x10x3" - NP      Tiarra participated in dynamic functional therapeutic activities to improve functional performance for 25 minutes, including:  Objective measures and reassessment  Sit to stands w/ 1 airex 2 x 10 cue for "nose over toes, push thru heel", x 10 /c LLE fwd step, x 10 RLE fwd step    Stair negotiation 12 steps up and down 3x  Step ups 8" into SLS 2x10x3" fwd and sideways  [place] carry 10#kb x 2 laps   Suitcase carry 10#kb x2 laps      Manual therapy techniques: Joint mobilizations and PROM were applied to the: R knee for 0 minutes, including:  Patient supine/long sitting:   Patellar mobs all planes, emphasis on superior glide  PROM flex/ext   STM R quad and calf     Patient receive cold pack for 5 minutes to R knee.    Patient Education and Home Exercises       Education provided:   - HEP, POC    Written Home Exercises Provided: Patient instructed to cont prior HEP. Exercises were reviewed and Tiarra was able to demonstrate them prior to the end of the session.  Tiarra demonstrated good  understanding of the education provided. See EMR under Patient Instructions for exercises provided during therapy sessions    Assessment     Tiarra presents today with minimal complaints of pain. " Reassessment performed with pt demo gains in knee ROM, LE strength, balance, and tolerance to gait and functional mobility activities. Continuation and progression of dynamic strengthening, balance, and functional activities with good tolerance and no adverse effects. Anticipate discharge at next follow-up.     Tiarra Is progressing well towards her goals.   Pt prognosis is Good.     Pt will continue to benefit from skilled outpatient physical therapy to address the deficits listed in the problem list box on initial evaluation, provide pt/family education and to maximize pt's level of independence in the home and community environment.     Pt's spiritual, cultural and educational needs considered and pt agreeable to plan of care and goals.     Anticipated barriers to physical therapy: none    GOALS: Short Term Goals:  4 weeks  1.Report decreased in pain at worse less than  <   / =  4  /10  to increase tolerance for functional mobility. MET  2. Pt to improve R knee range of motion by 25% to allow for improved functional mobility to allow for improvement in IADLs. MET  3. Increased R LE MMT 1/2 grade to increase tolerance for ADL and work activities.MET  4. Pt to report ability to ambulate 100 ft without AD.MET  5. Pt to tolerate HEP to improve ROM and independence with ADL's.MET     Long Term Goals: 8 weeks  1.Report decreased in pain at worse less than  <   / =  2  /10  to increase tolerance for functional mobility. On going  2.Pt to improve range of motion by 75% to allow for improved functional mobility to allow for improvement in IADLs. MET  3.Increased R LE MMT 1 grade to increase tolerance for ADL and work activities.MET  4. Pt will report 39% on FOTO knee survey  to demonstrate increase in LE function with every day tasks. MET  5. Pt to be Independent with HEP to improve ROM and independence with ADL's. MET    Plan   Plan of care Certification: 5/23/2023 to 8/31/2023.     Continue Treatment per established  THU Anaya, PT   8/24/2023

## 2023-08-25 ENCOUNTER — PATIENT MESSAGE (OUTPATIENT)
Dept: ORTHOPEDICS | Facility: CLINIC | Age: 68
End: 2023-08-25
Payer: MEDICARE

## 2023-08-29 ENCOUNTER — PROCEDURE VISIT (OUTPATIENT)
Dept: PAIN MEDICINE | Facility: CLINIC | Age: 68
End: 2023-08-29
Payer: MEDICARE

## 2023-08-29 VITALS
RESPIRATION RATE: 18 BRPM | BODY MASS INDEX: 24.68 KG/M2 | OXYGEN SATURATION: 100 % | WEIGHT: 139.31 LBS | DIASTOLIC BLOOD PRESSURE: 77 MMHG | SYSTOLIC BLOOD PRESSURE: 139 MMHG | HEIGHT: 63 IN | HEART RATE: 63 BPM | TEMPERATURE: 97 F

## 2023-08-29 DIAGNOSIS — M79.18 MYOFASCIAL PAIN: Primary | ICD-10-CM

## 2023-08-29 PROCEDURE — 20553 NJX 1/MLT TRIGGER POINTS 3/>: CPT | Mod: S$PBB,,, | Performed by: ANESTHESIOLOGY

## 2023-08-29 PROCEDURE — 20553 PR INJECT TRIGGER POINTS, > 3: ICD-10-PCS | Mod: S$PBB,,, | Performed by: ANESTHESIOLOGY

## 2023-08-29 PROCEDURE — 20553 NJX 1/MLT TRIGGER POINTS 3/>: CPT | Mod: PBBFAC | Performed by: ANESTHESIOLOGY

## 2023-08-29 NOTE — PROCEDURES
Procedures  Patient Name: Tiarra Norton  MRN: [id number]    INFORMED CONSENT: The procedure, risks, benefits and options were discussed with patient. There are no contraindications to the procedure. The patient expressed understanding and agreed to proceed. The personnel performing the procedure was discussed. I verify that I personally obtained Tiarra's consent prior to the start of the procedure and the signed consent can be found on the patient's chart.    Procedure Date: 08/29/2023    Anesthesia: Topical    Pre Procedure diagnosis:   1. Myofascial pain        Post-Procedure diagnosis: same          PROCEDURE: TRIGGER POINT INJECTION  The patient was placed in a seated position and time out was perfomed. The site of pain and procedure were confirmed with the patient prior to starting the procedure. After performing time out. The patient's  trigger points were identified and marked at the site around her spinal cord stimulator at lumbar paraspinal muscles. The skin was prepped with chlorhexidine three times.   After performing time out A 27-gauge 1.5 inch  needle was advanced through the skin and subcutaneous tissues.  Aspiration for blood, air and CSF was negative.  A total of 10 ml of Bupivacaine 0.25% and 10 mg Decadron was injected at all trigger point.  No complications were evident. No specimens collected.    Blood Loss: Nill  Specimen: None    Meghan Rivas MD    I have reviewed the notes, assessments, and/or procedures performed by resident, I concur with her/his documentation of Tiarra Norton.      Shoaib Aguirre MD

## 2023-08-30 ENCOUNTER — PATIENT MESSAGE (OUTPATIENT)
Dept: REHABILITATION | Facility: OTHER | Age: 68
End: 2023-08-30
Payer: MEDICARE

## 2023-08-31 ENCOUNTER — PATIENT MESSAGE (OUTPATIENT)
Dept: REHABILITATION | Facility: OTHER | Age: 68
End: 2023-08-31
Payer: MEDICARE

## 2023-09-03 ENCOUNTER — PATIENT MESSAGE (OUTPATIENT)
Dept: ORTHOPEDICS | Facility: CLINIC | Age: 68
End: 2023-09-03
Payer: MEDICARE

## 2023-09-05 RX ORDER — MELOXICAM 15 MG/1
15 TABLET ORAL DAILY
Qty: 90 TABLET | Refills: 0 | Status: SHIPPED | OUTPATIENT
Start: 2023-09-05 | End: 2023-09-06 | Stop reason: SDUPTHER

## 2023-09-06 RX ORDER — MELOXICAM 15 MG/1
15 TABLET ORAL DAILY
Qty: 90 TABLET | Refills: 0 | Status: SHIPPED | OUTPATIENT
Start: 2023-09-06 | End: 2023-09-12

## 2023-09-09 ENCOUNTER — PATIENT MESSAGE (OUTPATIENT)
Dept: PAIN MEDICINE | Facility: CLINIC | Age: 68
End: 2023-09-09
Payer: MEDICARE

## 2023-09-11 ENCOUNTER — PATIENT MESSAGE (OUTPATIENT)
Dept: INTERNAL MEDICINE | Facility: CLINIC | Age: 68
End: 2023-09-11
Payer: MEDICARE

## 2023-09-11 ENCOUNTER — PATIENT MESSAGE (OUTPATIENT)
Dept: ORTHOPEDICS | Facility: CLINIC | Age: 68
End: 2023-09-11
Payer: MEDICARE

## 2023-09-12 ENCOUNTER — OFFICE VISIT (OUTPATIENT)
Dept: PAIN MEDICINE | Facility: CLINIC | Age: 68
End: 2023-09-12
Payer: MEDICARE

## 2023-09-12 DIAGNOSIS — M79.18 MYOFASCIAL PAIN: Primary | ICD-10-CM

## 2023-09-12 DIAGNOSIS — Z96.89 SPINAL CORD STIMULATOR STATUS: ICD-10-CM

## 2023-09-12 DIAGNOSIS — M47.816 LUMBAR SPONDYLOSIS: ICD-10-CM

## 2023-09-12 PROCEDURE — 99213 PR OFFICE/OUTPT VISIT, EST, LEVL III, 20-29 MIN: ICD-10-PCS | Mod: 95,,, | Performed by: NURSE PRACTITIONER

## 2023-09-12 PROCEDURE — 99213 OFFICE O/P EST LOW 20 MIN: CPT | Mod: 95,,, | Performed by: NURSE PRACTITIONER

## 2023-09-12 NOTE — PROGRESS NOTES
Chronic Pain-Established Visit  Chronic Pain-Tele-Medicine-Established Note (Follow up visit)        The patient location is: Home  The chief complaint leading to consultation is: pain  Visit type: Virtual visit with synchronous audio and video  Total time spent with patient: 25 min  Each patient to whom he or she provides medical services by telemedicine is:  (1) informed of the relationship between the physician and patient and the respective role of any other health care provider with respect to management of the patient; and (2) notified that he or she may decline to receive medical services by telemedicine and may withdraw from such care at any time.           SUBJECTIVE:    PCP: Kaykay Perales MD    REFERRING PHYSICIAN: Tyler Jacobs MD    CHIEF COMPLAINT: Lower back pain       Original HISTORY OF PRESENT ILLNESS: Tiarra Norton presents to the clinic for the evaluation of the above pain. The pain started five years ago.     Original Pain Description:  The pain is located in the lower back and does radiate up towards her upper back.The pain is described as aching, dull and sharp. Initially starts as a dull, aching pain and it gets sharp once she bends down and moves around. Typically when she walks for more than five minutes, the sharp pain radiates up her back. Exacerbating factors: Standing, Bending, Touching, Walking, Night Time, Flexing and Lifting. Mitigating factors heat, ice, laying down and massage. Symptoms interfere with daily activity and sleeping. The patient feels like symptoms have been worsening. Patient denies night fever/night sweats, urinary incontinence, bowel incontinence, significant weight loss, significant motor weakness and loss of sensations.    Original PAIN SCORES:  Best: Pain is 1  Worst: Pain is 10  Usually: Pain is 4  Current: Pain is 4    INTERVAL HISTORY:  4/18/22 Interval History: Patient presents for telehealth visit for follow up following her b/l RFA at L3-L5. She  reports 80% relief and is very pleased with her pain relief. Unfortunately, she is not back to doing what she wants due to right knee pain. She is seeing Dr. Huber for knee pain and is s/p right medial compartment partial knee replacement. She is currently in therapy for this. We discussed that we may be able to assist her with her knee pain as well.     Interval History 6/15/22: Tiarra Norton returns for follow up. She continues to feel like she has mild pain sitting or laying down, and has her worst pain with extreme leaning forward to pick something up off the floor. She also has difficulty leaning over her handlebars to ride her bike or leaning forward to fold clothes or standing up for any period of time. So, we determined that leaning forward is her worst issue. We did previouisly do lmbb and rfa, which has helped with extension, but it would not help with leaning forward. On review of her MRI she has significant multilevel DDD with Modic changes which likely account for her pain.     Interval History 7/25/22: Tiarra Norton returns for follow up. We previously have been discussing treatments for her low back pain that did not resolve with RFA. At our last visit I referred her to Dr. Antony for clearance for a SCS trial. She was considered to be a reasonable candidate. However, in the meantime, her PT referred her to acupuncture with Dr. Jacobs. She had one treatment and already notes 50% relief. She notes that she still has pain, especially with leaning forward, but feels that it is much better controlled. Their plan is one treatment per week but is approved for 20 sessions.     Interval History 8/22/22: Ms. Norton returns for follow up on her low back pain. At our last visit she felt cured by acupuncture. Unfortunately, this only lasted for 24 hours. She returns today looking for other options to make life livable. Patient claims all of her pain in the low back and middle back. We discussed that this  will work best for her low back pain.     Interval History 9/21/2022:  The patient returns to clinic today for follow up of back pain via virtual visit. She received a letter from [employer] denying SCS trial. She is frustrated by this. She continues to report low back pain. She denies any radicular leg pain. Her pain is worse with bending and activity. She recently underwent med screening for the [employer] program. She is interested in pursuing this. She has tried Gabapentin and Lyrica in the past with side effects. She denies any other health changes. Her pain today is 7/10.     Interval History 10/14/22: Mrs. Norton presents today for follow up of chronic low back pain and to discuss the [place] trial. She had her lumbar MRI done last night without significant changes to her spine. She did have increased dilation of her bile duct. Her pain today is the same in character and severity as during her last visit with us and she rates it currently at a 7/10. She continues to do her stretches on her own which have helped some. She presents with some questions regarding the trial.     Interval History 12/29/2022:  The patient returns for post op appointment. She is s/p lumbar [employer] SCS trial with 90% relief. She has had very minimal back pain during the trial period. She says that she was more active over this time due to the Christmas holiday and had minimal symptoms. She is very happy with the relief. She would like to move forward with implant. She is part of Cortez study. No fever or malaise during trial period. Her pain today is 1/10.    Interval History 1/17/2023:  The patient returns to clinic today for post op. She is s/p [employer] SCS implant on 1/9/2022. She reports benefit with the device at this time. She is meeting with the representatives today. She does report soreness to her incisions. She denies any fever, chills, or drainage. She did have issues with her dressing staying on. She has  completed her antibiotics. She denies any other health changes. Her pain today is 2/10.    Interval History 1/31/2023:  The patient returns to clinic today for wound check. She reports benefit with [employer] SCS. She is in contact with representatives for programming. She reports intermittent muscle soreness into her legs. She does have an upcoming appointment with representatives for programming. She denies any fever, chills, or drainage. She continues to follow her activity restrictions. She denies any other health changes. Her pain today is 3/10.    Interval History 2/17/2023:  The patient returns to clinic today for follow up of back pain. She reports benefit with [employer] SCS. She is in contact with representatives for programming. She is very happy with relief. She denies any fever, chills, or drainage. She continues to follow her activity restrictions. She denies any other health changes. Her pain today is 2/10.    Interval History 3/21/2023:  The patient presents to discuss pain to IPG site. She says that it has been present since surgery and has gradually worsened. She says that it feels like a tight and weak muscle. She has not tried any topical medications or muscle relaxants. No fever or malaise. She has not noticed any redness or swelling to the site. Her original pain has significantly improved from SCS implant. She is part of a research study. Her pain today is 6/10.    Interval History 4/14/2023:  The patient is here for follow up of back pain and pain at IPG site. She reports improvement since previous visit. She did have benefit with Lidoderm patches but had a rash so she stopped. Her pain has improved with Salon Pas patches. She is scheduled for knee replacement next month with Dr. Scott. Her pain today is 3/10.    Interval History 8/11/2023:  The patient returns to clinic today for follow up of pain. She reports pain around her SCS battery site. She describes the pain as though  her muscles feel weak and tired. She does report benefit with SCS. No difficulty with charging or programming. She has tried Lidoderm and Salonpas patches but these caused a rash. She is currently participating in physical therapy for her knee. She denies any other health changes. Her pain today is 4/10.    Interval History 9/12/2023:  The patient returns to clinic today for follow up of battery site pain via virtual visit. She is s/p trigger point injections under ultrasound on 8/29/2023. She does feel some benefit. She is currently ill with Covid. She is currently running fever and having body aches. She denies any other health changes.     6 weeks of Conservative therapy (PT/Chiro/Home Exercises with Dates)  Healthy back program--> has four sessions left for a total of 20 sessions   Last session was on 1/31/22   Stretches that they taught at [employer] gives her relief       Medications:   Robaxin PRN    Ice and heat which has helped   Tried Gabapentin and Lyrica- made her loopy      Report: reviewed       Interventional Pain Procedures:   2/8/2022- Bilateral L3,4,5 MBB  2/15/2022- Bilateral L3,4,5 MBB  3/8/2022- Right L3,4,5 RFA- 80% relief for a few months  3/22/2022- Left L3,4,5 RFA- 80% relief for a few months  12/22/22 SCS trial- 90% relief  1/9/2023- [employer] SCS implant.     Imaging:  10/13/22: MRI LUMBAR SPINE WITHOUT CONTRAST  FINDINGS:  Lumbar levoscoliosis centered at L2.  Minimal anterolisthesis at L4-5.     No compression fractures.  No marrow replacing lesions.     Multilevel degenerative changes with disc desiccation and disc space narrowing, described in detail below.  Discogenic endplate edema at T12-L1.     The conus medullaris has a normal appearance and terminates at the L1 level.  Cauda equina nerve roots are unremarkable.  No epidural mass or collection.     The common duct is dilated up to 12 mm, previously 9 mm on 08/20/2022 CT.  Mild prominence of the main pancreatic duct up  to 4 mm, which is new.  No definite filling defect in the bile ducts.  Paraspinal muscle atrophy.     SIGNIFICANT FINDINGS BY LEVEL:     T12-L1: Disc bulge.  Bilateral facet arthropathy and ligamentum flavum thickening.  Mild canal stenosis.  Mild bilateral foraminal stenosis.     L1-2: Disc bulge.  Bilateral facet arthropathy and ligamentum flavum thickening.  Mild canal stenosis.  Mild right foraminal stenosis.     L2-3: Disc bulge.  Bilateral facet arthropathy and ligamentum flavum thickening.  Mild canal stenosis.  Moderate right mild left foraminal stenosis.     L3-4: Disc bulge.  Bilateral facet arthropathy and ligamentum flavum thickening.  Small bilateral facet joint effusions/synovitis.  Moderate canal stenosis with partial effacement of the thecal sac and crowding of the cauda equina nerve roots.  Mild right and moderate left foraminal stenosis.     L4-5: Disc bulge with posterior disc uncovering.  Bilateral facet arthropathy and ligamentum flavum thickening.  Small right facet joint effusion/synovitis.  Mild canal stenosis.  Mild bilateral foraminal stenosis.     L5-S1: Disc bulge.  Bilateral facet arthropathy and ligamentum flavum thickening.  No significant canal stenosis.  Mild left foraminal stenosis.     Impression:     Lumbar levoscoliosis and multilevel degenerative changes as detailed above.  No significant changes from 01/21/2022.     Increased biliary ductal dilatation up to 12 mm, greater than expected after cholecystectomy.  In addition, the main pancreatic duct is mildly prominent up to 4 mm, which is new.  If LFTs are abnormal, consider MRI/MRCP or ERCP for further evaluation.      Past Medical History:   Diagnosis Date    Cataract     Depression     Gestational diabetes     Hyperlipidemia     Hypertension     IBS (irritable bowel syndrome)     Thyroid disease      Past Surgical History:   Procedure Laterality Date    ADENOIDECTOMY      ARTHROPLASTY, KNEE, TOTAL, USING COMPUTER-ASSISTED  NAVIGATION Right 2023    Procedure: CONVERSION ARTHROPLASTY, KNEE, TOTAL, USING COMPUTER-ASSISTED NAVIGATION;  Surgeon: Virgil Scott MD;  Location: [facility] OR;  Service: Orthopedics;  Laterality: Right;  Same day discharge    ARTHROSCOPIC CHONDROPLASTY OF KNEE JOINT Right 10/19/2021    Procedure: ARTHROSCOPY, KNEE, WITH CHONDROPLASTY;  Surgeon: Trevin Huber MD;  Location: [facility] OR;  Service: Orthopedics;  Laterality: Right;    ARTHROSCOPY OF KNEE Right 10/19/2021    Procedure: ARTHROSCOPY, KNEE-RIGHT;  Surgeon: Trevin Huber MD;  Location: [facility] OR;  Service: Orthopedics;  Laterality: Right;     SECTION      CHOLECYSTECTOMY      COLONOSCOPY N/A 2016    Procedure: COLONOSCOPY;  Surgeon: Heir Segura MD;  Location: [facility] (4TH FLR);  Service: Endoscopy;  Laterality: N/A;    COLONOSCOPY N/A 2019    Procedure: COLONOSCOPY;  Surgeon: Joe Pitts MD;  Location: [facility] ENDO (4TH FLR);  Service: Endoscopy;  Laterality: N/A;    CYSTOSCOPY  2022    Procedure: CYSTOSCOPY;  Surgeon: Lenny Moore MD;  Location: [facility] OR 1ST FLR;  Service: Urology;;    ESOPHAGOGASTRODUODENOSCOPY N/A 2020    Procedure: EGD (ESOPHAGOGASTRODUODENOSCOPY);  Surgeon: Tolu Rivas MD;  Location: [facility] ENDO (4TH FLR);  Service: Endoscopy;  Laterality: N/A;  Pt. moved to 9:15am arrival time.. Instructed to not have anything after midnight.EC    EYE SURGERY      cataract resection right    INJECTION OF ANESTHETIC AGENT AROUND NERVE Bilateral 2022    Procedure: Block, Nerve MEDIAL BRANCH BLOCK BILATERAL L3,4,5;  Surgeon: Tara Gibbs MD;  Location: [facility] PAIN MGT;  Service: Pain Management;  Laterality: Bilateral;    INJECTION OF ANESTHETIC AGENT AROUND NERVE Bilateral 2/15/2022    Procedure: BLOCK, NERVE, L3*L4-L5 MEDIAL BR ANCH 2 OF 2;  Surgeon: Tara Gibbs MD;  Location: [facility] PAIN MGT;  Service: Pain Management;  Laterality: Bilateral;    INJECTION OF BOTULINUM TOXIN TYPE A   6/21/2022    Procedure: BOTULINUM TOXIN INJECTION 100 units;  Surgeon: Lenny Moore MD;  [facility];  Service: Urology;;    INSERTION, NEUROSTIMULATOR, SPINAL CORD N/A 1/9/2023    Procedure: SPINAL CORD STIMULATOR IMPLANT [place];  Surgeon: Shoaib Aguirre MD;  Location: [facility] [place] OR;  Service: Pain Management;  Laterality: N/A;    JOINT REPLACEMENT      partial right knee    KNEE ARTHROSCOPY W/ MENISCECTOMY Right 10/19/2021    Procedure: ARTHROSCOPY, KNEE, WITH MENISCECTOMY, PARTIAL LATERAL;  Surgeon: Trevin Huber MD;  Location: [facility] OR;  Service: Orthopedics;  Laterality: Right;    r hand surgery      RADIOFREQUENCY ABLATION Right 3/8/2022    Procedure: RADIOFREQUENCY ABLATION, L3-L5 1 OF 2;  Surgeon: Tara Gibbs MD;  Location: [facility] [place] PAIN MGT;  Service: Pain Management;  Laterality: Right;    RADIOFREQUENCY ABLATION Left 3/22/2022    Procedure: RADIOFREQUENCY ABLATION, l3-+l5 2 of 2;  Surgeon: Tara Gibbs MD;  Location: [facility] [place] PAIN MGT;  Service: Pain Management;  Laterality: Left;    TONSILLECTOMY      TOTAL KNEE ARTHROPLASTY      partial knee replacement    TRIAL OF SPINAL CORD NERVE STIMULATOR N/A 12/22/2022    Procedure: SPINAL CORD STIMULATOR TRIAL [employer] RESEARCH;  Surgeon: Shoaib Aguirre MD;  Location: [facility] [place] PAIN MGT;  Service: Pain Management;  Laterality: N/A;    TUBAL LIGATION       Social History     Socioeconomic History    Marital status:     Number of children: 1   Occupational History    Occupation:      Employer: [employer]     Comment: retired   Tobacco Use    Smoking status: Never    Smokeless tobacco: Never   Substance and Sexual Activity    Alcohol use: Yes     Alcohol/week: 3.0 standard drinks of alcohol     Types: 3 Glasses of wine per week     Comment: weekends    Drug use: Yes     Types: Marijuana     Comment: occasionally    Sexual activity: Yes     Partners: Male   Other Topics Concern    Are you pregnant or think  you may be? No    Breast-feeding No   Social History Narrative    Retired        Swims.       Stationary bike.            Social Determinants of Health     Financial Resource Strain: Low Risk  (7/27/2022)    Overall Financial Resource Strain (CARDIA)     Difficulty of Paying Living Expenses: Not hard at all   Food Insecurity: No Food Insecurity (7/27/2022)    Hunger Vital Sign     Worried About Running Out of Food in the Last Year: Never true     Ran Out of Food in the Last Year: Never true   Transportation Needs: No Transportation Needs (7/27/2022)    PRAPARE - Transportation     Lack of Transportation (Medical): No     Lack of Transportation (Non-Medical): No   Physical Activity: Insufficiently Active (7/27/2022)    Exercise Vital Sign     Days of Exercise per Week: 4 days     Minutes of Exercise per Session: 20 min   Stress: Stress Concern Present (7/27/2022)    [employer] - Occupational Stress Questionnaire     Feeling of Stress : Very much   Social Connections: Unknown (7/27/2022)    Social Connection and Isolation Panel [NHANES]     Frequency of Communication with Friends and Family: Twice a week   Housing Stability: Low Risk  (7/27/2022)    Housing Stability Vital Sign     Unable to Pay for Housing in the Last Year: No     Number of Places Lived in the Last Year: 1     Unstable Housing in the Last Year: No     Family History   Problem Relation Age of Onset    Hypertension Mother     Irritable bowel syndrome Mother     Hyperlipidemia Mother     Heart disease Father         mi    Hypertension Father     Cancer Father         prostate    Heart attack Father     Heart failure Father     Hyperlipidemia Father     Alcohol abuse Sister     Depression Sister     Epilepsy Son     Irritable bowel syndrome Sister     Alcohol abuse Sister     Ovarian cancer Maternal Aunt     Breast cancer Paternal Aunt     Breast cancer Maternal Aunt     Melanoma Neg Hx     Colon cancer Neg Hx      Stomach cancer Neg Hx     Esophageal cancer Neg Hx     Liver disease Neg Hx     Crohn's disease Neg Hx     Ulcerative colitis Neg Hx     Celiac disease Neg Hx     Stroke Neg Hx        Review of patient's allergies indicates:  No Known Allergies    Current Outpatient Medications   Medication Sig    acetaminophen (TYLENOL) 500 MG tablet Take 2 tablets (1,000 mg total) by mouth every 8 (eight) hours as needed for Pain.    ALPRAZolam (XANAX) 1 MG tablet Take 1 mg by mouth 3 (three) times daily.    amLODIPine (NORVASC) 5 MG tablet Take 1 tablet (5 mg total) by mouth once daily.    atorvastatin (LIPITOR) 10 MG tablet Take 1 tablet (10 mg total) by mouth once daily.    b complex vitamins capsule Take 1 capsule by mouth once daily.    cholecalciferol, vitamin D3, (VITAMIN D3) 5,000 unit Tab Take 1 tablet (5,000 Units total) by mouth once daily.    conjugated estrogens (PREMARIN) vaginal cream Place 0.5 g vaginally twice a week. Place a pea-sized amount in vagina every night for 4 weeks, then use 2-3 nights a week (Patient not taking: Reported on 8/29/2023)    docusate sodium (COLACE) 100 MG capsule Take 1 capsule (100 mg total) by mouth 2 (two) times daily. (Patient not taking: Reported on 8/29/2023)    EScitalopram oxalate (LEXAPRO) 5 MG Tab Take 5 mg by mouth.    estradioL (ESTRACE) 0.01 % (0.1 mg/gram) vaginal cream Place 1g daily for 2-4 weeks and then 2-3x/week afterwards (Patient not taking: Reported on 8/29/2023)    levothyroxine (SYNTHROID) 125 MCG tablet TAKE 1 TABLET BY MOUTH EVERY MORNING    liothyronine (CYTOMEL) 5 MCG Tab Take 1 tablet (5 mcg total) by mouth once daily. (Patient not taking: Reported on 8/29/2023)    meloxicam (MOBIC) 15 MG tablet Take 1 tablet (15 mg total) by mouth once daily.    RESTASIS 0.05 % ophthalmic emulsion INT 1 GTT IN OU BID    sumatriptan (IMITREX) 50 MG tablet 1 tab po at start of headache.  Repeat in 2 h prn    traMADoL (ULTRAM) 50 mg tablet Take 1-2 tablets ( mg total) by  mouth every 8 (eight) hours as needed for Pain. (Patient not taking: Reported on 8/29/2023)    traZODone (DESYREL) 100 MG tablet 1-2 tabs po q hs for sleep    valsartan (DIOVAN) 320 MG tablet Take 1 tablet (320 mg total) by mouth once daily.     No current facility-administered medications for this visit.     Facility-Administered Medications Ordered in Other Visits   Medication    0.9%  NaCl infusion    celecoxib capsule 400 mg       REVIEW OF SYSTEMS:    GENERAL: No fever. No chills. No fatigue. Denies weight loss. Denies weight gain.  HEENT: Denies headaches. Denies vision change. Denies eye pain. Denies double vision. Denies ear pain.   CV: Denies chest pain. HTN  PULM: Denies of shortness of breath.  GI: Denies constipation. No diarrhea. No abdominal pain. Denies nausea. Denies vomiting. No blood in stool.  HEME: Denies bleeding problems.  : Denies urgency. No painful urination. No blood in urine.  MS: See HPI  SKIN: Denies rash.   NEURO: Denies seizures. No weakness.  ENDO: Thyroid disorder.   PSYCH:  Depression    OBJECTIVE:     Exam limited due to virtual visit:  GENERAL: Well appearing, in no acute distress, alert and oriented x3.  PSYCH:  Mood and affect appropriate.    Previous physical exam:  There were no vitals filed for this visit.       PHYSICAL EXAM:   GENERAL: Well appearing, in no acute distress, alert and oriented x3.  PSYCH:  Mood and affect appropriate.  SKIN: Skin color, texture, turgor normal, no rashes or lesions. SCS site well healed.   BACK: There is pain with palpation over lumbar paraspinals. There is pain with palpation around SCS battery.   MSK: 5/5 strength in right ankle with plantar and dorsiflexion. 5/5 strength in left ankle with plantar and dorsiflexion. 5/5 strength with right knee flexion and extension. 5/5 strength with left knee flexion and extension.   GAIT: normal- ambulates without assistance.       ASSESSMENT: 68 y.o. year old female with low back pain, consistent with  the followin. Myofascial pain        2. Spinal cord stimulator status        3. Lumbar spondylosis              PLAN:     - Previous imaging reviewed.     - She is s/p trigger point injections around battery site with benefit. We can repeat this as needed.     - Continue physical therapy and home exercise routine.      - RTC PRN.      The above plan and management options were discussed at length with patient. Patient is in agreement with the above and verbalized understanding.     Marlene Thorne NP  2023

## 2023-09-14 ENCOUNTER — PATIENT MESSAGE (OUTPATIENT)
Dept: ORTHOPEDICS | Facility: CLINIC | Age: 68
End: 2023-09-14
Payer: MEDICARE

## 2023-09-15 ENCOUNTER — PATIENT MESSAGE (OUTPATIENT)
Dept: INTERNAL MEDICINE | Facility: CLINIC | Age: 68
End: 2023-09-15
Payer: MEDICARE

## 2023-09-17 NOTE — TELEPHONE ENCOUNTER
No care due was identified.  [facility] Embedded Care Due Messages. Reference number: [id number].   9/17/2023 9:47:42 AM CDT

## 2023-09-18 RX ORDER — PROMETHAZINE HYDROCHLORIDE 12.5 MG/1
TABLET ORAL
Qty: 30 TABLET | Refills: 2 | Status: SHIPPED | OUTPATIENT
Start: 2023-09-18 | End: 2024-03-14

## 2023-09-21 ENCOUNTER — OFFICE VISIT (OUTPATIENT)
Dept: PODIATRY | Facility: CLINIC | Age: 68
End: 2023-09-21
Payer: MEDICARE

## 2023-09-21 VITALS
BODY MASS INDEX: 24.68 KG/M2 | HEIGHT: 63 IN | DIASTOLIC BLOOD PRESSURE: 84 MMHG | SYSTOLIC BLOOD PRESSURE: 151 MMHG | HEART RATE: 77 BPM | WEIGHT: 139.31 LBS

## 2023-09-21 DIAGNOSIS — Z01.818 PRE-OP TESTING: ICD-10-CM

## 2023-09-21 DIAGNOSIS — L60.0 INGROWN NAIL: Primary | ICD-10-CM

## 2023-09-21 DIAGNOSIS — M79.675 TOE PAIN, LEFT: ICD-10-CM

## 2023-09-21 DIAGNOSIS — L03.032 PARONYCHIA, TOE, LEFT: ICD-10-CM

## 2023-09-21 PROCEDURE — 99214 OFFICE O/P EST MOD 30 MIN: CPT | Mod: PBBFAC,PN | Performed by: PODIATRIST

## 2023-09-21 PROCEDURE — 99999 PR PBB SHADOW E&M-EST. PATIENT-LVL IV: ICD-10-PCS | Mod: PBBFAC,,, | Performed by: PODIATRIST

## 2023-09-21 PROCEDURE — 99214 PR OFFICE/OUTPT VISIT, EST, LEVL IV, 30-39 MIN: ICD-10-PCS | Mod: S$PBB,,, | Performed by: PODIATRIST

## 2023-09-21 PROCEDURE — 99999 PR PBB SHADOW E&M-EST. PATIENT-LVL IV: CPT | Mod: PBBFAC,,, | Performed by: PODIATRIST

## 2023-09-21 PROCEDURE — 99214 OFFICE O/P EST MOD 30 MIN: CPT | Mod: S$PBB,,, | Performed by: PODIATRIST

## 2023-09-21 RX ORDER — TOBRAMYCIN 3 MG/ML
SOLUTION/ DROPS OPHTHALMIC
Qty: 5 ML | Refills: 3 | Status: SHIPPED | OUTPATIENT
Start: 2023-09-21 | End: 2024-02-27

## 2023-09-21 NOTE — PROGRESS NOTES
Subjective:      Patient ID: Tiarra Norton is a 68 y.o. female.    Chief Complaint: Nail Problem (Toenail fungus, nail crumbled)    Sharp, throbbing pain both sides of the left big toe/toenail.  Sick chronic condition present for years.  This new exacerbation has been  Gradual onset, worsening over the past few days.  Aggravated by socks shoes pressure ambulation.  No prior medical treatment.  No self-treatment.  Patient has had prior nail surgery on both sides of both big toenails which healed well in the right.  Healed well on the left, but the left discontinue to ingrowth with the remaining nail.    Review of Systems   Constitutional: Negative for chills, diaphoresis, fever, malaise/fatigue and night sweats.   Cardiovascular:  Negative for claudication, cyanosis, leg swelling and syncope.   Skin:  Positive for nail changes. Negative for color change, dry skin, rash, suspicious lesions and unusual hair distribution.   Musculoskeletal:  Negative for falls, joint pain, joint swelling, muscle cramps, muscle weakness and stiffness.   Gastrointestinal:  Negative for constipation, diarrhea, nausea and vomiting.   Neurological:  Negative for brief paralysis, disturbances in coordination, focal weakness, numbness, paresthesias, sensory change and tremors.         Objective:      Physical Exam  Constitutional:       General: She is not in acute distress.     Appearance: She is well-developed. She is not diaphoretic.   Cardiovascular:      Pulses:           Popliteal pulses are 2+ on the right side and 2+ on the left side.        Dorsalis pedis pulses are 2+ on the right side and 2+ on the left side.        Posterior tibial pulses are 2+ on the right side and 2+ on the left side.      Comments: Capillary refill 3 seconds all toes/distal feet, all toes/both feet warm to touch.      Negative lymphadenopathy bilateral popliteal fossa and tarsal tunnel.      Negavie lower extremity edema bilateral.    Musculoskeletal:       Right ankle: No swelling, deformity, ecchymosis or lacerations. Normal range of motion. Normal pulse.      Right Achilles Tendon: Normal. No defects. Vega's test negative.   Lymphadenopathy:      Lower Body: No right inguinal adenopathy. No left inguinal adenopathy.      Comments: Negative lymphadenopathy bilateral popliteal fossa and tarsal tunnel.    Negative lymphangitic streaking bilateral feet/ankles/legs.   Skin:     General: Skin is warm and dry.      Capillary Refill: Capillary refill takes 2 to 3 seconds.      Coloration: Skin is not pale.      Findings: No abrasion, bruising, burn, ecchymosis, erythema, laceration, lesion or rash.      Nails: There is no clubbing.      Comments:   Visible and palpable ingrowth of toenail bilateral borders left hallux with pain to palpation, and focal localized erythema and edema,  without ulceration, drainage, pus, tracking, fluctuance, malodor, or cardinal signs infection.     Neurological:      Mental Status: She is alert and oriented to person, place, and time.      Sensory: No sensory deficit.      Motor: No tremor, atrophy or abnormal muscle tone.      Gait: Gait normal.      Deep Tendon Reflexes:      Reflex Scores:       Patellar reflexes are 2+ on the right side and 2+ on the left side.       Achilles reflexes are 2+ on the right side and 2+ on the left side.  Psychiatric:         Behavior: Behavior is cooperative.           Assessment:       Encounter Diagnoses   Name Primary?    Ingrown nail Yes    Paronychia, toe, left     Toe pain, left          Plan:       Tiarra was seen today for nail problem.    Diagnoses and all orders for this visit:    Ingrown nail  -     US Lower Extrem Arteries Bilat with DANITA (xpd); Future    Paronychia, toe, left  -     US Lower Extrem Arteries Bilat with DANITA (xpd); Future    Toe pain, left  -     US Lower Extrem Arteries Bilat with DANITA (xpd); Future    Other orders  -     tobramycin sulfate 0.3% (TOBREX) 0.3 % ophthalmic  solution; 1-2 drops topically twice daily to affected toe(s).      I counseled the patient on her conditions, their implications and medical management.    Topical tobramycin drops twice daily left hallux.      Cover left hallux all times with Band-Aid or similar changing daily.      Discussed conservative treatment with shoes of adequate dimensions, material, and style to alleviate symptoms and delay or prevent surgical intervention.     Utilizing sterile toenail clippers I aggressively debrided the offending nail border approximately 3 mm from its edge and carried the nail plate incision down at an angle in order to wedge out the offending cryptotic portion of the nail plate. The offending border was then removed in toto. The area was cleansed with alcohol. Patient tolerated the procedure well and related significant relief.          No follow-ups on file.

## 2023-09-22 ENCOUNTER — OFFICE VISIT (OUTPATIENT)
Dept: ORTHOPEDICS | Facility: CLINIC | Age: 68
End: 2023-09-22
Payer: MEDICARE

## 2023-09-22 ENCOUNTER — PATIENT MESSAGE (OUTPATIENT)
Dept: PAIN MEDICINE | Facility: CLINIC | Age: 68
End: 2023-09-22
Payer: MEDICARE

## 2023-09-22 VITALS — WEIGHT: 139 LBS | BODY MASS INDEX: 24.63 KG/M2 | HEIGHT: 63 IN

## 2023-09-22 DIAGNOSIS — Z96.651 STATUS POST TOTAL RIGHT KNEE REPLACEMENT: Primary | ICD-10-CM

## 2023-09-22 PROCEDURE — 99999 PR PBB SHADOW E&M-EST. PATIENT-LVL III: ICD-10-PCS | Mod: PBBFAC,,, | Performed by: ORTHOPAEDIC SURGERY

## 2023-09-22 PROCEDURE — 99213 OFFICE O/P EST LOW 20 MIN: CPT | Mod: PBBFAC,PN | Performed by: ORTHOPAEDIC SURGERY

## 2023-09-22 PROCEDURE — 99213 PR OFFICE/OUTPT VISIT, EST, LEVL III, 20-29 MIN: ICD-10-PCS | Mod: S$PBB,,, | Performed by: ORTHOPAEDIC SURGERY

## 2023-09-22 PROCEDURE — 99999 PR PBB SHADOW E&M-EST. PATIENT-LVL III: CPT | Mod: PBBFAC,,, | Performed by: ORTHOPAEDIC SURGERY

## 2023-09-22 PROCEDURE — 99213 OFFICE O/P EST LOW 20 MIN: CPT | Mod: S$PBB,,, | Performed by: ORTHOPAEDIC SURGERY

## 2023-09-22 RX ORDER — METHYLPREDNISOLONE 4 MG/1
TABLET ORAL
Qty: 21 EACH | Refills: 0 | Status: SHIPPED | OUTPATIENT
Start: 2023-09-22 | End: 2023-10-13

## 2023-09-22 RX ORDER — DICLOFENAC SODIUM 75 MG/1
75 TABLET, DELAYED RELEASE ORAL 2 TIMES DAILY
Qty: 60 TABLET | Refills: 2 | Status: SHIPPED | OUTPATIENT
Start: 2023-09-22 | End: 2023-11-03

## 2023-09-22 RX ORDER — TRAMADOL HYDROCHLORIDE 50 MG/1
50 TABLET ORAL EVERY 8 HOURS PRN
Qty: 20 TABLET | Refills: 0 | Status: SHIPPED | OUTPATIENT
Start: 2023-09-22 | End: 2023-11-16 | Stop reason: CLARIF

## 2023-09-22 NOTE — PROGRESS NOTES
Ortho Knee Follow Up Note    PCP: Kaykay Perales MD   Referring Provider: No referring provider defined for this encounter.        Assessment:  68 y.o. female status post right total Knee conversion completed on 5/18/23. Doing well, good ROM. Pain better overall, less constant pain compared to presurgery but some continued pain to the medial aspect of her knee, pes bursa and medial joint line. Pain worse with activity and ambulating longer distances. Will try medrol dose pack, Voltaren PO for pain. If ongoing issues will see back with new x-rays in a few months to ensure no stress fracture.      Plan:  Follow up 12 months  Future Radiographs Indicated at next visit: Yes    Pain Management: Medrol and NSAIDs  Anticoagulation: None  Wound Care: None    Patient Reassurance:   Post-operative course discussed with patient. Patient reassured and supported. All questions answered.    ACTIVE PROBLEM LIST  Patient Active Problem List   Diagnosis    HTN (hypertension)    Hypothyroid    Unspecified vitamin D deficiency    Elevated liver enzymes    Primary osteoarthritis of right knee    Fatty liver    Hyperlipidemia    Migraine without status migrainosus, not intractable    Glucose intolerance (impaired glucose tolerance)    Hyponatremia    Anxiety    Fatigue    Intermittent diarrhea    HERMINIA (obstructive sleep apnea)    Decreased libido    Sleep arousal disorder    Major depressive disorder, recurrent, mild    Pain    Colon adenomas    Abdominal pain    Exocrine pancreatic insufficiency    Irritable bowel syndrome with diarrhea    Overweight (BMI 25.0-29.9)    Poor posture    Complex tear of lateral meniscus of right knee as current injury    Decreased range of motion (ROM) of right knee    Weakness of right lower extremity    Decreased range of motion of trunk and back    Decreased strength of trunk and back    Idiopathic scoliosis of thoracolumbar spine    Primary insomnia    Back pain    Impaired functional mobility,  "balance, gait, and endurance    Posture imbalance    Weakness of trunk musculature    COVID    Chronic pain syndrome    History of partial knee replacement    Benzodiazepine dependence    Aortic atherosclerosis           HPI:  Tirara Norton presents today for a post-op visit.     STATUS POST:  right total Knee Primary  BMI: Body mass index is 24.62 kg/m².    Post operative recovery was complicated by: None    Patient rates his condition as improving. Pleased with surgical outcome to date.     Functional Assessment:  Completed  Functional Difficulties:  Long distance walking  Pain Medication:  NSAIDs  Anticoagulation: None    EXAM:    right POST-OPERATIVE KNEE    Ht 5' 3" (1.6 m)   Wt 63 kg (139 lb)   BMI 24.62 kg/m²     Skin:  Appropriate post op appearance, No evidence of erythema, warmth, discharge, or drainage, and Incision clean/dry/intact  Range of Motion: Flexion: 130, Extension 0  Mild TTP over pes bursa and medial joint line  No instability   Neurovascular Status: Sensation intact in Sural, Saphenous, SPN, DPN and Tibial nerve distribution. 5 out of 5 strength in hip flexion, knee flexion/extension, ankle plantarflexion/dorsiflexion. 2+ dorsalis pedis    IMAGING:  X-ray Knee:   Implants are well fixed and aligned. There is no evidence of loosening.       "

## 2023-09-23 ENCOUNTER — PATIENT MESSAGE (OUTPATIENT)
Dept: INTERNAL MEDICINE | Facility: CLINIC | Age: 68
End: 2023-09-23
Payer: MEDICARE

## 2023-09-23 DIAGNOSIS — F51.01 PRIMARY INSOMNIA: Primary | ICD-10-CM

## 2023-09-26 ENCOUNTER — RESEARCH ENCOUNTER (OUTPATIENT)
Dept: RESEARCH | Facility: OTHER | Age: 68
End: 2023-09-26
Payer: MEDICARE

## 2023-09-26 NOTE — PROGRESS NOTES
Study: Wave Writer- BRUNO Study: A Randomized Controlled Study to Evaluate the Safety and Effectiveness of [employer] Spinal Cord Stimulation (SCS) Systems in the Treatment of Chronic Low Back and/or Leg Pain with No Prior Surgeries  IRB/Protocol #: [id number]/  : Obed Gasca MD  Treating Physician: Shoaib Aguirre MD  Site Number: [id number]        Subject Number: [id number]     9 Month Post-activation Visit:   The subject came to [facility] for 9 month post activation visit. The JERZY was reviewed and entered into the EDC. Opioid pain medications were also reviewed, there were no changes.  The following questionnaire and/or assessments were completed using the iPad by the subject. The clinician completed the assessments using the paper forms as the clinician's log in credentials were not providing access to the iPad. The subject reported working 0 hours a week. 0 Primary Care visit, O ED visits, and 1 Out patient hospital visit in the past 3 months. Subject then met with KATHLEEN Cali for any additional programming or changes, charging instructions were reviewed with the subject as well.         Questionnaires   Numeric Rating Scale (NRS)  Oswestry Disability Index (PREMA)  SF-36 Health Survey  EQ-5D-5L  PSQI  Sleep Quality Index  Pain Intensity VRS  Clinical Global Impression of Change -Clinician  Pain Intensity VRS (Follow-up)-Clinician     Adverse event reporting was reviewed with the subject and there were no adverse events or protocol deviations to report at this time. A payment of $100 was loaded onto the subject's [employer] for the visit.

## 2023-09-28 ENCOUNTER — PATIENT MESSAGE (OUTPATIENT)
Dept: INTERNAL MEDICINE | Facility: CLINIC | Age: 68
End: 2023-09-28
Payer: MEDICARE

## 2023-09-29 ENCOUNTER — TELEPHONE (OUTPATIENT)
Dept: PAIN MEDICINE | Facility: CLINIC | Age: 68
End: 2023-09-29
Payer: MEDICARE

## 2023-09-29 DIAGNOSIS — M79.18 MYOFASCIAL PAIN: Primary | ICD-10-CM

## 2023-09-29 RX ORDER — SCOLOPAMINE TRANSDERMAL SYSTEM 1 MG/1
1 PATCH, EXTENDED RELEASE TRANSDERMAL
Qty: 6 PATCH | Refills: 1 | Status: SHIPPED | OUTPATIENT
Start: 2023-09-29 | End: 2023-12-30 | Stop reason: SDUPTHER

## 2023-10-13 NOTE — ANESTHESIA PREPROCEDURE EVALUATION
06/21/2022  Tiarra Norton is a 67 y.o., female.    Pre-operative evaluation for Procedure(s) (LRB):  CYSTOSCOPY,WITH BOTULINUM TOXIN INJECTION 100 units (N/A)    Patient Active Problem List   Diagnosis    HTN (hypertension)    Hypothyroid    Unspecified vitamin D deficiency    Elevated liver enzymes    Primary osteoarthritis of right knee    Fatty liver    Hyperlipidemia    Migraine without status migrainosus, not intractable    Glucose intolerance (impaired glucose tolerance)    Hyponatremia    Anxiety    Fatigue    Intermittent diarrhea    HERMINIA (obstructive sleep apnea)    Decreased libido    Sleep arousal disorder    Recurrent major depressive disorder, in partial remission    Pain    Colon adenomas    Abdominal pain    Exocrine pancreatic insufficiency    Irritable bowel syndrome with diarrhea    Overweight (BMI 25.0-29.9)    Poor posture    Complex tear of lateral meniscus of right knee as current injury    Weakness of right lower extremity    Decreased range of motion of trunk and back    Decreased strength of trunk and back    Idiopathic scoliosis of thoracolumbar spine            Medications Prior to Admission   Medication Sig Dispense Refill Last Dose    alprazolam (XANAX) 2 MG Tab Take 1 mg by mouth 3 (three) times daily as needed.  0 6/20/2022 at Unknown time    amLODIPine (NORVASC) 5 MG tablet Take 1 tablet (5 mg total) by mouth once daily. 90 tablet 3 6/20/2022 at Unknown time    levothyroxine (SYNTHROID) 125 MCG tablet TAKE 1 TABLET(125 MCG) BY MOUTH EVERY MORNING 90 tablet 0 6/20/2022 at Unknown time    liothyronine (CYTOMEL) 5 MCG Tab Take 1 tablet (5 mcg total) by mouth once daily. 90 tablet 3 6/20/2022 at Unknown time    pantoprazole (PROTONIX) 40 MG tablet Take 1 tablet (40 mg total) by mouth once daily. 90 tablet 2 6/20/2022 at Unknown time     valsartan (DIOVAN) 320 MG tablet Take 1 tablet (320 mg total) by mouth once daily. (STOP LISINOPRIL) 90 tablet 3 6/20/2022 at Unknown time    atorvastatin (LIPITOR) 10 MG tablet TAKE 1 TABLET EVERY DAY 90 tablet 3     b complex vitamins capsule Take 1 capsule by mouth once daily.       cholecalciferol, vitamin D3, (VITAMIN D3) 5,000 unit Tab Take 1 tablet (5,000 Units total) by mouth once daily.       cholestyramine (QUESTRAN) 4 gram packet Take 1 packet (4 g total) by mouth 2 (two) times daily. 180 packet 3     ciclopirox (LOPROX) 0.77 % Crea APPLY 1 GRAM TOPICALLY TO THE AFFECTED AREA TWICE DAILY       ciclopirox (PENLAC) 8 % Soln Apply topically nightly. 6.6 mL 11     conjugated estrogens (PREMARIN) vaginal cream Place a pea-sized amount in vagina every night for 4 weeks, then use 2-3 nights a week (Patient not taking: Reported on 6/15/2022) 90 g 4     diphenoxylate-atropine 2.5-0.025 mg (LOMOTIL) 2.5-0.025 mg per tablet TAKE 1 TABLET BY MOUTH FOUR TIMES DAILY AS NEEDED FOR DIARRHEA 40 tablet 0     lipase-protease-amylase 24,000-76,000-120,000 units (CREON) 24,000-76,000 -120,000 unit capsule Take 1 capsule by mouth 3 (three) times daily with meals. 90 capsule 11     mirabegron (MYRBETRIQ) 50 mg Tb24 Take 1 tablet (50 mg total) by mouth once daily. (Patient not taking: Reported on 6/15/2022) 30 tablet 11     promethazine (PHENERGAN) 12.5 MG Tab Take 1 tablet (12.5 mg total) by mouth every 6 (six) hours as needed (nausea). 30 tablet 2     RESTASIS 0.05 % ophthalmic emulsion INT 1 GTT IN OU BID       sumatriptan (IMITREX) 50 MG tablet 1 tab po at start of headache.  Repeat in 2 h prn 27 tablet 3     traMADoL (ULTRAM) 50 mg tablet Take 1 tablet (50 mg total) by mouth every 12 (twelve) hours as needed for Pain. 14 tablet 0     traZODone (DESYREL) 100 MG tablet TAKE 1 TO 2 TABLETS AT BEDTIME FOR SLEEP 180 tablet 3     triamcinolone acetonide 0.1% (KENALOG) 0.1 % cream Apply to affected areas of back  BID prn irritation. Do not use on face, underarms, or groin. (Patient not taking: Reported on 6/15/2022) 80 g 1        Review of patient's allergies indicates:  No Known Allergies    Past Medical History:   Diagnosis Date    Cataract     Depression     Gestational diabetes     Hyperlipidemia     Hypertension     IBS (irritable bowel syndrome)     Thyroid disease      Past Surgical History:   Procedure Laterality Date    ADENOIDECTOMY      ARTHROSCOPIC CHONDROPLASTY OF KNEE JOINT Right 10/19/2021    Procedure: ARTHROSCOPY, KNEE, WITH CHONDROPLASTY;  Surgeon: Trevin Huber MD;  Location: [facility] OR;  Service: Orthopedics;  Laterality: Right;    ARTHROSCOPY OF KNEE Right 10/19/2021    Procedure: ARTHROSCOPY, KNEE-RIGHT;  Surgeon: Trevin Huber MD;  Location: [facility] OR;  Service: Orthopedics;  Laterality: Right;     SECTION      CHOLECYSTECTOMY      COLONOSCOPY N/A 2016    Procedure: COLONOSCOPY;  Surgeon: Heri Segura MD;  Location: [facility] ([address] [place] [address]);  Service: Endoscopy;  Laterality: N/A;    COLONOSCOPY N/A 2019    Procedure: COLONOSCOPY;  Surgeon: Joe Pitts MD;  Location: [facility] ([address] [place] [address]);  Service: Endoscopy;  Laterality: N/A;    ESOPHAGOGASTRODUODENOSCOPY N/A 2020    Procedure: EGD (ESOPHAGOGASTRODUODENOSCOPY);  Surgeon: Tolu Rivas MD;  [facility]  Service: Endoscopy;  Laterality: N/A;  Pt. moved to 9:15am arrival time.. Instructed to not have anything after midnight.EC    EYE SURGERY      cataract resection right    INJECTION OF ANESTHETIC AGENT AROUND NERVE Bilateral 2022    Procedure: Block, Nerve MEDIAL BRANCH BLOCK BILATERAL L3,4,5;  Surgeon: Tara Gibbs MD;  Location: [facility] PAIN MGT;  Service: Pain Management;  Laterality: Bilateral;    INJECTION OF ANESTHETIC AGENT AROUND NERVE Bilateral 2/15/2022    Procedure: BLOCK, NERVE, L3*L4-L5 MEDIAL BR ANCH 2 OF 2;  Surgeon: Tara Gibbs MD;  Location: [facility] PAIN  MGT;  Service: Pain Management;  Laterality: Bilateral;    JOINT REPLACEMENT      partial right knee    KNEE ARTHROSCOPY W/ MENISCECTOMY Right 10/19/2021    Procedure: ARTHROSCOPY, KNEE, WITH MENISCECTOMY, PARTIAL LATERAL;  Surgeon: Trevin Huber MD;  Location: [facility];  Service: Orthopedics;  Laterality: Right;    r hand surgery      RADIOFREQUENCY ABLATION Right 3/8/2022    Procedure: RADIOFREQUENCY ABLATION, L3-L5 1 OF 2;  Surgeon: Tara Gibbs MD;  Location: [facility] PAIN MGT;  Service: Pain Management;  Laterality: Right;    RADIOFREQUENCY ABLATION Left 3/22/2022    Procedure: RADIOFREQUENCY ABLATION, l3-+l5 2 of 2;  Surgeon: Tara Gibbs MD;  Location: [facility] PAIN MGT;  Service: Pain Management;  Laterality: Left;    TONSILLECTOMY      TOTAL KNEE ARTHROPLASTY      partial knee replacement    TUBAL LIGATION       Tobacco Use    Smoking status: Never Smoker    Smokeless tobacco: Never Used   Substance and Sexual Activity    Alcohol use: Yes     Alcohol/week: 3.0 standard drinks     Types: 3 Glasses of wine per week     Comment: weekends    Drug use: No    Sexual activity: Yes     Partners: Male       Objective:     Vital Signs (Most Recent):    Vital Signs (24h Range):           There is no height or weight on file to calculate BMI.        Significant Labs:  All pertinent labs from the last 24 hours have been reviewed.    CBC: No results for input(s): WBC, RBC, HGB, HCT, PLT, MCV, MCH, MCHC in the last 72 hours.    CMP: No results for input(s): NA, K, CL, CO2, BUN, CREATININE, GLU, MG, PHOS, CALCIUM, ALBUMIN, PROT, ALKPHOS, ALT, AST, BILITOT in the last 72 hours.    INR  No results for input(s): PT, INR, PROTIME, APTT in the last 72 hours.      Pre-op Assessment    I have reviewed the Patient Summary Reports.     I have reviewed the Nursing Notes. I have reviewed the NPO Status.   I have reviewed the Medications.     Review of Systems  Anesthesia Hx:  Denies Family Hx of Anesthesia  complications.   Denies Personal Hx of Anesthesia complications.       Physical Exam  General: Well nourished    Airway:  Mallampati: II   Mouth Opening: Normal  TM Distance: Normal  Tongue: Normal  Neck ROM: Normal ROM    Dental:Any loose and/or missing teeth verified with patient   Chest/Lungs:  Clear to auscultation    Heart:  Rate: Normal  Rhythm: Regular Rhythm  Sounds: Normal    Abdomen:  Normal        Anesthesia Plan  Type of Anesthesia, risks & benefits discussed:    Anesthesia Type: Gen ETT, Gen Supraglottic Airway, Gen Natural Airway  Intra-op Monitoring Plan: Standard ASA Monitors  Post Op Pain Control Plan: multimodal analgesia and IV/PO Opioids PRN  Induction:  IV  Informed Consent: Informed consent signed with the Patient and all parties understand the risks and agree with anesthesia plan.  All questions answered.   ASA Score: 3    Ready For Surgery From Anesthesia Perspective.     .       Monroe Shaffer(Attending)

## 2023-10-16 ENCOUNTER — PATIENT MESSAGE (OUTPATIENT)
Dept: PODIATRY | Facility: CLINIC | Age: 68
End: 2023-10-16
Payer: MEDICARE

## 2023-10-17 ENCOUNTER — PROCEDURE VISIT (OUTPATIENT)
Dept: PAIN MEDICINE | Facility: CLINIC | Age: 68
End: 2023-10-17
Payer: MEDICARE

## 2023-10-17 VITALS
SYSTOLIC BLOOD PRESSURE: 136 MMHG | HEART RATE: 64 BPM | HEIGHT: 63 IN | WEIGHT: 138.88 LBS | DIASTOLIC BLOOD PRESSURE: 77 MMHG | TEMPERATURE: 98 F | BODY MASS INDEX: 24.61 KG/M2 | OXYGEN SATURATION: 100 % | RESPIRATION RATE: 18 BRPM

## 2023-10-17 DIAGNOSIS — M79.18 MYOFASCIAL PAIN: Primary | ICD-10-CM

## 2023-10-17 DIAGNOSIS — M41.25 OTHER IDIOPATHIC SCOLIOSIS, THORACOLUMBAR REGION: ICD-10-CM

## 2023-10-17 DIAGNOSIS — Z96.89 SPINAL CORD STIMULATOR STATUS: ICD-10-CM

## 2023-10-17 DIAGNOSIS — G89.4 CHRONIC PAIN SYNDROME: ICD-10-CM

## 2023-10-17 PROCEDURE — 20553 NJX 1/MLT TRIGGER POINTS 3/>: CPT | Mod: S$PBB,,, | Performed by: ANESTHESIOLOGY

## 2023-10-17 PROCEDURE — 20553 NJX 1/MLT TRIGGER POINTS 3/>: CPT | Mod: PBBFAC | Performed by: ANESTHESIOLOGY

## 2023-10-17 PROCEDURE — 20553 PR INJECT TRIGGER POINTS, > 3: ICD-10-PCS | Mod: S$PBB,,, | Performed by: ANESTHESIOLOGY

## 2023-10-17 NOTE — PROCEDURES
Procedures    Patient Name: Tiarra Norton  MRN: [id number]     INFORMED CONSENT: The procedure, risks, benefits and options were discussed with patient. There are no contraindications to the procedure. The patient expressed understanding and agreed to proceed. The personnel performing the procedure was discussed. I verify that I personally obtained Tiarra's consent prior to the start of the procedure and the signed consent can be found on the patient's chart.     Procedure Date: 08/29/2023     Anesthesia: Topical     Pre Procedure diagnosis:   1. Myofascial pain        2. Other idiopathic scoliosis, thoracolumbar region        3. Chronic pain syndrome        4. Spinal cord stimulator status               Post-Procedure diagnosis: same     PROCEDURE: TRIGGER POINT INJECTION  The patient was placed in a seated position and time out was perfomed. The site of pain and procedure were confirmed with the patient prior to starting the procedure. After performing time out. The patient's  trigger points were identified and marked at the site around her spinal cord stimulator at lumbar paraspinal muscles. The skin was prepped with chlorhexidine three times.   After performing time out A 27-gauge 1.5 inch  needle was advanced through the skin and subcutaneous tissues under ultrasound guidacne.  Aspiration for blood, air and CSF was negative.  A total of 10 ml of Bupivacaine 0.25% and 10 mg Decadron was injected at all trigger point.  No complications were evident. No specimens collected.     Blood Loss: Nill  Specimen: None     Bob Garcia, DO     I have reviewed the notes, assessments, and/or procedures performed by resident, I concur with her/his documentation of Tiarra Norton.    Shoaib Aguirre MD

## 2023-10-18 ENCOUNTER — PATIENT MESSAGE (OUTPATIENT)
Dept: PAIN MEDICINE | Facility: CLINIC | Age: 68
End: 2023-10-18
Payer: MEDICARE

## 2023-10-19 ENCOUNTER — PATIENT MESSAGE (OUTPATIENT)
Dept: REHABILITATION | Facility: OTHER | Age: 68
End: 2023-10-19
Payer: MEDICARE

## 2023-10-19 ENCOUNTER — PATIENT MESSAGE (OUTPATIENT)
Dept: PODIATRY | Facility: CLINIC | Age: 68
End: 2023-10-19
Payer: MEDICARE

## 2023-10-19 ENCOUNTER — HOSPITAL ENCOUNTER (OUTPATIENT)
Dept: RADIOLOGY | Facility: OTHER | Age: 68
Discharge: HOME | End: 2023-10-19
Attending: PODIATRIST
Payer: MEDICARE

## 2023-10-19 DIAGNOSIS — L03.032 PARONYCHIA, TOE, LEFT: ICD-10-CM

## 2023-10-19 DIAGNOSIS — M79.675 TOE PAIN, LEFT: ICD-10-CM

## 2023-10-19 DIAGNOSIS — L60.0 INGROWN NAIL: ICD-10-CM

## 2023-10-19 PROCEDURE — 93925 LOWER EXTREMITY STUDY: CPT | Mod: TC

## 2023-10-19 PROCEDURE — 93925 LOWER EXTREMITY STUDY: CPT | Mod: 26,,, | Performed by: RADIOLOGY

## 2023-10-19 PROCEDURE — 93922 US ARTERIAL LOWER EXTREMITY BILAT WITH ABI (XPD): ICD-10-PCS | Mod: 26,,, | Performed by: RADIOLOGY

## 2023-10-19 PROCEDURE — 93922 UPR/L XTREMITY ART 2 LEVELS: CPT | Mod: 26,,, | Performed by: RADIOLOGY

## 2023-10-19 PROCEDURE — 93925 US ARTERIAL LOWER EXTREMITY BILAT WITH ABI (XPD): ICD-10-PCS | Mod: 26,,, | Performed by: RADIOLOGY

## 2023-10-20 ENCOUNTER — TELEPHONE (OUTPATIENT)
Dept: INTERNAL MEDICINE | Facility: CLINIC | Age: 68
End: 2023-10-20
Payer: MEDICARE

## 2023-10-20 NOTE — TELEPHONE ENCOUNTER
Tiarra Norton Key: [id number] - PA Case ID: [id number] - Rx #: [id number] help? Call us at [phone]  Status  Sent to [facility] October 19  Drug  Scopolamine 1MG/3DAYS 72 hr patches  Form  [employer] Electronic Prior Authorization Request Form (2017 NCPDP)  Original Claim Info  75,[phone] HRM PA REQUIRED IF 70 YRS OR OLDERPRESCRIBER CALL [phone] REQUIRES PRIOR AUTHORIZATION

## 2023-10-23 ENCOUNTER — PATIENT MESSAGE (OUTPATIENT)
Dept: INTERNAL MEDICINE | Facility: CLINIC | Age: 68
End: 2023-10-23
Payer: MEDICARE

## 2023-10-24 ENCOUNTER — PATIENT MESSAGE (OUTPATIENT)
Dept: ORTHOPEDICS | Facility: CLINIC | Age: 68
End: 2023-10-24
Payer: MEDICARE

## 2023-10-25 ENCOUNTER — OFFICE VISIT (OUTPATIENT)
Dept: PAIN MEDICINE | Facility: CLINIC | Age: 68
End: 2023-10-25
Payer: MEDICARE

## 2023-10-25 ENCOUNTER — TELEPHONE (OUTPATIENT)
Dept: PAIN MEDICINE | Facility: CLINIC | Age: 68
End: 2023-10-25

## 2023-10-25 DIAGNOSIS — M79.18 MYOFASCIAL PAIN: Primary | ICD-10-CM

## 2023-10-25 DIAGNOSIS — M51.36 DDD (DEGENERATIVE DISC DISEASE), LUMBAR: ICD-10-CM

## 2023-10-25 DIAGNOSIS — G89.29 OTHER CHRONIC PAIN: ICD-10-CM

## 2023-10-25 DIAGNOSIS — M62.838 MUSCLE SPASM: ICD-10-CM

## 2023-10-25 DIAGNOSIS — G89.4 CHRONIC PAIN SYNDROME: ICD-10-CM

## 2023-10-25 DIAGNOSIS — M41.25 OTHER IDIOPATHIC SCOLIOSIS, THORACOLUMBAR REGION: ICD-10-CM

## 2023-10-25 DIAGNOSIS — Z96.89 SPINAL CORD STIMULATOR STATUS: ICD-10-CM

## 2023-10-25 DIAGNOSIS — T85.192D MALFUNCTION OF SPINAL CORD STIMULATOR, SUBSEQUENT ENCOUNTER: Primary | ICD-10-CM

## 2023-10-25 PROCEDURE — 99214 PR OFFICE/OUTPT VISIT, EST, LEVL IV, 30-39 MIN: ICD-10-PCS | Mod: 95,,, | Performed by: NURSE PRACTITIONER

## 2023-10-25 PROCEDURE — 99214 OFFICE O/P EST MOD 30 MIN: CPT | Mod: 95,,, | Performed by: NURSE PRACTITIONER

## 2023-10-25 NOTE — TELEPHONE ENCOUNTER
Call patient and discussed consideration of revision of IPG to reduce her left low back pain the understanding and agreed.    Dilip Ortiz, NP-C  Interventional Pain Management

## 2023-10-25 NOTE — PROGRESS NOTES
Chronic Pain-Established Visit  telemedicine Encounter    Telemedicine Bundle:  This visit was completed during the Coronavirus Crisis to enhance patient safety.  The patient location is: patient's home  The chief complaint leading to consultation is: No chief complaint on file.  Visit type: Virtual visit with synchronous audio and video  Total time spent with patient: 20 minutes   Each patient to whom he or she provides medical services by telemedicine is:    (1) informed of the relationship between the physician and patient and the respective role of any other health care provider with respect to management of the patient  (2) notified that he or she may decline to receive medical services by telemedicine and may withdraw from such care at any time.      SUBJECTIVE:    PCP: Kaykay Perales MD    REFERRING PHYSICIAN: Tyler Jacobs MD    CHIEF COMPLAINT: Lower back pain       Original HISTORY OF PRESENT ILLNESS: Tiarra Norton presents to the clinic for the evaluation of the above pain. The pain started five years ago.     Original Pain Description:  The pain is located in the lower back and does radiate up towards her upper back.The pain is described as aching, dull and sharp. Initially starts as a dull, aching pain and it gets sharp once she bends down and moves around. Typically when she walks for more than five minutes, the sharp pain radiates up her back. Exacerbating factors: Standing, Bending, Touching, Walking, Night Time, Flexing and Lifting. Mitigating factors heat, ice, laying down and massage. Symptoms interfere with daily activity and sleeping. The patient feels like symptoms have been worsening. Patient denies night fever/night sweats, urinary incontinence, bowel incontinence, significant weight loss, significant motor weakness and loss of sensations.    Original PAIN SCORES:  Best: Pain is 1  Worst: Pain is 10  Usually: Pain is 4  Current: Pain is 4    Interval History 10/25/2023  67 y/o female  with hx of chronic left sided low back pain near IPG, she is s/p Octad electrode x2  placement of pulse generator 3  on 1/9 she recently was provided a recent TPI near left low back she reports 0% relief following.  Pain is worse when standing walking performing ADLs.  She does state she gets 5 hours of uninterrupted sleep.  Like her left low back is weak and aching.  Tingling any wearing her low back.  In the right side of her low back.  To discuss other pain interventions and the plan of care moving forward regard to her current subacute pain.  She denies any profound weakness denies any bowel bladder dysfunction denies any saddle anesthesia at this time.      INTERVAL HISTORY:  4/18/22 Interval History: Patient presents for telehealth visit for follow up following her b/l RFA at L3-L5. She reports 80% relief and is very pleased with her pain relief. Unfortunately, she is not back to doing what she wants due to right knee pain. She is seeing Dr. Huber for knee pain and is s/p right medial compartment partial knee replacement. She is currently in therapy for this. We discussed that we may be able to assist her with her knee pain as well.     Interval History 6/15/22: Tiarra Norton returns for follow up. She continues to feel like she has mild pain sitting or laying down, and has her worst pain with extreme leaning forward to pick something up off the floor. She also has difficulty leaning over her handlebars to ride her bike or leaning forward to fold clothes or standing up for any period of time. So, we determined that leaning forward is her worst issue. We did previouisly do lmbb and rfa, which has helped with extension, but it would not help with leaning forward. On review of her MRI she has significant multilevel DDD with Modic changes which likely account for her pain.     Interval History 7/25/22: Tiarra Norton returns for follow up. We previously have been discussing treatments for her low back  pain that did not resolve with RFA. At our last visit I referred her to Dr. Antony for clearance for a SCS trial. She was considered to be a reasonable candidate. However, in the meantime, her PT referred her to acupuncture with Dr. Jacobs. She had one treatment and already notes 50% relief. She notes that she still has pain, especially with leaning forward, but feels that it is much better controlled. Their plan is one treatment per week but is approved for 20 sessions.     Interval History 8/22/22: Ms. Norton returns for follow up on her low back pain. At our last visit she felt cured by acupuncture. Unfortunately, this only lasted for 24 hours. She returns today looking for other options to make life livable. Patient claims all of her pain in the low back and middle back. We discussed that this will work best for her low back pain.     Interval History 9/21/2022:  The patient returns to clinic today for follow up of back pain via virtual visit. She received a letter from [facility] denying SCS trial. She is frustrated by this. She continues to report low back pain. She denies any radicular leg pain. Her pain is worse with bending and activity. She recently underwent med screening for the [employer] program. She is interested in pursuing this. She has tried Gabapentin and Lyrica in the past with side effects. She denies any other health changes. Her pain today is 7/10.     Interval History 10/14/22: Mrs. Norton presents today for follow up of chronic low back pain and to discuss the [place] trial. She had her lumbar MRI done last night without significant changes to her spine. She did have increased dilation of her bile duct. Her pain today is the same in character and severity as during her last visit with us and she rates it currently at a 7/10. She continues to do her stretches on her own which have helped some. She presents with some questions regarding the trial.     Interval History 12/29/2022:  The patient  returns for post op appointment. She is s/p lumbar [place] SCS trial with 90% relief. She has had very minimal back pain during the trial period. She says that she was more active over this time due to the Christmas holiday and had minimal symptoms. She is very happy with the relief. She would like to move forward with implant. She is part of [place] study. No fever or malaise during trial period. Her pain today is 1/10.    Interval History 1/17/2023:  The patient returns to clinic today for post op. She is s/p [employer] SCS implant on 1/9/2022. She reports benefit with the device at this time. She is meeting with the representatives today. She does report soreness to her incisions. She denies any fever, chills, or drainage. She did have issues with her dressing staying on. She has completed her antibiotics. She denies any other health changes. Her pain today is 2/10.    Interval History 1/31/2023:  The patient returns to clinic today for wound check. She reports benefit with [employer] SCS. She is in contact with representatives for programming. She reports intermittent muscle soreness into her legs. She does have an upcoming appointment with representatives for programming. She denies any fever, chills, or drainage. She continues to follow her activity restrictions. She denies any other health changes. Her pain today is 3/10.    Interval History 2/17/2023:  The patient returns to clinic today for follow up of back pain. She reports benefit with [employer] SCS. She is in contact with representatives for programming. She is very happy with relief. She denies any fever, chills, or drainage. She continues to follow her activity restrictions. She denies any other health changes. Her pain today is 2/10.    Interval History 3/21/2023:  The patient presents to discuss pain to IPG site. She says that it has been present since surgery and has gradually worsened. She says that it feels like a tight and  weak muscle. She has not tried any topical medications or muscle relaxants. No fever or malaise. She has not noticed any redness or swelling to the site. Her original pain has significantly improved from SCS implant. She is part of a research study. Her pain today is 6/10.    Interval History 4/14/2023:  The patient is here for follow up of back pain and pain at IPG site. She reports improvement since previous visit. She did have benefit with Lidoderm patches but had a rash so she stopped. Her pain has improved with Salon Pas patches. She is scheduled for knee replacement next month with Dr. Scott. Her pain today is 3/10.    Interval History 8/11/2023:  The patient returns to clinic today for follow up of pain. She reports pain around her SCS battery site. She describes the pain as though her muscles feel weak and tired. She does report benefit with SCS. No difficulty with charging or programming. She has tried Lidoderm and Salonpas patches but these caused a rash. She is currently participating in physical therapy for her knee. She denies any other health changes. Her pain today is 4/10.    Interval History 9/12/2023:  The patient returns to clinic today for follow up of battery site pain via virtual visit. She is s/p trigger point injections under ultrasound on 8/29/2023. She does feel some benefit. She is currently ill with Covid. She is currently running fever and having body aches. She denies any other health changes.     6 weeks of Conservative therapy (PT/Chiro/Home Exercises with Dates)  Healthy back program--> has four sessions left for a total of 20 sessions   Last session was on 1/31/22   Stretches that they taught at health back gives her relief       Medications:   Robaxin PRN    Ice and heat which has helped   Tried Gabapentin and Lyrica- made her loopy      Report: reviewed       Interventional Pain Procedures:   10/17/2023 TPI left side near IPG battery 0% relief  2/8/2022- Bilateral L3,4,5  MBB  2/15/2022- Bilateral L3,4,5 MBB  3/8/2022- Right L3,4,5 RFA- 80% relief for a few months  3/22/2022- Left L3,4,5 RFA- 80% relief for a few months  12/22/22 SCS trial- 90% relief  1/9/2023- [employer] SCS implant.     Imaging:  10/13/22: MRI LUMBAR SPINE WITHOUT CONTRAST  FINDINGS:  Lumbar levoscoliosis centered at L2.  Minimal anterolisthesis at L4-5.     No compression fractures.  No marrow replacing lesions.     Multilevel degenerative changes with disc desiccation and disc space narrowing, described in detail below.  Discogenic endplate edema at T12-L1.     The conus medullaris has a normal appearance and terminates at the L1 level.  Cauda equina nerve roots are unremarkable.  No epidural mass or collection.     The common duct is dilated up to 12 mm, previously 9 mm on 08/20/2022 CT.  Mild prominence of the main pancreatic duct up to 4 mm, which is new.  No definite filling defect in the bile ducts.  Paraspinal muscle atrophy.     SIGNIFICANT FINDINGS BY LEVEL:     T12-L1: Disc bulge.  Bilateral facet arthropathy and ligamentum flavum thickening.  Mild canal stenosis.  Mild bilateral foraminal stenosis.     L1-2: Disc bulge.  Bilateral facet arthropathy and ligamentum flavum thickening.  Mild canal stenosis.  Mild right foraminal stenosis.     L2-3: Disc bulge.  Bilateral facet arthropathy and ligamentum flavum thickening.  Mild canal stenosis.  Moderate right mild left foraminal stenosis.     L3-4: Disc bulge.  Bilateral facet arthropathy and ligamentum flavum thickening.  Small bilateral facet joint effusions/synovitis.  Moderate canal stenosis with partial effacement of the thecal sac and crowding of the cauda equina nerve roots.  Mild right and moderate left foraminal stenosis.     L4-5: Disc bulge with posterior disc uncovering.  Bilateral facet arthropathy and ligamentum flavum thickening.  Small right facet joint effusion/synovitis.  Mild canal stenosis.  Mild bilateral foraminal stenosis.      L5-S1: Disc bulge.  Bilateral facet arthropathy and ligamentum flavum thickening.  No significant canal stenosis.  Mild left foraminal stenosis.     Impression:     Lumbar levoscoliosis and multilevel degenerative changes as detailed above.  No significant changes from 2022.     Increased biliary ductal dilatation up to 12 mm, greater than expected after cholecystectomy.  In addition, the main pancreatic duct is mildly prominent up to 4 mm, which is new.  If LFTs are abnormal, consider MRI/MRCP or ERCP for further evaluation.      Past Medical History:   Diagnosis Date    Cataract     Depression     Gestational diabetes     Hyperlipidemia     Hypertension     IBS (irritable bowel syndrome)     Thyroid disease      Past Surgical History:   Procedure Laterality Date    ADENOIDECTOMY      ARTHROPLASTY, KNEE, TOTAL, USING COMPUTER-ASSISTED NAVIGATION Right 2023    Procedure: CONVERSION ARTHROPLASTY, KNEE, TOTAL, USING COMPUTER-ASSISTED NAVIGATION;  Surgeon: Virgil Scott MD;  Location: [facility];  Service: Orthopedics;  Laterality: Right;  Same day discharge    ARTHROSCOPIC CHONDROPLASTY OF KNEE JOINT Right 10/19/2021    Procedure: ARTHROSCOPY, KNEE, WITH CHONDROPLASTY;  Surgeon: Trevin Huber MD;  Location: [facility] OR;  Service: Orthopedics;  Laterality: Right;    ARTHROSCOPY OF KNEE Right 10/19/2021    Procedure: ARTHROSCOPY, KNEE-RIGHT;  Surgeon: Trevin Huber MD;  Location: [facility] OR;  Service: Orthopedics;  Laterality: Right;     SECTION      CHOLECYSTECTOMY      COLONOSCOPY N/A 2016    Procedure: COLONOSCOPY;  Surgeon: Heri Segura MD;  Location: [address]);  Service: Endoscopy;  Laterality: N/A;    COLONOSCOPY N/A 2019    Procedure: COLONOSCOPY;  Surgeon: Joe Pitts MD;  Location: [address]);  Service: Endoscopy;  Laterality: N/A;    CYSTOSCOPY  2022    Procedure: CYSTOSCOPY;  Surgeon: Lenny Moore MD;  [address];   Service: Urology;;    ESOPHAGOGASTRODUODENOSCOPY N/A 2/13/2020    Procedure: EGD (ESOPHAGOGASTRODUODENOSCOPY);  Surgeon: Tolu Rivas MD;  Location: [facility] (4TH FLR);  Service: Endoscopy;  Laterality: N/A;  Pt. moved to 9:15am arrival time.. Instructed to not have anything after midnight.EC    EYE SURGERY      cataract resection right    INJECTION OF ANESTHETIC AGENT AROUND NERVE Bilateral 2/8/2022    Procedure: Block, Nerve MEDIAL BRANCH BLOCK BILATERAL L3,4,5;  Surgeon: Tara Gibbs MD;  Location: [facility] PAIN MGT;  Service: Pain Management;  Laterality: Bilateral;    INJECTION OF ANESTHETIC AGENT AROUND NERVE Bilateral 2/15/2022    Procedure: BLOCK, NERVE, L3*L4-L5 MEDIAL BR ANCH 2 OF 2;  Surgeon: Tara Gibbs MD;  Location: [facility] PAIN [facility]GT;  Service: Pain Management;  Laterality: Bilateral;    INJECTION OF BOTULINUM TOXIN TYPE A  6/21/2022    Procedure: BOTULINUM TOXIN INJECTION 100 units;  Surgeon: Lenny Moore MD;  Location: [facility] 1ST FLR;  Service: Urology;;    INSERTION, NEUROSTIMULATOR, SPINAL CORD N/A 1/9/2023    Procedure: SPINAL CORD STIMULATOR IMPLANT MeeDoc;  Surgeon: Shoaib Aguirre MD;  Location: [facility];  Service: Pain Management;  Laterality: N/A;    JOINT REPLACEMENT      partial right knee    KNEE ARTHROSCOPY W/ MENISCECTOMY Right 10/19/2021    Procedure: ARTHROSCOPY, KNEE, WITH MENISCECTOMY, PARTIAL LATERAL;  Surgeon: Trevin Huber MD;  Location: [facility] OR;  Service: Orthopedics;  Laterality: Right;    r hand surgery      RADIOFREQUENCY ABLATION Right 3/8/2022    Procedure: RADIOFREQUENCY ABLATION, L3-L5 1 OF 2;  Surgeon: Tara Gibbs MD;  Location: [facility] PAIN [facility]GT;  Service: Pain Management;  Laterality: Right;    RADIOFREQUENCY ABLATION Left 3/22/2022    Procedure: RADIOFREQUENCY ABLATION, l3-+l5 2 of 2;  Surgeon: Tara Gibbs MD;  Location: [facility] PAIN [facility]GT;  Service: Pain Management;  Laterality: Left;    TONSILLECTOMY      TOTAL KNEE  ARTHROPLASTY      partial knee replacement    TRIAL OF SPINAL CORD NERVE STIMULATOR N/A 12/22/2022    Procedure: SPINAL CORD STIMULATOR TRIAL SADIQ BRUNO RESEARCH;  Surgeon: Shoaib Aguirre MD;  Location: [facility];  Service: Pain Management;  Laterality: N/A;    TUBAL LIGATION       Social History     Socioeconomic History    Marital status:     Number of children: 1   Occupational History    Occupation:      Employer: [employer]     Comment: retired   Tobacco Use    Smoking status: Never    Smokeless tobacco: Never   Substance and Sexual Activity    Alcohol use: Yes     Alcohol/week: 3.0 standard drinks of alcohol     Types: 3 Glasses of wine per week     Comment: weekends    Drug use: Yes     Types: Marijuana     Comment: occasionally    Sexual activity: Yes     Partners: Male   Other Topics Concern    Are you pregnant or think you may be? No    Breast-feeding No   Social History Narrative    Retired        Swims.       Stationary bike.            Social Determinants of Health     Financial Resource Strain: Low Risk  (7/27/2022)    Overall Financial Resource Strain (CARDIA)     Difficulty of Paying Living Expenses: Not hard at all   Food Insecurity: No Food Insecurity (7/27/2022)    Hunger Vital Sign     Worried About Running Out of Food in the Last Year: Never true     Ran Out of Food in the Last Year: Never true   Transportation Needs: No Transportation Needs (7/27/2022)    PRAPARE - Transportation     Lack of Transportation (Medical): No     Lack of Transportation (Non-Medical): No   Physical Activity: Insufficiently Active (7/27/2022)    Exercise Vital Sign     Days of Exercise per Week: 4 days     Minutes of Exercise per Session: 20 min   Stress: Stress Concern Present (7/27/2022)    [place] [employer] - Occupational Stress Questionnaire     Feeling of Stress : Very much   Social Connections: Unknown (7/27/2022)    Social Connection and Isolation Panel  [NHANES]     Frequency of Communication with Friends and Family: Twice a week   Housing Stability: Low Risk  (7/27/2022)    Housing Stability Vital Sign     Unable to Pay for Housing in the Last Year: No     Number of Places Lived in the Last Year: 1     Unstable Housing in the Last Year: No     Family History   Problem Relation Age of Onset    Hypertension Mother     Irritable bowel syndrome Mother     Hyperlipidemia Mother     Heart disease Father         mi    Hypertension Father     Cancer Father         prostate    Heart attack Father     Heart failure Father     Hyperlipidemia Father     Alcohol abuse Sister     Depression Sister     Epilepsy Son     Irritable bowel syndrome Sister     Alcohol abuse Sister     Ovarian cancer Maternal Aunt     Breast cancer Paternal Aunt     Breast cancer Maternal Aunt     Melanoma Neg Hx     Colon cancer Neg Hx     Stomach cancer Neg Hx     Esophageal cancer Neg Hx     Liver disease Neg Hx     Crohn's disease Neg Hx     Ulcerative colitis Neg Hx     Celiac disease Neg Hx     Stroke Neg Hx        Review of patient's allergies indicates:  No Known Allergies    Current Outpatient Medications   Medication Sig    acetaminophen (TYLENOL) 500 MG tablet Take 2 tablets (1,000 mg total) by mouth every 8 (eight) hours as needed for Pain.    ALPRAZolam (XANAX) 1 MG tablet Take 1 mg by mouth 3 (three) times daily.    amLODIPine (NORVASC) 5 MG tablet Take 1 tablet (5 mg total) by mouth once daily.    atorvastatin (LIPITOR) 10 MG tablet Take 1 tablet (10 mg total) by mouth once daily.    b complex vitamins capsule Take 1 capsule by mouth once daily.    cholecalciferol, vitamin D3, (VITAMIN D3) 5,000 unit Tab Take 1 tablet (5,000 Units total) by mouth once daily.    conjugated estrogens (PREMARIN) vaginal cream Place 0.5 g vaginally twice a week. Place a pea-sized amount in vagina every night for 4 weeks, then use 2-3 nights a week    diclofenac (VOLTAREN) 75 MG EC tablet Take 1 tablet (75 mg  total) by mouth 2 (two) times daily. (Patient not taking: Reported on 10/17/2023)    EScitalopram oxalate (LEXAPRO) 5 MG Tab Take 5 mg by mouth.    estradioL (ESTRACE) 0.01 % (0.1 mg/gram) vaginal cream Place 1g daily for 2-4 weeks and then 2-3x/week afterwards (Patient not taking: Reported on 10/17/2023)    levothyroxine (SYNTHROID) 125 MCG tablet TAKE 1 TABLET BY MOUTH EVERY MORNING    liothyronine (CYTOMEL) 5 MCG Tab Take 1 tablet (5 mcg total) by mouth once daily.    promethazine (PHENERGAN) 12.5 MG Tab TAKE 1 TABLET(12.5 MG) BY MOUTH EVERY 6 HOURS AS NEEDED FOR NAUSEA    RESTASIS 0.05 % ophthalmic emulsion INT 1 GTT IN OU BID    scopolamine (TRANSDERM-SCOP) 1.3-1.5 mg (1 mg over 3 days) Place 1 patch onto the skin every 72 hours. (Patient not taking: Reported on 10/17/2023)    sumatriptan (IMITREX) 50 MG tablet 1 tab po at start of headache.  Repeat in 2 h prn    tobramycin sulfate 0.3% (TOBREX) 0.3 % ophthalmic solution 1-2 drops topically twice daily to affected toe(s). (Patient not taking: Reported on 10/17/2023)    traMADoL (ULTRAM) 50 mg tablet Take 1 tablet (50 mg total) by mouth every 8 (eight) hours as needed for Pain. (Patient not taking: Reported on 10/17/2023)    traZODone (DESYREL) 100 MG tablet 1-2 tabs po q hs for sleep    valsartan (DIOVAN) 320 MG tablet Take 1 tablet (320 mg total) by mouth once daily.     No current facility-administered medications for this visit.     Facility-Administered Medications Ordered in Other Visits   Medication    0.9%  NaCl infusion    celecoxib capsule 400 mg       REVIEW OF SYSTEMS:    GENERAL: No fever. No chills. No fatigue. Denies weight loss. Denies weight gain.  HEENT: Denies headaches. Denies vision change. Denies eye pain. Denies double vision. Denies ear pain.   CV: Denies chest pain. HTN  PULM: Denies of shortness of breath.  GI: Denies constipation. No diarrhea. No abdominal pain. Denies nausea. Denies vomiting. No blood in stool.  HEME: Denies bleeding  problems.  : Denies urgency. No painful urination. No blood in urine.  MS: See HPI  SKIN: Denies rash.   NEURO: Denies seizures. No weakness.  ENDO: Thyroid disorder.   PSYCH:  Depression    OBJECTIVE:     Exam limited due to virtual visit:  GENERAL: Well appearing, in no acute distress, alert and oriented x3.  PSYCH:  Mood and affect appropriate.    Previous physical exam:  There were no vitals filed for this visit.       PHYSICAL EXAM:   GEN: No acute distress. Calm, comfortable  HENT: Normocephalic, atraumatic, moist mucous membranes  EYE: Anicteric sclera, non-injected.   CV: Non-diaphoretic.   RESP: Breathing comfortably. Chest expansion symmetric.  PSYCH: Pleasant mood and appropriate affect. Recent and remote memory intact.         ASSESSMENT: 68 y.o. year old female with left low back pain near her IPG/ battery consistent with the followin. Myofascial pain        2. Chronic pain syndrome        3. Spinal cord stimulator status        4. Other idiopathic scoliosis, thoracolumbar region        5. DDD (degenerative disc disease), lumbar        6. Muscle spasm        7. Other chronic pain                PLAN:     - Previous imaging reviewed.     - She is s/p trigger point injections around battery site without benefit for long term.  She reported x2 days of relief    - Continue physical therapy and home exercise routine.      - will call patient with plan of care once I have consulted      The above plan and management options were discussed at length with patient. Patient is in agreement with the above and verbalized understanding.     LAUREN Vera  Interventional Pain Management    10/25/2023

## 2023-10-26 DIAGNOSIS — Z96.651 STATUS POST TOTAL RIGHT KNEE REPLACEMENT: Primary | ICD-10-CM

## 2023-10-31 ENCOUNTER — PATIENT MESSAGE (OUTPATIENT)
Dept: SURGERY | Facility: OTHER | Age: 68
End: 2023-10-31
Payer: MEDICARE

## 2023-10-31 ENCOUNTER — OFFICE VISIT (OUTPATIENT)
Dept: INTERNAL MEDICINE | Facility: CLINIC | Age: 68
End: 2023-10-31
Payer: MEDICARE

## 2023-10-31 ENCOUNTER — TELEPHONE (OUTPATIENT)
Dept: PAIN MEDICINE | Facility: CLINIC | Age: 68
End: 2023-10-31
Payer: MEDICARE

## 2023-10-31 VITALS
SYSTOLIC BLOOD PRESSURE: 130 MMHG | DIASTOLIC BLOOD PRESSURE: 62 MMHG | HEART RATE: 70 BPM | WEIGHT: 147.25 LBS | OXYGEN SATURATION: 98 % | BODY MASS INDEX: 26.09 KG/M2 | HEIGHT: 63 IN

## 2023-10-31 DIAGNOSIS — M25.569 KNEE PAIN, UNSPECIFIED CHRONICITY, UNSPECIFIED LATERALITY: ICD-10-CM

## 2023-10-31 DIAGNOSIS — F51.01 PRIMARY INSOMNIA: Primary | ICD-10-CM

## 2023-10-31 DIAGNOSIS — F33.0 MAJOR DEPRESSIVE DISORDER, RECURRENT, MILD: ICD-10-CM

## 2023-10-31 DIAGNOSIS — M54.9 BACK PAIN, UNSPECIFIED BACK LOCATION, UNSPECIFIED BACK PAIN LATERALITY, UNSPECIFIED CHRONICITY: ICD-10-CM

## 2023-10-31 PROBLEM — U07.1 COVID: Status: RESOLVED | Noted: 2022-11-08 | Resolved: 2023-10-31

## 2023-10-31 PROCEDURE — 99214 OFFICE O/P EST MOD 30 MIN: CPT | Mod: S$PBB,,, | Performed by: INTERNAL MEDICINE

## 2023-10-31 PROCEDURE — 99999 PR PBB SHADOW E&M-EST. PATIENT-LVL III: CPT | Mod: PBBFAC,,, | Performed by: INTERNAL MEDICINE

## 2023-10-31 PROCEDURE — 99213 OFFICE O/P EST LOW 20 MIN: CPT | Mod: PBBFAC | Performed by: INTERNAL MEDICINE

## 2023-10-31 PROCEDURE — 99999 PR PBB SHADOW E&M-EST. PATIENT-LVL III: ICD-10-PCS | Mod: PBBFAC,,, | Performed by: INTERNAL MEDICINE

## 2023-10-31 PROCEDURE — 99214 PR OFFICE/OUTPT VISIT, EST, LEVL IV, 30-39 MIN: ICD-10-PCS | Mod: S$PBB,,, | Performed by: INTERNAL MEDICINE

## 2023-10-31 NOTE — PROGRESS NOTES
"Subjective     Patient ID: Tiarra Norton is a 68 y.o. female.    Chief Complaint: Insomnia    HPI  Chronic insomnia.    In therapy once a week.  Dr Prabhakar is her psychiatrist.  Vistaril blurry vision  Qvivic - not helpful  Trazodone - 2 at night, but not sure helpful.   CBT taught her not to worry about insomnia.  She falls asleep readily, but awakens a 4 am.  She might doze off in her easy chair later.    Spinal cord stimulator pain.  It is to be re-located.  TKR in May.  Pain is better.  She finished PT  Knee still not back to normal.  [place] in September.    Cruise in October.  Wears a back brace to wear long distances.    Started with a  on [address].        BMI 26.  Wt increased on cruise.  Up 8 lbs.    She would like to try intermittent fasting.    She was in a "bad state" when this appointment made.  Not sleeping and in a lot of pain and was almost paralyzed.  Now pain is more manageable and she feels considerably better.        Review of Systems   Constitutional:  Negative for fever and unexpected weight change.   HENT:  Negative for nasal congestion and postnasal drip.    Respiratory:  Negative for chest tightness, shortness of breath and wheezing.    Cardiovascular:  Negative for chest pain and leg swelling.   Gastrointestinal:  Negative for abdominal pain, anal bleeding, constipation, diarrhea, nausea and vomiting.   Genitourinary:  Negative for dysuria and urgency.   Integumentary:  Negative for rash.   Neurological:  Negative for headaches.   Psychiatric/Behavioral:  Negative for dysphoric mood and sleep disturbance. The patient is not nervous/anxious.           Objective     Physical Exam  Constitutional:       Appearance: Normal appearance. She is not ill-appearing or diaphoretic.   Neurological:      Mental Status: She is alert.   Psychiatric:         Mood and Affect: Mood normal.         Behavior: Behavior normal.         Thought Content: Thought content normal.         " Judgment: Judgment normal.            Assessment and Plan     1. Primary insomnia    2. Major depressive disorder, recurrent, mild    3. Back pain, unspecified back location, unspecified back pain laterality, unspecified chronicity    4. Knee pain, unspecified chronicity, unspecified laterality        Sleep med appt upcoming  Encouraged to accept normal sleep pattern, as no easy answer to make her sleep longer.  Continue skills learned in CBT.           No follow-ups on file.

## 2023-10-31 NOTE — TELEPHONE ENCOUNTER
Pt provider is responding back to pt message about recovery time for procedure.    ----- Message from Shoaib Aguirre MD sent at 10/31/2023 10:00 AM CDT -----  One week recovery to resume normal activity and 4 weeks for heavy exercises.      ----- Message -----  From: Logan Sylvester MA  Sent: 10/31/2023   9:35 AM CDT  To: MD Dr. Timothy Ribeiro, can you estimate the recovery time following my SCS repositioning procedure on Nov. 20?  How long until I can resume exercise?     Thanks.

## 2023-10-31 NOTE — TELEPHONE ENCOUNTER
----- Message from Shoaib Aguirre MD sent at 10/31/2023 10:00 AM CDT -----  One week recovery to resume normal activity and 4 weeks for heavy exercises.      ----- Message -----  From: Logan Sylvester MA  Sent: 10/31/2023   9:35 AM CDT  To: MD Dr. Timothy Ribeiro, can you estimate the recovery time following my SCS repositioning procedure on Nov. 20?  How long until I can resume exercise?     Thanks.

## 2023-11-01 ENCOUNTER — TELEPHONE (OUTPATIENT)
Dept: PAIN MEDICINE | Facility: CLINIC | Age: 68
End: 2023-11-01
Payer: MEDICARE

## 2023-11-01 ENCOUNTER — PATIENT MESSAGE (OUTPATIENT)
Dept: SURGERY | Facility: OTHER | Age: 68
End: 2023-11-01
Payer: MEDICARE

## 2023-11-01 ENCOUNTER — PATIENT MESSAGE (OUTPATIENT)
Dept: REHABILITATION | Facility: OTHER | Age: 68
End: 2023-11-01
Payer: MEDICARE

## 2023-11-01 NOTE — TELEPHONE ENCOUNTER
Staff forwarded pt message to the procedure area. Shira Lozoya states they are not the ones to schedule the procedure.this message will be forwarded to issa for scheduling.      ----- Message from Shira Lozoya LPN sent at 11/1/2023  8:52 AM CDT -----  We do not schedule OR Procedures or handle any of their appointments. Only procedures that are performed here in the pain management procedure area.    ----- Message -----  From: Logan Sylvester MA  Sent: 11/1/2023   8:45 AM CDT  To: [facility] Pain Management Procedures    Good Morning the following pt has a SCS repositioning procedure on the 20th of November. She asked if anything comes available soon can someone contact her to get her in sooner.

## 2023-11-01 NOTE — TELEPHONE ENCOUNTER
----- Message from Shira Lozoya LPN sent at 11/1/2023  8:52 AM CDT -----  We do not schedule OR Procedures or handle any of their appointments. Only procedures that are performed here in the pain management procedure area.    ----- Message -----  From: Logan Sylvester MA  Sent: 11/1/2023   8:45 AM CDT  To: [facility] Pain Management Procedures    Good Morning the following pt has a SCS repositioning procedure on the 20th of November. She asked if anything comes available soon can someone contact her to get her in sooner.

## 2023-11-03 ENCOUNTER — OFFICE VISIT (OUTPATIENT)
Dept: ORTHOPEDICS | Facility: CLINIC | Age: 68
End: 2023-11-03
Attending: ORTHOPAEDIC SURGERY
Payer: MEDICARE

## 2023-11-03 VITALS — WEIGHT: 147 LBS | BODY MASS INDEX: 26.05 KG/M2 | HEIGHT: 63 IN

## 2023-11-03 DIAGNOSIS — Z96.651 STATUS POST TOTAL RIGHT KNEE REPLACEMENT: Primary | ICD-10-CM

## 2023-11-03 DIAGNOSIS — M70.50 PES ANSERINE BURSITIS: ICD-10-CM

## 2023-11-03 DIAGNOSIS — M76.30 ILIOTIBIAL BAND SYNDROME, UNSPECIFIED LATERALITY: ICD-10-CM

## 2023-11-03 DIAGNOSIS — M22.2X1 PATELLOFEMORAL PAIN SYNDROME OF RIGHT KNEE: ICD-10-CM

## 2023-11-03 PROCEDURE — 99999 PR PBB SHADOW E&M-EST. PATIENT-LVL III: CPT | Mod: PBBFAC,,, | Performed by: ORTHOPAEDIC SURGERY

## 2023-11-03 PROCEDURE — 99213 OFFICE O/P EST LOW 20 MIN: CPT | Mod: PBBFAC,PN | Performed by: ORTHOPAEDIC SURGERY

## 2023-11-03 PROCEDURE — 99213 OFFICE O/P EST LOW 20 MIN: CPT | Mod: S$PBB,,, | Performed by: ORTHOPAEDIC SURGERY

## 2023-11-03 PROCEDURE — 99999 PR PBB SHADOW E&M-EST. PATIENT-LVL III: ICD-10-PCS | Mod: PBBFAC,,, | Performed by: ORTHOPAEDIC SURGERY

## 2023-11-03 PROCEDURE — 99213 PR OFFICE/OUTPT VISIT, EST, LEVL III, 20-29 MIN: ICD-10-PCS | Mod: S$PBB,,, | Performed by: ORTHOPAEDIC SURGERY

## 2023-11-03 RX ORDER — MELOXICAM 15 MG/1
15 TABLET ORAL DAILY
Qty: 90 TABLET | Refills: 0 | Status: SHIPPED | OUTPATIENT
Start: 2023-11-03 | End: 2023-11-16 | Stop reason: CLARIF

## 2023-11-03 NOTE — PROGRESS NOTES
Ortho Knee Follow Up Note    PCP: Kaykay Perales MD   Referring Provider: Virgil Scott MD  [address]  [place] [address]        Assessment:  68 y.o. female status post right total Knee conversion completed on 5/18/23. Previously, she was doing well, good ROM. Her pain better overall, less constant pain compared to presurgery but some continued pain to the medial aspect of her knee, pes bursa and medial joint line. Pain worse with activity and ambulating longer distances.  I tried a Medrol Dosepak previously without significant improvement in her pains.  Today she is reporting more of an anterior based knee pain pain difficulty with squatting from low position.  She denies any significant difficulty with stairs.  She clinically has multiple pain generators including her distal quad, ITB band, pes bursa.  She is also having some confounding back issues that is requiring stimulator adjustments.  I have new x-rays today that demonstrate no particular hardware complication.  I am not concerned for infection.  This appears to be more muscular versus neuropathic.  I think for her I would consider more consistent use of anti-inflammatory medicines and want to send her back to physical therapy.  She is going to hold on therapy until after this spinal cord stimulator adjustment which I think is reasonable.  If she has ongoing pain despite these interventions I think my next move would be consideration of RFA procedures with pain management.  Advised her against any use of intra-articular steroid injections into that knee.    Plan:  Follow up 12 months  Future Radiographs Indicated at next visit: Yes    Pain Management: Mobic  Anticoagulation: None  Wound Care: None    Patient Reassurance:   Post-operative course discussed with patient. Patient reassured and supported. All questions answered.    ACTIVE PROBLEM LIST  Patient Active Problem List   Diagnosis    HTN (hypertension)    Hypothyroid    Unspecified  "vitamin D deficiency    Elevated liver enzymes    Primary osteoarthritis of right knee    Fatty liver    Hyperlipidemia    Migraine without status migrainosus, not intractable    Glucose intolerance (impaired glucose tolerance)    Hyponatremia    Anxiety    Fatigue    Intermittent diarrhea    HERMINIA (obstructive sleep apnea)    Decreased libido    Sleep arousal disorder    Major depressive disorder, recurrent, mild    Pain    Colon adenomas    Abdominal pain    Exocrine pancreatic insufficiency    Irritable bowel syndrome with diarrhea    Overweight (BMI 25.0-29.9)    Poor posture    Complex tear of lateral meniscus of right knee as current injury    Decreased range of motion (ROM) of right knee    Weakness of right lower extremity    Decreased range of motion of trunk and back    Decreased strength of trunk and back    Idiopathic scoliosis of thoracolumbar spine    Primary insomnia    Back pain    Impaired functional mobility, balance, gait, and endurance    Posture imbalance    Weakness of trunk musculature    Chronic pain syndrome    History of partial knee replacement    Benzodiazepine dependence    Aortic atherosclerosis           HPI:  Tiarra Norton presents today for a post-op visit.     STATUS POST:  right total Knee Primary  BMI: Body mass index is 26.04 kg/m².    Post operative recovery was complicated by: None    Patient rates his condition as improving. Pleased with surgical outcome to date.     Functional Assessment:  Completed  Functional Difficulties:  Long distance walking  Pain Medication:  NSAIDs  Anticoagulation: None    EXAM:    right POST-OPERATIVE KNEE    Ht 5' 3" (1.6 m)   Wt 66.7 kg (147 lb)   BMI 26.04 kg/m²     Skin:  Appropriate post op appearance, No evidence of erythema, warmth, discharge, or drainage, and Incision clean/dry/intact  Range of Motion: Flexion: 130, Extension 0  Mild TTP over pes bursa and medial joint line, IT band, quad tendon  No instability   Neurovascular Status: " Sensation intact in Sural, Saphenous, SPN, DPN and Tibial nerve distribution. 5 out of 5 strength in hip flexion, knee flexion/extension, ankle plantarflexion/dorsiflexion. 2+ dorsalis pedis    IMAGING:  X-ray Knee:   Implants are well fixed and aligned. There is no evidence of loosening.

## 2023-11-07 ENCOUNTER — PATIENT MESSAGE (OUTPATIENT)
Dept: INTERNAL MEDICINE | Facility: CLINIC | Age: 68
End: 2023-11-07
Payer: MEDICARE

## 2023-11-07 ENCOUNTER — PATIENT MESSAGE (OUTPATIENT)
Dept: SURGERY | Facility: OTHER | Age: 68
End: 2023-11-07
Payer: MEDICARE

## 2023-11-08 ENCOUNTER — PATIENT MESSAGE (OUTPATIENT)
Dept: ORTHOPEDICS | Facility: CLINIC | Age: 68
End: 2023-11-08
Payer: MEDICARE

## 2023-11-11 ENCOUNTER — PATIENT MESSAGE (OUTPATIENT)
Dept: SURGERY | Facility: OTHER | Age: 68
End: 2023-11-11
Payer: MEDICARE

## 2023-11-15 ENCOUNTER — TELEPHONE (OUTPATIENT)
Dept: PAIN MEDICINE | Facility: CLINIC | Age: 68
End: 2023-11-15
Payer: MEDICARE

## 2023-11-15 DIAGNOSIS — M17.11 PRIMARY OSTEOARTHRITIS OF RIGHT KNEE: Primary | ICD-10-CM

## 2023-11-16 ENCOUNTER — TELEPHONE (OUTPATIENT)
Dept: ADMINISTRATIVE | Facility: OTHER | Age: 68
End: 2023-11-16
Payer: MEDICARE

## 2023-11-16 ENCOUNTER — ANESTHESIA EVENT (OUTPATIENT)
Dept: SURGERY | Facility: OTHER | Age: 68
End: 2023-11-16
Payer: MEDICARE

## 2023-11-16 ENCOUNTER — PATIENT MESSAGE (OUTPATIENT)
Dept: OTHER | Facility: OTHER | Age: 68
End: 2023-11-16
Payer: MEDICARE

## 2023-11-16 ENCOUNTER — HOSPITAL ENCOUNTER (OUTPATIENT)
Dept: PREADMISSION TESTING | Facility: OTHER | Age: 68
Discharge: HOME | End: 2023-11-16
Attending: ANESTHESIOLOGY
Payer: MEDICARE

## 2023-11-16 VITALS
HEIGHT: 63 IN | TEMPERATURE: 98 F | SYSTOLIC BLOOD PRESSURE: 139 MMHG | BODY MASS INDEX: 25.69 KG/M2 | OXYGEN SATURATION: 99 % | HEART RATE: 66 BPM | WEIGHT: 145 LBS | DIASTOLIC BLOOD PRESSURE: 67 MMHG

## 2023-11-16 NOTE — ANESTHESIA PREPROCEDURE EVALUATION
11/16/2023  Tiarra Norton is a 68 y.o., female.      Pre-op Assessment    I have reviewed the Patient Summary Reports.     I have reviewed the Nursing Notes. I have reviewed the NPO Status.   I have reviewed the Medications.     Review of Systems  Anesthesia Hx:             Denies Family Hx of Anesthesia complications.    Denies Personal Hx of Anesthesia complications.                    Social:  Non-Smoker       Hematology/Oncology:  Hematology Normal   Oncology Normal                                   EENT/Dental:  EENT/Dental Normal           Cardiovascular:     Hypertension                                        Pulmonary:        Sleep Apnea                Renal/:  Renal/ Normal                 Hepatic/GI:      Liver Disease,  IBS          Musculoskeletal:  Arthritis               Neurological:      Headaches                                 Endocrine:   Hypothyroidism          Dermatological:  Skin Normal    Psych:  Psychiatric History                  Physical Exam  General: Well nourished, Cooperative, Alert and Oriented    Airway:  Mallampati: II   Mouth Opening: Normal  TM Distance: Normal  Tongue: Normal  Neck ROM: Normal ROM    Dental:  Intact        Anesthesia Plan  Type of Anesthesia, risks & benefits discussed:    Anesthesia Type: MAC  Intra-op Monitoring Plan: Standard ASA Monitors  Post Op Pain Control Plan: multimodal analgesia  Induction:  IV  Informed Consent: Informed consent signed with the Patient and all parties understand the risks and agree with anesthesia plan.  All questions answered.   ASA Score: 3  Anesthesia Plan Notes: For revision of stim placed in Jan  Pt has since had spinal for knee surgery at [place]    Ready For Surgery From Anesthesia Perspective.     .

## 2023-11-16 NOTE — DISCHARGE INSTRUCTIONS
Information to Prepare you for your Surgery    PRE-ADMIT TESTING -  [phone]    [address] [facility]          Your surgery has been scheduled at [facility]. We are pleased to have the opportunity to serve you. For Further Information please call [phone].    On the day of surgery please report to the Information Desk on the 1st floor.    CONTACT YOUR PHYSICIAN'S OFFICE THE DAY PRIOR TO YOUR SURGERY TO OBTAIN YOUR ARRIVAL TIME.     The evening before surgery do not eat anything after 9 p.m. ( this includes hard candy, chewing gum and mints).  You may only have GATORADE, POWERADE AND WATER  from 9 p.m. until you leave your home.   DO NOT DRINK ANY LIQUIDS ON THE WAY TO THE HOSPITAL.      Why does your anesthesiologist allow you to drink Gatorade/Powerade before surgery?  Gatorade/Powerade helps to increase your comfort before surgery and to decrease your nausea after surgery. The carbohydrates in Gatorade/Powerade help reduce your body's stress response to surgery.  If you are a diabetic-drink only water prior to surgery.    Outpatient Surgery- May allow 2 adult (18 and older) Support Persons (1 being the designated ) for all surgical/procedural patients. A breastfeeding mother will be allowed her infant and 2 adult Support Persons. No one under the age of 18 will be allowed in the building.      SPECIAL MEDICATION INSTRUCTIONS: TAKE medications checked off by the Anesthesiologist on your Medication List.    Angiogram Patients: Take medications as instructed by your physician, including aspirin.     Surgery Patients:    If you take ASPIRIN - Your PHYSICIAN/SURGEON will need to inform you IF/OR when you need to stop taking aspirin prior to your surgery.     The week prior to surgery do not ot take any medications containing IBUPROFEN or NSAIDS ( Advil, Motrin, Goodys, BC, Aleve, Naproxen etc) If you are not sure if you should take a medicine  please call your surgeon's office.  Ok to take Tylenol    Do Not Wear any make-up (especially eye make-up) to surgery. Please remove any false eyelashes or eyelash extensions. If you arrive the day of surgery with makeup/eyelashes on you will be required to remove prior to surgery. (There is a risk of corneal abrasions if eye makeup/eyelash extensions are not removed)      Leave all valuables at home.   Do Not wear any jewelry or watches, including any metal in body piercings. Jewelry must be removed prior to coming to the hospital.  There is a possibility that rings that are unable to be removed may be cut off if they are on the surgical extremity.    Please remove all hair extensions, wigs, clips and any other metal accessories/ ornaments from your hair.  These items may pose a flammable/fire risk in Surgery and must be removed.    Do not shave your surgical area at least 5 days prior to your surgery. The surgical prep will be performed at the hospital according to Infection Control regulations.    Contact Lens must be removed before surgery. Either do not wear the contact lens or bring a case and solution for storage.  Please bring a container for eyeglasses or dentures as required.  Bring any paperwork your physician has provided, such as consent forms,  history and physicals, doctor's orders, etc.   Bring comfortable clothes that are loose fitting to wear upon discharge. Take into consideration the type of surgery being performed.  Maintain your diet as advised per your physician the day prior to surgery.      Adequate rest the night before surgery is advised.   Park in the Parking lot behind the hospital or in the [facility] across the street from the parking lot. Parking is complimentary.  If you will be discharged the same day as your procedure, please arrange for a responsible adult to drive you home or to accompany you if traveling by taxi.   YOU WILL NOT BE PERMITTED TO DRIVE OR TO LEAVE THE  HOSPITAL ALONE AFTER SURGERY.   If you are being discharged the same day, it is strongly recommended that you arrange for someone to remain with you for the first 24 hrs following your surgery.    The Surgeon will speak to your family/visitor after your surgery regarding the outcome of your surgery and post op care.  The Surgeon may speak to you after your surgery, but there is a possibility you may not remember the details.  Please check with your family members regarding the conversation with the Surgeon.    We strongly recommend whoever is bringing you home be present for discharge instructions.  This will ensure a thorough understanding for your post op home care.          Thank you for your cooperation.  The Staff of [facility].            Bathing Instructions with Hibiclens    Shower the evening before and morning of your procedure with Chlorhexidine (Hibiclens)  do not use Chlorhexidine on your face or genitals. Do not get in your eyes.  Wash your face with water and your regular face wash/soap  Use your regular shampoo  Apply Chlorhexidine (Hibiclens) directly on your skin or on a wet washcloth and wash gently. When showering: Move away from the shower stream when applying Chlorhexidine (Hibiclens) to avoid rinsing off too soon.  Rinse thoroughly with warm water  Do not dilute Chlorhexidine (Hibiclens)   Dry off as usual, do not use any deodorant, powder, body lotions, perfume, after shave or cologne.

## 2023-11-17 ENCOUNTER — PATIENT MESSAGE (OUTPATIENT)
Dept: OTHER | Facility: OTHER | Age: 68
End: 2023-11-17
Payer: MEDICARE

## 2023-11-20 ENCOUNTER — ANESTHESIA (OUTPATIENT)
Dept: SURGERY | Facility: OTHER | Age: 68
End: 2023-11-20
Payer: MEDICARE

## 2023-11-20 ENCOUNTER — HOSPITAL ENCOUNTER (OUTPATIENT)
Facility: OTHER | Age: 68
Discharge: HOME | End: 2023-11-20
Attending: ANESTHESIOLOGY | Admitting: ANESTHESIOLOGY
Payer: MEDICARE

## 2023-11-20 DIAGNOSIS — T85.192D SPINAL CORD STIMULATOR DYSFUNCTION, SUBSEQUENT ENCOUNTER: Primary | ICD-10-CM

## 2023-11-20 DIAGNOSIS — G89.29 CHRONIC PAIN: ICD-10-CM

## 2023-11-20 DIAGNOSIS — M54.9 BACK PAIN, UNSPECIFIED BACK LOCATION, UNSPECIFIED BACK PAIN LATERALITY, UNSPECIFIED CHRONICITY: ICD-10-CM

## 2023-11-20 PROCEDURE — 36000707: Performed by: ANESTHESIOLOGY

## 2023-11-20 PROCEDURE — 25000003 PHARM REV CODE 250: Performed by: NURSE ANESTHETIST, CERTIFIED REGISTERED

## 2023-11-20 PROCEDURE — 63688 REV/RMV IMP SP NPG/R DTCH CN: CPT | Mod: ,,, | Performed by: ANESTHESIOLOGY

## 2023-11-20 PROCEDURE — 63600175 PHARM REV CODE 636 W HCPCS: Performed by: NURSE ANESTHETIST, CERTIFIED REGISTERED

## 2023-11-20 PROCEDURE — 25000003 PHARM REV CODE 250: Performed by: ANESTHESIOLOGY

## 2023-11-20 PROCEDURE — 71000015 HC POSTOP RECOV 1ST HR: Performed by: ANESTHESIOLOGY

## 2023-11-20 PROCEDURE — 37000008 HC ANESTHESIA 1ST 15 MINUTES: Performed by: ANESTHESIOLOGY

## 2023-11-20 PROCEDURE — D9220A PRA ANESTHESIA: Mod: ,,, | Performed by: NURSE ANESTHETIST, CERTIFIED REGISTERED

## 2023-11-20 PROCEDURE — 63688 PR REVISE/REMOVE SPINAL NEUROSTIM/RECEIVER: ICD-10-PCS | Mod: ,,, | Performed by: ANESTHESIOLOGY

## 2023-11-20 PROCEDURE — 36000706: Performed by: ANESTHESIOLOGY

## 2023-11-20 PROCEDURE — 63600175 PHARM REV CODE 636 W HCPCS: Performed by: ANESTHESIOLOGY

## 2023-11-20 PROCEDURE — 63600175 PHARM REV CODE 636 W HCPCS: Performed by: STUDENT IN AN ORGANIZED HEALTH CARE EDUCATION/TRAINING PROGRAM

## 2023-11-20 PROCEDURE — 37000009 HC ANESTHESIA EA ADD 15 MINS: Performed by: ANESTHESIOLOGY

## 2023-11-20 PROCEDURE — 71000016 HC POSTOP RECOV ADDL HR: Performed by: ANESTHESIOLOGY

## 2023-11-20 PROCEDURE — 25000003 PHARM REV CODE 250: Performed by: STUDENT IN AN ORGANIZED HEALTH CARE EDUCATION/TRAINING PROGRAM

## 2023-11-20 PROCEDURE — D9220A PRA ANESTHESIA: ICD-10-PCS | Mod: ,,, | Performed by: NURSE ANESTHETIST, CERTIFIED REGISTERED

## 2023-11-20 RX ORDER — ACETAMINOPHEN 500 MG
1000 TABLET ORAL
Status: COMPLETED | OUTPATIENT
Start: 2023-11-20 | End: 2023-11-20

## 2023-11-20 RX ORDER — DIPHENHYDRAMINE HYDROCHLORIDE 50 MG/ML
12.5 INJECTION INTRAMUSCULAR; INTRAVENOUS EVERY 30 MIN PRN
Status: DISCONTINUED | OUTPATIENT
Start: 2023-11-20 | End: 2023-11-20 | Stop reason: HOSPADM

## 2023-11-20 RX ORDER — OXYCODONE HYDROCHLORIDE 5 MG/1
5 TABLET ORAL
Status: DISCONTINUED | OUTPATIENT
Start: 2023-11-20 | End: 2023-11-20 | Stop reason: HOSPADM

## 2023-11-20 RX ORDER — SODIUM CHLORIDE, SODIUM LACTATE, POTASSIUM CHLORIDE, CALCIUM CHLORIDE 600; 310; 30; 20 MG/100ML; MG/100ML; MG/100ML; MG/100ML
INJECTION, SOLUTION INTRAVENOUS CONTINUOUS
Status: ACTIVE | OUTPATIENT
Start: 2023-11-20

## 2023-11-20 RX ORDER — MIDAZOLAM HYDROCHLORIDE 1 MG/ML
INJECTION INTRAMUSCULAR; INTRAVENOUS
Status: DISCONTINUED | OUTPATIENT
Start: 2023-11-20 | End: 2023-11-20

## 2023-11-20 RX ORDER — HYDROMORPHONE HYDROCHLORIDE 2 MG/ML
0.4 INJECTION, SOLUTION INTRAMUSCULAR; INTRAVENOUS; SUBCUTANEOUS EVERY 5 MIN PRN
Status: DISCONTINUED | OUTPATIENT
Start: 2023-11-20 | End: 2023-11-20 | Stop reason: HOSPADM

## 2023-11-20 RX ORDER — FENTANYL CITRATE 50 UG/ML
INJECTION, SOLUTION INTRAMUSCULAR; INTRAVENOUS
Status: DISCONTINUED | OUTPATIENT
Start: 2023-11-20 | End: 2023-11-20

## 2023-11-20 RX ORDER — PREGABALIN 75 MG/1
75 CAPSULE ORAL ONCE
Status: COMPLETED | OUTPATIENT
Start: 2023-11-20 | End: 2023-11-20

## 2023-11-20 RX ORDER — HYDROCODONE BITARTRATE AND ACETAMINOPHEN 5; 325 MG/1; MG/1
1 TABLET ORAL EVERY 8 HOURS PRN
Qty: 15 TABLET | Refills: 0 | Status: SHIPPED | OUTPATIENT
Start: 2023-11-20 | End: 2023-11-24 | Stop reason: SDUPTHER

## 2023-11-20 RX ORDER — SODIUM CHLORIDE 9 MG/ML
INJECTION, SOLUTION INTRAVENOUS CONTINUOUS
Status: DISCONTINUED | OUTPATIENT
Start: 2023-11-20 | End: 2023-11-20 | Stop reason: HOSPADM

## 2023-11-20 RX ORDER — KETAMINE HCL IN 0.9 % NACL 50 MG/5 ML
SYRINGE (ML) INTRAVENOUS
Status: DISCONTINUED | OUTPATIENT
Start: 2023-11-20 | End: 2023-11-20

## 2023-11-20 RX ORDER — SULFAMETHOXAZOLE AND TRIMETHOPRIM 800; 160 MG/1; MG/1
1 TABLET ORAL 2 TIMES DAILY
Qty: 10 TABLET | Refills: 0 | Status: SHIPPED | OUTPATIENT
Start: 2023-11-20 | End: 2023-11-25

## 2023-11-20 RX ORDER — PROCHLORPERAZINE EDISYLATE 5 MG/ML
5 INJECTION INTRAMUSCULAR; INTRAVENOUS EVERY 30 MIN PRN
Status: DISCONTINUED | OUTPATIENT
Start: 2023-11-20 | End: 2023-11-20 | Stop reason: HOSPADM

## 2023-11-20 RX ORDER — LIDOCAINE HYDROCHLORIDE 10 MG/ML
0.5 INJECTION, SOLUTION EPIDURAL; INFILTRATION; INTRACAUDAL; PERINEURAL ONCE
Status: ACTIVE | OUTPATIENT
Start: 2023-11-20

## 2023-11-20 RX ORDER — SODIUM CHLORIDE 0.9 % (FLUSH) 0.9 %
3 SYRINGE (ML) INJECTION
Status: DISCONTINUED | OUTPATIENT
Start: 2023-11-20 | End: 2023-11-20 | Stop reason: HOSPADM

## 2023-11-20 RX ORDER — LIDOCAINE HYDROCHLORIDE AND EPINEPHRINE 10; 10 MG/ML; UG/ML
INJECTION, SOLUTION INFILTRATION; PERINEURAL
Status: DISCONTINUED | OUTPATIENT
Start: 2023-11-20 | End: 2023-11-20 | Stop reason: HOSPADM

## 2023-11-20 RX ADMIN — FENTANYL CITRATE 50 MCG: 0.05 INJECTION, SOLUTION INTRAMUSCULAR; INTRAVENOUS at 07:11

## 2023-11-20 RX ADMIN — SODIUM CHLORIDE, SODIUM LACTATE, POTASSIUM CHLORIDE, AND CALCIUM CHLORIDE: 600; 310; 30; 20 INJECTION, SOLUTION INTRAVENOUS at 06:11

## 2023-11-20 RX ADMIN — CEFAZOLIN 2 G: 2 INJECTION, POWDER, FOR SOLUTION INTRAMUSCULAR; INTRAVENOUS at 07:11

## 2023-11-20 RX ADMIN — PREGABALIN 75 MG: 75 CAPSULE ORAL at 06:11

## 2023-11-20 RX ADMIN — MIDAZOLAM HYDROCHLORIDE 1 MG: 1 INJECTION, SOLUTION INTRAMUSCULAR; INTRAVENOUS at 07:11

## 2023-11-20 RX ADMIN — Medication 10 MG: at 07:11

## 2023-11-20 RX ADMIN — ACETAMINOPHEN 1000 MG: 500 TABLET ORAL at 06:11

## 2023-11-20 NOTE — PLAN OF CARE
Battery explant kept by [employer]eps and directly handed off to Rep Karissa d/t research study participant. NEHA Lawson MD aware of this and in agreement to hand battery explant off to ANNMARIE Hall Rep

## 2023-11-20 NOTE — OP NOTE
PROCEDURE DATE: 11/20/2023    PROCEDURE:   1. Peripheral Nerve Stimulator IPG Revision    Pre-op diagnosis:  1. SCS Pocket Pain  1. Spinal cord stimulator dysfunction, subsequent encounter    2. Back pain, unspecified back location, unspecified back pain laterality, unspecified chronicity    3. Chronic pain          Post-op diagnosis: Same    Surgeon: Shoaib Aguirre MD    Assistant: MD Oneil Espinal MD     Anesthesia: Monitored Anesthesia Care    Estimated Blood Loss: Minimal- <10ml    Urine Output: Not Measured    IV Fluids- See Anesthesia record    Complications: None    CONSENT: The risks, benefits, and options were discussed thoroughly with the patient. The patient's questions were answered. The patient understands the risk and benefits and wishes to proceed. Informed consent was obtained.     Technique:  Site of the implantable pulse generator was marked on the patients left side in the preoperative area. The patient was taken to the OR and placed in the prone position. The anesthesia provider throughout the case provided sedation and cardiopulmonary monitoring.   The Patient's back was prepped with Duraprep and then draped in usual sterile fashion. Local anesthetic was used at the IPG site with Bupivacaine 0.25% and  1% Lidocaine-EPinephrine Using a No. 10 scalpel, an incision was made over the left Flank scar from previous IPG placement and dissection carried out with blunt dissection. The battery was accessed and removed from pocket still attached to leads and placed in a towel. The leads were  not removed from the old battery.  Then the system was checked by device representative in sterile fashion, found to be completely functional. Copious bulb lavage was irrigated throughout the field and pocket, and hemostasis was maintained. The Same battery was then placed in the pocket. Two Silk sutures were used to anchor the battery in the pocket.    Then the wound was closed with simple  interrupted sutures using 2-0 Vicryl  sutures and skin closed 4-0 Monocryl with PS2. Bacitracin was placed over the incision sites and dressings applied. Sponge and needle counts were correct x2 at conclusion of the case.     Patient was then placed supine on the stretcher and transferred to the recovery room. Patient was programmed by the representative of the SCS. Patient was instructed not to perform any abrupt movements with the back, avoid bending, twisting, or lifting >10lbs. Thee patient has been scheduled to return to the clinic in approx 5-7 days. The patient tolerated the procedure well.      I have reviewed the notes, assessments, and/or procedures performed by fellow, I concur with her/his documentation of Tiarra Norton.      Shoaib Aguirre

## 2023-11-20 NOTE — DISCHARGE INSTRUCTIONS
INSTRUCTIONS    KEEP BINDER AND DRESSING ON UNTIL OFFICE VISIT    SPONGE BATH ONLY until seen by Doctor    Activity: Walk around house to perform normal daily activities NO STRENUOUS ACTIVITY FOR 4 WEEKS OR UNTIL DOCTOR RELEASES YOU    NO REPETITIVE ACTIONS ( no bending or twisting at the waist)       FOLLOW ANY OTHER INSTRUCTIONS GIVEN TO YOU BY DR WOODS

## 2023-11-20 NOTE — ANESTHESIA POSTPROCEDURE EVALUATION
Anesthesia Post Evaluation    Patient: Tiarra Norton    Procedure(s) Performed: Procedure(s) (LRB):  SCS IPG BATTERY REVISION (N/A)    Final Anesthesia Type: MAC      Patient location during evaluation: [facility]  Patient participation: Yes- Able to Participate  Level of consciousness: awake and alert, awake and oriented  Post-procedure vital signs: reviewed and stable  Pain management: adequate  Airway patency: patent    PONV status at discharge: No PONV  Anesthetic complications: no      Cardiovascular status: blood pressure returned to baseline, hemodynamically stable and stable  Respiratory status: spontaneous ventilation, unassisted and room air  Hydration status: euvolemic  Follow-up not needed.          Vitals Value Taken Time   /60 11/20/23 0605   Temp 36.7 °C (98.1 °F) 11/20/23 0605   Pulse 57 11/20/23 0605   Resp 18 11/20/23 0605   SpO2 97 % 11/20/23 0605         No case tracking events are documented in the log.      Pain/Arianne Score: Pain Rating Prior to Med Admin: 0 (11/20/2023  6:30 AM)

## 2023-11-20 NOTE — DISCHARGE SUMMARY
Discharge Note  Short Stay      SUMMARY     Admit Date: 11/20/2023    Attending Physician: Shaun Rocha      Discharge Physician: Shaun Rocha      Discharge Date: 11/20/2023 8:44 AM    Procedure(s) (LRB):  SCS IPG BATTERY REVISION (N/A)    Final Diagnosis: Malfunction of spinal cord stimulator, subsequent encounter [T85.246D]    Disposition: Home or self care    Patient Instructions:   Current Discharge Medication List        CONTINUE these medications which have NOT CHANGED    Details   ALPRAZolam (XANAX) 1 MG tablet Take 1 mg by mouth 3 (three) times daily.      amLODIPine (NORVASC) 5 MG tablet Take 1 tablet (5 mg total) by mouth once daily.  Qty: 90 tablet, Refills: 3    Comments: .      atorvastatin (LIPITOR) 10 MG tablet Take 1 tablet (10 mg total) by mouth once daily.  Qty: 90 tablet, Refills: 3      conjugated estrogens (PREMARIN) vaginal cream Place 0.5 g vaginally twice a week. Place a pea-sized amount in vagina every night for 4 weeks, then use 2-3 nights a week  Qty: 1 applicator, Refills: 1      EScitalopram oxalate (LEXAPRO) 5 MG Tab Take 5 mg by mouth.      levothyroxine (SYNTHROID) 125 MCG tablet TAKE 1 TABLET BY MOUTH EVERY MORNING  Qty: 90 tablet, Refills: 2      liothyronine (CYTOMEL) 5 MCG Tab Take 1 tablet (5 mcg total) by mouth once daily.  Qty: 90 tablet, Refills: 3      traZODone (DESYREL) 100 MG tablet 1-2 tabs po q hs for sleep  Qty: 180 tablet, Refills: 3      valsartan (DIOVAN) 320 MG tablet Take 1 tablet (320 mg total) by mouth once daily.  Qty: 90 tablet, Refills: 3    Comments: .      promethazine (PHENERGAN) 12.5 MG Tab TAKE 1 TABLET(12.5 MG) BY MOUTH EVERY 6 HOURS AS NEEDED FOR NAUSEA  Qty: 30 tablet, Refills: 2      RESTASIS 0.05 % ophthalmic emulsion INT 1 GTT IN OU BID      scopolamine (TRANSDERM-SCOP) 1.3-1.5 mg (1 mg over 3 days) Place 1 patch onto the skin every 72 hours.  Qty: 6 patch, Refills: 1      sumatriptan (IMITREX) 50 MG tablet 1 tab po at start of headache.  Repeat  in 2 h prn  Qty: 36 tablet, Refills: 3      tobramycin sulfate 0.3% (TOBREX) 0.3 % ophthalmic solution 1-2 drops topically twice daily to affected toe(s).  Qty: 5 mL, Refills: 3                 Discharge Diagnosis: Malfunction of spinal cord stimulator, subsequent encounter [T85.703D]  Condition on Discharge: Stable with no complications to procedure   Diet on Discharge: Same as before.  Activity: as per instruction sheet.  Discharge to: Home with a responsible adult.  Follow up: 2-4 weeks       Please call my office or pager at [phone] if experienced any weakness or loss of sensation, fever > 101.5, pain uncontrolled with oral medications, persistent nausea/vomiting/or diarrhea, redness or drainage from the incisions, or any other worrisome concerns. If physician on call was not reached or could not communicate with our office for any reason please go to the nearest emergency department

## 2023-11-20 NOTE — H&P
HPI    Patient is a 68 year old female coming in for a SCS battery revision. Patient battery is causing pain at this time on the left side. No redness or other issues. Patient is here for a revision and potential replacement of the battery. Patient is stable at this time and no new issues since last clinical visit      Patient presenting for Procedure(s) (LRB):  SCS IPG BATTERY REVISION (N/A)     Patient on Anti-coagulation No    No health changes since previous encounter    Past Medical History:   Diagnosis Date    Cataract     Depression     Gestational diabetes     Hyperlipidemia     Hypertension     IBS (irritable bowel syndrome)     Thyroid disease      Past Surgical History:   Procedure Laterality Date    ADENOIDECTOMY      ARTHROPLASTY, KNEE, TOTAL, USING COMPUTER-ASSISTED NAVIGATION Right 2023    Procedure: CONVERSION ARTHROPLASTY, KNEE, TOTAL, USING COMPUTER-ASSISTED NAVIGATION;  Surgeon: Virgil Scott MD;  Location: [facility] OR;  Service: Orthopedics;  Laterality: Right;  Same day discharge    ARTHROSCOPIC CHONDROPLASTY OF KNEE JOINT Right 10/19/2021    Procedure: ARTHROSCOPY, KNEE, WITH CHONDROPLASTY;  Surgeon: Trevin Huber MD;  Location: [facility] OR;  Service: Orthopedics;  Laterality: Right;    ARTHROSCOPY OF KNEE Right 10/19/2021    Procedure: ARTHROSCOPY, KNEE-RIGHT;  Surgeon: Trevin Huber MD;  Location: [facility] OR;  Service: Orthopedics;  Laterality: Right;     SECTION      CHOLECYSTECTOMY      COLONOSCOPY N/A 2016    Procedure: COLONOSCOPY;  Surgeon: Heri Segura MD;  Location: [facility] (4TH FLR);  Service: Endoscopy;  Laterality: N/A;    COLONOSCOPY N/A 2019    Procedure: COLONOSCOPY;  Surgeon: Joe Pitts MD;  Location: [facility] (4TH FLR);  Service: Endoscopy;  Laterality: N/A;    CYSTOSCOPY  2022    Procedure: CYSTOSCOPY;  Surgeon: Lenny Moore MD;  Location: [facility] OR 1ST FLR;  Service: Urology;;    ESOPHAGOGASTRODUODENOSCOPY N/A 2020     Procedure: EGD (ESOPHAGOGASTRODUODENOSCOPY);  Surgeon: Tolu Rivas MD;  Location: [facility] ENDO (4TH FLR);  Service: Endoscopy;  Laterality: N/A;  Pt. moved to 9:15am arrival time.. Instructed to not have anything after midnight.EC    EYE SURGERY      cataract resection right    INJECTION OF ANESTHETIC AGENT AROUND NERVE Bilateral 2/8/2022    Procedure: Block, Nerve MEDIAL BRANCH BLOCK BILATERAL L3,4,5;  Surgeon: Tara Gibbs MD;  Location: [facility] PAIN MGT;  Service: Pain Management;  Laterality: Bilateral;    INJECTION OF ANESTHETIC AGENT AROUND NERVE Bilateral 2/15/2022    Procedure: BLOCK, NERVE, L3*L4-L5 MEDIAL BR ANCH 2 OF 2;  Surgeon: Tara Gibbs MD;  Location: [facility] PAIN MGT;  Service: Pain Management;  Laterality: Bilateral;    INJECTION OF BOTULINUM TOXIN TYPE A  6/21/2022    Procedure: BOTULINUM TOXIN INJECTION 100 units;  Surgeon: Lenny Moore MD;  Location: [facility] 1ST FLR;  Service: Urology;;    INSERTION, NEUROSTIMULATOR, SPINAL CORD N/A 1/9/2023    Procedure: SPINAL CORD STIMULATOR IMPLANT [employer];  Surgeon: Shoaib Aguirre MD;  Location: [facility];  Service: Pain Management;  Laterality: N/A;    JOINT REPLACEMENT      partial right knee    KNEE ARTHROSCOPY W/ MENISCECTOMY Right 10/19/2021    Procedure: ARTHROSCOPY, KNEE, WITH MENISCECTOMY, PARTIAL LATERAL;  Surgeon: Trevin Huber MD;  Location: [facility] OR;  Service: Orthopedics;  Laterality: Right;    r hand surgery      RADIOFREQUENCY ABLATION Right 3/8/2022    Procedure: RADIOFREQUENCY ABLATION, L3-L5 1 OF 2;  Surgeon: Tara Gibbs MD;  Location: [facility] PAIN MGT;  Service: Pain Management;  Laterality: Right;    RADIOFREQUENCY ABLATION Left 3/22/2022    Procedure: RADIOFREQUENCY ABLATION, l3-+l5 2 of 2;  Surgeon: Tara Gibbs MD;  Location: [facility] PAIN MGT;  Service: Pain Management;  Laterality: Left;    TONSILLECTOMY      TOTAL KNEE ARTHROPLASTY      partial knee replacement    TRIAL OF SPINAL CORD NERVE  STIMULATOR N/A 12/22/2022    Procedure: SPINAL CORD STIMULATOR TRIAL [employer] RESEARCH;  Surgeon: Shoaib Aguirre MD;  Location: [facility] MGT;  Service: Pain Management;  Laterality: N/A;    TUBAL LIGATION       Review of patient's allergies indicates:  No Known Allergies   Current Facility-Administered Medications   Medication    ceFAZolin 2 g in dextrose 5 % in water (D5W) 50 mL IVPB (MB+)    lactated ringers infusion    LIDOcaine (PF) 10 mg/ml (1%) injection 5 mg     Facility-Administered Medications Ordered in Other Encounters   Medication    0.9%  NaCl infusion    celecoxib capsule 400 mg       PMHx, PSHx, Allergies, Medications reviewed in epic    ROS negative except pain complaints in HPI    OBJECTIVE:    /60 (BP Location: Right arm, Patient Position: Lying)   Pulse (!) 57   Temp 98.1 °F (36.7 °C) (Oral)   Resp 18   SpO2 97%   Breastfeeding No     PHYSICAL EXAMINATION:    GENERAL: Well appearing, in no acute distress, alert and oriented x3.  PSYCH:  Mood and affect appropriate.  SKIN: Skin color, texture, turgor normal, no rashes or lesions which will impact the procedure.  - Left side back you can palpate the battery, No obvious infection. Scare tissue noted   CV: RRR with palpation of the radial artery.  PULM: No evidence of respiratory difficulty, symmetric chest rise. Clear to auscultation.  NEURO: Cranial nerves grossly intact. Motor intact    Plan:    Proceed with procedure as planned Procedure(s) (LRB):  SCS IPG BATTERY REVISION (N/A)    Shaun Rocah  11/20/2023

## 2023-11-21 ENCOUNTER — PATIENT MESSAGE (OUTPATIENT)
Dept: PAIN MEDICINE | Facility: CLINIC | Age: 68
End: 2023-11-21
Payer: MEDICARE

## 2023-11-21 VITALS
RESPIRATION RATE: 18 BRPM | TEMPERATURE: 98 F | OXYGEN SATURATION: 100 % | DIASTOLIC BLOOD PRESSURE: 74 MMHG | HEART RATE: 65 BPM | SYSTOLIC BLOOD PRESSURE: 126 MMHG

## 2023-11-23 ENCOUNTER — PATIENT MESSAGE (OUTPATIENT)
Dept: ADMINISTRATIVE | Facility: OTHER | Age: 68
End: 2023-11-23
Payer: MEDICARE

## 2023-11-24 ENCOUNTER — PATIENT MESSAGE (OUTPATIENT)
Dept: PAIN MEDICINE | Facility: CLINIC | Age: 68
End: 2023-11-24
Payer: MEDICARE

## 2023-11-24 RX ORDER — HYDROCODONE BITARTRATE AND ACETAMINOPHEN 5; 325 MG/1; MG/1
1 TABLET ORAL EVERY 8 HOURS PRN
Qty: 15 TABLET | Refills: 0 | Status: SHIPPED | OUTPATIENT
Start: 2023-11-24 | End: 2023-12-04 | Stop reason: SDUPTHER

## 2023-11-24 NOTE — TELEPHONE ENCOUNTER
Pt refill request has been pended to  and is now pending an approval.    ----- Message from Ana Maria Fu sent at 11/24/2023  1:38 PM CST -----  Regarding: Refill Request    Who Called: Patient        New Prescription or Refill : Refill    RX Name and Strength:   HYDROcodone-acetaminophen (NORCO) 5-325 mg per tablet      RX Name and Strength:         RX Name and Strength:      30 day or 90 day RX:     Preferred Pharmacy:[facility]    Would the patient rather a call back or a response via [facility]?    Best Call Back Number:   [phone]    Additional Information:

## 2023-11-24 NOTE — TELEPHONE ENCOUNTER
----- Message from Ana Maria Fu sent at 11/24/2023  1:38 PM CST -----  Regarding: Refill Request    Who Called: Patient        New Prescription or Refill : Refill    RX Name and Strength:   HYDROcodone-acetaminophen (NORCO) 5-325 mg per tablet      RX Name and Strength:         RX Name and Strength:      30 day or 90 day RX:     Preferred Pharmacy:[facility]    Would the patient rather a call back or a response via [facility]?    Best Call Back Number:   [phone]    Additional Information:

## 2023-11-24 NOTE — TELEPHONE ENCOUNTER
Patient requesting refill on HYDROcodone-acetaminophen (NORCO) 5-325 mg   Last office visit 10/25/23   shows last refill on 11/20/23  Patient does not have a pain contract on file with [facility]  Patient last UDS 11/07/22 was consistent with current therapy          CODEINE  Not Detected    MORPHINE  Not Detected    6-ACETYLMORPHINE  Not Detected    OXYCODONE  Not Detected    NOROYXCODONE  Not Detected    OXYMORPHONE  Not Detected    NOROXYMORPHONE  Not Detected    HYDROCODONE  Not Detected    NORHYDROCODONE  Not Detected    HYDROMORPHONE  Not Detected    BUPRENORPHINE  Not Detected    NORUBPRENORPHINE  Not Detected    FENTANYL  Not Detected    NORFENTANYL  Not Detected    MEPERIDINE METABOLITE  Not Detected    TAPENTADOL  Not Detected    TAPENTADOL-O-SULF  Not Detected    METHADONE  Not Detected    TRAMADOL  Not Detected    AMPHETAMINE  Not Detected    METHAMPHETAMINE  Not Detected    MDMA- ECSTASY  Not Detected    MDA  Not Detected    MDEA- Amber  Not Detected    METHYLPHENIDATE  Present    PHENTERMINE  Not Detected    BENZOYLECGONINE  Not Detected    ALPRAZOLAM  Present    ALPHA-OH-ALPRAZOLAM  Present    CLONAZEPAM  Not Detected    7-AMINOCLONAZEPAM  Not Detected    DIAZEPAM  Not Detected    NORDIAZEPAM  Not Detected    OXAZEPAM  Not Detected    TEMAZEPAM  Not Detected    Lorazepam  Not Detected    MIDAZOLAM  Not Detected    ZOLPIDEM  Not Detected    BARBITURATES  Not Detected    Creatinine, Urine 20.0 - 400.0 mg/dL 58.6 190.0 R, CM   ETHYL GLUCURONIDE  Present    MARIJUANA METABOLITE  Not Detected    PCP  Not Detected    CARISOPRODOL  Not Detected    Comment: The carisoprodol immunoassay has cross-reactivity to  carisoprodol and meprobamate.   Naloxone  Not Detected    Gabapentin  Present    Pregabalin  Not Detected    Alpha-OH-Midazolam  Not Detected    Zolpidem Metabolite

## 2023-11-29 ENCOUNTER — OFFICE VISIT (OUTPATIENT)
Dept: PAIN MEDICINE | Facility: CLINIC | Age: 68
End: 2023-11-29
Payer: MEDICARE

## 2023-11-29 VITALS
RESPIRATION RATE: 18 BRPM | DIASTOLIC BLOOD PRESSURE: 79 MMHG | WEIGHT: 146.38 LBS | OXYGEN SATURATION: 100 % | BODY MASS INDEX: 25.94 KG/M2 | HEART RATE: 63 BPM | HEIGHT: 63 IN | TEMPERATURE: 98 F | SYSTOLIC BLOOD PRESSURE: 141 MMHG

## 2023-11-29 DIAGNOSIS — M17.11 PRIMARY OSTEOARTHRITIS OF RIGHT KNEE: ICD-10-CM

## 2023-11-29 DIAGNOSIS — T85.192D MALFUNCTION OF SPINAL CORD STIMULATOR, SUBSEQUENT ENCOUNTER: Primary | ICD-10-CM

## 2023-11-29 DIAGNOSIS — Z96.89 SPINAL CORD STIMULATOR STATUS: ICD-10-CM

## 2023-11-29 DIAGNOSIS — M51.36 DDD (DEGENERATIVE DISC DISEASE), LUMBAR: ICD-10-CM

## 2023-11-29 DIAGNOSIS — G89.4 CHRONIC PAIN SYNDROME: ICD-10-CM

## 2023-11-29 DIAGNOSIS — M79.18 MYOFASCIAL PAIN: ICD-10-CM

## 2023-11-29 PROCEDURE — 99999 PR PBB SHADOW E&M-EST. PATIENT-LVL IV: CPT | Mod: PBBFAC,,, | Performed by: NURSE PRACTITIONER

## 2023-11-29 PROCEDURE — 99999 PR PBB SHADOW E&M-EST. PATIENT-LVL IV: ICD-10-PCS | Mod: PBBFAC,,, | Performed by: NURSE PRACTITIONER

## 2023-11-29 PROCEDURE — 99024 PR POST-OP FOLLOW-UP VISIT: ICD-10-PCS | Mod: POP,,, | Performed by: NURSE PRACTITIONER

## 2023-11-29 PROCEDURE — 99024 POSTOP FOLLOW-UP VISIT: CPT | Mod: POP,,, | Performed by: NURSE PRACTITIONER

## 2023-11-29 PROCEDURE — 99214 OFFICE O/P EST MOD 30 MIN: CPT | Mod: PBBFAC | Performed by: NURSE PRACTITIONER

## 2023-11-29 NOTE — PROGRESS NOTES
"Post-Op Follow Up for Spinal Cord Stimulator IPG revision      Subjective  Tiarra Norton returns today following SCS IPG revision.   Patient reports mild to moderate incisional discomfort and denies fever, chills, wound drainage, or increasing tenderness of the surgical sites.    Objective  Vitals:    11/29/23 1401   BP: (!) 141/79   Pulse: 63   Resp: 18   Temp: 98 °F (36.7 °C)   SpO2: 100%   Weight: 66.4 kg (146 lb 6.2 oz)   Height: 5' 3" (1.6 m)   PainSc:   4   PainLoc: Back       Exam  Dressing removed to expose wounds on low back.    There is no erythema or discharge.  Wounds are appear clean, dry, and intact.          Assessment:  Successful SCS IPG revision  with appropriate wound healing.    Plan  Dressing re-applied.  OK to shower.  Do no submerge wound for 2 weeks in bath/pool.  Image would not load of wound    Follow Up: patient requested a follow up with Dr. Timothy Ortiz, NP-C  [facility] Pain Management    Disclaimer: This note was partly generated using dictation software which may occasionally result in transcription errors.      "

## 2023-11-30 ENCOUNTER — TELEPHONE (OUTPATIENT)
Dept: OPHTHALMOLOGY | Facility: CLINIC | Age: 68
End: 2023-11-30
Payer: MEDICARE

## 2023-12-03 ENCOUNTER — PATIENT MESSAGE (OUTPATIENT)
Dept: PAIN MEDICINE | Facility: CLINIC | Age: 68
End: 2023-12-03
Payer: MEDICARE

## 2023-12-04 ENCOUNTER — PATIENT MESSAGE (OUTPATIENT)
Dept: PAIN MEDICINE | Facility: CLINIC | Age: 68
End: 2023-12-04
Payer: MEDICARE

## 2023-12-04 RX ORDER — HYDROCODONE BITARTRATE AND ACETAMINOPHEN 5; 325 MG/1; MG/1
1 TABLET ORAL EVERY 8 HOURS PRN
Qty: 15 TABLET | Refills: 0 | Status: CANCELLED | OUTPATIENT
Start: 2023-12-04 | End: 2023-12-09

## 2023-12-04 NOTE — TELEPHONE ENCOUNTER
Patient requesting refill on HYDROcodone-acetaminophen (NORCO) 5-325 mg per tablet   Last office visit 11/27/23   shows last refill on 11  Patient does not have a pain contract on file with [facility]  Patient last UDS 11/07/23 was consistent with current therapy    CODEINE  Not Detected    MORPHINE  Not Detected    6-ACETYLMORPHINE  Not Detected    OXYCODONE  Not Detected    NOROYXCODONE  Not Detected    OXYMORPHONE  Not Detected    NOROXYMORPHONE  Not Detected    HYDROCODONE  Not Detected    NORHYDROCODONE  Not Detected    HYDROMORPHONE  Not Detected    BUPRENORPHINE  Not Detected    NORUBPRENORPHINE  Not Detected    FENTANYL  Not Detected    NORFENTANYL  Not Detected    MEPERIDINE METABOLITE  Not Detected    TAPENTADOL  Not Detected    TAPENTADOL-O-SULF  Not Detected    METHADONE  Not Detected    TRAMADOL  Not Detected    AMPHETAMINE  Not Detected    METHAMPHETAMINE  Not Detected    MDMA- ECSTASY  Not Detected    MDA  Not Detected    MDEA- Amber  Not Detected    METHYLPHENIDATE  Present    PHENTERMINE  Not Detected    BENZOYLECGONINE  Not Detected    ALPRAZOLAM  Present    ALPHA-OH-ALPRAZOLAM  Present    CLONAZEPAM  Not Detected    7-AMINOCLONAZEPAM  Not Detected    DIAZEPAM  Not Detected    NORDIAZEPAM  Not Detected    OXAZEPAM  Not Detected    TEMAZEPAM  Not Detected    Lorazepam  Not Detected    MIDAZOLAM  Not Detected    ZOLPIDEM  Not Detected    BARBITURATES  Not Detected    Creatinine, Urine 20.0 - 400.0 mg/dL 58.6 190.0 R, CM   ETHYL GLUCURONIDE  Present    MARIJUANA METABOLITE  Not Detected    PCP  Not Detected    CARISOPRODOL  Not Detected    Comment: The carisoprodol immunoassay has cross-reactivity to  carisoprodol and meprobamate.   Naloxone  Not Detected    Gabapentin  Present    Pregabalin  Not Detected    Alpha-OH-Midazolam  Not Detected    Zolpidem Metabolite  Not Detected    PAIN MANAG

## 2023-12-05 ENCOUNTER — PATIENT MESSAGE (OUTPATIENT)
Dept: PAIN MEDICINE | Facility: CLINIC | Age: 68
End: 2023-12-05
Payer: MEDICARE

## 2023-12-05 RX ORDER — HYDROCODONE BITARTRATE AND ACETAMINOPHEN 5; 325 MG/1; MG/1
1 TABLET ORAL EVERY 8 HOURS PRN
Qty: 15 TABLET | Refills: 0 | Status: CANCELLED | OUTPATIENT
Start: 2023-12-05 | End: 2023-12-10

## 2023-12-06 ENCOUNTER — PATIENT MESSAGE (OUTPATIENT)
Dept: OTHER | Facility: OTHER | Age: 68
End: 2023-12-06
Payer: MEDICARE

## 2023-12-06 RX ORDER — HYDROCODONE BITARTRATE AND ACETAMINOPHEN 5; 325 MG/1; MG/1
1 TABLET ORAL EVERY 8 HOURS PRN
Qty: 15 TABLET | Refills: 0 | Status: SHIPPED | OUTPATIENT
Start: 2023-12-06 | End: 2023-12-12

## 2023-12-11 ENCOUNTER — PATIENT MESSAGE (OUTPATIENT)
Dept: INTERNAL MEDICINE | Facility: CLINIC | Age: 68
End: 2023-12-11
Payer: MEDICARE

## 2023-12-14 ENCOUNTER — PATIENT MESSAGE (OUTPATIENT)
Dept: INTERNAL MEDICINE | Facility: CLINIC | Age: 68
End: 2023-12-14
Payer: MEDICARE

## 2023-12-14 DIAGNOSIS — K58.9 IRRITABLE BOWEL SYNDROME, UNSPECIFIED TYPE: Primary | ICD-10-CM

## 2023-12-15 NOTE — TELEPHONE ENCOUNTER
Pls ask gi schedulers who has interest in IBS- irritable bowel syndrome   And request appt  
Pls offer virtual appt to discuss that medication    Also - see earlier message- ask GI  who appropriate doctor would be for ibs (irritable bowel syndrome)  
Quality 130: Documentation Of Current Medications In The Medical Record: Current Medications Documented
Quality 431: Preventive Care And Screening: Unhealthy Alcohol Use - Screening: Patient screened for unhealthy alcohol use using a single question and scores less than 2 times per year
Detail Level: Detailed
Quality 226: Preventive Care And Screening: Tobacco Use: Screening And Cessation Intervention: Patient screened for tobacco use and is an ex/non-smoker

## 2023-12-19 ENCOUNTER — TELEPHONE (OUTPATIENT)
Dept: RESEARCH | Facility: OTHER | Age: 68
End: 2023-12-19
Payer: MEDICARE

## 2023-12-19 NOTE — TELEPHONE ENCOUNTER
"----- Message from Shoaib Aguirre MD sent at 12/19/2023  1:20 PM CST -----  Regarding: RE: Clarification  of DD for Subject 011  I agree that Subject 011 reported new pocket pain and subsequently had an unscheduled visit on 26 July 2023. It was determined by you that this pocket pain is related to the "Pain Around IPG Site, which was reported 16 March 2023.       ----- Message -----  From: Nena Prajapati  Sent: 12/19/2023   1:14 PM CST  To: Shoaib Aguirre MD  Subject: Clarification  of DD for Subject 011             Good afternoon Dr. Aguirre:    Per prior discussions, Subject 011 reported new pocket pain and subsequently had an unscheduled visit on 26 July 2023. It was determined by you that this pocket pain is related to the "Pain Around IPG Site, which was reported 16 March 2023.     Would you please reply to this stating that fact and please put an encounter or documentation in EPIC notes?     I appreciate your time and attention to this matter.    Respectfully,  Nena Prajapati  [facility]        "

## 2023-12-21 ENCOUNTER — OFFICE VISIT (OUTPATIENT)
Dept: INTERNAL MEDICINE | Facility: CLINIC | Age: 68
End: 2023-12-21
Payer: MEDICARE

## 2023-12-21 ENCOUNTER — CLINICAL SUPPORT (OUTPATIENT)
Dept: REHABILITATION | Facility: OTHER | Age: 68
End: 2023-12-21
Payer: MEDICARE

## 2023-12-21 DIAGNOSIS — M25.661 DECREASED RANGE OF MOTION (ROM) OF RIGHT KNEE: ICD-10-CM

## 2023-12-21 DIAGNOSIS — M62.81 WEAKNESS OF TRUNK MUSCULATURE: Primary | ICD-10-CM

## 2023-12-21 DIAGNOSIS — F33.0 MAJOR DEPRESSIVE DISORDER, RECURRENT, MILD: ICD-10-CM

## 2023-12-21 DIAGNOSIS — R29.898 WEAKNESS OF RIGHT LOWER EXTREMITY: ICD-10-CM

## 2023-12-21 DIAGNOSIS — R29.898 DECREASED STRENGTH OF TRUNK AND BACK: ICD-10-CM

## 2023-12-21 DIAGNOSIS — M25.69 DECREASED RANGE OF MOTION OF TRUNK AND BACK: ICD-10-CM

## 2023-12-21 DIAGNOSIS — K58.0 IRRITABLE BOWEL SYNDROME WITH DIARRHEA: Primary | ICD-10-CM

## 2023-12-21 PROCEDURE — 97161 PT EVAL LOW COMPLEX 20 MIN: CPT

## 2023-12-21 PROCEDURE — 97140 MANUAL THERAPY 1/> REGIONS: CPT

## 2023-12-21 PROCEDURE — 99213 OFFICE O/P EST LOW 20 MIN: CPT | Mod: 95,,, | Performed by: INTERNAL MEDICINE

## 2023-12-21 PROCEDURE — 99213 PR OFFICE/OUTPT VISIT, EST, LEVL III, 20-29 MIN: ICD-10-PCS | Mod: 95,,, | Performed by: INTERNAL MEDICINE

## 2023-12-21 PROCEDURE — 97110 THERAPEUTIC EXERCISES: CPT

## 2023-12-21 NOTE — PLAN OF CARE
"  [facility]  Physical Therapy Initial Evaluation  Date: 12/21/2023   Name: Tiarra Norton  Clinic Number: [id number]    Therapy Diagnosis:   Encounter Diagnoses   Name Primary?    Weakness of trunk musculature Yes    Weakness of right lower extremity     Decreased strength of trunk and back     Decreased range of motion of trunk and back     Decreased range of motion (ROM) of right knee      Physician: Virgil Scott MD    Physician Orders: PT Eval and Treat   Medical Diagnosis from Referral:   Z96.651 (ICD-10-CM) - Status post total right knee replacement   M22.2X1 (ICD-10-CM) - Patellofemoral pain syndrome of right knee   M70.50 (ICD-10-CM) - Pes anserine bursitis     Evaluation Date: 12/21/2023  Authorization Period Expiration: 9/21/2024  Plan of Care Expiration: 3/21/2024  Visit # / Visits authorized: 1/ 1 (pending)   Progress Note Due: 1/21/2024  FOTO: 1/ 1    Precautions: hx of TKA and menisectomy, spinal cord stimulator, scoliosis    Time In: 10:30 am  Time Out: 11:30 am   Total Appointment Time (timed & untimed codes): 60 minutes    Subjective   Date of onset: 5 years ago  History of current condition - Tiarra reports: she had spinal core stimulator placed in January of 2023, it helped with the nerve pain, but then she started to have "pocket pain" in the L side of her back, so she had a revision on 11/20 to to re-attach the stimulator. Her current pain is L side dominant with no LE radiation. She has not been able exercise for the last month because of the recent revision. She feels that her pain used to be constant achy nerve pain on the right, but now she describes it as a more muscular pain on the L side and she feels weak. She reports poor sleep quality almost every night. Aggravating factors include standing, walking, bending, lifting, twisting, squatting, stairs. Alleviated with heat, dry needling. There is also chronic pain of the R knee, she had partial TKA in 2015, " "meniscus repair in , but the pain has been worsening since then.     PALLAVI: chronic  Any Bowel and Bladder movement issues: no  Any falls: no  Any dizziness: no  Any Headache: no  Any injection in lower back: steroid or epidural: multiple back injections, most recently a few months ago  Pain radiates: no  Pain constant or intermitting: constant    Pain:  Current 4/10, worst 10/10, best 2/10   Location: left back, R knee   Description: Aching, tight  Aggravating Factors: standing, walking, bending, lifting, twisting, squatting, stairs  Easing Factors: heat, dry needling, ice    Prior Therapy: yes with some relief, but never complete resolution  Social History: 2 story home, lives with spouse   Occupation: retired   Prior Level of Function: independent  Current Level of Function: increased pain and decreased tolerance to standing, walking, bending, lifting, twisting, squatting, stairs    Pts goals: "I want to feel better and get back to being fit"     Imaging: XR LUMBAR SPINE AP AND LATERAL     CLINICAL HISTORY:  TO CONFIRM LEAD PLACEMENT;Chronic pain syndrome     TECHNIQUE:  AP, lateral and spot images were performed of the lumbar spine.     COMPARISON:  N 2021 one     FINDINGS:  DJD and lumbar scoliosis.  The disc spaces are narrowed between T12 and the L3 vertebral segments.  No fracture or dislocation.  No bone destruction identified.  Intraspinal electrode at T8 level noted.     Impression:     See above       Medical History:   Past Medical History:   Diagnosis Date    Cataract     Depression     Gestational diabetes     Hyperlipidemia     Hypertension     IBS (irritable bowel syndrome)     Thyroid disease        Surgical History:   Tiarra Norton  has a past surgical history that includes  section; Tubal ligation; Tonsillectomy; Adenoidectomy; Cholecystectomy; Eye surgery; r hand surgery; Total knee arthroplasty; Colonoscopy (N/A, 2016); Colonoscopy (N/A, 2019); " Esophagogastroduodenoscopy (N/A, 2/13/2020); Joint replacement; Arthroscopy of knee (Right, 10/19/2021); Arthroscopic chondroplasty of knee joint (Right, 10/19/2021); Knee arthroscopy w/ meniscectomy (Right, 10/19/2021); Injection of anesthetic agent around nerve (Bilateral, 2/8/2022); Injection of anesthetic agent around nerve (Bilateral, 2/15/2022); Radiofrequency ablation (Right, 3/8/2022); Radiofrequency ablation (Left, 3/22/2022); Injection of botulinum toxin type A (6/21/2022); Cystoscopy (6/21/2022); Trial of spinal cord nerve stimulator (N/A, 12/22/2022); insertion, neurostimulator, spinal cord (N/A, 1/9/2023); arthroplasty, knee, total, using computer-assisted navigation (Right, 5/18/2023); and Removal of neurostimulator (N/A, 11/20/2023).    Medications:   Tiarra has a current medication list which includes the following prescription(s): alprazolam, amlodipine, atorvastatin, conjugated estrogens, escitalopram oxalate, levothyroxine, liothyronine, promethazine, restasis, rifaximin, scopolamine, sumatriptan, tobramycin sulfate 0.3%, trazodone, and valsartan, and the following Facility-Administered Medications: sodium chloride 0.9%, celecoxib, lactated ringers, and lidocaine (pf) 10 mg/ml (1%).    Allergies:   Review of patient's allergies indicates:  No Known Allergies       Objective       Observation:     Posture Alignment: slouched posture;increased kyphosis;decreased lordosis    Sensation: intact to light touch      GAIT DEVIATIONS: Tiarra displays unsteady gait;decreased step length;decreased weight shift;antalgic gait    Lumbar Range of Motion:    %   Flexion 90     Extension 50     Left Side Bending 50   Right Side Bending 50   Left rotation   60   Right Rotation   70    *= pain    Passive hip ROM in degrees:     Left Right   IR WFL WFL   ER WFL WFL       GAIT DEVIATIONS: Tiarra displays decreased weight shift;antalgic gait;decreased step length;occasional unsteady gait    Range of Motion:   Knee Left  active Left Passive   Flexion 133 140   Extension 3 5     Knee Right active Right Passive   Flexion 121 124   Extension 1 3       Lower Extremity Strength    Right LE  Left LE    Hip flexion: 4-/5 Hip flexion: 4/5   Knee extension: 4/5 Knee extension: 4+/5   Knee flexion: 4-/5 Knee flexion: 4+/5   Hip IR: 4-/5 Hip IR: 4/5   Hip ER: 4-/5 Hip ER: 4/5   Hip extension:  3+/5 Hip extension: 3+/5   Hip abduction: 3+/5 Hip abduction: 4-/5   Hip adduction: 3+/5 Hip adduction 4-/5   Ankle dorsiflexion: 5/5 Ankle dorsiflexion: 5/5   Ankle plantarflexion: 5/5 Ankle plantarflexion: 5/5     Special Tests:  -Quadrant testing: negative  -ANDREW: negative  -Scour: negative  -Repeated Flexion: positive  -Repeated Ext:positive  -Piriformis Test: negative  -Prone Instability Test: positive  -Bridge Test: negative  -Sustained extension: negative  -Heel walking: positive  -Toe walking: negative  -Hip extension movement pattern: positive  -Long sitting test:negative    SI provocation test:  Distraction test: negative  Compression test: negative  Thigh thrust test: negative  Gaenslens Right side tests: positive  Gaenslens Left side test: positive  Sacral thrust test: negative  3 out of all 6 provocation have sensitivity of .94 and specificity .78 for SIJ pathology       Neuro Dynamic Testing:    Sciatic nerve:      SLR: positive       Femoral Nerve:    Femoral nerve test: negative   Neural Tension:     Slump test: negative    Joint Mobility: hypomobile lumbar segments    Palpation: TTP of bilateral lumbar paraspinals     Flexibility:    Ely's test: R = 80 degrees ; L = 100 degrees   Popliteal Angle: R = 30 degrees ; L = 20 degrees   Sammy's test: R = - ; L = -   Blas test: R = + ; L = +    PT Evaluation Completed? Yes  Discussed Plan of Care with patient: Yes        CMS Impairment/Limitation/Restriction for FOTO lumbar Survey    Therapist reviewed FOTO scores for Tiarra Norton on 12/21/2023.   FOTO documents entered into EPIC - see  "Media section.    Limitation Score: 42%    Goal: 51%         TREATMENT   Treatment Time In: 11:00  Treatment Time Out: 11:30  Total Treatment time separate from Evaluation: 30 minutes    Tiarra received therapeutic exercises to develop strength, endurance, ROM, flexibility, posture, and core stabilization for 15 minutes including:  PPT 15x5"  Bridge 15x3"  EIL x10  DKTC 15x5"'  Open books x15 ea  EIS 2x10  Hip flexor stretch 2x1' ea      Tiarra received the following manual therapy techniques: Joint mobilizations were applied to the: R knee and lumbar spine for 10 minutes, including:  Patellar mobs  Knee PROM  Lumbar CPA's grade III/IV    Tiarra received cold pack for 5 minutes to low back and R knee.      Home Exercises and Patient Education Provided    Education provided:   - HEP, POC    Written Home Exercises Provided: yes.  Exercises were reviewed and Tiarra was able to demonstrate them prior to the end of the session.  Tiarra demonstrated good  understanding of the education provided.     See EMR under Patient Instructions for exercises provided 12/21/2023.    Assessment   Tiarra is a 68 y.o. female referred to outpatient Physical Therapy with a medical diagnosis of Z96.651 (ICD-10-CM) - Status post total right knee replacement, M22.2X1 (ICD-10-CM) - Patellofemoral pain syndrome of right knee, and M70.50 (ICD-10-CM) - Pes anserine bursitis. Pt presents with signs and symptoms consistent with diagnosis including decreased range of motion of R knee, weakness of R LE, decreased range of motion of trunk and back, weakness of trunk and back, decreased joint mobility, decreased soft tissue flexibility and mobility, patellar hypomobility, gait deviations, poor balance, and decreased functional mobility tolerance. Theres deficits are limiting patient in full participation of ADL's and leisure activities such as standing, walking, bending, lifting, twisting, squatting, stairs. Moisés has previously completed physical therapy with " relief.     Pt prognosis is Fair.   Pt will benefit from skilled outpatient Physical Therapy to address the deficits stated above and in the chart below, provide pt/family education, and to maximize pt's level of independence.     Plan of care discussed with patient: Yes  Pt's spiritual, cultural and educational needs considered and patient is agreeable to the plan of care and goals as stated below:     Anticipated Barriers for therapy: chronicity of condition, multiple treatment areas, hx of R TKA and menisectomy    Medical Necessity is demonstrated by the following  History  Co-morbidities and personal factors that may impact the plan of care Co-morbidities:   advanced age, anxiety, depression, difficulty sleeping, HTN, and hx of TKA and menisectomy, spinal cord stimulator, scoliosis    Personal Factors:   age  coping style     low   Examination  Body Structures and Functions, activity limitations and participation restrictions that may impact the plan of care Body Regions:   back  lower extremities  trunk    Body Systems:    ROM  strength  balance  gait  motor control  blood pressure    Participation Restrictions:   See above    Activity limitations:   Learning and applying knowledge  no deficits    General Tasks and Commands  no deficits    Communication  no deficits    Mobility  lifting and carrying objects  walking  driving (bike, car, motorcycle)    Self care  no deficits    Domestic Life  doing house work (cleaning house, washing dishes, laundry)  assisting others    Interactions/Relationships  no deficits    Life Areas  no deficits    Community and Social Life  no deficits         Complexity: low  See FOTO outcome assessment   Clinical Presentation stable and uncomplicated low   Decision Making/ Complexity Score: low     GOALS: Short Term Goals:  4 weeks  1. Report decreased in pain at worse less than  <   / =  4  /10  to increase tolerance for functional activities.On going  2. Pt to increase B popliteal  angle by greater than > or = 5 degrees in order to improve flexibility and posture. On going  3. Increased R LE MMT 1/2  to increase tolerance for ADL and work activities.On going  4. Pt to increase B Ely's Test by greater than  > or = 5degrees in order to improve flexibility and posture. On going  5. Pt to tolerate HEP to improve ROM and independence with ADL's.On going  6. Pt to improve range of motion by 25% to allow for improved functional mobility to allow for improvement in IADLs. On going    Long Term Goals: 8 weeks  1. Report decreased in pain at worse less than  <   / =  2  /10  to increase tolerance for functional mobility.  On going  2 .Pt to increase B popliteal angle by greater than > or = 10 degrees in order to improve flexibility and posture. On going  3. Increased R LE MMT 1 grade to increase tolerance for ADL and work activities.On going  4. Pt will report at 51% or better score on FOTO Lumbar spine survey  to demonstrate decrease in disability and improvement in back pain.On going  5. Pt to be Independent with HEP to improve ROM and independence with ADL's. On going  6. Pt to increase B Ely's Test by greater than > or = 10 degrees in order to improve flexibility and posture. On going  7. Pt to demonstrate negative Bridge Test in order to show improved core strength for lumbar stabilization. On going  8. Pt to improve range of motion by 75% to allow for improved functional mobility to allow for improvement in IADLs. On going      Plan   Plan of care Certification: 12/21/2023 to 3/21/2023.    Outpatient Physical Therapy 2 times weekly for 12 weeks to include the following interventions: Cervical/Lumbar Traction, Gait Training, Manual Therapy, Moist Heat/ Ice, Neuromuscular Re-ed, Patient Education, Self Care, Therapeutic Activities, and Therapeutic Exercise. Dry aubree Anaya, PT      I CERTIFY THE NEED FOR THESE SERVICES FURNISHED UNDER THIS PLAN OF TREATMENT AND WHILE UNDER MY CARE    Physician's comments:     Physician's Signature: ___________________________________________________

## 2023-12-21 NOTE — PROGRESS NOTES
The patient location is: [facility]  The chief complaint leading to consultation is: IBS    Visit type: audiovisual    Face to Face time with patient: 10  15 minutes of total time spent on the encounter, which includes face to face time and non-face to face time preparing to see the patient (eg, review of tests), Obtaining and/or reviewing separately obtained history, Documenting clinical information in the electronic or other health record, Independently interpreting results (not separately reported) and communicating results to the patient/family/caregiver, or Care coordination (not separately reported).         Each patient to whom he or she provides medical services by telemedicine is:  (1) informed of the relationship between the physician and patient and the respective role of any other health care provider with respect to management of the patient; and (2) notified that he or she may decline to receive medical services by telemedicine and may withdraw from such care at any time.    Notes:    Subjective       Patient ID: Tiarra Norton is a 68 y.o. female.    Chief Complaint: No chief complaint on file.  She has IBS and would like to see a specialist with expertise in that area.  Her future daughter-in-law did has sibo, and symptoms improved remarkably with  Xifaxan.  She is bothered by persistent diarrhea, extending for years.  Previous GI notes reviewed.  Documented IBS with diarrhea 4/21..  TCA was recommended, but I don't see documentation that one was ever started..    She is chronically depressed.          Review of Systems   Constitutional:  Positive for activity change. Negative for unexpected weight change.   HENT:  Negative for hearing loss, rhinorrhea and trouble swallowing.    Eyes:  Negative for discharge and visual disturbance.   Respiratory:  Negative for chest tightness and wheezing.    Cardiovascular:  Positive for palpitations. Negative for chest pain.   Gastrointestinal:  Positive for  constipation, diarrhea and vomiting. Negative for blood in stool.   Endocrine: Negative for polydipsia and polyuria.   Genitourinary:  Negative for difficulty urinating, dysuria, hematuria and menstrual problem.   Musculoskeletal:  Positive for arthralgias. Negative for neck pain.   Neurological:  Positive for weakness and headaches.   Psychiatric/Behavioral:  Positive for dysphoric mood.           Objective     Physical Exam  Constitutional:       Appearance: Normal appearance. She is not ill-appearing or diaphoretic.   Psychiatric:         Mood and Affect: Mood normal.         Behavior: Behavior normal.         Thought Content: Thought content normal.            Assessment and Plan     1. Irritable bowel syndrome with diarrhea  -     rifAXIMin (XIFAXAN) 550 mg Tab; Take 1 tablet (550 mg total) by mouth 3 (three) times daily. for 14 days  Dispense: 42 tablet; Refill: 0    2. Major depressive disorder, recurrent, mild        We discussed indications for Rifaxan.  F/U with GI         No follow-ups on file.

## 2023-12-22 ENCOUNTER — PATIENT MESSAGE (OUTPATIENT)
Dept: INTERNAL MEDICINE | Facility: CLINIC | Age: 68
End: 2023-12-22
Payer: MEDICARE

## 2023-12-22 ENCOUNTER — PATIENT MESSAGE (OUTPATIENT)
Dept: ORTHOPEDICS | Facility: CLINIC | Age: 68
End: 2023-12-22
Payer: MEDICARE

## 2023-12-25 ENCOUNTER — PATIENT MESSAGE (OUTPATIENT)
Dept: GASTROENTEROLOGY | Facility: CLINIC | Age: 68
End: 2023-12-25
Payer: MEDICARE

## 2023-12-30 NOTE — TELEPHONE ENCOUNTER
Care Due:                  Date            Visit Type   Department     Provider  --------------------------------------------------------------------------------                                ESTABLISHED                              PATIENT -    [facility] INTERNAL  Last Visit: 12-      VIRTUAL      MEDICINE       DEX CANELA                              EP -                              PRIMARY      [facility] INTERNAL  Next Visit: 02-      CARE ([facility])   MEDICINE       DEX CANELA                                                            Last  Test          Frequency    Reason                     Performed    Due Date  --------------------------------------------------------------------------------    Lipid Panel.  12 months..  atorvastatin.............  03- 02-    [facility] Embedded Care Due Messages. Reference number: [id number].   12/30/2023 6:43:25 AM CST

## 2024-01-02 RX ORDER — SCOLOPAMINE TRANSDERMAL SYSTEM 1 MG/1
1 PATCH, EXTENDED RELEASE TRANSDERMAL
Qty: 6 PATCH | Refills: 1 | Status: SHIPPED | OUTPATIENT
Start: 2024-01-02

## 2024-01-02 NOTE — TELEPHONE ENCOUNTER
Refill Routing Note   Medication(s) are not appropriate for processing by [facility] for the following reason(s):        Med affordability  Outside of protocol    [facility] action(s):  Route     Requires labs : Yes             Appointments  past 12m or future 3m with PCP    Date Provider   Last Visit   12/21/2023 Kaykay Perales MD   Next Visit   2/27/2024 Kaykay Perales MD   ED visits in past 90 days: 0        Note composed:9:30 AM 01/02/2024

## 2024-01-23 ENCOUNTER — RESEARCH ENCOUNTER (OUTPATIENT)
Dept: RESEARCH | Facility: OTHER | Age: 69
End: 2024-01-23

## 2024-01-23 ENCOUNTER — OFFICE VISIT (OUTPATIENT)
Dept: PAIN MEDICINE | Facility: CLINIC | Age: 69
End: 2024-01-23
Payer: MEDICARE

## 2024-01-23 ENCOUNTER — PATIENT MESSAGE (OUTPATIENT)
Dept: PAIN MEDICINE | Facility: OTHER | Age: 69
End: 2024-01-23
Payer: MEDICARE

## 2024-01-23 VITALS
BODY MASS INDEX: 25.94 KG/M2 | HEART RATE: 66 BPM | SYSTOLIC BLOOD PRESSURE: 146 MMHG | HEIGHT: 63 IN | OXYGEN SATURATION: 100 % | WEIGHT: 146.38 LBS | DIASTOLIC BLOOD PRESSURE: 78 MMHG | RESPIRATION RATE: 18 BRPM

## 2024-01-23 DIAGNOSIS — M25.561 CHRONIC PAIN OF RIGHT KNEE: Primary | ICD-10-CM

## 2024-01-23 DIAGNOSIS — G89.4 CHRONIC PAIN SYNDROME: Primary | ICD-10-CM

## 2024-01-23 DIAGNOSIS — G89.29 CHRONIC PAIN OF RIGHT KNEE: ICD-10-CM

## 2024-01-23 DIAGNOSIS — M25.561 CHRONIC PAIN OF RIGHT KNEE: ICD-10-CM

## 2024-01-23 DIAGNOSIS — Z96.651 HISTORY OF TOTAL KNEE ARTHROPLASTY, RIGHT: ICD-10-CM

## 2024-01-23 DIAGNOSIS — G89.29 CHRONIC PAIN OF RIGHT KNEE: Primary | ICD-10-CM

## 2024-01-23 DIAGNOSIS — Z96.89 SPINAL CORD STIMULATOR STATUS: ICD-10-CM

## 2024-01-23 PROCEDURE — 80355 GABAPENTIN NON-BLOOD: CPT | Performed by: ANESTHESIOLOGY

## 2024-01-23 PROCEDURE — 99999 PR PBB SHADOW E&M-EST. PATIENT-LVL IV: CPT | Mod: PBBFAC,,, | Performed by: ANESTHESIOLOGY

## 2024-01-23 PROCEDURE — 99214 OFFICE O/P EST MOD 30 MIN: CPT | Mod: PBBFAC | Performed by: ANESTHESIOLOGY

## 2024-01-23 PROCEDURE — 80347 BENZODIAZEPINES 13 OR MORE: CPT | Performed by: ANESTHESIOLOGY

## 2024-01-23 PROCEDURE — 80326 AMPHETAMINES 5 OR MORE: CPT | Performed by: ANESTHESIOLOGY

## 2024-01-23 PROCEDURE — 99214 OFFICE O/P EST MOD 30 MIN: CPT | Mod: S$PBB,,, | Performed by: ANESTHESIOLOGY

## 2024-01-23 RX ORDER — HYDROCODONE BITARTRATE AND ACETAMINOPHEN 5; 325 MG/1; MG/1
1 TABLET ORAL EVERY 12 HOURS PRN
Qty: 60 TABLET | Refills: 0 | Status: SHIPPED | OUTPATIENT
Start: 2024-01-23 | End: 2024-02-22

## 2024-01-23 NOTE — PROGRESS NOTES
Study: Wave Writer- BRUNO Study: A Randomized Controlled Study to Evaluate the Safety and Effectiveness of [employer] Scientific Spinal Cord Stimulation (SCS) Systems in the Treatment of Chronic Low Back and/or Leg Pain with No Prior Surgeries  IRB/Protocol #: [id number]/  : Obed Gasca MD  Treating Physician: Shoaib Aguirre MD  Site Number: [id number]        Subject Number: [id number]     12 Month Post-activation Visit:   The subject came to [facility] for 12 month post activation visit. The JERZY was reviewed and entered into the EDC. Opioid pain medications were also reviewed, there were no changes.  The following questionnaire and/or assessments were completed using the iPad by the subject. The clinician completed the assessments using the paper forms as the clinician login credentials would not allow a login. The subject reported working 0 hours a week. 2 Primary Care visit, O ED visits, and 1 Out patient hospital visit in the past 3 months. Subject then met with KATHLEEN Cali for any additional programming or changes; the subject did not want any program change at today's visit.        Questionnaires   Numeric Rating Scale (NRS)  Oswestry Disability Index (PREMA)  SF-36 Health Survey  EQ-5D-5L  PSQI  Sleep Quality Index  Pain Intensity VRS  Clinical Global Impression of Change -Clinician  Pain Intensity VRS (Follow-up)-Clinician     Adverse event reporting was reviewed with the subject and there were no adverse events or protocol deviations to report at this time. A payment of $100 was loaded onto the subject's [place] for the visit.

## 2024-01-23 NOTE — PROGRESS NOTES
Chronic Pain-Established Visit      SUBJECTIVE:    PCP: Kaykay Perales MD    REFERRING PHYSICIAN: Tyler Jacobs MD    CHIEF COMPLAINT: Lower back pain       Original HISTORY OF PRESENT ILLNESS: Tiarra Norton presents to the clinic for the evaluation of the above pain. The pain started five years ago.     Original Pain Description:  The pain is located in the lower back and does radiate up towards her upper back.The pain is described as aching, dull and sharp. Initially starts as a dull, aching pain and it gets sharp once she bends down and moves around. Typically when she walks for more than five minutes, the sharp pain radiates up her back. Exacerbating factors: Standing, Bending, Touching, Walking, Night Time, Flexing and Lifting. Mitigating factors heat, ice, laying down and massage. Symptoms interfere with daily activity and sleeping. The patient feels like symptoms have been worsening. Patient denies night fever/night sweats, urinary incontinence, bowel incontinence, significant weight loss, significant motor weakness and loss of sensations.    Original PAIN SCORES:  Best: Pain is 1  Worst: Pain is 10  Usually: Pain is 4  Current: Pain is 4    Interval History 1/23/2024:  Tiarra Norton presents for folow-up for lower back pain s/p IPG revision 11/20/2023.  She reports that the pocket pain is significantly improved, continues to get better.  She is just now feeling well enough to participate in physical therapy which she has today.  Today she also complains of right knee pain. She had a right TKA on 5/18/2023. .  The patient describes the pain as aching. The pain is constant. Exacerbating factors: bending, cooking, standing for a long time, walking.  Mitigating factors: ice, heat, medications.  The patient takes Tylenol 500 mg PRN with mild benefit.  They deny any perceived side effects.  She is hoping for an additional prescription for Norco 5-325 mg which she had taken over the recovery period  from the IPG revision.  She states that the medication helps her with activity.  The symptoms interfere with ADLs.  The patient last had imaging Xray bilateral knees on 10/26/2023. See findings below.  The patient denies fever/night sweats, urinary incontinence, bowel incontinence, significant weight changes, significant motor weakness or changes, or loss of sensations.  Today's pain score is 3/10 for back pain and 7/10 for right knee pain.     RTA 5/18/23, activity restrictions from IPG revision was not able to do PT, starting PT for back and right knee back today.  Wondering if there are any procedures we can do for knee.     Wants Norco 5-325 mg-helps with activity  Pain ext and flex  Ptp superior andterior    Interval History 10/25/2023  67 y/o female with hx of chronic left sided low back pain near IPG, she is s/p Octad electrode x2  placement of pulse generator 3  on 1/9 she recently was provided a recent TPI near left low back she reports 0% relief following.  Pain is worse when standing walking performing ADLs.  She does state she gets 5 hours of uninterrupted sleep.  Like her left low back is weak and aching.  Tingling any wearing her low back.  In the right side of her low back.  To discuss other pain interventions and the plan of care moving forward regard to her current subacute pain.  She denies any profound weakness denies any bowel bladder dysfunction denies any saddle anesthesia at this time.      INTERVAL HISTORY:  4/18/22 Interval History: Patient presents for telehealth visit for follow up following her b/l RFA at L3-L5. She reports 80% relief and is very pleased with her pain relief. Unfortunately, she is not back to doing what she wants due to right knee pain. She is seeing Dr. Huber for knee pain and is s/p right medial compartment partial knee replacement. She is currently in therapy for this. We discussed that we may be able to assist her with her knee pain as well.     Interval History  6/15/22: Tiarra Norton returns for follow up. She continues to feel like she has mild pain sitting or laying down, and has her worst pain with extreme leaning forward to pick something up off the floor. She also has difficulty leaning over her handlebars to ride her bike or leaning forward to fold clothes or standing up for any period of time. So, we determined that leaning forward is her worst issue. We did previouisly do lmbb and rfa, which has helped with extension, but it would not help with leaning forward. On review of her MRI she has significant multilevel DDD with Modic changes which likely account for her pain.     Interval History 7/25/22: Tiarra Norton returns for follow up. We previously have been discussing treatments for her low back pain that did not resolve with RFA. At our last visit I referred her to Dr. Antony for clearance for a SCS trial. She was considered to be a reasonable candidate. However, in the meantime, her PT referred her to acupuncture with Dr. Jacobs. She had one treatment and already notes 50% relief. She notes that she still has pain, especially with leaning forward, but feels that it is much better controlled. Their plan is one treatment per week but is approved for 20 sessions.     Interval History 8/22/22: Ms. Norton returns for follow up on her low back pain. At our last visit she felt cured by acupuncture. Unfortunately, this only lasted for 24 hours. She returns today looking for other options to make life livable. Patient claims all of her pain in the low back and middle back. We discussed that this will work best for her low back pain.     Interval History 9/21/2022:  The patient returns to clinic today for follow up of back pain via virtual visit. She received a letter from [facility] denying SCS trial. She is frustrated by this. She continues to report low back pain. She denies any radicular leg pain. Her pain is worse with bending and activity. She recently underwent med  screening for the [employer] program. She is interested in pursuing this. She has tried Gabapentin and Lyrica in the past with side effects. She denies any other health changes. Her pain today is 7/10.     Interval History 10/14/22: Mrs. Norton presents today for follow up of chronic low back pain and to discuss the Cortez trial. She had her lumbar MRI done last night without significant changes to her spine. She did have increased dilation of her bile duct. Her pain today is the same in character and severity as during her last visit with us and she rates it currently at a 7/10. She continues to do her stretches on her own which have helped some. She presents with some questions regarding the trial.     Interval History 12/29/2022:  The patient returns for post op appointment. She is s/p lumbar Page SCS trial with 90% relief. She has had very minimal back pain during the trial period. She says that she was more active over this time due to the Christmas holiday and had minimal symptoms. She is very happy with the relief. She would like to move forward with implant. She is part of Cortez study. No fever or malaise during trial period. Her pain today is 1/10.    Interval History 1/17/2023:  The patient returns to clinic today for post op. She is s/p [employer] SCS implant on 1/9/2022. She reports benefit with the device at this time. She is meeting with the representatives today. She does report soreness to her incisions. She denies any fever, chills, or drainage. She did have issues with her dressing staying on. She has completed her antibiotics. She denies any other health changes. Her pain today is 2/10.    Interval History 1/31/2023:  The patient returns to clinic today for wound check. She reports benefit with [employer] SCS. She is in contact with representatives for programming. She reports intermittent muscle soreness into her legs. She does have an upcoming appointment with  representatives for programming. She denies any fever, chills, or drainage. She continues to follow her activity restrictions. She denies any other health changes. Her pain today is 3/10.    Interval History 2/17/2023:  The patient returns to clinic today for follow up of back pain. She reports benefit with [facility] SCS. She is in contact with representatives for programming. She is very happy with relief. She denies any fever, chills, or drainage. She continues to follow her activity restrictions. She denies any other health changes. Her pain today is 2/10.    Interval History 3/21/2023:  The patient presents to discuss pain to IPG site. She says that it has been present since surgery and has gradually worsened. She says that it feels like a tight and weak muscle. She has not tried any topical medications or muscle relaxants. No fever or malaise. She has not noticed any redness or swelling to the site. Her original pain has significantly improved from SCS implant. She is part of a research study. Her pain today is 6/10.    Interval History 4/14/2023:  The patient is here for follow up of back pain and pain at IPG site. She reports improvement since previous visit. She did have benefit with Lidoderm patches but had a rash so she stopped. Her pain has improved with Salon Pas patches. She is scheduled for knee replacement next month with Dr. Scott. Her pain today is 3/10.    Interval History 8/11/2023:  The patient returns to clinic today for follow up of pain. She reports pain around her SCS battery site. She describes the pain as though her muscles feel weak and tired. She does report benefit with SCS. No difficulty with charging or programming. She has tried Lidoderm and Salonpas patches but these caused a rash. She is currently participating in physical therapy for her knee. She denies any other health changes. Her pain today is 4/10.    Interval History 9/12/2023:  The patient returns to clinic today  for follow up of battery site pain via virtual visit. She is s/p trigger point injections under ultrasound on 8/29/2023. She does feel some benefit. She is currently ill with Covid. She is currently running fever and having body aches. She denies any other health changes.     6 weeks of Conservative therapy (PT/Chiro/Home Exercises with Dates)  Healthy back program--> has four sessions left for a total of 20 sessions   Last session was on 1/31/22   Stretches that they taught at [employer] back gives her relief          11/29/2023     1:59 PM 10/17/2023     2:54 PM 8/29/2023    11:44 AM   Last 3 PDI Scores   Pain Disability Index (PDI) 12 20 40          Medications:   Robaxin PRN    Ice and heat which has helped   Tried Gabapentin and Lyrica- made her loopy      Report: reviewed       Interventional Pain Procedures:   10/17/2023 TPI left side near IPG battery 0% relief  2/8/2022- Bilateral L3,4,5 MBB  2/15/2022- Bilateral L3,4,5 MBB  3/8/2022- Right L3,4,5 RFA- 80% relief for a few months  3/22/2022- Left L3,4,5 RFA- 80% relief for a few months  12/22/22 SCS trial- 90% relief  1/9/2023- [employer] SCS implant.     Imaging:    EXAMINATION:  XR KNEE 3 VIEW RIGHT     CLINICAL HISTORY:  Presence of right artificial knee joint     TECHNIQUE:  AP, lateral, and Merchant views of the right knee were performed.     COMPARISON:  Right knee radiograph dated 06/12/2023     FINDINGS:  Prior right knee arthroplasty.  Hardware projects in similar alignment without evidence for interval loosening or failure.  No periprosthetic fracture.     Impression:     As above.        Electronically signed by: Azael Wilkins  Date:                                            11/03/2023  Time:                                           11:05      10/13/22: MRI LUMBAR SPINE WITHOUT CONTRAST  FINDINGS:  Lumbar levoscoliosis centered at L2.  Minimal anterolisthesis at L4-5.     No compression fractures.  No marrow replacing lesions.      Multilevel degenerative changes with disc desiccation and disc space narrowing, described in detail below.  Discogenic endplate edema at T12-L1.     The conus medullaris has a normal appearance and terminates at the L1 level.  Cauda equina nerve roots are unremarkable.  No epidural mass or collection.     The common duct is dilated up to 12 mm, previously 9 mm on 08/20/2022 CT.  Mild prominence of the main pancreatic duct up to 4 mm, which is new.  No definite filling defect in the bile ducts.  Paraspinal muscle atrophy.     SIGNIFICANT FINDINGS BY LEVEL:     T12-L1: Disc bulge.  Bilateral facet arthropathy and ligamentum flavum thickening.  Mild canal stenosis.  Mild bilateral foraminal stenosis.     L1-2: Disc bulge.  Bilateral facet arthropathy and ligamentum flavum thickening.  Mild canal stenosis.  Mild right foraminal stenosis.     L2-3: Disc bulge.  Bilateral facet arthropathy and ligamentum flavum thickening.  Mild canal stenosis.  Moderate right mild left foraminal stenosis.     L3-4: Disc bulge.  Bilateral facet arthropathy and ligamentum flavum thickening.  Small bilateral facet joint effusions/synovitis.  Moderate canal stenosis with partial effacement of the thecal sac and crowding of the cauda equina nerve roots.  Mild right and moderate left foraminal stenosis.     L4-5: Disc bulge with posterior disc uncovering.  Bilateral facet arthropathy and ligamentum flavum thickening.  Small right facet joint effusion/synovitis.  Mild canal stenosis.  Mild bilateral foraminal stenosis.     L5-S1: Disc bulge.  Bilateral facet arthropathy and ligamentum flavum thickening.  No significant canal stenosis.  Mild left foraminal stenosis.     Impression:     Lumbar levoscoliosis and multilevel degenerative changes as detailed above.  No significant changes from 01/21/2022.     Increased biliary ductal dilatation up to 12 mm, greater than expected after cholecystectomy.  In addition, the main pancreatic duct is  mildly prominent up to 4 mm, which is new.  If LFTs are abnormal, consider MRI/MRCP or ERCP for further evaluation.      Past Medical History:   Diagnosis Date    Cataract     Depression     Gestational diabetes     Hyperlipidemia     Hypertension     IBS (irritable bowel syndrome)     Thyroid disease      Past Surgical History:   Procedure Laterality Date    ADENOIDECTOMY      ARTHROPLASTY, KNEE, TOTAL, USING COMPUTER-ASSISTED NAVIGATION Right 2023    Procedure: CONVERSION ARTHROPLASTY, KNEE, TOTAL, USING COMPUTER-ASSISTED NAVIGATION;  Surgeon: Virgil Scott MD;  Location: [facility] OR;  Service: Orthopedics;  Laterality: Right;  Same day discharge    ARTHROSCOPIC CHONDROPLASTY OF KNEE JOINT Right 10/19/2021    Procedure: ARTHROSCOPY, KNEE, WITH CHONDROPLASTY;  Surgeon: Trevin Huber MD;  Location: [facility] OR;  Service: Orthopedics;  Laterality: Right;    ARTHROSCOPY OF KNEE Right 10/19/2021    Procedure: ARTHROSCOPY, KNEE-RIGHT;  Surgeon: Trevin Huber MD;  Location: [facility] OR;  Service: Orthopedics;  Laterality: Right;     SECTION      CHOLECYSTECTOMY      COLONOSCOPY N/A 2016    Procedure: COLONOSCOPY;  Surgeon: Heri Segura MD;  Location: [facility] ([address] [place] [address]);  Service: Endoscopy;  Laterality: N/A;    COLONOSCOPY N/A 2019    Procedure: COLONOSCOPY;  Surgeon: Joe Pitts MD;  Location: [facility] (4TH FLR);  Service: Endoscopy;  Laterality: N/A;    CYSTOSCOPY  2022    Procedure: CYSTOSCOPY;  Surgeon: Lenny Moore MD;  Location: [address] FLR;  Service: Urology;;    ESOPHAGOGASTRODUODENOSCOPY N/A 2020    Procedure: EGD (ESOPHAGOGASTRODUODENOSCOPY);  Surgeon: Tolu Rivas MD;  Location: [facility] (4TH FLR);  Service: Endoscopy;  Laterality: N/A;  Pt. moved to 9:15am arrival time.. Instructed to not have anything after midnight.EC    EYE SURGERY      cataract resection right    INJECTION OF ANESTHETIC AGENT AROUND NERVE Bilateral 2022    Procedure:  Block, Nerve MEDIAL BRANCH BLOCK BILATERAL L3,4,5;  Surgeon: Tara Gibbs MD;  Location: [facility] PAIN MGT;  Service: Pain Management;  Laterality: Bilateral;    INJECTION OF ANESTHETIC AGENT AROUND NERVE Bilateral 2/15/2022    Procedure: BLOCK, NERVE, L3*L4-L5 MEDIAL BR ANCH 2 OF 2;  Surgeon: Tara Gibbs MD;  Location: [facility] PAIN MGT;  Service: Pain Management;  Laterality: Bilateral;    INJECTION OF BOTULINUM TOXIN TYPE A  6/21/2022    Procedure: BOTULINUM TOXIN INJECTION 100 units;  Surgeon: Lenny Moore MD;  Location: [facility] OR 1ST FLR;  Service: Urology;;    INSERTION, NEUROSTIMULATOR, SPINAL CORD N/A 1/9/2023    Procedure: SPINAL CORD STIMULATOR IMPLANT [employer];  Surgeon: Shoaib Aguirre MD;  Location: [facility] OR;  Service: Pain Management;  Laterality: N/A;    JOINT REPLACEMENT      partial right knee    KNEE ARTHROSCOPY W/ MENISCECTOMY Right 10/19/2021    Procedure: ARTHROSCOPY, KNEE, WITH MENISCECTOMY, PARTIAL LATERAL;  Surgeon: Trevin Huber MD;  Location: [facility] OR;  Service: Orthopedics;  Laterality: Right;    r hand surgery      RADIOFREQUENCY ABLATION Right 3/8/2022    Procedure: RADIOFREQUENCY ABLATION, L3-L5 1 OF 2;  Surgeon: Tara Gibbs MD;  Location: [facility] PAIN MGT;  Service: Pain Management;  Laterality: Right;    RADIOFREQUENCY ABLATION Left 3/22/2022    Procedure: RADIOFREQUENCY ABLATION, l3-+l5 2 of 2;  Surgeon: Tara Gibbs MD;  Location: [facility] PAIN MGT;  Service: Pain Management;  Laterality: Left;    REMOVAL OF NEUROSTIMULATOR N/A 11/20/2023    Procedure: SCS IPG BATTERY REVISION;  Surgeon: Shoaib Aguirre MD;  Location: [facility] OR;  Service: Pain Management;  Laterality: N/A;    TONSILLECTOMY      TOTAL KNEE ARTHROPLASTY      partial knee replacement    TRIAL OF SPINAL CORD NERVE STIMULATOR N/A 12/22/2022    Procedure: SPINAL CORD STIMULATOR TRIAL [employer];  Surgeon: Shoaib Aguirre MD;  Location: [facility] PAIN MGT;  Service: Pain  Management;  Laterality: N/A;    TUBAL LIGATION       Social History     Socioeconomic History    Marital status:     Number of children: 1   Occupational History    Occupation:      Employer: [employer]     Comment: retired   Tobacco Use    Smoking status: Never    Smokeless tobacco: Never   Substance and Sexual Activity    Alcohol use: Yes     Alcohol/week: 3.0 standard drinks of alcohol     Types: 3 Glasses of wine per week     Comment: weekends    Drug use: Yes     Types: Marijuana     Comment: occasionally    Sexual activity: Yes     Partners: Male   Other Topics Concern    Are you pregnant or think you may be? No    Breast-feeding No   Social History Narrative    Retired        Swims.       Stationary bike.            Social Determinants of Health     Financial Resource Strain: Low Risk  (12/21/2023)    Overall Financial Resource Strain (CARDIA)     Difficulty of Paying Living Expenses: Not hard at all   Food Insecurity: No Food Insecurity (12/21/2023)    Hunger Vital Sign     Worried About Running Out of Food in the Last Year: Never true     Ran Out of Food in the Last Year: Never true   Transportation Needs: Unmet Transportation Needs (12/21/2023)    PRAPARE - Transportation     Lack of Transportation (Medical): Yes     Lack of Transportation (Non-Medical): Yes   Physical Activity: Unknown (12/21/2023)    Exercise Vital Sign     Days of Exercise per Week: Patient declined     Minutes of Exercise per Session: 0 min   Stress: Stress Concern Present (12/21/2023)    [employer] [place] [employer] - Occupational Stress Questionnaire     Feeling of Stress : Very much   Social Connections: Unknown (12/21/2023)    Social Connection and Isolation Panel [NHANES]     Frequency of Communication with Friends and Family: More than three times a week     Frequency of Social Gatherings with Friends and Family: Twice a week     Active Member of Clubs or Organizations: Yes     Attends Club or  Organization Meetings: More than 4 times per year     Marital Status:    Housing Stability: Low Risk  (12/21/2023)    Housing Stability Vital Sign     Unable to Pay for Housing in the Last Year: No     Number of Places Lived in the Last Year: 1     Unstable Housing in the Last Year: No     Family History   Problem Relation Age of Onset    Hypertension Mother     Irritable bowel syndrome Mother     Hyperlipidemia Mother     Heart disease Father         mi    Hypertension Father     Cancer Father         prostate    Heart attack Father     Heart failure Father     Hyperlipidemia Father     Alcohol abuse Sister     Depression Sister     Epilepsy Son     Irritable bowel syndrome Sister     Alcohol abuse Sister     Ovarian cancer Maternal Aunt     Breast cancer Paternal Aunt     Breast cancer Maternal Aunt     Melanoma Neg Hx     Colon cancer Neg Hx     Stomach cancer Neg Hx     Esophageal cancer Neg Hx     Liver disease Neg Hx     Crohn's disease Neg Hx     Ulcerative colitis Neg Hx     Celiac disease Neg Hx     Stroke Neg Hx        Review of patient's allergies indicates:  No Known Allergies    Current Outpatient Medications   Medication Sig    ALPRAZolam (XANAX) 1 MG tablet Take 1 mg by mouth 3 (three) times daily.    amLODIPine (NORVASC) 5 MG tablet Take 1 tablet (5 mg total) by mouth once daily.    atorvastatin (LIPITOR) 10 MG tablet Take 1 tablet (10 mg total) by mouth once daily.    EScitalopram oxalate (LEXAPRO) 5 MG Tab Take 5 mg by mouth.    levothyroxine (SYNTHROID) 125 MCG tablet TAKE 1 TABLET BY MOUTH EVERY MORNING    liothyronine (CYTOMEL) 5 MCG Tab Take 1 tablet (5 mcg total) by mouth once daily.    promethazine (PHENERGAN) 12.5 MG Tab TAKE 1 TABLET(12.5 MG) BY MOUTH EVERY 6 HOURS AS NEEDED FOR NAUSEA    RESTASIS 0.05 % ophthalmic emulsion INT 1 GTT IN OU BID    scopolamine (TRANSDERM-SCOP) 1.3-1.5 mg (1 mg over 3 days) Place 1 patch onto the skin every 72 hours.    sumatriptan (IMITREX) 50 MG  "tablet 1 tab po at start of headache.  Repeat in 2 h prn    tobramycin sulfate 0.3% (TOBREX) 0.3 % ophthalmic solution 1-2 drops topically twice daily to affected toe(s).    traZODone (DESYREL) 100 MG tablet 1-2 tabs po q hs for sleep    valsartan (DIOVAN) 320 MG tablet Take 1 tablet (320 mg total) by mouth once daily.    conjugated estrogens (PREMARIN) vaginal cream Place 0.5 g vaginally twice a week. Place a pea-sized amount in vagina every night for 4 weeks, then use 2-3 nights a week     No current facility-administered medications for this visit.     Facility-Administered Medications Ordered in Other Visits   Medication    0.9%  NaCl infusion    celecoxib capsule 400 mg    lactated ringers infusion    LIDOcaine (PF) 10 mg/ml (1%) injection 5 mg       REVIEW OF SYSTEMS:    GENERAL: No fever. No chills. No fatigue. Denies weight loss. Denies weight gain.  HEENT: Denies headaches. Denies vision change. Denies eye pain. Denies double vision. Denies ear pain.   CV: Denies chest pain. HTN  PULM: Denies of shortness of breath.  GI: Denies constipation. No diarrhea. No abdominal pain. Denies nausea. Denies vomiting. No blood in stool.  HEME: Denies bleeding problems.  : Denies urgency. No painful urination. No blood in urine.  MS: See HPI  SKIN: Denies rash.   NEURO: Denies seizures. No weakness.  ENDO: Thyroid disorder.   PSYCH:  Depression    OBJECTIVE:     Exam limited due to virtual visit:  GENERAL: Well appearing, in no acute distress, alert and oriented x3.  PSYCH:  Mood and affect appropriate.    Previous physical exam:  Vitals:    01/23/24 0950   BP: (!) 146/78   Pulse: 66   Resp: 18   SpO2: 100%   Weight: 66.4 kg (146 lb 6.2 oz)   Height: 5' 3" (1.6 m)   PainSc:   3   PainLoc: Back          PHYSICAL EXAM:   GEN: No acute distress. Calm, comfortable  HENT: Normocephalic, atraumatic, moist mucous membranes  EYE: Anicteric sclera, non-injected.   CV: Non-diaphoretic.   RESP: Breathing comfortably. Chest " expansion symmetric.  PSYCH: Pleasant mood and appropriate affect. Recent and remote memory intact.         ASSESSMENT: 68 y.o. year old female with left low back pain near her IPG/ battery consistent with the followin. Chronic pain syndrome  Pain Clinic Drug Screen      2. History of total knee arthroplasty, right  Procedure Order to Pain Management      3. Chronic pain of right knee  Procedure Order to Pain Management      4. Spinal cord stimulator status                  PLAN:     - Previous imaging reviewed.     - S/P IPG revision with significant improvement.     - Continue physical therapy and home exercise routine.      - Refilled Norco 5/325 mg BID as needed today.    - UDS and opioid agreement today.     -RTC in 3 months.     The above plan and management options were discussed at length with patient. Patient is in agreement with the above and verbalized understanding.       Shoaib Aguirre MD      2024

## 2024-01-23 NOTE — H&P (VIEW-ONLY)
Chronic Pain-Established Visit      SUBJECTIVE:    PCP: Kaykay Perales MD    REFERRING PHYSICIAN: Tyler Jacobs MD    CHIEF COMPLAINT: Lower back pain       Original HISTORY OF PRESENT ILLNESS: Tiarra Norton presents to the clinic for the evaluation of the above pain. The pain started five years ago.     Original Pain Description:  The pain is located in the lower back and does radiate up towards her upper back.The pain is described as aching, dull and sharp. Initially starts as a dull, aching pain and it gets sharp once she bends down and moves around. Typically when she walks for more than five minutes, the sharp pain radiates up her back. Exacerbating factors: Standing, Bending, Touching, Walking, Night Time, Flexing and Lifting. Mitigating factors heat, ice, laying down and massage. Symptoms interfere with daily activity and sleeping. The patient feels like symptoms have been worsening. Patient denies night fever/night sweats, urinary incontinence, bowel incontinence, significant weight loss, significant motor weakness and loss of sensations.    Original PAIN SCORES:  Best: Pain is 1  Worst: Pain is 10  Usually: Pain is 4  Current: Pain is 4    Interval History 1/23/2024:  Tiarra Norton presents for folow-up for lower back pain s/p IPG revision 11/20/2023.  She reports that the pocket pain is significantly improved, continues to get better.  She is just now feeling well enough to participate in physical therapy which she has today.  Today she also complains of right knee pain. She had a right TKA on 5/18/2023. .  The patient describes the pain as aching. The pain is constant. Exacerbating factors: bending, cooking, standing for a long time, walking.  Mitigating factors: ice, heat, medications.  The patient takes Tylenol 500 mg PRN with mild benefit.  They deny any perceived side effects.  She is hoping for an additional prescription for Norco 5-325 mg which she had taken over the recovery period  from the IPG revision.  She states that the medication helps her with activity.  The symptoms interfere with ADLs.  The patient last had imaging Xray bilateral knees on 10/26/2023. See findings below.  The patient denies fever/night sweats, urinary incontinence, bowel incontinence, significant weight changes, significant motor weakness or changes, or loss of sensations.  Today's pain score is 3/10 for back pain and 7/10 for right knee pain.     RTA 5/18/23, activity restrictions from IPG revision was not able to do PT, starting PT for back and right knee back today.  Wondering if there are any procedures we can do for knee.     Wants Norco 5-325 mg-helps with activity  Pain ext and flex  Ptp superior andterior    Interval History 10/25/2023  67 y/o female with hx of chronic left sided low back pain near IPG, she is s/p Octad electrode x2  placement of pulse generator 3  on 1/9 she recently was provided a recent TPI near left low back she reports 0% relief following.  Pain is worse when standing walking performing ADLs.  She does state she gets 5 hours of uninterrupted sleep.  Like her left low back is weak and aching.  Tingling any wearing her low back.  In the right side of her low back.  To discuss other pain interventions and the plan of care moving forward regard to her current subacute pain.  She denies any profound weakness denies any bowel bladder dysfunction denies any saddle anesthesia at this time.      INTERVAL HISTORY:  4/18/22 Interval History: Patient presents for telehealth visit for follow up following her b/l RFA at L3-L5. She reports 80% relief and is very pleased with her pain relief. Unfortunately, she is not back to doing what she wants due to right knee pain. She is seeing Dr. Huber for knee pain and is s/p right medial compartment partial knee replacement. She is currently in therapy for this. We discussed that we may be able to assist her with her knee pain as well.     Interval History  6/15/22: Tiarra Norton returns for follow up. She continues to feel like she has mild pain sitting or laying down, and has her worst pain with extreme leaning forward to pick something up off the floor. She also has difficulty leaning over her handlebars to ride her bike or leaning forward to fold clothes or standing up for any period of time. So, we determined that leaning forward is her worst issue. We did previouisly do lmbb and rfa, which has helped with extension, but it would not help with leaning forward. On review of her MRI she has significant multilevel DDD with Modic changes which likely account for her pain.     Interval History 7/25/22: Tiarra Norton returns for follow up. We previously have been discussing treatments for her low back pain that did not resolve with RFA. At our last visit I referred her to Dr. Atnony for clearance for a SCS trial. She was considered to be a reasonable candidate. However, in the meantime, her PT referred her to acupuncture with Dr. Jacobs. She had one treatment and already notes 50% relief. She notes that she still has pain, especially with leaning forward, but feels that it is much better controlled. Their plan is one treatment per week but is approved for 20 sessions.     Interval History 8/22/22: Ms. Norton returns for follow up on her low back pain. At our last visit she felt cured by acupuncture. Unfortunately, this only lasted for 24 hours. She returns today looking for other options to make life livable. Patient claims all of her pain in the low back and middle back. We discussed that this will work best for her low back pain.     Interval History 9/21/2022:  The patient returns to clinic today for follow up of back pain via virtual visit. She received a letter from [facility] denying SCS trial. She is frustrated by this. She continues to report low back pain. She denies any radicular leg pain. Her pain is worse with bending and activity. She recently underwent med  screening for the [employer] program. She is interested in pursuing this. She has tried Gabapentin and Lyrica in the past with side effects. She denies any other health changes. Her pain today is 7/10.     Interval History 10/14/22: Mrs. Norton presents today for follow up of chronic low back pain and to discuss the Cortez trial. She had her lumbar MRI done last night without significant changes to her spine. She did have increased dilation of her bile duct. Her pain today is the same in character and severity as during her last visit with us and she rates it currently at a 7/10. She continues to do her stretches on her own which have helped some. She presents with some questions regarding the trial.     Interval History 12/29/2022:  The patient returns for post op appointment. She is s/p lumbar [employer] SCS trial with 90% relief. She has had very minimal back pain during the trial period. She says that she was more active over this time due to the Christmas holiday and had minimal symptoms. She is very happy with the relief. She would like to move forward with implant. She is part of Cortez study. No fever or malaise during trial period. Her pain today is 1/10.    Interval History 1/17/2023:  The patient returns to clinic today for post op. She is s/p [employer] SCS implant on 1/9/2022. She reports benefit with the device at this time. She is meeting with the representatives today. She does report soreness to her incisions. She denies any fever, chills, or drainage. She did have issues with her dressing staying on. She has completed her antibiotics. She denies any other health changes. Her pain today is 2/10.    Interval History 1/31/2023:  The patient returns to clinic today for wound check. She reports benefit with [employer] SCS. She is in contact with representatives for programming. She reports intermittent muscle soreness into her legs. She does have an upcoming appointment with  representatives for programming. She denies any fever, chills, or drainage. She continues to follow her activity restrictions. She denies any other health changes. Her pain today is 3/10.    Interval History 2/17/2023:  The patient returns to clinic today for follow up of back pain. She reports benefit with [employer] SCS. She is in contact with representatives for programming. She is very happy with relief. She denies any fever, chills, or drainage. She continues to follow her activity restrictions. She denies any other health changes. Her pain today is 2/10.    Interval History 3/21/2023:  The patient presents to discuss pain to IPG site. She says that it has been present since surgery and has gradually worsened. She says that it feels like a tight and weak muscle. She has not tried any topical medications or muscle relaxants. No fever or malaise. She has not noticed any redness or swelling to the site. Her original pain has significantly improved from SCS implant. She is part of a research study. Her pain today is 6/10.    Interval History 4/14/2023:  The patient is here for follow up of back pain and pain at IPG site. She reports improvement since previous visit. She did have benefit with Lidoderm patches but had a rash so she stopped. Her pain has improved with Salon Pas patches. She is scheduled for knee replacement next month with Dr. Scott. Her pain today is 3/10.    Interval History 8/11/2023:  The patient returns to clinic today for follow up of pain. She reports pain around her SCS battery site. She describes the pain as though her muscles feel weak and tired. She does report benefit with SCS. No difficulty with charging or programming. She has tried Lidoderm and Salonpas patches but these caused a rash. She is currently participating in physical therapy for her knee. She denies any other health changes. Her pain today is 4/10.    Interval History 9/12/2023:  The patient returns to clinic today  for follow up of battery site pain via virtual visit. She is s/p trigger point injections under ultrasound on 8/29/2023. She does feel some benefit. She is currently ill with Covid. She is currently running fever and having body aches. She denies any other health changes.     6 weeks of Conservative therapy (PT/Chiro/Home Exercises with Dates)  Healthy back program--> has four sessions left for a total of 20 sessions   Last session was on 1/31/22   Stretches that they taught at [employer] [facility] gives her relief          11/29/2023     1:59 PM 10/17/2023     2:54 PM 8/29/2023    11:44 AM   Last 3 PDI Scores   Pain Disability Index (PDI) 12 20 40          Medications:   Robaxin PRN    Ice and heat which has helped   Tried Gabapentin and Lyrica- made her loopy      Report: reviewed       Interventional Pain Procedures:   10/17/2023 TPI left side near IPG battery 0% relief  2/8/2022- Bilateral L3,4,5 MBB  2/15/2022- Bilateral L3,4,5 MBB  3/8/2022- Right L3,4,5 RFA- 80% relief for a few months  3/22/2022- Left L3,4,5 RFA- 80% relief for a few months  12/22/22 SCS trial- 90% relief  1/9/2023- Geckoboard SCS implant.     Imaging:    EXAMINATION:  XR KNEE 3 VIEW RIGHT     CLINICAL HISTORY:  Presence of right artificial knee joint     TECHNIQUE:  AP, lateral, and Merchant views of the right knee were performed.     COMPARISON:  Right knee radiograph dated 06/12/2023     FINDINGS:  Prior right knee arthroplasty.  Hardware projects in similar alignment without evidence for interval loosening or failure.  No periprosthetic fracture.     Impression:     As above.        Electronically signed by: Azael Wilkins  Date:                                            11/03/2023  Time:                                           11:05      10/13/22: MRI LUMBAR SPINE WITHOUT CONTRAST  FINDINGS:  Lumbar levoscoliosis centered at L2.  Minimal anterolisthesis at L4-5.     No compression fractures.  No marrow replacing lesions.      Multilevel degenerative changes with disc desiccation and disc space narrowing, described in detail below.  Discogenic endplate edema at T12-L1.     The conus medullaris has a normal appearance and terminates at the L1 level.  Cauda equina nerve roots are unremarkable.  No epidural mass or collection.     The common duct is dilated up to 12 mm, previously 9 mm on 08/20/2022 CT.  Mild prominence of the main pancreatic duct up to 4 mm, which is new.  No definite filling defect in the bile ducts.  Paraspinal muscle atrophy.     SIGNIFICANT FINDINGS BY LEVEL:     T12-L1: Disc bulge.  Bilateral facet arthropathy and ligamentum flavum thickening.  Mild canal stenosis.  Mild bilateral foraminal stenosis.     L1-2: Disc bulge.  Bilateral facet arthropathy and ligamentum flavum thickening.  Mild canal stenosis.  Mild right foraminal stenosis.     L2-3: Disc bulge.  Bilateral facet arthropathy and ligamentum flavum thickening.  Mild canal stenosis.  Moderate right mild left foraminal stenosis.     L3-4: Disc bulge.  Bilateral facet arthropathy and ligamentum flavum thickening.  Small bilateral facet joint effusions/synovitis.  Moderate canal stenosis with partial effacement of the thecal sac and crowding of the cauda equina nerve roots.  Mild right and moderate left foraminal stenosis.     L4-5: Disc bulge with posterior disc uncovering.  Bilateral facet arthropathy and ligamentum flavum thickening.  Small right facet joint effusion/synovitis.  Mild canal stenosis.  Mild bilateral foraminal stenosis.     L5-S1: Disc bulge.  Bilateral facet arthropathy and ligamentum flavum thickening.  No significant canal stenosis.  Mild left foraminal stenosis.     Impression:     Lumbar levoscoliosis and multilevel degenerative changes as detailed above.  No significant changes from 01/21/2022.     Increased biliary ductal dilatation up to 12 mm, greater than expected after cholecystectomy.  In addition, the main pancreatic duct is  mildly prominent up to 4 mm, which is new.  If LFTs are abnormal, consider MRI/MRCP or ERCP for further evaluation.      Past Medical History:   Diagnosis Date    Cataract     Depression     Gestational diabetes     Hyperlipidemia     Hypertension     IBS (irritable bowel syndrome)     Thyroid disease      Past Surgical History:   Procedure Laterality Date    ADENOIDECTOMY      ARTHROPLASTY, KNEE, TOTAL, USING COMPUTER-ASSISTED NAVIGATION Right 2023    Procedure: CONVERSION ARTHROPLASTY, KNEE, TOTAL, USING COMPUTER-ASSISTED NAVIGATION;  Surgeon: Virgil Scott MD;  Location: [facility] OR;  Service: Orthopedics;  Laterality: Right;  Same day discharge    ARTHROSCOPIC CHONDROPLASTY OF KNEE JOINT Right 10/19/2021    Procedure: ARTHROSCOPY, KNEE, WITH CHONDROPLASTY;  Surgeon: Trevin Huber MD;  Location: [facility] OR;  Service: Orthopedics;  Laterality: Right;    ARTHROSCOPY OF KNEE Right 10/19/2021    Procedure: ARTHROSCOPY, KNEE-RIGHT;  Surgeon: Trevin Huber MD;  Location: [facility] OR;  Service: Orthopedics;  Laterality: Right;     SECTION      CHOLECYSTECTOMY      COLONOSCOPY N/A 2016    Procedure: COLONOSCOPY;  Surgeon: Heri Segura MD;  Location: [facility] ([address] [place] [address]);  Service: Endoscopy;  Laterality: N/A;    COLONOSCOPY N/A 2019    Procedure: COLONOSCOPY;  Surgeon: Joe Pitts MD;  Location: [facility] (4TH FLR);  Service: Endoscopy;  Laterality: N/A;    CYSTOSCOPY  2022    Procedure: CYSTOSCOPY;  Surgeon: Lenny Moore MD;  Location: [address] FLR;  Service: Urology;;    ESOPHAGOGASTRODUODENOSCOPY N/A 2020    Procedure: EGD (ESOPHAGOGASTRODUODENOSCOPY);  Surgeon: Tolu Rivas MD;  Location: [facility] (4TH FLR);  Service: Endoscopy;  Laterality: N/A;  Pt. moved to 9:15am arrival time.. Instructed to not have anything after midnight.EC    EYE SURGERY      cataract resection right    INJECTION OF ANESTHETIC AGENT AROUND NERVE Bilateral 2022    Procedure:  Block, Nerve MEDIAL BRANCH BLOCK BILATERAL L3,4,5;  Surgeon: Tara Gibbs MD;  Location: [facility] PAIN MGT;  Service: Pain Management;  Laterality: Bilateral;    INJECTION OF ANESTHETIC AGENT AROUND NERVE Bilateral 2/15/2022    Procedure: BLOCK, NERVE, L3*L4-L5 MEDIAL BR ANCH 2 OF 2;  Surgeon: Tara Gibbs MD;  Location: [facility] PAIN MGT;  Service: Pain Management;  Laterality: Bilateral;    INJECTION OF BOTULINUM TOXIN TYPE A  6/21/2022    Procedure: BOTULINUM TOXIN INJECTION 100 units;  Surgeon: Lenny Moore MD;  Location: [facility] OR 1ST FLR;  Service: Urology;;    INSERTION, NEUROSTIMULATOR, SPINAL CORD N/A 1/9/2023    Procedure: SPINAL CORD STIMULATOR IMPLANT [employer];  Surgeon: Shoaib Aguirre MD;  Location: [facility] OR;  Service: Pain Management;  Laterality: N/A;    JOINT REPLACEMENT      partial right knee    KNEE ARTHROSCOPY W/ MENISCECTOMY Right 10/19/2021    Procedure: ARTHROSCOPY, KNEE, WITH MENISCECTOMY, PARTIAL LATERAL;  Surgeon: Trevin Huber MD;  Location: [facility] OR;  Service: Orthopedics;  Laterality: Right;    r hand surgery      RADIOFREQUENCY ABLATION Right 3/8/2022    Procedure: RADIOFREQUENCY ABLATION, L3-L5 1 OF 2;  Surgeon: Tara Gibbs MD;  Location: [facility] PAIN MGT;  Service: Pain Management;  Laterality: Right;    RADIOFREQUENCY ABLATION Left 3/22/2022    Procedure: RADIOFREQUENCY ABLATION, l3-+l5 2 of 2;  Surgeon: Tara Gibbs MD;  Location: [facility] PAIN MGT;  Service: Pain Management;  Laterality: Left;    REMOVAL OF NEUROSTIMULATOR N/A 11/20/2023    Procedure: SCS IPG BATTERY REVISION;  Surgeon: Shoaib gAuirre MD;  Location: [facility] OR;  Service: Pain Management;  Laterality: N/A;    TONSILLECTOMY      TOTAL KNEE ARTHROPLASTY      partial knee replacement    TRIAL OF SPINAL CORD NERVE STIMULATOR N/A 12/22/2022    Procedure: SPINAL CORD STIMULATOR TRIAL [employer];  Surgeon: Shoaib Aguirre MD;  Location: [facility] PAIN MGT;  Service: Pain  Management;  Laterality: N/A;    TUBAL LIGATION       Social History     Socioeconomic History    Marital status:     Number of children: 1   Occupational History    Occupation:      Employer: [employer]     Comment: retired   Tobacco Use    Smoking status: Never    Smokeless tobacco: Never   Substance and Sexual Activity    Alcohol use: Yes     Alcohol/week: 3.0 standard drinks of alcohol     Types: 3 Glasses of wine per week     Comment: weekends    Drug use: Yes     Types: Marijuana     Comment: occasionally    Sexual activity: Yes     Partners: Male   Other Topics Concern    Are you pregnant or think you may be? No    Breast-feeding No   Social History Narrative    Retired        [employer].       Stationary bike.            Social Determinants of Health     Financial Resource Strain: Low Risk  (12/21/2023)    Overall Financial Resource Strain (CARDIA)     Difficulty of Paying Living Expenses: Not hard at all   Food Insecurity: No Food Insecurity (12/21/2023)    Hunger Vital Sign     Worried About Running Out of Food in the Last Year: Never true     Ran Out of Food in the Last Year: Never true   Transportation Needs: Unmet Transportation Needs (12/21/2023)    PRAPARE - Transportation     Lack of Transportation (Medical): Yes     Lack of Transportation (Non-Medical): Yes   Physical Activity: Unknown (12/21/2023)    Exercise Vital Sign     Days of Exercise per Week: Patient declined     Minutes of Exercise per Session: 0 min   Stress: Stress Concern Present (12/21/2023)    [employer] - Occupational Stress Questionnaire     Feeling of Stress : Very much   Social Connections: Unknown (12/21/2023)    Social Connection and Isolation Panel [NHANES]     Frequency of Communication with Friends and Family: More than three times a week     Frequency of Social Gatherings with Friends and Family: Twice a week     Active Member of Clubs or Organizations: Yes     Attends Club or  Organization Meetings: More than 4 times per year     Marital Status:    Housing Stability: Low Risk  (12/21/2023)    Housing Stability Vital Sign     Unable to Pay for Housing in the Last Year: No     Number of Places Lived in the Last Year: 1     Unstable Housing in the Last Year: No     Family History   Problem Relation Age of Onset    Hypertension Mother     Irritable bowel syndrome Mother     Hyperlipidemia Mother     Heart disease Father         mi    Hypertension Father     Cancer Father         prostate    Heart attack Father     Heart failure Father     Hyperlipidemia Father     Alcohol abuse Sister     Depression Sister     Epilepsy Son     Irritable bowel syndrome Sister     Alcohol abuse Sister     Ovarian cancer Maternal Aunt     Breast cancer Paternal Aunt     Breast cancer Maternal Aunt     Melanoma Neg Hx     Colon cancer Neg Hx     Stomach cancer Neg Hx     Esophageal cancer Neg Hx     Liver disease Neg Hx     Crohn's disease Neg Hx     Ulcerative colitis Neg Hx     Celiac disease Neg Hx     Stroke Neg Hx        Review of patient's allergies indicates:  No Known Allergies    Current Outpatient Medications   Medication Sig    ALPRAZolam (XANAX) 1 MG tablet Take 1 mg by mouth 3 (three) times daily.    amLODIPine (NORVASC) 5 MG tablet Take 1 tablet (5 mg total) by mouth once daily.    atorvastatin (LIPITOR) 10 MG tablet Take 1 tablet (10 mg total) by mouth once daily.    EScitalopram oxalate (LEXAPRO) 5 MG Tab Take 5 mg by mouth.    levothyroxine (SYNTHROID) 125 MCG tablet TAKE 1 TABLET BY MOUTH EVERY MORNING    liothyronine (CYTOMEL) 5 MCG Tab Take 1 tablet (5 mcg total) by mouth once daily.    promethazine (PHENERGAN) 12.5 MG Tab TAKE 1 TABLET(12.5 MG) BY MOUTH EVERY 6 HOURS AS NEEDED FOR NAUSEA    RESTASIS 0.05 % ophthalmic emulsion INT 1 GTT IN OU BID    scopolamine (TRANSDERM-SCOP) 1.3-1.5 mg (1 mg over 3 days) Place 1 patch onto the skin every 72 hours.    sumatriptan (IMITREX) 50 MG  "tablet 1 tab po at start of headache.  Repeat in 2 h prn    tobramycin sulfate 0.3% (TOBREX) 0.3 % ophthalmic solution 1-2 drops topically twice daily to affected toe(s).    traZODone (DESYREL) 100 MG tablet 1-2 tabs po q hs for sleep    valsartan (DIOVAN) 320 MG tablet Take 1 tablet (320 mg total) by mouth once daily.    conjugated estrogens (PREMARIN) vaginal cream Place 0.5 g vaginally twice a week. Place a pea-sized amount in vagina every night for 4 weeks, then use 2-3 nights a week     No current facility-administered medications for this visit.     Facility-Administered Medications Ordered in Other Visits   Medication    0.9%  NaCl infusion    celecoxib capsule 400 mg    lactated ringers infusion    LIDOcaine (PF) 10 mg/ml (1%) injection 5 mg       REVIEW OF SYSTEMS:    GENERAL: No fever. No chills. No fatigue. Denies weight loss. Denies weight gain.  HEENT: Denies headaches. Denies vision change. Denies eye pain. Denies double vision. Denies ear pain.   CV: Denies chest pain. HTN  PULM: Denies of shortness of breath.  GI: Denies constipation. No diarrhea. No abdominal pain. Denies nausea. Denies vomiting. No blood in stool.  HEME: Denies bleeding problems.  : Denies urgency. No painful urination. No blood in urine.  MS: See HPI  SKIN: Denies rash.   NEURO: Denies seizures. No weakness.  ENDO: Thyroid disorder.   PSYCH:  Depression    OBJECTIVE:     Exam limited due to virtual visit:  GENERAL: Well appearing, in no acute distress, alert and oriented x3.  PSYCH:  Mood and affect appropriate.    Previous physical exam:  Vitals:    01/23/24 0950   BP: (!) 146/78   Pulse: 66   Resp: 18   SpO2: 100%   Weight: 66.4 kg (146 lb 6.2 oz)   Height: 5' 3" (1.6 m)   PainSc:   3   PainLoc: Back          PHYSICAL EXAM:   GEN: No acute distress. Calm, comfortable  HENT: Normocephalic, atraumatic, moist mucous membranes  EYE: Anicteric sclera, non-injected.   CV: Non-diaphoretic.   RESP: Breathing comfortably. Chest " expansion symmetric.  PSYCH: Pleasant mood and appropriate affect. Recent and remote memory intact.         ASSESSMENT: 68 y.o. year old female with left low back pain near her IPG/ battery consistent with the followin. Chronic pain syndrome  Pain Clinic Drug Screen      2. History of total knee arthroplasty, right  Procedure Order to Pain Management      3. Chronic pain of right knee  Procedure Order to Pain Management      4. Spinal cord stimulator status                  PLAN:     - Previous imaging reviewed.     - S/P IPG revision with significant improvement.     - Continue physical therapy and home exercise routine.      - Refilled Norco 5/325 mg BID as needed today.    - UDS and opioid agreement today.     -RTC in 3 months.     The above plan and management options were discussed at length with patient. Patient is in agreement with the above and verbalized understanding.       Shoaib Aguirre MD      2024

## 2024-01-24 ENCOUNTER — OFFICE VISIT (OUTPATIENT)
Dept: URGENT CARE | Facility: CLINIC | Age: 69
End: 2024-01-24
Payer: MEDICARE

## 2024-01-24 VITALS
HEIGHT: 63 IN | TEMPERATURE: 99 F | OXYGEN SATURATION: 97 % | SYSTOLIC BLOOD PRESSURE: 139 MMHG | HEART RATE: 69 BPM | RESPIRATION RATE: 16 BRPM | BODY MASS INDEX: 25.87 KG/M2 | WEIGHT: 146 LBS | DIASTOLIC BLOOD PRESSURE: 80 MMHG

## 2024-01-24 DIAGNOSIS — J10.1 INFLUENZA A: Primary | ICD-10-CM

## 2024-01-24 LAB
CTP QC/QA: YES
CTP QC/QA: YES
MOLECULAR STREP A: NEGATIVE
POC MOLECULAR INFLUENZA A AGN: POSITIVE
POC MOLECULAR INFLUENZA B AGN: NEGATIVE

## 2024-01-24 PROCEDURE — 87651 STREP A DNA AMP PROBE: CPT | Mod: QW,S$GLB,, | Performed by: NURSE PRACTITIONER

## 2024-01-24 PROCEDURE — 87502 INFLUENZA DNA AMP PROBE: CPT | Mod: QW,S$GLB,, | Performed by: NURSE PRACTITIONER

## 2024-01-24 PROCEDURE — 99214 OFFICE O/P EST MOD 30 MIN: CPT | Mod: S$GLB,,, | Performed by: NURSE PRACTITIONER

## 2024-01-24 RX ORDER — OSELTAMIVIR PHOSPHATE 75 MG/1
75 CAPSULE ORAL 2 TIMES DAILY
Qty: 10 CAPSULE | Refills: 0 | Status: SHIPPED | OUTPATIENT
Start: 2024-01-24 | End: 2024-01-29

## 2024-01-24 RX ORDER — BENZONATATE 200 MG/1
200 CAPSULE ORAL 3 TIMES DAILY PRN
Qty: 30 CAPSULE | Refills: 0 | Status: SHIPPED | OUTPATIENT
Start: 2024-01-24 | End: 2024-02-03

## 2024-01-24 NOTE — PATIENT INSTRUCTIONS
"    FLU    Your Rapid FLU test was POSITIVE FOR INFLUENZA    -  Take full course of Tamilfu as prescribed.  If symptoms began over 48 hours ago, you are not eligible for Tamiflu.  Symptomatic management is recommended as treatment.    - Rest at home.     - Drink plenty of fluids so you won't get dehydrated.    - Do not share any utensils or share drinks     - Wash hands frequently    - you can take plain Mucinex (guaifenesin) 1200 mg twice a day to help loosen mucous.     Avoid taking Decongestants such as Sudafed, pseudoephedrine, phenylephrine or meds that say "cold," "sinus" or "-D".           - Fever/Pain recommendations:  Alternate Tylenol or Ibuprofen as directed for fever/pain.     Take ibuprofen/Motrin/Advil every 6-8 hours for pain and inflammation.  Do not take ibuprofen if you have a history of GI bleeding, kidney disease, or if you take blood thinners.    You can also take acetaminophen/Tylenol every 6-8 hours for added pain relief. Avoid tylenol if you have a history of liver disease.        - Cough recommendations:  Warm tea with honey can help with cough. Honey is a natural cough suppressant.    Dextromethorphan (DM) is a cough suppressant over the counter (ie. mucinex DM, delsym).       -Sore throat recommendations: Warm fluids, warm salt water gargles, throat lozenges, tea, honey, soup, or drinking something cold or frozen.  Throat lozenges or sprays help reduce pain. Gargling with warm saltwater (1/4 teaspoon of salt in 1/2 cup of warm water) or an OTC anesthetic gargle may be useful for irritation.    - Follow up with your PCP or specialty clinic as directed in the next 1-2 weeks if not improved or as needed.  You can call [phone] to schedule an appointment with the appropriate provider.      - If your condition worsens or fails to improve we recommend that you receive another evaluation at the ER immediately or contact your PCP to discuss your concerns or return here.    When to seek " medical advice  Call your healthcare provider right away if any of these occur:  Fever that is poorly controlled with OTC fever reducing medication  New or worsening ear pain, sinus pain, or headache  Stiff neck  You can't swallow liquids or you can't open your mouth wide because of throat pain  Signs of dehydration. These include very dark urine or no urine, sunken eyes, and dizziness.  Trouble breathing or noisy breathing  Muffled voice  Rash

## 2024-01-24 NOTE — PROGRESS NOTES
"Subjective:      Patient ID: Tiarra Norton is a 68 y.o. female.    Vitals:  height is 5' 3" (1.6 m) and weight is 66.2 kg (146 lb). Her oral temperature is 99.2 °F (37.3 °C). Her blood pressure is 139/80 and her pulse is 69. Her respiration is 16 and oxygen saturation is 97%.     Chief Complaint: Cough    Patient reports a sometimes productive cough that started Monday.Patient took COVID test on Monday and it was negative.Patient took Nyquil,Robitussin ,Tylenol and jimena pot for hers symptoms.    Cough  The current episode started in the past 7 days (Monday). The problem has been gradually worsening. The problem occurs every few minutes. The cough is Productive of sputum (sometimes). Associated symptoms include a fever, myalgias, nasal congestion, postnasal drip and a sore throat. Pertinent negatives include no ear pain or shortness of breath. Nothing aggravates the symptoms. Treatments tried: Patient took Nyquil,Robitussin ,Tylenol, jimena pot. Her past medical history is significant for bronchitis. There is no history of asthma.       Constitution: Positive for fatigue and fever.   HENT:  Positive for postnasal drip, sinus pain, sinus pressure and sore throat. Negative for ear pain.    Neck: neck negative.   Cardiovascular: Negative.    Respiratory:  Positive for cough. Negative for shortness of breath.    Musculoskeletal:  Positive for muscle ache.      Objective:     Physical Exam   HENT:   Head: Normocephalic.   Ears:   Right Ear: Tympanic membrane, external ear and ear canal normal.   Left Ear: Tympanic membrane, external ear and ear canal normal.   Nose: Nose normal.   Mouth/Throat: Mucous membranes are moist. Oropharynx is clear.   Neck: Neck supple. No neck rigidity present.   Cardiovascular: Normal rate.   Pulmonary/Chest: Effort normal and breath sounds normal.   Abdominal: Normal appearance.   Musculoskeletal:      Cervical back: She exhibits tenderness.   Neurological: She is alert.   Skin: Skin " "is warm.       Assessment:     1. Influenza A        Plan:       Influenza A  -     POCT Strep A, Molecular  -     POCT Influenza A/B MOLECULAR  -     oseltamivir (TAMIFLU) 75 MG capsule; Take 1 capsule (75 mg total) by mouth 2 (two) times daily. for 5 days  Dispense: 10 capsule; Refill: 0  -     benzonatate (TESSALON) 200 MG capsule; Take 1 capsule (200 mg total) by mouth 3 (three) times daily as needed.  Dispense: 30 capsule; Refill: 0      Results for orders placed or performed in visit on 01/24/24   POCT Strep A, Molecular   Result Value Ref Range    Molecular Strep A, POC Negative Negative     Acceptable Yes    POCT Influenza A/B MOLECULAR   Result Value Ref Range    POC Molecular Influenza A Ag Positive (A) Negative, Not Reported    POC Molecular Influenza B Ag Negative Negative, Not Reported     Acceptable Yes      *Note: Due to a large number of results and/or encounters for the requested time period, some results have not been displayed. A complete set of results can be found in Results Review.     Patient Instructions       FLU    Your Rapid FLU test was POSITIVE FOR INFLUENZA    -  Take full course of Tamilfu as prescribed.  If symptoms began over 48 hours ago, you are not eligible for Tamiflu.  Symptomatic management is recommended as treatment.    - Rest at home.     - Drink plenty of fluids so you won't get dehydrated.    - Do not share any utensils or share drinks     - Wash hands frequently    - you can take plain Mucinex (guaifenesin) 1200 mg twice a day to help loosen mucous.     Avoid taking Decongestants such as Sudafed, pseudoephedrine, phenylephrine or meds that say "cold," "sinus" or "-D".           - Fever/Pain recommendations:  Alternate Tylenol or Ibuprofen as directed for fever/pain.     Take ibuprofen/Motrin/Advil every 6-8 hours for pain and inflammation.  Do not take ibuprofen if you have a history of GI bleeding, kidney disease, or if you take blood " thinners.    You can also take acetaminophen/Tylenol every 6-8 hours for added pain relief. Avoid tylenol if you have a history of liver disease.        - Cough recommendations:  Warm tea with honey can help with cough. Honey is a natural cough suppressant.    Dextromethorphan (DM) is a cough suppressant over the counter (ie. mucinex DM, delsym).       -Sore throat recommendations: Warm fluids, warm salt water gargles, throat lozenges, tea, honey, soup, or drinking something cold or frozen.  Throat lozenges or sprays help reduce pain. Gargling with warm saltwater (1/4 teaspoon of salt in 1/2 cup of warm water) or an OTC anesthetic gargle may be useful for irritation.    - Follow up with your PCP or specialty clinic as directed in the next 1-2 weeks if not improved or as needed.  You can call [phone] to schedule an appointment with the appropriate provider.      - If your condition worsens or fails to improve we recommend that you receive another evaluation at the ER immediately or contact your PCP to discuss your concerns or return here.    When to seek medical advice  Call your healthcare provider right away if any of these occur:  Fever that is poorly controlled with OTC fever reducing medication  New or worsening ear pain, sinus pain, or headache  Stiff neck  You can't swallow liquids or you can't open your mouth wide because of throat pain  Signs of dehydration. These include very dark urine or no urine, sunken eyes, and dizziness.  Trouble breathing or noisy breathing  Muffled voice  Rash

## 2024-01-27 LAB
6MAM UR QL: NOT DETECTED
7AMINOCLONAZEPAM UR QL: NOT DETECTED
A-OH ALPRAZ UR QL: PRESENT
ALPHA-OH-MIDAZOLAM: NOT DETECTED
ALPRAZ UR QL: PRESENT
AMPHET UR QL SCN: NOT DETECTED
ANNOTATION COMMENT IMP: NORMAL
BARBITURATES UR QL: NEGATIVE
BUPRENORPHINE UR QL: NOT DETECTED
BZE UR QL: NEGATIVE
CARBOXYTHC UR QL: NORMAL
CARISOPRODOL UR QL: NEGATIVE
CLONAZEPAM UR QL: NOT DETECTED
CODEINE UR QL: NOT DETECTED
CREAT UR-MCNC: 40.6 MG/DL (ref 20–400)
DIAZEPAM UR QL: NOT DETECTED
ETHYL GLUCURONIDE UR QL: NEGATIVE
FENTANYL UR QL: NOT DETECTED
GABAPENTIN: NOT DETECTED
HYDROCODONE UR QL: NOT DETECTED
HYDROMORPHONE UR QL: NOT DETECTED
LORAZEPAM UR QL: NOT DETECTED
MDA UR QL: NOT DETECTED
MDEA UR QL: NOT DETECTED
MDMA UR QL: NOT DETECTED
ME-PHENIDATE UR QL: NOT DETECTED
METHADONE UR QL: NEGATIVE
METHAMPHET UR QL: NOT DETECTED
MIDAZOLAM UR QL SCN: NOT DETECTED
MORPHINE UR QL: NOT DETECTED
NALOXONE: NOT DETECTED
NORBUPRENORPHINE UR QL CFM: NOT DETECTED
NORDIAZEPAM UR QL: NOT DETECTED
NORFENTANYL UR QL: NOT DETECTED
NORHYDROCODONE UR QL CFM: NOT DETECTED
NORMEPERIDINE UR QL CFM: NOT DETECTED
NOROXYCODONE UR QL CFM: NOT DETECTED
NOROXYMORPHONE UR QL SCN: NOT DETECTED
OXAZEPAM UR QL: NOT DETECTED
OXYCODONE UR QL: NOT DETECTED
OXYMORPHONE UR QL: NOT DETECTED
PATHOLOGY STUDY: NORMAL
PCP UR QL: NEGATIVE
PHENTERMINE UR QL: NOT DETECTED
PREGABALIN: NOT DETECTED
SERVICE CMNT-IMP: NORMAL
TAPENTADOL UR QL SCN: NOT DETECTED
TAPENTADOL UR QL SCN: NOT DETECTED
TEMAZEPAM UR QL: NOT DETECTED
TRAMADOL UR QL: NEGATIVE
ZOLPIDEM METABOLITE: NOT DETECTED
ZOLPIDEM UR QL: NOT DETECTED

## 2024-01-30 ENCOUNTER — OFFICE VISIT (OUTPATIENT)
Dept: SLEEP MEDICINE | Facility: CLINIC | Age: 69
End: 2024-01-30
Attending: PSYCHIATRY & NEUROLOGY
Payer: MEDICARE

## 2024-01-30 VITALS
BODY MASS INDEX: 26.22 KG/M2 | SYSTOLIC BLOOD PRESSURE: 120 MMHG | HEIGHT: 63 IN | HEART RATE: 70 BPM | WEIGHT: 148 LBS | DIASTOLIC BLOOD PRESSURE: 76 MMHG

## 2024-01-30 DIAGNOSIS — F51.01 PRIMARY INSOMNIA: ICD-10-CM

## 2024-01-30 PROCEDURE — 99204 OFFICE O/P NEW MOD 45 MIN: CPT | Mod: S$PBB,,, | Performed by: PSYCHIATRY & NEUROLOGY

## 2024-01-30 PROCEDURE — 99999 PR PBB SHADOW E&M-EST. PATIENT-LVL III: CPT | Mod: PBBFAC,,, | Performed by: PSYCHIATRY & NEUROLOGY

## 2024-01-30 PROCEDURE — 99213 OFFICE O/P EST LOW 20 MIN: CPT | Mod: PBBFAC | Performed by: PSYCHIATRY & NEUROLOGY

## 2024-01-30 RX ORDER — SUVOREXANT 15 MG/1
TABLET, FILM COATED ORAL
Qty: 30 TABLET | Refills: 5 | Status: SHIPPED | OUTPATIENT
Start: 2024-01-30 | End: 2024-02-27 | Stop reason: ALTCHOICE

## 2024-01-30 NOTE — PROGRESS NOTES
Already completed CBTi course with Dr. Antony     Tiarra Norton is a 68 y.o. female is here to be evaluated for a sleep disorder; referred by Kaykay Perales MD.  Difficulty falling and staying asleep  Already been through CBTi    TFailing Trazodone 100 mg x2 pills  Quiviviq worked great, but expensice  Bedroom is dark and cool and quiet.  White noise seems to helps.  Bedroom is cool  No electronics at night.   Drinks coke no cofee; drinks coke  after 7 PM      The patient feels rested upon awakening. Takes naps.     The patient  reports morning headaches and reports  dry mouth on awakening.   Tiarra Norton denies  nasal congestion.    The patient never had tonsillectomy, adenoidectomy or UPPP      Tiarra Norton  denies symptoms concerning for parasomnia except for occasional somniloquy.  The patient  denies auxiliary symptoms of narcolepsy including sleep onset paralysis, hypnagogic hallucinations, sleep attacks and cataplexy.    Tiarra Norton denied symptoms concerning for RLS; nocturnal leg movements have not been noticed.   The patient does not experience sleep related leg cramps.     Long family h/o insomniavcs    Medications pertinent to sleep  disorders taken currently:   Trazodone 200 mg - not helping ehr staying asleep  Previous  medications taken  for sleep disorders:Vystaril - blurry vision; Quviq - worked well but expensive with Medicare   Tried and failed AMbien    Sleep studies  2016 - AHI 3, SAo2 min - 87 %        Occupation:reetired   Bed partner: her  who has nacrolapsy and HERMINIA  Exercise routine: acrtive  Caffeine:  coke toll 7 PM beverages per day        1/29/2024     3:51 PM   EPWORTH SLEEPINESS SCALE TOTAL SCORE    Total score 6             1/29/2024     3:51 PM   EPWORTH SLEEPINESS SCALE   Sitting and reading 1   Watching TV 1   Sitting, inactive in a public place (e.g. a theatre or a meeting) 0   As a passenger in a car for an hour without a break 2   Lying down to  rest in the afternoon when circumstances permit 2   Sitting and talking to someone 0   Sitting quietly after a lunch without alcohol 0   In a car, while stopped for a few minutes in traffic 0   Total score 6           8/23/2022     9:17 AM   PHQ9   Little interest or pleasure in doing things    Feeling down, depressed, or hopeless    Trouble falling or staying asleep, or sleeping too much    Feeling tired or having little energy    Poor appetite or overeating    Feeling bad about yourself - or that you are a failure or have let yourself or your family down    Trouble concentrating on things, such as reading the newspaper or watching television    Moving or speaking so slowly that other people could have noticed. Or the opposite - being so fidgety or restless that you have been moving around a lot more than usual    PHQ-9 Total Score        Information is confidential and restricted. Go to Review Flowsheets to unlock data.         8/23/2022     9:16 AM 8/7/2022    11:12 AM 7/26/2022    10:34 AM   GAD7   1. Feeling nervous, anxious, or on edge?      2. Not being able to stop or control worrying?      3. Worrying too much about different things?      4. Trouble relaxing?      5. Being so restless that it is hard to sit still?      6. Becoming easily annoyed or irritable?      7. Feeling afraid as if something awful might happen?      8. If you checked off any problems, how difficult have these problems made it for you to do your work, take care of things at home, or get along with other people?      ONEIL-7 Score          Information is confidential and restricted. Go to Review Flowsheets to unlock data.           1/29/2024     3:51 PM   EPWORTH SLEEPINESS SCALE   Sitting and reading 1   Watching TV 1   Sitting, inactive in a public place (e.g. a theatre or a meeting) 0   As a passenger in a car for an hour without a break 2   Lying down to rest in the afternoon when circumstances permit 2   Sitting and talking to  someone 0   Sitting quietly after a lunch without alcohol 0   In a car, while stopped for a few minutes in traffic 0   Total score 6         1/29/2024     3:58 PM   Sleep Clinic New Patient   What time do you go to bed on a week day? (Give a range) 9:30 - 10-30   What time do you go to bed on a day off? (Give a range) Same.  Im retired -- everyday is a day off   How long does it take you to fall asleep? (Give a range) 15 - 30 minutes   On average, how many times per night do you wake up? 3   How long does it take you to fall back into sleep? (Give a range) 30 minutes or I may not be able to go back to sleep   What time do you wake up to start your day on a week day? (Give a range) 2:00 - 4:00 AM   What time do you wake up to start your day on a day off? (Give a range) Same   What time do you get out of bed? (Give a range) 2:30 - 4:00   On average, how many hours do you sleep? 5   On average, how many naps do you take per day? None (unless im sick)   Rate your sleep quality from 0 to 5 (0-poor, 5-great). 3   Have you experienced:  N/a   How much weight have you lost or gained (in lbs.) in the last year? 5 lbs.   On average, how many times per night do you go to the bathroom?  1   Have you ever had a sleep study/CPAP machine/surgery for sleep apnea? Yes   Have you ever had a CPAP machine for sleep apnea? No   Have you ever had surgery for sleep apnea? No           1/29/2024     3:58 PM   Sleep Clinic ROS    Difficulty breathing through the nose?  Sometimes   Sore throat or dry mouth in the morning? Sometimes   Irregular or very fast heart beat?  No   Shortness of breath?  No   Acid reflux? No   Body aches and pains?  Yes   Morning headaches? Sometimes   Dizziness? Sometimes   Mood changes?  No   Do you exercise?  Sometimes   Do you feel like moving your legs a lot?  No       DME:         PAST MEDICAL HISTORY:    Active Ambulatory Problems     Diagnosis Date Noted    HTN (hypertension) 08/01/2012    Hypothyroid  12/10/2012    Unspecified vitamin D deficiency 12/10/2012    Elevated liver enzymes 12/10/2012    Primary osteoarthritis of right knee 06/11/2015    Fatty liver 11/16/2015    Hyperlipidemia 11/16/2015    Migraine without status migrainosus, not intractable 11/16/2015    Glucose intolerance (impaired glucose tolerance) 11/16/2015    Hyponatremia 11/22/2015    Anxiety 11/24/2015    Fatigue 03/01/2016    Intermittent diarrhea 03/01/2016    HERMINIA (obstructive sleep apnea)     Decreased libido 05/06/2016    Sleep arousal disorder     Major depressive disorder, recurrent, mild 01/12/2018    Pain 05/06/2019    Colon adenomas 12/06/2019    Abdominal pain 02/13/2020    Exocrine pancreatic insufficiency 01/27/2021    Irritable bowel syndrome with diarrhea 04/14/2021    Overweight (BMI 25.0-29.9) 04/14/2021    Poor posture 05/05/2021    Complex tear of lateral meniscus of right knee as current injury 10/19/2021    Decreased range of motion (ROM) of right knee 02/14/2022    Weakness of right lower extremity 02/14/2022    Decreased range of motion of trunk and back 05/17/2022    Decreased strength of trunk and back 05/17/2022    Idiopathic scoliosis of thoracolumbar spine 05/17/2022    Primary insomnia 07/18/2022    Back pain 08/10/2022    Impaired functional mobility, balance, gait, and endurance 09/23/2022    Posture imbalance 09/23/2022    Weakness of trunk musculature 09/23/2022    Chronic pain syndrome 12/21/2022    History of partial knee replacement 02/15/2023    Benzodiazepine dependence 02/27/2023    Aortic atherosclerosis 02/27/2023     Resolved Ambulatory Problems     Diagnosis Date Noted    Depression 08/01/2012    BMI 31.0-31.9,adult 11/16/2015    Colon cancer screening 04/11/2016    Acute pain of right knee 09/29/2019    Other chest pain 10/20/2020    Myalgia 01/08/2021    Lack of coordination 01/08/2021    Decreased range of motion of trunk and back 05/05/2021    Impaired functional mobility and activity tolerance  2022    Fear of pain 2022    Alteration in performance of activities of daily living 2022    COVID 2022     Past Medical History:   Diagnosis Date    Cataract     Gestational diabetes     Hypertension     IBS (irritable bowel syndrome)     Thyroid disease                 PAST SURGICAL HISTORY:    Past Surgical History:   Procedure Laterality Date    ADENOIDECTOMY      ARTHROPLASTY, KNEE, TOTAL, USING COMPUTER-ASSISTED NAVIGATION Right 2023    Procedure: CONVERSION ARTHROPLASTY, KNEE, TOTAL, USING COMPUTER-ASSISTED NAVIGATION;  Surgeon: Virgil Scott MD;  Location: [facility] OR;  Service: Orthopedics;  Laterality: Right;  Same day discharge    ARTHROSCOPIC CHONDROPLASTY OF KNEE JOINT Right 10/19/2021    Procedure: ARTHROSCOPY, KNEE, WITH CHONDROPLASTY;  Surgeon: Trevin Huber MD;  Location: [facility] OR;  Service: Orthopedics;  Laterality: Right;    ARTHROSCOPY OF KNEE Right 10/19/2021    Procedure: ARTHROSCOPY, KNEE-RIGHT;  Surgeon: Trevin Huber MD;  Location: [facility] OR;  Service: Orthopedics;  Laterality: Right;     SECTION      CHOLECYSTECTOMY      COLONOSCOPY N/A 2016    Procedure: COLONOSCOPY;  Surgeon: Heri Segura MD;  Location: [facility] (4TH FLR);  Service: Endoscopy;  Laterality: N/A;    COLONOSCOPY N/A 2019    Procedure: COLONOSCOPY;  Surgeon: Joe Pitts MD;  Location: [facility] (4TH FLR);  Service: Endoscopy;  Laterality: N/A;    CYSTOSCOPY  2022    Procedure: CYSTOSCOPY;  Surgeon: Lenny Moore MD;  Location: [address] FLR;  Service: Urology;;    ESOPHAGOGASTRODUODENOSCOPY N/A 2020    Procedure: EGD (ESOPHAGOGASTRODUODENOSCOPY);  Surgeon: Tolu Rivas MD;  Location: [facility] (4TH FLR);  Service: Endoscopy;  Laterality: N/A;  Pt. moved to 9:15am arrival time.. Instructed to not have anything after midnight.EC    EYE SURGERY      cataract resection right    INJECTION OF ANESTHETIC AGENT AROUND NERVE Bilateral 2022     Procedure: Block, Nerve MEDIAL BRANCH BLOCK BILATERAL L3,4,5;  Surgeon: Tara Gibbs MD;  Location: [facility] PAIN MGT;  Service: Pain Management;  Laterality: Bilateral;    INJECTION OF ANESTHETIC AGENT AROUND NERVE Bilateral 2/15/2022    Procedure: BLOCK, NERVE, L3*L4-L5 MEDIAL BR ANCH 2 OF 2;  Surgeon: Tara Gibbs MD;  Location: [facility] PAIN MGT;  Service: Pain Management;  Laterality: Bilateral;    INJECTION OF BOTULINUM TOXIN TYPE A  6/21/2022    Procedure: BOTULINUM TOXIN INJECTION 100 units;  Surgeon: Lenny Moore MD;  Location: [facility] OR 1ST FLR;  Service: Urology;;    INSERTION, NEUROSTIMULATOR, SPINAL CORD N/A 1/9/2023    Procedure: SPINAL CORD STIMULATOR IMPLANT [employer];  Surgeon: Shoaib Aguirre MD;  Location: [facility] OR;  Service: Pain Management;  Laterality: N/A;    JOINT REPLACEMENT      partial right knee    KNEE ARTHROSCOPY W/ MENISCECTOMY Right 10/19/2021    Procedure: ARTHROSCOPY, KNEE, WITH MENISCECTOMY, PARTIAL LATERAL;  Surgeon: Trevin Huber MD;  Location: [facility] OR;  Service: Orthopedics;  Laterality: Right;    r hand surgery      RADIOFREQUENCY ABLATION Right 3/8/2022    Procedure: RADIOFREQUENCY ABLATION, L3-L5 1 OF 2;  Surgeon: Tara Gibbs MD;  Location: [facility] PAIN MGT;  Service: Pain Management;  Laterality: Right;    RADIOFREQUENCY ABLATION Left 3/22/2022    Procedure: RADIOFREQUENCY ABLATION, l3-+l5 2 of 2;  Surgeon: Tara Gibbs MD;  Location: [facility] PAIN MGT;  Service: Pain Management;  Laterality: Left;    REMOVAL OF NEUROSTIMULATOR N/A 11/20/2023    Procedure: SCS IPG BATTERY REVISION;  Surgeon: Shoaib Aguirre MD;  Location: [facility] OR;  Service: Pain Management;  Laterality: N/A;    TONSILLECTOMY      TOTAL KNEE ARTHROPLASTY      partial knee replacement    TRIAL OF SPINAL CORD NERVE STIMULATOR N/A 12/22/2022    Procedure: SPINAL CORD STIMULATOR TRIAL [employer];  Surgeon: Shoaib Aguirre MD;  Location: [facility] PAIN MGT;  Service: Pain  Management;  Laterality: N/A;    TUBAL LIGATION           FAMILY HISTORY:                Family History   Problem Relation Age of Onset    Hypertension Mother     Irritable bowel syndrome Mother     Hyperlipidemia Mother     Heart disease Father         mi    Hypertension Father     Cancer Father         prostate    Heart attack Father     Heart failure Father     Hyperlipidemia Father     Alcohol abuse Sister     Depression Sister     Epilepsy Son     Irritable bowel syndrome Sister     Alcohol abuse Sister     Ovarian cancer Maternal Aunt     Breast cancer Paternal Aunt     Breast cancer Maternal Aunt     Melanoma Neg Hx     Colon cancer Neg Hx     Stomach cancer Neg Hx     Esophageal cancer Neg Hx     Liver disease Neg Hx     Crohn's disease Neg Hx     Ulcerative colitis Neg Hx     Celiac disease Neg Hx     Stroke Neg Hx        SOCIAL HISTORY:          Tobacco:   Social History     Tobacco Use   Smoking Status Never   Smokeless Tobacco Never       alcohol use:    Social History     Substance and Sexual Activity   Alcohol Use Yes    Alcohol/week: 3.0 standard drinks of alcohol    Types: 3 Glasses of wine per week    Comment: weekends                   ALLERGIES:  Review of patient's allergies indicates:  No Known Allergies    CURRENT MEDICATIONS:    Current Outpatient Medications   Medication Sig Dispense Refill    ALPRAZolam (XANAX) 1 MG tablet Take 1 mg by mouth 3 (three) times daily.      amLODIPine (NORVASC) 5 MG tablet Take 1 tablet (5 mg total) by mouth once daily. 90 tablet 3    atorvastatin (LIPITOR) 10 MG tablet Take 1 tablet (10 mg total) by mouth once daily. 90 tablet 3    benzonatate (TESSALON) 200 MG capsule Take 1 capsule (200 mg total) by mouth 3 (three) times daily as needed. 30 capsule 0    conjugated estrogens (PREMARIN) vaginal cream Place 0.5 g vaginally twice a week. Place a pea-sized amount in vagina every night for 4 weeks, then use 2-3 nights a week 1 applicator 1    EScitalopram oxalate  "(LEXAPRO) 5 MG Tab Take 5 mg by mouth.      HYDROcodone-acetaminophen (NORCO) 5-325 mg per tablet Take 1 tablet by mouth every 12 (twelve) hours as needed for Pain. 60 tablet 0    levothyroxine (SYNTHROID) 125 MCG tablet TAKE 1 TABLET BY MOUTH EVERY MORNING 90 tablet 2    liothyronine (CYTOMEL) 5 MCG Tab Take 1 tablet (5 mcg total) by mouth once daily. 90 tablet 3    promethazine (PHENERGAN) 12.5 MG Tab TAKE 1 TABLET(12.5 MG) BY MOUTH EVERY 6 HOURS AS NEEDED FOR NAUSEA 30 tablet 2    RESTASIS 0.05 % ophthalmic emulsion INT 1 GTT IN OU BID      scopolamine (TRANSDERM-SCOP) 1.3-1.5 mg (1 mg over 3 days) Place 1 patch onto the skin every 72 hours. 6 patch 1    sumatriptan (IMITREX) 50 MG tablet 1 tab po at start of headache.  Repeat in 2 h prn 36 tablet 3    tobramycin sulfate 0.3% (TOBREX) 0.3 % ophthalmic solution 1-2 drops topically twice daily to affected toe(s). 5 mL 3    traZODone (DESYREL) 100 MG tablet 1-2 tabs po q hs for sleep 180 tablet 3    valsartan (DIOVAN) 320 MG tablet Take 1 tablet (320 mg total) by mouth once daily. 90 tablet 3     No current facility-administered medications for this visit.     Facility-Administered Medications Ordered in Other Visits   Medication Dose Route Frequency Provider Last Rate Last Admin    0.9%  NaCl infusion  500 mL Intravenous Continuous Adelfo Zamarripa MD        celecoxib capsule 400 mg  400 mg Oral Once Randall Conrad MD        lactated ringers infusion   Intravenous Continuous Michael Srinivasan MD   New Bag at 11/20/23 0654    LIDOcaine (PF) 10 mg/ml (1%) injection 5 mg  0.5 mL Intradermal Once Michael Srinivasan MD                        PHYSICAL EXAM:  /76 (BP Location: Left arm, Patient Position: Sitting, BP Method: Large (Automatic))   Pulse 70   Ht 5' 3" (1.6 m)   Wt 67.1 kg (148 lb)   BMI 26.22 kg/m²   GENERAL: Normal development, well groomed.  HEENT:   HEENT:  Conjunctivae are non-erythematous; Pupils equal, round, and reactive to light; " Nose is symmetrical; Nasal mucosa is pink and moist; Septum is midline; Inferior turbinates are normal; Nasal airflow is normal; Posterior pharynx is pink; Modified Mallampati:II-III; Posterior palate is low; Tonsils not visualized; Uvula is normal and pink;Tongue is enlarged; Dentition is fair; No TMJ tenderness; Jaw opening and protrusion without click and without discomfort.  NECK: Supple. Neck circumference is 14 inches. No thyromegaly. No palpable nodes.     SKIN: On face and neck: No abrasions, no rashes, no lesions.  No subcutaneous nodules are palpable.  RESPIRATORY: Chest is clear to auscultation.  Normal chest expansion and non-labored breathing at rest.  CARDIOVASCULAR: Normal S1, S2.  No murmurs, gallops or rubs. No carotid bruits bilaterally.  No edema. No clubbing. No cyanosis.    NEURO: Oriented to time, place and person. Normal attention span and concentration. Gait normal.    PSYCH: Affect is full. Mood is normal  MUSCULOSKELETAL: Moves 4 extremities. Gait normal.           ASSESSMENT:    2. Insomnia NEC. Multi-factorial -  excess time in bed, poor sleep hygiene, and likely paradoxical insomnia play a role Already finished CBT.    1. Sleep Apnea NEC. The patient symptomatically has occasional  snoring . The patient has medical co-morbidities of hyperlipidemia  which can be worsened by HERMINIA. Previous negative PSG in 2016; lost weight since            PLAN:    Already completed CBTi  Recommended to decrease coke products consumption  at night  Will order Belsomra 15 mg -> if not enough ->will increase 20 mg  Will defer PSG will Tiarra Bernardo Norton's sleep continuity improves.    During our discussion today, we talked about the etiology of  HERMINIA as well as the potential ramifications of untreated sleep apnea, which could include daytime sleepiness, hypertension, heart disease and/or stroke.  We discussed potential treatment options, which could include weight loss, body positioning, continuous positive  airway pressure (CPAP), or referral for surgical consideration. Meanwhile, she  is urged to avoid supine sleep, weight gain and alcoholic beverages since all of these can worsen HERMINIA.     The patient was given open opportunity to ask questions and/or express concerns about treatment plan. Two point patient identifier confirmed.       Precautions: The patient was advised to abstain from driving should he feel sleepy or drowsy.    Follow up: MD after the sleep study has been completed.     ESS ([place] Sleepiness Scale) and other sleep medicine related questionnaires were reviewed with the patient.      46-minute visit. >50% spent counseling patient and coordination of care.  The patient was  cautioned against drowsy driving.

## 2024-02-01 ENCOUNTER — CLINICAL SUPPORT (OUTPATIENT)
Dept: REHABILITATION | Facility: OTHER | Age: 69
End: 2024-02-01
Payer: MEDICARE

## 2024-02-01 DIAGNOSIS — M62.81 WEAKNESS OF TRUNK MUSCULATURE: ICD-10-CM

## 2024-02-01 DIAGNOSIS — M25.69 DECREASED RANGE OF MOTION OF TRUNK AND BACK: ICD-10-CM

## 2024-02-01 DIAGNOSIS — M25.661 DECREASED RANGE OF MOTION (ROM) OF RIGHT KNEE: Primary | ICD-10-CM

## 2024-02-01 DIAGNOSIS — R29.898 WEAKNESS OF RIGHT LOWER EXTREMITY: ICD-10-CM

## 2024-02-01 DIAGNOSIS — R29.898 DECREASED STRENGTH OF TRUNK AND BACK: ICD-10-CM

## 2024-02-01 PROCEDURE — 97530 THERAPEUTIC ACTIVITIES: CPT

## 2024-02-01 PROCEDURE — 97110 THERAPEUTIC EXERCISES: CPT

## 2024-02-01 PROCEDURE — 97112 NEUROMUSCULAR REEDUCATION: CPT

## 2024-02-01 NOTE — PROGRESS NOTES
[facility]   Physical Therapy Treatment Note      Name: Tiarra Norton  Clinic Number: [id number]    Therapy Diagnosis:   Encounter Diagnoses   Name Primary?    Decreased range of motion (ROM) of right knee Yes    Weakness of right lower extremity     Decreased range of motion of trunk and back     Decreased strength of trunk and back     Weakness of trunk musculature      Physician: Virgil Scott MD    Visit Date: 2/1/2024    Physician Orders: PT Eval and Treat   Medical Diagnosis from Referral:   Z96.651 (ICD-10-CM) - Status post total right knee replacement   M22.2X1 (ICD-10-CM) - Patellofemoral pain syndrome of right knee   M70.50 (ICD-10-CM) - Pes anserine bursitis      Evaluation Date: 12/21/2023  Authorization Period Expiration: 9/21/2024  Plan of Care Expiration: 3/21/2024  Visit # / Visits authorized: 2/20   Progress Note Due: 3/1/2024  FOTO: 1/ 1     Precautions: hx of TKA and menisectomy, spinal cord stimulator, scoliosis     Time In: 10:30 am  Time Out: 11:30 am   Total Appointment Time (timed & untimed codes): 60 minutes    PTA Visit #: 0/5       Subjective     Patient reports: she has not been able to attend therapy since the evaluation due to illness and being out of town. Overall her pain has been up and down with no real change yet.   She was compliant with home exercise program.  Response to previous treatment: no adverse effects   Functional change: none yet, first follow up    Pain: 5/10  Location: bilateral low back and R knee     Objective      Objective Measures updated at progress report unless specified.     Lumbar Range of Motion:     %   Flexion 90      Extension 50      Left Side Bending 50   Right Side Bending 50   Left rotation    60   Right Rotation    70    *= pain         GAIT DEVIATIONS: Tiarra displays decreased weight shift;antalgic gait;decreased step length;occasional unsteady gait     Range of Motion:   Knee Left active Left Passive   Flexion 133  "140   Extension 3 5      Knee Right active Right Passive   Flexion 121 124   Extension 1 3         Lower Extremity Strength     Right LE   Left LE     Hip flexion: 4-/5 Hip flexion: 4/5   Knee extension: 4/5 Knee extension: 4+/5   Knee flexion: 4-/5 Knee flexion: 4+/5   Hip IR: 4-/5 Hip IR: 4/5   Hip ER: 4-/5 Hip ER: 4/5   Hip extension:  3+/5 Hip extension: 3+/5   Hip abduction: 3+/5 Hip abduction: 4-/5   Hip adduction: 3+/5 Hip adduction 4-/5   Ankle dorsiflexion: 5/5 Ankle dorsiflexion: 5/5   Ankle plantarflexion: 5/5 Ankle plantarflexion: 5/5        Treatment     Tiarra received the treatments listed below:      therapeutic exercises to develop strength, endurance, ROM, flexibility, posture, and core stabilization for 20 minutes including:    Recumbent bike lv. 4 x 5 min for endurance and knee ROM  PPT 15x5"  Bridge 15x3"  EIL x10  DKTC 15x5"'  Open books x15 ea  EIS 2x10  Hip flexor stretch 2x1' ea    manual therapy techniques: - were applied to the: n/a for 0 minutes, including:      neuromuscular re-education activities to improve: Balance, Coordination, Kinesthetic, Proprioception, and Posture for 25 minutes. The following activities were included:    Sit to stands GTB around knees 2x10  Side stepping RTB 2x12 steps  Monster walks rtb 2x12 steps  Single leg balance 3x30" R only  Step ups fwd/lateral 2x10x3" ea R only  Medx trunk extension 50# x20 increase next visit.      therapeutic activities to improve functional performance for 10  minutes, including:    Objective measures and reassessment    cold pack for 5 minutes to R knee and low back .    Patient Education and Home Exercises       Education provided:   - HEP, POC    Written Home Exercises Provided: Patient instructed to cont prior HEP. Exercises were reviewed and Tiarra was able to demonstrate them prior to the end of the session.  Tiarra demonstrated good  understanding of the education provided. See Electronic Medical Record under Patient " Instructions for exercises provided during therapy sessions    Assessment     Tiarra presents today for her first follow-up since initial evaluation without complaints of pain. Reassessment performed with pt demo min change in lumbar spine or R knee ROM, and min improvements in B LE strength which can be attributed to lack of attendance to PT appts and illness and being out of town for extended periods of time. Discussed increasing frequency of HEP for max therapeutic benefits. Continue to progress as tolerated.     Tiarra Is progressing well towards her goals.   Patient prognosis is Good.     Patient will continue to benefit from skilled outpatient physical therapy to address the deficits listed in the problem list box on initial evaluation, provide pt/family education and to maximize pt's level of independence in the home and community environment.     Patient's spiritual, cultural and educational needs considered and pt agreeable to plan of care and goals.     Anticipated barriers to physical therapy: chronicity of condition, multiple treatment areas, hx of R TKA and menisectomy     GOALS: Short Term Goals:  4 weeks  1. Report decreased in pain at worse less than  <   / =  4  /10  to increase tolerance for functional activities.On going  2. Pt to increase B popliteal angle by greater than > or = 5 degrees in order to improve flexibility and posture. On going  3. Increased R LE MMT 1/2  to increase tolerance for ADL and work activities.On going  4. Pt to increase B Ely's Test by greater than  > or = 5degrees in order to improve flexibility and posture. On going  5. Pt to tolerate HEP to improve ROM and independence with ADL's.On going  6. Pt to improve range of motion by 25% to allow for improved functional mobility to allow for improvement in IADLs. On going     Long Term Goals: 8 weeks  1. Report decreased in pain at worse less than  <   / =  2  /10  to increase tolerance for functional mobility.  On going  2 .Pt  to increase B popliteal angle by greater than > or = 10 degrees in order to improve flexibility and posture. On going  3. Increased R LE MMT 1 grade to increase tolerance for ADL and work activities.On going  4. Pt will report at 51% or better score on FOTO Lumbar spine survey  to demonstrate decrease in disability and improvement in back pain.On going  5. Pt to be Independent with HEP to improve ROM and independence with ADL's. On going  6. Pt to increase B Ely's Test by greater than > or = 10 degrees in order to improve flexibility and posture. On going  7. Pt to demonstrate negative Bridge Test in order to show improved core strength for lumbar stabilization. On going  8. Pt to improve range of motion by 75% to allow for improved functional mobility to allow for improvement in IADLs. On going        Plan   Plan of care Certification: 12/21/2023 to 3/21/2023.     Outpatient Physical Therapy 2 times weekly for 12 weeks to include the following interventions: Cervical/Lumbar Traction, Gait Training, Manual Therapy, Moist Heat/ Ice, Neuromuscular Re-ed, Patient Education, Self Care, Therapeutic Activities, and Therapeutic Exercise. Dry aubree Anaya, PT

## 2024-02-09 ENCOUNTER — PATIENT MESSAGE (OUTPATIENT)
Dept: SLEEP MEDICINE | Facility: CLINIC | Age: 69
End: 2024-02-09
Payer: MEDICARE

## 2024-02-09 ENCOUNTER — PATIENT MESSAGE (OUTPATIENT)
Dept: ADMINISTRATIVE | Facility: OTHER | Age: 69
End: 2024-02-09
Payer: MEDICARE

## 2024-02-09 ENCOUNTER — TELEPHONE (OUTPATIENT)
Dept: SLEEP MEDICINE | Facility: CLINIC | Age: 69
End: 2024-02-09
Payer: MEDICARE

## 2024-02-13 ENCOUNTER — PATIENT MESSAGE (OUTPATIENT)
Dept: ORTHOPEDICS | Facility: CLINIC | Age: 69
End: 2024-02-13
Payer: MEDICARE

## 2024-02-13 ENCOUNTER — PATIENT MESSAGE (OUTPATIENT)
Dept: ADMINISTRATIVE | Facility: OTHER | Age: 69
End: 2024-02-13
Payer: MEDICARE

## 2024-02-13 ENCOUNTER — PATIENT MESSAGE (OUTPATIENT)
Dept: OBSTETRICS AND GYNECOLOGY | Facility: CLINIC | Age: 69
End: 2024-02-13
Payer: MEDICARE

## 2024-02-13 ENCOUNTER — PATIENT MESSAGE (OUTPATIENT)
Dept: INTERNAL MEDICINE | Facility: CLINIC | Age: 69
End: 2024-02-13
Payer: MEDICARE

## 2024-02-14 ENCOUNTER — CLINICAL SUPPORT (OUTPATIENT)
Dept: REHABILITATION | Facility: OTHER | Age: 69
End: 2024-02-14
Payer: MEDICARE

## 2024-02-14 ENCOUNTER — PATIENT MESSAGE (OUTPATIENT)
Dept: PAIN MEDICINE | Facility: OTHER | Age: 69
End: 2024-02-14
Payer: MEDICARE

## 2024-02-14 DIAGNOSIS — M25.69 DECREASED RANGE OF MOTION OF TRUNK AND BACK: ICD-10-CM

## 2024-02-14 DIAGNOSIS — M62.81 WEAKNESS OF TRUNK MUSCULATURE: ICD-10-CM

## 2024-02-14 DIAGNOSIS — R29.898 DECREASED STRENGTH OF TRUNK AND BACK: ICD-10-CM

## 2024-02-14 DIAGNOSIS — Z96.651 STATUS POST TOTAL RIGHT KNEE REPLACEMENT: Primary | ICD-10-CM

## 2024-02-14 DIAGNOSIS — M25.661 DECREASED RANGE OF MOTION (ROM) OF RIGHT KNEE: Primary | ICD-10-CM

## 2024-02-14 DIAGNOSIS — R29.898 WEAKNESS OF RIGHT LOWER EXTREMITY: ICD-10-CM

## 2024-02-14 PROCEDURE — 97110 THERAPEUTIC EXERCISES: CPT

## 2024-02-14 PROCEDURE — 97140 MANUAL THERAPY 1/> REGIONS: CPT

## 2024-02-14 PROCEDURE — 97112 NEUROMUSCULAR REEDUCATION: CPT

## 2024-02-14 NOTE — PROGRESS NOTES
[facility]   Physical Therapy Treatment Note      Name: Tiarra Norton  Clinic Number: [id number]    Therapy Diagnosis:   Encounter Diagnoses   Name Primary?    Decreased range of motion (ROM) of right knee Yes    Weakness of right lower extremity     Decreased range of motion of trunk and back     Decreased strength of trunk and back     Weakness of trunk musculature      Physician: Virgil Scott MD    Visit Date: 2/14/2024    Physician Orders: PT Eval and Treat   Medical Diagnosis from Referral:   Z96.651 (ICD-10-CM) - Status post total right knee replacement   M22.2X1 (ICD-10-CM) - Patellofemoral pain syndrome of right knee   M70.50 (ICD-10-CM) - Pes anserine bursitis      Evaluation Date: 12/21/2023  Authorization Period Expiration: 9/21/2024  Plan of Care Expiration: 3/21/2024  Visit # / Visits authorized: 3/20   Progress Note Due: 3/1/2024  FOTO: 1/ 1     Precautions: hx of TKA and menisectomy, spinal cord stimulator, scoliosis     Time In: 1:30 pm  Time Out: 2:30 pm   Total Billable Time: 55 minutes  Total Appointment Time (timed & untimed codes): 60 minutes    PTA Visit #: 0/5       Subjective     Patient reports: she is having a lot of knee pain today. The back hurts too, but the knee is more of a problem     She was compliant with home exercise program.  Response to previous treatment: no adverse effects   Functional change: none yet, first follow up    Pain: 5/10  Location: bilateral low back and R knee     Objective      Objective Measures updated at progress report unless specified.     Lumbar Range of Motion:     %   Flexion 90      Extension 50      Left Side Bending 50   Right Side Bending 50   Left rotation    60   Right Rotation    70    *= pain         GAIT DEVIATIONS: Tiarra displays decreased weight shift;antalgic gait;decreased step length;occasional unsteady gait     Range of Motion:   Knee Left active Left Passive   Flexion 133 140   Extension 3 5      Knee  "Right active Right Passive   Flexion 121 124   Extension 1 3         Lower Extremity Strength     Right LE   Left LE     Hip flexion: 4-/5 Hip flexion: 4/5   Knee extension: 4/5 Knee extension: 4+/5   Knee flexion: 4-/5 Knee flexion: 4+/5   Hip IR: 4-/5 Hip IR: 4/5   Hip ER: 4-/5 Hip ER: 4/5   Hip extension:  3+/5 Hip extension: 3+/5   Hip abduction: 3+/5 Hip abduction: 4-/5   Hip adduction: 3+/5 Hip adduction 4-/5   Ankle dorsiflexion: 5/5 Ankle dorsiflexion: 5/5   Ankle plantarflexion: 5/5 Ankle plantarflexion: 5/5        Treatment     Tiarra received the treatments listed below:      therapeutic exercises to develop strength, endurance, ROM, flexibility, posture, and core stabilization for 20 minutes including:    Recumbent bike lv. 4 x 5 min for endurance and knee ROM  PPT 15x5"  Bridge 15x3"  EIL x10  DKTC 15x5"'  Open books x15 ea  EIS 2x10  Hip flexor stretch 2x1' ea    manual therapy techniques: - were applied to the: R knee for 10 minutes, including:    FDN via 50 mm needles x 2 points to lateral quad, 2 points to medial quad and x 2 points to distal quad with 2 hz estim x 10 minutes with good tolerance     neuromuscular re-education activities to improve: Balance, Coordination, Kinesthetic, Proprioception, and Posture for 25 minutes. The following activities were included:    Sit to stands GTB around knees 2x10  Side stepping RTB 2x12 steps  Monster walks rtb 2x12 steps  Single leg balance 3x30" R only  Step ups fwd/lateral 2x10x3" ea R only  Medx trunk extension 50# x20 increase next visit.      therapeutic activities to improve functional performance for 0  minutes, including:      cold pack for 5 minutes to R knee and low back .    Patient Education and Home Exercises       Education provided:   - HEP, POC    Written Home Exercises Provided: Patient instructed to cont prior HEP. Exercises were reviewed and Tiarra was able to demonstrate them prior to the end of the session.  Tiarra demonstrated good  " understanding of the education provided. See Electronic Medical Record under Patient Instructions for exercises provided during therapy sessions    Assessment     Tiarra presents today with continued complaints of pain, unchanged from last visit. Dry needling of R knee applied today with pt reporting decreased pain and improved tolerance to gait and functional mobility activities after application. Continuation and progression of dynamic strengthening and flexibility activities with good tolerance and no adverse effects. Continue to progress as tolerated.       Tiarra Is progressing well towards her goals.   Patient prognosis is Good.     Patient will continue to benefit from skilled outpatient physical therapy to address the deficits listed in the problem list box on initial evaluation, provide pt/family education and to maximize pt's level of independence in the home and community environment.     Patient's spiritual, cultural and educational needs considered and pt agreeable to plan of care and goals.     Anticipated barriers to physical therapy: chronicity of condition, multiple treatment areas, hx of R TKA and menisectomy     GOALS: Short Term Goals:  4 weeks  1. Report decreased in pain at worse less than  <   / =  4  /10  to increase tolerance for functional activities.On going  2. Pt to increase B popliteal angle by greater than > or = 5 degrees in order to improve flexibility and posture. On going  3. Increased R LE MMT 1/2  to increase tolerance for ADL and work activities.On going  4. Pt to increase B Ely's Test by greater than  > or = 5degrees in order to improve flexibility and posture. On going  5. Pt to tolerate HEP to improve ROM and independence with ADL's.On going  6. Pt to improve range of motion by 25% to allow for improved functional mobility to allow for improvement in IADLs. On going     Long Term Goals: 8 weeks  1. Report decreased in pain at worse less than  <   / =  2  /10  to increase  tolerance for functional mobility.  On going  2 .Pt to increase B popliteal angle by greater than > or = 10 degrees in order to improve flexibility and posture. On going  3. Increased R LE MMT 1 grade to increase tolerance for ADL and work activities.On going  4. Pt will report at 51% or better score on FOTO Lumbar spine survey  to demonstrate decrease in disability and improvement in back pain.On going  5. Pt to be Independent with HEP to improve ROM and independence with ADL's. On going  6. Pt to increase B Ely's Test by greater than > or = 10 degrees in order to improve flexibility and posture. On going  7. Pt to demonstrate negative Bridge Test in order to show improved core strength for lumbar stabilization. On going  8. Pt to improve range of motion by 75% to allow for improved functional mobility to allow for improvement in IADLs. On going        Plan   Plan of care Certification: 12/21/2023 to 3/21/2023.     Outpatient Physical Therapy 2 times weekly for 12 weeks to include the following interventions: Cervical/Lumbar Traction, Gait Training, Manual Therapy, Moist Heat/ Ice, Neuromuscular Re-ed, Patient Education, Self Care, Therapeutic Activities, and Therapeutic Exercise. Dry needling    Zi Anaya, PT   2/14/2024

## 2024-02-16 ENCOUNTER — HOSPITAL ENCOUNTER (OUTPATIENT)
Facility: OTHER | Age: 69
Discharge: HOME | End: 2024-02-16
Attending: ANESTHESIOLOGY | Admitting: ANESTHESIOLOGY
Payer: MEDICARE

## 2024-02-16 VITALS
OXYGEN SATURATION: 98 % | RESPIRATION RATE: 16 BRPM | TEMPERATURE: 97 F | DIASTOLIC BLOOD PRESSURE: 72 MMHG | SYSTOLIC BLOOD PRESSURE: 142 MMHG | HEIGHT: 63 IN | BODY MASS INDEX: 25.69 KG/M2 | HEART RATE: 58 BPM | WEIGHT: 145 LBS

## 2024-02-16 DIAGNOSIS — G89.29 CHRONIC PAIN: ICD-10-CM

## 2024-02-16 DIAGNOSIS — M25.561 CHRONIC PAIN OF RIGHT KNEE: ICD-10-CM

## 2024-02-16 DIAGNOSIS — M17.11 PRIMARY OSTEOARTHRITIS OF RIGHT KNEE: Primary | ICD-10-CM

## 2024-02-16 DIAGNOSIS — G89.29 CHRONIC PAIN OF RIGHT KNEE: ICD-10-CM

## 2024-02-16 PROCEDURE — 64454 NJX AA&/STRD GNCLR NRV BRNCH: CPT | Mod: RT,,, | Performed by: ANESTHESIOLOGY

## 2024-02-16 PROCEDURE — 64454 NJX AA&/STRD GNCLR NRV BRNCH: CPT | Mod: RT | Performed by: ANESTHESIOLOGY

## 2024-02-16 PROCEDURE — 25000003 PHARM REV CODE 250: Performed by: ANESTHESIOLOGY

## 2024-02-16 PROCEDURE — 63600175 PHARM REV CODE 636 W HCPCS: Mod: JZ,JG | Performed by: ANESTHESIOLOGY

## 2024-02-16 RX ORDER — LIDOCAINE HYDROCHLORIDE 20 MG/ML
INJECTION, SOLUTION INFILTRATION; PERINEURAL
Status: DISCONTINUED | OUTPATIENT
Start: 2024-02-16 | End: 2024-02-16 | Stop reason: HOSPADM

## 2024-02-16 RX ORDER — BUPIVACAINE HYDROCHLORIDE 2.5 MG/ML
INJECTION, SOLUTION EPIDURAL; INFILTRATION; INTRACAUDAL
Status: DISCONTINUED | OUTPATIENT
Start: 2024-02-16 | End: 2024-02-16 | Stop reason: HOSPADM

## 2024-02-16 RX ORDER — SODIUM CHLORIDE 9 MG/ML
INJECTION, SOLUTION INTRAVENOUS CONTINUOUS
Status: DISCONTINUED | OUTPATIENT
Start: 2024-02-16 | End: 2024-02-16 | Stop reason: HOSPADM

## 2024-02-16 NOTE — DISCHARGE SUMMARY
Discharge Note  Short Stay      SUMMARY     Admit Date: 2/16/2024    Attending Physician: Oneil Díaz      Discharge Physician: Oneil Díaz      Discharge Date: 2/16/2024 8:24 AM    Procedure(s) (LRB):  BLOCK, NERVE RIGHT GENICULAR (Right)    Final Diagnosis: Chronic pain of right knee [M25.561, G89.29]    Disposition: Home or self care    Patient Instructions:   Current Discharge Medication List        CONTINUE these medications which have NOT CHANGED    Details   ALPRAZolam (XANAX) 1 MG tablet Take 1 mg by mouth 3 (three) times daily.      amLODIPine (NORVASC) 5 MG tablet Take 1 tablet (5 mg total) by mouth once daily.  Qty: 90 tablet, Refills: 3    Comments: .      atorvastatin (LIPITOR) 10 MG tablet Take 1 tablet (10 mg total) by mouth once daily.  Qty: 90 tablet, Refills: 3      conjugated estrogens (PREMARIN) vaginal cream Place 0.5 g vaginally twice a week. Place a pea-sized amount in vagina every night for 4 weeks, then use 2-3 nights a week  Qty: 1 applicator, Refills: 1      EScitalopram oxalate (LEXAPRO) 5 MG Tab Take 5 mg by mouth.      HYDROcodone-acetaminophen (NORCO) 5-325 mg per tablet Take 1 tablet by mouth every 12 (twelve) hours as needed for Pain.  Qty: 60 tablet, Refills: 0    Comments: Quantity prescribed more than 7 day supply? Yes, quantity medically necessary      levothyroxine (SYNTHROID) 125 MCG tablet TAKE 1 TABLET BY MOUTH EVERY MORNING  Qty: 90 tablet, Refills: 2      liothyronine (CYTOMEL) 5 MCG Tab Take 1 tablet (5 mcg total) by mouth once daily.  Qty: 90 tablet, Refills: 3      promethazine (PHENERGAN) 12.5 MG Tab TAKE 1 TABLET(12.5 MG) BY MOUTH EVERY 6 HOURS AS NEEDED FOR NAUSEA  Qty: 30 tablet, Refills: 2      RESTASIS 0.05 % ophthalmic emulsion INT 1 GTT IN OU BID      scopolamine (TRANSDERM-SCOP) 1.3-1.5 mg (1 mg over 3 days) Place 1 patch onto the skin every 72 hours.  Qty: 6 patch, Refills: 1      sumatriptan (IMITREX) 50 MG tablet 1 tab po at start of headache.  Repeat in  2 h prn  Qty: 36 tablet, Refills: 3      suvorexant (BELSOMRA) 15 mg Tab Take one tablet by mouth every night at bedtime  Qty: 30 tablet, Refills: 5    Associated Diagnoses: Primary insomnia      tobramycin sulfate 0.3% (TOBREX) 0.3 % ophthalmic solution 1-2 drops topically twice daily to affected toe(s).  Qty: 5 mL, Refills: 3      traZODone (DESYREL) 100 MG tablet 1-2 tabs po q hs for sleep  Qty: 180 tablet, Refills: 3      valsartan (DIOVAN) 320 MG tablet Take 1 tablet (320 mg total) by mouth once daily.  Qty: 90 tablet, Refills: 3    Comments: .                 Discharge Diagnosis: Chronic pain of right knee [M25.561, G89.29]  Condition on Discharge: Stable with no complications to procedure   Diet on Discharge: Same as before.  Activity: as per instruction sheet.  Discharge to: Home with a responsible adult.  Follow up: 2-4 weeks       Please call my office or pager at [phone] if experienced any weakness or loss of sensation, fever > 101.5, pain uncontrolled with oral medications, persistent nausea/vomiting/or diarrhea, redness or drainage from the incisions, or any other worrisome concerns. If physician on call was not reached or could not communicate with our office for any reason please go to the nearest emergency department

## 2024-02-16 NOTE — OP NOTE
Diagnostic Genicular Nerve Block under Fluoroscopic Guidance    The procedure, risks, benefits, and options were discussed with the patient. There are no contraindications to the procedure. The patent expressed understanding and agreed to the procedure. Informed written consent was obtained prior to the start of the procedure and can be found in the patient's chart.    PATIENT NAME: Tiarra Norton   MRN: [id number]     DATE OF PROCEDURE: 02/16/2024    PROCEDURE: Diagnostic Right Genicular Nerve Block under Fluoroscopic Guidance    PRE-OP DIAGNOSIS: Chronic pain of right knee [M25.561, G89.29]    POST-OP DIAGNOSIS: Same    PHYSICIAN: Shoaib Aguirre MD    ASSISTANTS: Ngoc Mena, [facility] Fellow       MEDICATIONS INJECTED: Bupivacine 0.25%     LOCAL ANESTHETIC INJECTED: Xylocaine 2%     SEDATION: None    ESTIMATED BLOOD LOSS: None    COMPLICATIONS: None    INTERVAL HISTORY: Patient has clinical findings of chronic knee pain that is not relieved by other therapies.     TECHNIQUE: Time-out was performed to identify the patient and procedure to be performed. With the patient laying in a supine position, the surgical area was prepped and draped in the usual sterile fashion using ChloraPrep and a fenestrated drape. Three target sites including the superior lateral genicular nerve where the lateral femoral shaft meets the epicondyle, the superior medial genicular nerve where the medial femoral shaft meets the epicondyle, and the inferior medial genicular nerve where the medial tibial shaft meets the epicondyle, were determined under fluoroscopy guidance. Skin anesthesia was achieved by injecting Lidocaine 2% over the injection sites. A 25 gauge, 3.5 inch spinal quinke needle was then advanced under fluoroscopy in the AP and lateral views into the positions of the geniculate nerves at each site. Once the needle tip position was confirmed on fluoroscopy, negative pressure was applied to confirm no intravascular  placement.  1 mL of the anesthetic listed above was then slowly injected at each site. The needles were removed and bleeding was nil. A sterile dressing was applied. No specimens collected. The patient tolerated the procedure well.     The patient was monitored after the procedure in the recovery area. They were given post-procedure and discharge instructions to follow at home. The patient was discharged in a stable condition.    Oneil íDaz MD     I reviewed and edited the fellow's note. I conducted my own interview and physical examination. I agree with the findings. I was present and supervising all critical portions of the procedure.    Shoaib Aguirre MD

## 2024-02-16 NOTE — DISCHARGE INSTRUCTIONS

## 2024-02-18 ENCOUNTER — PATIENT MESSAGE (OUTPATIENT)
Dept: PAIN MEDICINE | Facility: CLINIC | Age: 69
End: 2024-02-18
Payer: MEDICARE

## 2024-02-18 DIAGNOSIS — M17.11 PRIMARY OSTEOARTHRITIS OF RIGHT KNEE: Primary | ICD-10-CM

## 2024-02-22 ENCOUNTER — PATIENT MESSAGE (OUTPATIENT)
Dept: PAIN MEDICINE | Facility: CLINIC | Age: 69
End: 2024-02-22
Payer: MEDICARE

## 2024-02-22 ENCOUNTER — PATIENT MESSAGE (OUTPATIENT)
Dept: PAIN MEDICINE | Facility: OTHER | Age: 69
End: 2024-02-22
Payer: MEDICARE

## 2024-02-22 DIAGNOSIS — M17.11 PRIMARY OSTEOARTHRITIS OF RIGHT KNEE: Primary | ICD-10-CM

## 2024-02-23 ENCOUNTER — CLINICAL SUPPORT (OUTPATIENT)
Dept: REHABILITATION | Facility: OTHER | Age: 69
End: 2024-02-23
Payer: MEDICARE

## 2024-02-23 DIAGNOSIS — M25.661 DECREASED RANGE OF MOTION (ROM) OF RIGHT KNEE: Primary | ICD-10-CM

## 2024-02-23 DIAGNOSIS — M62.81 WEAKNESS OF TRUNK MUSCULATURE: ICD-10-CM

## 2024-02-23 DIAGNOSIS — R29.898 DECREASED STRENGTH OF TRUNK AND BACK: ICD-10-CM

## 2024-02-23 DIAGNOSIS — R29.898 WEAKNESS OF RIGHT LOWER EXTREMITY: ICD-10-CM

## 2024-02-23 DIAGNOSIS — M25.69 DECREASED RANGE OF MOTION OF TRUNK AND BACK: ICD-10-CM

## 2024-02-23 PROCEDURE — 97110 THERAPEUTIC EXERCISES: CPT

## 2024-02-23 PROCEDURE — 97112 NEUROMUSCULAR REEDUCATION: CPT

## 2024-02-23 PROCEDURE — 97140 MANUAL THERAPY 1/> REGIONS: CPT

## 2024-02-23 NOTE — PROGRESS NOTES
[facility]   Physical Therapy Treatment Note      Name: Tiarra Norton  Clinic Number: [id number]    Therapy Diagnosis:   Encounter Diagnoses   Name Primary?    Decreased range of motion (ROM) of right knee Yes    Weakness of right lower extremity     Decreased range of motion of trunk and back     Decreased strength of trunk and back     Weakness of trunk musculature      Physician: Virgil Scott MD    Visit Date: 2/23/2024    Physician Orders: PT Eval and Treat   Medical Diagnosis from Referral:   Z96.651 (ICD-10-CM) - Status post total right knee replacement   M22.2X1 (ICD-10-CM) - Patellofemoral pain syndrome of right knee   M70.50 (ICD-10-CM) - Pes anserine bursitis      Evaluation Date: 12/21/2023  Authorization Period Expiration: 9/21/2024  Plan of Care Expiration: 3/21/2024  Visit # / Visits authorized: 4/20   Progress Note Due: 3/1/2024  FOTO: 1/ 1     Precautions: hx of TKA and menisectomy, spinal cord stimulator, scoliosis     Time In: 1:30 pm  Time Out: 2:30 pm   Total Billable Time: 55 minutes  Total Appointment Time (timed & untimed codes): 60 minutes    PTA Visit #: 0/5       Subjective     Patient reports: she felt better after her most recent procedure with pain management, they plan to do a permanent nerve block in a few weeks    She was compliant with home exercise program.  Response to previous treatment: no adverse effects   Functional change: none yet    Pain: 5/10  Location: bilateral low back and R knee     Objective      Objective Measures updated at progress report unless specified.     Lumbar Range of Motion:     %   Flexion 90      Extension 50      Left Side Bending 50   Right Side Bending 50   Left rotation    60   Right Rotation    70    *= pain         GAIT DEVIATIONS: Tiarra displays decreased weight shift;antalgic gait;decreased step length;occasional unsteady gait     Range of Motion:   Knee Left active Left Passive   Flexion 133 140   Extension 3 5  "     Knee Right active Right Passive   Flexion 121 124   Extension 1 3         Lower Extremity Strength     Right LE   Left LE     Hip flexion: 4-/5 Hip flexion: 4/5   Knee extension: 4/5 Knee extension: 4+/5   Knee flexion: 4-/5 Knee flexion: 4+/5   Hip IR: 4-/5 Hip IR: 4/5   Hip ER: 4-/5 Hip ER: 4/5   Hip extension:  3+/5 Hip extension: 3+/5   Hip abduction: 3+/5 Hip abduction: 4-/5   Hip adduction: 3+/5 Hip adduction 4-/5   Ankle dorsiflexion: 5/5 Ankle dorsiflexion: 5/5   Ankle plantarflexion: 5/5 Ankle plantarflexion: 5/5        Treatment     Tiarra received the treatments listed below:      therapeutic exercises to develop strength, endurance, ROM, flexibility, posture, and core stabilization for 20 minutes including:    Recumbent bike lv. 4 x 5 min for endurance and knee ROM  PPT 15x5"  Bridge 15x3"  EIL x10  DKTC 15x5"'  Open books x15 ea  EIS 2x10  Hip flexor stretch 2x1' ea    manual therapy techniques: - were applied to the: R knee for 10 minutes, including:    FDN via 50 mm needles x 2 points to lateral quad, 2 points to medial quad and x 2 points to distal quad with 2 hz estim x 10 minutes with good tolerance     neuromuscular re-education activities to improve: Balance, Coordination, Kinesthetic, Proprioception, and Posture for 25 minutes. The following activities were included:    Sit to stands GTB around knees 2x10  Side stepping RTB 2x12 steps  Monster walks rtb 2x12 steps  Single leg balance 3x30" R only  Step ups fwd/lateral 2x10x3" ea R only  Medx trunk extension 50# x20 increase next visit.      therapeutic activities to improve functional performance for 0  minutes, including:      cold pack for 5 minutes to R knee and low back .    Patient Education and Home Exercises       Education provided:   - HEP, POC    Written Home Exercises Provided: Patient instructed to cont prior HEP. Exercises were reviewed and Tiarra was able to demonstrate them prior to the end of the session.  Tiarra demonstrated " good  understanding of the education provided. See Electronic Medical Record under Patient Instructions for exercises provided during therapy sessions    Assessment     Tiarra presents today with reports of improvement in symptoms after last visit, but overall they remain unchanged at baseline. Dry needling again applied with pt reporting improvement in symptoms after application. Progressed dynamic activities with good tolerance and no adverse effects. Assess response to needling at next follow-up.      Tiarra Is progressing well towards her goals.   Patient prognosis is Good.     Patient will continue to benefit from skilled outpatient physical therapy to address the deficits listed in the problem list box on initial evaluation, provide pt/family education and to maximize pt's level of independence in the home and community environment.     Patient's spiritual, cultural and educational needs considered and pt agreeable to plan of care and goals.     Anticipated barriers to physical therapy: chronicity of condition, multiple treatment areas, hx of R TKA and menisectomy     GOALS: Short Term Goals:  4 weeks  1. Report decreased in pain at worse less than  <   / =  4  /10  to increase tolerance for functional activities.On going  2. Pt to increase B popliteal angle by greater than > or = 5 degrees in order to improve flexibility and posture. On going  3. Increased R LE MMT 1/2  to increase tolerance for ADL and work activities.On going  4. Pt to increase B Ely's Test by greater than  > or = 5degrees in order to improve flexibility and posture. On going  5. Pt to tolerate HEP to improve ROM and independence with ADL's.On going  6. Pt to improve range of motion by 25% to allow for improved functional mobility to allow for improvement in IADLs. On going     Long Term Goals: 8 weeks  1. Report decreased in pain at worse less than  <   / =  2  /10  to increase tolerance for functional mobility.  On going  2 .Pt to increase  B popliteal angle by greater than > or = 10 degrees in order to improve flexibility and posture. On going  3. Increased R LE MMT 1 grade to increase tolerance for ADL and work activities.On going  4. Pt will report at 51% or better score on FOTO Lumbar spine survey  to demonstrate decrease in disability and improvement in back pain.On going  5. Pt to be Independent with HEP to improve ROM and independence with ADL's. On going  6. Pt to increase B Ely's Test by greater than > or = 10 degrees in order to improve flexibility and posture. On going  7. Pt to demonstrate negative Bridge Test in order to show improved core strength for lumbar stabilization. On going  8. Pt to improve range of motion by 75% to allow for improved functional mobility to allow for improvement in IADLs. On going        Plan   Plan of care Certification: 12/21/2023 to 3/21/2023.     Outpatient Physical Therapy 2 times weekly for 12 weeks to include the following interventions: Cervical/Lumbar Traction, Gait Training, Manual Therapy, Moist Heat/ Ice, Neuromuscular Re-ed, Patient Education, Self Care, Therapeutic Activities, and Therapeutic Exercise. Dry needtanisha Anaya, PT   2/23/2024

## 2024-02-26 ENCOUNTER — HOSPITAL ENCOUNTER (OUTPATIENT)
Dept: RADIOLOGY | Facility: OTHER | Age: 69
Discharge: HOME | End: 2024-02-26
Attending: INTERNAL MEDICINE
Payer: MEDICARE

## 2024-02-26 ENCOUNTER — CLINICAL SUPPORT (OUTPATIENT)
Dept: REHABILITATION | Facility: OTHER | Age: 69
End: 2024-02-26
Payer: MEDICARE

## 2024-02-26 DIAGNOSIS — M25.661 DECREASED RANGE OF MOTION (ROM) OF RIGHT KNEE: Primary | ICD-10-CM

## 2024-02-26 DIAGNOSIS — R29.898 DECREASED STRENGTH OF TRUNK AND BACK: ICD-10-CM

## 2024-02-26 DIAGNOSIS — Z12.31 ENCOUNTER FOR SCREENING MAMMOGRAM FOR BREAST CANCER: ICD-10-CM

## 2024-02-26 DIAGNOSIS — M25.69 DECREASED RANGE OF MOTION OF TRUNK AND BACK: ICD-10-CM

## 2024-02-26 DIAGNOSIS — R29.898 WEAKNESS OF RIGHT LOWER EXTREMITY: ICD-10-CM

## 2024-02-26 DIAGNOSIS — M62.81 WEAKNESS OF TRUNK MUSCULATURE: ICD-10-CM

## 2024-02-26 PROCEDURE — 97140 MANUAL THERAPY 1/> REGIONS: CPT

## 2024-02-26 PROCEDURE — 77067 SCR MAMMO BI INCL CAD: CPT | Mod: TC

## 2024-02-26 PROCEDURE — 77067 SCR MAMMO BI INCL CAD: CPT | Mod: 26,,, | Performed by: RADIOLOGY

## 2024-02-26 PROCEDURE — 77063 BREAST TOMOSYNTHESIS BI: CPT | Mod: 26,,, | Performed by: RADIOLOGY

## 2024-02-26 PROCEDURE — 97112 NEUROMUSCULAR REEDUCATION: CPT

## 2024-02-26 PROCEDURE — 97110 THERAPEUTIC EXERCISES: CPT

## 2024-02-27 ENCOUNTER — OFFICE VISIT (OUTPATIENT)
Dept: INTERNAL MEDICINE | Facility: CLINIC | Age: 69
End: 2024-02-27
Payer: MEDICARE

## 2024-02-27 ENCOUNTER — PATIENT MESSAGE (OUTPATIENT)
Dept: SLEEP MEDICINE | Facility: CLINIC | Age: 69
End: 2024-02-27
Payer: MEDICARE

## 2024-02-27 VITALS
HEART RATE: 63 BPM | DIASTOLIC BLOOD PRESSURE: 72 MMHG | BODY MASS INDEX: 25.39 KG/M2 | HEIGHT: 63 IN | OXYGEN SATURATION: 97 % | WEIGHT: 143.31 LBS | SYSTOLIC BLOOD PRESSURE: 130 MMHG

## 2024-02-27 DIAGNOSIS — F51.01 PRIMARY INSOMNIA: ICD-10-CM

## 2024-02-27 DIAGNOSIS — I10 HYPERTENSION, UNSPECIFIED TYPE: Primary | ICD-10-CM

## 2024-02-27 DIAGNOSIS — F32.A CHRONIC DEPRESSION: ICD-10-CM

## 2024-02-27 DIAGNOSIS — K58.0 IRRITABLE BOWEL SYNDROME WITH DIARRHEA: ICD-10-CM

## 2024-02-27 DIAGNOSIS — E03.8 OTHER SPECIFIED HYPOTHYROIDISM: ICD-10-CM

## 2024-02-27 DIAGNOSIS — Z00.00 ANNUAL PHYSICAL EXAM: ICD-10-CM

## 2024-02-27 DIAGNOSIS — E78.5 HYPERLIPIDEMIA, UNSPECIFIED HYPERLIPIDEMIA TYPE: ICD-10-CM

## 2024-02-27 DIAGNOSIS — F43.9 SITUATIONAL STRESS: ICD-10-CM

## 2024-02-27 DIAGNOSIS — I70.0 AORTIC ATHEROSCLEROSIS: ICD-10-CM

## 2024-02-27 DIAGNOSIS — G89.4 CHRONIC PAIN SYNDROME: ICD-10-CM

## 2024-02-27 DIAGNOSIS — F41.9 ANXIETY: ICD-10-CM

## 2024-02-27 DIAGNOSIS — G47.00 INSOMNIA, UNSPECIFIED TYPE: ICD-10-CM

## 2024-02-27 PROCEDURE — 99213 OFFICE O/P EST LOW 20 MIN: CPT | Mod: PBBFAC | Performed by: INTERNAL MEDICINE

## 2024-02-27 PROCEDURE — 99999 PR PBB SHADOW E&M-EST. PATIENT-LVL III: CPT | Mod: PBBFAC,,, | Performed by: INTERNAL MEDICINE

## 2024-02-27 PROCEDURE — 99214 OFFICE O/P EST MOD 30 MIN: CPT | Mod: S$PBB,,, | Performed by: INTERNAL MEDICINE

## 2024-02-27 RX ORDER — SUVOREXANT 20 MG/1
20 TABLET, FILM COATED ORAL NIGHTLY
Qty: 30 TABLET | Refills: 5 | Status: SHIPPED | OUTPATIENT
Start: 2024-02-27 | End: 2024-05-13

## 2024-02-27 NOTE — PROGRESS NOTES
Subjective     Patient ID: Tiarra Norton is a 69 y.o. female.    Chief Complaint: Annual Exam      HPI  Still with considerable knee pain.  TKR in May.    Geniculate ablation to be attempted.    Lumbar stimulator helped DDD, but pocket pain is present.       Stimulator was moved a bit, but pain persists.       She plans on starting PT at [facility].    We discussed Functional Rehab program, but excluded due to spinal stimulator trial, for another year..    Post viral  (flu)cough is improveing.         Belsomra dose to be increased by sleep med.                Not working at current dose.    Has not been able to find GI doc with expertise in IBS-diarrhea.        Review of Systems   Constitutional:  Negative for fever and unexpected weight change.   HENT:  Negative for nasal congestion and postnasal drip.    Respiratory:  Negative for chest tightness, shortness of breath and wheezing.    Cardiovascular:  Negative for chest pain and leg swelling.   Gastrointestinal:  Negative for abdominal pain, anal bleeding, constipation, diarrhea, nausea and vomiting.   Genitourinary:  Negative for dysuria and urgency.   Integumentary:  Negative for rash.   Neurological:  Negative for headaches.   Psychiatric/Behavioral:  Negative for dysphoric mood and sleep disturbance. The patient is not nervous/anxious.           Objective     Physical Exam  Constitutional:       General: She is not in acute distress.     Appearance: She is well-developed.   HENT:      Head: Normocephalic and atraumatic.      Right Ear: External ear normal.      Left Ear: External ear normal.      Nose: Nose normal.   Eyes:      General: No scleral icterus.        Right eye: No discharge.         Left eye: No discharge.      Conjunctiva/sclera: Conjunctivae normal.      Pupils: Pupils are equal, round, and reactive to light.   Neck:      Thyroid: No thyromegaly.      Vascular: No JVD.   Cardiovascular:      Rate and Rhythm: Normal rate and regular rhythm.       Heart sounds: Normal heart sounds. No murmur heard.     No gallop.   Pulmonary:      Effort: Pulmonary effort is normal. No respiratory distress.      Breath sounds: Normal breath sounds. No wheezing or rales.   Abdominal:      General: Bowel sounds are normal. There is no distension.      Palpations: Abdomen is soft. There is no mass.      Tenderness: There is no abdominal tenderness. There is no guarding or rebound.   Musculoskeletal:         General: No tenderness. Normal range of motion.      Cervical back: Normal range of motion and neck supple.   Lymphadenopathy:      Cervical: No cervical adenopathy.   Skin:     General: Skin is warm and dry.      Findings: No rash.   Neurological:      Mental Status: She is alert and oriented to person, place, and time.      Cranial Nerves: No cranial nerve deficit.      Coordination: Coordination normal.   Psychiatric:         Behavior: Behavior normal.         Thought Content: Thought content normal.         Judgment: Judgment normal.            Assessment and Plan     1. Hypertension, unspecified type  -     Comprehensive Metabolic Panel; Future; Expected date: 02/27/2024  -     CBC Without Differential; Future; Expected date: 02/27/2024    2. Aortic atherosclerosis    3. Irritable bowel syndrome with diarrhea  -     Comprehensive Metabolic Panel; Future; Expected date: 02/27/2024    4. Anxiety    5. Annual physical exam    6. Hyperlipidemia, unspecified hyperlipidemia type  -     Lipid Panel; Future; Expected date: 02/27/2024    7. Other specified hypothyroidism  -     TSH; Future; Expected date: 02/27/2024    8. Chronic depression    9. Chronic pain syndrome    10. Insomnia, unspecified type    11. Situational stress        Long talk  Stress reduction.  Options for care as per pt instructions         No follow-ups on file.

## 2024-02-27 NOTE — PATIENT INSTRUCTIONS
Dr Kade Bhatti or Les Rivas for Irritable Bowel.    Physical med and Rehab for chronic back pain :     [facility].    Consider changing lexapro to cymbalta, as the latter may help with pain.

## 2024-02-27 NOTE — PROGRESS NOTES
[facility]   Physical Therapy Treatment Note      Name: Tiarra Norton  Clinic Number: [id number]    Therapy Diagnosis:   Encounter Diagnoses   Name Primary?    Decreased range of motion (ROM) of right knee Yes    Weakness of right lower extremity     Decreased range of motion of trunk and back     Decreased strength of trunk and back     Weakness of trunk musculature      Physician: Virgil Scott MD    Visit Date: 2/26/2024    Physician Orders: PT Eval and Treat   Medical Diagnosis from Referral:   Z96.651 (ICD-10-CM) - Status post total right knee replacement   M22.2X1 (ICD-10-CM) - Patellofemoral pain syndrome of right knee   M70.50 (ICD-10-CM) - Pes anserine bursitis      Evaluation Date: 12/21/2023  Authorization Period Expiration: 9/21/2024  Plan of Care Expiration: 3/21/2024  Visit # / Visits authorized: 4/20   Progress Note Due: 3/1/2024  FOTO: 1/ 1     Precautions: hx of TKA and menisectomy, spinal cord stimulator, scoliosis     Time In: 1:30 pm  Time Out: 2:30 pm   Total Billable Time: 55 minutes  Total Appointment Time (timed & untimed codes): 60 minutes    PTA Visit #: 0/5       Subjective     Patient reports: she felt better after her most recent procedure with pain management, they plan to do a permanent nerve block in a few weeks    She was compliant with home exercise program.  Response to previous treatment: no adverse effects   Functional change: none yet    Pain: 5/10  Location: bilateral low back and R knee     Objective      Objective Measures updated at progress report unless specified.     Lumbar Range of Motion:     %   Flexion 90      Extension 50      Left Side Bending 50   Right Side Bending 50   Left rotation    60   Right Rotation    70    *= pain         GAIT DEVIATIONS: Tiarra displays decreased weight shift;antalgic gait;decreased step length;occasional unsteady gait     Range of Motion:   Knee Left active Left Passive   Flexion 133 140   Extension 3  "5      Knee Right active Right Passive   Flexion 121 124   Extension 1 3         Lower Extremity Strength     Right LE   Left LE     Hip flexion: 4-/5 Hip flexion: 4/5   Knee extension: 4/5 Knee extension: 4+/5   Knee flexion: 4-/5 Knee flexion: 4+/5   Hip IR: 4-/5 Hip IR: 4/5   Hip ER: 4-/5 Hip ER: 4/5   Hip extension:  3+/5 Hip extension: 3+/5   Hip abduction: 3+/5 Hip abduction: 4-/5   Hip adduction: 3+/5 Hip adduction 4-/5   Ankle dorsiflexion: 5/5 Ankle dorsiflexion: 5/5   Ankle plantarflexion: 5/5 Ankle plantarflexion: 5/5        Treatment     Tiarra received the treatments listed below:      therapeutic exercises to develop strength, endurance, ROM, flexibility, posture, and core stabilization for 20 minutes including:    Recumbent bike lv. 4 x 5 min for endurance and knee ROM  PPT 15x5"  Bridge 15x3"  EIL x10  DKTC 15x5"'  Open books x15 ea  EIS 2x10  Hip flexor stretch 2x1' ea    manual therapy techniques: - were applied to the: R knee for 10 minutes, including:    FDN via 50 mm needles x 2 points to lateral quad, 2 points to medial quad and x 2 points to distal quad with 2 hz estim x 10 minutes with good tolerance     neuromuscular re-education activities to improve: Balance, Coordination, Kinesthetic, Proprioception, and Posture for 25 minutes. The following activities were included:    Sit to stands GTB around knees 2x10  Side stepping RTB 2x12 steps  Monster walks rtb 2x12 steps  Single leg balance 3x30" R only  Step ups fwd/lateral 2x10x3" ea R only  Medx trunk extension 50# x20 increase next visit.      therapeutic activities to improve functional performance for 0  minutes, including:      cold pack for 5 minutes to R knee and low back .    Patient Education and Home Exercises       Education provided:   - HEP, POC    Written Home Exercises Provided: Patient instructed to cont prior HEP. Exercises were reviewed and Tiarra was able to demonstrate them prior to the end of the session.  Tiarra demonstrated " good  understanding of the education provided. See Electronic Medical Record under Patient Instructions for exercises provided during therapy sessions    Assessment     Tiarra presents today with continued complaints of pain. Relief of pain after dry needling last visit for a few hours, but continues to have pain with all mobility. Dry needling again applied with pt reproting no pain after application. Progressed strengthening and mobility exercises with good tolerance. Continue to progress as tolerated.       Tiarra Is progressing well towards her goals.   Patient prognosis is Good.     Patient will continue to benefit from skilled outpatient physical therapy to address the deficits listed in the problem list box on initial evaluation, provide pt/family education and to maximize pt's level of independence in the home and community environment.     Patient's spiritual, cultural and educational needs considered and pt agreeable to plan of care and goals.     Anticipated barriers to physical therapy: chronicity of condition, multiple treatment areas, hx of R TKA and menisectomy     GOALS: Short Term Goals:  4 weeks  1. Report decreased in pain at worse less than  <   / =  4  /10  to increase tolerance for functional activities.On going  2. Pt to increase B popliteal angle by greater than > or = 5 degrees in order to improve flexibility and posture. On going  3. Increased R LE MMT 1/2  to increase tolerance for ADL and work activities.On going  4. Pt to increase B Ely's Test by greater than  > or = 5degrees in order to improve flexibility and posture. On going  5. Pt to tolerate HEP to improve ROM and independence with ADL's.On going  6. Pt to improve range of motion by 25% to allow for improved functional mobility to allow for improvement in IADLs. On going     Long Term Goals: 8 weeks  1. Report decreased in pain at worse less than  <   / =  2  /10  to increase tolerance for functional mobility.  On going  2 .Pt to  increase B popliteal angle by greater than > or = 10 degrees in order to improve flexibility and posture. On going  3. Increased R LE MMT 1 grade to increase tolerance for ADL and work activities.On going  4. Pt will report at 51% or better score on FOTO Lumbar spine survey  to demonstrate decrease in disability and improvement in back pain.On going  5. Pt to be Independent with HEP to improve ROM and independence with ADL's. On going  6. Pt to increase B Ely's Test by greater than > or = 10 degrees in order to improve flexibility and posture. On going  7. Pt to demonstrate negative Bridge Test in order to show improved core strength for lumbar stabilization. On going  8. Pt to improve range of motion by 75% to allow for improved functional mobility to allow for improvement in IADLs. On going        Plan   Plan of care Certification: 12/21/2023 to 3/21/2023.     Outpatient Physical Therapy 2 times weekly for 12 weeks to include the following interventions: Cervical/Lumbar Traction, Gait Training, Manual Therapy, Moist Heat/ Ice, Neuromuscular Re-ed, Patient Education, Self Care, Therapeutic Activities, and Therapeutic Exercise. Marybeth Anaya, PT   2/27/2024

## 2024-02-28 ENCOUNTER — CLINICAL SUPPORT (OUTPATIENT)
Dept: REHABILITATION | Facility: OTHER | Age: 69
End: 2024-02-28
Payer: MEDICARE

## 2024-02-28 DIAGNOSIS — M25.661 DECREASED RANGE OF MOTION (ROM) OF RIGHT KNEE: Primary | ICD-10-CM

## 2024-02-28 DIAGNOSIS — R29.898 DECREASED STRENGTH OF TRUNK AND BACK: ICD-10-CM

## 2024-02-28 DIAGNOSIS — M62.81 WEAKNESS OF TRUNK MUSCULATURE: ICD-10-CM

## 2024-02-28 DIAGNOSIS — R29.898 WEAKNESS OF RIGHT LOWER EXTREMITY: ICD-10-CM

## 2024-02-28 DIAGNOSIS — M25.69 DECREASED RANGE OF MOTION OF TRUNK AND BACK: ICD-10-CM

## 2024-02-28 PROCEDURE — 97140 MANUAL THERAPY 1/> REGIONS: CPT

## 2024-02-28 PROCEDURE — 97112 NEUROMUSCULAR REEDUCATION: CPT

## 2024-02-28 PROCEDURE — 97110 THERAPEUTIC EXERCISES: CPT

## 2024-02-28 NOTE — PROGRESS NOTES
As instructed, I increased my dosage by 1/2 tablet last night but still only slept 4.5 hours.  Im willing to try a 20 mg dose to see whether its effective on a longer term basis.  Please call in a prescription to the [facility] pharmacy; my remaining supply will only last a 2  more nights at the increased dosage.     Thanks.

## 2024-02-28 NOTE — PROGRESS NOTES
[facility]   Physical Therapy Treatment Note      Name: Tiarra Norton  Clinic Number: [id number]    Therapy Diagnosis:   Encounter Diagnoses   Name Primary?    Decreased range of motion (ROM) of right knee Yes    Weakness of right lower extremity     Decreased range of motion of trunk and back     Decreased strength of trunk and back     Weakness of trunk musculature      Physician: Virgil Scott MD    Visit Date: 2/28/2024    Physician Orders: PT Eval and Treat   Medical Diagnosis from Referral:   Z96.651 (ICD-10-CM) - Status post total right knee replacement   M22.2X1 (ICD-10-CM) - Patellofemoral pain syndrome of right knee   M70.50 (ICD-10-CM) - Pes anserine bursitis      Evaluation Date: 12/21/2023  Authorization Period Expiration: 9/21/2024  Plan of Care Expiration: 3/21/2024  Visit # / Visits authorized: 4/20   Progress Note Due: 3/1/2024  FOTO: 1/ 1  FOTO NEXT VISIT     Precautions: hx of TKA and menisectomy, spinal cord stimulator, scoliosis     Time In: 10:00 am  Time Out: 11:00 am   Total Billable Time: 55 minutes  Total Appointment Time (timed & untimed codes): 60 minutes    PTA Visit #: 0/5       Subjective     Patient reports: she continues to have increased pain and knee and back that feels better after therapy but is aggravated with at home activities. Dealing with a lot of stress due to death of mother in law.     She was compliant with home exercise program.  Response to previous treatment: no adverse effects   Functional change: none yet    Pain: 5/10  Location: bilateral low back and R knee     Objective      Objective Measures updated at progress report unless specified.     Lumbar Range of Motion:     %   Flexion 90      Extension 50      Left Side Bending 50   Right Side Bending 50   Left rotation    60   Right Rotation    70    *= pain         GAIT DEVIATIONS: Tiarra displays decreased weight shift;antalgic gait;decreased step length;occasional unsteady  "gait     Range of Motion:   Knee Left active Left Passive   Flexion 133 140   Extension 3 5      Knee Right active Right Passive   Flexion 121 124   Extension 1 3         Lower Extremity Strength     Right LE   Left LE     Hip flexion: 4-/5 Hip flexion: 4/5   Knee extension: 4/5 Knee extension: 4+/5   Knee flexion: 4-/5 Knee flexion: 4+/5   Hip IR: 4-/5 Hip IR: 4/5   Hip ER: 4-/5 Hip ER: 4/5   Hip extension:  3+/5 Hip extension: 3+/5   Hip abduction: 3+/5 Hip abduction: 4-/5   Hip adduction: 3+/5 Hip adduction 4-/5   Ankle dorsiflexion: 5/5 Ankle dorsiflexion: 5/5   Ankle plantarflexion: 5/5 Ankle plantarflexion: 5/5        Treatment     Tiarra received the treatments listed below:      therapeutic exercises to develop strength, endurance, ROM, flexibility, posture, and core stabilization for 20 minutes including:    Recumbent bike lv. 4 x 5 min for endurance and knee ROM  PPT 15x5"  Bridge 15x3"  EIL x10  DKTC 15x5"'  Open books x15 ea  EIS 2x10  Hip flexor stretch 2x1' ea    manual therapy techniques: - were applied to the: R knee for 10 minutes, including:    FDN via 50 mm needles x 2 points to lateral quad, 2 points to medial quad and x 2 points to distal quad with 2 hz estim x 10 minutes with good tolerance     neuromuscular re-education activities to improve: Balance, Coordination, Kinesthetic, Proprioception, and Posture for 25 minutes. The following activities were included:    Sit to stands GTB around knees 2x10  Side stepping RTB 2x12 steps  Monster walks rtb 2x12 steps  Single leg balance 3x30" R only  Step ups fwd/lateral 2x10x3" ea R only  Medx trunk extension 60# x20 increase next visit.  Medx trunk rotation 24# x20, increase next visit      therapeutic activities to improve functional performance for 0  minutes, including:      cold pack for 5 minutes to R knee and low back .    Patient Education and Home Exercises       Education provided:   - HEP, POC    Written Home Exercises Provided: Patient " instructed to cont prior HEP. Exercises were reviewed and Tiarra was able to demonstrate them prior to the end of the session.  Tiarra demonstrated good  understanding of the education provided. See Electronic Medical Record under Patient Instructions for exercises provided during therapy sessions    Assessment     Tiarra presents today with reports of increased pain levels today. Decreased pain reported after dry needling application, but some pain aggravation with strengthening and balance activities. Reassessment and FOTO to be administered next visit.       Tiarra Is progressing well towards her goals.   Patient prognosis is Good.     Patient will continue to benefit from skilled outpatient physical therapy to address the deficits listed in the problem list box on initial evaluation, provide pt/family education and to maximize pt's level of independence in the home and community environment.     Patient's spiritual, cultural and educational needs considered and pt agreeable to plan of care and goals.     Anticipated barriers to physical therapy: chronicity of condition, multiple treatment areas, hx of R TKA and menisectomy     GOALS: Short Term Goals:  4 weeks  1. Report decreased in pain at worse less than  <   / =  4  /10  to increase tolerance for functional activities.On going  2. Pt to increase B popliteal angle by greater than > or = 5 degrees in order to improve flexibility and posture. On going  3. Increased R LE MMT 1/2  to increase tolerance for ADL and work activities.On going  4. Pt to increase B Ely's Test by greater than  > or = 5degrees in order to improve flexibility and posture. On going  5. Pt to tolerate HEP to improve ROM and independence with ADL's.On going  6. Pt to improve range of motion by 25% to allow for improved functional mobility to allow for improvement in IADLs. On going     Long Term Goals: 8 weeks  1. Report decreased in pain at worse less than  <   / =  2  /10  to increase  tolerance for functional mobility.  On going  2 .Pt to increase B popliteal angle by greater than > or = 10 degrees in order to improve flexibility and posture. On going  3. Increased R LE MMT 1 grade to increase tolerance for ADL and work activities.On going  4. Pt will report at 51% or better score on FOTO Lumbar spine survey  to demonstrate decrease in disability and improvement in back pain.On going  5. Pt to be Independent with HEP to improve ROM and independence with ADL's. On going  6. Pt to increase B Ely's Test by greater than > or = 10 degrees in order to improve flexibility and posture. On going  7. Pt to demonstrate negative Bridge Test in order to show improved core strength for lumbar stabilization. On going  8. Pt to improve range of motion by 75% to allow for improved functional mobility to allow for improvement in IADLs. On going        Plan   Plan of care Certification: 12/21/2023 to 3/21/2023.     Outpatient Physical Therapy 2 times weekly for 12 weeks to include the following interventions: Cervical/Lumbar Traction, Gait Training, Manual Therapy, Moist Heat/ Ice, Neuromuscular Re-ed, Patient Education, Self Care, Therapeutic Activities, and Therapeutic Exercise. Dry needling    Zi Anaya, PT   2/28/2024

## 2024-02-29 ENCOUNTER — TELEPHONE (OUTPATIENT)
Dept: GASTROENTEROLOGY | Facility: CLINIC | Age: 69
End: 2024-02-29
Payer: MEDICARE

## 2024-02-29 NOTE — TELEPHONE ENCOUNTER
----- Message from Radha Mauricio sent at 2/28/2024  2:45 PM CST -----  Type:  Appointment Request    Name of Caller:MARION NIETO [[id number]]  When is the first available appointment?No access  Symptoms:NP IBSD  Would the patient rather a call back or a response via [facility]? Call back   Best Call Back Number:[phone]  Additional Information: Patient indicates she would like to schedule an appointment with either provider if possible. Patient indicates she would like to be seen specifically for IBSD. Patient indicates she has been seen by other providers but is having a issues finding a provider that is able to give her more information and relief. Patient would like a call back with further assistance and more information.

## 2024-02-29 NOTE — TELEPHONE ENCOUNTER
Spoke with patient.  Scheduled to see Dr.James Rivas on  3/26 at 2:00 for IBS-D.   Confirmation mailed.   Mara

## 2024-03-05 ENCOUNTER — CLINICAL SUPPORT (OUTPATIENT)
Dept: REHABILITATION | Facility: OTHER | Age: 69
End: 2024-03-05
Payer: MEDICARE

## 2024-03-05 DIAGNOSIS — R29.898 WEAKNESS OF RIGHT LOWER EXTREMITY: ICD-10-CM

## 2024-03-05 DIAGNOSIS — R29.898 DECREASED STRENGTH OF TRUNK AND BACK: ICD-10-CM

## 2024-03-05 DIAGNOSIS — M25.661 DECREASED RANGE OF MOTION (ROM) OF RIGHT KNEE: Primary | ICD-10-CM

## 2024-03-05 DIAGNOSIS — M25.69 DECREASED RANGE OF MOTION OF TRUNK AND BACK: ICD-10-CM

## 2024-03-05 DIAGNOSIS — M62.81 WEAKNESS OF TRUNK MUSCULATURE: ICD-10-CM

## 2024-03-05 PROCEDURE — 97530 THERAPEUTIC ACTIVITIES: CPT

## 2024-03-05 PROCEDURE — 97112 NEUROMUSCULAR REEDUCATION: CPT

## 2024-03-05 NOTE — PROGRESS NOTES
[facility]   Physical Therapy Treatment Note      Name: Tiarra Norton  Clinic Number: [id number]    Therapy Diagnosis:   Encounter Diagnoses   Name Primary?    Decreased range of motion (ROM) of right knee Yes    Weakness of right lower extremity     Decreased range of motion of trunk and back     Decreased strength of trunk and back     Weakness of trunk musculature      Physician: Virgil Scott MD    Visit Date: 3/5/2024    Physician Orders: PT Eval and Treat   Medical Diagnosis from Referral:   Z96.651 (ICD-10-CM) - Status post total right knee replacement   M22.2X1 (ICD-10-CM) - Patellofemoral pain syndrome of right knee   M70.50 (ICD-10-CM) - Pes anserine bursitis      Evaluation Date: 12/21/2023  Authorization Period Expiration: 9/21/2024  Plan of Care Expiration: 3/21/2024  Visit # / Visits authorized: 7/20   Progress Note Due: 4/5/2024  FOTO: 2/3      Precautions: hx of TKA and menisectomy, spinal cord stimulator, scoliosis     Time In: 10:00 am  Time Out: 11:00 am   Total Billable Time: 45 minutes (1:1)  Total Appointment Time (timed & untimed codes): 60 minutes    PTA Visit #: 0/5       Subjective     Patient reports: her knee and back are both feeling better today, she has a procedure on Friday 3/8    She was compliant with home exercise program.  Response to previous treatment: no adverse effects   Functional change: none yet    Pain: 2/10  Location: bilateral low back and R knee     Objective      Objective Measures updated at progress report unless specified.     Lumbar Range of Motion:     %   Flexion 90      Extension 50      Left Side Bending 50   Right Side Bending 50   Left rotation    60   Right Rotation    70    *= pain         GAIT DEVIATIONS: Tiarra displays decreased weight shift;antalgic gait;decreased step length;occasional unsteady gait     Range of Motion:   Knee Left active Left Passive   Flexion 133 140   Extension 3 5      Knee Right active Right  "Passive   Flexion 121 124   Extension 1 3         Lower Extremity Strength     Right LE   Left LE     Hip flexion: 4/5 Hip flexion: 4+/5   Knee extension: 4/5 Knee extension: 4+/5   Knee flexion: 4/5 Knee flexion: 4+/5   Hip IR: 4/5 Hip IR: 4/5   Hip ER: 4/5 Hip ER: 4/5   Hip extension:  4-/5 Hip extension: 4-/5   Hip abduction: 4-/5 Hip abduction: 4/5   Hip adduction: 4-/5 Hip adduction 4/5   Ankle dorsiflexion: 5/5 Ankle dorsiflexion: 5/5   Ankle plantarflexion: 5/5 Ankle plantarflexion: 5/5        Treatment     Tiarra received the treatments listed below:      therapeutic exercises to develop strength, endurance, ROM, flexibility, posture, and core stabilization for 20 minutes including:    Recumbent bike lv. 4 x 5 min for endurance and knee ROM  PPT 15x5"  Bridge 15x3"  EIL x10  DKTC 15x5"'  Open books x15 ea  Hip flexor stretch 2x1' ea  Leg press # 2x10  Leg press SL 70# 2x10    NP:  EIS 2x10    manual therapy techniques: - were applied to the: R knee for 0 minutes, including:      neuromuscular re-education activities to improve: Balance, Coordination, Kinesthetic, Proprioception, and Posture for 25 minutes. The following activities were included:    Sit to stands GTB around knees 2x10  Side stepping RTB 2x12 steps  Monster walks rtb 2x12 steps  Single leg balance 3x30" R only  Step ups fwd/lateral 2x10x3" ea R only  Medx trunk extension 60# x20 increase next visit.  Medx trunk rotation 24# x20, increase next visit      therapeutic activities to improve functional performance for 10  minutes, including:    Objective measures and reassessment      cold pack for 5 minutes to R knee and low back .    Patient Education and Home Exercises       Education provided:   - HEP, POC    Written Home Exercises Provided: Patient instructed to cont prior HEP. Exercises were reviewed and Tiarra was able to demonstrate them prior to the end of the session.  Tiarra demonstrated good  understanding of the education provided. " See Electronic Medical Record under Patient Instructions for exercises provided during therapy sessions    Assessment     Tiarra presents today with reports of decreased pain levels. Reassessment performed with pt demo gains in R knee ROM, R LE strength, improved static and dynamic balance, improved lumbar ROM and strength. Progressed strengthening exercises as tolerated. Continue to progress as tolerated.       Tiarra Is progressing well towards her goals.   Patient prognosis is Good.     Patient will continue to benefit from skilled outpatient physical therapy to address the deficits listed in the problem list box on initial evaluation, provide pt/family education and to maximize pt's level of independence in the home and community environment.     Patient's spiritual, cultural and educational needs considered and pt agreeable to plan of care and goals.     Anticipated barriers to physical therapy: chronicity of condition, multiple treatment areas, hx of R TKA and menisectomy     GOALS: Short Term Goals:  4 weeks  1. Report decreased in pain at worse less than  <   / =  4  /10  to increase tolerance for functional activities.On going  2. Pt to increase B popliteal angle by greater than > or = 5 degrees in order to improve flexibility and posture. MET  3. Increased R LE MMT 1/2  to increase tolerance for ADL and work activities. MET  4. Pt to increase B Ely's Test by greater than  > or = 5degrees in order to improve flexibility and posture. MET  5. Pt to tolerate HEP to improve ROM and independence with ADL's. MET  6. Pt to improve range of motion by 25% to allow for improved functional mobility to allow for improvement in IADLs. MET     Long Term Goals: 8 weeks  1. Report decreased in pain at worse less than  <   / =  2  /10  to increase tolerance for functional mobility.  On going  2 .Pt to increase B popliteal angle by greater than > or = 10 degrees in order to improve flexibility and posture. On going  3.  Increased R LE MMT 1 grade to increase tolerance for ADL and work activities.On going  4. Pt will report at 51% or better score on FOTO Lumbar spine survey  to demonstrate decrease in disability and improvement in back pain.On going  5. Pt to be Independent with HEP to improve ROM and independence with ADL's. On going  6. Pt to increase B Ely's Test by greater than > or = 10 degrees in order to improve flexibility and posture. On going  7. Pt to demonstrate negative Bridge Test in order to show improved core strength for lumbar stabilization. On going  8. Pt to improve range of motion by 75% to allow for improved functional mobility to allow for improvement in IADLs. On going        Plan   Plan of care Certification: 12/21/2023 to 3/21/2023.     Outpatient Physical Therapy 2 times weekly for 12 weeks to include the following interventions: Cervical/Lumbar Traction, Gait Training, Manual Therapy, Moist Heat/ Ice, Neuromuscular Re-ed, Patient Education, Self Care, Therapeutic Activities, and Therapeutic Exercise. Dry needling    Zi Anaya, PT   3/5/2024

## 2024-03-07 ENCOUNTER — CLINICAL SUPPORT (OUTPATIENT)
Dept: REHABILITATION | Facility: OTHER | Age: 69
End: 2024-03-07
Payer: MEDICARE

## 2024-03-07 ENCOUNTER — PATIENT MESSAGE (OUTPATIENT)
Dept: PAIN MEDICINE | Facility: CLINIC | Age: 69
End: 2024-03-07
Payer: MEDICARE

## 2024-03-07 DIAGNOSIS — M25.69 DECREASED RANGE OF MOTION OF TRUNK AND BACK: ICD-10-CM

## 2024-03-07 DIAGNOSIS — M62.81 WEAKNESS OF TRUNK MUSCULATURE: ICD-10-CM

## 2024-03-07 DIAGNOSIS — R29.898 DECREASED STRENGTH OF TRUNK AND BACK: ICD-10-CM

## 2024-03-07 DIAGNOSIS — R29.898 WEAKNESS OF RIGHT LOWER EXTREMITY: ICD-10-CM

## 2024-03-07 DIAGNOSIS — M25.661 DECREASED RANGE OF MOTION (ROM) OF RIGHT KNEE: Primary | ICD-10-CM

## 2024-03-07 PROCEDURE — 97112 NEUROMUSCULAR REEDUCATION: CPT

## 2024-03-07 PROCEDURE — 97140 MANUAL THERAPY 1/> REGIONS: CPT

## 2024-03-07 PROCEDURE — 97110 THERAPEUTIC EXERCISES: CPT

## 2024-03-07 NOTE — PROGRESS NOTES
[facility]   Physical Therapy Treatment Note      Name: Tiarra Norton  Clinic Number: [id number]    Therapy Diagnosis:   Encounter Diagnoses   Name Primary?    Decreased range of motion (ROM) of right knee Yes    Weakness of right lower extremity     Decreased range of motion of trunk and back     Decreased strength of trunk and back     Weakness of trunk musculature        Physician: Virgil Scott MD    Visit Date: 3/7/2024    Physician Orders: PT Eval and Treat   Medical Diagnosis from Referral:   Z96.651 (ICD-10-CM) - Status post total right knee replacement   M22.2X1 (ICD-10-CM) - Patellofemoral pain syndrome of right knee   M70.50 (ICD-10-CM) - Pes anserine bursitis      Evaluation Date: 12/21/2023  Authorization Period Expiration: 9/21/2024  Plan of Care Expiration: 3/21/2024  Visit # / Visits authorized: 8/20   Progress Note Due: 4/5/2024  FOTO: 2/3      Precautions: hx of TKA and menisectomy, spinal cord stimulator, scoliosis     Time In: 10:30 am  Time Out: 11:30 am   Total Billable Time: 55 minutes (1:1)  Total Appointment Time (timed & untimed codes): 60 minutes    PTA Visit #: 0/5       Subjective     Patient reports: both the knee and the back continue to feel better this week. Her ablation procedure got rescheduled to next Friday.     She was compliant with home exercise program.  Response to previous treatment: no adverse effects   Functional change: none yet    Pain: 2/10  Location: bilateral low back and R knee     Objective      Objective Measures updated at progress report unless specified.     Lumbar Range of Motion:     %   Flexion 90      Extension 50      Left Side Bending 50   Right Side Bending 50   Left rotation    60   Right Rotation    70    *= pain         GAIT DEVIATIONS: Tiarra displays decreased weight shift;antalgic gait;decreased step length;occasional unsteady gait     Range of Motion:   Knee Left active Left Passive   Flexion 133 140   Extension  "3 5      Knee Right active Right Passive   Flexion 121 124   Extension 1 3         Lower Extremity Strength     Right LE   Left LE     Hip flexion: 4/5 Hip flexion: 4+/5   Knee extension: 4/5 Knee extension: 4+/5   Knee flexion: 4/5 Knee flexion: 4+/5   Hip IR: 4/5 Hip IR: 4/5   Hip ER: 4/5 Hip ER: 4/5   Hip extension:  4-/5 Hip extension: 4-/5   Hip abduction: 4-/5 Hip abduction: 4/5   Hip adduction: 4-/5 Hip adduction 4/5   Ankle dorsiflexion: 5/5 Ankle dorsiflexion: 5/5   Ankle plantarflexion: 5/5 Ankle plantarflexion: 5/5        Treatment     Tiarra received the treatments listed below:      therapeutic exercises to develop strength, endurance, ROM, flexibility, posture, and core stabilization for 20 minutes including:    Recumbent bike lv. 4 x 5 min for endurance and knee ROM  PPT 15x5"  Bridge 15x3"  EIL x10  DKTC 15x5"'  Open books x15 ea  Hip flexor stretch 2x1' ea  Leg press # 2x10  Leg press SL 70# 2x10    NP:  EIS 2x10    manual therapy techniques: dry needling applied to the: R knee for 10 minutes, including:    FDN via 50 mm needles x 2 points to lateral quad, 2 points to medial quad and x 2 points to distal quad with 2 hz estim x 10 minutes with good tolerance        neuromuscular re-education activities to improve: Balance, Coordination, Kinesthetic, Proprioception, and Posture for 25 minutes. The following activities were included:    Sit to stands GTB around knees 2x10  Side stepping RTB 2x12 steps  Monster walks rtb 2x12 steps  Single leg balance 3x30" R only  Step ups fwd/lateral 2x10x3" ea R only  Medx trunk extension 64# x20 increase next visit.  Medx trunk rotation 26# x20, increase next visit      therapeutic activities to improve functional performance for 0  minutes, including:        cold pack for 5 minutes to R knee and low back .    Patient Education and Home Exercises       Education provided:   - HEP, POC    Written Home Exercises Provided: Patient instructed to cont prior HEP. " Exercises were reviewed and Tiarra was able to demonstrate them prior to the end of the session.  Tiarra demonstrated good  understanding of the education provided. See Electronic Medical Record under Patient Instructions for exercises provided during therapy sessions    Assessment     Tiarra presents today with continued reports improved pain levels over the last week. Ablation procedure postponed until next week, so dry needling was gain applied today, good response with decreased pain reported after application. Progressed dynamic strengthening and mobility as tolerated. Assess response at next F/U.       Tiarra Is progressing well towards her goals.   Patient prognosis is Good.     Patient will continue to benefit from skilled outpatient physical therapy to address the deficits listed in the problem list box on initial evaluation, provide pt/family education and to maximize pt's level of independence in the home and community environment.     Patient's spiritual, cultural and educational needs considered and pt agreeable to plan of care and goals.     Anticipated barriers to physical therapy: chronicity of condition, multiple treatment areas, hx of R TKA and menisectomy     GOALS: Short Term Goals:  4 weeks  1. Report decreased in pain at worse less than  <   / =  4  /10  to increase tolerance for functional activities.On going  2. Pt to increase B popliteal angle by greater than > or = 5 degrees in order to improve flexibility and posture. MET  3. Increased R LE MMT 1/2  to increase tolerance for ADL and work activities. MET  4. Pt to increase B Ely's Test by greater than  > or = 5degrees in order to improve flexibility and posture. MET  5. Pt to tolerate HEP to improve ROM and independence with ADL's. MET  6. Pt to improve range of motion by 25% to allow for improved functional mobility to allow for improvement in IADLs. MET     Long Term Goals: 8 weeks  1. Report decreased in pain at worse less than  <   / =  2   /10  to increase tolerance for functional mobility.  On going  2 .Pt to increase B popliteal angle by greater than > or = 10 degrees in order to improve flexibility and posture. On going  3. Increased R LE MMT 1 grade to increase tolerance for ADL and work activities.On going  4. Pt will report at 51% or better score on FOTO Lumbar spine survey  to demonstrate decrease in disability and improvement in back pain.On going  5. Pt to be Independent with HEP to improve ROM and independence with ADL's. On going  6. Pt to increase B Ely's Test by greater than > or = 10 degrees in order to improve flexibility and posture. On going  7. Pt to demonstrate negative Bridge Test in order to show improved core strength for lumbar stabilization. On going  8. Pt to improve range of motion by 75% to allow for improved functional mobility to allow for improvement in IADLs. On going        Plan   Plan of care Certification: 12/21/2023 to 3/21/2023.     Outpatient Physical Therapy 2 times weekly for 12 weeks to include the following interventions: Cervical/Lumbar Traction, Gait Training, Manual Therapy, Moist Heat/ Ice, Neuromuscular Re-ed, Patient Education, Self Care, Therapeutic Activities, and Therapeutic Exercise. Dry needling    Zi Anaya, PT   3/12/2024

## 2024-03-10 ENCOUNTER — PATIENT MESSAGE (OUTPATIENT)
Dept: SLEEP MEDICINE | Facility: CLINIC | Age: 69
End: 2024-03-10
Payer: MEDICARE

## 2024-03-12 ENCOUNTER — CLINICAL SUPPORT (OUTPATIENT)
Dept: REHABILITATION | Facility: OTHER | Age: 69
End: 2024-03-12
Payer: MEDICARE

## 2024-03-12 DIAGNOSIS — R29.898 DECREASED STRENGTH OF TRUNK AND BACK: ICD-10-CM

## 2024-03-12 DIAGNOSIS — M25.661 DECREASED RANGE OF MOTION (ROM) OF RIGHT KNEE: Primary | ICD-10-CM

## 2024-03-12 DIAGNOSIS — M25.69 DECREASED RANGE OF MOTION OF TRUNK AND BACK: ICD-10-CM

## 2024-03-12 DIAGNOSIS — M62.81 WEAKNESS OF TRUNK MUSCULATURE: ICD-10-CM

## 2024-03-12 DIAGNOSIS — R29.898 WEAKNESS OF RIGHT LOWER EXTREMITY: ICD-10-CM

## 2024-03-12 PROCEDURE — 97112 NEUROMUSCULAR REEDUCATION: CPT

## 2024-03-12 PROCEDURE — 97140 MANUAL THERAPY 1/> REGIONS: CPT

## 2024-03-12 NOTE — PROGRESS NOTES
[facility]   Physical Therapy Treatment Note      Name: Tiarra Norton  Clinic Number: [id number]    Therapy Diagnosis:   Encounter Diagnoses   Name Primary?    Decreased range of motion (ROM) of right knee Yes    Weakness of right lower extremity     Decreased range of motion of trunk and back     Decreased strength of trunk and back     Weakness of trunk musculature        Physician: Virgil Scott MD    Visit Date: 3/12/2024    Physician Orders: PT Eval and Treat   Medical Diagnosis from Referral:   Z96.651 (ICD-10-CM) - Status post total right knee replacement   M22.2X1 (ICD-10-CM) - Patellofemoral pain syndrome of right knee   M70.50 (ICD-10-CM) - Pes anserine bursitis      Evaluation Date: 12/21/2023  Authorization Period Expiration: 9/21/2024  Plan of Care Expiration: 3/21/2024  Visit # / Visits authorized: 9/20   Progress Note Due: 4/5/2024  FOTO: 2/3      Precautions: hx of TKA and menisectomy, spinal cord stimulator, scoliosis     Time In: 10:00 am  Time Out: 11:00 am   Total Billable Time: 30 minutes (1:1)  Total Appointment Time (timed & untimed codes): 60 minutes    PTA Visit #: 0/5       Subjective     Patient reports: she had been feeling better, but today both the knee and the back are hurting more.    She was compliant with home exercise program.  Response to previous treatment: no adverse effects   Functional change: none yet    Pain: 6/10  Location: bilateral low back and R knee     Objective      Objective Measures updated at progress report unless specified.     Lumbar Range of Motion:     %   Flexion 90      Extension 50      Left Side Bending 50   Right Side Bending 50   Left rotation    60   Right Rotation    70    *= pain         GAIT DEVIATIONS: Tiarra displays decreased weight shift;antalgic gait;decreased step length;occasional unsteady gait     Range of Motion:   Knee Left active Left Passive   Flexion 133 140   Extension 3 5      Knee Right active Right  "Passive   Flexion 121 124   Extension 1 3         Lower Extremity Strength     Right LE   Left LE     Hip flexion: 4/5 Hip flexion: 4+/5   Knee extension: 4/5 Knee extension: 4+/5   Knee flexion: 4/5 Knee flexion: 4+/5   Hip IR: 4/5 Hip IR: 4/5   Hip ER: 4/5 Hip ER: 4/5   Hip extension:  4-/5 Hip extension: 4-/5   Hip abduction: 4-/5 Hip abduction: 4/5   Hip adduction: 4-/5 Hip adduction 4/5   Ankle dorsiflexion: 5/5 Ankle dorsiflexion: 5/5   Ankle plantarflexion: 5/5 Ankle plantarflexion: 5/5        Treatment     Tiarra received the treatments listed below:      therapeutic exercises to develop strength, endurance, ROM, flexibility, posture, and core stabilization for 20 minutes including:    Recumbent bike lv. 4 x 5 min for endurance and knee ROM  PPT 15x5"  Bridge 15x3"  EIL x10  DKTC 15x5"'  Open books x15 ea  Hip flexor stretch 2x1' ea  Leg press # 2x10  Leg press SL 70# 2x10    NP:  EIS 2x10    manual therapy techniques: dry needling applied to the: R knee for 10 minutes, including:    FDN via 50 mm needles x 2 points to lateral quad, 2 points to medial quad and x 2 points to distal quad with 2 hz estim x 10 minutes with good tolerance        neuromuscular re-education activities to improve: Balance, Coordination, Kinesthetic, Proprioception, and Posture for 25 minutes. The following activities were included:    Sit to stands GTB around knees 2x10  Side stepping RTB 2x12 steps  Monster walks rtb 2x12 steps  Single leg balance 3x30" R only  Step ups fwd/lateral 2x10x3" ea R only  Medx trunk extension 64# x20 increase next visit.  Medx trunk rotation 26# x20, increase next visit      therapeutic activities to improve functional performance for 0  minutes, including:      cold pack for 5 minutes to R knee and low back .    Patient Education and Home Exercises       Education provided:   - HEP, POC    Written Home Exercises Provided: Patient instructed to cont prior HEP. Exercises were reviewed and Tiarra was " able to demonstrate them prior to the end of the session.  Tiarra demonstrated good  understanding of the education provided. See Electronic Medical Record under Patient Instructions for exercises provided during therapy sessions    Assessment     Tiarra presents today with reports of increased pain levels in both knee and back since last visit. Dry needling of R knee applied with decreased pain reported after application. Progressed strengthening and flexibility/mobility exercises as tolerated. Continue to progress as able.       Tiarra Is progressing well towards her goals.   Patient prognosis is Good.     Patient will continue to benefit from skilled outpatient physical therapy to address the deficits listed in the problem list box on initial evaluation, provide pt/family education and to maximize pt's level of independence in the home and community environment.     Patient's spiritual, cultural and educational needs considered and pt agreeable to plan of care and goals.     Anticipated barriers to physical therapy: chronicity of condition, multiple treatment areas, hx of R TKA and menisectomy     GOALS: Short Term Goals:  4 weeks  1. Report decreased in pain at worse less than  <   / =  4  /10  to increase tolerance for functional activities.On going  2. Pt to increase B popliteal angle by greater than > or = 5 degrees in order to improve flexibility and posture. MET  3. Increased R LE MMT 1/2  to increase tolerance for ADL and work activities. MET  4. Pt to increase B Ely's Test by greater than  > or = 5degrees in order to improve flexibility and posture. MET  5. Pt to tolerate HEP to improve ROM and independence with ADL's. MET  6. Pt to improve range of motion by 25% to allow for improved functional mobility to allow for improvement in IADLs. MET     Long Term Goals: 8 weeks  1. Report decreased in pain at worse less than  <   / =  2  /10  to increase tolerance for functional mobility.  On going  2 .Pt to  increase B popliteal angle by greater than > or = 10 degrees in order to improve flexibility and posture. On going  3. Increased R LE MMT 1 grade to increase tolerance for ADL and work activities.On going  4. Pt will report at 51% or better score on FOTO Lumbar spine survey  to demonstrate decrease in disability and improvement in back pain.On going  5. Pt to be Independent with HEP to improve ROM and independence with ADL's. On going  6. Pt to increase B Ely's Test by greater than > or = 10 degrees in order to improve flexibility and posture. On going  7. Pt to demonstrate negative Bridge Test in order to show improved core strength for lumbar stabilization. On going  8. Pt to improve range of motion by 75% to allow for improved functional mobility to allow for improvement in IADLs. On going        Plan   Plan of care Certification: 12/21/2023 to 3/21/2023.     Outpatient Physical Therapy 2 times weekly for 12 weeks to include the following interventions: Cervical/Lumbar Traction, Gait Training, Manual Therapy, Moist Heat/ Ice, Neuromuscular Re-ed, Patient Education, Self Care, Therapeutic Activities, and Therapeutic Exercise. Marybeth Anaya, PT   3/12/2024

## 2024-03-14 ENCOUNTER — PATIENT MESSAGE (OUTPATIENT)
Dept: REHABILITATION | Facility: OTHER | Age: 69
End: 2024-03-14
Payer: MEDICARE

## 2024-03-14 ENCOUNTER — PATIENT MESSAGE (OUTPATIENT)
Dept: INTERNAL MEDICINE | Facility: CLINIC | Age: 69
End: 2024-03-14
Payer: MEDICARE

## 2024-03-14 DIAGNOSIS — R11.0 NAUSEA: Primary | ICD-10-CM

## 2024-03-14 RX ORDER — PROMETHAZINE HYDROCHLORIDE 25 MG/1
TABLET ORAL
Qty: 30 TABLET | Refills: 2 | Status: SHIPPED | OUTPATIENT
Start: 2024-03-14

## 2024-03-15 ENCOUNTER — HOSPITAL ENCOUNTER (OUTPATIENT)
Facility: OTHER | Age: 69
Discharge: HOME | End: 2024-03-15
Attending: ANESTHESIOLOGY | Admitting: ANESTHESIOLOGY
Payer: MEDICARE

## 2024-03-15 VITALS
WEIGHT: 138 LBS | DIASTOLIC BLOOD PRESSURE: 77 MMHG | HEIGHT: 63 IN | TEMPERATURE: 98 F | HEART RATE: 67 BPM | OXYGEN SATURATION: 99 % | SYSTOLIC BLOOD PRESSURE: 168 MMHG | RESPIRATION RATE: 14 BRPM | BODY MASS INDEX: 24.45 KG/M2

## 2024-03-15 DIAGNOSIS — G89.29 CHRONIC PAIN OF RIGHT KNEE: ICD-10-CM

## 2024-03-15 DIAGNOSIS — G89.29 CHRONIC PAIN: ICD-10-CM

## 2024-03-15 DIAGNOSIS — M17.11 PRIMARY OSTEOARTHRITIS OF RIGHT KNEE: Primary | ICD-10-CM

## 2024-03-15 DIAGNOSIS — H57.813 BROW PTOSIS, BILATERAL: Primary | ICD-10-CM

## 2024-03-15 DIAGNOSIS — M25.561 CHRONIC PAIN OF RIGHT KNEE: ICD-10-CM

## 2024-03-15 LAB — POCT GLUCOSE: 93 MG/DL (ref 70–110)

## 2024-03-15 PROCEDURE — 99152 MOD SED SAME PHYS/QHP 5/>YRS: CPT | Performed by: ANESTHESIOLOGY

## 2024-03-15 PROCEDURE — 25000003 PHARM REV CODE 250

## 2024-03-15 PROCEDURE — A4649 SURGICAL SUPPLIES: HCPCS | Performed by: ANESTHESIOLOGY

## 2024-03-15 PROCEDURE — 25000003 PHARM REV CODE 250: Performed by: ANESTHESIOLOGY

## 2024-03-15 PROCEDURE — 64624 DSTRJ NULYT AGT GNCLR NRV: CPT | Mod: RT,,, | Performed by: ANESTHESIOLOGY

## 2024-03-15 PROCEDURE — 63600175 PHARM REV CODE 636 W HCPCS: Performed by: ANESTHESIOLOGY

## 2024-03-15 PROCEDURE — 64624 DSTRJ NULYT AGT GNCLR NRV: CPT | Mod: RT | Performed by: ANESTHESIOLOGY

## 2024-03-15 RX ORDER — TRIAMCINOLONE ACETONIDE 40 MG/ML
INJECTION, SUSPENSION INTRA-ARTICULAR; INTRAMUSCULAR
Status: DISCONTINUED | OUTPATIENT
Start: 2024-03-15 | End: 2024-03-15 | Stop reason: HOSPADM

## 2024-03-15 RX ORDER — MIDAZOLAM HYDROCHLORIDE 1 MG/ML
INJECTION INTRAMUSCULAR; INTRAVENOUS
Status: DISCONTINUED | OUTPATIENT
Start: 2024-03-15 | End: 2024-03-15 | Stop reason: HOSPADM

## 2024-03-15 RX ORDER — SODIUM CHLORIDE 9 MG/ML
INJECTION, SOLUTION INTRAVENOUS CONTINUOUS
Status: DISCONTINUED | OUTPATIENT
Start: 2024-03-15 | End: 2024-03-15 | Stop reason: HOSPADM

## 2024-03-15 RX ORDER — BUPIVACAINE HYDROCHLORIDE 2.5 MG/ML
INJECTION, SOLUTION EPIDURAL; INFILTRATION; INTRACAUDAL
Status: DISCONTINUED | OUTPATIENT
Start: 2024-03-15 | End: 2024-03-15 | Stop reason: HOSPADM

## 2024-03-15 RX ORDER — FENTANYL CITRATE 50 UG/ML
INJECTION, SOLUTION INTRAMUSCULAR; INTRAVENOUS
Status: DISCONTINUED | OUTPATIENT
Start: 2024-03-15 | End: 2024-03-15 | Stop reason: HOSPADM

## 2024-03-15 RX ORDER — LIDOCAINE HYDROCHLORIDE 20 MG/ML
INJECTION, SOLUTION INFILTRATION; PERINEURAL
Status: DISCONTINUED | OUTPATIENT
Start: 2024-03-15 | End: 2024-03-15 | Stop reason: HOSPADM

## 2024-03-15 NOTE — DISCHARGE INSTRUCTIONS

## 2024-03-15 NOTE — DISCHARGE SUMMARY
Discharge Note  Short Stay      SUMMARY     Admit Date: 3/15/2024    Attending Physician: Nemesio Meléndez      Discharge Physician: Nemesio Meléndez      Discharge Date: 3/15/2024 2:36 PM    Procedure(s) (LRB):  RADIOFREQUENCY ABLATION RIGHT GENICULAR (Right)    Final Diagnosis: Primary osteoarthritis of right knee [M17.11]    Disposition: Home or self care    Patient Instructions:   Current Discharge Medication List        CONTINUE these medications which have NOT CHANGED    Details   ALPRAZolam (XANAX) 1 MG tablet Take 1 mg by mouth 3 (three) times daily.      amLODIPine (NORVASC) 5 MG tablet Take 1 tablet (5 mg total) by mouth once daily.  Qty: 90 tablet, Refills: 3    Comments: .      atorvastatin (LIPITOR) 10 MG tablet Take 1 tablet (10 mg total) by mouth once daily.  Qty: 90 tablet, Refills: 3      EScitalopram oxalate (LEXAPRO) 5 MG Tab Take 5 mg by mouth.      levothyroxine (SYNTHROID) 125 MCG tablet TAKE 1 TABLET BY MOUTH EVERY MORNING  Qty: 90 tablet, Refills: 2      liothyronine (CYTOMEL) 5 MCG Tab Take 1 tablet (5 mcg total) by mouth once daily.  Qty: 90 tablet, Refills: 3      promethazine (PHENERGAN) 25 MG tablet 1 tab po q 12 h prn nausea  Qty: 30 tablet, Refills: 2    Associated Diagnoses: Nausea      RESTASIS 0.05 % ophthalmic emulsion INT 1 GTT IN OU BID      sumatriptan (IMITREX) 50 MG tablet 1 tab po at start of headache.  Repeat in 2 h prn  Qty: 36 tablet, Refills: 3      suvorexant (BELSOMRA) 20 mg Tab Take 20 mg by mouth every evening.  Qty: 30 tablet, Refills: 5    Associated Diagnoses: Primary insomnia      traZODone (DESYREL) 100 MG tablet 1-2 tabs po q hs for sleep  Qty: 180 tablet, Refills: 3      valsartan (DIOVAN) 320 MG tablet Take 1 tablet (320 mg total) by mouth once daily.  Qty: 90 tablet, Refills: 3    Comments: .                 Discharge Diagnosis: Primary osteoarthritis of right knee [M17.11]  Condition on Discharge: Stable with no complications to procedure   Diet on Discharge:  Same as before.  Activity: as per instruction sheet.  Discharge to: Home with a responsible adult.  Follow up: 2-4 weeks       Please call my office or pager at [phone] if experienced any weakness or loss of sensation, fever > 101.5, pain uncontrolled with oral medications, persistent nausea/vomiting/or diarrhea, redness or drainage from the incisions, or any other worrisome concerns. If physician on call was not reached or could not communicate with our office for any reason please go to the nearest emergency department

## 2024-03-15 NOTE — OP NOTE
Therapeutic Genicular Cooled Nerve Radiofrequency Ablation under Fluoroscopy     The procedure, risks, benefits, and options were discussed with the patient. There are no contraindications to the procedure. The patent expressed understanding and agreed to the procedure. Informed written consent was obtained prior to the start of the procedure and can be found in the patient's chart.        PATIENT NAME: Tiarra Norton   MRN: [id number]     DATE OF PROCEDURE: 03/15/2024     PROCEDURE: Therapeutic Right Genicular Cooled Nerve Radiofrequency Ablation under Fluoroscopy    PRE-OP DIAGNOSIS: Primary osteoarthritis of right knee [M17.11]    POST-OP DIAGNOSIS: Primary osteoarthritis of right knee [M17.11]    PHYSICIAN: Shoaib Aguirre MD    ASSISTANTS: Deondre Duvall MD  Pain Fellow [employer]      MEDICATIONS INJECTED:  Preservative-free Kenalog 40mg with 9cc of Bupivicaine 0.25%    LOCAL ANESTHETIC INJECTED:   Xylocaine 2%    SEDATION: Versed 2mg and Fentanyl 100mcg                                                                                                                                                                                     Conscious sedation ordered by M.D. Patient re-evaluation prior to administration of conscious sedation. No changes noted in patient's status from initial evaluation. The patient's vital signs were monitored by RN and patient remained hemodynamically stable throughout the procedure.    Event Time In   Sedation Start 1434   Sedation End 1449       ESTIMATED BLOOD LOSS:  None    COMPLICATIONS:  None     INTERVAL HISTORY: Patient has clinical findings of chronic knee pain. Patients has completed 2 previous diagnostic genicular nerve blocks with at least 80% relief for the expected duration of the local anesthetic utilized.     TECHNIQUE: Time-out was performed to identify the patient and procedure to be performed. With the patient laying in a supine position, the surgical area was prepped and  draped in the usual sterile fashion using ChloraPrep and fenestrated drape. Three target sites including the superior lateral genicular nerve where the lateral femoral shaft meets the epicondyle, the superior medial genicular nerve where the medial femoral shaft meets the epicondyle, and the inferior medial genicular nerve where the medial tibial shaft meets the epicondyle, were determined under fluoroscopic guidance. Skin anesthesia was achieved by injecting Lidocaine 2% over the injection sites. A 17 gauge, 50mm, 10mm active tip needle was then advanced under fluoroscopy in the AP and lateral views into the positions of the geniculate nerves at these levels. This was followed by motor testing at each of the nerves to ensure that there was no dorsiflexion of the foot. After negative aspiration for blood was confirmed, 1 mL of the lidocaine 2% listed above was injected slowly at each site. This was followed by cooled thermal lesioning at 80 degrees celsius for 150 seconds at each site. That was followed by slowly injecting 2 mL of the medication mixture listed above at each site. The needles were removed and bleeding was nil. A sterile dressing was applied. No specimens collected. The patient tolerated the procedure well and did not have any procedure related motor deficit at the conclusion of the procedure.        The patient was monitored after the procedure in the recovery area. They were given post-procedure and discharge instructions to follow at home. The patient was discharged in a stable condition.    Nemesio Meléndez MD    I reviewed and edited the fellow's note. I conducted my own interview and physical examination. I agree with the findings. I was present and supervising all critical portions of the procedure.     Shoaib Aguirre MD

## 2024-03-15 NOTE — H&P
HPI  Patient presenting for Procedure(s) (LRB):  RADIOFREQUENCY ABLATION RIGHT GENICULAR (Right)     Patient on Anti-coagulation No    No health changes since previous encounter    Past Medical History:   Diagnosis Date    Cataract     Depression     Gestational diabetes     Hyperlipidemia     Hypertension     IBS (irritable bowel syndrome)     Thyroid disease      Past Surgical History:   Procedure Laterality Date    ADENOIDECTOMY      ARTHROPLASTY, KNEE, TOTAL, USING COMPUTER-ASSISTED NAVIGATION Right 2023    Procedure: CONVERSION ARTHROPLASTY, KNEE, TOTAL, USING COMPUTER-ASSISTED NAVIGATION;  Surgeon: Virgil Scott MD;  Location: [facility] OR;  Service: Orthopedics;  Laterality: Right;  Same day discharge    ARTHROSCOPIC CHONDROPLASTY OF KNEE JOINT Right 10/19/2021    Procedure: ARTHROSCOPY, KNEE, WITH CHONDROPLASTY;  Surgeon: Trevin Huber MD;  Location: [facility] OR;  Service: Orthopedics;  Laterality: Right;    ARTHROSCOPY OF KNEE Right 10/19/2021    Procedure: ARTHROSCOPY, KNEE-RIGHT;  Surgeon: Trevin Huber MD;  Location: [facility] OR;  Service: Orthopedics;  Laterality: Right;    BLOCK, NERVE, GENICULAR Right 2024    Procedure: BLOCK, NERVE RIGHT GENICULAR;  Surgeon: Shoaib Aguirre MD;  Location: [facility] PAIN MGT;  Service: Pain Management;  Laterality: Right;  [phone]     SECTION      CHOLECYSTECTOMY      COLONOSCOPY N/A 2016    Procedure: COLONOSCOPY;  Surgeon: Heri Segura MD;  Location: [facility] (4TH FLR);  Service: Endoscopy;  Laterality: N/A;    COLONOSCOPY N/A 2019    Procedure: COLONOSCOPY;  Surgeon: Joe Pitts MD;  Location: [facility] (4TH FLR);  Service: Endoscopy;  Laterality: N/A;    CYSTOSCOPY  2022    Procedure: CYSTOSCOPY;  Surgeon: Lenny Moore MD;  Location: [address] FLR;  Service: Urology;;    ESOPHAGOGASTRODUODENOSCOPY N/A 2020    Procedure: EGD (ESOPHAGOGASTRODUODENOSCOPY);  Surgeon: Tolu Rvias MD;  Location: [facility]  (4TH FLR);  Service: Endoscopy;  Laterality: N/A;  Pt. moved to 9:15am arrival time.. Instructed to not have anything after midnight.EC    EYE SURGERY      cataract resection right    INJECTION OF ANESTHETIC AGENT AROUND NERVE Bilateral 2/8/2022    Procedure: Block, Nerve MEDIAL BRANCH BLOCK BILATERAL L3,4,5;  Surgeon: Tara Gibbs MD;  Location: [facility] [place] [facility]GT;  Service: Pain Management;  Laterality: Bilateral;    INJECTION OF ANESTHETIC AGENT AROUND NERVE Bilateral 2/15/2022    Procedure: BLOCK, NERVE, L3*L4-L5 MEDIAL BR ANCH 2 OF 2;  Surgeon: Tara Gibbs MD;  Location: [facility] [place] [facility]GT;  Service: Pain Management;  Laterality: Bilateral;    INJECTION OF BOTULINUM TOXIN TYPE A  6/21/2022    Procedure: BOTULINUM TOXIN INJECTION 100 units;  Surgeon: Lenny Moore MD;  Location: [facility] OR 1ST FLR;  Service: Urology;;    INSERTION, NEUROSTIMULATOR, SPINAL CORD N/A 1/9/2023    Procedure: SPINAL CORD STIMULATOR IMPLANT [employer];  Surgeon: Shoaib Aguirre MD;  Location: [facility];  Service: Pain Management;  Laterality: N/A;    JOINT REPLACEMENT      partial right knee    KNEE ARTHROSCOPY W/ MENISCECTOMY Right 10/19/2021    Procedure: ARTHROSCOPY, KNEE, WITH MENISCECTOMY, PARTIAL LATERAL;  Surgeon: Trevin Huber MD;  Location: [facility] OR;  Service: Orthopedics;  Laterality: Right;    r hand surgery      RADIOFREQUENCY ABLATION Right 3/8/2022    Procedure: RADIOFREQUENCY ABLATION, L3-L5 1 OF 2;  Surgeon: Tara Gibbs MD;  Location: [facility] [place] [facility]IN MGT;  Service: Pain Management;  Laterality: Right;    RADIOFREQUENCY ABLATION Left 3/22/2022    Procedure: RADIOFREQUENCY ABLATION, l3-+l5 2 of 2;  Surgeon: Tara Gibbs MD;  Location: [facility] [place] [facility]IN [facility]GT;  Service: Pain Management;  Laterality: Left;    REMOVAL OF NEUROSTIMULATOR N/A 11/20/2023    Procedure: SCS IPG BATTERY REVISION;  Surgeon: Shoaib Aguirre MD;  Location: [facility] [place] OR;  Service: Pain Management;  Laterality: N/A;    TONSILLECTOMY    "   TOTAL KNEE ARTHROPLASTY      partial knee replacement    TRIAL OF SPINAL CORD NERVE STIMULATOR N/A 12/22/2022    Procedure: SPINAL CORD STIMULATOR TRIAL [facility];  Surgeon: Shoaib Aguirre MD;  Location: [facility];  Service: Pain Management;  Laterality: N/A;    TUBAL LIGATION       Review of patient's allergies indicates:  No Known Allergies   Current Facility-Administered Medications   Medication    0.9%  NaCl infusion     Facility-Administered Medications Ordered in Other Encounters   Medication    0.9%  NaCl infusion    celecoxib capsule 400 mg    lactated ringers infusion    LIDOcaine (PF) 10 mg/ml (1%) injection 5 mg       PMHx, PSHx, Allergies, Medications reviewed in epic    ROS negative except pain complaints in HPI    OBJECTIVE:    /65 (BP Location: Right arm, Patient Position: Lying)   Pulse (!) 57   Temp 98 °F (36.7 °C) (Oral)   Resp 16   Ht 5' 3" (1.6 m)   Wt 62.6 kg (138 lb)   SpO2 100%   Breastfeeding No   BMI 24.45 kg/m²     PHYSICAL EXAMINATION:    GENERAL: Well appearing, in no acute distress, alert and oriented x3.  PSYCH:  Mood and affect appropriate.  SKIN: Skin color, texture, turgor normal, no rashes or lesions which will impact the procedure.  CV: RRR with palpation of the radial artery.  PULM: No evidence of respiratory difficulty, symmetric chest rise. Clear to auscultation.  NEURO: Cranial nerves grossly intact.    Plan:    Proceed with procedure as planned Procedure(s) (LRB):  RADIOFREQUENCY ABLATION RIGHT GENICULAR (Right)    Nemesio Meléndez  03/15/2024            "

## 2024-03-17 ENCOUNTER — PATIENT MESSAGE (OUTPATIENT)
Dept: PAIN MEDICINE | Facility: CLINIC | Age: 69
End: 2024-03-17
Payer: MEDICARE

## 2024-03-18 RX ORDER — AMLODIPINE BESYLATE 5 MG/1
5 TABLET ORAL DAILY
Qty: 90 TABLET | Refills: 3 | Status: SHIPPED | OUTPATIENT
Start: 2024-03-18 | End: 2024-06-05

## 2024-03-18 NOTE — TELEPHONE ENCOUNTER
Care Due:                  Date            Visit Type   Department     Provider  --------------------------------------------------------------------------------                                EP -                              PRIMARY      [facility] INTERNAL  Last Visit: 02-      CARE ([facility])   MEDICINE       DEX CANELA  Next Visit: None Scheduled  None         None Found                                                            Last  Test          Frequency    Reason                     Performed    Due Date  --------------------------------------------------------------------------------    CMP.........  12 months..  atorvastatin, valsartan..  05- 05-    Lipid Panel.  12 months..  atorvastatin.............  03- 02-    [facility] Embedded Care Due Messages. Reference number: [id number].   3/18/2024 12:35:01 PM CDT

## 2024-03-19 ENCOUNTER — CLINICAL SUPPORT (OUTPATIENT)
Dept: REHABILITATION | Facility: OTHER | Age: 69
End: 2024-03-19
Payer: MEDICARE

## 2024-03-19 DIAGNOSIS — M25.69 DECREASED RANGE OF MOTION OF TRUNK AND BACK: ICD-10-CM

## 2024-03-19 DIAGNOSIS — M62.81 WEAKNESS OF TRUNK MUSCULATURE: ICD-10-CM

## 2024-03-19 DIAGNOSIS — R29.898 WEAKNESS OF RIGHT LOWER EXTREMITY: ICD-10-CM

## 2024-03-19 DIAGNOSIS — M25.661 DECREASED RANGE OF MOTION (ROM) OF RIGHT KNEE: Primary | ICD-10-CM

## 2024-03-19 DIAGNOSIS — R29.898 DECREASED STRENGTH OF TRUNK AND BACK: ICD-10-CM

## 2024-03-19 DIAGNOSIS — G47.00 INSOMNIA, UNSPECIFIED TYPE: Primary | ICD-10-CM

## 2024-03-19 PROCEDURE — 97112 NEUROMUSCULAR REEDUCATION: CPT

## 2024-03-19 PROCEDURE — 97110 THERAPEUTIC EXERCISES: CPT

## 2024-03-19 RX ORDER — LEMBOREXANT 10 MG/1
TABLET, FILM COATED ORAL
Qty: 30 TABLET | Refills: 5 | Status: SHIPPED | OUTPATIENT
Start: 2024-03-19 | End: 2024-06-05

## 2024-03-19 NOTE — PROGRESS NOTES
[facility]   Physical Therapy Treatment Note      Name: Tiarra Norton  Clinic Number: [id number]    Therapy Diagnosis:   Encounter Diagnoses   Name Primary?    Decreased range of motion (ROM) of right knee Yes    Weakness of right lower extremity     Decreased range of motion of trunk and back     Decreased strength of trunk and back     Weakness of trunk musculature        Physician: Virgil Scott MD    Visit Date: 3/19/2024    Physician Orders: PT Eval and Treat   Medical Diagnosis from Referral:   Z96.651 (ICD-10-CM) - Status post total right knee replacement   M22.2X1 (ICD-10-CM) - Patellofemoral pain syndrome of right knee   M70.50 (ICD-10-CM) - Pes anserine bursitis      Evaluation Date: 12/21/2023  Authorization Period Expiration: 9/21/2024  Plan of Care Expiration: 3/21/2024  Visit # / Visits authorized: 10/20   Progress Note Due: 4/5/2024  FOTO: 2/3      Precautions: hx of TKA and menisectomy, spinal cord stimulator, scoliosis     Time In: 10:00 am  Time Out: 11:00 am   Total Billable Time: 30 minutes (1:1)  Total Appointment Time (timed & untimed codes): 60 minutes    PTA Visit #: 0/5       Subjective     Patient reports: she has been having less pain since receiving ablation last Friday, she is feeling hopeful    She was compliant with home exercise program.  Response to previous treatment: no adverse effects   Functional change: none yet    Pain: 2/10  Location: bilateral low back and R knee     Objective      Objective Measures updated at progress report unless specified.     Lumbar Range of Motion:     %   Flexion 90      Extension 50      Left Side Bending 50   Right Side Bending 50   Left rotation    60   Right Rotation    70    *= pain         GAIT DEVIATIONS: Tiarra displays decreased weight shift;antalgic gait;decreased step length;occasional unsteady gait     Range of Motion:   Knee Left active Left Passive   Flexion 133 140   Extension 3 5      Knee Right  "active Right Passive   Flexion 121 124   Extension 1 3         Lower Extremity Strength     Right LE   Left LE     Hip flexion: 4/5 Hip flexion: 4+/5   Knee extension: 4/5 Knee extension: 4+/5   Knee flexion: 4/5 Knee flexion: 4+/5   Hip IR: 4/5 Hip IR: 4/5   Hip ER: 4/5 Hip ER: 4/5   Hip extension:  4-/5 Hip extension: 4-/5   Hip abduction: 4-/5 Hip abduction: 4/5   Hip adduction: 4-/5 Hip adduction 4/5   Ankle dorsiflexion: 5/5 Ankle dorsiflexion: 5/5   Ankle plantarflexion: 5/5 Ankle plantarflexion: 5/5        Treatment     Tiarra received the treatments listed below:      therapeutic exercises to develop strength, endurance, ROM, flexibility, posture, and core stabilization for 30 minutes including:    Recumbent bike lv. 4 x 5 min for endurance and knee ROM  PPT 15x5"  Bridge 2x10x3"  EIL 2x10  DKTC 15x5"'  Open books x15 ea  Hip flexor stretch 2x1' ea  Leg press # 2x10  Leg press SL 70# 2x10    NP:  EIS 2x10    manual therapy techniques: dry needling applied to the: R knee for 0 minutes, including:          neuromuscular re-education activities to improve: Balance, Coordination, Kinesthetic, Proprioception, and Posture for 25 minutes. The following activities were included:    Sit to stands GTB around knees 2x10  Side stepping RTB 2x12 steps  Monster walks rtb 2x12 steps  Single leg balance 3x30" R only  Step ups fwd/lateral 2x10x3" ea R only  Medx trunk extension 64# x20 increase next visit.  Medx trunk rotation 26# x20, increase next visit      therapeutic activities to improve functional performance for 0  minutes, including:      cold pack for 5 minutes to R knee and low back .    Patient Education and Home Exercises       Education provided:   - HEP, POC    Written Home Exercises Provided: Patient instructed to cont prior HEP. Exercises were reviewed and Tiarra was able to demonstrate them prior to the end of the session.  Tiarra demonstrated good  understanding of the education provided. See Electronic " Medical Record under Patient Instructions for exercises provided during therapy sessions    Assessment     Tiarra presents today with reports of improved pain levels since receiving ablation last week. Pt with improved tolerance to strengthening and mobility activities today so progressions were made with pt able to complete all without increased pain. Continue to progress as tolerated.       Tiarra Is progressing well towards her goals.   Patient prognosis is Good.     Patient will continue to benefit from skilled outpatient physical therapy to address the deficits listed in the problem list box on initial evaluation, provide pt/family education and to maximize pt's level of independence in the home and community environment.     Patient's spiritual, cultural and educational needs considered and pt agreeable to plan of care and goals.     Anticipated barriers to physical therapy: chronicity of condition, multiple treatment areas, hx of R TKA and menisectomy     GOALS: Short Term Goals:  4 weeks  1. Report decreased in pain at worse less than  <   / =  4  /10  to increase tolerance for functional activities.On going  2. Pt to increase B popliteal angle by greater than > or = 5 degrees in order to improve flexibility and posture. MET  3. Increased R LE MMT 1/2  to increase tolerance for ADL and work activities. MET  4. Pt to increase B Ely's Test by greater than  > or = 5degrees in order to improve flexibility and posture. MET  5. Pt to tolerate HEP to improve ROM and independence with ADL's. MET  6. Pt to improve range of motion by 25% to allow for improved functional mobility to allow for improvement in IADLs. MET     Long Term Goals: 8 weeks  1. Report decreased in pain at worse less than  <   / =  2  /10  to increase tolerance for functional mobility.  On going  2 .Pt to increase B popliteal angle by greater than > or = 10 degrees in order to improve flexibility and posture. On going  3. Increased R LE MMT 1 grade  to increase tolerance for ADL and work activities.On going  4. Pt will report at 51% or better score on FOTO Lumbar spine survey  to demonstrate decrease in disability and improvement in back pain.On going  5. Pt to be Independent with HEP to improve ROM and independence with ADL's. On going  6. Pt to increase B Ely's Test by greater than > or = 10 degrees in order to improve flexibility and posture. On going  7. Pt to demonstrate negative Bridge Test in order to show improved core strength for lumbar stabilization. On going  8. Pt to improve range of motion by 75% to allow for improved functional mobility to allow for improvement in IADLs. On going        Plan   Plan of care Certification: 12/21/2023 to 3/21/2023.     Outpatient Physical Therapy 2 times weekly for 12 weeks to include the following interventions: Cervical/Lumbar Traction, Gait Training, Manual Therapy, Moist Heat/ Ice, Neuromuscular Re-ed, Patient Education, Self Care, Therapeutic Activities, and Therapeutic Exercise. Dry needling    Zi Anaya, PT   3/19/2024

## 2024-03-21 ENCOUNTER — CLINICAL SUPPORT (OUTPATIENT)
Dept: REHABILITATION | Facility: OTHER | Age: 69
End: 2024-03-21
Payer: MEDICARE

## 2024-03-21 ENCOUNTER — OFFICE VISIT (OUTPATIENT)
Dept: OPHTHALMOLOGY | Facility: CLINIC | Age: 69
End: 2024-03-21
Payer: MEDICARE

## 2024-03-21 ENCOUNTER — CLINICAL SUPPORT (OUTPATIENT)
Dept: OPHTHALMOLOGY | Facility: CLINIC | Age: 69
End: 2024-03-21
Payer: MEDICARE

## 2024-03-21 DIAGNOSIS — M62.81 WEAKNESS OF TRUNK MUSCULATURE: ICD-10-CM

## 2024-03-21 DIAGNOSIS — H05.20 PROPTOSIS: ICD-10-CM

## 2024-03-21 DIAGNOSIS — H02.835 DERMATOCHALASIS OF UPPER AND LOWER EYELIDS OF BOTH EYES: ICD-10-CM

## 2024-03-21 DIAGNOSIS — H02.9 LESION OF RIGHT LOWER EYELID: ICD-10-CM

## 2024-03-21 DIAGNOSIS — R29.898 WEAKNESS OF RIGHT LOWER EXTREMITY: ICD-10-CM

## 2024-03-21 DIAGNOSIS — M25.69 DECREASED RANGE OF MOTION OF TRUNK AND BACK: ICD-10-CM

## 2024-03-21 DIAGNOSIS — H02.831 DERMATOCHALASIS OF UPPER AND LOWER EYELIDS OF BOTH EYES: ICD-10-CM

## 2024-03-21 DIAGNOSIS — H02.834 DERMATOCHALASIS OF UPPER AND LOWER EYELIDS OF BOTH EYES: ICD-10-CM

## 2024-03-21 DIAGNOSIS — H02.59 LAXITY OF EYELID: ICD-10-CM

## 2024-03-21 DIAGNOSIS — H02.832 DERMATOCHALASIS OF UPPER AND LOWER EYELIDS OF BOTH EYES: ICD-10-CM

## 2024-03-21 DIAGNOSIS — H57.813 BROW PTOSIS, BILATERAL: Primary | ICD-10-CM

## 2024-03-21 DIAGNOSIS — M25.661 DECREASED RANGE OF MOTION (ROM) OF RIGHT KNEE: Primary | ICD-10-CM

## 2024-03-21 DIAGNOSIS — R29.898 DECREASED STRENGTH OF TRUNK AND BACK: ICD-10-CM

## 2024-03-21 PROCEDURE — 99999 PR PBB SHADOW E&M-EST. PATIENT-LVL III: CPT | Mod: PBBFAC,,, | Performed by: OPHTHALMOLOGY

## 2024-03-21 PROCEDURE — 99204 OFFICE O/P NEW MOD 45 MIN: CPT | Mod: S$PBB,,, | Performed by: OPHTHALMOLOGY

## 2024-03-21 PROCEDURE — 99213 OFFICE O/P EST LOW 20 MIN: CPT | Mod: PBBFAC | Performed by: OPHTHALMOLOGY

## 2024-03-21 PROCEDURE — 97112 NEUROMUSCULAR REEDUCATION: CPT

## 2024-03-21 PROCEDURE — 97530 THERAPEUTIC ACTIVITIES: CPT

## 2024-03-21 NOTE — PROGRESS NOTES
[facility]   Physical Therapy Progress Note      Name: Tiarra Norton  Clinic Number: [id number]    Therapy Diagnosis:   Encounter Diagnoses   Name Primary?    Decreased range of motion (ROM) of right knee Yes    Weakness of right lower extremity     Decreased range of motion of trunk and back     Decreased strength of trunk and back     Weakness of trunk musculature        Physician: Virgil Scott MD    Visit Date: 3/21/2024    Physician Orders: PT Eval and Treat   Medical Diagnosis from Referral:   Z96.651 (ICD-10-CM) - Status post total right knee replacement   M22.2X1 (ICD-10-CM) - Patellofemoral pain syndrome of right knee   M70.50 (ICD-10-CM) - Pes anserine bursitis      Evaluation Date: 12/21/2023  Authorization Period Expiration: 9/21/2024  Plan of Care Expiration: Updated to 5/21/2024  Visit # / Visits authorized: 11/20   Progress Note Due: 4/21/2024  FOTO: 2/3      Precautions: hx of TKA and menisectomy, spinal cord stimulator, scoliosis     Time In: 10:00 am  Time Out: 11:00 am   Total Billable Time: 30 minutes (1:1)  Total Appointment Time (timed & untimed codes): 60 minutes    PTA Visit #: 0/5       Subjective     Patient reports: she continues to feel like she improving and having less pain in both knee and back since ablation last week.     She was compliant with home exercise program.  Response to previous treatment: no adverse effects   Functional change: none yet    Pain: 2/10  Location: bilateral low back and R knee     Objective      Objective Measures updated at progress report unless specified.     Lumbar Range of Motion:     %   Flexion 90      Extension 50      Left Side Bending 50   Right Side Bending 50   Left rotation    60   Right Rotation    70    *= pain         GAIT DEVIATIONS: Tiarra displays decreased weight shift;antalgic gait;decreased step length;occasional unsteady gait     Range of Motion:   Knee Left active Left Passive   Flexion 133 140  "  Extension 3 5      Knee Right active Right Passive   Flexion 121 124   Extension 1 3         Lower Extremity Strength     Right LE   Left LE     Hip flexion: 4/5 Hip flexion: 4+/5   Knee extension: 4/5 Knee extension: 4+/5   Knee flexion: 4/5 Knee flexion: 4+/5   Hip IR: 4/5 Hip IR: 4/5   Hip ER: 4/5 Hip ER: 4/5   Hip extension:  4-/5 Hip extension: 4-/5   Hip abduction: 4-/5 Hip abduction: 4/5   Hip adduction: 4-/5 Hip adduction 4/5   Ankle dorsiflexion: 5/5 Ankle dorsiflexion: 5/5   Ankle plantarflexion: 5/5 Ankle plantarflexion: 5/5          Treatment     Tiarra received the treatments listed below:      therapeutic exercises to develop strength, endurance, ROM, flexibility, posture, and core stabilization for 20 minutes including:    Recumbent bike lv. 4 x 5 min for endurance and knee ROM  PPT 15x5"  Bridge 2x10x3"  EIL 2x10  DKTC 15x5"'  Open books x15 ea  Hip flexor stretch 2x1' ea  Leg press # 2x10  Leg press SL 70# 2x10    NP:  EIS 2x10    manual therapy techniques: dry needling applied to the: R knee for 0 minutes, including:        neuromuscular re-education activities to improve: Balance, Coordination, Kinesthetic, Proprioception, and Posture for 25 minutes. The following activities were included:    Sit to stands GTB around knees 2x10  Side stepping RTB 2x12 steps  Monster walks rtb 2x12 steps  Single leg balance 3x30" R only  Step ups fwd/lateral 2x10x3" ea R only  Medx trunk extension 64# x20 increase next visit.  Medx trunk rotation 26# x20, increase next visit      therapeutic activities to improve functional performance for 10  minutes, including:    Objective measures and reassessment      cold pack for 5 minutes to R knee and low back .    Patient Education and Home Exercises       Education provided:   - HEP, POC    Written Home Exercises Provided: Patient instructed to cont prior HEP. Exercises were reviewed and Tiarra was able to demonstrate them prior to the end of the session.  Tiarra " demonstrated good  understanding of the education provided. See Electronic Medical Record under Patient Instructions for exercises provided during therapy sessions    Assessment     Tiarra presents today with with continued reports of decreased pain levels overall. Reassessment performed with pt demo gains in all planes of lumbar spine ROM, improved R knee ROM, improved strength of core and bilateral LE's, improved tolerance to gait and functional mobility activities, and improved FOTO score. Will continue to benefit from skilled therapy to decrease pain levels and improve activity tolerance.       Tiarra Is progressing well towards her goals.   Patient prognosis is Good.     Patient will continue to benefit from skilled outpatient physical therapy to address the deficits listed in the problem list box on initial evaluation, provide pt/family education and to maximize pt's level of independence in the home and community environment.     Patient's spiritual, cultural and educational needs considered and pt agreeable to plan of care and goals.     Anticipated barriers to physical therapy: chronicity of condition, multiple treatment areas, hx of R TKA and menisectomy     GOALS: Short Term Goals:  4 weeks  1. Report decreased in pain at worse less than  <   / =  4  /10  to increase tolerance for functional activities.On going  2. Pt to increase B popliteal angle by greater than > or = 5 degrees in order to improve flexibility and posture. MET  3. Increased R LE MMT 1/2  to increase tolerance for ADL and work activities. MET  4. Pt to increase B Ely's Test by greater than  > or = 5degrees in order to improve flexibility and posture. MET  5. Pt to tolerate HEP to improve ROM and independence with ADL's. MET  6. Pt to improve range of motion by 25% to allow for improved functional mobility to allow for improvement in IADLs. MET     Long Term Goals: 8 weeks  1. Report decreased in pain at worse less than  <   / =  2  /10  to  increase tolerance for functional mobility.  On going  2 .Pt to increase B popliteal angle by greater than > or = 10 degrees in order to improve flexibility and posture. On going  3. Increased R LE MMT 1 grade to increase tolerance for ADL and work activities.On going  4. Pt will report at 51% or better score on FOTO Lumbar spine survey  to demonstrate decrease in disability and improvement in back pain.On going  5. Pt to be Independent with HEP to improve ROM and independence with ADL's. On going  6. Pt to increase B Ely's Test by greater than > or = 10 degrees in order to improve flexibility and posture. On going  7. Pt to demonstrate negative Bridge Test in order to show improved core strength for lumbar stabilization. On going  8. Pt to improve range of motion by 75% to allow for improved functional mobility to allow for improvement in IADLs. On going        Plan   Plan of care Certification: 12/21/2023 to 5/21/2023.     Outpatient Physical Therapy 2 times weekly for 10 weeks to include the following interventions: Cervical/Lumbar Traction, Gait Training, Manual Therapy, Moist Heat/ Ice, Neuromuscular Re-ed, Patient Education, Self Care, Therapeutic Activities, and Therapeutic Exercise. Dry needling    Zi Anaya, PT   3/21/2024

## 2024-03-21 NOTE — PROGRESS NOTES
HPI    Tiarra Norton is a/an 69 y.o. female here for droopy lids.  Referred by: Dr. Nicholas Goodwin  How long have eyelid(s) been droopy? 10 yrs  Any h/o wearing hard CLs? yes  Do eyelids feel heavy? no  Does the height of the eyelid(s) fluctuate throughout the day? no  Do eyelid(s) interfere with daily activities such as driving, reading,   working? no  Spontaneous orbital pain? no  Orbital pain w eye movement? no  Red eyes? no  Red eyelids? no  Eyelid swelling? no    Restasis BID     Pt sts she is bothered by cosmetic appearance of upper and lower eyelids.   Last edited by Lashell Botello on 3/21/2024  1:58 PM.            Assessment /Plan     For exam results, see Encounter Report.    Brow ptosis, bilateral  -     Ambulatory referral/consult to Ophthalmology    Proptosis    Lesion of right lower eyelid    Dermatochalasis of upper and lower eyelids of both eyes    Laxity of eyelid      The patient is a pleasant 69-year-old female here for evaluation of bilateral upper eyelid heaviness symmetrically.  This has been progressive and affects activities of daily living.  The patient denies any prior history of eyelid surgery or periorbital surgery.  She has a history of cryotherapy on the left brow.  She does have a history of hypothyroidism and currently takes levothyroxine.  She does have a remote history of botulinum toxin to her face but it has been several years.  She does not have any prior history of facial filler use.    On exam, the patient has chronic frontalis use. She has mild temporal brow ptosis bilaterally slightly worse on the left than the right.  She has longstanding history of left-sided amblyopia.  She has relative right proptosis compared to the left measured by [facility]. She has bilateral upper eyelid dermatochalasis with herniation of the medial fat pads.  She has bilateral lower eyelid dermatochalasis and herniation of the temporal central and medial fat pads greater on the right than the left.  She has good  lateral canthal tone bilaterally.  The patient also has a 2 mm x 3 mm raised ulcerated growth on the right nasal bridge.     These findings were discussed with the patient.    Pt. With some improvement of superior visual fields with lids and brows taped vs. untaped.    Recommend obtaining CT maxillofacial to evaluate for relative right proptosis.  After imaging is obtained, if there are no masses found, we can consider repeat evaluation for possible bilateral brow pexy, upper and lower eyelid blepharoplasty.     Plan for biopsy of right nasal bridge lesion and send for the pathology in the minor room with valium.     Return for surgery

## 2024-03-22 NOTE — TELEPHONE ENCOUNTER
Patient Canceled (Patient NO showed. She stated that she scheduled with another provider. Appt canceled. )   Vacuum Pressure Low Setting (Will Not Render If Set To 0): 10 Solution Override Solution: Activ-4 Solution: Beta-HD Procedure: Fusion Solution: GlySal 7.5% Procedure: Boost Procedure: Extraction Number Of Passes: 1 Tip: Hydropeel Tip, Clear Procedure: Peel Procedure: Extend and Protect Tip: Hydropeel Tip, Teal Indication: prominent pores Procedure: Exfoliation Post-Care Instructions: I reviewed with the patient in detail post-care instructions. Patient should stay away from the sun and wear sun protection until treated areas are fully healed. Tip: Hydropeel Tip, Blue Tip Override Consent: Written consent obtained, risks reviewed including but not limited to crusting, scabbing, blistering, scarring, darker or lighter pigmentary change, bruising, and/or incomplete response. Location: face

## 2024-03-25 ENCOUNTER — PATIENT MESSAGE (OUTPATIENT)
Dept: PAIN MEDICINE | Facility: CLINIC | Age: 69
End: 2024-03-25

## 2024-03-25 ENCOUNTER — OFFICE VISIT (OUTPATIENT)
Dept: PAIN MEDICINE | Facility: CLINIC | Age: 69
End: 2024-03-25
Payer: MEDICARE

## 2024-03-25 VITALS
DIASTOLIC BLOOD PRESSURE: 70 MMHG | BODY MASS INDEX: 24.45 KG/M2 | SYSTOLIC BLOOD PRESSURE: 124 MMHG | HEART RATE: 79 BPM | HEIGHT: 63 IN | WEIGHT: 138 LBS

## 2024-03-25 DIAGNOSIS — M41.25 OTHER IDIOPATHIC SCOLIOSIS, THORACOLUMBAR REGION: ICD-10-CM

## 2024-03-25 DIAGNOSIS — G89.4 CHRONIC PAIN SYNDROME: Primary | ICD-10-CM

## 2024-03-25 DIAGNOSIS — Z96.89 SPINAL CORD STIMULATOR STATUS: ICD-10-CM

## 2024-03-25 DIAGNOSIS — M51.36 DDD (DEGENERATIVE DISC DISEASE), LUMBAR: ICD-10-CM

## 2024-03-25 DIAGNOSIS — Z96.651 HISTORY OF TOTAL KNEE ARTHROPLASTY, RIGHT: ICD-10-CM

## 2024-03-25 DIAGNOSIS — M25.561 CHRONIC PAIN OF RIGHT KNEE: ICD-10-CM

## 2024-03-25 DIAGNOSIS — G89.29 CHRONIC PAIN OF RIGHT KNEE: ICD-10-CM

## 2024-03-25 PROCEDURE — 99214 OFFICE O/P EST MOD 30 MIN: CPT | Mod: PBBFAC | Performed by: NURSE PRACTITIONER

## 2024-03-25 PROCEDURE — 99999 PR PBB SHADOW E&M-EST. PATIENT-LVL IV: CPT | Mod: PBBFAC,,, | Performed by: NURSE PRACTITIONER

## 2024-03-25 PROCEDURE — 99214 OFFICE O/P EST MOD 30 MIN: CPT | Mod: S$PBB,,, | Performed by: NURSE PRACTITIONER

## 2024-03-25 NOTE — PROGRESS NOTES
Chronic Pain-Established Visit      SUBJECTIVE:    Interval History 3/25/2024:  The patient returns to clinic today for follow up of back and knee pain. She is s/p right genicular RFA on 3/15/2024. She reports 50% relief. She continues to report some right knee pain.She continues to report low back pain. She is using her SCS. She does have intermittent pain around IPG site. She is participating in physical therapy. She is taking Norco as needed with benefit. She denies any adverse effects. She denies any other health changes. Her pain today is 4/10.    Interval History 1/23/2024:  Tiarra Norton presents for folow-up for lower back pain s/p IPG revision 11/20/2023.  She reports that the pocket pain is significantly improved, continues to get better.  She is just now feeling well enough to participate in physical therapy which she has today.  Today she also complains of right knee pain. She had a right TKA on 5/18/2023. .  The patient describes the pain as aching. The pain is constant. Exacerbating factors: bending, cooking, standing for a long time, walking.  Mitigating factors: ice, heat, medications.  The patient takes Tylenol 500 mg PRN with mild benefit.  They deny any perceived side effects.  She is hoping for an additional prescription for Norco 5-325 mg which she had taken over the recovery period from the IPG revision.  She states that the medication helps her with activity.  The symptoms interfere with ADLs.  The patient last had imaging Xray bilateral knees on 10/26/2023. See findings below.  The patient denies fever/night sweats, urinary incontinence, bowel incontinence, significant weight changes, significant motor weakness or changes, or loss of sensations.  Today's pain score is 3/10 for back pain and 7/10 for right knee pain.     RTA 5/18/23, activity restrictions from IPG revision was not able to do PT, starting PT for back and right knee back today.  Wondering if there are any procedures we can  do for knee.     Wants Norco 5-325 mg-helps with activity  Pain ext and flex  Ptp superior anterior    Interval History 10/25/2023  67 y/o female with hx of chronic left sided low back pain near IPG, she is s/p Octad electrode x2  placement of pulse generator 3  on 1/9 she recently was provided a recent TPI near left low back she reports 0% relief following.  Pain is worse when standing walking performing ADLs.  She does state she gets 5 hours of uninterrupted sleep.  Like her left low back is weak and aching.  Tingling any wearing her low back.  In the right side of her low back.  To discuss other pain interventions and the plan of care moving forward regard to her current subacute pain.  She denies any profound weakness denies any bowel bladder dysfunction denies any saddle anesthesia at this time.    Interval History 9/12/2023:  The patient returns to clinic today for follow up of battery site pain via virtual visit. She is s/p trigger point injections under ultrasound on 8/29/2023. She does feel some benefit. She is currently ill with Covid. She is currently running fever and having body aches. She denies any other health changes.     Interval History 8/11/2023:  The patient returns to clinic today for follow up of pain. She reports pain around her SCS battery site. She describes the pain as though her muscles feel weak and tired. She does report benefit with SCS. No difficulty with charging or programming. She has tried Lidoderm and Salonpas patches but these caused a rash. She is currently participating in physical therapy for her knee. She denies any other health changes. Her pain today is 4/10.    Interval History 4/14/2023:  The patient is here for follow up of back pain and pain at IPG site. She reports improvement since previous visit. She did have benefit with Lidoderm patches but had a rash so she stopped. Her pain has improved with Salon Pas patches. She is scheduled for knee replacement next month  with Dr. Scott. Her pain today is 3/10.    Interval History 3/21/2023:  The patient presents to discuss pain to IPG site. She says that it has been present since surgery and has gradually worsened. She says that it feels like a tight and weak muscle. She has not tried any topical medications or muscle relaxants. No fever or malaise. She has not noticed any redness or swelling to the site. Her original pain has significantly improved from SCS implant. She is part of a research study. Her pain today is 6/10.    Interval History 2/17/2023:  The patient returns to clinic today for follow up of back pain. She reports benefit with [employer] SCS. She is in contact with representatives for programming. She is very happy with relief. She denies any fever, chills, or drainage. She continues to follow her activity restrictions. She denies any other health changes. Her pain today is 2/10.    Interval History 1/31/2023:  The patient returns to clinic today for wound check. She reports benefit with [employer] SCS. She is in contact with representatives for programming. She reports intermittent muscle soreness into her legs. She does have an upcoming appointment with representatives for programming. She denies any fever, chills, or drainage. She continues to follow her activity restrictions. She denies any other health changes. Her pain today is 3/10.    Interval History 1/17/2023:  The patient returns to clinic today for post op. She is s/p [employer] SCS implant on 1/9/2022. She reports benefit with the device at this time. She is meeting with the representatives today. She does report soreness to her incisions. She denies any fever, chills, or drainage. She did have issues with her dressing staying on. She has completed her antibiotics. She denies any other health changes. Her pain today is 2/10.    Interval History 12/29/2022:  The patient returns for post op appointment. She is s/p lumbar [employer] SCS  trial with 90% relief. She has had very minimal back pain during the trial period. She says that she was more active over this time due to the Christmas holiday and had minimal symptoms. She is very happy with the relief. She would like to move forward with implant. She is part of [employer] study. No fever or malaise during trial period. Her pain today is 1/10.    Interval History 10/14/22: Mrs. Norton presents today for follow up of chronic low back pain and to discuss the [place] trial. She had her lumbar MRI done last night without significant changes to her spine. She did have increased dilation of her bile duct. Her pain today is the same in character and severity as during her last visit with us and she rates it currently at a 7/10. She continues to do her stretches on her own which have helped some. She presents with some questions regarding the trial.     Interval History 9/21/2022:  The patient returns to clinic today for follow up of back pain via virtual visit. She received a letter from [employer] denying SCS trial. She is frustrated by this. She continues to report low back pain. She denies any radicular leg pain. Her pain is worse with bending and activity. She recently underwent med screening for the [employer] program. She is interested in pursuing this. She has tried Gabapentin and Lyrica in the past with side effects. She denies any other health changes. Her pain today is 7/10.     Interval History 8/22/22: Ms. Norton returns for follow up on her low back pain. At our last visit she felt cured by acupuncture. Unfortunately, this only lasted for 24 hours. She returns today looking for other options to make life livable. Patient claims all of her pain in the low back and middle back. We discussed that this will work best for her low back pain.     Interval History 7/25/22: Tiarra Norton returns for follow up. We previously have been discussing treatments for her low back pain that did not  resolve with RFA. At our last visit I referred her to Dr. Antony for clearance for a SCS trial. She was considered to be a reasonable candidate. However, in the meantime, her PT referred her to acupuncture with Dr. Jacobs. She had one treatment and already notes 50% relief. She notes that she still has pain, especially with leaning forward, but feels that it is much better controlled. Their plan is one treatment per week but is approved for 20 sessions.     Interval History 6/15/22: Tiarra Norton returns for follow up. She continues to feel like she has mild pain sitting or laying down, and has her worst pain with extreme leaning forward to pick something up off the floor. She also has difficulty leaning over her handlebars to ride her bike or leaning forward to fold clothes or standing up for any period of time. So, we determined that leaning forward is her worst issue. We did previouisly do lmbb and rfa, which has helped with extension, but it would not help with leaning forward. On review of her MRI she has significant multilevel DDD with Modic changes which likely account for her pain.     4/18/22 Interval History: Patient presents for telehealth visit for follow up following her b/l RFA at L3-L5. She reports 80% relief and is very pleased with her pain relief. Unfortunately, she is not back to doing what she wants due to right knee pain. She is seeing Dr. Huber for knee pain and is s/p right medial compartment partial knee replacement. She is currently in therapy for this. We discussed that we may be able to assist her with her knee pain as well.     HPI:  Original HISTORY OF PRESENT ILLNESS: Tiarra Norton presents to the clinic for the evaluation of the above pain. The pain started five years ago.     Original Pain Description:  The pain is located in the lower back and does radiate up towards her upper back.The pain is described as aching, dull and sharp. Initially starts as a dull, aching pain and it  gets sharp once she bends down and moves around. Typically when she walks for more than five minutes, the sharp pain radiates up her back. Exacerbating factors: Standing, Bending, Touching, Walking, Night Time, Flexing and Lifting. Mitigating factors heat, ice, laying down and massage. Symptoms interfere with daily activity and sleeping. The patient feels like symptoms have been worsening. Patient denies night fever/night sweats, urinary incontinence, bowel incontinence, significant weight loss, significant motor weakness and loss of sensations.    Original PAIN SCORES:  Best: Pain is 1  Worst: Pain is 10  Usually: Pain is 4  Current: Pain is 4        11/29/2023     1:59 PM 10/17/2023     2:54 PM 8/29/2023    11:44 AM   Last 3 PDI Scores   Pain Disability Index (PDI) 12 20 40     6 weeks of Conservative therapy (PT/Chiro/Home Exercises with Dates)  Healthy back program--> has four sessions left for a total of 20 sessions   Last session was on 1/31/22   Stretches that they taught at [employer] gives her relief       Medications:   Robaxin PRN    Ice and heat which has helped   Tried Gabapentin and Lyrica- made her loopy      Report: reviewed       Interventional Pain Procedures:   10/17/2023 TPI left side near IPG battery 0% relief  2/8/2022- Bilateral L3,4,5 MBB  2/15/2022- Bilateral L3,4,5 MBB  3/8/2022- Right L3,4,5 RFA- 80% relief for a few months  3/22/2022- Left L3,4,5 RFA- 80% relief for a few months  12/22/22 SCS trial- 90% relief  1/9/2023- [employer] SCS implant.   11/20/2023- IPG revision  2/16/2024- Right genicular nerve block  3/15/2024- Right genicular RFA    Imaging:    EXAMINATION:  XR KNEE 3 VIEW RIGHT     CLINICAL HISTORY:  Presence of right artificial knee joint     TECHNIQUE:  AP, lateral, and Merchant views of the right knee were performed.     COMPARISON:  Right knee radiograph dated 06/12/2023     FINDINGS:  Prior right knee arthroplasty.  Hardware projects in similar alignment  without evidence for interval loosening or failure.  No periprosthetic fracture.     Impression:     As above.        Electronically signed by: Azael Wilkins  Date:                                            11/03/2023  Time:                                           11:05      10/13/22: MRI LUMBAR SPINE WITHOUT CONTRAST  FINDINGS:  Lumbar levoscoliosis centered at L2.  Minimal anterolisthesis at L4-5.     No compression fractures.  No marrow replacing lesions.     Multilevel degenerative changes with disc desiccation and disc space narrowing, described in detail below.  Discogenic endplate edema at T12-L1.     The conus medullaris has a normal appearance and terminates at the L1 level.  Cauda equina nerve roots are unremarkable.  No epidural mass or collection.     The common duct is dilated up to 12 mm, previously 9 mm on 08/20/2022 CT.  Mild prominence of the main pancreatic duct up to 4 mm, which is new.  No definite filling defect in the bile ducts.  Paraspinal muscle atrophy.     SIGNIFICANT FINDINGS BY LEVEL:     T12-L1: Disc bulge.  Bilateral facet arthropathy and ligamentum flavum thickening.  Mild canal stenosis.  Mild bilateral foraminal stenosis.     L1-2: Disc bulge.  Bilateral facet arthropathy and ligamentum flavum thickening.  Mild canal stenosis.  Mild right foraminal stenosis.     L2-3: Disc bulge.  Bilateral facet arthropathy and ligamentum flavum thickening.  Mild canal stenosis.  Moderate right mild left foraminal stenosis.     L3-4: Disc bulge.  Bilateral facet arthropathy and ligamentum flavum thickening.  Small bilateral facet joint effusions/synovitis.  Moderate canal stenosis with partial effacement of the thecal sac and crowding of the cauda equina nerve roots.  Mild right and moderate left foraminal stenosis.     L4-5: Disc bulge with posterior disc uncovering.  Bilateral facet arthropathy and ligamentum flavum thickening.  Small right facet joint effusion/synovitis.  Mild canal  stenosis.  Mild bilateral foraminal stenosis.     L5-S1: Disc bulge.  Bilateral facet arthropathy and ligamentum flavum thickening.  No significant canal stenosis.  Mild left foraminal stenosis.     Impression:     Lumbar levoscoliosis and multilevel degenerative changes as detailed above.  No significant changes from 2022.     Increased biliary ductal dilatation up to 12 mm, greater than expected after cholecystectomy.  In addition, the main pancreatic duct is mildly prominent up to 4 mm, which is new.  If LFTs are abnormal, consider MRI/MRCP or ERCP for further evaluation.      Past Medical History:   Diagnosis Date    Cataract     Depression     Gestational diabetes     Hyperlipidemia     Hypertension     IBS (irritable bowel syndrome)     Thyroid disease      Past Surgical History:   Procedure Laterality Date    ADENOIDECTOMY      ARTHROPLASTY, KNEE, TOTAL, USING COMPUTER-ASSISTED NAVIGATION Right 2023    Procedure: CONVERSION ARTHROPLASTY, KNEE, TOTAL, USING COMPUTER-ASSISTED NAVIGATION;  Surgeon: Virgil Scott MD;  Location: [facility] OR;  Service: Orthopedics;  Laterality: Right;  Same day discharge    ARTHROSCOPIC CHONDROPLASTY OF KNEE JOINT Right 10/19/2021    Procedure: ARTHROSCOPY, KNEE, WITH CHONDROPLASTY;  Surgeon: Trevin Huber MD;  Location: [facility] OR;  Service: Orthopedics;  Laterality: Right;    ARTHROSCOPY OF KNEE Right 10/19/2021    Procedure: ARTHROSCOPY, KNEE-RIGHT;  Surgeon: Trevin Huber MD;  Location: [facility] OR;  Service: Orthopedics;  Laterality: Right;    BLOCK, NERVE, GENICULAR Right 2024    Procedure: BLOCK, NERVE RIGHT GENICULAR;  Surgeon: Shoaib Aguirre MD;  Location: [facility] PAIN MGT;  Service: Pain Management;  Laterality: Right;  [phone]     SECTION      CHOLECYSTECTOMY      COLONOSCOPY N/A 2016    Procedure: COLONOSCOPY;  Surgeon: Heri Segura MD;  Location: [facility] ENDO ([address] [place] [address]);  Service: Endoscopy;  Laterality: N/A;    COLONOSCOPY N/A  12/6/2019    Procedure: COLONOSCOPY;  Surgeon: Joe Pitts MD;  Location: [facility] ENDO (4TH FLR);  Service: Endoscopy;  Laterality: N/A;    CYSTOSCOPY  6/21/2022    Procedure: CYSTOSCOPY;  Surgeon: Lenny Moore MD;  Location: [facility] 1ST FLR;  Service: Urology;;    ESOPHAGOGASTRODUODENOSCOPY N/A 2/13/2020    Procedure: EGD (ESOPHAGOGASTRODUODENOSCOPY);  Surgeon: Tolu Rivas MD;  Location: [facility] ENDO (4TH FLR);  Service: Endoscopy;  Laterality: N/A;  Pt. moved to 9:15am arrival time.. Instructed to not have anything after midnight.EC    EYE SURGERY      cataract resection right    INJECTION OF ANESTHETIC AGENT AROUND NERVE Bilateral 2/8/2022    Procedure: Block, Nerve MEDIAL BRANCH BLOCK BILATERAL L3,4,5;  Surgeon: Tara Gibbs MD;  Location: [facility] PAIN MGT;  Service: Pain Management;  Laterality: Bilateral;    INJECTION OF ANESTHETIC AGENT AROUND NERVE Bilateral 2/15/2022    Procedure: BLOCK, NERVE, L3*L4-L5 MEDIAL BR ANCH 2 OF 2;  Surgeon: Tara Gibbs MD;  Location: [facility] PAIN MGT;  Service: Pain Management;  Laterality: Bilateral;    INJECTION OF BOTULINUM TOXIN TYPE A  6/21/2022    Procedure: BOTULINUM TOXIN INJECTION 100 units;  Surgeon: Lenny Moore MD;  Location: [facility] 1ST FLR;  Service: Urology;;    INSERTION, NEUROSTIMULATOR, SPINAL CORD N/A 1/9/2023    Procedure: SPINAL CORD STIMULATOR IMPLANT [email or web address];  Surgeon: Shoaib Aguirre MD;  Location: [facility];  Service: Pain Management;  Laterality: N/A;    JOINT REPLACEMENT      partial right knee    KNEE ARTHROSCOPY W/ MENISCECTOMY Right 10/19/2021    Procedure: ARTHROSCOPY, KNEE, WITH MENISCECTOMY, PARTIAL LATERAL;  Surgeon: Trevin Huber MD;  Location: [facility];  Service: Orthopedics;  Laterality: Right;    r hand surgery      RADIOFREQUENCY ABLATION Right 3/8/2022    Procedure: RADIOFREQUENCY ABLATION, L3-L5 1 OF 2;  Surgeon: Tara Gibbs MD;  Location: [facility] PAIN MGT;  Service: Pain Management;   Laterality: Right;    RADIOFREQUENCY ABLATION Left 3/22/2022    Procedure: RADIOFREQUENCY ABLATION, l3-+l5 2 of 2;  Surgeon: Tara Gibbs MD;  Location: [facility] PAIN MGT;  Service: Pain Management;  Laterality: Left;    RADIOFREQUENCY ABLATION Right 3/15/2024    Procedure: RADIOFREQUENCY ABLATION RIGHT GENICULAR;  Surgeon: Shoaib Aguirre MD;  Location: [facility] PAIN MGT;  Service: Pain Management;  Laterality: Right;  [phone]    REMOVAL OF NEUROSTIMULATOR N/A 11/20/2023    Procedure: SCS IPG BATTERY REVISION;  Surgeon: Shoaib Aguirre MD;  Location: [facility] OR;  Service: Pain Management;  Laterality: N/A;    TONSILLECTOMY      TOTAL KNEE ARTHROPLASTY      partial knee replacement    TRIAL OF SPINAL CORD NERVE STIMULATOR N/A 12/22/2022    Procedure: SPINAL CORD STIMULATOR TRIAL [employer] RESEARCH;  Surgeon: Shoaib Aguirre MD;  Location: [facility] PAIN MGT;  Service: Pain Management;  Laterality: N/A;    TUBAL LIGATION       Social History     Socioeconomic History    Marital status:     Number of children: 1   Occupational History    Occupation:      Employer: [employer]     Comment: retired   Tobacco Use    Smoking status: Never    Smokeless tobacco: Never   Substance and Sexual Activity    Alcohol use: Yes     Alcohol/week: 3.0 standard drinks of alcohol     Types: 3 Glasses of wine per week     Comment: weekends    Drug use: Yes     Types: Marijuana     Comment: occasionally    Sexual activity: Yes     Partners: Male   Other Topics Concern    Are you pregnant or think you may be? No    Breast-feeding No   Social History Narrative    Retired        [employer].       Stationary bike.            Social Determinants of Health     Financial Resource Strain: Low Risk  (3/21/2024)    Overall Financial Resource Strain (CARDIA)     Difficulty of Paying Living Expenses: Not hard at all   Food Insecurity: No Food Insecurity (3/21/2024)    Hunger Vital Sign     Worried About Running Out of Food in the  Last Year: Never true     Ran Out of Food in the Last Year: Never true   Transportation Needs: No Transportation Needs (3/21/2024)    PRAPARE - Transportation     Lack of Transportation (Medical): No     Lack of Transportation (Non-Medical): No   Physical Activity: Sufficiently Active (3/21/2024)    Exercise Vital Sign     Days of Exercise per Week: 4 days     Minutes of Exercise per Session: 40 min   Stress: Stress Concern Present (3/21/2024)    [employer] [place] [employer] - Occupational Stress Questionnaire     Feeling of Stress : Rather much   Social Connections: Unknown (3/21/2024)    Social Connection and Isolation Panel [NHANES]     Frequency of Communication with Friends and Family: Twice a week     Frequency of Social Gatherings with Friends and Family: Twice a week     Active Member of Clubs or Organizations: No     Attends Club or Organization Meetings: Patient declined     Marital Status:    Housing Stability: Patient Declined (3/21/2024)    Housing Stability Vital Sign     Unable to Pay for Housing in the Last Year: Patient declined     Number of Places Lived in the Last Year: 1     Unstable Housing in the Last Year: Patient declined     Family History   Problem Relation Age of Onset    Hypertension Mother     Irritable bowel syndrome Mother     Hyperlipidemia Mother     Heart disease Father         mi    Hypertension Father     Cancer Father         prostate    Heart attack Father     Heart failure Father     Hyperlipidemia Father     Alcohol abuse Sister     Depression Sister     Epilepsy Son     Irritable bowel syndrome Sister     Alcohol abuse Sister     Ovarian cancer Maternal Aunt     Breast cancer Paternal Aunt     Breast cancer Maternal Aunt     Melanoma Neg Hx     Colon cancer Neg Hx     Stomach cancer Neg Hx     Esophageal cancer Neg Hx     Liver disease Neg Hx     Crohn's disease Neg Hx     Ulcerative colitis Neg Hx     Celiac disease Neg Hx     Stroke Neg Hx        Review  of patient's allergies indicates:  No Known Allergies    Current Outpatient Medications   Medication Sig    ALPRAZolam (XANAX) 1 MG tablet Take 1 mg by mouth 3 (three) times daily.    amLODIPine (NORVASC) 5 MG tablet Take 1 tablet (5 mg total) by mouth once daily.    atorvastatin (LIPITOR) 10 MG tablet Take 1 tablet (10 mg total) by mouth once daily.    EScitalopram oxalate (LEXAPRO) 5 MG Tab Take 5 mg by mouth.    lemborexant (DAYVIGO) 10 mg Tab Take 1 tablet by mouth nightly as needed for insomnia    levothyroxine (SYNTHROID) 125 MCG tablet TAKE 1 TABLET BY MOUTH EVERY MORNING    liothyronine (CYTOMEL) 5 MCG Tab Take 1 tablet (5 mcg total) by mouth once daily.    promethazine (PHENERGAN) 25 MG tablet 1 tab po q 12 h prn nausea    RESTASIS 0.05 % ophthalmic emulsion INT 1 GTT IN OU BID    sumatriptan (IMITREX) 50 MG tablet 1 tab po at start of headache.  Repeat in 2 h prn    suvorexant (BELSOMRA) 20 mg Tab Take 20 mg by mouth every evening.    traZODone (DESYREL) 100 MG tablet 1-2 tabs po q hs for sleep    valsartan (DIOVAN) 320 MG tablet Take 1 tablet (320 mg total) by mouth once daily.     No current facility-administered medications for this visit.     Facility-Administered Medications Ordered in Other Visits   Medication    0.9%  NaCl infusion    celecoxib capsule 400 mg    lactated ringers infusion    LIDOcaine (PF) 10 mg/ml (1%) injection 5 mg       REVIEW OF SYSTEMS:    GENERAL: No fever. No chills. No fatigue. Denies weight loss. Denies weight gain.  HEENT: Denies headaches. Denies vision change. Denies eye pain. Denies double vision. Denies ear pain.   CV: Denies chest pain. HTN  PULM: Denies of shortness of breath.  GI: Denies constipation. No diarrhea. No abdominal pain. Denies nausea. Denies vomiting. No blood in stool.  HEME: Denies bleeding problems.  : Denies urgency. No painful urination. No blood in urine.  MS: See HPI  SKIN: Denies rash.   NEURO: Denies seizures. No weakness.  ENDO: Thyroid  "disorder.   PSYCH:  Depression    OBJECTIVE:       Vitals:    24 1022 24 1023   BP:  124/70   Pulse:  79   Weight: 62.6 kg (138 lb) 62.6 kg (138 lb)   Height: 5' 3" (1.6 m) 5' 3" (1.6 m)   PainSc:   4   4   PainLoc: Back             PHYSICAL EXAM:     GENERAL: Well appearing, in no acute distress, alert and oriented x3.  PSYCH:  Mood and affect appropriate.  SKIN: Skin color, texture, turgor normal, no rashes or lesions. SCS site well healed.   RESP: Symmetric chest rise, no respiratory distress noted.   BACK: Straight leg raise in the sitting position is positive for radicular pain on the right. There is mild pain with palpation over IPG site. Limited ROM with mild pain on extension.  MSK: 5/5 strength in right ankle with plantar and dorsiflexion. 5/5 strength in left ankle with plantar and dorsiflexion. 5/5 strength with right knee flexion and extension. 5/5 strength with left knee flexion and extension.   GAIT: Normal.         ASSESSMENT: 69 y.o. year old female with low back and knee pain, consistent with the followin. Chronic pain syndrome        2. Chronic pain of right knee        3. History of total knee arthroplasty, right        4. Other idiopathic scoliosis, thoracolumbar region        5. DDD (degenerative disc disease), lumbar        6. Spinal cord stimulator status            PLAN:     - Previous imaging reviewed. Labs reviewed.     - She is s/p right genicular RFA with benefit.     - Continue physical therapy.     - Continue home exercise routine.     - Pain contract signed 2024.    - Continue Norco 5/325 mg BID PRN, #60.     - The patient is here today for a refill of current pain medications and they believe these provide effective pain control and improvements in quality of life.  The patient notes no serious side effects, and feels the benefits outweigh the risks.  The patient was reminded of the pain contract that they signed previously as well as the risks and benefits of " the medication including possible death.  The updated [place] [employer] prescription monitoring program was reviewed, and the patient has been found to be compliant with current treatment plan. Medication management provided by Dr. Aguirre.     - UDS from 1/23/2024. Repeat next visit.     -RTC in 3 months.     The above plan and management options were discussed at length with patient. Patient is in agreement with the above and verbalized understanding.       Marlene Thorne NP  03/25/2024

## 2024-03-26 ENCOUNTER — OFFICE VISIT (OUTPATIENT)
Dept: GASTROENTEROLOGY | Facility: CLINIC | Age: 69
End: 2024-03-26
Payer: MEDICARE

## 2024-03-26 ENCOUNTER — CLINICAL SUPPORT (OUTPATIENT)
Dept: REHABILITATION | Facility: OTHER | Age: 69
End: 2024-03-26
Payer: MEDICARE

## 2024-03-26 VITALS
HEART RATE: 79 BPM | WEIGHT: 138 LBS | BODY MASS INDEX: 24.45 KG/M2 | HEIGHT: 63 IN | SYSTOLIC BLOOD PRESSURE: 124 MMHG | DIASTOLIC BLOOD PRESSURE: 70 MMHG

## 2024-03-26 DIAGNOSIS — K58.0 IRRITABLE BOWEL SYNDROME WITH DIARRHEA: Primary | ICD-10-CM

## 2024-03-26 DIAGNOSIS — M25.661 DECREASED RANGE OF MOTION (ROM) OF RIGHT KNEE: Primary | ICD-10-CM

## 2024-03-26 DIAGNOSIS — R29.898 WEAKNESS OF RIGHT LOWER EXTREMITY: ICD-10-CM

## 2024-03-26 DIAGNOSIS — M25.69 DECREASED RANGE OF MOTION OF TRUNK AND BACK: ICD-10-CM

## 2024-03-26 DIAGNOSIS — M62.81 WEAKNESS OF TRUNK MUSCULATURE: ICD-10-CM

## 2024-03-26 DIAGNOSIS — R29.898 DECREASED STRENGTH OF TRUNK AND BACK: ICD-10-CM

## 2024-03-26 PROCEDURE — 99999 PR PBB SHADOW E&M-EST. PATIENT-LVL III: CPT | Mod: PBBFAC,,, | Performed by: INTERNAL MEDICINE

## 2024-03-26 PROCEDURE — 99213 OFFICE O/P EST LOW 20 MIN: CPT | Mod: PBBFAC | Performed by: INTERNAL MEDICINE

## 2024-03-26 PROCEDURE — 97112 NEUROMUSCULAR REEDUCATION: CPT

## 2024-03-26 PROCEDURE — 99214 OFFICE O/P EST MOD 30 MIN: CPT | Mod: S$PBB,,, | Performed by: INTERNAL MEDICINE

## 2024-03-26 RX ORDER — HYDROCODONE BITARTRATE AND ACETAMINOPHEN 5; 325 MG/1; MG/1
1 TABLET ORAL EVERY 12 HOURS PRN
Qty: 60 TABLET | Refills: 0 | Status: SHIPPED | OUTPATIENT
Start: 2024-03-26 | End: 2024-04-30 | Stop reason: SDUPTHER

## 2024-03-26 NOTE — PROGRESS NOTES
"GENERAL GI PATIENT INTAKE:    COVID symptoms in the last 7 days (runny nose, sore throat, congestion, cough, fever): No  PCP: Kaykay Perales  If not PCP-  number given to establish [phone]: N/A    ALLERGIES REVIEWED:  N/A    CHIEF COMPLAINT:    Chief Complaint   Patient presents with    GI Problem     IBSD       VITAL SIGNS:  /70   Pulse 79   Ht 5' 3" (1.6 m)   Wt 62.6 kg (138 lb 0.1 oz)   BMI 24.45 kg/m²      Change in medical, surgical, family or social history: No      REVIEWED MEDICATION LIST RECONCILED INCLUDING ABOVE MEDS:  Yes     "

## 2024-03-26 NOTE — PROGRESS NOTES
Subjective:       Patient ID: Tiarra Norton is a 69 y.o. female.    Chief Complaint: GI Problem (IBSD)    HPI    69-year-old lady, my 1st visit with her since 2020..  But she remember seeing Dr. Kim possibly 30 years ago for irritable bowel syndrome her colonoscopy in 2016 was done for chronic diarrhea as biopsies were taken and they were negative for microscopic colitis.  She did see Dr. Jarvis in 2021 and Dr. Pang at [facility], but does not remember them prescribing anything    These problems go back possibly 50 years.  She remembers having issues even in college.  She does have remote cholecystectomy.    She starts by saying she needs help with the IBS.  She is dependent on Imodium for any significant quality life.  The days are quite variable but every day has loose stools.  Rarely she has accidents at night.  It definitely would be worse after fried foods or fatty foods which she avoids these.  The only time she ever has constipation is if she takes a significant number of Imodium.  She does not have significant abdominal pain but has mild cramps associated with her bowel movements.  Each day as multiple loose to small volume or high volume amounts.  Imodium does work in that she can take a trip for instance if she takes Imodium ahead of time.    Medications tried  Colestid  Creon   Questran  Bentyl  Probiotics  Fiber  Rifaximin   Pelvic floor therapy    I do not think she was ever tried on tricyclic antidepressant but she was previously on Lexapro and has recently been started on Cymbalta    Current  No probiotics now  Does take a fiber powder    Past Medical History:   Diagnosis Date    Cataract     Depression     Gestational diabetes     Hyperlipidemia     Hypertension     IBS (irritable bowel syndrome)     Thyroid disease        Review of patient's allergies indicates:  No Known Allergies     Family History   Problem Relation Age of Onset    Hypertension Mother     Irritable bowel syndrome Mother      "Hyperlipidemia Mother     Heart disease Father         mi    Hypertension Father     Cancer Father         prostate    Heart attack Father     Heart failure Father     Hyperlipidemia Father     Alcohol abuse Sister     Depression Sister     Epilepsy Son     Irritable bowel syndrome Sister     Alcohol abuse Sister     Ovarian cancer Maternal Aunt     Breast cancer Paternal Aunt     Breast cancer Maternal Aunt     Melanoma Neg Hx     Colon cancer Neg Hx     Stomach cancer Neg Hx     Esophageal cancer Neg Hx     Liver disease Neg Hx     Crohn's disease Neg Hx     Ulcerative colitis Neg Hx     Celiac disease Neg Hx     Stroke Neg Hx        Social History     Tobacco Use    Smoking status: Never    Smokeless tobacco: Never   Substance Use Topics    Alcohol use: Yes     Alcohol/week: 3.0 standard drinks of alcohol     Types: 3 Glasses of wine per week     Comment: weekends    Drug use: Yes     Types: Marijuana     Comment: occasionally        Review of Systems    CMP No results found for: "NA", "K", "CL", "CO2", "GLU", "BUN", "CREATININE", "CALCIUM", "PROT", "ALBUMIN", "BILITOT", "ALKPHOS", "AST", "ALT", "ANIONGAP", "ESTGFRAFRICA", "EGFRNONAA" and CBC No results found for: "WBC", "HGB", "HCT", "PLT"    No results found for this or any previous visit from the past 365 days.             Objective:      Physical Exam    Assessment & Plan:       Irritable bowel syndrome with diarrhea     Assessment.  IBS with diarrhea longstanding and a significant source of frustration for the patient.  I would like to try rifaximin again.  If that does not work I would like to try Questran again with twice a day dosing.  I have use Viberzi once on a patient who has had a prior cholecystectomy, I think that was before the warning came out about not using it in that circumstance.  Otherwise I know it is of the risk of pancreatitis.  I also wonder if we could still use a low dose of TCA in addition to her Cymbalta.  She is very frustrated by " this as willing to travel anywhere.  I did mentioned the name of Dr. Jevon levi but I am not sure if he is seeing patients in his role as  at in [place].  Her last colonoscopy was 5 years ago 1 tiny polyp was found.  The interval recommended at that time was 5 years but currently now the guidelines would say 70 year so I do not think we need to repeat the colonoscopy now.  But as I follow her might be reasonable to repeat again with random biopsies as was done in 2016.      This note was created with voice recognition dictation technology.  There may be errors that I did not see, detect or correct.      Tolu Rivas MD

## 2024-03-26 NOTE — PLAN OF CARE
Outpatient Therapy Updated Plan of Care     Visit Date: 3/21/2024    Name: Tiarra Norton  Clinic Number: [id number]    Therapy Diagnosis:   Encounter Diagnoses   Name Primary?    Decreased range of motion (ROM) of right knee Yes    Weakness of right lower extremity     Decreased range of motion of trunk and back     Decreased strength of trunk and back     Weakness of trunk musculature      Physician: Virgil Scott MD    Physician Orders: PT Eval and Treat   Medical Diagnosis from Referral:   Z96.651 (ICD-10-CM) - Status post total right knee replacement   M22.2X1 (ICD-10-CM) - Patellofemoral pain syndrome of right knee   M70.50 (ICD-10-CM) - Pes anserine bursitis      Evaluation Date: 12/21/2023  Authorization Period Expiration: 9/21/2024  Plan of Care Expiration: Updated to 5/21/2024  Visit # / Visits authorized: 11/20   Progress Note Due: 4/21/2024    Precautions: hx of TKA and menisectomy, spinal cord stimulator, scoliosis     Subjective     Update: Patient reports: she continues to feel like she improving and having less pain in both knee and back since ablation last week.      She was compliant with home exercise program.  Response to previous treatment: no adverse effects   Functional change: none yet     Pain: 2/10  Location: bilateral low back and R knee      Objective       Objective Measures updated at progress report unless specified.      Lumbar Range of Motion:     %   Flexion 90      Extension 50      Left Side Bending 50   Right Side Bending 50   Left rotation    60   Right Rotation    70    *= pain         GAIT DEVIATIONS: Tiarra displays decreased weight shift;antalgic gait;decreased step length;occasional unsteady gait     Range of Motion:   Knee Left active Left Passive   Flexion 133 140   Extension 3 5      Knee Right active Right Passive   Flexion 121 124   Extension 1 3         Lower Extremity Strength     Right LE   Left LE     Hip flexion: 4/5 Hip flexion: 4+/5   Knee extension: 4/5  Knee extension: 4+/5   Knee flexion: 4/5 Knee flexion: 4+/5   Hip IR: 4/5 Hip IR: 4/5   Hip ER: 4/5 Hip ER: 4/5   Hip extension:  4-/5 Hip extension: 4-/5   Hip abduction: 4-/5 Hip abduction: 4/5   Hip adduction: 4-/5 Hip adduction 4/5   Ankle dorsiflexion: 5/5 Ankle dorsiflexion: 5/5   Ankle plantarflexion: 5/5 Ankle plantarflexion: 5/5          Assessment     Update: Tiarra presents today with with continued reports of decreased pain levels overall. Reassessment performed with pt demo gains in all planes of lumbar spine ROM, improved R knee ROM, improved strength of core and bilateral LE's, improved tolerance to gait and functional mobility activities, and improved FOTO score. Will continue to benefit from skilled therapy to decrease pain levels and improve activity tolerance.         Tiarra Is progressing well towards her goals.   Patient prognosis is Good.      Patient will continue to benefit from skilled outpatient physical therapy to address the deficits listed in the problem list box on initial evaluation, provide pt/family education and to maximize pt's level of independence in the home and community environment.      Patient's spiritual, cultural and educational needs considered and pt agreeable to plan of care and goals.     Anticipated barriers to physical therapy: chronicity of condition, multiple treatment areas, hx of R TKA and menisectomy      GOALS: Short Term Goals:  4 weeks  1. Report decreased in pain at worse less than  <   / =  4  /10  to increase tolerance for functional activities.On going  2. Pt to increase B popliteal angle by greater than > or = 5 degrees in order to improve flexibility and posture. MET  3. Increased R LE MMT 1/2  to increase tolerance for ADL and work activities. MET  4. Pt to increase B Ely's Test by greater than  > or = 5degrees in order to improve flexibility and posture. MET  5. Pt to tolerate HEP to improve ROM and independence with ADL's. MET  6. Pt to improve range  "of motion by 25% to allow for improved functional mobility to allow for improvement in IADLs. MET     Long Term Goals: 8 weeks  1. Report decreased in pain at worse less than  <   / =  2  /10  to increase tolerance for functional mobility.  On going  2 .Pt to increase B popliteal angle by greater than > or = 10 degrees in order to improve flexibility and posture. On going  3. Increased R LE MMT 1 grade to increase tolerance for ADL and work activities.On going  4. Pt will report at 51% or better score on FOTO Lumbar spine survey  to demonstrate decrease in disability and improvement in back pain.On going  5. Pt to be Independent with HEP to improve ROM and independence with ADL's. On going  6. Pt to increase B Ely's Test by greater than > or = 10 degrees in order to improve flexibility and posture. On going  7. Pt to demonstrate negative Bridge Test in order to show improved core strength for lumbar stabilization. On going  8. Pt to improve range of motion by 75% to allow for improved functional mobility to allow for improvement in IADLs. On going    Reasons for Recertification of Therapy:  To allow patient to complete full allotment of visits so that they may achieve maximum therapeutic benefit      Plan     Updated Certification Period: 3/21/2024 to 5/21/2024  Recommended Treatment Plan: 2 times per week for 10 weeks:     - Patient education  - Therapeutic exercise  - Manual therapy  - Performance testing   - Neuromuscular Re-education  - Therapeutic activity   - Modalities    Pt may be seen by PTA as part of the rehabilitation team.     Therapist: Zi Anaya, PT  3/26/2024    "I certify the need for these services furnished under this plan of treatment and while under my care."    ____________________________________  Physician/Referring Practitioner    _______________  Date of Signature    Zi Anaya, PT  3/21/2024      I CERTIFY THE NEED FOR THESE SERVICES FURNISHED UNDER THIS PLAN OF TREATMENT AND " WHILE UNDER MY CARE    Physician's comments:        Physician's Signature: ___________________________________________________

## 2024-03-26 NOTE — PROGRESS NOTES
[facility]   Physical Therapy Treatment Note      Name: Tiarra Norton  [facility] Number: [id number]    Therapy Diagnosis:   Encounter Diagnoses   Name Primary?    Decreased range of motion (ROM) of right knee Yes    Weakness of right lower extremity     Decreased range of motion of trunk and back     Decreased strength of trunk and back     Weakness of trunk musculature        Physician: Virgil Scott MD    Visit Date: 3/26/2024    Physician Orders: PT Eval and Treat   Medical Diagnosis from Referral:   Z96.651 (ICD-10-CM) - Status post total right knee replacement   M22.2X1 (ICD-10-CM) - Patellofemoral pain syndrome of right knee   M70.50 (ICD-10-CM) - Pes anserine bursitis      Evaluation Date: 12/21/2023  Authorization Period Expiration: 9/21/2024  Plan of Care Expiration: Updated to 5/21/2024  Visit # / Visits authorized: 12/20   Progress Note Due: 4/21/2024  FOTO: 2/3      Precautions: hx of TKA and menisectomy, spinal cord stimulator, scoliosis     Time In: 10:00 am  Time Out: 11:00 am   Total Billable Time: 30 minutes (1:1)  Total Appointment Time (timed & untimed codes): 60 minutes    PTA Visit #: 0/5       Subjective     Patient reports: knee and back both are feeling better    She was compliant with home exercise program.  Response to previous treatment: no adverse effects   Functional change: improved tolerance to functional mobility activities with decreased pain    Pain: 2/10  Location: bilateral low back and R knee     Objective      Objective Measures updated at progress report unless specified.     Lumbar Range of Motion:     %   Flexion 90      Extension 50      Left Side Bending 50   Right Side Bending 50   Left rotation    60   Right Rotation    70    *= pain           Range of Motion:   Knee Left active Left Passive   Flexion 133 140   Extension 3 5      Knee Right active Right Passive   Flexion 121 124   Extension 1 3         Lower Extremity Strength     Right LE  "  Left LE     Hip flexion: 4/5 Hip flexion: 4+/5   Knee extension: 4/5 Knee extension: 4+/5   Knee flexion: 4/5 Knee flexion: 4+/5   Hip IR: 4/5 Hip IR: 4/5   Hip ER: 4/5 Hip ER: 4/5   Hip extension:  4-/5 Hip extension: 4-/5   Hip abduction: 4-/5 Hip abduction: 4/5   Hip adduction: 4-/5 Hip adduction 4/5   Ankle dorsiflexion: 5/5 Ankle dorsiflexion: 5/5   Ankle plantarflexion: 5/5 Ankle plantarflexion: 5/5          Treatment     Tiarra received the treatments listed below:      therapeutic exercises to develop strength, endurance, ROM, flexibility, posture, and core stabilization for 25 minutes including:    Recumbent bike lv. 4 x 5 min for endurance and knee ROM  PPT 15x5"  Bridge 2x10x3"  EIL 2x10  DKTC 15x5"'  Open books x15 ea  Hip flexor stretch 2x1' ea  Leg press # 2x10  Leg press SL 70# 2x10    NP:  EIS 2x10    manual therapy techniques: n/a applied to the: R knee for 0 minutes, including:        neuromuscular re-education activities to improve: Balance, Coordination, Kinesthetic, Proprioception, and Posture for 30 minutes. The following activities were included:    Sit to stands GTB around knees 2x10  Side stepping RTB 2x12 steps  Monster walks rtb 2x12 steps  Single leg balance 3x30" R only  Step ups fwd/lateral 2x10x3" ea R only  Medx trunk extension 68# x20 increase next visit.  Medx trunk rotation 28# x15, increase next visit  Heel raises 2x15      therapeutic activities to improve functional performance for 0  minutes, including:      cold pack for 5 minutes to R knee and low back .    Patient Education and Home Exercises       Education provided:   - HEP, POC    Written Home Exercises Provided: Patient instructed to cont prior HEP. Exercises were reviewed and Tiarra was able to demonstrate them prior to the end of the session.  Tiarra demonstrated good  understanding of the education provided. See Electronic Medical Record under Patient Instructions for exercises provided during therapy " sessions    Assessment     Tiarra presents today with minimal complaints of pain. Continuation and progression of dynamic core and LE strengthening and mobility exercises with increased focus on dynamic standing and balance activities today with good tolerance. Continue to progress as tolerated.       Tiarra Is progressing well towards her goals.   Patient prognosis is Good.     Patient will continue to benefit from skilled outpatient physical therapy to address the deficits listed in the problem list box on initial evaluation, provide pt/family education and to maximize pt's level of independence in the home and community environment.     Patient's spiritual, cultural and educational needs considered and pt agreeable to plan of care and goals.     Anticipated barriers to physical therapy: chronicity of condition, multiple treatment areas, hx of R TKA and menisectomy     GOALS: Short Term Goals:  4 weeks  1. Report decreased in pain at worse less than  <   / =  4  /10  to increase tolerance for functional activities.On going  2. Pt to increase B popliteal angle by greater than > or = 5 degrees in order to improve flexibility and posture. MET  3. Increased R LE MMT 1/2  to increase tolerance for ADL and work activities. MET  4. Pt to increase B Ely's Test by greater than  > or = 5degrees in order to improve flexibility and posture. MET  5. Pt to tolerate HEP to improve ROM and independence with ADL's. MET  6. Pt to improve range of motion by 25% to allow for improved functional mobility to allow for improvement in IADLs. MET     Long Term Goals: 8 weeks  1. Report decreased in pain at worse less than  <   / =  2  /10  to increase tolerance for functional mobility.  On going  2 .Pt to increase B popliteal angle by greater than > or = 10 degrees in order to improve flexibility and posture. On going  3. Increased R LE MMT 1 grade to increase tolerance for ADL and work activities.On going  4. Pt will report at 51% or  better score on FOTO Lumbar spine survey  to demonstrate decrease in disability and improvement in back pain.On going  5. Pt to be Independent with HEP to improve ROM and independence with ADL's. On going  6. Pt to increase B Ely's Test by greater than > or = 10 degrees in order to improve flexibility and posture. On going  7. Pt to demonstrate negative Bridge Test in order to show improved core strength for lumbar stabilization. On going  8. Pt to improve range of motion by 75% to allow for improved functional mobility to allow for improvement in IADLs. On going        Plan   Plan of care Certification: 12/21/2023 to 5/21/2023.     Outpatient Physical Therapy 2 times weekly for 10 weeks to include the following interventions: Cervical/Lumbar Traction, Gait Training, Manual Therapy, Moist Heat/ Ice, Neuromuscular Re-ed, Patient Education, Self Care, Therapeutic Activities, and Therapeutic Exercise. Dry needtanisha Anaya, PT   3/26/2024

## 2024-03-28 ENCOUNTER — PATIENT MESSAGE (OUTPATIENT)
Dept: GASTROENTEROLOGY | Facility: CLINIC | Age: 69
End: 2024-03-28
Payer: MEDICARE

## 2024-04-04 ENCOUNTER — CLINICAL SUPPORT (OUTPATIENT)
Dept: REHABILITATION | Facility: OTHER | Age: 69
End: 2024-04-04
Payer: MEDICARE

## 2024-04-04 DIAGNOSIS — M25.69 DECREASED RANGE OF MOTION OF TRUNK AND BACK: ICD-10-CM

## 2024-04-04 DIAGNOSIS — R29.898 DECREASED STRENGTH OF TRUNK AND BACK: ICD-10-CM

## 2024-04-04 DIAGNOSIS — M25.661 DECREASED RANGE OF MOTION (ROM) OF RIGHT KNEE: Primary | ICD-10-CM

## 2024-04-04 DIAGNOSIS — M62.81 WEAKNESS OF TRUNK MUSCULATURE: ICD-10-CM

## 2024-04-04 DIAGNOSIS — R29.898 WEAKNESS OF RIGHT LOWER EXTREMITY: ICD-10-CM

## 2024-04-04 PROCEDURE — 97110 THERAPEUTIC EXERCISES: CPT

## 2024-04-04 PROCEDURE — 97112 NEUROMUSCULAR REEDUCATION: CPT

## 2024-04-04 NOTE — PROGRESS NOTES
[facility]   Physical Therapy Treatment Note      Name: Tiarra Norton  [facility] Number: [id number]    Therapy Diagnosis:   Encounter Diagnoses   Name Primary?    Decreased range of motion (ROM) of right knee Yes    Weakness of right lower extremity     Decreased range of motion of trunk and back     Decreased strength of trunk and back     Weakness of trunk musculature        Physician: Virgil Scott MD    Visit Date: 4/4/2024    Physician Orders: PT Eval and Treat   Medical Diagnosis from Referral:   Z96.651 (ICD-10-CM) - Status post total right knee replacement   M22.2X1 (ICD-10-CM) - Patellofemoral pain syndrome of right knee   M70.50 (ICD-10-CM) - Pes anserine bursitis      Evaluation Date: 12/21/2023  Authorization Period Expiration: 9/21/2024  Plan of Care Expiration: Updated to 5/21/2024  Visit # / Visits authorized: 13/20   Progress Note Due: 4/21/2024  FOTO: 2/3      Precautions: hx of TKA and menisectomy, spinal cord stimulator, scoliosis     Time In: 10:30 am  Time Out: 11:30 am   Total Billable Time: 55 minutes (1:1)  Total Appointment Time (timed & untimed codes): 60 minutes    PTA Visit #: 0/5       Subjective     Patient reports: knee continues to feel better, but back is bothering her more today.     She was compliant with home exercise program.  Response to previous treatment: no adverse effects   Functional change: improved tolerance to functional mobility activities with decreased pain    Pain: 2/10  Location: bilateral low back and R knee     Objective      Objective Measures updated at progress report unless specified.     Lumbar Range of Motion:     %   Flexion 90      Extension 50      Left Side Bending 50   Right Side Bending 50   Left rotation    60   Right Rotation    70    *= pain           Range of Motion:   Knee Left active Left Passive   Flexion 133 140   Extension 3 5      Knee Right active Right Passive   Flexion 121 124   Extension 1 3         Lower  "Extremity Strength     Right LE   Left LE     Hip flexion: 4/5 Hip flexion: 4+/5   Knee extension: 4/5 Knee extension: 4+/5   Knee flexion: 4/5 Knee flexion: 4+/5   Hip IR: 4/5 Hip IR: 4/5   Hip ER: 4/5 Hip ER: 4/5   Hip extension:  4-/5 Hip extension: 4-/5   Hip abduction: 4-/5 Hip abduction: 4/5   Hip adduction: 4-/5 Hip adduction 4/5   Ankle dorsiflexion: 5/5 Ankle dorsiflexion: 5/5   Ankle plantarflexion: 5/5 Ankle plantarflexion: 5/5          Treatment     Tiarra received the treatments listed below:      therapeutic exercises to develop strength, endurance, ROM, flexibility, posture, and core stabilization for 25 minutes including:    Recumbent bike lv. 4 x 5 min for endurance and knee ROM  PPT 15x5"  Bridge 2x10x3"  EIL 2x10  DKTC 15x5"'  Open books x15 ea  Hip flexor stretch 2x1' ea  Leg press # 2x10  Leg press SL 70# 2x10    NP:  EIS 2x10    manual therapy techniques: n/a applied to the: R knee for 0 minutes, including:        neuromuscular re-education activities to improve: Balance, Coordination, Kinesthetic, Proprioception, and Posture for 30 minutes. The following activities were included:    Sit to stands GTB around knees 2x10  Side stepping RTB 2x12 steps  Monster walks rtb 2x12 steps  Single leg balance 3x30" R only  Step ups fwd/lateral 2x10x3" ea R only  Medx trunk extension 72# x20 increase 5% next visit.  Medx trunk rotation 30# x15, increase reps next visit  Heel raises 2x15      therapeutic activities to improve functional performance for 0  minutes, including:      cold pack for 5 minutes to R knee and low back .    Patient Education and Home Exercises       Education provided:   - HEP, POC    Written Home Exercises Provided: Patient instructed to cont prior HEP. Exercises were reviewed and Tiarra was able to demonstrate them prior to the end of the session.  Tiarra demonstrated good  understanding of the education provided. See Electronic Medical Record under Patient Instructions for " exercises provided during therapy sessions    Assessment     Tiarra presents today with reports of improved knee pain, but worsening back pain. After completion of guided strengthening and mobility exercises pt reported decreased pain. Continue to progress as tolerated.       Tiarra Is progressing well towards her goals.   Patient prognosis is Good.     Patient will continue to benefit from skilled outpatient physical therapy to address the deficits listed in the problem list box on initial evaluation, provide pt/family education and to maximize pt's level of independence in the home and community environment.     Patient's spiritual, cultural and educational needs considered and pt agreeable to plan of care and goals.     Anticipated barriers to physical therapy: chronicity of condition, multiple treatment areas, hx of R TKA and menisectomy     GOALS: Short Term Goals:  4 weeks  1. Report decreased in pain at worse less than  <   / =  4  /10  to increase tolerance for functional activities.On going  2. Pt to increase B popliteal angle by greater than > or = 5 degrees in order to improve flexibility and posture. MET  3. Increased R LE MMT 1/2  to increase tolerance for ADL and work activities. MET  4. Pt to increase B Ely's Test by greater than  > or = 5degrees in order to improve flexibility and posture. MET  5. Pt to tolerate HEP to improve ROM and independence with ADL's. MET  6. Pt to improve range of motion by 25% to allow for improved functional mobility to allow for improvement in IADLs. MET     Long Term Goals: 8 weeks  1. Report decreased in pain at worse less than  <   / =  2  /10  to increase tolerance for functional mobility.  On going  2 .Pt to increase B popliteal angle by greater than > or = 10 degrees in order to improve flexibility and posture. On going  3. Increased R LE MMT 1 grade to increase tolerance for ADL and work activities.On going  4. Pt will report at 51% or better score on FOTO Lumbar  spine survey  to demonstrate decrease in disability and improvement in back pain.On going  5. Pt to be Independent with HEP to improve ROM and independence with ADL's. On going  6. Pt to increase B Ely's Test by greater than > or = 10 degrees in order to improve flexibility and posture. On going  7. Pt to demonstrate negative Bridge Test in order to show improved core strength for lumbar stabilization. On going  8. Pt to improve range of motion by 75% to allow for improved functional mobility to allow for improvement in IADLs. On going        Plan   Plan of care Certification: 12/21/2023 to 5/21/2023.     Outpatient Physical Therapy 2 times weekly for 10 weeks to include the following interventions: Cervical/Lumbar Traction, Gait Training, Manual Therapy, Moist Heat/ Ice, Neuromuscular Re-ed, Patient Education, Self Care, Therapeutic Activities, and Therapeutic Exercise. Marybeth Anaya, PT   4/4/2024

## 2024-04-08 ENCOUNTER — TELEPHONE (OUTPATIENT)
Dept: OPHTHALMOLOGY | Facility: CLINIC | Age: 69
End: 2024-04-08
Payer: MEDICARE

## 2024-04-08 DIAGNOSIS — H57.813 BROW PTOSIS, BILATERAL: Primary | ICD-10-CM

## 2024-04-08 NOTE — PROGRESS NOTES
Ext pix and    Ptosis vf done ou    Rel & Fix =  good ou     Coop       good     Patient has no allergies to latex or adhesives at this time    Jthomas

## 2024-04-08 NOTE — TELEPHONE ENCOUNTER
----- Message from Rahel Odonnell sent at 4/5/2024  2:35 PM CDT -----  Regarding: orders  Hello,    I have a patient that will need an order in for ptosis vf and ext done on 3/21/24    So I can close the encounter.     Thanks,  Katie     Have a great weekend!

## 2024-04-09 ENCOUNTER — HOSPITAL ENCOUNTER (OUTPATIENT)
Dept: RADIOLOGY | Facility: HOSPITAL | Age: 69
Discharge: HOME | End: 2024-04-09
Attending: OPHTHALMOLOGY
Payer: MEDICARE

## 2024-04-09 ENCOUNTER — CLINICAL SUPPORT (OUTPATIENT)
Dept: REHABILITATION | Facility: OTHER | Age: 69
End: 2024-04-09
Payer: MEDICARE

## 2024-04-09 DIAGNOSIS — M25.69 DECREASED RANGE OF MOTION OF TRUNK AND BACK: ICD-10-CM

## 2024-04-09 DIAGNOSIS — H05.20 PROPTOSIS: ICD-10-CM

## 2024-04-09 DIAGNOSIS — R29.898 DECREASED STRENGTH OF TRUNK AND BACK: ICD-10-CM

## 2024-04-09 DIAGNOSIS — R29.898 WEAKNESS OF RIGHT LOWER EXTREMITY: ICD-10-CM

## 2024-04-09 DIAGNOSIS — M62.81 WEAKNESS OF TRUNK MUSCULATURE: ICD-10-CM

## 2024-04-09 DIAGNOSIS — M25.661 DECREASED RANGE OF MOTION (ROM) OF RIGHT KNEE: Primary | ICD-10-CM

## 2024-04-09 PROCEDURE — 70486 CT MAXILLOFACIAL W/O DYE: CPT | Mod: 26,,, | Performed by: RADIOLOGY

## 2024-04-09 PROCEDURE — 70486 CT MAXILLOFACIAL W/O DYE: CPT | Mod: TC

## 2024-04-09 PROCEDURE — 97110 THERAPEUTIC EXERCISES: CPT | Mod: CQ

## 2024-04-09 PROCEDURE — 97112 NEUROMUSCULAR REEDUCATION: CPT | Mod: CQ

## 2024-04-09 NOTE — PROGRESS NOTES
[facility]   Physical Therapy Treatment Note      Name: Tiarra Norton  [facility] Number: [id number]    Therapy Diagnosis:   Encounter Diagnoses   Name Primary?    Decreased range of motion (ROM) of right knee Yes    Weakness of right lower extremity     Decreased range of motion of trunk and back     Decreased strength of trunk and back     Weakness of trunk musculature          Physician: Virgil Scott MD    Visit Date: 4/9/2024    Physician Orders: PT Eval and Treat   Medical Diagnosis from Referral:   Z96.651 (ICD-10-CM) - Status post total right knee replacement   M22.2X1 (ICD-10-CM) - Patellofemoral pain syndrome of right knee   M70.50 (ICD-10-CM) - Pes anserine bursitis      Evaluation Date: 12/21/2023  Authorization Period Expiration: 9/21/2024  Plan of Care Expiration: Updated to 5/21/2024  Visit # / Visits authorized: 14/20   Progress Note Due: 4/21/2024  FOTO: 2/3      Precautions: hx of TKA and menisectomy, spinal cord stimulator, scoliosis     Time In: 9:00 am  Time Out: 10:00 am   Total Billable Time: 55 minutes (1:1)  Total Appointment Time (timed & untimed codes): 60 minutes    PTA Visit #: 0/5   Subjective   Patient reports: more knee pain than back pain today, has been doing a lot of yard work.    She was compliant with home exercise program.  Response to previous treatment: no adverse effects   Functional change: improved tolerance to functional mobility activities with decreased pain    Pain: 5/10 R knee, 3/10 LB  Location: bilateral low back and R knee   Objective    Lumbar Range of Motion:     %   Flexion 90      Extension 50      Left Side Bending 50   Right Side Bending 50   Left rotation    60   Right Rotation    70    *= pain           Range of Motion:   Knee Left active Left Passive   Flexion 133 140   Extension 3 5      Knee Right active Right Passive   Flexion 121 124   Extension 1 3         Lower Extremity Strength     Right LE   Left LE     Hip flexion:  "4/5 Hip flexion: 4+/5   Knee extension: 4/5 Knee extension: 4+/5   Knee flexion: 4/5 Knee flexion: 4+/5   Hip IR: 4/5 Hip IR: 4/5   Hip ER: 4/5 Hip ER: 4/5   Hip extension:  4-/5 Hip extension: 4-/5   Hip abduction: 4-/5 Hip abduction: 4/5   Hip adduction: 4-/5 Hip adduction 4/5   Ankle dorsiflexion: 5/5 Ankle dorsiflexion: 5/5   Ankle plantarflexion: 5/5 Ankle plantarflexion: 5/5          Treatment   Tiarra received the treatments listed below:      Therapeutic exercises to develop strength, endurance, ROM, flexibility, posture, and core stabilization for 25 minutes including:    Recumbent bike lv. 4 x 5 min for endurance and knee ROM  PPT 15x5"  Bridge 2x10x3"  EIL 2x10  DKTC 15x5"'  Open books x15,3" ea  Hip flexor stretch 2x1' ea  Leg press # 2x10  Leg press SL 70# 2x10    NP:  EIS 2x10    manual therapy techniques: n/a applied to the: R knee for 0 minutes, including:        Neuromuscular re-education activities to improve: Balance, Coordination, Kinesthetic, Proprioception, and Posture for 30 minutes. The following activities were included:    Sit to stands GTB around knees 2x10  Side stepping GTB 2x12 steps  Monster walks GTB 2x12 steps  Single leg balance 3x30" R only  Step ups fwd/lateral  6 in step 2x10x3" ea R only   Medx trunk extension 74# x12 (decrease weight NV)  Medx trunk rotation 30# x20 increase weight   Heel raises 2x15 (add weight NV)      therapeutic activities to improve functional performance for 0  minutes, including:      cold pack for 5 minutes to R knee and low back    Patient Education and Home Exercises     Education provided:   - HEP, POC    Written Home Exercises Provided: Patient instructed to cont prior HEP. Exercises were reviewed and Tiarra was able to demonstrate them prior to the end of the session.  Tiarra demonstrated good  understanding of the education provided. See Electronic Medical Record under Patient Instructions for exercises provided during therapy " sessions    Assessment   Despite increased R knee pain Tiarra able to perform all exercises without adverse effects. Minimal cueing required to increase hip hinge with standing standing side steps and monster walks, progress to GTB NV to provide more appropriate challenge. Continue to progress as tolerated.       Tiarra Is progressing well towards her goals.   Patient prognosis is Good.     Patient will continue to benefit from skilled outpatient physical therapy to address the deficits listed in the problem list box on initial evaluation, provide pt/family education and to maximize pt's level of independence in the home and community environment.     Patient's spiritual, cultural and educational needs considered and pt agreeable to plan of care and goals.     Anticipated barriers to physical therapy: chronicity of condition, multiple treatment areas, hx of R TKA and menisectomy     GOALS: Short Term Goals:  4 weeks  1. Report decreased in pain at worse less than  <   / =  4  /10  to increase tolerance for functional activities.On going  2. Pt to increase B popliteal angle by greater than > or = 5 degrees in order to improve flexibility and posture. MET  3. Increased R LE MMT 1/2  to increase tolerance for ADL and work activities. MET  4. Pt to increase B Ely's Test by greater than  > or = 5degrees in order to improve flexibility and posture. MET  5. Pt to tolerate HEP to improve ROM and independence with ADL's. MET  6. Pt to improve range of motion by 25% to allow for improved functional mobility to allow for improvement in IADLs. MET     Long Term Goals: 8 weeks  1. Report decreased in pain at worse less than  <   / =  2  /10  to increase tolerance for functional mobility.  On going  2 .Pt to increase B popliteal angle by greater than > or = 10 degrees in order to improve flexibility and posture. On going  3. Increased R LE MMT 1 grade to increase tolerance for ADL and work activities.On going  4. Pt will report at  51% or better score on FOTO Lumbar spine survey  to demonstrate decrease in disability and improvement in back pain.On going  5. Pt to be Independent with HEP to improve ROM and independence with ADL's. On going  6. Pt to increase B Ely's Test by greater than > or = 10 degrees in order to improve flexibility and posture. On going  7. Pt to demonstrate negative Bridge Test in order to show improved core strength for lumbar stabilization. On going  8. Pt to improve range of motion by 75% to allow for improved functional mobility to allow for improvement in IADLs. On going        Plan   Plan of care Certification: 12/21/2023 to 5/21/2023.     Outpatient Physical Therapy 2 times weekly for 10 weeks to include the following interventions: Cervical/Lumbar Traction, Gait Training, Manual Therapy, Moist Heat/ Ice, Neuromuscular Re-ed, Patient Education, Self Care, Therapeutic Activities, and Therapeutic Exercise. Marybeth Metzger, PTA   4/9/2024

## 2024-04-11 ENCOUNTER — CLINICAL SUPPORT (OUTPATIENT)
Dept: REHABILITATION | Facility: OTHER | Age: 69
End: 2024-04-11
Payer: MEDICARE

## 2024-04-11 DIAGNOSIS — R29.898 WEAKNESS OF RIGHT LOWER EXTREMITY: ICD-10-CM

## 2024-04-11 DIAGNOSIS — R29.898 DECREASED STRENGTH OF TRUNK AND BACK: ICD-10-CM

## 2024-04-11 DIAGNOSIS — M25.69 DECREASED RANGE OF MOTION OF TRUNK AND BACK: ICD-10-CM

## 2024-04-11 DIAGNOSIS — M25.661 DECREASED RANGE OF MOTION (ROM) OF RIGHT KNEE: Primary | ICD-10-CM

## 2024-04-11 DIAGNOSIS — M62.81 WEAKNESS OF TRUNK MUSCULATURE: ICD-10-CM

## 2024-04-11 PROCEDURE — 97112 NEUROMUSCULAR REEDUCATION: CPT

## 2024-04-11 PROCEDURE — 97110 THERAPEUTIC EXERCISES: CPT

## 2024-04-11 NOTE — PROGRESS NOTES
[facility]   Physical Therapy Treatment Note      Name: Tiarra Norton  [facility] Number: [id number]    Therapy Diagnosis:   Encounter Diagnoses   Name Primary?    Decreased range of motion (ROM) of right knee Yes    Weakness of right lower extremity     Decreased range of motion of trunk and back     Decreased strength of trunk and back     Weakness of trunk musculature        Physician: Virgil Scott MD    Visit Date: 4/11/2024    Physician Orders: PT Eval and Treat   Medical Diagnosis from Referral:   Z96.651 (ICD-10-CM) - Status post total right knee replacement   M22.2X1 (ICD-10-CM) - Patellofemoral pain syndrome of right knee   M70.50 (ICD-10-CM) - Pes anserine bursitis      Evaluation Date: 12/21/2023  Authorization Period Expiration: 9/21/2024  Plan of Care Expiration: Updated to 5/21/2024  Visit # / Visits authorized: 15/20   Progress Note Due: 4/21/2024  FOTO: 2/3      Precautions: hx of TKA and menisectomy, spinal cord stimulator, scoliosis     Time In: 2:00 pm  Time Out: 3:00 pm   Total Billable Time: 55 minutes (1:1)  Total Appointment Time (timed & untimed codes): 60 minutes    PTA Visit #: 0/5   Subjective   Patient reports: knee and back are both feeling better than last visit, there is still pain but not as bad as the other day.    She was compliant with home exercise program.  Response to previous treatment: no adverse effects   Functional change: improved tolerance to functional mobility activities with decreased pain    Pain: 3/10 R knee, 3/10 LB  Location: bilateral low back and R knee   Objective    Lumbar Range of Motion:     %   Flexion 90      Extension 50      Left Side Bending 50   Right Side Bending 50   Left rotation    60   Right Rotation    70    *= pain           Range of Motion:   Knee Left active Left Passive   Flexion 133 140   Extension 3 5      Knee Right active Right Passive   Flexion 121 124   Extension 1 3         Lower Extremity Strength    "  Right LE   Left LE     Hip flexion: 4/5 Hip flexion: 4+/5   Knee extension: 4/5 Knee extension: 4+/5   Knee flexion: 4/5 Knee flexion: 4+/5   Hip IR: 4/5 Hip IR: 4/5   Hip ER: 4/5 Hip ER: 4/5   Hip extension:  4-/5 Hip extension: 4-/5   Hip abduction: 4-/5 Hip abduction: 4/5   Hip adduction: 4-/5 Hip adduction 4/5   Ankle dorsiflexion: 5/5 Ankle dorsiflexion: 5/5   Ankle plantarflexion: 5/5 Ankle plantarflexion: 5/5          Treatment   Tiarra received the treatments listed below:      Therapeutic exercises to develop strength, endurance, ROM, flexibility, posture, and core stabilization for 25 minutes including:    Recumbent bike lv. 4 x 5 min for endurance and knee ROM  PPT 15x5"  Bridge 2x10x3"  EIL 2x10  DKTC 15x5"'  Open books x15,3" ea  Hip flexor stretch 2x1' ea  Leg press # 2x10  Leg press SL 70# 2x10    NP:  EIS 2x10    manual therapy techniques: n/a applied to the: R knee for 0 minutes, including:        Neuromuscular re-education activities to improve: Balance, Coordination, Kinesthetic, Proprioception, and Posture for 30 minutes. The following activities were included:    Sit to stands GTB around knees 2x10  Side stepping GTB 2x12 steps  Monster walks GTB 2x12 steps  Single leg balance 3x30" R only  Step ups fwd/lateral  6 in step 2x10x3" ea R only   Medx trunk extension 74# x12 (decrease weight NV)  Medx trunk rotation 30# x20 increase weight   Heel raises 2x15 (add weight NV)      therapeutic activities to improve functional performance for 0  minutes, including:      cold pack for 5 minutes to R knee and low back    Patient Education and Home Exercises     Education provided:   - HEP, POC    Written Home Exercises Provided: Patient instructed to cont prior HEP. Exercises were reviewed and Tiarra was able to demonstrate them prior to the end of the session.  Tiarra demonstrated good  understanding of the education provided. See Electronic Medical Record under Patient Instructions for exercises " provided during therapy sessions    Assessment   Tiarra presents today with reports of decreased pain levels.Continuation and progression of dynamic strengthening and mobility exercises with good tolerance. Continue to progress as tolerated.     Tiarra Is progressing well towards her goals.   Patient prognosis is Good.     Patient will continue to benefit from skilled outpatient physical therapy to address the deficits listed in the problem list box on initial evaluation, provide pt/family education and to maximize pt's level of independence in the home and community environment.     Patient's spiritual, cultural and educational needs considered and pt agreeable to plan of care and goals.     Anticipated barriers to physical therapy: chronicity of condition, multiple treatment areas, hx of R TKA and menisectomy     GOALS: Short Term Goals:  4 weeks  1. Report decreased in pain at worse less than  <   / =  4  /10  to increase tolerance for functional activities.On going  2. Pt to increase B popliteal angle by greater than > or = 5 degrees in order to improve flexibility and posture. MET  3. Increased R LE MMT 1/2  to increase tolerance for ADL and work activities. MET  4. Pt to increase B Ely's Test by greater than  > or = 5degrees in order to improve flexibility and posture. MET  5. Pt to tolerate HEP to improve ROM and independence with ADL's. MET  6. Pt to improve range of motion by 25% to allow for improved functional mobility to allow for improvement in IADLs. MET     Long Term Goals: 8 weeks  1. Report decreased in pain at worse less than  <   / =  2  /10  to increase tolerance for functional mobility.  On going  2 .Pt to increase B popliteal angle by greater than > or = 10 degrees in order to improve flexibility and posture. On going  3. Increased R LE MMT 1 grade to increase tolerance for ADL and work activities.On going  4. Pt will report at 51% or better score on FOTO Lumbar spine survey  to demonstrate  decrease in disability and improvement in back pain.On going  5. Pt to be Independent with HEP to improve ROM and independence with ADL's. On going  6. Pt to increase B Ely's Test by greater than > or = 10 degrees in order to improve flexibility and posture. On going  7. Pt to demonstrate negative Bridge Test in order to show improved core strength for lumbar stabilization. On going  8. Pt to improve range of motion by 75% to allow for improved functional mobility to allow for improvement in IADLs. On going        Plan   Plan of care Certification: 12/21/2023 to 5/21/2023.     Outpatient Physical Therapy 2 times weekly for 10 weeks to include the following interventions: Cervical/Lumbar Traction, Gait Training, Manual Therapy, Moist Heat/ Ice, Neuromuscular Re-ed, Patient Education, Self Care, Therapeutic Activities, and Therapeutic Exercise. Marybeth Anaya, PT   4/11/2024

## 2024-04-13 ENCOUNTER — PATIENT MESSAGE (OUTPATIENT)
Dept: OPHTHALMOLOGY | Facility: CLINIC | Age: 69
End: 2024-04-13
Payer: MEDICARE

## 2024-04-14 ENCOUNTER — PATIENT MESSAGE (OUTPATIENT)
Dept: GASTROENTEROLOGY | Facility: CLINIC | Age: 69
End: 2024-04-14
Payer: MEDICARE

## 2024-04-15 ENCOUNTER — CLINICAL SUPPORT (OUTPATIENT)
Dept: REHABILITATION | Facility: OTHER | Age: 69
End: 2024-04-15
Payer: MEDICARE

## 2024-04-15 ENCOUNTER — PATIENT MESSAGE (OUTPATIENT)
Dept: OPHTHALMOLOGY | Facility: CLINIC | Age: 69
End: 2024-04-15
Payer: MEDICARE

## 2024-04-15 DIAGNOSIS — M62.81 WEAKNESS OF TRUNK MUSCULATURE: ICD-10-CM

## 2024-04-15 DIAGNOSIS — M25.661 DECREASED RANGE OF MOTION (ROM) OF RIGHT KNEE: Primary | ICD-10-CM

## 2024-04-15 DIAGNOSIS — M25.69 DECREASED RANGE OF MOTION OF TRUNK AND BACK: ICD-10-CM

## 2024-04-15 DIAGNOSIS — R29.898 DECREASED STRENGTH OF TRUNK AND BACK: ICD-10-CM

## 2024-04-15 DIAGNOSIS — R29.898 WEAKNESS OF RIGHT LOWER EXTREMITY: ICD-10-CM

## 2024-04-15 PROCEDURE — 97112 NEUROMUSCULAR REEDUCATION: CPT

## 2024-04-15 PROCEDURE — 97110 THERAPEUTIC EXERCISES: CPT

## 2024-04-16 ENCOUNTER — PATIENT MESSAGE (OUTPATIENT)
Dept: GASTROENTEROLOGY | Facility: CLINIC | Age: 69
End: 2024-04-16
Payer: MEDICARE

## 2024-04-16 ENCOUNTER — PATIENT MESSAGE (OUTPATIENT)
Dept: SLEEP MEDICINE | Facility: CLINIC | Age: 69
End: 2024-04-16
Payer: MEDICARE

## 2024-04-17 DIAGNOSIS — G47.30 SLEEP APNEA, UNSPECIFIED TYPE: Primary | ICD-10-CM

## 2024-04-17 NOTE — PROGRESS NOTES
I will order the overnight study for you, as it is more sensitive, and can look for more sleep disorders.  You do it in [facility] or [facility].    Sleep lab schedule is very close to your natural sleep schedule, so I hope you will be able to sleep comfortably there; if you take any sleep aids, it is okay to bring it to the sleep lab.    SLEEP LAB ([facility]ox[facility] Culver) will contact you to schedulethe sleep study. Their number is [phone] (ext 2). Please call them if you do not hear from them in 2 weeks from now.  The [facility] Lab is located on 7th floor of the [facility] (for home and in lab studies); [place] lab is located in [facility] ( in lab studies only).    SLEEP CLINIC (my assistant) will call you when the sleep study results are ready or I will message you through the portal with the results as we have discussed - if you have not heard from us by 2 weeks from the date of the study, or you can use [facility] to contact me.    Our clinic phone number is [phone] (ext 1)       You are advised to abstain from driving should you feel sleepy or drowsy.

## 2024-04-18 ENCOUNTER — TELEPHONE (OUTPATIENT)
Dept: SLEEP MEDICINE | Facility: OTHER | Age: 69
End: 2024-04-18
Payer: MEDICARE

## 2024-04-18 ENCOUNTER — PATIENT MESSAGE (OUTPATIENT)
Dept: GASTROENTEROLOGY | Facility: CLINIC | Age: 69
End: 2024-04-18
Payer: MEDICARE

## 2024-04-19 ENCOUNTER — TELEPHONE (OUTPATIENT)
Dept: SLEEP MEDICINE | Facility: OTHER | Age: 69
End: 2024-04-19
Payer: MEDICARE

## 2024-04-22 ENCOUNTER — PATIENT MESSAGE (OUTPATIENT)
Dept: SLEEP MEDICINE | Facility: CLINIC | Age: 69
End: 2024-04-22
Payer: MEDICARE

## 2024-04-22 ENCOUNTER — TELEPHONE (OUTPATIENT)
Dept: SLEEP MEDICINE | Facility: OTHER | Age: 69
End: 2024-04-22
Payer: MEDICARE

## 2024-04-22 NOTE — PROGRESS NOTES
[facility]   Physical Therapy Treatment Note      Name: Tiarra Norton  [facility] Number: [id number]    Therapy Diagnosis:   Encounter Diagnoses   Name Primary?    Decreased range of motion (ROM) of right knee Yes    Weakness of right lower extremity     Decreased range of motion of trunk and back     Decreased strength of trunk and back     Weakness of trunk musculature        Physician: Virgil Scott MD    Visit Date: 4/15/2024    Physician Orders: PT Eval and Treat   Medical Diagnosis from Referral:   Z96.651 (ICD-10-CM) - Status post total right knee replacement   M22.2X1 (ICD-10-CM) - Patellofemoral pain syndrome of right knee   M70.50 (ICD-10-CM) - Pes anserine bursitis      Evaluation Date: 12/21/2023  Authorization Period Expiration: 9/21/2024  Plan of Care Expiration: Updated to 5/21/2024  Visit # / Visits authorized: 15/20   Progress Note Due: 4/21/2024  FOTO: 2/3      Precautions: hx of TKA and menisectomy, spinal cord stimulator, scoliosis     Time In: 9:00 am  Time Out: 10:00 am   Total Billable Time: 55 minutes (1:1)  Total Appointment Time (timed & untimed codes): 60 minutes    PTA Visit #: 0/5   Subjective   Patient reports: some instances of back and knee pain since last visit, but overall improved.     She was compliant with home exercise program.  Response to previous treatment: no adverse effects   Functional change: improved tolerance to functional mobility activities with decreased pain    Pain: 3/10 R knee, 3/10 LB  Location: bilateral low back and R knee   Objective    Lumbar Range of Motion:     %   Flexion 90      Extension 50      Left Side Bending 50   Right Side Bending 50   Left rotation    60   Right Rotation    70    *= pain           Range of Motion:   Knee Left active Left Passive   Flexion 133 140   Extension 3 5      Knee Right active Right Passive   Flexion 121 124   Extension 1 3         Lower Extremity Strength     Right LE   Left LE     Hip  "flexion: 4/5 Hip flexion: 4+/5   Knee extension: 4/5 Knee extension: 4+/5   Knee flexion: 4/5 Knee flexion: 4+/5   Hip IR: 4/5 Hip IR: 4/5   Hip ER: 4/5 Hip ER: 4/5   Hip extension:  4-/5 Hip extension: 4-/5   Hip abduction: 4-/5 Hip abduction: 4/5   Hip adduction: 4-/5 Hip adduction 4/5   Ankle dorsiflexion: 5/5 Ankle dorsiflexion: 5/5   Ankle plantarflexion: 5/5 Ankle plantarflexion: 5/5          Treatment   Tiarra received the treatments listed below:      Therapeutic exercises to develop strength, endurance, ROM, flexibility, posture, and core stabilization for 25 minutes including:    Recumbent bike lv. 4 x 5 min for endurance and knee ROM  PPT 15x5"  Bridge 2x10x3"  EIL 2x10  DKTC 15x5"'  Open books x15,3" ea  Hip flexor stretch 2x1' ea  Leg press # 2x10  Leg press SL 70# 2x10    NP:  EIS 2x10    manual therapy techniques: n/a applied to the: R knee for 0 minutes, including:        Neuromuscular re-education activities to improve: Balance, Coordination, Kinesthetic, Proprioception, and Posture for 30 minutes. The following activities were included:    Sit to stands GTB around knees 2x10  Side stepping GTB 2x12 steps  Monster walks GTB 2x12 steps  Single leg balance 3x30" R only  Step ups fwd/lateral  6 in step 2x10x3" ea R only   Medx trunk extension 70# x20  Medx trunk rotation 30# x20 increase weight   Heel raises 2x15 with 10# KB alternating hands      therapeutic activities to improve functional performance for 0  minutes, including:      cold pack for 5 minutes to R knee and low back    Patient Education and Home Exercises     Education provided:   - HEP, POC    Written Home Exercises Provided: Patient instructed to cont prior HEP. Exercises were reviewed and Tiarra was able to demonstrate them prior to the end of the session.  Tiarra demonstrated good  understanding of the education provided. See Electronic Medical Record under Patient Instructions for exercises provided during therapy " sessions    Assessment   Tiarra presents today with reports of low level back and knee pain. Pt reporting relief of both painful areas at end of treatment session. Progressions made in mobility and strengthening exercises with good tolerance and no adverse effects. Continue to progress as able.     Tiarra Is progressing well towards her goals.   Patient prognosis is Good.     Patient will continue to benefit from skilled outpatient physical therapy to address the deficits listed in the problem list box on initial evaluation, provide pt/family education and to maximize pt's level of independence in the home and community environment.     Patient's spiritual, cultural and educational needs considered and pt agreeable to plan of care and goals.     Anticipated barriers to physical therapy: chronicity of condition, multiple treatment areas, hx of R TKA and menisectomy     GOALS: Short Term Goals:  4 weeks  1. Report decreased in pain at worse less than  <   / =  4  /10  to increase tolerance for functional activities.On going  2. Pt to increase B popliteal angle by greater than > or = 5 degrees in order to improve flexibility and posture. MET  3. Increased R LE MMT 1/2  to increase tolerance for ADL and work activities. MET  4. Pt to increase B Ely's Test by greater than  > or = 5degrees in order to improve flexibility and posture. MET  5. Pt to tolerate HEP to improve ROM and independence with ADL's. MET  6. Pt to improve range of motion by 25% to allow for improved functional mobility to allow for improvement in IADLs. MET     Long Term Goals: 8 weeks  1. Report decreased in pain at worse less than  <   / =  2  /10  to increase tolerance for functional mobility.  On going  2 .Pt to increase B popliteal angle by greater than > or = 10 degrees in order to improve flexibility and posture. On going  3. Increased R LE MMT 1 grade to increase tolerance for ADL and work activities.On going  4. Pt will report at 51% or better  score on FOTO Lumbar spine survey  to demonstrate decrease in disability and improvement in back pain.On going  5. Pt to be Independent with HEP to improve ROM and independence with ADL's. On going  6. Pt to increase B Ely's Test by greater than > or = 10 degrees in order to improve flexibility and posture. On going  7. Pt to demonstrate negative Bridge Test in order to show improved core strength for lumbar stabilization. On going  8. Pt to improve range of motion by 75% to allow for improved functional mobility to allow for improvement in IADLs. On going        Plan   Plan of care Certification: 12/21/2023 to 5/21/2023.     Outpatient Physical Therapy 2 times weekly for 10 weeks to include the following interventions: Cervical/Lumbar Traction, Gait Training, Manual Therapy, Moist Heat/ Ice, Neuromuscular Re-ed, Patient Education, Self Care, Therapeutic Activities, and Therapeutic Exercise. Marybeth Anaya, PT   4/22/2024

## 2024-04-25 ENCOUNTER — PATIENT MESSAGE (OUTPATIENT)
Dept: INTERNAL MEDICINE | Facility: CLINIC | Age: 69
End: 2024-04-25
Payer: MEDICARE

## 2024-04-25 RX ORDER — ONDANSETRON 4 MG/1
4 TABLET, FILM COATED ORAL EVERY 8 HOURS PRN
Qty: 30 TABLET | Refills: 0 | Status: SHIPPED | OUTPATIENT
Start: 2024-04-25

## 2024-04-25 NOTE — TELEPHONE ENCOUNTER
No care due was identified.  [facility] Embedded Care Due Messages. Reference number: [id number].   4/25/2024 12:10:25 PM CDT

## 2024-04-25 NOTE — TELEPHONE ENCOUNTER
Pt c/o nausea not relieved by Phenergen. Asking for another nausea med. Pended Zofran. States she is currently being worked up by GI and they are unsure of the cause. Asking if covering provider would prescribe?

## 2024-04-26 ENCOUNTER — PATIENT MESSAGE (OUTPATIENT)
Dept: GASTROENTEROLOGY | Facility: CLINIC | Age: 69
End: 2024-04-26
Payer: MEDICARE

## 2024-04-29 ENCOUNTER — PATIENT MESSAGE (OUTPATIENT)
Dept: GASTROENTEROLOGY | Facility: CLINIC | Age: 69
End: 2024-04-29
Payer: MEDICARE

## 2024-04-30 ENCOUNTER — PATIENT MESSAGE (OUTPATIENT)
Dept: PAIN MEDICINE | Facility: CLINIC | Age: 69
End: 2024-04-30
Payer: MEDICARE

## 2024-04-30 DIAGNOSIS — M51.36 DDD (DEGENERATIVE DISC DISEASE), LUMBAR: ICD-10-CM

## 2024-04-30 DIAGNOSIS — Z96.89 SPINAL CORD STIMULATOR STATUS: ICD-10-CM

## 2024-04-30 DIAGNOSIS — G89.4 CHRONIC PAIN SYNDROME: ICD-10-CM

## 2024-04-30 DIAGNOSIS — M41.25 OTHER IDIOPATHIC SCOLIOSIS, THORACOLUMBAR REGION: ICD-10-CM

## 2024-04-30 DIAGNOSIS — M25.561 CHRONIC PAIN OF RIGHT KNEE: ICD-10-CM

## 2024-04-30 DIAGNOSIS — Z96.651 HISTORY OF TOTAL KNEE ARTHROPLASTY, RIGHT: ICD-10-CM

## 2024-04-30 DIAGNOSIS — G89.29 CHRONIC PAIN OF RIGHT KNEE: ICD-10-CM

## 2024-04-30 RX ORDER — HYDROCODONE BITARTRATE AND ACETAMINOPHEN 5; 325 MG/1; MG/1
1 TABLET ORAL EVERY 12 HOURS PRN
Qty: 60 TABLET | Refills: 0 | Status: SHIPPED | OUTPATIENT
Start: 2024-04-30 | End: 2024-06-04 | Stop reason: SDUPTHER

## 2024-04-30 NOTE — TELEPHONE ENCOUNTER
Patient is requesting:hydrocodone  Last visit: 03/25/2024  Pain contract: no  :03/26/2024  UDS:  PAIN MANAGEMENT DRUG PANEL INTERP  See Note See Note CM    Comment: ____________________________________________________________  ____  NO DRUGS PROVIDED  ____________________________________________________________  ____  Please call [facility] madvertise Client Services at  [phone] for Medical Director interpretation if  applicable. Alternatively, please consider the TARGETED  DRUG PROF, MASS SPEC/EMIT, UR ([id number]) which does not  require medication information or provide compliance  interpretation.  ____________________________________________________________  ____  INTERPRETIVE INFORMATION: Targeted drug profile Interp  Interpretation depends on accuracy and completeness of  patient medication information submitted by client.   CODEINE  Not Detected Not Detected    Comment: INTERPRETIVE INFORMATION: Codeine, U  Positive Cutoff: 40 ng/mL  Methodology: Mass Spectrometry   MORPHINE  Not Detected Not Detected    Comment: INTERPRETIVE INFORMATION:Morphine, U  Positive Cutoff: 20 ng/mL  Methodology: Mass Spectrometry   6-ACETYLMORPHINE  Not Detected Not Detected    Comment: INTERPRETIVE INFORMATION:6-acetylmorphine, U  Positive Cutoff: 20 ng/mL  Methodology: Mass Spectrometry   OXYCODONE  Not Detected Not Detected    Comment: INTERPRETIVE INFORMATION:Oxycodone, U  Positive Cutoff: 40 ng/mL  Methodology: Mass Spectrometry   NOROYXCODONE  Not Detected Not Detected    Comment: INTERPRETIVE INFORMATION:Noroxycodone, U  Positive Cutoff: 100 ng/mL  Methodology: Mass Spectrometry   OXYMORPHONE  Not Detected Not Detected    Comment: INTERPRETIVE INFORMATION:Oxymorphone, U  Positive Cutoff: 40 ng/mL  Methodology: Mass Spectrometry   NOROXYMORPHONE  Not Detected Not Detected    Comment: INTERPRETIVE INFORMATION:Noroxymorphone, U  Positive Cutoff: 100 ng/mL  Methodology: Mass Spectrometry   HYDROCODONE  Not Detected Not Detected     Comment: INTERPRETIVE INFORMATION:Hydrocodone, U  Positive Cutoff: 40 ng/mL  Methodology: Mass Spectrometry   NORHYDROCODONE  Not Detected Not Detected    Comment: INTERPRETIVE INFORMATION:Norhydrocodone, U  Positive Cutoff: 100 ng/mL  Methodology: Mass Spectrometry   HYDROMORPHONE  Not Detected Not Detected    Comment: INTERPRETIVE INFORMATION:Hydromorphone, U  Positive Cutoff: 20 ng/mL  Methodology: Mass Spectrometry   BUPRENORPHINE  Not Detected Not Detected    Comment: INTERPRETIVE INFORMATION:Buprenorphine, U  Positive Cutoff: 5 ng/mL  Methodology: Mass Spectrometry   NORUBPRENORPHINE  Not Detected Not Detected    Comment: INTERPRETIVE INFORMATION:Norbuprenorphine, U  Positive Cutoff: 20 ng/mL  Methodology: Mass Spectrometry   FENTANYL  Not Detected Not Detected    Comment: INTERPRETIVE INFORMATION:Fentanyl, U  Positive Cutoff: 2 ng/mL  Methodology: Mass Spectrometry   NORFENTANYL  Not Detected Not Detected    Comment: INTERPRETIVE INFORMATION:Norfentanyl, U  Positive Cutoff: 2 ng/mL  Methodology: Mass Spectrometry   MEPERIDINE METABOLITE  Not Detected Not Detected    Comment: INTERPRETIVE INFORMATION:Meperidine metabolite, U  Positive Cutoff: 50 ng/mL  Methodology: Mass Spectrometry   TAPENTADOL  Not Detected Not Detected    Comment: INTERPRETIVE INFORMATION:Tapentadol, U  Positive Cutoff: 100 ng/mL  Methodology: Mass Spectrometry   TAPENTADOL-O-SULF  Not Detected Not Detected    Comment: INTERPRETIVE INFORMATION:Tapentadol-o-Sulf, U  Positive Cutoff: 200 ng/mL  Methodology: Mass Spectrometry   METHADONE  Negative Not Detected    Comment: Presumptive negative by immunoassay. Testing by mass  spectrometry is available on request.  INTERPRETIVE INFORMATION: Methadone Screen, U  Positive Cutoff: 150 ng/mL  Methodology: Immunoassay   TRAMADOL  Negative Not Detected    Comment: Presumptive negative by immunoassay. Testing by mass  spectrometry is available on request.  INTERPRETIVE INFORMATION:Tramadol Screen,  U  Positive Cutoff: 100 ng/mL  Methodology: Immunoassay   AMPHETAMINE  Not Detected Not Detected    Comment: INTERPRETIVE INFORMATION:Amphetamine, U  Positive Cutoff: 50 ng/mL  Methodology: Mass Spectrometry   METHAMPHETAMINE  Not Detected Not Detected    Comment: INTERPRETIVE INFORMATION:Methamphetamine, U  Positive Cutoff: 200 ng/mL  Methodology: Mass Spectrometry   MDMA- ECSTASY  Not Detected Not Detected    Comment: INTERPRETIVE INFORMATION:MDMA, U  Positive Cutoff: 200 ng/mL  Methodology: Mass Spectrometry   MDA  Not Detected Not Detected    Comment: INTERPRETIVE INFORMATION:MDA, U  Positive Cutoff: 200 ng/mL  Methodology: Mass Spectrometry   MDEA- Amber  Not Detected Not Detected    Comment: INTERPRETIVE INFORMATION:MDEA, U  Positive Cutoff: 200 ng/mL  Methodology: Mass Spectrometry   METHYLPHENIDATE  Not Detected Present    Comment: INTERPRETIVE INFORMATION:Methylphenidate, U  Positive Cutoff: 100 ng/mL  Methodology: Mass Spectrometry   PHENTERMINE  Not Detected Not Detected    Comment: INTERPRETIVE INFORMATION:Phentermine, U  Positive Cutoff: 100 ng/mL  Methodology: Mass Spectrometry   BENZOYLECGONINE  Negative Not Detected    Comment: Presumptive negative by immunoassay. Testing by mass  spectrometry is available on request.  INTERPRETIVE INFORMATION:Cocaine Screen, U  Positive Cutoff: 150 ng/mL  Methodology: Immunoassay   ALPRAZOLAM  Present Present    Comment: INTERPRETIVE INFORMATION:Alprazolam, U  Positive Cutoff: 40 ng/mL  Methodology: Mass Spectrometry   ALPHA-OH-ALPRAZOLAM  Present Present    Comment: INTERPRETIVE INFORMATION:Alpha-OH-Alprazolam, U  Positive Cutoff: 20 ng/mL  Methodology: Mass Spectrometry   CLONAZEPAM  Not Detected Not Detected    Comment: INTERPRETIVE INFORMATION:Clonazepam, U  Positive Cutoff: 20 ng/mL  Methodology: Mass Spectrometry   7-AMINOCLONAZEPAM  Not Detected Not Detected    Comment: INTERPRETIVE INFORMATION:7-Aminoclonazepam, U  Positive Cutoff: 40 ng/mL  Methodology: Mass  Spectrometry   DIAZEPAM  Not Detected Not Detected    Comment: INTERPRETIVE INFORMATION:Diazepam, U  Positive Cutoff: 50 ng/mL  Methodology: Mass Spectrometry   NORDIAZEPAM  Not Detected Not Detected    Comment: INTERPRETIVE INFORMATION:Nordiazepam, U  Positive Cutoff: 50 ng/mL  Methodology: Mass Spectrometry   OXAZEPAM  Not Detected Not Detected    Comment: INTERPRETIVE INFORMATION:Oxazepam, U  Positive Cutoff: 50 ng/mL  Methodology: Mass Spectrometry   TEMAZEPAM  Not Detected Not Detected    Comment: INTERPRETIVE INFORMATION:Temazepam, U  Positive Cutoff: 50 ng/mL  Methodology: Mass Spectrometry   Lorazepam  Not Detected Not Detected    Comment: INTERPRETIVE INFORMATION:Lorazepam, U  Positive Cutoff: 60 ng/mL  Methodology: Mass Spectrometry   MIDAZOLAM  Not Detected Not Detected    Comment: INTERPRETIVE INFORMATION:Midazolam, U  Positive Cutoff: 20 ng/mL  Methodology: Mass Spectrometry   ZOLPIDEM  Not Detected Not Detected    Comment: INTERPRETIVE INFORMATION:Zolpidem, U  Positive Cutoff: 20 ng/mL  Methodology: Mass Spectrometry   BARBITURATES  Negative Not Detected    Comment: Presumptive negative by immunoassay. Testing by mass  spectrometry is available on request.  INTERPRETIVE INFORMATION:Barbiturates Screen, U  Positive Cutoff: 200 ng/mL  Methodology: Immunoassay   Creatinine, Urine 20.0 - 400.0 mg/dL 40.6 58.6 190.0 R, CM   ETHYL GLUCURONIDE  Negative Present    Comment: Presumptive negative by immunoassay. Testing by mass  spectrometry is available on request.  INTERPRETIVE INFORMATION:Ethyl Glucuronide Screen, U  Positive Cutoff: 500 ng/mL  Methodology: Immunoassay   MARIJUANA METABOLITE  PresumptivePOS Not Detected    Comment: Presumptive positive by immunoassay. Testing by mass  spectrometry is available on request.  INTERPRETIVE INFORMATION: THC (Cannabinoids) Screen, U  Positive Cutoff: 50 ng/mL  Methodology: Immunoassay   PCP  Negative Not Detected    Comment: Presumptive negative by immunoassay.  Testing by mass  spectrometry is available on request.  INTERPRETIVE INFORMATION:Phencyclidine Screen, U  Positive Cutoff: 25 ng/mL  Methodology: Immunoassay   CARISOPRODOL  Negative Not Detected CM    Comment: Presumptive negative by immunoassay. Testing by mass  spectrometry is available on request.  INTERPRETIVE INFORMATION: Carisoprodol Screen, U  Positive Cutoff: 100 ng/mL  Methodology: Immunoassay  The carisoprodol immunoassay has cross-reactivity to  carisoprodol and meprobamate.   Naloxone  Not Detected Not Detected    Comment: INTERPRETIVE INFORMATION:Naloxone, U  Positive Cutoff: 100 ng/mL  Methodology: Mass Spectrometry   Gabapentin  Not Detected Present    Comment: INTERPRETIVE INFORMATION:Gabapentin, U  Positive Cutoff: 3,000 ng/mL  Methodology: Mass Spectrometry   Pregabalin  Not Detected Not Detected    Comment: INTERPRETIVE INFORMATION:Pregabalin, U  Positive Cutoff: 3,000 ng/mL  Methodology: Mass Spectrometry   Alpha-OH-Midazolam  Not Detected Not Detected    Comment: INTERPRETIVE INFORMATION:Alpha-OH-Midazolam, U  Positive Cutoff: 20 ng/mL  Methodology: Mass Spectrometry   Zolpidem Metabolite  Not Detected Not Detected    Comment: INTERPRETIVE INFORMATION:Zolpidem Metabolite, U  Positive Cutoff: 100 ng/mL  Methodology: Mass Spectrometry   PAIN MANAGEMENT DRUG PANEL  See Below

## 2024-05-07 ENCOUNTER — PATIENT MESSAGE (OUTPATIENT)
Dept: PAIN MEDICINE | Facility: CLINIC | Age: 69
End: 2024-05-07
Payer: MEDICARE

## 2024-05-07 ENCOUNTER — CLINICAL SUPPORT (OUTPATIENT)
Dept: REHABILITATION | Facility: OTHER | Age: 69
End: 2024-05-07
Payer: MEDICARE

## 2024-05-07 ENCOUNTER — PATIENT MESSAGE (OUTPATIENT)
Dept: REHABILITATION | Facility: OTHER | Age: 69
End: 2024-05-07

## 2024-05-07 DIAGNOSIS — M25.69 DECREASED RANGE OF MOTION OF TRUNK AND BACK: ICD-10-CM

## 2024-05-07 DIAGNOSIS — R29.898 DECREASED STRENGTH OF TRUNK AND BACK: ICD-10-CM

## 2024-05-07 DIAGNOSIS — M62.81 WEAKNESS OF TRUNK MUSCULATURE: ICD-10-CM

## 2024-05-07 DIAGNOSIS — R29.898 WEAKNESS OF RIGHT LOWER EXTREMITY: ICD-10-CM

## 2024-05-07 DIAGNOSIS — M25.661 DECREASED RANGE OF MOTION (ROM) OF RIGHT KNEE: Primary | ICD-10-CM

## 2024-05-07 PROCEDURE — 97530 THERAPEUTIC ACTIVITIES: CPT

## 2024-05-07 PROCEDURE — 97112 NEUROMUSCULAR REEDUCATION: CPT

## 2024-05-08 ENCOUNTER — PATIENT MESSAGE (OUTPATIENT)
Dept: GASTROENTEROLOGY | Facility: CLINIC | Age: 69
End: 2024-05-08
Payer: MEDICARE

## 2024-05-09 ENCOUNTER — CLINICAL SUPPORT (OUTPATIENT)
Dept: REHABILITATION | Facility: OTHER | Age: 69
End: 2024-05-09
Payer: MEDICARE

## 2024-05-09 DIAGNOSIS — M25.661 DECREASED RANGE OF MOTION (ROM) OF RIGHT KNEE: Primary | ICD-10-CM

## 2024-05-09 DIAGNOSIS — R29.898 WEAKNESS OF RIGHT LOWER EXTREMITY: ICD-10-CM

## 2024-05-09 DIAGNOSIS — R29.898 DECREASED STRENGTH OF TRUNK AND BACK: ICD-10-CM

## 2024-05-09 DIAGNOSIS — M25.69 DECREASED RANGE OF MOTION OF TRUNK AND BACK: ICD-10-CM

## 2024-05-09 DIAGNOSIS — M62.81 WEAKNESS OF TRUNK MUSCULATURE: ICD-10-CM

## 2024-05-09 PROCEDURE — 97112 NEUROMUSCULAR REEDUCATION: CPT

## 2024-05-09 PROCEDURE — 97110 THERAPEUTIC EXERCISES: CPT

## 2024-05-09 NOTE — PROGRESS NOTES
[facility]   Physical Therapy Treatment Note      Name: Tiarra Norton  [facility] Number: [id number]    Therapy Diagnosis:   Encounter Diagnoses   Name Primary?    Decreased range of motion (ROM) of right knee Yes    Weakness of right lower extremity     Decreased range of motion of trunk and back     Decreased strength of trunk and back     Weakness of trunk musculature        Physician: Virgil Scott MD    Visit Date: 5/9/2024    Physician Orders: PT Eval and Treat   Medical Diagnosis from Referral:   Z96.651 (ICD-10-CM) - Status post total right knee replacement   M22.2X1 (ICD-10-CM) - Patellofemoral pain syndrome of right knee   M70.50 (ICD-10-CM) - Pes anserine bursitis      Evaluation Date: 12/21/2023  Authorization Period Expiration: 9/21/2024  Plan of Care Expiration: Updated to 7/17/2024  Visit # / Visits authorized: 18/40   Progress Note Due: 6/7/2024  FOTO: 3/4      Precautions: hx of TKA and menisectomy, spinal cord stimulator, scoliosis     Time In: 11:20 am  Time Out: 12:20 pm   Total Billable Time: 40 minutes (1:1)  Total Appointment Time (timed & untimed codes): 60 minutes    PTA Visit #: 0/5   Subjective   Patient reports: she has been unable to come to therapy for a couple weeks due to illness and prolonged episode of nausea. Her knee pain has been manageable, but her back has been bothering her more since the last time she was here. Prior to break from therapy she was having minimal pain and doing a lot better with ADL's, but feels she has returned to baseline with the extended time off.     She was compliant with home exercise program.  Response to previous treatment: no adverse effects   Functional change: improved tolerance to functional mobility activities with decreased pain    Pain: 3/10 R knee, 7/10 LB  Location: bilateral low back and R knee   Objective    Lumbar Range of Motion:     %   Flexion 90      Extension 50      Left Side Bending 50   Right Side  "Bending 50   Left rotation    60   Right Rotation    70    *= pain           Range of Motion:   Knee Left active Left Passive   Flexion 133 140   Extension 3 5      Knee Right active Right Passive   Flexion 121 124   Extension 1 3         Lower Extremity Strength     Right LE   Left LE     Hip flexion: 4/5 Hip flexion: 4+/5   Knee extension: 4/5 Knee extension: 4+/5   Knee flexion: 4/5 Knee flexion: 4+/5   Hip IR: 4/5 Hip IR: 4/5   Hip ER: 4/5 Hip ER: 4/5   Hip extension:  4-/5 Hip extension: 4-/5   Hip abduction: 4-/5 Hip abduction: 4/5   Hip adduction: 4-/5 Hip adduction 4/5   Ankle dorsiflexion: 5/5 Ankle dorsiflexion: 5/5   Ankle plantarflexion: 5/5 Ankle plantarflexion: 5/5          Treatment   Tiarra received the treatments listed below:      Therapeutic exercises to develop strength, endurance, ROM, flexibility, posture, and core stabilization for 30 minutes including:    Recumbent bike lv. 4 x 5 min for endurance and knee ROM  PPT 15x5"  Bridge 2x10x3"  EIL 2x10  DKTC 15x5"'  Open books x15,3" ea  Hip flexor stretch 2x1' ea  Leg press # 2x10  Leg press SL 80# 2x10    NP:  EIS 2x10    manual therapy techniques: n/a applied to the: R knee for 0 minutes, including:        Neuromuscular re-education activities to improve: Balance, Coordination, Kinesthetic, Proprioception, and Posture for 25 minutes. The following activities were included:    Sit to stands GTB around knees 2x10  Side stepping GTB 2x12 steps  Monster walks GTB 2x12 steps  Single leg balance 3x30" R only  Step ups fwd/lateral  6 in step 2x10x3" ea R only   Medx trunk extension 70# x20  Medx trunk rotation 30# x20 increase weight   Heel raises 2x15 with 10# KB alternating hands      therapeutic activities to improve functional performance for 0  minutes, including:        cold pack for 5 minutes to R knee and low back    Patient Education and Home Exercises     Education provided:   - HEP, POC    Written Home Exercises Provided: Patient " instructed to cont prior HEP. Exercises were reviewed and Tiarra was able to demonstrate them prior to the end of the session.  Tiarra demonstrated good  understanding of the education provided. See Electronic Medical Record under Patient Instructions for exercises provided during therapy sessions    Assessment   Tiarra presents today with reports of continued knee and back pain unchanged from last visit. Good tolerance to strengthening and mobility exercises today with decreased pain reported at end of session. Continue to progress as able.     Tiarra Is progressing well towards her goals.   Patient prognosis is Good.     Patient will continue to benefit from skilled outpatient physical therapy to address the deficits listed in the problem list box on initial evaluation, provide pt/family education and to maximize pt's level of independence in the home and community environment.     Patient's spiritual, cultural and educational needs considered and pt agreeable to plan of care and goals.     Anticipated barriers to physical therapy: chronicity of condition, multiple treatment areas, hx of R TKA and menisectomy     GOALS: Short Term Goals:  4 weeks  1. Report decreased in pain at worse less than  <   / =  4  /10  to increase tolerance for functional activities.On going  2. Pt to increase B popliteal angle by greater than > or = 5 degrees in order to improve flexibility and posture. MET  3. Increased R LE MMT 1/2  to increase tolerance for ADL and work activities. MET  4. Pt to increase B Ely's Test by greater than  > or = 5degrees in order to improve flexibility and posture. MET  5. Pt to tolerate HEP to improve ROM and independence with ADL's. MET  6. Pt to improve range of motion by 25% to allow for improved functional mobility to allow for improvement in IADLs. MET     Long Term Goals: 8 weeks  1. Report decreased in pain at worse less than  <   / =  2  /10  to increase tolerance for functional mobility.  On  going  2 .Pt to increase B popliteal angle by greater than > or = 10 degrees in order to improve flexibility and posture. On going  3. Increased R LE MMT 1 grade to increase tolerance for ADL and work activities.On going  4. Pt will report at 51% or better score on FOTO Lumbar spine survey  to demonstrate decrease in disability and improvement in back pain.On going  5. Pt to be Independent with HEP to improve ROM and independence with ADL's. On going  6. Pt to increase B Ely's Test by greater than > or = 10 degrees in order to improve flexibility and posture. On going  7. Pt to demonstrate negative Bridge Test in order to show improved core strength for lumbar stabilization. On going  8. Pt to improve range of motion by 75% to allow for improved functional mobility to allow for improvement in IADLs. On going        Plan   Plan of care Certification: 5/9/2024 to 7/17/2024     Outpatient Physical Therapy 2 times weekly for 10 weeks to include the following interventions: Cervical/Lumbar Traction, Gait Training, Manual Therapy, Moist Heat/ Ice, Neuromuscular Re-ed, Patient Education, Self Care, Therapeutic Activities, and Therapeutic Exercise. Dry aubree Anaya, PT   5/12/2024

## 2024-05-09 NOTE — PROGRESS NOTES
[facility]   Physical Therapy Progress Note      Name: Tiarra Norton  [facility] Number: [id number]    Therapy Diagnosis:   Encounter Diagnoses   Name Primary?    Decreased range of motion (ROM) of right knee Yes    Weakness of right lower extremity     Decreased range of motion of trunk and back     Decreased strength of trunk and back     Weakness of trunk musculature        Physician: Virgil Scott MD    Visit Date: 5/7/2024    Physician Orders: PT Eval and Treat   Medical Diagnosis from Referral:   Z96.651 (ICD-10-CM) - Status post total right knee replacement   M22.2X1 (ICD-10-CM) - Patellofemoral pain syndrome of right knee   M70.50 (ICD-10-CM) - Pes anserine bursitis      Evaluation Date: 12/21/2023  Authorization Period Expiration: 9/21/2024  Plan of Care Expiration: Updated to 7/17/2024  Visit # / Visits authorized: 18/20   Progress Note Due: 6/7/2024  FOTO: 3/4      Precautions: hx of TKA and menisectomy, spinal cord stimulator, scoliosis     Time In: 11:00 am  Time Out: 12:00 pm   Total Billable Time: 40 minutes (1:1)  Total Appointment Time (timed & untimed codes): 60 minutes    PTA Visit #: 0/5   Subjective   Patient reports: she has been unable to come to therapy for a couple weeks due to illness and prolonged episode of nausea. Her knee pain has been manageable, but her back has been bothering her more since the last time she was here. Prior to break from therapy she was having minimal pain and doing a lot better with ADL's, but feels she has returned to baseline with the extended time off.     She was compliant with home exercise program.  Response to previous treatment: no adverse effects   Functional change: improved tolerance to functional mobility activities with decreased pain    Pain: 3/10 R knee, 7/10 LB  Location: bilateral low back and R knee   Objective    Lumbar Range of Motion:     %   Flexion 90      Extension 50      Left Side Bending 50   Right Side  "Bending 50   Left rotation    60   Right Rotation    70    *= pain           Range of Motion:   Knee Left active Left Passive   Flexion 133 140   Extension 3 5      Knee Right active Right Passive   Flexion 121 124   Extension 1 3         Lower Extremity Strength     Right LE   Left LE     Hip flexion: 4/5 Hip flexion: 4+/5   Knee extension: 4/5 Knee extension: 4+/5   Knee flexion: 4/5 Knee flexion: 4+/5   Hip IR: 4/5 Hip IR: 4/5   Hip ER: 4/5 Hip ER: 4/5   Hip extension:  4-/5 Hip extension: 4-/5   Hip abduction: 4-/5 Hip abduction: 4/5   Hip adduction: 4-/5 Hip adduction 4/5   Ankle dorsiflexion: 5/5 Ankle dorsiflexion: 5/5   Ankle plantarflexion: 5/5 Ankle plantarflexion: 5/5          Treatment   Tiarra received the treatments listed below:      Therapeutic exercises to develop strength, endurance, ROM, flexibility, posture, and core stabilization for 20 minutes including:    Recumbent bike lv. 4 x 5 min for endurance and knee ROM  PPT 15x5"  Bridge 2x10x3"  EIL 2x10  DKTC 15x5"'  Open books x15,3" ea  Hip flexor stretch 2x1' ea  Leg press # 2x10  Leg press SL 70# 2x10    NP:  EIS 2x10    manual therapy techniques: n/a applied to the: R knee for 0 minutes, including:        Neuromuscular re-education activities to improve: Balance, Coordination, Kinesthetic, Proprioception, and Posture for 25 minutes. The following activities were included:    Sit to stands GTB around knees 2x10  Side stepping GTB 2x12 steps  Monster walks GTB 2x12 steps  Single leg balance 3x30" R only  Step ups fwd/lateral  6 in step 2x10x3" ea R only   Medx trunk extension 70# x20  Medx trunk rotation 30# x20 increase weight   Heel raises 2x15 with 10# KB alternating hands      therapeutic activities to improve functional performance for 10  minutes, including:    Objective measures and reassessment    cold pack for 5 minutes to R knee and low back    Patient Education and Home Exercises     Education provided:   - HEP, POC    Written " Home Exercises Provided: Patient instructed to cont prior HEP. Exercises were reviewed and Tiarra was able to demonstrate them prior to the end of the session.  Tiarra demonstrated good  understanding of the education provided. See Electronic Medical Record under Patient Instructions for exercises provided during therapy sessions    Assessment   Tiarra presents today with reports of increased back pain which she attributes to extended break from therapy due to GI issues. Reassessment performed with pt demo gains in B LE strength, core and lumbar strength, and back and R knee ROM, but she continues to have increased pain and limitations with ADL's and leisure activities. She will continue to benefit from skilled therapy to address deficits and improve tolerance to ADL's and functional mobility.     Tiarra Is progressing well towards her goals.   Patient prognosis is Good.     Patient will continue to benefit from skilled outpatient physical therapy to address the deficits listed in the problem list box on initial evaluation, provide pt/family education and to maximize pt's level of independence in the home and community environment.     Patient's spiritual, cultural and educational needs considered and pt agreeable to plan of care and goals.     Anticipated barriers to physical therapy: chronicity of condition, multiple treatment areas, hx of R TKA and menisectomy     GOALS: Short Term Goals:  4 weeks  1. Report decreased in pain at worse less than  <   / =  4  /10  to increase tolerance for functional activities.On going  2. Pt to increase B popliteal angle by greater than > or = 5 degrees in order to improve flexibility and posture. MET  3. Increased R LE MMT 1/2  to increase tolerance for ADL and work activities. MET  4. Pt to increase B Ely's Test by greater than  > or = 5degrees in order to improve flexibility and posture. MET  5. Pt to tolerate HEP to improve ROM and independence with ADL's. MET  6. Pt to improve  range of motion by 25% to allow for improved functional mobility to allow for improvement in IADLs. MET     Long Term Goals: 8 weeks  1. Report decreased in pain at worse less than  <   / =  2  /10  to increase tolerance for functional mobility.  On going  2 .Pt to increase B popliteal angle by greater than > or = 10 degrees in order to improve flexibility and posture. On going  3. Increased R LE MMT 1 grade to increase tolerance for ADL and work activities.On going  4. Pt will report at 51% or better score on FOTO Lumbar spine survey  to demonstrate decrease in disability and improvement in back pain.On going  5. Pt to be Independent with HEP to improve ROM and independence with ADL's. On going  6. Pt to increase B Ely's Test by greater than > or = 10 degrees in order to improve flexibility and posture. On going  7. Pt to demonstrate negative Bridge Test in order to show improved core strength for lumbar stabilization. On going  8. Pt to improve range of motion by 75% to allow for improved functional mobility to allow for improvement in IADLs. On going        Plan   Plan of care Certification: 5/9/2024 to 7/17/2024     Outpatient Physical Therapy 2 times weekly for 10 weeks to include the following interventions: Cervical/Lumbar Traction, Gait Training, Manual Therapy, Moist Heat/ Ice, Neuromuscular Re-ed, Patient Education, Self Care, Therapeutic Activities, and Therapeutic Exercise. Marybeth Anaya, PT   5/9/2024

## 2024-05-09 NOTE — PLAN OF CARE
Outpatient Therapy Updated Plan of Care     Visit Date: 5/7/2024    Name: Tiarra Norton  Clinic Number: [id number]    Therapy Diagnosis:   Encounter Diagnoses   Name Primary?    Decreased range of motion (ROM) of right knee Yes    Weakness of right lower extremity     Decreased range of motion of trunk and back     Decreased strength of trunk and back     Weakness of trunk musculature      Physician: Virgil Scott MD    Physician Orders: PT Eval and Treat   Medical Diagnosis from Referral:   Z96.651 (ICD-10-CM) - Status post total right knee replacement   M22.2X1 (ICD-10-CM) - Patellofemoral pain syndrome of right knee   M70.50 (ICD-10-CM) - Pes anserine bursitis      Evaluation Date: 12/21/2023  Authorization Period Expiration: 9/21/2024  Plan of Care Expiration: Updated to 7/17/2024  Visit # / Visits authorized: 18/20   Progress Note Due: 6/7/2024  FOTO: 3/4            Precautions: hx of TKA and menisectomy, spinal cord stimulator, scoliosis         Subjective     Update: Patient reports: she has been unable to come to therapy for a couple weeks due to illness and prolonged episode of nausea. Her knee pain has been manageable, but her back has been bothering her more since the last time she was here. Prior to break from therapy she was having minimal pain and doing a lot better with ADL's, but feels she has returned to baseline with the extended time off.      She was compliant with home exercise program.  Response to previous treatment: no adverse effects   Functional change: improved tolerance to functional mobility activities with decreased pain     Pain: 3/10 R knee, 7/10 LB  Location: bilateral low back and R knee   Objective    Lumbar Range of Motion:     %   Flexion 90      Extension 50      Left Side Bending 50   Right Side Bending 50   Left rotation    60   Right Rotation    70    *= pain            Range of Motion:   Knee Left active Left Passive   Flexion 133 140   Extension 3 5      Knee Right  active Right Passive   Flexion 121 124   Extension 1 3         Lower Extremity Strength     Right LE   Left LE     Hip flexion: 4/5 Hip flexion: 4+/5   Knee extension: 4/5 Knee extension: 4+/5   Knee flexion: 4/5 Knee flexion: 4+/5   Hip IR: 4/5 Hip IR: 4/5   Hip ER: 4/5 Hip ER: 4/5   Hip extension:  4-/5 Hip extension: 4-/5   Hip abduction: 4-/5 Hip abduction: 4/5   Hip adduction: 4-/5 Hip adduction 4/5   Ankle dorsiflexion: 5/5 Ankle dorsiflexion: 5/5   Ankle plantarflexion: 5/5 Ankle plantarflexion: 5/5          Assessment     Update: Tiarra presents today with reports of increased back pain which she attributes to extended break from therapy due to GI issues. Reassessment performed with pt demo gains in B LE strength, core and lumbar strength, and back and R knee ROM, but she continues to have increased pain and limitations with ADL's and leisure activities. She will continue to benefit from skilled therapy to address deficits and improve tolerance to ADL's and functional mobility.      Tiarra Is progressing well towards her goals.   Patient prognosis is Good.      Patient will continue to benefit from skilled outpatient physical therapy to address the deficits listed in the problem list box on initial evaluation, provide pt/family education and to maximize pt's level of independence in the home and community environment.      Patient's spiritual, cultural and educational needs considered and pt agreeable to plan of care and goals.     Anticipated barriers to physical therapy: chronicity of condition, multiple treatment areas, hx of R TKA and menisectomy      GOALS: Short Term Goals:  4 weeks  1. Report decreased in pain at worse less than  <   / =  4  /10  to increase tolerance for functional activities.On going  2. Pt to increase B popliteal angle by greater than > or = 5 degrees in order to improve flexibility and posture. MET  3. Increased R LE MMT 1/2  to increase tolerance for ADL and work activities.  "MET  4. Pt to increase B Ely's Test by greater than  > or = 5degrees in order to improve flexibility and posture. MET  5. Pt to tolerate HEP to improve ROM and independence with ADL's. MET  6. Pt to improve range of motion by 25% to allow for improved functional mobility to allow for improvement in IADLs. MET     Long Term Goals: 8 weeks  1. Report decreased in pain at worse less than  <   / =  2  /10  to increase tolerance for functional mobility.  On going  2 .Pt to increase B popliteal angle by greater than > or = 10 degrees in order to improve flexibility and posture. On going  3. Increased R LE MMT 1 grade to increase tolerance for ADL and work activities.On going  4. Pt will report at 51% or better score on FOTO Lumbar spine survey  to demonstrate decrease in disability and improvement in back pain.On going  5. Pt to be Independent with HEP to improve ROM and independence with ADL's. On going  6. Pt to increase B Ely's Test by greater than > or = 10 degrees in order to improve flexibility and posture. On going  7. Pt to demonstrate negative Bridge Test in order to show improved core strength for lumbar stabilization. On going  8. Pt to improve range of motion by 75% to allow for improved functional mobility to allow for improvement in IADLs. On going     Reasons for Recertification of Therapy:   To allow patient to complete full allotment of visits so that they may achieve maximum therapeutic benefit      Plan     Updated Certification Period: 5/7/2024 to 7/17/2024  Recommended Treatment Plan: 2 times per week for 10 weeks:     - Patient education  - Therapeutic exercise  - Manual therapy  - Performance testing   - Neuromuscular Re-education  - Therapeutic activity   - Modalities    Pt may be seen by PTA as part of the rehabilitation team.     Therapist: Zi Anaya, PT  5/9/2024    "I certify the need for these services furnished under this plan of treatment and while under my " "care."    ____________________________________  Physician/Referring Practitioner    _______________  Date of Signature    Zi Anaya, PT  5/7/2024      I CERTIFY THE NEED FOR THESE SERVICES FURNISHED UNDER THIS PLAN OF TREATMENT AND WHILE UNDER MY CARE    Physician's comments:        Physician's Signature: ___________________________________________________      "

## 2024-05-10 ENCOUNTER — HOSPITAL ENCOUNTER (OUTPATIENT)
Dept: SLEEP MEDICINE | Facility: OTHER | Age: 69
Discharge: HOME | End: 2024-05-10
Attending: PSYCHIATRY & NEUROLOGY
Payer: MEDICARE

## 2024-05-10 DIAGNOSIS — G47.20 SLEEP STAGE DYSFUNCTION: Primary | ICD-10-CM

## 2024-05-10 DIAGNOSIS — G47.30 SLEEP APNEA, UNSPECIFIED TYPE: ICD-10-CM

## 2024-05-10 PROCEDURE — 95810 POLYSOM 6/> YRS 4/> PARAM: CPT

## 2024-05-11 NOTE — PROGRESS NOTES
A diagnostic study was performed on  69 year old female, Tiarra Norton. The following was explained to the pt prior to the study: the time to bed and wake time, the set-up process timeframe and the purpose of each sensor, the reason (if sensors fall off) the technician will need to enter the room during the night, the possibility of the tech fitting a PAP mask on pt for treatment in the middle of the night, and how to call out for assistance during the night. A post-study letter was handed to the pt in the morning.

## 2024-05-13 ENCOUNTER — CLINICAL SUPPORT (OUTPATIENT)
Dept: REHABILITATION | Facility: OTHER | Age: 69
End: 2024-05-13
Payer: MEDICARE

## 2024-05-13 ENCOUNTER — OFFICE VISIT (OUTPATIENT)
Dept: PRIMARY CARE CLINIC | Facility: CLINIC | Age: 69
End: 2024-05-13
Payer: MEDICARE

## 2024-05-13 VITALS
HEIGHT: 63 IN | OXYGEN SATURATION: 97 % | DIASTOLIC BLOOD PRESSURE: 60 MMHG | SYSTOLIC BLOOD PRESSURE: 118 MMHG | BODY MASS INDEX: 24.84 KG/M2 | WEIGHT: 140.19 LBS | HEART RATE: 67 BPM

## 2024-05-13 DIAGNOSIS — M25.69 DECREASED RANGE OF MOTION OF TRUNK AND BACK: ICD-10-CM

## 2024-05-13 DIAGNOSIS — R21 RASH: Primary | ICD-10-CM

## 2024-05-13 DIAGNOSIS — R29.898 DECREASED STRENGTH OF TRUNK AND BACK: ICD-10-CM

## 2024-05-13 DIAGNOSIS — M25.661 DECREASED RANGE OF MOTION (ROM) OF RIGHT KNEE: Primary | ICD-10-CM

## 2024-05-13 DIAGNOSIS — M62.81 WEAKNESS OF TRUNK MUSCULATURE: ICD-10-CM

## 2024-05-13 DIAGNOSIS — R29.898 WEAKNESS OF RIGHT LOWER EXTREMITY: ICD-10-CM

## 2024-05-13 PROCEDURE — 99214 OFFICE O/P EST MOD 30 MIN: CPT | Mod: S$PBB,,, | Performed by: NURSE PRACTITIONER

## 2024-05-13 PROCEDURE — 97112 NEUROMUSCULAR REEDUCATION: CPT

## 2024-05-13 PROCEDURE — 99999 PR PBB SHADOW E&M-EST. PATIENT-LVL V: CPT | Mod: PBBFAC,,, | Performed by: NURSE PRACTITIONER

## 2024-05-13 PROCEDURE — 97110 THERAPEUTIC EXERCISES: CPT

## 2024-05-13 PROCEDURE — 99215 OFFICE O/P EST HI 40 MIN: CPT | Mod: PBBFAC,PN | Performed by: NURSE PRACTITIONER

## 2024-05-13 RX ORDER — TRIAMCINOLONE ACETONIDE 0.25 MG/G
CREAM TOPICAL 2 TIMES DAILY
Qty: 15 G | Refills: 0 | Status: SHIPPED | OUTPATIENT
Start: 2024-05-13 | End: 2024-06-05

## 2024-05-13 NOTE — PROGRESS NOTES
[facility]   Physical Therapy Treatment Note      Name: Tiarra Norton  [facility] Number: [id number]    Therapy Diagnosis:   Encounter Diagnoses   Name Primary?    Decreased range of motion (ROM) of right knee Yes    Weakness of right lower extremity     Decreased range of motion of trunk and back     Decreased strength of trunk and back     Weakness of trunk musculature        Physician: Virgil Scott MD    Visit Date: 5/13/2024    Physician Orders: PT Eval and Treat   Medical Diagnosis from Referral:   Z96.651 (ICD-10-CM) - Status post total right knee replacement   M22.2X1 (ICD-10-CM) - Patellofemoral pain syndrome of right knee   M70.50 (ICD-10-CM) - Pes anserine bursitis      Evaluation Date: 12/21/2023  Authorization Period Expiration: 9/21/2024  Plan of Care Expiration: Updated to 7/17/2024  Visit # / Visits authorized: 19/40   Progress Note Due: 6/7/2024  FOTO: 3/4      Precautions: hx of TKA and menisectomy, spinal cord stimulator, scoliosis     Time In: 12:30 pm  Time Out: 1:30 pm   Total Billable Time: 55 minutes (1:1)  Total Appointment Time (timed & untimed codes): 60 minutes    PTA Visit #: 0/5   Subjective   Patient reports: R side of her back bothering her the most today. She had to do a sleep study overnight on Friday and has been feeling worse since then     She was compliant with home exercise program.  Response to previous treatment: no adverse effects   Functional change: improved tolerance to functional mobility activities with decreased pain    Pain: 3/10 R knee, 4/10 LB  Location: R low back and R knee   Objective    Lumbar Range of Motion:     %   Flexion 90      Extension 50      Left Side Bending 50   Right Side Bending 50   Left rotation    60   Right Rotation    70    *= pain           Range of Motion:   Knee Left active Left Passive   Flexion 133 140   Extension 3 5      Knee Right active Right Passive   Flexion 121 124   Extension 1 3         Lower  "Extremity Strength     Right LE   Left LE     Hip flexion: 4/5 Hip flexion: 4+/5   Knee extension: 4/5 Knee extension: 4+/5   Knee flexion: 4/5 Knee flexion: 4+/5   Hip IR: 4/5 Hip IR: 4/5   Hip ER: 4/5 Hip ER: 4/5   Hip extension:  4-/5 Hip extension: 4-/5   Hip abduction: 4-/5 Hip abduction: 4/5   Hip adduction: 4-/5 Hip adduction 4/5   Ankle dorsiflexion: 5/5 Ankle dorsiflexion: 5/5   Ankle plantarflexion: 5/5 Ankle plantarflexion: 5/5          Treatment   Tiarra received the treatments listed below:      Therapeutic exercises to develop strength, endurance, ROM, flexibility, posture, and core stabilization for 30 minutes including:    Recumbent bike lv. 4 x 5 min for endurance and knee ROM  PPT 15x5"  Bridge 2x10x3"  EIL 2x10  DKTC 15x5"'  Open books x15,3" ea  Hip flexor stretch 2x1' ea  Leg press # 2x10  Leg press SL 80# 2x10    NP:  EIS 2x10    manual therapy techniques: n/a applied to the: R knee for 0 minutes, including:        Neuromuscular re-education activities to improve: Balance, Coordination, Kinesthetic, Proprioception, and Posture for 25 minutes. The following activities were included:    Sit to stands GTB around knees 2x10  Side stepping GTB 2x12 steps  Monster walks GTB 2x12 steps  Single leg balance 3x30" R only  Step ups fwd/lateral  6 in step 2x10x3" ea R only   Medx trunk extension 70# x20  Medx trunk rotation 30# x20 increase weight   Heel raises 2x15 with 10# KB alternating hands      therapeutic activities to improve functional performance for 0  minutes, including:        cold pack for 5 minutes to R knee and low back    Patient Education and Home Exercises     Education provided:   - HEP, POC    Written Home Exercises Provided: Patient instructed to cont prior HEP. Exercises were reviewed and Tiarra was able to demonstrate them prior to the end of the session.  Tiarra demonstrated good  understanding of the education provided. See Electronic Medical Record under Patient Instructions " for exercises provided during therapy sessions    Assessment   Tiarra presents today with continued reports of R sided back pain. Decreased tightness and soreness reported at completion of treatment session. Progress strengthening and mobility exercises as tolerated with no adverse effects. Continue to progress as able.     Tiarra Is progressing well towards her goals.   Patient prognosis is Good.     Patient will continue to benefit from skilled outpatient physical therapy to address the deficits listed in the problem list box on initial evaluation, provide pt/family education and to maximize pt's level of independence in the home and community environment.     Patient's spiritual, cultural and educational needs considered and pt agreeable to plan of care and goals.     Anticipated barriers to physical therapy: chronicity of condition, multiple treatment areas, hx of R TKA and menisectomy     GOALS: Short Term Goals:  4 weeks  1. Report decreased in pain at worse less than  <   / =  4  /10  to increase tolerance for functional activities.On going  2. Pt to increase B popliteal angle by greater than > or = 5 degrees in order to improve flexibility and posture. MET  3. Increased R LE MMT 1/2  to increase tolerance for ADL and work activities. MET  4. Pt to increase B Ely's Test by greater than  > or = 5degrees in order to improve flexibility and posture. MET  5. Pt to tolerate HEP to improve ROM and independence with ADL's. MET  6. Pt to improve range of motion by 25% to allow for improved functional mobility to allow for improvement in IADLs. MET     Long Term Goals: 8 weeks  1. Report decreased in pain at worse less than  <   / =  2  /10  to increase tolerance for functional mobility.  On going  2 .Pt to increase B popliteal angle by greater than > or = 10 degrees in order to improve flexibility and posture. On going  3. Increased R LE MMT 1 grade to increase tolerance for ADL and work activities.On going  4. Pt  will report at 51% or better score on FOTO Lumbar spine survey  to demonstrate decrease in disability and improvement in back pain.On going  5. Pt to be Independent with HEP to improve ROM and independence with ADL's. On going  6. Pt to increase B Ely's Test by greater than > or = 10 degrees in order to improve flexibility and posture. On going  7. Pt to demonstrate negative Bridge Test in order to show improved core strength for lumbar stabilization. On going  8. Pt to improve range of motion by 75% to allow for improved functional mobility to allow for improvement in IADLs. On going        Plan   Plan of care Certification: 5/9/2024 to 7/17/2024     Outpatient Physical Therapy 2 times weekly for 10 weeks to include the following interventions: Cervical/Lumbar Traction, Gait Training, Manual Therapy, Moist Heat/ Ice, Neuromuscular Re-ed, Patient Education, Self Care, Therapeutic Activities, and Therapeutic Exercise. Dry aubree Anaya, PT   5/13/2024

## 2024-05-14 ENCOUNTER — PATIENT MESSAGE (OUTPATIENT)
Dept: SLEEP MEDICINE | Facility: CLINIC | Age: 69
End: 2024-05-14

## 2024-05-14 ENCOUNTER — PATIENT MESSAGE (OUTPATIENT)
Dept: PAIN MEDICINE | Facility: CLINIC | Age: 69
End: 2024-05-14
Payer: MEDICARE

## 2024-05-14 PROCEDURE — 95810 POLYSOM 6/> YRS 4/> PARAM: CPT | Mod: 26,,, | Performed by: PSYCHIATRY & NEUROLOGY

## 2024-05-14 NOTE — PROCEDURES
"Diagnostic Report  [facility] - [address] [place] [address]  Phone: [phone]  Fax: [phone]       Patient Name: Tiarra Norton Study Date: 5/10/2024   YOB: 1955 Patient MRN: [id number]   Age:  69 year     Sex: Female Referring Physician: Bekah Nichols MD   Height: 5' 3" Recording Tech: Sabrina Talbert RRT SDS   Weight: 139.0 lbs Scoring Tech: Juaquin Jeffries RPSGT   BMI:  24.6 AASM:  1B   AHI: 0.4 Interpreting Physician: Bekah Nichols MD   RERA index: 0.9 Low oxygen sat: 84.0%   RDI: 1.3        Polysomnogram Data: A full night polysomnogram recorded the standard physiologic parameters including EEG, EOG, EMG, EKG, nasal and oral airflow.  Respiratory parameters of chest and abdominal movements were recorded with Peizo-Crystal motion transducers.  Oxygen saturation was recorded by pulse oximetry.    Sleep architecture: This is a baseline polysomnogram. At light's out, the patient fell asleep in 13.9 minutes and slept for 66.5% of the time. Total sleep time (TST) was 272.0 minutes. 4.8% of TST was in Stage N1 sleep, 0.2% TST in slow wave sleep, and 24.8% TST in REM sleep. The REM latency was 157.0 minutes. Sleep architecture was significantly disrupted      Respiratory: No significant  snoring was present. The polysomnogram revealed a presence of - obstructive, - central, and - mixed apneas resulting in a Total Apnea index of - events per hour.  There were 2 hypopneas resulting in a Total Hypopnea index of 0.4 events per hour.  The combined Apnea/Hypopnea index was 0.4 events per hour.  There were a total of 4 RERA events resulting in a Respiratory Disturbance Index (RDI) of 1.3 events per hour.  Mean oxygen saturation was 94.0%.  The lowest oxygen saturation during sleep was 88.0%.  Time spent ?88% oxygen saturation was - minutes (-).The patient did not qualify for a split night study due to an insufficient number of events in the first half of the study.    Motor " "movement / Parasomnia: There were no significant  limb movements of sleep noted. The total limb movement index was - (- with arousal).     Cardiac: Cardiac rhythm monitoring revealed a sinus rhythm.   The average pulse rate was 64.2 bpm.  The minimum pulse rate was 41.0 bpm while the maximum pulse rate was 88.0 bpm.      Patient perception: On a post-sleep study questionnaire, the patient indicated that sleep was the same in the lab than compared to home.    IMPRESSION:  No evidence of Obstructive Sleep Apnea based on AHI criteria.  Sleep disordered breathing could be underestimated due to poos sleep efficiency.    RECOMMENDATION:    Consider repeating PSG in several months if the patient remains symptomatic for sleep disordered breathing.  Consider clinical evaluation for sleep disorders other than HERMINIA as a potential cause of daytime fatigue/sleepiness.        I have reviewed the attached data report and the raw data tracings of this study epoch-by-epoch and have determined that the recording quality and scoring of events are sufficient to allow for interpretation and electronically signed by:      Bekah Nichols MD 5/10/2024                              [facility]    Diagnostic PSG Report    Patient Name: Tiarra Norton Study Date: 5/10/2024   YOB: 1955 MRN #: [id number]   Age: 69 year RADHA #: [id number]   Sex: Female Referring Provider: Bekah Nichols MD   Height: 5' 3" Recording Tech: Sabrina Talbert RRT SDS   Weight: 139.0 lbs Scoring Tech: Juaquin Jeffries RRT RPSGT   BMI: 24.6 Interpreting Physician: Bekah Nichols MD   ESS: - Neck Circumference: -     Study Overview    Lights Off: 09:52:53 PM  Count Index   Lights On: 04:42:05 AM Awakenings: 20 4.4   Time in Bed: 409.2 min. Arousals: 16 3.5   Total Sleep Time: 272.0 min. Apneas & Hypopneas: 2 0.4    Sleep Efficiency: 66.5% Limb Movements: - -   Sleep Latency: 13.9 min. Snores: - -   Wake After Sleep Onset: 123.0 min. " Desaturations: 3 0.7    REM Latency from Sleep Onset: 157.0 min. Minimum SpO2 TST: 88.0%        Sleep Architecture   % of Time in Bed    Stages Time (mins) % Sleep Time   Wake 137.5    Stage N1 13.0 4.8%   Stage N2 191.0 70.2%   Stage N3 0.5 0.2%   REM 67.5 24.8%         Arousal Summary     NREM REM Sleep Index   Respiratory Arousals 2 2 4 0.9   PLM Arousals - - - -   Isolated Limb Movement Arousals - - - -   Spontaneous Arousals 11 1 12 2.6   Total 13 3 16 3.5       Limb Movement Summary     Count Index   Isolated Limb Movements - -   Periodic Limb Movements (PLMs) - -   Total Limb Movements - -         Respiratory Summary     By Sleep Stage By Body Position Total    NREM REM Supine Non-Supine    Time (min) 204.5 67.5 38.0 234.0 272.0           Obstructive Apnea - - - - -   Mixed Apnea - - - - -   Central Apnea - - - - -   Total Apneas - - - - -   Total Apnea Index - - - - -           Hypopnea 1 1 - 2 2   Hypopnea Index 0.3 0.9 - 0.5 0.4           Apnea & Hypopnea 1 1 - 2 2   Apnea & Hypopnea Index 0.3 0.9 - 0.5 0.4           RERAs 1 3 1 3 4   RERA Index 0.3 2.7 1.6 0.8 0.9           RDI 0.6 3.6 1.6 1.3 1.3      Scoring Criteria: Hypopneas scored at Choose an item.% desaturation criteria.    Respiratory Event Durations     Apnea Hypopnea    NREM REM NREM REM   Average (seconds) - - 13.2 11.5   Maximum (seconds) - - 13.2 11.5       Oxygen Saturation Summary     Wake NREM REM TST TIB   Average SpO2 95.0% 93.7% 93.0% 93.5% 94.0%   Minimum SpO2 84.0% 90.0% 88.0% 88.0% 84.0%   Maximum SpO2 99.0% 97.0% 96.0% 97.0% 99.0%       Oxygen Saturation Distribution    Range (%) Time in range (min) Time in range (%)   90.0 - 100.0 391.5 97.7%   80.0 - 90.0 8.0 2.0%   70.0 - 80.0 - -   60.0 - 70.0 - -   50.0 - 60.0 - -   0.0 - 50.0 - -   Time Spent ?88% SpO2    Range (%) Time in range (min) Time in range (%)   0.0 - 88.0 0.2 0.0%          Count Index   Desaturations 3 0.7      Cardiac Summary     Wake NREM REM Sleep Total   Average  Pulse Rate (BPM) 65.8 63.9 62.1 63.4 64.2   Minimum Pulse Rate (BPM) 41.0 54.0 55.0 54.0 41.0   Maximum Pulse Rate (BPM) 88.0 82.0 72.0 82.0 88.0     Pulse Rate Distribution    Range (bpm) Time in range (min) Time in range (%)   0.0 - 40.0 - -   40.0 - 60.0 56.9 14.2%   60.0 - 80.0 342.9 85.5%   80.0 - 100.0 1.5 0.4%   100.0 - 120.0 - -   120.0 - 140.0 - -   140.0 - 200.0 - -     EtCO2 Summary    Stage Min (mmHg) Average (mmHg) Max (mmHg)   Wake - - -   NREM(1+2+3) - - -   REM - - -     Range (mmHg) Time in range (min) Time in range (%)   20.0 - 40.0 - -   40.0 - 50.0 - -   50.0 - 55.0 - -   55.0 - 100.0 - -   Excluded data <20.0 & >100.0 409.5 100.0%     TcCO2 Summary    Stage Min (mmHg) Average (mmHg) Max (mmHg)   Wake - - -   NREM(1+2+3) - - -   REM - - -     Range (mmHg) Time in range (min) Time in range (%)   20.0 - 40.0 - -   40.0 - 50.0 - -   50.0 - 55.0 - -   55.0 - 100.0 - -   Excluded data <20.0 & >100.0 409.5 100.0%     Comments

## 2024-05-15 ENCOUNTER — PATIENT MESSAGE (OUTPATIENT)
Dept: SLEEP MEDICINE | Facility: CLINIC | Age: 69
End: 2024-05-15
Payer: MEDICARE

## 2024-05-15 DIAGNOSIS — G47.00 INSOMNIA, UNSPECIFIED TYPE: Primary | ICD-10-CM

## 2024-05-15 RX ORDER — SUVOREXANT 20 MG/1
20 TABLET, FILM COATED ORAL NIGHTLY PRN
Qty: 30 TABLET | Refills: 5 | Status: SHIPPED | OUTPATIENT
Start: 2024-05-15

## 2024-05-15 NOTE — PROGRESS NOTES
Dr. Nichols, the bed I was given for my sleep study had no controls at all.     I have checked with my insurance company and Belsomra IS covered, at a cost to me of $84 for a 30-day supply.  I confirmed this with the [facility] pharmacy.  So please call a prescription for Belsomra to the [facility] pharmacy and I will give it a try.     Thanks for your help.

## 2024-05-21 ENCOUNTER — RESEARCH ENCOUNTER (OUTPATIENT)
Dept: RESEARCH | Facility: OTHER | Age: 69
End: 2024-05-21
Payer: MEDICARE

## 2024-05-21 NOTE — PROGRESS NOTES
Study: Wave Writer- BRUNO Study: A Randomized Controlled Study to Evaluate the Safety and Effectiveness of [employer] Spinal Cord Stimulation (SCS) Systems in the Treatment of Chronic Low Back and/or Leg Pain with No Prior Surgeries  IRB/Protocol #: [id number]/  : Obed Gasca MD  Treating Physician: Shoaib Aguirre MD  Site Number: [id number]        Subject Number: [id number]     Unscheduled Visit:  The subject visited [facility], met with Akila Cali (BURT) for device reprogramming.  FCE added new programs to address the pain. CRC (Nena) and FCE (Akila), will follow up next week Tuesday 28 MAY 2024 via phone check-in.  There were no changes in concomitant medications, no protocol deviations or adverse events to report. Payment of $50 was added to subject's ClinCard.

## 2024-05-28 ENCOUNTER — CLINICAL SUPPORT (OUTPATIENT)
Dept: REHABILITATION | Facility: OTHER | Age: 69
End: 2024-05-28
Payer: MEDICARE

## 2024-05-28 DIAGNOSIS — M25.661 DECREASED RANGE OF MOTION (ROM) OF RIGHT KNEE: Primary | ICD-10-CM

## 2024-05-28 DIAGNOSIS — R29.898 DECREASED STRENGTH OF TRUNK AND BACK: ICD-10-CM

## 2024-05-28 DIAGNOSIS — R29.898 WEAKNESS OF RIGHT LOWER EXTREMITY: ICD-10-CM

## 2024-05-28 DIAGNOSIS — M62.81 WEAKNESS OF TRUNK MUSCULATURE: ICD-10-CM

## 2024-05-28 DIAGNOSIS — M25.69 DECREASED RANGE OF MOTION OF TRUNK AND BACK: ICD-10-CM

## 2024-05-28 PROCEDURE — 97112 NEUROMUSCULAR REEDUCATION: CPT

## 2024-05-28 PROCEDURE — 97110 THERAPEUTIC EXERCISES: CPT

## 2024-05-29 ENCOUNTER — PATIENT MESSAGE (OUTPATIENT)
Dept: SLEEP MEDICINE | Facility: CLINIC | Age: 69
End: 2024-05-29
Payer: MEDICARE

## 2024-05-29 ENCOUNTER — PATIENT MESSAGE (OUTPATIENT)
Dept: REHABILITATION | Facility: OTHER | Age: 69
End: 2024-05-29
Payer: MEDICARE

## 2024-05-30 NOTE — PROGRESS NOTES
[facility]   Physical Therapy Treatment Note      Name: Tiarra Norton  [facility] Number: [id number]    Therapy Diagnosis:   Encounter Diagnoses   Name Primary?    Decreased range of motion (ROM) of right knee Yes    Weakness of right lower extremity     Decreased range of motion of trunk and back     Decreased strength of trunk and back     Weakness of trunk musculature        Physician: Virgil Scott MD    Visit Date: 5/28/2024    Physician Orders: PT Eval and Treat   Medical Diagnosis from Referral:   Z96.651 (ICD-10-CM) - Status post total right knee replacement   M22.2X1 (ICD-10-CM) - Patellofemoral pain syndrome of right knee   M70.50 (ICD-10-CM) - Pes anserine bursitis      Evaluation Date: 12/21/2023  Authorization Period Expiration: 9/21/2024  Plan of Care Expiration: Updated to 7/17/2024  Visit # / Visits authorized: 20/40   Progress Note Due: 6/7/2024  FOTO: 3/4      Precautions: hx of TKA and menisectomy, spinal cord stimulator, scoliosis     Time In: 12:30 pm  Time Out: 1:30 pm   Total Billable Time: 55 minutes (1:1)  Total Appointment Time (timed & untimed codes): 60 minutes    PTA Visit #: 0/5   Subjective   Patient reports: she feels like she makes improvements after therapy visits, but her pain comes back a few days later    She was compliant with home exercise program.  Response to previous treatment: no adverse effects   Functional change: improved tolerance to functional mobility activities with decreased pain    Pain: 3/10 R knee, 4/10 LB  Location: R low back and R knee   Objective    Lumbar Range of Motion:     %   Flexion 90      Extension 50      Left Side Bending 50   Right Side Bending 50   Left rotation    60   Right Rotation    70    *= pain           Range of Motion:   Knee Left active Left Passive   Flexion 133 140   Extension 3 5      Knee Right active Right Passive   Flexion 121 124   Extension 1 3         Lower Extremity Strength     Right LE   Left  "LE     Hip flexion: 4/5 Hip flexion: 4+/5   Knee extension: 4/5 Knee extension: 4+/5   Knee flexion: 4/5 Knee flexion: 4+/5   Hip IR: 4/5 Hip IR: 4/5   Hip ER: 4/5 Hip ER: 4/5   Hip extension:  4-/5 Hip extension: 4-/5   Hip abduction: 4-/5 Hip abduction: 4/5   Hip adduction: 4-/5 Hip adduction 4/5   Ankle dorsiflexion: 5/5 Ankle dorsiflexion: 5/5   Ankle plantarflexion: 5/5 Ankle plantarflexion: 5/5          Treatment   Tiarra received the treatments listed below:      Therapeutic exercises to develop strength, endurance, ROM, flexibility, posture, and core stabilization for 30 minutes including:    Recumbent bike lv. 4 x 5 min for endurance and knee ROM  PPT 15x5"  Bridge 2x10x3"  EIL 2x10  DKTC 15x5"'  Open books x15,3" ea  Hip flexor stretch 2x1' ea  Leg press # 2x10  Leg press SL 80# 2x10    NP:  EIS 2x10    manual therapy techniques: n/a applied to the: R knee for 0 minutes, including:        Neuromuscular re-education activities to improve: Balance, Coordination, Kinesthetic, Proprioception, and Posture for 25 minutes. The following activities were included:    Sit to stands GTB around knees 2x10  Side stepping GTB 2x12 steps  Monster walks GTB 2x12 steps  Single leg balance 3x30" R only  Step ups fwd/lateral  6 in step 2x10x3" ea R only   Medx trunk extension 70# x20  Medx trunk rotation 30# x20 increase weight   Heel raises 2x15 with 10# KB alternating hands      therapeutic activities to improve functional performance for 0  minutes, including:        cold pack for 5 minutes to R knee and low back    Patient Education and Home Exercises     Education provided:   - HEP, POC    Written Home Exercises Provided: Patient instructed to cont prior HEP. Exercises were reviewed and Tiarra was able to demonstrate them prior to the end of the session.  Tiarra demonstrated good  understanding of the education provided. See Electronic Medical Record under Patient Instructions for exercises provided during therapy " sessions    Assessment   Tiarra presents today with reports of increased back and knee pain. Discussed importance of daily HEP completion to maximize therapeutic benefit and decrease pain levels when she is not in therapy. Continuation of dynamic strengthening and mobility exercises with good tolerance and no adverse effects. Continue to progress as tolerated.    Tiarra Is progressing well towards her goals.   Patient prognosis is Good.     Patient will continue to benefit from skilled outpatient physical therapy to address the deficits listed in the problem list box on initial evaluation, provide pt/family education and to maximize pt's level of independence in the home and community environment.     Patient's spiritual, cultural and educational needs considered and pt agreeable to plan of care and goals.     Anticipated barriers to physical therapy: chronicity of condition, multiple treatment areas, hx of R TKA and menisectomy     GOALS: Short Term Goals:  4 weeks  1. Report decreased in pain at worse less than  <   / =  4  /10  to increase tolerance for functional activities.On going  2. Pt to increase B popliteal angle by greater than > or = 5 degrees in order to improve flexibility and posture. MET  3. Increased R LE MMT 1/2  to increase tolerance for ADL and work activities. MET  4. Pt to increase B Ely's Test by greater than  > or = 5degrees in order to improve flexibility and posture. MET  5. Pt to tolerate HEP to improve ROM and independence with ADL's. MET  6. Pt to improve range of motion by 25% to allow for improved functional mobility to allow for improvement in IADLs. MET     Long Term Goals: 8 weeks  1. Report decreased in pain at worse less than  <   / =  2  /10  to increase tolerance for functional mobility.  On going  2 .Pt to increase B popliteal angle by greater than > or = 10 degrees in order to improve flexibility and posture. On going  3. Increased R LE MMT 1 grade to increase tolerance for ADL  and work activities.On going  4. Pt will report at 51% or better score on FOTO Lumbar spine survey  to demonstrate decrease in disability and improvement in back pain.On going  5. Pt to be Independent with HEP to improve ROM and independence with ADL's. On going  6. Pt to increase B Ely's Test by greater than > or = 10 degrees in order to improve flexibility and posture. On going  7. Pt to demonstrate negative Bridge Test in order to show improved core strength for lumbar stabilization. On going  8. Pt to improve range of motion by 75% to allow for improved functional mobility to allow for improvement in IADLs. On going        Plan   Plan of care Certification: 5/9/2024 to 7/17/2024     Outpatient Physical Therapy 2 times weekly for 10 weeks to include the following interventions: Cervical/Lumbar Traction, Gait Training, Manual Therapy, Moist Heat/ Ice, Neuromuscular Re-ed, Patient Education, Self Care, Therapeutic Activities, and Therapeutic Exercise. Dry aubree Anaya, PT   5/30/2024

## 2024-05-31 ENCOUNTER — PATIENT MESSAGE (OUTPATIENT)
Dept: REHABILITATION | Facility: OTHER | Age: 69
End: 2024-05-31
Payer: MEDICARE

## 2024-06-03 ENCOUNTER — PATIENT MESSAGE (OUTPATIENT)
Dept: PAIN MEDICINE | Facility: CLINIC | Age: 69
End: 2024-06-03
Payer: MEDICARE

## 2024-06-03 DIAGNOSIS — M25.561 CHRONIC PAIN OF RIGHT KNEE: ICD-10-CM

## 2024-06-03 DIAGNOSIS — Z96.89 SPINAL CORD STIMULATOR STATUS: ICD-10-CM

## 2024-06-03 DIAGNOSIS — M41.25 OTHER IDIOPATHIC SCOLIOSIS, THORACOLUMBAR REGION: ICD-10-CM

## 2024-06-03 DIAGNOSIS — G89.29 CHRONIC PAIN OF RIGHT KNEE: ICD-10-CM

## 2024-06-03 DIAGNOSIS — G89.4 CHRONIC PAIN SYNDROME: ICD-10-CM

## 2024-06-03 DIAGNOSIS — M51.36 DDD (DEGENERATIVE DISC DISEASE), LUMBAR: ICD-10-CM

## 2024-06-03 DIAGNOSIS — Z96.651 HISTORY OF TOTAL KNEE ARTHROPLASTY, RIGHT: ICD-10-CM

## 2024-06-04 ENCOUNTER — CLINICAL SUPPORT (OUTPATIENT)
Dept: REHABILITATION | Facility: OTHER | Age: 69
End: 2024-06-04
Payer: MEDICARE

## 2024-06-04 DIAGNOSIS — R29.898 WEAKNESS OF RIGHT LOWER EXTREMITY: ICD-10-CM

## 2024-06-04 DIAGNOSIS — M25.69 DECREASED RANGE OF MOTION OF TRUNK AND BACK: ICD-10-CM

## 2024-06-04 DIAGNOSIS — R29.898 DECREASED STRENGTH OF TRUNK AND BACK: ICD-10-CM

## 2024-06-04 DIAGNOSIS — M62.81 WEAKNESS OF TRUNK MUSCULATURE: ICD-10-CM

## 2024-06-04 DIAGNOSIS — M25.661 DECREASED RANGE OF MOTION (ROM) OF RIGHT KNEE: Primary | ICD-10-CM

## 2024-06-04 PROCEDURE — 97112 NEUROMUSCULAR REEDUCATION: CPT

## 2024-06-04 PROCEDURE — 97110 THERAPEUTIC EXERCISES: CPT

## 2024-06-04 NOTE — TELEPHONE ENCOUNTER
Patient requesting refill on [facility]  Last office visit 3/25/24   shows last refill on 5/1/24  Patient does not have a pain contract on file with [facility] department  Patient last UDS 1/23/24 was not consistent with current therapy    CODEINE  Not Detected Not Detected    Comment: INTERPRETIVE INFORMATION: Codeine, U  Positive Cutoff: 40 ng/mL  Methodology: Mass Spectrometry   MORPHINE  Not Detected Not Detected    Comment: INTERPRETIVE INFORMATION:Morphine, U  Positive Cutoff: 20 ng/mL  Methodology: Mass Spectrometry   6-ACETYLMORPHINE  Not Detected Not Detected    Comment: INTERPRETIVE INFORMATION:6-acetylmorphine, U  Positive Cutoff: 20 ng/mL  Methodology: Mass Spectrometry   OXYCODONE  Not Detected Not Detected    Comment: INTERPRETIVE INFORMATION:Oxycodone, U  Positive Cutoff: 40 ng/mL  Methodology: Mass Spectrometry   NOROYXCODONE  Not Detected Not Detected    Comment: INTERPRETIVE INFORMATION:Noroxycodone, U  Positive Cutoff: 100 ng/mL  Methodology: Mass Spectrometry   OXYMORPHONE  Not Detected Not Detected    Comment: INTERPRETIVE INFORMATION:Oxymorphone, U  Positive Cutoff: 40 ng/mL  Methodology: Mass Spectrometry   NOROXYMORPHONE  Not Detected Not Detected    Comment: INTERPRETIVE INFORMATION:Noroxymorphone, U  Positive Cutoff: 100 ng/mL  Methodology: Mass Spectrometry   HYDROCODONE  Not Detected Not Detected    Comment: INTERPRETIVE INFORMATION:Hydrocodone, U  Positive Cutoff: 40 ng/mL  Methodology: Mass Spectrometry   NORHYDROCODONE  Not Detected Not Detected    Comment: INTERPRETIVE INFORMATION:Norhydrocodone, U  Positive Cutoff: 100 ng/mL  Methodology: Mass Spectrometry   HYDROMORPHONE  Not Detected Not Detected    Comment: INTERPRETIVE INFORMATION:Hydromorphone, U  Positive Cutoff: 20 ng/mL  Methodology: Mass Spectrometry   BUPRENORPHINE  Not Detected Not Detected    Comment: INTERPRETIVE INFORMATION:Buprenorphine, U  Positive Cutoff: 5 ng/mL  Methodology: Mass Spectrometry    NORUBPRENORPHINE  Not Detected Not Detected    Comment: INTERPRETIVE INFORMATION:Norbuprenorphine, U  Positive Cutoff: 20 ng/mL  Methodology: Mass Spectrometry   FENTANYL  Not Detected Not Detected    Comment: INTERPRETIVE INFORMATION:Fentanyl, U  Positive Cutoff: 2 ng/mL  Methodology: Mass Spectrometry   NORFENTANYL  Not Detected Not Detected    Comment: INTERPRETIVE INFORMATION:Norfentanyl, U  Positive Cutoff: 2 ng/mL  Methodology: Mass Spectrometry   MEPERIDINE METABOLITE  Not Detected Not Detected    Comment: INTERPRETIVE INFORMATION:Meperidine metabolite, U  Positive Cutoff: 50 ng/mL  Methodology: Mass Spectrometry   TAPENTADOL  Not Detected Not Detected    Comment: INTERPRETIVE INFORMATION:Tapentadol, U  Positive Cutoff: 100 ng/mL  Methodology: Mass Spectrometry   TAPENTADOL-O-SULF  Not Detected Not Detected    Comment: INTERPRETIVE INFORMATION:Tapentadol-o-Sulf, U  Positive Cutoff: 200 ng/mL  Methodology: Mass Spectrometry   METHADONE  Negative Not Detected    Comment: Presumptive negative by immunoassay. Testing by mass  spectrometry is available on request.  INTERPRETIVE INFORMATION: Methadone Screen, U  Positive Cutoff: 150 ng/mL  Methodology: Immunoassay   TRAMADOL  Negative Not Detected    Comment: Presumptive negative by immunoassay. Testing by mass  spectrometry is available on request.  INTERPRETIVE INFORMATION:Tramadol Screen, U  Positive Cutoff: 100 ng/mL  Methodology: Immunoassay   AMPHETAMINE  Not Detected Not Detected    Comment: INTERPRETIVE INFORMATION:Amphetamine, U  Positive Cutoff: 50 ng/mL  Methodology: Mass Spectrometry   METHAMPHETAMINE  Not Detected Not Detected    Comment: INTERPRETIVE INFORMATION:Methamphetamine, U  Positive Cutoff: 200 ng/mL  Methodology: Mass Spectrometry   MDMA- ECSTASY  Not Detected Not Detected    Comment: INTERPRETIVE INFORMATION:MDMA, U  Positive Cutoff: 200 ng/mL  Methodology: Mass Spectrometry   MDA  Not Detected Not Detected    Comment: INTERPRETIVE  INFORMATION:MDA, U  Positive Cutoff: 200 ng/mL  Methodology: Mass Spectrometry   MDEA- Amber  Not Detected Not Detected    Comment: INTERPRETIVE INFORMATION:MDEA, U  Positive Cutoff: 200 ng/mL  Methodology: Mass Spectrometry   METHYLPHENIDATE  Not Detected Present    Comment: INTERPRETIVE INFORMATION:Methylphenidate, U  Positive Cutoff: 100 ng/mL  Methodology: Mass Spectrometry   PHENTERMINE  Not Detected Not Detected    Comment: INTERPRETIVE INFORMATION:Phentermine, U  Positive Cutoff: 100 ng/mL  Methodology: Mass Spectrometry   BENZOYLECGONINE  Negative Not Detected    Comment: Presumptive negative by immunoassay. Testing by mass  spectrometry is available on request.  INTERPRETIVE INFORMATION:Cocaine Screen, U  Positive Cutoff: 150 ng/mL  Methodology: Immunoassay   ALPRAZOLAM  Present Present    Comment: INTERPRETIVE INFORMATION:Alprazolam, U  Positive Cutoff: 40 ng/mL  Methodology: Mass Spectrometry   ALPHA-OH-ALPRAZOLAM  Present Present    Comment: INTERPRETIVE INFORMATION:Alpha-OH-Alprazolam, U  Positive Cutoff: 20 ng/mL  Methodology: Mass Spectrometry   CLONAZEPAM  Not Detected Not Detected    Comment: INTERPRETIVE INFORMATION:Clonazepam, U  Positive Cutoff: 20 ng/mL  Methodology: Mass Spectrometry   7-AMINOCLONAZEPAM  Not Detected Not Detected    Comment: INTERPRETIVE INFORMATION:7-Aminoclonazepam, U  Positive Cutoff: 40 ng/mL  Methodology: Mass Spectrometry   DIAZEPAM  Not Detected Not Detected    Comment: INTERPRETIVE INFORMATION:Diazepam, U  Positive Cutoff: 50 ng/mL  Methodology: Mass Spectrometry   NORDIAZEPAM  Not Detected Not Detected    Comment: INTERPRETIVE INFORMATION:Nordiazepam, U  Positive Cutoff: 50 ng/mL  Methodology: Mass Spectrometry   OXAZEPAM  Not Detected Not Detected    Comment: INTERPRETIVE INFORMATION:Oxazepam, U  Positive Cutoff: 50 ng/mL  Methodology: Mass Spectrometry   TEMAZEPAM  Not Detected Not Detected    Comment: INTERPRETIVE INFORMATION:Temazepam, U  Positive Cutoff: 50  ng/mL  Methodology: Mass Spectrometry   Lorazepam  Not Detected Not Detected    Comment: INTERPRETIVE INFORMATION:Lorazepam, U  Positive Cutoff: 60 ng/mL  Methodology: Mass Spectrometry   MIDAZOLAM  Not Detected Not Detected    Comment: INTERPRETIVE INFORMATION:Midazolam, U  Positive Cutoff: 20 ng/mL  Methodology: Mass Spectrometry   ZOLPIDEM  Not Detected Not Detected    Comment: INTERPRETIVE INFORMATION:Zolpidem, U  Positive Cutoff: 20 ng/mL  Methodology: Mass Spectrometry   BARBITURATES  Negative Not Detected    Comment: Presumptive negative by immunoassay. Testing by mass  spectrometry is available on request.  INTERPRETIVE INFORMATION:Barbiturates Screen, U  Positive Cutoff: 200 ng/mL  Methodology: Immunoassay   Creatinine, Urine 20.0 - 400.0 mg/dL 40.6 58.6 190.0 R, CM   ETHYL GLUCURONIDE  Negative Present    Comment: Presumptive negative by immunoassay. Testing by mass  spectrometry is available on request.  INTERPRETIVE INFORMATION:Ethyl Glucuronide Screen, U  Positive Cutoff: 500 ng/mL  Methodology: Immunoassay   MARIJUANA METABOLITE  PresumptivePOS Not Detected    Comment: Presumptive positive by immunoassay. Testing by mass  spectrometry is available on request.  INTERPRETIVE INFORMATION: THC (Cannabinoids) Screen, U  Positive Cutoff: 50 ng/mL  Methodology: Immunoassay   PCP  Negative Not Detected    Comment: Presumptive negative by immunoassay. Testing by mass  spectrometry is available on request.  INTERPRETIVE INFORMATION:Phencyclidine Screen, U  Positive Cutoff: 25 ng/mL  Methodology: Immunoassay   CARISOPRODOL  Negative Not Detected CM    Comment: Presumptive negative by immunoassay. Testing by mass  spectrometry is available on request.  INTERPRETIVE INFORMATION: Carisoprodol Screen, U  Positive Cutoff: 100 ng/mL  Methodology: Immunoassay  The carisoprodol immunoassay has cross-reactivity to  carisoprodol and meprobamate.   Naloxone  Not Detected Not Detected    Comment: INTERPRETIVE  INFORMATION:Naloxone, U  Positive Cutoff: 100 ng/mL  Methodology: Mass Spectrometry   Gabapentin  Not Detected Present    Comment: INTERPRETIVE INFORMATION:Gabapentin, U  Positive Cutoff: 3,000 ng/mL  Methodology: Mass Spectrometry   Pregabalin  Not Detected Not Detected    Comment: INTERPRETIVE INFORMATION:Pregabalin, U  Positive Cutoff: 3,000 ng/mL  Methodology: Mass Spectrometry   Alpha-OH-Midazolam  Not Detected Not Detected    Comment: INTERPRETIVE INFORMATION:Alpha-OH-Midazolam, U  Positive Cutoff: 20 ng/mL  Methodology: Mass Spectrometry   Zolpidem Metabolite  Not Detected Not Detected    Comment: INTERPRETIVE INFORMATION:Zolpidem Metabolite, U  Positive Cutoff: 100 ng/mL  Methodology: Mass Spectrometry

## 2024-06-05 ENCOUNTER — OFFICE VISIT (OUTPATIENT)
Dept: PRIMARY CARE CLINIC | Facility: CLINIC | Age: 69
End: 2024-06-05
Payer: MEDICARE

## 2024-06-05 VITALS
OXYGEN SATURATION: 99 % | HEIGHT: 63 IN | DIASTOLIC BLOOD PRESSURE: 68 MMHG | HEART RATE: 74 BPM | BODY MASS INDEX: 25.12 KG/M2 | WEIGHT: 141.75 LBS | SYSTOLIC BLOOD PRESSURE: 130 MMHG

## 2024-06-05 DIAGNOSIS — S61.502A OPEN WOUND OF LEFT WRIST, INITIAL ENCOUNTER: Primary | ICD-10-CM

## 2024-06-05 PROCEDURE — 99214 OFFICE O/P EST MOD 30 MIN: CPT | Mod: S$PBB,,, | Performed by: NURSE PRACTITIONER

## 2024-06-05 PROCEDURE — 99214 OFFICE O/P EST MOD 30 MIN: CPT | Mod: PBBFAC,PN | Performed by: NURSE PRACTITIONER

## 2024-06-05 PROCEDURE — 99999 PR PBB SHADOW E&M-EST. PATIENT-LVL IV: CPT | Mod: PBBFAC,,, | Performed by: NURSE PRACTITIONER

## 2024-06-05 RX ORDER — CYCLOSPORINE 0.5 MG/ML
1 EMULSION OPHTHALMIC DAILY
COMMUNITY

## 2024-06-05 RX ORDER — CEPHALEXIN 500 MG/1
500 CAPSULE ORAL EVERY 12 HOURS
Qty: 14 CAPSULE | Refills: 0 | Status: SHIPPED | OUTPATIENT
Start: 2024-06-05

## 2024-06-05 RX ORDER — AMLODIPINE BESYLATE 5 MG/1
5 TABLET ORAL DAILY
COMMUNITY
Start: 2024-03-18

## 2024-06-05 RX ORDER — ALPRAZOLAM 1 MG/1
1 TABLET ORAL 2 TIMES DAILY PRN
COMMUNITY
End: 2024-06-05

## 2024-06-05 RX ORDER — SILVER SULFADIAZINE 10 G/1000G
CREAM TOPICAL DAILY
Qty: 25 G | Refills: 0 | Status: SHIPPED | OUTPATIENT
Start: 2024-06-05

## 2024-06-05 NOTE — PROGRESS NOTES
[facility] Note    Chief Complaint      Chief Complaint   Patient presents with    Burn     Lt wrist       History of Present Illness      Tiarra Norton is a 69 y.o. female who presents today for   Chief Complaint   Patient presents with    Burn     Lt wrist         Patient is new to me.  She presents to clinic today with healing burn to left wrist.  She reports she received a steam burn 1 week ago.  She has been applying Neosporin ointment to help heal area.  There is no drainage coming from site.  There is a scab covering the site.  There are no other complaints at this time.  Otherwise she is feeling well today.         Review of Systems   Skin:         + healing burn to left wrist.   All 12 systems otherwise negative.       Family History:  family history includes Alcohol abuse in her sister and sister; Breast cancer in her maternal aunt and paternal aunt; Cancer in her father; Depression in her sister; Epilepsy in her son; Heart attack in her father; Heart disease in her father; Heart failure in her father; Hyperlipidemia in her father and mother; Hypertension in her father and mother; Irritable bowel syndrome in her mother and sister; Ovarian cancer in her maternal aunt.   Family history was reviewed with patient.     Medications:  Outpatient Encounter Medications as of 6/5/2024   Medication Sig Note Dispense Refill    ALPRAZolam (XANAX) 1 MG tablet Take 1 mg by mouth 3 (three) times daily.       amLODIPine (NORVASC) 5 MG tablet Take 5 mg by mouth once daily.       atorvastatin (LIPITOR) 10 MG tablet Take 1 tablet (10 mg total) by mouth once daily.  90 tablet 3    cycloSPORINE (RESTASIS) 0.05 % ophthalmic emulsion Place 1 drop into both eyes Daily.       EScitalopram oxalate (LEXAPRO) 5 MG Tab Take 5 mg by mouth.       levothyroxine (SYNTHROID) 125 MCG tablet TAKE 1 TABLET BY MOUTH EVERY MORNING  90 tablet 2    liothyronine (CYTOMEL) 5 MCG Tab Take 1 tablet (5 mcg total) by mouth once  daily.  90 tablet 3    promethazine (PHENERGAN) 25 MG tablet 1 tab po q 12 h prn nausea  30 tablet 2    sumatriptan (IMITREX) 50 MG tablet 1 tab po at start of headache.  Repeat in 2 h prn 4/25/2023: Hold 7 days prior to surgery.   36 tablet 3    suvorexant (BELSOMRA) 20 mg Tab Take 1 tablet (20 mg total) by mouth nightly as needed.  30 tablet 5    valsartan (DIOVAN) 320 MG tablet Take 1 tablet (320 mg total) by mouth once daily.  90 tablet 3    ALPRAZolam (XANAX) 1 MG tablet Take 1 mg by mouth 2 (two) times daily as needed for Anxiety. (Patient not taking: Reported on 6/5/2024)       cephALEXin (KEFLEX) 500 MG capsule Take 1 capsule (500 mg total) by mouth every 12 (twelve) hours.  14 capsule 0    ondansetron (ZOFRAN) 4 MG tablet Take 1 tablet (4 mg total) by mouth every 8 (eight) hours as needed for Nausea.  30 tablet 0    silver sulfADIAZINE 1% (SILVADENE) 1 % cream Apply topically once daily.  25 g 0    traZODone (DESYREL) 100 MG tablet 1-2 tabs po q hs for sleep  180 tablet 3    triamcinolone acetonide 0.025% (KENALOG) 0.025 % cream Apply topically 2 (two) times daily.  15 g 0    [DISCONTINUED] amLODIPine (NORVASC) 5 MG tablet Take 1 tablet (5 mg total) by mouth once daily.  90 tablet 3    [DISCONTINUED] lemborexant (DAYVIGO) 10 mg Tab Take 1 tablet by mouth nightly as needed for insomnia  30 tablet 5    [DISCONTINUED] RESTASIS 0.05 % ophthalmic emulsion INT 1 GTT IN OU BID 1/6/2023: prn       Facility-Administered Encounter Medications as of 6/5/2024   Medication Dose Route Frequency Provider Last Rate Last Admin    0.9%  NaCl infusion  500 mL Intravenous Continuous Adelfo Zamarripa MD        celecoxib capsule 400 mg  400 mg Oral Once Randall Conrad MD        lactated ringers infusion   Intravenous Continuous Michael Srinivasan MD   New Bag at 11/20/23 0654    LIDOcaine (PF) 10 mg/ml (1%) injection 5 mg  0.5 mL Intradermal Once Michael Srinivasan MD           Allergies:  Review of patient's allergies  "indicates:  No Known Allergies    Health Maintenance:  Health Maintenance   Topic Date Due    Lipid Panel  03/01/2024    Colorectal Cancer Screening  12/06/2024    Mammogram  02/26/2025    TETANUS VACCINE  11/15/2026    Hepatitis C Screening  Completed    Shingles Vaccine  Completed    DEXA Scan  Discontinued     Health Maintenance Topics with due status: Not Due       Topic Last Completion Date    TETANUS VACCINE 11/15/2016    Colorectal Cancer Screening 12/06/2019    Influenza Vaccine 10/11/2022    Mammogram 02/26/2024       Physical Exam      Vital Signs  Pulse: 74  SpO2: 99 %  BP: 130/68  BP Location: Right arm  Patient Position: Sitting  Pain Score:   5  Height and Weight  Height: 5' 3" (160 cm)  Weight: 64.3 kg (141 lb 12.1 oz)  BSA (Calculated - sq m): 1.69 sq meters  BMI (Calculated): 25.1  Weight in (lb) to have BMI = 25: 140.8]    Physical Exam  Vitals reviewed.   Constitutional:       Appearance: Normal appearance. She is normal weight.   HENT:      Head: Normocephalic and atraumatic.      Nose: Nose normal.      Mouth/Throat:      Mouth: Mucous membranes are moist.      Pharynx: Oropharynx is clear.   Eyes:      Extraocular Movements: Extraocular movements intact.      Conjunctiva/sclera: Conjunctivae normal.      Pupils: Pupils are equal, round, and reactive to light.   Cardiovascular:      Rate and Rhythm: Normal rate and regular rhythm.      Pulses: Normal pulses.      Heart sounds: Normal heart sounds.   Pulmonary:      Effort: Pulmonary effort is normal.      Breath sounds: Normal breath sounds.   Musculoskeletal:         General: Normal range of motion.      Cervical back: Normal range of motion and neck supple.   Skin:     General: Skin is warm and dry.      Capillary Refill: Capillary refill takes less than 2 seconds.      Comments: Healing burn to left wrist   Neurological:      General: No focal deficit present.      Mental Status: She is alert and oriented to person, place, and time. Mental " status is at baseline.   Psychiatric:         Mood and Affect: Mood normal.         Behavior: Behavior normal.         Thought Content: Thought content normal.         Judgment: Judgment normal.            Assessment/Plan     Tiarra Nroton is a 69 y.o.female with:    Open wound of left wrist, initial encounter  -     cephALEXin (KEFLEX) 500 MG capsule; Take 1 capsule (500 mg total) by mouth every 12 (twelve) hours.  Dispense: 14 capsule; Refill: 0  -     silver sulfADIAZINE 1% (SILVADENE) 1 % cream; Apply topically once daily.  Dispense: 25 g; Refill: 0        As above, continue current medications and maintain follow up with specialists.  Return to clinic as needed.    Greater than 50% of visit was spent face to face with patient.  All questions were answered to patient's satisfaction.          Karen L Spencer, NP-C [facility]

## 2024-06-06 ENCOUNTER — CLINICAL SUPPORT (OUTPATIENT)
Dept: REHABILITATION | Facility: OTHER | Age: 69
End: 2024-06-06
Payer: MEDICARE

## 2024-06-06 ENCOUNTER — PATIENT MESSAGE (OUTPATIENT)
Dept: INTERNAL MEDICINE | Facility: CLINIC | Age: 69
End: 2024-06-06
Payer: MEDICARE

## 2024-06-06 DIAGNOSIS — M62.81 WEAKNESS OF TRUNK MUSCULATURE: ICD-10-CM

## 2024-06-06 DIAGNOSIS — R29.898 DECREASED STRENGTH OF TRUNK AND BACK: ICD-10-CM

## 2024-06-06 DIAGNOSIS — E03.8 OTHER SPECIFIED HYPOTHYROIDISM: Primary | ICD-10-CM

## 2024-06-06 DIAGNOSIS — R29.898 WEAKNESS OF RIGHT LOWER EXTREMITY: ICD-10-CM

## 2024-06-06 DIAGNOSIS — M25.69 DECREASED RANGE OF MOTION OF TRUNK AND BACK: ICD-10-CM

## 2024-06-06 DIAGNOSIS — M25.661 DECREASED RANGE OF MOTION (ROM) OF RIGHT KNEE: Primary | ICD-10-CM

## 2024-06-06 PROCEDURE — 97110 THERAPEUTIC EXERCISES: CPT

## 2024-06-06 PROCEDURE — 97112 NEUROMUSCULAR REEDUCATION: CPT

## 2024-06-06 NOTE — PROGRESS NOTES
[facility]   Physical Therapy Treatment Note      Name: Tiarra Norton  [facility] Number: [id number]    Therapy Diagnosis:   Encounter Diagnoses   Name Primary?    Decreased range of motion (ROM) of right knee Yes    Weakness of right lower extremity     Decreased range of motion of trunk and back     Decreased strength of trunk and back     Weakness of trunk musculature        Physician: Virgil Scott MD    Visit Date: 6/4/2024    Physician Orders: PT Eval and Treat   Medical Diagnosis from Referral:   Z96.651 (ICD-10-CM) - Status post total right knee replacement   M22.2X1 (ICD-10-CM) - Patellofemoral pain syndrome of right knee   M70.50 (ICD-10-CM) - Pes anserine bursitis      Evaluation Date: 12/21/2023  Authorization Period Expiration: 9/21/2024  Plan of Care Expiration: Updated to 7/17/2024  Visit # / Visits authorized: 20/40   Progress Note Due: 6/7/2024  FOTO: 3/4      Precautions: hx of TKA and menisectomy, spinal cord stimulator, scoliosis     Time In: 10:00 am  Time Out: 11:00 am   Total Billable Time: 55 minutes (1:1)  Total Appointment Time (timed & untimed codes): 60 minutes    PTA Visit #: 0/5   Subjective   Patient reports: knee and back are both more sore today    She was compliant with home exercise program.  Response to previous treatment: no adverse effects   Functional change: improved tolerance to functional mobility activities with decreased pain    Pain: 3/10 R knee, 4/10 LB  Location: R low back and R knee   Objective    Lumbar Range of Motion:     %   Flexion 90      Extension 50      Left Side Bending 50   Right Side Bending 50   Left rotation    60   Right Rotation    70    *= pain           Range of Motion:   Knee Left active Left Passive   Flexion 133 140   Extension 3 5      Knee Right active Right Passive   Flexion 121 124   Extension 1 3         Lower Extremity Strength     Right LE   Left LE     Hip flexion: 4/5 Hip flexion: 4+/5   Knee extension: 4/5  "Knee extension: 4+/5   Knee flexion: 4/5 Knee flexion: 4+/5   Hip IR: 4/5 Hip IR: 4/5   Hip ER: 4/5 Hip ER: 4/5   Hip extension:  4-/5 Hip extension: 4-/5   Hip abduction: 4-/5 Hip abduction: 4/5   Hip adduction: 4-/5 Hip adduction 4/5   Ankle dorsiflexion: 5/5 Ankle dorsiflexion: 5/5   Ankle plantarflexion: 5/5 Ankle plantarflexion: 5/5          Treatment   Tiarra received the treatments listed below:      Therapeutic exercises to develop strength, endurance, ROM, flexibility, posture, and core stabilization for 30 minutes including:    Recumbent bike lv. 4 x 5 min for endurance and knee ROM  PPT 15x5"  Bridge 2x10x3"  EIL 2x10  DKTC 15x5"'  Open books x15,3" ea  Hip flexor stretch 2x1' ea  Leg press # 2x10  Leg press SL 80# 2x10    NP:  EIS 2x10    manual therapy techniques: n/a applied to the: R knee for 0 minutes, including:        Neuromuscular re-education activities to improve: Balance, Coordination, Kinesthetic, Proprioception, and Posture for 25 minutes. The following activities were included:    Sit to stands GTB around knees 2x10  Side stepping GTB 2x12 steps  Monster walks GTB 2x12 steps  Single leg balance 3x30" R only  Step ups fwd/lateral  6 in step 2x10x3" ea R only   Medx trunk extension 70# x20  Medx trunk rotation 30# x20 increase weight   Heel raises 2x15 with 10# KB alternating hands      therapeutic activities to improve functional performance for 0  minutes, including:        cold pack for 5 minutes to R knee and low back    Patient Education and Home Exercises     Education provided:   - HEP, POC    Written Home Exercises Provided: Patient instructed to cont prior HEP. Exercises were reviewed and Tiarra was able to demonstrate them prior to the end of the session.  Tiarra demonstrated good  understanding of the education provided. See Electronic Medical Record under Patient Instructions for exercises provided during therapy sessions    Assessment   Tiarra presents today with continued " complaints of back and knee pain. Able to tolerate all mobility and strengthening activities without adverse effects, but some activities did increase pain. Reassessment to be performed next visit.     Tiarra Is progressing well towards her goals.   Patient prognosis is Good.     Patient will continue to benefit from skilled outpatient physical therapy to address the deficits listed in the problem list box on initial evaluation, provide pt/family education and to maximize pt's level of independence in the home and community environment.     Patient's spiritual, cultural and educational needs considered and pt agreeable to plan of care and goals.     Anticipated barriers to physical therapy: chronicity of condition, multiple treatment areas, hx of R TKA and menisectomy     GOALS: Short Term Goals:  4 weeks  1. Report decreased in pain at worse less than  <   / =  4  /10  to increase tolerance for functional activities.On going  2. Pt to increase B popliteal angle by greater than > or = 5 degrees in order to improve flexibility and posture. MET  3. Increased R LE MMT 1/2  to increase tolerance for ADL and work activities. MET  4. Pt to increase B Ely's Test by greater than  > or = 5degrees in order to improve flexibility and posture. MET  5. Pt to tolerate HEP to improve ROM and independence with ADL's. MET  6. Pt to improve range of motion by 25% to allow for improved functional mobility to allow for improvement in IADLs. MET     Long Term Goals: 8 weeks  1. Report decreased in pain at worse less than  <   / =  2  /10  to increase tolerance for functional mobility.  On going  2 .Pt to increase B popliteal angle by greater than > or = 10 degrees in order to improve flexibility and posture. On going  3. Increased R LE MMT 1 grade to increase tolerance for ADL and work activities.On going  4. Pt will report at 51% or better score on FOTO Lumbar spine survey  to demonstrate decrease in disability and improvement in back  pain.On going  5. Pt to be Independent with HEP to improve ROM and independence with ADL's. On going  6. Pt to increase B Ely's Test by greater than > or = 10 degrees in order to improve flexibility and posture. On going  7. Pt to demonstrate negative Bridge Test in order to show improved core strength for lumbar stabilization. On going  8. Pt to improve range of motion by 75% to allow for improved functional mobility to allow for improvement in IADLs. On going        Plan   Plan of care Certification: 5/9/2024 to 7/17/2024     Outpatient Physical Therapy 2 times weekly for 10 weeks to include the following interventions: Cervical/Lumbar Traction, Gait Training, Manual Therapy, Moist Heat/ Ice, Neuromuscular Re-ed, Patient Education, Self Care, Therapeutic Activities, and Therapeutic Exercise. Dry aubree Anaya, PT   6/6/2024

## 2024-06-06 NOTE — PROGRESS NOTES
[facility]   Physical Therapy Progress Note      Name: Tiarra Norton  Clinic Number: [id number]    Therapy Diagnosis:   Encounter Diagnoses   Name Primary?    Decreased range of motion (ROM) of right knee Yes    Weakness of right lower extremity     Decreased range of motion of trunk and back     Decreased strength of trunk and back     Weakness of trunk musculature        Physician: Virgil Scott MD    Visit Date: 6/6/2024    Physician Orders: PT Eval and Treat   Medical Diagnosis from Referral:   Z96.651 (ICD-10-CM) - Status post total right knee replacement   M22.2X1 (ICD-10-CM) - Patellofemoral pain syndrome of right knee   M70.50 (ICD-10-CM) - Pes anserine bursitis      Evaluation Date: 12/21/2023  Authorization Period Expiration: 9/21/2024  Plan of Care Expiration: Updated to 7/17/2024  Visit # / Visits authorized: 22/40   Progress Note Due: 7/6/2024  FOTO: 3/4      Precautions: hx of TKA and menisectomy, spinal cord stimulator, scoliosis     Time In: 11:30 am  Time Out: 12:30 pm   Total Billable Time: 55 minutes (1:1)  Total Appointment Time (timed & untimed codes): 60 minutes    PTA Visit #: 0/5   Subjective   Patient reports: she is frustrated by her continued pain levels, and inability to sleep through the night.     She was compliant with home exercise program.  Response to previous treatment: no adverse effects   Functional change: improved tolerance to functional mobility activities with decreased pain    Pain: 3/10 R knee, 4/10 LB  Location: R low back and R knee   Objective    Lumbar Range of Motion:     %   Flexion 90      Extension 50      Left Side Bending 50   Right Side Bending 50   Left rotation    60   Right Rotation    70    *= pain           Range of Motion:   Knee Left active Left Passive   Flexion 133 140   Extension 3 5      Knee Right active Right Passive   Flexion 121 124   Extension 1 3         Lower Extremity Strength     Right LE   Left LE     Hip  "flexion: 4/5 Hip flexion: 4+/5   Knee extension: 4/5 Knee extension: 4+/5   Knee flexion: 4/5 Knee flexion: 4+/5   Hip IR: 4/5 Hip IR: 4/5   Hip ER: 4/5 Hip ER: 4/5   Hip extension:  4-/5 Hip extension: 4-/5   Hip abduction: 4-/5 Hip abduction: 4/5   Hip adduction: 4-/5 Hip adduction 4/5   Ankle dorsiflexion: 5/5 Ankle dorsiflexion: 5/5   Ankle plantarflexion: 5/5 Ankle plantarflexion: 5/5          Treatment   Tiarra received the treatments listed below:      Therapeutic exercises to develop strength, endurance, ROM, flexibility, posture, and core stabilization for 30 minutes including:    Recumbent bike lv. 4 x 5 min for endurance and knee ROM  PPT 15x5"  Bridge 2x10x3"  EIL 2x10  DKTC 15x5"'  Open books x15,3" ea  Hip flexor stretch 2x1' ea  Leg press # 2x10  Leg press SL 80# 2x10    NP:  EIS 2x10    manual therapy techniques: n/a applied to the: R knee for 0 minutes, including:        Neuromuscular re-education activities to improve: Balance, Coordination, Kinesthetic, Proprioception, and Posture for 25 minutes. The following activities were included:    Sit to stands GTB around knees 2x10  Side stepping GTB 2x12 steps  Monster walks GTB 2x12 steps  Single leg balance 3x30" R only  Step ups fwd/lateral  6 in step 2x10x3" ea R only   Medx trunk extension 70# x20  Medx trunk rotation 30# x20 increase weight   Heel raises 2x15 with 10# KB alternating hands      therapeutic activities to improve functional performance for 0  minutes, including:        cold pack for 5 minutes to R knee and low back    Patient Education and Home Exercises     Education provided:   - HEP, POC    Written Home Exercises Provided: Patient instructed to cont prior HEP. Exercises were reviewed and Tiarra was able to demonstrate them prior to the end of the session.  Tiarra demonstrated good  understanding of the education provided. See Electronic Medical Record under Patient Instructions for exercises provided during therapy " sessions    Assessment   Tiarra presents today with reports of unchanged back and knee pain levels. Pt continues to demonstrate deficits in strength of bilateral lower extremities, weakness of trunk and core, and balance and gait deficits. These deficits are limiting patient in full participation in ADL's and leisure activities and limiting tolerance to functional monbility activities. Will continue to benefit from skilled therapy to address described deficits and improve quality of life.    Tiarra Is progressing well towards her goals.   Patient prognosis is Good.     Patient will continue to benefit from skilled outpatient physical therapy to address the deficits listed in the problem list box on initial evaluation, provide pt/family education and to maximize pt's level of independence in the home and community environment.     Patient's spiritual, cultural and educational needs considered and pt agreeable to plan of care and goals.     Anticipated barriers to physical therapy: chronicity of condition, multiple treatment areas, hx of R TKA and menisectomy     GOALS: Short Term Goals:  4 weeks  1. Report decreased in pain at worse less than  <   / =  4  /10  to increase tolerance for functional activities.On going  2. Pt to increase B popliteal angle by greater than > or = 5 degrees in order to improve flexibility and posture. MET  3. Increased R LE MMT 1/2  to increase tolerance for ADL and work activities. MET  4. Pt to increase B Ely's Test by greater than  > or = 5degrees in order to improve flexibility and posture. MET  5. Pt to tolerate HEP to improve ROM and independence with ADL's. MET  6. Pt to improve range of motion by 25% to allow for improved functional mobility to allow for improvement in IADLs. MET     Long Term Goals: 8 weeks  1. Report decreased in pain at worse less than  <   / =  2  /10  to increase tolerance for functional mobility.  On going  2 .Pt to increase B popliteal angle by greater than >  or = 10 degrees in order to improve flexibility and posture. On going  3. Increased R LE MMT 1 grade to increase tolerance for ADL and work activities.On going  4. Pt will report at 51% or better score on FOTO Lumbar spine survey  to demonstrate decrease in disability and improvement in back pain.On going  5. Pt to be Independent with HEP to improve ROM and independence with ADL's. On going  6. Pt to increase B Ely's Test by greater than > or = 10 degrees in order to improve flexibility and posture. On going  7. Pt to demonstrate negative Bridge Test in order to show improved core strength for lumbar stabilization. On going  8. Pt to improve range of motion by 75% to allow for improved functional mobility to allow for improvement in IADLs. On going        Plan   Plan of care Certification: 5/9/2024 to 7/17/2024     Outpatient Physical Therapy 2 times weekly for 10 weeks to include the following interventions: Cervical/Lumbar Traction, Gait Training, Manual Therapy, Moist Heat/ Ice, Neuromuscular Re-ed, Patient Education, Self Care, Therapeutic Activities, and Therapeutic Exercise. Dry needling    Zi Anaya, PT   6/6/2024

## 2024-06-07 ENCOUNTER — PATIENT MESSAGE (OUTPATIENT)
Dept: PRIMARY CARE CLINIC | Facility: CLINIC | Age: 69
End: 2024-06-07
Payer: MEDICARE

## 2024-06-07 RX ORDER — HYDROCODONE BITARTRATE AND ACETAMINOPHEN 5; 325 MG/1; MG/1
1 TABLET ORAL EVERY 12 HOURS PRN
Qty: 60 TABLET | Refills: 0 | Status: SHIPPED | OUTPATIENT
Start: 2024-06-07 | End: 2024-07-10

## 2024-06-09 RX ORDER — LEVOTHYROXINE SODIUM 100 UG/1
TABLET ORAL
Qty: 90 TABLET | Refills: 0 | Status: SHIPPED | OUTPATIENT
Start: 2024-06-09

## 2024-06-10 ENCOUNTER — PATIENT MESSAGE (OUTPATIENT)
Dept: INTERNAL MEDICINE | Facility: CLINIC | Age: 69
End: 2024-06-10
Payer: MEDICARE

## 2024-06-10 ENCOUNTER — TELEPHONE (OUTPATIENT)
Dept: RESEARCH | Facility: OTHER | Age: 69
End: 2024-06-10
Payer: MEDICARE

## 2024-06-11 ENCOUNTER — CLINICAL SUPPORT (OUTPATIENT)
Dept: REHABILITATION | Facility: OTHER | Age: 69
End: 2024-06-11
Payer: MEDICARE

## 2024-06-11 DIAGNOSIS — M25.69 DECREASED RANGE OF MOTION OF TRUNK AND BACK: ICD-10-CM

## 2024-06-11 DIAGNOSIS — M62.81 WEAKNESS OF TRUNK MUSCULATURE: ICD-10-CM

## 2024-06-11 DIAGNOSIS — R29.898 WEAKNESS OF RIGHT LOWER EXTREMITY: ICD-10-CM

## 2024-06-11 DIAGNOSIS — M25.661 DECREASED RANGE OF MOTION (ROM) OF RIGHT KNEE: Primary | ICD-10-CM

## 2024-06-11 DIAGNOSIS — R29.898 DECREASED STRENGTH OF TRUNK AND BACK: ICD-10-CM

## 2024-06-11 PROCEDURE — 97110 THERAPEUTIC EXERCISES: CPT

## 2024-06-11 PROCEDURE — 97112 NEUROMUSCULAR REEDUCATION: CPT

## 2024-06-13 ENCOUNTER — CLINICAL SUPPORT (OUTPATIENT)
Dept: REHABILITATION | Facility: OTHER | Age: 69
End: 2024-06-13
Payer: MEDICARE

## 2024-06-13 DIAGNOSIS — R29.898 DECREASED STRENGTH OF TRUNK AND BACK: ICD-10-CM

## 2024-06-13 DIAGNOSIS — M25.69 DECREASED RANGE OF MOTION OF TRUNK AND BACK: ICD-10-CM

## 2024-06-13 DIAGNOSIS — M25.661 DECREASED RANGE OF MOTION (ROM) OF RIGHT KNEE: Primary | ICD-10-CM

## 2024-06-13 DIAGNOSIS — M62.81 WEAKNESS OF TRUNK MUSCULATURE: ICD-10-CM

## 2024-06-13 DIAGNOSIS — R29.898 WEAKNESS OF RIGHT LOWER EXTREMITY: ICD-10-CM

## 2024-06-13 PROCEDURE — 97112 NEUROMUSCULAR REEDUCATION: CPT

## 2024-06-13 PROCEDURE — 97110 THERAPEUTIC EXERCISES: CPT

## 2024-06-13 NOTE — PROGRESS NOTES
[facility]   Physical Therapy Treatment Note      Name: Tiarra Norton  [facility] Number: [id number]    Therapy Diagnosis:   Encounter Diagnoses   Name Primary?    Decreased range of motion (ROM) of right knee Yes    Weakness of right lower extremity     Decreased range of motion of trunk and back     Decreased strength of trunk and back     Weakness of trunk musculature        Physician: Virgil Scott MD    Visit Date: 6/11/2024    Physician Orders: PT Eval and Treat   Medical Diagnosis from Referral:   Z96.651 (ICD-10-CM) - Status post total right knee replacement   M22.2X1 (ICD-10-CM) - Patellofemoral pain syndrome of right knee   M70.50 (ICD-10-CM) - Pes anserine bursitis      Evaluation Date: 12/21/2023  Authorization Period Expiration: 9/21/2024  Plan of Care Expiration: Updated to 7/17/2024  Visit # / Visits authorized: 23/40   Progress Note Due: 7/6/2024  FOTO: 3/4      Precautions: hx of TKA and menisectomy, spinal cord stimulator, scoliosis     Time In: 11:30 am  Time Out: 12:30 pm   Total Billable Time: 55 minutes (1:1)  Total Appointment Time (timed & untimed codes): 60 minutes    PTA Visit #: 0/5   Subjective   Patient reports: she has less pain today than last time.     She was compliant with home exercise program.  Response to previous treatment: no adverse effects   Functional change: improved tolerance to functional mobility activities with decreased pain    Pain: 3/10 R knee, 4/10 LB  Location: R low back and R knee   Objective    Lumbar Range of Motion:     %   Flexion 90      Extension 50      Left Side Bending 50   Right Side Bending 50   Left rotation    60   Right Rotation    70    *= pain           Range of Motion:   Knee Left active Left Passive   Flexion 133 140   Extension 3 5      Knee Right active Right Passive   Flexion 121 124   Extension 1 3         Lower Extremity Strength     Right LE   Left LE     Hip flexion: 4/5 Hip flexion: 4+/5   Knee extension:  "4/5 Knee extension: 4+/5   Knee flexion: 4/5 Knee flexion: 4+/5   Hip IR: 4/5 Hip IR: 4/5   Hip ER: 4/5 Hip ER: 4/5   Hip extension:  4-/5 Hip extension: 4-/5   Hip abduction: 4-/5 Hip abduction: 4/5   Hip adduction: 4-/5 Hip adduction 4/5   Ankle dorsiflexion: 5/5 Ankle dorsiflexion: 5/5   Ankle plantarflexion: 5/5 Ankle plantarflexion: 5/5          Treatment   Tiarra received the treatments listed below:      Therapeutic exercises to develop strength, endurance, ROM, flexibility, posture, and core stabilization for 30 minutes including:    Recumbent bike lv. 4 x 5 min for endurance and knee ROM  PPT 15x5"  Bridge 2x10x3"  EIL 2x10  DKTC 15x5"'  Open books x15,3" ea  Hip flexor stretch 2x1' ea  Leg press # 2x10  Leg press SL 80# 2x10    NP:  EIS 2x10    manual therapy techniques: n/a applied to the: R knee for 0 minutes, including:        Neuromuscular re-education activities to improve: Balance, Coordination, Kinesthetic, Proprioception, and Posture for 25 minutes. The following activities were included:    Sit to stands GTB around knees 2x10  Side stepping GTB 2x12 steps  Monster walks GTB 2x12 steps  Single leg balance 3x30" R only  Step ups fwd/lateral  6 in step 2x10x3" ea R only   Medx trunk extension 70# x20  Medx trunk rotation 30# x20 increase weight   Heel raises 2x15 with 10# KB alternating hands      therapeutic activities to improve functional performance for 0  minutes, including:        cold pack for 5 minutes to R knee and low back    Patient Education and Home Exercises     Education provided:   - HEP, POC    Written Home Exercises Provided: Patient instructed to cont prior HEP. Exercises were reviewed and Tiarra was able to demonstrate them prior to the end of the session.  Tiarra demonstrated good  understanding of the education provided. See Electronic Medical Record under Patient Instructions for exercises provided during therapy sessions    Assessment   Tiarra presents today with slight " improvement in pain levels. Continuation and progression of dynamic core, lumbar, and B LE strengthening and mobility exercises with good tolerance and no adverse effects. Continue to progress as tolerated.     Tiarra Is progressing well towards her goals.   Patient prognosis is Good.     Patient will continue to benefit from skilled outpatient physical therapy to address the deficits listed in the problem list box on initial evaluation, provide pt/family education and to maximize pt's level of independence in the home and community environment.     Patient's spiritual, cultural and educational needs considered and pt agreeable to plan of care and goals.     Anticipated barriers to physical therapy: chronicity of condition, multiple treatment areas, hx of R TKA and menisectomy     GOALS: Short Term Goals:  4 weeks  1. Report decreased in pain at worse less than  <   / =  4  /10  to increase tolerance for functional activities.On going  2. Pt to increase B popliteal angle by greater than > or = 5 degrees in order to improve flexibility and posture. MET  3. Increased R LE MMT 1/2  to increase tolerance for ADL and work activities. MET  4. Pt to increase B Ely's Test by greater than  > or = 5degrees in order to improve flexibility and posture. MET  5. Pt to tolerate HEP to improve ROM and independence with ADL's. MET  6. Pt to improve range of motion by 25% to allow for improved functional mobility to allow for improvement in IADLs. MET     Long Term Goals: 8 weeks  1. Report decreased in pain at worse less than  <   / =  2  /10  to increase tolerance for functional mobility.  On going  2 .Pt to increase B popliteal angle by greater than > or = 10 degrees in order to improve flexibility and posture. On going  3. Increased R LE MMT 1 grade to increase tolerance for ADL and work activities.On going  4. Pt will report at 51% or better score on FOTO Lumbar spine survey  to demonstrate decrease in disability and improvement  in back pain.On going  5. Pt to be Independent with HEP to improve ROM and independence with ADL's. On going  6. Pt to increase B Ely's Test by greater than > or = 10 degrees in order to improve flexibility and posture. On going  7. Pt to demonstrate negative Bridge Test in order to show improved core strength for lumbar stabilization. On going  8. Pt to improve range of motion by 75% to allow for improved functional mobility to allow for improvement in IADLs. On going        Plan   Plan of care Certification: 5/9/2024 to 7/17/2024     Outpatient Physical Therapy 2 times weekly for 10 weeks to include the following interventions: Cervical/Lumbar Traction, Gait Training, Manual Therapy, Moist Heat/ Ice, Neuromuscular Re-ed, Patient Education, Self Care, Therapeutic Activities, and Therapeutic Exercise. Dry aubree Anaya, PT   6/13/2024

## 2024-06-13 NOTE — PROGRESS NOTES
[facility]   Physical Therapy Treatment Note      Name: Tiarra Norton  [facility] Number: [id number]    Therapy Diagnosis:   Encounter Diagnoses   Name Primary?    Decreased range of motion (ROM) of right knee Yes    Weakness of right lower extremity     Decreased range of motion of trunk and back     Decreased strength of trunk and back     Weakness of trunk musculature        Physician: Virgil Scott MD    Visit Date: 6/13/2024    Physician Orders: PT Eval and Treat   Medical Diagnosis from Referral:   Z96.651 (ICD-10-CM) - Status post total right knee replacement   M22.2X1 (ICD-10-CM) - Patellofemoral pain syndrome of right knee   M70.50 (ICD-10-CM) - Pes anserine bursitis      Evaluation Date: 12/21/2023  Authorization Period Expiration: 9/21/2024  Plan of Care Expiration: Updated to 7/17/2024  Visit # / Visits authorized: 23/40   Progress Note Due: 7/6/2024  FOTO: 3/4      Precautions: hx of TKA and menisectomy, spinal cord stimulator, scoliosis     Time In: 10:30 am  Time Out: 11:30 am   Total Billable Time: 30 minutes (1:1)  Total Appointment Time (timed & untimed codes): 60 minutes    PTA Visit #: 0/5   Subjective   Patient reports: she is hurting more in her low back today.    She was compliant with home exercise program.  Response to previous treatment: no adverse effects   Functional change: improved tolerance to functional mobility activities with decreased pain    Pain: 3/10 R knee, 6/10 LB  Location: R low back and R knee   Objective    Lumbar Range of Motion:     %   Flexion 90      Extension 50      Left Side Bending 50   Right Side Bending 50   Left rotation    60   Right Rotation    70    *= pain           Range of Motion:   Knee Left active Left Passive   Flexion 133 140   Extension 3 5      Knee Right active Right Passive   Flexion 121 124   Extension 1 3         Lower Extremity Strength     Right LE   Left LE     Hip flexion: 4/5 Hip flexion: 4+/5   Knee  "extension: 4/5 Knee extension: 4+/5   Knee flexion: 4/5 Knee flexion: 4+/5   Hip IR: 4/5 Hip IR: 4/5   Hip ER: 4/5 Hip ER: 4/5   Hip extension:  4-/5 Hip extension: 4-/5   Hip abduction: 4-/5 Hip abduction: 4/5   Hip adduction: 4-/5 Hip adduction 4/5   Ankle dorsiflexion: 5/5 Ankle dorsiflexion: 5/5   Ankle plantarflexion: 5/5 Ankle plantarflexion: 5/5          Treatment   Tiarra received the treatments listed below:      Therapeutic exercises to develop strength, endurance, ROM, flexibility, posture, and core stabilization for 30 minutes including:    Recumbent bike lv. 4 x 5 min for endurance and knee ROM  PPT 15x5"  Bridge 2x10x3"  EIL 2x10  DKTC 15x5"'  Open books x15,3" ea  Hip flexor stretch 2x1' ea  Leg press # 2x10  Leg press SL 80# 2x10    NP:  EIS 2x10    manual therapy techniques: n/a applied to the: R knee for 0 minutes, including:        Neuromuscular re-education activities to improve: Balance, Coordination, Kinesthetic, Proprioception, and Posture for 25 minutes. The following activities were included:    Sit to stands GTB around knees 2x10  Side stepping GTB 2x12 steps  Monster walks GTB 2x12 steps  Single leg balance 3x30" R only  Step ups fwd/lateral  6 in step 2x10x3" ea R only   Medx trunk extension 70# x20  Medx trunk rotation 30# x20 increase weight   Heel raises 2x15 with 10# KB alternating hands      therapeutic activities to improve functional performance for 0  minutes, including:        cold pack for 5 minutes to R knee and low back    Patient Education and Home Exercises     Education provided:   - HEP, POC    Written Home Exercises Provided: Patient instructed to cont prior HEP. Exercises were reviewed and Tiarra was able to demonstrate them prior to the end of the session.  Tiarra demonstrated good  understanding of the education provided. See Electronic Medical Record under Patient Instructions for exercises provided during therapy sessions    Assessment   Tiarra presents today with " reports of increased back pain without incident. Decreased tightness and soreness reported after completion of exercises and activities. Continue to progress as able.     Tiarra Is progressing well towards her goals.   Patient prognosis is Good.     Patient will continue to benefit from skilled outpatient physical therapy to address the deficits listed in the problem list box on initial evaluation, provide pt/family education and to maximize pt's level of independence in the home and community environment.     Patient's spiritual, cultural and educational needs considered and pt agreeable to plan of care and goals.     Anticipated barriers to physical therapy: chronicity of condition, multiple treatment areas, hx of R TKA and menisectomy     GOALS: Short Term Goals:  4 weeks  1. Report decreased in pain at worse less than  <   / =  4  /10  to increase tolerance for functional activities.On going  2. Pt to increase B popliteal angle by greater than > or = 5 degrees in order to improve flexibility and posture. MET  3. Increased R LE MMT 1/2  to increase tolerance for ADL and work activities. MET  4. Pt to increase B Ely's Test by greater than  > or = 5degrees in order to improve flexibility and posture. MET  5. Pt to tolerate HEP to improve ROM and independence with ADL's. MET  6. Pt to improve range of motion by 25% to allow for improved functional mobility to allow for improvement in IADLs. MET     Long Term Goals: 8 weeks  1. Report decreased in pain at worse less than  <   / =  2  /10  to increase tolerance for functional mobility.  On going  2 .Pt to increase B popliteal angle by greater than > or = 10 degrees in order to improve flexibility and posture. On going  3. Increased R LE MMT 1 grade to increase tolerance for ADL and work activities.On going  4. Pt will report at 51% or better score on FOTO Lumbar spine survey  to demonstrate decrease in disability and improvement in back pain.On going  5. Pt to be  Independent with HEP to improve ROM and independence with ADL's. On going  6. Pt to increase B Ely's Test by greater than > or = 10 degrees in order to improve flexibility and posture. On going  7. Pt to demonstrate negative Bridge Test in order to show improved core strength for lumbar stabilization. On going  8. Pt to improve range of motion by 75% to allow for improved functional mobility to allow for improvement in IADLs. On going        Plan   Plan of care Certification: 5/9/2024 to 7/17/2024     Outpatient Physical Therapy 2 times weekly for 10 weeks to include the following interventions: Cervical/Lumbar Traction, Gait Training, Manual Therapy, Moist Heat/ Ice, Neuromuscular Re-ed, Patient Education, Self Care, Therapeutic Activities, and Therapeutic Exercise. Marybeth Anaya, PT   6/18/2024

## 2024-06-18 ENCOUNTER — RESEARCH ENCOUNTER (OUTPATIENT)
Dept: RESEARCH | Facility: OTHER | Age: 69
End: 2024-06-18
Payer: MEDICARE

## 2024-06-18 NOTE — PROGRESS NOTES
Study: [employer] BRUNO Study: A Randomized Controlled Study to Evaluate the Safety and Effectiveness of [employer] Spinal Cord Stimulation (SCS) Systems in the Treatment of Chronic Low Back and/or Leg Pain with No Prior Surgeries  IRB/Protocol #: [id number]/  : Obed Gasca MD  Treating Physician: Shoaib Aguirre MD  Site Number: [id number]        Subject Number: [id number]     Unscheduled Visit:  The subject visited [facility], met with Tyler Gabriel ([facility] [place] [facility]) for device reprogramming.  E added new programs to address the pain. Subject reported pain @ level 1 after reprogramming. SERENA Fam), will follow up next week via phone check-in.  There were no changes in concomitant medications, no protocol deviations or adverse events to report.

## 2024-06-20 ENCOUNTER — CLINICAL SUPPORT (OUTPATIENT)
Dept: REHABILITATION | Facility: OTHER | Age: 69
End: 2024-06-20
Payer: MEDICARE

## 2024-06-20 ENCOUNTER — PATIENT MESSAGE (OUTPATIENT)
Dept: PAIN MEDICINE | Facility: CLINIC | Age: 69
End: 2024-06-20
Payer: MEDICARE

## 2024-06-20 DIAGNOSIS — M25.661 DECREASED RANGE OF MOTION (ROM) OF RIGHT KNEE: Primary | ICD-10-CM

## 2024-06-20 DIAGNOSIS — M62.81 WEAKNESS OF TRUNK MUSCULATURE: ICD-10-CM

## 2024-06-20 DIAGNOSIS — M25.69 DECREASED RANGE OF MOTION OF TRUNK AND BACK: ICD-10-CM

## 2024-06-20 DIAGNOSIS — R29.898 WEAKNESS OF RIGHT LOWER EXTREMITY: ICD-10-CM

## 2024-06-20 DIAGNOSIS — R29.898 DECREASED STRENGTH OF TRUNK AND BACK: ICD-10-CM

## 2024-06-20 PROCEDURE — 97140 MANUAL THERAPY 1/> REGIONS: CPT

## 2024-06-20 PROCEDURE — 97112 NEUROMUSCULAR REEDUCATION: CPT

## 2024-06-20 PROCEDURE — 97110 THERAPEUTIC EXERCISES: CPT

## 2024-06-20 NOTE — PROGRESS NOTES
[facility]   Physical Therapy Treatment Note      Name: Tiarra Norton  [facility] Number: [id number]    Therapy Diagnosis:   Encounter Diagnoses   Name Primary?    Decreased range of motion (ROM) of right knee Yes    Weakness of right lower extremity     Decreased range of motion of trunk and back     Decreased strength of trunk and back     Weakness of trunk musculature          Physician: Virgil Scott MD    Visit Date: 6/20/2024    Physician Orders: PT Eval and Treat   Medical Diagnosis from Referral:   Z96.651 (ICD-10-CM) - Status post total right knee replacement   M22.2X1 (ICD-10-CM) - Patellofemoral pain syndrome of right knee   M70.50 (ICD-10-CM) - Pes anserine bursitis      Evaluation Date: 12/21/2023  Authorization Period Expiration: 9/21/2024  Plan of Care Expiration: Updated to 7/17/2024  Visit # / Visits authorized: 25/40   Progress Note Due: 7/6/2024  FOTO: 3/4      Precautions: hx of TKA and menisectomy, spinal cord stimulator, scoliosis     Time In: 10:30 am  Time Out: 11:30 am   Total Billable Time: 55 minutes (1:1)  Total Appointment Time (timed & untimed codes): 60 minutes    PTA Visit #: 0/5   Subjective   Patient reports: low back continues to hurt more than anything    She was compliant with home exercise program.  Response to previous treatment: no adverse effects   Functional change: improved tolerance to functional mobility activities with decreased pain    Pain: 3/10 R knee, 6/10 LB  Location: R low back and R knee   Objective    Lumbar Range of Motion:     %   Flexion 90      Extension 50      Left Side Bending 50   Right Side Bending 50   Left rotation    60   Right Rotation    70    *= pain           Range of Motion:   Knee Left active Left Passive   Flexion 133 140   Extension 3 5      Knee Right active Right Passive   Flexion 121 124   Extension 1 3         Lower Extremity Strength     Right LE   Left LE     Hip flexion: 4/5 Hip flexion: 4+/5   Knee  "extension: 4/5 Knee extension: 4+/5   Knee flexion: 4/5 Knee flexion: 4+/5   Hip IR: 4/5 Hip IR: 4/5   Hip ER: 4/5 Hip ER: 4/5   Hip extension:  4-/5 Hip extension: 4-/5   Hip abduction: 4-/5 Hip abduction: 4/5   Hip adduction: 4-/5 Hip adduction 4/5   Ankle dorsiflexion: 5/5 Ankle dorsiflexion: 5/5   Ankle plantarflexion: 5/5 Ankle plantarflexion: 5/5          Treatment   Tiarra received the treatments listed below:      Therapeutic exercises to develop strength, endurance, ROM, flexibility, posture, and core stabilization for 20 minutes including:    Recumbent bike lv. 4 x 5 min for endurance and knee ROM  PPT 15x5"  Bridge 2x10x3"  EIL 2x10  DKTC 15x5"'  Open books x15,3" ea  Hip flexor stretch 2x1' ea  Leg press # 2x10  Leg press SL 80# 2x10    NP:  EIS 2x10    manual therapy techniques:  applied to the: B LB  for 15 minutes, including:    FDN. Educated pt to use heat following treatment sessions if pt is experiencing pain or soreness. Pt verbalized good understanding of education. Pt signed written consent to dry needling. Pt gave verbal consent for DN     Pt received dry needling to the below listed muscles using 50 mm needles.     B Lumbar paraspinals L1-L5      Neuromuscular re-education activities to improve: Balance, Coordination, Kinesthetic, Proprioception, and Posture for 25 minutes. The following activities were included:    Sit to stands GTB around knees 2x10  Side stepping GTB 2x12 steps  Monster walks GTB 2x12 steps  Single leg balance 3x30" R only  Step ups fwd/lateral  6 in step 2x10x3" ea R only   Medx trunk extension 70# x20  Medx trunk rotation 30# x20 increase weight   Heel raises 2x15 with 10# KB alternating hands      therapeutic activities to improve functional performance for 0  minutes, including:        cold pack for 5 minutes to R knee and low back    Patient Education and Home Exercises     Education provided:   - HEP, POC    Written Home Exercises Provided: Patient instructed to " cont prior HEP. Exercises were reviewed and Tiarra was able to demonstrate them prior to the end of the session.  Tiarra demonstrated good  understanding of the education provided. See Electronic Medical Record under Patient Instructions for exercises provided during therapy sessions    Assessment   Tiarra presents today with continued complaints of back pain. Dry needling of lumbar paraspinals applied with pt reporting decreased tightness and soreness after application. Continue to progress strengthening activities as tolerated.     Tiarra Is progressing well towards her goals.   Patient prognosis is Good.     Patient will continue to benefit from skilled outpatient physical therapy to address the deficits listed in the problem list box on initial evaluation, provide pt/family education and to maximize pt's level of independence in the home and community environment.     Patient's spiritual, cultural and educational needs considered and pt agreeable to plan of care and goals.     Anticipated barriers to physical therapy: chronicity of condition, multiple treatment areas, hx of R TKA and menisectomy     GOALS: Short Term Goals:  4 weeks  1. Report decreased in pain at worse less than  <   / =  4  /10  to increase tolerance for functional activities.On going  2. Pt to increase B popliteal angle by greater than > or = 5 degrees in order to improve flexibility and posture. MET  3. Increased R LE MMT 1/2  to increase tolerance for ADL and work activities. MET  4. Pt to increase B Ely's Test by greater than  > or = 5degrees in order to improve flexibility and posture. MET  5. Pt to tolerate HEP to improve ROM and independence with ADL's. MET  6. Pt to improve range of motion by 25% to allow for improved functional mobility to allow for improvement in IADLs. MET     Long Term Goals: 8 weeks  1. Report decreased in pain at worse less than  <   / =  2  /10  to increase tolerance for functional mobility.  On going  2 .Pt to  increase B popliteal angle by greater than > or = 10 degrees in order to improve flexibility and posture. On going  3. Increased R LE MMT 1 grade to increase tolerance for ADL and work activities.On going  4. Pt will report at 51% or better score on FOTO Lumbar spine survey  to demonstrate decrease in disability and improvement in back pain.On going  5. Pt to be Independent with HEP to improve ROM and independence with ADL's. On going  6. Pt to increase B Ely's Test by greater than > or = 10 degrees in order to improve flexibility and posture. On going  7. Pt to demonstrate negative Bridge Test in order to show improved core strength for lumbar stabilization. On going  8. Pt to improve range of motion by 75% to allow for improved functional mobility to allow for improvement in IADLs. On going        Plan   Plan of care Certification: 5/9/2024 to 7/17/2024     Outpatient Physical Therapy 2 times weekly for 10 weeks to include the following interventions: Cervical/Lumbar Traction, Gait Training, Manual Therapy, Moist Heat/ Ice, Neuromuscular Re-ed, Patient Education, Self Care, Therapeutic Activities, and Therapeutic Exercise. Dry aubree Anaya, PT   6/25/2024

## 2024-06-24 ENCOUNTER — OFFICE VISIT (OUTPATIENT)
Dept: PAIN MEDICINE | Facility: CLINIC | Age: 69
End: 2024-06-24
Payer: MEDICARE

## 2024-06-24 DIAGNOSIS — G89.4 CHRONIC PAIN SYNDROME: ICD-10-CM

## 2024-06-24 DIAGNOSIS — Z96.651 HISTORY OF TOTAL KNEE ARTHROPLASTY, RIGHT: ICD-10-CM

## 2024-06-24 DIAGNOSIS — G89.29 CHRONIC PAIN OF RIGHT KNEE: ICD-10-CM

## 2024-06-24 DIAGNOSIS — G89.4 CHRONIC PAIN SYNDROME: Primary | ICD-10-CM

## 2024-06-24 DIAGNOSIS — M47.816 LUMBAR SPONDYLOSIS: ICD-10-CM

## 2024-06-24 DIAGNOSIS — M41.25 OTHER IDIOPATHIC SCOLIOSIS, THORACOLUMBAR REGION: ICD-10-CM

## 2024-06-24 DIAGNOSIS — M25.561 CHRONIC PAIN OF RIGHT KNEE: ICD-10-CM

## 2024-06-24 DIAGNOSIS — M51.36 DDD (DEGENERATIVE DISC DISEASE), LUMBAR: ICD-10-CM

## 2024-06-24 DIAGNOSIS — Z96.89 SPINAL CORD STIMULATOR STATUS: ICD-10-CM

## 2024-06-24 PROCEDURE — 99213 OFFICE O/P EST LOW 20 MIN: CPT | Mod: 95,,, | Performed by: NURSE PRACTITIONER

## 2024-06-24 RX ORDER — HYDROCODONE BITARTRATE AND ACETAMINOPHEN 5; 325 MG/1; MG/1
1 TABLET ORAL EVERY 12 HOURS PRN
Qty: 60 TABLET | Refills: 0 | Status: SHIPPED | OUTPATIENT
Start: 2024-07-09 | End: 2024-08-08

## 2024-06-24 NOTE — PROGRESS NOTES
Chronic Pain-Established Visit  Chronic Pain-Tele-Medicine-Established Note (Follow up visit)        The patient location is: Home  The chief complaint leading to consultation is: pain  Visit type: Virtual visit with synchronous audio and video  Total time spent with patient: 25 min  Each patient to whom he or she provides medical services by telemedicine is:  (1) informed of the relationship between the physician and patient and the respective role of any other health care provider with respect to management of the patient; and (2) notified that he or she may decline to receive medical services by telemedicine and may withdraw from such care at any time.        SUBJECTIVE:    Interval History 6/24/2024:  The patient returns to clinic today for follow up of back and knee pain via virtual visit. She continues to report low back pain. This is worse with prolonged standing. She denies any radicular leg pain. She did recently meet with [employer] representatives for programming. She reports limited relief with these changes. She is participating in physical therapy. She is taking Norco as needed with benefit. She denies any adverse effects. She denies any other health changes.     Interval History 3/25/2024:  The patient returns to clinic today for follow up of back and knee pain. She is s/p right genicular RFA on 3/15/2024. She reports 50% relief. She continues to report some right knee pain.She continues to report low back pain. She is using her SCS. She does have intermittent pain around IPG site. She is participating in physical therapy. She is taking Norco as needed with benefit. She denies any adverse effects. She denies any other health changes. Her pain today is 4/10.    Interval History 1/23/2024:  Tiarra Alberta Errol presents for folow-up for lower back pain s/p IPG revision 11/20/2023.  She reports that the pocket pain is significantly improved, continues to get better.  She is just now feeling well  enough to participate in physical therapy which she has today.  Today she also complains of right knee pain. She had a right TKA on 5/18/2023. .  The patient describes the pain as aching. The pain is constant. Exacerbating factors: bending, cooking, standing for a long time, walking.  Mitigating factors: ice, heat, medications.  The patient takes Tylenol 500 mg PRN with mild benefit.  They deny any perceived side effects.  She is hoping for an additional prescription for Norco 5-325 mg which she had taken over the recovery period from the IPG revision.  She states that the medication helps her with activity.  The symptoms interfere with ADLs.  The patient last had imaging Xray bilateral knees on 10/26/2023. See findings below.  The patient denies fever/night sweats, urinary incontinence, bowel incontinence, significant weight changes, significant motor weakness or changes, or loss of sensations.  Today's pain score is 3/10 for back pain and 7/10 for right knee pain.     RTA 5/18/23, activity restrictions from IPG revision was not able to do PT, starting PT for back and right knee back today.  Wondering if there are any procedures we can do for knee.     Wants Norco 5-325 mg-helps with activity  Pain ext and flex  Ptp superior anterior    Interval History 10/25/2023  67 y/o female with hx of chronic left sided low back pain near IPG, she is s/p Octad electrode x2  placement of pulse generator 3  on 1/9 she recently was provided a recent TPI near left low back she reports 0% relief following.  Pain is worse when standing walking performing ADLs.  She does state she gets 5 hours of uninterrupted sleep.  Like her left low back is weak and aching.  Tingling any wearing her low back.  In the right side of her low back.  To discuss other pain interventions and the plan of care moving forward regard to her current subacute pain.  She denies any profound weakness denies any bowel bladder dysfunction denies any saddle  anesthesia at this time.    Interval History 9/12/2023:  The patient returns to clinic today for follow up of battery site pain via virtual visit. She is s/p trigger point injections under ultrasound on 8/29/2023. She does feel some benefit. She is currently ill with Covid. She is currently running fever and having body aches. She denies any other health changes.     Interval History 8/11/2023:  The patient returns to clinic today for follow up of pain. She reports pain around her SCS battery site. She describes the pain as though her muscles feel weak and tired. She does report benefit with SCS. No difficulty with charging or programming. She has tried Lidoderm and Salonpas patches but these caused a rash. She is currently participating in physical therapy for her knee. She denies any other health changes. Her pain today is 4/10.    Interval History 4/14/2023:  The patient is here for follow up of back pain and pain at IPG site. She reports improvement since previous visit. She did have benefit with Lidoderm patches but had a rash so she stopped. Her pain has improved with Salon Pas patches. She is scheduled for knee replacement next month with Dr. Scott. Her pain today is 3/10.    Interval History 3/21/2023:  The patient presents to discuss pain to IPG site. She says that it has been present since surgery and has gradually worsened. She says that it feels like a tight and weak muscle. She has not tried any topical medications or muscle relaxants. No fever or malaise. She has not noticed any redness or swelling to the site. Her original pain has significantly improved from SCS implant. She is part of a research study. Her pain today is 6/10.    Interval History 2/17/2023:  The patient returns to clinic today for follow up of back pain. She reports benefit with [employer] SCS. She is in contact with representatives for programming. She is very happy with relief. She denies any fever, chills, or drainage.  She continues to follow her activity restrictions. She denies any other health changes. Her pain today is 2/10.    Interval History 1/31/2023:  The patient returns to clinic today for wound check. She reports benefit with [employer] SCS. She is in contact with representatives for programming. She reports intermittent muscle soreness into her legs. She does have an upcoming appointment with representatives for programming. She denies any fever, chills, or drainage. She continues to follow her activity restrictions. She denies any other health changes. Her pain today is 3/10.    Interval History 1/17/2023:  The patient returns to clinic today for post op. She is s/p [employer] SCS implant on 1/9/2022. She reports benefit with the device at this time. She is meeting with the representatives today. She does report soreness to her incisions. She denies any fever, chills, or drainage. She did have issues with her dressing staying on. She has completed her antibiotics. She denies any other health changes. Her pain today is 2/10.    Interval History 12/29/2022:  The patient returns for post op appointment. She is s/p lumbar [employer] SCS trial with 90% relief. She has had very minimal back pain during the trial period. She says that she was more active over this time due to the Christmas holiday and had minimal symptoms. She is very happy with the relief. She would like to move forward with implant. She is part of [employer] study. No fever or malaise during trial period. Her pain today is 1/10.    Interval History 10/14/22: Mrs. Norton presents today for follow up of chronic low back pain and to discuss the [employer] trial. She had her lumbar MRI done last night without significant changes to her spine. She did have increased dilation of her bile duct. Her pain today is the same in character and severity as during her last visit with us and she rates it currently at a 7/10. She continues to do her stretches on her own  which have helped some. She presents with some questions regarding the trial.     Interval History 9/21/2022:  The patient returns to clinic today for follow up of back pain via virtual visit. She received a letter from [employer] denying SCS trial. She is frustrated by this. She continues to report low back pain. She denies any radicular leg pain. Her pain is worse with bending and activity. She recently underwent med screening for the [employer] program. She is interested in pursuing this. She has tried Gabapentin and Lyrica in the past with side effects. She denies any other health changes. Her pain today is 7/10.     Interval History 8/22/22: Ms. Norton returns for follow up on her low back pain. At our last visit she felt cured by acupuncture. Unfortunately, this only lasted for 24 hours. She returns today looking for other options to make life livable. Patient claims all of her pain in the low back and middle back. We discussed that this will work best for her low back pain.     Interval History 7/25/22: Tiarra Norton returns for follow up. We previously have been discussing treatments for her low back pain that did not resolve with RFA. At our last visit I referred her to Dr. Antony for clearance for a SCS trial. She was considered to be a reasonable candidate. However, in the meantime, her PT referred her to acupuncture with Dr. Jacobs. She had one treatment and already notes 50% relief. She notes that she still has pain, especially with leaning forward, but feels that it is much better controlled. Their plan is one treatment per week but is approved for 20 sessions.     Interval History 6/15/22: Tiarra Norton returns for follow up. She continues to feel like she has mild pain sitting or laying down, and has her worst pain with extreme leaning forward to pick something up off the floor. She also has difficulty leaning over her handlebars to ride her bike or leaning forward to fold clothes or  standing up for any period of time. So, we determined that leaning forward is her worst issue. We did previouisly do lmbb and rfa, which has helped with extension, but it would not help with leaning forward. On review of her MRI she has significant multilevel DDD with Modic changes which likely account for her pain.     4/18/22 Interval History: Patient presents for telehealth visit for follow up following her b/l RFA at L3-L5. She reports 80% relief and is very pleased with her pain relief. Unfortunately, she is not back to doing what she wants due to right knee pain. She is seeing Dr. Huber for knee pain and is s/p right medial compartment partial knee replacement. She is currently in therapy for this. We discussed that we may be able to assist her with her knee pain as well.     HPI:  Original HISTORY OF PRESENT ILLNESS: Tiarra Norton presents to the clinic for the evaluation of the above pain. The pain started five years ago.     Original Pain Description:  The pain is located in the lower back and does radiate up towards her upper back.The pain is described as aching, dull and sharp. Initially starts as a dull, aching pain and it gets sharp once she bends down and moves around. Typically when she walks for more than five minutes, the sharp pain radiates up her back. Exacerbating factors: Standing, Bending, Touching, Walking, Night Time, Flexing and Lifting. Mitigating factors heat, ice, laying down and massage. Symptoms interfere with daily activity and sleeping. The patient feels like symptoms have been worsening. Patient denies night fever/night sweats, urinary incontinence, bowel incontinence, significant weight loss, significant motor weakness and loss of sensations.    Original PAIN SCORES:  Best: Pain is 1  Worst: Pain is 10  Usually: Pain is 4  Current: Pain is 4        11/29/2023     1:59 PM 10/17/2023     2:54 PM 8/29/2023    11:44 AM   Last 3 PDI Scores   Pain Disability Index (PDI) 12 20 40      6 weeks of Conservative therapy (PT/Chiro/Home Exercises with Dates)  Healthy back program--> has four sessions left for a total of 20 sessions   Last session was on 1/31/22   Stretches that they taught at [employer] back gives her relief       Medications:   Robaxin PRN    Ice and heat which has helped   Tried Gabapentin and Lyrica- made her loopy      Report: reviewed       Interventional Pain Procedures:   10/17/2023 TPI left side near IPG battery 0% relief  2/8/2022- Bilateral L3,4,5 MBB  2/15/2022- Bilateral L3,4,5 MBB  3/8/2022- Right L3,4,5 RFA- 80% relief for a few months  3/22/2022- Left L3,4,5 RFA- 80% relief for a few months  12/22/22 SCS trial- 90% relief  1/9/2023- [employer] SCS implant.   11/20/2023- IPG revision  2/16/2024- Right genicular nerve block  3/15/2024- Right genicular RFA    Imaging:    EXAMINATION:  XR KNEE 3 VIEW RIGHT     CLINICAL HISTORY:  Presence of right artificial knee joint     TECHNIQUE:  AP, lateral, and Merchant views of the right knee were performed.     COMPARISON:  Right knee radiograph dated 06/12/2023     FINDINGS:  Prior right knee arthroplasty.  Hardware projects in similar alignment without evidence for interval loosening or failure.  No periprosthetic fracture.     Impression:     As above.        Electronically signed by: Azael Wilkins  Date:                                            11/03/2023  Time:                                           11:05      10/13/22: MRI LUMBAR SPINE WITHOUT CONTRAST  FINDINGS:  Lumbar levoscoliosis centered at L2.  Minimal anterolisthesis at L4-5.     No compression fractures.  No marrow replacing lesions.     Multilevel degenerative changes with disc desiccation and disc space narrowing, described in detail below.  Discogenic endplate edema at T12-L1.     The conus medullaris has a normal appearance and terminates at the L1 level.  Cauda equina nerve roots are unremarkable.  No epidural mass or collection.     The common  duct is dilated up to 12 mm, previously 9 mm on 08/20/2022 CT.  Mild prominence of the main pancreatic duct up to 4 mm, which is new.  No definite filling defect in the bile ducts.  Paraspinal muscle atrophy.     SIGNIFICANT FINDINGS BY LEVEL:     T12-L1: Disc bulge.  Bilateral facet arthropathy and ligamentum flavum thickening.  Mild canal stenosis.  Mild bilateral foraminal stenosis.     L1-2: Disc bulge.  Bilateral facet arthropathy and ligamentum flavum thickening.  Mild canal stenosis.  Mild right foraminal stenosis.     L2-3: Disc bulge.  Bilateral facet arthropathy and ligamentum flavum thickening.  Mild canal stenosis.  Moderate right mild left foraminal stenosis.     L3-4: Disc bulge.  Bilateral facet arthropathy and ligamentum flavum thickening.  Small bilateral facet joint effusions/synovitis.  Moderate canal stenosis with partial effacement of the thecal sac and crowding of the cauda equina nerve roots.  Mild right and moderate left foraminal stenosis.     L4-5: Disc bulge with posterior disc uncovering.  Bilateral facet arthropathy and ligamentum flavum thickening.  Small right facet joint effusion/synovitis.  Mild canal stenosis.  Mild bilateral foraminal stenosis.     L5-S1: Disc bulge.  Bilateral facet arthropathy and ligamentum flavum thickening.  No significant canal stenosis.  Mild left foraminal stenosis.     Impression:     Lumbar levoscoliosis and multilevel degenerative changes as detailed above.  No significant changes from 01/21/2022.     Increased biliary ductal dilatation up to 12 mm, greater than expected after cholecystectomy.  In addition, the main pancreatic duct is mildly prominent up to 4 mm, which is new.  If LFTs are abnormal, consider MRI/MRCP or ERCP for further evaluation.      Past Medical History:   Diagnosis Date    Cataract     Depression     Gestational diabetes     Hyperlipidemia     Hypertension     IBS (irritable bowel syndrome)     Thyroid disease      Past Surgical  History:   Procedure Laterality Date    ADENOIDECTOMY      ARTHROPLASTY, KNEE, TOTAL, USING COMPUTER-ASSISTED NAVIGATION Right 2023    Procedure: CONVERSION ARTHROPLASTY, KNEE, TOTAL, USING COMPUTER-ASSISTED NAVIGATION;  Surgeon: Virgil Scott MD;  Location: [facility] OR;  Service: Orthopedics;  Laterality: Right;  Same day discharge    ARTHROSCOPIC CHONDROPLASTY OF KNEE JOINT Right 10/19/2021    Procedure: ARTHROSCOPY, KNEE, WITH CHONDROPLASTY;  Surgeon: Trevin Huber MD;  Location: [facility] OR;  Service: Orthopedics;  Laterality: Right;    ARTHROSCOPY OF KNEE Right 10/19/2021    Procedure: ARTHROSCOPY, KNEE-RIGHT;  Surgeon: Trevin Huber MD;  Location: [facility] OR;  Service: Orthopedics;  Laterality: Right;    BLOCK, NERVE, GENICULAR Right 2024    Procedure: BLOCK, NERVE RIGHT GENICULAR;  Surgeon: Shoaib Aguirre MD;  Location: [facility];  Service: Pain Management;  Laterality: Right;  [phone]     SECTION      CHOLECYSTECTOMY      COLONOSCOPY N/A 2016    Procedure: COLONOSCOPY;  Surgeon: Heri Segura MD;  [facility]  Service: Endoscopy;  Laterality: N/A;    COLONOSCOPY N/A 2019    Procedure: COLONOSCOPY;  Surgeon: Joe Pitts MD;  [facility]  Service: Endoscopy;  Laterality: N/A;    CYSTOSCOPY  2022    Procedure: CYSTOSCOPY;  Surgeon: Lenny Moore MD;  [address]  Service: Urology;;    ESOPHAGOGASTRODUODENOSCOPY N/A 2020    Procedure: EGD (ESOPHAGOGASTRODUODENOSCOPY);  Surgeon: Tolu Rivas MD;  Location: [facility] (4TH FLR);  Service: Endoscopy;  Laterality: N/A;  Pt. moved to 9:15am arrival time.. Instructed to not have anything after midnight.EC    EYE SURGERY      cataract resection right    INJECTION OF ANESTHETIC AGENT AROUND NERVE Bilateral 2022    Procedure: Block, Nerve MEDIAL BRANCH BLOCK BILATERAL L3,4,5;  Surgeon: Tara Gibbs MD;  Location: [facility];  Service:  Pain Management;  Laterality: Bilateral;    INJECTION OF ANESTHETIC AGENT AROUND NERVE Bilateral 2/15/2022    Procedure: BLOCK, NERVE, L3*L4-L5 MEDIAL BR ANCH 2 OF 2;  Surgeon: Tara Gibbs MD;  Location: [facility]IN [facility]GT;  Service: Pain Management;  Laterality: Bilateral;    INJECTION OF BOTULINUM TOXIN TYPE A  6/21/2022    Procedure: BOTULINUM TOXIN INJECTION 100 units;  Surgeon: Lenny Moore MD;  [facility];  Service: Urology;;    INSERTION, NEUROSTIMULATOR, SPINAL CORD N/A 1/9/2023    Procedure: SPINAL CORD STIMULATOR IMPLANT MicroJob;  Surgeon: Shoaib Aguirre MD;  Location: [facility] OR;  Service: Pain Management;  Laterality: N/A;    JOINT REPLACEMENT      partial right knee    KNEE ARTHROSCOPY W/ MENISCECTOMY Right 10/19/2021    Procedure: ARTHROSCOPY, KNEE, WITH MENISCECTOMY, PARTIAL LATERAL;  Surgeon: Trevin Huber MD;  Location: [facility] OR;  Service: Orthopedics;  Laterality: Right;    r hand surgery      RADIOFREQUENCY ABLATION Right 3/8/2022    Procedure: RADIOFREQUENCY ABLATION, L3-L5 1 OF 2;  Surgeon: Tara Gibbs MD;  Location: [facility] PAIN [facility]GT;  Service: Pain Management;  Laterality: Right;    RADIOFREQUENCY ABLATION Left 3/22/2022    Procedure: RADIOFREQUENCY ABLATION, l3-+l5 2 of 2;  Surgeon: Tara Gibbs MD;  Location: [facility]IN [facility]GT;  Service: Pain Management;  Laterality: Left;    RADIOFREQUENCY ABLATION Right 3/15/2024    Procedure: RADIOFREQUENCY ABLATION RIGHT GENICULAR;  Surgeon: Shoaib Aguirre MD;  Location: [facility]IN [facility]GT;  Service: Pain Management;  Laterality: Right;  [phone]    REMOVAL OF NEUROSTIMULATOR N/A 11/20/2023    Procedure: SCS IPG BATTERY REVISION;  Surgeon: Shoaib Aguirre MD;  Location: [facility] OR;  Service: Pain Management;  Laterality: N/A;    TONSILLECTOMY      TOTAL KNEE ARTHROPLASTY      partial knee replacement    TRIAL OF SPINAL CORD NERVE STIMULATOR N/A 12/22/2022    Procedure: SPINAL CORD STIMULATOR TRIAL BOSTON  DAMION RESEARCH;  Surgeon: Shoaib Aguirre MD;  Location: [facility];  Service: Pain Management;  Laterality: N/A;    TUBAL LIGATION       Social History     Socioeconomic History    Marital status:     Number of children: 1   Occupational History    Occupation:      Employer: [employer]     Comment: retired   Tobacco Use    Smoking status: Never    Smokeless tobacco: Never   Substance and Sexual Activity    Alcohol use: Yes     Alcohol/week: 3.0 standard drinks of alcohol     Types: 3 Glasses of wine per week     Comment: weekends    Drug use: Yes     Types: Marijuana     Comment: occasionally    Sexual activity: Yes     Partners: Male   Other Topics Concern    Are you pregnant or think you may be? No    Breast-feeding No   Social History Narrative    Retired        Swims.       Stationary bike.            Social Determinants of Health     Financial Resource Strain: Low Risk  (3/21/2024)    Overall Financial Resource Strain (CARDIA)     Difficulty of Paying Living Expenses: Not hard at all   Food Insecurity: No Food Insecurity (3/21/2024)    Hunger Vital Sign     Worried About Running Out of Food in the Last Year: Never true     Ran Out of Food in the Last Year: Never true   Transportation Needs: No Transportation Needs (3/21/2024)    PRAPARE - Transportation     Lack of Transportation (Medical): No     Lack of Transportation (Non-Medical): No   Physical Activity: Sufficiently Active (3/21/2024)    Exercise Vital Sign     Days of Exercise per Week: 4 days     Minutes of Exercise per Session: 40 min   Stress: Stress Concern Present (3/21/2024)    [employer] - Occupational Stress Questionnaire     Feeling of Stress : Rather much   Housing Stability: Patient Declined (3/21/2024)    Housing Stability Vital Sign     Unable to Pay for Housing in the Last Year: Patient declined     Number of Places Lived in the Last Year: 1     Unstable Housing in the Last Year:  Patient declined     Family History   Problem Relation Name Age of Onset    Hypertension Mother      Irritable bowel syndrome Mother      Hyperlipidemia Mother      Heart disease Father          mi    Hypertension Father      Cancer Father          prostate    Heart attack Father      Heart failure Father      Hyperlipidemia Father      Alcohol abuse Sister nathanael     Depression Sister nathanael     Epilepsy Son ari     Irritable bowel syndrome Sister martin     Alcohol abuse Sister martin     Ovarian cancer Maternal Aunt      Breast cancer Paternal Aunt      Breast cancer Maternal Aunt      Melanoma Neg Hx      Colon cancer Neg Hx      Stomach cancer Neg Hx      Esophageal cancer Neg Hx      Liver disease Neg Hx      Crohn's disease Neg Hx      Ulcerative colitis Neg Hx      Celiac disease Neg Hx      Stroke Neg Hx         Review of patient's allergies indicates:  No Known Allergies    Current Outpatient Medications   Medication Sig    ALPRAZolam (XANAX) 1 MG tablet Take 1 mg by mouth 3 (three) times daily.    amLODIPine (NORVASC) 5 MG tablet Take 5 mg by mouth once daily.    atorvastatin (LIPITOR) 10 MG tablet Take 1 tablet (10 mg total) by mouth once daily.    cephALEXin (KEFLEX) 500 MG capsule Take 1 capsule (500 mg total) by mouth every 12 (twelve) hours.    cycloSPORINE (RESTASIS) 0.05 % ophthalmic emulsion Place 1 drop into both eyes Daily.    EScitalopram oxalate (LEXAPRO) 5 MG Tab Take 5 mg by mouth.    HYDROcodone-acetaminophen (NORCO) 5-325 mg per tablet Take 1 tablet by mouth every 12 (twelve) hours as needed for Pain.    levothyroxine (SYNTHROID) 100 MCG tablet TAKE 1 TABLET BY MOUTH EVERY MORNING    liothyronine (CYTOMEL) 5 MCG Tab Take 1 tablet (5 mcg total) by mouth once daily.    ondansetron (ZOFRAN) 4 MG tablet Take 1 tablet (4 mg total) by mouth every 8 (eight) hours as needed for Nausea.    promethazine (PHENERGAN) 25 MG tablet 1 tab po q 12 h prn nausea    silver sulfADIAZINE 1% (SILVADENE) 1 %  cream Apply topically once daily.    sumatriptan (IMITREX) 50 MG tablet 1 tab po at start of headache.  Repeat in 2 h prn    suvorexant (BELSOMRA) 20 mg Tab Take 1 tablet (20 mg total) by mouth nightly as needed.    valsartan (DIOVAN) 320 MG tablet Take 1 tablet (320 mg total) by mouth once daily.     No current facility-administered medications for this visit.     Facility-Administered Medications Ordered in Other Visits   Medication    0.9%  NaCl infusion    celecoxib capsule 400 mg    lactated ringers infusion    LIDOcaine (PF) 10 mg/ml (1%) injection 5 mg       REVIEW OF SYSTEMS:    GENERAL: No fever. No chills. No fatigue. Denies weight loss. Denies weight gain.  HEENT: Denies headaches. Denies vision change. Denies eye pain. Denies double vision. Denies ear pain.   CV: Denies chest pain. HTN  PULM: Denies of shortness of breath.  GI: Denies constipation. No diarrhea. No abdominal pain. Denies nausea. Denies vomiting. No blood in stool.  HEME: Denies bleeding problems.  : Denies urgency. No painful urination. No blood in urine.  MS: See HPI  SKIN: Denies rash.   NEURO: Denies seizures. No weakness.  ENDO: Thyroid disorder.   PSYCH:  Depression    OBJECTIVE:     Exam limited due to virtual visit:  GENERAL: Well appearing, in no acute distress, alert and oriented x3.  PSYCH:  Mood and affect appropriate.    Previous physical exam:  There were no vitals filed for this visit.      PHYSICAL EXAM:     GENERAL: Well appearing, in no acute distress, alert and oriented x3.  PSYCH:  Mood and affect appropriate.  SKIN: Skin color, texture, turgor normal, no rashes or lesions. SCS site well healed.   RESP: Symmetric chest rise, no respiratory distress noted.   BACK: Straight leg raise in the sitting position is positive for radicular pain on the right. There is mild pain with palpation over IPG site. Limited ROM with mild pain on extension.  MSK: 5/5 strength in right ankle with plantar and dorsiflexion. 5/5 strength in  left ankle with plantar and dorsiflexion. 5/5 strength with right knee flexion and extension. 5/5 strength with left knee flexion and extension.   GAIT: Normal.         ASSESSMENT: 69 y.o. year old female with low back and knee pain, consistent with the followin. Chronic pain syndrome        2. Other idiopathic scoliosis, thoracolumbar region        3. Lumbar spondylosis        4. DDD (degenerative disc disease), lumbar        5. Spinal cord stimulator status        6. Chronic pain of right knee        7. History of total knee arthroplasty, right              PLAN:     - Previous imaging reviewed. Labs reviewed.     - We can repeat right genicular RFA as needed.    - Continue physical therapy.     - Continue home exercise routine.     - Pain contract signed 2024.    - Continue Norco 5/325 mg BID PRN, #60.     - The patient is here today for a refill of current pain medications and they believe these provide effective pain control and improvements in quality of life.  The patient notes no serious side effects, and feels the benefits outweigh the risks.  The patient was reminded of the pain contract that they signed previously as well as the risks and benefits of the medication including possible death.  The updated [place] [employer] prescription monitoring program was reviewed, and the patient has been found to be compliant with current treatment plan. Medication management provided by Dr. Aguirre.     - UDS from 2024. Repeat next visit.     -RTC in 3 months.     The above plan and management options were discussed at length with patient. Patient is in agreement with the above and verbalized understanding.       Marlene Thorne NP  2024

## 2024-06-25 ENCOUNTER — CLINICAL SUPPORT (OUTPATIENT)
Dept: REHABILITATION | Facility: OTHER | Age: 69
End: 2024-06-25
Payer: MEDICARE

## 2024-06-25 ENCOUNTER — PATIENT MESSAGE (OUTPATIENT)
Dept: DERMATOLOGY | Facility: CLINIC | Age: 69
End: 2024-06-25
Payer: MEDICARE

## 2024-06-25 DIAGNOSIS — M25.69 DECREASED RANGE OF MOTION OF TRUNK AND BACK: ICD-10-CM

## 2024-06-25 DIAGNOSIS — M62.81 WEAKNESS OF TRUNK MUSCULATURE: ICD-10-CM

## 2024-06-25 DIAGNOSIS — R29.898 WEAKNESS OF RIGHT LOWER EXTREMITY: ICD-10-CM

## 2024-06-25 DIAGNOSIS — M25.661 DECREASED RANGE OF MOTION (ROM) OF RIGHT KNEE: Primary | ICD-10-CM

## 2024-06-25 DIAGNOSIS — R29.898 DECREASED STRENGTH OF TRUNK AND BACK: ICD-10-CM

## 2024-06-25 PROCEDURE — 97140 MANUAL THERAPY 1/> REGIONS: CPT

## 2024-06-25 PROCEDURE — 97112 NEUROMUSCULAR REEDUCATION: CPT

## 2024-06-25 PROCEDURE — 97110 THERAPEUTIC EXERCISES: CPT

## 2024-06-25 NOTE — PROGRESS NOTES
[facility]   Physical Therapy Treatment Note      Name: Tiarra Norton  [facility] Number: [id number]    Therapy Diagnosis:   Encounter Diagnoses   Name Primary?    Decreased range of motion (ROM) of right knee Yes    Weakness of right lower extremity     Decreased range of motion of trunk and back     Decreased strength of trunk and back     Weakness of trunk musculature        Physician: Virgil Scott MD    Visit Date: 6/25/2024    Physician Orders: PT Eval and Treat   Medical Diagnosis from Referral:   Z96.651 (ICD-10-CM) - Status post total right knee replacement   M22.2X1 (ICD-10-CM) - Patellofemoral pain syndrome of right knee   M70.50 (ICD-10-CM) - Pes anserine bursitis      Evaluation Date: 12/21/2023  Authorization Period Expiration: 9/21/2024  Plan of Care Expiration: Updated to 7/17/2024  Visit # / Visits authorized: 26/40   Progress Note Due: 7/6/2024  FOTO: 3/4      Precautions: hx of TKA and menisectomy, spinal cord stimulator, scoliosis     Time In: 10:00 am  Time Out: 11:00 am   Total Billable Time: 55 minutes (1:1)  Total Appointment Time (timed & untimed codes): 60 minutes    PTA Visit #: 0/5   Subjective   Patient reports: needling gave her relief for about 3 days last time she would like to do it again today.     She was compliant with home exercise program.  Response to previous treatment: no adverse effects   Functional change: improved tolerance to functional mobility activities with decreased pain    Pain: 3/10 R knee, 6/10 LB  Location: R low back and R knee   Objective    Lumbar Range of Motion:     %   Flexion 90      Extension 50      Left Side Bending 50   Right Side Bending 50   Left rotation    60   Right Rotation    70    *= pain           Range of Motion:   Knee Left active Left Passive   Flexion 133 140   Extension 3 5      Knee Right active Right Passive   Flexion 121 124   Extension 1 3         Lower Extremity Strength     Right LE   Left LE     Hip  "flexion: 4/5 Hip flexion: 4+/5   Knee extension: 4/5 Knee extension: 4+/5   Knee flexion: 4/5 Knee flexion: 4+/5   Hip IR: 4/5 Hip IR: 4/5   Hip ER: 4/5 Hip ER: 4/5   Hip extension:  4-/5 Hip extension: 4-/5   Hip abduction: 4-/5 Hip abduction: 4/5   Hip adduction: 4-/5 Hip adduction 4/5   Ankle dorsiflexion: 5/5 Ankle dorsiflexion: 5/5   Ankle plantarflexion: 5/5 Ankle plantarflexion: 5/5          Treatment   Tiarra received the treatments listed below:      Therapeutic exercises to develop strength, endurance, ROM, flexibility, posture, and core stabilization for 20 minutes including:    Recumbent bike lv. 4 x 5 min for endurance and knee ROM  PPT 15x5"  Bridge 2x10x3"  EIL 2x10  DKTC 15x5"'  Open books x15,3" ea  Hip flexor stretch 2x1' ea  Leg press # 2x10  Leg press SL 80# 2x10    NP:  EIS 2x10    manual therapy techniques:  applied to the: B LB  for 15 minutes, including:    FDN. Educated pt to use heat following treatment sessions if pt is experiencing pain or soreness. Pt verbalized good understanding of education. Pt signed written consent to dry needling. Pt gave verbal consent for DN     Pt received dry needling to the below listed muscles using 50 mm needles.     B Lumbar paraspinals L1-L5      Neuromuscular re-education activities to improve: Balance, Coordination, Kinesthetic, Proprioception, and Posture for 25 minutes. The following activities were included:    Sit to stands GTB around knees 2x10  Side stepping GTB 2x12 steps  Monster walks GTB 2x12 steps  Single leg balance 3x30" R only  Step ups fwd/lateral  6 in step 2x10x3" ea R only   Medx trunk extension 70# x20  Medx trunk rotation 34# x20 increase weight   Heel raises 2x15 with 10# KB alternating hands      therapeutic activities to improve functional performance for 0  minutes, including:        cold pack for 5 minutes to R knee and low back    Patient Education and Home Exercises     Education provided:   - HEP, POC    Written Home " Exercises Provided: Patient instructed to cont prior HEP. Exercises were reviewed and Tiarra was able to demonstrate them prior to the end of the session.  Tiarra demonstrated good  understanding of the education provided. See Electronic Medical Record under Patient Instructions for exercises provided during therapy sessions    Assessment   Tiarra presents today with reports of improvement in pain levels for 3 days after last visit, but return of symptoms today in both back and knee, with back continuing to be most painful and limiting. Dry needling of lumbar musculature again applied with decreased tightness and soreness reported after application. Good tolerance to strengthening, mobility, and balance exercises with minimal increase in pain. Continue to progress as able.     Tiarra Is progressing well towards her goals.   Patient prognosis is Good.     Patient will continue to benefit from skilled outpatient physical therapy to address the deficits listed in the problem list box on initial evaluation, provide pt/family education and to maximize pt's level of independence in the home and community environment.     Patient's spiritual, cultural and educational needs considered and pt agreeable to plan of care and goals.     Anticipated barriers to physical therapy: chronicity of condition, multiple treatment areas, hx of R TKA and menisectomy     GOALS: Short Term Goals:  4 weeks  1. Report decreased in pain at worse less than  <   / =  4  /10  to increase tolerance for functional activities.On going  2. Pt to increase B popliteal angle by greater than > or = 5 degrees in order to improve flexibility and posture. MET  3. Increased R LE MMT 1/2  to increase tolerance for ADL and work activities. MET  4. Pt to increase B Ely's Test by greater than  > or = 5degrees in order to improve flexibility and posture. MET  5. Pt to tolerate HEP to improve ROM and independence with ADL's. MET  6. Pt to improve range of motion by 25%  to allow for improved functional mobility to allow for improvement in IADLs. MET     Long Term Goals: 8 weeks  1. Report decreased in pain at worse less than  <   / =  2  /10  to increase tolerance for functional mobility.  On going  2 .Pt to increase B popliteal angle by greater than > or = 10 degrees in order to improve flexibility and posture. On going  3. Increased R LE MMT 1 grade to increase tolerance for ADL and work activities.On going  4. Pt will report at 51% or better score on FOTO Lumbar spine survey  to demonstrate decrease in disability and improvement in back pain.On going  5. Pt to be Independent with HEP to improve ROM and independence with ADL's. On going  6. Pt to increase B Ely's Test by greater than > or = 10 degrees in order to improve flexibility and posture. On going  7. Pt to demonstrate negative Bridge Test in order to show improved core strength for lumbar stabilization. On going  8. Pt to improve range of motion by 75% to allow for improved functional mobility to allow for improvement in IADLs. On going        Plan   Plan of care Certification: 5/9/2024 to 7/17/2024     Outpatient Physical Therapy 2 times weekly for 10 weeks to include the following interventions: Cervical/Lumbar Traction, Gait Training, Manual Therapy, Moist Heat/ Ice, Neuromuscular Re-ed, Patient Education, Self Care, Therapeutic Activities, and Therapeutic Exercise. Marybeth Anaya, PT   6/27/2024

## 2024-06-27 ENCOUNTER — CLINICAL SUPPORT (OUTPATIENT)
Dept: REHABILITATION | Facility: OTHER | Age: 69
End: 2024-06-27
Payer: MEDICARE

## 2024-06-27 DIAGNOSIS — M25.69 DECREASED RANGE OF MOTION OF TRUNK AND BACK: ICD-10-CM

## 2024-06-27 DIAGNOSIS — M62.81 WEAKNESS OF TRUNK MUSCULATURE: ICD-10-CM

## 2024-06-27 DIAGNOSIS — R29.898 WEAKNESS OF RIGHT LOWER EXTREMITY: ICD-10-CM

## 2024-06-27 DIAGNOSIS — M25.661 DECREASED RANGE OF MOTION (ROM) OF RIGHT KNEE: Primary | ICD-10-CM

## 2024-06-27 DIAGNOSIS — R29.898 DECREASED STRENGTH OF TRUNK AND BACK: ICD-10-CM

## 2024-06-27 PROCEDURE — 97112 NEUROMUSCULAR REEDUCATION: CPT

## 2024-06-27 NOTE — PROGRESS NOTES
[facility]   Physical Therapy Treatment Note      Name: Tiarra Norton  [facility] Number: [id number]    Therapy Diagnosis:   Encounter Diagnoses   Name Primary?    Decreased range of motion (ROM) of right knee Yes    Weakness of right lower extremity     Decreased range of motion of trunk and back     Decreased strength of trunk and back     Weakness of trunk musculature        Physician: Virgil Scott MD    Visit Date: 6/27/2024    Physician Orders: PT Eval and Treat   Medical Diagnosis from Referral:   Z96.651 (ICD-10-CM) - Status post total right knee replacement   M22.2X1 (ICD-10-CM) - Patellofemoral pain syndrome of right knee   M70.50 (ICD-10-CM) - Pes anserine bursitis      Evaluation Date: 12/21/2023  Authorization Period Expiration: 9/21/2024  Plan of Care Expiration: Updated to 7/17/2024  Visit # / Visits authorized: 27/40   Progress Note Due: 7/6/2024  FOTO: 3/4      Precautions: hx of TKA and menisectomy, spinal cord stimulator, scoliosis     Time In: 10:30 am  Time Out: 11:30 am   Total Billable Time: 30 minutes (1:1)  Total Appointment Time (timed & untimed codes): 60 minutes    PTA Visit #: 0/5   Subjective   Patient reports: needling gave her relief for about 3 days last time she would like to do it again today.     She was compliant with home exercise program.  Response to previous treatment: no adverse effects   Functional change: improved tolerance to functional mobility activities with decreased pain    Pain: 3/10 R knee, 6/10 LB  Location: R low back and R knee   Objective    Lumbar Range of Motion:     %   Flexion 90      Extension 50      Left Side Bending 50   Right Side Bending 50   Left rotation    60   Right Rotation    70    *= pain           Range of Motion:   Knee Left active Left Passive   Flexion 133 140   Extension 3 5      Knee Right active Right Passive   Flexion 121 124   Extension 1 3         Lower Extremity Strength     Right LE   Left LE     Hip  "flexion: 4/5 Hip flexion: 4+/5   Knee extension: 4/5 Knee extension: 4+/5   Knee flexion: 4/5 Knee flexion: 4+/5   Hip IR: 4/5 Hip IR: 4/5   Hip ER: 4/5 Hip ER: 4/5   Hip extension:  4-/5 Hip extension: 4-/5   Hip abduction: 4-/5 Hip abduction: 4/5   Hip adduction: 4-/5 Hip adduction 4/5   Ankle dorsiflexion: 5/5 Ankle dorsiflexion: 5/5   Ankle plantarflexion: 5/5 Ankle plantarflexion: 5/5          Treatment   Tiarra received the treatments listed below:      Therapeutic exercises to develop strength, endurance, ROM, flexibility, posture, and core stabilization for 20 minutes including:    Recumbent bike lv. 4 x 5 min for endurance and knee ROM  PPT 15x5"  Bridge 2x10x3"  EIL 2x10  DKTC 15x5"'  Open books x15,3" ea  Hip flexor stretch 2x1' ea  Leg press # 2x10  Leg press SL 80# 2x10    NP:  EIS 2x10    manual therapy techniques:  applied to the: B LB  for 15 minutes, including:    FDN. Educated pt to use heat following treatment sessions if pt is experiencing pain or soreness. Pt verbalized good understanding of education. Pt signed written consent to dry needling. Pt gave verbal consent for DN     Pt received dry needling to the below listed muscles using 50 mm needles.     B Lumbar paraspinals L1-L5      Neuromuscular re-education activities to improve: Balance, Coordination, Kinesthetic, Proprioception, and Posture for 25 minutes. The following activities were included:    Sit to stands GTB around knees 2x10  Side stepping GTB 2x12 steps  Monster walks GTB 2x12 steps  Single leg balance 3x30" R only  Step ups fwd/lateral  6 in step 2x10x3" ea R only   Medx trunk extension 70# x20  Medx trunk rotation 34# x20 increase weight   Heel raises 2x15 with 10# KB alternating hands      therapeutic activities to improve functional performance for 0  minutes, including:        cold pack for 5 minutes to R knee and low back    Patient Education and Home Exercises     Education provided:   - HEP, POC    Written Home " Exercises Provided: Patient instructed to cont prior HEP. Exercises were reviewed and Tiarra was able to demonstrate them prior to the end of the session.  Tiarra demonstrated good  understanding of the education provided. See Electronic Medical Record under Patient Instructions for exercises provided during therapy sessions    Assessment   Tiarra presents today with continued complaints of pain. Dry needling applied to bilateral lumbar paraspinals with pt reporting decreased tightness and soreness after application. Progressed strengthening and mobility exercises as tolerated. Continue to progress as able.     Tiarra Is progressing well towards her goals.   Patient prognosis is Good.     Patient will continue to benefit from skilled outpatient physical therapy to address the deficits listed in the problem list box on initial evaluation, provide pt/family education and to maximize pt's level of independence in the home and community environment.     Patient's spiritual, cultural and educational needs considered and pt agreeable to plan of care and goals.     Anticipated barriers to physical therapy: chronicity of condition, multiple treatment areas, hx of R TKA and menisectomy     GOALS: Short Term Goals:  4 weeks  1. Report decreased in pain at worse less than  <   / =  4  /10  to increase tolerance for functional activities.On going  2. Pt to increase B popliteal angle by greater than > or = 5 degrees in order to improve flexibility and posture. MET  3. Increased R LE MMT 1/2  to increase tolerance for ADL and work activities. MET  4. Pt to increase B Ely's Test by greater than  > or = 5degrees in order to improve flexibility and posture. MET  5. Pt to tolerate HEP to improve ROM and independence with ADL's. MET  6. Pt to improve range of motion by 25% to allow for improved functional mobility to allow for improvement in IADLs. MET     Long Term Goals: 8 weeks  1. Report decreased in pain at worse less than  <   / =   2  /10  to increase tolerance for functional mobility.  On going  2 .Pt to increase B popliteal angle by greater than > or = 10 degrees in order to improve flexibility and posture. On going  3. Increased R LE MMT 1 grade to increase tolerance for ADL and work activities.On going  4. Pt will report at 51% or better score on FOTO Lumbar spine survey  to demonstrate decrease in disability and improvement in back pain.On going  5. Pt to be Independent with HEP to improve ROM and independence with ADL's. On going  6. Pt to increase B Ely's Test by greater than > or = 10 degrees in order to improve flexibility and posture. On going  7. Pt to demonstrate negative Bridge Test in order to show improved core strength for lumbar stabilization. On going  8. Pt to improve range of motion by 75% to allow for improved functional mobility to allow for improvement in IADLs. On going        Plan   Plan of care Certification: 5/9/2024 to 7/17/2024     Outpatient Physical Therapy 2 times weekly for 10 weeks to include the following interventions: Cervical/Lumbar Traction, Gait Training, Manual Therapy, Moist Heat/ Ice, Neuromuscular Re-ed, Patient Education, Self Care, Therapeutic Activities, and Therapeutic Exercise. Dry needling    Zi Anaya, PT   6/27/2024

## 2024-06-28 ENCOUNTER — PATIENT MESSAGE (OUTPATIENT)
Dept: DERMATOLOGY | Facility: CLINIC | Age: 69
End: 2024-06-28

## 2024-06-28 ENCOUNTER — PATIENT MESSAGE (OUTPATIENT)
Dept: INTERNAL MEDICINE | Facility: CLINIC | Age: 69
End: 2024-06-28
Payer: MEDICARE

## 2024-06-28 ENCOUNTER — PROCEDURE VISIT (OUTPATIENT)
Dept: DERMATOLOGY | Facility: CLINIC | Age: 69
End: 2024-06-28
Payer: MEDICARE

## 2024-06-28 DIAGNOSIS — L57.8 ACTINIC ELASTOSIS: Primary | ICD-10-CM

## 2024-06-28 RX ORDER — TRETINOIN 0.25 MG/G
CREAM TOPICAL
Qty: 30 G | Refills: 5 | Status: SHIPPED | OUTPATIENT
Start: 2024-06-28

## 2024-06-28 NOTE — PATIENT INSTRUCTIONS
Your prescription has been sent to the compounding pharmacy [facility].  They are located in [place] at [address] just before the [address].  If you have any questions, please call the pharmacy at [phone].  Website: [email or web address]       RETINOIDS   Your Doctor has prescribed a topical retinoid for your skin.  A retinoid is a vitamin A-derived product used to treat a variety of skin conditions including acne, actinic keratoses (pre-skin cancers), uneven pigmentation from sun damage, fine lines and wrinkles, and enlarged pores.      How do they work?   Retinoids increase skin cell turn over from the normal 30 days to five or six days, minimizing clogged pores--the major factor in acne.  Retinoids can also repair the DNA in cells damaged by the sun, helping to even out skin pigmentation and clear pre-skin cancers.  They stimulate collagen remodeling and repair, reversing signs of maturing skin.   They can shrink oil glands and minimize the appearance of large pores. These effects can not be appreciated unless the medication is used on a consistent basis!    How do I use a retinoid?   After washing with a mild cleanser (CeraVe, Cetaphil, Neutrogena, Purpose), the skin should be moisturized with a non-retinol containing moisturizer such as Cerave cream or PM lotion. Then, a thin layer of medication is applied to the forehead, nose, cheeks, and chin (and around eyes if treating fine wrinkles) at night.  The amount of medication needed to cover the entire face should be no more than the size of a green pea. Irritation around the eyes can be treated with Vaseline at night.     What if my skin appears dry, red, and is peeling?   Retinoids do not cause dry skin but rather they cause the top layer of the skin to shed, giving an appearance of dry skin.  In fact, new healthy skin cells are replacing the older, damaged cells on the surface. This usually occurs the first 2-4 weeks as the skin is  adjusting to the medication.  It is reasonable to use the medication every other night or even every three nights until your skin adjusts.  You can use a MILD exfoliant to remove the peeling skin (Aveeno daily clarifying pads, Aqua glycolic face cream) and can apply a moisturizer throughout the day as needed. Retinoids come in a variety of strengths and vehicles, and your doctor can find one best for you.  If you cannot tolerate prescription strength retinoids, over the counter products with retinol may be beneficial (Olay ProX wrinkle cream, MITRA deep wrinkle cream, Green Cream at [employer])    Will my skin be more sensitive in the sun?   You will need to use a sunscreen with SPF 30 daily.  Retinoids will cause the outermost layer of the skin to be thinner and thus more sensitive to ultraviolet rays.  However, remember that over time, retinoids actually make the skin thicker by enhancing collagen deposition which protects the skin from sun damage.     When will I see results?   If you are using a retinoid for acne, you should see some improvement in 6-8 weeks.  Do not be alarmed if you find that your acne gets WORSE before it gets better- KEEP USING THE MEDICATION- this is a normal response and your acne will improve if you can stick with it.     If you are using the medication for anti-aging and skin dyspigmentation, you may see results in 3 months, but most effects are not visible until 6 months.  Retinoids are clinically proven to reverse signs of aging, but only if used on a CONSISTENT BASIS!   *Remember that retinoids should not be used if you are pregnant.  *Discontinue use 1 week prior to waxing, as skin is more likely to tear.

## 2024-06-28 NOTE — PROGRESS NOTES
Tiarra Norton is a 69 y.o. female who is here today for cosmetic consult for spots/bumps/wrinkles on face and back.   Discussed risks, benefits, alternatives, and side effects of  Botox, filler, microneedling, and benign lesion removal .  The patient was given the opportunity to ask any questions, and all questions were answered to the patient's satisfaction.      -     tretinoin (RETIN-A) 0.025 % cream; Compound tretinoin 0.025% / azelaic acid 8% / niacinamide 2% cream. Apply a pea-sized amount to entire face qhs.  Dispense: 30 g; Refill: 5  Retinoids (adapalene, tretinoin, tazarotene) increase skin cell turn over from the normal 30 days to five or six days, which encourages the shedding of these sun-damaged surface skin cells  Retinoids can also repair the DNA in cells damaged by the sun, helping to even out skin pigmentation and clear pre-cancerous cells.  They also stimulate collagen remodeling and repair, reversing signs of maturing skin.   Retinoids may make the skin dry, red, and irritated. Moisturize daily with a non-comedogenic moisturizer. If still dry, then use the retinoid every other night or every third night as tolerated. Avoid using around corners of eyes, nose, and mouth.  Everyday use a broad-spectrum, water-resistant sunscreen with SPF of 30 or higher--reapply every 2 hours. Seek shade and wear sun-protective clothing/hat.  You may see results in 3 months, but most effects are not visible until 6 months, and it must be used on a consistent basis.   *Remember that retinoids should not be used if you are pregnant.  *Discontinue use 1 week prior to waxing, as skin is more likely to tear.       Recommended AmLactin lotion or cream to body every night after shower or bath. Available over the counter. Sample and coupon were given to the patient.  Ammonium lactate cream contains an alpha hydroxy acid which hydrates the skin and encourages the shedding of these sun-damaged surface skin cells. Over  time, it can help regenerate collagen and elastin to improve the appearance and texture of the skin as well.  Apply the cream to damp skin immediately following your bath or shower. Sometimes, a mild stinging sensation or irritation may be felt which is normal.   Do not use on open wounds or on sunburned, windburned, dry, chapped, or irritated skin.  Everyday use a broad-spectrum, water-resistant sunscreen with SPF of 30 or higher--reapply every 2 hours. Seek shade and wear sun-protective clothing/hat.        PROCEDURE NOTE: DESTRUCTION VIA HYFRECATION     DIAGNOSIS:  sebaceous hyperplasia    LOCATION: face    Discussed with the patient that this procedure is not covered by insurance because treatment of these benign lesions is considered cosmetic. The patient would like to pursue treatment. She understands that she is responsible for the bill and agrees to pay.     Benefits, risks (infection, scarring, hypo- or hyperpigmentation, recurrence, and development of more lesions), and alternatives of treatment, including no treatment, are discussed with the patient who verbally agrees to the procedure.     3 sebaceous hyperplasia are cleaned with alcohol. Lesions are then lightly hyfrecated (on low 1.5) to remove visible lesion.     The patient tolerated the procedure well without adverse event and with negligible blood loss. The patient is advised to keep the sites covered to minimize tenderness and to promote healing. Instructions on wound care are given to the patient, who is to call for any bleeding or signs of infection, such as redness or purulent discharge.    Follow up as needed.    --------------------------------------------    CRYOSURGERY PROCEDURE NOTE    DIAGNOSIS: seborrheic keratoses    LOCATION: back    Discussed with the patient that this procedure is not covered by insurance because treatment of these benign lesions is considered cosmetic. The patient would like to pursue treatment. She understands that  she is responsible for the bill and agrees to pay.     Risk, benefits, and alternatives of cryosurgery are discussed with the patient, including but not limited to the risks of hypopigmentation, hyperpigmentation, scar, infection, recurrence of lesion(s), development of new lesion(s), and need for additional treatment of the lesion(s). Verbal consent obtained from patient. Liquid nitrogen cryosurgery applied to 10+ lesion(s) to produce a freeze injury. Counseled patient that blisters may form, and instructed patient on wound care with gentle cleansing and use of Vaseline ointment to keep moist until healed. Handout was provided, and patient was instructed to return to clinic in 1-2 months if lesions do not completely resolve.    Follow up as needed.

## 2024-07-02 ENCOUNTER — CLINICAL SUPPORT (OUTPATIENT)
Dept: REHABILITATION | Facility: OTHER | Age: 69
End: 2024-07-02
Payer: MEDICARE

## 2024-07-02 DIAGNOSIS — R29.898 WEAKNESS OF RIGHT LOWER EXTREMITY: ICD-10-CM

## 2024-07-02 DIAGNOSIS — M25.661 DECREASED RANGE OF MOTION (ROM) OF RIGHT KNEE: Primary | ICD-10-CM

## 2024-07-02 DIAGNOSIS — R29.898 DECREASED STRENGTH OF TRUNK AND BACK: ICD-10-CM

## 2024-07-02 DIAGNOSIS — M25.69 DECREASED RANGE OF MOTION OF TRUNK AND BACK: ICD-10-CM

## 2024-07-02 DIAGNOSIS — M62.81 WEAKNESS OF TRUNK MUSCULATURE: ICD-10-CM

## 2024-07-02 PROCEDURE — 97140 MANUAL THERAPY 1/> REGIONS: CPT

## 2024-07-02 PROCEDURE — 97112 NEUROMUSCULAR REEDUCATION: CPT

## 2024-07-02 NOTE — PROGRESS NOTES
[facility]   Physical Therapy Treatment Note      Name: Tiarra Norton  [facility] Number: [id number]    Therapy Diagnosis:   Encounter Diagnoses   Name Primary?    Decreased range of motion (ROM) of right knee Yes    Weakness of right lower extremity     Decreased range of motion of trunk and back     Decreased strength of trunk and back     Weakness of trunk musculature          Physician: Virgil Scott MD    Visit Date: 7/2/2024    Physician Orders: PT Eval and Treat   Medical Diagnosis from Referral:   Z96.651 (ICD-10-CM) - Status post total right knee replacement   M22.2X1 (ICD-10-CM) - Patellofemoral pain syndrome of right knee   M70.50 (ICD-10-CM) - Pes anserine bursitis      Evaluation Date: 12/21/2023  Authorization Period Expiration: 9/21/2024  Plan of Care Expiration: Updated to 7/17/2024  Visit # / Visits authorized: 28/40   Progress Note Due: 7/6/2024  FOTO: 3/4      Precautions: hx of TKA and menisectomy, spinal cord stimulator, scoliosis     Time In: 10:00 am  Time Out: 11:00 am   Total Billable Time: 30 minutes (1:1)  Total Appointment Time (timed & untimed codes): 60 minutes    PTA Visit #: 0/5   Subjective   Patient reports: back and knee are both hurting more today without cause    She was compliant with home exercise program.  Response to previous treatment: no adverse effects   Functional change: improved tolerance to functional mobility activities with decreased pain    Pain: 5/10 R knee, 6/10 LB  Location: R low back and R knee   Objective    Lumbar Range of Motion:     %   Flexion 90      Extension 50      Left Side Bending 50   Right Side Bending 50   Left rotation    60   Right Rotation    70    *= pain           Range of Motion:   Knee Left active Left Passive   Flexion 133 140   Extension 3 5      Knee Right active Right Passive   Flexion 121 124   Extension 1 3         Lower Extremity Strength     Right LE   Left LE     Hip flexion: 4/5 Hip flexion: 4+/5  "  Knee extension: 4/5 Knee extension: 4+/5   Knee flexion: 4/5 Knee flexion: 4+/5   Hip IR: 4/5 Hip IR: 4/5   Hip ER: 4/5 Hip ER: 4/5   Hip extension:  4-/5 Hip extension: 4-/5   Hip abduction: 4-/5 Hip abduction: 4/5   Hip adduction: 4-/5 Hip adduction 4/5   Ankle dorsiflexion: 5/5 Ankle dorsiflexion: 5/5   Ankle plantarflexion: 5/5 Ankle plantarflexion: 5/5          Treatment   Tiarra received the treatments listed below:      Therapeutic exercises to develop strength, endurance, ROM, flexibility, posture, and core stabilization for 20 minutes including:    Recumbent bike lv. 4 x 5 min for endurance and knee ROM  PPT 15x5"  Bridge 2x10x3"  EIL 2x10  DKTC 15x5"'  Open books x15,3" ea  Hip flexor stretch 2x1' ea  Leg press # 2x10  Leg press SL 80# 2x10    NP:  EIS 2x10    manual therapy techniques:  applied to the: B LB  for 15 minutes, including:    FDN. Educated pt to use heat following treatment sessions if pt is experiencing pain or soreness. Pt verbalized good understanding of education. Pt signed written consent to dry needling. Pt gave verbal consent for DN     Pt received dry needling to the below listed muscles using 50 mm needles.     B Lumbar paraspinals L1-L5      Neuromuscular re-education activities to improve: Balance, Coordination, Kinesthetic, Proprioception, and Posture for 25 minutes. The following activities were included:    Sit to stands GTB around knees 2x10  Side stepping GTB 2x12 steps  Monster walks GTB 2x12 steps  Single leg balance 3x30" R only  Step ups fwd/lateral  6 in step 2x10x3" ea R only   Medx trunk extension 70# x20  Medx trunk rotation 34# x20 increase weight   Heel raises 2x15 with 10# KB alternating hands      therapeutic activities to improve functional performance for 0  minutes, including:        cold pack for 5 minutes to R knee and low back    Patient Education and Home Exercises     Education provided:   - HEP, POC    Written Home Exercises Provided: Patient " instructed to cont prior HEP. Exercises were reviewed and Tiarra was able to demonstrate them prior to the end of the session.  Tiarra demonstrated good  understanding of the education provided. See Electronic Medical Record under Patient Instructions for exercises provided during therapy sessions    Assessment   Tiarra presents today with reports of increased pain levels without incident. Decreased tightness and soreness reported in low back after application of dry needling. Progressed lumbar mobility and core stability exercises with good tolerance. Continue to progress as able.     Tiarra Is progressing well towards her goals.   Patient prognosis is Good.     Patient will continue to benefit from skilled outpatient physical therapy to address the deficits listed in the problem list box on initial evaluation, provide pt/family education and to maximize pt's level of independence in the home and community environment.     Patient's spiritual, cultural and educational needs considered and pt agreeable to plan of care and goals.     Anticipated barriers to physical therapy: chronicity of condition, multiple treatment areas, hx of R TKA and menisectomy     GOALS: Short Term Goals:  4 weeks  1. Report decreased in pain at worse less than  <   / =  4  /10  to increase tolerance for functional activities.On going  2. Pt to increase B popliteal angle by greater than > or = 5 degrees in order to improve flexibility and posture. MET  3. Increased R LE MMT 1/2  to increase tolerance for ADL and work activities. MET  4. Pt to increase B Ely's Test by greater than  > or = 5degrees in order to improve flexibility and posture. MET  5. Pt to tolerate HEP to improve ROM and independence with ADL's. MET  6. Pt to improve range of motion by 25% to allow for improved functional mobility to allow for improvement in IADLs. MET     Long Term Goals: 8 weeks  1. Report decreased in pain at worse less than  <   / =  2  /10  to increase  tolerance for functional mobility.  On going  2 .Pt to increase B popliteal angle by greater than > or = 10 degrees in order to improve flexibility and posture. On going  3. Increased R LE MMT 1 grade to increase tolerance for ADL and work activities.On going  4. Pt will report at 51% or better score on FOTO Lumbar spine survey  to demonstrate decrease in disability and improvement in back pain.On going  5. Pt to be Independent with HEP to improve ROM and independence with ADL's. On going  6. Pt to increase B Ely's Test by greater than > or = 10 degrees in order to improve flexibility and posture. On going  7. Pt to demonstrate negative Bridge Test in order to show improved core strength for lumbar stabilization. On going  8. Pt to improve range of motion by 75% to allow for improved functional mobility to allow for improvement in IADLs. On going        Plan   Plan of care Certification: 5/9/2024 to 7/17/2024     Outpatient Physical Therapy 2 times weekly for 10 weeks to include the following interventions: Cervical/Lumbar Traction, Gait Training, Manual Therapy, Moist Heat/ Ice, Neuromuscular Re-ed, Patient Education, Self Care, Therapeutic Activities, and Therapeutic Exercise. Dry aubree Anaya, PT   7/8/2024

## 2024-07-09 ENCOUNTER — PATIENT MESSAGE (OUTPATIENT)
Dept: INTERNAL MEDICINE | Facility: CLINIC | Age: 69
End: 2024-07-09
Payer: MEDICARE

## 2024-07-09 ENCOUNTER — CLINICAL SUPPORT (OUTPATIENT)
Dept: REHABILITATION | Facility: OTHER | Age: 69
End: 2024-07-09
Payer: MEDICARE

## 2024-07-09 DIAGNOSIS — R29.898 DECREASED STRENGTH OF TRUNK AND BACK: ICD-10-CM

## 2024-07-09 DIAGNOSIS — M62.81 WEAKNESS OF TRUNK MUSCULATURE: ICD-10-CM

## 2024-07-09 DIAGNOSIS — R29.898 WEAKNESS OF RIGHT LOWER EXTREMITY: ICD-10-CM

## 2024-07-09 DIAGNOSIS — M25.69 DECREASED RANGE OF MOTION OF TRUNK AND BACK: ICD-10-CM

## 2024-07-09 DIAGNOSIS — G47.00 INSOMNIA, UNSPECIFIED TYPE: Primary | ICD-10-CM

## 2024-07-09 DIAGNOSIS — M25.661 DECREASED RANGE OF MOTION (ROM) OF RIGHT KNEE: Primary | ICD-10-CM

## 2024-07-09 PROCEDURE — 97140 MANUAL THERAPY 1/> REGIONS: CPT

## 2024-07-09 PROCEDURE — 97110 THERAPEUTIC EXERCISES: CPT

## 2024-07-09 PROCEDURE — 97112 NEUROMUSCULAR REEDUCATION: CPT

## 2024-07-10 ENCOUNTER — PATIENT MESSAGE (OUTPATIENT)
Dept: PAIN MEDICINE | Facility: CLINIC | Age: 69
End: 2024-07-10
Payer: MEDICARE

## 2024-07-10 RX ORDER — TRAZODONE HYDROCHLORIDE 100 MG/1
TABLET ORAL
Qty: 180 TABLET | Refills: 3 | Status: SHIPPED | OUTPATIENT
Start: 2024-07-10

## 2024-07-10 NOTE — TELEPHONE ENCOUNTER
Care Due:                  Date            Visit Type   Department     Provider  --------------------------------------------------------------------------------                                EP -                              PRIMARY      [facility] INTERNAL  Last Visit: 02-      CARE ([facility])   MEDICINE       DEX CANELA  Next Visit: None Scheduled  None         None Found                                                            Last  Test          Frequency    Reason                     Performed    Due Date  --------------------------------------------------------------------------------    CMP.........  12 months..  atorvastatin, valsartan..  05- 05-    Lipid Panel.  12 months..  atorvastatin.............  03- 02-    [facility] Embedded Care Due Messages. Reference number: [id number].   7/10/2024 11:12:57 AM CDT

## 2024-07-11 ENCOUNTER — CLINICAL SUPPORT (OUTPATIENT)
Dept: REHABILITATION | Facility: OTHER | Age: 69
End: 2024-07-11
Payer: MEDICARE

## 2024-07-11 DIAGNOSIS — M62.81 WEAKNESS OF TRUNK MUSCULATURE: ICD-10-CM

## 2024-07-11 DIAGNOSIS — R29.898 WEAKNESS OF RIGHT LOWER EXTREMITY: ICD-10-CM

## 2024-07-11 DIAGNOSIS — M25.69 DECREASED RANGE OF MOTION OF TRUNK AND BACK: ICD-10-CM

## 2024-07-11 DIAGNOSIS — M25.661 DECREASED RANGE OF MOTION (ROM) OF RIGHT KNEE: Primary | ICD-10-CM

## 2024-07-11 DIAGNOSIS — R29.898 DECREASED STRENGTH OF TRUNK AND BACK: ICD-10-CM

## 2024-07-11 PROCEDURE — 97112 NEUROMUSCULAR REEDUCATION: CPT

## 2024-07-11 PROCEDURE — 97110 THERAPEUTIC EXERCISES: CPT

## 2024-07-11 PROCEDURE — 97140 MANUAL THERAPY 1/> REGIONS: CPT

## 2024-07-11 NOTE — PROGRESS NOTES
[facility]   Physical Therapy Treatment Note      Name: Tiarra Norton  [facility] Number: [id number]    Therapy Diagnosis:   Encounter Diagnoses   Name Primary?    Decreased range of motion (ROM) of right knee Yes    Weakness of right lower extremity     Decreased range of motion of trunk and back     Decreased strength of trunk and back     Weakness of trunk musculature        Physician: Virgil Scott MD    Visit Date: 7/9/2024    Physician Orders: PT Eval and Treat   Medical Diagnosis from Referral:   Z96.651 (ICD-10-CM) - Status post total right knee replacement   M22.2X1 (ICD-10-CM) - Patellofemoral pain syndrome of right knee   M70.50 (ICD-10-CM) - Pes anserine bursitis      Evaluation Date: 12/21/2023  Authorization Period Expiration: 9/21/2024  Plan of Care Expiration: Updated to 7/17/2024  Visit # / Visits authorized: 29/40   Progress Note Due: 8/11/2024  FOTO: 3/4      Precautions: hx of TKA and menisectomy, spinal cord stimulator, scoliosis     Time In: 10:00 am  Time Out: 11:00 am   Total Billable Time: 55 minutes (1:1)  Total Appointment Time (timed & untimed codes): 60 minutes    PTA Visit #: 0/5   Subjective   Patient reports: she has been in more pain since this weekend, she did feel better after last visit, but only for a day or 2    She was compliant with home exercise program.  Response to previous treatment: no adverse effects   Functional change: improved tolerance to functional mobility activities with decreased pain    Pain: 5/10 R knee, 6/10 LB  Location: R low back and R knee   Objective      Updated 7/11/2024    Lumbar Range of Motion:     %   Flexion 90      Extension 70      Left Side Bending 60   Right Side Bending 60   Left rotation    70   Right Rotation    80    *= pain        Range of Motion:   Knee Left active Left Passive   Flexion 133 140   Extension 3 5      Knee Right active Right Passive   Flexion 121 124   Extension 1 3         Lower Extremity  "Strength     Right LE   Left LE     Hip flexion: 4/5 Hip flexion: 4+/5   Knee extension: 4/5 Knee extension: 4+/5   Knee flexion: 4/5 Knee flexion: 4+/5   Hip IR: 4/5 Hip IR: 4/5   Hip ER: 4/5 Hip ER: 4/5   Hip extension:  4-/5 Hip extension: 4-/5   Hip abduction: 4-/5 Hip abduction: 4/5   Hip adduction: 4-/5 Hip adduction 4/5   Ankle dorsiflexion: 5/5 Ankle dorsiflexion: 5/5   Ankle plantarflexion: 5/5 Ankle plantarflexion: 5/5          Treatment   Tiarra received the treatments listed below:      Therapeutic exercises to develop strength, endurance, ROM, flexibility, posture, and core stabilization for 20 minutes including:    Recumbent bike lv. 4 x 5 min for endurance and knee ROM  PPT 15x5"  Bridge 2x10x3"  EIL 2x10  DKTC 15x5"'  Open books x15,3" ea  Hip flexor stretch 2x1' ea  Leg press # 2x10  Leg press SL 80# 2x10    NP:  EIS 2x10    manual therapy techniques:  applied to the: B LB  for 10 minutes, including:    FDN. Educated pt to use heat following treatment sessions if pt is experiencing pain or soreness. Pt verbalized good understanding of education. Pt signed written consent to dry needling. Pt gave verbal consent for DN     Pt received dry needling to the below listed muscles using 50 mm needles.     B Lumbar paraspinals L1-L5      Neuromuscular re-education activities to improve: Balance, Coordination, Kinesthetic, Proprioception, and Posture for 25 minutes. The following activities were included:    Sit to stands GTB around knees 2x10  Side stepping GTB 2x12 steps  Monster walks GTB 2x12 steps  Single leg balance 3x30" R only  Step ups fwd/lateral  6 in step 2x10x3" ea R only   Medx trunk extension 70# x20  Medx trunk rotation 34# x20 increase weight   Heel raises 2x15 with 10# KB alternating hands      therapeutic activities to improve functional performance for 0  minutes, including:        cold pack for 5 minutes to R knee and low back    Patient Education and Home Exercises     Education " provided:   - HEP, POC    Written Home Exercises Provided: Patient instructed to cont prior HEP. Exercises were reviewed and Tiarra was able to demonstrate them prior to the end of the session.  Tiarra demonstrated good  understanding of the education provided. See Electronic Medical Record under Patient Instructions for exercises provided during therapy sessions    Assessment   Tiarra presents today with continued complaints of pain. Reassessment performed with pt demo gains in lumbar ROM, strength of core and B LE, and improved FOTO score, but pt continues to have pain limiting her with all ADL's, leisure activities, and limits her with functional mobility. She will continue to benefit from skilled therapy to address described deficits and decrease pain levels.     Tiarra Is progressing well towards her goals.   Patient prognosis is Good.     Patient will continue to benefit from skilled outpatient physical therapy to address the deficits listed in the problem list box on initial evaluation, provide pt/family education and to maximize pt's level of independence in the home and community environment.     Patient's spiritual, cultural and educational needs considered and pt agreeable to plan of care and goals.     Anticipated barriers to physical therapy: chronicity of condition, multiple treatment areas, hx of R TKA and menisectomy     GOALS: Short Term Goals:  4 weeks  1. Report decreased in pain at worse less than  <   / =  4  /10  to increase tolerance for functional activities.On going  2. Pt to increase B popliteal angle by greater than > or = 5 degrees in order to improve flexibility and posture. MET  3. Increased R LE MMT 1/2  to increase tolerance for ADL and work activities. MET  4. Pt to increase B Ely's Test by greater than  > or = 5degrees in order to improve flexibility and posture. MET  5. Pt to tolerate HEP to improve ROM and independence with ADL's. MET  6. Pt to improve range of motion by 25% to allow  for improved functional mobility to allow for improvement in IADLs. MET     Long Term Goals: 8 weeks  1. Report decreased in pain at worse less than  <   / =  2  /10  to increase tolerance for functional mobility.  On going  2 .Pt to increase B popliteal angle by greater than > or = 10 degrees in order to improve flexibility and posture. On going  3. Increased R LE MMT 1 grade to increase tolerance for ADL and work activities.On going  4. Pt will report at 51% or better score on FOTO Lumbar spine survey  to demonstrate decrease in disability and improvement in back pain.On going  5. Pt to be Independent with HEP to improve ROM and independence with ADL's. On going  6. Pt to increase B Ely's Test by greater than > or = 10 degrees in order to improve flexibility and posture. On going  7. Pt to demonstrate negative Bridge Test in order to show improved core strength for lumbar stabilization. On going  8. Pt to improve range of motion by 75% to allow for improved functional mobility to allow for improvement in IADLs. On going        Plan   Plan of care Certification: 5/9/2024 to 7/17/2024     Outpatient Physical Therapy 2 times weekly for 10 weeks to include the following interventions: Cervical/Lumbar Traction, Gait Training, Manual Therapy, Moist Heat/ Ice, Neuromuscular Re-ed, Patient Education, Self Care, Therapeutic Activities, and Therapeutic Exercise. Marybeth Anaya, PT   7/11/2024

## 2024-07-11 NOTE — PROGRESS NOTES
[facility]   Physical Therapy Treatment Note      Name: Tiarra Norton  [facility] Number: [id number]    Therapy Diagnosis:   Encounter Diagnoses   Name Primary?    Decreased range of motion (ROM) of right knee Yes    Weakness of right lower extremity     Decreased range of motion of trunk and back     Decreased strength of trunk and back     Weakness of trunk musculature        Physician: Virgil Scott MD    Visit Date: 7/11/2024    Physician Orders: PT Eval and Treat   Medical Diagnosis from Referral:   Z96.651 (ICD-10-CM) - Status post total right knee replacement   M22.2X1 (ICD-10-CM) - Patellofemoral pain syndrome of right knee   M70.50 (ICD-10-CM) - Pes anserine bursitis      Evaluation Date: 12/21/2023  Authorization Period Expiration: 9/21/2024  Plan of Care Expiration: Updated to 7/17/2024  Visit # / Visits authorized: 30/40   Progress Note Due: 8/11/2024  FOTO: 3/4      Precautions: hx of TKA and menisectomy, spinal cord stimulator, scoliosis     Time In: 8:30 am  Time Out: 9:30 am   Total Billable Time: 55 minutes (1:1)  Total Appointment Time (timed & untimed codes): 60 minutes    PTA Visit #: 0/5   Subjective   Patient reports: felt better after last visit, still having pain today though     She was compliant with home exercise program.  Response to previous treatment: no adverse effects   Functional change: improved tolerance to functional mobility activities with decreased pain    Pain: 5/10 R knee, 6/10 LB  Location: R low back and R knee   Objective      Updated 7/11/2024    Lumbar Range of Motion:     %   Flexion 90      Extension 70      Left Side Bending 60   Right Side Bending 60   Left rotation    70   Right Rotation    80    *= pain        Range of Motion:   Knee Left active Left Passive   Flexion 133 140   Extension 3 5      Knee Right active Right Passive   Flexion 121 124   Extension 1 3         Lower Extremity Strength     Right LE   Left LE     Hip flexion:  "4/5 Hip flexion: 4+/5   Knee extension: 4/5 Knee extension: 4+/5   Knee flexion: 4/5 Knee flexion: 4+/5   Hip IR: 4/5 Hip IR: 4/5   Hip ER: 4/5 Hip ER: 4/5   Hip extension:  4-/5 Hip extension: 4-/5   Hip abduction: 4-/5 Hip abduction: 4/5   Hip adduction: 4-/5 Hip adduction 4/5   Ankle dorsiflexion: 5/5 Ankle dorsiflexion: 5/5   Ankle plantarflexion: 5/5 Ankle plantarflexion: 5/5          Treatment   Tiarra received the treatments listed below:      Therapeutic exercises to develop strength, endurance, ROM, flexibility, posture, and core stabilization for 20 minutes including:    Recumbent bike lv. 4 x 5 min for endurance and knee ROM  PPT 15x5"  Bridge 2x10x3"  EIL 2x10  DKTC 15x5"'  Open books x15,3" ea  Hip flexor stretch 2x1' ea  Leg press # 2x10  Leg press SL 80# 2x10    NP:  EIS 2x10    manual therapy techniques:  applied to the: B LB  for 10 minutes, including:    FDN. Educated pt to use heat following treatment sessions if pt is experiencing pain or soreness. Pt verbalized good understanding of education. Pt signed written consent to dry needling. Pt gave verbal consent for DN     Pt received dry needling to the below listed muscles using 50 mm needles.     B Lumbar paraspinals L1-L5      Neuromuscular re-education activities to improve: Balance, Coordination, Kinesthetic, Proprioception, and Posture for 25 minutes. The following activities were included:    Sit to stands GTB around knees 2x10  Side stepping GTB 2x12 steps  Monster walks GTB 2x12 steps  Single leg balance 3x30" R only  Step ups fwd/lateral  6 in step 2x10x3" ea R only   Medx trunk extension 70# x20  Medx trunk rotation 34# x20 increase weight   Heel raises 2x15 with 10# KB alternating hands      therapeutic activities to improve functional performance for 0  minutes, including:        cold pack for 5 minutes to R knee and low back    Patient Education and Home Exercises     Education provided:   - HEP, POC    Written Home Exercises " Provided: Patient instructed to cont prior HEP. Exercises were reviewed and Tiarra was able to demonstrate them prior to the end of the session.  Tiarra demonstrated good  understanding of the education provided. See Electronic Medical Record under Patient Instructions for exercises provided during therapy sessions    Assessment   Tiarra presents today with reports of slight improvement in pain since last visit. Continuation and progression of dynamic core, lumbar and B LE strengthening and mobility exercises with good tolerance and no adverse effects. Continue to progress    Tiarra Is progressing well towards her goals.   Patient prognosis is Good.     Patient will continue to benefit from skilled outpatient physical therapy to address the deficits listed in the problem list box on initial evaluation, provide pt/family education and to maximize pt's level of independence in the home and community environment.     Patient's spiritual, cultural and educational needs considered and pt agreeable to plan of care and goals.     Anticipated barriers to physical therapy: chronicity of condition, multiple treatment areas, hx of R TKA and menisectomy     GOALS: Short Term Goals:  4 weeks  1. Report decreased in pain at worse less than  <   / =  4  /10  to increase tolerance for functional activities.On going  2. Pt to increase B popliteal angle by greater than > or = 5 degrees in order to improve flexibility and posture. MET  3. Increased R LE MMT 1/2  to increase tolerance for ADL and work activities. MET  4. Pt to increase B Ely's Test by greater than  > or = 5degrees in order to improve flexibility and posture. MET  5. Pt to tolerate HEP to improve ROM and independence with ADL's. MET  6. Pt to improve range of motion by 25% to allow for improved functional mobility to allow for improvement in IADLs. MET     Long Term Goals: 8 weeks  1. Report decreased in pain at worse less than  <   / =  2  /10  to increase tolerance for  functional mobility.  On going  2 .Pt to increase B popliteal angle by greater than > or = 10 degrees in order to improve flexibility and posture. On going  3. Increased R LE MMT 1 grade to increase tolerance for ADL and work activities.On going  4. Pt will report at 51% or better score on FOTO Lumbar spine survey  to demonstrate decrease in disability and improvement in back pain.On going  5. Pt to be Independent with HEP to improve ROM and independence with ADL's. On going  6. Pt to increase B Ely's Test by greater than > or = 10 degrees in order to improve flexibility and posture. On going  7. Pt to demonstrate negative Bridge Test in order to show improved core strength for lumbar stabilization. On going  8. Pt to improve range of motion by 75% to allow for improved functional mobility to allow for improvement in IADLs. On going        Plan   Plan of care Certification: 5/9/2024 to 7/17/2024     Outpatient Physical Therapy 2 times weekly for 10 weeks to include the following interventions: Cervical/Lumbar Traction, Gait Training, Manual Therapy, Moist Heat/ Ice, Neuromuscular Re-ed, Patient Education, Self Care, Therapeutic Activities, and Therapeutic Exercise. Dry aubree Anaya, PT   7/11/2024

## 2024-07-15 ENCOUNTER — TELEPHONE (OUTPATIENT)
Dept: INTERNAL MEDICINE | Facility: CLINIC | Age: 69
End: 2024-07-15
Payer: MEDICARE

## 2024-07-15 ENCOUNTER — PATIENT MESSAGE (OUTPATIENT)
Dept: INTERNAL MEDICINE | Facility: CLINIC | Age: 69
End: 2024-07-15
Payer: MEDICARE

## 2024-07-15 NOTE — TELEPHONE ENCOUNTER
Pt sent a message concerned about her BP. States it has been low for a few weeks. Pt states she is still taking her BP medication. Pt states nothing has changed except her increase in exercise. Pt states her last reading was 81/45

## 2024-07-15 NOTE — TELEPHONE ENCOUNTER
"She is sweating from her long walk every day. Walking 2-3 miles a day, usually on her treadmill Drinking water, George and coke not drinking electrolyte fluids. Taking medications daily prior to her walk. She is prescribed Xanax, Norvasc, Lexapro, Norco, Phenergan,  Trazone, and Diovan. She has NOT been checking her BP daily prior to taking her medications. She takes all but the Trazodone and Phenergan prior to her walk. Has not lost any weight over the last 2-3 months. She states she had a thyroid test outside [facility], the results were dramatic" Heart rate 65-75. No signs of UTI. She is wondering if her drop in thyroid caused the drop in BP. I asked her to check her BP prior to her meds in the a.m. If her BP was lower than 100 systolic, not to take her medications until she heard from you. Either call me at 8 a.m or send via portal. She will sip on Electrolytes tonight to help with the low BP. Thank you Dr. Perales.   "

## 2024-07-16 ENCOUNTER — TELEPHONE (OUTPATIENT)
Dept: RESEARCH | Facility: OTHER | Age: 69
End: 2024-07-16
Payer: MEDICARE

## 2024-07-16 ENCOUNTER — CLINICAL SUPPORT (OUTPATIENT)
Dept: REHABILITATION | Facility: OTHER | Age: 69
End: 2024-07-16
Payer: MEDICARE

## 2024-07-16 DIAGNOSIS — R29.898 DECREASED STRENGTH OF TRUNK AND BACK: ICD-10-CM

## 2024-07-16 DIAGNOSIS — M25.69 DECREASED RANGE OF MOTION OF TRUNK AND BACK: ICD-10-CM

## 2024-07-16 DIAGNOSIS — R29.898 WEAKNESS OF RIGHT LOWER EXTREMITY: ICD-10-CM

## 2024-07-16 DIAGNOSIS — M62.81 WEAKNESS OF TRUNK MUSCULATURE: ICD-10-CM

## 2024-07-16 DIAGNOSIS — M25.661 DECREASED RANGE OF MOTION (ROM) OF RIGHT KNEE: Primary | ICD-10-CM

## 2024-07-16 PROCEDURE — 97112 NEUROMUSCULAR REEDUCATION: CPT

## 2024-07-16 PROCEDURE — 97110 THERAPEUTIC EXERCISES: CPT

## 2024-07-16 NOTE — TELEPHONE ENCOUNTER
Dr. Perales is out this week  See how her BP is this morning  She needs to push fluids  If BP remains low she needs an appointment to discuss

## 2024-07-16 NOTE — TELEPHONE ENCOUNTER
Patient reached out this morning. She got up at 345a, her usual. //71. Exercised 545-7a, BP after exercise 96/50. She took her meds at 7. NO symptoms.Booked for Monday afternoon.

## 2024-07-21 ENCOUNTER — PATIENT MESSAGE (OUTPATIENT)
Dept: INTERNAL MEDICINE | Facility: CLINIC | Age: 69
End: 2024-07-21
Payer: MEDICARE

## 2024-07-22 ENCOUNTER — OFFICE VISIT (OUTPATIENT)
Dept: INTERNAL MEDICINE | Facility: CLINIC | Age: 69
End: 2024-07-22
Payer: MEDICARE

## 2024-07-22 VITALS
HEIGHT: 63 IN | DIASTOLIC BLOOD PRESSURE: 80 MMHG | BODY MASS INDEX: 26.06 KG/M2 | OXYGEN SATURATION: 98 % | HEART RATE: 54 BPM | SYSTOLIC BLOOD PRESSURE: 174 MMHG | WEIGHT: 147.06 LBS

## 2024-07-22 DIAGNOSIS — E78.5 HYPERLIPIDEMIA, UNSPECIFIED HYPERLIPIDEMIA TYPE: ICD-10-CM

## 2024-07-22 DIAGNOSIS — M25.473 ANKLE SWELLING, UNSPECIFIED LATERALITY: ICD-10-CM

## 2024-07-22 DIAGNOSIS — I10 HYPERTENSION, ESSENTIAL: Primary | ICD-10-CM

## 2024-07-22 DIAGNOSIS — E03.8 OTHER SPECIFIED HYPOTHYROIDISM: ICD-10-CM

## 2024-07-22 PROCEDURE — 99214 OFFICE O/P EST MOD 30 MIN: CPT | Mod: S$PBB,,, | Performed by: PHYSICIAN ASSISTANT

## 2024-07-22 PROCEDURE — 99215 OFFICE O/P EST HI 40 MIN: CPT | Mod: PBBFAC | Performed by: PHYSICIAN ASSISTANT

## 2024-07-22 PROCEDURE — 99999 PR PBB SHADOW E&M-EST. PATIENT-LVL V: CPT | Mod: PBBFAC,,, | Performed by: PHYSICIAN ASSISTANT

## 2024-07-22 RX ORDER — HYDROCHLOROTHIAZIDE 12.5 MG/1
12.5 TABLET ORAL DAILY
Qty: 30 TABLET | Refills: 2 | Status: SHIPPED | OUTPATIENT
Start: 2024-07-22 | End: 2025-07-22

## 2024-07-22 NOTE — PROGRESS NOTES
Subjective:       Patient ID: Tiarra Norton is a 69 y.o. female.        Chief Complaint: discuss BP  and Foot Swelling    Tiarra Norton is an established patient of Kaykay Perales MD here today for urgent care visit.    Walking past 2 months    7/9 started checking BP  Low readings from 7/9-7/15, taken after exercise and tired/sweaty, walking 1.5-3 miles, 81/45 was the lowest, most ~100/58  Talked to RN, told to stop amlodipine 5 mg and hydrate, 7/17  BP began going up, 130-140 systolic initially, but now up to 160-170 range, taken all different times of day, not just post exercise    No weakness or feeling of syncope/pre syncope with BP readings    Hydrating more now since BP was low, drinking liquid IV, 2/day now    No chest pain or shortness of breath    Ankle swelling started 2-3 weeks ago, she thinks prior to starting liquid IV           Review of Systems   Constitutional:  Negative for chills, diaphoresis, fatigue and fever.   HENT:  Negative for congestion and sore throat.    Eyes:  Negative for visual disturbance.   Respiratory:  Negative for cough, chest tightness and shortness of breath.    Cardiovascular:  Positive for leg swelling (ankle swelling). Negative for chest pain and palpitations.   Gastrointestinal:  Negative for abdominal pain, blood in stool, constipation, diarrhea, nausea and vomiting.   Genitourinary:  Negative for dysuria, frequency, hematuria and urgency.   Musculoskeletal:  Negative for arthralgias and back pain.   Skin:  Negative for rash.   Neurological:  Negative for dizziness, syncope, weakness and headaches.   Psychiatric/Behavioral:  Negative for dysphoric mood and sleep disturbance. The patient is not nervous/anxious.        Objective:      Physical Exam  Vitals and nursing note reviewed.   Constitutional:       Appearance: Normal appearance. She is well-developed.   HENT:      Head: Normocephalic.      Right Ear: External ear normal.      Left Ear: External ear  normal.   Eyes:      Pupils: Pupils are equal, round, and reactive to light.   Cardiovascular:      Rate and Rhythm: Normal rate and regular rhythm.      Heart sounds: Normal heart sounds. No murmur heard.     No friction rub. No gallop.   Pulmonary:      Effort: Pulmonary effort is normal. No respiratory distress.      Breath sounds: Normal breath sounds.   Abdominal:      Palpations: Abdomen is soft.      Tenderness: There is no abdominal tenderness.   Musculoskeletal:      Comments: Trace non pitting ankle swelling   Skin:     General: Skin is warm and dry.   Neurological:      Mental Status: She is alert.         Assessment:       1. Hypertension, essential    2. Ankle swelling, unspecified laterality    3. Hyperlipidemia, unspecified hyperlipidemia type    4. Other specified hypothyroidism        Plan:       Tiarra was seen today for discuss bp  and foot swelling.    Diagnoses and all orders for this visit:    Hypertension, essential - BP is elevated off amlodipine but also with mild ankle swelling, echo from 2020 reviewed, h/o mild hyponatremia 10 years ago, will start low dose hctz 12.5 mg and check CMP in 1 week on medication, monitor BP, f/u in 4 weeks  -     Comprehensive Metabolic Panel; Future  -     hydroCHLOROthiazide (HYDRODIURIL) 12.5 MG Tab; Take 1 tablet (12.5 mg total) by mouth once daily.    Ankle swelling, unspecified laterality - see above, will start hctz 12.5 mg    Hyperlipidemia, unspecified hyperlipidemia type - stable and controlled, continue current regimen  Lab Results   Component Value Date    CHOL 202 (H) 03/01/2023    TRIG 78 03/01/2023    HDL 83 (H) 03/01/2023    LDLCALC 103.4 03/01/2023     Other specified hypothyroidism - recent med adjustment with repeat TSH scheduled in 3 weeks    F/u in 4 weeks    Pt has been given instructions populated from patient instructions database and has verbalized understanding of the after visit summary and information contained wherein.    Follow up  "with a primary care provider. May go to ER for acute shortness of breath, lightheadedness, fever, or any other emergent complaints or changes in condition.    "This note will be shared with the patient"    Future Appointments   Date Time Provider Department Center   8/1/2024  8:30 AM Zi Anaya, PT [facility]   8/2/2024  9:15 AM Melissa Cabrera MD [facility]   8/16/2024  8:00 AM LAB, [facility] LAB [facility]   8/19/2024 11:30 AM Kaykay Perales MD [facility]   8/22/2024  9:30 AM Melissa Cabrera MD [facility]   9/24/2024  9:00 AM Marlene Thorne, NP [facility]   1/21/2025 10:45 AM CTU, RESEARCH [facility] ADMN RE [facility]                 "

## 2024-07-23 NOTE — PLAN OF CARE
Outpatient Therapy Updated Plan of Care     Visit Date: 7/16/2024    Name: Tiarra Norton  [facility] Number: [id number]    Therapy Diagnosis:   Encounter Diagnoses   Name Primary?    Decreased range of motion (ROM) of right knee Yes    Weakness of right lower extremity     Decreased range of motion of trunk and back     Decreased strength of trunk and back     Weakness of trunk musculature      Physician: Virgil Scott MD    Physician Orders: PT Eval and Treat   Medical Diagnosis from Referral:   Z96.651 (ICD-10-CM) - Status post total right knee replacement   M22.2X1 (ICD-10-CM) - Patellofemoral pain syndrome of right knee   M70.50 (ICD-10-CM) - Pes anserine bursitis      Evaluation Date: 12/21/2023  Authorization Period Expiration: 9/21/2024  Plan of Care Expiration: Updated to 9/3/2024  Visit # / Visits authorized: 31/40   Progress Note Due: 8/11/2024  FOTO: 3/4            Precautions: hx of TKA and menisectomy, spinal cord stimulator, scoliosis    Subjective     Update: Patient reports: today is better than it has been, still with pain but less overall.      She was compliant with home exercise program.  Response to previous treatment: no adverse effects   Functional change: improved tolerance to functional mobility activities with decreased pain     Pain: 5/10 R knee, 6/10 LB  Location: R low back and R knee   Objective       Updated 7/11/2024     Lumbar Range of Motion:     %   Flexion 90      Extension 70      Left Side Bending 60   Right Side Bending 60   Left rotation    70   Right Rotation    80    *= pain         Range of Motion:   Knee Left active Left Passive   Flexion 133 140   Extension 3 5      Knee Right active Right Passive   Flexion 121 124   Extension 1 3         Lower Extremity Strength     Right LE   Left LE     Hip flexion: 4/5 Hip flexion: 4+/5   Knee extension: 4/5 Knee extension: 4+/5   Knee flexion: 4/5 Knee flexion: 4+/5   Hip IR: 4/5 Hip IR: 4/5   Hip ER: 4/5 Hip ER: 4/5   Hip  extension:  4-/5 Hip extension: 4-/5   Hip abduction: 4-/5 Hip abduction: 4/5   Hip adduction: 4-/5 Hip adduction 4/5   Ankle dorsiflexion: 5/5 Ankle dorsiflexion: 5/5   Ankle plantarflexion: 5/5 Ankle plantarflexion: 5/5          Assessment     Update: Tiarra presents today with reports of decreased pain levels. Dry needling held until next visit due to decreased symptoms at this time to allow patient to progress strengthening and mobility activities. Continue to progress as able.      Tiarra Is progressing well towards her goals.   Patient prognosis is Good.      Patient will continue to benefit from skilled outpatient physical therapy to address the deficits listed in the problem list box on initial evaluation, provide pt/family education and to maximize pt's level of independence in the home and community environment.      Patient's spiritual, cultural and educational needs considered and pt agreeable to plan of care and goals.     Anticipated barriers to physical therapy: chronicity of condition, multiple treatment areas, hx of R TKA and menisectomy      GOALS: Short Term Goals:  4 weeks  1. Report decreased in pain at worse less than  <   / =  4  /10  to increase tolerance for functional activities.On going  2. Pt to increase B popliteal angle by greater than > or = 5 degrees in order to improve flexibility and posture. MET  3. Increased R LE MMT 1/2  to increase tolerance for ADL and work activities. MET  4. Pt to increase B Ely's Test by greater than  > or = 5degrees in order to improve flexibility and posture. MET  5. Pt to tolerate HEP to improve ROM and independence with ADL's. MET  6. Pt to improve range of motion by 25% to allow for improved functional mobility to allow for improvement in IADLs. MET     Long Term Goals: 8 weeks  1. Report decreased in pain at worse less than  <   / =  2  /10  to increase tolerance for functional mobility.  On going  2 .Pt to increase B popliteal angle by greater than >  "or = 10 degrees in order to improve flexibility and posture. On going  3. Increased R LE MMT 1 grade to increase tolerance for ADL and work activities.On going  4. Pt will report at 51% or better score on FOTO Lumbar spine survey  to demonstrate decrease in disability and improvement in back pain.On going  5. Pt to be Independent with HEP to improve ROM and independence with ADL's. On going  6. Pt to increase B Ely's Test by greater than > or = 10 degrees in order to improve flexibility and posture. On going  7. Pt to demonstrate negative Bridge Test in order to show improved core strength for lumbar stabilization. On going  8. Pt to improve range of motion by 75% to allow for improved functional mobility to allow for improvement in IADLs. On going       Reasons for Recertification of Therapy:   To allow patient to complete full allotment of visits so that they may achieve maximum therapeutic benefit      Plan     Updated Certification Period: 7/16/2024 to 9/3/2024  Recommended Treatment Plan: 2 times per week for 6 weeks:     - Patient education  - Therapeutic exercise  - Manual therapy  - Performance testing   - Neuromuscular Re-education  - Therapeutic activity   - Modalities    Pt may be seen by PTA as part of the rehabilitation team.     Therapist: Zi Anaya, PT  7/23/2024    "I certify the need for these services furnished under this plan of treatment and while under my care."    ____________________________________  Physician/Referring Practitioner    _______________  Date of Signature    Zi Anaya, PT  7/16/2024      I CERTIFY THE NEED FOR THESE SERVICES FURNISHED UNDER THIS PLAN OF TREATMENT AND WHILE UNDER MY CARE    Physician's comments:        Physician's Signature: ___________________________________________________      "

## 2024-07-23 NOTE — PROGRESS NOTES
[facility]   Physical Therapy Treatment Note      Name: Tiarra Norton  [facility] Number: [id number]    Therapy Diagnosis:   Encounter Diagnoses   Name Primary?    Decreased range of motion (ROM) of right knee Yes    Weakness of right lower extremity     Decreased range of motion of trunk and back     Decreased strength of trunk and back     Weakness of trunk musculature        Physician: Virgil Scott MD    Visit Date: 7/16/2024    Physician Orders: PT Eval and Treat   Medical Diagnosis from Referral:   Z96.651 (ICD-10-CM) - Status post total right knee replacement   M22.2X1 (ICD-10-CM) - Patellofemoral pain syndrome of right knee   M70.50 (ICD-10-CM) - Pes anserine bursitis      Evaluation Date: 12/21/2023  Authorization Period Expiration: 9/21/2024  Plan of Care Expiration: Updated to 9/3/2024  Visit # / Visits authorized: 31/40   Progress Note Due: 8/11/2024  FOTO: 3/4      Precautions: hx of TKA and menisectomy, spinal cord stimulator, scoliosis     Time In: 8:30 am  Time Out: 9:30 am   Total Billable Time: 30 minutes (1:1)  Total Appointment Time (timed & untimed codes): 60 minutes    PTA Visit #: 0/5   Subjective   Patient reports: today is better than it has been, still with pain but less overall.     She was compliant with home exercise program.  Response to previous treatment: no adverse effects   Functional change: improved tolerance to functional mobility activities with decreased pain    Pain: 5/10 R knee, 6/10 LB  Location: R low back and R knee   Objective      Updated 7/11/2024    Lumbar Range of Motion:     %   Flexion 90      Extension 70      Left Side Bending 60   Right Side Bending 60   Left rotation    70   Right Rotation    80    *= pain        Range of Motion:   Knee Left active Left Passive   Flexion 133 140   Extension 3 5      Knee Right active Right Passive   Flexion 121 124   Extension 1 3         Lower Extremity Strength     Right LE   Left LE     Hip  "flexion: 4/5 Hip flexion: 4+/5   Knee extension: 4/5 Knee extension: 4+/5   Knee flexion: 4/5 Knee flexion: 4+/5   Hip IR: 4/5 Hip IR: 4/5   Hip ER: 4/5 Hip ER: 4/5   Hip extension:  4-/5 Hip extension: 4-/5   Hip abduction: 4-/5 Hip abduction: 4/5   Hip adduction: 4-/5 Hip adduction 4/5   Ankle dorsiflexion: 5/5 Ankle dorsiflexion: 5/5   Ankle plantarflexion: 5/5 Ankle plantarflexion: 5/5          Treatment   Tiarra received the treatments listed below:      Therapeutic exercises to develop strength, endurance, ROM, flexibility, posture, and core stabilization for 30 minutes including:    Recumbent bike lv. 4 x 5 min for endurance and knee ROM  PPT 15x5"  Bridge 2x10x3"  EIL 2x10  DKTC 15x5"'  Open books x15,3" ea  Hip flexor stretch 2x1' ea  Leg press # 2x10  Leg press SL 80# 2x10    NP:  EIS 2x10    manual therapy techniques:  applied to the: B LB  for 00 minutes, including:    Not performed today:    FDN. Educated pt to use heat following treatment sessions if pt is experiencing pain or soreness. Pt verbalized good understanding of education. Pt signed written consent to dry needling. Pt gave verbal consent for DN     Pt received dry needling to the below listed muscles using 50 mm needles.     B Lumbar paraspinals L1-L5      Neuromuscular re-education activities to improve: Balance, Coordination, Kinesthetic, Proprioception, and Posture for 25 minutes. The following activities were included:    Sit to stands GTB around knees 2x10  Side stepping GTB 2x12 steps  Monster walks GTB 2x12 steps  Single leg balance 3x30" R only  Step ups fwd/lateral  6 in step 2x10x3" ea R only   Medx trunk extension 70# x20  Medx trunk rotation 34# x20 increase weight   Heel raises 2x15 with 10# KB alternating hands      therapeutic activities to improve functional performance for 0  minutes, including:        cold pack for 5 minutes to R knee and low back    Patient Education and Home Exercises     Education provided:   - HEP, " POC    Written Home Exercises Provided: Patient instructed to cont prior HEP. Exercises were reviewed and Tiarra was able to demonstrate them prior to the end of the session.  Tiarra demonstrated good  understanding of the education provided. See Electronic Medical Record under Patient Instructions for exercises provided during therapy sessions    Assessment   Tiarra presents today with reports of decreased pain levels. Dry needling held until next visit due to decreased symptoms at this time to allow patient to progress strengthening and mobility activities. Continue to progress as able.     Tiarra Is progressing well towards her goals.   Patient prognosis is Good.     Patient will continue to benefit from skilled outpatient physical therapy to address the deficits listed in the problem list box on initial evaluation, provide pt/family education and to maximize pt's level of independence in the home and community environment.     Patient's spiritual, cultural and educational needs considered and pt agreeable to plan of care and goals.     Anticipated barriers to physical therapy: chronicity of condition, multiple treatment areas, hx of R TKA and menisectomy     GOALS: Short Term Goals:  4 weeks  1. Report decreased in pain at worse less than  <   / =  4  /10  to increase tolerance for functional activities.On going  2. Pt to increase B popliteal angle by greater than > or = 5 degrees in order to improve flexibility and posture. MET  3. Increased R LE MMT 1/2  to increase tolerance for ADL and work activities. MET  4. Pt to increase B Ely's Test by greater than  > or = 5degrees in order to improve flexibility and posture. MET  5. Pt to tolerate HEP to improve ROM and independence with ADL's. MET  6. Pt to improve range of motion by 25% to allow for improved functional mobility to allow for improvement in IADLs. MET     Long Term Goals: 8 weeks  1. Report decreased in pain at worse less than  <   / =  2  /10  to  increase tolerance for functional mobility.  On going  2 .Pt to increase B popliteal angle by greater than > or = 10 degrees in order to improve flexibility and posture. On going  3. Increased R LE MMT 1 grade to increase tolerance for ADL and work activities.On going  4. Pt will report at 51% or better score on FOTO Lumbar spine survey  to demonstrate decrease in disability and improvement in back pain.On going  5. Pt to be Independent with HEP to improve ROM and independence with ADL's. On going  6. Pt to increase B Ely's Test by greater than > or = 10 degrees in order to improve flexibility and posture. On going  7. Pt to demonstrate negative Bridge Test in order to show improved core strength for lumbar stabilization. On going  8. Pt to improve range of motion by 75% to allow for improved functional mobility to allow for improvement in IADLs. On going        Plan   Plan of care Certification: 5/9/2024 to 9/3/2024     Outpatient Physical Therapy 2 times weekly for 10 weeks to include the following interventions: Cervical/Lumbar Traction, Gait Training, Manual Therapy, Moist Heat/ Ice, Neuromuscular Re-ed, Patient Education, Self Care, Therapeutic Activities, and Therapeutic Exercise. Dry needling    Zi Anaya, PT   7/23/2024

## 2024-08-02 ENCOUNTER — LAB VISIT (OUTPATIENT)
Dept: LAB | Facility: HOSPITAL | Age: 69
End: 2024-08-02
Attending: PHYSICIAN ASSISTANT
Payer: MEDICARE

## 2024-08-02 ENCOUNTER — PROCEDURE VISIT (OUTPATIENT)
Dept: DERMATOLOGY | Facility: CLINIC | Age: 69
End: 2024-08-02
Payer: MEDICARE

## 2024-08-02 DIAGNOSIS — Z41.1 ENCOUNTER FOR COSMETIC PROCEDURE: Primary | ICD-10-CM

## 2024-08-02 DIAGNOSIS — I10 HYPERTENSION, ESSENTIAL: ICD-10-CM

## 2024-08-02 LAB
ALBUMIN SERPL BCP-MCNC: 4.1 G/DL (ref 3.5–5.2)
ALP SERPL-CCNC: 96 U/L (ref 55–135)
ALT SERPL W/O P-5'-P-CCNC: 12 U/L (ref 10–44)
ANION GAP SERPL CALC-SCNC: 9 MMOL/L (ref 8–16)
AST SERPL-CCNC: 21 U/L (ref 10–40)
BILIRUB SERPL-MCNC: 0.8 MG/DL (ref 0.1–1)
BUN SERPL-MCNC: 12 MG/DL (ref 8–23)
CALCIUM SERPL-MCNC: 9.1 MG/DL (ref 8.7–10.5)
CHLORIDE SERPL-SCNC: 102 MMOL/L (ref 95–110)
CO2 SERPL-SCNC: 24 MMOL/L (ref 23–29)
CREAT SERPL-MCNC: 0.8 MG/DL (ref 0.5–1.4)
EST. GFR  (NO RACE VARIABLE): >60 ML/MIN/1.73 M^2
GLUCOSE SERPL-MCNC: 100 MG/DL (ref 70–110)
POTASSIUM SERPL-SCNC: 3.8 MMOL/L (ref 3.5–5.1)
PROT SERPL-MCNC: 6.7 G/DL (ref 6–8.4)
SODIUM SERPL-SCNC: 135 MMOL/L (ref 136–145)

## 2024-08-02 PROCEDURE — 80053 COMPREHEN METABOLIC PANEL: CPT | Performed by: PHYSICIAN ASSISTANT

## 2024-08-02 PROCEDURE — 36415 COLL VENOUS BLD VENIPUNCTURE: CPT | Mod: PN | Performed by: PHYSICIAN ASSISTANT

## 2024-08-02 NOTE — PROGRESS NOTES
Pt presents today for cosmetic treatment of wrinkles and folds on the face with hyaluronic .    Discussed risks, benefits, and side effects of hyaluronic acid injections, including bruising, bleeding, redness, swelling, allergic reaction, pain, tenderness at injection site, infection, immune rejection of filler. Verbal and written consent were obtained from the patient.     A total of 1.5 mL of Juvederm Voluma XC was injected into the left zygomatic arch and submalar region with a 27 gauge needle.  A total of 0.5 mL of Juvederm Voluma XC was injected into the right zygomatic arch with a 27 gauge needle.  There were no complications, and the patient tolerated the procedure well. Post-injection instructions were provided to the patient.    Juvederm Voluma XC  Lot #: [id number]  Exp date: 2024-10-28  ------------------------------------------    Patient is here today for cosmetic Botox treatment.   She denies any history of adverse reaction to Botox or history of neuromuscular disease.     Discussed benefits and risks of Botox injections, including headache, weakness/paralysis of muscles, asymmetry, eyebrow/lid drooping, pain, bruising, swelling, infection, and rare risk of systemic botulism. Verbal and written consent was obtained.    Patient agreed to proceed with treatment. 34 units total were injected today:  22 in glabella (3-6-4-6-3)  12 in bilateral periorbital region    Patient tolerated well with no complications. She was instructed not to rub or massage the treated areas and that she should avoid lying down, bending upside down, and strenuous exercise for the rest of the day.  Instructed to wait two weeks to assess response before presenting for a touch up injection, if needed.      Botox dilution: 10u / 0.2mL (10u / 0.4mL for frontalis)  Lot #: [id number]  Exp date: 03/2026

## 2024-08-03 ENCOUNTER — PATIENT MESSAGE (OUTPATIENT)
Dept: DERMATOLOGY | Facility: CLINIC | Age: 69
End: 2024-08-03
Payer: MEDICARE

## 2024-08-04 ENCOUNTER — PATIENT MESSAGE (OUTPATIENT)
Dept: PAIN MEDICINE | Facility: CLINIC | Age: 69
End: 2024-08-04
Payer: MEDICARE

## 2024-08-04 ENCOUNTER — PATIENT MESSAGE (OUTPATIENT)
Dept: PSYCHIATRY | Facility: CLINIC | Age: 69
End: 2024-08-04
Payer: MEDICARE

## 2024-08-04 DIAGNOSIS — Z96.89 SPINAL CORD STIMULATOR STATUS: ICD-10-CM

## 2024-08-04 DIAGNOSIS — M41.25 OTHER IDIOPATHIC SCOLIOSIS, THORACOLUMBAR REGION: ICD-10-CM

## 2024-08-04 DIAGNOSIS — M51.36 DDD (DEGENERATIVE DISC DISEASE), LUMBAR: ICD-10-CM

## 2024-08-04 DIAGNOSIS — G89.4 CHRONIC PAIN SYNDROME: ICD-10-CM

## 2024-08-04 DIAGNOSIS — G89.29 CHRONIC PAIN OF RIGHT KNEE: ICD-10-CM

## 2024-08-04 DIAGNOSIS — M25.561 CHRONIC PAIN OF RIGHT KNEE: ICD-10-CM

## 2024-08-04 DIAGNOSIS — Z96.651 HISTORY OF TOTAL KNEE ARTHROPLASTY, RIGHT: ICD-10-CM

## 2024-08-05 ENCOUNTER — PATIENT MESSAGE (OUTPATIENT)
Dept: INTERNAL MEDICINE | Facility: CLINIC | Age: 69
End: 2024-08-05
Payer: MEDICARE

## 2024-08-05 ENCOUNTER — PATIENT MESSAGE (OUTPATIENT)
Dept: SLEEP MEDICINE | Facility: CLINIC | Age: 69
End: 2024-08-05
Payer: MEDICARE

## 2024-08-05 ENCOUNTER — PATIENT MESSAGE (OUTPATIENT)
Dept: PSYCHIATRY | Facility: CLINIC | Age: 69
End: 2024-08-05
Payer: MEDICARE

## 2024-08-05 ENCOUNTER — PATIENT MESSAGE (OUTPATIENT)
Dept: DERMATOLOGY | Facility: CLINIC | Age: 69
End: 2024-08-05
Payer: MEDICARE

## 2024-08-05 RX ORDER — HYDROCODONE BITARTRATE AND ACETAMINOPHEN 5; 325 MG/1; MG/1
1 TABLET ORAL EVERY 12 HOURS PRN
Qty: 60 TABLET | Refills: 0 | Status: SHIPPED | OUTPATIENT
Start: 2024-08-05 | End: 2024-08-05 | Stop reason: SDUPTHER

## 2024-08-06 ENCOUNTER — CLINICAL SUPPORT (OUTPATIENT)
Dept: REHABILITATION | Facility: OTHER | Age: 69
End: 2024-08-06
Payer: MEDICARE

## 2024-08-06 ENCOUNTER — TELEPHONE (OUTPATIENT)
Dept: DERMATOLOGY | Facility: CLINIC | Age: 69
End: 2024-08-06
Payer: MEDICARE

## 2024-08-06 DIAGNOSIS — M62.81 WEAKNESS OF TRUNK MUSCULATURE: ICD-10-CM

## 2024-08-06 DIAGNOSIS — R29.898 DECREASED STRENGTH OF TRUNK AND BACK: ICD-10-CM

## 2024-08-06 DIAGNOSIS — L71.9 ROSACEA: Primary | ICD-10-CM

## 2024-08-06 DIAGNOSIS — R29.898 WEAKNESS OF RIGHT LOWER EXTREMITY: ICD-10-CM

## 2024-08-06 DIAGNOSIS — M25.661 DECREASED RANGE OF MOTION (ROM) OF RIGHT KNEE: Primary | ICD-10-CM

## 2024-08-06 DIAGNOSIS — M25.69 DECREASED RANGE OF MOTION OF TRUNK AND BACK: ICD-10-CM

## 2024-08-06 PROCEDURE — 97110 THERAPEUTIC EXERCISES: CPT

## 2024-08-06 PROCEDURE — 97140 MANUAL THERAPY 1/> REGIONS: CPT

## 2024-08-06 PROCEDURE — 97112 NEUROMUSCULAR REEDUCATION: CPT

## 2024-08-06 RX ORDER — HYDROCODONE BITARTRATE AND ACETAMINOPHEN 5; 325 MG/1; MG/1
1 TABLET ORAL EVERY 12 HOURS PRN
Qty: 60 TABLET | Refills: 0 | Status: SHIPPED | OUTPATIENT
Start: 2024-08-09 | End: 2024-09-08

## 2024-08-06 RX ORDER — DOXYCYCLINE 100 MG/1
TABLET ORAL
Qty: 30 TABLET | Refills: 1 | Status: SHIPPED | OUTPATIENT
Start: 2024-08-06

## 2024-08-07 ENCOUNTER — PATIENT MESSAGE (OUTPATIENT)
Dept: RESEARCH | Facility: OTHER | Age: 69
End: 2024-08-07
Payer: MEDICARE

## 2024-08-07 ENCOUNTER — PATIENT OUTREACH (OUTPATIENT)
Dept: SLEEP MEDICINE | Facility: CLINIC | Age: 69
End: 2024-08-07
Payer: MEDICARE

## 2024-08-07 DIAGNOSIS — G47.00 INSOMNIA, UNSPECIFIED TYPE: Primary | ICD-10-CM

## 2024-08-07 PROCEDURE — 99358 PROLONG SERVICE W/O CONTACT: CPT | Mod: S$PBB,,, | Performed by: PSYCHIATRY & NEUROLOGY

## 2024-08-08 ENCOUNTER — CLINICAL SUPPORT (OUTPATIENT)
Dept: REHABILITATION | Facility: OTHER | Age: 69
End: 2024-08-08
Payer: MEDICARE

## 2024-08-08 DIAGNOSIS — M62.81 WEAKNESS OF TRUNK MUSCULATURE: ICD-10-CM

## 2024-08-08 DIAGNOSIS — M25.69 DECREASED RANGE OF MOTION OF TRUNK AND BACK: ICD-10-CM

## 2024-08-08 DIAGNOSIS — R29.898 DECREASED STRENGTH OF TRUNK AND BACK: ICD-10-CM

## 2024-08-08 DIAGNOSIS — M25.661 DECREASED RANGE OF MOTION (ROM) OF RIGHT KNEE: Primary | ICD-10-CM

## 2024-08-08 DIAGNOSIS — R29.898 WEAKNESS OF RIGHT LOWER EXTREMITY: ICD-10-CM

## 2024-08-08 PROCEDURE — 97140 MANUAL THERAPY 1/> REGIONS: CPT

## 2024-08-08 PROCEDURE — 97112 NEUROMUSCULAR REEDUCATION: CPT

## 2024-08-08 PROCEDURE — 97110 THERAPEUTIC EXERCISES: CPT

## 2024-08-09 ENCOUNTER — OFFICE VISIT (OUTPATIENT)
Dept: PSYCHIATRY | Facility: CLINIC | Age: 69
End: 2024-08-09
Payer: MEDICARE

## 2024-08-09 DIAGNOSIS — F51.01 PRIMARY INSOMNIA: Primary | ICD-10-CM

## 2024-08-12 ENCOUNTER — CLINICAL SUPPORT (OUTPATIENT)
Dept: REHABILITATION | Facility: OTHER | Age: 69
End: 2024-08-12
Payer: MEDICARE

## 2024-08-12 DIAGNOSIS — M25.661 DECREASED RANGE OF MOTION (ROM) OF RIGHT KNEE: Primary | ICD-10-CM

## 2024-08-12 DIAGNOSIS — R29.898 WEAKNESS OF RIGHT LOWER EXTREMITY: ICD-10-CM

## 2024-08-12 DIAGNOSIS — M62.81 WEAKNESS OF TRUNK MUSCULATURE: ICD-10-CM

## 2024-08-12 DIAGNOSIS — R29.898 DECREASED STRENGTH OF TRUNK AND BACK: ICD-10-CM

## 2024-08-12 DIAGNOSIS — M25.69 DECREASED RANGE OF MOTION OF TRUNK AND BACK: ICD-10-CM

## 2024-08-12 PROCEDURE — 97140 MANUAL THERAPY 1/> REGIONS: CPT

## 2024-08-12 PROCEDURE — 97110 THERAPEUTIC EXERCISES: CPT

## 2024-08-12 PROCEDURE — 97112 NEUROMUSCULAR REEDUCATION: CPT

## 2024-08-12 NOTE — PROGRESS NOTES
[facility]   Physical Therapy Treatment Note      Name: Tiarra Norton  [facility] Number: [id number]    Therapy Diagnosis:   Encounter Diagnoses   Name Primary?    Decreased range of motion (ROM) of right knee Yes    Weakness of right lower extremity     Decreased range of motion of trunk and back     Decreased strength of trunk and back     Weakness of trunk musculature        Physician: Virgil Scott MD    Visit Date: 8/8/2024    Physician Orders: PT Eval and Treat   Medical Diagnosis from Referral:   Z96.651 (ICD-10-CM) - Status post total right knee replacement   M22.2X1 (ICD-10-CM) - Patellofemoral pain syndrome of right knee   M70.50 (ICD-10-CM) - Pes anserine bursitis      Evaluation Date: 12/21/2023  Authorization Period Expiration: 9/21/2024  Plan of Care Expiration: Updated to 9/3/2024  Visit # / Visits authorized: 33/40   Progress Note Due: 8/11/2024  FOTO: 3/4      Precautions: hx of TKA and menisectomy, spinal cord stimulator, scoliosis     Time In: 8:30 am  Time Out: 9:30 am   Total Billable Time: 55 minutes (1:1)  Total Appointment Time (timed & untimed codes): 60 minutes    PTA Visit #: 0/5   Subjective   Patient reports: she has been out of town for a couple weeks and unable to come to therapy. Pain has increased during that time.    She was compliant with home exercise program.  Response to previous treatment: no adverse effects   Functional change: improved tolerance to functional mobility activities with decreased pain    Pain: 5/10 R knee, 6/10 LB  Location: R low back and R knee   Objective      Updated 7/11/2024    Lumbar Range of Motion:     %   Flexion 90      Extension 70      Left Side Bending 60   Right Side Bending 60   Left rotation    70   Right Rotation    80    *= pain        Range of Motion:   Knee Left active Left Passive   Flexion 133 140   Extension 3 5      Knee Right active Right Passive   Flexion 121 124   Extension 1 3         Lower Extremity  "Strength     Right LE   Left LE     Hip flexion: 4/5 Hip flexion: 4+/5   Knee extension: 4/5 Knee extension: 4+/5   Knee flexion: 4/5 Knee flexion: 4+/5   Hip IR: 4/5 Hip IR: 4/5   Hip ER: 4/5 Hip ER: 4/5   Hip extension:  4-/5 Hip extension: 4-/5   Hip abduction: 4-/5 Hip abduction: 4/5   Hip adduction: 4-/5 Hip adduction 4/5   Ankle dorsiflexion: 5/5 Ankle dorsiflexion: 5/5   Ankle plantarflexion: 5/5 Ankle plantarflexion: 5/5          Treatment   Tiarra received the treatments listed below:      Therapeutic exercises to develop strength, endurance, ROM, flexibility, posture, and core stabilization for 20 minutes including:    Recumbent bike lv. 4 x 5 min for endurance and knee ROM  PPT 15x5"  Bridge 2x10x3"  EIL 2x10  DKTC 15x5"'  Open books x15,3" ea  Hip flexor stretch 2x1' ea  Leg press # 2x10  Leg press SL 80# 2x10    NP:  EIS 2x10    manual therapy techniques:  applied to the: B LB  for 10 minutes, including:    FDN. Educated pt to use heat following treatment sessions if pt is experiencing pain or soreness. Pt verbalized good understanding of education. Pt signed written consent to dry needling. Pt gave verbal consent for DN     Pt received dry needling to the below listed muscles using 50 mm needles.     B Lumbar paraspinals L1-L5      Neuromuscular re-education activities to improve: Balance, Coordination, Kinesthetic, Proprioception, and Posture for 25 minutes. The following activities were included:    Sit to stands GTB around knees 2x10  Side stepping GTB 2x12 steps  Monster walks GTB 2x12 steps  Single leg balance 3x30" R only  Step ups fwd/lateral  6 in step 2x10x3" ea R only   Medx trunk extension 70# x20  Medx trunk rotation 34# x20 increase weight   Heel raises 2x15 with 10# KB alternating hands      therapeutic activities to improve functional performance for 0  minutes, including:        cold pack for 5 minutes to R knee and low back    Patient Education and Home Exercises     Education " provided:   - HEP, POC    Written Home Exercises Provided: Patient instructed to cont prior HEP. Exercises were reviewed and Tiarra was able to demonstrate them prior to the end of the session.  Tiarra demonstrated good  understanding of the education provided. See Electronic Medical Record under Patient Instructions for exercises provided during therapy sessions    Assessment   Tiarra presents today with slight improvement in pain levels compared to last visit, but continued high pain levels. Unable to tolerate progressions so maintained previously completed exercises. Decreased pain reported after application of dry needling. Continue to progress as able.     Tiarra Is progressing well towards her goals.   Patient prognosis is Good.     Patient will continue to benefit from skilled outpatient physical therapy to address the deficits listed in the problem list box on initial evaluation, provide pt/family education and to maximize pt's level of independence in the home and community environment.     Patient's spiritual, cultural and educational needs considered and pt agreeable to plan of care and goals.     Anticipated barriers to physical therapy: chronicity of condition, multiple treatment areas, hx of R TKA and menisectomy     GOALS: Short Term Goals:  4 weeks  1. Report decreased in pain at worse less than  <   / =  4  /10  to increase tolerance for functional activities.On going  2. Pt to increase B popliteal angle by greater than > or = 5 degrees in order to improve flexibility and posture. MET  3. Increased R LE MMT 1/2  to increase tolerance for ADL and work activities. MET  4. Pt to increase B Ely's Test by greater than  > or = 5degrees in order to improve flexibility and posture. MET  5. Pt to tolerate HEP to improve ROM and independence with ADL's. MET  6. Pt to improve range of motion by 25% to allow for improved functional mobility to allow for improvement in IADLs. MET     Long Term Goals: 8 weeks  1.  Report decreased in pain at worse less than  <   / =  2  /10  to increase tolerance for functional mobility.  On going  2 .Pt to increase B popliteal angle by greater than > or = 10 degrees in order to improve flexibility and posture. On going  3. Increased R LE MMT 1 grade to increase tolerance for ADL and work activities.On going  4. Pt will report at 51% or better score on FOTO Lumbar spine survey  to demonstrate decrease in disability and improvement in back pain.On going  5. Pt to be Independent with HEP to improve ROM and independence with ADL's. On going  6. Pt to increase B Ely's Test by greater than > or = 10 degrees in order to improve flexibility and posture. On going  7. Pt to demonstrate negative Bridge Test in order to show improved core strength for lumbar stabilization. On going  8. Pt to improve range of motion by 75% to allow for improved functional mobility to allow for improvement in IADLs. On going        Plan   Plan of care Certification: 5/9/2024 to 9/3/2024     Outpatient Physical Therapy 2 times weekly for 10 weeks to include the following interventions: Cervical/Lumbar Traction, Gait Training, Manual Therapy, Moist Heat/ Ice, Neuromuscular Re-ed, Patient Education, Self Care, Therapeutic Activities, and Therapeutic Exercise. Dry needling    Zi Anaya, PT   8/12/2024

## 2024-08-12 NOTE — PROGRESS NOTES
[facility]   Physical Therapy Treatment Note      Name: Tiarra Norton  [facility] Number: [id number]    Therapy Diagnosis:   Encounter Diagnoses   Name Primary?    Decreased range of motion (ROM) of right knee Yes    Weakness of right lower extremity     Decreased range of motion of trunk and back     Decreased strength of trunk and back     Weakness of trunk musculature        Physician: Virgil Scott MD    Visit Date: 8/12/2024    Physician Orders: PT Eval and Treat   Medical Diagnosis from Referral:   Z96.651 (ICD-10-CM) - Status post total right knee replacement   M22.2X1 (ICD-10-CM) - Patellofemoral pain syndrome of right knee   M70.50 (ICD-10-CM) - Pes anserine bursitis      Evaluation Date: 12/21/2023  Authorization Period Expiration: 9/21/2024  Plan of Care Expiration: Updated to 9/3/2024  Visit # / Visits authorized: 33/40   Progress Note Due: 9/12/2024  FOTO: 3/4      Precautions: hx of TKA and menisectomy, spinal cord stimulator, scoliosis     Time In: 11:00 am  Time Out: 12:00 pm   Total Billable Time: 55 minutes (1:1)  Total Appointment Time (timed & untimed codes): 60 minutes    PTA Visit #: 0/5   Subjective   Patient reports: felt better after last visit, but pain has returned .    She was compliant with home exercise program.  Response to previous treatment: no adverse effects   Functional change: improved tolerance to functional mobility activities with decreased pain    Pain: 5/10 R knee, 6/10 LB  Location: R low back and R knee   Objective      Updated 8/12/2024    Lumbar Range of Motion:     %   Flexion 90      Extension 70      Left Side Bending 60   Right Side Bending 60   Left rotation    70   Right Rotation    80    *= pain        Range of Motion:   Knee Left active Left Passive   Flexion 133 140   Extension 3 5      Knee Right active Right Passive   Flexion 121 124   Extension 1 3         Lower Extremity Strength     Right LE   Left LE     Hip flexion: 4/5 Hip  "flexion: 4+/5   Knee extension: 4/5 Knee extension: 4+/5   Knee flexion: 4/5 Knee flexion: 4+/5   Hip IR: 4/5 Hip IR: 4/5   Hip ER: 4/5 Hip ER: 4/5   Hip extension:  4-/5 Hip extension: 4-/5   Hip abduction: 4-/5 Hip abduction: 4/5   Hip adduction: 4-/5 Hip adduction 4/5   Ankle dorsiflexion: 5/5 Ankle dorsiflexion: 5/5   Ankle plantarflexion: 5/5 Ankle plantarflexion: 5/5          Treatment   Tiarra received the treatments listed below:      Therapeutic exercises to develop strength, endurance, ROM, flexibility, posture, and core stabilization for 20 minutes including:    Recumbent bike lv. 4 x 5 min for endurance and knee ROM  PPT 15x5"  Bridge 2x10x3"  EIL 2x10  DKTC 15x5"'  Open books x15,3" ea  Hip flexor stretch 2x1' ea  Leg press # 2x10  Leg press SL 80# 2x10    NP:  EIS 2x10    manual therapy techniques:  applied to the: B LB  for 10 minutes, including:    FDN. Educated pt to use heat following treatment sessions if pt is experiencing pain or soreness. Pt verbalized good understanding of education. Pt signed written consent to dry needling. Pt gave verbal consent for DN     Pt received dry needling to the below listed muscles using 50 mm needles.     B Lumbar paraspinals L1-L5      Neuromuscular re-education activities to improve: Balance, Coordination, Kinesthetic, Proprioception, and Posture for 25 minutes. The following activities were included:    Sit to stands GTB around knees 2x10  Side stepping GTB 2x12 steps  Monster walks GTB 2x12 steps  Single leg balance 3x30" R only  Step ups fwd/lateral  6 in step 2x10x3" ea R only   Medx trunk extension 70# x20  Medx trunk rotation 34# x20 increase weight   Heel raises 2x15 with 10# KB alternating hands      therapeutic activities to improve functional performance for 0  minutes, including:        cold pack for 5 minutes to R knee and low back    Patient Education and Home Exercises     Education provided:   - HEP, POC    Written Home Exercises Provided: " Patient instructed to cont prior HEP. Exercises were reviewed and Tiarra was able to demonstrate them prior to the end of the session.  Tiarra demonstrated good  understanding of the education provided. See Electronic Medical Record under Patient Instructions for exercises provided during therapy sessions    Assessment   Tiarra tolerated treatment well today reporting decreased pain after completion of exercises and application of dry needling. Improved walking tolerance with decreased deviations at end of session. Good tolerance to all strengthening and flexibility activities. Continue to progress as able.     Tiarra Is progressing well towards her goals.   Patient prognosis is Good.     Patient will continue to benefit from skilled outpatient physical therapy to address the deficits listed in the problem list box on initial evaluation, provide pt/family education and to maximize pt's level of independence in the home and community environment.     Patient's spiritual, cultural and educational needs considered and pt agreeable to plan of care and goals.     Anticipated barriers to physical therapy: chronicity of condition, multiple treatment areas, hx of R TKA and menisectomy     GOALS: Short Term Goals:  4 weeks  1. Report decreased in pain at worse less than  <   / =  4  /10  to increase tolerance for functional activities.On going  2. Pt to increase B popliteal angle by greater than > or = 5 degrees in order to improve flexibility and posture. MET  3. Increased R LE MMT 1/2  to increase tolerance for ADL and work activities. MET  4. Pt to increase B Ely's Test by greater than  > or = 5degrees in order to improve flexibility and posture. MET  5. Pt to tolerate HEP to improve ROM and independence with ADL's. MET  6. Pt to improve range of motion by 25% to allow for improved functional mobility to allow for improvement in IADLs. MET     Long Term Goals: 8 weeks  1. Report decreased in pain at worse less than  <   / =   2  /10  to increase tolerance for functional mobility.  On going  2 .Pt to increase B popliteal angle by greater than > or = 10 degrees in order to improve flexibility and posture. On going  3. Increased R LE MMT 1 grade to increase tolerance for ADL and work activities.On going  4. Pt will report at 51% or better score on FOTO Lumbar spine survey  to demonstrate decrease in disability and improvement in back pain.On going  5. Pt to be Independent with HEP to improve ROM and independence with ADL's. On going  6. Pt to increase B Ely's Test by greater than > or = 10 degrees in order to improve flexibility and posture. On going  7. Pt to demonstrate negative Bridge Test in order to show improved core strength for lumbar stabilization. On going  8. Pt to improve range of motion by 75% to allow for improved functional mobility to allow for improvement in IADLs. On going        Plan   Plan of care Certification: 5/9/2024 to 9/3/2024     Outpatient Physical Therapy 2 times weekly for 10 weeks to include the following interventions: Cervical/Lumbar Traction, Gait Training, Manual Therapy, Moist Heat/ Ice, Neuromuscular Re-ed, Patient Education, Self Care, Therapeutic Activities, and Therapeutic Exercise. Dry needtanisha Anaya, PT   8/12/2024

## 2024-08-13 ENCOUNTER — PATIENT MESSAGE (OUTPATIENT)
Dept: INTERNAL MEDICINE | Facility: CLINIC | Age: 69
End: 2024-08-13
Payer: MEDICARE

## 2024-08-15 ENCOUNTER — PATIENT MESSAGE (OUTPATIENT)
Dept: DERMATOLOGY | Facility: CLINIC | Age: 69
End: 2024-08-15
Payer: MEDICARE

## 2024-08-16 ENCOUNTER — LAB VISIT (OUTPATIENT)
Dept: LAB | Facility: HOSPITAL | Age: 69
End: 2024-08-16
Attending: INTERNAL MEDICINE
Payer: MEDICARE

## 2024-08-16 ENCOUNTER — PATIENT OUTREACH (OUTPATIENT)
Dept: ADMINISTRATIVE | Facility: HOSPITAL | Age: 69
End: 2024-08-16
Payer: MEDICARE

## 2024-08-16 VITALS — DIASTOLIC BLOOD PRESSURE: 75 MMHG | SYSTOLIC BLOOD PRESSURE: 139 MMHG

## 2024-08-16 DIAGNOSIS — E03.8 OTHER SPECIFIED HYPOTHYROIDISM: ICD-10-CM

## 2024-08-16 LAB
T3 SERPL-MCNC: 79 NG/DL (ref 60–180)
TSH SERPL DL<=0.005 MIU/L-ACNC: 0.84 UIU/ML (ref 0.4–4)

## 2024-08-16 PROCEDURE — 84480 ASSAY TRIIODOTHYRONINE (T3): CPT | Performed by: INTERNAL MEDICINE

## 2024-08-16 PROCEDURE — 36415 COLL VENOUS BLD VENIPUNCTURE: CPT | Mod: PN | Performed by: INTERNAL MEDICINE

## 2024-08-16 PROCEDURE — 84443 ASSAY THYROID STIM HORMONE: CPT | Performed by: INTERNAL MEDICINE

## 2024-08-16 NOTE — PROGRESS NOTES
Health Maintenance Due   Topic Date Due    Naloxone Prescription  Never done    COVID-19 Vaccine (6 - 2023-24 season) 09/01/2023    Lipid Panel  03/01/2024    Urine Drug Screen  07/23/2024     Triggered LINKS and reconciled immunizations. Updated Care Everywhere. BP reading of 139/75 was taken from pt's Hypertension [facility] flow sheet on 8/10/2024. Chart review completed.

## 2024-08-19 ENCOUNTER — OFFICE VISIT (OUTPATIENT)
Dept: INTERNAL MEDICINE | Facility: CLINIC | Age: 69
End: 2024-08-19
Payer: MEDICARE

## 2024-08-19 ENCOUNTER — OFFICE VISIT (OUTPATIENT)
Dept: PAIN MEDICINE | Facility: CLINIC | Age: 69
End: 2024-08-19
Payer: MEDICARE

## 2024-08-19 VITALS
DIASTOLIC BLOOD PRESSURE: 68 MMHG | HEART RATE: 67 BPM | BODY MASS INDEX: 26.44 KG/M2 | OXYGEN SATURATION: 99 % | SYSTOLIC BLOOD PRESSURE: 128 MMHG | TEMPERATURE: 98 F | WEIGHT: 149.25 LBS

## 2024-08-19 VITALS
HEIGHT: 63 IN | DIASTOLIC BLOOD PRESSURE: 82 MMHG | OXYGEN SATURATION: 98 % | BODY MASS INDEX: 26.21 KG/M2 | WEIGHT: 147.94 LBS | HEART RATE: 57 BPM | SYSTOLIC BLOOD PRESSURE: 162 MMHG

## 2024-08-19 DIAGNOSIS — M25.561 CHRONIC PAIN OF RIGHT KNEE: ICD-10-CM

## 2024-08-19 DIAGNOSIS — M54.50 CHRONIC LOW BACK PAIN WITHOUT SCIATICA, UNSPECIFIED BACK PAIN LATERALITY: ICD-10-CM

## 2024-08-19 DIAGNOSIS — I10 HYPERTENSION, UNSPECIFIED TYPE: Primary | ICD-10-CM

## 2024-08-19 DIAGNOSIS — G89.29 CHRONIC LOW BACK PAIN WITHOUT SCIATICA, UNSPECIFIED BACK PAIN LATERALITY: ICD-10-CM

## 2024-08-19 DIAGNOSIS — M51.36 DDD (DEGENERATIVE DISC DISEASE), LUMBAR: ICD-10-CM

## 2024-08-19 DIAGNOSIS — G89.4 CHRONIC PAIN SYNDROME: Primary | ICD-10-CM

## 2024-08-19 DIAGNOSIS — G89.29 CHRONIC PAIN OF RIGHT KNEE: ICD-10-CM

## 2024-08-19 DIAGNOSIS — M41.25 OTHER IDIOPATHIC SCOLIOSIS, THORACOLUMBAR REGION: ICD-10-CM

## 2024-08-19 DIAGNOSIS — R60.9 EDEMA, UNSPECIFIED TYPE: ICD-10-CM

## 2024-08-19 DIAGNOSIS — M79.18 MYOFASCIAL PAIN: ICD-10-CM

## 2024-08-19 DIAGNOSIS — E05.80 IATROGENIC HYPERTHYROIDISM: ICD-10-CM

## 2024-08-19 DIAGNOSIS — Z96.651 HISTORY OF TOTAL KNEE ARTHROPLASTY, RIGHT: ICD-10-CM

## 2024-08-19 DIAGNOSIS — K58.0 IRRITABLE BOWEL SYNDROME WITH DIARRHEA: ICD-10-CM

## 2024-08-19 DIAGNOSIS — F13.20 BENZODIAZEPINE DEPENDENCE: ICD-10-CM

## 2024-08-19 DIAGNOSIS — Z96.89 SPINAL CORD STIMULATOR STATUS: ICD-10-CM

## 2024-08-19 DIAGNOSIS — F33.2 MAJOR DEPRESSIVE DISORDER, RECURRENT SEVERE WITHOUT PSYCHOTIC FEATURES: ICD-10-CM

## 2024-08-19 PROBLEM — F33.0 MAJOR DEPRESSIVE DISORDER, RECURRENT, MILD: Status: RESOLVED | Noted: 2018-01-12 | Resolved: 2024-08-19

## 2024-08-19 PROCEDURE — 99999 PR PBB SHADOW E&M-EST. PATIENT-LVL III: CPT | Mod: PBBFAC,,, | Performed by: INTERNAL MEDICINE

## 2024-08-19 PROCEDURE — 99999 PR PBB SHADOW E&M-EST. PATIENT-LVL IV: CPT | Mod: PBBFAC,,, | Performed by: NURSE PRACTITIONER

## 2024-08-19 PROCEDURE — 99214 OFFICE O/P EST MOD 30 MIN: CPT | Mod: S$PBB,,, | Performed by: NURSE PRACTITIONER

## 2024-08-19 PROCEDURE — 99213 OFFICE O/P EST LOW 20 MIN: CPT | Mod: PBBFAC,27 | Performed by: INTERNAL MEDICINE

## 2024-08-19 PROCEDURE — 99214 OFFICE O/P EST MOD 30 MIN: CPT | Mod: S$PBB,,, | Performed by: INTERNAL MEDICINE

## 2024-08-19 PROCEDURE — 99214 OFFICE O/P EST MOD 30 MIN: CPT | Mod: PBBFAC | Performed by: NURSE PRACTITIONER

## 2024-08-19 PROCEDURE — G2211 COMPLEX E/M VISIT ADD ON: HCPCS | Mod: S$PBB,,, | Performed by: INTERNAL MEDICINE

## 2024-08-19 RX ORDER — CYCLOBENZAPRINE HCL 10 MG
10 TABLET ORAL 3 TIMES DAILY PRN
Qty: 90 TABLET | Refills: 0 | Status: SHIPPED | OUTPATIENT
Start: 2024-08-19

## 2024-08-19 RX ORDER — HYDROCHLOROTHIAZIDE 25 MG/1
25 TABLET ORAL DAILY
Qty: 90 TABLET | Refills: 1 | Status: SHIPPED | OUTPATIENT
Start: 2024-08-19 | End: 2024-11-17

## 2024-08-19 RX ORDER — LEVOTHYROXINE SODIUM 100 UG/1
TABLET ORAL
Qty: 90 TABLET | Refills: 0 | Status: SHIPPED | OUTPATIENT
Start: 2024-08-19

## 2024-08-19 NOTE — PROGRESS NOTES
Chronic Pain-Established Visit    SUBJECTIVE:    Interval History 8/19/2024:  The patient returns to clinic today for follow up of back pain. She continues to report pocket pain around her SCS site. She cannot lie flat on her back due to pain. She also notes pain with prolonged standing and activity. She does have benefit with SCS. She reports intermittent right knee pain. She is currently taking Norco as needed with some benefit. She is performing  home exercises. She denies any other health changes. Her pain today is 8/10.    Interval History 6/24/2024:  The patient returns to clinic today for follow up of back and knee pain via virtual visit. She continues to report low back pain. This is worse with prolonged standing. She denies any radicular leg pain. She did recently meet with [employer] representatives for programming. She reports limited relief with these changes. She is participating in physical therapy. She is taking Norco as needed with benefit. She denies any adverse effects. She denies any other health changes.     Interval History 3/25/2024:  The patient returns to clinic today for follow up of back and knee pain. She is s/p right genicular RFA on 3/15/2024. She reports 50% relief. She continues to report some right knee pain.She continues to report low back pain. She is using her SCS. She does have intermittent pain around IPG site. She is participating in physical therapy. She is taking Norco as needed with benefit. She denies any adverse effects. She denies any other health changes. Her pain today is 4/10.    Interval History 1/23/2024:  Tiarra Norton presents for folow-up for lower back pain s/p IPG revision 11/20/2023.  She reports that the pocket pain is significantly improved, continues to get better.  She is just now feeling well enough to participate in physical therapy which she has today.  Today she also complains of right knee pain. She had a right TKA on 5/18/2023. .  The  patient describes the pain as aching. The pain is constant. Exacerbating factors: bending, cooking, standing for a long time, walking.  Mitigating factors: ice, heat, medications.  The patient takes Tylenol 500 mg PRN with mild benefit.  They deny any perceived side effects.  She is hoping for an additional prescription for Norco 5-325 mg which she had taken over the recovery period from the IPG revision.  She states that the medication helps her with activity.  The symptoms interfere with ADLs.  The patient last had imaging Xray bilateral knees on 10/26/2023. See findings below.  The patient denies fever/night sweats, urinary incontinence, bowel incontinence, significant weight changes, significant motor weakness or changes, or loss of sensations.  Today's pain score is 3/10 for back pain and 7/10 for right knee pain.     RTA 5/18/23, activity restrictions from IPG revision was not able to do PT, starting PT for back and right knee back today.  Wondering if there are any procedures we can do for knee.     Wants Norco 5-325 mg-helps with activity  Pain ext and flex  Ptp superior anterior    Interval History 10/25/2023  69 y/o female with hx of chronic left sided low back pain near IPG, she is s/p Octad electrode x2  placement of pulse generator 3  on 1/9 she recently was provided a recent TPI near left low back she reports 0% relief following.  Pain is worse when standing walking performing ADLs.  She does state she gets 5 hours of uninterrupted sleep.  Like her left low back is weak and aching.  Tingling any wearing her low back.  In the right side of her low back.  To discuss other pain interventions and the plan of care moving forward regard to her current subacute pain.  She denies any profound weakness denies any bowel bladder dysfunction denies any saddle anesthesia at this time.    Interval History 9/12/2023:  The patient returns to clinic today for follow up of battery site pain via virtual visit. She is s/p  trigger point injections under ultrasound on 8/29/2023. She does feel some benefit. She is currently ill with Covid. She is currently running fever and having body aches. She denies any other health changes.     Interval History 8/11/2023:  The patient returns to clinic today for follow up of pain. She reports pain around her SCS battery site. She describes the pain as though her muscles feel weak and tired. She does report benefit with SCS. No difficulty with charging or programming. She has tried Lidoderm and Salonpas patches but these caused a rash. She is currently participating in physical therapy for her knee. She denies any other health changes. Her pain today is 4/10.    Interval History 4/14/2023:  The patient is here for follow up of back pain and pain at IPG site. She reports improvement since previous visit. She did have benefit with Lidoderm patches but had a rash so she stopped. Her pain has improved with Salon Pas patches. She is scheduled for knee replacement next month with Dr. Scott. Her pain today is 3/10.    Interval History 3/21/2023:  The patient presents to discuss pain to IPG site. She says that it has been present since surgery and has gradually worsened. She says that it feels like a tight and weak muscle. She has not tried any topical medications or muscle relaxants. No fever or malaise. She has not noticed any redness or swelling to the site. Her original pain has significantly improved from SCS implant. She is part of a research study. Her pain today is 6/10.    Interval History 2/17/2023:  The patient returns to clinic today for follow up of back pain. She reports benefit with [employer] SCS. She is in contact with representatives for programming. She is very happy with relief. She denies any fever, chills, or drainage. She continues to follow her activity restrictions. She denies any other health changes. Her pain today is 2/10.    Interval History 1/31/2023:  The patient  returns to clinic today for wound check. She reports benefit with [employer] SCS. She is in contact with representatives for programming. She reports intermittent muscle soreness into her legs. She does have an upcoming appointment with representatives for programming. She denies any fever, chills, or drainage. She continues to follow her activity restrictions. She denies any other health changes. Her pain today is 3/10.    Interval History 1/17/2023:  The patient returns to clinic today for post op. She is s/p [employer] SCS implant on 1/9/2022. She reports benefit with the device at this time. She is meeting with the representatives today. She does report soreness to her incisions. She denies any fever, chills, or drainage. She did have issues with her dressing staying on. She has completed her antibiotics. She denies any other health changes. Her pain today is 2/10.    Interval History 12/29/2022:  The patient returns for post op appointment. She is s/p lumbar [employer] SCS trial with 90% relief. She has had very minimal back pain during the trial period. She says that she was more active over this time due to the Christmas holiday and had minimal symptoms. She is very happy with the relief. She would like to move forward with implant. She is part of [employer] study. No fever or malaise during trial period. Her pain today is 1/10.    Interval History 10/14/22: Mrs. Norton presents today for follow up of chronic low back pain and to discuss the [employer] trial. She had her lumbar MRI done last night without significant changes to her spine. She did have increased dilation of her bile duct. Her pain today is the same in character and severity as during her last visit with us and she rates it currently at a 7/10. She continues to do her stretches on her own which have helped some. She presents with some questions regarding the trial.     Interval History 9/21/2022:  The patient returns to clinic today for follow  up of back pain via virtual visit. She received a letter from [facility] denying SCS trial. She is frustrated by this. She continues to report low back pain. She denies any radicular leg pain. Her pain is worse with bending and activity. She recently underwent med screening for the [employer] program. She is interested in pursuing this. She has tried Gabapentin and Lyrica in the past with side effects. She denies any other health changes. Her pain today is 7/10.     Interval History 8/22/22: Ms. Norton returns for follow up on her low back pain. At our last visit she felt cured by acupuncture. Unfortunately, this only lasted for 24 hours. She returns today looking for other options to make life livable. Patient claims all of her pain in the low back and middle back. We discussed that this will work best for her low back pain.     Interval History 7/25/22: Tiarra Norton returns for follow up. We previously have been discussing treatments for her low back pain that did not resolve with RFA. At our last visit I referred her to Dr. Antony for clearance for a SCS trial. She was considered to be a reasonable candidate. However, in the meantime, her PT referred her to acupuncture with Dr. Jacobs. She had one treatment and already notes 50% relief. She notes that she still has pain, especially with leaning forward, but feels that it is much better controlled. Their plan is one treatment per week but is approved for 20 sessions.     Interval History 6/15/22: Tiarra Norton returns for follow up. She continues to feel like she has mild pain sitting or laying down, and has her worst pain with extreme leaning forward to pick something up off the floor. She also has difficulty leaning over her handlebars to ride her bike or leaning forward to fold clothes or standing up for any period of time. So, we determined that leaning forward is her worst issue. We did previouisly do lmbb and rfa, which has helped with extension,  but it would not help with leaning forward. On review of her MRI she has significant multilevel DDD with Modic changes which likely account for her pain.     4/18/22 Interval History: Patient presents for telehealth visit for follow up following her b/l RFA at L3-L5. She reports 80% relief and is very pleased with her pain relief. Unfortunately, she is not back to doing what she wants due to right knee pain. She is seeing Dr. Huber for knee pain and is s/p right medial compartment partial knee replacement. She is currently in therapy for this. We discussed that we may be able to assist her with her knee pain as well.     HPI:  Original HISTORY OF PRESENT ILLNESS: Tiarra Norton presents to the clinic for the evaluation of the above pain. The pain started five years ago.     Original Pain Description:  The pain is located in the lower back and does radiate up towards her upper back.The pain is described as aching, dull and sharp. Initially starts as a dull, aching pain and it gets sharp once she bends down and moves around. Typically when she walks for more than five minutes, the sharp pain radiates up her back. Exacerbating factors: Standing, Bending, Touching, Walking, Night Time, Flexing and Lifting. Mitigating factors heat, ice, laying down and massage. Symptoms interfere with daily activity and sleeping. The patient feels like symptoms have been worsening. Patient denies night fever/night sweats, urinary incontinence, bowel incontinence, significant weight loss, significant motor weakness and loss of sensations.    Original PAIN SCORES:  Best: Pain is 1  Worst: Pain is 10  Usually: Pain is 4  Current: Pain is 4        8/19/2024     9:21 AM 11/29/2023     1:59 PM 10/17/2023     2:54 PM   Last 3 PDI Scores   Pain Disability Index (PDI) 56 12 20     6 weeks of Conservative therapy (PT/Chiro/Home Exercises with Dates)  Healthy back program--> has four sessions left for a total of 20 sessions   Last session  was on 1/31/22   Stretches that they taught at [employer] back gives her relief       Medications:   Robaxin PRN    Ice and heat which has helped   Tried Gabapentin and Lyrica- made her loopy      Report: reviewed       Interventional Pain Procedures:   10/17/2023 TPI left side near IPG battery 0% relief  2/8/2022- Bilateral L3,4,5 MBB  2/15/2022- Bilateral L3,4,5 MBB  3/8/2022- Right L3,4,5 RFA- 80% relief for a few months  3/22/2022- Left L3,4,5 RFA- 80% relief for a few months  12/22/22 SCS trial- 90% relief  1/9/2023- [employer] SCS implant.   11/20/2023- IPG revision  2/16/2024- Right genicular nerve block  3/15/2024- Right genicular RFA    Imaging:    EXAMINATION:  XR KNEE 3 VIEW RIGHT     CLINICAL HISTORY:  Presence of right artificial knee joint     TECHNIQUE:  AP, lateral, and Merchant views of the right knee were performed.     COMPARISON:  Right knee radiograph dated 06/12/2023     FINDINGS:  Prior right knee arthroplasty.  Hardware projects in similar alignment without evidence for interval loosening or failure.  No periprosthetic fracture.     Impression:     As above.        Electronically signed by: Azael Wilkins  Date:                                            11/03/2023  Time:                                           11:05      10/13/22: MRI LUMBAR SPINE WITHOUT CONTRAST  FINDINGS:  Lumbar levoscoliosis centered at L2.  Minimal anterolisthesis at L4-5.     No compression fractures.  No marrow replacing lesions.     Multilevel degenerative changes with disc desiccation and disc space narrowing, described in detail below.  Discogenic endplate edema at T12-L1.     The conus medullaris has a normal appearance and terminates at the L1 level.  Cauda equina nerve roots are unremarkable.  No epidural mass or collection.     The common duct is dilated up to 12 mm, previously 9 mm on 08/20/2022 CT.  Mild prominence of the main pancreatic duct up to 4 mm, which is new.  No definite filling defect in  the bile ducts.  Paraspinal muscle atrophy.     SIGNIFICANT FINDINGS BY LEVEL:     T12-L1: Disc bulge.  Bilateral facet arthropathy and ligamentum flavum thickening.  Mild canal stenosis.  Mild bilateral foraminal stenosis.     L1-2: Disc bulge.  Bilateral facet arthropathy and ligamentum flavum thickening.  Mild canal stenosis.  Mild right foraminal stenosis.     L2-3: Disc bulge.  Bilateral facet arthropathy and ligamentum flavum thickening.  Mild canal stenosis.  Moderate right mild left foraminal stenosis.     L3-4: Disc bulge.  Bilateral facet arthropathy and ligamentum flavum thickening.  Small bilateral facet joint effusions/synovitis.  Moderate canal stenosis with partial effacement of the thecal sac and crowding of the cauda equina nerve roots.  Mild right and moderate left foraminal stenosis.     L4-5: Disc bulge with posterior disc uncovering.  Bilateral facet arthropathy and ligamentum flavum thickening.  Small right facet joint effusion/synovitis.  Mild canal stenosis.  Mild bilateral foraminal stenosis.     L5-S1: Disc bulge.  Bilateral facet arthropathy and ligamentum flavum thickening.  No significant canal stenosis.  Mild left foraminal stenosis.     Impression:     Lumbar levoscoliosis and multilevel degenerative changes as detailed above.  No significant changes from 01/21/2022.     Increased biliary ductal dilatation up to 12 mm, greater than expected after cholecystectomy.  In addition, the main pancreatic duct is mildly prominent up to 4 mm, which is new.  If LFTs are abnormal, consider MRI/MRCP or ERCP for further evaluation.      Past Medical History:   Diagnosis Date    Cataract     Depression     Gestational diabetes     Hyperlipidemia     Hypertension     IBS (irritable bowel syndrome)     Thyroid disease      Past Surgical History:   Procedure Laterality Date    ADENOIDECTOMY      ARTHROPLASTY, KNEE, TOTAL, USING COMPUTER-ASSISTED NAVIGATION Right 5/18/2023    Procedure: CONVERSION  ARTHROPLASTY, KNEE, TOTAL, USING COMPUTER-ASSISTED NAVIGATION;  Surgeon: Virgil Scott MD;  Location: [facility] OR;  Service: Orthopedics;  Laterality: Right;  Same day discharge    ARTHROSCOPIC CHONDROPLASTY OF KNEE JOINT Right 10/19/2021    Procedure: ARTHROSCOPY, KNEE, WITH CHONDROPLASTY;  Surgeon: Trevin Huber MD;  Location: [facility] OR;  Service: Orthopedics;  Laterality: Right;    ARTHROSCOPY OF KNEE Right 10/19/2021    Procedure: ARTHROSCOPY, KNEE-RIGHT;  Surgeon: Trevin Huber MD;  Location: [facility] OR;  Service: Orthopedics;  Laterality: Right;    BLOCK, NERVE, GENICULAR Right 2024    Procedure: BLOCK, NERVE RIGHT GENICULAR;  Surgeon: Shoaib Aguirre MD;  Location: [facility] PAIN MGT;  Service: Pain Management;  Laterality: Right;  [phone]     SECTION      CHOLECYSTECTOMY      COLONOSCOPY N/A 2016    Procedure: COLONOSCOPY;  Surgeon: Heri Segura MD;  Location: [facility] ([address] [place] [address]);  Service: Endoscopy;  Laterality: N/A;    COLONOSCOPY N/A 2019    Procedure: COLONOSCOPY;  Surgeon: Joe Pitts MD;  Location: [facility] ([address] [place] [address]);  Service: Endoscopy;  Laterality: N/A;    CYSTOSCOPY  2022    Procedure: CYSTOSCOPY;  Surgeon: Lenny Moore MD;  [address];  Service: Urology;;    ESOPHAGOGASTRODUODENOSCOPY N/A 2020    Procedure: EGD (ESOPHAGOGASTRODUODENOSCOPY);  Surgeon: Tolu Rivas MD;  [facility]  Service: Endoscopy;  Laterality: N/A;  Pt. moved to 9:15am arrival time.. Instructed to not have anything after midnight.EC    EYE SURGERY      cataract resection right    INJECTION OF ANESTHETIC AGENT AROUND NERVE Bilateral 2022    Procedure: Block, Nerve MEDIAL BRANCH BLOCK BILATERAL L3,4,5;  Surgeon: Tara Gibbs MD;  Location: [facility]IN [facility];  Service: Pain Management;  Laterality: Bilateral;    INJECTION OF ANESTHETIC AGENT AROUND NERVE Bilateral 2/15/2022    Procedure: BLOCK, NERVE, L3*L4-L5 MEDIAL BR ANCH 2 OF  2;  Surgeon: Tara Gibbs MD;  Location: [facility] PAIN MGT;  Service: Pain Management;  Laterality: Bilateral;    INJECTION OF BOTULINUM TOXIN TYPE A  6/21/2022    Procedure: BOTULINUM TOXIN INJECTION 100 units;  Surgeon: Lenny Moore MD;  [facility];  Service: Urology;;    INSERTION, NEUROSTIMULATOR, SPINAL CORD N/A 1/9/2023    Procedure: SPINAL CORD STIMULATOR IMPLANT [employer];  Surgeon: Shoaib Aguirre MD;  Location: [facility] OR;  Service: Pain Management;  Laterality: N/A;    JOINT REPLACEMENT      partial right knee    KNEE ARTHROSCOPY W/ MENISCECTOMY Right 10/19/2021    Procedure: ARTHROSCOPY, KNEE, WITH MENISCECTOMY, PARTIAL LATERAL;  Surgeon: Trevin Huber MD;  Location: [facility] OR;  Service: Orthopedics;  Laterality: Right;    r hand surgery      RADIOFREQUENCY ABLATION Right 3/8/2022    Procedure: RADIOFREQUENCY ABLATION, L3-L5 1 OF 2;  Surgeon: Tara Gibbs MD;  Location: [facility] PAIN MGT;  Service: Pain Management;  Laterality: Right;    RADIOFREQUENCY ABLATION Left 3/22/2022    Procedure: RADIOFREQUENCY ABLATION, l3-+l5 2 of 2;  Surgeon: Tara Gibbs MD;  Location: [facility] PAIN MGT;  Service: Pain Management;  Laterality: Left;    RADIOFREQUENCY ABLATION Right 3/15/2024    Procedure: RADIOFREQUENCY ABLATION RIGHT GENICULAR;  Surgeon: Shoaib Aguirre MD;  Location: [facility] PAIN MGT;  Service: Pain Management;  Laterality: Right;  [phone]    REMOVAL OF NEUROSTIMULATOR N/A 11/20/2023    Procedure: SCS IPG BATTERY REVISION;  Surgeon: Shoaib Aguirre MD;  Location: [facility] OR;  Service: Pain Management;  Laterality: N/A;    TONSILLECTOMY      TOTAL KNEE ARTHROPLASTY      partial knee replacement    TRIAL OF SPINAL CORD NERVE STIMULATOR N/A 12/22/2022    Procedure: SPINAL CORD STIMULATOR TRIAL [employer];  Surgeon: Shoaib Aguirre MD;  Location: [facility] PAIN MGT;  Service: Pain Management;  Laterality: N/A;    TUBAL LIGATION       Social History      Socioeconomic History    Marital status:     Number of children: 1   Occupational History    Occupation:      Employer: [employer]     Comment: retired   Tobacco Use    Smoking status: Never    Smokeless tobacco: Never   Substance and Sexual Activity    Alcohol use: Yes     Alcohol/week: 3.0 standard drinks of alcohol     Types: 3 Glasses of wine per week     Comment: weekends    Drug use: Yes     Types: Marijuana     Comment: occasionally    Sexual activity: Yes     Partners: Male   Other Topics Concern    Are you pregnant or think you may be? No    Breast-feeding No   Social History Narrative    Retired        Swims.       Stationary bike.            Social Determinants of Health     Financial Resource Strain: Low Risk  (3/21/2024)    Overall Financial Resource Strain (CARDIA)     Difficulty of Paying Living Expenses: Not hard at all   Food Insecurity: No Food Insecurity (3/21/2024)    Hunger Vital Sign     Worried About Running Out of Food in the Last Year: Never true     Ran Out of Food in the Last Year: Never true   Transportation Needs: No Transportation Needs (3/21/2024)    PRAPARE - Transportation     Lack of Transportation (Medical): No     Lack of Transportation (Non-Medical): No   Physical Activity: Sufficiently Active (3/21/2024)    Exercise Vital Sign     Days of Exercise per Week: 4 days     Minutes of Exercise per Session: 40 min   Stress: Stress Concern Present (3/21/2024)    [employer] - Occupational Stress Questionnaire     Feeling of Stress : Rather much   Housing Stability: Patient Declined (3/21/2024)    Housing Stability Vital Sign     Unable to Pay for Housing in the Last Year: Patient declined     Number of Places Lived in the Last Year: 1     Unstable Housing in the Last Year: Patient declined     Family History   Problem Relation Name Age of Onset    Hypertension Mother      Irritable bowel syndrome Mother      Hyperlipidemia Mother       Heart disease Father          mi    Hypertension Father      Cancer Father          prostate    Heart attack Father      Heart failure Father      Hyperlipidemia Father      Alcohol abuse Sister nathanael     Depression Sister nathanael     Epilepsy Son ari     Irritable bowel syndrome Sister martin     Alcohol abuse Sister martin     Ovarian cancer Maternal Aunt      Breast cancer Paternal Aunt      Breast cancer Maternal Aunt      Melanoma Neg Hx      Colon cancer Neg Hx      Stomach cancer Neg Hx      Esophageal cancer Neg Hx      Liver disease Neg Hx      Crohn's disease Neg Hx      Ulcerative colitis Neg Hx      Celiac disease Neg Hx      Stroke Neg Hx         Review of patient's allergies indicates:  No Known Allergies    Current Outpatient Medications   Medication Sig    ALPRAZolam (XANAX) 1 MG tablet Take 1 mg by mouth 3 (three) times daily.    ALPRAZolam (XANAX) 1 MG tablet take 1 tablet by mouth 3 times a day    atorvastatin (LIPITOR) 10 MG tablet Take 1 tablet (10 mg total) by mouth once daily.    cycloSPORINE (RESTASIS) 0.05 % ophthalmic emulsion Place 1 drop into both eyes Daily.    daridorexant (QUVIVIQ) 50 mg Tab take 1 tablet by mouth every night    doxycycline monohydrate 100 mg Tab Take one tablet by mouth once daily with food and plenty of water    EScitalopram oxalate (LEXAPRO) 5 MG Tab take 1 tablet by mouth once daily    hydroCHLOROthiazide (HYDRODIURIL) 12.5 MG Tab Take 1 tablet (12.5 mg total) by mouth once daily.    HYDROcodone-acetaminophen (NORCO) 5-325 mg per tablet Take 1 tablet by mouth every 12 (twelve) hours as needed for Pain.    levothyroxine (SYNTHROID) 100 MCG tablet TAKE 1 TABLET BY MOUTH EVERY MORNING    liothyronine (CYTOMEL) 5 MCG Tab Take 1 tablet (5 mcg total) by mouth once daily.    promethazine (PHENERGAN) 25 MG tablet 1 tab po q 12 h prn nausea    sumatriptan (IMITREX) 50 MG tablet 1 tab po at start of headache.  Repeat in 2 h prn    traZODone (DESYREL) 100 MG tablet  take 1-2 tabs by mouth every evening for for sleep    tretinoin (RETIN-A) 0.025 % cream Compound tretinoin 0.025% / azelaic acid 8% / niacinamide 2% cream. Apply a pea-sized amount to entire face qhs.    valsartan (DIOVAN) 320 MG tablet Take 1 tablet (320 mg total) by mouth once daily.    benzocaine 20 % Oint Compound benzocaine 20% / lidocaine 6% / tetracaine 4% ointment. Apply to affected area 1 hour prior to procedure.    EScitalopram oxalate (LEXAPRO) 5 MG Tab Take 5 mg by mouth.     No current facility-administered medications for this visit.     Facility-Administered Medications Ordered in Other Visits   Medication    0.9%  NaCl infusion    celecoxib capsule 400 mg    lactated ringers infusion    LIDOcaine (PF) 10 mg/ml (1%) injection 5 mg       REVIEW OF SYSTEMS:    GENERAL: No fever. No chills. No fatigue. Denies weight loss. Denies weight gain.  HEENT: Denies headaches. Denies vision change. Denies eye pain. Denies double vision. Denies ear pain.   CV: Denies chest pain. HTN  PULM: Denies of shortness of breath.  GI: Denies constipation. No diarrhea. No abdominal pain. Denies nausea. Denies vomiting. No blood in stool.  HEME: Denies bleeding problems.  : Denies urgency. No painful urination. No blood in urine.  MS: See HPI  SKIN: Denies rash.   NEURO: Denies seizures. No weakness.  ENDO: Thyroid disorder.   PSYCH:  Depression    OBJECTIVE:       Vitals:    08/19/24 0920   BP: 128/68   Pulse: 67   Temp: 97.6 °F (36.4 °C)   SpO2: 99%   Weight: 67.7 kg (149 lb 4 oz)   PainSc:   8   PainLoc: Back         PHYSICAL EXAM:     GENERAL: Well appearing, in no acute distress, alert and oriented x3.  PSYCH:  Mood and affect appropriate.  SKIN: Skin color, texture, turgor normal, no rashes or lesions. SCS site well healed.   RESP: Symmetric chest rise, no respiratory distress noted.   BACK: Straight leg raise in the sitting position is positive for radicular pain on the right. There is pain with palpation over IPG  site. There is pain with palpation over lumbar paraspinals. Limited ROM with pain on extension.  MSK: 5/5 strength in right ankle with plantar and dorsiflexion. 5/5 strength in left ankle with plantar and dorsiflexion. 5/5 strength with right knee flexion and extension. 5/5 strength with left knee flexion and extension.   GAIT: Normal.         ASSESSMENT: 69 y.o. year old female with low back and knee pain, consistent with the followin. Chronic pain syndrome        2. Myofascial pain  cyclobenzaprine (FLEXERIL) 10 MG tablet      3. Spinal cord stimulator status        4. Other idiopathic scoliosis, thoracolumbar region        5. DDD (degenerative disc disease), lumbar        6. Chronic pain of right knee        7. History of total knee arthroplasty, right                PLAN:     - Previous imaging reviewed. Labs reviewed.     - Schedule for TPI around battery site in office under ultrasound.     - We can repeat right genicular RFA as needed.    - Continue home exercise routine.     - Flexeril 10 mg TID PRN.     - Pain contract signed 2024.    - Continue Norco 5/325 mg BID PRN, #60. Refill provided.     - The patient is here today for a refill of current pain medications and they believe these provide effective pain control and improvements in quality of life.  The patient notes no serious side effects, and feels the benefits outweigh the risks.  The patient was reminded of the pain contract that they signed previously as well as the risks and benefits of the medication including possible death.  The updated [place] Board [employer] prescription monitoring program was reviewed, and the patient has been found to be compliant with current treatment plan. Medication management provided by Dr. Aguirre.     - UDS from 2024. Repeat next visit.     -RTC 2 weeks after above procedure.     The above plan and management options were discussed at length with patient. Patient is in agreement with the above  and verbalized understanding.       Marlene Thorne NP  08/19/2024

## 2024-08-19 NOTE — PATIENT INSTRUCTIONS
Increase hctz (hydrochlorothiazide ) to 25 mg a day.   Take 2 of 12.5 mg til you run out.    Ok to start co q 10 for muscle pain.

## 2024-08-19 NOTE — PROGRESS NOTES
"Subjective     Patient ID: Tiarra Norton is a 69 y.o. female.    Chief Complaint: Follow-up    HPI    We adjusted synthroid dose and TSH now normal.    TSh was suppressed when measured by her gastroenterologist in [place].    She just had EGD and colonoscopy.    SBP over 130 this am, and frequently above 140.    She is taking hctz, 12.5 mg, but edema is not controlled.     BMi 26     Amlodipine was stopped due to hypotension.    " Misery" in back and knee.    Pocket pain.  Moved once.   She is considering removing.    170/78    Review of Systems   Constitutional:  Negative for fever and unexpected weight change.   HENT:  Negative for nasal congestion and postnasal drip.    Eyes:  Negative for pain, discharge and visual disturbance.   Respiratory:  Negative for cough, chest tightness, shortness of breath and wheezing.    Cardiovascular:  Negative for chest pain and leg swelling.   Gastrointestinal:  Negative for abdominal pain, constipation, diarrhea and nausea.   Genitourinary:  Negative for difficulty urinating, dysuria and hematuria.   Integumentary:  Negative for rash.   Neurological:  Negative for headaches.   Psychiatric/Behavioral:  Negative for dysphoric mood and sleep disturbance. The patient is not nervous/anxious.           Objective     Physical Exam  Constitutional:       General: She is not in acute distress.     Appearance: Normal appearance. She is normal weight. She is not ill-appearing, toxic-appearing or diaphoretic.   Musculoskeletal:      Right lower leg: Edema present.      Left lower leg: Edema (minimal, bilat) present.   Neurological:      General: No focal deficit present.      Mental Status: She is alert and oriented to person, place, and time.   Psychiatric:         Mood and Affect: Mood normal.         Behavior: Behavior normal.         Thought Content: Thought content normal.            Assessment and Plan     1. Hypertension, unspecified type    2. Edema, unspecified type    3. " Irritable bowel syndrome with diarrhea    4. Iatrogenic hyperthyroidism    5. Chronic low back pain without sciatica, unspecified back pain laterality    Other orders  -     levothyroxine (SYNTHROID) 100 MCG tablet; TAKE 1 TABLET BY MOUTH EVERY MORNING  Dispense: 90 tablet; Refill: 0  -     hydroCHLOROthiazide (HYDRODIURIL) 25 MG tablet; Take 1 tablet (25 mg total) by mouth once daily.  Dispense: 90 tablet; Refill: 1      Increase hctz (hydrochlorothiazide ) to 25 mg a day.   Take 2 of 12.5 mg til you run out.    Ok to start co q 10 for muscle pain.    Continue present dose of synthroid.    F/u pain management.         Follow up in about 4 weeks (around 9/16/2024).

## 2024-08-20 ENCOUNTER — PATIENT MESSAGE (OUTPATIENT)
Dept: SLEEP MEDICINE | Facility: CLINIC | Age: 69
End: 2024-08-20
Payer: MEDICARE

## 2024-08-23 ENCOUNTER — TELEPHONE (OUTPATIENT)
Dept: PSYCHIATRY | Facility: CLINIC | Age: 69
End: 2024-08-23
Payer: MEDICARE

## 2024-08-23 ENCOUNTER — PATIENT MESSAGE (OUTPATIENT)
Dept: INTERNAL MEDICINE | Facility: CLINIC | Age: 69
End: 2024-08-23
Payer: MEDICARE

## 2024-08-23 ENCOUNTER — PATIENT OUTREACH (OUTPATIENT)
Dept: ADMINISTRATIVE | Facility: HOSPITAL | Age: 69
End: 2024-08-23
Payer: MEDICARE

## 2024-08-23 VITALS — DIASTOLIC BLOOD PRESSURE: 86 MMHG | SYSTOLIC BLOOD PRESSURE: 148 MMHG

## 2024-08-23 NOTE — TELEPHONE ENCOUNTER
Outreach to patient to discuss interest in Sleep 101 course.    Spoke to patient who confirmed interest in enrolling in the September course. Reviewed format and dates of the course.    Will outreach to team for scheduling.

## 2024-08-23 NOTE — PROGRESS NOTES
Health Maintenance Due   Topic Date Due    Naloxone Prescription  Never done    COVID-19 Vaccine (6 - 2023-24 season) 09/01/2023    Lipid Panel  03/01/2024    Urine Drug Screen  07/23/2024     Triggered LINKS and reconciled immunizations. Updated Care Everywhere. BP reading of 148/86 was taken from pt's [facility] flow sheet on 8/20/2024. Pt is being followed by [facility]. Chart review completed.

## 2024-08-24 ENCOUNTER — PATIENT MESSAGE (OUTPATIENT)
Dept: PAIN MEDICINE | Facility: CLINIC | Age: 69
End: 2024-08-24
Payer: MEDICARE

## 2024-08-30 ENCOUNTER — PATIENT MESSAGE (OUTPATIENT)
Dept: PSYCHIATRY | Facility: CLINIC | Age: 69
End: 2024-08-30
Payer: MEDICARE

## 2024-08-30 ENCOUNTER — PATIENT MESSAGE (OUTPATIENT)
Dept: INTERNAL MEDICINE | Facility: CLINIC | Age: 69
End: 2024-08-30
Payer: MEDICARE

## 2024-08-30 DIAGNOSIS — H93.19 TINNITUS, UNSPECIFIED LATERALITY: Primary | ICD-10-CM

## 2024-09-01 ENCOUNTER — PATIENT MESSAGE (OUTPATIENT)
Dept: PAIN MEDICINE | Facility: CLINIC | Age: 69
End: 2024-09-01
Payer: MEDICARE

## 2024-09-01 DIAGNOSIS — M41.25 OTHER IDIOPATHIC SCOLIOSIS, THORACOLUMBAR REGION: ICD-10-CM

## 2024-09-01 DIAGNOSIS — Z96.651 HISTORY OF TOTAL KNEE ARTHROPLASTY, RIGHT: ICD-10-CM

## 2024-09-01 DIAGNOSIS — M51.36 DDD (DEGENERATIVE DISC DISEASE), LUMBAR: ICD-10-CM

## 2024-09-01 DIAGNOSIS — G89.4 CHRONIC PAIN SYNDROME: ICD-10-CM

## 2024-09-01 DIAGNOSIS — G89.29 CHRONIC PAIN OF RIGHT KNEE: ICD-10-CM

## 2024-09-01 DIAGNOSIS — Z96.89 SPINAL CORD STIMULATOR STATUS: ICD-10-CM

## 2024-09-01 DIAGNOSIS — M25.561 CHRONIC PAIN OF RIGHT KNEE: ICD-10-CM

## 2024-09-03 RX ORDER — HYDROCODONE BITARTRATE AND ACETAMINOPHEN 5; 325 MG/1; MG/1
1 TABLET ORAL EVERY 12 HOURS PRN
Qty: 60 TABLET | Refills: 0 | Status: SHIPPED | OUTPATIENT
Start: 2024-09-08 | End: 2024-10-08

## 2024-09-03 NOTE — TELEPHONE ENCOUNTER
Patient requesting refill on: Hydrocodone  Last office visit: 08/19/24   shows last refill on: 08/09/24  Patient does have a pain contract on file with [facility]  Patient last UDS: 01/23/24 was not consistent with current therapy.                CODEINE  Not Detected Not Detected    Comment: INTERPRETIVE INFORMATION: Codeine, U  Positive Cutoff: 40 ng/mL  Methodology: Mass Spectrometry   MORPHINE  Not Detected Not Detected    Comment: INTERPRETIVE INFORMATION:Morphine, U  Positive Cutoff: 20 ng/mL  Methodology: Mass Spectrometry   6-ACETYLMORPHINE  Not Detected Not Detected    Comment: INTERPRETIVE INFORMATION:6-acetylmorphine, U  Positive Cutoff: 20 ng/mL  Methodology: Mass Spectrometry   OXYCODONE  Not Detected Not Detected    Comment: INTERPRETIVE INFORMATION:Oxycodone, U  Positive Cutoff: 40 ng/mL  Methodology: Mass Spectrometry   NOROYXCODONE  Not Detected Not Detected    Comment: INTERPRETIVE INFORMATION:Noroxycodone, U  Positive Cutoff: 100 ng/mL  Methodology: Mass Spectrometry   OXYMORPHONE  Not Detected Not Detected    Comment: INTERPRETIVE INFORMATION:Oxymorphone, U  Positive Cutoff: 40 ng/mL  Methodology: Mass Spectrometry   NOROXYMORPHONE  Not Detected Not Detected    Comment: INTERPRETIVE INFORMATION:Noroxymorphone, U  Positive Cutoff: 100 ng/mL  Methodology: Mass Spectrometry   HYDROCODONE  Not Detected Not Detected    Comment: INTERPRETIVE INFORMATION:Hydrocodone, U  Positive Cutoff: 40 ng/mL  Methodology: Mass Spectrometry   NORHYDROCODONE  Not Detected Not Detected    Comment: INTERPRETIVE INFORMATION:Norhydrocodone, U  Positive Cutoff: 100 ng/mL  Methodology: Mass Spectrometry   HYDROMORPHONE  Not Detected Not Detected    Comment: INTERPRETIVE INFORMATION:Hydromorphone, U  Positive Cutoff: 20 ng/mL  Methodology: Mass Spectrometry   BUPRENORPHINE  Not Detected Not Detected    Comment: INTERPRETIVE INFORMATION:Buprenorphine, U  Positive Cutoff: 5  ng/mL  Methodology: Mass Spectrometry   NORUBPRENORPHINE  Not Detected Not Detected    Comment: INTERPRETIVE INFORMATION:Norbuprenorphine, U  Positive Cutoff: 20 ng/mL  Methodology: Mass Spectrometry   FENTANYL  Not Detected Not Detected    Comment: INTERPRETIVE INFORMATION:Fentanyl, U  Positive Cutoff: 2 ng/mL  Methodology: Mass Spectrometry   NORFENTANYL  Not Detected Not Detected    Comment: INTERPRETIVE INFORMATION:Norfentanyl, U  Positive Cutoff: 2 ng/mL  Methodology: Mass Spectrometry   MEPERIDINE METABOLITE  Not Detected Not Detected    Comment: INTERPRETIVE INFORMATION:Meperidine metabolite, U  Positive Cutoff: 50 ng/mL  Methodology: Mass Spectrometry   TAPENTADOL  Not Detected Not Detected    Comment: INTERPRETIVE INFORMATION:Tapentadol, U  Positive Cutoff: 100 ng/mL  Methodology: Mass Spectrometry   TAPENTADOL-O-SULF  Not Detected Not Detected    Comment: INTERPRETIVE INFORMATION:Tapentadol-o-Sulf, U  Positive Cutoff: 200 ng/mL  Methodology: Mass Spectrometry   METHADONE  Negative Not Detected    Comment: Presumptive negative by immunoassay. Testing by mass  spectrometry is available on request.  INTERPRETIVE INFORMATION: Methadone Screen, U  Positive Cutoff: 150 ng/mL  Methodology: Immunoassay   TRAMADOL  Negative Not Detected    Comment: Presumptive negative by immunoassay. Testing by mass  spectrometry is available on request.  INTERPRETIVE INFORMATION:Tramadol Screen, U  Positive Cutoff: 100 ng/mL  Methodology: Immunoassay   AMPHETAMINE  Not Detected Not Detected    Comment: INTERPRETIVE INFORMATION:Amphetamine, U  Positive Cutoff: 50 ng/mL  Methodology: Mass Spectrometry   METHAMPHETAMINE  Not Detected Not Detected    Comment: INTERPRETIVE INFORMATION:Methamphetamine, U  Positive Cutoff: 200 ng/mL  Methodology: Mass Spectrometry   MDMA- ECSTASY  Not Detected Not Detected    Comment: INTERPRETIVE INFORMATION:MDMA, U  Positive Cutoff: 200 ng/mL  Methodology: Mass Spectrometry   MDA  Not Detected Not  Detected    Comment: INTERPRETIVE INFORMATION:MDA, U  Positive Cutoff: 200 ng/mL  Methodology: Mass Spectrometry   MDEA- Amber  Not Detected Not Detected    Comment: INTERPRETIVE INFORMATION:MDEA, U  Positive Cutoff: 200 ng/mL  Methodology: Mass Spectrometry   METHYLPHENIDATE  Not Detected Present    Comment: INTERPRETIVE INFORMATION:Methylphenidate, U  Positive Cutoff: 100 ng/mL  Methodology: Mass Spectrometry   PHENTERMINE  Not Detected Not Detected    Comment: INTERPRETIVE INFORMATION:Phentermine, U  Positive Cutoff: 100 ng/mL  Methodology: Mass Spectrometry   BENZOYLECGONINE  Negative Not Detected    Comment: Presumptive negative by immunoassay. Testing by mass  spectrometry is available on request.  INTERPRETIVE INFORMATION:Cocaine Screen, U  Positive Cutoff: 150 ng/mL  Methodology: Immunoassay   ALPRAZOLAM  Present Present    Comment: INTERPRETIVE INFORMATION:Alprazolam, U  Positive Cutoff: 40 ng/mL  Methodology: Mass Spectrometry   ALPHA-OH-ALPRAZOLAM  Present Present    Comment: INTERPRETIVE INFORMATION:Alpha-OH-Alprazolam, U  Positive Cutoff: 20 ng/mL  Methodology: Mass Spectrometry   CLONAZEPAM  Not Detected Not Detected    Comment: INTERPRETIVE INFORMATION:Clonazepam, U  Positive Cutoff: 20 ng/mL  Methodology: Mass Spectrometry   7-AMINOCLONAZEPAM  Not Detected Not Detected    Comment: INTERPRETIVE INFORMATION:7-Aminoclonazepam, U  Positive Cutoff: 40 ng/mL  Methodology: Mass Spectrometry   DIAZEPAM  Not Detected Not Detected    Comment: INTERPRETIVE INFORMATION:Diazepam, U  Positive Cutoff: 50 ng/mL  Methodology: Mass Spectrometry   NORDIAZEPAM  Not Detected Not Detected    Comment: INTERPRETIVE INFORMATION:Nordiazepam, U  Positive Cutoff: 50 ng/mL  Methodology: Mass Spectrometry   OXAZEPAM  Not Detected Not Detected    Comment: INTERPRETIVE INFORMATION:Oxazepam, U  Positive Cutoff: 50 ng/mL  Methodology: Mass Spectrometry   TEMAZEPAM  Not Detected Not Detected    Comment: INTERPRETIVE  INFORMATION:Temazepam, U  Positive Cutoff: 50 ng/mL  Methodology: Mass Spectrometry   Lorazepam  Not Detected Not Detected    Comment: INTERPRETIVE INFORMATION:Lorazepam, U  Positive Cutoff: 60 ng/mL  Methodology: Mass Spectrometry   MIDAZOLAM  Not Detected Not Detected    Comment: INTERPRETIVE INFORMATION:Midazolam, U  Positive Cutoff: 20 ng/mL  Methodology: Mass Spectrometry   ZOLPIDEM  Not Detected Not Detected    Comment: INTERPRETIVE INFORMATION:Zolpidem, U  Positive Cutoff: 20 ng/mL  Methodology: Mass Spectrometry   BARBITURATES  Negative Not Detected    Comment: Presumptive negative by immunoassay. Testing by mass  spectrometry is available on request.  INTERPRETIVE INFORMATION:Barbiturates Screen, U  Positive Cutoff: 200 ng/mL  Methodology: Immunoassay   Creatinine, Urine 20.0 - 400.0 mg/dL 40.6 58.6 190.0 R, CM   ETHYL GLUCURONIDE  Negative Present    Comment: Presumptive negative by immunoassay. Testing by mass  spectrometry is available on request.  INTERPRETIVE INFORMATION:Ethyl Glucuronide Screen, U  Positive Cutoff: 500 ng/mL  Methodology: Immunoassay   MARIJUANA METABOLITE  PresumptivePOS Not Detected    Comment: Presumptive positive by immunoassay. Testing by mass  spectrometry is available on request.  INTERPRETIVE INFORMATION: THC (Cannabinoids) Screen, U  Positive Cutoff: 50 ng/mL  Methodology: Immunoassay   PCP  Negative Not Detected    Comment: Presumptive negative by immunoassay. Testing by mass  spectrometry is available on request.  INTERPRETIVE INFORMATION:Phencyclidine Screen, U  Positive Cutoff: 25 ng/mL  Methodology: Immunoassay   CARISOPRODOL  Negative Not Detected CM    Comment: Presumptive negative by immunoassay. Testing by mass  spectrometry is available on request.  INTERPRETIVE INFORMATION: Carisoprodol Screen, U  Positive Cutoff: 100 ng/mL  Methodology: Immunoassay  The carisoprodol immunoassay has cross-reactivity to  carisoprodol and meprobamate.   Naloxone  Not Detected Not  Detected    Comment: INTERPRETIVE INFORMATION:Naloxone, U  Positive Cutoff: 100 ng/mL  Methodology: Mass Spectrometry   Gabapentin  Not Detected Present    Comment: INTERPRETIVE INFORMATION:Gabapentin, U  Positive Cutoff: 3,000 ng/mL  Methodology: Mass Spectrometry   Pregabalin  Not Detected Not Detected    Comment: INTERPRETIVE INFORMATION:Pregabalin, U  Positive Cutoff: 3,000 ng/mL  Methodology: Mass Spectrometry   Alpha-OH-Midazolam  Not Detected Not Detected    Comment: INTERPRETIVE INFORMATION:Alpha-OH-Midazolam, U  Positive Cutoff: 20 ng/mL  Methodology: Mass Spectrometry   Zolpidem Metabolite  Not Detected Not Detected

## 2024-09-04 ENCOUNTER — TELEPHONE (OUTPATIENT)
Dept: CARDIOLOGY | Facility: CLINIC | Age: 69
End: 2024-09-04
Payer: MEDICARE

## 2024-09-04 DIAGNOSIS — E03.9 ACQUIRED HYPOTHYROIDISM: ICD-10-CM

## 2024-09-04 DIAGNOSIS — I10 PRIMARY HYPERTENSION: ICD-10-CM

## 2024-09-04 DIAGNOSIS — E78.2 MIXED HYPERLIPIDEMIA: Primary | ICD-10-CM

## 2024-09-05 ENCOUNTER — CLINICAL SUPPORT (OUTPATIENT)
Dept: PSYCHIATRY | Facility: CLINIC | Age: 69
End: 2024-09-05
Payer: MEDICARE

## 2024-09-05 ENCOUNTER — PATIENT MESSAGE (OUTPATIENT)
Dept: PSYCHIATRY | Facility: CLINIC | Age: 69
End: 2024-09-05
Payer: MEDICARE

## 2024-09-05 DIAGNOSIS — Z71.9 ENCOUNTER FOR EDUCATION: Primary | ICD-10-CM

## 2024-09-05 PROCEDURE — 99499 UNLISTED E&M SERVICE: CPT | Mod: S$PBB,,, | Performed by: PSYCHOLOGIST

## 2024-09-05 NOTE — PROGRESS NOTES
Group Psychotherapy (PhD/LCSW)     Site: Telemedicine     The attending portion of this evaluation, treatment, and documentation was performed per Melisa Hager via Telemedicine AudioVisual using the secure [employer] software platform with two-way audio/video. The patient was located off-site and the provider is located in the hospital. The aforementioned video software was utilized to document the relevant history and physical exam.      Date: 09/05/2024      Group Focus: Sleep 101     Length of service: 75843 - 45-50 minutes     Number of patients in attendance: 8     Target symptoms: Sleep     Patient's response to treatment: Active Listening     Progress toward goals: Progressing adequately     Interval History: Session focus was on psychoeducation on basic concepts about sleep including stages of sleep, reasons for sleep, and changes in sleep as we age. Session included discussion of sleep drive, the uses of sleep medication, and the efficacy of CBTi. Clinician introduced group to daily sleep diary.      Diagnosis:       ICD-10-CM ICD-9-CM   1. Encounter for education  Z71.9 V65.40      Plan: Continue treatment

## 2024-09-06 ENCOUNTER — PATIENT MESSAGE (OUTPATIENT)
Dept: PAIN MEDICINE | Facility: CLINIC | Age: 69
End: 2024-09-06
Payer: MEDICARE

## 2024-09-12 ENCOUNTER — CLINICAL SUPPORT (OUTPATIENT)
Dept: REHABILITATION | Facility: OTHER | Age: 69
End: 2024-09-12
Payer: MEDICARE

## 2024-09-12 DIAGNOSIS — R29.898 DECREASED STRENGTH OF TRUNK AND BACK: ICD-10-CM

## 2024-09-12 DIAGNOSIS — M62.81 WEAKNESS OF TRUNK MUSCULATURE: ICD-10-CM

## 2024-09-12 DIAGNOSIS — M25.69 DECREASED RANGE OF MOTION OF TRUNK AND BACK: ICD-10-CM

## 2024-09-12 DIAGNOSIS — M25.661 DECREASED RANGE OF MOTION (ROM) OF RIGHT KNEE: Primary | ICD-10-CM

## 2024-09-12 DIAGNOSIS — R29.898 WEAKNESS OF RIGHT LOWER EXTREMITY: ICD-10-CM

## 2024-09-12 PROCEDURE — 97110 THERAPEUTIC EXERCISES: CPT

## 2024-09-12 PROCEDURE — 97112 NEUROMUSCULAR REEDUCATION: CPT

## 2024-09-12 PROCEDURE — 97140 MANUAL THERAPY 1/> REGIONS: CPT

## 2024-09-12 PROCEDURE — 97530 THERAPEUTIC ACTIVITIES: CPT

## 2024-09-16 NOTE — PLAN OF CARE
Outpatient Therapy Updated Plan of Care     Visit Date: 9/12/2024    Name: Tiarra Norton  [facility] Number: [id number]    Therapy Diagnosis:   Encounter Diagnoses   Name Primary?    Decreased range of motion (ROM) of right knee Yes    Weakness of right lower extremity     Decreased range of motion of trunk and back     Decreased strength of trunk and back     Weakness of trunk musculature      Physician: Virgil Scott MD    Physician Orders: PT Eval and Treat   Medical Diagnosis from Referral:   Z96.651 (ICD-10-CM) - Status post total right knee replacement   M22.2X1 (ICD-10-CM) - Patellofemoral pain syndrome of right knee   M70.50 (ICD-10-CM) - Pes anserine bursitis      Evaluation Date: 12/21/2023  Authorization Period Expiration: 12/31/2024  Plan of Care Expiration: Updated to 11/12/2024  Visit # / Visits authorized: 35/40   Progress Note Due: 10/12/2024  FOTO: 3/4            Precautions: hx of TKA and menisectomy, spinal cord stimulator, scoliosis       Subjective     Update: Patient reports: she has been unable to come for a few weeks due to medication adverse effects, but that has been resolved. Her back and knee pain has worsened during her time off     She was compliant with home exercise program.  Response to previous treatment: no adverse effects   Functional change: improved tolerance to functional mobility activities with decreased pain     Pain: 5/10 R knee, 6/10 LB  Location: R low back and R knee   Objective       Updated 9/12/2024     Lumbar Range of Motion:     %   Flexion 90      Extension 70      Left Side Bending 60   Right Side Bending 60   Left rotation    70   Right Rotation    80    *= pain         Range of Motion:   Knee Left active Left Passive   Flexion 133 140   Extension 3 5      Knee Right active Right Passive   Flexion 121 124   Extension 1 3         Lower Extremity Strength     Right LE   Left LE     Hip flexion: 4/5 Hip flexion: 4+/5   Knee extension: 4/5 Knee extension: 4+/5    Knee flexion: 4/5 Knee flexion: 4+/5   Hip IR: 4/5 Hip IR: 4/5   Hip ER: 4/5 Hip ER: 4/5   Hip extension:  4-/5 Hip extension: 4-/5   Hip abduction: 4-/5 Hip abduction: 4/5   Hip adduction: 4-/5 Hip adduction 4/5   Ankle dorsiflexion: 5/5 Ankle dorsiflexion: 5/5   Ankle plantarflexion: 5/5 Ankle plantarflexion: 5/5          Assessment     Update: Tiarra presents today with reports of increased pain since last visit. Reassessment performed with continue to demo deficits in R knee ROM, B LE strength, and lumbar ROM. These deficits are causing patient to have decreased tolerance to gait and functional mobility activities. Will continue to benefit from skilled therapy to address described deficits, reduce pain levels, and return to prior level of function.      Tiarra Is progressing well towards her goals.   Patient prognosis is Good.      Patient will continue to benefit from skilled outpatient physical therapy to address the deficits listed in the problem list box on initial evaluation, provide pt/family education and to maximize pt's level of independence in the home and community environment.      Patient's spiritual, cultural and educational needs considered and pt agreeable to plan of care and goals.     Anticipated barriers to physical therapy: chronicity of condition, multiple treatment areas, hx of R TKA and menisectomy      GOALS: Short Term Goals:  4 weeks  1. Report decreased in pain at worse less than  <   / =  4  /10  to increase tolerance for functional activities.On going  2. Pt to increase B popliteal angle by greater than > or = 5 degrees in order to improve flexibility and posture. MET  3. Increased R LE MMT 1/2  to increase tolerance for ADL and work activities. MET  4. Pt to increase B Ely's Test by greater than  > or = 5degrees in order to improve flexibility and posture. MET  5. Pt to tolerate HEP to improve ROM and independence with ADL's. MET  6. Pt to improve range of motion by 25% to allow for  "improved functional mobility to allow for improvement in IADLs. MET     Long Term Goals: 8 weeks  1. Report decreased in pain at worse less than  <   / =  2  /10  to increase tolerance for functional mobility.  On going  2 .Pt to increase B popliteal angle by greater than > or = 10 degrees in order to improve flexibility and posture. On going  3. Increased R LE MMT 1 grade to increase tolerance for ADL and work activities.On going  4. Pt will report at 51% or better score on FOTO Lumbar spine survey  to demonstrate decrease in disability and improvement in back pain.On going  5. Pt to be Independent with HEP to improve ROM and independence with ADL's. On going  6. Pt to increase B Ely's Test by greater than > or = 10 degrees in order to improve flexibility and posture. On going  7. Pt to demonstrate negative Bridge Test in order to show improved core strength for lumbar stabilization. On going  8. Pt to improve range of motion by 75% to allow for improved functional mobility to allow for improvement in IADLs. On going       Reasons for Recertification of Therapy:   To allow patient to complete full allotment of visits so that they may achieve maximum therapeutic benefit      Plan     Updated Certification Period: 9/12/2024 to 11/12/2024  Recommended Treatment Plan: 2 times per week for 8 weeks:     - Patient education  - Therapeutic exercise  - Manual therapy  - Performance testing   - Neuromuscular Re-education  - Therapeutic activity   - Modalities    Pt may be seen by PTA as part of the rehabilitation team.     Therapist: Zi Anaya, PT  9/16/2024    "I certify the need for these services furnished under this plan of treatment and while under my care."    ____________________________________  Physician/Referring Practitioner    _______________  Date of Signature    Zi Anaya, PT  9/12/2024      I CERTIFY THE NEED FOR THESE SERVICES FURNISHED UNDER THIS PLAN OF TREATMENT AND WHILE UNDER MY " CARE    Physician's comments:        Physician's Signature: ___________________________________________________

## 2024-09-17 ENCOUNTER — TELEPHONE (OUTPATIENT)
Dept: PAIN MEDICINE | Facility: CLINIC | Age: 69
End: 2024-09-17
Payer: MEDICARE

## 2024-09-17 ENCOUNTER — PATIENT MESSAGE (OUTPATIENT)
Dept: PAIN MEDICINE | Facility: CLINIC | Age: 69
End: 2024-09-17
Payer: MEDICARE

## 2024-09-17 NOTE — TELEPHONE ENCOUNTER
----- Message from Ese Dominguez sent at 9/17/2024 12:52 PM CDT -----   Name of Who is Calling:     What is the request in detail: patient request call back in reference to reschedule today's procedure  Please contact to further discuss and advise      Can the clinic reply by [facility]:     What Number to Call Back if not in [facility]:   [phone]

## 2024-09-18 ENCOUNTER — PATIENT MESSAGE (OUTPATIENT)
Dept: INTERNAL MEDICINE | Facility: CLINIC | Age: 69
End: 2024-09-18
Payer: MEDICARE

## 2024-09-18 NOTE — TELEPHONE ENCOUNTER
Staff spoke with patient to get her rescheduled for her missed appointment on 9/17. Staff rescheduled her to 10/1. Patient verbalized understanding.

## 2024-09-19 ENCOUNTER — CLINICAL SUPPORT (OUTPATIENT)
Dept: PSYCHIATRY | Facility: CLINIC | Age: 69
End: 2024-09-19
Payer: MEDICARE

## 2024-09-19 ENCOUNTER — OFFICE VISIT (OUTPATIENT)
Dept: INTERNAL MEDICINE | Facility: CLINIC | Age: 69
End: 2024-09-19
Payer: MEDICARE

## 2024-09-19 DIAGNOSIS — Z71.9 ENCOUNTER FOR EDUCATION: Primary | ICD-10-CM

## 2024-09-19 DIAGNOSIS — G89.29 CHRONIC BACK PAIN, UNSPECIFIED BACK LOCATION, UNSPECIFIED BACK PAIN LATERALITY: ICD-10-CM

## 2024-09-19 DIAGNOSIS — G47.00 INSOMNIA, UNSPECIFIED TYPE: ICD-10-CM

## 2024-09-19 DIAGNOSIS — I10 HYPERTENSION, UNSPECIFIED TYPE: Primary | ICD-10-CM

## 2024-09-19 DIAGNOSIS — M54.9 CHRONIC BACK PAIN, UNSPECIFIED BACK LOCATION, UNSPECIFIED BACK PAIN LATERALITY: ICD-10-CM

## 2024-09-19 PROCEDURE — G2211 COMPLEX E/M VISIT ADD ON: HCPCS | Mod: 95,,, | Performed by: INTERNAL MEDICINE

## 2024-09-19 PROCEDURE — 99213 OFFICE O/P EST LOW 20 MIN: CPT | Mod: 95,,, | Performed by: INTERNAL MEDICINE

## 2024-09-19 RX ORDER — CHLORTHALIDONE 25 MG/1
25 TABLET ORAL DAILY
Qty: 90 TABLET | Refills: 3 | Status: SHIPPED | OUTPATIENT
Start: 2024-09-19 | End: 2025-09-19

## 2024-09-19 NOTE — PROGRESS NOTES
The patient location is: [facility]  The chief complaint leading to consultation is: bp f/u    Visit type: audiovisual    Face to Face time with patient: 12  17 minutes of total time spent on the encounter, which includes face to face time and non-face to face time preparing to see the patient (eg, review of tests), Obtaining and/or reviewing separately obtained history, Documenting clinical information in the electronic or other health record, Independently interpreting results (not separately reported) and communicating results to the patient/family/caregiver, or Care coordination (not separately reported).         Each patient to whom he or she provides medical services by telemedicine is:  (1) informed of the relationship between the physician and patient and the respective role of any other health care provider with respect to management of the patient; and (2) notified that he or she may decline to receive medical services by telemedicine and may withdraw from such care at any time.    Notes:  Subjective     Patient ID: Tiarra Norton is a 69 y.o. female.    Chief Complaint: No chief complaint on file.    HPI    BP remains elevated.  Edema is stable.    Pain control no better.    SHe is to have revision of pocket.    Sleep still difficult.  She is working with MEHRAN Antony.    Enrolled in a sleep course.    She continues to see a psychiatrist.    Also sees Dr Nichols in Sleep Medicine.        Review of Systems   Constitutional:  Negative for activity change and unexpected weight change.   HENT:  Negative for hearing loss, rhinorrhea and trouble swallowing.    Eyes:  Negative for discharge and visual disturbance.   Respiratory:  Negative for chest tightness and wheezing.    Cardiovascular:  Negative for chest pain and palpitations.   Gastrointestinal:  Negative for blood in stool, constipation, diarrhea and vomiting.   Endocrine: Negative for polydipsia and polyuria.   Genitourinary:  Negative for difficulty urinating,  dysuria, hematuria and menstrual problem.   Musculoskeletal:  Negative for arthralgias, joint swelling and neck pain.   Neurological:  Negative for weakness and headaches.   Psychiatric/Behavioral:  Negative for confusion and dysphoric mood.           Objective     Physical Exam  Constitutional:       Appearance: She is not ill-appearing or diaphoretic.   Neurological:      General: No focal deficit present.      Mental Status: She is alert and oriented to person, place, and time.   Psychiatric:         Mood and Affect: Mood normal.         Behavior: Behavior normal.            Assessment and Plan     1. Hypertension, unspecified type  -     Basic Metabolic Panel; Future; Expected date: 09/19/2024    2. Insomnia, unspecified type    Other orders  -     chlorthalidone (HYGROTEN) 25 MG Tab; Take 1 tablet (25 mg total) by mouth once daily.  Dispense: 90 tablet; Refill: 3          Bmp 2 weeks   Change hctz to chlorthalidone.    F/u pain management, psych       No follow-ups on file.

## 2024-09-25 ENCOUNTER — PATIENT MESSAGE (OUTPATIENT)
Dept: INTERNAL MEDICINE | Facility: CLINIC | Age: 69
End: 2024-09-25
Payer: MEDICARE

## 2024-09-27 ENCOUNTER — HOSPITAL ENCOUNTER (OUTPATIENT)
Dept: RADIOLOGY | Facility: HOSPITAL | Age: 69
Discharge: HOME | End: 2024-09-27
Attending: NURSE PRACTITIONER
Payer: MEDICARE

## 2024-09-27 ENCOUNTER — OFFICE VISIT (OUTPATIENT)
Dept: INTERNAL MEDICINE | Facility: CLINIC | Age: 69
End: 2024-09-27
Payer: MEDICARE

## 2024-09-27 VITALS
SYSTOLIC BLOOD PRESSURE: 137 MMHG | WEIGHT: 150.81 LBS | HEART RATE: 74 BPM | OXYGEN SATURATION: 98 % | BODY MASS INDEX: 26.72 KG/M2 | DIASTOLIC BLOOD PRESSURE: 64 MMHG | HEIGHT: 63 IN

## 2024-09-27 DIAGNOSIS — R05.9 COUGH, UNSPECIFIED TYPE: ICD-10-CM

## 2024-09-27 DIAGNOSIS — H91.90 HEARING LOSS, UNSPECIFIED HEARING LOSS TYPE, UNSPECIFIED LATERALITY: Primary | ICD-10-CM

## 2024-09-27 DIAGNOSIS — R05.9 COUGH, UNSPECIFIED TYPE: Primary | ICD-10-CM

## 2024-09-27 PROCEDURE — 71046 X-RAY EXAM CHEST 2 VIEWS: CPT | Mod: 26,,, | Performed by: RADIOLOGY

## 2024-09-27 PROCEDURE — 71046 X-RAY EXAM CHEST 2 VIEWS: CPT | Mod: TC

## 2024-09-27 PROCEDURE — 99999 PR PBB SHADOW E&M-EST. PATIENT-LVL III: CPT | Mod: PBBFAC,,, | Performed by: NURSE PRACTITIONER

## 2024-09-27 PROCEDURE — 99213 OFFICE O/P EST LOW 20 MIN: CPT | Mod: PBBFAC | Performed by: NURSE PRACTITIONER

## 2024-09-27 RX ORDER — BENZONATATE 200 MG/1
200 CAPSULE ORAL 3 TIMES DAILY PRN
Qty: 30 CAPSULE | Refills: 0 | Status: SHIPPED | OUTPATIENT
Start: 2024-09-27 | End: 2024-10-07

## 2024-09-27 RX ORDER — PROMETHAZINE HYDROCHLORIDE AND DEXTROMETHORPHAN HYDROBROMIDE 6.25; 15 MG/5ML; MG/5ML
5 SYRUP ORAL NIGHTLY PRN
Qty: 118 ML | Refills: 0 | Status: SHIPPED | OUTPATIENT
Start: 2024-09-27 | End: 2024-10-20

## 2024-09-27 NOTE — PROGRESS NOTES
INTERNAL MEDICINE PROGRESS/URGENT CARE NOTE    CHIEF COMPLAINT     Chief Complaint   Patient presents with    Cough    Nasal Congestion    Fatigue    Generalized Body Aches       HPI     Tiarra Norton is a 69 y.o. female who presents for an urgent visit today.    PCP: Dr. Perales    Tested positive for Covid over 1 week ago. Symptoms started 10 days ago. Didn't take paxlovid as she developed rebound COVID after taking it. Still coughing significantly. Dry cough and having some nasal congestion. Still having fatigue and post nasal drip.  No recent fevers.   States has some sob at times.  No wheezing or chest pain  Has taken nyquil, mucinex-dm, water.         Patient Active Problem List   Diagnosis    HTN (hypertension)    Hypothyroid    Unspecified vitamin D deficiency    Elevated liver enzymes    Primary osteoarthritis of right knee    Fatty liver    Hyperlipidemia    Migraine without status migrainosus, not intractable    Glucose intolerance (impaired glucose tolerance)    Hyponatremia    Anxiety    Fatigue    Intermittent diarrhea    Sleep apnea    Decreased libido    Sleep stage dysfunction    Pain    Colon adenomas    Abdominal pain    Exocrine pancreatic insufficiency    Irritable bowel syndrome with diarrhea    Overweight (BMI 25.0-29.9)    Poor posture    Complex tear of lateral meniscus of right knee as current injury    Decreased range of motion (ROM) of right knee    Weakness of right lower extremity    Decreased range of motion of trunk and back    Decreased strength of trunk and back    Idiopathic scoliosis of thoracolumbar spine    Primary insomnia    Back pain    Impaired functional mobility, balance, gait, and endurance    Posture imbalance    Weakness of trunk musculature    Chronic pain syndrome    History of partial knee replacement    Benzodiazepine dependence    Aortic atherosclerosis    Major depressive disorder, recurrent severe without psychotic features        Past Medical History:  Past  Medical History:   Diagnosis Date    Cataract     Depression     Gestational diabetes     Hyperlipidemia     Hypertension     IBS (irritable bowel syndrome)     Thyroid disease         Past Surgical History:  Past Surgical History:   Procedure Laterality Date    ADENOIDECTOMY      ARTHROPLASTY, KNEE, TOTAL, USING COMPUTER-ASSISTED NAVIGATION Right 2023    Procedure: CONVERSION ARTHROPLASTY, KNEE, TOTAL, USING COMPUTER-ASSISTED NAVIGATION;  Surgeon: Virgil Scott MD;  Location: [facility] OR;  Service: Orthopedics;  Laterality: Right;  Same day discharge    ARTHROSCOPIC CHONDROPLASTY OF KNEE JOINT Right 10/19/2021    Procedure: ARTHROSCOPY, KNEE, WITH CHONDROPLASTY;  Surgeon: Trevin Huber MD;  Location: [facility] OR;  Service: Orthopedics;  Laterality: Right;    ARTHROSCOPY OF KNEE Right 10/19/2021    Procedure: ARTHROSCOPY, KNEE-RIGHT;  Surgeon: Trevin Huber MD;  Location: [facility] OR;  Service: Orthopedics;  Laterality: Right;    BLOCK, NERVE, GENICULAR Right 2024    Procedure: BLOCK, NERVE RIGHT GENICULAR;  Surgeon: Shoaib Aguirre MD;  Location: [facility] PAIN MGT;  Service: Pain Management;  Laterality: Right;  [phone]     SECTION      CHOLECYSTECTOMY      COLONOSCOPY N/A 2016    Procedure: COLONOSCOPY;  Surgeon: Heri Segura MD;  [facility]  Service: Endoscopy;  Laterality: N/A;    COLONOSCOPY N/A 2019    Procedure: COLONOSCOPY;  Surgeon: Joe Pitts MD;  [facility]  Service: Endoscopy;  Laterality: N/A;    CYSTOSCOPY  2022    Procedure: CYSTOSCOPY;  Surgeon: Lenny Moore MD;  Location: [address] FLR;  Service: Urology;;    ESOPHAGOGASTRODUODENOSCOPY N/A 2020    Procedure: EGD (ESOPHAGOGASTRODUODENOSCOPY);  Surgeon: Tolu Rivas MD;  Location: [facility] (4TH FLR);  Service: Endoscopy;  Laterality: N/A;  Pt. moved to 9:15am arrival time.. Instructed to not have anything after midnight.    EYE SURGERY       cataract resection right    INJECTION OF ANESTHETIC AGENT AROUND NERVE Bilateral 2/8/2022    Procedure: Block, Nerve MEDIAL BRANCH BLOCK BILATERAL L3,4,5;  Surgeon: Tara Gibbs MD;  Location: [facility] [place]IN MGT;  Service: Pain Management;  Laterality: Bilateral;    INJECTION OF ANESTHETIC AGENT AROUND NERVE Bilateral 2/15/2022    Procedure: BLOCK, NERVE, L3*L4-L5 MEDIAL BR ANCH 2 OF 2;  Surgeon: Tara Gibbs MD;  Location: [facility] [place] PAIN MGT;  Service: Pain Management;  Laterality: Bilateral;    INJECTION OF BOTULINUM TOXIN TYPE A  6/21/2022    Procedure: BOTULINUM TOXIN INJECTION 100 units;  Surgeon: Lenny Moore MD;  [facility];  Service: Urology;;    INSERTION, NEUROSTIMULATOR, SPINAL CORD N/A 1/9/2023    Procedure: SPINAL CORD STIMULATOR IMPLANT [employer];  Surgeon: Shoaib Aguirre MD;  Location: [facility] [place] OR;  Service: Pain Management;  Laterality: N/A;    JOINT REPLACEMENT      partial right knee    KNEE ARTHROSCOPY W/ MENISCECTOMY Right 10/19/2021    Procedure: ARTHROSCOPY, KNEE, WITH MENISCECTOMY, PARTIAL LATERAL;  Surgeon: Trevin Huber MD;  Location: [facility] OR;  Service: Orthopedics;  Laterality: Right;    r hand surgery      RADIOFREQUENCY ABLATION Right 3/8/2022    Procedure: RADIOFREQUENCY ABLATION, L3-L5 1 OF 2;  Surgeon: Tara Gibbs MD;  Location: [facility] [place], [facility] PAIN MGT;  Service: Pain Management;  Laterality: Right;    RADIOFREQUENCY ABLATION Left 3/22/2022    Procedure: RADIOFREQUENCY ABLATION, l3-+l5 2 of 2;  Surgeon: Tara Gibbs MD;  Location: [facility] [place] PAIN MGT;  Service: Pain Management;  Laterality: Left;    RADIOFREQUENCY ABLATION Right 3/15/2024    Procedure: RADIOFREQUENCY ABLATION RIGHT GENICULAR;  Surgeon: Shoaib Aguirre MD;  Location: [facility] [place]IN MGT;  Service: Pain Management;  Laterality: Right;  [phone]    REMOVAL OF NEUROSTIMULATOR N/A 11/20/2023    Procedure: SCS IPG BATTERY REVISION;  Surgeon: Shoaib Aguirre MD;  Location: [facility] [place] OR;   Service: Pain Management;  Laterality: N/A;    TONSILLECTOMY      TOTAL KNEE ARTHROPLASTY      partial knee replacement    TRIAL OF SPINAL CORD NERVE STIMULATOR N/A 12/22/2022    Procedure: SPINAL CORD STIMULATOR TRIAL [facility];  Surgeon: Shoaib Aguirre MD;  Location: [facility];  Service: Pain Management;  Laterality: N/A;    TUBAL LIGATION          Allergies:  Review of patient's allergies indicates:  No Known Allergies    Home Medications:    Current Outpatient Medications:     ALPRAZolam (XANAX) 1 MG tablet, take 1 tablet by mouth 3 times a day, Disp: 90 tablet, Rfl: 2    ALPRAZolam (XANAX) 1 MG tablet, Take one tablet by mouth three times daily, Disp: 90 tablet, Rfl: 1    amLODIPine (NORVASC) 5 MG tablet, Take 1 tablet (5 mg total) by mouth once daily., Disp: 30 tablet, Rfl: 5    atorvastatin (LIPITOR) 10 MG tablet, Take 1 tablet (10 mg total) by mouth once daily., Disp: 90 tablet, Rfl: 3    benzonatate (TESSALON) 200 MG capsule, Take 1 capsule (200 mg total) by mouth 3 (three) times daily as needed., Disp: 30 capsule, Rfl: 0    chlorthalidone (HYGROTEN) 25 MG Tab, Take 1 tablet (25 mg total) by mouth once daily., Disp: 90 tablet, Rfl: 3    cyclobenzaprine (FLEXERIL) 10 MG tablet, Take 1 tablet (10 mg total) by mouth 3 (three) times daily as needed for Muscle spasms. (Patient not taking: Reported on 8/19/2024), Disp: 90 tablet, Rfl: 0    cycloSPORINE (RESTASIS) 0.05 % ophthalmic emulsion, Place 1 drop into both eyes Daily., Disp: , Rfl:     doxycycline monohydrate 100 mg Tab, Take one tablet by mouth once daily with food and plenty of water, Disp: 30 tablet, Rfl: 1    EScitalopram oxalate (LEXAPRO) 5 MG Tab, take 1 tablet by mouth once daily, Disp: 90 tablet, Rfl: 1    HYDROcodone-acetaminophen (NORCO) 5-325 mg per tablet, Take 1 tablet by mouth every 12 (twelve) hours as needed for Pain., Disp: 60 tablet, Rfl: 0    levothyroxine (SYNTHROID) 100 MCG tablet, TAKE 1 TABLET BY MOUTH EVERY  "MORNING, Disp: 90 tablet, Rfl: 0    liothyronine (CYTOMEL) 5 MCG Tab, Take 1 tablet (5 mcg total) by mouth once daily., Disp: 90 tablet, Rfl: 3    promethazine (PHENERGAN) 25 MG tablet, 1 tab po q 12 h prn nausea, Disp: 30 tablet, Rfl: 2    promethazine-dextromethorphan (PROMETHAZINE-DM) 6.25-15 mg/5 mL Syrp, Take 5 mLs by mouth nightly as needed (cough syrup)., Disp: 118 mL, Rfl: 0    sumatriptan (IMITREX) 50 MG tablet, 1 tab po at start of headache.  Repeat in 2 h prn, Disp: 36 tablet, Rfl: 3    traZODone (DESYREL) 100 MG tablet, take 1-2 tabs by mouth every evening for for sleep, Disp: 180 tablet, Rfl: 3    tretinoin (RETIN-A) 0.025 % cream, Compound tretinoin 0.025% / azelaic acid 8% / niacinamide 2% cream. Apply a pea-sized amount to entire face qhs., Disp: 30 g, Rfl: 5    valsartan (DIOVAN) 320 MG tablet, Take 1 tablet (320 mg total) by mouth once daily., Disp: 90 tablet, Rfl: 3     Review of Systems:  Review of Systems   Constitutional:  Positive for fatigue. Negative for fever.   HENT:  Positive for postnasal drip. Negative for congestion and sore throat.    Respiratory:  Positive for cough. Negative for chest tightness and wheezing.    Cardiovascular:  Negative for chest pain.   Musculoskeletal:  Positive for myalgias.   Neurological:  Negative for weakness and headaches.         PHYSICAL EXAM     Vitals:    09/27/24 1333   BP: 137/64   Pulse: 74   SpO2: 98%   Weight: 68.4 kg (150 lb 12.7 oz)   Height: 5' 3" (1.6 m)      Body mass index is 26.71 kg/m².     Physical Exam  Vitals reviewed.   Constitutional:       Appearance: Normal appearance.   HENT:      Head: Normocephalic.      Right Ear: Tympanic membrane normal.      Left Ear: Tympanic membrane normal.      Mouth/Throat:      Mouth: Mucous membranes are moist.      Pharynx: Oropharynx is clear.   Eyes:      Conjunctiva/sclera: Conjunctivae normal.      Pupils: Pupils are equal, round, and reactive to light.   Cardiovascular:      Rate and Rhythm: Normal " rate and regular rhythm.      Heart sounds: Normal heart sounds.   Pulmonary:      Effort: Pulmonary effort is normal.      Breath sounds: Normal breath sounds.   Lymphadenopathy:      Cervical: No cervical adenopathy.   Skin:     General: Skin is warm and dry.   Neurological:      General: No focal deficit present.      Mental Status: She is alert.   Psychiatric:         Mood and Affect: Mood normal.         Behavior: Behavior normal.         LABS     Lab Results   Component Value Date    HGBA1C 5.5 10/22/2018     CMP  Sodium   Date Value Ref Range Status   08/02/2024 135 (L) 136 - 145 mmol/L Final     Potassium   Date Value Ref Range Status   08/02/2024 3.8 3.5 - 5.1 mmol/L Final     Chloride   Date Value Ref Range Status   08/02/2024 102 95 - 110 mmol/L Final     CO2   Date Value Ref Range Status   08/02/2024 24 23 - 29 mmol/L Final     Glucose   Date Value Ref Range Status   08/02/2024 100 70 - 110 mg/dL Final     BUN   Date Value Ref Range Status   08/02/2024 12 8 - 23 mg/dL Final     Creatinine   Date Value Ref Range Status   08/02/2024 0.8 0.5 - 1.4 mg/dL Final     Calcium   Date Value Ref Range Status   08/02/2024 9.1 8.7 - 10.5 mg/dL Final     Total Protein   Date Value Ref Range Status   08/02/2024 6.7 6.0 - 8.4 g/dL Final     Albumin   Date Value Ref Range Status   08/02/2024 4.1 3.5 - 5.2 g/dL Final     Total Bilirubin   Date Value Ref Range Status   08/02/2024 0.8 0.1 - 1.0 mg/dL Final     Comment:     For infants and newborns, interpretation of results should be based  on gestational age, weight and in agreement with clinical  observations.    Premature Infant recommended reference ranges:  Up to 24 hours.............<8.0 mg/dL  Up to 48 hours............<12.0 mg/dL  3-5 days..................<15.0 mg/dL  6-29 days.................<15.0 mg/dL       Alkaline Phosphatase   Date Value Ref Range Status   08/02/2024 96 55 - 135 U/L Final     AST   Date Value Ref Range Status   08/02/2024 21 10 - 40 U/L  Final     ALT   Date Value Ref Range Status   08/02/2024 12 10 - 44 U/L Final     Anion Gap   Date Value Ref Range Status   08/02/2024 9 8 - 16 mmol/L Final     eGFR if    Date Value Ref Range Status   02/02/2022 >60.0 >60 mL/min/1.73 m^2 Final     eGFR if non    Date Value Ref Range Status   02/02/2022 >60.0 >60 mL/min/1.73 m^2 Final     Comment:     Calculation used to obtain the estimated glomerular filtration  rate (eGFR) is the CKD-EPI equation.        Lab Results   Component Value Date    WBC 5.30 05/12/2023    HGB 12.9 05/12/2023    HCT 40.7 05/12/2023    MCV 94 05/12/2023     05/12/2023     Lab Results   Component Value Date    CHOL 202 (H) 03/01/2023    CHOL 177 02/02/2022    CHOL 151 01/27/2021     Lab Results   Component Value Date    HDL 83 (H) 03/01/2023    HDL 65 02/02/2022    HDL 58 01/27/2021     Lab Results   Component Value Date    LDLCALC 103.4 03/01/2023    LDLCALC 96.4 02/02/2022    LDLCALC 66.6 01/27/2021     Lab Results   Component Value Date    TRIG 78 03/01/2023    TRIG 78 02/02/2022    TRIG 132 01/27/2021     Lab Results   Component Value Date    CHOLHDL 41.1 03/01/2023    CHOLHDL 36.7 02/02/2022    CHOLHDL 38.4 01/27/2021     Lab Results   Component Value Date    TSH 0.837 08/16/2024    H9VVRXV 79 08/16/2024       ASSESSMENT     1. Cough, unspecified type           PLAN  Lungs are clear sats are good.  May just be post viral cough.  But given her complaint of shortness for breath we will go ahead and check a chest x-ray.  We will send Tessalon and promethazine DM for at night.  She will hold her oral promethazine which she only takes for migraines. Instructed to start otc antihistamine like zyrtec or claritin.  Follow up with PCP if no improvement    1. Cough, unspecified type  - promethazine-dextromethorphan (PROMETHAZINE-DM) 6.25-15 mg/5 mL Syrp; Take 5 mLs by mouth nightly as needed (cough syrup).  Dispense: 118 mL; Refill: 0  - benzonatate  (TESSALON) 200 MG capsule; Take 1 capsule (200 mg total) by mouth 3 (three) times daily as needed.  Dispense: 30 capsule; Refill: 0  - X-Ray Chest PA And Lateral; Future           Patient was counseled on when to seek emergent care. Patient's plan/treatment was discussed including medications and possible side effects. Verbalized understanding of all instructions.     This note was partly generated with [employer] voice recognition software. I apologize for any possible typographical errors.          MILE Cline  Department of Internal Medicine - [facility]  09/27/2024

## 2024-09-27 NOTE — PROGRESS NOTES
Group Psychotherapy (PhD/LCSW)     Site: Telemedicine     Date: 9/19/2024     Group Focus: Sleep 101     Length of service: 49564 - 45-50 minutes     Number of patients in attendance: 4     Target symptoms: Sleep      Patient's response to treatment: Active listening and self-disclosure     Progress toward goals: Progressing adequately     Interval History: Session focus was on the use and implementation of relaxation techniques to help improve sleep quality and counteract negative sleep thoughts. Patients explored the connection between arousal and sleep. Patients engaged in relaxation techniques to promote relaxation including diaphragmatic breathing, paced breathing, visualization, body scan mediations and progressive muscle relaxation. Patients reviewed their sleep diaries, made adjustments to sleep compression times, and attended to stimulus control.      Diagnosis:     ICD-10-CM ICD-9-CM   1. Encounter for education  Z71.9 V65.40      Plan: Continue treatment

## 2024-09-29 ENCOUNTER — PATIENT MESSAGE (OUTPATIENT)
Dept: PAIN MEDICINE | Facility: CLINIC | Age: 69
End: 2024-09-29
Payer: MEDICARE

## 2024-09-29 ENCOUNTER — PATIENT MESSAGE (OUTPATIENT)
Dept: INTERNAL MEDICINE | Facility: CLINIC | Age: 69
End: 2024-09-29
Payer: MEDICARE

## 2024-09-30 ENCOUNTER — TELEPHONE (OUTPATIENT)
Dept: PAIN MEDICINE | Facility: CLINIC | Age: 69
End: 2024-09-30
Payer: MEDICARE

## 2024-09-30 ENCOUNTER — PATIENT MESSAGE (OUTPATIENT)
Dept: INTERNAL MEDICINE | Facility: CLINIC | Age: 69
End: 2024-09-30
Payer: MEDICARE

## 2024-09-30 DIAGNOSIS — G47.00 INSOMNIA, UNSPECIFIED TYPE: ICD-10-CM

## 2024-09-30 NOTE — TELEPHONE ENCOUNTER
While doing confirmation calls the day prior to scheduled appointment. Appointment was Confirmed when contacting the patient and documenting it with a telephone encounter. Call ended pleasant.

## 2024-10-01 ENCOUNTER — CLINICAL SUPPORT (OUTPATIENT)
Dept: REHABILITATION | Facility: OTHER | Age: 69
End: 2024-10-01
Payer: MEDICARE

## 2024-10-01 ENCOUNTER — PROCEDURE VISIT (OUTPATIENT)
Dept: PAIN MEDICINE | Facility: CLINIC | Age: 69
End: 2024-10-01
Payer: MEDICARE

## 2024-10-01 VITALS
WEIGHT: 149.94 LBS | SYSTOLIC BLOOD PRESSURE: 137 MMHG | RESPIRATION RATE: 18 BRPM | OXYGEN SATURATION: 100 % | BODY MASS INDEX: 26.57 KG/M2 | DIASTOLIC BLOOD PRESSURE: 80 MMHG | TEMPERATURE: 99 F | HEART RATE: 68 BPM | HEIGHT: 63 IN

## 2024-10-01 DIAGNOSIS — R29.898 WEAKNESS OF RIGHT LOWER EXTREMITY: ICD-10-CM

## 2024-10-01 DIAGNOSIS — M62.81 WEAKNESS OF TRUNK MUSCULATURE: ICD-10-CM

## 2024-10-01 DIAGNOSIS — M79.18 MYOFASCIAL PAIN: Primary | ICD-10-CM

## 2024-10-01 DIAGNOSIS — M25.69 DECREASED RANGE OF MOTION OF TRUNK AND BACK: ICD-10-CM

## 2024-10-01 DIAGNOSIS — M25.661 DECREASED RANGE OF MOTION (ROM) OF RIGHT KNEE: Primary | ICD-10-CM

## 2024-10-01 DIAGNOSIS — R29.898 DECREASED STRENGTH OF TRUNK AND BACK: ICD-10-CM

## 2024-10-01 PROCEDURE — 97112 NEUROMUSCULAR REEDUCATION: CPT

## 2024-10-01 PROCEDURE — 97110 THERAPEUTIC EXERCISES: CPT

## 2024-10-01 PROCEDURE — 20553 NJX 1/MLT TRIGGER POINTS 3/>: CPT | Mod: PBBFAC | Performed by: ANESTHESIOLOGY

## 2024-10-01 PROCEDURE — 76942 ECHO GUIDE FOR BIOPSY: CPT | Mod: PBBFAC | Performed by: ANESTHESIOLOGY

## 2024-10-01 PROCEDURE — 97140 MANUAL THERAPY 1/> REGIONS: CPT

## 2024-10-01 PROCEDURE — 99999PBSHW PR PBB SHADOW TECHNICAL ONLY FILED TO HB: Mod: PBBFAC,,,

## 2024-10-01 PROCEDURE — 20553 NJX 1/MLT TRIGGER POINTS 3/>: CPT | Mod: S$PBB,,, | Performed by: ANESTHESIOLOGY

## 2024-10-01 RX ORDER — TRIAMCINOLONE ACETONIDE 40 MG/ML
40 INJECTION, SUSPENSION INTRA-ARTICULAR; INTRAMUSCULAR
Status: COMPLETED | OUTPATIENT
Start: 2024-10-01 | End: 2024-10-01

## 2024-10-01 RX ORDER — TRAZODONE HYDROCHLORIDE 100 MG/1
TABLET ORAL
Qty: 180 TABLET | Refills: 3 | Status: SHIPPED | OUTPATIENT
Start: 2024-10-01

## 2024-10-01 RX ADMIN — TRIAMCINOLONE ACETONIDE 40 MG: 40 INJECTION, SUSPENSION INTRA-ARTICULAR; INTRAMUSCULAR at 09:10

## 2024-10-01 NOTE — TELEPHONE ENCOUNTER
Care Due:                  Date            Visit Type   Department     Provider  --------------------------------------------------------------------------------                                ESTABLISHED                              PATIENT -    [facility] INTERNAL  Last Visit: 09-      [facility]      MEDICINE       DEX CANELA  Next Visit: None Scheduled  None         None Found                                                            Last  Test          Frequency    Reason                     Performed    Due Date  --------------------------------------------------------------------------------    Lipid Panel.  12 months..  atorvastatin.............  03- 02-    [facility] Embedded Care Due Messages. Reference number: [id number].   10/01/2024 10:54:52 AM CDT  
Pt states the sleep dr meds were unsatisfactory, states she is back to Trazodone. States 2 tabs at night is not sufficient and is requesting a higher dose. Pended last rx for your review. Pt also requesting a stronger cough med.     LOV with Kaykay Perales MD , 9/19/2024  
normal

## 2024-10-01 NOTE — TELEPHONE ENCOUNTER
No care due was identified.  [facility] Embedded Care Due Messages. Reference number: [id number].   10/01/2024 12:31:27 PM CDT

## 2024-10-02 ENCOUNTER — PATIENT MESSAGE (OUTPATIENT)
Dept: PAIN MEDICINE | Facility: CLINIC | Age: 69
End: 2024-10-02
Payer: MEDICARE

## 2024-10-02 RX ORDER — ATORVASTATIN CALCIUM 10 MG/1
10 TABLET, FILM COATED ORAL DAILY
Qty: 90 TABLET | Refills: 3 | Status: SHIPPED | OUTPATIENT
Start: 2024-10-02

## 2024-10-02 NOTE — TELEPHONE ENCOUNTER
Please advise. I looked in the patient chart and don't see the same procedure being performed in the past but two eyes are better than one before I pass the information on

## 2024-10-02 NOTE — PROCEDURES
Patient Name: Tiarra Norton  MRN: [id number]    INFORMED CONSENT: The procedure, risks, benefits and options were discussed with patient. There are no contraindications to the procedure. The patient expressed understanding and agreed to proceed. The personnel performing the procedure was discussed. I verify that I personally obtained Tiarra's consent prior to the start of the procedure and the signed consent can be found on the patient's chart.    Procedure Date: 10/01/2024    Anesthesia: Topical    Pre Procedure diagnosis:   1. Myofascial pain      Myofascial pain      Post-Procedure diagnosis: same      PROCEDURE: TRIGGER POINT INJECTION  The patient was placed in a seated position and time out was perfomed. The site of pain and procedure were confirmed with the patient prior to starting the procedure. After performing time out. The patient's  trigger points were identified and marked at left lumbar area around the SCS IPG. The skin was prepped with chlorhexidine three times.   After performing time out A 27-gauge 1.5 inch  needle was advanced through the skin and subcutaneous tissues.  Aspiration for blood, air and CSF was negative.  A total of 9 ml of Bupivacaine 0.25% and 40 mg Kenalog was injected at all trigger point.  No complications were evident. No specimens collected.    Blood Loss: Nill  Specimen: None    Shoaib Aguirre MD

## 2024-10-02 NOTE — TELEPHONE ENCOUNTER
Refill Routing Note   Medication(s) are not appropriate for processing by [facility] for the following reason(s):        Required labs outdated    [facility] action(s):  Defer               Appointments  past 12m or future 3m with PCP    Date Provider   Last Visit   9/19/2024 Kaykay Perales MD   Next Visit   Visit date not found Kaykay Perales MD   ED visits in past 90 days: 0        Note composed:8:36 PM 10/01/2024

## 2024-10-03 ENCOUNTER — CLINICAL SUPPORT (OUTPATIENT)
Dept: REHABILITATION | Facility: OTHER | Age: 69
End: 2024-10-03
Payer: MEDICARE

## 2024-10-03 ENCOUNTER — PATIENT MESSAGE (OUTPATIENT)
Dept: PAIN MEDICINE | Facility: CLINIC | Age: 69
End: 2024-10-03
Payer: MEDICARE

## 2024-10-03 DIAGNOSIS — R29.898 WEAKNESS OF RIGHT LOWER EXTREMITY: ICD-10-CM

## 2024-10-03 DIAGNOSIS — M25.69 DECREASED RANGE OF MOTION OF TRUNK AND BACK: ICD-10-CM

## 2024-10-03 DIAGNOSIS — G89.4 CHRONIC PAIN SYNDROME: ICD-10-CM

## 2024-10-03 DIAGNOSIS — Z96.651 HISTORY OF TOTAL KNEE ARTHROPLASTY, RIGHT: ICD-10-CM

## 2024-10-03 DIAGNOSIS — M41.25 OTHER IDIOPATHIC SCOLIOSIS, THORACOLUMBAR REGION: ICD-10-CM

## 2024-10-03 DIAGNOSIS — G89.29 CHRONIC PAIN OF RIGHT KNEE: ICD-10-CM

## 2024-10-03 DIAGNOSIS — M62.81 WEAKNESS OF TRUNK MUSCULATURE: ICD-10-CM

## 2024-10-03 DIAGNOSIS — Z96.89 SPINAL CORD STIMULATOR STATUS: ICD-10-CM

## 2024-10-03 DIAGNOSIS — M25.661 DECREASED RANGE OF MOTION (ROM) OF RIGHT KNEE: Primary | ICD-10-CM

## 2024-10-03 DIAGNOSIS — R29.898 DECREASED STRENGTH OF TRUNK AND BACK: ICD-10-CM

## 2024-10-03 DIAGNOSIS — M25.561 CHRONIC PAIN OF RIGHT KNEE: ICD-10-CM

## 2024-10-03 DIAGNOSIS — M51.369 DDD (DEGENERATIVE DISC DISEASE), LUMBAR: ICD-10-CM

## 2024-10-03 PROCEDURE — 97110 THERAPEUTIC EXERCISES: CPT

## 2024-10-03 PROCEDURE — 97112 NEUROMUSCULAR REEDUCATION: CPT

## 2024-10-03 RX ORDER — HYDROCODONE BITARTRATE AND ACETAMINOPHEN 5; 325 MG/1; MG/1
1 TABLET ORAL EVERY 12 HOURS PRN
Qty: 60 TABLET | Refills: 0 | Status: SHIPPED | OUTPATIENT
Start: 2024-10-08 | End: 2024-11-07

## 2024-10-03 NOTE — TELEPHONE ENCOUNTER
Patient requesting refill on: Hydocodone  Last office visit: 08/19/24   shows last refill on: 09/09/24  Patient does have a pain contract on file with [facility] department  Patient last UDS:01/23/24  was not consistent with current therapy.           CODEINE  Not Detected Not Detected    Comment: INTERPRETIVE INFORMATION: Codeine, U  Positive Cutoff: 40 ng/mL  Methodology: Mass Spectrometry   MORPHINE  Not Detected Not Detected    Comment: INTERPRETIVE INFORMATION:Morphine, U  Positive Cutoff: 20 ng/mL  Methodology: Mass Spectrometry   6-ACETYLMORPHINE  Not Detected Not Detected    Comment: INTERPRETIVE INFORMATION:6-acetylmorphine, U  Positive Cutoff: 20 ng/mL  Methodology: Mass Spectrometry   OXYCODONE  Not Detected Not Detected    Comment: INTERPRETIVE INFORMATION:Oxycodone, U  Positive Cutoff: 40 ng/mL  Methodology: Mass Spectrometry   NOROYXCODONE  Not Detected Not Detected    Comment: INTERPRETIVE INFORMATION:Noroxycodone, U  Positive Cutoff: 100 ng/mL  Methodology: Mass Spectrometry   OXYMORPHONE  Not Detected Not Detected    Comment: INTERPRETIVE INFORMATION:Oxymorphone, U  Positive Cutoff: 40 ng/mL  Methodology: Mass Spectrometry   NOROXYMORPHONE  Not Detected Not Detected    Comment: INTERPRETIVE INFORMATION:Noroxymorphone, U  Positive Cutoff: 100 ng/mL  Methodology: Mass Spectrometry   HYDROCODONE  Not Detected Not Detected    Comment: INTERPRETIVE INFORMATION:Hydrocodone, U  Positive Cutoff: 40 ng/mL  Methodology: Mass Spectrometry   NORHYDROCODONE  Not Detected Not Detected    Comment: INTERPRETIVE INFORMATION:Norhydrocodone, U  Positive Cutoff: 100 ng/mL  Methodology: Mass Spectrometry   HYDROMORPHONE  Not Detected Not Detected    Comment: INTERPRETIVE INFORMATION:Hydromorphone, U  Positive Cutoff: 20 ng/mL  Methodology: Mass Spectrometry   BUPRENORPHINE  Not Detected Not Detected    Comment: INTERPRETIVE INFORMATION:Buprenorphine, U  Positive Cutoff: 5 ng/mL  Methodology: Mass  Spectrometry   NORUBPRENORPHINE  Not Detected Not Detected    Comment: INTERPRETIVE INFORMATION:Norbuprenorphine, U  Positive Cutoff: 20 ng/mL  Methodology: Mass Spectrometry   FENTANYL  Not Detected Not Detected    Comment: INTERPRETIVE INFORMATION:Fentanyl, U  Positive Cutoff: 2 ng/mL  Methodology: Mass Spectrometry   NORFENTANYL  Not Detected Not Detected    Comment: INTERPRETIVE INFORMATION:Norfentanyl, U  Positive Cutoff: 2 ng/mL  Methodology: Mass Spectrometry   MEPERIDINE METABOLITE  Not Detected Not Detected    Comment: INTERPRETIVE INFORMATION:Meperidine metabolite, U  Positive Cutoff: 50 ng/mL  Methodology: Mass Spectrometry   TAPENTADOL  Not Detected Not Detected    Comment: INTERPRETIVE INFORMATION:Tapentadol, U  Positive Cutoff: 100 ng/mL  Methodology: Mass Spectrometry   TAPENTADOL-O-SULF  Not Detected Not Detected    Comment: INTERPRETIVE INFORMATION:Tapentadol-o-Sulf, U  Positive Cutoff: 200 ng/mL  Methodology: Mass Spectrometry   METHADONE  Negative Not Detected    Comment: Presumptive negative by immunoassay. Testing by mass  spectrometry is available on request.  INTERPRETIVE INFORMATION: Methadone Screen, U  Positive Cutoff: 150 ng/mL  Methodology: Immunoassay   TRAMADOL  Negative Not Detected    Comment: Presumptive negative by immunoassay. Testing by mass  spectrometry is available on request.  INTERPRETIVE INFORMATION:Tramadol Screen, U  Positive Cutoff: 100 ng/mL  Methodology: Immunoassay   AMPHETAMINE  Not Detected Not Detected    Comment: INTERPRETIVE INFORMATION:Amphetamine, U  Positive Cutoff: 50 ng/mL  Methodology: Mass Spectrometry   METHAMPHETAMINE  Not Detected Not Detected    Comment: INTERPRETIVE INFORMATION:Methamphetamine, U  Positive Cutoff: 200 ng/mL  Methodology: Mass Spectrometry   MDMA- ECSTASY  Not Detected Not Detected    Comment: INTERPRETIVE INFORMATION:MDMA, U  Positive Cutoff: 200 ng/mL  Methodology: Mass Spectrometry   MDA  Not Detected Not Detected    Comment:  INTERPRETIVE INFORMATION:MDA, U  Positive Cutoff: 200 ng/mL  Methodology: Mass Spectrometry   MDEA- Amber  Not Detected Not Detected    Comment: INTERPRETIVE INFORMATION:MDEA, U  Positive Cutoff: 200 ng/mL  Methodology: Mass Spectrometry   METHYLPHENIDATE  Not Detected Present    Comment: INTERPRETIVE INFORMATION:Methylphenidate, U  Positive Cutoff: 100 ng/mL  Methodology: Mass Spectrometry   PHENTERMINE  Not Detected Not Detected    Comment: INTERPRETIVE INFORMATION:Phentermine, U  Positive Cutoff: 100 ng/mL  Methodology: Mass Spectrometry   BENZOYLECGONINE  Negative Not Detected    Comment: Presumptive negative by immunoassay. Testing by mass  spectrometry is available on request.  INTERPRETIVE INFORMATION:Cocaine Screen, U  Positive Cutoff: 150 ng/mL  Methodology: Immunoassay   ALPRAZOLAM  Present Present    Comment: INTERPRETIVE INFORMATION:Alprazolam, U  Positive Cutoff: 40 ng/mL  Methodology: Mass Spectrometry   ALPHA-OH-ALPRAZOLAM  Present Present    Comment: INTERPRETIVE INFORMATION:Alpha-OH-Alprazolam, U  Positive Cutoff: 20 ng/mL  Methodology: Mass Spectrometry   CLONAZEPAM  Not Detected Not Detected    Comment: INTERPRETIVE INFORMATION:Clonazepam, U  Positive Cutoff: 20 ng/mL  Methodology: Mass Spectrometry   7-AMINOCLONAZEPAM  Not Detected Not Detected    Comment: INTERPRETIVE INFORMATION:7-Aminoclonazepam, U  Positive Cutoff: 40 ng/mL  Methodology: Mass Spectrometry   DIAZEPAM  Not Detected Not Detected    Comment: INTERPRETIVE INFORMATION:Diazepam, U  Positive Cutoff: 50 ng/mL  Methodology: Mass Spectrometry   NORDIAZEPAM  Not Detected Not Detected    Comment: INTERPRETIVE INFORMATION:Nordiazepam, U  Positive Cutoff: 50 ng/mL  Methodology: Mass Spectrometry   OXAZEPAM  Not Detected Not Detected    Comment: INTERPRETIVE INFORMATION:Oxazepam, U  Positive Cutoff: 50 ng/mL  Methodology: Mass Spectrometry   TEMAZEPAM  Not Detected Not Detected    Comment: INTERPRETIVE INFORMATION:Temazepam, U  Positive  Cutoff: 50 ng/mL  Methodology: Mass Spectrometry   Lorazepam  Not Detected Not Detected    Comment: INTERPRETIVE INFORMATION:Lorazepam, U  Positive Cutoff: 60 ng/mL  Methodology: Mass Spectrometry   MIDAZOLAM  Not Detected Not Detected    Comment: INTERPRETIVE INFORMATION:Midazolam, U  Positive Cutoff: 20 ng/mL  Methodology: Mass Spectrometry   ZOLPIDEM  Not Detected Not Detected    Comment: INTERPRETIVE INFORMATION:Zolpidem, U  Positive Cutoff: 20 ng/mL  Methodology: Mass Spectrometry   BARBITURATES  Negative Not Detected    Comment: Presumptive negative by immunoassay. Testing by mass  spectrometry is available on request.  INTERPRETIVE INFORMATION:Barbiturates Screen, U  Positive Cutoff: 200 ng/mL  Methodology: Immunoassay   Creatinine, Urine 20.0 - 400.0 mg/dL 40.6 58.6 190.0 R, CM   ETHYL GLUCURONIDE  Negative Present    Comment: Presumptive negative by immunoassay. Testing by mass  spectrometry is available on request.  INTERPRETIVE INFORMATION:Ethyl Glucuronide Screen, U  Positive Cutoff: 500 ng/mL  Methodology: Immunoassay   MARIJUANA METABOLITE  PresumptivePOS Not Detected    Comment: Presumptive positive by immunoassay. Testing by mass  spectrometry is available on request.  INTERPRETIVE INFORMATION: THC (Cannabinoids) Screen, U  Positive Cutoff: 50 ng/mL  Methodology: Immunoassay   PCP  Negative Not Detected    Comment: Presumptive negative by immunoassay. Testing by mass  spectrometry is available on request.  INTERPRETIVE INFORMATION:Phencyclidine Screen, U  Positive Cutoff: 25 ng/mL  Methodology: Immunoassay   CARISOPRODOL  Negative Not Detected CM    Comment: Presumptive negative by immunoassay. Testing by mass  spectrometry is available on request.  INTERPRETIVE INFORMATION: Carisoprodol Screen, U  Positive Cutoff: 100 ng/mL  Methodology: Immunoassay  The carisoprodol immunoassay has cross-reactivity to  carisoprodol and meprobamate.   Naloxone  Not Detected Not Detected    Comment: INTERPRETIVE  INFORMATION:Naloxone, U  Positive Cutoff: 100 ng/mL  Methodology: Mass Spectrometry   Gabapentin  Not Detected Present    Comment: INTERPRETIVE INFORMATION:Gabapentin, U  Positive Cutoff: 3,000 ng/mL  Methodology: Mass Spectrometry   Pregabalin  Not Detected Not Detected    Comment: INTERPRETIVE INFORMATION:Pregabalin, U  Positive Cutoff: 3,000 ng/mL  Methodology: Mass Spectrometry   Alpha-OH-Midazolam  Not Detected Not Detected    Comment: INTERPRETIVE INFORMATION:Alpha-OH-Midazolam, U  Positive Cutoff: 20 ng/mL  Methodology: Mass Spectrometry   Zolpidem Metabolite  Not Detected Not Detected

## 2024-10-03 NOTE — PROGRESS NOTES
[facility]   Physical Therapy Treatment Note      Name: Tiarra Norton  [facility] Number: [id number]    Therapy Diagnosis:   Encounter Diagnoses   Name Primary?    Decreased range of motion (ROM) of right knee Yes    Weakness of right lower extremity     Decreased range of motion of trunk and back     Decreased strength of trunk and back     Weakness of trunk musculature        Physician: Virgil Scott MD    Visit Date: 10/1/2024    Physician Orders: PT Eval and Treat   Medical Diagnosis from Referral:   Z96.651 (ICD-10-CM) - Status post total right knee replacement   M22.2X1 (ICD-10-CM) - Patellofemoral pain syndrome of right knee   M70.50 (ICD-10-CM) - Pes anserine bursitis      Evaluation Date: 12/21/2023  Authorization Period Expiration: 12/31/2024  Plan of Care Expiration: Updated to 11/12/2024  Visit # / Visits authorized: 36/40   Progress Note Due: 10/12/2024  FOTO: 3/4      Precautions: hx of TKA and menisectomy, spinal cord stimulator, scoliosis     Time In: 8:00 am  Time Out: 9:00 am   Total Billable Time: 55 minutes (1:1)  Total Appointment Time (timed & untimed codes): 60 minutes    PTA Visit #: 0/5   Subjective   Patient reports: she had Covid for a while and was unable to come to therapy or do many exercises at home so she is hurting more today.     She was compliant with home exercise program.  Response to previous treatment: no adverse effects   Functional change: improved tolerance to functional mobility activities with decreased pain    Pain: 5/10 R knee, 6/10 LB  Location: R low back and R knee   Objective      Updated 9/12/2024    Lumbar Range of Motion:     %   Flexion 90      Extension 70      Left Side Bending 60   Right Side Bending 60   Left rotation    70   Right Rotation    80    *= pain        Range of Motion:   Knee Left active Left Passive   Flexion 133 140   Extension 3 5      Knee Right active Right Passive   Flexion 121 124   Extension 1 3        "  Lower Extremity Strength     Right LE   Left LE     Hip flexion: 4/5 Hip flexion: 4+/5   Knee extension: 4/5 Knee extension: 4+/5   Knee flexion: 4/5 Knee flexion: 4+/5   Hip IR: 4/5 Hip IR: 4/5   Hip ER: 4/5 Hip ER: 4/5   Hip extension:  4-/5 Hip extension: 4-/5   Hip abduction: 4-/5 Hip abduction: 4/5   Hip adduction: 4-/5 Hip adduction 4/5   Ankle dorsiflexion: 5/5 Ankle dorsiflexion: 5/5   Ankle plantarflexion: 5/5 Ankle plantarflexion: 5/5          Treatment   Tiarra received the treatments listed below:      Therapeutic exercises to develop strength, endurance, ROM, flexibility, posture, and core stabilization for 15 minutes including:    Recumbent bike lv. 4 x 5 min for endurance and knee ROM  PPT 15x5"  Bridge 2x10x3"  EIL 2x10  DKTC 15x5"'  Open books x15,3" ea  Hip flexor stretch 2x1' ea  Leg press # 2x10  Leg press SL 80# 2x10    NP:  EIS 2x10    manual therapy techniques:  applied to the: B LB  for 10 minutes, including:    FDN. Educated pt to use heat following treatment sessions if pt is experiencing pain or soreness. Pt verbalized good understanding of education. Pt signed written consent to dry needling. Pt gave verbal consent for DN     Pt received dry needling to the below listed muscles using 50 mm needles.     B Lumbar paraspinals L1-L5      Neuromuscular re-education activities to improve: Balance, Coordination, Kinesthetic, Proprioception, and Posture for 30 minutes. The following activities were included:    Sit to stands GTB around knees 2x10  Side stepping GTB 2x12 steps  Monster walks GTB 2x12 steps  Single leg balance 3x30" R only  Step ups fwd/lateral  6 in step 2x10x3" ea R only   Medx trunk extension 70# x20  Medx trunk rotation 34# x20 increase weight   Heel raises 2x15 with 10# KB alternating hands      therapeutic activities to improve functional performance for 10  minutes, including:      cold pack for 5 minutes to R knee and low back    Patient Education and Home Exercises  "    Education provided:   - HEP, POC    Written Home Exercises Provided: Patient instructed to cont prior HEP. Exercises were reviewed and Tiarra was able to demonstrate them prior to the end of the session.  Tiarra demonstrated good  understanding of the education provided. See Electronic Medical Record under Patient Instructions for exercises provided during therapy sessions    Assessment   Tiarra presents today with continued complaints of pain. Decreased tightness and soreness reported after application of dry needling. Continued with core and LE strengthening and mobility/flexibility exercises with good tolerance. Continue to progress as able.     Tiarra Is progressing well towards her goals.   Patient prognosis is Good.     Patient will continue to benefit from skilled outpatient physical therapy to address the deficits listed in the problem list box on initial evaluation, provide pt/family education and to maximize pt's level of independence in the home and community environment.     Patient's spiritual, cultural and educational needs considered and pt agreeable to plan of care and goals.     Anticipated barriers to physical therapy: chronicity of condition, multiple treatment areas, hx of R TKA and menisectomy     GOALS: Short Term Goals:  4 weeks  1. Report decreased in pain at worse less than  <   / =  4  /10  to increase tolerance for functional activities.On going  2. Pt to increase B popliteal angle by greater than > or = 5 degrees in order to improve flexibility and posture. MET  3. Increased R LE MMT 1/2  to increase tolerance for ADL and work activities. MET  4. Pt to increase B Ely's Test by greater than  > or = 5degrees in order to improve flexibility and posture. MET  5. Pt to tolerate HEP to improve ROM and independence with ADL's. MET  6. Pt to improve range of motion by 25% to allow for improved functional mobility to allow for improvement in IADLs. MET     Long Term Goals: 8 weeks  1. Report  decreased in pain at worse less than  <   / =  2  /10  to increase tolerance for functional mobility.  On going  2 .Pt to increase B popliteal angle by greater than > or = 10 degrees in order to improve flexibility and posture. On going  3. Increased R LE MMT 1 grade to increase tolerance for ADL and work activities.On going  4. Pt will report at 51% or better score on FOTO Lumbar spine survey  to demonstrate decrease in disability and improvement in back pain.On going  5. Pt to be Independent with HEP to improve ROM and independence with ADL's. On going  6. Pt to increase B Ely's Test by greater than > or = 10 degrees in order to improve flexibility and posture. On going  7. Pt to demonstrate negative Bridge Test in order to show improved core strength for lumbar stabilization. On going  8. Pt to improve range of motion by 75% to allow for improved functional mobility to allow for improvement in IADLs. On going        Plan   Plan of care Certification: 5/9/2024 to 11/12/2024     Outpatient Physical Therapy 2 times weekly for 10 weeks to include the following interventions: Cervical/Lumbar Traction, Gait Training, Manual Therapy, Moist Heat/ Ice, Neuromuscular Re-ed, Patient Education, Self Care, Therapeutic Activities, and Therapeutic Exercise. Dry needling    Zi Anaya, PT   10/3/2024

## 2024-10-09 ENCOUNTER — RESEARCH ENCOUNTER (OUTPATIENT)
Dept: RESEARCH | Facility: OTHER | Age: 69
End: 2024-10-09
Payer: MEDICARE

## 2024-10-09 ENCOUNTER — PATIENT MESSAGE (OUTPATIENT)
Dept: PSYCHIATRY | Facility: CLINIC | Age: 69
End: 2024-10-09
Payer: MEDICARE

## 2024-10-09 NOTE — PROGRESS NOTES
"  [facility]   Physical Therapy Treatment Note      Name: Tiarra Norton  Clinic Number: [id number]    Therapy Diagnosis:   Encounter Diagnoses   Name Primary?    Decreased range of motion (ROM) of right knee Yes    Weakness of right lower extremity     Decreased range of motion of trunk and back     Decreased strength of trunk and back     Weakness of trunk musculature        Physician: Virgil Scott MD    Visit Date: 10/3/2024    Physician Orders: PT Eval and Treat   Medical Diagnosis from Referral:   Z96.651 (ICD-10-CM) - Status post total right knee replacement   M22.2X1 (ICD-10-CM) - Patellofemoral pain syndrome of right knee   M70.50 (ICD-10-CM) - Pes anserine bursitis      Evaluation Date: 12/21/2023  Authorization Period Expiration: 12/31/2024  Plan of Care Expiration: Updated to 11/12/2024  Visit # / Visits authorized: 37/40   Progress Note Due: 10/12/2024  FOTO: 3/4      Precautions: hx of TKA and menisectomy, spinal cord stimulator, scoliosis     Time In: 8:30 am  Time Out: 9:30 am   Total Billable Time: 30 minutes (1:1)  Total Appointment Time (timed & untimed codes): 60 minutes    PTA Visit #: 0/5   Subjective   Patient reports: she has been feeling better since last visit, knee is still sore, but back is not as bad today    She was compliant with home exercise program.  Response to previous treatment: no adverse effects   Functional change: improved tolerance to functional mobility activities with decreased pain    Pain: 5/10 R knee, 6/10 LB  Location: R low back and R knee   Objective           Treatment   Tiarra received the treatments listed below:      Therapeutic exercises to develop strength, endurance, ROM, flexibility, posture, and core stabilization for 20 minutes including:    Recumbent bike lv. 4 x 5 min for endurance and knee ROM  PPT 15x5"  Bridge 2x10x3"  EIL 2x10  DKTC 15x5"'  Open books x15,3" ea  Hip flexor stretch 2x1' ea  Leg press # 2x10  Leg " "press SL 80# 2x10    NP:  EIS 2x10    manual therapy techniques: joint mobs applied to the: B LB  for 5 minutes, including:    Lumbar CPA's   B LAD    Neuromuscular re-education activities to improve: Balance, Coordination, Kinesthetic, Proprioception, and Posture for 30 minutes. The following activities were included:    Sit to stands GTB around knees 2x10  Side stepping GTB 2x12 steps  Monster walks GTB 2x12 steps  Single leg balance 3x30" R only  Step ups fwd/lateral  6 in step 2x10x3" ea R only   Medx trunk extension 70# x20  Medx trunk rotation 34# x20 increase weight   Heel raises 2x15 with 10# KB alternating hands      therapeutic activities to improve functional performance for 0  minutes, including:      cold pack for 5 minutes to R knee and low back    Patient Education and Home Exercises     Education provided:   - HEP, POC    Written Home Exercises Provided: Patient instructed to cont prior HEP. Exercises were reviewed and Tiarra was able to demonstrate them prior to the end of the session.  Tiarra demonstrated good  understanding of the education provided. See Electronic Medical Record under Patient Instructions for exercises provided during therapy sessions    Assessment   Tiarra presents today with reports of improved pain levels since last visit. Progressed strengthening and mobility exercises as tolerated. Continues to demo weakness throughout core, lumbar, and B LE musculature.    Tiarra Is progressing well towards her goals.   Patient prognosis is Good.     Patient will continue to benefit from skilled outpatient physical therapy to address the deficits listed in the problem list box on initial evaluation, provide pt/family education and to maximize pt's level of independence in the home and community environment.     Patient's spiritual, cultural and educational needs considered and pt agreeable to plan of care and goals.     Anticipated barriers to physical therapy: chronicity of condition, multiple " treatment areas, hx of R TKA and menisectomy     GOALS: Short Term Goals:  4 weeks  1. Report decreased in pain at worse less than  <   / =  4  /10  to increase tolerance for functional activities.On going  2. Pt to increase B popliteal angle by greater than > or = 5 degrees in order to improve flexibility and posture. MET  3. Increased R LE MMT 1/2  to increase tolerance for ADL and work activities. MET  4. Pt to increase B Ely's Test by greater than  > or = 5degrees in order to improve flexibility and posture. MET  5. Pt to tolerate HEP to improve ROM and independence with ADL's. MET  6. Pt to improve range of motion by 25% to allow for improved functional mobility to allow for improvement in IADLs. MET     Long Term Goals: 8 weeks  1. Report decreased in pain at worse less than  <   / =  2  /10  to increase tolerance for functional mobility.  On going  2 .Pt to increase B popliteal angle by greater than > or = 10 degrees in order to improve flexibility and posture. On going  3. Increased R LE MMT 1 grade to increase tolerance for ADL and work activities.On going  4. Pt will report at 51% or better score on FOTO Lumbar spine survey  to demonstrate decrease in disability and improvement in back pain.On going  5. Pt to be Independent with HEP to improve ROM and independence with ADL's. On going  6. Pt to increase B Ely's Test by greater than > or = 10 degrees in order to improve flexibility and posture. On going  7. Pt to demonstrate negative Bridge Test in order to show improved core strength for lumbar stabilization. On going  8. Pt to improve range of motion by 75% to allow for improved functional mobility to allow for improvement in IADLs. On going        Plan   Plan of care Certification: 5/9/2024 to 11/12/2024     Outpatient Physical Therapy 2 times weekly for 10 weeks to include the following interventions: Cervical/Lumbar Traction, Gait Training, Manual Therapy, Moist Heat/ Ice, Neuromuscular Re-ed,  Patient Education, Self Care, Therapeutic Activities, and Therapeutic Exercise. Marybeth Anaya, PT   10/9/2024

## 2024-10-09 NOTE — PROGRESS NOTES
Study: [employer]- BRUNO Study: A Randomized Controlled Study to Evaluate the Safety and Effectiveness of [employer] Spinal Cord Stimulation (SCS) Systems in the Treatment of Chronic Low Back and/or Leg Pain with No Prior Surgeries  IRB/Protocol #: [id number]/  : Obed Gasca MD  Treating Physician: Shoaib Aguirre MD  Site Number: [id number]        Subject Number: G011     Unscheduled Visit/Reprogramming:   The subject came to [facility] and met with KATHLEEN Gabriel, for device reprogramming, and to address pocket pain. The device firmware update could not be completed due to low battery.  There were no changes in concomitant medications,no protocol deviations or adverse events to report at this time. A payment of $50 was loaded onto the subject's clincard. The subject was reminded of the 2 year visit on [id number].

## 2024-10-10 ENCOUNTER — TELEPHONE (OUTPATIENT)
Dept: OTOLARYNGOLOGY | Facility: CLINIC | Age: 69
End: 2024-10-10

## 2024-10-10 DIAGNOSIS — H91.90 HEARING LOSS, UNSPECIFIED HEARING LOSS TYPE, UNSPECIFIED LATERALITY: Primary | ICD-10-CM

## 2024-10-14 ENCOUNTER — CLINICAL SUPPORT (OUTPATIENT)
Dept: PSYCHIATRY | Facility: CLINIC | Age: 69
End: 2024-10-14
Payer: MEDICARE

## 2024-10-14 DIAGNOSIS — F33.2 MAJOR DEPRESSIVE DISORDER, RECURRENT SEVERE WITHOUT PSYCHOTIC FEATURES: ICD-10-CM

## 2024-10-14 DIAGNOSIS — F41.9 ANXIETY: ICD-10-CM

## 2024-10-14 DIAGNOSIS — F51.01 PRIMARY INSOMNIA: Primary | ICD-10-CM

## 2024-10-14 PROCEDURE — 99499 UNLISTED E&M SERVICE: CPT | Mod: 95,,, | Performed by: PSYCHOLOGIST

## 2024-10-14 NOTE — PROGRESS NOTES
"Psychiatry Initial Visit (PhD/LCSW)  Diagnostic Interview - CPT 18451    Date: 10/14/2024    Site: [facility]    Referral source: Dr. Antony    Clinical status of patient: Outpatient    Tiarra Norton, a 69 y.o. female, for initial evaluation visit.  Met with patient.    Chief complaint/reason for encounter: pain management     History of present illness:  Mrs. Norton reported a history of pain in her knee and back for the past five years. She described her knee pain as aching and transient from the top/bottom/side of her knee. She reported a history of partial knee replacement in 2015 followed by a total knee replacement last year, which has improved her knee pain such that it is now irregular. Mrs. Norton noted limitations in squatting and standing/ walking from her knee pain and noted that she will wear a knee brace during exercise to prevent it from buckling.     Mrs. Norton also reported a history of chronic back pain and degenerative disc disease. Two years ago, she had a spinal cord stimulator implanted and she noted this has improved her pain from aching/ sharp pain while squatting to a feeling of "weakness." She also endorsed some "pocket pain" around the site of her stimulator, which her physician has attempted to correct through relocating the stimulator. Mrs. Norton reported experiencing back pain every day, multiple times a day, with the worst pain occurring in the morning. She noted that increased activity makes her pain worse and currently endorsed walking some distance everyday, with her goal being to return to walking 2-3x/day. Mrs. Norton reported that the feeling of weakness in her back prevents her from finishing her walks, noting recently that she almost had to call her  to pick her up because she was concerned she could not walk back to her car. She also noted that her back pain means she is generally more isolated, and can't accompany her  to festivals because they require " "long periods of walking/ standing. She reported a history of anxiety, depression, and insomnia which she manages through psychiatric care and regular psychotherapy and does not wish to address currently.  She notes that some of her anxiety is pain-related, as she worries about how her much her pain has limited her life. She described her treatment goals as wanting to learn ways to manage her pain and to "do what she can" to live a full, valuable life.     Symptoms:   Mood: depressed mood: Mrs. Norton reported currently experiencing an episode of Major Depressive Disorder. Her MDD was diagnosed and is currently being managed by an outside psychotherapist.   Anxiety: excessive anxiety/worry. Mrs. Norton reported difficult to manage worry about finances, aging, stressful live events, and pain. She noted her generalized anxiety was diagnosed by and is currently being managed by an outside psychotherapist.   Substance abuse: denied  Cognitive functioning: denied      Psychiatric history: has participated in counseling/psychotherapy on an outpatient basis in the past and currently under psychiatric care    Medical history:   Patient Active Problem List   Diagnosis    HTN (hypertension)    Hypothyroid    Unspecified vitamin D deficiency    Elevated liver enzymes    Primary osteoarthritis of right knee    Fatty liver    Hyperlipidemia    Migraine without status migrainosus, not intractable    Glucose intolerance (impaired glucose tolerance)    Hyponatremia    Anxiety    Fatigue    Intermittent diarrhea    Sleep apnea    Decreased libido    Sleep stage dysfunction    Pain    Colon adenomas    Abdominal pain    Exocrine pancreatic insufficiency    Irritable bowel syndrome with diarrhea    Overweight (BMI 25.0-29.9)    Poor posture    Complex tear of lateral meniscus of right knee as current injury    Decreased range of motion (ROM) of right knee    Weakness of right lower extremity    Decreased range of motion of trunk and back "    Decreased strength of trunk and back    Idiopathic scoliosis of thoracolumbar spine    Primary insomnia    Back pain    Impaired functional mobility, balance, gait, and endurance    Posture imbalance    Weakness of trunk musculature    Chronic pain syndrome    History of partial knee replacement    Benzodiazepine dependence    Aortic atherosclerosis    Major depressive disorder, recurrent severe without psychotic features       Social history (marriage, employment, etc.): Mrs. Norton is currently retired and lives in a home with her . She was born in [place], [place] and raised in [place], [place] by her mother and father with her two sisters. She denied any childhood trauma, abuse, or neglect. Her highest level of education is a [employer].     Substance use:   Alcohol: social   Drugs: uses a 5mg THC gummy approximately once a month through [employer]. Noted difficulty appropriately dosing gummies as onset is 20-30 min after consumption and reported that 5mg can be too strong for her. Otherwise, denied any negative impacts of marijuana usage.    Tobacco: none   Caffeine: 4-5 caffeinated beverages/ day  Prescription medications: Denied any instances of running out of medications early or taking medications not as prescribed.     Current medications and drug reactions (include OTC, herbal):   Current Outpatient Medications on File Prior to Visit   Medication Sig Dispense Refill    alosetron (LOTRONEX) 0.5 MG tablet Take 1 tablet (0.5 mg total) by mouth in the morning and 1 tablet (0.5 mg total) in the evening. 60 tablet 0    ALPRAZolam (XANAX) 1 MG tablet take 1 tablet by mouth 3 times a day 90 tablet 2    ALPRAZolam (XANAX) 1 MG tablet Take one tablet by mouth three times daily 90 tablet 1    amLODIPine (NORVASC) 5 MG tablet Take 1 tablet (5 mg total) by mouth once daily. 30 tablet 5    atorvastatin (LIPITOR) 10 MG tablet Take 1 tablet (10 mg total) by mouth once daily. 90 tablet 3    chlorthalidone  (HYGROTEN) 25 MG Tab Take 1 tablet (25 mg total) by mouth once daily. 90 tablet 3    cyclobenzaprine (FLEXERIL) 10 MG tablet Take 1 tablet (10 mg total) by mouth 3 (three) times daily as needed for Muscle spasms. (Patient not taking: Reported on 8/19/2024) 90 tablet 0    cycloSPORINE (RESTASIS) 0.05 % ophthalmic emulsion Place 1 drop into both eyes Daily.      doxycycline monohydrate 100 mg Tab Take one tablet by mouth once daily with food and plenty of water 30 tablet 1    EScitalopram oxalate (LEXAPRO) 5 MG Tab take 1 tablet by mouth once daily 90 tablet 1    HYDROcodone-acetaminophen (NORCO) 5-325 mg per tablet Take 1 tablet by mouth every 12 (twelve) hours as needed for Pain. 60 tablet 0    levothyroxine (SYNTHROID) 100 MCG tablet TAKE 1 TABLET BY MOUTH EVERY MORNING 90 tablet 0    liothyronine (CYTOMEL) 5 MCG Tab Take 1 tablet (5 mcg total) by mouth once daily. 90 tablet 3    promethazine (PHENERGAN) 25 MG tablet 1 tab po q 12 h prn nausea 30 tablet 2    promethazine-dextromethorphan (PROMETHAZINE-DM) 6.25-15 mg/5 mL Syrp Take 5 mLs by mouth nightly as needed (cough syrup). 118 mL 0    scopolamine (TRANSDERM-SCOP) 1.3-1.5 mg (1 mg over 3 days) Place 1 patch onto the skin every 72 hours 6 patch 1    sumatriptan (IMITREX) 50 MG tablet 1 tab po at start of headache.  Repeat in 2 h prn 36 tablet 3    traZODone (DESYREL) 100 MG tablet take 1 to 2 tablets by mouth every evening for for sleep 180 tablet 3    tretinoin (RETIN-A) 0.025 % cream Compound tretinoin 0.025% / azelaic acid 8% / niacinamide 2% cream. Apply a pea-sized amount to entire face qhs. 30 g 5    valsartan (DIOVAN) 320 MG tablet Take 1 tablet (320 mg total) by mouth once daily. 90 tablet 3     No current facility-administered medications on file prior to visit.         Strengths and liabilities: Strength: Patient accepts guidance/feedback, Strength: Patient is motivated for change., Strength: Patient has positive support network.    Current Evaluation:      Mental Status Exam:  General Appearance:  unremarkable, age appropriate   Speech: normal tone, normal rate, normal pitch, normal volume      Level of Cooperation: cooperative      Thought Processes: normal and logical   Mood: steady      Thought Content: normal, no suicidality, no homicidality, delusions, or paranoia   Affect: congruent and appropriate   Orientation: Oriented x3   Memory: recent >  intact, remote >  intact                         Diagnostic Impression - Plan:       ICD-10-CM ICD-9-CM   1. Primary insomnia  F51.01 307.42   2. Anxiety  F41.9 300.00   3. Major depressive disorder, recurrent severe without psychotic features  F33.2 296.33       Plan:individual psychotherapy    Return to Clinic: 1 week    Length of Service (minutes): 60

## 2024-10-15 ENCOUNTER — OFFICE VISIT (OUTPATIENT)
Dept: PAIN MEDICINE | Facility: CLINIC | Age: 69
End: 2024-10-15
Payer: MEDICARE

## 2024-10-15 ENCOUNTER — CLINICAL SUPPORT (OUTPATIENT)
Dept: REHABILITATION | Facility: OTHER | Age: 69
End: 2024-10-15
Payer: MEDICARE

## 2024-10-15 VITALS — SYSTOLIC BLOOD PRESSURE: 135 MMHG | DIASTOLIC BLOOD PRESSURE: 73 MMHG | HEART RATE: 70 BPM

## 2024-10-15 DIAGNOSIS — M47.816 LUMBAR SPONDYLOSIS: ICD-10-CM

## 2024-10-15 DIAGNOSIS — Z96.89 SPINAL CORD STIMULATOR STATUS: ICD-10-CM

## 2024-10-15 DIAGNOSIS — R29.898 WEAKNESS OF RIGHT LOWER EXTREMITY: ICD-10-CM

## 2024-10-15 DIAGNOSIS — M79.18 MYOFASCIAL PAIN: ICD-10-CM

## 2024-10-15 DIAGNOSIS — M62.81 WEAKNESS OF TRUNK MUSCULATURE: ICD-10-CM

## 2024-10-15 DIAGNOSIS — M51.360 DEGENERATION OF INTERVERTEBRAL DISC OF LUMBAR REGION WITH DISCOGENIC BACK PAIN: ICD-10-CM

## 2024-10-15 DIAGNOSIS — M25.661 DECREASED RANGE OF MOTION (ROM) OF RIGHT KNEE: Primary | ICD-10-CM

## 2024-10-15 DIAGNOSIS — M41.25 OTHER IDIOPATHIC SCOLIOSIS, THORACOLUMBAR REGION: ICD-10-CM

## 2024-10-15 DIAGNOSIS — G89.4 CHRONIC PAIN SYNDROME: Primary | ICD-10-CM

## 2024-10-15 DIAGNOSIS — R29.898 DECREASED STRENGTH OF TRUNK AND BACK: ICD-10-CM

## 2024-10-15 DIAGNOSIS — M25.69 DECREASED RANGE OF MOTION OF TRUNK AND BACK: ICD-10-CM

## 2024-10-15 PROCEDURE — 97530 THERAPEUTIC ACTIVITIES: CPT

## 2024-10-15 PROCEDURE — 99999 PR PBB SHADOW E&M-EST. PATIENT-LVL III: CPT | Mod: PBBFAC,,, | Performed by: NURSE PRACTITIONER

## 2024-10-15 PROCEDURE — 97110 THERAPEUTIC EXERCISES: CPT

## 2024-10-15 PROCEDURE — 97112 NEUROMUSCULAR REEDUCATION: CPT

## 2024-10-15 PROCEDURE — 97140 MANUAL THERAPY 1/> REGIONS: CPT

## 2024-10-15 PROCEDURE — 99213 OFFICE O/P EST LOW 20 MIN: CPT | Mod: PBBFAC | Performed by: NURSE PRACTITIONER

## 2024-10-15 PROCEDURE — 99213 OFFICE O/P EST LOW 20 MIN: CPT | Mod: S$PBB,,, | Performed by: NURSE PRACTITIONER

## 2024-10-15 RX ORDER — TIZANIDINE 4 MG/1
4 TABLET ORAL 3 TIMES DAILY PRN
Qty: 90 TABLET | Refills: 0 | Status: SHIPPED | OUTPATIENT
Start: 2024-10-15

## 2024-10-15 NOTE — PROGRESS NOTES
Chronic Pain-Established Visit    SUBJECTIVE:    Interval History 10/15/2024:  The patient returns to clinic today for follow up of back pain. She is s/p TPI around SCS battery site on 10/1/2024. She reports one day of relief. She continues to report pocket pain around her SCS site. She did meet with [employer] representative for programming. This has been beneficial. She also did dry needling with benefit. She does not wish to pursue battery revision at this time. She continues to take Norco as needed with benefit. She denies any adverse effects. She denies any other health changes. Her pain today is 6/10.    Interval History 8/19/2024:  The patient returns to clinic today for follow up of back pain. She continues to report pocket pain around her SCS site. She cannot lie flat on her back due to pain. She also notes pain with prolonged standing and activity. She does have benefit with SCS. She reports intermittent right knee pain. She is currently taking Norco as needed with some benefit. She is performing  home exercises. She denies any other health changes. Her pain today is 8/10.    Interval History 6/24/2024:  The patient returns to clinic today for follow up of back and knee pain via virtual visit. She continues to report low back pain. This is worse with prolonged standing. She denies any radicular leg pain. She did recently meet with [employer] representatives for programming. She reports limited relief with these changes. She is participating in physical therapy. She is taking Norco as needed with benefit. She denies any adverse effects. She denies any other health changes.     Interval History 3/25/2024:  The patient returns to clinic today for follow up of back and knee pain. She is s/p right genicular RFA on 3/15/2024. She reports 50% relief. She continues to report some right knee pain.She continues to report low back pain. She is using her SCS. She does have intermittent pain around  IPG site. She is participating in physical therapy. She is taking Norco as needed with benefit. She denies any adverse effects. She denies any other health changes. Her pain today is 4/10.    Interval History 1/23/2024:  Tiarra Norton presents for folow-up for lower back pain s/p IPG revision 11/20/2023.  She reports that the pocket pain is significantly improved, continues to get better.  She is just now feeling well enough to participate in physical therapy which she has today.  Today she also complains of right knee pain. She had a right TKA on 5/18/2023. .  The patient describes the pain as aching. The pain is constant. Exacerbating factors: bending, cooking, standing for a long time, walking.  Mitigating factors: ice, heat, medications.  The patient takes Tylenol 500 mg PRN with mild benefit.  They deny any perceived side effects.  She is hoping for an additional prescription for Norco 5-325 mg which she had taken over the recovery period from the IPG revision.  She states that the medication helps her with activity.  The symptoms interfere with ADLs.  The patient last had imaging Xray bilateral knees on 10/26/2023. See findings below.  The patient denies fever/night sweats, urinary incontinence, bowel incontinence, significant weight changes, significant motor weakness or changes, or loss of sensations.  Today's pain score is 3/10 for back pain and 7/10 for right knee pain.     RTA 5/18/23, activity restrictions from IPG revision was not able to do PT, starting PT for back and right knee back today.  Wondering if there are any procedures we can do for knee.     Wants Norco 5-325 mg-helps with activity  Pain ext and flex  Ptp superior anterior    Interval History 10/25/2023  67 y/o female with hx of chronic left sided low back pain near IPG, she is s/p Octad electrode x2  placement of pulse generator 3  on 1/9 she recently was provided a recent TPI near left low back she reports 0% relief following.  Pain  is worse when standing walking performing ADLs.  She does state she gets 5 hours of uninterrupted sleep.  Like her left low back is weak and aching.  Tingling any wearing her low back.  In the right side of her low back.  To discuss other pain interventions and the plan of care moving forward regard to her current subacute pain.  She denies any profound weakness denies any bowel bladder dysfunction denies any saddle anesthesia at this time.    Interval History 9/12/2023:  The patient returns to clinic today for follow up of battery site pain via virtual visit. She is s/p trigger point injections under ultrasound on 8/29/2023. She does feel some benefit. She is currently ill with Covid. She is currently running fever and having body aches. She denies any other health changes.     Interval History 8/11/2023:  The patient returns to clinic today for follow up of pain. She reports pain around her SCS battery site. She describes the pain as though her muscles feel weak and tired. She does report benefit with SCS. No difficulty with charging or programming. She has tried Lidoderm and Salonpas patches but these caused a rash. She is currently participating in physical therapy for her knee. She denies any other health changes. Her pain today is 4/10.    Interval History 4/14/2023:  The patient is here for follow up of back pain and pain at IPG site. She reports improvement since previous visit. She did have benefit with Lidoderm patches but had a rash so she stopped. Her pain has improved with Salon Pas patches. She is scheduled for knee replacement next month with Dr. Scott. Her pain today is 3/10.    Interval History 3/21/2023:  The patient presents to discuss pain to IPG site. She says that it has been present since surgery and has gradually worsened. She says that it feels like a tight and weak muscle. She has not tried any topical medications or muscle relaxants. No fever or malaise. She has not noticed any redness  or swelling to the site. Her original pain has significantly improved from SCS implant. She is part of a research study. Her pain today is 6/10.    Interval History 2/17/2023:  The patient returns to clinic today for follow up of back pain. She reports benefit with [employer] SCS. She is in contact with representatives for programming. She is very happy with relief. She denies any fever, chills, or drainage. She continues to follow her activity restrictions. She denies any other health changes. Her pain today is 2/10.    Interval History 1/31/2023:  The patient returns to clinic today for wound check. She reports benefit with [employer] SCS. She is in contact with representatives for programming. She reports intermittent muscle soreness into her legs. She does have an upcoming appointment with representatives for programming. She denies any fever, chills, or drainage. She continues to follow her activity restrictions. She denies any other health changes. Her pain today is 3/10.    Interval History 1/17/2023:  The patient returns to clinic today for post op. She is s/p [employer] SCS implant on 1/9/2022. She reports benefit with the device at this time. She is meeting with the representatives today. She does report soreness to her incisions. She denies any fever, chills, or drainage. She did have issues with her dressing staying on. She has completed her antibiotics. She denies any other health changes. Her pain today is 2/10.    Interval History 12/29/2022:  The patient returns for post op appointment. She is s/p lumbar [employer] SCS trial with 90% relief. She has had very minimal back pain during the trial period. She says that she was more active over this time due to the Christmas holiday and had minimal symptoms. She is very happy with the relief. She would like to move forward with implant. She is part of [employer] study. No fever or malaise during trial period. Her pain today is 1/10.    Interval  History 10/14/22: Mrs. Norton presents today for follow up of chronic low back pain and to discuss the [place] trial. She had her lumbar MRI done last night without significant changes to her spine. She did have increased dilation of her bile duct. Her pain today is the same in character and severity as during her last visit with us and she rates it currently at a 7/10. She continues to do her stretches on her own which have helped some. She presents with some questions regarding the trial.     Interval History 9/21/2022:  The patient returns to clinic today for follow up of back pain via virtual visit. She received a letter from [facility] denying SCS trial. She is frustrated by this. She continues to report low back pain. She denies any radicular leg pain. Her pain is worse with bending and activity. She recently underwent med screening for the [employer] program. She is interested in pursuing this. She has tried Gabapentin and Lyrica in the past with side effects. She denies any other health changes. Her pain today is 7/10.     Interval History 8/22/22: Ms. Norton returns for follow up on her low back pain. At our last visit she felt cured by acupuncture. Unfortunately, this only lasted for 24 hours. She returns today looking for other options to make life livable. Patient claims all of her pain in the low back and middle back. We discussed that this will work best for her low back pain.     Interval History 7/25/22: Tiarra Norton returns for follow up. We previously have been discussing treatments for her low back pain that did not resolve with RFA. At our last visit I referred her to Dr. Antony for clearance for a SCS trial. She was considered to be a reasonable candidate. However, in the meantime, her PT referred her to acupuncture with Dr. Jacobs. She had one treatment and already notes 50% relief. She notes that she still has pain, especially with leaning forward, but feels that it is much better  controlled. Their plan is one treatment per week but is approved for 20 sessions.     Interval History 6/15/22: Tiarra Norton returns for follow up. She continues to feel like she has mild pain sitting or laying down, and has her worst pain with extreme leaning forward to pick something up off the floor. She also has difficulty leaning over her handlebars to ride her bike or leaning forward to fold clothes or standing up for any period of time. So, we determined that leaning forward is her worst issue. We did previouisly do lmbb and rfa, which has helped with extension, but it would not help with leaning forward. On review of her MRI she has significant multilevel DDD with Modic changes which likely account for her pain.     4/18/22 Interval History: Patient presents for telehealth visit for follow up following her b/l RFA at L3-L5. She reports 80% relief and is very pleased with her pain relief. Unfortunately, she is not back to doing what she wants due to right knee pain. She is seeing Dr. Huber for knee pain and is s/p right medial compartment partial knee replacement. She is currently in therapy for this. We discussed that we may be able to assist her with her knee pain as well.     HPI:  Original HISTORY OF PRESENT ILLNESS: Tiarra Norton presents to the clinic for the evaluation of the above pain. The pain started five years ago.     Original Pain Description:  The pain is located in the lower back and does radiate up towards her upper back.The pain is described as aching, dull and sharp. Initially starts as a dull, aching pain and it gets sharp once she bends down and moves around. Typically when she walks for more than five minutes, the sharp pain radiates up her back. Exacerbating factors: Standing, Bending, Touching, Walking, Night Time, Flexing and Lifting. Mitigating factors heat, ice, laying down and massage. Symptoms interfere with daily activity and sleeping. The patient feels like  symptoms have been worsening. Patient denies night fever/night sweats, urinary incontinence, bowel incontinence, significant weight loss, significant motor weakness and loss of sensations.    Original PAIN SCORES:  Best: Pain is 1  Worst: Pain is 10  Usually: Pain is 4  Current: Pain is 4        10/15/2024     1:05 PM 10/1/2024     2:39 PM 8/19/2024     9:21 AM   Last 3 PDI Scores   Pain Disability Index (PDI) 33 30 56     6 weeks of Conservative therapy (PT/Chiro/Home Exercises with Dates)  Healthy back program--> has four sessions left for a total of 20 sessions   Last session was on 1/31/22   Stretches that they taught at [employer] gives her relief       Medications:   Robaxin PRN    Ice and heat which has helped   Tried Gabapentin and Lyrica- made her loopy      Report: reviewed       Interventional Pain Procedures:   10/17/2023 TPI left side near IPG battery 0% relief  2/8/2022- Bilateral L3,4,5 MBB  2/15/2022- Bilateral L3,4,5 MBB  3/8/2022- Right L3,4,5 RFA- 80% relief for a few months  3/22/2022- Left L3,4,5 RFA- 80% relief for a few months  12/22/22 SCS trial- 90% relief  1/9/2023- [employer] SCS implant.   11/20/2023- IPG revision  2/16/2024- Right genicular nerve block  3/15/2024- Right genicular RFA    Imaging:    EXAMINATION:  XR KNEE 3 VIEW RIGHT     CLINICAL HISTORY:  Presence of right artificial knee joint     TECHNIQUE:  AP, lateral, and Merchant views of the right knee were performed.     COMPARISON:  Right knee radiograph dated 06/12/2023     FINDINGS:  Prior right knee arthroplasty.  Hardware projects in similar alignment without evidence for interval loosening or failure.  No periprosthetic fracture.     Impression:     As above.        Electronically signed by: Azael Wilkins  Date:                                            11/03/2023  Time:                                           11:05      10/13/22: MRI LUMBAR SPINE WITHOUT CONTRAST  FINDINGS:  Lumbar levoscoliosis centered  at L2.  Minimal anterolisthesis at L4-5.     No compression fractures.  No marrow replacing lesions.     Multilevel degenerative changes with disc desiccation and disc space narrowing, described in detail below.  Discogenic endplate edema at T12-L1.     The conus medullaris has a normal appearance and terminates at the L1 level.  Cauda equina nerve roots are unremarkable.  No epidural mass or collection.     The common duct is dilated up to 12 mm, previously 9 mm on 08/20/2022 CT.  Mild prominence of the main pancreatic duct up to 4 mm, which is new.  No definite filling defect in the bile ducts.  Paraspinal muscle atrophy.     SIGNIFICANT FINDINGS BY LEVEL:     T12-L1: Disc bulge.  Bilateral facet arthropathy and ligamentum flavum thickening.  Mild canal stenosis.  Mild bilateral foraminal stenosis.     L1-2: Disc bulge.  Bilateral facet arthropathy and ligamentum flavum thickening.  Mild canal stenosis.  Mild right foraminal stenosis.     L2-3: Disc bulge.  Bilateral facet arthropathy and ligamentum flavum thickening.  Mild canal stenosis.  Moderate right mild left foraminal stenosis.     L3-4: Disc bulge.  Bilateral facet arthropathy and ligamentum flavum thickening.  Small bilateral facet joint effusions/synovitis.  Moderate canal stenosis with partial effacement of the thecal sac and crowding of the cauda equina nerve roots.  Mild right and moderate left foraminal stenosis.     L4-5: Disc bulge with posterior disc uncovering.  Bilateral facet arthropathy and ligamentum flavum thickening.  Small right facet joint effusion/synovitis.  Mild canal stenosis.  Mild bilateral foraminal stenosis.     L5-S1: Disc bulge.  Bilateral facet arthropathy and ligamentum flavum thickening.  No significant canal stenosis.  Mild left foraminal stenosis.     Impression:     Lumbar levoscoliosis and multilevel degenerative changes as detailed above.  No significant changes from 01/21/2022.     Increased biliary ductal dilatation up  to 12 mm, greater than expected after cholecystectomy.  In addition, the main pancreatic duct is mildly prominent up to 4 mm, which is new.  If LFTs are abnormal, consider MRI/MRCP or ERCP for further evaluation.      Past Medical History:   Diagnosis Date    Cataract     Depression     Gestational diabetes     Hyperlipidemia     Hypertension     IBS (irritable bowel syndrome)     Thyroid disease      Past Surgical History:   Procedure Laterality Date    ADENOIDECTOMY      ARTHROPLASTY, KNEE, TOTAL, USING COMPUTER-ASSISTED NAVIGATION Right 2023    Procedure: CONVERSION ARTHROPLASTY, KNEE, TOTAL, USING COMPUTER-ASSISTED NAVIGATION;  Surgeon: Virgil Scott MD;  Location: [facility] OR;  Service: Orthopedics;  Laterality: Right;  Same day discharge    ARTHROSCOPIC CHONDROPLASTY OF KNEE JOINT Right 10/19/2021    Procedure: ARTHROSCOPY, KNEE, WITH CHONDROPLASTY;  Surgeon: Trevin Huber MD;  Location: [facility] OR;  Service: Orthopedics;  Laterality: Right;    ARTHROSCOPY OF KNEE Right 10/19/2021    Procedure: ARTHROSCOPY, KNEE-RIGHT;  Surgeon: Trevin Huber MD;  Location: [facility] OR;  Service: Orthopedics;  Laterality: Right;    BLOCK, NERVE, GENICULAR Right 2024    Procedure: BLOCK, NERVE RIGHT GENICULAR;  Surgeon: Shoaib Aguirre MD;  Location: [facility] PAIN MGT;  Service: Pain Management;  Laterality: Right;  [phone]     SECTION      CHOLECYSTECTOMY      COLONOSCOPY N/A 2016    Procedure: COLONOSCOPY;  Surgeon: Heri Segura MD;  [address]  Service: Endoscopy;  Laterality: N/A;    COLONOSCOPY N/A 2019    Procedure: COLONOSCOPY;  Surgeon: Joe Pitts MD;  Location: [facility] ([address] [place] [address]);  Service: Endoscopy;  Laterality: N/A;    CYSTOSCOPY  2022    Procedure: CYSTOSCOPY;  Surgeon: Lenny Moore MD;  [address];  Service: Urology;;    ESOPHAGOGASTRODUODENOSCOPY N/A 2020    Procedure: EGD (ESOPHAGOGASTRODUODENOSCOPY);  Surgeon:  Tolu Rivas MD;  Location: [facility] ENDO (4TH FLR);  Service: Endoscopy;  Laterality: N/A;  Pt. moved to 9:15am arrival time.. Instructed to not have anything after midnight.EC    EYE SURGERY      cataract resection right    INJECTION OF ANESTHETIC AGENT AROUND NERVE Bilateral 2/8/2022    Procedure: Block, Nerve MEDIAL BRANCH BLOCK BILATERAL L3,4,5;  Surgeon: Tara Gibbs MD;  Location: [facility] PAIN MGT;  Service: Pain Management;  Laterality: Bilateral;    INJECTION OF ANESTHETIC AGENT AROUND NERVE Bilateral 2/15/2022    Procedure: BLOCK, NERVE, L3*L4-L5 MEDIAL BR ANCH 2 OF 2;  Surgeon: Tara Gibbs MD;  Location: [facility] PAIN MGT;  Service: Pain Management;  Laterality: Bilateral;    INJECTION OF BOTULINUM TOXIN TYPE A  6/21/2022    Procedure: BOTULINUM TOXIN INJECTION 100 units;  Surgeon: Lenny Moore MD;  Location: [facility] OR 1ST FLR;  Service: Urology;;    INSERTION, NEUROSTIMULATOR, SPINAL CORD N/A 1/9/2023    Procedure: SPINAL CORD STIMULATOR IMPLANT Truzip;  Surgeon: Shoaib Aguirre MD;  Location: [facility] OR;  Service: Pain Management;  Laterality: N/A;    JOINT REPLACEMENT      partial right knee    KNEE ARTHROSCOPY W/ MENISCECTOMY Right 10/19/2021    Procedure: ARTHROSCOPY, KNEE, WITH MENISCECTOMY, PARTIAL LATERAL;  Surgeon: Trevin Huber MD;  Location: [facility] OR;  Service: Orthopedics;  Laterality: Right;    r hand surgery      RADIOFREQUENCY ABLATION Right 3/8/2022    Procedure: RADIOFREQUENCY ABLATION, L3-L5 1 OF 2;  Surgeon: Tara Gibbs MD;  Location: [facility] PAIN MGT;  Service: Pain Management;  Laterality: Right;    RADIOFREQUENCY ABLATION Left 3/22/2022    Procedure: RADIOFREQUENCY ABLATION, l3-+l5 2 of 2;  Surgeon: Tara Gibbs MD;  Location: [facility] PAIN MGT;  Service: Pain Management;  Laterality: Left;    RADIOFREQUENCY ABLATION Right 3/15/2024    Procedure: RADIOFREQUENCY ABLATION RIGHT GENICULAR;  Surgeon: Shoaib Aguirre MD;  Location: [facility] PAIN MGT;   Service: Pain Management;  Laterality: Right;  [phone]    REMOVAL OF NEUROSTIMULATOR N/A 11/20/2023    Procedure: SCS IPG BATTERY REVISION;  Surgeon: Shoaib Aguirre MD;  Location: [facility] OR;  Service: Pain Management;  Laterality: N/A;    TONSILLECTOMY      TOTAL KNEE ARTHROPLASTY      partial knee replacement    TRIAL OF SPINAL CORD NERVE STIMULATOR N/A 12/22/2022    Procedure: SPINAL CORD STIMULATOR TRIAL [employer] RESEARCH;  Surgeon: Shoaib Aguirre MD;  Location: [facility] PAIN MGT;  Service: Pain Management;  Laterality: N/A;    TUBAL LIGATION       Social History     Socioeconomic History    Marital status:     Number of children: 1   Occupational History    Occupation:      Employer: [employer]     Comment: retired   Tobacco Use    Smoking status: Never    Smokeless tobacco: Never   Substance and Sexual Activity    Alcohol use: Yes     Alcohol/week: 3.0 standard drinks of alcohol     Types: 3 Glasses of wine per week     Comment: weekends    Drug use: Yes     Types: Marijuana     Comment: occasionally    Sexual activity: Yes     Partners: Male   Other Topics Concern    Are you pregnant or think you may be? No    Breast-feeding No   Social History Narrative    Retired        Swims.       Stationary bike.            Social Drivers of Health     Financial Resource Strain: Low Risk  (3/21/2024)    Overall Financial Resource Strain (CARDIA)     Difficulty of Paying Living Expenses: Not hard at all   Food Insecurity: No Food Insecurity (3/21/2024)    Hunger Vital Sign     Worried About Running Out of Food in the Last Year: Never true     Ran Out of Food in the Last Year: Never true   Transportation Needs: No Transportation Needs (3/21/2024)    PRAPARE - Transportation     Lack of Transportation (Medical): No     Lack of Transportation (Non-Medical): No   Physical Activity: Sufficiently Active (3/21/2024)    Exercise Vital Sign     Days of Exercise per Week: 4 days     Minutes of  Exercise per Session: 40 min   Stress: Stress Concern Present (3/21/2024)    [employer] [place] [employer] - Occupational Stress Questionnaire     Feeling of Stress : Rather much   Housing Stability: Patient Declined (3/21/2024)    Housing Stability Vital Sign     Unable to Pay for Housing in the Last Year: Patient declined     Number of Places Lived in the Last Year: 1     Unstable Housing in the Last Year: Patient declined     Family History   Problem Relation Name Age of Onset    Hypertension Mother      Irritable bowel syndrome Mother      Hyperlipidemia Mother      Heart disease Father          mi    Hypertension Father      Cancer Father          prostate    Heart attack Father      Heart failure Father      Hyperlipidemia Father      Alcohol abuse Sister nathanael     Depression Sister nathanael     Epilepsy Son ari     Irritable bowel syndrome Sister martin     Alcohol abuse Sister martin     Ovarian cancer Maternal Aunt      Breast cancer Paternal Aunt      Breast cancer Maternal Aunt      Melanoma Neg Hx      Colon cancer Neg Hx      Stomach cancer Neg Hx      Esophageal cancer Neg Hx      Liver disease Neg Hx      Crohn's disease Neg Hx      Ulcerative colitis Neg Hx      Celiac disease Neg Hx      Stroke Neg Hx         Review of patient's allergies indicates:  No Known Allergies    Current Outpatient Medications   Medication Sig    alosetron (LOTRONEX) 0.5 MG tablet Take 1 tablet (0.5 mg total) by mouth in the morning and 1 tablet (0.5 mg total) in the evening.    ALPRAZolam (XANAX) 1 MG tablet take 1 tablet by mouth 3 times a day    ALPRAZolam (XANAX) 1 MG tablet Take one tablet by mouth three times daily    amLODIPine (NORVASC) 5 MG tablet Take 1 tablet (5 mg total) by mouth once daily.    atorvastatin (LIPITOR) 10 MG tablet Take 1 tablet (10 mg total) by mouth once daily.    chlorthalidone (HYGROTEN) 25 MG Tab Take 1 tablet (25 mg total) by mouth once daily.    cycloSPORINE (RESTASIS) 0.05 %  ophthalmic emulsion Place 1 drop into both eyes Daily.    doxycycline monohydrate 100 mg Tab Take one tablet by mouth once daily with food and plenty of water    EScitalopram oxalate (LEXAPRO) 5 MG Tab take 1 tablet by mouth once daily    HYDROcodone-acetaminophen (NORCO) 5-325 mg per tablet Take 1 tablet by mouth every 12 (twelve) hours as needed for Pain.    levothyroxine (SYNTHROID) 100 MCG tablet TAKE 1 TABLET BY MOUTH EVERY MORNING    liothyronine (CYTOMEL) 5 MCG Tab Take 1 tablet (5 mcg total) by mouth once daily.    promethazine (PHENERGAN) 25 MG tablet 1 tab po q 12 h prn nausea    scopolamine (TRANSDERM-SCOP) 1.3-1.5 mg (1 mg over 3 days) Place 1 patch onto the skin every 72 hours    sumatriptan (IMITREX) 50 MG tablet 1 tab po at start of headache.  Repeat in 2 h prn    traZODone (DESYREL) 100 MG tablet take 1 to 2 tablets by mouth every evening for for sleep    tretinoin (RETIN-A) 0.025 % cream Compound tretinoin 0.025% / azelaic acid 8% / niacinamide 2% cream. Apply a pea-sized amount to entire face qhs.    valsartan (DIOVAN) 320 MG tablet Take 1 tablet (320 mg total) by mouth once daily.    cyclobenzaprine (FLEXERIL) 10 MG tablet Take 1 tablet (10 mg total) by mouth 3 (three) times daily as needed for Muscle spasms. (Patient not taking: Reported on 8/19/2024)    promethazine-dextromethorphan (PROMETHAZINE-DM) 6.25-15 mg/5 mL Syrp Take 5 mLs by mouth nightly as needed (cough syrup).     No current facility-administered medications for this visit.       REVIEW OF SYSTEMS:    GENERAL: No fever. No chills. No fatigue. Denies weight loss. Denies weight gain.  HEENT: Denies headaches. Denies vision change. Denies eye pain. Denies double vision. Denies ear pain.   CV: Denies chest pain. HTN  PULM: Denies of shortness of breath.  GI: Denies constipation. No diarrhea. No abdominal pain. Denies nausea. Denies vomiting. No blood in stool.  HEME: Denies bleeding problems.  : Denies urgency. No painful urination.  No blood in urine.  MS: See HPI  SKIN: Denies rash.   NEURO: Denies seizures. No weakness.  ENDO: Thyroid disorder.   PSYCH:  Depression    OBJECTIVE:       Vitals:    10/15/24 1303   BP: 135/73   Pulse: 70   PainSc:   6         PHYSICAL EXAM:     GENERAL: Well appearing, in no acute distress, alert and oriented x3.  PSYCH:  Mood and affect appropriate.  SKIN: Skin color, texture, turgor normal, no rashes or lesions. SCS site well healed.   RESP: Symmetric chest rise, no respiratory distress noted.   BACK: Straight leg raise in the sitting position is negative for radicular pain.   MSK: 5/5 strength in right ankle with plantar and dorsiflexion. 5/5 strength in left ankle with plantar and dorsiflexion. 5/5 strength with right knee flexion and extension. 5/5 strength with left knee flexion and extension.   GAIT: Normal.         ASSESSMENT: 69 y.o. year old female with low back and knee pain, consistent with the followin. Chronic pain syndrome        2. Other idiopathic scoliosis, thoracolumbar region        3. Lumbar spondylosis        4. Degeneration of intervertebral disc of lumbar region with discogenic back pain        5. Spinal cord stimulator status        6. Myofascial pain  tiZANidine (ZANAFLEX) 4 MG tablet          PLAN:     - Previous imaging reviewed. Labs reviewed.     - We can consider SCS battery revision in the future as needed.     - Continue home exercise routine.     - Flexeril 10 mg TID PRN.     - Pain contract signed 2024.    - Continue Norco 5/325 mg BID PRN, #60. Refill already sent.     - The patient is here today for a refill of current pain medications and they believe these provide effective pain control and improvements in quality of life.  The patient notes no serious side effects, and feels the benefits outweigh the risks.  The patient was reminded of the pain contract that they signed previously as well as the risks and benefits of the medication including possible death.   The updated [place] Board  Pharmacy prescription monitoring program was reviewed, and the patient has been found to be compliant with current treatment plan. Medication management provided by Dr. Aguirre.     - UDS from 1/23/2024. Repeat next visit.     -RTC in 3 months or sooner if needed.      The above plan and management options were discussed at length with patient. Patient is in agreement with the above and verbalized understanding.       Marlene Thorne NP  10/15/2024

## 2024-10-17 ENCOUNTER — CLINICAL SUPPORT (OUTPATIENT)
Dept: REHABILITATION | Facility: OTHER | Age: 69
End: 2024-10-17
Payer: MEDICARE

## 2024-10-17 DIAGNOSIS — M62.81 WEAKNESS OF TRUNK MUSCULATURE: ICD-10-CM

## 2024-10-17 DIAGNOSIS — R29.898 DECREASED STRENGTH OF TRUNK AND BACK: ICD-10-CM

## 2024-10-17 DIAGNOSIS — M25.69 DECREASED RANGE OF MOTION OF TRUNK AND BACK: ICD-10-CM

## 2024-10-17 DIAGNOSIS — M25.661 DECREASED RANGE OF MOTION (ROM) OF RIGHT KNEE: Primary | ICD-10-CM

## 2024-10-17 DIAGNOSIS — R29.898 WEAKNESS OF RIGHT LOWER EXTREMITY: ICD-10-CM

## 2024-10-17 PROCEDURE — 97110 THERAPEUTIC EXERCISES: CPT

## 2024-10-17 PROCEDURE — 97112 NEUROMUSCULAR REEDUCATION: CPT

## 2024-10-18 ENCOUNTER — PATIENT MESSAGE (OUTPATIENT)
Dept: PAIN MEDICINE | Facility: CLINIC | Age: 69
End: 2024-10-18
Payer: MEDICARE

## 2024-10-21 ENCOUNTER — CLINICAL SUPPORT (OUTPATIENT)
Dept: PSYCHIATRY | Facility: CLINIC | Age: 69
End: 2024-10-21
Payer: MEDICARE

## 2024-10-21 DIAGNOSIS — F33.2 MAJOR DEPRESSIVE DISORDER, RECURRENT SEVERE WITHOUT PSYCHOTIC FEATURES: ICD-10-CM

## 2024-10-21 DIAGNOSIS — F41.9 ANXIETY: Primary | ICD-10-CM

## 2024-10-21 PROCEDURE — 99499 UNLISTED E&M SERVICE: CPT | Mod: 95,,, | Performed by: PSYCHOLOGIST

## 2024-10-21 NOTE — PROGRESS NOTES
"Individual Psychotherapy (PhD/LCSW)    10/21/2024    Site:  Telemed         The patient location is: home ([facility])    Visit type: audiovisual    Face to Face time with patient: 45  45 minutes of total time spent on the encounter, which includes face to face time and non-face to face time preparing to see the patient (eg, review of tests), Obtaining and/or reviewing separately obtained history, Documenting clinical information in the electronic or other health record, Independently interpreting results (not separately reported) and communicating results to the patient/family/caregiver, or Care coordination (not separately reported).     Each patient to whom he or she provides medical services by telemedicine is:  (1) informed of the relationship between the physician and patient and the respective role of any other health care provider with respect to management of the patient; and (2) notified that he or she may decline to receive medical services by telemedicine and may withdraw from such care at any time.    Notes:       Therapeutic Intervention: Met with patient.      Chief complaint/reason for encounter: depression, anxiety, and pain     Interval history and content of current session: Psychoeducation on the biopsychosocial model of pain and the goals of ACT for chronic pain were provided. Pt described prior experience with mindfulness in therapy, noting that she practices visualizing herself at the bottom of a row-boat travelling through smooth [place] at least once every other day. Description and observation were introduced as facets of mindfulness that can be used to increase present moment awareness, particularly when experiencing negative pain-related thoughts or emotions. Pt practiced these mindfulness facets in-session using the 5-4-3-2-1 centering exercise. Feedback was provided that many of the pt's observations ("I see too many pens that need to be thrown away") also included elements of judgment. Pt " "agreed to practice this exercise, focusing on non-judgmental description, once daily before her next session. Lastly, pt described all the ways in which she has tried to control or fix her pain problem unsuccessfully and noted that reviewing these strategies made her feel resigned or frustrated. Pt connected with the idea of "creative hopeless" and noted it's utility in allowing her to fully live her life (ex. "In order to go to a concert, I need to bring a chair which means I will be sitting alone sometimes. But that's better than staying at home and once I danced with a famous person because they saw me sitting by myself at a concert.")    Treatment plan:  Target symptoms: depression, anxiety   Why chosen therapy is appropriate versus another modality: evidence based practice  Outcome monitoring methods: self-report  Therapeutic intervention type: behavior modifying psychotherapy    Risk parameters:  Patient reports no suicidal ideation  Patient reports no homicidal ideation  Patient reports no self-injurious behavior  Patient reports no violent behavior    Verbal deficits: None    Patient's response to intervention:  The patient's response to intervention is motivated.    Progress toward goals and other mental status changes:  The patient's progress toward goals is good.    Diagnosis:     ICD-10-CM ICD-9-CM   1. Anxiety  F41.9 300.00   2. Major depressive disorder, recurrent severe without psychotic features  F33.2 296.33       Plan:  individual psychotherapy    Return to clinic: 1 week    Length of Service (minutes): 45        "

## 2024-10-22 ENCOUNTER — CLINICAL SUPPORT (OUTPATIENT)
Dept: REHABILITATION | Facility: OTHER | Age: 69
End: 2024-10-22
Payer: MEDICARE

## 2024-10-22 DIAGNOSIS — M62.81 WEAKNESS OF TRUNK MUSCULATURE: ICD-10-CM

## 2024-10-22 DIAGNOSIS — R29.898 WEAKNESS OF RIGHT LOWER EXTREMITY: ICD-10-CM

## 2024-10-22 DIAGNOSIS — M25.69 DECREASED RANGE OF MOTION OF TRUNK AND BACK: ICD-10-CM

## 2024-10-22 DIAGNOSIS — M25.661 DECREASED RANGE OF MOTION (ROM) OF RIGHT KNEE: Primary | ICD-10-CM

## 2024-10-22 DIAGNOSIS — R29.898 DECREASED STRENGTH OF TRUNK AND BACK: ICD-10-CM

## 2024-10-22 PROCEDURE — 97112 NEUROMUSCULAR REEDUCATION: CPT

## 2024-10-22 PROCEDURE — 97140 MANUAL THERAPY 1/> REGIONS: CPT

## 2024-10-23 NOTE — PROGRESS NOTES
[facility]   Physical Therapy Progress Note      Name: Tiarra Norton  [facility] Number: [id number]    Therapy Diagnosis:   Encounter Diagnoses   Name Primary?    Decreased range of motion (ROM) of right knee Yes    Weakness of right lower extremity     Decreased range of motion of trunk and back     Decreased strength of trunk and back     Weakness of trunk musculature        Physician: Virgil Scott MD    Visit Date: 10/15/2024    Physician Orders: PT Eval and Treat   Medical Diagnosis from Referral:   Z96.651 (ICD-10-CM) - Status post total right knee replacement   M22.2X1 (ICD-10-CM) - Patellofemoral pain syndrome of right knee   M70.50 (ICD-10-CM) - Pes anserine bursitis      Evaluation Date: 12/21/2023  Authorization Period Expiration: 12/31/2024  Plan of Care Expiration: Updated to 11/12/2024  Visit # / Visits authorized: 38/40   Progress Note Due: 11/15/2024  FOTO: 3/4      Precautions: hx of TKA and menisectomy, spinal cord stimulator, scoliosis     Time In: 10:00 am  Time Out: 11:00 am   Total Billable Time: 55 minutes (1:1)  Total Appointment Time (timed & untimed codes): 60 minutes    PTA Visit #: 0/5   Subjective   Patient reports: had to miss the last few visits due to illness.    She was compliant with home exercise program.  Response to previous treatment: no adverse effects   Functional change: improved tolerance to functional mobility activities with decreased pain    Pain: 5/10 R knee, 6/10 LB  Location: R low back and R knee   Objective       Updated 10/15/2024     Lumbar Range of Motion:     %   Flexion 90      Extension 70      Left Side Bending 60   Right Side Bending 60   Left rotation    70   Right Rotation    80    *= pain         Range of Motion:   Knee Left active Left Passive   Flexion 133 140   Extension 3 5      Knee Right active Right Passive   Flexion 121 124   Extension 1 3         Lower Extremity Strength     Right LE   Left LE     Hip flexion: 4/5  "Hip flexion: 4+/5   Knee extension: 4/5 Knee extension: 4+/5   Knee flexion: 4/5 Knee flexion: 4+/5   Hip IR: 4/5 Hip IR: 4/5   Hip ER: 4/5 Hip ER: 4/5   Hip extension:  4-/5 Hip extension: 4-/5   Hip abduction: 4-/5 Hip abduction: 4/5   Hip adduction: 4-/5 Hip adduction 4/5   Ankle dorsiflexion: 5/5 Ankle dorsiflexion: 5/5   Ankle plantarflexion: 5/5 Ankle plantarflexion: 5/5           Treatment   Tiarra received the treatments listed below:      Therapeutic exercises to develop strength, endurance, ROM, flexibility, posture, and core stabilization for 15 minutes including:    Recumbent bike lv. 4 x 5 min for endurance and knee ROM - NP    PPT 15x5"  Bridge 2x10x3"  EIL 2x10  DKTC 15x5"'  Open books x15,3" ea  Hip flexor stretch 2x1' ea  Leg press # 2x10  Leg press SL 80# 2x10    NP:  EIS 2x10    manual therapy techniques: joint mobs applied to the: B LB  for 10 minutes, including:    FDN. Educated pt to use heat following treatment sessions if pt is experiencing pain or soreness. Pt verbalized good understanding of education. Pt signed written consent to dry needling. Pt gave verbal consent for DN     Pt received dry needling to the below listed muscles using 50 mm needles.     B Lumbar paraspinals L1-L5    Neuromuscular re-education activities to improve: Balance, Coordination, Kinesthetic, Proprioception, and Posture for 20 minutes. The following activities were included:    Sit to stands GTB around knees 2x10  Side stepping GTB 2x12 steps  Monster walks GTB 2x12 steps  Single leg balance 3x30" R only  Step ups fwd/lateral  6 in step 2x10x3" ea R only   Medx trunk extension 74# x20  Medx trunk rotation 34# x20 increase weight   Heel raises 2x15 with 10# KB alternating hands      therapeutic activities to improve functional performance for 10  minutes, including:      cold pack for 5 minutes to R knee and low back    Patient Education and Home Exercises     Education provided:   - HEP, POC    Written Home " Exercises Provided: Patient instructed to cont prior HEP. Exercises were reviewed and Tiarra was able to demonstrate them prior to the end of the session.  Tiarra demonstrated good  understanding of the education provided. See Electronic Medical Record under Patient Instructions for exercises provided during therapy sessions    Assessment   Tiarra presents today with continued reports of pain. Reassessment performed with pt continue to demo deficits in strength of core, lumbar, and B LE musculature, decreased range of motion of trunk and back, gait deviations, balance deficits, and decreased functional mobility tolerance. Will continue to benefit from skilled therapy to address described deficits.     Tiarra Is progressing well towards her goals.   Patient prognosis is Good.     Patient will continue to benefit from skilled outpatient physical therapy to address the deficits listed in the problem list box on initial evaluation, provide pt/family education and to maximize pt's level of independence in the home and community environment.     Patient's spiritual, cultural and educational needs considered and pt agreeable to plan of care and goals.     Anticipated barriers to physical therapy: chronicity of condition, multiple treatment areas, hx of R TKA and menisectomy     GOALS: Short Term Goals:  4 weeks  1. Report decreased in pain at worse less than  <   / =  4  /10  to increase tolerance for functional activities.On going  2. Pt to increase B popliteal angle by greater than > or = 5 degrees in order to improve flexibility and posture. MET  3. Increased R LE MMT 1/2  to increase tolerance for ADL and work activities. MET  4. Pt to increase B Ely's Test by greater than  > or = 5degrees in order to improve flexibility and posture. MET  5. Pt to tolerate HEP to improve ROM and independence with ADL's. MET  6. Pt to improve range of motion by 25% to allow for improved functional mobility to allow for improvement in  IADLs. MET     Long Term Goals: 8 weeks  1. Report decreased in pain at worse less than  <   / =  2  /10  to increase tolerance for functional mobility.  On going  2 .Pt to increase B popliteal angle by greater than > or = 10 degrees in order to improve flexibility and posture. On going  3. Increased R LE MMT 1 grade to increase tolerance for ADL and work activities.On going  4. Pt will report at 51% or better score on FOTO Lumbar spine survey  to demonstrate decrease in disability and improvement in back pain.On going  5. Pt to be Independent with HEP to improve ROM and independence with ADL's. On going  6. Pt to increase B Ely's Test by greater than > or = 10 degrees in order to improve flexibility and posture. On going  7. Pt to demonstrate negative Bridge Test in order to show improved core strength for lumbar stabilization. On going  8. Pt to improve range of motion by 75% to allow for improved functional mobility to allow for improvement in IADLs. On going        Plan   Plan of care Certification: 5/9/2024 to 11/12/2024     Outpatient Physical Therapy 2 times weekly for 10 weeks to include the following interventions: Cervical/Lumbar Traction, Gait Training, Manual Therapy, Moist Heat/ Ice, Neuromuscular Re-ed, Patient Education, Self Care, Therapeutic Activities, and Therapeutic Exercise. Dry aubree Anaya, PT   10/23/2024

## 2024-10-24 ENCOUNTER — CLINICAL SUPPORT (OUTPATIENT)
Dept: REHABILITATION | Facility: OTHER | Age: 69
End: 2024-10-24
Payer: MEDICARE

## 2024-10-24 ENCOUNTER — PATIENT MESSAGE (OUTPATIENT)
Dept: INTERNAL MEDICINE | Facility: CLINIC | Age: 69
End: 2024-10-24
Payer: MEDICARE

## 2024-10-24 ENCOUNTER — LAB VISIT (OUTPATIENT)
Dept: LAB | Facility: HOSPITAL | Age: 69
End: 2024-10-24
Attending: INTERNAL MEDICINE
Payer: MEDICARE

## 2024-10-24 DIAGNOSIS — I10 HYPERTENSION, UNSPECIFIED TYPE: ICD-10-CM

## 2024-10-24 DIAGNOSIS — R29.898 DECREASED STRENGTH OF TRUNK AND BACK: ICD-10-CM

## 2024-10-24 DIAGNOSIS — R29.898 WEAKNESS OF RIGHT LOWER EXTREMITY: ICD-10-CM

## 2024-10-24 DIAGNOSIS — M25.661 DECREASED RANGE OF MOTION (ROM) OF RIGHT KNEE: Primary | ICD-10-CM

## 2024-10-24 DIAGNOSIS — M62.81 WEAKNESS OF TRUNK MUSCULATURE: ICD-10-CM

## 2024-10-24 DIAGNOSIS — M25.69 DECREASED RANGE OF MOTION OF TRUNK AND BACK: ICD-10-CM

## 2024-10-24 LAB
ANION GAP SERPL CALC-SCNC: 10 MMOL/L (ref 8–16)
BUN SERPL-MCNC: 12 MG/DL (ref 8–23)
CALCIUM SERPL-MCNC: 10.4 MG/DL (ref 8.7–10.5)
CHLORIDE SERPL-SCNC: 90 MMOL/L (ref 95–110)
CO2 SERPL-SCNC: 28 MMOL/L (ref 23–29)
CREAT SERPL-MCNC: 0.9 MG/DL (ref 0.5–1.4)
EST. GFR  (NO RACE VARIABLE): >60 ML/MIN/1.73 M^2
GLUCOSE SERPL-MCNC: 92 MG/DL (ref 70–110)
POTASSIUM SERPL-SCNC: 3.4 MMOL/L (ref 3.5–5.1)
SODIUM SERPL-SCNC: 128 MMOL/L (ref 136–145)

## 2024-10-24 PROCEDURE — 80048 BASIC METABOLIC PNL TOTAL CA: CPT | Performed by: INTERNAL MEDICINE

## 2024-10-24 PROCEDURE — 97110 THERAPEUTIC EXERCISES: CPT

## 2024-10-24 PROCEDURE — 36415 COLL VENOUS BLD VENIPUNCTURE: CPT | Mod: PN | Performed by: INTERNAL MEDICINE

## 2024-10-24 PROCEDURE — 97112 NEUROMUSCULAR REEDUCATION: CPT

## 2024-10-24 NOTE — PROGRESS NOTES
[facility]   Physical Therapy Progress Note      Name: Tiarra Norton  [facility] Number: [id number]    Therapy Diagnosis:   Encounter Diagnoses   Name Primary?    Decreased range of motion (ROM) of right knee Yes    Weakness of right lower extremity     Decreased range of motion of trunk and back     Decreased strength of trunk and back     Weakness of trunk musculature        Physician: Virgil Scott MD    Visit Date: 10/17/2024    Physician Orders: PT Eval and Treat   Medical Diagnosis from Referral:   Z96.651 (ICD-10-CM) - Status post total right knee replacement   M22.2X1 (ICD-10-CM) - Patellofemoral pain syndrome of right knee   M70.50 (ICD-10-CM) - Pes anserine bursitis      Evaluation Date: 12/21/2023  Authorization Period Expiration: 12/31/2024  Plan of Care Expiration: Updated to 11/12/2024  Visit # / Visits authorized: 39/50   Progress Note Due: 11/15/2024  FOTO: 3/4      Precautions: hx of TKA and menisectomy, spinal cord stimulator, scoliosis     Time In: 10:30 am  Time Out: 11:30 am   Total Billable Time: 30 minutes (1:1)  Total Appointment Time (timed & untimed codes): 60 minutes    PTA Visit #: 0/5   Subjective   Patient reports: been feeling better since last visit.     She was compliant with home exercise program.  Response to previous treatment: no adverse effects   Functional change: improved tolerance to functional mobility activities with decreased pain    Pain: 5/10 R knee, 6/10 LB  Location: R low back and R knee   Objective       Updated 10/15/2024     Lumbar Range of Motion:     %   Flexion 90      Extension 70      Left Side Bending 60   Right Side Bending 60   Left rotation    70   Right Rotation    80    *= pain         Range of Motion:   Knee Left active Left Passive   Flexion 133 140   Extension 3 5      Knee Right active Right Passive   Flexion 121 124   Extension 1 3         Lower Extremity Strength     Right LE   Left LE     Hip flexion: 4/5 Hip  "flexion: 4+/5   Knee extension: 4/5 Knee extension: 4+/5   Knee flexion: 4/5 Knee flexion: 4+/5   Hip IR: 4/5 Hip IR: 4/5   Hip ER: 4/5 Hip ER: 4/5   Hip extension:  4-/5 Hip extension: 4-/5   Hip abduction: 4-/5 Hip abduction: 4/5   Hip adduction: 4-/5 Hip adduction 4/5   Ankle dorsiflexion: 5/5 Ankle dorsiflexion: 5/5   Ankle plantarflexion: 5/5 Ankle plantarflexion: 5/5           Treatment   Tiarra received the treatments listed below:      Therapeutic exercises to develop strength, endurance, ROM, flexibility, posture, and core stabilization for 30 minutes including:    Recumbent bike lv. 4 x 5 min for endurance and knee ROM     PPT 15x5"  Bridge 2x10x3"  EIL 2x10  DKTC 15x5"'  Open books x15,3" ea  Hip flexor stretch 2x1' ea  Leg press # 2x10  Leg press SL 80# 2x10    NP:  EIS 2x10    manual therapy techniques: joint mobs applied to the: B LB  for 0 minutes, including:    FDN. Educated pt to use heat following treatment sessions if pt is experiencing pain or soreness. Pt verbalized good understanding of education. Pt signed written consent to dry needling. Pt gave verbal consent for DN     Pt received dry needling to the below listed muscles using 50 mm needles.     B Lumbar paraspinals L1-L5    Neuromuscular re-education activities to improve: Balance, Coordination, Kinesthetic, Proprioception, and Posture for 25 minutes. The following activities were included:    Sit to stands GTB around knees 2x10  Side stepping GTB 2x12 steps  Monster walks GTB 2x12 steps  Single leg balance 3x30" R only  Step ups fwd/lateral  6 in step 2x10x3" ea R only   Medx trunk extension 74# x20  Medx trunk rotation 34# x20 increase weight   Heel raises 2x15 with 10# KB alternating hands      therapeutic activities to improve functional performance for 0  minutes, including:      cold pack for 5 minutes to R knee and low back    Patient Education and Home Exercises     Education provided:   - HEP, POC    Written Home Exercises " Provided: Patient instructed to cont prior HEP. Exercises were reviewed and Tiarra was able to demonstrate them prior to the end of the session.  Tiarra demonstrated good  understanding of the education provided. See Electronic Medical Record under Patient Instructions for exercises provided during therapy sessions    Assessment   Tiarra presents today with reports of improvement in pain levels since last visit. Continuation and progression of lumbopelvic mobility and core and LE strengthening exercises with good tolerance and no adverse effects. Continue to progress as able.     Tiarra Is progressing well towards her goals.   Patient prognosis is Good.     Patient will continue to benefit from skilled outpatient physical therapy to address the deficits listed in the problem list box on initial evaluation, provide pt/family education and to maximize pt's level of independence in the home and community environment.     Patient's spiritual, cultural and educational needs considered and pt agreeable to plan of care and goals.     Anticipated barriers to physical therapy: chronicity of condition, multiple treatment areas, hx of R TKA and menisectomy     GOALS: Short Term Goals:  4 weeks  1. Report decreased in pain at worse less than  <   / =  4  /10  to increase tolerance for functional activities.On going  2. Pt to increase B popliteal angle by greater than > or = 5 degrees in order to improve flexibility and posture. MET  3. Increased R LE MMT 1/2  to increase tolerance for ADL and work activities. MET  4. Pt to increase B Ely's Test by greater than  > or = 5degrees in order to improve flexibility and posture. MET  5. Pt to tolerate HEP to improve ROM and independence with ADL's. MET  6. Pt to improve range of motion by 25% to allow for improved functional mobility to allow for improvement in IADLs. MET     Long Term Goals: 8 weeks  1. Report decreased in pain at worse less than  <   / =  2  /10  to increase tolerance  for functional mobility.  On going  2 .Pt to increase B popliteal angle by greater than > or = 10 degrees in order to improve flexibility and posture. On going  3. Increased R LE MMT 1 grade to increase tolerance for ADL and work activities.On going  4. Pt will report at 51% or better score on FOTO Lumbar spine survey  to demonstrate decrease in disability and improvement in back pain.On going  5. Pt to be Independent with HEP to improve ROM and independence with ADL's. On going  6. Pt to increase B Ely's Test by greater than > or = 10 degrees in order to improve flexibility and posture. On going  7. Pt to demonstrate negative Bridge Test in order to show improved core strength for lumbar stabilization. On going  8. Pt to improve range of motion by 75% to allow for improved functional mobility to allow for improvement in IADLs. On going        Plan   Plan of care Certification: 5/9/2024 to 11/12/2024     Outpatient Physical Therapy 2 times weekly for 10 weeks to include the following interventions: Cervical/Lumbar Traction, Gait Training, Manual Therapy, Moist Heat/ Ice, Neuromuscular Re-ed, Patient Education, Self Care, Therapeutic Activities, and Therapeutic Exercise. Dry aubree Anaya, PT   10/24/2024

## 2024-10-25 ENCOUNTER — E-VISIT (OUTPATIENT)
Dept: PRIMARY CARE CLINIC | Facility: CLINIC | Age: 69
End: 2024-10-25
Payer: MEDICARE

## 2024-10-25 ENCOUNTER — PATIENT MESSAGE (OUTPATIENT)
Dept: INTERNAL MEDICINE | Facility: CLINIC | Age: 69
End: 2024-10-25
Payer: MEDICARE

## 2024-10-25 DIAGNOSIS — R30.0 DYSURIA: Primary | ICD-10-CM

## 2024-10-25 DIAGNOSIS — E87.1 HYPONATREMIA: Primary | ICD-10-CM

## 2024-10-25 DIAGNOSIS — I10 HYPERTENSION, UNSPECIFIED TYPE: ICD-10-CM

## 2024-10-25 RX ORDER — SPIRONOLACTONE 25 MG/1
25 TABLET ORAL DAILY
Qty: 90 TABLET | Refills: 0 | Status: SHIPPED | OUTPATIENT
Start: 2024-10-25 | End: 2025-10-25

## 2024-10-25 RX ORDER — NITROFURANTOIN 25; 75 MG/1; MG/1
100 CAPSULE ORAL 2 TIMES DAILY
Qty: 10 CAPSULE | Refills: 0 | Status: SHIPPED | OUTPATIENT
Start: 2024-10-25

## 2024-10-25 NOTE — PROGRESS NOTES
Patient ID: Tiarra Norton is a 69 y.o. female.    Chief Complaint: General Illness (Entered automatically based on patient selection in [facility].)    The patient initiated a request through [facility] on 10/25/2024 for evaluation and management with a chief complaint of General Illness (Entered automatically based on patient selection in [facility].)     I evaluated the questionnaire submission on 10/25/24.    [facility]    10/25/2024  5:46 AM CDT - Filed by Patient   What do you need help with? Urinary Symptoms   Do you agree to participate in an E-Visit? Yes   If you have any of the following symptoms, please present to your local emergency room or call 911:  I acknowledge   What is the main issue you would like addressed today? Possible UTI   What symptoms do you currently have? Pain while passing urine   When did your symptoms first appear? 10/18/2024   List what you have done or taken to help your symptoms. Hydrate   Please indicate whether you have had the following symptoms during the past 24 hours     Urgent urination (a sudden and uncontrollable urge to urinate) Moderate   Frequent urination of small amounts of urine (going to the toilet very often) Moderate   Burning pain when urinating Moderate   Incomplete bladder emptying (still feel like you need to urinate again after urination) Mild   Pain not associated with urination in the lower abdomen below the belly button) Mild   What does your urine look like? Clear   Blood seen in the urine None   Flank pain (pain in one or both sides of the lower back) None   Abnormal Vaginal Discharge (abnormal amount, color and/or odor) None   Discharge from the urethra (urinary opening) without urination None   High body temperature/fever? None-<99.5   Please rate how much discomfort you have experience because of the symptoms in the past 24 hours: Moderate   Please indicate how the symptoms have interfered with your every day activities/work  in the past 24 hours: Mild   Please indicate how these symptoms have interfered with your social activities (visiting people, meeting with friends, etc.) in the past 24 hours? Mild   Are you a diabetic? No   Please indicate whether you have the following at the time of completion of this questionnaire: Signs of menopause syndrome (hot flashes)   Provide any additional information you feel is important.    Please attach any relevant images or files (if you have performed a home test for UTI, please submit a photo of results)    Are you able to take your vital signs? Yes   Systolic Blood Pressure: 135   Diastolic Blood Pressure: 75   Weight: 143   Height: 63   Pulse: 55   Temperature: 98   Respiration rate: 75   Pulse Oxygen:          Encounter Diagnosis   Name Primary?    Dysuria Yes        Orders Placed This Encounter   Procedures    Urinalysis, Reflex to Urine Culture Urine, Clean Catch     Order Specific Question:   Preferred Collection Type     Answer:   Urine, Clean Catch     Order Specific Question:   Specimen Source     Answer:   Urine     Order Specific Question:   Send normal result to authorizing provider's In Basket if patient is active on MyChart:     Answer:   Yes      Medications Ordered This Encounter   Medications    nitrofurantoin, macrocrystal-monohydrate, (MACROBID) 100 MG capsule     Sig: Take 1 capsule (100 mg total) by mouth 2 (two) times daily.     Dispense:  10 capsule     Refill:  0        No follow-ups on file.      E-Visit Time Tracking:            Five minutes was spent with this visit.

## 2024-10-28 ENCOUNTER — PATIENT MESSAGE (OUTPATIENT)
Dept: PSYCHIATRY | Facility: CLINIC | Age: 69
End: 2024-10-28
Payer: COMMERCIAL

## 2024-10-28 ENCOUNTER — CLINICAL SUPPORT (OUTPATIENT)
Dept: PSYCHIATRY | Facility: CLINIC | Age: 69
End: 2024-10-28
Payer: MEDICARE

## 2024-10-28 ENCOUNTER — CLINICAL SUPPORT (OUTPATIENT)
Dept: REHABILITATION | Facility: OTHER | Age: 69
End: 2024-10-28
Payer: MEDICARE

## 2024-10-28 DIAGNOSIS — R29.898 DECREASED STRENGTH OF TRUNK AND BACK: ICD-10-CM

## 2024-10-28 DIAGNOSIS — R29.898 WEAKNESS OF RIGHT LOWER EXTREMITY: ICD-10-CM

## 2024-10-28 DIAGNOSIS — F41.9 ANXIETY: Primary | ICD-10-CM

## 2024-10-28 DIAGNOSIS — M25.661 DECREASED RANGE OF MOTION (ROM) OF RIGHT KNEE: Primary | ICD-10-CM

## 2024-10-28 DIAGNOSIS — F33.2 MAJOR DEPRESSIVE DISORDER, RECURRENT SEVERE WITHOUT PSYCHOTIC FEATURES: ICD-10-CM

## 2024-10-28 DIAGNOSIS — M62.81 WEAKNESS OF TRUNK MUSCULATURE: ICD-10-CM

## 2024-10-28 DIAGNOSIS — M25.69 DECREASED RANGE OF MOTION OF TRUNK AND BACK: ICD-10-CM

## 2024-10-28 PROCEDURE — 97112 NEUROMUSCULAR REEDUCATION: CPT

## 2024-10-28 PROCEDURE — 99499 UNLISTED E&M SERVICE: CPT | Mod: 95,,, | Performed by: PSYCHOLOGIST

## 2024-10-28 PROCEDURE — 97140 MANUAL THERAPY 1/> REGIONS: CPT

## 2024-10-29 ENCOUNTER — TELEPHONE (OUTPATIENT)
Dept: INTERNAL MEDICINE | Facility: CLINIC | Age: 69
End: 2024-10-29
Payer: MEDICARE

## 2024-10-29 ENCOUNTER — PATIENT MESSAGE (OUTPATIENT)
Dept: INTERNAL MEDICINE | Facility: CLINIC | Age: 69
End: 2024-10-29
Payer: MEDICARE

## 2024-10-29 ENCOUNTER — RESEARCH ENCOUNTER (OUTPATIENT)
Dept: RESEARCH | Facility: OTHER | Age: 69
End: 2024-10-29
Payer: MEDICARE

## 2024-10-29 ENCOUNTER — OFFICE VISIT (OUTPATIENT)
Dept: OTOLARYNGOLOGY | Facility: CLINIC | Age: 69
End: 2024-10-29
Payer: MEDICARE

## 2024-10-29 ENCOUNTER — CLINICAL SUPPORT (OUTPATIENT)
Dept: OTOLARYNGOLOGY | Facility: CLINIC | Age: 69
End: 2024-10-29
Payer: MEDICARE

## 2024-10-29 VITALS
WEIGHT: 149.94 LBS | BODY MASS INDEX: 26.56 KG/M2 | HEART RATE: 75 BPM | DIASTOLIC BLOOD PRESSURE: 83 MMHG | SYSTOLIC BLOOD PRESSURE: 128 MMHG

## 2024-10-29 DIAGNOSIS — Z82.2 FAMILY HISTORY OF HEARING LOSS: ICD-10-CM

## 2024-10-29 DIAGNOSIS — H90.3 SENSORINEURAL HEARING LOSS (SNHL) OF BOTH EARS: Primary | ICD-10-CM

## 2024-10-29 DIAGNOSIS — R29.2 ABNORMAL ACOUSTIC REFLEX: ICD-10-CM

## 2024-10-29 DIAGNOSIS — Z77.122 HISTORY OF EXPOSURE TO NOISE: ICD-10-CM

## 2024-10-29 DIAGNOSIS — H93.13 BILATERAL TINNITUS: ICD-10-CM

## 2024-10-29 DIAGNOSIS — H90.3 BILATERAL SENSORINEURAL HEARING LOSS: Primary | ICD-10-CM

## 2024-10-29 DIAGNOSIS — H93.13 TINNITUS OF BOTH EARS: ICD-10-CM

## 2024-10-29 PROCEDURE — 92550 TYMPANOMETRY & REFLEX THRESH: CPT | Mod: S$GLB,,, | Performed by: AUDIOLOGIST-HEARING AID FITTER

## 2024-10-29 PROCEDURE — 92557 COMPREHENSIVE HEARING TEST: CPT | Mod: S$GLB,,, | Performed by: AUDIOLOGIST-HEARING AID FITTER

## 2024-10-29 PROCEDURE — 99214 OFFICE O/P EST MOD 30 MIN: CPT | Mod: S$GLB,,, | Performed by: STUDENT IN AN ORGANIZED HEALTH CARE EDUCATION/TRAINING PROGRAM

## 2024-10-30 ENCOUNTER — PATIENT MESSAGE (OUTPATIENT)
Dept: INTERNAL MEDICINE | Facility: CLINIC | Age: 69
End: 2024-10-30
Payer: COMMERCIAL

## 2024-10-31 ENCOUNTER — CLINICAL SUPPORT (OUTPATIENT)
Dept: REHABILITATION | Facility: OTHER | Age: 69
End: 2024-10-31
Payer: MEDICARE

## 2024-10-31 DIAGNOSIS — R29.898 WEAKNESS OF RIGHT LOWER EXTREMITY: ICD-10-CM

## 2024-10-31 DIAGNOSIS — M62.81 WEAKNESS OF TRUNK MUSCULATURE: ICD-10-CM

## 2024-10-31 DIAGNOSIS — M25.69 DECREASED RANGE OF MOTION OF TRUNK AND BACK: ICD-10-CM

## 2024-10-31 DIAGNOSIS — R29.898 DECREASED STRENGTH OF TRUNK AND BACK: ICD-10-CM

## 2024-10-31 DIAGNOSIS — M25.661 DECREASED RANGE OF MOTION (ROM) OF RIGHT KNEE: Primary | ICD-10-CM

## 2024-10-31 PROCEDURE — 97112 NEUROMUSCULAR REEDUCATION: CPT

## 2024-10-31 PROCEDURE — 97110 THERAPEUTIC EXERCISES: CPT

## 2024-10-31 NOTE — PROGRESS NOTES
[facility]   Physical Therapy Treatment Note      Name: Tiarra Norton  [facility] Number: [id number]    Therapy Diagnosis:   Encounter Diagnoses   Name Primary?    Decreased range of motion (ROM) of right knee Yes    Weakness of right lower extremity     Decreased range of motion of trunk and back     Decreased strength of trunk and back     Weakness of trunk musculature          Physician: Virgil Scott MD    Visit Date: 10/31/2024    Physician Orders: PT Eval and Treat   Medical Diagnosis from Referral:   Z96.651 (ICD-10-CM) - Status post total right knee replacement   M22.2X1 (ICD-10-CM) - Patellofemoral pain syndrome of right knee   M70.50 (ICD-10-CM) - Pes anserine bursitis      Evaluation Date: 12/21/2023  Authorization Period Expiration: 12/31/2024  Plan of Care Expiration: Updated to 11/12/2024  Visit # / Visits authorized: 43/50   Progress Note Due: 11/15/2024  FOTO: 3/4      Precautions: hx of TKA and menisectomy, spinal cord stimulator, scoliosis     Time In: 1030 am  Time Out: 1130 am   Total Billable Time: 30 minutes (1:1)  Total Appointment Time (timed & untimed codes): 60 minutes    PTA Visit #: 0/5   Subjective   Patient reports: the needling really helped last visit. Pain is better today    She was compliant with home exercise program.  Response to previous treatment: no adverse effects   Functional change: improved tolerance to functional mobility activities with decreased pain    Pain: 5/10 R knee, 6/10 LB  Location: R low back and R knee   Objective       Updated 10/15/2024     Lumbar Range of Motion:     %   Flexion 90      Extension 70      Left Side Bending 60   Right Side Bending 60   Left rotation    70   Right Rotation    80    *= pain         Range of Motion:   Knee Left active Left Passive   Flexion 133 140   Extension 3 5      Knee Right active Right Passive   Flexion 121 124   Extension 1 3         Lower Extremity Strength     Right LE   Left LE     Hip  "flexion: 4/5 Hip flexion: 4+/5   Knee extension: 4/5 Knee extension: 4+/5   Knee flexion: 4/5 Knee flexion: 4+/5   Hip IR: 4/5 Hip IR: 4/5   Hip ER: 4/5 Hip ER: 4/5   Hip extension:  4-/5 Hip extension: 4-/5   Hip abduction: 4-/5 Hip abduction: 4/5   Hip adduction: 4-/5 Hip adduction 4/5   Ankle dorsiflexion: 5/5 Ankle dorsiflexion: 5/5   Ankle plantarflexion: 5/5 Ankle plantarflexion: 5/5           Treatment   Tiarra received the treatments listed below:      Therapeutic exercises to develop strength, endurance, ROM, flexibility, posture, and core stabilization for 30 minutes including:    Recumbent bike lv. 4 x 5 min for endurance and knee ROM     PPT 15x5"  Bridge 2x10x3"  EIL 2x10  DKTC 15x5"'  Open books x15,3" ea  Hip flexor stretch 2x1' ea  Leg press # 2x10  Leg press SL 80# 2x10  EIS 2x10    manual therapy techniques: joint mobs applied to the: B LB  for 0 minutes, including:        Neuromuscular re-education activities to improve: Balance, Coordination, Kinesthetic, Proprioception, and Posture for 25 minutes. The following activities were included:    Sit to stands GTB around knees 2x10  Side stepping GTB 2x12 steps  Monster walks GTB 2x12 steps  Single leg balance 3x30" R only  Step ups fwd/lateral  6 in step 2x10x3" ea R only   Medx trunk extension 74# x20  Medx trunk rotation 34# x20 increase weight   Heel raises 2x15 with 10# KB alternating hands      therapeutic activities to improve functional performance for 0  minutes, including:      cold pack for 5 minutes to R knee and low back    Patient Education and Home Exercises     Education provided:   - HEP, POC    Written Home Exercises Provided: Patient instructed to cont prior HEP. Exercises were reviewed and Tiarra was able to demonstrate them prior to the end of the session.  Tiarra demonstrated good  understanding of the education provided. See Electronic Medical Record under Patient Instructions for exercises provided during therapy " sessions    Assessment   Tiarra presents today with reports of improvement in pain levels since last visit. Continuation and progression of dynamic mobility, flexibility, and strengthening exercises with good tolerance and no adverse effects. Continue to progress as able.     Tiarra Is progressing well towards her goals.   Patient prognosis is Good.     Patient will continue to benefit from skilled outpatient physical therapy to address the deficits listed in the problem list box on initial evaluation, provide pt/family education and to maximize pt's level of independence in the home and community environment.     Patient's spiritual, cultural and educational needs considered and pt agreeable to plan of care and goals.     Anticipated barriers to physical therapy: chronicity of condition, multiple treatment areas, hx of R TKA and menisectomy     GOALS: Short Term Goals:  4 weeks  1. Report decreased in pain at worse less than  <   / =  4  /10  to increase tolerance for functional activities.On going  2. Pt to increase B popliteal angle by greater than > or = 5 degrees in order to improve flexibility and posture. MET  3. Increased R LE MMT 1/2  to increase tolerance for ADL and work activities. MET  4. Pt to increase B Ely's Test by greater than  > or = 5degrees in order to improve flexibility and posture. MET  5. Pt to tolerate HEP to improve ROM and independence with ADL's. MET  6. Pt to improve range of motion by 25% to allow for improved functional mobility to allow for improvement in IADLs. MET     Long Term Goals: 8 weeks  1. Report decreased in pain at worse less than  <   / =  2  /10  to increase tolerance for functional mobility.  On going  2 .Pt to increase B popliteal angle by greater than > or = 10 degrees in order to improve flexibility and posture. On going  3. Increased R LE MMT 1 grade to increase tolerance for ADL and work activities.On going  4. Pt will report at 51% or better score on FOTO Lumbar  spine survey  to demonstrate decrease in disability and improvement in back pain.On going  5. Pt to be Independent with HEP to improve ROM and independence with ADL's. On going  6. Pt to increase B Ely's Test by greater than > or = 10 degrees in order to improve flexibility and posture. On going  7. Pt to demonstrate negative Bridge Test in order to show improved core strength for lumbar stabilization. On going  8. Pt to improve range of motion by 75% to allow for improved functional mobility to allow for improvement in IADLs. On going        Plan   Plan of care Certification: 5/9/2024 to 11/12/2024     Outpatient Physical Therapy 2 times weekly for 10 weeks to include the following interventions: Cervical/Lumbar Traction, Gait Training, Manual Therapy, Moist Heat/ Ice, Neuromuscular Re-ed, Patient Education, Self Care, Therapeutic Activities, and Therapeutic Exercise. Marybeth Anaya, PT   10/31/2024

## 2024-11-01 ENCOUNTER — PATIENT MESSAGE (OUTPATIENT)
Dept: INTERNAL MEDICINE | Facility: CLINIC | Age: 69
End: 2024-11-01
Payer: MEDICARE

## 2024-11-01 ENCOUNTER — PATIENT MESSAGE (OUTPATIENT)
Dept: PAIN MEDICINE | Facility: CLINIC | Age: 69
End: 2024-11-01
Payer: MEDICARE

## 2024-11-01 ENCOUNTER — OFFICE VISIT (OUTPATIENT)
Dept: INTERNAL MEDICINE | Facility: CLINIC | Age: 69
End: 2024-11-01
Payer: MEDICARE

## 2024-11-01 VITALS
HEIGHT: 63 IN | DIASTOLIC BLOOD PRESSURE: 68 MMHG | HEART RATE: 82 BPM | SYSTOLIC BLOOD PRESSURE: 130 MMHG | TEMPERATURE: 98 F | BODY MASS INDEX: 25.78 KG/M2 | OXYGEN SATURATION: 98 % | WEIGHT: 145.5 LBS

## 2024-11-01 DIAGNOSIS — Z96.89 SPINAL CORD STIMULATOR STATUS: ICD-10-CM

## 2024-11-01 DIAGNOSIS — Z96.651 HISTORY OF TOTAL KNEE ARTHROPLASTY, RIGHT: ICD-10-CM

## 2024-11-01 DIAGNOSIS — M41.25 OTHER IDIOPATHIC SCOLIOSIS, THORACOLUMBAR REGION: ICD-10-CM

## 2024-11-01 DIAGNOSIS — G89.29 CHRONIC PAIN OF RIGHT KNEE: ICD-10-CM

## 2024-11-01 DIAGNOSIS — G89.4 CHRONIC PAIN SYNDROME: ICD-10-CM

## 2024-11-01 DIAGNOSIS — R21 RASH: Primary | ICD-10-CM

## 2024-11-01 DIAGNOSIS — M51.369 DDD (DEGENERATIVE DISC DISEASE), LUMBAR: ICD-10-CM

## 2024-11-01 DIAGNOSIS — M25.561 CHRONIC PAIN OF RIGHT KNEE: ICD-10-CM

## 2024-11-01 PROCEDURE — 99213 OFFICE O/P EST LOW 20 MIN: CPT | Mod: PBBFAC,PO | Performed by: INTERNAL MEDICINE

## 2024-11-01 PROCEDURE — 99214 OFFICE O/P EST MOD 30 MIN: CPT | Mod: S$PBB,,, | Performed by: INTERNAL MEDICINE

## 2024-11-01 PROCEDURE — 99999 PR PBB SHADOW E&M-EST. PATIENT-LVL III: CPT | Mod: PBBFAC,,, | Performed by: INTERNAL MEDICINE

## 2024-11-01 PROCEDURE — G2211 COMPLEX E/M VISIT ADD ON: HCPCS | Mod: S$PBB,,, | Performed by: INTERNAL MEDICINE

## 2024-11-01 RX ORDER — LEVOTHYROXINE SODIUM 100 UG/1
TABLET ORAL
Qty: 90 TABLET | Refills: 3 | Status: SHIPPED | OUTPATIENT
Start: 2024-11-01

## 2024-11-01 RX ORDER — MUPIROCIN 20 MG/G
OINTMENT TOPICAL 2 TIMES DAILY
Qty: 15 G | Refills: 1 | Status: SHIPPED | OUTPATIENT
Start: 2024-11-01

## 2024-11-01 RX ORDER — VALACYCLOVIR HYDROCHLORIDE 500 MG/1
500 TABLET, FILM COATED ORAL 2 TIMES DAILY
Qty: 14 TABLET | Refills: 0 | Status: SHIPPED | OUTPATIENT
Start: 2024-11-01 | End: 2024-11-08

## 2024-11-01 RX ORDER — HYDROCODONE BITARTRATE AND ACETAMINOPHEN 5; 325 MG/1; MG/1
1 TABLET ORAL EVERY 12 HOURS PRN
Qty: 60 TABLET | Refills: 0 | Status: SHIPPED | OUTPATIENT
Start: 2024-11-07 | End: 2024-12-07

## 2024-11-01 NOTE — PROGRESS NOTES
"History of Present Illness    CHIEF COMPLAINT:  Tiarra presents today for evaluation of a rash on her right foot.    DERMATOLOGICAL CONCERN:  She reports a rash on her right foot present for approximately one week. The rash is described as oozing and itchy. She initially thought it was a bug bite, but notes that it has elongated since its initial appearance. She denies any pain associated with the rash or any itching before its appearance. No known trauma to the area is reported.    MEDICAL HISTORY:  She has a history of shingles, chicken pox, Irritable Bowel Syndrome (IBS), knee replacement, migraines, and back pain. She describes her back pain as "really bad" and takes medication for it as needed. She uses medication in combination with Imodium to manage IBS symptoms. She has had one knee replacement.    MEDICATIONS:  Current medications include Valsartan 320 mg daily, retina cream, Trazodone, Tizanidine PRN for back pain, Imitrex for migraines, Spironolactone, Phenergan PRN for nausea, Synthroid 100 mcg, Hydrocodone PRN for back pain, Lexapro, Atorvastatin for cholesterol, Amlodipine, and Xanax at least daily (reports weaning off). She has Lotrinx for IBS but has not yet taken it. Recently completed medications include Macrobid and Doxycycline.    ALLERGIES:  She denies any medication allergies.    SOCIAL HISTORY:  She is a non-smoker and is .      ROS:  Musculoskeletal: +back pain  Integumentary: +rash, +itching  Allergic: -allergic reactions       PAST MEDICAL HISTORY:  Past Medical History:   Diagnosis Date    Cataract     Depression     Gestational diabetes     Hyperlipidemia     Hypertension     IBS (irritable bowel syndrome)     Thyroid disease        SURGICAL HISTORY:  Past Surgical History:   Procedure Laterality Date    ADENOIDECTOMY      ARTHROPLASTY, KNEE, TOTAL, USING COMPUTER-ASSISTED NAVIGATION Right 5/18/2023    Procedure: CONVERSION ARTHROPLASTY, KNEE, TOTAL, USING COMPUTER-ASSISTED " NAVIGATION;  Surgeon: Virgil Scott MD;  Location: [facility] OR;  Service: Orthopedics;  Laterality: Right;  Same day discharge    ARTHROSCOPIC CHONDROPLASTY OF KNEE JOINT Right 10/19/2021    Procedure: ARTHROSCOPY, KNEE, WITH CHONDROPLASTY;  Surgeon: Trevin Huber MD;  Location: [facility] OR;  Service: Orthopedics;  Laterality: Right;    ARTHROSCOPY OF KNEE Right 10/19/2021    Procedure: ARTHROSCOPY, KNEE-RIGHT;  Surgeon: Trevin Huber MD;  Location: [facility] OR;  Service: Orthopedics;  Laterality: Right;    BLOCK, NERVE, GENICULAR Right 2024    Procedure: BLOCK, NERVE RIGHT GENICULAR;  Surgeon: Shoaib Aguirre MD;  Location: [facility] PAIN MGT;  Service: Pain Management;  Laterality: Right;  [phone]     SECTION      CHOLECYSTECTOMY      COLONOSCOPY N/A 2016    Procedure: COLONOSCOPY;  Surgeon: Heri Segura MD;  Location: [facility] ([address] [place] [address]);  Service: Endoscopy;  Laterality: N/A;    COLONOSCOPY N/A 2019    Procedure: COLONOSCOPY;  Surgeon: Joe Pitts MD;  Location: [facility] ([address] [place] [address]);  Service: Endoscopy;  Laterality: N/A;    CYSTOSCOPY  2022    Procedure: CYSTOSCOPY;  Surgeon: Lenny Moore MD;  [address]  Service: Urology;;    ESOPHAGOGASTRODUODENOSCOPY N/A 2020    Procedure: EGD (ESOPHAGOGASTRODUODENOSCOPY);  Surgeon: Tolu Rivas MD;  [facility]  Service: Endoscopy;  Laterality: N/A;  Pt. moved to 9:15am arrival time.. Instructed to not have anything after midnight.EC    EYE SURGERY      cataract resection right    INJECTION OF ANESTHETIC AGENT AROUND NERVE Bilateral 2022    Procedure: Block, Nerve MEDIAL BRANCH BLOCK BILATERAL L3,4,5;  Surgeon: Tara Gibbs MD;  Location: [facility] PAIN [facility]GT;  Service: Pain Management;  Laterality: Bilateral;    INJECTION OF ANESTHETIC AGENT AROUND NERVE Bilateral 2/15/2022    Procedure: BLOCK, NERVE, L3*L4-L5 MEDIAL BR ANCH 2 OF 2;  Surgeon: Tara Gibbs MD;  Location:  [facility] PAIN [facility]GT;  Service: Pain Management;  Laterality: Bilateral;    INJECTION OF BOTULINUM TOXIN TYPE A  6/21/2022    Procedure: BOTULINUM TOXIN INJECTION 100 units;  Surgeon: Lenny Moore MD;  Location: [address];  Service: Urology;;    INSERTION, NEUROSTIMULATOR, SPINAL CORD N/A 1/9/2023    Procedure: SPINAL CORD STIMULATOR IMPLANT TCHO;  Surgeon: Shoaib Aguirre MD;  Location: [facility];  Service: Pain Management;  Laterality: N/A;    JOINT REPLACEMENT      partial right knee    KNEE ARTHROSCOPY W/ MENISCECTOMY Right 10/19/2021    Procedure: ARTHROSCOPY, KNEE, WITH MENISCECTOMY, PARTIAL LATERAL;  Surgeon: Trevin Huber MD;  Location: [facility];  Service: Orthopedics;  Laterality: Right;    r hand surgery      RADIOFREQUENCY ABLATION Right 3/8/2022    Procedure: RADIOFREQUENCY ABLATION, L3-L5 1 OF 2;  Surgeon: Tara Gibbs MD;  Location: [facility] PAIN MGT;  Service: Pain Management;  Laterality: Right;    RADIOFREQUENCY ABLATION Left 3/22/2022    Procedure: RADIOFREQUENCY ABLATION, l3-+l5 2 of 2;  Surgeon: Tara Gibbs MD;  Location: [facility] PAIN [facility]GT;  Service: Pain Management;  Laterality: Left;    RADIOFREQUENCY ABLATION Right 3/15/2024    Procedure: RADIOFREQUENCY ABLATION RIGHT GENICULAR;  Surgeon: Shoaib Aguirre MD;  Location: [facility] PAIN [facility]GT;  Service: Pain Management;  Laterality: Right;  [phone]    REMOVAL OF NEUROSTIMULATOR N/A 11/20/2023    Procedure: SCS IPG BATTERY REVISION;  Surgeon: Shoaib Aguirre MD;  Location: [facility];  Service: Pain Management;  Laterality: N/A;    TONSILLECTOMY      TOTAL KNEE ARTHROPLASTY      partial knee replacement    TRIAL OF SPINAL CORD NERVE STIMULATOR N/A 12/22/2022    Procedure: SPINAL CORD STIMULATOR TRIAL TCHO;  Surgeon: Shoaib Aguirre MD;  Location: [facility] PAIN [facility]GT;  Service: Pain Management;  Laterality: N/A;    TUBAL LIGATION         PE:   APPEARANCE: Well nourished, well developed, in no acute  distress.    CHEST: Lungs clear to auscultation with unlabored respirations.  CARDIOVASCULAR: Normal S1, S2. No murmurs. No carotid bruits. No pedal edema.  ABDOMEN: Bowel sounds normal. Not distended. Soft. No tenderness or masses.   SKIN:    PSYCHIATRIC: The patient is oriented to person, place, and time and has a pleasant affect.          ASSESSMENT/PLAN:  Tiarra was seen today for rash.    Diagnoses and all orders for this visit:    Rash  -     possible herpetic rash  -    start Valtrex    Other orders  -     valACYclovir (VALTREX) 500 MG tablet; Take 1 tablet (500 mg total) by mouth 2 (two) times daily. for 7 days  -     mupirocin (BACTROBAN) 2 % ointment; Apply topically 2 (two) times daily.        Suspected herpetic rash on patient's right foot based on vesicular appearance and distribution along nerve pathway  Considered shingles as less likely due to lack of pain and atypical presentation  Ordered antiviral medication to slow spread, dry out lesions, and prevent new ones  Prescribed topical antibiotic ointment as precaution against secondary bacterial infection  Documented rash with photograph for future comparison    HERPETIC RASH:  - Explained characteristics of herpetic rashes, including vesicular appearance on erythematous base.  - Discussed difference between typical shingles presentation and patient's current rash.  - Informed patient about herpetic viruses, including examples like chickenpox, shingles, and fever blisters.  - Tiarra to leave rash exposed when possible to allow drying.  - Tiarra to wash socks and clothing that come into contact with rash in hot water.  - Started Valtrex 500 mg twice daily for 7 days.  - Started Bactroban ointment to be applied if rash becomes erythematous and inflamed.  - Photograph of rash taken and added to patient's chart.  - Contact the office if rash continues to spread while taking antiviral medication.

## 2024-11-04 ENCOUNTER — PATIENT MESSAGE (OUTPATIENT)
Dept: INTERNAL MEDICINE | Facility: CLINIC | Age: 69
End: 2024-11-04
Payer: MEDICARE

## 2024-11-04 ENCOUNTER — PATIENT MESSAGE (OUTPATIENT)
Dept: OBSTETRICS AND GYNECOLOGY | Facility: CLINIC | Age: 69
End: 2024-11-04
Payer: MEDICARE

## 2024-11-04 NOTE — PROGRESS NOTES
[facility]   Physical Therapy Treatment Note      Name: Tiarra Norton  [facility] Number: [id number]    Therapy Diagnosis:   Encounter Diagnoses   Name Primary?    Decreased range of motion (ROM) of right knee Yes    Weakness of right lower extremity     Decreased range of motion of trunk and back     Decreased strength of trunk and back     Weakness of trunk musculature          Physician: Virgil Scott MD    Visit Date: 10/28/2024    Physician Orders: PT Eval and Treat   Medical Diagnosis from Referral:   Z96.651 (ICD-10-CM) - Status post total right knee replacement   M22.2X1 (ICD-10-CM) - Patellofemoral pain syndrome of right knee   M70.50 (ICD-10-CM) - Pes anserine bursitis      Evaluation Date: 12/21/2023  Authorization Period Expiration: 12/31/2024  Plan of Care Expiration: Updated to 11/12/2024  Visit # / Visits authorized: 41/50   Progress Note Due: 11/15/2024  FOTO: 3/4      Precautions: hx of TKA and menisectomy, spinal cord stimulator, scoliosis     Time In: 900 am  Time Out: 100 am   Total Billable Time: 30 minutes (1:1)  Total Appointment Time (timed & untimed codes): 60 minutes    PTA Visit #: 0/5   Subjective   Patient reports: she was very active and busy over the weekend so she is more sore today    She was compliant with home exercise program.  Response to previous treatment: no adverse effects   Functional change: improved tolerance to functional mobility activities with decreased pain    Pain: 5/10 R knee, 6/10 LB  Location: R low back and R knee   Objective       Updated 10/15/2024     Lumbar Range of Motion:     %   Flexion 90      Extension 70      Left Side Bending 60   Right Side Bending 60   Left rotation    70   Right Rotation    80    *= pain         Range of Motion:   Knee Left active Left Passive   Flexion 133 140   Extension 3 5      Knee Right active Right Passive   Flexion 121 124   Extension 1 3         Lower Extremity Strength     Right LE   Left LE  "    Hip flexion: 4/5 Hip flexion: 4+/5   Knee extension: 4/5 Knee extension: 4+/5   Knee flexion: 4/5 Knee flexion: 4+/5   Hip IR: 4/5 Hip IR: 4/5   Hip ER: 4/5 Hip ER: 4/5   Hip extension:  4-/5 Hip extension: 4-/5   Hip abduction: 4-/5 Hip abduction: 4/5   Hip adduction: 4-/5 Hip adduction 4/5   Ankle dorsiflexion: 5/5 Ankle dorsiflexion: 5/5   Ankle plantarflexion: 5/5 Ankle plantarflexion: 5/5           Treatment   Tiarra received the treatments listed below:      Therapeutic exercises to develop strength, endurance, ROM, flexibility, posture, and core stabilization for 20 minutes including:    Recumbent bike lv. 4 x 5 min for endurance and knee ROM     PPT 15x5"  Bridge 2x10x3"  EIL 2x10  DKTC 15x5"'  Open books x15,3" ea  Hip flexor stretch 2x1' ea  Leg press # 2x10  Leg press SL 80# 2x10  EIS 2x10    manual therapy techniques: dry needling applied to the: B LB  for 10 minutes, including:    FDN. Educated pt to use heat following treatment sessions if pt is experiencing pain or soreness. Pt verbalized good understanding of education. Pt signed written consent to dry needling. Pt gave verbal consent for DN     Pt received dry needling to the below listed muscles using 50 mm needles.     B Lumbar paraspinals L1-L5    Neuromuscular re-education activities to improve: Balance, Coordination, Kinesthetic, Proprioception, and Posture for 25 minutes. The following activities were included:    Sit to stands GTB around knees 2x10  Side stepping GTB 2x12 steps  Monster walks GTB 2x12 steps  Single leg balance 3x30" R only  Step ups fwd/lateral  6 in step 2x10x3" ea R only   Medx trunk extension 74# x20  Medx trunk rotation 34# x20 increase weight   Heel raises 2x15 with 10# KB alternating hands      therapeutic activities to improve functional performance for 0  minutes, including:      cold pack for 5 minutes to R knee and low back    Patient Education and Home Exercises     Education provided:   - HEP, " POC    Written Home Exercises Provided: Patient instructed to cont prior HEP. Exercises were reviewed and Tiarra was able to demonstrate them prior to the end of the session.  Tiarra demonstrated good  understanding of the education provided. See Electronic Medical Record under Patient Instructions for exercises provided during therapy sessions    Assessment   Tiarra presents today with reports of increased soreness after increased activity over the weekend. Decreased pain and tightness reported after application of dry needling. Good tolerance to all strengthening and mobility activities. Continue to progress as able.     Tiarra Is progressing well towards her goals.   Patient prognosis is Good.     Patient will continue to benefit from skilled outpatient physical therapy to address the deficits listed in the problem list box on initial evaluation, provide pt/family education and to maximize pt's level of independence in the home and community environment.     Patient's spiritual, cultural and educational needs considered and pt agreeable to plan of care and goals.     Anticipated barriers to physical therapy: chronicity of condition, multiple treatment areas, hx of R TKA and menisectomy     GOALS: Short Term Goals:  4 weeks  1. Report decreased in pain at worse less than  <   / =  4  /10  to increase tolerance for functional activities.On going  2. Pt to increase B popliteal angle by greater than > or = 5 degrees in order to improve flexibility and posture. MET  3. Increased R LE MMT 1/2  to increase tolerance for ADL and work activities. MET  4. Pt to increase B Ely's Test by greater than  > or = 5degrees in order to improve flexibility and posture. MET  5. Pt to tolerate HEP to improve ROM and independence with ADL's. MET  6. Pt to improve range of motion by 25% to allow for improved functional mobility to allow for improvement in IADLs. MET     Long Term Goals: 8 weeks  1. Report decreased in pain at worse less  than  <   / =  2  /10  to increase tolerance for functional mobility.  On going  2 .Pt to increase B popliteal angle by greater than > or = 10 degrees in order to improve flexibility and posture. On going  3. Increased R LE MMT 1 grade to increase tolerance for ADL and work activities.On going  4. Pt will report at 51% or better score on FOTO Lumbar spine survey  to demonstrate decrease in disability and improvement in back pain.On going  5. Pt to be Independent with HEP to improve ROM and independence with ADL's. On going  6. Pt to increase B Ely's Test by greater than > or = 10 degrees in order to improve flexibility and posture. On going  7. Pt to demonstrate negative Bridge Test in order to show improved core strength for lumbar stabilization. On going  8. Pt to improve range of motion by 75% to allow for improved functional mobility to allow for improvement in IADLs. On going        Plan   Plan of care Certification: 5/9/2024 to 11/12/2024     Outpatient Physical Therapy 2 times weekly for 10 weeks to include the following interventions: Cervical/Lumbar Traction, Gait Training, Manual Therapy, Moist Heat/ Ice, Neuromuscular Re-ed, Patient Education, Self Care, Therapeutic Activities, and Therapeutic Exercise. Dry needling    Zi Anaya, PT   11/4/2024

## 2024-11-05 ENCOUNTER — CLINICAL SUPPORT (OUTPATIENT)
Dept: PSYCHIATRY | Facility: CLINIC | Age: 69
End: 2024-11-05
Payer: MEDICARE

## 2024-11-05 ENCOUNTER — PATIENT MESSAGE (OUTPATIENT)
Dept: INTERNAL MEDICINE | Facility: CLINIC | Age: 69
End: 2024-11-05
Payer: COMMERCIAL

## 2024-11-05 ENCOUNTER — CLINICAL SUPPORT (OUTPATIENT)
Dept: REHABILITATION | Facility: OTHER | Age: 69
End: 2024-11-05
Payer: MEDICARE

## 2024-11-05 DIAGNOSIS — M25.69 DECREASED RANGE OF MOTION OF TRUNK AND BACK: ICD-10-CM

## 2024-11-05 DIAGNOSIS — R29.898 WEAKNESS OF RIGHT LOWER EXTREMITY: ICD-10-CM

## 2024-11-05 DIAGNOSIS — M25.661 DECREASED RANGE OF MOTION (ROM) OF RIGHT KNEE: Primary | ICD-10-CM

## 2024-11-05 DIAGNOSIS — M62.81 WEAKNESS OF TRUNK MUSCULATURE: ICD-10-CM

## 2024-11-05 DIAGNOSIS — R29.898 DECREASED STRENGTH OF TRUNK AND BACK: ICD-10-CM

## 2024-11-05 DIAGNOSIS — F33.2 MAJOR DEPRESSIVE DISORDER, RECURRENT SEVERE WITHOUT PSYCHOTIC FEATURES: Primary | ICD-10-CM

## 2024-11-05 PROCEDURE — 97110 THERAPEUTIC EXERCISES: CPT

## 2024-11-05 PROCEDURE — 97112 NEUROMUSCULAR REEDUCATION: CPT

## 2024-11-05 PROCEDURE — 20560 NDL INSJ W/O NJX 1 OR 2 MUSC: CPT

## 2024-11-05 NOTE — PROGRESS NOTES
"Individual Psychotherapy (PhD/LCSW)    11/5/2024    Site:  telemed (audiovisual)  The patient location is: home ([place])    Face to Face time with patient: 47  55 minutes of total time spent on the encounter, which includes face to face time and non-face to face time preparing to see the patient (eg, review of tests), Obtaining and/or reviewing separately obtained history, Documenting clinical information in the electronic or other health record, Independently interpreting results (not separately reported) and communicating results to the patient/family/caregiver, or Care coordination (not separately reported).         Each patient to whom he or she provides medical services by telemedicine is:  (1) informed of the relationship between the physician and patient and the respective role of any other health care provider with respect to management of the patient; and (2) notified that he or she may decline to receive medical services by telemedicine and may withdraw from such care at any time.    Notes:     Therapeutic Intervention: Met with patient.   Chief complaint/reason for encounter: depression     Interval history and content of current session: Mrs. Norton shared that she pushed herself to visit friends this weekend, despite elevated pain, and was very grateful as this visit allowed her to be inspired by her friends and to start a bird sanctuary in her backyard. She noted that being present in the moment allowed her to "see the good things that were right in front of [her]." Psychoeducation on cognitive defusion was provided in session, including the experiential lemon exercise. Mrs. Norton responded very well to this exercise and was enthusiastic about defusion techniques, particularly "I'm having the thought that..." and "kicking the buts." Mrs. Norton noted she wants to "kick the buts" that occur when she wants to go outside for a walk with her dog to further motivate her to engage in her valued behavior of walking " her dog.     Treatment plan:  Target symptoms: depression  Why chosen therapy is appropriate versus another modality: relevant to diagnosis, evidence based practice  Outcome monitoring methods: self-report  Therapeutic intervention type: behavior modifying psychotherapy    Risk parameters:  Patient reports no suicidal ideation  Patient reports no homicidal ideation  Patient reports no self-injurious behavior  Patient reports no violent behavior    Verbal deficits: None    Patient's response to intervention:  The patient's response to intervention is motivated.    Progress toward goals and other mental status changes:  The patient's progress toward goals is excellent.    Diagnosis:     ICD-10-CM ICD-9-CM   1. Major depressive disorder, recurrent severe without psychotic features  F33.2 296.33       Plan:  individual psychotherapy    Return to clinic: 1 week- Monday 11/11 at 10AM    Length of Service (minutes): 45    Nell Cuba M.S.  Psychology Resident  This case is supervised by Dr. Nicole Tejeda, PhD

## 2024-11-06 ENCOUNTER — LAB VISIT (OUTPATIENT)
Dept: LAB | Facility: HOSPITAL | Age: 69
End: 2024-11-06
Attending: INTERNAL MEDICINE
Payer: MEDICARE

## 2024-11-06 DIAGNOSIS — I10 PRIMARY HYPERTENSION: ICD-10-CM

## 2024-11-06 DIAGNOSIS — E78.2 MIXED HYPERLIPIDEMIA: ICD-10-CM

## 2024-11-06 DIAGNOSIS — E87.1 HYPONATREMIA: ICD-10-CM

## 2024-11-06 DIAGNOSIS — E03.9 ACQUIRED HYPOTHYROIDISM: ICD-10-CM

## 2024-11-06 LAB
ALBUMIN SERPL BCP-MCNC: 4.2 G/DL (ref 3.5–5.2)
ALP SERPL-CCNC: 95 U/L (ref 40–150)
ALT SERPL W/O P-5'-P-CCNC: 16 U/L (ref 10–44)
ANION GAP SERPL CALC-SCNC: 11 MMOL/L (ref 8–16)
ANION GAP SERPL CALC-SCNC: 11 MMOL/L (ref 8–16)
AST SERPL-CCNC: 25 U/L (ref 10–40)
BILIRUB SERPL-MCNC: 0.7 MG/DL (ref 0.1–1)
BUN SERPL-MCNC: 10 MG/DL (ref 8–23)
BUN SERPL-MCNC: 10 MG/DL (ref 8–23)
CALCIUM SERPL-MCNC: 9.7 MG/DL (ref 8.7–10.5)
CALCIUM SERPL-MCNC: 9.7 MG/DL (ref 8.7–10.5)
CHLORIDE SERPL-SCNC: 103 MMOL/L (ref 95–110)
CHLORIDE SERPL-SCNC: 103 MMOL/L (ref 95–110)
CHOLEST SERPL-MCNC: 195 MG/DL (ref 120–199)
CHOLEST/HDLC SERPL: 2 {RATIO} (ref 2–5)
CO2 SERPL-SCNC: 25 MMOL/L (ref 23–29)
CO2 SERPL-SCNC: 25 MMOL/L (ref 23–29)
CREAT SERPL-MCNC: 0.9 MG/DL (ref 0.5–1.4)
CREAT SERPL-MCNC: 0.9 MG/DL (ref 0.5–1.4)
EST. GFR  (NO RACE VARIABLE): >60 ML/MIN/1.73 M^2
EST. GFR  (NO RACE VARIABLE): >60 ML/MIN/1.73 M^2
GLUCOSE SERPL-MCNC: 94 MG/DL (ref 70–110)
GLUCOSE SERPL-MCNC: 94 MG/DL (ref 70–110)
HDLC SERPL-MCNC: 99 MG/DL (ref 40–75)
HDLC SERPL: 50.8 % (ref 20–50)
LDLC SERPL CALC-MCNC: 86 MG/DL (ref 63–159)
NONHDLC SERPL-MCNC: 96 MG/DL
POTASSIUM SERPL-SCNC: 3.9 MMOL/L (ref 3.5–5.1)
POTASSIUM SERPL-SCNC: 3.9 MMOL/L (ref 3.5–5.1)
PROT SERPL-MCNC: 6.8 G/DL (ref 6–8.4)
SODIUM SERPL-SCNC: 139 MMOL/L (ref 136–145)
SODIUM SERPL-SCNC: 139 MMOL/L (ref 136–145)
TRIGL SERPL-MCNC: 50 MG/DL (ref 30–150)
TSH SERPL DL<=0.005 MIU/L-ACNC: 3.17 UIU/ML (ref 0.4–4)

## 2024-11-06 PROCEDURE — 80061 LIPID PANEL: CPT | Performed by: INTERNAL MEDICINE

## 2024-11-06 PROCEDURE — 84443 ASSAY THYROID STIM HORMONE: CPT | Performed by: INTERNAL MEDICINE

## 2024-11-06 PROCEDURE — 36415 COLL VENOUS BLD VENIPUNCTURE: CPT | Mod: PN | Performed by: INTERNAL MEDICINE

## 2024-11-06 PROCEDURE — 80053 COMPREHEN METABOLIC PANEL: CPT | Performed by: INTERNAL MEDICINE

## 2024-11-07 ENCOUNTER — TELEPHONE (OUTPATIENT)
Dept: BARIATRICS | Facility: CLINIC | Age: 69
End: 2024-11-07
Payer: COMMERCIAL

## 2024-11-07 ENCOUNTER — CLINICAL SUPPORT (OUTPATIENT)
Dept: REHABILITATION | Facility: OTHER | Age: 69
End: 2024-11-07
Payer: MEDICARE

## 2024-11-07 DIAGNOSIS — M25.661 DECREASED RANGE OF MOTION (ROM) OF RIGHT KNEE: Primary | ICD-10-CM

## 2024-11-07 DIAGNOSIS — R29.898 WEAKNESS OF RIGHT LOWER EXTREMITY: ICD-10-CM

## 2024-11-07 DIAGNOSIS — M25.69 DECREASED RANGE OF MOTION OF TRUNK AND BACK: ICD-10-CM

## 2024-11-07 DIAGNOSIS — R29.898 DECREASED STRENGTH OF TRUNK AND BACK: ICD-10-CM

## 2024-11-07 DIAGNOSIS — M62.81 WEAKNESS OF TRUNK MUSCULATURE: ICD-10-CM

## 2024-11-07 PROCEDURE — 97112 NEUROMUSCULAR REEDUCATION: CPT

## 2024-11-07 PROCEDURE — 20561 NDL INSJ W/O NJX 3+ MUSC: CPT

## 2024-11-08 ENCOUNTER — PATIENT MESSAGE (OUTPATIENT)
Dept: INTERNAL MEDICINE | Facility: CLINIC | Age: 69
End: 2024-11-08
Payer: COMMERCIAL

## 2024-11-08 NOTE — PROGRESS NOTES
[facility]   Physical Therapy Treatment Note      Name: Tiarra Norton  [facility] Number: [id number]    Therapy Diagnosis:   Encounter Diagnoses   Name Primary?    Decreased range of motion (ROM) of right knee Yes    Weakness of right lower extremity     Decreased range of motion of trunk and back     Decreased strength of trunk and back     Weakness of trunk musculature          Physician: Virgil Scott MD    Visit Date: 11/5/2024    Physician Orders: PT Eval and Treat   Medical Diagnosis from Referral:   Z96.651 (ICD-10-CM) - Status post total right knee replacement   M22.2X1 (ICD-10-CM) - Patellofemoral pain syndrome of right knee   M70.50 (ICD-10-CM) - Pes anserine bursitis      Evaluation Date: 12/21/2023  Authorization Period Expiration: 12/31/2024  Plan of Care Expiration: Updated to 11/12/2024  Visit # / Visits authorized: 43/50   Progress Note Due: 11/15/2024  FOTO: 3/4      Precautions: hx of TKA and menisectomy, spinal cord stimulator, scoliosis     Time In: 1030 am  Time Out: 1130 am   Total Billable Time: 30 minutes (1:1)  Total Appointment Time (timed & untimed codes): 60 minutes    PTA Visit #: 0/5   Subjective   Patient reports: felt good over the weekend and might have overdone it, her back is hurting as a result    She was compliant with home exercise program.  Response to previous treatment: no adverse effects   Functional change: improved tolerance to functional mobility activities with decreased pain    Pain: 5/10 R knee, 6/10 LB  Location: R low back and R knee   Objective       Updated 10/15/2024     Lumbar Range of Motion:     %   Flexion 90      Extension 70      Left Side Bending 60   Right Side Bending 60   Left rotation    70   Right Rotation    80    *= pain         Range of Motion:   Knee Left active Left Passive   Flexion 133 140   Extension 3 5      Knee Right active Right Passive   Flexion 121 124   Extension 1 3         Lower Extremity Strength    "  Right LE   Left LE     Hip flexion: 4/5 Hip flexion: 4+/5   Knee extension: 4/5 Knee extension: 4+/5   Knee flexion: 4/5 Knee flexion: 4+/5   Hip IR: 4/5 Hip IR: 4/5   Hip ER: 4/5 Hip ER: 4/5   Hip extension:  4-/5 Hip extension: 4-/5   Hip abduction: 4-/5 Hip abduction: 4/5   Hip adduction: 4-/5 Hip adduction 4/5   Ankle dorsiflexion: 5/5 Ankle dorsiflexion: 5/5   Ankle plantarflexion: 5/5 Ankle plantarflexion: 5/5           Treatment   Tiarra received the treatments listed below:      Therapeutic exercises to develop strength, endurance, ROM, flexibility, posture, and core stabilization for 20 minutes including:    Recumbent bike lv. 4 x 5 min for endurance and knee ROM     PPT 15x5"  Bridge 2x10x3"  EIL 2x10  DKTC 15x5"'  Open books x15,3" ea  Hip flexor stretch 2x1' ea  Leg press # 2x10  Leg press SL 80# 2x10  EIS 2x10    Dry needling applied to the: B LB  for 10 minutes, including:    FDN. Educated pt to use heat following treatment sessions if pt is experiencing pain or soreness. Pt verbalized good understanding of education. Pt signed written consent to dry needling. Pt gave verbal consent for DN     Pt received dry needling to the below listed muscles using 50 mm needles.     B Lumbar paraspinals L1-L5      Neuromuscular re-education activities to improve: Balance, Coordination, Kinesthetic, Proprioception, and Posture for 25 minutes. The following activities were included:    Sit to stands GTB around knees 2x10  Side stepping GTB 2x12 steps  Monster walks GTB 2x12 steps  Single leg balance 3x30" R only  Step ups fwd/lateral  6 in step 2x10x3" ea R only   Medx trunk extension 74# x20  Medx trunk rotation 34# x20 increase weight   Heel raises 2x15 with 10# KB alternating hands      therapeutic activities to improve functional performance for 0  minutes, including:      cold pack for 5 minutes to R knee and low back    Patient Education and Home Exercises     Education provided:   - HEP, POC    Written " Home Exercises Provided: Patient instructed to cont prior HEP. Exercises were reviewed and Tiarra was able to demonstrate them prior to the end of the session.  Tiarra demonstrated good  understanding of the education provided. See Electronic Medical Record under Patient Instructions for exercises provided during therapy sessions    Assessment   Tiarra presents today with reports of increased pain due to increased activity over the weekend. After application of dry needling pt reported decreased tightness and soreness and improved exercise tolerance. Was able to tolerate all exercise progressions with minimal increase in pain and improved gait mechanics. Continue to progress as able.     Tiarra Is progressing well towards her goals.   Patient prognosis is Good.     Patient will continue to benefit from skilled outpatient physical therapy to address the deficits listed in the problem list box on initial evaluation, provide pt/family education and to maximize pt's level of independence in the home and community environment.     Patient's spiritual, cultural and educational needs considered and pt agreeable to plan of care and goals.     Anticipated barriers to physical therapy: chronicity of condition, multiple treatment areas, hx of R TKA and menisectomy     GOALS: Short Term Goals:  4 weeks  1. Report decreased in pain at worse less than  <   / =  4  /10  to increase tolerance for functional activities.On going  2. Pt to increase B popliteal angle by greater than > or = 5 degrees in order to improve flexibility and posture. MET  3. Increased R LE MMT 1/2  to increase tolerance for ADL and work activities. MET  4. Pt to increase B Ely's Test by greater than  > or = 5degrees in order to improve flexibility and posture. MET  5. Pt to tolerate HEP to improve ROM and independence with ADL's. MET  6. Pt to improve range of motion by 25% to allow for improved functional mobility to allow for improvement in IADLs. MET     Long  Term Goals: 8 weeks  1. Report decreased in pain at worse less than  <   / =  2  /10  to increase tolerance for functional mobility.  On going  2 .Pt to increase B popliteal angle by greater than > or = 10 degrees in order to improve flexibility and posture. On going  3. Increased R LE MMT 1 grade to increase tolerance for ADL and work activities.On going  4. Pt will report at 51% or better score on FOTO Lumbar spine survey  to demonstrate decrease in disability and improvement in back pain.On going  5. Pt to be Independent with HEP to improve ROM and independence with ADL's. On going  6. Pt to increase B Ely's Test by greater than > or = 10 degrees in order to improve flexibility and posture. On going  7. Pt to demonstrate negative Bridge Test in order to show improved core strength for lumbar stabilization. On going  8. Pt to improve range of motion by 75% to allow for improved functional mobility to allow for improvement in IADLs. On going        Plan   Plan of care Certification: 5/9/2024 to 11/12/2024     Outpatient Physical Therapy 2 times weekly for 10 weeks to include the following interventions: Cervical/Lumbar Traction, Gait Training, Manual Therapy, Moist Heat/ Ice, Neuromuscular Re-ed, Patient Education, Self Care, Therapeutic Activities, and Therapeutic Exercise. Dry needling    Zi Anaya, PT   11/8/2024

## 2024-11-11 ENCOUNTER — TELEPHONE (OUTPATIENT)
Dept: INTERNAL MEDICINE | Facility: CLINIC | Age: 69
End: 2024-11-11

## 2024-11-11 ENCOUNTER — PATIENT MESSAGE (OUTPATIENT)
Dept: INTERNAL MEDICINE | Facility: CLINIC | Age: 69
End: 2024-11-11
Payer: MEDICARE

## 2024-11-11 ENCOUNTER — OFFICE VISIT (OUTPATIENT)
Dept: INTERNAL MEDICINE | Facility: CLINIC | Age: 69
End: 2024-11-11
Payer: MEDICARE

## 2024-11-11 ENCOUNTER — CLINICAL SUPPORT (OUTPATIENT)
Dept: PSYCHIATRY | Facility: CLINIC | Age: 69
End: 2024-11-11
Payer: MEDICARE

## 2024-11-11 ENCOUNTER — PATIENT MESSAGE (OUTPATIENT)
Dept: SLEEP MEDICINE | Facility: CLINIC | Age: 69
End: 2024-11-11
Payer: MEDICARE

## 2024-11-11 DIAGNOSIS — M54.9 CHRONIC BACK PAIN, UNSPECIFIED BACK LOCATION, UNSPECIFIED BACK PAIN LATERALITY: ICD-10-CM

## 2024-11-11 DIAGNOSIS — G89.29 CHRONIC BACK PAIN, UNSPECIFIED BACK LOCATION, UNSPECIFIED BACK PAIN LATERALITY: ICD-10-CM

## 2024-11-11 DIAGNOSIS — F33.2 MAJOR DEPRESSIVE DISORDER, RECURRENT SEVERE WITHOUT PSYCHOTIC FEATURES: Primary | ICD-10-CM

## 2024-11-11 DIAGNOSIS — M51.369 DEGENERATION OF INTERVERTEBRAL DISC OF LUMBAR REGION, UNSPECIFIED WHETHER PAIN PRESENT: Primary | ICD-10-CM

## 2024-11-11 PROCEDURE — 99499 UNLISTED E&M SERVICE: CPT | Mod: 95,,, | Performed by: PSYCHOLOGIST

## 2024-11-11 PROCEDURE — 99213 OFFICE O/P EST LOW 20 MIN: CPT | Mod: 95,,, | Performed by: PHYSICIAN ASSISTANT

## 2024-11-11 RX ORDER — VALACYCLOVIR HYDROCHLORIDE 500 MG/1
500 TABLET, FILM COATED ORAL 2 TIMES DAILY
Qty: 14 TABLET | Refills: 0 | Status: SHIPPED | OUTPATIENT
Start: 2024-11-11 | End: 2024-11-18

## 2024-11-11 NOTE — PROGRESS NOTES
"Individual Psychotherapy (PhD/LCSW)    11/11/2024    Site:  telemed (audiovisual)  The patient location is: home ([place])    Face to Face time with patient: 45  50 minutes of total time spent on the encounter, which includes face to face time and non-face to face time preparing to see the patient (eg, review of tests), Obtaining and/or reviewing separately obtained history, Documenting clinical information in the electronic or other health record, Independently interpreting results (not separately reported) and communicating results to the patient/family/caregiver, or Care coordination (not separately reported).         Each patient to whom he or she provides medical services by telemedicine is:  (1) informed of the relationship between the physician and patient and the respective role of any other health care provider with respect to management of the patient; and (2) notified that he or she may decline to receive medical services by telemedicine and may withdraw from such care at any time.    Notes:     Therapeutic Intervention: Met with patient.   Chief complaint/reason for encounter: depression     Interval history and content of current session: Pt noted she had been successful in "kicking her buts" this past week, noting she used this strategy when encouraged to practice music more frequently and going to social events. Psychoeducation on the observer self was provided and she engaged in an experiential exercise to practice this skill. She noted that remembering that her past self was connected to her current self helped her to move past her immediate fear and urge to give up related to the political climate and instead take action. This was connected to willingness and the "unwelcome party guest" metaphor was used to provide further psychoeducation on willingness.     Treatment plan:  Target symptoms: depression  Why chosen therapy is appropriate versus another modality: relevant to diagnosis, evidence based " practice  Outcome monitoring methods: self-report  Therapeutic intervention type: behavior modifying psychotherapy    Risk parameters:  Patient reports no suicidal ideation  Patient reports no homicidal ideation  Patient reports no self-injurious behavior  Patient reports no violent behavior    Verbal deficits: None    Patient's response to intervention:  The patient's response to intervention is motivated.    Progress toward goals and other mental status changes:  The patient's progress toward goals is excellent.    Diagnosis:     ICD-10-CM ICD-9-CM   1. Major depressive disorder, recurrent severe without psychotic features  F33.2 296.33       Plan:  individual psychotherapy    Return to clinic: 1 week- 11/18 at 10AM    Length of Service (minutes): 45    ANNMARIE DossS.  Psychology Resident  This case is supervised by Dr. Nicole Tejeda, PhD

## 2024-11-11 NOTE — TELEPHONE ENCOUNTER
Dr. Perales - she has seen Dr. Jacobs in the past and is specifically looking for a neurosurgery rec if you have one

## 2024-11-11 NOTE — PROGRESS NOTES
Subjective:       Patient ID: Tiarra Norton is a 69 y.o. female.        Chief Complaint: Follow-up    The patient location is: her home  The chief complaint leading to consultation is: discuss pain    Visit type: audiovisual    Face to Face time with patient: 23 minutes  31 minutes of total time spent on the encounter, which includes face to face time and non-face to face time preparing to see the patient (eg, review of tests), Obtaining and/or reviewing separately obtained history, Documenting clinical information in the electronic or other health record, Independently interpreting results (not separately reported) and communicating results to the patient/family/caregiver, or Care coordination (not separately reported).         Each patient to whom he or she provides medical services by telemedicine is:  (1) informed of the relationship between the physician and patient and the respective role of any other health care provider with respect to management of the patient; and (2) notified that he or she may decline to receive medical services by telemedicine and may withdraw from such care at any time.    Notes:   She has chronic low back pain.  She has had multiple procedures with pain management.  She had a spinal stimulator placed 12/2022, removed, then placed again 1/2023.  Unfortunately, she has terrible pain around the site.  Multiple attempts to alleviate have not improved.  Considering removal vs placement of new stimulator.  She would like to discuss with a neurosurgeon and seeks recommendation regarding this.    She is miserable with pain.  It affects her sleep.    She was supposed to start functional restoration but did not.    Very frustrated by all, in clinical trial for this that ends 1/2025.    In PT x 2 years, gets dry needling  At this point considering back surgery if it would help        Hypertension  This is a chronic problem. The current episode started more than 1 year ago. The problem has  been waxing and waning since onset. The problem is resistant. Associated symptoms include anxiety, malaise/fatigue, peripheral edema and sweats. Pertinent negatives include no blurred vision, chest pain, headaches, neck pain, orthopnea, palpitations, PND or shortness of breath. Agents associated with hypertension include thyroid hormones. Risk factors for coronary artery disease include dyslipidemia, family history, obesity, post-menopausal state, sedentary lifestyle and stress. Past treatments include nothing. The current treatment provides mild improvement. Compliance problems include exercise.    Follow-up  Associated symptoms include arthralgias. Pertinent negatives include no abdominal pain, chest pain, chills, congestion, coughing, diaphoresis, fatigue, fever, headaches, nausea, neck pain, rash, sore throat, vomiting or weakness.      Review of Systems   Constitutional:  Positive for malaise/fatigue. Negative for chills, diaphoresis, fatigue and fever.   HENT:  Negative for congestion and sore throat.    Eyes:  Negative for blurred vision and visual disturbance.   Respiratory:  Negative for cough, chest tightness and shortness of breath.    Cardiovascular:  Negative for chest pain, palpitations, orthopnea, leg swelling and PND.   Gastrointestinal:  Negative for abdominal pain, blood in stool, constipation, diarrhea, nausea and vomiting.   Genitourinary:  Negative for dysuria, frequency, hematuria and urgency.   Musculoskeletal:  Positive for arthralgias and back pain. Negative for neck pain.   Skin:  Negative for rash.   Neurological:  Negative for dizziness, syncope, weakness and headaches.   Psychiatric/Behavioral:  Negative for dysphoric mood and sleep disturbance. The patient is not nervous/anxious.        Objective:      Physical Exam  HENT:      Head: Normocephalic.   Pulmonary:      Effort: Pulmonary effort is normal.   Abdominal:      General: Abdomen is flat.   Neurological:      General: No focal  "deficit present.      Mental Status: She is alert.   Psychiatric:         Mood and Affect: Mood normal.         Assessment:       1. Degeneration of intervertebral disc of lumbar region, unspecified whether pain present    2. Chronic back pain, unspecified back location, unspecified back pain laterality        Plan:       Tiarra was seen today for follow-up.    Diagnoses and all orders for this visit:    Degeneration of intervertebral disc of lumbar region, unspecified whether pain present  -     Ambulatory referral/consult to Orthopedics; Future  -     Ambulatory referral/consult to Neurosurgery; Future    Chronic back pain, unspecified back location, unspecified back pain laterality  -     Ambulatory referral/consult to Orthopedics; Future  -     Ambulatory referral/consult to Neurosurgery; Future    Will discuss with Dr. Perales and proceed from there in regard to recommendations     Pt has been given instructions populated from patient instructions database and has verbalized understanding of the after visit summary and information contained wherein.    Follow up with a primary care provider. May go to ER for acute shortness of breath, lightheadedness, fever, or any other emergent complaints or changes in condition.    "This note will be shared with the patient"    Future Appointments   Date Time Provider Department Center   11/13/2024  9:00 AM Karen Kenyon MD [facility] CARDIO [place]   11/14/2024 10:30 AM Zi Anaya, PT [facility]   11/18/2024 10:00 AM INTERN 3, [facility] PSYCHOLOGY [facility] PSYCHOL [place] [facility]   11/19/2024 10:00 AM Zi Anaya PT [facility]   11/21/2024 10:30 AM Zi Anaya, PT [facility]   11/25/2024 11:15 AM Trevin Huber MD [facility] ORTHO [place]   1/15/2025  1:30 PM Tyler Jacobs MD [facility] SPINE [place] [facility]   1/21/2025 10:00 AM Marlene Thorne NP [facility]   1/21/2025 10:45 AM CTU, RESEARCH [facility] ADMN RE [facility] "   1/24/2025  1:15 PM Tiny Cook MD [facility]

## 2024-11-12 ENCOUNTER — CLINICAL SUPPORT (OUTPATIENT)
Dept: REHABILITATION | Facility: OTHER | Age: 69
End: 2024-11-12
Payer: MEDICARE

## 2024-11-12 ENCOUNTER — PATIENT MESSAGE (OUTPATIENT)
Dept: INTERNAL MEDICINE | Facility: CLINIC | Age: 69
End: 2024-11-12
Payer: MEDICARE

## 2024-11-12 DIAGNOSIS — G47.00 INSOMNIA, UNSPECIFIED TYPE: Primary | ICD-10-CM

## 2024-11-12 DIAGNOSIS — R29.898 WEAKNESS OF RIGHT LOWER EXTREMITY: ICD-10-CM

## 2024-11-12 DIAGNOSIS — M25.69 DECREASED RANGE OF MOTION OF TRUNK AND BACK: ICD-10-CM

## 2024-11-12 DIAGNOSIS — M25.661 DECREASED RANGE OF MOTION (ROM) OF RIGHT KNEE: Primary | ICD-10-CM

## 2024-11-12 DIAGNOSIS — M62.81 WEAKNESS OF TRUNK MUSCULATURE: ICD-10-CM

## 2024-11-12 DIAGNOSIS — R29.898 DECREASED STRENGTH OF TRUNK AND BACK: ICD-10-CM

## 2024-11-12 PROCEDURE — 97112 NEUROMUSCULAR REEDUCATION: CPT

## 2024-11-12 PROCEDURE — 97140 MANUAL THERAPY 1/> REGIONS: CPT

## 2024-11-12 RX ORDER — RAMELTEON 8 MG/1
8 TABLET ORAL NIGHTLY
Qty: 30 TABLET | Refills: 5 | Status: SHIPPED | OUTPATIENT
Start: 2024-11-12

## 2024-11-12 NOTE — PROGRESS NOTES
[facility]   Physical Therapy Treatment Note      Name: Tiarra Norton  [facility] Number: [id number]    Therapy Diagnosis:   Encounter Diagnoses   Name Primary?    Decreased range of motion (ROM) of right knee Yes    Weakness of right lower extremity     Decreased range of motion of trunk and back     Decreased strength of trunk and back     Weakness of trunk musculature          Physician: Virgil Scott MD    Visit Date: 11/7/2024    Physician Orders: PT Eval and Treat   Medical Diagnosis from Referral:   Z96.651 (ICD-10-CM) - Status post total right knee replacement   M22.2X1 (ICD-10-CM) - Patellofemoral pain syndrome of right knee   M70.50 (ICD-10-CM) - Pes anserine bursitis      Evaluation Date: 12/21/2023  Authorization Period Expiration: 12/31/2024  Plan of Care Expiration: Updated to 11/12/2024  Visit # / Visits authorized: 43/50   Progress Note Due: 11/15/2024  FOTO: 3/4      Precautions: hx of TKA and menisectomy, spinal cord stimulator, scoliosis     Time In: 1030 am  Time Out: 1130 am   Total Billable Time: 30 minutes (1:1)  Total Appointment Time (timed & untimed codes): 60 minutes    PTA Visit #: 0/5   Subjective   Patient reports: pain is less today in both the back and knee, she thinks last session was very helpful    She was compliant with home exercise program.  Response to previous treatment: no adverse effects   Functional change: improved tolerance to functional mobility activities with decreased pain    Pain: 3/10 R knee, 4/10 LB  Location: R low back and R knee   Objective       Updated 10/15/2024     Lumbar Range of Motion:     %   Flexion 90      Extension 70      Left Side Bending 60   Right Side Bending 60   Left rotation    70   Right Rotation    80    *= pain         Range of Motion:   Knee Left active Left Passive   Flexion 133 140   Extension 3 5      Knee Right active Right Passive   Flexion 121 124   Extension 1 3         Lower Extremity Strength    "  Right LE   Left LE     Hip flexion: 4/5 Hip flexion: 4+/5   Knee extension: 4/5 Knee extension: 4+/5   Knee flexion: 4/5 Knee flexion: 4+/5   Hip IR: 4/5 Hip IR: 4/5   Hip ER: 4/5 Hip ER: 4/5   Hip extension:  4-/5 Hip extension: 4-/5   Hip abduction: 4-/5 Hip abduction: 4/5   Hip adduction: 4-/5 Hip adduction 4/5   Ankle dorsiflexion: 5/5 Ankle dorsiflexion: 5/5   Ankle plantarflexion: 5/5 Ankle plantarflexion: 5/5           Treatment   Tiarra received the treatments listed below:      Therapeutic exercises to develop strength, endurance, ROM, flexibility, posture, and core stabilization for 20 minutes including:    Recumbent bike lv. 4 x 5 min for endurance and knee ROM     PPT 15x5"  Bridge 2x10x3"  EIL 2x10  DKTC 15x5"'  Open books x15,3" ea  Hip flexor stretch 2x1' ea  Leg press # 2x10  Leg press SL 80# 2x10  EIS 2x10    Dry needling applied to the: B LB  for 10 minutes, including:    FDN. Educated pt to use heat following treatment sessions if pt is experiencing pain or soreness. Pt verbalized good understanding of education. Pt signed written consent to dry needling. Pt gave verbal consent for DN     Pt received dry needling to the below listed muscles using 50 mm needles.     B Lumbar paraspinals L1-L5      Neuromuscular re-education activities to improve: Balance, Coordination, Kinesthetic, Proprioception, and Posture for 25 minutes. The following activities were included:    Sit to stands GTB around knees 2x10  Side stepping GTB 2x12 steps  Monster walks GTB 2x12 steps  Single leg balance 3x30" R only  Step ups fwd/lateral  6 in step 2x10x3" ea R only   Medx trunk extension 74# x20  Medx trunk rotation 34# x20 increase weight   Heel raises 2x15 with 10# KB alternating hands      therapeutic activities to improve functional performance for 0  minutes, including:      cold pack for 5 minutes to R knee and low back    Patient Education and Home Exercises     Education provided:   - HEP, POC    Written " Home Exercises Provided: Patient instructed to cont prior HEP. Exercises were reviewed and Tiarra was able to demonstrate them prior to the end of the session.  Tiarra demonstrated good  understanding of the education provided. See Electronic Medical Record under Patient Instructions for exercises provided during therapy sessions    Assessment   Tiarra presents today with reports of decreased pain since last visit. She reports improved tolerance to gait and functional mobility activities over the last 48 hours. Progressed strengthening and mobility exercises as tolearted. Dry needling of lumbar musculature again applied with decreased tightness and soreness reported after application. Continue to progress as tolerated, reassess next visit.     Tiarra Is progressing well towards her goals.   Patient prognosis is Good.     Patient will continue to benefit from skilled outpatient physical therapy to address the deficits listed in the problem list box on initial evaluation, provide pt/family education and to maximize pt's level of independence in the home and community environment.     Patient's spiritual, cultural and educational needs considered and pt agreeable to plan of care and goals.     Anticipated barriers to physical therapy: chronicity of condition, multiple treatment areas, hx of R TKA and menisectomy     GOALS: Short Term Goals:  4 weeks  1. Report decreased in pain at worse less than  <   / =  4  /10  to increase tolerance for functional activities.On going  2. Pt to increase B popliteal angle by greater than > or = 5 degrees in order to improve flexibility and posture. MET  3. Increased R LE MMT 1/2  to increase tolerance for ADL and work activities. MET  4. Pt to increase B Ely's Test by greater than  > or = 5degrees in order to improve flexibility and posture. MET  5. Pt to tolerate HEP to improve ROM and independence with ADL's. MET  6. Pt to improve range of motion by 25% to allow for improved functional  mobility to allow for improvement in IADLs. MET     Long Term Goals: 8 weeks  1. Report decreased in pain at worse less than  <   / =  2  /10  to increase tolerance for functional mobility.  On going  2 .Pt to increase B popliteal angle by greater than > or = 10 degrees in order to improve flexibility and posture. On going  3. Increased R LE MMT 1 grade to increase tolerance for ADL and work activities.On going  4. Pt will report at 51% or better score on FOTO Lumbar spine survey  to demonstrate decrease in disability and improvement in back pain.On going  5. Pt to be Independent with HEP to improve ROM and independence with ADL's. On going  6. Pt to increase B Ely's Test by greater than > or = 10 degrees in order to improve flexibility and posture. On going  7. Pt to demonstrate negative Bridge Test in order to show improved core strength for lumbar stabilization. On going  8. Pt to improve range of motion by 75% to allow for improved functional mobility to allow for improvement in IADLs. On going        Plan   Plan of care Certification: 5/9/2024 to 11/12/2024     Outpatient Physical Therapy 2 times weekly for 10 weeks to include the following interventions: Cervical/Lumbar Traction, Gait Training, Manual Therapy, Moist Heat/ Ice, Neuromuscular Re-ed, Patient Education, Self Care, Therapeutic Activities, and Therapeutic Exercise. Marybeth Anaya, PT   11/12/2024

## 2024-11-13 ENCOUNTER — PATIENT MESSAGE (OUTPATIENT)
Dept: PAIN MEDICINE | Facility: CLINIC | Age: 69
End: 2024-11-13
Payer: MEDICARE

## 2024-11-13 ENCOUNTER — OFFICE VISIT (OUTPATIENT)
Dept: CARDIOLOGY | Facility: CLINIC | Age: 69
End: 2024-11-13
Payer: MEDICARE

## 2024-11-13 VITALS
SYSTOLIC BLOOD PRESSURE: 114 MMHG | WEIGHT: 148.69 LBS | BODY MASS INDEX: 26.34 KG/M2 | HEART RATE: 76 BPM | DIASTOLIC BLOOD PRESSURE: 66 MMHG | HEIGHT: 63 IN

## 2024-11-13 DIAGNOSIS — Z96.651 STATUS POST TOTAL RIGHT KNEE REPLACEMENT: Primary | ICD-10-CM

## 2024-11-13 DIAGNOSIS — E03.9 ACQUIRED HYPOTHYROIDISM: ICD-10-CM

## 2024-11-13 DIAGNOSIS — E78.2 MIXED HYPERLIPIDEMIA: ICD-10-CM

## 2024-11-13 DIAGNOSIS — I10 PRIMARY HYPERTENSION: Primary | ICD-10-CM

## 2024-11-13 PROCEDURE — 99204 OFFICE O/P NEW MOD 45 MIN: CPT | Mod: S$PBB,,, | Performed by: INTERNAL MEDICINE

## 2024-11-13 PROCEDURE — 99214 OFFICE O/P EST MOD 30 MIN: CPT | Mod: PBBFAC,PO | Performed by: INTERNAL MEDICINE

## 2024-11-13 PROCEDURE — 99999 PR PBB SHADOW E&M-EST. PATIENT-LVL IV: CPT | Mod: PBBFAC,,, | Performed by: INTERNAL MEDICINE

## 2024-11-13 NOTE — PROGRESS NOTES
Subjective:   Patient ID:  Tiarra Norton is a 69 y.o. female who presents for follow-up of Hypertension      HPI: I had a pleasure to see this patient in 2020.  She has family history of CAD ( father had CABG at the age of 50).  She caries a diagnosis of hypertension and and dyslipidemia and is on minimal dose of Atorvastatin.  Stress echo in 2020 was negative for ischemia. Echo showed LVH.  Digital medicine follow up indicates poorly controlled HTN. 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  She was tried on low dose Hctz, but had electrolyte abnormalities and was changed to Aldactone.  Patient is currently asymptomatic and today BP in an office is controlled.    We reviewed most recent blood work and cardiac tests.    Lab Results   Component Value Date     11/06/2024     11/06/2024    K 3.9 11/06/2024    K 3.9 11/06/2024     11/06/2024     11/06/2024    CO2 25 11/06/2024    CO2 25 11/06/2024    BUN 10 11/06/2024    BUN 10 11/06/2024    CREATININE 0.9 11/06/2024    CREATININE 0.9 11/06/2024    GLU 94 11/06/2024    GLU 94 11/06/2024    HGBA1C 5.5 10/22/2018    AST 25 11/06/2024    ALT 16 11/06/2024    ALBUMIN 4.2 11/06/2024    PROT 6.8 11/06/2024    BILITOT 0.7 11/06/2024    WBC 5.30 05/12/2023    HGB 12.9 05/12/2023    HCT 40.7 05/12/2023    MCV 94 05/12/2023     05/12/2023    INR 1.0 05/12/2023    TSH 3.165 11/06/2024    CHOL 195 11/06/2024    HDL 99 (H) 11/06/2024    LDLCALC 86.0 11/06/2024    TRIG 50 11/06/2024       No results found in the last 24 hours.     Review of Systems   Constitutional: Negative.   HENT: Negative.     Eyes: Negative.    Cardiovascular:  Positive for leg swelling. Negative for chest pain, claudication, dyspnea on exertion, irregular heartbeat, near-syncope, palpitations and syncope.   Respiratory: Negative.  Negative for cough, shortness of breath, snoring and wheezing.    Endocrine: Negative.  Negative for cold intolerance, heat  "intolerance, polydipsia, polyphagia and polyuria.   Skin: Negative.    Musculoskeletal:  Positive for arthritis and back pain.   Gastrointestinal:  Positive for constipation and diarrhea.   Genitourinary: Negative.    Neurological: Negative.    Psychiatric/Behavioral:  Positive for depression. The patient is nervous/anxious.        Objective:   Physical Exam  Vitals and nursing note reviewed.   Constitutional:       Appearance: Normal appearance. She is well-developed.      Comments: /66   Pulse 76   Ht 5' 3" (1.6 m)   Wt 67.4 kg (148 lb 11.2 oz)   BMI 26.34 kg/m²      HENT:      Head: Normocephalic.   Eyes:      Pupils: Pupils are equal, round, and reactive to light.   Neck:      Thyroid: No thyromegaly.      Vascular: No carotid bruit.   Cardiovascular:      Rate and Rhythm: Normal rate and regular rhythm.      Pulses: Intact distal pulses.           Carotid pulses are 2+ on the right side and 2+ on the left side.       Radial pulses are 2+ on the right side and 2+ on the left side.        Femoral pulses are 2+ on the right side and 2+ on the left side.       Popliteal pulses are 2+ on the right side and 2+ on the left side.        Dorsalis pedis pulses are 2+ on the right side and 2+ on the left side.        Posterior tibial pulses are 2+ on the right side and 2+ on the left side.      Heart sounds: Normal heart sounds. No murmur heard.     No friction rub. No gallop.   Pulmonary:      Effort: Pulmonary effort is normal. No respiratory distress.      Breath sounds: Normal breath sounds. No wheezing or rales.   Chest:      Chest wall: No tenderness.   Abdominal:      Palpations: Abdomen is soft.   Musculoskeletal:         General: Normal range of motion.      Cervical back: Normal range of motion and neck supple.   Skin:     General: Skin is warm and dry.   Neurological:      Mental Status: She is alert and oriented to person, place, and time.           Assessment and Plan:     Problem List Items " Addressed This Visit          Cardiology Problems    HTN (hypertension) - Primary    Relevant Orders    Echo    Hyperlipidemia    Relevant Orders    Echo       Other    Hypothyroid    Relevant Orders    Echo       Patient's Medications   New Prescriptions    No medications on file   Previous Medications    ALOSETRON (LOTRONEX) 0.5 MG TABLET    Take 1 tablet (0.5 mg total) by mouth in the morning and 1 tablet (0.5 mg total) in the evening.    ALPRAZOLAM (XANAX) 1 MG TABLET    take 1 tablet by mouth 3 times a day    AMLODIPINE (NORVASC) 5 MG TABLET    Take 1 tablet (5 mg total) by mouth once daily.    ATORVASTATIN (LIPITOR) 10 MG TABLET    Take 1 tablet (10 mg total) by mouth once daily.    ESCITALOPRAM OXALATE (LEXAPRO) 5 MG TAB    take 1 tablet by mouth once daily    HYDROCODONE-ACETAMINOPHEN (NORCO) 5-325 MG PER TABLET    Take 1 tablet by mouth every 12 (twelve) hours as needed for Pain.    LEVOTHYROXINE (SYNTHROID) 100 MCG TABLET    TAKE 1 TABLET BY MOUTH EVERY MORNING    MUPIROCIN (BACTROBAN) 2 % OINTMENT    Apply topically 2 (two) times daily.    PROMETHAZINE (PHENERGAN) 25 MG TABLET    1 tab po q 12 h prn nausea    RAMELTEON (ROZEREM) 8 MG TABLET    Take 1 tablet (8 mg total) by mouth every evening.    SPIRONOLACTONE (ALDACTONE) 25 MG TABLET    Take 1 tablet (25 mg total) by mouth once daily. For blood pressure    SUMATRIPTAN (IMITREX) 50 MG TABLET    1 tab po at start of headache.  Repeat in 2 h prn    TIZANIDINE (ZANAFLEX) 4 MG TABLET    Take 1 tablet (4 mg total) by mouth 3 (three) times daily as needed (muscle pain).    TRAZODONE (DESYREL) 100 MG TABLET    take 1 to 2 tablets by mouth every evening for for sleep    TRETINOIN (RETIN-A) 0.025 % CREAM    Compound tretinoin 0.025% / azelaic acid 8% / niacinamide 2% cream. Apply a pea-sized amount to entire face qhs.    VALACYCLOVIR (VALTREX) 500 MG TABLET    Take 1 tablet (500 mg total) by mouth 2 (two) times daily. for 7 days    VALSARTAN (DIOVAN) 320 MG  TABLET    Take 1 tablet (320 mg total) by mouth once daily.   Modified Medications    No medications on file   Discontinued Medications    No medications on file     Repeat cardiac echo to re assess LV mass and Adriane along with diastolic function and PA pressure.  Would consider re challenge with low dose thiazide diuretic while maintained on Spironolactone with close BMP monitoring.  Ideally first line of hypertensive therapy includes thiazides, ACEi or ARB along with calcium channel blockers and if resistant an addition of Spironolactone.  This could potentially balance electrolytes issues.    Continued exercise and primary prevention encouraged.  Follow up with PCP and cardiology as needed.    No follow-ups on file.

## 2024-11-13 NOTE — PROGRESS NOTES
Another sleep test   Tiarra HELENEMarkus Norton   Sent: Tue November 12, 2024  6:02 PM   To: Bekah Nichols MD   Another sleep test  (Newest Message First)  November 12, 2024  Tiarra Bernardo Errol to Me         11/12/24  6:02 PM  Yes, Id love to try the new medication.  Please send it to the [facility] pharmacy.  Thanks so much.  Its been rough    Me to Tiarra Norton         11/12/24  5:57 PM        Dear Ms. Norton         I'm sorry to hear that your insomnia has been getting worse.  I wonder if [facility] team has reached out to you on the portal for refresh course?  Also, since Belsomra -group of medications did not work for you, I thought you may want to try Ramelteon - very clean medication that has been effective but hard to get in the past - I have noticed that Medicare has been doing a better job approving it lately.  Can I send it to one of the [facility] pharmacies  this time for insurance authorization?     As for repeating the sleep study - sleep studies are not design to test for the reason why your mind is refusing to shut down and sleep, they just test the quality of your sleep and your breathing while you sleep.  So we will need to make sure you have an effective medication first, to help you sleep during the sleep study.      Sincerely,     Bekah Nichols MD    Last read by Tiarra Norton at  5:59 PM on 11/12/2024.

## 2024-11-13 NOTE — TELEPHONE ENCOUNTER
Staff made contact with patient due to the message being misread, staff assisted patient with informing her on her last xray or mri of her back

## 2024-11-14 ENCOUNTER — CLINICAL SUPPORT (OUTPATIENT)
Dept: REHABILITATION | Facility: OTHER | Age: 69
End: 2024-11-14
Payer: MEDICARE

## 2024-11-14 ENCOUNTER — PATIENT MESSAGE (OUTPATIENT)
Dept: INTERNAL MEDICINE | Facility: CLINIC | Age: 69
End: 2024-11-14
Payer: MEDICARE

## 2024-11-14 DIAGNOSIS — R29.898 WEAKNESS OF RIGHT LOWER EXTREMITY: ICD-10-CM

## 2024-11-14 DIAGNOSIS — M25.661 DECREASED RANGE OF MOTION (ROM) OF RIGHT KNEE: Primary | ICD-10-CM

## 2024-11-14 DIAGNOSIS — M62.81 WEAKNESS OF TRUNK MUSCULATURE: ICD-10-CM

## 2024-11-14 DIAGNOSIS — R29.898 DECREASED STRENGTH OF TRUNK AND BACK: ICD-10-CM

## 2024-11-14 DIAGNOSIS — M25.69 DECREASED RANGE OF MOTION OF TRUNK AND BACK: ICD-10-CM

## 2024-11-14 PROCEDURE — 97110 THERAPEUTIC EXERCISES: CPT

## 2024-11-14 PROCEDURE — 97140 MANUAL THERAPY 1/> REGIONS: CPT

## 2024-11-14 PROCEDURE — 97530 THERAPEUTIC ACTIVITIES: CPT

## 2024-11-14 PROCEDURE — 97112 NEUROMUSCULAR REEDUCATION: CPT

## 2024-11-14 NOTE — PROGRESS NOTES
[facility]   Physical Therapy Progress Note      Name: Tiarra Norton  [facility] Number: [id number]    Therapy Diagnosis:   Encounter Diagnoses   Name Primary?    Decreased range of motion (ROM) of right knee Yes    Weakness of right lower extremity     Decreased range of motion of trunk and back     Decreased strength of trunk and back     Weakness of trunk musculature        Physician: Virgil Scott MD    Visit Date: 11/14/2024    Physician Orders: PT Eval and Treat   Medical Diagnosis from Referral:   Z96.651 (ICD-10-CM) - Status post total right knee replacement   M22.2X1 (ICD-10-CM) - Patellofemoral pain syndrome of right knee   M70.50 (ICD-10-CM) - Pes anserine bursitis      Evaluation Date: 12/21/2023  Authorization Period Expiration: 12/31/2024  Plan of Care Expiration: Updated to 1/14/2025  Visit # / Visits authorized: 47/50   Progress Note Due: 12/14/2024  FOTO: 3/4      Precautions: hx of TKA and menisectomy, spinal cord stimulator, scoliosis     Time In: 1030 am  Time Out: 1135 am   Total Billable Time: 60 minutes (1:1)  Total Appointment Time (timed & untimed codes): 65 minutes    PTA Visit #: 0/5   Subjective   Patient reports: back and knee are feeling better, but they still bothers her with activity    She was compliant with home exercise program.  Response to previous treatment: no adverse effects   Functional change: improved tolerance to functional mobility activities with decreased pain    Pain: 3/10 R knee, 4/10 LB  Location: R low back and R knee   Objective       Updated 11/14/2024     Lumbar Range of Motion:     %   Flexion 90      Extension 70      Left Side Bending 60   Right Side Bending 60   Left rotation    70   Right Rotation    80    *= pain         Range of Motion:   Knee Left active Left Passive   Flexion 133 140   Extension 3 5      Knee Right active Right Passive   Flexion 121 124   Extension 1 3         Lower Extremity Strength     Right LE   Left  "LE     Hip flexion: 4/5 Hip flexion: 4+/5   Knee extension: 4/5 Knee extension: 4+/5   Knee flexion: 4/5 Knee flexion: 4+/5   Hip IR: 4/5 Hip IR: 4/5   Hip ER: 4/5 Hip ER: 4/5   Hip extension:  4-/5 Hip extension: 4-/5   Hip abduction: 4-/5 Hip abduction: 4/5   Hip adduction: 4-/5 Hip adduction 4/5   Ankle dorsiflexion: 5/5 Ankle dorsiflexion: 5/5   Ankle plantarflexion: 5/5 Ankle plantarflexion: 5/5           Treatment   Tiarra received the treatments listed below:      Therapeutic exercises to develop strength, endurance, ROM, flexibility, posture, and core stabilization for 15 minutes including:    Recumbent bike lv. 4 x 5 min for endurance and knee ROM     PPT 15x5"  Bridge 2x10x3"  EIL 2x10  DKTC 15x5"'  Open books x15,3" ea  Hip flexor stretch 2x1' ea  Leg press # 2x10  Leg press SL 80# 2x10  EIS 2x10    manual therapy techniques: joint mobs applied to the: B LB  for 10 minutes, including:     Lumbar CPA's grade III/IV  Manual facet gapping bilaterally      Neuromuscular re-education activities to improve: Balance, Coordination, Kinesthetic, Proprioception, and Posture for 25 minutes. The following activities were included:    Sit to stands GTB around knees 2x10  Side stepping GTB 2x12 steps  Monster walks GTB 2x12 steps  Single leg balance 3x30" R only  Step ups fwd/lateral  6 in step 2x10x3" ea R only   Medx trunk extension 74# x20  Medx trunk rotation 34# x20 increase weight       therapeutic activities to improve functional performance for 10  minutes, including:    Objective measures and reassessment    cold pack for 5 minutes to R knee and low back    Patient Education and Home Exercises     Education provided:   - HEP, POC    Written Home Exercises Provided: Patient instructed to cont prior HEP. Exercises were reviewed and Tiarra was able to demonstrate them prior to the end of the session.  Tiarra demonstrated good  understanding of the education provided. See Electronic Medical Record under Patient " Instructions for exercises provided during therapy sessions    Assessment   Tiarra presents today with continued complaints of pain. Reassessment performed with pt continue to demo deficits in lumbar and knee ROM, weakness of core, lumbar and B LE musculature, decreased joint mobility and soft tissue flexibility, and decreased tolerance to ADL's, functional mobility, and leisure activities. Pt has shown increased progress over the last 2 weeks with decreased pain reported, but continues to have activity limitations due to increased pain. Pt will continue to befit from skilled therapy to address described deficits, decrease pain, and improve functional mobility and activity tolerance.     Tiarra Is progressing well towards her goals.   Patient prognosis is Good.     Patient will continue to benefit from skilled outpatient physical therapy to address the deficits listed in the problem list box on initial evaluation, provide pt/family education and to maximize pt's level of independence in the home and community environment.     Patient's spiritual, cultural and educational needs considered and pt agreeable to plan of care and goals.     Anticipated barriers to physical therapy: chronicity of condition, multiple treatment areas, hx of R TKA and menisectomy     GOALS: Short Term Goals:  4 weeks  1. Report decreased in pain at worse less than  <   / =  4  /10  to increase tolerance for functional activities.On going  2. Pt to increase B popliteal angle by greater than > or = 5 degrees in order to improve flexibility and posture. MET  3. Increased R LE MMT 1/2  to increase tolerance for ADL and work activities. MET  4. Pt to increase B Ely's Test by greater than  > or = 5degrees in order to improve flexibility and posture. MET  5. Pt to tolerate HEP to improve ROM and independence with ADL's. MET  6. Pt to improve range of motion by 25% to allow for improved functional mobility to allow for improvement in IADLs. MET      Long Term Goals: 8 weeks  1. Report decreased in pain at worse less than  <   / =  2  /10  to increase tolerance for functional mobility.  On going  2 .Pt to increase B popliteal angle by greater than > or = 10 degrees in order to improve flexibility and posture. On going  3. Increased R LE MMT 1 grade to increase tolerance for ADL and work activities.On going  4. Pt will report at 51% or better score on FOTO Lumbar spine survey  to demonstrate decrease in disability and improvement in back pain.On going  5. Pt to be Independent with HEP to improve ROM and independence with ADL's. On going  6. Pt to increase B Ely's Test by greater than > or = 10 degrees in order to improve flexibility and posture. On going  7. Pt to demonstrate negative Bridge Test in order to show improved core strength for lumbar stabilization. On going  8. Pt to improve range of motion by 75% to allow for improved functional mobility to allow for improvement in IADLs. On going        Plan   Plan of care Certification: 5/9/2024 to 1/14/2025     Outpatient Physical Therapy 2 times weekly for 10 weeks to include the following interventions: Cervical/Lumbar Traction, Gait Training, Manual Therapy, Moist Heat/ Ice, Neuromuscular Re-ed, Patient Education, Self Care, Therapeutic Activities, and Therapeutic Exercise. Marybeth Anaya, PT   11/14/2024

## 2024-11-14 NOTE — PROGRESS NOTES
[facility]   Physical Therapy Treatment Note      Name: Tiarra Norton  [facility] Number: [id number]    Therapy Diagnosis:   Encounter Diagnoses   Name Primary?    Decreased range of motion (ROM) of right knee Yes    Weakness of right lower extremity     Decreased range of motion of trunk and back     Decreased strength of trunk and back     Weakness of trunk musculature        Physician: Virgil Scott MD    Visit Date: 11/12/2024    Physician Orders: PT Eval and Treat   Medical Diagnosis from Referral:   Z96.651 (ICD-10-CM) - Status post total right knee replacement   M22.2X1 (ICD-10-CM) - Patellofemoral pain syndrome of right knee   M70.50 (ICD-10-CM) - Pes anserine bursitis      Evaluation Date: 12/21/2023  Authorization Period Expiration: 12/31/2024  Plan of Care Expiration: Updated to 11/12/2024  Visit # / Visits authorized: 46/50   Progress Note Due: 11/15/2024  FOTO: 3/4      Precautions: hx of TKA and menisectomy, spinal cord stimulator, scoliosis     Time In: 900 am  Time Out: 1000 am   Total Billable Time: 30 minutes (1:1)  Total Appointment Time (timed & untimed codes): 60 minutes    PTA Visit #: 0/5   Subjective   Patient reports: continued improvements in pain since last visit.     She was compliant with home exercise program.  Response to previous treatment: no adverse effects   Functional change: improved tolerance to functional mobility activities with decreased pain    Pain: 3/10 R knee, 4/10 LB  Location: R low back and R knee   Objective       Updated 10/15/2024     Lumbar Range of Motion:     %   Flexion 90      Extension 70      Left Side Bending 60   Right Side Bending 60   Left rotation    70   Right Rotation    80    *= pain         Range of Motion:   Knee Left active Left Passive   Flexion 133 140   Extension 3 5      Knee Right active Right Passive   Flexion 121 124   Extension 1 3         Lower Extremity Strength     Right LE   Left LE     Hip flexion: 4/5  "Hip flexion: 4+/5   Knee extension: 4/5 Knee extension: 4+/5   Knee flexion: 4/5 Knee flexion: 4+/5   Hip IR: 4/5 Hip IR: 4/5   Hip ER: 4/5 Hip ER: 4/5   Hip extension:  4-/5 Hip extension: 4-/5   Hip abduction: 4-/5 Hip abduction: 4/5   Hip adduction: 4-/5 Hip adduction 4/5   Ankle dorsiflexion: 5/5 Ankle dorsiflexion: 5/5   Ankle plantarflexion: 5/5 Ankle plantarflexion: 5/5           Treatment   Tiarra received the treatments listed below:      Therapeutic exercises to develop strength, endurance, ROM, flexibility, posture, and core stabilization for 20 minutes including:    Recumbent bike lv. 4 x 5 min for endurance and knee ROM     PPT 15x5"  Bridge 2x10x3"  EIL 2x10  DKTC 15x5"'  Open books x15,3" ea  Hip flexor stretch 2x1' ea  Leg press # 2x10  Leg press SL 80# 2x10  EIS 2x10    manual therapy techniques: joint mobs applied to the: B LB  for 10 minutes, including:     Lumbar CPA's grade III/IV  Manual facet gapping bilaterally      Neuromuscular re-education activities to improve: Balance, Coordination, Kinesthetic, Proprioception, and Posture for 25 minutes. The following activities were included:    Sit to stands GTB around knees 2x10  Side stepping GTB 2x12 steps  Monster walks GTB 2x12 steps  Single leg balance 3x30" R only  Step ups fwd/lateral  6 in step 2x10x3" ea R only   Medx trunk extension 74# x20  Medx trunk rotation 34# x20 increase weight   Heel raises 2x15 with 10# KB alternating hands      therapeutic activities to improve functional performance for 0  minutes, including:      cold pack for 5 minutes to R knee and low back    Patient Education and Home Exercises     Education provided:   - HEP, POC    Written Home Exercises Provided: Patient instructed to cont prior HEP. Exercises were reviewed and Tiarra was able to demonstrate them prior to the end of the session.  Tiarra demonstrated good  understanding of the education provided. See Electronic Medical Record under Patient Instructions " for exercises provided during therapy sessions    Assessment   Tiarra presents today with continues reports of improvements in pain levels since last week. Manual techniques applied to lumbar spine with improvements in ROM and decreased pain reported after application. Progressed mobility and strengthening exercises as tolerated. Reassess next visit.     Tiarra Is progressing well towards her goals.   Patient prognosis is Good.     Patient will continue to benefit from skilled outpatient physical therapy to address the deficits listed in the problem list box on initial evaluation, provide pt/family education and to maximize pt's level of independence in the home and community environment.     Patient's spiritual, cultural and educational needs considered and pt agreeable to plan of care and goals.     Anticipated barriers to physical therapy: chronicity of condition, multiple treatment areas, hx of R TKA and menisectomy     GOALS: Short Term Goals:  4 weeks  1. Report decreased in pain at worse less than  <   / =  4  /10  to increase tolerance for functional activities.On going  2. Pt to increase B popliteal angle by greater than > or = 5 degrees in order to improve flexibility and posture. MET  3. Increased R LE MMT 1/2  to increase tolerance for ADL and work activities. MET  4. Pt to increase B Ely's Test by greater than  > or = 5degrees in order to improve flexibility and posture. MET  5. Pt to tolerate HEP to improve ROM and independence with ADL's. MET  6. Pt to improve range of motion by 25% to allow for improved functional mobility to allow for improvement in IADLs. MET     Long Term Goals: 8 weeks  1. Report decreased in pain at worse less than  <   / =  2  /10  to increase tolerance for functional mobility.  On going  2 .Pt to increase B popliteal angle by greater than > or = 10 degrees in order to improve flexibility and posture. On going  3. Increased R LE MMT 1 grade to increase tolerance for ADL and work  activities.On going  4. Pt will report at 51% or better score on FOTO Lumbar spine survey  to demonstrate decrease in disability and improvement in back pain.On going  5. Pt to be Independent with HEP to improve ROM and independence with ADL's. On going  6. Pt to increase B Ely's Test by greater than > or = 10 degrees in order to improve flexibility and posture. On going  7. Pt to demonstrate negative Bridge Test in order to show improved core strength for lumbar stabilization. On going  8. Pt to improve range of motion by 75% to allow for improved functional mobility to allow for improvement in IADLs. On going        Plan   Plan of care Certification: 5/9/2024 to 11/12/2024     Outpatient Physical Therapy 2 times weekly for 10 weeks to include the following interventions: Cervical/Lumbar Traction, Gait Training, Manual Therapy, Moist Heat/ Ice, Neuromuscular Re-ed, Patient Education, Self Care, Therapeutic Activities, and Therapeutic Exercise. Marybeth Anaya, PT   11/14/2024

## 2024-11-14 NOTE — TELEPHONE ENCOUNTER
Pt states that she was told by cardiologist during recent office visit that she has fatty liver    Pt would like to know if that is accurate and if she should be treating it

## 2024-11-16 ENCOUNTER — PATIENT MESSAGE (OUTPATIENT)
Dept: INTERNAL MEDICINE | Facility: CLINIC | Age: 69
End: 2024-11-16
Payer: MEDICARE

## 2024-11-18 ENCOUNTER — TELEPHONE (OUTPATIENT)
Dept: ORTHOPEDIC SURGERY | Facility: CLINIC | Age: 69
End: 2024-11-18
Payer: MEDICARE

## 2024-11-18 ENCOUNTER — CLINICAL SUPPORT (OUTPATIENT)
Dept: PSYCHIATRY | Facility: CLINIC | Age: 69
End: 2024-11-18
Payer: MEDICARE

## 2024-11-18 DIAGNOSIS — F41.9 ANXIETY: ICD-10-CM

## 2024-11-18 DIAGNOSIS — F33.2 MAJOR DEPRESSIVE DISORDER, RECURRENT SEVERE WITHOUT PSYCHOTIC FEATURES: Primary | ICD-10-CM

## 2024-11-18 DIAGNOSIS — M54.50 LOW BACK PAIN, UNSPECIFIED BACK PAIN LATERALITY, UNSPECIFIED CHRONICITY, UNSPECIFIED WHETHER SCIATICA PRESENT: Primary | ICD-10-CM

## 2024-11-18 PROCEDURE — 99499 UNLISTED E&M SERVICE: CPT | Mod: 95,,, | Performed by: PSYCHOLOGIST

## 2024-11-18 NOTE — TELEPHONE ENCOUNTER
Called patient and appt rescheduled to earlier date. Patient confirmed new appt date/time and location as well as xray appt.    ----- Message from Galina sent at 11/18/2024 12:45 PM CST -----  Regarding: PT'S REQUESTING A CALL BACK REGARDING A SOONER APPT FOR BACK PAIN  Contact: pt  Pt is scheduled for a new pt new problem appt but was seen in 2022 with provider for same issue.     Confirmed contact info below:  Contact Name: Tiarra Norton  Phone Number: [phone]

## 2024-11-19 ENCOUNTER — PATIENT MESSAGE (OUTPATIENT)
Dept: SLEEP MEDICINE | Facility: CLINIC | Age: 69
End: 2024-11-19
Payer: MEDICARE

## 2024-11-20 ENCOUNTER — PATIENT MESSAGE (OUTPATIENT)
Dept: CARDIOLOGY | Facility: CLINIC | Age: 69
End: 2024-11-20
Payer: MEDICARE

## 2024-11-20 ENCOUNTER — HOSPITAL ENCOUNTER (OUTPATIENT)
Dept: CARDIOLOGY | Facility: HOSPITAL | Age: 69
Discharge: HOME | End: 2024-11-20
Attending: INTERNAL MEDICINE
Payer: MEDICARE

## 2024-11-20 VITALS
WEIGHT: 148 LBS | BODY MASS INDEX: 26.22 KG/M2 | DIASTOLIC BLOOD PRESSURE: 80 MMHG | SYSTOLIC BLOOD PRESSURE: 120 MMHG | HEIGHT: 63 IN | HEART RATE: 55 BPM

## 2024-11-20 DIAGNOSIS — E03.9 ACQUIRED HYPOTHYROIDISM: ICD-10-CM

## 2024-11-20 DIAGNOSIS — I10 PRIMARY HYPERTENSION: ICD-10-CM

## 2024-11-20 DIAGNOSIS — E78.2 MIXED HYPERLIPIDEMIA: ICD-10-CM

## 2024-11-20 LAB
ASCENDING AORTA: 3.36 CM
AV INDEX (PROSTH): 0.81
AV MEAN GRADIENT: 4.8 MMHG
AV PEAK GRADIENT: 9 MMHG
AV VALVE AREA BY VELOCITY RATIO: 2.7 CM²
AV VALVE AREA: 2.5 CM²
AV VELOCITY RATIO: 0.87
BSA FOR ECHO PROCEDURE: 1.73 M2
CV ECHO LV RWT: 0.36 CM
DOP CALC AO PEAK VEL: 1.5 M/S
DOP CALC AO VTI: 35.9 CM
DOP CALC LVOT AREA: 3.1 CM2
DOP CALC LVOT DIAMETER: 2 CM
DOP CALC LVOT PEAK VEL: 1.3 M/S
DOP CALC LVOT STROKE VOLUME: 91.1 CM3
DOP CALCLVOT PEAK VEL VTI: 29 CM
E WAVE DECELERATION TIME: 241.97 MSEC
E/A RATIO: 1.11
E/E' RATIO: 12.91 M/S
ECHO LV POSTERIOR WALL: 0.8 CM (ref 0.6–1.1)
EJECTION FRACTION: 60 %
FRACTIONAL SHORTENING: 34.1 % (ref 28–44)
INTERVENTRICULAR SEPTUM: 0.8 CM (ref 0.6–1.1)
LA MAJOR: 5.73 CM
LA MINOR: 5.66 CM
LA WIDTH: 3.75 CM
LEFT ATRIUM SIZE: 3.41 CM
LEFT ATRIUM VOLUME INDEX MOD: 34.7 ML/M2
LEFT ATRIUM VOLUME INDEX: 36.4 ML/M2
LEFT ATRIUM VOLUME MOD: 59.02 ML
LEFT ATRIUM VOLUME: 61.9 CM3
LEFT INTERNAL DIMENSION IN SYSTOLE: 2.9 CM (ref 2.1–4)
LEFT VENTRICLE DIASTOLIC VOLUME INDEX: 52.66 ML/M2
LEFT VENTRICLE DIASTOLIC VOLUME: 89.52 ML
LEFT VENTRICLE MASS INDEX: 64.4 G/M2
LEFT VENTRICLE SYSTOLIC VOLUME INDEX: 18.8 ML/M2
LEFT VENTRICLE SYSTOLIC VOLUME: 31.94 ML
LEFT VENTRICULAR INTERNAL DIMENSION IN DIASTOLE: 4.4 CM (ref 3.5–6)
LEFT VENTRICULAR MASS: 109.4 G
LV LATERAL E/E' RATIO: 11.83 M/S
LV SEPTAL E/E' RATIO: 14.2 M/S
MV A" WAVE DURATION": 12.27 MSEC
MV PEAK A VEL: 0.64 M/S
MV PEAK E VEL: 0.71 M/S
MV STENOSIS PRESSURE HALF TIME: 70.17 MS
MV VALVE AREA P 1/2 METHOD: 3.14 CM2
OHS CV RV/LV RATIO: 0.59 CM
PISA TR MAX VEL: 2.59 M/S
PULM VEIN S/D RATIO: 1.25
PV PEAK D VEL: 0.52 M/S
PV PEAK S VEL: 0.65 M/S
RA MAJOR: 4.94 CM
RA PRESSURE ESTIMATED: 3 MMHG
RA WIDTH: 2.82 CM
RIGHT VENTRICLE DIASTOLIC BASEL DIMENSION: 2.6 CM
RV TB RVSP: 6 MMHG
SINUS: 3.26 CM
STJ: 2.59 CM
TDI LATERAL: 0.06 M/S
TDI SEPTAL: 0.05 M/S
TDI: 0.06 M/S
TR MAX PG: 27 MMHG
TRICUSPID ANNULAR PLANE SYSTOLIC EXCURSION: 2.49 CM
TV REST PULMONARY ARTERY PRESSURE: 30 MMHG
Z-SCORE OF LEFT VENTRICULAR DIMENSION IN END DIASTOLE: -0.72
Z-SCORE OF LEFT VENTRICULAR DIMENSION IN END SYSTOLE: -0.08

## 2024-11-20 PROCEDURE — 93306 TTE W/DOPPLER COMPLETE: CPT | Mod: 26,,, | Performed by: INTERNAL MEDICINE

## 2024-11-20 PROCEDURE — 93306 TTE W/DOPPLER COMPLETE: CPT | Mod: PO

## 2024-11-20 NOTE — PLAN OF CARE
Outpatient Therapy Updated Plan of Care     Visit Date: 11/14/2024    Name: Tiarra Norton  [facility] Number: [id number]    Therapy Diagnosis:   Encounter Diagnoses   Name Primary?    Decreased range of motion (ROM) of right knee Yes    Weakness of right lower extremity     Decreased range of motion of trunk and back     Decreased strength of trunk and back     Weakness of trunk musculature      Physician: Virgil Scott MD    Physician Orders: PT Eval and Treat   Medical Diagnosis from Referral:   Z96.651 (ICD-10-CM) - Status post total right knee replacement   M22.2X1 (ICD-10-CM) - Patellofemoral pain syndrome of right knee   M70.50 (ICD-10-CM) - Pes anserine bursitis      Evaluation Date: 12/21/2023  Authorization Period Expiration: 12/31/2024  Plan of Care Expiration: Updated to 1/14/2025  Visit # / Visits authorized: 47/50   Progress Note Due: 12/14/2024  FOTO: 3/4            Precautions: hx of TKA and menisectomy, spinal cord stimulator, scoliosis    Subjective     Update: Patient reports: back and knee are feeling better, but they still bothers her with activity     She was compliant with home exercise program.  Response to previous treatment: no adverse effects   Functional change: improved tolerance to functional mobility activities with decreased pain     Pain: 3/10 R knee, 4/10 LB  Location: R low back and R knee   Objective       Updated 11/14/2024     Lumbar Range of Motion:     %   Flexion 90      Extension 70      Left Side Bending 60   Right Side Bending 60   Left rotation    70   Right Rotation    80    *= pain         Range of Motion:   Knee Left active Left Passive   Flexion 133 140   Extension 3 5      Knee Right active Right Passive   Flexion 121 124   Extension 1 3         Lower Extremity Strength     Right LE   Left LE     Hip flexion: 4/5 Hip flexion: 4+/5   Knee extension: 4/5 Knee extension: 4+/5   Knee flexion: 4/5 Knee flexion: 4+/5   Hip IR: 4/5 Hip IR: 4/5   Hip ER: 4/5 Hip ER: 4/5    Hip extension:  4-/5 Hip extension: 4-/5   Hip abduction: 4-/5 Hip abduction: 4/5   Hip adduction: 4-/5 Hip adduction 4/5   Ankle dorsiflexion: 5/5 Ankle dorsiflexion: 5/5   Ankle plantarflexion: 5/5 Ankle plantarflexion: 5/5            Assessment     Update: Tiarra presents today with continued complaints of pain. Reassessment performed with pt continue to demo deficits in lumbar and knee ROM, weakness of core, lumbar and B LE musculature, decreased joint mobility and soft tissue flexibility, and decreased tolerance to ADL's, functional mobility, and leisure activities. Pt has shown increased progress over the last 2 weeks with decreased pain reported, but continues to have activity limitations due to increased pain. Pt will continue to befit from skilled therapy to address described deficits, decrease pain, and improve functional mobility and activity tolerance.      Tiarra Is progressing well towards her goals.   Patient prognosis is Good.      Patient will continue to benefit from skilled outpatient physical therapy to address the deficits listed in the problem list box on initial evaluation, provide pt/family education and to maximize pt's level of independence in the home and community environment.      Patient's spiritual, cultural and educational needs considered and pt agreeable to plan of care and goals.     Anticipated barriers to physical therapy: chronicity of condition, multiple treatment areas, hx of R TKA and menisectomy      GOALS: Short Term Goals:  4 weeks  1. Report decreased in pain at worse less than  <   / =  4  /10  to increase tolerance for functional activities.On going  2. Pt to increase B popliteal angle by greater than > or = 5 degrees in order to improve flexibility and posture. MET  3. Increased R LE MMT 1/2  to increase tolerance for ADL and work activities. MET  4. Pt to increase B Ely's Test by greater than  > or = 5degrees in order to improve flexibility and posture. MET  5. Pt to  "tolerate HEP to improve ROM and independence with ADL's. MET  6. Pt to improve range of motion by 25% to allow for improved functional mobility to allow for improvement in IADLs. MET     Long Term Goals: 8 weeks  1. Report decreased in pain at worse less than  <   / =  2  /10  to increase tolerance for functional mobility.  On going  2 .Pt to increase B popliteal angle by greater than > or = 10 degrees in order to improve flexibility and posture. On going  3. Increased R LE MMT 1 grade to increase tolerance for ADL and work activities.On going  4. Pt will report at 51% or better score on FOTO Lumbar spine survey  to demonstrate decrease in disability and improvement in back pain.On going  5. Pt to be Independent with HEP to improve ROM and independence with ADL's. On going  6. Pt to increase B Ely's Test by greater than > or = 10 degrees in order to improve flexibility and posture. On going  7. Pt to demonstrate negative Bridge Test in order to show improved core strength for lumbar stabilization. On going  8. Pt to improve range of motion by 75% to allow for improved functional mobility to allow for improvement in IADLs. On going    Reasons for Recertification of Therapy:   To allow patient to complete full allotment of visits so that they may achieve maximum therapeutic benefit      Plan     Updated Certification Period: 11/14/2024 to 1/14/2024  Recommended Treatment Plan: 2 times per week for 8 weeks:     - Patient education  - Therapeutic exercise  - Manual therapy  - Performance testing   - Neuromuscular Re-education  - Therapeutic activity   - Modalities    Pt may be seen by PTA as part of the rehabilitation team.     Therapist: Zi Anaya, PT  11/20/2024    "I certify the need for these services furnished under this plan of treatment and while under my care."    ____________________________________  Physician/Referring Practitioner    _______________  Date of Signature    Zi Anaya, " PT  11/14/2024      I CERTIFY THE NEED FOR THESE SERVICES FURNISHED UNDER THIS PLAN OF TREATMENT AND WHILE UNDER MY CARE    Physician's comments:        Physician's Signature: ___________________________________________________

## 2024-11-21 ENCOUNTER — CLINICAL SUPPORT (OUTPATIENT)
Dept: REHABILITATION | Facility: OTHER | Age: 69
End: 2024-11-21
Payer: MEDICARE

## 2024-11-21 DIAGNOSIS — R29.898 WEAKNESS OF RIGHT LOWER EXTREMITY: ICD-10-CM

## 2024-11-21 DIAGNOSIS — M25.661 DECREASED RANGE OF MOTION (ROM) OF RIGHT KNEE: Primary | ICD-10-CM

## 2024-11-21 DIAGNOSIS — M62.81 WEAKNESS OF TRUNK MUSCULATURE: ICD-10-CM

## 2024-11-21 DIAGNOSIS — R29.898 DECREASED STRENGTH OF TRUNK AND BACK: ICD-10-CM

## 2024-11-21 DIAGNOSIS — M25.69 DECREASED RANGE OF MOTION OF TRUNK AND BACK: ICD-10-CM

## 2024-11-21 PROCEDURE — 97140 MANUAL THERAPY 1/> REGIONS: CPT

## 2024-11-21 PROCEDURE — 97110 THERAPEUTIC EXERCISES: CPT

## 2024-11-21 PROCEDURE — 97112 NEUROMUSCULAR REEDUCATION: CPT

## 2024-11-22 ENCOUNTER — PATIENT MESSAGE (OUTPATIENT)
Dept: REHABILITATION | Facility: OTHER | Age: 69
End: 2024-11-22
Payer: MEDICARE

## 2024-11-25 ENCOUNTER — HOSPITAL ENCOUNTER (OUTPATIENT)
Dept: RADIOLOGY | Facility: HOSPITAL | Age: 69
Discharge: HOME | End: 2024-11-25
Attending: ORTHOPAEDIC SURGERY
Payer: MEDICARE

## 2024-11-25 ENCOUNTER — OFFICE VISIT (OUTPATIENT)
Dept: ORTHOPEDICS | Facility: CLINIC | Age: 69
End: 2024-11-25
Payer: MEDICARE

## 2024-11-25 VITALS — BODY MASS INDEX: 26.49 KG/M2 | WEIGHT: 149.5 LBS | HEIGHT: 63 IN

## 2024-11-25 DIAGNOSIS — G89.29 CHRONIC PAIN OF RIGHT KNEE: ICD-10-CM

## 2024-11-25 DIAGNOSIS — M25.561 CHRONIC PAIN OF RIGHT KNEE: ICD-10-CM

## 2024-11-25 DIAGNOSIS — Z96.651 STATUS POST TOTAL RIGHT KNEE REPLACEMENT: ICD-10-CM

## 2024-11-25 DIAGNOSIS — Z96.651 STATUS POST TOTAL RIGHT KNEE REPLACEMENT: Primary | ICD-10-CM

## 2024-11-25 DIAGNOSIS — Z96.651 PRESENCE OF RIGHT ARTIFICIAL KNEE JOINT: ICD-10-CM

## 2024-11-25 PROCEDURE — 73560 X-RAY EXAM OF KNEE 1 OR 2: CPT | Mod: 26,59,LT, | Performed by: RADIOLOGY

## 2024-11-25 PROCEDURE — 73562 X-RAY EXAM OF KNEE 3: CPT | Mod: 26,RT,, | Performed by: RADIOLOGY

## 2024-11-25 PROCEDURE — 99213 OFFICE O/P EST LOW 20 MIN: CPT | Mod: PBBFAC,25 | Performed by: ORTHOPAEDIC SURGERY

## 2024-11-25 PROCEDURE — 73562 X-RAY EXAM OF KNEE 3: CPT | Mod: TC,RT

## 2024-11-25 PROCEDURE — 99214 OFFICE O/P EST MOD 30 MIN: CPT | Mod: S$PBB,,, | Performed by: ORTHOPAEDIC SURGERY

## 2024-11-25 PROCEDURE — 99999 PR PBB SHADOW E&M-EST. PATIENT-LVL III: CPT | Mod: PBBFAC,,, | Performed by: ORTHOPAEDIC SURGERY

## 2024-11-25 NOTE — PROGRESS NOTES
"Subjective:      Patient ID: Tiarra Norton is a 69 y.o. female.    Chief Complaint: Pain of the Right Knee    HPI  Tiarra Norton has right knee pain.  Had a right medial Uni by me back in 2015.  It was converted to a total knee arthroplasty by Dr. Scott in May of 2023.  She states she has had pain since that surgery the pain is diffuse in anterior. No giving away.  No catching. Occasional clicking.  No issues with wound healing. No recent trauma.    Review of Systems   Constitutional: Negative for chills, fever and night sweats.   HENT:  Negative for hearing loss.    Eyes:  Negative for blurred vision and double vision.   Cardiovascular:  Negative for chest pain, claudication and leg swelling.   Respiratory:  Negative for shortness of breath.    Endocrine: Negative for polydipsia, polyphagia and polyuria.   Hematologic/Lymphatic: Negative for adenopathy and bleeding problem. Does not bruise/bleed easily.   Skin:  Negative for poor wound healing.   Gastrointestinal:  Negative for diarrhea and heartburn.   Genitourinary:  Negative for bladder incontinence.   Neurological:  Negative for focal weakness, headaches, numbness, paresthesias and sensory change.   Psychiatric/Behavioral:  The patient is not nervous/anxious.    Allergic/Immunologic: Negative for persistent infections.         Objective:      Body mass index is 26.48 kg/m².  Vitals:    11/25/24 1047   Weight: 67.8 kg (149 lb 7.6 oz)   Height: 5' 2.99" (1.6 m)           General    Constitutional: She is oriented to person, place, and time. She appears well-developed and well-nourished.   HENT:   Head: Normocephalic and atraumatic.   Eyes: EOM are normal.   Cardiovascular:  Normal rate.            Pulmonary/Chest: Effort normal.   Neurological: She is alert and oriented to person, place, and time.   Psychiatric: She has a normal mood and affect. Her behavior is normal.     General Musculoskeletal Exam   Gait: normal       Right Knee Exam "     Inspection   Erythema: absent  Scars: present  Swelling: present  Effusion: present  Deformity: absent  Bruising: absent    Tenderness   The patient is tender to palpation of the medial retinaculum and lateral retinaculum.    Range of Motion   Extension:  0   Flexion:  130     Tests   Ligament Examination   Lachman: abnormal - grade I  MCL - Valgus: normal (0 to 2mm)  LCL - Varus: normal  Patella   Passive Patellar Tilt: neutral    Other   Sensation: normal    Left Knee Exam     Inspection   Erythema: absent  Scars: absent  Swelling: absent  Effusion: absent  Deformity: absent  Bruising: absent    Tenderness   The patient is experiencing no tenderness.     Range of Motion   Extension:  0   Flexion:  130     Tests   Stability   Lachman: normal (-1 to 2mm)   MCL - Valgus: normal (0 to 2mm)  LCL - Varus: normal (0 to 2mm)  Patella   Passive Patellar Tilt: neutral    Other   Sensation: normal    Muscle Strength   Right Lower Extremity   Hip Abduction: 5/5   Quadriceps:  5/5   Hamstrin/5   Left Lower Extremity   Hip Abduction: 5/5   Quadriceps:  5/5   Hamstrin/5     Vascular Exam     Right Pulses  Dorsalis Pedis:      2+          Left Pulses  Dorsalis Pedis:      2+          Edema  Right Lower Leg: absent  Left Lower Leg: absent    Radiographs obtained today and reviewed by me demonstrate a well-fixed and positioned right total knee arthroplasty. No fracture.          Assessment:       Encounter Diagnoses   Name Primary?    Status post total right knee replacement Yes    Chronic pain of right knee     Presence of right artificial knee joint           Plan:       Tiarra was seen today for pain.    Diagnoses and all orders for this visit:    Status post total right knee replacement  -     C-Reactive Protein; Future  -     Sedimentation rate; Future    Chronic pain of right knee  -     C-Reactive Protein; Future  -     Sedimentation rate; Future    Presence of right artificial knee joint  -     MRI Knee Without  Contrast Right; Future      ESR/CRP.  If OK will get a MARS MRI

## 2024-11-26 ENCOUNTER — TELEPHONE (OUTPATIENT)
Dept: ORTHOPEDICS | Facility: CLINIC | Age: 69
End: 2024-11-26
Payer: MEDICARE

## 2024-11-26 NOTE — TELEPHONE ENCOUNTER
Called pt and discussed that her lab were fine and we will f/u after her MRI. Pt verbalized understanding and has no further questions.    ----- Message from Trevin Huber MD sent at 11/26/2024  6:58 AM CST -----  Please call patient.  Labs were fine.  I will see her back after the MRI

## 2024-11-29 ENCOUNTER — PATIENT MESSAGE (OUTPATIENT)
Dept: BARIATRICS | Facility: CLINIC | Age: 69
End: 2024-11-29
Payer: MEDICARE

## 2024-11-29 ENCOUNTER — PATIENT MESSAGE (OUTPATIENT)
Dept: PAIN MEDICINE | Facility: CLINIC | Age: 69
End: 2024-11-29
Payer: MEDICARE

## 2024-12-01 ENCOUNTER — HOSPITAL ENCOUNTER (OUTPATIENT)
Dept: RADIOLOGY | Facility: HOSPITAL | Age: 69
Discharge: HOME | End: 2024-12-01
Attending: ORTHOPAEDIC SURGERY
Payer: MEDICARE

## 2024-12-01 DIAGNOSIS — Z96.651 PRESENCE OF RIGHT ARTIFICIAL KNEE JOINT: ICD-10-CM

## 2024-12-02 ENCOUNTER — RESEARCH ENCOUNTER (OUTPATIENT)
Dept: RESEARCH | Facility: OTHER | Age: 69
End: 2024-12-02
Payer: MEDICARE

## 2024-12-02 ENCOUNTER — HOSPITAL ENCOUNTER (OUTPATIENT)
Dept: RADIOLOGY | Facility: HOSPITAL | Age: 69
Discharge: HOME | End: 2024-12-02
Attending: ORTHOPAEDIC SURGERY
Payer: MEDICARE

## 2024-12-02 ENCOUNTER — TELEPHONE (OUTPATIENT)
Dept: ORTHOPEDICS | Facility: CLINIC | Age: 69
End: 2024-12-02
Payer: MEDICARE

## 2024-12-02 ENCOUNTER — TELEPHONE (OUTPATIENT)
Dept: RESEARCH | Facility: OTHER | Age: 69
End: 2024-12-02
Payer: MEDICARE

## 2024-12-02 ENCOUNTER — OFFICE VISIT (OUTPATIENT)
Dept: PAIN MEDICINE | Facility: CLINIC | Age: 69
End: 2024-12-02
Payer: MEDICARE

## 2024-12-02 DIAGNOSIS — Z96.651 HISTORY OF TOTAL KNEE ARTHROPLASTY, RIGHT: ICD-10-CM

## 2024-12-02 DIAGNOSIS — G89.29 CHRONIC PAIN OF RIGHT KNEE: ICD-10-CM

## 2024-12-02 DIAGNOSIS — M51.369 DDD (DEGENERATIVE DISC DISEASE), LUMBAR: ICD-10-CM

## 2024-12-02 DIAGNOSIS — M25.561 CHRONIC PAIN OF RIGHT KNEE: ICD-10-CM

## 2024-12-02 DIAGNOSIS — G89.4 CHRONIC PAIN SYNDROME: Primary | ICD-10-CM

## 2024-12-02 DIAGNOSIS — Z96.89 SPINAL CORD STIMULATOR STATUS: ICD-10-CM

## 2024-12-02 DIAGNOSIS — M41.25 OTHER IDIOPATHIC SCOLIOSIS, THORACOLUMBAR REGION: ICD-10-CM

## 2024-12-02 DIAGNOSIS — T85.192D MALFUNCTION OF SPINAL CORD STIMULATOR, SUBSEQUENT ENCOUNTER: ICD-10-CM

## 2024-12-02 DIAGNOSIS — G89.4 CHRONIC PAIN SYNDROME: ICD-10-CM

## 2024-12-02 PROCEDURE — 73721 MRI JNT OF LWR EXTRE W/O DYE: CPT | Mod: TC,RT

## 2024-12-02 PROCEDURE — 99213 OFFICE O/P EST LOW 20 MIN: CPT | Mod: 95,,, | Performed by: NURSE PRACTITIONER

## 2024-12-02 PROCEDURE — 73721 MRI JNT OF LWR EXTRE W/O DYE: CPT | Mod: 26,RT,, | Performed by: RADIOLOGY

## 2024-12-02 RX ORDER — HYDROCODONE BITARTRATE AND ACETAMINOPHEN 5; 325 MG/1; MG/1
1 TABLET ORAL EVERY 12 HOURS PRN
Qty: 60 TABLET | Refills: 0 | Status: SHIPPED | OUTPATIENT
Start: 2024-12-10 | End: 2025-01-09

## 2024-12-02 NOTE — PROGRESS NOTES
Chronic Pain-Established Visit  Chronic Pain-Tele-Medicine-Established Note (Follow up visit)        The patient location is: Home  The chief complaint leading to consultation is: pain  Visit type: Virtual visit with synchronous audio and video  Total time spent with patient: 25 min  Each patient to whom he or she provides medical services by telemedicine is:  (1) informed of the relationship between the physician and patient and the respective role of any other health care provider with respect to management of the patient; and (2) notified that he or she may decline to receive medical services by telemedicine and may withdraw from such care at any time.      SUBJECTIVE:    Interval History 12/2/2024:  Ms. Mayen returns to clinic today for follow up of back pain via virtual visit. She reports worsened pain around her SCS battery site. She is having difficulty sleeping due to the pain. On a recent vacation, she had to sleep in a recliner. She also has pain with sitting on a airplane and in the car. She does have benefit with the device. She is interested in moving forward with battery revision. She is taking Norco as needed with benefit and without adverse effects. She denies any other health changes.     Interval History 10/15/2024:  The patient returns to clinic today for follow up of back pain. She is s/p TPI around SCS battery site on 10/1/2024. She reports one day of relief. She continues to report pocket pain around her SCS site. She did meet with [employer] SCS representative for programming. This has been beneficial. She also did dry needling with benefit. She does not wish to pursue battery revision at this time. She continues to take Norco as needed with benefit. She denies any adverse effects. She denies any other health changes. Her pain today is 6/10.    Interval History 8/19/2024:  The patient returns to clinic today for follow up of back pain. She continues to report pocket pain around her SCS  site. She cannot lie flat on her back due to pain. She also notes pain with prolonged standing and activity. She does have benefit with SCS. She reports intermittent right knee pain. She is currently taking Norco as needed with some benefit. She is performing  home exercises. She denies any other health changes. Her pain today is 8/10.    Interval History 6/24/2024:  The patient returns to clinic today for follow up of back and knee pain via virtual visit. She continues to report low back pain. This is worse with prolonged standing. She denies any radicular leg pain. She did recently meet with [employer] representatives for programming. She reports limited relief with these changes. She is participating in physical therapy. She is taking Norco as needed with benefit. She denies any adverse effects. She denies any other health changes.     Interval History 3/25/2024:  The patient returns to clinic today for follow up of back and knee pain. She is s/p right genicular RFA on 3/15/2024. She reports 50% relief. She continues to report some right knee pain.She continues to report low back pain. She is using her SCS. She does have intermittent pain around IPG site. She is participating in physical therapy. She is taking Norco as needed with benefit. She denies any adverse effects. She denies any other health changes. Her pain today is 4/10.    Interval History 1/23/2024:  Tiarra Norton presents for folow-up for lower back pain s/p IPG revision 11/20/2023.  She reports that the pocket pain is significantly improved, continues to get better.  She is just now feeling well enough to participate in physical therapy which she has today.  Today she also complains of right knee pain. She had a right TKA on 5/18/2023. .  The patient describes the pain as aching. The pain is constant. Exacerbating factors: bending, cooking, standing for a long time, walking.  Mitigating factors: ice, heat, medications.  The patient takes  Tylenol 500 mg PRN with mild benefit.  They deny any perceived side effects.  She is hoping for an additional prescription for Norco 5-325 mg which she had taken over the recovery period from the IPG revision.  She states that the medication helps her with activity.  The symptoms interfere with ADLs.  The patient last had imaging Xray bilateral knees on 10/26/2023. See findings below.  The patient denies fever/night sweats, urinary incontinence, bowel incontinence, significant weight changes, significant motor weakness or changes, or loss of sensations.  Today's pain score is 3/10 for back pain and 7/10 for right knee pain.     RTA 5/18/23, activity restrictions from IPG revision was not able to do PT, starting PT for back and right knee back today.  Wondering if there are any procedures we can do for knee.     Wants Norco 5-325 mg-helps with activity  Pain ext and flex  Ptp superior anterior    Interval History 10/25/2023  69 y/o female with hx of chronic left sided low back pain near IPG, she is s/p Octad electrode x2  placement of pulse generator 3  on 1/9 she recently was provided a recent TPI near left low back she reports 0% relief following.  Pain is worse when standing walking performing ADLs.  She does state she gets 5 hours of uninterrupted sleep.  Like her left low back is weak and aching.  Tingling any wearing her low back.  In the right side of her low back.  To discuss other pain interventions and the plan of care moving forward regard to her current subacute pain.  She denies any profound weakness denies any bowel bladder dysfunction denies any saddle anesthesia at this time.    Interval History 9/12/2023:  The patient returns to clinic today for follow up of battery site pain via virtual visit. She is s/p trigger point injections under ultrasound on 8/29/2023. She does feel some benefit. She is currently ill with Covid. She is currently running fever and having body aches. She denies any other health  changes.     Interval History 8/11/2023:  The patient returns to clinic today for follow up of pain. She reports pain around her SCS battery site. She describes the pain as though her muscles feel weak and tired. She does report benefit with SCS. No difficulty with charging or programming. She has tried Lidoderm and Salonpas patches but these caused a rash. She is currently participating in physical therapy for her knee. She denies any other health changes. Her pain today is 4/10.    Interval History 4/14/2023:  The patient is here for follow up of back pain and pain at IPG site. She reports improvement since previous visit. She did have benefit with Lidoderm patches but had a rash so she stopped. Her pain has improved with Salon Pas patches. She is scheduled for knee replacement next month with Dr. Scott. Her pain today is 3/10.    Interval History 3/21/2023:  The patient presents to discuss pain to IPG site. She says that it has been present since surgery and has gradually worsened. She says that it feels like a tight and weak muscle. She has not tried any topical medications or muscle relaxants. No fever or malaise. She has not noticed any redness or swelling to the site. Her original pain has significantly improved from SCS implant. She is part of a research study. Her pain today is 6/10.    Interval History 2/17/2023:  The patient returns to clinic today for follow up of back pain. She reports benefit with [employer] SCS. She is in contact with representatives for programming. She is very happy with relief. She denies any fever, chills, or drainage. She continues to follow her activity restrictions. She denies any other health changes. Her pain today is 2/10.    Interval History 1/31/2023:  The patient returns to clinic today for wound check. She reports benefit with [employer] SCS. She is in contact with representatives for programming. She reports intermittent muscle soreness into her legs.  She does have an upcoming appointment with representatives for programming. She denies any fever, chills, or drainage. She continues to follow her activity restrictions. She denies any other health changes. Her pain today is 3/10.    Interval History 1/17/2023:  The patient returns to clinic today for post op. She is s/p [employer] SCS implant on 1/9/2022. She reports benefit with the device at this time. She is meeting with the representatives today. She does report soreness to her incisions. She denies any fever, chills, or drainage. She did have issues with her dressing staying on. She has completed her antibiotics. She denies any other health changes. Her pain today is 2/10.    Interval History 12/29/2022:  The patient returns for post op appointment. She is s/p lumbar [employer] SCS trial with 90% relief. She has had very minimal back pain during the trial period. She says that she was more active over this time due to the Christmas holiday and had minimal symptoms. She is very happy with the relief. She would like to move forward with implant. She is part of [employer] study. No fever or malaise during trial period. Her pain today is 1/10.    Interval History 10/14/22: Mrs. Norton presents today for follow up of chronic low back pain and to discuss the [employer] trial. She had her lumbar MRI done last night without significant changes to her spine. She did have increased dilation of her bile duct. Her pain today is the same in character and severity as during her last visit with us and she rates it currently at a 7/10. She continues to do her stretches on her own which have helped some. She presents with some questions regarding the trial.     Interval History 9/21/2022:  The patient returns to clinic today for follow up of back pain via virtual visit. She received a letter from [employer] denying SCS trial. She is frustrated by this. She continues to report low back pain. She denies any radicular leg pain. Her pain is  worse with bending and activity. She recently underwent med screening for the [employer] program. She is interested in pursuing this. She has tried Gabapentin and Lyrica in the past with side effects. She denies any other health changes. Her pain today is 7/10.     Interval History 8/22/22: Ms. Norton returns for follow up on her low back pain. At our last visit she felt cured by acupuncture. Unfortunately, this only lasted for 24 hours. She returns today looking for other options to make life livable. Patient claims all of her pain in the low back and middle back. We discussed that this will work best for her low back pain.     Interval History 7/25/22: Tiarra Norton returns for follow up. We previously have been discussing treatments for her low back pain that did not resolve with RFA. At our last visit I referred her to Dr. Antony for clearance for a SCS trial. She was considered to be a reasonable candidate. However, in the meantime, her PT referred her to acupuncture with Dr. Jacobs. She had one treatment and already notes 50% relief. She notes that she still has pain, especially with leaning forward, but feels that it is much better controlled. Their plan is one treatment per week but is approved for 20 sessions.     Interval History 6/15/22: Tiarra Norton returns for follow up. She continues to feel like she has mild pain sitting or laying down, and has her worst pain with extreme leaning forward to pick something up off the floor. She also has difficulty leaning over her handlebars to ride her bike or leaning forward to fold clothes or standing up for any period of time. So, we determined that leaning forward is her worst issue. We did previouisly do lmbb and rfa, which has helped with extension, but it would not help with leaning forward. On review of her MRI she has significant multilevel DDD with Modic changes which likely account for her pain.     4/18/22 Interval History: Patient presents  for telehealth visit for follow up following her b/l RFA at L3-L5. She reports 80% relief and is very pleased with her pain relief. Unfortunately, she is not back to doing what she wants due to right knee pain. She is seeing Dr. Huber for knee pain and is s/p right medial compartment partial knee replacement. She is currently in therapy for this. We discussed that we may be able to assist her with her knee pain as well.     HPI:  Original HISTORY OF PRESENT ILLNESS: Tiarra Norton presents to the clinic for the evaluation of the above pain. The pain started five years ago.     Original Pain Description:  The pain is located in the lower back and does radiate up towards her upper back.The pain is described as aching, dull and sharp. Initially starts as a dull, aching pain and it gets sharp once she bends down and moves around. Typically when she walks for more than five minutes, the sharp pain radiates up her back. Exacerbating factors: Standing, Bending, Touching, Walking, Night Time, Flexing and Lifting. Mitigating factors heat, ice, laying down and massage. Symptoms interfere with daily activity and sleeping. The patient feels like symptoms have been worsening. Patient denies night fever/night sweats, urinary incontinence, bowel incontinence, significant weight loss, significant motor weakness and loss of sensations.    Original PAIN SCORES:  Best: Pain is 1  Worst: Pain is 10  Usually: Pain is 4  Current: Pain is 4        10/15/2024     1:05 PM 10/1/2024     2:39 PM 8/19/2024     9:21 AM   Last 3 PDI Scores   Pain Disability Index (PDI) 33 30 56     6 weeks of Conservative therapy (PT/Chiro/Home Exercises with Dates)  Healthy back program--> has four sessions left for a total of 20 sessions   Last session was on 1/31/22   Stretches that they taught at [employer] gives her relief       Medications:   Robaxin PRN    Ice and heat which has helped   Tried Gabapentin and Lyrica- made her loopy      Report:  reviewed       Interventional Pain Procedures:   10/17/2023 TPI left side near IPG battery 0% relief  2/8/2022- Bilateral L3,4,5 MBB  2/15/2022- Bilateral L3,4,5 MBB  3/8/2022- Right L3,4,5 RFA- 80% relief for a few months  3/22/2022- Left L3,4,5 RFA- 80% relief for a few months  12/22/22 SCS trial- 90% relief  1/9/2023- [employer] SCS implant.   11/20/2023- IPG revision  2/16/2024- Right genicular nerve block  3/15/2024- Right genicular RFA    Imaging:    EXAMINATION:  XR KNEE 3 VIEW RIGHT     CLINICAL HISTORY:  Presence of right artificial knee joint     TECHNIQUE:  AP, lateral, and Merchant views of the right knee were performed.     COMPARISON:  Right knee radiograph dated 06/12/2023     FINDINGS:  Prior right knee arthroplasty.  Hardware projects in similar alignment without evidence for interval loosening or failure.  No periprosthetic fracture.     Impression:     As above.        Electronically signed by: Azael Wilkins  Date:                                            11/03/2023  Time:                                           11:05      10/13/22: MRI LUMBAR SPINE WITHOUT CONTRAST  FINDINGS:  Lumbar levoscoliosis centered at L2.  Minimal anterolisthesis at L4-5.     No compression fractures.  No marrow replacing lesions.     Multilevel degenerative changes with disc desiccation and disc space narrowing, described in detail below.  Discogenic endplate edema at T12-L1.     The conus medullaris has a normal appearance and terminates at the L1 level.  Cauda equina nerve roots are unremarkable.  No epidural mass or collection.     The common duct is dilated up to 12 mm, previously 9 mm on 08/20/2022 CT.  Mild prominence of the main pancreatic duct up to 4 mm, which is new.  No definite filling defect in the bile ducts.  Paraspinal muscle atrophy.     SIGNIFICANT FINDINGS BY LEVEL:     T12-L1: Disc bulge.  Bilateral facet arthropathy and ligamentum flavum thickening.  Mild canal stenosis.  Mild bilateral  foraminal stenosis.     L1-2: Disc bulge.  Bilateral facet arthropathy and ligamentum flavum thickening.  Mild canal stenosis.  Mild right foraminal stenosis.     L2-3: Disc bulge.  Bilateral facet arthropathy and ligamentum flavum thickening.  Mild canal stenosis.  Moderate right mild left foraminal stenosis.     L3-4: Disc bulge.  Bilateral facet arthropathy and ligamentum flavum thickening.  Small bilateral facet joint effusions/synovitis.  Moderate canal stenosis with partial effacement of the thecal sac and crowding of the cauda equina nerve roots.  Mild right and moderate left foraminal stenosis.     L4-5: Disc bulge with posterior disc uncovering.  Bilateral facet arthropathy and ligamentum flavum thickening.  Small right facet joint effusion/synovitis.  Mild canal stenosis.  Mild bilateral foraminal stenosis.     L5-S1: Disc bulge.  Bilateral facet arthropathy and ligamentum flavum thickening.  No significant canal stenosis.  Mild left foraminal stenosis.     Impression:     Lumbar levoscoliosis and multilevel degenerative changes as detailed above.  No significant changes from 01/21/2022.     Increased biliary ductal dilatation up to 12 mm, greater than expected after cholecystectomy.  In addition, the main pancreatic duct is mildly prominent up to 4 mm, which is new.  If LFTs are abnormal, consider MRI/MRCP or ERCP for further evaluation.      Past Medical History:   Diagnosis Date    Cataract     Depression     Gestational diabetes     Hyperlipidemia     Hypertension     IBS (irritable bowel syndrome)     Thyroid disease      Past Surgical History:   Procedure Laterality Date    ADENOIDECTOMY      ARTHROPLASTY, KNEE, TOTAL, USING COMPUTER-ASSISTED NAVIGATION Right 5/18/2023    Procedure: CONVERSION ARTHROPLASTY, KNEE, TOTAL, USING COMPUTER-ASSISTED NAVIGATION;  Surgeon: Virgil Scott MD;  Location: [facility];  Service: Orthopedics;  Laterality: Right;  Same day discharge    ARTHROSCOPIC CHONDROPLASTY OF  KNEE JOINT Right 10/19/2021    Procedure: ARTHROSCOPY, KNEE, WITH CHONDROPLASTY;  Surgeon: Trevin Huber MD;  Location: [facility] OR;  Service: Orthopedics;  Laterality: Right;    ARTHROSCOPY OF KNEE Right 10/19/2021    Procedure: ARTHROSCOPY, KNEE-RIGHT;  Surgeon: Trevin Huber MD;  Location: [facility] OR;  Service: Orthopedics;  Laterality: Right;    BLOCK, NERVE, GENICULAR Right 2024    Procedure: BLOCK, NERVE RIGHT GENICULAR;  Surgeon: Shoaib Aguirre MD;  Location: [facility] PAIN MGT;  Service: Pain Management;  Laterality: Right;  [phone]     SECTION      CHOLECYSTECTOMY      COLONOSCOPY N/A 2016    Procedure: COLONOSCOPY;  Surgeon: Heri Segura MD;  Location: [facility] (4TH FLR);  Service: Endoscopy;  Laterality: N/A;    COLONOSCOPY N/A 2019    Procedure: COLONOSCOPY;  Surgeon: Joe Pitts MD;  Location: [facility] (4TH FLR);  Service: Endoscopy;  Laterality: N/A;    CYSTOSCOPY  2022    Procedure: CYSTOSCOPY;  Surgeon: Lenny Moore MD;  Location: [facility] OR 1ST FLR;  Service: Urology;;    ESOPHAGOGASTRODUODENOSCOPY N/A 2020    Procedure: EGD (ESOPHAGOGASTRODUODENOSCOPY);  Surgeon: Tolu Rivas MD;  Location: [facility] (4TH FLR);  Service: Endoscopy;  Laterality: N/A;  Pt. moved to 9:15am arrival time.. Instructed to not have anything after midnight.EC    EYE SURGERY      cataract resection right    INJECTION OF ANESTHETIC AGENT AROUND NERVE Bilateral 2022    Procedure: Block, Nerve MEDIAL BRANCH BLOCK BILATERAL L3,4,5;  Surgeon: Tara Gibbs MD;  Location: [facility] PAIN MGT;  Service: Pain Management;  Laterality: Bilateral;    INJECTION OF ANESTHETIC AGENT AROUND NERVE Bilateral 2/15/2022    Procedure: BLOCK, NERVE, L3*L4-L5 MEDIAL BR ANCH 2 OF 2;  Surgeon: Tara Gibbs MD;  Location: [facility] PAIN [facility]GT;  Service: Pain Management;  Laterality: Bilateral;    INJECTION OF BOTULINUM TOXIN TYPE A  2022    Procedure: BOTULINUM TOXIN INJECTION 100  units;  Surgeon: Lenny Moore MD;  Location: [facility] OR [facility] F[facility];  Service: Urology;;    INSERTION, NEUROSTIMULATOR, SPINAL CORD N/A 1/9/2023    Procedure: SPINAL CORD STIMULATOR IMPLANT [employer];  Surgeon: Shoaib Aguirre MD;  Location: [facility] OR;  Service: Pain Management;  Laterality: N/A;    JOINT REPLACEMENT      partial right knee    KNEE ARTHROSCOPY W/ MENISCECTOMY Right 10/19/2021    Procedure: ARTHROSCOPY, KNEE, WITH MENISCECTOMY, PARTIAL LATERAL;  Surgeon: Trevin Huber MD;  Location: [facility] OR;  Service: Orthopedics;  Laterality: Right;    r hand surgery      RADIOFREQUENCY ABLATION Right 3/8/2022    Procedure: RADIOFREQUENCY ABLATION, L3-L5 1 OF 2;  Surgeon: Tara Gibbs MD;  Location: [facility] PAIN MGT;  Service: Pain Management;  Laterality: Right;    RADIOFREQUENCY ABLATION Left 3/22/2022    Procedure: RADIOFREQUENCY ABLATION, l3-+l5 2 of 2;  Surgeon: Tara Gibbs MD;  Location: [facility] PAIN MGT;  Service: Pain Management;  Laterality: Left;    RADIOFREQUENCY ABLATION Right 3/15/2024    Procedure: RADIOFREQUENCY ABLATION RIGHT GENICULAR;  Surgeon: Shoaib Aguirre MD;  Location: [facility] PAIN MGT;  Service: Pain Management;  Laterality: Right;  [phone]    REMOVAL OF NEUROSTIMULATOR N/A 11/20/2023    Procedure: SCS IPG BATTERY REVISION;  Surgeon: Shoaib Aguirre MD;  Location: [facility] OR;  Service: Pain Management;  Laterality: N/A;    TONSILLECTOMY      TOTAL KNEE ARTHROPLASTY      partial knee replacement    TRIAL OF SPINAL CORD NERVE STIMULATOR N/A 12/22/2022    Procedure: SPINAL CORD STIMULATOR TRIAL [employer] RESEARCH;  Surgeon: Shoaib Aguirre MD;  Location: [facility] PAIN MGT;  Service: Pain Management;  Laterality: N/A;    TUBAL LIGATION       Social History     Socioeconomic History    Marital status:     Number of children: 1   Occupational History    Occupation:      Employer: [employer]     Comment: retired   Tobacco Use    Smoking status: Never     Smokeless tobacco: Never   Substance and Sexual Activity    Alcohol use: Yes     Alcohol/week: 3.0 standard drinks of alcohol     Types: 3 Glasses of wine per week     Comment: weekends    Drug use: Yes     Types: Marijuana     Comment: occasionally    Sexual activity: Yes     Partners: Male   Other Topics Concern    Are you pregnant or think you may be? No    Breast-feeding No   Social History Narrative    Retired        Swims.       Stationary bike.            Social Drivers of Health     Financial Resource Strain: Low Risk  (3/21/2024)    Overall Financial Resource Strain (CARDIA)     Difficulty of Paying Living Expenses: Not hard at all   Food Insecurity: No Food Insecurity (3/21/2024)    Hunger Vital Sign     Worried About Running Out of Food in the Last Year: Never true     Ran Out of Food in the Last Year: Never true   Transportation Needs: No Transportation Needs (3/21/2024)    PRAPARE - Transportation     Lack of Transportation (Medical): No     Lack of Transportation (Non-Medical): No   Physical Activity: Sufficiently Active (3/21/2024)    Exercise Vital Sign     Days of Exercise per Week: 4 days     Minutes of Exercise per Session: 40 min   Stress: Stress Concern Present (3/21/2024)    [place] [employer] - Occupational Stress Questionnaire     Feeling of Stress : Rather much   Housing Stability: Patient Declined (3/21/2024)    Housing Stability Vital Sign     Unable to Pay for Housing in the Last Year: Patient declined     Number of Places Lived in the Last Year: 1     Unstable Housing in the Last Year: Patient declined     Family History   Problem Relation Name Age of Onset    Hypertension Mother      Irritable bowel syndrome Mother      Hyperlipidemia Mother      Heart disease Father          mi    Hypertension Father      Cancer Father          prostate    Heart attack Father      Heart failure Father      Hyperlipidemia Father      Alcohol abuse Sister nathanael      Depression Sister nathanael     Epilepsy Son ari     Irritable bowel syndrome Sister martin     Alcohol abuse Sister martin     Ovarian cancer Maternal Aunt      Breast cancer Paternal Aunt      Breast cancer Maternal Aunt      Melanoma Neg Hx      Colon cancer Neg Hx      Stomach cancer Neg Hx      Esophageal cancer Neg Hx      Liver disease Neg Hx      Crohn's disease Neg Hx      Ulcerative colitis Neg Hx      Celiac disease Neg Hx      Stroke Neg Hx         Review of patient's allergies indicates:  No Known Allergies    Current Outpatient Medications   Medication Sig    alosetron (LOTRONEX) 0.5 MG tablet Take 1 tablet (0.5 mg total) by mouth in the morning and 1 tablet (0.5 mg total) in the evening. (Patient taking differently: Take 0.5 mg by mouth 2 (two) times daily as needed.)    ALPRAZolam (XANAX) 1 MG tablet take 1 tablet by mouth 3 times a day    amLODIPine (NORVASC) 10 MG tablet Take 1 tablet (10 mg total) by mouth once daily.    atorvastatin (LIPITOR) 10 MG tablet Take 1 tablet (10 mg total) by mouth once daily.    EScitalopram oxalate (LEXAPRO) 5 MG Tab take 1 tablet by mouth once daily    HYDROcodone-acetaminophen (NORCO) 5-325 mg per tablet Take 1 tablet by mouth every 12 (twelve) hours as needed for Pain.    levothyroxine (SYNTHROID) 100 MCG tablet TAKE 1 TABLET BY MOUTH EVERY MORNING    mupirocin (BACTROBAN) 2 % ointment Apply topically 2 (two) times daily.    promethazine (PHENERGAN) 25 MG tablet 1 tab po q 12 h prn nausea    ramelteon (ROZEREM) 8 mg tablet Take 1 tablet (8 mg total) by mouth every evening.    spironolactone (ALDACTONE) 25 MG tablet Take 1 tablet (25 mg total) by mouth once daily. For blood pressure    sumatriptan (IMITREX) 50 MG tablet 1 tab po at start of headache.  Repeat in 2 h prn    tiZANidine (ZANAFLEX) 4 MG tablet Take 1 tablet (4 mg total) by mouth 3 (three) times daily as needed (muscle pain).    traZODone (DESYREL) 100 MG tablet take 1 to 2 tablets by mouth every evening  for for sleep    tretinoin (RETIN-A) 0.025 % cream Compound tretinoin 0.025% / azelaic acid 8% / niacinamide 2% cream. Apply a pea-sized amount to entire face qhs.    valACYclovir (VALTREX) 500 MG tablet Take 1 tablet (500 mg total) by mouth 2 (two) times daily. for 7 days    valsartan (DIOVAN) 320 MG tablet Take 1 tablet (320 mg total) by mouth once daily.     No current facility-administered medications for this visit.       REVIEW OF SYSTEMS:    GENERAL: No fever. No chills. No fatigue. Denies weight loss. Denies weight gain.  HEENT: Denies headaches. Denies vision change. Denies eye pain. Denies double vision. Denies ear pain.   CV: Denies chest pain. HTN  PULM: Denies of shortness of breath.  GI: Denies constipation. No diarrhea. No abdominal pain. Denies nausea. Denies vomiting. No blood in stool.  HEME: Denies bleeding problems.  : Denies urgency. No painful urination. No blood in urine.  MS: See HPI  SKIN: Denies rash.   NEURO: Denies seizures. No weakness.  ENDO: Thyroid disorder.   PSYCH:  Depression    OBJECTIVE:     Previous physical exam:  There were no vitals filed for this visit.      PHYSICAL EXAM:     GENERAL: Well appearing, in no acute distress, alert and oriented x3.  PSYCH:  Mood and affect appropriate.  SKIN: Skin color, texture, turgor normal, no rashes or lesions. SCS site well healed.   RESP: Symmetric chest rise, no respiratory distress noted.   BACK: Straight leg raise in the sitting position is negative for radicular pain.   MSK: 5/5 strength in right ankle with plantar and dorsiflexion. 5/5 strength in left ankle with plantar and dorsiflexion. 5/5 strength with right knee flexion and extension. 5/5 strength with left knee flexion and extension.   GAIT: Normal.         ASSESSMENT: 69 y.o. year old female with low back and knee pain, consistent with the followin. Chronic pain syndrome        2. Malfunction of spinal cord stimulator, subsequent encounter              PLAN:     -  Previous imaging reviewed. Labs reviewed.     - Schedule for SCS battery revision.     - Continue home exercise routine.     - Flexeril 10 mg TID PRN.     - Pain contract signed 1/23/2024.    - Continue Norco 5/325 mg BID PRN, #60. Refill provided with appropriate date.      - The patient is here today for a refill of current pain medications and they believe these provide effective pain control and improvements in quality of life.  The patient notes no serious side effects, and feels the benefits outweigh the risks.  The patient was reminded of the pain contract that they signed previously as well as the risks and benefits of the medication including possible death.  The updated [place] [employer] prescription monitoring program was reviewed, and the patient has been found to be compliant with current treatment plan. Medication management provided by Dr. Aguirre.     - UDS from 1/23/2024. Repeat next visit.     -RTC after above procedure.    The above plan and management options were discussed at length with patient. Patient is in agreement with the above and verbalized understanding.       Marlene Thorne NP  12/02/2024

## 2024-12-02 NOTE — TELEPHONE ENCOUNTER
Called pt and informed her that the MRI showed some inflammation and tendonitis, but nothing remarkable. Scheduled pt for a virtual visit 12/9 at 9:00 AM.    ----- Message from Trevin Huber MD sent at 12/2/2024  3:20 PM CST -----  MRI showede some inflammation and tendonitis.  Nothing remarkable.  Please schedule virtual f/u

## 2024-12-03 ENCOUNTER — CLINICAL SUPPORT (OUTPATIENT)
Dept: REHABILITATION | Facility: OTHER | Age: 69
End: 2024-12-03
Payer: MEDICARE

## 2024-12-03 DIAGNOSIS — M25.661 DECREASED RANGE OF MOTION (ROM) OF RIGHT KNEE: Primary | ICD-10-CM

## 2024-12-03 DIAGNOSIS — R29.898 WEAKNESS OF RIGHT LOWER EXTREMITY: ICD-10-CM

## 2024-12-03 DIAGNOSIS — M25.69 DECREASED RANGE OF MOTION OF TRUNK AND BACK: ICD-10-CM

## 2024-12-03 DIAGNOSIS — M62.81 WEAKNESS OF TRUNK MUSCULATURE: ICD-10-CM

## 2024-12-03 DIAGNOSIS — R29.898 DECREASED STRENGTH OF TRUNK AND BACK: ICD-10-CM

## 2024-12-03 PROCEDURE — 20560 NDL INSJ W/O NJX 1 OR 2 MUSC: CPT

## 2024-12-03 PROCEDURE — 97110 THERAPEUTIC EXERCISES: CPT

## 2024-12-03 PROCEDURE — 97112 NEUROMUSCULAR REEDUCATION: CPT

## 2024-12-03 NOTE — PROGRESS NOTES
"  [facility]   Physical Therapy Progress Note      Name: Tiarra Norton  Clinic Number: [id number]    Therapy Diagnosis:   Encounter Diagnoses   Name Primary?    Decreased range of motion (ROM) of right knee Yes    Weakness of right lower extremity     Decreased range of motion of trunk and back     Decreased strength of trunk and back     Weakness of trunk musculature          Physician: Virgil Scott MD    Visit Date: 11/21/2024    Physician Orders: PT Eval and Treat   Medical Diagnosis from Referral:   Z96.651 (ICD-10-CM) - Status post total right knee replacement   M22.2X1 (ICD-10-CM) - Patellofemoral pain syndrome of right knee   M70.50 (ICD-10-CM) - Pes anserine bursitis      Evaluation Date: 12/21/2023  Authorization Period Expiration: 12/31/2024  Plan of Care Expiration: Updated to 1/14/2025  Visit # / Visits authorized: 48/50   Progress Note Due: 12/14/2024  FOTO: 3/4      Precautions: hx of TKA and menisectomy, spinal cord stimulator, scoliosis     Time In: 1030 am  Time Out: 1135 am   Total Billable Time: 60 minutes (1:1)  Total Appointment Time (timed & untimed codes): 65 minutes    PTA Visit #: 0/5   Subjective   Patient reports: feeling a little better today    She was compliant with home exercise program.  Response to previous treatment: no adverse effects   Functional change: improved tolerance to functional mobility activities with decreased pain    Pain: 3/10 R knee, 4/10 LB  Location: R low back and R knee   Objective             Treatment   Tiarra received the treatments listed below:      Therapeutic exercises to develop strength, endurance, ROM, flexibility, posture, and core stabilization for 15 minutes including:    Recumbent bike lv. 4 x 5 min for endurance and knee ROM     PPT 15x5"  Bridge 2x10x3"  EIL 2x10  DKTC 15x5"'  Open books x15,3" ea  Hip flexor stretch 2x1' ea  Leg press # 2x10  Leg press SL 80# 2x10  EIS 2x10    manual therapy techniques: " "joint mobs applied to the: B LB  for 10 minutes, including:     Lumbar CPA's grade III/IV  Manual facet gapping bilaterally      Neuromuscular re-education activities to improve: Balance, Coordination, Kinesthetic, Proprioception, and Posture for 25 minutes. The following activities were included:    Sit to stands GTB around knees 2x10  Side stepping GTB 2x12 steps  Monster walks GTB 2x12 steps  Single leg balance 3x30" R only  Step ups fwd/lateral  6 in step 2x10x3" ea R only   Medx trunk extension 74# x20  Medx trunk rotation 34# x20 increase weight       therapeutic activities to improve functional performance for 0  minutes, including:      cold pack for 5 minutes to R knee and low back    Patient Education and Home Exercises     Education provided:   - HEP, POC    Written Home Exercises Provided: Patient instructed to cont prior HEP. Exercises were reviewed and Tiarra was able to demonstrate them prior to the end of the session.  Tiarra demonstrated good  understanding of the education provided. See Electronic Medical Record under Patient Instructions for exercises provided during therapy sessions    Assessment   Tiarra presents today with reports of decreased pain. Continuation and progression of core and LE strengthening and lumbar mobility exercises with good tolerance and no adverse effects. Continue to progress as able.     Tiarra Is progressing well towards her goals.   Patient prognosis is Good.     Patient will continue to benefit from skilled outpatient physical therapy to address the deficits listed in the problem list box on initial evaluation, provide pt/family education and to maximize pt's level of independence in the home and community environment.     Patient's spiritual, cultural and educational needs considered and pt agreeable to plan of care and goals.     Anticipated barriers to physical therapy: chronicity of condition, multiple treatment areas, hx of R TKA and menisectomy     GOALS: Short " Term Goals:  4 weeks  1. Report decreased in pain at worse less than  <   / =  4  /10  to increase tolerance for functional activities.On going  2. Pt to increase B popliteal angle by greater than > or = 5 degrees in order to improve flexibility and posture. MET  3. Increased R LE MMT 1/2  to increase tolerance for ADL and work activities. MET  4. Pt to increase B Ely's Test by greater than  > or = 5degrees in order to improve flexibility and posture. MET  5. Pt to tolerate HEP to improve ROM and independence with ADL's. MET  6. Pt to improve range of motion by 25% to allow for improved functional mobility to allow for improvement in IADLs. MET     Long Term Goals: 8 weeks  1. Report decreased in pain at worse less than  <   / =  2  /10  to increase tolerance for functional mobility.  On going  2 .Pt to increase B popliteal angle by greater than > or = 10 degrees in order to improve flexibility and posture. On going  3. Increased R LE MMT 1 grade to increase tolerance for ADL and work activities.On going  4. Pt will report at 51% or better score on FOTO Lumbar spine survey  to demonstrate decrease in disability and improvement in back pain.On going  5. Pt to be Independent with HEP to improve ROM and independence with ADL's. On going  6. Pt to increase B Ely's Test by greater than > or = 10 degrees in order to improve flexibility and posture. On going  7. Pt to demonstrate negative Bridge Test in order to show improved core strength for lumbar stabilization. On going  8. Pt to improve range of motion by 75% to allow for improved functional mobility to allow for improvement in IADLs. On going        Plan   Plan of care Certification: 5/9/2024 to 1/14/2025     Outpatient Physical Therapy 2 times weekly for 10 weeks to include the following interventions: Cervical/Lumbar Traction, Gait Training, Manual Therapy, Moist Heat/ Ice, Neuromuscular Re-ed, Patient Education, Self Care, Therapeutic Activities, and Therapeutic  Exercise. Dry needtanisha Anaya, PT   12/3/2024

## 2024-12-04 ENCOUNTER — PATIENT MESSAGE (OUTPATIENT)
Dept: INTERNAL MEDICINE | Facility: CLINIC | Age: 69
End: 2024-12-04
Payer: MEDICARE

## 2024-12-04 ENCOUNTER — PATIENT MESSAGE (OUTPATIENT)
Dept: PAIN MEDICINE | Facility: CLINIC | Age: 69
End: 2024-12-04
Payer: MEDICARE

## 2024-12-04 NOTE — TELEPHONE ENCOUNTER
Lov 11/11/24  Pt would like to know if she should continue taking Cytomel 5mg QD. It was d/c from her medication list on 11/1/24 by  who she seen for a rash. There was no discontinue reason listed. I do see that patient is also on Levothyroxine 100mcg daily

## 2024-12-05 ENCOUNTER — PATIENT MESSAGE (OUTPATIENT)
Dept: OBSTETRICS AND GYNECOLOGY | Facility: CLINIC | Age: 69
End: 2024-12-05
Payer: MEDICARE

## 2024-12-05 DIAGNOSIS — N95.1 VAGINAL DRYNESS, MENOPAUSAL: Primary | ICD-10-CM

## 2024-12-05 RX ORDER — ESTRADIOL 0.1 MG/G
CREAM VAGINAL
Qty: 42.5 G | Refills: 6 | Status: SHIPPED | OUTPATIENT
Start: 2024-12-05

## 2024-12-06 ENCOUNTER — PATIENT MESSAGE (OUTPATIENT)
Dept: PAIN MEDICINE | Facility: CLINIC | Age: 69
End: 2024-12-06
Payer: MEDICARE

## 2024-12-06 DIAGNOSIS — T85.192D MALFUNCTION OF SPINAL CORD STIMULATOR, SUBSEQUENT ENCOUNTER: ICD-10-CM

## 2024-12-06 DIAGNOSIS — G89.4 CHRONIC PAIN SYNDROME: Primary | ICD-10-CM

## 2024-12-09 ENCOUNTER — OFFICE VISIT (OUTPATIENT)
Dept: ORTHOPEDICS | Facility: CLINIC | Age: 69
End: 2024-12-09
Payer: MEDICARE

## 2024-12-09 DIAGNOSIS — M25.561 CHRONIC PAIN OF RIGHT KNEE: ICD-10-CM

## 2024-12-09 DIAGNOSIS — G89.29 CHRONIC PAIN OF RIGHT KNEE: ICD-10-CM

## 2024-12-09 DIAGNOSIS — Z96.651 STATUS POST TOTAL RIGHT KNEE REPLACEMENT: Primary | ICD-10-CM

## 2024-12-09 PROCEDURE — 99441 PR PHYSICIAN TELEPHONE EVALUATION 5-10 MIN: CPT | Mod: 95,,, | Performed by: ORTHOPAEDIC SURGERY

## 2024-12-09 NOTE — PROGRESS NOTES
Established Patient - Audio Only Telehealth Visit     The patient location is: [facility]  The chief complaint leading to consultation is: Right Knee Pain  Visit type: Virtual visit with audio only (telephone)  Total time spent with patient: 10  minutes       The reason for the audio only service rather than synchronous audio and video virtual visit was related to technical difficulties or patient preference/necessity.     Each patient to whom I provide medical services by telemedicine is:  (1) informed of the relationship between the physician and patient and the respective role of any other health care provider with respect to management of the patient; and (2) notified that they may decline to receive medical services by telemedicine and may withdraw from such care at any time. Patient verbally consented to receive this service via voice-only telephone call.       HPI:  Still having right knee pain.  There has been no significant change in the nature of her complaints.  She is taking over-the-counter nonsteroidal medication.  She had an RFA with no results.  She is in physical therapy.   MRI was reviewed.  I am not concerned about any loosening of the patellar component.  She does have some synovitis and effusion and some biceps tendinitis.  Assessment and plan:  Options were discussed.  At this point I would like to get an aspiration and Synovasure of her knee.  She does have an effusion on the MRI.  We will call her with the Synovasure results.  The next step may be prescription strength anti-inflammatories.                        This service was not originating from a related E/M service provided within the previous 7 days nor will  to an E/M service or procedure within the next 24 hours or my soonest available appointment.  Prevailing standard of care was able to be met in this audio-only visit.

## 2024-12-10 ENCOUNTER — OFFICE VISIT (OUTPATIENT)
Dept: ORTHOPEDICS | Facility: CLINIC | Age: 69
End: 2024-12-10
Payer: MEDICARE

## 2024-12-10 VITALS — HEIGHT: 63 IN | WEIGHT: 149.5 LBS | BODY MASS INDEX: 26.49 KG/M2

## 2024-12-10 DIAGNOSIS — Z96.651 STATUS POST TOTAL RIGHT KNEE REPLACEMENT: Primary | ICD-10-CM

## 2024-12-10 PROCEDURE — 99213 OFFICE O/P EST LOW 20 MIN: CPT | Mod: PBBFAC

## 2024-12-10 PROCEDURE — 99499 UNLISTED E&M SERVICE: CPT | Mod: S$PBB,,,

## 2024-12-10 PROCEDURE — 99999 PR PBB SHADOW E&M-EST. PATIENT-LVL III: CPT | Mod: PBBFAC,,,

## 2024-12-10 NOTE — PROGRESS NOTES
Knee Arthrocentesis Procedure Note  Diagnosis: Painful right knee prosthesis  Indications: Knee pain  Procedure Details: Verbal consent obtained and allergies reviewed. Area prepped with alcohol.  An 18 gauge needle was inserted into superolateral aspect of knee. 2 ml of grossly bloody was removed from the joint and discarded. The needle was removed and the area was cleansed and dressed.    Complications: None.      - Insufficient volume aspirated from the right knee for synovasure.   - Will discuss with Dr. Huber and consider referral to sports medicine for ultrasound guided right knee aspiration with synovasure.

## 2024-12-11 ENCOUNTER — HOSPITAL ENCOUNTER (OUTPATIENT)
Dept: RADIOLOGY | Facility: HOSPITAL | Age: 69
Discharge: HOME | End: 2024-12-11
Attending: ORTHOPAEDIC SURGERY
Payer: MEDICARE

## 2024-12-11 ENCOUNTER — OFFICE VISIT (OUTPATIENT)
Dept: ORTHOPEDICS | Facility: CLINIC | Age: 69
End: 2024-12-11
Payer: MEDICARE

## 2024-12-11 DIAGNOSIS — G89.29 CHRONIC BACK PAIN, UNSPECIFIED BACK LOCATION, UNSPECIFIED BACK PAIN LATERALITY: ICD-10-CM

## 2024-12-11 DIAGNOSIS — M54.9 CHRONIC BACK PAIN, UNSPECIFIED BACK LOCATION, UNSPECIFIED BACK PAIN LATERALITY: ICD-10-CM

## 2024-12-11 DIAGNOSIS — M54.50 LOW BACK PAIN, UNSPECIFIED BACK PAIN LATERALITY, UNSPECIFIED CHRONICITY, UNSPECIFIED WHETHER SCIATICA PRESENT: ICD-10-CM

## 2024-12-11 DIAGNOSIS — M51.369 DEGENERATION OF INTERVERTEBRAL DISC OF LUMBAR REGION, UNSPECIFIED WHETHER PAIN PRESENT: ICD-10-CM

## 2024-12-11 PROCEDURE — 72110 X-RAY EXAM L-2 SPINE 4/>VWS: CPT | Mod: 26,,, | Performed by: RADIOLOGY

## 2024-12-11 PROCEDURE — 72110 X-RAY EXAM L-2 SPINE 4/>VWS: CPT | Mod: TC

## 2024-12-11 PROCEDURE — 99214 OFFICE O/P EST MOD 30 MIN: CPT | Mod: S$PBB,,, | Performed by: ORTHOPAEDIC SURGERY

## 2024-12-11 PROCEDURE — 99213 OFFICE O/P EST LOW 20 MIN: CPT | Mod: PBBFAC,25 | Performed by: ORTHOPAEDIC SURGERY

## 2024-12-11 PROCEDURE — 99999 PR PBB SHADOW E&M-EST. PATIENT-LVL III: CPT | Mod: PBBFAC,,, | Performed by: ORTHOPAEDIC SURGERY

## 2024-12-12 ENCOUNTER — CLINICAL SUPPORT (OUTPATIENT)
Dept: REHABILITATION | Facility: OTHER | Age: 69
End: 2024-12-12
Payer: MEDICARE

## 2024-12-12 ENCOUNTER — PATIENT MESSAGE (OUTPATIENT)
Dept: INTERNAL MEDICINE | Facility: CLINIC | Age: 69
End: 2024-12-12
Payer: MEDICARE

## 2024-12-12 DIAGNOSIS — M62.81 WEAKNESS OF TRUNK MUSCULATURE: ICD-10-CM

## 2024-12-12 DIAGNOSIS — R29.898 WEAKNESS OF RIGHT LOWER EXTREMITY: ICD-10-CM

## 2024-12-12 DIAGNOSIS — M25.661 DECREASED RANGE OF MOTION (ROM) OF RIGHT KNEE: Primary | ICD-10-CM

## 2024-12-12 DIAGNOSIS — Z96.651 STATUS POST TOTAL RIGHT KNEE REPLACEMENT: Primary | ICD-10-CM

## 2024-12-12 DIAGNOSIS — R29.898 DECREASED STRENGTH OF TRUNK AND BACK: ICD-10-CM

## 2024-12-12 DIAGNOSIS — M25.69 DECREASED RANGE OF MOTION OF TRUNK AND BACK: ICD-10-CM

## 2024-12-12 PROCEDURE — 97112 NEUROMUSCULAR REEDUCATION: CPT

## 2024-12-12 PROCEDURE — 20560 NDL INSJ W/O NJX 1 OR 2 MUSC: CPT

## 2024-12-12 PROCEDURE — 97110 THERAPEUTIC EXERCISES: CPT

## 2024-12-12 NOTE — PROGRESS NOTES
DATE: 12/12/2024  PATIENT: Tiarra Norton    Attending Physician: Tyler Jacobs M.D.    HISTORY:  Tiarra Norton is a 69 y.o. female who returns to me today for LBP FU Patient was last seen on 1/25/22. Patient has LBP, but her SCS has been helping quite a bit. She said the battery is causing significant irritation. She wants the SCS to be revised.    PMH/PSH/FamHx/SocHx:  Unchanged from prior visit    ROS:  Positive for LBP  Denies perineal paresthesias, bowel or bladder incontinence    EXAM:  There were no vitals taken for this visit.    My physical examination was notable for the following findings: motor 5/5 BLE; SILT    IMAGING:  Previous XRs showed lumbar spondylosis and degenerative scoliosis measuring 24degrees. L4-5 anterolisthesis measuring 6mm.     MRI lumbar showed no significant central or foraminal stenosis.    Standing scoliosis XR reviewed - thoracolumbar levoscoliosis approximately 26 degrees     ASSESSMENT/PLAN:  Patient has LBP in setting of degenerative scoliosis. She has no sagittal imbalance. She will continue therapy and medications. I educated the patient on the importance of core/back strengthening, correct posture, bending/lifting ergonomics, and low-impact aerobic exercises (walking, elliptical, and aquatherapy). I messaged NSGY about possible SCS revision. She will FU prn.    Tyler Jacobs MD  Orthopaedic Spine Surgeon  Department of Orthopaedic Surgery  [phone]

## 2024-12-12 NOTE — PROGRESS NOTES
"  [facility]   Physical Therapy Treatment Note      Name: Tiarra Norton  Clinic Number: [id number]    Therapy Diagnosis:   Encounter Diagnoses   Name Primary?    Decreased range of motion (ROM) of right knee Yes    Weakness of right lower extremity     Decreased range of motion of trunk and back     Decreased strength of trunk and back     Weakness of trunk musculature          Physician: Virgil Scott MD    Visit Date: 12/3/2024    Physician Orders: PT Eval and Treat   Medical Diagnosis from Referral:   Z96.651 (ICD-10-CM) - Status post total right knee replacement   M22.2X1 (ICD-10-CM) - Patellofemoral pain syndrome of right knee   M70.50 (ICD-10-CM) - Pes anserine bursitis      Evaluation Date: 12/21/2023  Authorization Period Expiration: 12/31/2024  Plan of Care Expiration: Updated to 1/14/2025  Visit # / Visits authorized: 49/50   Progress Note Due: 12/14/2024  FOTO: 3/4      Precautions: hx of TKA and menisectomy, spinal cord stimulator, scoliosis     Time In: 900 am  Time Out: 1000 am   Total Billable Time: 55 minutes (1:1)  Total Appointment Time (timed & untimed codes): 60 minutes    PTA Visit #: 0/5   Subjective   Patient reports: both the knee and back are sore today, back is hurting more that it has in a while, she is thinking about getting her spinal cord stimulator adjusted    She was compliant with home exercise program.  Response to previous treatment: no adverse effects   Functional change: improved tolerance to functional mobility activities with decreased pain    Pain: 5/10 R knee, 8/10 LB  Location: R low back and R knee   Objective             Treatment   Tiarra received the treatments listed below:      Therapeutic exercises to develop strength, endurance, ROM, flexibility, posture, and core stabilization for 15 minutes including:    Recumbent bike lv. 4 x 5 min for endurance and knee ROM     PPT 15x5"  Bridge 2x10x3"  EIL 2x10  DKTC 15x5"'  Open books x15,3" " "ea  Hip flexor stretch 2x1' ea  Leg press # 2x10  Leg press SL 80# 2x10  EIS 2x10    Dry needling applied to the: B LB  for 10 minutes, including:     FDN. Educated pt to use heat following treatment sessions if pt is experiencing pain or soreness. Pt verbalized good understanding of education. Pt signed written consent to dry needling. Pt gave verbal consent for DN     Pt received dry needling to the below listed muscles using 50 mm needles.     B Lumbar paraspinals L1-L5      Neuromuscular re-education activities to improve: Balance, Coordination, Kinesthetic, Proprioception, and Posture for 30 minutes. The following activities were included:    Sit to stands GTB around knees 2x10  Side stepping GTB 2x12 steps  Monster walks GTB 2x12 steps  Single leg balance 3x30" R only  Step ups fwd/lateral  6 in step 2x10x3" ea R only   Medx trunk extension 74# x20  Medx trunk rotation 34# x20 increase weight       therapeutic activities to improve functional performance for 0  minutes, including:      Hot pack for 5 minutes to R knee and low back    Patient Education and Home Exercises     Education provided:   - HEP, POC    Written Home Exercises Provided: Patient instructed to cont prior HEP. Exercises were reviewed and Tiarra was able to demonstrate them prior to the end of the session.  Tiarra demonstrated good  understanding of the education provided. See Electronic Medical Record under Patient Instructions for exercises provided during therapy sessions    Assessment   Tiarra presents today with reports of increased pain without incident. Treatment modified due to poor tolerance to previously completed exercises. Dry needling applied with slight improvement in symptoms after application. Continue to progress as able.     Tiarra Is progressing well towards her goals.   Patient prognosis is Good.     Patient will continue to benefit from skilled outpatient physical therapy to address the deficits listed in the problem " list box on initial evaluation, provide pt/family education and to maximize pt's level of independence in the home and community environment.     Patient's spiritual, cultural and educational needs considered and pt agreeable to plan of care and goals.     Anticipated barriers to physical therapy: chronicity of condition, multiple treatment areas, hx of R TKA and menisectomy     GOALS: Short Term Goals:  4 weeks  1. Report decreased in pain at worse less than  <   / =  4  /10  to increase tolerance for functional activities.On going  2. Pt to increase B popliteal angle by greater than > or = 5 degrees in order to improve flexibility and posture. MET  3. Increased R LE MMT 1/2  to increase tolerance for ADL and work activities. MET  4. Pt to increase B Ely's Test by greater than  > or = 5degrees in order to improve flexibility and posture. MET  5. Pt to tolerate HEP to improve ROM and independence with ADL's. MET  6. Pt to improve range of motion by 25% to allow for improved functional mobility to allow for improvement in IADLs. MET     Long Term Goals: 8 weeks  1. Report decreased in pain at worse less than  <   / =  2  /10  to increase tolerance for functional mobility.  On going  2 .Pt to increase B popliteal angle by greater than > or = 10 degrees in order to improve flexibility and posture. On going  3. Increased R LE MMT 1 grade to increase tolerance for ADL and work activities.On going  4. Pt will report at 51% or better score on FOTO Lumbar spine survey  to demonstrate decrease in disability and improvement in back pain.On going  5. Pt to be Independent with HEP to improve ROM and independence with ADL's. On going  6. Pt to increase B Ely's Test by greater than > or = 10 degrees in order to improve flexibility and posture. On going  7. Pt to demonstrate negative Bridge Test in order to show improved core strength for lumbar stabilization. On going  8. Pt to improve range of motion by 75% to allow for  improved functional mobility to allow for improvement in IADLs. On going        Plan   Plan of care Certification: 5/9/2024 to 1/14/2025     Outpatient Physical Therapy 2 times weekly for 10 weeks to include the following interventions: Cervical/Lumbar Traction, Gait Training, Manual Therapy, Moist Heat/ Ice, Neuromuscular Re-ed, Patient Education, Self Care, Therapeutic Activities, and Therapeutic Exercise. Marybeth Anaya, PT   12/12/2024

## 2024-12-16 ENCOUNTER — PATIENT MESSAGE (OUTPATIENT)
Dept: ADMINISTRATIVE | Facility: HOSPITAL | Age: 69
End: 2024-12-16
Payer: MEDICARE

## 2024-12-17 ENCOUNTER — CLINICAL SUPPORT (OUTPATIENT)
Dept: REHABILITATION | Facility: OTHER | Age: 69
End: 2024-12-17
Payer: MEDICARE

## 2024-12-17 ENCOUNTER — OFFICE VISIT (OUTPATIENT)
Dept: PAIN MEDICINE | Facility: CLINIC | Age: 69
End: 2024-12-17
Payer: MEDICARE

## 2024-12-17 VITALS
DIASTOLIC BLOOD PRESSURE: 71 MMHG | HEART RATE: 56 BPM | WEIGHT: 147.69 LBS | BODY MASS INDEX: 27.18 KG/M2 | OXYGEN SATURATION: 100 % | HEIGHT: 62 IN | SYSTOLIC BLOOD PRESSURE: 132 MMHG | RESPIRATION RATE: 18 BRPM | TEMPERATURE: 98 F

## 2024-12-17 DIAGNOSIS — M25.661 DECREASED RANGE OF MOTION (ROM) OF RIGHT KNEE: Primary | ICD-10-CM

## 2024-12-17 DIAGNOSIS — M79.18 MYOFASCIAL PAIN: Primary | ICD-10-CM

## 2024-12-17 DIAGNOSIS — R29.898 DECREASED STRENGTH OF TRUNK AND BACK: ICD-10-CM

## 2024-12-17 DIAGNOSIS — M25.69 DECREASED RANGE OF MOTION OF TRUNK AND BACK: ICD-10-CM

## 2024-12-17 DIAGNOSIS — R29.898 WEAKNESS OF RIGHT LOWER EXTREMITY: ICD-10-CM

## 2024-12-17 DIAGNOSIS — M62.81 WEAKNESS OF TRUNK MUSCULATURE: ICD-10-CM

## 2024-12-17 PROCEDURE — 99214 OFFICE O/P EST MOD 30 MIN: CPT | Mod: PBBFAC | Performed by: ANESTHESIOLOGY

## 2024-12-17 PROCEDURE — 76942 ECHO GUIDE FOR BIOPSY: CPT | Mod: 26,S$PBB,, | Performed by: ANESTHESIOLOGY

## 2024-12-17 PROCEDURE — 76942 ECHO GUIDE FOR BIOPSY: CPT | Mod: PBBFAC | Performed by: ANESTHESIOLOGY

## 2024-12-17 PROCEDURE — 20553 NJX 1/MLT TRIGGER POINTS 3/>: CPT | Mod: S$PBB,,, | Performed by: ANESTHESIOLOGY

## 2024-12-17 PROCEDURE — 20553 NJX 1/MLT TRIGGER POINTS 3/>: CPT | Mod: PBBFAC | Performed by: ANESTHESIOLOGY

## 2024-12-17 PROCEDURE — 99212 OFFICE O/P EST SF 10 MIN: CPT | Mod: 25,S$PBB,, | Performed by: ANESTHESIOLOGY

## 2024-12-17 PROCEDURE — 97110 THERAPEUTIC EXERCISES: CPT

## 2024-12-17 PROCEDURE — 20561 NDL INSJ W/O NJX 3+ MUSC: CPT

## 2024-12-17 PROCEDURE — 99999 PR PBB SHADOW E&M-EST. PATIENT-LVL IV: CPT | Mod: PBBFAC,,, | Performed by: ANESTHESIOLOGY

## 2024-12-17 PROCEDURE — 97112 NEUROMUSCULAR REEDUCATION: CPT

## 2024-12-17 RX ORDER — TRIAMCINOLONE ACETONIDE 40 MG/ML
40 INJECTION, SUSPENSION INTRA-ARTICULAR; INTRAMUSCULAR
Status: SHIPPED | OUTPATIENT
Start: 2024-12-17

## 2024-12-17 NOTE — PROGRESS NOTES
Patient Name: Tiarra Norton  MRN: [id number]    INFORMED CONSENT: The procedure, risks, benefits and options were discussed with patient. There are no contraindications to the procedure. The patient expressed understanding and agreed to proceed. The personnel performing the procedure was discussed. I verify that I personally obtained Tiarra's consent prior to the start of the procedure and the signed consent can be found on the patient's chart.    Procedure Date: 12/17/2024    Anesthesia: Topical    Pre Procedure diagnosis:   1. Myofascial pain      Myofascial pain      Post-Procedure diagnosis: same       PROCEDURE: TRIGGER POINT INJECTION under ultrasound guidance  The patient was placed in a seated position and time out was perfomed. The site of pain and procedure were confirmed with the patient prior to starting the procedure. After performing time out. The patient's  trigger points were identified and marked at left lumbar area around the SCS IPG. The skin was prepped with chlorhexidine three times.   After performing time out A 27-gauge 1.5 inch  needle was advanced through the skin and subcutaneous tissues.  Aspiration for blood, air and CSF was negative.  A total of 9 ml of Bupivacaine 0.25% and 40 mg Kenalog was injected at all trigger point.  No complications were evident. No specimens collected.    Blood Loss: Nill  Specimen: None    Jasmina Luis MD    I reviewed and edited the resident/fellow's note. I conducted my own interview and physical examination. I agree with the findings and documentation. I was present and supervising all critical portions of the procedure.    Shoaib Aguirre MD

## 2024-12-18 NOTE — PROGRESS NOTES
"  [facility]   Physical Therapy Treatment Note      Name: Tiarra Norton  Clinic Number: [id number]    Therapy Diagnosis:   Encounter Diagnoses   Name Primary?    Decreased range of motion (ROM) of right knee Yes    Weakness of right lower extremity     Decreased range of motion of trunk and back     Decreased strength of trunk and back     Weakness of trunk musculature          Physician: Virgil Scott MD    Visit Date: 12/12/2024    Physician Orders: PT Eval and Treat   Medical Diagnosis from Referral:   Z96.651 (ICD-10-CM) - Status post total right knee replacement   M22.2X1 (ICD-10-CM) - Patellofemoral pain syndrome of right knee   M70.50 (ICD-10-CM) - Pes anserine bursitis      Evaluation Date: 12/21/2023  Authorization Period Expiration: 12/31/2024  Plan of Care Expiration: Updated to 1/14/2025  Visit # / Visits authorized: 50/55   Progress Note Due: 12/14/2024  FOTO: 3/4      Precautions: hx of TKA and menisectomy, spinal cord stimulator, scoliosis     Time In: 830 am  Time Out: 930 am   Total Billable Time: 55 minutes (1:1)  Total Appointment Time (timed & untimed codes): 60 minutes    PTA Visit #: 0/5   Subjective   Patient reports: she is scheduled for a trigger point injection next week. She saw Dr. Jacobs about spinal cord stimulator revision and he advised her to continue therapy.     She was compliant with home exercise program.  Response to previous treatment: no adverse effects   Functional change: improved tolerance to functional mobility activities with decreased pain    Pain: 5/10 R knee, 8/10 LB  Location: R low back and R knee   Objective             Treatment   Tiarra received the treatments listed below:      Therapeutic exercises to develop strength, endurance, ROM, flexibility, posture, and core stabilization for 15 minutes including:    Recumbent bike lv. 4 x 5 min for endurance and knee ROM     PPT 15x5"  Bridge 2x10x3"  EIL 2x10  DKTC 15x5"'  Open books x15,3" " "ea  Hip flexor stretch 2x1' ea  Leg press # 2x10  Leg press SL 80# 2x10  EIS 2x10    Dry needling applied to the: B LB  for 10 minutes, including:     FDN. Educated pt to use heat following treatment sessions if pt is experiencing pain or soreness. Pt verbalized good understanding of education. Pt signed written consent to dry needling. Pt gave verbal consent for DN     Pt received dry needling to the below listed muscles using 50 mm needles.     B Lumbar paraspinals L1-L5      Neuromuscular re-education activities to improve: Balance, Coordination, Kinesthetic, Proprioception, and Posture for 30 minutes. The following activities were included:    Sit to stands GTB around knees 2x10  Side stepping GTB 2x12 steps  Monster walks GTB 2x12 steps  Single leg balance 3x30" R only  Step ups fwd/lateral  6 in step 2x10x3" ea R only   Medx trunk extension 74# x20  Medx trunk rotation 34# x20 increase weight       therapeutic activities to improve functional performance for 0  minutes, including:      Hot pack for 5 minutes to R knee and low back    Patient Education and Home Exercises     Education provided:   - HEP, POC    Written Home Exercises Provided: Patient instructed to cont prior HEP. Exercises were reviewed and Tiarra was able to demonstrate them prior to the end of the session.  Tiarra demonstrated good  understanding of the education provided. See Electronic Medical Record under Patient Instructions for exercises provided during therapy sessions    Assessment   Tiarra presents today with continued complaints of pain. Pt scheduled for trigger point injections next week. Dry needling again applied today with relief reported after application. Progressed core strengthening as tolerated. Reassess after trigger point injection.     Tiarra Is progressing well towards her goals.   Patient prognosis is Good.     Patient will continue to benefit from skilled outpatient physical therapy to address the deficits listed in " the problem list box on initial evaluation, provide pt/family education and to maximize pt's level of independence in the home and community environment.     Patient's spiritual, cultural and educational needs considered and pt agreeable to plan of care and goals.     Anticipated barriers to physical therapy: chronicity of condition, multiple treatment areas, hx of R TKA and menisectomy     GOALS: Short Term Goals:  4 weeks  1. Report decreased in pain at worse less than  <   / =  4  /10  to increase tolerance for functional activities.On going  2. Pt to increase B popliteal angle by greater than > or = 5 degrees in order to improve flexibility and posture. MET  3. Increased R LE MMT 1/2  to increase tolerance for ADL and work activities. MET  4. Pt to increase B Ely's Test by greater than  > or = 5degrees in order to improve flexibility and posture. MET  5. Pt to tolerate HEP to improve ROM and independence with ADL's. MET  6. Pt to improve range of motion by 25% to allow for improved functional mobility to allow for improvement in IADLs. MET     Long Term Goals: 8 weeks  1. Report decreased in pain at worse less than  <   / =  2  /10  to increase tolerance for functional mobility.  On going  2 .Pt to increase B popliteal angle by greater than > or = 10 degrees in order to improve flexibility and posture. On going  3. Increased R LE MMT 1 grade to increase tolerance for ADL and work activities.On going  4. Pt will report at 51% or better score on FOTO Lumbar spine survey  to demonstrate decrease in disability and improvement in back pain.On going  5. Pt to be Independent with HEP to improve ROM and independence with ADL's. On going  6. Pt to increase B Ely's Test by greater than > or = 10 degrees in order to improve flexibility and posture. On going  7. Pt to demonstrate negative Bridge Test in order to show improved core strength for lumbar stabilization. On going  8. Pt to improve range of motion by 75% to allow  for improved functional mobility to allow for improvement in IADLs. On going        Plan   Plan of care Certification: 5/9/2024 to 1/14/2025     Outpatient Physical Therapy 2 times weekly for 10 weeks to include the following interventions: Cervical/Lumbar Traction, Gait Training, Manual Therapy, Moist Heat/ Ice, Neuromuscular Re-ed, Patient Education, Self Care, Therapeutic Activities, and Therapeutic Exercise. Marybeth Anaya, PT   12/18/2024

## 2024-12-24 ENCOUNTER — PATIENT MESSAGE (OUTPATIENT)
Dept: PAIN MEDICINE | Facility: CLINIC | Age: 69
End: 2024-12-24
Payer: MEDICARE

## 2024-12-26 ENCOUNTER — PATIENT MESSAGE (OUTPATIENT)
Dept: INTERNAL MEDICINE | Facility: CLINIC | Age: 69
End: 2024-12-26
Payer: MEDICARE

## 2024-12-27 DIAGNOSIS — I10 HYPERTENSION, UNSPECIFIED TYPE: ICD-10-CM

## 2024-12-27 RX ORDER — SPIRONOLACTONE 25 MG/1
25 TABLET ORAL DAILY
Qty: 90 TABLET | Refills: 2 | Status: SHIPPED | OUTPATIENT
Start: 2024-12-27

## 2024-12-27 NOTE — TELEPHONE ENCOUNTER
Care Due:                  Date            Visit Type   Department     Provider  --------------------------------------------------------------------------------                                ESTABLISHED                              PATIENT -    [facility] INTERNAL  Last Visit: 09-      [facility]      MEDICINE       DEX CANELA  Next Visit: None Scheduled  None         None Found                                                            Last  Test          Frequency    Reason                     Performed    Due Date  --------------------------------------------------------------------------------    CBC.........  12 months..  valACYclovir.............  05- 05-    [facility] Embedded Care Due Messages. Reference number: [id number].   12/27/2024 5:50:31 AM CST

## 2024-12-27 NOTE — TELEPHONE ENCOUNTER
Refill Routing Note   Medication(s) are not appropriate for processing by [facility] for the following reason(s):        New or recently adjusted medication    [facility] action(s):  Defer     Requires labs : Yes             Appointments  past 12m or future 3m with PCP    Date Provider   Last Visit   9/19/2024 Kaykay Perales MD   Next Visit   Visit date not found Kaykay Perales MD   ED visits in past 90 days: 0        Note composed:11:28 AM 12/27/2024

## 2024-12-30 NOTE — PROGRESS NOTES
[facility]   Physical Therapy Progress  Note      Name: Tiarra Norton  [facility] Number: [id number]    Therapy Diagnosis:   Encounter Diagnoses   Name Primary?    Decreased range of motion (ROM) of right knee Yes    Weakness of right lower extremity     Decreased range of motion of trunk and back     Decreased strength of trunk and back     Weakness of trunk musculature          Physician: Virgil Scott MD    Visit Date: 12/17/2024    Physician Orders: PT Eval and Treat   Medical Diagnosis from Referral:   Z96.651 (ICD-10-CM) - Status post total right knee replacement   M22.2X1 (ICD-10-CM) - Patellofemoral pain syndrome of right knee   M70.50 (ICD-10-CM) - Pes anserine bursitis      Evaluation Date: 12/21/2023  Authorization Period Expiration: 12/31/2024  Plan of Care Expiration: Updated to 1/14/2025  Visit # / Visits authorized: 51/55   Progress Note Due: 1/17/2024  FOTO: 3/4      Precautions: hx of TKA and menisectomy, spinal cord stimulator, scoliosis     Time In: 800 am  Time Out: 900 am   Total Billable Time: 55 minutes (1:1)  Total Appointment Time (timed & untimed codes): 60 minutes    PTA Visit #: 0/5   Subjective   Patient reports: pain has been worse since she was last here    She was compliant with home exercise program.  Response to previous treatment: no adverse effects   Functional change: improved tolerance to functional mobility activities with decreased pain    Pain: 5/10 R knee, 8/10 LB  Location: R low back and R knee   Objective       Updated 12/17/2024     Lumbar Range of Motion:     %   Flexion 90      Extension 70      Left Side Bending 60   Right Side Bending 60   Left rotation    70   Right Rotation    80    *= pain         Range of Motion:   Knee Left active Left Passive   Flexion 133 140   Extension 3 5      Knee Right active Right Passive   Flexion 121 124   Extension 1 3         Lower Extremity Strength     Right LE   Left LE     Hip flexion: 4+/5 Hip  "flexion: 4+/5   Knee extension: 4+/5 Knee extension: 4+/5   Knee flexion: 4+/5 Knee flexion: 4+/5   Hip IR: 4+/5 Hip IR: 4+/5   Hip ER: 4+/5 Hip ER: 4+/5   Hip extension:  4/5 Hip extension: 4/5   Hip abduction: 4/5 Hip abduction: 4/5   Hip adduction: 4/5 Hip adduction 4/5   Ankle dorsiflexion: 5/5 Ankle dorsiflexion: 5/5   Ankle plantarflexion: 5/5 Ankle plantarflexion: 5/5             Treatment   Tiarra received the treatments listed below:      Therapeutic exercises to develop strength, endurance, ROM, flexibility, posture, and core stabilization for 15 minutes including:    Recumbent bike lv. 4 x 5 min for endurance and knee ROM     PPT 15x5"  Bridge 2x10x3"  EIL 2x10  DKTC 15x5"'  Open books x15,3" ea  Hip flexor stretch 2x1' ea  Leg press # 2x10  Leg press SL 80# 2x10  EIS 2x10    Dry needling applied to the: B LB  for 10 minutes, including:     FDN. Educated pt to use heat following treatment sessions if pt is experiencing pain or soreness. Pt verbalized good understanding of education. Pt signed written consent to dry needling. Pt gave verbal consent for DN     Pt received dry needling to the below listed muscles using 50 mm needles.     B Lumbar paraspinals L1-L5      Neuromuscular re-education activities to improve: Balance, Coordination, Kinesthetic, Proprioception, and Posture for 30 minutes. The following activities were included:    Sit to stands GTB around knees 2x10  Side stepping GTB 2x12 steps  Monster walks GTB 2x12 steps  Single leg balance 3x30" R only  Step ups fwd/lateral  6 in step 2x10x3" ea R only   Medx trunk extension 74# x20  Medx trunk rotation 34# x20 increase weight       therapeutic activities to improve functional performance for 0  minutes, including:      Hot pack for 5 minutes to R knee and low back    Patient Education and Home Exercises     Education provided:   - HEP, POC    Written Home Exercises Provided: Patient instructed to cont prior HEP. Exercises were reviewed and " Tiarra was able to demonstrate them prior to the end of the session.  Tiarra demonstrated good  understanding of the education provided. See Electronic Medical Record under Patient Instructions for exercises provided during therapy sessions    Assessment   Tiarra presents today with continued complaints of pain. Reassessment performed with pt continue to demo deficits in lumbar and knee ROM, weakness of core, lumbar and B LE musculature, decreased joint mobility and soft tissue flexibility, and decreased tolerance to ADL's, functional mobility, and leisure activities. She continues to have activity limitations due to increased pain. Pt will continue to befit from skilled therapy to address described deficits, decrease pain, and improve functional mobility and activity tolerance.     Tiarra Is progressing well towards her goals.   Patient prognosis is Good.     Patient will continue to benefit from skilled outpatient physical therapy to address the deficits listed in the problem list box on initial evaluation, provide pt/family education and to maximize pt's level of independence in the home and community environment.     Patient's spiritual, cultural and educational needs considered and pt agreeable to plan of care and goals.     Anticipated barriers to physical therapy: chronicity of condition, multiple treatment areas, hx of R TKA and menisectomy     GOALS: Short Term Goals:  4 weeks  1. Report decreased in pain at worse less than  <   / =  4  /10  to increase tolerance for functional activities.On going  2. Pt to increase B popliteal angle by greater than > or = 5 degrees in order to improve flexibility and posture. MET  3. Increased R LE MMT 1/2  to increase tolerance for ADL and work activities. MET  4. Pt to increase B Ely's Test by greater than  > or = 5degrees in order to improve flexibility and posture. MET  5. Pt to tolerate HEP to improve ROM and independence with ADL's. MET  6. Pt to improve range of motion  by 25% to allow for improved functional mobility to allow for improvement in IADLs. MET     Long Term Goals: 8 weeks  1. Report decreased in pain at worse less than  <   / =  2  /10  to increase tolerance for functional mobility.  On going  2 .Pt to increase B popliteal angle by greater than > or = 10 degrees in order to improve flexibility and posture. On going  3. Increased R LE MMT 1 grade to increase tolerance for ADL and work activities.On going  4. Pt will report at 51% or better score on FOTO Lumbar spine survey  to demonstrate decrease in disability and improvement in back pain.On going  5. Pt to be Independent with HEP to improve ROM and independence with ADL's. On going  6. Pt to increase B Ely's Test by greater than > or = 10 degrees in order to improve flexibility and posture. On going  7. Pt to demonstrate negative Bridge Test in order to show improved core strength for lumbar stabilization. On going  8. Pt to improve range of motion by 75% to allow for improved functional mobility to allow for improvement in IADLs. On going        Plan   Plan of care Certification: 5/9/2024 to 1/14/2025     Outpatient Physical Therapy 2 times weekly for 10 weeks to include the following interventions: Cervical/Lumbar Traction, Gait Training, Manual Therapy, Moist Heat/ Ice, Neuromuscular Re-ed, Patient Education, Self Care, Therapeutic Activities, and Therapeutic Exercise. Marybeth Anaya, PT   12/30/2024

## 2024-12-31 ENCOUNTER — TELEPHONE (OUTPATIENT)
Dept: RESEARCH | Facility: OTHER | Age: 69
End: 2024-12-31
Payer: MEDICARE

## 2024-12-31 ENCOUNTER — IMMUNIZATION (OUTPATIENT)
Dept: INTERNAL MEDICINE | Facility: CLINIC | Age: 69
End: 2024-12-31
Payer: MEDICARE

## 2024-12-31 ENCOUNTER — OFFICE VISIT (OUTPATIENT)
Dept: INTERNAL MEDICINE | Facility: CLINIC | Age: 69
End: 2024-12-31
Payer: MEDICARE

## 2024-12-31 VITALS
HEART RATE: 70 BPM | DIASTOLIC BLOOD PRESSURE: 66 MMHG | OXYGEN SATURATION: 98 % | RESPIRATION RATE: 18 BRPM | SYSTOLIC BLOOD PRESSURE: 118 MMHG | WEIGHT: 147.5 LBS | BODY MASS INDEX: 26.13 KG/M2 | TEMPERATURE: 99 F | HEIGHT: 63 IN

## 2024-12-31 DIAGNOSIS — I10 HYPERTENSION, ESSENTIAL: ICD-10-CM

## 2024-12-31 DIAGNOSIS — M25.473 ANKLE SWELLING, UNSPECIFIED LATERALITY: Primary | ICD-10-CM

## 2024-12-31 DIAGNOSIS — Z23 NEED FOR VACCINATION: Primary | ICD-10-CM

## 2024-12-31 DIAGNOSIS — R11.0 NAUSEA: ICD-10-CM

## 2024-12-31 DIAGNOSIS — L57.8 ACTINIC ELASTOSIS: ICD-10-CM

## 2024-12-31 PROCEDURE — 90480 ADMN SARSCOV2 VAC 1/ONLY CMP: CPT | Mod: PBBFAC

## 2024-12-31 PROCEDURE — 99999 PR PBB SHADOW E&M-EST. PATIENT-LVL V: CPT | Mod: PBBFAC,,, | Performed by: PHYSICIAN ASSISTANT

## 2024-12-31 PROCEDURE — 99215 OFFICE O/P EST HI 40 MIN: CPT | Mod: PBBFAC,25 | Performed by: PHYSICIAN ASSISTANT

## 2024-12-31 PROCEDURE — 99213 OFFICE O/P EST LOW 20 MIN: CPT | Mod: S$PBB,,, | Performed by: PHYSICIAN ASSISTANT

## 2024-12-31 PROCEDURE — 99999PBSHW COVID-19 VAC, TRIS 2023 (PFIZER)(PF) 30 MCG/0.3 ML IM SUSR (12+): Mod: PBBFAC,,,

## 2024-12-31 PROCEDURE — 91320 SARSCV2 VAC 30MCG TRS-SUC IM: CPT | Mod: PBBFAC

## 2024-12-31 RX ORDER — TRETINOIN 0.25 MG/G
CREAM TOPICAL
Qty: 30 G | Refills: 5 | Status: SHIPPED | OUTPATIENT
Start: 2024-12-31

## 2024-12-31 RX ORDER — PROMETHAZINE HYDROCHLORIDE 25 MG/1
TABLET ORAL
Qty: 30 TABLET | Refills: 2 | Status: SHIPPED | OUTPATIENT
Start: 2024-12-31

## 2024-12-31 NOTE — PROGRESS NOTES
Subjective:       Patient ID: Tiarra Norton is a 69 y.o. female.        Chief Complaint: fluid retention     Tiarra Norton is an established patient of Kaykay Perales MD here today for urgent care visit.    Swelling in ankles that occurs 1/week and lasts 1-2 days.  Swelling comes on over course of day.  Does not really bother her.  No chest pain or shortness of breath.  Started over the past few months.  She notes edema as pitting as she can see finger imprint when it occurs.  Occurs bilaterally.      10/2024 chlorthalidone d/c due to hyponatremia and hypokalemia, changed to spironolactone.    Amlodipine inc from 5 mg to 10 mg within the past 1-2 months.    She believes this correlates with ankle swelling.    She had echo 11/2024 with normal EF.    She wears compression stockings prn swelling.           Review of Systems   Constitutional:  Negative for chills, diaphoresis, fatigue and fever.   HENT:  Negative for congestion and sore throat.    Eyes:  Negative for visual disturbance.   Respiratory:  Negative for cough, chest tightness and shortness of breath.    Cardiovascular:  Negative for chest pain, palpitations and leg swelling.   Gastrointestinal:  Negative for abdominal pain, blood in stool, constipation, diarrhea, nausea and vomiting.   Genitourinary:  Negative for dysuria, frequency, hematuria and urgency.   Musculoskeletal:  Positive for joint swelling. Negative for arthralgias and back pain.   Skin:  Negative for rash.   Neurological:  Negative for dizziness, syncope, weakness and headaches.   Psychiatric/Behavioral:  Negative for dysphoric mood and sleep disturbance. The patient is not nervous/anxious.        Objective:      Physical Exam  Vitals and nursing note reviewed.   Constitutional:       Appearance: Normal appearance. She is well-developed.   HENT:      Head: Normocephalic.      Right Ear: External ear normal.      Left Ear: External ear normal.   Eyes:      Pupils: Pupils are equal,  "round, and reactive to light.   Cardiovascular:      Rate and Rhythm: Normal rate and regular rhythm.      Heart sounds: Normal heart sounds. No murmur heard.     No friction rub. No gallop.   Pulmonary:      Effort: Pulmonary effort is normal. No respiratory distress.      Breath sounds: Normal breath sounds.   Abdominal:      Palpations: Abdomen is soft.      Tenderness: There is no abdominal tenderness.   Musculoskeletal:      Comments: No swelling in ankles noted   Skin:     General: Skin is warm and dry.   Neurological:      Mental Status: She is alert.         Assessment:       1. Ankle swelling, unspecified laterality    2. Hypertension, essential        Plan:       Tiarra was seen today for fluid retention .    Diagnoses and all orders for this visit:    Ankle swelling, unspecified laterality    Hypertension, essential    No swelling today but has had it intermittently since inc amlodipine dosage  Not bothersome to her so will monitor  Can reduce dose if needed and adjust other meds  Chlorthalidone in past caused electrolyte disturbances    Pt has been given instructions populated from patient instructions database and has verbalized understanding of the after visit summary and information contained wherein.    Follow up with a primary care provider. May go to ER for acute shortness of breath, lightheadedness, fever, or any other emergent complaints or changes in condition.    "This note will be shared with the patient"    Future Appointments   Date Time Provider Department Center   1/2/2025  8:30 AM Zi Anaya PT [facility]   1/6/2025 11:00 AM Roxanne Freeman NP [facility] NEUROS8 [facility]   1/7/2025 10:00 AM Zi Anaya PT [facility]   1/9/2025  8:30 AM Zi Anaya PT [facility]   1/11/2025 11:30 AM Ingris Alexandra MD [facility] [place]   1/14/2025  8:00 AM Zi Anaya PT [facility]   1/16/2025  8:30 AM Zi Anaya PT [facility]   1/21/2025  8:00 AM Zi Anaya, PT [facility]   1/21/2025 10:00 AM Marlene Thorne, [facility]   1/22/2025 10:30 AM PRE-ADMIT, [facility] P[facility]   1/23/2025 10:00 AM Eligio Abel, PTA [facility]   1/24/2025  1:15 PM Tiny Cook MD [facility]   1/28/2025  8:00 AM Zi Anaya, PT [facility]   1/30/2025  8:30 AM Zi Anaya, PT [facility]   2/3/2025 11:00 AM CTU, [facility] ADMN RE [facility]   2/3/2025  1:00 PM Shoaib Aguirre MD [facility]   2/26/2025  2:15 PM MAYITO MITCHELLO2 JW KAILYN Richardson

## 2024-12-31 NOTE — TELEPHONE ENCOUNTER
No care due was identified.  [facility] Embedded Care Due Messages. Reference number: [id number].   12/31/2024 5:24:59 AM CST

## 2025-01-02 ENCOUNTER — CLINICAL SUPPORT (OUTPATIENT)
Dept: REHABILITATION | Facility: OTHER | Age: 70
End: 2025-01-02
Payer: MEDICARE

## 2025-01-02 ENCOUNTER — TELEPHONE (OUTPATIENT)
Dept: RESEARCH | Facility: OTHER | Age: 70
End: 2025-01-02
Payer: MEDICARE

## 2025-01-02 DIAGNOSIS — M62.81 WEAKNESS OF TRUNK MUSCULATURE: ICD-10-CM

## 2025-01-02 DIAGNOSIS — M25.661 DECREASED RANGE OF MOTION (ROM) OF RIGHT KNEE: Primary | ICD-10-CM

## 2025-01-02 DIAGNOSIS — R29.898 WEAKNESS OF RIGHT LOWER EXTREMITY: ICD-10-CM

## 2025-01-02 DIAGNOSIS — M25.69 DECREASED RANGE OF MOTION OF TRUNK AND BACK: ICD-10-CM

## 2025-01-02 DIAGNOSIS — R29.898 DECREASED STRENGTH OF TRUNK AND BACK: ICD-10-CM

## 2025-01-02 PROCEDURE — 97110 THERAPEUTIC EXERCISES: CPT

## 2025-01-02 PROCEDURE — 20560 NDL INSJ W/O NJX 1 OR 2 MUSC: CPT

## 2025-01-02 PROCEDURE — 97112 NEUROMUSCULAR REEDUCATION: CPT

## 2025-01-03 ENCOUNTER — TELEPHONE (OUTPATIENT)
Dept: RESEARCH | Facility: OTHER | Age: 70
End: 2025-01-03
Payer: MEDICARE

## 2025-01-03 ENCOUNTER — PATIENT MESSAGE (OUTPATIENT)
Dept: PAIN MEDICINE | Facility: CLINIC | Age: 70
End: 2025-01-03
Payer: MEDICARE

## 2025-01-03 DIAGNOSIS — M25.561 CHRONIC PAIN OF RIGHT KNEE: ICD-10-CM

## 2025-01-03 DIAGNOSIS — Z96.651 HISTORY OF TOTAL KNEE ARTHROPLASTY, RIGHT: ICD-10-CM

## 2025-01-03 DIAGNOSIS — G89.4 CHRONIC PAIN SYNDROME: ICD-10-CM

## 2025-01-03 DIAGNOSIS — M41.25 OTHER IDIOPATHIC SCOLIOSIS, THORACOLUMBAR REGION: ICD-10-CM

## 2025-01-03 DIAGNOSIS — G89.29 CHRONIC PAIN OF RIGHT KNEE: ICD-10-CM

## 2025-01-03 DIAGNOSIS — Z96.89 SPINAL CORD STIMULATOR STATUS: ICD-10-CM

## 2025-01-03 DIAGNOSIS — M51.369 DDD (DEGENERATIVE DISC DISEASE), LUMBAR: ICD-10-CM

## 2025-01-03 RX ORDER — HYDROCODONE BITARTRATE AND ACETAMINOPHEN 5; 325 MG/1; MG/1
1 TABLET ORAL EVERY 12 HOURS PRN
Qty: 60 TABLET | Refills: 0 | OUTPATIENT
Start: 2025-01-09 | End: 2025-02-08

## 2025-01-05 ENCOUNTER — PATIENT MESSAGE (OUTPATIENT)
Dept: SLEEP MEDICINE | Facility: CLINIC | Age: 70
End: 2025-01-05
Payer: MEDICARE

## 2025-01-06 ENCOUNTER — TELEPHONE (OUTPATIENT)
Dept: RESEARCH | Facility: HOSPITAL | Age: 70
End: 2025-01-06
Payer: MEDICARE

## 2025-01-06 ENCOUNTER — OFFICE VISIT (OUTPATIENT)
Dept: NEUROSURGERY | Facility: CLINIC | Age: 70
End: 2025-01-06
Payer: MEDICARE

## 2025-01-06 VITALS
HEART RATE: 61 BPM | DIASTOLIC BLOOD PRESSURE: 75 MMHG | WEIGHT: 147.5 LBS | BODY MASS INDEX: 26.13 KG/M2 | SYSTOLIC BLOOD PRESSURE: 124 MMHG | TEMPERATURE: 98 F

## 2025-01-06 DIAGNOSIS — M51.369 DEGENERATION OF INTERVERTEBRAL DISC OF LUMBAR REGION, UNSPECIFIED WHETHER PAIN PRESENT: ICD-10-CM

## 2025-01-06 DIAGNOSIS — M41.25 OTHER IDIOPATHIC SCOLIOSIS, THORACOLUMBAR REGION: Primary | ICD-10-CM

## 2025-01-06 DIAGNOSIS — G89.29 CHRONIC BACK PAIN, UNSPECIFIED BACK LOCATION, UNSPECIFIED BACK PAIN LATERALITY: ICD-10-CM

## 2025-01-06 DIAGNOSIS — M54.9 CHRONIC BACK PAIN, UNSPECIFIED BACK LOCATION, UNSPECIFIED BACK PAIN LATERALITY: ICD-10-CM

## 2025-01-06 PROCEDURE — 99999 PR PBB SHADOW E&M-EST. PATIENT-LVL IV: CPT | Mod: PBBFAC,,, | Performed by: NURSE PRACTITIONER

## 2025-01-06 PROCEDURE — 99214 OFFICE O/P EST MOD 30 MIN: CPT | Mod: PBBFAC | Performed by: NURSE PRACTITIONER

## 2025-01-06 PROCEDURE — 99214 OFFICE O/P EST MOD 30 MIN: CPT | Mod: S$PBB,,, | Performed by: NURSE PRACTITIONER

## 2025-01-06 NOTE — TELEPHONE ENCOUNTER
Contacted and spoke with patient regarding pain management after device adjustment. Patient was also provided 9 digit study code and password as requested.

## 2025-01-06 NOTE — PROGRESS NOTES
Neurosurgery  History & Physical    SUBJECTIVE:     History of Present Illness: Tiarra Norton is a 69 y.o. female being seen in clinic today to discuss concerns with persistent pain around her SCS battery pocket. She is scheduled for a revision of the pocket to place it into her buttock area.The patient is also followed by Dr. Jacobs and has discussed these concerns. He does not recommend deformity surgery at this time. Reports chronic right knee pain as well. She is hopeful that the revision surgery will alleviate her pain. She is active with PT and HEP without significant improvement in her pain.     Review of patient's allergies indicates:  No Known Allergies    Current Outpatient Medications   Medication Sig Dispense Refill    alosetron (LOTRONEX) 0.5 MG tablet Take 1 tablet (0.5 mg total) by mouth in the morning and 1 tablet (0.5 mg total) in the evening. 60 tablet 0    ALPRAZolam (XANAX) 1 MG tablet Take 1 tablet by mouth three times daily 90 tablet 2    amLODIPine (NORVASC) 10 MG tablet Take 1 tablet (10 mg total) by mouth once daily. 30 tablet 5    atorvastatin (LIPITOR) 10 MG tablet Take 1 tablet (10 mg total) by mouth once daily. 90 tablet 3    EScitalopram oxalate (LEXAPRO) 5 MG Tab take 1 tablet by mouth once daily 90 tablet 1    estradioL (ESTRACE) 0.01 % (0.1 mg/gram) vaginal cream Insert 1 gram as directed vagianlly nightly for 2 to 4 weeks followed by 2-3x/week 42.5 g 6    [START ON 1/9/2025] HYDROcodone-acetaminophen (NORCO) 5-325 mg per tablet Take 1 tablet by mouth every 12 (twelve) hours as needed for Pain. 60 tablet 0    levothyroxine (SYNTHROID) 100 MCG tablet TAKE 1 TABLET BY MOUTH EVERY MORNING 90 tablet 3    mupirocin (BACTROBAN) 2 % ointment Apply topically 2 (two) times daily. 15 g 1    promethazine (PHENERGAN) 25 MG tablet Take 1 tablet by mouth every 12 hours as needed for nausea 30 tablet 2    ramelteon (ROZEREM) 8 mg tablet Take 1 tablet (8 mg total) by mouth every evening. 30  tablet 5    spironolactone (ALDACTONE) 25 MG tablet Take 1 tablet (25 mg total) by mouth once daily. For blood pressure 90 tablet 2    sumatriptan (IMITREX) 50 MG tablet 1 tab po at start of headache.  Repeat in 2 h prn 36 tablet 3    tiZANidine (ZANAFLEX) 4 MG tablet Take 1 tablet (4 mg total) by mouth 3 (three) times daily as needed (muscle pain). 90 tablet 0    traZODone (DESYREL) 100 MG tablet take 1 to 2 tablets by mouth every evening for for sleep 180 tablet 3    tretinoin (RETIN-A) 0.025 % cream Compound tretinoin 0.025% / azelaic acid 8% / niacinamide 2% cream. Apply a pea-sized amount to entire face qhs. 30 g 5    valsartan (DIOVAN) 320 MG tablet Take 1 tablet (320 mg total) by mouth once daily. 90 tablet 3    valACYclovir (VALTREX) 500 MG tablet Take 1 tablet (500 mg total) by mouth 2 (two) times daily. for 7 days 14 tablet 0     Current Facility-Administered Medications   Medication Dose Route Frequency Provider Last Rate Last Admin    triamcinolone acetonide injection 40 mg  40 mg Intramuscular 1 time in Clinic/HOD            Past Medical History:   Diagnosis Date    Cataract     Depression     Gestational diabetes     Hyperlipidemia     Hypertension     IBS (irritable bowel syndrome)     Thyroid disease      Past Surgical History:   Procedure Laterality Date    ADENOIDECTOMY      ARTHROPLASTY, KNEE, TOTAL, USING COMPUTER-ASSISTED NAVIGATION Right 5/18/2023    Procedure: CONVERSION ARTHROPLASTY, KNEE, TOTAL, USING COMPUTER-ASSISTED NAVIGATION;  Surgeon: Virgil Scott MD;  Location: [facility] OR;  Service: Orthopedics;  Laterality: Right;  Same day discharge    ARTHROSCOPIC CHONDROPLASTY OF KNEE JOINT Right 10/19/2021    Procedure: ARTHROSCOPY, KNEE, WITH CHONDROPLASTY;  Surgeon: Trevin Huber MD;  Location: [facility] OR;  Service: Orthopedics;  Laterality: Right;    ARTHROSCOPY OF KNEE Right 10/19/2021    Procedure: ARTHROSCOPY, KNEE-RIGHT;  Surgeon: Trevin Huber MD;  Location: [facility] OR;  Service:  Orthopedics;  Laterality: Right;    BLOCK, NERVE, GENICULAR Right 2024    Procedure: BLOCK, NERVE RIGHT GENICULAR;  Surgeon: Shoaib Aguirre MD;  Location: [facility] PAIN MGT;  Service: Pain Management;  Laterality: Right;  [phone]     SECTION      CHOLECYSTECTOMY      COLONOSCOPY N/A 2016    Procedure: COLONOSCOPY;  Surgeon: Heri Segura MD;  Location: [facility] ENDO (4TH FLR);  Service: Endoscopy;  Laterality: N/A;    COLONOSCOPY N/A 2019    Procedure: COLONOSCOPY;  Surgeon: Joe Pitts MD;  Location: [facility]  Service: Endoscopy;  Laterality: N/A;    CYSTOSCOPY  2022    Procedure: CYSTOSCOPY;  Surgeon: Lenny Moore MD;  Location: [address];  Service: Urology;;    ESOPHAGOGASTRODUODENOSCOPY N/A 2020    Procedure: EGD (ESOPHAGOGASTRODUODENOSCOPY);  Surgeon: Tolu Rivas MD;  [facility]  Service: Endoscopy;  Laterality: N/A;  Pt. moved to 9:15am arrival time.. Instructed to not have anything after midnight.EC    EYE SURGERY      cataract resection right    INJECTION OF ANESTHETIC AGENT AROUND NERVE Bilateral 2022    Procedure: Block, Nerve MEDIAL BRANCH BLOCK BILATERAL L3,4,5;  Surgeon: Tara Gibbs MD;  Location: [facility] PAIN MGT;  Service: Pain Management;  Laterality: Bilateral;    INJECTION OF ANESTHETIC AGENT AROUND NERVE Bilateral 2/15/2022    Procedure: BLOCK, NERVE, L3*L4-L5 MEDIAL BR ANCH 2 OF 2;  Surgeon: Tara Gibbs MD;  Location: [facility] PAIN MGT;  Service: Pain Management;  Laterality: Bilateral;    INJECTION OF BOTULINUM TOXIN TYPE A  2022    Procedure: BOTULINUM TOXIN INJECTION 100 units;  Surgeon: Lenny Moore MD;  Location: [address];  Service: Urology;;    INSERTION, NEUROSTIMULATOR, SPINAL CORD N/A 2023    Procedure: SPINAL CORD STIMULATOR IMPLANT [employer] [facility];  Surgeon: Shoaib Aguirre MD;  Location: [facility];  Service: Pain Management;  Laterality: N/A;    JOINT  REPLACEMENT      partial right knee    KNEE ARTHROSCOPY W/ MENISCECTOMY Right 10/19/2021    Procedure: ARTHROSCOPY, KNEE, WITH MENISCECTOMY, PARTIAL LATERAL;  Surgeon: Trevin Huber MD;  Location: [facility] OR;  Service: Orthopedics;  Laterality: Right;    r hand surgery      RADIOFREQUENCY ABLATION Right 3/8/2022    Procedure: RADIOFREQUENCY ABLATION, L3-L5 1 OF 2;  Surgeon: Tara Gibbs MD;  Location: [facility] PAIN MGT;  Service: Pain Management;  Laterality: Right;    RADIOFREQUENCY ABLATION Left 3/22/2022    Procedure: RADIOFREQUENCY ABLATION, l3-+l5 2 of 2;  Surgeon: Tara Gibbs MD;  Location: [facility] PAIN MGT;  Service: Pain Management;  Laterality: Left;    RADIOFREQUENCY ABLATION Right 3/15/2024    Procedure: RADIOFREQUENCY ABLATION RIGHT GENICULAR;  Surgeon: Shoaib Aguirre MD;  Location: [facility] PAIN MGT;  Service: Pain Management;  Laterality: Right;  [phone]    REMOVAL OF NEUROSTIMULATOR N/A 11/20/2023    Procedure: SCS IPG BATTERY REVISION;  Surgeon: Shoaib Aguirre MD;  Location: [facility] OR;  Service: Pain Management;  Laterality: N/A;    TONSILLECTOMY      TOTAL KNEE ARTHROPLASTY      partial knee replacement    TRIAL OF SPINAL CORD NERVE STIMULATOR N/A 12/22/2022    Procedure: SPINAL CORD STIMULATOR TRIAL [email or web address];  Surgeon: Shoaib Aguirre MD;  Location: [facility] PAIN MGT;  Service: Pain Management;  Laterality: N/A;    TUBAL LIGATION       Family History       Problem Relation (Age of Onset)    Alcohol abuse Sister, Sister    Breast cancer Paternal Aunt, Maternal Aunt    Cancer Father    Depression Sister    Epilepsy Son    Heart attack Father    Heart disease Father    Heart failure Father    Hyperlipidemia Mother, Father    Hypertension Mother, Father    Irritable bowel syndrome Mother, Sister    Ovarian cancer Maternal Aunt          Social History     Socioeconomic History    Marital status:     Number of children: 1   Occupational History    Occupation:       Employer: [employer]     Comment: retired   Tobacco Use    Smoking status: Never    Smokeless tobacco: Never   Substance and Sexual Activity    Alcohol use: Yes     Alcohol/week: 3.0 standard drinks of alcohol     Types: 3 Glasses of wine per week     Comment: weekends    Drug use: Yes     Types: Marijuana     Comment: occasionally    Sexual activity: Yes     Partners: Male   Other Topics Concern    Are you pregnant or think you may be? No    Breast-feeding No   Social History Narrative    Retired        Swims.       Stationary bike.            Social Drivers of Health     Financial Resource Strain: Low Risk  (3/21/2024)    Overall Financial Resource Strain (CARDIA)     Difficulty of Paying Living Expenses: Not hard at all   Food Insecurity: No Food Insecurity (3/21/2024)    Hunger Vital Sign     Worried About Running Out of Food in the Last Year: Never true     Ran Out of Food in the Last Year: Never true   Transportation Needs: No Transportation Needs (3/21/2024)    PRAPARE - Transportation     Lack of Transportation (Medical): No     Lack of Transportation (Non-Medical): No   Physical Activity: Sufficiently Active (3/21/2024)    Exercise Vital Sign     Days of Exercise per Week: 4 days     Minutes of Exercise per Session: 40 min   Stress: Stress Concern Present (3/21/2024)    [employer] [place] [employer] - Occupational Stress Questionnaire     Feeling of Stress : Rather much   Housing Stability: Patient Declined (3/21/2024)    Housing Stability Vital Sign     Unable to Pay for Housing in the Last Year: Patient declined     Number of Places Lived in the Last Year: 1     Unstable Housing in the Last Year: Patient declined       Review of Systems    OBJECTIVE:     Vital Signs  Temp: 97.6 °F (36.4 °C)  Pulse: 61  BP: 124/75  Pain Score:   5  Weight: 66.9 kg (147 lb 7.8 oz)  Body mass index is 26.13 kg/m².      Neurosurgery Physical Exam  General: well developed, well nourished, no distress.    Head: normocephalic, atraumatic  Neurologic: Alert and oriented. Thought content appropriate.  GCS: Motor: 6/Verbal: 5/Eyes: 4 GCS Total: 15  Mental Status: Awake, Alert, Oriented x 4  Language: No aphasia  Speech: No dysarthria  Cranial nerves: face symmetric, tongue midline, CN II-XII grossly intact.   Eyes: pupils equal, round, reactive to light with accomodation, EOMI.   Pulmonary: normal respirations, no signs of respiratory distress  Abdomen: soft, non-distended  Skin: Skin is warm, dry and intact.  Sensory: intact to light touch throughout  Motor Strength:Moves all extremities spontaneously with good tone.       Diagnostic Results:  I have personally reviewed the x-ray lumbar spine flex/ex dated 12/11/24 which shows levoscoliosis. Multilevel degenerative changes. No instability.       ASSESSMENT/PLAN:     Tiarra Norton is a 69 y.o. female seen in clinic today to discuss concerns with persistent pain around her SCS battery pocket. We discussed that she should proceed with the scheduled revision. She was agreeable. A referral to aqua therapy has also been placed. She will follow-up with orthopedics regarding her knee pain. Lastly, we discussed that her scoliosis can be re-evaluated once healed from surgery. I would like the patient to follow-up in clinic as needed. I have encouraged her to contact the clinic with any questions, concerns, or adverse clinical changes. She verbalized understanding.      Roxanne Freeman, MIGUEL-CYNTHIA  Neurosurgery  [facility].      Note dictated with voice recognition software, please excuse any grammatical errors.

## 2025-01-07 ENCOUNTER — PATIENT MESSAGE (OUTPATIENT)
Dept: PAIN MEDICINE | Facility: CLINIC | Age: 70
End: 2025-01-07
Payer: MEDICARE

## 2025-01-07 ENCOUNTER — CLINICAL SUPPORT (OUTPATIENT)
Dept: REHABILITATION | Facility: OTHER | Age: 70
End: 2025-01-07
Payer: MEDICARE

## 2025-01-07 DIAGNOSIS — M25.69 DECREASED RANGE OF MOTION OF TRUNK AND BACK: ICD-10-CM

## 2025-01-07 DIAGNOSIS — M41.25 OTHER IDIOPATHIC SCOLIOSIS, THORACOLUMBAR REGION: Primary | ICD-10-CM

## 2025-01-07 DIAGNOSIS — R29.898 WEAKNESS OF RIGHT LOWER EXTREMITY: ICD-10-CM

## 2025-01-07 DIAGNOSIS — R29.898 DECREASED STRENGTH OF TRUNK AND BACK: ICD-10-CM

## 2025-01-07 DIAGNOSIS — M25.661 DECREASED RANGE OF MOTION (ROM) OF RIGHT KNEE: Primary | ICD-10-CM

## 2025-01-07 DIAGNOSIS — M47.816 LUMBAR SPONDYLOSIS: ICD-10-CM

## 2025-01-07 DIAGNOSIS — M62.81 WEAKNESS OF TRUNK MUSCULATURE: ICD-10-CM

## 2025-01-07 PROCEDURE — 97110 THERAPEUTIC EXERCISES: CPT

## 2025-01-07 PROCEDURE — 20560 NDL INSJ W/O NJX 1 OR 2 MUSC: CPT

## 2025-01-07 PROCEDURE — 97112 NEUROMUSCULAR REEDUCATION: CPT

## 2025-01-09 ENCOUNTER — PATIENT MESSAGE (OUTPATIENT)
Dept: PAIN MEDICINE | Facility: CLINIC | Age: 70
End: 2025-01-09
Payer: MEDICARE

## 2025-01-09 ENCOUNTER — OFFICE VISIT (OUTPATIENT)
Dept: SPORTS MEDICINE | Facility: CLINIC | Age: 70
End: 2025-01-09
Payer: MEDICARE

## 2025-01-09 VITALS
BODY MASS INDEX: 26.69 KG/M2 | HEART RATE: 67 BPM | WEIGHT: 150.69 LBS | SYSTOLIC BLOOD PRESSURE: 128 MMHG | DIASTOLIC BLOOD PRESSURE: 76 MMHG

## 2025-01-09 DIAGNOSIS — Z96.651 STATUS POST TOTAL RIGHT KNEE REPLACEMENT: Primary | ICD-10-CM

## 2025-01-09 PROCEDURE — 20611 DRAIN/INJ JOINT/BURSA W/US: CPT | Mod: PBBFAC | Performed by: STUDENT IN AN ORGANIZED HEALTH CARE EDUCATION/TRAINING PROGRAM

## 2025-01-09 PROCEDURE — 99214 OFFICE O/P EST MOD 30 MIN: CPT | Mod: PBBFAC | Performed by: STUDENT IN AN ORGANIZED HEALTH CARE EDUCATION/TRAINING PROGRAM

## 2025-01-09 PROCEDURE — 99999 PR PBB SHADOW E&M-EST. PATIENT-LVL IV: CPT | Mod: PBBFAC,,, | Performed by: STUDENT IN AN ORGANIZED HEALTH CARE EDUCATION/TRAINING PROGRAM

## 2025-01-09 NOTE — PROGRESS NOTES
"  [facility]   Physical Therapy Treatment Note      Name: Tiarra Norton  Clinic Number: [id number]    Therapy Diagnosis:   Encounter Diagnoses   Name Primary?    Decreased range of motion (ROM) of right knee Yes    Weakness of right lower extremity     Decreased range of motion of trunk and back     Decreased strength of trunk and back     Weakness of trunk musculature          Physician: Virgil Scott MD    Visit Date: 1/2/2025    Physician Orders: PT Eval and Treat   Medical Diagnosis from Referral:   Z96.651 (ICD-10-CM) - Status post total right knee replacement   M22.2X1 (ICD-10-CM) - Patellofemoral pain syndrome of right knee   M70.50 (ICD-10-CM) - Pes anserine bursitis      Evaluation Date: 12/21/2023  Authorization Period Expiration: 12/31/2024  Plan of Care Expiration: Updated to 1/14/2025  Visit # / Visits authorized: 52/70   Progress Note Due: 1/17/2024  FOTO: 3/4     FOTO NEXT VISIT     Precautions: hx of TKA and menisectomy, spinal cord stimulator, scoliosis     Time In: 830 am  Time Out: 930 am   Total Billable Time: 55 minutes (1:1)  Total Appointment Time (timed & untimed codes): 60 minutes    PTA Visit #: 0/5   Subjective   Patient reports: pain in back and knee remain constant and limit her with all activity    She was compliant with home exercise program.  Response to previous treatment: no adverse effects   Functional change: improved tolerance to functional mobility activities with decreased pain    Pain: 5/10 R knee, 8/10 LB  Location: R low back and R knee   Objective             Treatment   Tiarra received the treatments listed below:      Therapeutic exercises to develop strength, endurance, ROM, flexibility, posture, and core stabilization for 15 minutes including:    Recumbent bike lv. 4 x 5 min for endurance and knee ROM     PPT 15x5"  Bridge 2x10x3"  EIL 2x10  DKTC 15x5"'  Open books x15,3" ea  Hip flexor stretch 2x1' ea  Leg press # 2x10  Leg press " "SL 80# 2x10  EIS 2x10    Dry needling applied to the: B LB  for 10 minutes, including:     FDN. Educated pt to use heat following treatment sessions if pt is experiencing pain or soreness. Pt verbalized good understanding of education. Pt signed written consent to dry needling. Pt gave verbal consent for DN     Pt received dry needling to the below listed muscles using 50 mm needles.     B Lumbar paraspinals L1-L5      Neuromuscular re-education activities to improve: Balance, Coordination, Kinesthetic, Proprioception, and Posture for 30 minutes. The following activities were included:    Sit to stands GTB around knees 2x10  Side stepping GTB 2x12 steps  Monster walks GTB 2x12 steps  Single leg balance 3x30" R only  Step ups fwd/lateral  6 in step 2x10x3" ea R only   Medx trunk extension 74# x20  Medx trunk rotation 34# x20 increase weight       therapeutic activities to improve functional performance for 0  minutes, including:      Hot pack for 5 minutes to R knee and low back    Patient Education and Home Exercises     Education provided:   - HEP, POC    Written Home Exercises Provided: Patient instructed to cont prior HEP. Exercises were reviewed and Tiarra was able to demonstrate them prior to the end of the session.  Tiarra demonstrated good  understanding of the education provided. See Electronic Medical Record under Patient Instructions for exercises provided during therapy sessions    Assessment   Tiarra presents today with reports of unchanged pain levels. Dry needling of lumbar paraspinals applied again today with decreased tightness reported after application and improved exercise tolerance. Progressed lumbopelvic and LE strengthening and mobility exercises with good tolerance and no adverse effects. Continue to progress as able.     Tiarra Is progressing well towards her goals.   Patient prognosis is Good.     Patient will continue to benefit from skilled outpatient physical therapy to address the deficits " listed in the problem list box on initial evaluation, provide pt/family education and to maximize pt's level of independence in the home and community environment.     Patient's spiritual, cultural and educational needs considered and pt agreeable to plan of care and goals.     Anticipated barriers to physical therapy: chronicity of condition, multiple treatment areas, hx of R TKA and menisectomy     GOALS: Short Term Goals:  4 weeks  1. Report decreased in pain at worse less than  <   / =  4  /10  to increase tolerance for functional activities.On going  2. Pt to increase B popliteal angle by greater than > or = 5 degrees in order to improve flexibility and posture. MET  3. Increased R LE MMT 1/2  to increase tolerance for ADL and work activities. MET  4. Pt to increase B Ely's Test by greater than  > or = 5degrees in order to improve flexibility and posture. MET  5. Pt to tolerate HEP to improve ROM and independence with ADL's. MET  6. Pt to improve range of motion by 25% to allow for improved functional mobility to allow for improvement in IADLs. MET     Long Term Goals: 8 weeks  1. Report decreased in pain at worse less than  <   / =  2  /10  to increase tolerance for functional mobility.  On going  2 .Pt to increase B popliteal angle by greater than > or = 10 degrees in order to improve flexibility and posture. On going  3. Increased R LE MMT 1 grade to increase tolerance for ADL and work activities.On going  4. Pt will report at 51% or better score on FOTO Lumbar spine survey  to demonstrate decrease in disability and improvement in back pain.On going  5. Pt to be Independent with HEP to improve ROM and independence with ADL's. On going  6. Pt to increase B Ely's Test by greater than > or = 10 degrees in order to improve flexibility and posture. On going  7. Pt to demonstrate negative Bridge Test in order to show improved core strength for lumbar stabilization. On going  8. Pt to improve range of motion by  75% to allow for improved functional mobility to allow for improvement in IADLs. On going        Plan   Plan of care Certification: 5/9/2024 to 1/14/2025     Outpatient Physical Therapy 2 times weekly for 10 weeks to include the following interventions: Cervical/Lumbar Traction, Gait Training, Manual Therapy, Moist Heat/ Ice, Neuromuscular Re-ed, Patient Education, Self Care, Therapeutic Activities, and Therapeutic Exercise. Dry needling    Zi Anaya, PT   1/9/2025

## 2025-01-09 NOTE — PROCEDURES
Large Joint Aspiration/Injection: R knee    Date/Time: 1/9/2025 2:00 PM    Performed by: Ingris Alexandra MD  Authorized by: Ingris Alexandra MD    Consent Done?:  Yes (Verbal)  Indications:  Pain, diagnostic evaluation and joint swelling  Site marked: the procedure site was marked    Timeout: prior to procedure the correct patient, procedure, and site was verified      Local anesthesia used?: Yes    Anesthesia:  Local infiltration  Local anesthetic:  Co-phenylcaine spray    Details:  Needle Size:  18 G  Ultrasonic Guidance for needle placement?: Yes (Ultrasound guidance used to avoid neurovascular injury and/or to improve accuracy given body habitus.)    Images are saved and documented.  Approach: Superolateral.  Location:  Knee  Site:  R knee  Medications comment:  Ropivacaine 0.2% 2mL  Aspirate amount (mL):  12  Aspirate:  Bloody  Patient tolerance:  Patient tolerated the procedure well with no immediate complications     TECHNIQUE: Real time ultrasound examination of the right knee was performed with SonoSite Edge 2, 9-L MHz linear probe(s). Ultrasound guidance was used for needle localization. Images were saved and stored for documentation. Dynamic visualization of the needle was continuous throughout the procedures and maintained in good position.

## 2025-01-09 NOTE — PROGRESS NOTES
CC: right knee pain    69 y.o. Female presents today for aspiration of her right knee. Pt was referred by Dr. Huber & Quentin Romano PA-C.    Attempted treatments: fPT  Pain score: 5/10  History of trauma/injury: none since last visit with Dr. Huber & Quentin Romano PA-C  Affecting ADLs: yes     REVIEW OF SYSTEMS:   Constitution: Patient denies fever or chills.  Eyes: Patient denies eye pain or vision changes.  HEENT: Patient denies ear pain, sore throat, or nasal discharge.  CVS: Patient denies chest pain.  Lungs: Patient denies shortness of breath or cough.  Skin: Patient denies skin rash or itching.    Musculoskeletal: Patient denies recent falls. See HPI.  Psych: Patient denies any current anxiety or nervousness.    PAST MEDICAL HISTORY:   Past Medical History:   Diagnosis Date    Cataract     Depression     Gestational diabetes     Hyperlipidemia     Hypertension     IBS (irritable bowel syndrome)     Thyroid disease        MEDICATIONS:     Current Outpatient Medications:     alosetron (LOTRONEX) 0.5 MG tablet, Take 1 tablet (0.5 mg total) by mouth in the morning and 1 tablet (0.5 mg total) in the evening., Disp: 60 tablet, Rfl: 0    ALPRAZolam (XANAX) 1 MG tablet, Take 1 tablet by mouth three times daily, Disp: 90 tablet, Rfl: 2    amLODIPine (NORVASC) 10 MG tablet, Take 1 tablet (10 mg total) by mouth once daily., Disp: 30 tablet, Rfl: 5    atorvastatin (LIPITOR) 10 MG tablet, Take 1 tablet (10 mg total) by mouth once daily., Disp: 90 tablet, Rfl: 3    EScitalopram oxalate (LEXAPRO) 5 MG Tab, take 1 tablet by mouth once daily, Disp: 90 tablet, Rfl: 1    estradioL (ESTRACE) 0.01 % (0.1 mg/gram) vaginal cream, Insert 1 gram as directed vagianlly nightly for 2 to 4 weeks followed by 2-3x/week, Disp: 42.5 g, Rfl: 6    HYDROcodone-acetaminophen (NORCO) 5-325 mg per tablet, Take 1 tablet by mouth every 12 (twelve) hours as needed for Pain., Disp: 60 tablet, Rfl: 0    levothyroxine (SYNTHROID) 100 MCG  tablet, TAKE 1 TABLET BY MOUTH EVERY MORNING, Disp: 90 tablet, Rfl: 3    mupirocin (BACTROBAN) 2 % ointment, Apply topically 2 (two) times daily., Disp: 15 g, Rfl: 1    promethazine (PHENERGAN) 25 MG tablet, Take 1 tablet by mouth every 12 hours as needed for nausea, Disp: 30 tablet, Rfl: 2    ramelteon (ROZEREM) 8 mg tablet, Take 1 tablet (8 mg total) by mouth every evening., Disp: 30 tablet, Rfl: 5    spironolactone (ALDACTONE) 25 MG tablet, Take 1 tablet (25 mg total) by mouth once daily. For blood pressure, Disp: 90 tablet, Rfl: 2    sumatriptan (IMITREX) 50 MG tablet, 1 tab po at start of headache.  Repeat in 2 h prn, Disp: 36 tablet, Rfl: 3    tiZANidine (ZANAFLEX) 4 MG tablet, Take 1 tablet (4 mg total) by mouth 3 (three) times daily as needed (muscle pain)., Disp: 90 tablet, Rfl: 0    traZODone (DESYREL) 100 MG tablet, take 1 to 2 tablets by mouth every evening for for sleep, Disp: 180 tablet, Rfl: 3    tretinoin (RETIN-A) 0.025 % cream, Compound tretinoin 0.025% / azelaic acid 8% / niacinamide 2% cream. Apply a pea-sized amount to entire face qhs., Disp: 30 g, Rfl: 5    valsartan (DIOVAN) 320 MG tablet, Take 1 tablet (320 mg total) by mouth once daily., Disp: 90 tablet, Rfl: 3    valACYclovir (VALTREX) 500 MG tablet, Take 1 tablet (500 mg total) by mouth 2 (two) times daily. for 7 days, Disp: 14 tablet, Rfl: 0    Current Facility-Administered Medications:     triamcinolone acetonide injection 40 mg, 40 mg, Intramuscular, 1 time in Clinic/HOD,     ALLERGIES:   Review of patient's allergies indicates:  No Known Allergies     PHYSICAL EXAMINATION:  /76   Pulse 67   Wt 68.3 kg (150 lb 11 oz)   BMI 26.69 kg/m²   There are no signs of infection at the injection site, including no rubor, calor, or skin lesions.  Gen: NAD.  Psych: Affect & judgment nl.  Neuro: Grossly CNI. DANG.  HEENT: -Trach dev. -Eye d/c.   CV: Color nl. -E/C/C. WWPx4.  Pulm: -Dyspnea. -Cough.  Lymph: -Edema.  Int: -Rash/lesion noted.  Skin is warm and dry    ASSESSMENT:      ICD-10-CM ICD-9-CM   1. Status post total right knee replacement  Z96.651 V43.65         PLAN:  Ultrasound-guided aspiration of the right knee performed at visit today and fluid was sent for Synovasure.    Risks and benefits were discussed with patient prior to receiving aspiration.  Risks include the possibility of infection, pain, and cosmetic deformity at site of injection.    All questions were answered to the best of my ability and all concerns were addressed at this time.    This note is dictated using the M*Modal Fluency Direct word recognition program. There are word recognition mistakes that are occasionally missed on review.

## 2025-01-13 ENCOUNTER — TELEPHONE (OUTPATIENT)
Dept: RESEARCH | Facility: OTHER | Age: 70
End: 2025-01-13
Payer: MEDICARE

## 2025-01-14 ENCOUNTER — TELEPHONE (OUTPATIENT)
Dept: RESEARCH | Facility: OTHER | Age: 70
End: 2025-01-14
Payer: MEDICARE

## 2025-01-14 NOTE — PROGRESS NOTES
" [facility]   Physical Therapy Treatment Note      Name: Tiarra Norton  Clinic Number: [id number]    Therapy Diagnosis:   Encounter Diagnoses   Name Primary?    Decreased range of motion (ROM) of right knee Yes    Weakness of right lower extremity     Decreased range of motion of trunk and back     Decreased strength of trunk and back     Weakness of trunk musculature          Physician: Virgil Scott MD    Visit Date: 1/7/2025    Physician Orders: PT Eval and Treat   Medical Diagnosis from Referral:   Z96.651 (ICD-10-CM) - Status post total right knee replacement   M22.2X1 (ICD-10-CM) - Patellofemoral pain syndrome of right knee   M70.50 (ICD-10-CM) - Pes anserine bursitis      Evaluation Date: 12/21/2023  Authorization Period Expiration: 12/31/2024  Plan of Care Expiration: Updated to 1/14/2025  Visit # / Visits authorized: 52/70   Progress Note Due: 1/17/2024  FOTO: 3/4     FOTO NEXT VISIT     Precautions: hx of TKA and menisectomy, spinal cord stimulator, scoliosis     Time In: 1000 am  Time Out: 1100 am   Total Billable Time: 55 minutes (1:1)  Total Appointment Time (timed & untimed codes): 60 minutes    PTA Visit #: 0/5   Subjective   Patient reports: she saw another doctor who said she could be a candidate for surgery if her pain doesn't improve. Her back continues to hurt on days she doesn't come to therapy.     She was compliant with home exercise program.  Response to previous treatment: no adverse effects   Functional change: improved tolerance to functional mobility activities with decreased pain    Pain: 5/10 R knee, 8/10 LB  Location: R low back and R knee   Objective             Treatment   Tiarra received the treatments listed below:      Therapeutic exercises to develop strength, endurance, ROM, flexibility, posture, and core stabilization for 15 minutes including:    Recumbent bike lv. 4 x 5 min for endurance and knee ROM     PPT 15x5"  Bridge 2x10x3"  EIL " "2x10  DKTC 15x5"'  Open books x15,3" ea  Hip flexor stretch 2x1' ea  Leg press # 2x10  Leg press SL 80# 2x10  EIS 2x10    Dry needling applied to the: B LB  for 10 minutes, including:     FDN. Educated pt to use heat following treatment sessions if pt is experiencing pain or soreness. Pt verbalized good understanding of education. Pt signed written consent to dry needling. Pt gave verbal consent for DN     Pt received dry needling to the below listed muscles using 50 mm needles.     B Lumbar paraspinals L1-L5      Neuromuscular re-education activities to improve: Balance, Coordination, Kinesthetic, Proprioception, and Posture for 30 minutes. The following activities were included:    Sit to stands GTB around knees 2x10  Side stepping GTB 2x12 steps  Monster walks GTB 2x12 steps  Single leg balance 3x30" R only  Step ups fwd/lateral  6 in step 2x10x3" ea R only   Medx trunk extension 74# x20  Medx trunk rotation 34# x20 increase weight       therapeutic activities to improve functional performance for 0  minutes, including:      Hot pack for 5 minutes to R knee and low back    Patient Education and Home Exercises     Education provided:   - HEP, POC    Written Home Exercises Provided: Patient instructed to cont prior HEP. Exercises were reviewed and Tiarra was able to demonstrate them prior to the end of the session.  Tiarra demonstrated good  understanding of the education provided. See Electronic Medical Record under Patient Instructions for exercises provided during therapy sessions    Assessment   Tiarra presents today with reports increased pain since last visit. Dry needling applied at beginning of treatment session with pt reporting decreased tightness and soreness of low back and improved tolerance to mobility and strengthening exercises. Continue to progress as able.     Tiarra Is progressing well towards her goals.   Patient prognosis is Good.     Patient will continue to benefit from skilled outpatient " physical therapy to address the deficits listed in the problem list box on initial evaluation, provide pt/family education and to maximize pt's level of independence in the home and community environment.     Patient's spiritual, cultural and educational needs considered and pt agreeable to plan of care and goals.     Anticipated barriers to physical therapy: chronicity of condition, multiple treatment areas, hx of R TKA and menisectomy     GOALS: Short Term Goals:  4 weeks  1. Report decreased in pain at worse less than  <   / =  4  /10  to increase tolerance for functional activities.On going  2. Pt to increase B popliteal angle by greater than > or = 5 degrees in order to improve flexibility and posture. MET  3. Increased R LE MMT 1/2  to increase tolerance for ADL and work activities. MET  4. Pt to increase B Ely's Test by greater than  > or = 5degrees in order to improve flexibility and posture. MET  5. Pt to tolerate HEP to improve ROM and independence with ADL's. MET  6. Pt to improve range of motion by 25% to allow for improved functional mobility to allow for improvement in IADLs. MET     Long Term Goals: 8 weeks  1. Report decreased in pain at worse less than  <   / =  2  /10  to increase tolerance for functional mobility.  On going  2 .Pt to increase B popliteal angle by greater than > or = 10 degrees in order to improve flexibility and posture. On going  3. Increased R LE MMT 1 grade to increase tolerance for ADL and work activities.On going  4. Pt will report at 51% or better score on FOTO Lumbar spine survey  to demonstrate decrease in disability and improvement in back pain.On going  5. Pt to be Independent with HEP to improve ROM and independence with ADL's. On going  6. Pt to increase B Ely's Test by greater than > or = 10 degrees in order to improve flexibility and posture. On going  7. Pt to demonstrate negative Bridge Test in order to show improved core strength for lumbar stabilization. On  going  8. Pt to improve range of motion by 75% to allow for improved functional mobility to allow for improvement in IADLs. On going        Plan   Plan of care Certification: 5/9/2024 to 1/14/2025     Outpatient Physical Therapy 2 times weekly for 10 weeks to include the following interventions: Cervical/Lumbar Traction, Gait Training, Manual Therapy, Moist Heat/ Ice, Neuromuscular Re-ed, Patient Education, Self Care, Therapeutic Activities, and Therapeutic Exercise. Dry aubree Anaya, PT   1/13/2025

## 2025-01-16 DIAGNOSIS — G47.30 SLEEP APNEA, UNSPECIFIED TYPE: Primary | ICD-10-CM

## 2025-01-17 ENCOUNTER — TELEPHONE (OUTPATIENT)
Dept: SLEEP MEDICINE | Facility: OTHER | Age: 70
End: 2025-01-17
Payer: MEDICARE

## 2025-01-17 ENCOUNTER — PATIENT MESSAGE (OUTPATIENT)
Dept: SPORTS MEDICINE | Facility: CLINIC | Age: 70
End: 2025-01-17
Payer: MEDICARE

## 2025-01-19 ENCOUNTER — PATIENT MESSAGE (OUTPATIENT)
Dept: ORTHOPEDICS | Facility: CLINIC | Age: 70
End: 2025-01-19
Payer: MEDICARE

## 2025-01-20 ENCOUNTER — PATIENT MESSAGE (OUTPATIENT)
Dept: PAIN MEDICINE | Facility: CLINIC | Age: 70
End: 2025-01-20
Payer: MEDICARE

## 2025-01-21 ENCOUNTER — PATIENT MESSAGE (OUTPATIENT)
Dept: ORTHOPEDICS | Facility: CLINIC | Age: 70
End: 2025-01-21
Payer: MEDICARE

## 2025-01-21 NOTE — OR NURSING
Call placed to patient to cancel pre-admit appt for 1/22/25.Notified patient that pre-admit office will call her when reopened. Patient verbalized understanding.

## 2025-01-22 ENCOUNTER — HOSPITAL ENCOUNTER (OUTPATIENT)
Dept: PREADMISSION TESTING | Facility: OTHER | Age: 70
Discharge: HOME | End: 2025-01-22
Payer: MEDICARE

## 2025-01-23 ENCOUNTER — OFFICE VISIT (OUTPATIENT)
Dept: PAIN MEDICINE | Facility: CLINIC | Age: 70
End: 2025-01-23
Payer: MEDICARE

## 2025-01-23 DIAGNOSIS — M41.25 OTHER IDIOPATHIC SCOLIOSIS, THORACOLUMBAR REGION: ICD-10-CM

## 2025-01-23 DIAGNOSIS — T85.192D MALFUNCTION OF SPINAL CORD STIMULATOR, SUBSEQUENT ENCOUNTER: ICD-10-CM

## 2025-01-23 DIAGNOSIS — M51.360 DEGENERATION OF INTERVERTEBRAL DISC OF LUMBAR REGION WITH DISCOGENIC BACK PAIN: ICD-10-CM

## 2025-01-23 DIAGNOSIS — M47.816 LUMBAR SPONDYLOSIS: ICD-10-CM

## 2025-01-23 DIAGNOSIS — Z96.89 SPINAL CORD STIMULATOR STATUS: ICD-10-CM

## 2025-01-23 DIAGNOSIS — G89.4 CHRONIC PAIN SYNDROME: Primary | ICD-10-CM

## 2025-01-23 PROCEDURE — 98005 SYNCH AUDIO-VIDEO EST LOW 20: CPT | Mod: 95,,, | Performed by: NURSE PRACTITIONER

## 2025-01-23 NOTE — H&P (VIEW-ONLY)
Chronic Pain-Established Visit  Chronic Pain-Tele-Medicine-Established Note (Follow up visit)        The patient location is: Home  The chief complaint leading to consultation is: pain  Visit type: Virtual visit with synchronous audio and video  Total time spent with patient: 25 min  Each patient to whom he or she provides medical services by telemedicine is:  (1) informed of the relationship between the physician and patient and the respective role of any other health care provider with respect to management of the patient; and (2) notified that he or she may decline to receive medical services by telemedicine and may withdraw from such care at any time.      SUBJECTIVE:    Interval History 1/23/2025:  The patient returns to clinic today for follow up of back pain via virtual visit. She is scheduled for SCS battery site revision next week. She would like to have the battery moved lower into the buttock. She continues to experience pocket pain, worse with sitting and laying down. She often has to sleep in a recliner. She continues to take Norco as needed with benefit. She denies any adverse effects. She denies any other health changes.     Interval History 12/2/2024:  Ms. Mayen returns to clinic today for follow up of back pain via virtual visit. She reports worsened pain around her SCS battery site. She is having difficulty sleeping due to the pain. On a recent vacation, she had to sleep in a recliner. She also has pain with sitting on a airplane and in the car. She does have benefit with the device. She is interested in moving forward with battery revision. She is taking Norco as needed with benefit and without adverse effects. She denies any other health changes.     Interval History 10/15/2024:  The patient returns to clinic today for follow up of back pain. She is s/p TPI around SCS battery site on 10/1/2024. She reports one day of relief. She continues to report pocket pain around her SCS site. She did meet  with [employer] SCS representative for programming. This has been beneficial. She also did dry needling with benefit. She does not wish to pursue battery revision at this time. She continues to take Norco as needed with benefit. She denies any adverse effects. She denies any other health changes. Her pain today is 6/10.    Interval History 8/19/2024:  The patient returns to clinic today for follow up of back pain. She continues to report pocket pain around her SCS site. She cannot lie flat on her back due to pain. She also notes pain with prolonged standing and activity. She does have benefit with SCS. She reports intermittent right knee pain. She is currently taking Norco as needed with some benefit. She is performing  home exercises. She denies any other health changes. Her pain today is 8/10.    Interval History 6/24/2024:  The patient returns to clinic today for follow up of back and knee pain via virtual visit. She continues to report low back pain. This is worse with prolonged standing. She denies any radicular leg pain. She did recently meet with [employer] representatives for programming. She reports limited relief with these changes. She is participating in physical therapy. She is taking Norco as needed with benefit. She denies any adverse effects. She denies any other health changes.     Interval History 3/25/2024:  The patient returns to clinic today for follow up of back and knee pain. She is s/p right genicular RFA on 3/15/2024. She reports 50% relief. She continues to report some right knee pain.She continues to report low back pain. She is using her SCS. She does have intermittent pain around IPG site. She is participating in physical therapy. She is taking Norco as needed with benefit. She denies any adverse effects. She denies any other health changes. Her pain today is 4/10.    Interval History 1/23/2024:  Tiarra Norton presents for folow-up for lower back pain s/p IPG revision  11/20/2023.  She reports that the pocket pain is significantly improved, continues to get better.  She is just now feeling well enough to participate in physical therapy which she has today.  Today she also complains of right knee pain. She had a right TKA on 5/18/2023. .  The patient describes the pain as aching. The pain is constant. Exacerbating factors: bending, cooking, standing for a long time, walking.  Mitigating factors: ice, heat, medications.  The patient takes Tylenol 500 mg PRN with mild benefit.  They deny any perceived side effects.  She is hoping for an additional prescription for Norco 5-325 mg which she had taken over the recovery period from the IPG revision.  She states that the medication helps her with activity.  The symptoms interfere with ADLs.  The patient last had imaging Xray bilateral knees on 10/26/2023. See findings below.  The patient denies fever/night sweats, urinary incontinence, bowel incontinence, significant weight changes, significant motor weakness or changes, or loss of sensations.  Today's pain score is 3/10 for back pain and 7/10 for right knee pain.     RTA 5/18/23, activity restrictions from IPG revision was not able to do PT, starting PT for back and right knee back today.  Wondering if there are any procedures we can do for knee.     Wants Norco 5-325 mg-helps with activity  Pain ext and flex  Ptp superior anterior    Interval History 10/25/2023  69 y/o female with hx of chronic left sided low back pain near IPG, she is s/p Octad electrode x2  placement of pulse generator 3  on 1/9 she recently was provided a recent TPI near left low back she reports 0% relief following.  Pain is worse when standing walking performing ADLs.  She does state she gets 5 hours of uninterrupted sleep.  Like her left low back is weak and aching.  Tingling any wearing her low back.  In the right side of her low back.  To discuss other pain interventions and the plan of care moving forward  regard to her current subacute pain.  She denies any profound weakness denies any bowel bladder dysfunction denies any saddle anesthesia at this time.    Interval History 9/12/2023:  The patient returns to clinic today for follow up of battery site pain via virtual visit. She is s/p trigger point injections under ultrasound on 8/29/2023. She does feel some benefit. She is currently ill with Covid. She is currently running fever and having body aches. She denies any other health changes.     Interval History 8/11/2023:  The patient returns to clinic today for follow up of pain. She reports pain around her SCS battery site. She describes the pain as though her muscles feel weak and tired. She does report benefit with SCS. No difficulty with charging or programming. She has tried Lidoderm and Salonpas patches but these caused a rash. She is currently participating in physical therapy for her knee. She denies any other health changes. Her pain today is 4/10.    Interval History 4/14/2023:  The patient is here for follow up of back pain and pain at IPG site. She reports improvement since previous visit. She did have benefit with Lidoderm patches but had a rash so she stopped. Her pain has improved with Salon Pas patches. She is scheduled for knee replacement next month with Dr. Scott. Her pain today is 3/10.    Interval History 3/21/2023:  The patient presents to discuss pain to IPG site. She says that it has been present since surgery and has gradually worsened. She says that it feels like a tight and weak muscle. She has not tried any topical medications or muscle relaxants. No fever or malaise. She has not noticed any redness or swelling to the site. Her original pain has significantly improved from SCS implant. She is part of a research study. Her pain today is 6/10.    Interval History 2/17/2023:  The patient returns to clinic today for follow up of back pain. She reports benefit with [employer] SCS. She is  in contact with representatives for programming. She is very happy with relief. She denies any fever, chills, or drainage. She continues to follow her activity restrictions. She denies any other health changes. Her pain today is 2/10.    Interval History 1/31/2023:  The patient returns to clinic today for wound check. She reports benefit with [employer] SCS. She is in contact with representatives for programming. She reports intermittent muscle soreness into her legs. She does have an upcoming appointment with representatives for programming. She denies any fever, chills, or drainage. She continues to follow her activity restrictions. She denies any other health changes. Her pain today is 3/10.    Interval History 1/17/2023:  The patient returns to clinic today for post op. She is s/p [employer] SCS implant on 1/9/2022. She reports benefit with the device at this time. She is meeting with the representatives today. She does report soreness to her incisions. She denies any fever, chills, or drainage. She did have issues with her dressing staying on. She has completed her antibiotics. She denies any other health changes. Her pain today is 2/10.    Interval History 12/29/2022:  The patient returns for post op appointment. She is s/p lumbar [employer] SCS trial with 90% relief. She has had very minimal back pain during the trial period. She says that she was more active over this time due to the Christmas holiday and had minimal symptoms. She is very happy with the relief. She would like to move forward with implant. She is part of [employer] study. No fever or malaise during trial period. Her pain today is 1/10.    Interval History 10/14/22: Mrs. Norton presents today for follow up of chronic low back pain and to discuss the [employer] trial. She had her lumbar MRI done last night without significant changes to her spine. She did have increased dilation of her bile duct. Her pain today is the same in character and  severity as during her last visit with us and she rates it currently at a 7/10. She continues to do her stretches on her own which have helped some. She presents with some questions regarding the trial.     Interval History 9/21/2022:  The patient returns to clinic today for follow up of back pain via virtual visit. She received a letter from [employer] denying SCS trial. She is frustrated by this. She continues to report low back pain. She denies any radicular leg pain. Her pain is worse with bending and activity. She recently underwent med screening for the [employer] program. She is interested in pursuing this. She has tried Gabapentin and Lyrica in the past with side effects. She denies any other health changes. Her pain today is 7/10.     Interval History 8/22/22: Ms. Norton returns for follow up on her low back pain. At our last visit she felt cured by acupuncture. Unfortunately, this only lasted for 24 hours. She returns today looking for other options to make life livable. Patient claims all of her pain in the low back and middle back. We discussed that this will work best for her low back pain.     Interval History 7/25/22: Tiarra Norton returns for follow up. We previously have been discussing treatments for her low back pain that did not resolve with RFA. At our last visit I referred her to Dr. Antony for clearance for a SCS trial. She was considered to be a reasonable candidate. However, in the meantime, her PT referred her to acupuncture with Dr. Jacobs. She had one treatment and already notes 50% relief. She notes that she still has pain, especially with leaning forward, but feels that it is much better controlled. Their plan is one treatment per week but is approved for 20 sessions.     Interval History 6/15/22: Tiarra Norton returns for follow up. She continues to feel like she has mild pain sitting or laying down, and has her worst pain with extreme leaning forward to pick something up  off the floor. She also has difficulty leaning over her handlebars to ride her bike or leaning forward to fold clothes or standing up for any period of time. So, we determined that leaning forward is her worst issue. We did previouisly do lmbb and rfa, which has helped with extension, but it would not help with leaning forward. On review of her MRI she has significant multilevel DDD with Modic changes which likely account for her pain.     4/18/22 Interval History: Patient presents for telehealth visit for follow up following her b/l RFA at L3-L5. She reports 80% relief and is very pleased with her pain relief. Unfortunately, she is not back to doing what she wants due to right knee pain. She is seeing Dr. Huber for knee pain and is s/p right medial compartment partial knee replacement. She is currently in therapy for this. We discussed that we may be able to assist her with her knee pain as well.     HPI:  Original HISTORY OF PRESENT ILLNESS: Tiarra Norton presents to the clinic for the evaluation of the above pain. The pain started five years ago.     Original Pain Description:  The pain is located in the lower back and does radiate up towards her upper back.The pain is described as aching, dull and sharp. Initially starts as a dull, aching pain and it gets sharp once she bends down and moves around. Typically when she walks for more than five minutes, the sharp pain radiates up her back. Exacerbating factors: Standing, Bending, Touching, Walking, Night Time, Flexing and Lifting. Mitigating factors heat, ice, laying down and massage. Symptoms interfere with daily activity and sleeping. The patient feels like symptoms have been worsening. Patient denies night fever/night sweats, urinary incontinence, bowel incontinence, significant weight loss, significant motor weakness and loss of sensations.    Original PAIN SCORES:  Best: Pain is 1  Worst: Pain is 10  Usually: Pain is 4  Current: Pain is 4         12/17/2024     2:57 PM 10/15/2024     1:05 PM 10/1/2024     2:39 PM   Last 3 PDI Scores   Pain Disability Index (PDI) 42 33 30     6 weeks of Conservative therapy (PT/Chiro/Home Exercises with Dates)  Healthy back program--> has four sessions left for a total of 20 sessions   Last session was on 1/31/22   Stretches that they taught at [employer] back gives her relief       Medications:   Robaxin PRN    Ice and heat which has helped   Tried Gabapentin and Lyrica- made her loopy      Report: reviewed       Interventional Pain Procedures:   10/17/2023 TPI left side near IPG battery 0% relief  2/8/2022- Bilateral L3,4,5 MBB  2/15/2022- Bilateral L3,4,5 MBB  3/8/2022- Right L3,4,5 RFA- 80% relief for a few months  3/22/2022- Left L3,4,5 RFA- 80% relief for a few months  12/22/22 SCS trial- 90% relief  1/9/2023- [employer] SCS implant.   11/20/2023- IPG revision  2/16/2024- Right genicular nerve block  3/15/2024- Right genicular RFA    Imaging:    EXAMINATION:  XR KNEE 3 VIEW RIGHT     CLINICAL HISTORY:  Presence of right artificial knee joint     TECHNIQUE:  AP, lateral, and Merchant views of the right knee were performed.     COMPARISON:  Right knee radiograph dated 06/12/2023     FINDINGS:  Prior right knee arthroplasty.  Hardware projects in similar alignment without evidence for interval loosening or failure.  No periprosthetic fracture.     Impression:     As above.        Electronically signed by: Azael Wilkins  Date:                                            11/03/2023  Time:                                           11:05      10/13/22: MRI LUMBAR SPINE WITHOUT CONTRAST  FINDINGS:  Lumbar levoscoliosis centered at L2.  Minimal anterolisthesis at L4-5.     No compression fractures.  No marrow replacing lesions.     Multilevel degenerative changes with disc desiccation and disc space narrowing, described in detail below.  Discogenic endplate edema at T12-L1.     The conus medullaris has a normal  appearance and terminates at the L1 level.  Cauda equina nerve roots are unremarkable.  No epidural mass or collection.     The common duct is dilated up to 12 mm, previously 9 mm on 08/20/2022 CT.  Mild prominence of the main pancreatic duct up to 4 mm, which is new.  No definite filling defect in the bile ducts.  Paraspinal muscle atrophy.     SIGNIFICANT FINDINGS BY LEVEL:     T12-L1: Disc bulge.  Bilateral facet arthropathy and ligamentum flavum thickening.  Mild canal stenosis.  Mild bilateral foraminal stenosis.     L1-2: Disc bulge.  Bilateral facet arthropathy and ligamentum flavum thickening.  Mild canal stenosis.  Mild right foraminal stenosis.     L2-3: Disc bulge.  Bilateral facet arthropathy and ligamentum flavum thickening.  Mild canal stenosis.  Moderate right mild left foraminal stenosis.     L3-4: Disc bulge.  Bilateral facet arthropathy and ligamentum flavum thickening.  Small bilateral facet joint effusions/synovitis.  Moderate canal stenosis with partial effacement of the thecal sac and crowding of the cauda equina nerve roots.  Mild right and moderate left foraminal stenosis.     L4-5: Disc bulge with posterior disc uncovering.  Bilateral facet arthropathy and ligamentum flavum thickening.  Small right facet joint effusion/synovitis.  Mild canal stenosis.  Mild bilateral foraminal stenosis.     L5-S1: Disc bulge.  Bilateral facet arthropathy and ligamentum flavum thickening.  No significant canal stenosis.  Mild left foraminal stenosis.     Impression:     Lumbar levoscoliosis and multilevel degenerative changes as detailed above.  No significant changes from 01/21/2022.     Increased biliary ductal dilatation up to 12 mm, greater than expected after cholecystectomy.  In addition, the main pancreatic duct is mildly prominent up to 4 mm, which is new.  If LFTs are abnormal, consider MRI/MRCP or ERCP for further evaluation.      Past Medical History:   Diagnosis Date    Cataract     Depression      Gestational diabetes     Hyperlipidemia     Hypertension     IBS (irritable bowel syndrome)     Thyroid disease      Past Surgical History:   Procedure Laterality Date    ADENOIDECTOMY      ARTHROPLASTY, KNEE, TOTAL, USING COMPUTER-ASSISTED NAVIGATION Right 2023    Procedure: CONVERSION ARTHROPLASTY, KNEE, TOTAL, USING COMPUTER-ASSISTED NAVIGATION;  Surgeon: Virgil Scott MD;  Location: [facility] OR;  Service: Orthopedics;  Laterality: Right;  Same day discharge    ARTHROSCOPIC CHONDROPLASTY OF KNEE JOINT Right 10/19/2021    Procedure: ARTHROSCOPY, KNEE, WITH CHONDROPLASTY;  Surgeon: Trevin Huber MD;  Location: [facility] OR;  Service: Orthopedics;  Laterality: Right;    ARTHROSCOPY OF KNEE Right 10/19/2021    Procedure: ARTHROSCOPY, KNEE-RIGHT;  Surgeon: Trevin Huber MD;  Location: [facility] OR;  Service: Orthopedics;  Laterality: Right;    BLOCK, NERVE, GENICULAR Right 2024    Procedure: BLOCK, NERVE RIGHT GENICULAR;  Surgeon: Shoaib Aguirre MD;  Location: [facility] PAIN MGT;  Service: Pain Management;  Laterality: Right;  [phone]     SECTION      CHOLECYSTECTOMY      COLONOSCOPY N/A 2016    Procedure: COLONOSCOPY;  Surgeon: Heri Segura MD;  Location: [facility] (4TH FLR);  Service: Endoscopy;  Laterality: N/A;    COLONOSCOPY N/A 2019    Procedure: COLONOSCOPY;  Surgeon: Joe Pitts MD;  Location: [facility] (4TH FLR);  Service: Endoscopy;  Laterality: N/A;    CYSTOSCOPY  2022    Procedure: CYSTOSCOPY;  Surgeon: Lenny Moore MD;  Location: [address] FLR;  Service: Urology;;    ESOPHAGOGASTRODUODENOSCOPY N/A 2020    Procedure: EGD (ESOPHAGOGASTRODUODENOSCOPY);  Surgeon: Tolu Rivas MD;  Location: [facility] (4TH FLR);  Service: Endoscopy;  Laterality: N/A;  Pt. moved to 9:15am arrival time.. Instructed to not have anything after midnight.EC    EYE SURGERY      cataract resection right    INJECTION OF ANESTHETIC AGENT AROUND NERVE Bilateral 2022     Procedure: Block, Nerve MEDIAL BRANCH BLOCK BILATERAL L3,4,5;  Surgeon: Tara Gibbs MD;  Location: [facility] [place] PAIN MGT;  Service: Pain Management;  Laterality: Bilateral;    INJECTION OF ANESTHETIC AGENT AROUND NERVE Bilateral 2/15/2022    Procedure: BLOCK, NERVE, L3*L4-L5 MEDIAL BR ANCH 2 OF 2;  Surgeon: Tara Gibbs MD;  Location: [facility] [place] PAIN MGT;  Service: Pain Management;  Laterality: Bilateral;    INJECTION OF BOTULINUM TOXIN TYPE A  6/21/2022    Procedure: BOTULINUM TOXIN INJECTION 100 units;  Surgeon: Lenny Moore MD;  Location: [facility] OR 1ST FLR;  Service: Urology;;    INSERTION, NEUROSTIMULATOR, SPINAL CORD N/A 1/9/2023    Procedure: SPINAL CORD STIMULATOR IMPLANT [employer];  Surgeon: Shoaib Aguirre MD;  Location: [facility] [place] OR;  Service: Pain Management;  Laterality: N/A;    JOINT REPLACEMENT      partial right knee    KNEE ARTHROSCOPY W/ MENISCECTOMY Right 10/19/2021    Procedure: ARTHROSCOPY, KNEE, WITH MENISCECTOMY, PARTIAL LATERAL;  Surgeon: Trevin Huber MD;  Location: [facility] OR;  Service: Orthopedics;  Laterality: Right;    r hand surgery      RADIOFREQUENCY ABLATION Right 3/8/2022    Procedure: RADIOFREQUENCY ABLATION, L3-L5 1 OF 2;  Surgeon: Tara Gibbs MD;  Location: [facility] [place] PAIN MGT;  Service: Pain Management;  Laterality: Right;    RADIOFREQUENCY ABLATION Left 3/22/2022    Procedure: RADIOFREQUENCY ABLATION, l3-+l5 2 of 2;  Surgeon: Tara Gibbs MD;  Location: [facility] [place] PAIN MGT;  Service: Pain Management;  Laterality: Left;    RADIOFREQUENCY ABLATION Right 3/15/2024    Procedure: RADIOFREQUENCY ABLATION RIGHT GENICULAR;  Surgeon: Shoaib Aguirre MD;  Location: [facility] [place] PAIN MGT;  Service: Pain Management;  Laterality: Right;  [phone]    REMOVAL OF NEUROSTIMULATOR N/A 11/20/2023    Procedure: SCS IPG BATTERY REVISION;  Surgeon: Shoaib Aguirre MD;  Location: [facility] [place] OR;  Service: Pain Management;  Laterality: N/A;    TONSILLECTOMY      TOTAL KNEE ARTHROPLASTY       partial knee replacement    TRIAL OF SPINAL CORD NERVE STIMULATOR N/A 12/22/2022    Procedure: SPINAL CORD STIMULATOR TRIAL SADIQ CHAPA;  Surgeon: Shoaib Aguirre MD;  Location: [facility];  Service: Pain Management;  Laterality: N/A;    TUBAL LIGATION       Social History     Socioeconomic History    Marital status:     Number of children: 1   Occupational History    Occupation:      Employer: [employer]     Comment: retired   Tobacco Use    Smoking status: Never    Smokeless tobacco: Never   Substance and Sexual Activity    Alcohol use: Yes     Alcohol/week: 3.0 standard drinks of alcohol     Types: 3 Glasses of wine per week     Comment: weekends    Drug use: Yes     Types: Marijuana     Comment: occasionally    Sexual activity: Yes     Partners: Male   Other Topics Concern    Are you pregnant or think you may be? No    Breast-feeding No   Social History Narrative    Retired        Swims.       Stationary bike.            Social Drivers of Health     Financial Resource Strain: Low Risk  (3/21/2024)    Overall Financial Resource Strain (CARDIA)     Difficulty of Paying Living Expenses: Not hard at all   Food Insecurity: No Food Insecurity (3/21/2024)    Hunger Vital Sign     Worried About Running Out of Food in the Last Year: Never true     Ran Out of Food in the Last Year: Never true   Transportation Needs: No Transportation Needs (3/21/2024)    PRAPARE - Transportation     Lack of Transportation (Medical): No     Lack of Transportation (Non-Medical): No   Physical Activity: Sufficiently Active (3/21/2024)    Exercise Vital Sign     Days of Exercise per Week: 4 days     Minutes of Exercise per Session: 40 min   Stress: Stress Concern Present (3/21/2024)    [employer] [place] [employer] - Occupational Stress Questionnaire     Feeling of Stress : Rather much   Housing Stability: Patient Declined (3/21/2024)    Housing Stability Vital Sign     Unable to Pay for  Housing in the Last Year: Patient declined     Number of Places Lived in the Last Year: 1     Unstable Housing in the Last Year: Patient declined     Family History   Problem Relation Name Age of Onset    Hypertension Mother      Irritable bowel syndrome Mother      Hyperlipidemia Mother      Heart disease Father          mi    Hypertension Father      Cancer Father          prostate    Heart attack Father      Heart failure Father      Hyperlipidemia Father      Alcohol abuse Sister nathanael     Depression Sister nathanael     Epilepsy Son ari     Irritable bowel syndrome Sister martin     Alcohol abuse Sister martin     Ovarian cancer Maternal Aunt      Breast cancer Paternal Aunt      Breast cancer Maternal Aunt      Melanoma Neg Hx      Colon cancer Neg Hx      Stomach cancer Neg Hx      Esophageal cancer Neg Hx      Liver disease Neg Hx      Crohn's disease Neg Hx      Ulcerative colitis Neg Hx      Celiac disease Neg Hx      Stroke Neg Hx         Review of patient's allergies indicates:  No Known Allergies    Current Outpatient Medications   Medication Sig    alosetron (LOTRONEX) 0.5 MG tablet Take 1 tablet (0.5 mg total) by mouth in the morning and 1 tablet (0.5 mg total) in the evening.    ALPRAZolam (XANAX) 1 MG tablet Take 1 tablet by mouth three times daily    amLODIPine (NORVASC) 10 MG tablet Take 1 tablet (10 mg total) by mouth once daily.    atorvastatin (LIPITOR) 10 MG tablet Take 1 tablet (10 mg total) by mouth once daily.    EScitalopram oxalate (LEXAPRO) 5 MG Tab take 1 tablet by mouth once daily    estradioL (ESTRACE) 0.01 % (0.1 mg/gram) vaginal cream Insert 1 gram as directed vagianlly nightly for 2 to 4 weeks followed by 2-3x/week    HYDROcodone-acetaminophen (NORCO) 5-325 mg per tablet Take 1 tablet by mouth every 12 (twelve) hours as needed for Pain.    levothyroxine (SYNTHROID) 100 MCG tablet TAKE 1 TABLET BY MOUTH EVERY MORNING    mupirocin (BACTROBAN) 2 % ointment Apply topically 2 (two)  times daily.    ondansetron (ZOFRAN) 4 MG tablet Take 1 tablet (4 mg total) by mouth every 8 (eight) hours if needed for nausea.    promethazine (PHENERGAN) 25 MG tablet Take 1 tablet by mouth every 12 hours as needed for nausea    ramelteon (ROZEREM) 8 mg tablet Take 1 tablet (8 mg total) by mouth every evening.    spironolactone (ALDACTONE) 25 MG tablet Take 1 tablet (25 mg total) by mouth once daily. For blood pressure    sumatriptan (IMITREX) 50 MG tablet 1 tab po at start of headache.  Repeat in 2 h prn    tiZANidine (ZANAFLEX) 4 MG tablet Take 1 tablet (4 mg total) by mouth 3 (three) times daily as needed (muscle pain).    traZODone (DESYREL) 100 MG tablet take 1 to 2 tablets by mouth every evening for for sleep    tretinoin (RETIN-A) 0.025 % cream Compound tretinoin 0.025% / azelaic acid 8% / niacinamide 2% cream. Apply a pea-sized amount to entire face qhs.    valACYclovir (VALTREX) 500 MG tablet Take 1 tablet (500 mg total) by mouth 2 (two) times daily. for 7 days    valsartan (DIOVAN) 320 MG tablet Take 1 tablet (320 mg total) by mouth once daily.     Current Facility-Administered Medications   Medication    triamcinolone acetonide injection 40 mg       REVIEW OF SYSTEMS:    GENERAL: No fever. No chills. No fatigue. Denies weight loss. Denies weight gain.  HEENT: Denies headaches. Denies vision change. Denies eye pain. Denies double vision. Denies ear pain.   CV: Denies chest pain. HTN  PULM: Denies of shortness of breath.  GI: Denies constipation. No diarrhea. No abdominal pain. Denies nausea. Denies vomiting. No blood in stool.  HEME: Denies bleeding problems.  : Denies urgency. No painful urination. No blood in urine.  MS: See HPI  SKIN: Denies rash.   NEURO: Denies seizures. No weakness.  ENDO: Thyroid disorder.   PSYCH:  Depression    OBJECTIVE:     Exam limited due to virtual visit:  GENERAL: Well appearing, in no acute distress, alert and oriented x3.  PSYCH:  Mood and affect  appropriate.    Previous physical exam:  There were no vitals filed for this visit.      PHYSICAL EXAM:     GENERAL: Well appearing, in no acute distress, alert and oriented x3.  PSYCH:  Mood and affect appropriate.  SKIN: Skin color, texture, turgor normal, no rashes or lesions. SCS site well healed.   RESP: Symmetric chest rise, no respiratory distress noted.   BACK: Straight leg raise in the sitting position is negative for radicular pain.   MSK: 5/5 strength in right ankle with plantar and dorsiflexion. 5/5 strength in left ankle with plantar and dorsiflexion. 5/5 strength with right knee flexion and extension. 5/5 strength with left knee flexion and extension.   GAIT: Normal.         ASSESSMENT: 69 y.o. year old female with low back and knee pain, consistent with the followin. Chronic pain syndrome        2. Spinal cord stimulator status        3. Malfunction of spinal cord stimulator, subsequent encounter        4. Lumbar spondylosis        5. Degeneration of intervertebral disc of lumbar region with discogenic back pain        6. Other idiopathic scoliosis, thoracolumbar region              PLAN:     - Previous imaging reviewed. Labs reviewed.     - She is scheduled for SCS battery revision next week.      - Continue home exercise routine.     - Flexeril 10 mg TID PRN.     - Pain contract signed 2024.    - Continue Norco 5/325 mg BID PRN, #60. She does not need a refill at this time.      - The patient is here today for a refill of current pain medications and they believe these provide effective pain control and improvements in quality of life.  The patient notes no serious side effects, and feels the benefits outweigh the risks.  The patient was reminded of the pain contract that they signed previously as well as the risks and benefits of the medication including possible death.  The updated [place] [employer] prescription monitoring program was reviewed, and the patient has been found  to be compliant with current treatment plan. Medication management provided by Dr. Aguirre.     - UDS from 1/23/2024. Repeat next in person visit.     -RTC after above procedure.    The above plan and management options were discussed at length with patient. Patient is in agreement with the above and verbalized understanding.       Marlene Thorne NP  01/23/2025

## 2025-01-23 NOTE — PROGRESS NOTES
Chronic Pain-Established Visit  Chronic Pain-Tele-Medicine-Established Note (Follow up visit)        The patient location is: Home  The chief complaint leading to consultation is: pain  Visit type: Virtual visit with synchronous audio and video  Total time spent with patient: 25 min  Each patient to whom he or she provides medical services by telemedicine is:  (1) informed of the relationship between the physician and patient and the respective role of any other health care provider with respect to management of the patient; and (2) notified that he or she may decline to receive medical services by telemedicine and may withdraw from such care at any time.      SUBJECTIVE:    Interval History 1/23/2025:  The patient returns to clinic today for follow up of back pain via virtual visit. She is scheduled for SCS battery site revision next week. She would like to have the battery moved lower into the buttock. She continues to experience pocket pain, worse with sitting and laying down. She often has to sleep in a recliner. She continues to take Norco as needed with benefit. She denies any adverse effects. She denies any other health changes.     Interval History 12/2/2024:  Ms. Mayen returns to clinic today for follow up of back pain via virtual visit. She reports worsened pain around her SCS battery site. She is having difficulty sleeping due to the pain. On a recent vacation, she had to sleep in a recliner. She also has pain with sitting on a airplane and in the car. She does have benefit with the device. She is interested in moving forward with battery revision. She is taking Norco as needed with benefit and without adverse effects. She denies any other health changes.     Interval History 10/15/2024:  The patient returns to clinic today for follow up of back pain. She is s/p TPI around SCS battery site on 10/1/2024. She reports one day of relief. She continues to report pocket pain around her SCS site. She did meet  with [employer] SCS representative for programming. This has been beneficial. She also did dry needling with benefit. She does not wish to pursue battery revision at this time. She continues to take Norco as needed with benefit. She denies any adverse effects. She denies any other health changes. Her pain today is 6/10.    Interval History 8/19/2024:  The patient returns to clinic today for follow up of back pain. She continues to report pocket pain around her SCS site. She cannot lie flat on her back due to pain. She also notes pain with prolonged standing and activity. She does have benefit with SCS. She reports intermittent right knee pain. She is currently taking Norco as needed with some benefit. She is performing  home exercises. She denies any other health changes. Her pain today is 8/10.    Interval History 6/24/2024:  The patient returns to clinic today for follow up of back and knee pain via virtual visit. She continues to report low back pain. This is worse with prolonged standing. She denies any radicular leg pain. She did recently meet with [employer] representatives for programming. She reports limited relief with these changes. She is participating in physical therapy. She is taking Norco as needed with benefit. She denies any adverse effects. She denies any other health changes.     Interval History 3/25/2024:  The patient returns to clinic today for follow up of back and knee pain. She is s/p right genicular RFA on 3/15/2024. She reports 50% relief. She continues to report some right knee pain.She continues to report low back pain. She is using her SCS. She does have intermittent pain around IPG site. She is participating in physical therapy. She is taking Norco as needed with benefit. She denies any adverse effects. She denies any other health changes. Her pain today is 4/10.    Interval History 1/23/2024:  Tiarra Norton presents for folow-up for lower back pain s/p IPG revision  11/20/2023.  She reports that the pocket pain is significantly improved, continues to get better.  She is just now feeling well enough to participate in physical therapy which she has today.  Today she also complains of right knee pain. She had a right TKA on 5/18/2023. .  The patient describes the pain as aching. The pain is constant. Exacerbating factors: bending, cooking, standing for a long time, walking.  Mitigating factors: ice, heat, medications.  The patient takes Tylenol 500 mg PRN with mild benefit.  They deny any perceived side effects.  She is hoping for an additional prescription for Norco 5-325 mg which she had taken over the recovery period from the IPG revision.  She states that the medication helps her with activity.  The symptoms interfere with ADLs.  The patient last had imaging Xray bilateral knees on 10/26/2023. See findings below.  The patient denies fever/night sweats, urinary incontinence, bowel incontinence, significant weight changes, significant motor weakness or changes, or loss of sensations.  Today's pain score is 3/10 for back pain and 7/10 for right knee pain.     RTA 5/18/23, activity restrictions from IPG revision was not able to do PT, starting PT for back and right knee back today.  Wondering if there are any procedures we can do for knee.     Wants Norco 5-325 mg-helps with activity  Pain ext and flex  Ptp superior anterior    Interval History 10/25/2023  69 y/o female with hx of chronic left sided low back pain near IPG, she is s/p Octad electrode x2  placement of pulse generator 3  on 1/9 she recently was provided a recent TPI near left low back she reports 0% relief following.  Pain is worse when standing walking performing ADLs.  She does state she gets 5 hours of uninterrupted sleep.  Like her left low back is weak and aching.  Tingling any wearing her low back.  In the right side of her low back.  To discuss other pain interventions and the plan of care moving forward  regard to her current subacute pain.  She denies any profound weakness denies any bowel bladder dysfunction denies any saddle anesthesia at this time.    Interval History 9/12/2023:  The patient returns to clinic today for follow up of battery site pain via virtual visit. She is s/p trigger point injections under ultrasound on 8/29/2023. She does feel some benefit. She is currently ill with Covid. She is currently running fever and having body aches. She denies any other health changes.     Interval History 8/11/2023:  The patient returns to clinic today for follow up of pain. She reports pain around her SCS battery site. She describes the pain as though her muscles feel weak and tired. She does report benefit with SCS. No difficulty with charging or programming. She has tried Lidoderm and Salonpas patches but these caused a rash. She is currently participating in physical therapy for her knee. She denies any other health changes. Her pain today is 4/10.    Interval History 4/14/2023:  The patient is here for follow up of back pain and pain at IPG site. She reports improvement since previous visit. She did have benefit with Lidoderm patches but had a rash so she stopped. Her pain has improved with Salon Pas patches. She is scheduled for knee replacement next month with Dr. Scott. Her pain today is 3/10.    Interval History 3/21/2023:  The patient presents to discuss pain to IPG site. She says that it has been present since surgery and has gradually worsened. She says that it feels like a tight and weak muscle. She has not tried any topical medications or muscle relaxants. No fever or malaise. She has not noticed any redness or swelling to the site. Her original pain has significantly improved from SCS implant. She is part of a research study. Her pain today is 6/10.    Interval History 2/17/2023:  The patient returns to clinic today for follow up of back pain. She reports benefit with [employer] SCS. She is  in contact with representatives for programming. She is very happy with relief. She denies any fever, chills, or drainage. She continues to follow her activity restrictions. She denies any other health changes. Her pain today is 2/10.    Interval History 1/31/2023:  The patient returns to clinic today for wound check. She reports benefit with [employer] SCS. She is in contact with representatives for programming. She reports intermittent muscle soreness into her legs. She does have an upcoming appointment with representatives for programming. She denies any fever, chills, or drainage. She continues to follow her activity restrictions. She denies any other health changes. Her pain today is 3/10.    Interval History 1/17/2023:  The patient returns to clinic today for post op. She is s/p [employer] SCS implant on 1/9/2022. She reports benefit with the device at this time. She is meeting with the representatives today. She does report soreness to her incisions. She denies any fever, chills, or drainage. She did have issues with her dressing staying on. She has completed her antibiotics. She denies any other health changes. Her pain today is 2/10.    Interval History 12/29/2022:  The patient returns for post op appointment. She is s/p lumbar [employer] SCS trial with 90% relief. She has had very minimal back pain during the trial period. She says that she was more active over this time due to the Christmas holiday and had minimal symptoms. She is very happy with the relief. She would like to move forward with implant. She is part of [place] study. No fever or malaise during trial period. Her pain today is 1/10.    Interval History 10/14/22: Mrs. Norton presents today for follow up of chronic low back pain and to discuss the [employer] trial. She had her lumbar MRI done last night without significant changes to her spine. She did have increased dilation of her bile duct. Her pain today is the same in character and  severity as during her last visit with us and she rates it currently at a 7/10. She continues to do her stretches on her own which have helped some. She presents with some questions regarding the trial.     Interval History 9/21/2022:  The patient returns to clinic today for follow up of back pain via virtual visit. She received a letter from [employer] denying SCS trial. She is frustrated by this. She continues to report low back pain. She denies any radicular leg pain. Her pain is worse with bending and activity. She recently underwent med screening for the [employer] program. She is interested in pursuing this. She has tried Gabapentin and Lyrica in the past with side effects. She denies any other health changes. Her pain today is 7/10.     Interval History 8/22/22: Ms. Norton returns for follow up on her low back pain. At our last visit she felt cured by acupuncture. Unfortunately, this only lasted for 24 hours. She returns today looking for other options to make life livable. Patient claims all of her pain in the low back and middle back. We discussed that this will work best for her low back pain.     Interval History 7/25/22: Tiarra Norton returns for follow up. We previously have been discussing treatments for her low back pain that did not resolve with RFA. At our last visit I referred her to Dr. Antony for clearance for a SCS trial. She was considered to be a reasonable candidate. However, in the meantime, her PT referred her to acupuncture with Dr. Jacobs. She had one treatment and already notes 50% relief. She notes that she still has pain, especially with leaning forward, but feels that it is much better controlled. Their plan is one treatment per week but is approved for 20 sessions.     Interval History 6/15/22: Tiarra Norton returns for follow up. She continues to feel like she has mild pain sitting or laying down, and has her worst pain with extreme leaning forward to pick something up  off the floor. She also has difficulty leaning over her handlebars to ride her bike or leaning forward to fold clothes or standing up for any period of time. So, we determined that leaning forward is her worst issue. We did previouisly do lmbb and rfa, which has helped with extension, but it would not help with leaning forward. On review of her MRI she has significant multilevel DDD with Modic changes which likely account for her pain.     4/18/22 Interval History: Patient presents for telehealth visit for follow up following her b/l RFA at L3-L5. She reports 80% relief and is very pleased with her pain relief. Unfortunately, she is not back to doing what she wants due to right knee pain. She is seeing Dr. Huber for knee pain and is s/p right medial compartment partial knee replacement. She is currently in therapy for this. We discussed that we may be able to assist her with her knee pain as well.     HPI:  Original HISTORY OF PRESENT ILLNESS: Tiarra Norton presents to the clinic for the evaluation of the above pain. The pain started five years ago.     Original Pain Description:  The pain is located in the lower back and does radiate up towards her upper back.The pain is described as aching, dull and sharp. Initially starts as a dull, aching pain and it gets sharp once she bends down and moves around. Typically when she walks for more than five minutes, the sharp pain radiates up her back. Exacerbating factors: Standing, Bending, Touching, Walking, Night Time, Flexing and Lifting. Mitigating factors heat, ice, laying down and massage. Symptoms interfere with daily activity and sleeping. The patient feels like symptoms have been worsening. Patient denies night fever/night sweats, urinary incontinence, bowel incontinence, significant weight loss, significant motor weakness and loss of sensations.    Original PAIN SCORES:  Best: Pain is 1  Worst: Pain is 10  Usually: Pain is 4  Current: Pain is 4         12/17/2024     2:57 PM 10/15/2024     1:05 PM 10/1/2024     2:39 PM   Last 3 PDI Scores   Pain Disability Index (PDI) 42 33 30     6 weeks of Conservative therapy (PT/Chiro/Home Exercises with Dates)  Healthy back program--> has four sessions left for a total of 20 sessions   Last session was on 1/31/22   Stretches that they taught at [employer] back gives her relief       Medications:   Robaxin PRN    Ice and heat which has helped   Tried Gabapentin and Lyrica- made her loopy      Report: reviewed       Interventional Pain Procedures:   10/17/2023 TPI left side near IPG battery 0% relief  2/8/2022- Bilateral L3,4,5 MBB  2/15/2022- Bilateral L3,4,5 MBB  3/8/2022- Right L3,4,5 RFA- 80% relief for a few months  3/22/2022- Left L3,4,5 RFA- 80% relief for a few months  12/22/22 SCS trial- 90% relief  1/9/2023- [employer] SCS implant.   11/20/2023- IPG revision  2/16/2024- Right genicular nerve block  3/15/2024- Right genicular RFA    Imaging:    EXAMINATION:  XR KNEE 3 VIEW RIGHT     CLINICAL HISTORY:  Presence of right artificial knee joint     TECHNIQUE:  AP, lateral, and Merchant views of the right knee were performed.     COMPARISON:  Right knee radiograph dated 06/12/2023     FINDINGS:  Prior right knee arthroplasty.  Hardware projects in similar alignment without evidence for interval loosening or failure.  No periprosthetic fracture.     Impression:     As above.        Electronically signed by: Azael Wilkins  Date:                                            11/03/2023  Time:                                           11:05      10/13/22: MRI LUMBAR SPINE WITHOUT CONTRAST  FINDINGS:  Lumbar levoscoliosis centered at L2.  Minimal anterolisthesis at L4-5.     No compression fractures.  No marrow replacing lesions.     Multilevel degenerative changes with disc desiccation and disc space narrowing, described in detail below.  Discogenic endplate edema at T12-L1.     The conus medullaris has a normal  appearance and terminates at the L1 level.  Cauda equina nerve roots are unremarkable.  No epidural mass or collection.     The common duct is dilated up to 12 mm, previously 9 mm on 08/20/2022 CT.  Mild prominence of the main pancreatic duct up to 4 mm, which is new.  No definite filling defect in the bile ducts.  Paraspinal muscle atrophy.     SIGNIFICANT FINDINGS BY LEVEL:     T12-L1: Disc bulge.  Bilateral facet arthropathy and ligamentum flavum thickening.  Mild canal stenosis.  Mild bilateral foraminal stenosis.     L1-2: Disc bulge.  Bilateral facet arthropathy and ligamentum flavum thickening.  Mild canal stenosis.  Mild right foraminal stenosis.     L2-3: Disc bulge.  Bilateral facet arthropathy and ligamentum flavum thickening.  Mild canal stenosis.  Moderate right mild left foraminal stenosis.     L3-4: Disc bulge.  Bilateral facet arthropathy and ligamentum flavum thickening.  Small bilateral facet joint effusions/synovitis.  Moderate canal stenosis with partial effacement of the thecal sac and crowding of the cauda equina nerve roots.  Mild right and moderate left foraminal stenosis.     L4-5: Disc bulge with posterior disc uncovering.  Bilateral facet arthropathy and ligamentum flavum thickening.  Small right facet joint effusion/synovitis.  Mild canal stenosis.  Mild bilateral foraminal stenosis.     L5-S1: Disc bulge.  Bilateral facet arthropathy and ligamentum flavum thickening.  No significant canal stenosis.  Mild left foraminal stenosis.     Impression:     Lumbar levoscoliosis and multilevel degenerative changes as detailed above.  No significant changes from 01/21/2022.     Increased biliary ductal dilatation up to 12 mm, greater than expected after cholecystectomy.  In addition, the main pancreatic duct is mildly prominent up to 4 mm, which is new.  If LFTs are abnormal, consider MRI/MRCP or ERCP for further evaluation.      Past Medical History:   Diagnosis Date    Cataract     Depression      Gestational diabetes     Hyperlipidemia     Hypertension     IBS (irritable bowel syndrome)     Thyroid disease      Past Surgical History:   Procedure Laterality Date    ADENOIDECTOMY      ARTHROPLASTY, KNEE, TOTAL, USING COMPUTER-ASSISTED NAVIGATION Right 2023    Procedure: CONVERSION ARTHROPLASTY, KNEE, TOTAL, USING COMPUTER-ASSISTED NAVIGATION;  Surgeon: Virgil Scott MD;  Location: [facility] OR;  Service: Orthopedics;  Laterality: Right;  Same day discharge    ARTHROSCOPIC CHONDROPLASTY OF KNEE JOINT Right 10/19/2021    Procedure: ARTHROSCOPY, KNEE, WITH CHONDROPLASTY;  Surgeon: Trevin Huber MD;  Location: [facility] OR;  Service: Orthopedics;  Laterality: Right;    ARTHROSCOPY OF KNEE Right 10/19/2021    Procedure: ARTHROSCOPY, KNEE-RIGHT;  Surgeon: Trevin Huber MD;  Location: [facility] OR;  Service: Orthopedics;  Laterality: Right;    BLOCK, NERVE, GENICULAR Right 2024    Procedure: BLOCK, NERVE RIGHT GENICULAR;  Surgeon: Shoaib Aguirre MD;  Location: [facility] PAIN MGT;  Service: Pain Management;  Laterality: Right;  [phone]     SECTION      CHOLECYSTECTOMY      COLONOSCOPY N/A 2016    Procedure: COLONOSCOPY;  Surgeon: Heri Segura MD;  Location: [facility] (4TH FLR);  Service: Endoscopy;  Laterality: N/A;    COLONOSCOPY N/A 2019    Procedure: COLONOSCOPY;  Surgeon: Joe Pitts MD;  Location: [facility] (4TH FLR);  Service: Endoscopy;  Laterality: N/A;    CYSTOSCOPY  2022    Procedure: CYSTOSCOPY;  Surgeon: Lenny Moore MD;  Location: [address] FLR;  Service: Urology;;    ESOPHAGOGASTRODUODENOSCOPY N/A 2020    Procedure: EGD (ESOPHAGOGASTRODUODENOSCOPY);  Surgeon: Tolu Rivas MD;  Location: [facility] (4TH FLR);  Service: Endoscopy;  Laterality: N/A;  Pt. moved to 9:15am arrival time.. Instructed to not have anything after midnight.EC    EYE SURGERY      cataract resection right    INJECTION OF ANESTHETIC AGENT AROUND NERVE Bilateral 2022     Procedure: Block, Nerve MEDIAL BRANCH BLOCK BILATERAL L3,4,5;  Surgeon: Tara Gibbs MD;  Location: [facility] PAIN MGT;  Service: Pain Management;  Laterality: Bilateral;    INJECTION OF ANESTHETIC AGENT AROUND NERVE Bilateral 2/15/2022    Procedure: BLOCK, NERVE, L3*L4-L5 MEDIAL BR ANCH 2 OF 2;  Surgeon: Tara Gibbs MD;  Location: [facility] PAIN MGT;  Service: Pain Management;  Laterality: Bilateral;    INJECTION OF BOTULINUM TOXIN TYPE A  6/21/2022    Procedure: BOTULINUM TOXIN INJECTION 100 units;  Surgeon: Lenny Moore MD;  Location: [facility] OR 1ST FLR;  Service: Urology;;    INSERTION, NEUROSTIMULATOR, SPINAL CORD N/A 1/9/2023    Procedure: SPINAL CORD STIMULATOR IMPLANT [employer];  Surgeon: Shoaib Aguirre MD;  Location: [facility] OR;  Service: Pain Management;  Laterality: N/A;    JOINT REPLACEMENT      partial right knee    KNEE ARTHROSCOPY W/ MENISCECTOMY Right 10/19/2021    Procedure: ARTHROSCOPY, KNEE, WITH MENISCECTOMY, PARTIAL LATERAL;  Surgeon: Trevin Huber MD;  Location: [facility] OR;  Service: Orthopedics;  Laterality: Right;    r hand surgery      RADIOFREQUENCY ABLATION Right 3/8/2022    Procedure: RADIOFREQUENCY ABLATION, L3-L5 1 OF 2;  Surgeon: Tara Gibbs MD;  Location: [facility] PAIN MGT;  Service: Pain Management;  Laterality: Right;    RADIOFREQUENCY ABLATION Left 3/22/2022    Procedure: RADIOFREQUENCY ABLATION, l3-+l5 2 of 2;  Surgeon: Tara Gibbs MD;  Location: [facility] PAIN MGT;  Service: Pain Management;  Laterality: Left;    RADIOFREQUENCY ABLATION Right 3/15/2024    Procedure: RADIOFREQUENCY ABLATION RIGHT GENICULAR;  Surgeon: Shoaib Aguirre MD;  Location: [facility] PAIN MGT;  Service: Pain Management;  Laterality: Right;  [phone]    REMOVAL OF NEUROSTIMULATOR N/A 11/20/2023    Procedure: SCS IPG BATTERY REVISION;  Surgeon: Shoaib Aguirre MD;  Location: [facility] OR;  Service: Pain Management;  Laterality: N/A;    TONSILLECTOMY      TOTAL KNEE ARTHROPLASTY       partial knee replacement    TRIAL OF SPINAL CORD NERVE STIMULATOR N/A 12/22/2022    Procedure: SPINAL CORD STIMULATOR TRIAL SADIQ CHAPA;  Surgeon: Shoaib Aguirre MD;  Location: [facility];  Service: Pain Management;  Laterality: N/A;    TUBAL LIGATION       Social History     Socioeconomic History    Marital status:     Number of children: 1   Occupational History    Occupation:      Employer: [employer]     Comment: retired   Tobacco Use    Smoking status: Never    Smokeless tobacco: Never   Substance and Sexual Activity    Alcohol use: Yes     Alcohol/week: 3.0 standard drinks of alcohol     Types: 3 Glasses of wine per week     Comment: weekends    Drug use: Yes     Types: Marijuana     Comment: occasionally    Sexual activity: Yes     Partners: Male   Other Topics Concern    Are you pregnant or think you may be? No    Breast-feeding No   Social History Narrative    Retired        Swims.       Stationary bike.            Social Drivers of Health     Financial Resource Strain: Low Risk  (3/21/2024)    Overall Financial Resource Strain (CARDIA)     Difficulty of Paying Living Expenses: Not hard at all   Food Insecurity: No Food Insecurity (3/21/2024)    Hunger Vital Sign     Worried About Running Out of Food in the Last Year: Never true     Ran Out of Food in the Last Year: Never true   Transportation Needs: No Transportation Needs (3/21/2024)    PRAPARE - Transportation     Lack of Transportation (Medical): No     Lack of Transportation (Non-Medical): No   Physical Activity: Sufficiently Active (3/21/2024)    Exercise Vital Sign     Days of Exercise per Week: 4 days     Minutes of Exercise per Session: 40 min   Stress: Stress Concern Present (3/21/2024)    [employer] - Occupational Stress Questionnaire     Feeling of Stress : Rather much   Housing Stability: Patient Declined (3/21/2024)    Housing Stability Vital Sign     Unable to Pay for  Housing in the Last Year: Patient declined     Number of Places Lived in the Last Year: 1     Unstable Housing in the Last Year: Patient declined     Family History   Problem Relation Name Age of Onset    Hypertension Mother      Irritable bowel syndrome Mother      Hyperlipidemia Mother      Heart disease Father          mi    Hypertension Father      Cancer Father          prostate    Heart attack Father      Heart failure Father      Hyperlipidemia Father      Alcohol abuse Sister nathanael     Depression Sister nathanael     Epilepsy Son ari     Irritable bowel syndrome Sister martin     Alcohol abuse Sister martin     Ovarian cancer Maternal Aunt      Breast cancer Paternal Aunt      Breast cancer Maternal Aunt      Melanoma Neg Hx      Colon cancer Neg Hx      Stomach cancer Neg Hx      Esophageal cancer Neg Hx      Liver disease Neg Hx      Crohn's disease Neg Hx      Ulcerative colitis Neg Hx      Celiac disease Neg Hx      Stroke Neg Hx         Review of patient's allergies indicates:  No Known Allergies    Current Outpatient Medications   Medication Sig    alosetron (LOTRONEX) 0.5 MG tablet Take 1 tablet (0.5 mg total) by mouth in the morning and 1 tablet (0.5 mg total) in the evening.    ALPRAZolam (XANAX) 1 MG tablet Take 1 tablet by mouth three times daily    amLODIPine (NORVASC) 10 MG tablet Take 1 tablet (10 mg total) by mouth once daily.    atorvastatin (LIPITOR) 10 MG tablet Take 1 tablet (10 mg total) by mouth once daily.    EScitalopram oxalate (LEXAPRO) 5 MG Tab take 1 tablet by mouth once daily    estradioL (ESTRACE) 0.01 % (0.1 mg/gram) vaginal cream Insert 1 gram as directed vagianlly nightly for 2 to 4 weeks followed by 2-3x/week    HYDROcodone-acetaminophen (NORCO) 5-325 mg per tablet Take 1 tablet by mouth every 12 (twelve) hours as needed for Pain.    levothyroxine (SYNTHROID) 100 MCG tablet TAKE 1 TABLET BY MOUTH EVERY MORNING    mupirocin (BACTROBAN) 2 % ointment Apply topically 2 (two)  times daily.    ondansetron (ZOFRAN) 4 MG tablet Take 1 tablet (4 mg total) by mouth every 8 (eight) hours if needed for nausea.    promethazine (PHENERGAN) 25 MG tablet Take 1 tablet by mouth every 12 hours as needed for nausea    ramelteon (ROZEREM) 8 mg tablet Take 1 tablet (8 mg total) by mouth every evening.    spironolactone (ALDACTONE) 25 MG tablet Take 1 tablet (25 mg total) by mouth once daily. For blood pressure    sumatriptan (IMITREX) 50 MG tablet 1 tab po at start of headache.  Repeat in 2 h prn    tiZANidine (ZANAFLEX) 4 MG tablet Take 1 tablet (4 mg total) by mouth 3 (three) times daily as needed (muscle pain).    traZODone (DESYREL) 100 MG tablet take 1 to 2 tablets by mouth every evening for for sleep    tretinoin (RETIN-A) 0.025 % cream Compound tretinoin 0.025% / azelaic acid 8% / niacinamide 2% cream. Apply a pea-sized amount to entire face qhs.    valACYclovir (VALTREX) 500 MG tablet Take 1 tablet (500 mg total) by mouth 2 (two) times daily. for 7 days    valsartan (DIOVAN) 320 MG tablet Take 1 tablet (320 mg total) by mouth once daily.     Current Facility-Administered Medications   Medication    triamcinolone acetonide injection 40 mg       REVIEW OF SYSTEMS:    GENERAL: No fever. No chills. No fatigue. Denies weight loss. Denies weight gain.  HEENT: Denies headaches. Denies vision change. Denies eye pain. Denies double vision. Denies ear pain.   CV: Denies chest pain. HTN  PULM: Denies of shortness of breath.  GI: Denies constipation. No diarrhea. No abdominal pain. Denies nausea. Denies vomiting. No blood in stool.  HEME: Denies bleeding problems.  : Denies urgency. No painful urination. No blood in urine.  MS: See HPI  SKIN: Denies rash.   NEURO: Denies seizures. No weakness.  ENDO: Thyroid disorder.   PSYCH:  Depression    OBJECTIVE:     Exam limited due to virtual visit:  GENERAL: Well appearing, in no acute distress, alert and oriented x3.  PSYCH:  Mood and affect  appropriate.    Previous physical exam:  There were no vitals filed for this visit.      PHYSICAL EXAM:     GENERAL: Well appearing, in no acute distress, alert and oriented x3.  PSYCH:  Mood and affect appropriate.  SKIN: Skin color, texture, turgor normal, no rashes or lesions. SCS site well healed.   RESP: Symmetric chest rise, no respiratory distress noted.   BACK: Straight leg raise in the sitting position is negative for radicular pain.   MSK: 5/5 strength in right ankle with plantar and dorsiflexion. 5/5 strength in left ankle with plantar and dorsiflexion. 5/5 strength with right knee flexion and extension. 5/5 strength with left knee flexion and extension.   GAIT: Normal.         ASSESSMENT: 69 y.o. year old female with low back and knee pain, consistent with the followin. Chronic pain syndrome        2. Spinal cord stimulator status        3. Malfunction of spinal cord stimulator, subsequent encounter        4. Lumbar spondylosis        5. Degeneration of intervertebral disc of lumbar region with discogenic back pain        6. Other idiopathic scoliosis, thoracolumbar region              PLAN:     - Previous imaging reviewed. Labs reviewed.     - She is scheduled for SCS battery revision next week.      - Continue home exercise routine.     - Flexeril 10 mg TID PRN.     - Pain contract signed 2024.    - Continue Norco 5/325 mg BID PRN, #60. She does not need a refill at this time.      - The patient is here today for a refill of current pain medications and they believe these provide effective pain control and improvements in quality of life.  The patient notes no serious side effects, and feels the benefits outweigh the risks.  The patient was reminded of the pain contract that they signed previously as well as the risks and benefits of the medication including possible death.  The updated [place] Board of Pharmacy prescription monitoring program was reviewed, and the patient has been found  to be compliant with current treatment plan. Medication management provided by Dr. Aguirre.     - UDS from 1/23/2024. Repeat next in person visit.     -RTC after above procedure.    The above plan and management options were discussed at length with patient. Patient is in agreement with the above and verbalized understanding.       Marlene Thorne NP  01/23/2025

## 2025-01-24 ENCOUNTER — PATIENT MESSAGE (OUTPATIENT)
Dept: PAIN MEDICINE | Facility: CLINIC | Age: 70
End: 2025-01-24
Payer: MEDICARE

## 2025-01-24 ENCOUNTER — ANESTHESIA EVENT (OUTPATIENT)
Dept: SURGERY | Facility: OTHER | Age: 70
End: 2025-01-24
Payer: MEDICARE

## 2025-01-24 ENCOUNTER — PATIENT MESSAGE (OUTPATIENT)
Dept: PREADMISSION TESTING | Facility: OTHER | Age: 70
End: 2025-01-24
Payer: MEDICARE

## 2025-01-24 NOTE — PRE ADMISSION SCREENING
Phone complete  Information to Prepare you for your Surgery    PRE-ADMIT TESTING & SURGERY  [address] [facility] BUILDING  ENTRANCE 2       Your surgery has been scheduled at [facility]. We are pleased to have the opportunity to serve you. For Further Information please call [phone].    On the day of surgery please report to the Registration Desk on the 1st floor of the [facility]. This is a 4 story red brick building on the corner of [facility] [address] [facility]. Turn onto [address]  to access the parking lot behind the [facility] at the corner of [facility].    CONTACT YOUR PHYSICIAN'S OFFICE THE DAY PRIOR TO YOUR SURGERY TO OBTAIN YOUR ARRIVAL TIME.     The evening before surgery do not eat anything after 9 p.m. ( this includes hard candy, chewing gum and mints).  You may only have GATORADE, POWERADE AND WATER  from 9 p.m. until you leave your home.   DO NOT DRINK ANY LIQUIDS ON THE WAY TO THE HOSPITAL.      Why does your anesthesiologist allow you to drink Gatorade/Powerade before surgery?  Gatorade/Powerade helps to increase your comfort before surgery and to decrease your nausea after surgery.   The carbohydrates in Gatorade/Powerade help reduce your body's stress response to surgery.    If you are a diabetic-drink only water prior to surgery.    Outpatient Surgery- May allow 2 adults (18 and older)/ Support Persons (1 being the designated ) for all surgical/procedural patients. A breastfeeding mother will be allowed her infant and 2 adult Support Persons. No one under the age of 18 will be allowed in the building.    MEDICATION INSTRUCTIONS:   DO NOT TAKE AM OF SURGERY:  Spironolactone  Valsartan (please bring with you day of surgery)    PLEASE TAKE AM OF SURGERY:  Amlodipine  Levothyroxine      Surgery Patients:  If you take ASPIRIN - Your PHYSICIAN/SURGEON will need to inform you IF/OR when you need to stop taking aspirin prior to your surgery.      Starting the week prior to surgery, do not take any medications containing IBUPROFEN or NSAIDS (Advil, Aleve, BC, Goody's, Ketorolac, Meloxicam, Mobic, Motrin, Naproxen, Toradol, etc).  If you are not sure if you should take a medicine, please call your surgeon's office.  You may take Tylenol.    Do Not Wear any make-up (especially eye make-up) to surgery. Please remove any false eyelashes or eyelash extensions. If you arrive the day of surgery with makeup/eyelashes on you will be required to remove prior to surgery. (There is a risk of corneal abrasions if eye makeup/eyelash extensions are not removed)    Leave all valuables at home.   Do Not wear any jewelry or watches, including any metal in body piercings. Jewelry must be removed prior to coming to the hospital.  There is a possibility that rings that are unable to be removed may be cut off if they are on the surgical extremity.    Please remove all hair extensions, wigs, clips and any other metal accessories/ ornaments from your hair.  These items may pose a flammable/fire risk in Surgery and must be removed.    Do not shave your surgical area at least 5 days prior to your surgery. The surgical prep will be performed at the hospital according to Infection Control regulations.    Contact Lens must be removed before surgery. Either do not wear the contact lens or bring a case and solution for storage.  Please bring a container for eyeglasses or dentures as required.  Bring any paperwork your physician has provided, such as consent forms,  history and physicals, doctor's orders, etc.   Bring comfortable clothes that are loose fitting to wear upon discharge. Take into consideration the type of surgery being performed.  Maintain your diet as advised per your physician the day prior to surgery.    Adequate rest the night before surgery is advised.   Park in the Parking lot behind the hospital or in the [employer] [facility] across the street from the parking lot.  Parking is complimentary.  If you will be discharged the same day as your procedure, please arrange for a responsible adult to drive you home or to accompany you if traveling by taxi.   YOU WILL NOT BE PERMITTED TO DRIVE OR TO LEAVE THE HOSPITAL ALONE AFTER SURGERY.   If you are being discharged the same day, it is strongly recommended that you arrange for someone to remain with you for the first 24 hrs following your surgery.    The Surgeon will speak to your family/visitor after your surgery regarding the outcome of your surgery and post op care.  The Surgeon may speak to you after your surgery, but there is a possibility you may not remember the details.  Please check with your family members regarding the conversation with the Surgeon.    We strongly recommend whoever is bringing you home be present for discharge instructions.  This will ensure a thorough understanding for your post op home care.    If the patient has fever, cough, or signs/symptoms of Flu or Covid please do not come in for your surgery.   Contact your surgeon and your primary care physician for further instructions.               Bathing Instructions  Shower (no bath) the evening before and the morning of your procedure with antibacterial soap.  Wash your face with water and your regular face wash/soap  Use your regular shampoo  Dry off as usual Do not use any deodorant, powder, body lotions, perfume, after shave or cologne.     Thanks,  JERMAIN Manrique

## 2025-01-25 DIAGNOSIS — Z96.89 SPINAL CORD STIMULATOR STATUS: ICD-10-CM

## 2025-01-25 DIAGNOSIS — Z96.651 HISTORY OF TOTAL KNEE ARTHROPLASTY, RIGHT: ICD-10-CM

## 2025-01-25 DIAGNOSIS — M25.561 CHRONIC PAIN OF RIGHT KNEE: ICD-10-CM

## 2025-01-25 DIAGNOSIS — M41.25 OTHER IDIOPATHIC SCOLIOSIS, THORACOLUMBAR REGION: ICD-10-CM

## 2025-01-25 DIAGNOSIS — G89.29 CHRONIC PAIN OF RIGHT KNEE: ICD-10-CM

## 2025-01-25 DIAGNOSIS — G89.4 CHRONIC PAIN SYNDROME: ICD-10-CM

## 2025-01-25 DIAGNOSIS — M51.369 DDD (DEGENERATIVE DISC DISEASE), LUMBAR: ICD-10-CM

## 2025-01-27 ENCOUNTER — DOCUMENTATION ONLY (OUTPATIENT)
Dept: RESEARCH | Facility: HOSPITAL | Age: 70
End: 2025-01-27
Payer: MEDICARE

## 2025-01-27 ENCOUNTER — PATIENT MESSAGE (OUTPATIENT)
Dept: PAIN MEDICINE | Facility: CLINIC | Age: 70
End: 2025-01-27

## 2025-01-27 ENCOUNTER — ANESTHESIA (OUTPATIENT)
Dept: SURGERY | Facility: OTHER | Age: 70
End: 2025-01-27
Payer: MEDICARE

## 2025-01-27 ENCOUNTER — HOSPITAL ENCOUNTER (OUTPATIENT)
Facility: OTHER | Age: 70
Discharge: HOME | End: 2025-01-27
Attending: ANESTHESIOLOGY | Admitting: ANESTHESIOLOGY
Payer: MEDICARE

## 2025-01-27 DIAGNOSIS — Z45.42 BATTERY END OF LIFE OF SPINAL CORD STIMULATOR: Primary | ICD-10-CM

## 2025-01-27 DIAGNOSIS — G89.29 CHRONIC PAIN: ICD-10-CM

## 2025-01-27 PROCEDURE — 25000003 PHARM REV CODE 250: Performed by: ANESTHESIOLOGY

## 2025-01-27 PROCEDURE — 63600175 PHARM REV CODE 636 W HCPCS: Performed by: ANESTHESIOLOGY

## 2025-01-27 PROCEDURE — 36000706: Performed by: ANESTHESIOLOGY

## 2025-01-27 PROCEDURE — D9220A PRA ANESTHESIA: Mod: ANES,,, | Performed by: ANESTHESIOLOGY

## 2025-01-27 PROCEDURE — 25000003 PHARM REV CODE 250: Performed by: NURSE ANESTHETIST, CERTIFIED REGISTERED

## 2025-01-27 PROCEDURE — 71000039 HC RECOVERY, EACH ADD'L HOUR: Performed by: ANESTHESIOLOGY

## 2025-01-27 PROCEDURE — 63600175 PHARM REV CODE 636 W HCPCS: Performed by: NURSE ANESTHETIST, CERTIFIED REGISTERED

## 2025-01-27 PROCEDURE — 71000015 HC POSTOP RECOV 1ST HR: Performed by: ANESTHESIOLOGY

## 2025-01-27 PROCEDURE — 37000008 HC ANESTHESIA 1ST 15 MINUTES: Performed by: ANESTHESIOLOGY

## 2025-01-27 PROCEDURE — 27201423 OPTIME MED/SURG SUP & DEVICES STERILE SUPPLY: Performed by: ANESTHESIOLOGY

## 2025-01-27 PROCEDURE — 37000009 HC ANESTHESIA EA ADD 15 MINS: Performed by: ANESTHESIOLOGY

## 2025-01-27 PROCEDURE — 63600175 PHARM REV CODE 636 W HCPCS: Mod: TB | Performed by: ANESTHESIOLOGY

## 2025-01-27 PROCEDURE — 71000033 HC RECOVERY, INTIAL HOUR: Performed by: ANESTHESIOLOGY

## 2025-01-27 PROCEDURE — 64585 REV/RMV PERPH NSTIM ELTRD RA: CPT | Mod: ,,, | Performed by: ANESTHESIOLOGY

## 2025-01-27 PROCEDURE — 64595 REV/RMV PRPH SAC/GSTR NPG/R: CPT | Mod: ,,, | Performed by: ANESTHESIOLOGY

## 2025-01-27 PROCEDURE — 63600175 PHARM REV CODE 636 W HCPCS: Performed by: STUDENT IN AN ORGANIZED HEALTH CARE EDUCATION/TRAINING PROGRAM

## 2025-01-27 PROCEDURE — D9220A PRA ANESTHESIA: Mod: CRNA,,, | Performed by: NURSE ANESTHETIST, CERTIFIED REGISTERED

## 2025-01-27 PROCEDURE — 36000707: Performed by: ANESTHESIOLOGY

## 2025-01-27 RX ORDER — FENTANYL CITRATE 50 UG/ML
INJECTION, SOLUTION INTRAMUSCULAR; INTRAVENOUS
Status: DISCONTINUED | OUTPATIENT
Start: 2025-01-27 | End: 2025-01-27

## 2025-01-27 RX ORDER — CEFAZOLIN 2 G/1
2 INJECTION, POWDER, FOR SOLUTION INTRAMUSCULAR; INTRAVENOUS
Status: COMPLETED | OUTPATIENT
Start: 2025-01-27 | End: 2025-01-27

## 2025-01-27 RX ORDER — MEPERIDINE HYDROCHLORIDE 25 MG/ML
12.5 INJECTION INTRAMUSCULAR; INTRAVENOUS; SUBCUTANEOUS ONCE AS NEEDED
Status: DISCONTINUED | OUTPATIENT
Start: 2025-01-27 | End: 2025-01-27 | Stop reason: HOSPADM

## 2025-01-27 RX ORDER — EPHEDRINE SULFATE 50 MG/ML
INJECTION, SOLUTION INTRAVENOUS
Status: DISCONTINUED | OUTPATIENT
Start: 2025-01-27 | End: 2025-01-27

## 2025-01-27 RX ORDER — GLUCAGON 1 MG
1 KIT INJECTION
Status: DISCONTINUED | OUTPATIENT
Start: 2025-01-27 | End: 2025-01-27 | Stop reason: HOSPADM

## 2025-01-27 RX ORDER — HYDROMORPHONE HYDROCHLORIDE 2 MG/ML
0.4 INJECTION, SOLUTION INTRAMUSCULAR; INTRAVENOUS; SUBCUTANEOUS EVERY 5 MIN PRN
Status: DISCONTINUED | OUTPATIENT
Start: 2025-01-27 | End: 2025-01-27 | Stop reason: HOSPADM

## 2025-01-27 RX ORDER — PROCHLORPERAZINE EDISYLATE 5 MG/ML
5 INJECTION INTRAMUSCULAR; INTRAVENOUS EVERY 30 MIN PRN
Status: DISCONTINUED | OUTPATIENT
Start: 2025-01-27 | End: 2025-01-27 | Stop reason: HOSPADM

## 2025-01-27 RX ORDER — BUPIVACAINE HYDROCHLORIDE 2.5 MG/ML
INJECTION, SOLUTION EPIDURAL; INFILTRATION; INTRACAUDAL
Status: DISCONTINUED | OUTPATIENT
Start: 2025-01-27 | End: 2025-01-27 | Stop reason: HOSPADM

## 2025-01-27 RX ORDER — LIDOCAINE HYDROCHLORIDE 20 MG/ML
INJECTION INTRAVENOUS
Status: DISCONTINUED | OUTPATIENT
Start: 2025-01-27 | End: 2025-01-27

## 2025-01-27 RX ORDER — ROCURONIUM BROMIDE 10 MG/ML
INJECTION, SOLUTION INTRAVENOUS
Status: DISCONTINUED | OUTPATIENT
Start: 2025-01-27 | End: 2025-01-27

## 2025-01-27 RX ORDER — ONDANSETRON HYDROCHLORIDE 2 MG/ML
INJECTION, SOLUTION INTRAVENOUS
Status: DISCONTINUED | OUTPATIENT
Start: 2025-01-27 | End: 2025-01-27

## 2025-01-27 RX ORDER — DIPHENHYDRAMINE HYDROCHLORIDE 50 MG/ML
25 INJECTION INTRAMUSCULAR; INTRAVENOUS EVERY 6 HOURS PRN
Status: DISCONTINUED | OUTPATIENT
Start: 2025-01-27 | End: 2025-01-27 | Stop reason: HOSPADM

## 2025-01-27 RX ORDER — HYDROCODONE BITARTRATE AND ACETAMINOPHEN 5; 325 MG/1; MG/1
1 TABLET ORAL EVERY 12 HOURS PRN
Qty: 60 TABLET | Refills: 0 | Status: SHIPPED | OUTPATIENT
Start: 2025-01-27 | End: 2025-02-26

## 2025-01-27 RX ORDER — SODIUM CHLORIDE 9 MG/ML
INJECTION, SOLUTION INTRAVENOUS CONTINUOUS
Status: DISCONTINUED | OUTPATIENT
Start: 2025-01-27 | End: 2025-01-27 | Stop reason: HOSPADM

## 2025-01-27 RX ORDER — BACITRACIN ZINC 500 UNIT/G
OINTMENT (GRAM) TOPICAL
Status: DISCONTINUED | OUTPATIENT
Start: 2025-01-27 | End: 2025-01-27 | Stop reason: HOSPADM

## 2025-01-27 RX ORDER — VANCOMYCIN HYDROCHLORIDE 1 G/20ML
INJECTION, POWDER, LYOPHILIZED, FOR SOLUTION INTRAVENOUS
Status: DISCONTINUED | OUTPATIENT
Start: 2025-01-27 | End: 2025-01-27 | Stop reason: HOSPADM

## 2025-01-27 RX ORDER — OXYCODONE HYDROCHLORIDE 5 MG/1
5 TABLET ORAL
Status: DISCONTINUED | OUTPATIENT
Start: 2025-01-27 | End: 2025-01-27 | Stop reason: HOSPADM

## 2025-01-27 RX ORDER — LIDOCAINE HYDROCHLORIDE AND EPINEPHRINE 10; 10 UG/ML; MG/ML
INJECTION, SOLUTION INFILTRATION; PERINEURAL
Status: DISCONTINUED | OUTPATIENT
Start: 2025-01-27 | End: 2025-01-27 | Stop reason: HOSPADM

## 2025-01-27 RX ORDER — DEXAMETHASONE SODIUM PHOSPHATE 4 MG/ML
INJECTION, SOLUTION INTRA-ARTICULAR; INTRALESIONAL; INTRAMUSCULAR; INTRAVENOUS; SOFT TISSUE
Status: DISCONTINUED | OUTPATIENT
Start: 2025-01-27 | End: 2025-01-27

## 2025-01-27 RX ORDER — SODIUM CHLORIDE 0.9 % (FLUSH) 0.9 %
3 SYRINGE (ML) INJECTION
Status: DISCONTINUED | OUTPATIENT
Start: 2025-01-27 | End: 2025-01-27 | Stop reason: HOSPADM

## 2025-01-27 RX ORDER — PROPOFOL 10 MG/ML
VIAL (ML) INTRAVENOUS
Status: DISCONTINUED | OUTPATIENT
Start: 2025-01-27 | End: 2025-01-27

## 2025-01-27 RX ORDER — SODIUM CHLORIDE, SODIUM LACTATE, POTASSIUM CHLORIDE, CALCIUM CHLORIDE 600; 310; 30; 20 MG/100ML; MG/100ML; MG/100ML; MG/100ML
INJECTION, SOLUTION INTRAVENOUS CONTINUOUS
Status: DISCONTINUED | OUTPATIENT
Start: 2025-01-27 | End: 2025-01-27 | Stop reason: HOSPADM

## 2025-01-27 RX ADMIN — FENTANYL CITRATE 100 MCG: 50 INJECTION, SOLUTION INTRAMUSCULAR; INTRAVENOUS at 11:01

## 2025-01-27 RX ADMIN — SODIUM CHLORIDE, SODIUM LACTATE, POTASSIUM CHLORIDE, AND CALCIUM CHLORIDE: 600; 310; 30; 20 INJECTION, SOLUTION INTRAVENOUS at 10:01

## 2025-01-27 RX ADMIN — HYDROMORPHONE HYDROCHLORIDE 0.4 MG: 2 INJECTION INTRAMUSCULAR; INTRAVENOUS; SUBCUTANEOUS at 12:01

## 2025-01-27 RX ADMIN — SUGAMMADEX 200 MG: 100 INJECTION, SOLUTION INTRAVENOUS at 12:01

## 2025-01-27 RX ADMIN — ROCURONIUM BROMIDE 50 MG: 10 SOLUTION INTRAVENOUS at 11:01

## 2025-01-27 RX ADMIN — PROPOFOL 150 MG: 10 INJECTION, EMULSION INTRAVENOUS at 11:01

## 2025-01-27 RX ADMIN — DEXAMETHASONE SODIUM PHOSPHATE 4 MG: 4 INJECTION, SOLUTION INTRAMUSCULAR; INTRAVENOUS at 11:01

## 2025-01-27 RX ADMIN — ONDANSETRON HYDROCHLORIDE 4 MG: 2 INJECTION INTRAMUSCULAR; INTRAVENOUS at 11:01

## 2025-01-27 RX ADMIN — EPHEDRINE SULFATE 10 MG: 50 INJECTION INTRAVENOUS at 11:01

## 2025-01-27 RX ADMIN — CEFAZOLIN 2 G: 2 INJECTION, POWDER, FOR SOLUTION INTRAMUSCULAR; INTRAVENOUS at 11:01

## 2025-01-27 RX ADMIN — OXYCODONE 5 MG: 5 TABLET ORAL at 12:01

## 2025-01-27 RX ADMIN — LIDOCAINE HYDROCHLORIDE 75 MG: 20 INJECTION, SOLUTION INTRAVENOUS at 11:01

## 2025-01-27 NOTE — OP NOTE
PROCEDURE DATE: 1/27/2025    PROCEDURE:   1. Peripheral Nerve Stimulator IPG movement of pocket    Pre-op diagnosis:  1. Battery pocket pain  2. Spinal cord Stimulator malfunction    Post-op diagnosis: Same    Surgeon: Shoaib Aguirre MD    Assistant: Juliano Dupree MD  [facility] Fellow       Anesthesia: General    Estimated Blood Loss: Minimal- <10ml    Urine Output: Not Measured    IV Fluids- See Anesthesia record    Complications: none     ABX: 2G Cefazolin IV    CONSENT: The risks, benefits, and options were discussed thoroughly with the patient. The patient's questions were answered. The patient understands the risk and benefits and wishes to proceed. Informed consent was obtained.     Technique:  Site of the implantable pulse generator was marked on the patients left side in the preoperative area. The patient was taken to the OR and placed in the prone position after induction and intubation.  The Patient's back was prepped with Duraprep and Chloraprep and then draped in usual sterile fashion. Local anesthetic was used at the IPG site with Bupivicaine 0.25% with epi and Lidocaine 1% Using a No. 10 scalpel, an incision was made over the left flank scar from previous IPG placement and dissection carried out with blunt dissection. The battery was accessed and removed from pocket still attached to leads. The leads were removed from the battery. A new pocket site was identified under fluoroscopy over the left buttock. This was localized with the same combination of local. Using a No. 10 scalpel a new incision was made down to the level of jonathan's fascia and a new pocket site was created over the left buttock. The new pocket site fit the battery well and leads were tunneled from the old pocket site to the new pocket site. The leads were reconnected to the battery.     Copious bulb lavage was irrigated throughout the field and new pocket, and hemostasis was maintained. Vanc powder was placed into both pockets.  The battery was then placed in the new pocket.      Then the wounds were closed with simple interrupted sutures using 2-0 Vicryl sutures in two layers and skin closed with 4-0 Monocryl. Bacitracin and steri strips were placed over the incision sites and dressings applied. Sponge and needle counts were correct x2 at conclusion of the case. Fluoro images obtained of new pocket.     Patient was then placed supine on the stretcher and transferred to the recovery room. Patient was programmed by the representative of the SCS. Patient was instructed not to perform any abrupt movements with the back, avoid bending, twisting, or lifting >10lbs. Thee patient has been scheduled to return to the clinic in approx 5-7 days. The patient tolerated the procedure well and remained hemodynamically stable and neurologically intact post operatively.     Juliano Dupree M.D.  PGY-5  Interventional Pain Management Fellow  [facility]  Pager: [phone]      I reviewed and edited the resident/fellow's note. I conducted my own interview and physical examination. I agree with the findings and documentation. I was present and supervising all critical portions of the procedure.      Shoaib Aguirre MD

## 2025-01-27 NOTE — TELEPHONE ENCOUNTER
Patient requesting refill on: Hydrocodone  Last office visit:01.23.25   shows last refill on: 01.09.25  Patient does have a pain contract on file with [facility] department  Patient last UDS: 01.23.24 was not consistent with current therapy.         ODEINE  Not Detected Not Detected    Comment: INTERPRETIVE INFORMATION: Codeine, U  Positive Cutoff: 40 ng/mL  Methodology: Mass Spectrometry   MORPHINE  Not Detected Not Detected    Comment: INTERPRETIVE INFORMATION:Morphine, U  Positive Cutoff: 20 ng/mL  Methodology: Mass Spectrometry   6-ACETYLMORPHINE  Not Detected Not Detected    Comment: INTERPRETIVE INFORMATION:6-acetylmorphine, U  Positive Cutoff: 20 ng/mL  Methodology: Mass Spectrometry   OXYCODONE  Not Detected Not Detected    Comment: INTERPRETIVE INFORMATION:Oxycodone, U  Positive Cutoff: 40 ng/mL  Methodology: Mass Spectrometry   NOROYXCODONE  Not Detected Not Detected    Comment: INTERPRETIVE INFORMATION:Noroxycodone, U  Positive Cutoff: 100 ng/mL  Methodology: Mass Spectrometry   OXYMORPHONE  Not Detected Not Detected    Comment: INTERPRETIVE INFORMATION:Oxymorphone, U  Positive Cutoff: 40 ng/mL  Methodology: Mass Spectrometry   NOROXYMORPHONE  Not Detected Not Detected    Comment: INTERPRETIVE INFORMATION:Noroxymorphone, U  Positive Cutoff: 100 ng/mL  Methodology: Mass Spectrometry   HYDROCODONE  Not Detected Not Detected    Comment: INTERPRETIVE INFORMATION:Hydrocodone, U  Positive Cutoff: 40 ng/mL  Methodology: Mass Spectrometry   NORHYDROCODONE  Not Detected Not Detected    Comment: INTERPRETIVE INFORMATION:Norhydrocodone, U  Positive Cutoff: 100 ng/mL  Methodology: Mass Spectrometry   HYDROMORPHONE  Not Detected Not Detected    Comment: INTERPRETIVE INFORMATION:Hydromorphone, U  Positive Cutoff: 20 ng/mL  Methodology: Mass Spectrometry   BUPRENORPHINE  Not Detected Not Detected    Comment: INTERPRETIVE INFORMATION:Buprenorphine, U  Positive Cutoff: 5 ng/mL  Methodology: Mass  Spectrometry   NORUBPRENORPHINE  Not Detected Not Detected    Comment: INTERPRETIVE INFORMATION:Norbuprenorphine, U  Positive Cutoff: 20 ng/mL  Methodology: Mass Spectrometry   FENTANYL  Not Detected Not Detected    Comment: INTERPRETIVE INFORMATION:Fentanyl, U  Positive Cutoff: 2 ng/mL  Methodology: Mass Spectrometry   NORFENTANYL  Not Detected Not Detected    Comment: INTERPRETIVE INFORMATION:Norfentanyl, U  Positive Cutoff: 2 ng/mL  Methodology: Mass Spectrometry   MEPERIDINE METABOLITE  Not Detected Not Detected    Comment: INTERPRETIVE INFORMATION:Meperidine metabolite, U  Positive Cutoff: 50 ng/mL  Methodology: Mass Spectrometry   TAPENTADOL  Not Detected Not Detected    Comment: INTERPRETIVE INFORMATION:Tapentadol, U  Positive Cutoff: 100 ng/mL  Methodology: Mass Spectrometry   TAPENTADOL-O-SULF  Not Detected Not Detected    Comment: INTERPRETIVE INFORMATION:Tapentadol-o-Sulf, U  Positive Cutoff: 200 ng/mL  Methodology: Mass Spectrometry   METHADONE  Negative Not Detected    Comment: Presumptive negative by immunoassay. Testing by mass  spectrometry is available on request.  INTERPRETIVE INFORMATION: Methadone Screen, U  Positive Cutoff: 150 ng/mL  Methodology: Immunoassay   TRAMADOL  Negative Not Detected    Comment: Presumptive negative by immunoassay. Testing by mass  spectrometry is available on request.  INTERPRETIVE INFORMATION:Tramadol Screen, U  Positive Cutoff: 100 ng/mL  Methodology: Immunoassay   AMPHETAMINE  Not Detected Not Detected    Comment: INTERPRETIVE INFORMATION:Amphetamine, U  Positive Cutoff: 50 ng/mL  Methodology: Mass Spectrometry   METHAMPHETAMINE  Not Detected Not Detected    Comment: INTERPRETIVE INFORMATION:Methamphetamine, U  Positive Cutoff: 200 ng/mL  Methodology: Mass Spectrometry   MDMA- ECSTASY  Not Detected Not Detected    Comment: INTERPRETIVE INFORMATION:MDMA, U  Positive Cutoff: 200 ng/mL  Methodology: Mass Spectrometry   MDA  Not Detected Not Detected    Comment:  INTERPRETIVE INFORMATION:MDA, U  Positive Cutoff: 200 ng/mL  Methodology: Mass Spectrometry   MDEA- Amber  Not Detected Not Detected    Comment: INTERPRETIVE INFORMATION:MDEA, U  Positive Cutoff: 200 ng/mL  Methodology: Mass Spectrometry   METHYLPHENIDATE  Not Detected Present    Comment: INTERPRETIVE INFORMATION:Methylphenidate, U  Positive Cutoff: 100 ng/mL  Methodology: Mass Spectrometry   PHENTERMINE  Not Detected Not Detected    Comment: INTERPRETIVE INFORMATION:Phentermine, U  Positive Cutoff: 100 ng/mL  Methodology: Mass Spectrometry   BENZOYLECGONINE  Negative Not Detected    Comment: Presumptive negative by immunoassay. Testing by mass  spectrometry is available on request.  INTERPRETIVE INFORMATION:Cocaine Screen, U  Positive Cutoff: 150 ng/mL  Methodology: Immunoassay   ALPRAZOLAM  Present Present    Comment: INTERPRETIVE INFORMATION:Alprazolam, U  Positive Cutoff: 40 ng/mL  Methodology: Mass Spectrometry   ALPHA-OH-ALPRAZOLAM  Present Present    Comment: INTERPRETIVE INFORMATION:Alpha-OH-Alprazolam, U  Positive Cutoff: 20 ng/mL  Methodology: Mass Spectrometry   CLONAZEPAM  Not Detected Not Detected    Comment: INTERPRETIVE INFORMATION:Clonazepam, U  Positive Cutoff: 20 ng/mL  Methodology: Mass Spectrometry   7-AMINOCLONAZEPAM  Not Detected Not Detected    Comment: INTERPRETIVE INFORMATION:7-Aminoclonazepam, U  Positive Cutoff: 40 ng/mL  Methodology: Mass Spectrometry   DIAZEPAM  Not Detected Not Detected    Comment: INTERPRETIVE INFORMATION:Diazepam, U  Positive Cutoff: 50 ng/mL  Methodology: Mass Spectrometry   NORDIAZEPAM  Not Detected Not Detected    Comment: INTERPRETIVE INFORMATION:Nordiazepam, U  Positive Cutoff: 50 ng/mL  Methodology: Mass Spectrometry   OXAZEPAM  Not Detected Not Detected    Comment: INTERPRETIVE INFORMATION:Oxazepam, U  Positive Cutoff: 50 ng/mL  Methodology: Mass Spectrometry   TEMAZEPAM  Not Detected Not Detected    Comment: INTERPRETIVE INFORMATION:Temazepam, U  Positive  Cutoff: 50 ng/mL  Methodology: Mass Spectrometry   Lorazepam  Not Detected Not Detected    Comment: INTERPRETIVE INFORMATION:Lorazepam, U  Positive Cutoff: 60 ng/mL  Methodology: Mass Spectrometry   MIDAZOLAM  Not Detected Not Detected    Comment: INTERPRETIVE INFORMATION:Midazolam, U  Positive Cutoff: 20 ng/mL  Methodology: Mass Spectrometry   ZOLPIDEM  Not Detected Not Detected    Comment: INTERPRETIVE INFORMATION:Zolpidem, U  Positive Cutoff: 20 ng/mL  Methodology: Mass Spectrometry   BARBITURATES  Negative Not Detected    Comment: Presumptive negative by immunoassay. Testing by mass  spectrometry is available on request.  INTERPRETIVE INFORMATION:Barbiturates Screen, U  Positive Cutoff: 200 ng/mL  Methodology: Immunoassay   Creatinine, Urine 20.0 - 400.0 mg/dL 40.6 58.6 190.0 R, CM   ETHYL GLUCURONIDE  Negative Present    Comment: Presumptive negative by immunoassay. Testing by mass  spectrometry is available on request.  INTERPRETIVE INFORMATION:Ethyl Glucuronide Screen, U  Positive Cutoff: 500 ng/mL  Methodology: Immunoassay   MARIJUANA METABOLITE  PresumptivePOS Not Detected    Comment: Presumptive positive by immunoassay. Testing by mass  spectrometry is available on request.  INTERPRETIVE INFORMATION: THC (Cannabinoids) Screen, U  Positive Cutoff: 50 ng/mL  Methodology: Immunoassay   PCP  Negative Not Detected    Comment: Presumptive negative by immunoassay. Testing by mass  spectrometry is available on request.  INTERPRETIVE INFORMATION:Phencyclidine Screen, U  Positive Cutoff: 25 ng/mL  Methodology: Immunoassay   CARISOPRODOL  Negative Not Detected CM    Comment: Presumptive negative by immunoassay. Testing by mass  spectrometry is available on request.  INTERPRETIVE INFORMATION: Carisoprodol Screen, U  Positive Cutoff: 100 ng/mL  Methodology: Immunoassay  The carisoprodol immunoassay has cross-reactivity to  carisoprodol and meprobamate.   Naloxone  Not Detected Not Detected    Comment: INTERPRETIVE  INFORMATION:Naloxone, U  Positive Cutoff: 100 ng/mL  Methodology: Mass Spectrometry   Gabapentin  Not Detected Present    Comment: INTERPRETIVE INFORMATION:Gabapentin, U  Positive Cutoff: 3,000 ng/mL  Methodology: Mass Spectrometry   Pregabalin  Not Detected Not Detected    Comment: INTERPRETIVE INFORMATION:Pregabalin, U  Positive Cutoff: 3,000 ng/mL  Methodology: Mass Spectrometry   Alpha-OH-Midazolam  Not Detected Not Detected    Comment: INTERPRETIVE INFORMATION:Alpha-OH-Midazolam, U  Positive Cutoff: 20 ng/mL  Methodology: Mass Spectrometry   Zolpidem Metabolite  Not Detected Not Detected    Comment: INTERPRETIVE INFORMATION:Zolpidem Metabolite, U  Positive Cutoff: 100 ng/mL  Methodology: Mass Spectrometry

## 2025-01-27 NOTE — DISCHARGE INSTRUCTIONS
Patient was instructed not to perform any abrupt movements with the back, avoid bending, twisting, or lifting >10lbs. Thee patient has been scheduled to return to the clinic in approx 5-7 days     Keep dressing on until post op visit with Dr Aguirre.

## 2025-01-27 NOTE — ANESTHESIA POSTPROCEDURE EVALUATION
Anesthesia Post Evaluation    Patient: Tiarra Norton    Procedure(s) Performed: Procedure(s) (LRB):  SPINAL CORD STIMULATOR BATTERY REVISION [place] REP (N/A)    Final Anesthesia Type: general      Patient location during evaluation: PACU  Patient participation: Yes- Able to Participate  Level of consciousness: awake and alert  Post-procedure vital signs: reviewed and stable  Pain management: adequate  Airway patency: patent    PONV status at discharge: No PONV  Anesthetic complications: no      Cardiovascular status: blood pressure returned to baseline  Respiratory status: unassisted  Hydration status: euvolemic  Follow-up not needed.              Vitals Value Taken Time   /61 01/27/25 1315   Temp 36.7 °C (98 °F) 01/27/25 1315   Pulse 68 01/27/25 1315   Resp 18 01/27/25 1315   SpO2 96 % 01/27/25 1315         Event Time   Out of Recovery 13:11:40         Pain/Arianne Score: Pain Rating Prior to Med Admin: 7 (1/27/2025 12:25 PM)  Pain Rating Post Med Admin: 3 (1/27/2025 12:32 PM)  Arianne Score: 10 (1/27/2025  1:15 PM)

## 2025-01-27 NOTE — DISCHARGE SUMMARY
Discharge Note  Short Stay      SUMMARY     Admit Date: 1/27/2025    Attending Physician: Shoaib Aguirre MD    Discharge Physician: Shoaib Aguirre MD      Discharge Date: 1/27/2025 11:04 AM    Procedure(s) (LRB):  SPINAL CORD STIMULATOR BATTERY REVISION [place] REP (N/A)    Final Diagnosis: Chronic pain syndrome [G89.4]  Malfunction of spinal cord stimulator, subsequent encounter [T85.192D]    Disposition: Home or self care    Patient Instructions:   Current Discharge Medication List        CONTINUE these medications which have NOT CHANGED    Details   ALPRAZolam (XANAX) 1 MG tablet Take 1 tablet by mouth three times daily  Qty: 90 tablet, Refills: 2      amLODIPine (NORVASC) 10 MG tablet Take 1 tablet (10 mg total) by mouth once daily.  Qty: 30 tablet, Refills: 5    Comments: .  Associated Diagnoses: Hypertension, unspecified type      atorvastatin (LIPITOR) 10 MG tablet Take 1 tablet (10 mg total) by mouth once daily.  Qty: 90 tablet, Refills: 3      EScitalopram oxalate (LEXAPRO) 5 MG Tab take 1 tablet by mouth once daily  Qty: 90 tablet, Refills: 1      estradioL (ESTRACE) 0.01 % (0.1 mg/gram) vaginal cream Insert 1 gram as directed vagianlly nightly for 2 to 4 weeks followed by 2-3x/week  Qty: 42.5 g, Refills: 6    Associated Diagnoses: Vaginal dryness, menopausal      HYDROcodone-acetaminophen (NORCO) 5-325 mg per tablet Take 1 tablet by mouth every 12 (twelve) hours as needed for Pain.  Qty: 60 tablet, Refills: 0    Comments: Quantity prescribed more than 7 day supply? Yes, quantity medically necessary  Associated Diagnoses: Chronic pain syndrome; Chronic pain of right knee; History of total knee arthroplasty, right; Other idiopathic scoliosis, thoracolumbar region; DDD (degenerative disc disease), lumbar; Spinal cord stimulator status      levothyroxine (SYNTHROID) 100 MCG tablet TAKE 1 TABLET BY MOUTH EVERY MORNING  Qty: 90 tablet, Refills: 3      mupirocin (BACTROBAN) 2 % ointment Apply topically 2  (two) times daily.  Qty: 15 g, Refills: 1      ondansetron (ZOFRAN) 4 MG tablet Take 1 tablet (4 mg total) by mouth every 8 (eight) hours if needed for nausea.  Qty: 20 tablet, Refills: 0      promethazine (PHENERGAN) 25 MG tablet Take 1 tablet by mouth every 12 hours as needed for nausea  Qty: 30 tablet, Refills: 2    Associated Diagnoses: Nausea      ramelteon (ROZEREM) 8 mg tablet Take 1 tablet (8 mg total) by mouth every evening.  Qty: 30 tablet, Refills: 5    Associated Diagnoses: Insomnia, unspecified type      spironolactone (ALDACTONE) 25 MG tablet Take 1 tablet (25 mg total) by mouth once daily. For blood pressure  Qty: 90 tablet, Refills: 2    Comments: .  Associated Diagnoses: Hypertension, unspecified type      tiZANidine (ZANAFLEX) 4 MG tablet Take 1 tablet (4 mg total) by mouth 3 (three) times daily as needed (muscle pain).  Qty: 90 tablet, Refills: 0    Associated Diagnoses: Myofascial pain      traZODone (DESYREL) 100 MG tablet take 1 to 2 tablets by mouth every evening for for sleep  Qty: 180 tablet, Refills: 3    Associated Diagnoses: Insomnia, unspecified type      tretinoin (RETIN-A) 0.025 % cream Compound tretinoin 0.025% / azelaic acid 8% / niacinamide 2% cream. Apply a pea-sized amount to entire face qhs.  Qty: 30 g, Refills: 5    Associated Diagnoses: Actinic elastosis      valsartan (DIOVAN) 320 MG tablet Take 1 tablet (320 mg total) by mouth once daily.  Qty: 90 tablet, Refills: 3    Comments: .      alosetron (LOTRONEX) 0.5 MG tablet Take 1 tablet (0.5 mg total) by mouth in the morning and 1 tablet (0.5 mg total) in the evening.  Qty: 60 tablet, Refills: 0      sumatriptan (IMITREX) 50 MG tablet 1 tab po at start of headache.  Repeat in 2 h prn  Qty: 36 tablet, Refills: 3      valACYclovir (VALTREX) 500 MG tablet Take 1 tablet (500 mg total) by mouth 2 (two) times daily. for 7 days  Qty: 14 tablet, Refills: 0                 Discharge Diagnosis: Chronic pain syndrome [G89.4]  Malfunction  of spinal cord stimulator, subsequent encounter [T85.192D]  Condition on Discharge: Stable with no complications to procedure   Diet on Discharge: Same as before.  Activity: as per instruction sheet.  Discharge to: Home with a responsible adult.  Follow up: 2-4 weeks       Please call my office or pager at [phone] if experienced any weakness or loss of sensation, fever > 101.5, pain uncontrolled with oral medications, persistent nausea/vomiting/or diarrhea, redness or drainage from the incisions, or any other worrisome concerns. If physician on call was not reached or could not communicate with our office for any reason please go to the nearest emergency department     Juliano Dupree M.D.  PGY-5  Interventional Pain Management Fellow  [facility]  Pager: [phone]

## 2025-01-27 NOTE — ANESTHESIA PROCEDURE NOTES
Intubation    Date/Time: 1/27/2025 11:10 AM    Performed by: Yajaira Rossi CRNA  Authorized by: Moe Moreno MD    Intubation:     Induction:  Intravenous    Intubated:  Postinduction    Mask Ventilation:  Easy mask    Attempts:  1    Attempted By:  CRNA    Method of Intubation:  Video laryngoscopy    Blade:  Ingram 3    Laryngeal View Grade: Grade I - full view of cords      Difficult Airway Encountered?: No      Complications:  None    Airway Device:  Oral endotracheal tube    Airway Device Size:  7.0    Style/Cuff Inflation:  Cuffed (inflated to minimal occlusive pressure)    Tube secured:  21    Secured at:  The lips    Placement Verified By:  Capnometry    Complicating Factors:  None    Findings Post-Intubation:  BS equal bilateral and atraumatic/condition of teeth unchanged

## 2025-01-27 NOTE — TRANSFER OF CARE
"Anesthesia Transfer of Care Note    Patient: Tiarra Norton    Procedure(s) Performed: Procedure(s) (LRB):  SPINAL CORD STIMULATOR BATTERY REVISION [place] REP (N/A)    Patient location: PACU    Anesthesia Type: general    Transport from OR: Transported from OR on 2-3 L/min O2 by NC with adequate spontaneous ventilation    Post pain: adequate analgesia    Post assessment: no apparent anesthetic complications and tolerated procedure well    Post vital signs: stable    Level of consciousness: awake, alert and oriented    Nausea/Vomiting: no nausea/vomiting    Complications: none    Transfer of care protocol was followed      Last vitals: Visit Vitals  /62 (BP Location: Right arm)   Pulse 79   Temp 36.1 °C (97 °F)   Resp 16   Ht 5' 3" (1.6 m)   Wt 68 kg (150 lb)   SpO2 98%   Breastfeeding No   BMI 26.57 kg/m²     "

## 2025-01-27 NOTE — ANESTHESIA PREPROCEDURE EVALUATION
01/27/2025  Tiarra Norton is a 69 y.o., female.      Pre-op Assessment    I have reviewed the Patient Summary Reports.     I have reviewed the Nursing Notes. I have reviewed the NPO Status.   I have reviewed the Medications.     Review of Systems  Anesthesia Hx:             Denies Family Hx of Anesthesia complications.    Denies Personal Hx of Anesthesia complications.                    Social:  Non-Smoker       Hematology/Oncology:  Hematology Normal   Oncology Normal                                   EENT/Dental:  EENT/Dental Normal           Cardiovascular:     Hypertension                                          Pulmonary:        Sleep Apnea                Renal/:  Renal/ Normal                 Hepatic/GI:      Liver Disease,  IBS             Musculoskeletal:  Arthritis               Neurological:      Headaches                                 Endocrine:   Hypothyroidism          Dermatological:  Skin Normal    Psych:  Psychiatric History                  Physical Exam  General: Well nourished, Cooperative, Alert and Oriented    Airway:  Mallampati: II   Mouth Opening: Normal  TM Distance: Normal  Tongue: Normal  Neck ROM: Normal ROM    Dental:  Intact        Anesthesia Plan  Type of Anesthesia, risks & benefits discussed:    Anesthesia Type: Gen ETT  Intra-op Monitoring Plan: Standard ASA Monitors  Post Op Pain Control Plan: multimodal analgesia  Induction:  IV  Informed Consent: Informed consent signed with the Patient and all parties understand the risks and agree with anesthesia plan.  All questions answered.   ASA Score: 3  Anesthesia Plan Notes: Here for same 11/23-    Ready For Surgery From Anesthesia Perspective.     .

## 2025-01-28 ENCOUNTER — PATIENT MESSAGE (OUTPATIENT)
Dept: PAIN MEDICINE | Facility: CLINIC | Age: 70
End: 2025-01-28
Payer: MEDICARE

## 2025-01-28 ENCOUNTER — TELEPHONE (OUTPATIENT)
Dept: PAIN MEDICINE | Facility: CLINIC | Age: 70
End: 2025-01-28
Payer: MEDICARE

## 2025-01-28 ENCOUNTER — PATIENT MESSAGE (OUTPATIENT)
Dept: DERMATOLOGY | Facility: CLINIC | Age: 70
End: 2025-01-28
Payer: MEDICARE

## 2025-01-28 VITALS
TEMPERATURE: 98 F | BODY MASS INDEX: 26.58 KG/M2 | HEIGHT: 63 IN | WEIGHT: 150 LBS | RESPIRATION RATE: 18 BRPM | OXYGEN SATURATION: 96 % | DIASTOLIC BLOOD PRESSURE: 59 MMHG | SYSTOLIC BLOOD PRESSURE: 105 MMHG | HEART RATE: 65 BPM

## 2025-01-28 RX ORDER — HYDROCODONE BITARTRATE AND ACETAMINOPHEN 5; 325 MG/1; MG/1
1 TABLET ORAL EVERY 8 HOURS PRN
Qty: 14 TABLET | Refills: 0 | Status: SHIPPED | OUTPATIENT
Start: 2025-01-28 | End: 2025-01-31 | Stop reason: SDUPTHER

## 2025-01-28 RX ORDER — DOXYCYCLINE HYCLATE 100 MG
100 TABLET ORAL 2 TIMES DAILY
Qty: 10 TABLET | Refills: 0 | Status: SHIPPED | OUTPATIENT
Start: 2025-01-28 | End: 2025-02-02

## 2025-01-29 NOTE — PROGRESS NOTES
Study: [employer] BRUNO Study: A Randomized Controlled Study to Evaluate the Safety and Effectiveness of "[employer] Spinal Cord Stimulation (SCS) Systems in the Treatment of Chronic Low Back and/or Leg Pain with No Prior Surgeries  IRB/Protocol #: [id number]/  : Obed Gasca MD  Treating Physician: Shoaib Roche MD  Site Number: [id number]        Subject Number: G011     SCS Permanent Implant/Revision:  The subject came to [facility] and met with Gerard Thorne ([facility] [place]for SCS permanent implant revision.  There were no changes in concomitant medications,no protocol deviations or adverse events to report.

## 2025-01-31 ENCOUNTER — TELEPHONE (OUTPATIENT)
Dept: RESEARCH | Facility: HOSPITAL | Age: 70
End: 2025-01-31
Payer: MEDICARE

## 2025-01-31 DIAGNOSIS — T85.192D MALFUNCTION OF SPINAL CORD STIMULATOR, SUBSEQUENT ENCOUNTER: Primary | ICD-10-CM

## 2025-01-31 RX ORDER — HYDROCODONE BITARTRATE AND ACETAMINOPHEN 5; 325 MG/1; MG/1
1 TABLET ORAL EVERY 6 HOURS PRN
Qty: 10 TABLET | Refills: 0 | Status: SHIPPED | OUTPATIENT
Start: 2025-01-31

## 2025-01-31 NOTE — TELEPHONE ENCOUNTER
Contacted and spoke with the patient at the request of the [facility]  Gerard. The patient reported experiencing 0/10 pain at the originally treated site but acknowledged some expected post-operative pain.

## 2025-02-01 ENCOUNTER — PATIENT MESSAGE (OUTPATIENT)
Dept: INTERNAL MEDICINE | Facility: CLINIC | Age: 70
End: 2025-02-01
Payer: MEDICARE

## 2025-02-03 ENCOUNTER — RESEARCH ENCOUNTER (OUTPATIENT)
Dept: RESEARCH | Facility: OTHER | Age: 70
End: 2025-02-03

## 2025-02-03 ENCOUNTER — OFFICE VISIT (OUTPATIENT)
Dept: PAIN MEDICINE | Facility: CLINIC | Age: 70
End: 2025-02-03
Payer: MEDICARE

## 2025-02-03 VITALS
HEART RATE: 75 BPM | BODY MASS INDEX: 26.57 KG/M2 | TEMPERATURE: 98 F | SYSTOLIC BLOOD PRESSURE: 107 MMHG | HEIGHT: 63 IN | RESPIRATION RATE: 18 BRPM | DIASTOLIC BLOOD PRESSURE: 56 MMHG | WEIGHT: 149.94 LBS | OXYGEN SATURATION: 100 %

## 2025-02-03 DIAGNOSIS — G89.4 CHRONIC PAIN SYNDROME: Primary | ICD-10-CM

## 2025-02-03 DIAGNOSIS — T85.192D MALFUNCTION OF SPINAL CORD STIMULATOR, SUBSEQUENT ENCOUNTER: ICD-10-CM

## 2025-02-03 DIAGNOSIS — Z96.89 SPINAL CORD STIMULATOR STATUS: ICD-10-CM

## 2025-02-03 PROCEDURE — 99215 OFFICE O/P EST HI 40 MIN: CPT | Mod: PBBFAC | Performed by: ANESTHESIOLOGY

## 2025-02-03 PROCEDURE — 99024 POSTOP FOLLOW-UP VISIT: CPT | Mod: S$PBB,,, | Performed by: ANESTHESIOLOGY

## 2025-02-03 PROCEDURE — 99999 PR PBB SHADOW E&M-EST. PATIENT-LVL V: CPT | Mod: PBBFAC,,, | Performed by: ANESTHESIOLOGY

## 2025-02-03 NOTE — PROGRESS NOTES
Chronic Pain-Established Visit  SUBJECTIVE:    Interval History 2/3/2025:  The patient is seen today for follow-up of back pain and postoperative evaluation after spinal cord stimulator (SCS) battery site revision. The revision was performed due to migration of the battery into the lower buttock. She reports that the new battery site feels much better, and she is optimistic that she will be pain-free once the surgical site fully heals. Previous imaging and lab results were reviewed.    Interval History 12/2/2024:  Ms. Mayen returns to clinic today for follow up of back pain via virtual visit. She reports worsened pain around her SCS battery site. She is having difficulty sleeping due to the pain. On a recent vacation, she had to sleep in a recliner. She also has pain with sitting on a airplane and in the car. She does have benefit with the device. She is interested in moving forward with battery revision. She is taking Norco as needed with benefit and without adverse effects. She denies any other health changes.     Interval History 10/15/2024:  The patient returns to clinic today for follow up of back pain. She is s/p TPI around SCS battery site on 10/1/2024. She reports one day of relief. She continues to report pocket pain around her SCS site. She did meet with [employer] SCS representative for programming. This has been beneficial. She also did dry needling with benefit. She does not wish to pursue battery revision at this time. She continues to take Norco as needed with benefit. She denies any adverse effects. She denies any other health changes. Her pain today is 6/10.    Interval History 8/19/2024:  The patient returns to clinic today for follow up of back pain. She continues to report pocket pain around her SCS site. She cannot lie flat on her back due to pain. She also notes pain with prolonged standing and activity. She does have benefit with SCS. She reports intermittent right knee pain. She is  currently taking Norco as needed with some benefit. She is performing  home exercises. She denies any other health changes. Her pain today is 8/10.    Interval History 6/24/2024:  The patient returns to clinic today for follow up of back and knee pain via virtual visit. She continues to report low back pain. This is worse with prolonged standing. She denies any radicular leg pain. She did recently meet with [employer] representatives for programming. She reports limited relief with these changes. She is participating in physical therapy. She is taking Norco as needed with benefit. She denies any adverse effects. She denies any other health changes.     Interval History 3/25/2024:  The patient returns to clinic today for follow up of back and knee pain. She is s/p right genicular RFA on 3/15/2024. She reports 50% relief. She continues to report some right knee pain.She continues to report low back pain. She is using her SCS. She does have intermittent pain around IPG site. She is participating in physical therapy. She is taking Norco as needed with benefit. She denies any adverse effects. She denies any other health changes. Her pain today is 4/10.    Interval History 1/23/2024:  Tiarra Norton presents for folow-up for lower back pain s/p IPG revision 11/20/2023.  She reports that the pocket pain is significantly improved, continues to get better.  She is just now feeling well enough to participate in physical therapy which she has today.  Today she also complains of right knee pain. She had a right TKA on 5/18/2023. .  The patient describes the pain as aching. The pain is constant. Exacerbating factors: bending, cooking, standing for a long time, walking.  Mitigating factors: ice, heat, medications.  The patient takes Tylenol 500 mg PRN with mild benefit.  They deny any perceived side effects.  She is hoping for an additional prescription for Norco 5-325 mg which she had taken over the recovery period  from the IPG revision.  She states that the medication helps her with activity.  The symptoms interfere with ADLs.  The patient last had imaging Xray bilateral knees on 10/26/2023. See findings below.  The patient denies fever/night sweats, urinary incontinence, bowel incontinence, significant weight changes, significant motor weakness or changes, or loss of sensations.  Today's pain score is 3/10 for back pain and 7/10 for right knee pain.     RTA 5/18/23, activity restrictions from IPG revision was not able to do PT, starting PT for back and right knee back today.  Wondering if there are any procedures we can do for knee.     Wants Norco 5-325 mg-helps with activity  Pain ext and flex  Ptp superior anterior    Interval History 10/25/2023  69 y/o female with hx of chronic left sided low back pain near IPG, she is s/p Octad electrode x2  placement of pulse generator 3  on 1/9 she recently was provided a recent TPI near left low back she reports 0% relief following.  Pain is worse when standing walking performing ADLs.  She does state she gets 5 hours of uninterrupted sleep.  Like her left low back is weak and aching.  Tingling any wearing her low back.  In the right side of her low back.  To discuss other pain interventions and the plan of care moving forward regard to her current subacute pain.  She denies any profound weakness denies any bowel bladder dysfunction denies any saddle anesthesia at this time.    Interval History 9/12/2023:  The patient returns to clinic today for follow up of battery site pain via virtual visit. She is s/p trigger point injections under ultrasound on 8/29/2023. She does feel some benefit. She is currently ill with Covid. She is currently running fever and having body aches. She denies any other health changes.     Interval History 8/11/2023:  The patient returns to clinic today for follow up of pain. She reports pain around her SCS battery site. She describes the pain as though her  muscles feel weak and tired. She does report benefit with SCS. No difficulty with charging or programming. She has tried Lidoderm and Salonpas patches but these caused a rash. She is currently participating in physical therapy for her knee. She denies any other health changes. Her pain today is 4/10.    Interval History 4/14/2023:  The patient is here for follow up of back pain and pain at IPG site. She reports improvement since previous visit. She did have benefit with Lidoderm patches but had a rash so she stopped. Her pain has improved with Salon Pas patches. She is scheduled for knee replacement next month with Dr. Scott. Her pain today is 3/10.    Interval History 3/21/2023:  The patient presents to discuss pain to IPG site. She says that it has been present since surgery and has gradually worsened. She says that it feels like a tight and weak muscle. She has not tried any topical medications or muscle relaxants. No fever or malaise. She has not noticed any redness or swelling to the site. Her original pain has significantly improved from SCS implant. She is part of a research study. Her pain today is 6/10.    Interval History 2/17/2023:  The patient returns to clinic today for follow up of back pain. She reports benefit with [employer] SCS. She is in contact with representatives for programming. She is very happy with relief. She denies any fever, chills, or drainage. She continues to follow her activity restrictions. She denies any other health changes. Her pain today is 2/10.    Interval History 1/31/2023:  The patient returns to clinic today for wound check. She reports benefit with [employer] SCS. She is in contact with representatives for programming. She reports intermittent muscle soreness into her legs. She does have an upcoming appointment with representatives for [employer]. She denies any fever, chills, or drainage. She continues to follow her activity restrictions. She denies any  other health changes. Her pain today is 3/10.    Interval History 1/17/2023:  The patient returns to clinic today for post op. She is s/p [employer] SCS implant on 1/9/2022. She reports benefit with the device at this time. She is meeting with the representatives today. She does report soreness to her incisions. She denies any fever, chills, or drainage. She did have issues with her dressing staying on. She has completed her antibiotics. She denies any other health changes. Her pain today is 2/10.    Interval History 12/29/2022:  The patient returns for post op appointment. She is s/p lumbar [employer] SCS trial with 90% relief. She has had very minimal back pain during the trial period. She says that she was more active over this time due to the Christmas holiday and had minimal symptoms. She is very happy with the relief. She would like to move forward with implant. She is part of [employer] study. No fever or malaise during trial period. Her pain today is 1/10.    Interval History 10/14/22: Mrs. Norton presents today for follow up of chronic low back pain and to discuss the [employer] trial. She had her lumbar MRI done last night without significant changes to her spine. She did have increased dilation of her bile duct. Her pain today is the same in character and severity as during her last visit with us and she rates it currently at a 7/10. She continues to do her stretches on her own which have helped some. She presents with some questions regarding the trial.     Interval History 9/21/2022:  The patient returns to clinic today for follow up of back pain via virtual visit. She received a letter from [employer] denying SCS trial. She is frustrated by this. She continues to report low back pain. She denies any radicular leg pain. Her pain is worse with bending and activity. She recently underwent med screening for the [employer] program. She is interested in pursuing this. She has tried Gabapentin and Lyrica in  the past with side effects. She denies any other health changes. Her pain today is 7/10.     Interval History 8/22/22: Ms. Norton returns for follow up on her low back pain. At our last visit she felt cured by acupuncture. Unfortunately, this only lasted for 24 hours. She returns today looking for other options to make life livable. Patient claims all of her pain in the low back and middle back. We discussed that this will work best for her low back pain.     Interval History 7/25/22: Tiarra Norton returns for follow up. We previously have been discussing treatments for her low back pain that did not resolve with RFA. At our last visit I referred her to Dr. Antony for clearance for a SCS trial. She was considered to be a reasonable candidate. However, in the meantime, her PT referred her to acupuncture with Dr. Jacobs. She had one treatment and already notes 50% relief. She notes that she still has pain, especially with leaning forward, but feels that it is much better controlled. Their plan is one treatment per week but is approved for 20 sessions.     Interval History 6/15/22: Tiarra Norton returns for follow up. She continues to feel like she has mild pain sitting or laying down, and has her worst pain with extreme leaning forward to pick something up off the floor. She also has difficulty leaning over her handlebars to ride her bike or leaning forward to fold clothes or standing up for any period of time. So, we determined that leaning forward is her worst issue. We did previouisly do lmbb and rfa, which has helped with extension, but it would not help with leaning forward. On review of her MRI she has significant multilevel DDD with Modic changes which likely account for her pain.     4/18/22 Interval History: Patient presents for telehealth visit for follow up following her b/l RFA at L3-L5. She reports 80% relief and is very pleased with her pain relief. Unfortunately, she is not back to doing what she  wants due to right knee pain. She is seeing Dr. Huber for knee pain and is s/p right medial compartment partial knee replacement. She is currently in therapy for this. We discussed that we may be able to assist her with her knee pain as well.     HPI:  Original HISTORY OF PRESENT ILLNESS: Tiarra Norton presents to the clinic for the evaluation of the above pain. The pain started five years ago.     Original Pain Description:  The pain is located in the lower back and does radiate up towards her upper back.The pain is described as aching, dull and sharp. Initially starts as a dull, aching pain and it gets sharp once she bends down and moves around. Typically when she walks for more than five minutes, the sharp pain radiates up her back. Exacerbating factors: Standing, Bending, Touching, Walking, Night Time, Flexing and Lifting. Mitigating factors heat, ice, laying down and massage. Symptoms interfere with daily activity and sleeping. The patient feels like symptoms have been worsening. Patient denies night fever/night sweats, urinary incontinence, bowel incontinence, significant weight loss, significant motor weakness and loss of sensations.    Original PAIN SCORES:  Best: Pain is 1  Worst: Pain is 10  Usually: Pain is 4  Current: Pain is 4        2/3/2025     1:05 PM 12/17/2024     2:57 PM 10/15/2024     1:05 PM   Last 3 PDI Scores   Pain Disability Index (PDI) [phone]     6 weeks of Conservative therapy (PT/Chiro/Home Exercises with Dates)  Healthy back program--> has four sessions left for a total of 20 sessions   Last session was on 1/31/22   Stretches that they taught at [place] gives her relief       Medications:   Robaxin PRN    Ice and heat which has helped   Tried Gabapentin and Lyrica- made her loopy      Report: reviewed       Interventional Pain Procedures:   10/17/2023 TPI left side near IPG battery 0% relief  2/8/2022- Bilateral L3,4,5 MBB  2/15/2022- Bilateral L3,4,5 MBB  3/8/2022-  Right L3,4,5 RFA- 80% relief for a few months  3/22/2022- Left L3,4,5 RFA- 80% relief for a few months  12/22/22 SCS trial- 90% relief  1/9/2023- [employer] SCS implant.   11/20/2023- IPG revision  2/16/2024- Right genicular nerve block  3/15/2024- Right genicular RFA    Imaging:    EXAMINATION:  XR KNEE 3 VIEW RIGHT     CLINICAL HISTORY:  Presence of right artificial knee joint     TECHNIQUE:  AP, lateral, and Merchant views of the right knee were performed.     COMPARISON:  Right knee radiograph dated 06/12/2023     FINDINGS:  Prior right knee arthroplasty.  Hardware projects in similar alignment without evidence for interval loosening or failure.  No periprosthetic fracture.     Impression:     As above.        Electronically signed by: Azael Wilkins  Date:                                            11/03/2023  Time:                                           11:05      10/13/22: MRI LUMBAR SPINE WITHOUT CONTRAST  FINDINGS:  Lumbar levoscoliosis centered at L2.  Minimal anterolisthesis at L4-5.     No compression fractures.  No marrow replacing lesions.     Multilevel degenerative changes with disc desiccation and disc space narrowing, described in detail below.  Discogenic endplate edema at T12-L1.     The conus medullaris has a normal appearance and terminates at the L1 level.  Cauda equina nerve roots are unremarkable.  No epidural mass or collection.     The common duct is dilated up to 12 mm, previously 9 mm on 08/20/2022 CT.  Mild prominence of the main pancreatic duct up to 4 mm, which is new.  No definite filling defect in the bile ducts.  Paraspinal muscle atrophy.     SIGNIFICANT FINDINGS BY LEVEL:     T12-L1: Disc bulge.  Bilateral facet arthropathy and ligamentum flavum thickening.  Mild canal stenosis.  Mild bilateral foraminal stenosis.     L1-2: Disc bulge.  Bilateral facet arthropathy and ligamentum flavum thickening.  Mild canal stenosis.  Mild right foraminal stenosis.     L2-3: Disc  bulge.  Bilateral facet arthropathy and ligamentum flavum thickening.  Mild canal stenosis.  Moderate right mild left foraminal stenosis.     L3-4: Disc bulge.  Bilateral facet arthropathy and ligamentum flavum thickening.  Small bilateral facet joint effusions/synovitis.  Moderate canal stenosis with partial effacement of the thecal sac and crowding of the cauda equina nerve roots.  Mild right and moderate left foraminal stenosis.     L4-5: Disc bulge with posterior disc uncovering.  Bilateral facet arthropathy and ligamentum flavum thickening.  Small right facet joint effusion/synovitis.  Mild canal stenosis.  Mild bilateral foraminal stenosis.     L5-S1: Disc bulge.  Bilateral facet arthropathy and ligamentum flavum thickening.  No significant canal stenosis.  Mild left foraminal stenosis.     Impression:     Lumbar levoscoliosis and multilevel degenerative changes as detailed above.  No significant changes from 01/21/2022.     Increased biliary ductal dilatation up to 12 mm, greater than expected after cholecystectomy.  In addition, the main pancreatic duct is mildly prominent up to 4 mm, which is new.  If LFTs are abnormal, consider MRI/MRCP or ERCP for further evaluation.      Past Medical History:   Diagnosis Date    Cataract     Depression     Gestational diabetes     Hyperlipidemia     Hypertension     IBS (irritable bowel syndrome)     HERMINIA (obstructive sleep apnea)     no cpap use    Thyroid disease      Past Surgical History:   Procedure Laterality Date    ADENOIDECTOMY      ARTHROPLASTY, KNEE, TOTAL, USING COMPUTER-ASSISTED NAVIGATION Right 5/18/2023    Procedure: CONVERSION ARTHROPLASTY, KNEE, TOTAL, USING COMPUTER-ASSISTED NAVIGATION;  Surgeon: Virgil Scott MD;  Location: [facility];  Service: Orthopedics;  Laterality: Right;  Same day discharge    ARTHROSCOPIC CHONDROPLASTY OF KNEE JOINT Right 10/19/2021    Procedure: ARTHROSCOPY, KNEE, WITH CHONDROPLASTY;  Surgeon: Trevin Huber MD;  Location:  [facility] OR;  Service: Orthopedics;  Laterality: Right;    ARTHROSCOPY OF KNEE Right 10/19/2021    Procedure: ARTHROSCOPY, KNEE-RIGHT;  Surgeon: Trevin Huber MD;  Location: [facility] OR;  Service: Orthopedics;  Laterality: Right;    BLOCK, NERVE, GENICULAR Right 2024    Procedure: BLOCK, NERVE RIGHT GENICULAR;  Surgeon: Shoaib Aguirre MD;  Location: [facility] PAIN MGT;  Service: Pain Management;  Laterality: Right;  [phone]     SECTION      CHOLECYSTECTOMY      COLONOSCOPY N/A 2016    Procedure: COLONOSCOPY;  Surgeon: Heri Segura MD;  Location: [facility] ENDO (4TH FLR);  Service: Endoscopy;  Laterality: N/A;    COLONOSCOPY N/A 2019    Procedure: COLONOSCOPY;  Surgeon: Joe Pitts MD;  [facility]  Service: Endoscopy;  Laterality: N/A;    CYSTOSCOPY  2022    Procedure: CYSTOSCOPY;  Surgeon: Lenny Moore MD;  Location: [address];  Service: Urology;;    ESOPHAGOGASTRODUODENOSCOPY N/A 2020    Procedure: EGD (ESOPHAGOGASTRODUODENOSCOPY);  Surgeon: Tolu Rivas MD;  [facility]  Service: Endoscopy;  Laterality: N/A;  Pt. moved to 9:15am arrival time.. Instructed to not have anything after midnight.EC    EYE SURGERY      cataract resection right    INJECTION OF ANESTHETIC AGENT AROUND NERVE Bilateral 2022    Procedure: Block, Nerve MEDIAL BRANCH BLOCK BILATERAL L3,4,5;  Surgeon: Tara Gibbs MD;  Location: [facility] PAIN MGT;  Service: Pain Management;  Laterality: Bilateral;    INJECTION OF ANESTHETIC AGENT AROUND NERVE Bilateral 2/15/2022    Procedure: BLOCK, NERVE, L3*L4-L5 MEDIAL BR ANCH 2 OF 2;  Surgeon: Tara Gibbs MD;  Location: [facility] PAIN MGT;  Service: Pain Management;  Laterality: Bilateral;    INJECTION OF BOTULINUM TOXIN TYPE A  2022    Procedure: BOTULINUM TOXIN INJECTION 100 units;  Surgeon: Lenny Moore MD;  [facility];  Service: Urology;;    INSERTION, NEUROSTIMULATOR, SPINAL  CORD N/A 1/9/2023    Procedure: SPINAL CORD STIMULATOR IMPLANT BOSTON BRUNO RESEARCH;  Surgeon: Shoaib Aguirre MD;  Location: [facility] OR;  Service: Pain Management;  Laterality: N/A;    JOINT REPLACEMENT      partial right knee    KNEE ARTHROSCOPY W/ MENISCECTOMY Right 10/19/2021    Procedure: ARTHROSCOPY, KNEE, WITH MENISCECTOMY, PARTIAL LATERAL;  Surgeon: Trevin Huber MD;  Location: [facility] OR;  Service: Orthopedics;  Laterality: Right;    r hand surgery      RADIOFREQUENCY ABLATION Right 3/8/2022    Procedure: RADIOFREQUENCY ABLATION, L3-L5 1 OF 2;  Surgeon: Tara Gibbs MD;  Location: [facility] PAIN MGT;  Service: Pain Management;  Laterality: Right;    RADIOFREQUENCY ABLATION Left 3/22/2022    Procedure: RADIOFREQUENCY ABLATION, l3-+l5 2 of 2;  Surgeon: Tara Gibbs MD;  Location: [facility] PAIN MGT;  Service: Pain Management;  Laterality: Left;    RADIOFREQUENCY ABLATION Right 3/15/2024    Procedure: RADIOFREQUENCY ABLATION RIGHT GENICULAR;  Surgeon: Shoaib Aguirre MD;  Location: [facility] PAIN MGT;  Service: Pain Management;  Laterality: Right;  [phone]    REMOVAL OF NEUROSTIMULATOR N/A 11/20/2023    Procedure: SCS IPG BATTERY REVISION;  Surgeon: Shoaib Aguirre MD;  Location: [facility] OR;  Service: Pain Management;  Laterality: N/A;    REVISION PROCEDURE INVOLVING SPINAL CORD NEUROSTIMULATOR N/A 1/27/2025    Procedure: SPINAL CORD STIMULATOR BATTERY REVISION [facility];  Surgeon: Shoaib Aguirre MD;  Location: [facility] OR;  Service: Pain Management;  Laterality: N/A;    TONSILLECTOMY      TOTAL KNEE ARTHROPLASTY      partial knee replacement    TRIAL OF SPINAL CORD NERVE STIMULATOR N/A 12/22/2022    Procedure: SPINAL CORD STIMULATOR TRIAL BOSTON SCYNEXIS RESEARCH;  Surgeon: Shoaib Aguirre MD;  Location: [facility] PAIN MGT;  Service: Pain Management;  Laterality: N/A;    TUBAL LIGATION       Social History     Socioeconomic History    Marital status:     Number of children: 1   Occupational History     Occupation:      Employer: [employer]     Comment: retired   Tobacco Use    Smoking status: Never    Smokeless tobacco: Never   Substance and Sexual Activity    Alcohol use: Yes     Alcohol/week: 3.0 standard drinks of alcohol     Types: 3 Glasses of wine per week     Comment: weekends    Drug use: Yes     Types: Marijuana     Comment: occasionally    Sexual activity: Yes     Partners: Male   Other Topics Concern    Are you pregnant or think you may be? No    Breast-feeding No   Social History Narrative    Retired        Swims.       Stationary bike.            Social Drivers of Health     Financial Resource Strain: Low Risk  (3/21/2024)    Overall Financial Resource Strain (CARDIA)     Difficulty of Paying Living Expenses: Not hard at all   Food Insecurity: No Food Insecurity (3/21/2024)    Hunger Vital Sign     Worried About Running Out of Food in the Last Year: Never true     Ran Out of Food in the Last Year: Never true   Transportation Needs: No Transportation Needs (3/21/2024)    PRAPARE - Transportation     Lack of Transportation (Medical): No     Lack of Transportation (Non-Medical): No   Physical Activity: Sufficiently Active (3/21/2024)    Exercise Vital Sign     Days of Exercise per Week: 4 days     Minutes of Exercise per Session: 40 min   Stress: Stress Concern Present (3/21/2024)    [facility] [place] [employer] - Occupational Stress Questionnaire     Feeling of Stress : Rather much   Housing Stability: Patient Declined (3/21/2024)    Housing Stability Vital Sign     Unable to Pay for Housing in the Last Year: Patient declined     Number of Places Lived in the Last Year: 1     Unstable Housing in the Last Year: Patient declined     Family History   Problem Relation Name Age of Onset    Hypertension Mother      Irritable bowel syndrome Mother      Hyperlipidemia Mother      Heart disease Father          mi    Hypertension Father      Cancer Father          prostate    Heart  attack Father      Heart failure Father      Hyperlipidemia Father      Alcohol abuse Sister nathanael     Depression Sister nathanael     Epilepsy Son ari     Irritable bowel syndrome Sister martin     Alcohol abuse Sister martin     Ovarian cancer Maternal Aunt      Breast cancer Paternal Aunt      Breast cancer Maternal Aunt      Melanoma Neg Hx      Colon cancer Neg Hx      Stomach cancer Neg Hx      Esophageal cancer Neg Hx      Liver disease Neg Hx      Crohn's disease Neg Hx      Ulcerative colitis Neg Hx      Celiac disease Neg Hx      Stroke Neg Hx         Review of patient's allergies indicates:  No Known Allergies    Current Outpatient Medications   Medication Sig    alosetron (LOTRONEX) 0.5 MG tablet Take 1 tablet (0.5 mg total) by mouth in the morning and 1 tablet (0.5 mg total) in the evening.    ALPRAZolam (XANAX) 1 MG tablet Take 1 tablet by mouth three times daily    amLODIPine (NORVASC) 10 MG tablet Take 1 tablet (10 mg total) by mouth once daily.    atorvastatin (LIPITOR) 10 MG tablet Take 1 tablet (10 mg total) by mouth once daily.    EScitalopram oxalate (LEXAPRO) 5 MG Tab take 1 tablet by mouth once daily    estradioL (ESTRACE) 0.01 % (0.1 mg/gram) vaginal cream Insert 1 gram as directed vagianlly nightly for 2 to 4 weeks followed by 2-3x/week    HYDROcodone-acetaminophen (NORCO) 5-325 mg per tablet Take 1 tablet by mouth every 12 (twelve) hours as needed for Pain.    HYDROcodone-acetaminophen (NORCO) 5-325 mg per tablet Take 1 tablet by mouth every 6 (six) hours as needed for Pain.    levothyroxine (SYNTHROID) 100 MCG tablet TAKE 1 TABLET BY MOUTH EVERY MORNING    mupirocin (BACTROBAN) 2 % ointment Apply topically 2 (two) times daily.    promethazine (PHENERGAN) 25 MG tablet Take 1 tablet by mouth every 12 hours as needed for nausea    ramelteon (ROZEREM) 8 mg tablet Take 1 tablet (8 mg total) by mouth every evening.    spironolactone (ALDACTONE) 25 MG tablet Take 1 tablet (25 mg total) by mouth  "once daily. For blood pressure    sumatriptan (IMITREX) 50 MG tablet 1 tab po at start of headache.  Repeat in 2 h prn    tiZANidine (ZANAFLEX) 4 MG tablet Take 1 tablet (4 mg total) by mouth 3 (three) times daily as needed (muscle pain).    traZODone (DESYREL) 100 MG tablet take 1 to 2 tablets by mouth every evening for for sleep    tretinoin (RETIN-A) 0.025 % cream Compound tretinoin 0.025% / azelaic acid 8% / niacinamide 2% cream. Apply a pea-sized amount to entire face qhs.    valsartan (DIOVAN) 320 MG tablet Take 1 tablet (320 mg total) by mouth once daily.    ondansetron (ZOFRAN) 4 MG tablet Take 1 tablet (4 mg total) by mouth every 8 (eight) hours if needed for nausea.    valACYclovir (VALTREX) 500 MG tablet Take 1 tablet (500 mg total) by mouth 2 (two) times daily. for 7 days     Current Facility-Administered Medications   Medication    triamcinolone acetonide injection 40 mg       REVIEW OF SYSTEMS:    GENERAL: No fever. No chills. No fatigue. Denies weight loss. Denies weight gain.  HEENT: Denies headaches. Denies vision change. Denies eye pain. Denies double vision. Denies ear pain.   CV: Denies chest pain. HTN  PULM: Denies of shortness of breath.  GI: Denies constipation. No diarrhea. No abdominal pain. Denies nausea. Denies vomiting. No blood in stool.  HEME: Denies bleeding problems.  : Denies urgency. No painful urination. No blood in urine.  MS: See HPI  SKIN: Denies rash.   NEURO: Denies seizures. No weakness.  ENDO: Thyroid disorder.   PSYCH:  Depression    OBJECTIVE:     Exam limited due to virtual visit:  GENERAL: Well appearing, in no acute distress, alert and oriented x3.  PSYCH:  Mood and affect appropriate.    Previous physical exam:  Vitals:    02/03/25 1305   BP: (!) 107/56   Pulse: 75   Resp: 18   Temp: 98 °F (36.7 °C)   SpO2: 100%   Weight: 68 kg (149 lb 14.6 oz)   Height: 5' 3" (1.6 m)   PainSc:   7   PainLoc: Back         PHYSICAL EXAM:     GENERAL: Well appearing, in no acute " distress, alert and oriented x3.  PSYCH:  Mood and affect appropriate.  SKIN: Skin color, texture, turgor normal, no rashes or lesions. SCS site well healed, clean , dry and intact.   RESP: Symmetric chest rise, no respiratory distress noted.   BACK: Straight leg raise in the sitting position is negative for radicular pain.   MSK: 5/5 strength in right ankle with plantar and dorsiflexion. 5/5 strength in left ankle with plantar and dorsiflexion. 5/5 strength with right knee flexion and extension. 5/5 strength with left knee flexion and extension.   GAIT: Normal.         ASSESSMENT: 70 y.o. year old female with low back and knee pain, consistent with the followin. Chronic pain syndrome        2. Malfunction of spinal cord stimulator, subsequent encounter        3. Spinal cord stimulator status            Plan:  Status post SCS battery revision; surgical site is clean, dry, intact, and healing well. Dressings were removed today.  Continue home exercise routine.  Continue Flexeril 10 mg TID PRN for muscle spasms.  Pain management contract remains in place (signed 2024).  Continue Norco 5/325 mg BID PRN; patient does not require a refill at this time.  UDS from 2024. Repeat next in person visit.   RTC in 4 weeks with PAUL.    The above plan and management options were discussed at length with patient. Patient is in agreement with the above and verbalized understanding.       Shoaib Aguirre MD  2025

## 2025-02-03 NOTE — PROGRESS NOTES
Study: Wave Writer- BRUNO Study: A Randomized Controlled Study to Evaluate the Safety and Effectiveness of [employer] Spinal Cord Stimulation (SCS) Systems in the Treatment of Chronic Low Back and/or Leg Pain with No Prior Surgeries  IRB/Protocol #: [id number]/  : Obed Gasca MD  Treating Physician: Shoaib Aguirre MD  Site Number: [id number]        Subject Number: [id number]     2 Year Post Activation     The subject came to [facility] for 2 year (End of Study) post-activation visit. The JERZY was reviewed, entered into EDC. Subject reported working 60 hours per week, 10 Primary Care visits, 0 ED and 1 Outpatient visit. Opioid pain medications were reviewed. The following questionnaires were completed using the iPad by the subject and the Clinician completed paper forms, as the login credentials were not valid/working. Subject then met with KATHLEEN Gabriel for any programming and changes; subject did not want any changes made to programs. The MSP leana was removed from subject's phone, protocol was discussed for contacting the commercial team.       Questionnaires completed during visit:      Numeric Rating Scale (NRS)   Oswestry Disability Index (PREMA)   SF-36 Health Survey   EQ-5D-5L   PSQI   Sleep Quality Index   Pain Intensity VRS   Clinical Global Impression of Change - Clinician    Pain Intensity VRS (Follow-Up) - Clinician      Adverse Event reporting was reviewed with subject and there were no adverse events or protocol deviations to report at this time. A $100 payment was loaded onto the subject's [employer] for this visit.

## 2025-02-05 ENCOUNTER — OFFICE VISIT (OUTPATIENT)
Dept: ORTHOPEDICS | Facility: CLINIC | Age: 70
End: 2025-02-05
Payer: MEDICARE

## 2025-02-05 ENCOUNTER — PATIENT MESSAGE (OUTPATIENT)
Dept: PAIN MEDICINE | Facility: CLINIC | Age: 70
End: 2025-02-05
Payer: MEDICARE

## 2025-02-05 DIAGNOSIS — G89.29 CHRONIC PAIN OF RIGHT KNEE: ICD-10-CM

## 2025-02-05 DIAGNOSIS — Z96.651 STATUS POST TOTAL RIGHT KNEE REPLACEMENT: Primary | ICD-10-CM

## 2025-02-05 DIAGNOSIS — M25.561 CHRONIC PAIN OF RIGHT KNEE: ICD-10-CM

## 2025-02-05 DIAGNOSIS — Z96.651 PRESENCE OF RIGHT ARTIFICIAL KNEE JOINT: ICD-10-CM

## 2025-02-05 PROCEDURE — 98004 SYNCH AUDIO-VIDEO EST SF 10: CPT | Mod: 95,,, | Performed by: ORTHOPAEDIC SURGERY

## 2025-02-05 NOTE — PROGRESS NOTES
The patient location is: [facility]  The chief complaint leading to consultation is: knee pain    Visit type: audiovisual    Face to Face time with patient: 7 min  15 min minutes of total time spent on the encounter, which includes face to face time and non-face to face time preparing to see the patient (eg, review of tests), Obtaining and/or reviewing separately obtained history, Documenting clinical information in the electronic or other health record, Independently interpreting results (not separately reported) and communicating results to the patient/family/caregiver, or Care coordination (not separately reported).         Each patient to whom he or she provides medical services by telemedicine is:  (1) informed of the relationship between the physician and patient and the respective role of any other health care provider with respect to management of the patient; and (2) notified that he or she may decline to receive medical services by telemedicine and may withdraw from such care at any time.    Notes:          Subjective:      Patient ID: Tiarra Norton is a 70 y.o. female.    Chief Complaint: Pain of the Right Knee    HPI  Tiarra Norton has right knee pain.  The pain has worsened slightly.  Recent aspiration was negative.  Synovasure results are in the media section.  She had her spinal cord stimulator removed and this is helped some with her back pain.  The knee pain is mainly anterior in his still bothersome.        Objective:      There is no height or weight on file to calculate BMI.  There were no vitals filed for this visit.        General    Constitutional: She is oriented to person, place, and time. She appears well-developed and well-nourished.   HENT:   Head: Normocephalic and atraumatic.   Eyes: EOM are normal.   Pulmonary/Chest: Effort normal.   Neurological: She is alert and oriented to person, place, and time.   Psychiatric: She has a normal mood and affect. Her behavior is normal.                        Assessment:       Encounter Diagnoses   Name Primary?    Status post total right knee replacement Yes    Chronic pain of right knee     Presence of right artificial knee joint           Plan:       Tiarra was seen today for pain.    Diagnoses and all orders for this visit:    Status post total right knee replacement    Chronic pain of right knee    Presence of right artificial knee joint  -     CT Knee Without Contrast Right; Future      At this point I believe a CT scan is indicated.  I would like to assess component rotation.  I explained that it is rare that components or malrotated when using the robot however I have seen it.  We will get the CT scan ordered and we will give her a call with the results.

## 2025-02-06 ENCOUNTER — PATIENT MESSAGE (OUTPATIENT)
Dept: INTERNAL MEDICINE | Facility: CLINIC | Age: 70
End: 2025-02-06
Payer: MEDICARE

## 2025-02-07 ENCOUNTER — PATIENT MESSAGE (OUTPATIENT)
Dept: INTERNAL MEDICINE | Facility: CLINIC | Age: 70
End: 2025-02-07
Payer: MEDICARE

## 2025-02-07 ENCOUNTER — PATIENT MESSAGE (OUTPATIENT)
Dept: PAIN MEDICINE | Facility: CLINIC | Age: 70
End: 2025-02-07
Payer: MEDICARE

## 2025-02-07 DIAGNOSIS — Z96.89 SPINAL CORD STIMULATOR STATUS: ICD-10-CM

## 2025-02-07 DIAGNOSIS — G89.4 CHRONIC PAIN SYNDROME: Primary | ICD-10-CM

## 2025-02-07 DIAGNOSIS — M41.25 OTHER IDIOPATHIC SCOLIOSIS, THORACOLUMBAR REGION: ICD-10-CM

## 2025-02-10 ENCOUNTER — PATIENT MESSAGE (OUTPATIENT)
Dept: ORTHOPEDICS | Facility: CLINIC | Age: 70
End: 2025-02-10
Payer: MEDICARE

## 2025-02-10 ENCOUNTER — PATIENT MESSAGE (OUTPATIENT)
Dept: SLEEP MEDICINE | Facility: CLINIC | Age: 70
End: 2025-02-10
Payer: MEDICARE

## 2025-02-10 DIAGNOSIS — G47.00 INSOMNIA, UNSPECIFIED TYPE: ICD-10-CM

## 2025-02-10 RX ORDER — RAMELTEON 8 MG/1
8 TABLET ORAL NIGHTLY
Qty: 30 TABLET | Refills: 5 | Status: SHIPPED | OUTPATIENT
Start: 2025-02-10

## 2025-02-11 ENCOUNTER — PATIENT MESSAGE (OUTPATIENT)
Dept: PAIN MEDICINE | Facility: CLINIC | Age: 70
End: 2025-02-11
Payer: MEDICARE

## 2025-02-11 ENCOUNTER — PATIENT MESSAGE (OUTPATIENT)
Dept: SLEEP MEDICINE | Facility: CLINIC | Age: 70
End: 2025-02-11
Payer: MEDICARE

## 2025-02-12 ENCOUNTER — PATIENT MESSAGE (OUTPATIENT)
Dept: INTERNAL MEDICINE | Facility: CLINIC | Age: 70
End: 2025-02-12
Payer: MEDICARE

## 2025-02-12 ENCOUNTER — OFFICE VISIT (OUTPATIENT)
Dept: DERMATOLOGY | Facility: CLINIC | Age: 70
End: 2025-02-12
Payer: MEDICARE

## 2025-02-12 ENCOUNTER — TELEPHONE (OUTPATIENT)
Dept: SLEEP MEDICINE | Facility: CLINIC | Age: 70
End: 2025-02-12
Payer: MEDICARE

## 2025-02-12 DIAGNOSIS — L82.0 INFLAMED SEBORRHEIC KERATOSIS: Primary | ICD-10-CM

## 2025-02-12 PROCEDURE — 17110 DESTRUCTION B9 LES UP TO 14: CPT | Mod: S$GLB,,, | Performed by: DERMATOLOGY

## 2025-02-12 PROCEDURE — 99499 UNLISTED E&M SERVICE: CPT | Mod: S$GLB,,, | Performed by: DERMATOLOGY

## 2025-02-12 NOTE — TELEPHONE ENCOUNTER
----- Message from Ana Maria sent at 2/12/2025  9:30 AM CST -----  Regarding: Sleep Study  Name of Who is Calling:  Patient          What is the request in detail:  Please call patient about her Sleep Study            Can the clinic reply by [facility]: Yes            What Number to Call Back if not in [facility]:

## 2025-02-12 NOTE — PROGRESS NOTES
Patient Information  Name: Tiarra Norton  : 1955  MRN: [id number]     Referring Physician:  No ref. provider found   Primary Care Physician:  Kaykay Perales MD   Date of Visit: 25      Subjective:     History of Present lllness:    Tiarra Norton is a 70 y.o. female who presents with a chief complaint of bump and lesion.    Bump  Location: scalp  Duration: months  Signs/Symptoms: scaly, itchy, irritated  Exacerbating factors: none  Relieving factors/Prior treatments: none    Lesion  Location: left hand  Duration: months  Signs/Symptoms: hard spot, red, scaly  Exacerbating factors: none  Relieving factors/Prior treatments: none    Patient was last seen: 2024.  Prior notes by myself reviewed.   Clinical documentation obtained by nursing staff reviewed.    Review of Systems    Objective:   Physical Exam   Constitutional: She appears well-developed and well-nourished. No distress.   Neurological: She is alert and oriented to person, place, and time. She is not disoriented.   Psychiatric: She has a normal mood and affect.   Skin:   Areas Examined (abnormalities noted in diagram):   Scalp / Hair Palpated and Inspected  RUE Inspected  LUE Inspection Performed                 Diagram Legend     Erythematous scaling macule/papule c/w actinic keratosis       Vascular papule c/w angioma      Pigmented verrucoid papule/plaque c/w seborrheic keratosis      Yellow umbilicated papule c/w sebaceous hyperplasia      Irregularly shaped tan macule c/w lentigo     1-2 mm smooth white papules consistent with Milia      Movable subcutaneous cyst with punctum c/w epidermal inclusion cyst      Subcutaneous movable cyst c/w pilar cyst      Firm pink to brown papule c/w dermatofibroma      Pedunculated fleshy papule(s) c/w skin tag(s)      Evenly pigmented macule c/w junctional nevus     Mildly variegated pigmented, slightly irregular-bordered macule c/w mildly atypical nevus      Flesh colored to evenly pigmented  papule c/w intradermal nevus       Pink pearly papule/plaque c/w basal cell carcinoma      Erythematous hyperkeratotic cursted plaque c/w SCC      Surgical scar with no sign of skin cancer recurrence      Open and closed comedones      Inflammatory papules and pustules      Verrucoid papule consistent consistent with wart     Erythematous eczematous patches and plaques     Dystrophic onycholytic nail with subungual debris c/w onychomycosis     Umbilicated papule    Erythematous-base heme-crusted tan verrucoid plaque consistent with inflamed seborrheic keratosis     Erythematous Silvery Scaling Plaque c/w Psoriasis     See annotation    No images are attached to the encounter or orders placed in the encounter.      [] Data reviewed  [] Prior external notes reviewed  [] Independent review of test  [] Management discussed with another provider  [] Independent historian    Assessment / Plan:        Inflamed seborrheic keratosis    Cryosurgery procedure note:  Risk, benefits, and alternatives of cryosurgery are discussed with the patient, including but not limited to the risks of hypopigmentation, hyperpigmentation, scar, infection, recurrence of lesion(s), development of new lesion(s), and need for additional treatment of the lesion(s). Verbal consent obtained from patient. Liquid nitrogen cryosurgery applied to 3 lesion(s) to produce a freeze injury. Counseled patient that blisters may form, and instructed patient on wound care with gentle cleansing and use of Vaseline ointment to keep moist until healed. Handout was provided, and patient was instructed to return to clinic in 1-2 months if lesions do not completely resolve.           Follow up if symptoms worsen or fail to improve.      Melissa Cabrera MD, FAAD  [facility]

## 2025-02-13 ENCOUNTER — CLINICAL SUPPORT (OUTPATIENT)
Dept: REHABILITATION | Facility: OTHER | Age: 70
End: 2025-02-13
Payer: MEDICARE

## 2025-02-13 ENCOUNTER — HOSPITAL ENCOUNTER (OUTPATIENT)
Dept: RADIOLOGY | Facility: HOSPITAL | Age: 70
Discharge: HOME | End: 2025-02-13
Attending: ORTHOPAEDIC SURGERY
Payer: MEDICARE

## 2025-02-13 ENCOUNTER — PATIENT MESSAGE (OUTPATIENT)
Dept: ORTHOPEDICS | Facility: CLINIC | Age: 70
End: 2025-02-13
Payer: MEDICARE

## 2025-02-13 ENCOUNTER — PATIENT MESSAGE (OUTPATIENT)
Dept: PAIN MEDICINE | Facility: CLINIC | Age: 70
End: 2025-02-13
Payer: MEDICARE

## 2025-02-13 ENCOUNTER — PATIENT MESSAGE (OUTPATIENT)
Dept: INTERNAL MEDICINE | Facility: CLINIC | Age: 70
End: 2025-02-13
Payer: MEDICARE

## 2025-02-13 DIAGNOSIS — R29.898 DECREASED STRENGTH OF TRUNK AND BACK: ICD-10-CM

## 2025-02-13 DIAGNOSIS — M62.81 WEAKNESS OF TRUNK MUSCULATURE: ICD-10-CM

## 2025-02-13 DIAGNOSIS — R29.898 WEAKNESS OF RIGHT LOWER EXTREMITY: ICD-10-CM

## 2025-02-13 DIAGNOSIS — M25.661 DECREASED RANGE OF MOTION (ROM) OF RIGHT KNEE: Primary | ICD-10-CM

## 2025-02-13 DIAGNOSIS — M25.69 DECREASED RANGE OF MOTION OF TRUNK AND BACK: ICD-10-CM

## 2025-02-13 DIAGNOSIS — Z96.651 PRESENCE OF RIGHT ARTIFICIAL KNEE JOINT: ICD-10-CM

## 2025-02-13 PROCEDURE — 97112 NEUROMUSCULAR REEDUCATION: CPT

## 2025-02-13 PROCEDURE — 97530 THERAPEUTIC ACTIVITIES: CPT

## 2025-02-13 PROCEDURE — 73700 CT LOWER EXTREMITY W/O DYE: CPT | Mod: 26,RT,, | Performed by: RADIOLOGY

## 2025-02-13 PROCEDURE — 20560 NDL INSJ W/O NJX 1 OR 2 MUSC: CPT

## 2025-02-13 PROCEDURE — 73700 CT LOWER EXTREMITY W/O DYE: CPT | Mod: TC,RT

## 2025-02-14 ENCOUNTER — TELEPHONE (OUTPATIENT)
Dept: ORTHOPEDICS | Facility: CLINIC | Age: 70
End: 2025-02-14
Payer: MEDICARE

## 2025-02-14 NOTE — TELEPHONE ENCOUNTER
Called pt and LVM to call our office back to go over the notes that Dr. Huber sent regarding her CT scan.     ----- Message from Trevin Huber MD sent at 2/14/2025  9:11 AM CST -----  Please call patient.  The MRI did show some nonspecific inflammation in the joint.  Otherwise everything looked good.  The component is positioned very well and there was no loosening.  Please schedule a routine follow-up for her.

## 2025-02-14 NOTE — TELEPHONE ENCOUNTER
"Called pt and went over Dr. Huber's notes with her regarding her CT scan."The MRI did show some nonspecific inflammation in the joint.  Otherwise everything looked good.  The component is positioned very well and there was no loosening." Patient did state that she would like to schedule a virtual visit to discuss the CT scan and that she does not want to wait till her routine follow up appointment. Stated I would message the surgeon and then find a time to schedule the appointment.  Pt verbalized understanding and has no further questions.        ----- Message from Marely sent at 2/14/2025 12:25 PM CST -----  Regarding: Results  Contact: pt [phone]  Pt is returning call to discuss results of CT done 2/13, pt is requesting call back today due to weekend, please call pt @ [phone]  "

## 2025-02-19 ENCOUNTER — PATIENT MESSAGE (OUTPATIENT)
Dept: INTERNAL MEDICINE | Facility: CLINIC | Age: 70
End: 2025-02-19
Payer: MEDICARE

## 2025-02-19 ENCOUNTER — OFFICE VISIT (OUTPATIENT)
Dept: INTERNAL MEDICINE | Facility: CLINIC | Age: 70
End: 2025-02-19
Payer: MEDICARE

## 2025-02-19 VITALS — WEIGHT: 155 LBS | BODY MASS INDEX: 27.46 KG/M2 | HEIGHT: 63 IN

## 2025-02-19 DIAGNOSIS — E78.5 HYPERLIPIDEMIA, UNSPECIFIED HYPERLIPIDEMIA TYPE: ICD-10-CM

## 2025-02-19 DIAGNOSIS — E66.3 OVERWEIGHT WITH BODY MASS INDEX (BMI) OF 27 TO 27.9 IN ADULT: ICD-10-CM

## 2025-02-19 DIAGNOSIS — G47.33 OSA (OBSTRUCTIVE SLEEP APNEA): Primary | ICD-10-CM

## 2025-02-19 DIAGNOSIS — I10 HYPERTENSION, UNSPECIFIED TYPE: ICD-10-CM

## 2025-02-19 RX ORDER — TIRZEPATIDE 2.5 MG/.5ML
2.5 INJECTION, SOLUTION SUBCUTANEOUS
Qty: 2 ML | Refills: 0 | Status: SHIPPED | OUTPATIENT
Start: 2025-02-19 | End: 2025-03-20

## 2025-02-19 NOTE — PROGRESS NOTES
Subjective:       Patient ID: Tiarra Norton is a 70 y.o. female.        Chief Complaint: Follow-up    The patient location is: her home  The chief complaint leading to consultation is: discuss zepbound    Visit type: audiovisual    Face to Face time with patient: 15 minutes  30 minutes of total time spent on the encounter, which includes face to face time and non-face to face time preparing to see the patient (eg, review of tests), Obtaining and/or reviewing separately obtained history, Documenting clinical information in the electronic or other health record, Independently interpreting results (not separately reported) and communicating results to the patient/family/caregiver, or Care coordination (not separately reported).         Each patient to whom he or she provides medical services by telemedicine is:  (1) informed of the relationship between the physician and patient and the respective role of any other health care provider with respect to management of the patient; and (2) notified that he or she may decline to receive medical services by telemedicine and may withdraw from such care at any time.    Notes:     Her  is taking zepbound through clinical trial and has lost a lot of weight  She was unable to join clinical trial as it was full for women    BMI at home was 27.7 yesterday based on home weight of 155#  With comorbidity, hypertension, HERMINIA    Realizes insurance will likely not pay for it    Terrible trouble with back, attributes to weight    [facility] for $400/month?    No personal h/o pancreatitis  No personal or FHx of MEN 2 or medullary thyroid cancer    HTN -   Chlorthalidone d/c due to hyponatremia, hypokalemia 10/2024  Amlodipine 10 mg daily  Valsartan 320 mg daily  Spironolactone 25 mg daily    Lipids -   Lipitor 10 mg daily    Hypothyroidism -   Synthroid 100 mcg daily    HERMINIA            Review of Systems   Constitutional:  Positive for activity change and  unexpected weight change. Negative for chills, diaphoresis, fatigue and fever.   HENT:  Negative for congestion, hearing loss, rhinorrhea, sore throat and trouble swallowing.    Eyes:  Negative for discharge and visual disturbance.   Respiratory:  Negative for cough, chest tightness, shortness of breath and wheezing.    Cardiovascular:  Negative for chest pain, palpitations and leg swelling.   Gastrointestinal:  Negative for abdominal pain, blood in stool, constipation, diarrhea, nausea and vomiting.   Endocrine: Negative for polydipsia and polyuria.   Genitourinary:  Negative for difficulty urinating, dysuria, frequency, hematuria, menstrual problem and urgency.   Musculoskeletal:  Positive for joint swelling. Negative for arthralgias, back pain and neck pain.   Skin:  Negative for rash.   Neurological:  Negative for dizziness, syncope, weakness and headaches.   Psychiatric/Behavioral:  Negative for confusion, dysphoric mood and sleep disturbance. The patient is not nervous/anxious.        Objective:      Physical Exam  HENT:      Head: Normocephalic.   Pulmonary:      Effort: Pulmonary effort is normal.   Abdominal:      General: Abdomen is flat.   Neurological:      General: No focal deficit present.      Mental Status: She is alert.   Psychiatric:         Mood and Affect: Mood normal.         Assessment:       1. HERMINIA (obstructive sleep apnea)    2. Overweight with body mass index (BMI) of 27 to 27.9 in adult    3. Hypertension, unspecified type    4. Hyperlipidemia, unspecified hyperlipidemia type        Plan:       Tiarra was seen today for follow-up.    Diagnoses and all orders for this visit:    HERMINIA (obstructive sleep apnea)      -     tirzepatide, weight loss, (ZEPBOUND) 2.5 mg/0.5 mL PnIj    Overweight with body mass index (BMI) of 27 to 27.9 in adult      -     tirzepatide, weight loss, (ZEPBOUND) 2.5 mg/0.5 mL PnIj    Hypertension, unspecified type - stable and controlled, continue current regimen  BP  "Readings from Last 5 Encounters:   02/03/25 (!) 107/56   01/27/25 (!) 105/59   01/09/25 128/76   01/06/25 124/75   12/31/24 118/66      Hyperlipidemia, unspecified hyperlipidemia type - stable and controlled, continue current regimen  Lab Results   Component Value Date    CHOL 195 11/06/2024    TRIG 50 11/06/2024    HDL 99 (H) 11/06/2024    LDLCALC 86.0 11/06/2024     Discussed with Dr. Perlaes  Will try sending zepbound to pharmacy first as approved for treatment of sleep apnea  If not covered, will investigate [facility]    30 minutes spent on patient encounter    Visit today included increased complexity associated with the care of the episodic problem overweight addressed and managing the longitudinal care of the patient due to the serious and/or complex managed problem(s) sleep apnea, hypertension, hyperlipidemia.    Pt has been given instructions populated from patient instructions database and has verbalized understanding of the after visit summary and information contained wherein.    Follow up with a primary care provider. May go to ER for acute shortness of breath, lightheadedness, fever, or any other emergent complaints or changes in condition.    "This note will be shared with the patient"    Future Appointments   Date Time Provider Department Center   2/20/2025  8:30 AM Zi Anaya, PT [facility]   2/24/2025  1:00 PM Trevin Huber MD [facility] ORTHO [facility]   2/25/2025 11:00 AM Zi Anaya, PT [facility]   2/26/2025  2:15 PM JW MAUREEN [facility]2 [facility]   2/27/2025 10:00 AM Zi Anaya, PT [facility]   3/10/2025  7:00 PM LAB, SLEEP GÓMEZ [facility] SLEEP [facility]   3/11/2025 10:00 AM Kaykay Perales MD [facility] IM [facility]   3/11/2025  2:00 PM Marlene Thorne, NP [facility]   3/26/2025  2:45 PM Melissa Cabrera MD [facility] DERM [facility]   4/24/2025  9:30 AM Bekah Nichols MD [facility] SLEEP " [facility]

## 2025-02-20 ENCOUNTER — CLINICAL SUPPORT (OUTPATIENT)
Dept: REHABILITATION | Facility: OTHER | Age: 70
End: 2025-02-20
Payer: MEDICARE

## 2025-02-20 DIAGNOSIS — M25.69 DECREASED RANGE OF MOTION OF TRUNK AND BACK: ICD-10-CM

## 2025-02-20 DIAGNOSIS — R29.898 DECREASED STRENGTH OF TRUNK AND BACK: ICD-10-CM

## 2025-02-20 DIAGNOSIS — M25.661 DECREASED RANGE OF MOTION (ROM) OF RIGHT KNEE: Primary | ICD-10-CM

## 2025-02-20 DIAGNOSIS — M62.81 WEAKNESS OF TRUNK MUSCULATURE: ICD-10-CM

## 2025-02-20 DIAGNOSIS — R29.898 WEAKNESS OF RIGHT LOWER EXTREMITY: ICD-10-CM

## 2025-02-20 PROCEDURE — 97112 NEUROMUSCULAR REEDUCATION: CPT

## 2025-02-20 PROCEDURE — 20560 NDL INSJ W/O NJX 1 OR 2 MUSC: CPT

## 2025-02-22 DIAGNOSIS — Z00.00 ENCOUNTER FOR MEDICARE ANNUAL WELLNESS EXAM: ICD-10-CM

## 2025-02-24 ENCOUNTER — OFFICE VISIT (OUTPATIENT)
Dept: ORTHOPEDICS | Facility: CLINIC | Age: 70
End: 2025-02-24
Payer: MEDICARE

## 2025-02-24 DIAGNOSIS — M25.561 CHRONIC PAIN OF RIGHT KNEE: ICD-10-CM

## 2025-02-24 DIAGNOSIS — G89.29 CHRONIC PAIN OF RIGHT KNEE: ICD-10-CM

## 2025-02-24 DIAGNOSIS — Z96.651 STATUS POST TOTAL RIGHT KNEE REPLACEMENT: Primary | ICD-10-CM

## 2025-02-24 RX ORDER — MELOXICAM 15 MG/1
15 TABLET ORAL DAILY
Qty: 30 TABLET | Refills: 1 | Status: SHIPPED | OUTPATIENT
Start: 2025-02-24 | End: 2025-04-25

## 2025-02-24 NOTE — PROGRESS NOTES
[facility]   Physical Therapy Progress Note      Name: Tiarra Norton  [facility] Number: [id number]    Therapy Diagnosis:   Encounter Diagnoses   Name Primary?    Decreased range of motion (ROM) of right knee Yes    Weakness of right lower extremity     Decreased range of motion of trunk and back     Decreased strength of trunk and back     Weakness of trunk musculature          Physician: Virgil Scott MD    Visit Date: 2/13/2025    Physician Orders: PT Eval and Treat   Medical Diagnosis from Referral:   Z96.651 (ICD-10-CM) - Status post total right knee replacement   M22.2X1 (ICD-10-CM) - Patellofemoral pain syndrome of right knee   M70.50 (ICD-10-CM) - Pes anserine bursitis      Evaluation Date: 12/21/2023  Authorization Period Expiration: 12/31/2025  Plan of Care Expiration: Updated to 4/13/2025  Visit # / Visits authorized: 54/70   Progress Note Due: 3/13/2024  FOTO: 3/4     FOTO NEXT VISIT     Precautions: hx of TKA and menisectomy, spinal cord stimulator, scoliosis     Time In: 1000 am  Time Out: 1100 am   Total Billable Time: 55 minutes (1:1)  Total Appointment Time (timed & untimed codes): 60 minutes    PTA Visit #: 0/5   Subjective   Patient reports: she saw another doctor who said she could be a candidate for surgery if her pain doesn't improve. Her back continues to hurt on days she doesn't come to therapy.     She was compliant with home exercise program.  Response to previous treatment: no adverse effects   Functional change: improved tolerance to functional mobility activities with decreased pain    Pain: 5/10 R knee, 8/10 LB  Location: R low back and R knee   Objective       Updated 2/13/2025     Lumbar Range of Motion:     %   Flexion 90      Extension 70      Left Side Bending 60   Right Side Bending 60   Left rotation    70   Right Rotation    80    *= pain         Range of Motion:   Knee Left active Left Passive   Flexion 133 140   Extension 3 5      Knee Right  "active Right Passive   Flexion 121 124   Extension 1 3         Lower Extremity Strength     Right LE   Left LE     Hip flexion: 4+/5 Hip flexion: 4+/5   Knee extension: 4+/5 Knee extension: 4+/5   Knee flexion: 4+/5 Knee flexion: 4+/5   Hip IR: 4+/5 Hip IR: 4+/5   Hip ER: 4+/5 Hip ER: 4+/5   Hip extension:  4/5 Hip extension: 4/5   Hip abduction: 4/5 Hip abduction: 4/5   Hip adduction: 4/5 Hip adduction 4/5   Ankle dorsiflexion: 5/5 Ankle dorsiflexion: 5/5   Ankle plantarflexion: 5/5 Ankle plantarflexion: 5/5             Treatment   Tiarra received the treatments listed below:      Therapeutic exercises to develop strength, endurance, ROM, flexibility, posture, and core stabilization for 15 minutes including:    Recumbent bike lv. 4 x 5 min for endurance and knee ROM     PPT 15x5"  Bridge 2x10x3"  EIL 2x10  DKTC 15x5"'  Open books x15,3" ea  Hip flexor stretch 2x1' ea  Leg press # 2x10  Leg press SL 80# 2x10  EIS 2x10    Dry needling applied to the: B LB  for 10 minutes, including:     FDN. Educated pt to use heat following treatment sessions if pt is experiencing pain or soreness. Pt verbalized good understanding of education. Pt signed written consent to dry needling. Pt gave verbal consent for DN     Pt received dry needling to the below listed muscles using 50 mm needles.     B Lumbar paraspinals L1-L5      Neuromuscular re-education activities to improve: Balance, Coordination, Kinesthetic, Proprioception, and Posture for 20 minutes. The following activities were included:    Sit to stands GTB around knees 2x10  Side stepping GTB 2x12 steps  Monster walks GTB 2x12 steps  Single leg balance 3x30" R only  Step ups fwd/lateral  6 in step 2x10x3" ea R only   Medx trunk extension 74# x20  Medx trunk rotation 34# x20 increase weight       therapeutic activities to improve functional performance for 10  minutes, including:  Objective measures and reassessment    Hot pack for 5 minutes to R knee and low " back    Patient Education and Home Exercises     Education provided:   - HEP, POC    Written Home Exercises Provided: Patient instructed to cont prior HEP. Exercises were reviewed and Tiarra was able to demonstrate them prior to the end of the session.  Tiarra demonstrated good  understanding of the education provided. See Electronic Medical Record under Patient Instructions for exercises provided during therapy sessions    Assessment   Tiarra presents today for her first follow since spinal cord stimulator was moved. Reassessment performed with pt continue to demo deficits in lumbar and R knee ROM, weakness of B LE and weakness of trunk, and decreased tolerance to ADL's, functional mobility, and leisure activities due to pain. Pt will continue to benefit from skilled therapy to address described deficits and decrease pain.     Tiarra Is progressing well towards her goals.   Patient prognosis is Good.     Patient will continue to benefit from skilled outpatient physical therapy to address the deficits listed in the problem list box on initial evaluation, provide pt/family education and to maximize pt's level of independence in the home and community environment.     Patient's spiritual, cultural and educational needs considered and pt agreeable to plan of care and goals.     Anticipated barriers to physical therapy: chronicity of condition, multiple treatment areas, hx of R TKA and menisectomy     GOALS: Short Term Goals:  4 weeks  1. Report decreased in pain at worse less than  <   / =  4  /10  to increase tolerance for functional activities.On going  2. Pt to increase B popliteal angle by greater than > or = 5 degrees in order to improve flexibility and posture. MET  3. Increased R LE MMT 1/2  to increase tolerance for ADL and work activities. MET  4. Pt to increase B Ely's Test by greater than  > or = 5degrees in order to improve flexibility and posture. MET  5. Pt to tolerate HEP to improve ROM and independence with  ADL's. MET  6. Pt to improve range of motion by 25% to allow for improved functional mobility to allow for improvement in IADLs. MET     Long Term Goals: 8 weeks  1. Report decreased in pain at worse less than  <   / =  2  /10  to increase tolerance for functional mobility.  On going  2 .Pt to increase B popliteal angle by greater than > or = 10 degrees in order to improve flexibility and posture. On going  3. Increased R LE MMT 1 grade to increase tolerance for ADL and work activities.On going  4. Pt will report at 51% or better score on FOTO Lumbar spine survey  to demonstrate decrease in disability and improvement in back pain.On going  5. Pt to be Independent with HEP to improve ROM and independence with ADL's. On going  6. Pt to increase B Ely's Test by greater than > or = 10 degrees in order to improve flexibility and posture. On going  7. Pt to demonstrate negative Bridge Test in order to show improved core strength for lumbar stabilization. On going  8. Pt to improve range of motion by 75% to allow for improved functional mobility to allow for improvement in IADLs. On going        Plan   Plan of care Certification: 2/13/2025 to 4/13/2025     Outpatient Physical Therapy 2 times weekly for 10 weeks to include the following interventions: Cervical/Lumbar Traction, Gait Training, Manual Therapy, Moist Heat/ Ice, Neuromuscular Re-ed, Patient Education, Self Care, Therapeutic Activities, and Therapeutic Exercise. Marybeth Anaya, PT   2/24/2025

## 2025-02-24 NOTE — PROGRESS NOTES
The patient location is: [facility]  The chief complaint leading to consultation is: right knee pain    Visit type: audiovisual    Face to Face time with patient: 7 minutes  15 minutes of total time spent on the encounter, which includes face to face time and non-face to face time preparing to see the patient (eg, review of tests), Obtaining and/or reviewing separately obtained history, Documenting clinical information in the electronic or other health record, Independently interpreting results (not separately reported) and communicating results to the patient/family/caregiver, or Care coordination (not separately reported).         Each patient to whom he or she provides medical services by telemedicine is:  (1) informed of the relationship between the physician and patient and the respective role of any other health care provider with respect to management of the patient; and (2) notified that he or she may decline to receive medical services by telemedicine and may withdraw from such care at any time.    Notes:      Subjective:      Patient ID: Tiarra Norton is a 70 y.o. female.    Chief Complaint: No chief complaint on file.    HPI  Tiarra Norton has right knee pain.  The pain has worsened. Worse with activity.  Pain is lateral to knee cap.  Review of Systems   Constitutional: Negative for chills, fever and night sweats.   HENT:  Negative for hearing loss.    Eyes:  Negative for blurred vision and double vision.   Cardiovascular:  Negative for chest pain, claudication and leg swelling.   Respiratory:  Negative for shortness of breath.    Endocrine: Negative for polydipsia, polyphagia and polyuria.   Hematologic/Lymphatic: Negative for adenopathy and bleeding problem. Does not bruise/bleed easily.   Skin:  Negative for poor wound healing.   Gastrointestinal:  Negative for diarrhea and heartburn.   Genitourinary:  Negative for bladder incontinence.   Neurological:  Negative for focal weakness, headaches,  numbness, paresthesias and sensory change.   Psychiatric/Behavioral:  The patient is not nervous/anxious.    Allergic/Immunologic: Negative for persistent infections.         Objective:      There is no height or weight on file to calculate BMI.  There were no vitals filed for this visit.        General    Constitutional: She is oriented to person, place, and time. She appears well-developed and well-nourished.   HENT:   Head: Normocephalic and atraumatic.   Eyes: EOM are normal.   Pulmonary/Chest: Effort normal.   Neurological: She is alert and oriented to person, place, and time.   Psychiatric: She has a normal mood and affect. Her behavior is normal.           T scan it was reviewed by me personally.  There is no rotational abnormality.  There is some nonspecific synovitis.            Assessment:       Encounter Diagnoses   Name Primary?    Status post total right knee replacement Yes    Chronic pain of right knee           Plan:       Diagnoses and all orders for this visit:    Status post total right knee replacement    Chronic pain of right knee    Other orders  -     meloxicam (MOBIC) 15 MG tablet; Take 1 tablet (15 mg total) by mouth once daily.        Tiarra Norton was given a prescription for Meloxicam.  The patient was given instructions on how to take the medication and side effects were discussed.  F/U in 6 weeks

## 2025-02-25 ENCOUNTER — PATIENT MESSAGE (OUTPATIENT)
Dept: PAIN MEDICINE | Facility: CLINIC | Age: 70
End: 2025-02-25
Payer: MEDICARE

## 2025-02-25 DIAGNOSIS — G89.4 CHRONIC PAIN SYNDROME: ICD-10-CM

## 2025-02-25 DIAGNOSIS — M51.369 DDD (DEGENERATIVE DISC DISEASE), LUMBAR: ICD-10-CM

## 2025-02-25 DIAGNOSIS — M25.561 CHRONIC PAIN OF RIGHT KNEE: ICD-10-CM

## 2025-02-25 DIAGNOSIS — Z96.89 SPINAL CORD STIMULATOR STATUS: ICD-10-CM

## 2025-02-25 DIAGNOSIS — M41.25 OTHER IDIOPATHIC SCOLIOSIS, THORACOLUMBAR REGION: ICD-10-CM

## 2025-02-25 DIAGNOSIS — Z96.651 HISTORY OF TOTAL KNEE ARTHROPLASTY, RIGHT: ICD-10-CM

## 2025-02-25 DIAGNOSIS — G89.29 CHRONIC PAIN OF RIGHT KNEE: ICD-10-CM

## 2025-02-25 NOTE — TELEPHONE ENCOUNTER
Patient requesting refill on: Hydrocodone  Last office visit: 02.03.25   shows last refill on: 02.14.25  Patient does have a pain contract on file with [facility]  Patient last UDS: 01.23.24 was not consistent with current therapy.

## 2025-02-27 DIAGNOSIS — Z96.651 HISTORY OF TOTAL KNEE ARTHROPLASTY, RIGHT: ICD-10-CM

## 2025-02-27 DIAGNOSIS — G89.29 CHRONIC PAIN OF RIGHT KNEE: ICD-10-CM

## 2025-02-27 DIAGNOSIS — G89.4 CHRONIC PAIN SYNDROME: ICD-10-CM

## 2025-02-27 DIAGNOSIS — Z96.89 SPINAL CORD STIMULATOR STATUS: ICD-10-CM

## 2025-02-27 DIAGNOSIS — M41.25 OTHER IDIOPATHIC SCOLIOSIS, THORACOLUMBAR REGION: ICD-10-CM

## 2025-02-27 DIAGNOSIS — M25.561 CHRONIC PAIN OF RIGHT KNEE: ICD-10-CM

## 2025-02-27 DIAGNOSIS — M51.369 DDD (DEGENERATIVE DISC DISEASE), LUMBAR: ICD-10-CM

## 2025-02-27 RX ORDER — HYDROCODONE BITARTRATE AND ACETAMINOPHEN 5; 325 MG/1; MG/1
1 TABLET ORAL EVERY 12 HOURS PRN
Qty: 60 TABLET | Refills: 0 | Status: SHIPPED | OUTPATIENT
Start: 2025-03-13 | End: 2025-04-12

## 2025-02-27 RX ORDER — HYDROCODONE BITARTRATE AND ACETAMINOPHEN 5; 325 MG/1; MG/1
1 TABLET ORAL EVERY 12 HOURS PRN
Qty: 60 TABLET | Refills: 0 | Status: SHIPPED | OUTPATIENT
Start: 2025-02-28 | End: 2025-03-30

## 2025-02-27 NOTE — TELEPHONE ENCOUNTER
Patient requesting refill on HYDROcodone-acetaminophen (NORCO) 5-325 m   Last office visit 02/03/25   shows last refill on 02/14/25  Patient does have a pain contract on file with [facility]  Patient last UDS 01/23/24    CODEINE Not Detected Not Detected    Comment: INTERPRETIVE INFORMATION: Codeine, U  Positive Cutoff: 40 ng/mL  Methodology: Mass Spectrometry   MORPHINE Not Detected Not Detected    Comment: INTERPRETIVE INFORMATION:Morphine, U  Positive Cutoff: 20 ng/mL  Methodology: Mass Spectrometry   6-ACETYLMORPHINE Not Detected Not Detected    Comment: INTERPRETIVE INFORMATION:6-acetylmorphine, U  Positive Cutoff: 20 ng/mL  Methodology: Mass Spectrometry   OXYCODONE Not Detected Not Detected    Comment: INTERPRETIVE INFORMATION:Oxycodone, U  Positive Cutoff: 40 ng/mL  Methodology: Mass Spectrometry   NOROYXCODONE Not Detected Not Detected    Comment: INTERPRETIVE INFORMATION:Noroxycodone, U  Positive Cutoff: 100 ng/mL  Methodology: Mass Spectrometry   OXYMORPHONE Not Detected Not Detected    Comment: INTERPRETIVE INFORMATION:Oxymorphone, U  Positive Cutoff: 40 ng/mL  Methodology: Mass Spectrometry   NOROXYMORPHONE Not Detected Not Detected    Comment: INTERPRETIVE INFORMATION:Noroxymorphone, U  Positive Cutoff: 100 ng/mL  Methodology: Mass Spectrometry   HYDROCODONE Not Detected Not Detected    Comment: INTERPRETIVE INFORMATION:Hydrocodone, U  Positive Cutoff: 40 ng/mL  Methodology: Mass Spectrometry   NORHYDROCODONE Not Detected Not Detected    Comment: INTERPRETIVE INFORMATION:Norhydrocodone, U  Positive Cutoff: 100 ng/mL  Methodology: Mass Spectrometry   HYDROMORPHONE Not Detected Not Detected    Comment: INTERPRETIVE INFORMATION:Hydromorphone, U  Positive Cutoff: 20 ng/mL  Methodology: Mass Spectrometry   BUPRENORPHINE Not Detected Not Detected    Comment: INTERPRETIVE INFORMATION:Buprenorphine, U  Positive Cutoff: 5 ng/mL  Methodology: Mass Spectrometry    NORUBPRENORPHINE Not Detected Not Detected    Comment: INTERPRETIVE INFORMATION:Norbuprenorphine, U  Positive Cutoff: 20 ng/mL  Methodology: Mass Spectrometry   FENTANYL Not Detected Not Detected    Comment: INTERPRETIVE INFORMATION:Fentanyl, U  Positive Cutoff: 2 ng/mL  Methodology: Mass Spectrometry   NORFENTANYL Not Detected Not Detected    Comment: INTERPRETIVE INFORMATION:Norfentanyl, U  Positive Cutoff: 2 ng/mL  Methodology: Mass Spectrometry   MEPERIDINE METABOLITE Not Detected Not Detected    Comment: INTERPRETIVE INFORMATION:Meperidine metabolite, U  Positive Cutoff: 50 ng/mL  Methodology: Mass Spectrometry   TAPENTADOL Not Detected Not Detected    Comment: INTERPRETIVE INFORMATION:Tapentadol, U  Positive Cutoff: 100 ng/mL  Methodology: Mass Spectrometry   TAPENTADOL-O-SULF Not Detected Not Detected    Comment: INTERPRETIVE INFORMATION:Tapentadol-o-Sulf, U  Positive Cutoff: 200 ng/mL  Methodology: Mass Spectrometry   METHADONE Negative Not Detected    Comment: Presumptive negative by immunoassay. Testing by mass  spectrometry is available on request.  INTERPRETIVE INFORMATION: Methadone Screen, U  Positive Cutoff: 150 ng/mL  Methodology: Immunoassay   TRAMADOL Negative Not Detected    Comment: Presumptive negative by immunoassay. Testing by mass  spectrometry is available on request.  INTERPRETIVE INFORMATION:Tramadol Screen, U  Positive Cutoff: 100 ng/mL  Methodology: Immunoassay   AMPHETAMINE Not Detected Not Detected    Comment: INTERPRETIVE INFORMATION:Amphetamine, U  Positive Cutoff: 50 ng/mL  Methodology: Mass Spectrometry   METHAMPHETAMINE Not Detected Not Detected    Comment: INTERPRETIVE INFORMATION:Methamphetamine, U  Positive Cutoff: 200 ng/mL  Methodology: Mass Spectrometry   MDMA- ECSTASY Not Detected Not Detected    Comment: INTERPRETIVE INFORMATION:MDMA, U  Positive Cutoff: 200 ng/mL  Methodology: Mass Spectrometry   MDA Not Detected Not Detected    Comment: INTERPRETIVE INFORMATION:MDA,  U  Positive Cutoff: 200 ng/mL  Methodology: Mass Spectrometry   MDEA- Amber Not Detected Not Detected    Comment: INTERPRETIVE INFORMATION:MDEA, U  Positive Cutoff: 200 ng/mL  Methodology: Mass Spectrometry   METHYLPHENIDATE Not Detected Present    Comment: INTERPRETIVE INFORMATION:Methylphenidate, U  Positive Cutoff: 100 ng/mL  Methodology: Mass Spectrometry   PHENTERMINE Not Detected Not Detected    Comment: INTERPRETIVE INFORMATION:Phentermine, U  Positive Cutoff: 100 ng/mL  Methodology: Mass Spectrometry   BENZOYLECGONINE Negative Not Detected    Comment: Presumptive negative by immunoassay. Testing by mass  spectrometry is available on request.  INTERPRETIVE INFORMATION:Cocaine Screen, U  Positive Cutoff: 150 ng/mL  Methodology: Immunoassay   ALPRAZOLAM Present Present    Comment: INTERPRETIVE INFORMATION:Alprazolam, U  Positive Cutoff: 40 ng/mL  Methodology: Mass Spectrometry   ALPHA-OH-ALPRAZOLAM Present Present    Comment: INTERPRETIVE INFORMATION:Alpha-OH-Alprazolam, U  Positive Cutoff: 20 ng/mL  Methodology: Mass Spectrometry   CLONAZEPAM Not Detected Not Detected    Comment: INTERPRETIVE INFORMATION:Clonazepam, U  Positive Cutoff: 20 ng/mL  Methodology: Mass Spectrometry   7-AMINOCLONAZEPAM Not Detected Not Detected    Comment: INTERPRETIVE INFORMATION:7-Aminoclonazepam, U  Positive Cutoff: 40 ng/mL  Methodology: Mass Spectrometry   DIAZEPAM Not Detected Not Detected    Comment: INTERPRETIVE INFORMATION:Diazepam, U  Positive Cutoff: 50 ng/mL  Methodology: Mass Spectrometry   NORDIAZEPAM Not Detected Not Detected    Comment: INTERPRETIVE INFORMATION:Nordiazepam, U  Positive Cutoff: 50 ng/mL  Methodology: Mass Spectrometry   OXAZEPAM Not Detected Not Detected    Comment: INTERPRETIVE INFORMATION:Oxazepam, U  Positive Cutoff: 50 ng/mL  Methodology: Mass Spectrometry   TEMAZEPAM Not Detected Not Detected    Comment: INTERPRETIVE INFORMATION:Temazepam, U  Positive Cutoff: 50 ng/mL  Methodology: Mass Spectrometry    Lorazepam Not Detected Not Detected    Comment: INTERPRETIVE INFORMATION:Lorazepam, U  Positive Cutoff: 60 ng/mL  Methodology: Mass Spectrometry   MIDAZOLAM Not Detected Not Detected    Comment: INTERPRETIVE INFORMATION:Midazolam, U  Positive Cutoff: 20 ng/mL  Methodology: Mass Spectrometry   ZOLPIDEM Not Detected Not Detected    Comment: INTERPRETIVE INFORMATION:Zolpidem, U  Positive Cutoff: 20 ng/mL  Methodology: Mass Spectrometry   BARBITURATES Negative Not Detected    Comment: Presumptive negative by immunoassay. Testing by mass  spectrometry is available on request.  INTERPRETIVE INFORMATION:Barbiturates Screen, U  Positive Cutoff: 200 ng/mL  Methodology: Immunoassay   Creatinine, Urine 40.6 58.6 190.0 R, CM   ETHYL GLUCURONIDE Negative Present    Comment: Presumptive negative by immunoassay. Testing by mass  spectrometry is available on request.  INTERPRETIVE INFORMATION:Ethyl Glucuronide Screen, U  Positive Cutoff: 500 ng/mL  Methodology: Immunoassay   MARIJUANA METABOLITE PresumptivePOS Not Detected    Comment: Presumptive positive by immunoassay. Testing by mass  spectrometry is available on request.  INTERPRETIVE INFORMATION: THC (Cannabinoids) Screen, U  Positive Cutoff: 50 ng/mL  Methodology: Immunoassay   PCP Negative Not Detected    Comment: Presumptive negative by immunoassay. Testing by mass  spectrometry is available on request.  INTERPRETIVE INFORMATION:Phencyclidine Screen, U  Positive Cutoff: 25 ng/mL  Methodology: Immunoassay   CARISOPRODOL Negative Not Detected CM    Comment: Presumptive negative by immunoassay. Testing by mass  spectrometry is available on request.  INTERPRETIVE INFORMATION: Carisoprodol Screen, U  Positive Cutoff: 100 ng/mL  Methodology: Immunoassay  The carisoprodol immunoassay has cross-reactivity to  carisoprodol and meprobamate.   Naloxone Not Detected Not Detected    Comment: INTERPRETIVE INFORMATION:Naloxone, U  Positive Cutoff: 100 ng/mL  Methodology: Mass  Spectrometry   Gabapentin Not Detected Present    Comment: INTERPRETIVE INFORMATION:Gabapentin, U  Positive Cutoff: 3,000 ng/mL  Methodology: Mass Spectrometry   Pregabalin Not Detected Not Detected    Comment: INTERPRETIVE INFORMATION:Pregabalin, U  Positive Cutoff: 3,000 ng/mL  Methodology: Mass Spectrometry   Alpha-OH-Midazolam Not Detected Not Detected    Comment: INTERPRETIVE INFORMATION:Alpha-OH-Midazolam, U  Positive Cutoff: 20 ng/mL  Methodology: Mass Spectrometry   Zolpidem Metabolite Not Detected Not Detected    Comment: INTERPRETIVE INFORMATION:Zolpidem Metabolite, U  Positive Cutoff: 100 ng/mL  Methodology: Mass Spectrometry   PAIN MANAGEMENT DRUG PANEL See Below See Below CM    Comment: Methodology: Qualitative Enzyme Immunoassay and Qualitative

## 2025-02-27 NOTE — PROGRESS NOTES
" [facility]   Physical Therapy Treatment Note      Name: Tiarra Norton  Clinic Number: [id number]    Therapy Diagnosis:   Encounter Diagnoses   Name Primary?    Decreased range of motion (ROM) of right knee Yes    Weakness of right lower extremity     Decreased range of motion of trunk and back     Decreased strength of trunk and back     Weakness of trunk musculature        Physician: Virgil Scott MD    Visit Date: 2/20/2025    Physician Orders: PT Eval and Treat   Medical Diagnosis from Referral:   Z96.651 (ICD-10-CM) - Status post total right knee replacement   M22.2X1 (ICD-10-CM) - Patellofemoral pain syndrome of right knee   M70.50 (ICD-10-CM) - Pes anserine bursitis      Evaluation Date: 12/21/2023  Authorization Period Expiration: 12/31/2025  Plan of Care Expiration: Updated to 4/13/2025  Visit # / Visits authorized: 55/70   Progress Note Due: 3/13/2024  FOTO: 3/4     FOTO NEXT VISIT     Precautions: hx of TKA and menisectomy, spinal cord stimulator, scoliosis     Time In: 830 am  Time Out: 930 am   Total Billable Time: 30 minutes (1:1)  Total Appointment Time (timed & untimed codes): 60 minutes    PTA Visit #: 0/5   Subjective   Patient reports: she feels a little better today than last visit, but still with constant knee and back pain, back still sore from spinal cord stimulator revision    She was compliant with home exercise program.  Response to previous treatment: no adverse effects   Functional change: improved tolerance to functional mobility activities with decreased pain    Pain: 7/10 R knee, 5/10 LB  Location: R low back and R knee   Objective             Treatment   Tiarra received the treatments listed below:      Therapeutic exercises to develop strength, endurance, ROM, flexibility, posture, and core stabilization for 25 minutes including:    Recumbent bike lv. 4 x 5 min for endurance and knee ROM     PPT 15x5"  Bridge 2x10x3"  EIL 2x10  DKTC 15x5"'  Open " "books x15,3" ea  Hip flexor stretch 2x1' ea  Leg press # 2x10  Leg press SL 80# 2x10  EIS 2x10    Dry needling applied to the: B LB  for 10 minutes, including:     FDN. Educated pt to use heat following treatment sessions if pt is experiencing pain or soreness. Pt verbalized good understanding of education. Pt signed written consent to dry needling. Pt gave verbal consent for DN     Pt received dry needling to the below listed muscles using 50 mm needles.     B Lumbar paraspinals L1-L5      Neuromuscular re-education activities to improve: Balance, Coordination, Kinesthetic, Proprioception, and Posture for 15 minutes. The following activities were included:    Sit to stands GTB around knees 2x10  Side stepping GTB 2x12 steps  Monster walks GTB 2x12 steps  Single leg balance 3x30" R only  Step ups fwd/lateral  6 in step 2x10x3" ea R only   Medx trunk extension 74# x20  Medx trunk rotation 34# x20 increase weight       therapeutic activities to improve functional performance for 0  minutes, including:      Hot pack for 5 minutes to R knee and low back    Patient Education and Home Exercises     Education provided:   - HEP, POC    Written Home Exercises Provided: Patient instructed to cont prior HEP. Exercises were reviewed and Tiarra was able to demonstrate them prior to the end of the session.  Tiarra demonstrated good  understanding of the education provided. See Electronic Medical Record under Patient Instructions for exercises provided during therapy sessions    Assessment   Tiarra presents today with continued complaints of back and knee pain. Dry needling applied to lumbar spine with decreased tightness and soreness reported after application. Pt with improved exercise tolerance today so some previously held exercises were resumed. Assess response to exercise progressions at next follow-up.    Tiarra Is progressing well towards her goals.   Patient prognosis is Good.     Patient will continue to benefit from " skilled outpatient physical therapy to address the deficits listed in the problem list box on initial evaluation, provide pt/family education and to maximize pt's level of independence in the home and community environment.     Patient's spiritual, cultural and educational needs considered and pt agreeable to plan of care and goals.     Anticipated barriers to physical therapy: chronicity of condition, multiple treatment areas, hx of R TKA and menisectomy     GOALS: Short Term Goals:  4 weeks  1. Report decreased in pain at worse less than  <   / =  4  /10  to increase tolerance for functional activities.On going  2. Pt to increase B popliteal angle by greater than > or = 5 degrees in order to improve flexibility and posture. MET  3. Increased R LE MMT 1/2  to increase tolerance for ADL and work activities. MET  4. Pt to increase B Ely's Test by greater than  > or = 5degrees in order to improve flexibility and posture. MET  5. Pt to tolerate HEP to improve ROM and independence with ADL's. MET  6. Pt to improve range of motion by 25% to allow for improved functional mobility to allow for improvement in IADLs. MET     Long Term Goals: 8 weeks  1. Report decreased in pain at worse less than  <   / =  2  /10  to increase tolerance for functional mobility.  On going  2 .Pt to increase B popliteal angle by greater than > or = 10 degrees in order to improve flexibility and posture. On going  3. Increased R LE MMT 1 grade to increase tolerance for ADL and work activities.On going  4. Pt will report at 51% or better score on FOTO Lumbar spine survey  to demonstrate decrease in disability and improvement in back pain.On going  5. Pt to be Independent with HEP to improve ROM and independence with ADL's. On going  6. Pt to increase B Ely's Test by greater than > or = 10 degrees in order to improve flexibility and posture. On going  7. Pt to demonstrate negative Bridge Test in order to show improved core strength for lumbar  stabilization. On going  8. Pt to improve range of motion by 75% to allow for improved functional mobility to allow for improvement in IADLs. On going        Plan   Plan of care Certification: 2/13/2025 to 4/13/2025     Outpatient Physical Therapy 2 times weekly for 10 weeks to include the following interventions: Cervical/Lumbar Traction, Gait Training, Manual Therapy, Moist Heat/ Ice, Neuromuscular Re-ed, Patient Education, Self Care, Therapeutic Activities, and Therapeutic Exercise. Marybeth Anaya, PT   2/27/2025

## 2025-03-01 ENCOUNTER — PATIENT MESSAGE (OUTPATIENT)
Dept: INTERNAL MEDICINE | Facility: CLINIC | Age: 70
End: 2025-03-01
Payer: MEDICARE

## 2025-03-07 NOTE — TELEPHONE ENCOUNTER
Repeat TSH ordered for confirmation since so out of range and prior levels have been fine   [Alert] : alert [FreeTextEntry3] : eczematous plaques on face, trunk, extremities mild thin pink patches on buttocks and mons sparing inguinal folds xerosis

## 2025-03-10 ENCOUNTER — TELEPHONE (OUTPATIENT)
Dept: OBSTETRICS AND GYNECOLOGY | Facility: CLINIC | Age: 70
End: 2025-03-10
Payer: MEDICARE

## 2025-03-10 ENCOUNTER — HOSPITAL ENCOUNTER (OUTPATIENT)
Dept: SLEEP MEDICINE | Facility: HOSPITAL | Age: 70
Discharge: HOME | End: 2025-03-10
Attending: PSYCHIATRY & NEUROLOGY
Payer: MEDICARE

## 2025-03-10 DIAGNOSIS — G47.30 SLEEP APNEA, UNSPECIFIED TYPE: ICD-10-CM

## 2025-03-10 PROCEDURE — 95810 POLYSOM 6/> YRS 4/> PARAM: CPT

## 2025-03-11 ENCOUNTER — OFFICE VISIT (OUTPATIENT)
Dept: PAIN MEDICINE | Facility: CLINIC | Age: 70
End: 2025-03-11
Payer: MEDICARE

## 2025-03-11 ENCOUNTER — OFFICE VISIT (OUTPATIENT)
Dept: INTERNAL MEDICINE | Facility: CLINIC | Age: 70
End: 2025-03-11
Payer: MEDICARE

## 2025-03-11 VITALS
HEIGHT: 63 IN | BODY MASS INDEX: 27.03 KG/M2 | HEART RATE: 74 BPM | DIASTOLIC BLOOD PRESSURE: 58 MMHG | SYSTOLIC BLOOD PRESSURE: 110 MMHG | WEIGHT: 152.56 LBS | OXYGEN SATURATION: 98 %

## 2025-03-11 VITALS
BODY MASS INDEX: 27.02 KG/M2 | DIASTOLIC BLOOD PRESSURE: 58 MMHG | WEIGHT: 152.56 LBS | HEART RATE: 75 BPM | SYSTOLIC BLOOD PRESSURE: 100 MMHG

## 2025-03-11 DIAGNOSIS — F13.20 BENZODIAZEPINE DEPENDENCE: ICD-10-CM

## 2025-03-11 DIAGNOSIS — E66.3 OVERWEIGHT WITH BODY MASS INDEX (BMI) OF 27 TO 27.9 IN ADULT: ICD-10-CM

## 2025-03-11 DIAGNOSIS — F33.2 MAJOR DEPRESSIVE DISORDER, RECURRENT SEVERE WITHOUT PSYCHOTIC FEATURES: ICD-10-CM

## 2025-03-11 DIAGNOSIS — G89.29 CHRONIC LOW BACK PAIN WITHOUT SCIATICA, UNSPECIFIED BACK PAIN LATERALITY: ICD-10-CM

## 2025-03-11 DIAGNOSIS — M47.816 LUMBAR SPONDYLOSIS: ICD-10-CM

## 2025-03-11 DIAGNOSIS — M79.18 MYOFASCIAL PAIN: ICD-10-CM

## 2025-03-11 DIAGNOSIS — G89.4 CHRONIC PAIN SYNDROME: Primary | ICD-10-CM

## 2025-03-11 DIAGNOSIS — Z96.89 SPINAL CORD STIMULATOR STATUS: ICD-10-CM

## 2025-03-11 DIAGNOSIS — E28.39 ESTROGEN DEFICIENCY: ICD-10-CM

## 2025-03-11 DIAGNOSIS — I70.0 AORTIC ATHEROSCLEROSIS: ICD-10-CM

## 2025-03-11 DIAGNOSIS — M54.50 CHRONIC LOW BACK PAIN WITHOUT SCIATICA, UNSPECIFIED BACK PAIN LATERALITY: ICD-10-CM

## 2025-03-11 DIAGNOSIS — R60.9 EDEMA, UNSPECIFIED TYPE: ICD-10-CM

## 2025-03-11 DIAGNOSIS — G47.33 OSA (OBSTRUCTIVE SLEEP APNEA): ICD-10-CM

## 2025-03-11 DIAGNOSIS — M41.25 OTHER IDIOPATHIC SCOLIOSIS, THORACOLUMBAR REGION: ICD-10-CM

## 2025-03-11 PROCEDURE — 99214 OFFICE O/P EST MOD 30 MIN: CPT | Mod: PBBFAC | Performed by: NURSE PRACTITIONER

## 2025-03-11 PROCEDURE — 99214 OFFICE O/P EST MOD 30 MIN: CPT | Mod: PBBFAC,27 | Performed by: INTERNAL MEDICINE

## 2025-03-11 PROCEDURE — 99999 PR PBB SHADOW E&M-EST. PATIENT-LVL IV: CPT | Mod: PBBFAC,,, | Performed by: INTERNAL MEDICINE

## 2025-03-11 PROCEDURE — 99214 OFFICE O/P EST MOD 30 MIN: CPT | Mod: S$PBB,,, | Performed by: NURSE PRACTITIONER

## 2025-03-11 PROCEDURE — 99999 PR PBB SHADOW E&M-EST. PATIENT-LVL IV: CPT | Mod: PBBFAC,,, | Performed by: NURSE PRACTITIONER

## 2025-03-11 RX ORDER — CYCLOBENZAPRINE HCL 10 MG
10 TABLET ORAL 3 TIMES DAILY PRN
Qty: 90 TABLET | Refills: 1 | Status: SHIPPED | OUTPATIENT
Start: 2025-03-11 | End: 2025-05-10

## 2025-03-11 RX ORDER — LIDOCAINE 50 MG/G
OINTMENT TOPICAL 3 TIMES DAILY PRN
Qty: 30 G | Refills: 2 | Status: SHIPPED | OUTPATIENT
Start: 2025-03-11

## 2025-03-11 RX ORDER — TIRZEPATIDE 5 MG/.5ML
5 INJECTION, SOLUTION SUBCUTANEOUS
Qty: 4 PEN | Refills: 0 | Status: SHIPPED | OUTPATIENT
Start: 2025-03-11

## 2025-03-11 NOTE — PROGRESS NOTES
Edcuation was done via explanation of sleep study process and procedure. All questions were answered.    Pt did not meet criteria for CPAP. Very few respiratory events were observed. REM sleep was obtained.    Low sat of 87% was observed in study. EKG revealed NSR. Soft snoring was heard. Thank you letter was given in a.m.

## 2025-03-11 NOTE — PROGRESS NOTES
Subjective     Patient ID: Tiarra Norton is a 70 y.o. female.    Chief Complaint: Annual Exam    HPI  2nd sleep study last night.    HERMINIA - she hates cpap mask.    Back pain still severe.  Stimulator was moved.  Site of old spot is painful.      The pain for which stimulator was placed has improved, however.       Zepbound x 1 mo.  No wt loss.  IBS is actually better - less diarrhea.    Her insurance covers since she has HERMINIA.    Chronic MDD.  Takes sm dose Lexapro.  She doesn't thinks meds help.     Sees psychiatrist and therapist regularly.    She takes xanax at least once a day - plans to taper after son's wedding.    Htn well controlled today 110/58..    139/79 is dig htn average, however.     She is taking spironolactone..       Intermittent edema.  Amlodipine dose decr from 10 to 5 mg.    She thinks she is taking valsartan but Epic doesn't show recent dispense date..    Hypothyroidism, taking levothyroxine.      Review of Systems   Constitutional:  Negative for fever and unexpected weight change.   HENT:  Negative for nasal congestion and postnasal drip.    Respiratory:  Negative for chest tightness, shortness of breath and wheezing.    Cardiovascular:  Positive for leg swelling. Negative for chest pain.   Gastrointestinal:  Negative for abdominal pain, anal bleeding, constipation, diarrhea, nausea and vomiting.   Genitourinary:  Negative for dysuria and urgency.   Integumentary:  Negative for rash.   Neurological:  Negative for headaches.   Psychiatric/Behavioral:  Positive for depressed mood and sleep disturbance. Negative for dysphoric mood. The patient is not nervous/anxious.           Objective     Physical Exam  Constitutional:       General: She is not in acute distress.     Appearance: She is well-developed. She is not ill-appearing, toxic-appearing or diaphoretic.   HENT:      Head: Normocephalic and atraumatic.      Right Ear: External ear normal.      Left Ear: External ear normal.      Nose:  Nose normal.   Eyes:      General: No scleral icterus.        Right eye: No discharge.         Left eye: No discharge.      Conjunctiva/sclera: Conjunctivae normal.      Pupils: Pupils are equal, round, and reactive to light.   Neck:      Thyroid: No thyromegaly.      Vascular: No JVD.   Cardiovascular:      Rate and Rhythm: Normal rate and regular rhythm.      Heart sounds: Normal heart sounds. No murmur heard.     No gallop.   Pulmonary:      Effort: Pulmonary effort is normal. No respiratory distress.      Breath sounds: Normal breath sounds. No stridor. No wheezing, rhonchi or rales.   Abdominal:      General: Bowel sounds are normal. There is no distension.      Palpations: Abdomen is soft. There is no mass.      Tenderness: There is no abdominal tenderness. There is no guarding or rebound.      Hernia: No hernia is present.   Musculoskeletal:         General: No tenderness. Normal range of motion.      Cervical back: Normal range of motion and neck supple.      Right lower leg: No edema.      Left lower leg: No edema.   Lymphadenopathy:      Cervical: No cervical adenopathy.   Skin:     General: Skin is warm and dry.      Findings: No rash.   Neurological:      Mental Status: She is alert and oriented to person, place, and time.      Cranial Nerves: No cranial nerve deficit.      Coordination: Coordination normal.   Psychiatric:         Mood and Affect: Mood normal.         Behavior: Behavior normal.         Thought Content: Thought content normal.         Judgment: Judgment normal.            Assessment and Plan     1. BMI 27.0-27.9,adult    2. HERMINIA (obstructive sleep apnea)  -     tirzepatide, weight loss, (ZEPBOUND) 5 mg/0.5 mL PnIj; Inject 5 mg into the skin every 7 days.  Dispense: 4 Pen; Refill: 0    3. Overweight with body mass index (BMI) of 27 to 27.9 in adult  -     tirzepatide, weight loss, (ZEPBOUND) 5 mg/0.5 mL PnIj; Inject 5 mg into the skin every 7 days.  Dispense: 4 Pen; Refill: 0    4. Estrogen  deficiency  -     DXA Bone Density Axial Skeleton 1 or more sites; Future; Expected date: 03/11/2025    5. Chronic low back pain without sciatica, unspecified back pain laterality    6. Edema, unspecified type    7. Benzodiazepine dependence  Assessment & Plan:  Takes 1  xanax daily for anxiety, but plans to wean soon      8. Major depressive disorder, recurrent severe without psychotic features    9. Aortic atherosclerosis  Assessment & Plan:  Tx with atorvastatin  Asymptomatic.                  Follow up in about 3 months (around 6/11/2025) for virtual visit.    Incr zepbound dose 1 mo if necessary    see pt instructions

## 2025-03-11 NOTE — PROGRESS NOTES
Chronic Pain-Established Visit    SUBJECTIVE:    Interval History 3/11/2025:  The patient returns to clinic today for follow up of back pain. She continues to report pain near former battery site. She denies any pain near new battery site. She does have benefit with SCS. She is taking Norco as needed for severe pain with benefit. She denies any adverse effects. She denies any other health changes. Her pain today is 8/10.    Interval History 2/3/2025:  The patient is seen today for follow-up of back pain and postoperative evaluation after spinal cord stimulator (SCS) battery site revision. The revision was performed due to migration of the battery into the lower buttock. She reports that the new battery site feels much better, and she is optimistic that she will be pain-free once the surgical site fully heals. Previous imaging and lab results were reviewed.    Interval History 12/2/2024:  Ms. Mayen returns to clinic today for follow up of back pain via virtual visit. She reports worsened pain around her SCS battery site. She is having difficulty sleeping due to the pain. On a recent vacation, she had to sleep in a recliner. She also has pain with sitting on a airplane and in the car. She does have benefit with the device. She is interested in moving forward with battery revision. She is taking Norco as needed with benefit and without adverse effects. She denies any other health changes.     Interval History 10/15/2024:  The patient returns to clinic today for follow up of back pain. She is s/p TPI around SCS battery site on 10/1/2024. She reports one day of relief. She continues to report pocket pain around her SCS site. She did meet with [employer] SCS representative for programming. This has been beneficial. She also did dry needling with benefit. She does not wish to pursue battery revision at this time. She continues to take Norco as needed with benefit. She denies any adverse effects. She denies any other  health changes. Her pain today is 6/10.    Interval History 8/19/2024:  The patient returns to clinic today for follow up of back pain. She continues to report pocket pain around her SCS site. She cannot lie flat on her back due to pain. She also notes pain with prolonged standing and activity. She does have benefit with SCS. She reports intermittent right knee pain. She is currently taking Norco as needed with some benefit. She is performing  home exercises. She denies any other health changes. Her pain today is 8/10.    Interval History 6/24/2024:  The patient returns to clinic today for follow up of back and knee pain via virtual visit. She continues to report low back pain. This is worse with prolonged standing. She denies any radicular leg pain. She did recently meet with [employer] representatives for programming. She reports limited relief with these changes. She is participating in physical therapy. She is taking Norco as needed with benefit. She denies any adverse effects. She denies any other health changes.     Interval History 3/25/2024:  The patient returns to clinic today for follow up of back and knee pain. She is s/p right genicular RFA on 3/15/2024. She reports 50% relief. She continues to report some right knee pain.She continues to report low back pain. She is using her SCS. She does have intermittent pain around IPG site. She is participating in physical therapy. She is taking Norco as needed with benefit. She denies any adverse effects. She denies any other health changes. Her pain today is 4/10.    Interval History 1/23/2024:  Tiarra Norton presents for folow-up for lower back pain s/p IPG revision 11/20/2023.  She reports that the pocket pain is significantly improved, continues to get better.  She is just now feeling well enough to participate in physical therapy which she has today.  Today she also complains of right knee pain. She had a right TKA on 5/18/2023. .  The patient  describes the pain as aching. The pain is constant. Exacerbating factors: bending, cooking, standing for a long time, walking.  Mitigating factors: ice, heat, medications.  The patient takes Tylenol 500 mg PRN with mild benefit.  They deny any perceived side effects.  She is hoping for an additional prescription for Norco 5-325 mg which she had taken over the recovery period from the IPG revision.  She states that the medication helps her with activity.  The symptoms interfere with ADLs.  The patient last had imaging Xray bilateral knees on 10/26/2023. See findings below.  The patient denies fever/night sweats, urinary incontinence, bowel incontinence, significant weight changes, significant motor weakness or changes, or loss of sensations.  Today's pain score is 3/10 for back pain and 7/10 for right knee pain.     RTA 5/18/23, activity restrictions from IPG revision was not able to do PT, starting PT for back and right knee back today.  Wondering if there are any procedures we can do for knee.     Wants Norco 5-325 mg-helps with activity  Pain ext and flex  Ptp superior anterior    Interval History 10/25/2023  67 y/o female with hx of chronic left sided low back pain near IPG, she is s/p Octad electrode x2  placement of pulse generator 3  on 1/9 she recently was provided a recent TPI near left low back she reports 0% relief following.  Pain is worse when standing walking performing ADLs.  She does state she gets 5 hours of uninterrupted sleep.  Like her left low back is weak and aching.  Tingling any wearing her low back.  In the right side of her low back.  To discuss other pain interventions and the plan of care moving forward regard to her current subacute pain.  She denies any profound weakness denies any bowel bladder dysfunction denies any saddle anesthesia at this time.    Interval History 9/12/2023:  The patient returns to clinic today for follow up of battery site pain via virtual visit. She is s/p trigger  point injections under ultrasound on 8/29/2023. She does feel some benefit. She is currently ill with Covid. She is currently running fever and having body aches. She denies any other health changes.     Interval History 8/11/2023:  The patient returns to clinic today for follow up of pain. She reports pain around her SCS battery site. She describes the pain as though her muscles feel weak and tired. She does report benefit with SCS. No difficulty with charging or programming. She has tried Lidoderm and Salonpas patches but these caused a rash. She is currently participating in physical therapy for her knee. She denies any other health changes. Her pain today is 4/10.    Interval History 4/14/2023:  The patient is here for follow up of back pain and pain at IPG site. She reports improvement since previous visit. She did have benefit with Lidoderm patches but had a rash so she stopped. Her pain has improved with Salon Pas patches. She is scheduled for knee replacement next month with Dr. Scott. Her pain today is 3/10.    Interval History 3/21/2023:  The patient presents to discuss pain to IPG site. She says that it has been present since surgery and has gradually worsened. She says that it feels like a tight and weak muscle. She has not tried any topical medications or muscle relaxants. No fever or malaise. She has not noticed any redness or swelling to the site. Her original pain has significantly improved from SCS implant. She is part of a research study. Her pain today is 6/10.    Interval History 2/17/2023:  The patient returns to clinic today for follow up of back pain. She reports benefit with [employer] SCS. She is in contact with representatives for programming. She is very happy with relief. She denies any fever, chills, or drainage. She continues to follow her activity restrictions. She denies any other health changes. Her pain today is 2/10.    Interval History 1/31/2023:  The patient returns to  clinic today for wound check. She reports benefit with [employer] SCS. She is in contact with representatives for programming. She reports intermittent muscle soreness into her legs. She does have an upcoming appointment with representatives for programming. She denies any fever, chills, or drainage. She continues to follow her activity restrictions. She denies any other health changes. Her pain today is 3/10.    Interval History 1/17/2023:  The patient returns to clinic today for post op. She is s/p [employer] SCS implant on 1/9/2022. She reports benefit with the device at this time. She is meeting with the representatives today. She does report soreness to her incisions. She denies any fever, chills, or drainage. She did have issues with her dressing staying on. She has completed her antibiotics. She denies any other health changes. Her pain today is 2/10.    Interval History 12/29/2022:  The patient returns for post op appointment. She is s/p lumbar [employer] SCS trial with 90% relief. She has had very minimal back pain during the trial period. She says that she was more active over this time due to the Christmas holiday and had minimal symptoms. She is very happy with the relief. She would like to move forward with implant. She is part of [employer] study. No fever or malaise during trial period. Her pain today is 1/10.    Interval History 10/14/22: Mrs. Norton presents today for follow up of chronic low back pain and to discuss the [employer] trial. She had her lumbar MRI done last night without significant changes to her spine. She did have increased dilation of her bile duct. Her pain today is the same in character and severity as during her last visit with us and she rates it currently at a 7/10. She continues to do her stretches on her own which have helped some. She presents with some questions regarding the trial.     Interval History 9/21/2022:  The patient returns to clinic today for follow up of back  pain via virtual visit. She received a letter from [facility] denying SCS trial. She is frustrated by this. She continues to report low back pain. She denies any radicular leg pain. Her pain is worse with bending and activity. She recently underwent med screening for the [employer] program. She is interested in pursuing this. She has tried Gabapentin and Lyrica in the past with side effects. She denies any other health changes. Her pain today is 7/10.     Interval History 8/22/22: Ms. Norton returns for follow up on her low back pain. At our last visit she felt cured by acupuncture. Unfortunately, this only lasted for 24 hours. She returns today looking for other options to make life livable. Patient claims all of her pain in the low back and middle back. We discussed that this will work best for her low back pain.     Interval History 7/25/22: Tiarra Norton returns for follow up. We previously have been discussing treatments for her low back pain that did not resolve with RFA. At our last visit I referred her to Dr. Antony for clearance for a SCS trial. She was considered to be a reasonable candidate. However, in the meantime, her PT referred her to acupuncture with Dr. Jacobs. She had one treatment and already notes 50% relief. She notes that she still has pain, especially with leaning forward, but feels that it is much better controlled. Their plan is one treatment per week but is approved for 20 sessions.     Interval History 6/15/22: Tiarra Norton returns for follow up. She continues to feel like she has mild pain sitting or laying down, and has her worst pain with extreme leaning forward to pick something up off the floor. She also has difficulty leaning over her handlebars to ride her bike or leaning forward to fold clothes or standing up for any period of time. So, we determined that leaning forward is her worst issue. We did previouisly do lmbb and rfa, which has helped with extension, but it  would not help with leaning forward. On review of her MRI she has significant multilevel DDD with Modic changes which likely account for her pain.     4/18/22 Interval History: Patient presents for telehealth visit for follow up following her b/l RFA at L3-L5. She reports 80% relief and is very pleased with her pain relief. Unfortunately, she is not back to doing what she wants due to right knee pain. She is seeing Dr. Huber for knee pain and is s/p right medial compartment partial knee replacement. She is currently in therapy for this. We discussed that we may be able to assist her with her knee pain as well.     HPI:  Original HISTORY OF PRESENT ILLNESS: Tiarra Norton presents to the clinic for the evaluation of the above pain. The pain started five years ago.     Original Pain Description:  The pain is located in the lower back and does radiate up towards her upper back.The pain is described as aching, dull and sharp. Initially starts as a dull, aching pain and it gets sharp once she bends down and moves around. Typically when she walks for more than five minutes, the sharp pain radiates up her back. Exacerbating factors: Standing, Bending, Touching, Walking, Night Time, Flexing and Lifting. Mitigating factors heat, ice, laying down and massage. Symptoms interfere with daily activity and sleeping. The patient feels like symptoms have been worsening. Patient denies night fever/night sweats, urinary incontinence, bowel incontinence, significant weight loss, significant motor weakness and loss of sensations.    Original PAIN SCORES:  Best: Pain is 1  Worst: Pain is 10  Usually: Pain is 4  Current: Pain is 4        3/11/2025     2:14 PM 2/3/2025     1:05 PM 12/17/2024     2:57 PM   Last 3 PDI Scores   Pain Disability Index (PDI) 56 [phone]     6 weeks of Conservative therapy (PT/Chiro/Home Exercises with Dates)  Healthy back program--> has four sessions left for a total of 20 sessions   Last session was on  1/31/22   Stretches that they taught at [email or web address] back gives her relief       Medications:   Robaxin PRN    Ice and heat which has helped   Tried Gabapentin and Lyrica- made her loopy      Report: reviewed       Interventional Pain Procedures:   10/17/2023 TPI left side near IPG battery 0% relief  2/8/2022- Bilateral L3,4,5 MBB  2/15/2022- Bilateral L3,4,5 MBB  3/8/2022- Right L3,4,5 RFA- 80% relief for a few months  3/22/2022- Left L3,4,5 RFA- 80% relief for a few months  12/22/22 SCS trial- 90% relief  1/9/2023- [employer] SCS implant.   11/20/2023- IPG revision  2/16/2024- Right genicular nerve block  3/15/2024- Right genicular RFA    Imaging:    EXAMINATION:  XR KNEE 3 VIEW RIGHT     CLINICAL HISTORY:  Presence of right artificial knee joint     TECHNIQUE:  AP, lateral, and Merchant views of the right knee were performed.     COMPARISON:  Right knee radiograph dated 06/12/2023     FINDINGS:  Prior right knee arthroplasty.  Hardware projects in similar alignment without evidence for interval loosening or failure.  No periprosthetic fracture.     Impression:     As above.        Electronically signed by: Azael Wilkins  Date:                                            11/03/2023  Time:                                           11:05      10/13/22: MRI LUMBAR SPINE WITHOUT CONTRAST  FINDINGS:  Lumbar levoscoliosis centered at L2.  Minimal anterolisthesis at L4-5.     No compression fractures.  No marrow replacing lesions.     Multilevel degenerative changes with disc desiccation and disc space narrowing, described in detail below.  Discogenic endplate edema at T12-L1.     The conus medullaris has a normal appearance and terminates at the L1 level.  Cauda equina nerve roots are unremarkable.  No epidural mass or collection.     The common duct is dilated up to 12 mm, previously 9 mm on 08/20/2022 CT.  Mild prominence of the main pancreatic duct up to 4 mm, which is new.  No definite filling defect in the  bile ducts.  Paraspinal muscle atrophy.     SIGNIFICANT FINDINGS BY LEVEL:     T12-L1: Disc bulge.  Bilateral facet arthropathy and ligamentum flavum thickening.  Mild canal stenosis.  Mild bilateral foraminal stenosis.     L1-2: Disc bulge.  Bilateral facet arthropathy and ligamentum flavum thickening.  Mild canal stenosis.  Mild right foraminal stenosis.     L2-3: Disc bulge.  Bilateral facet arthropathy and ligamentum flavum thickening.  Mild canal stenosis.  Moderate right mild left foraminal stenosis.     L3-4: Disc bulge.  Bilateral facet arthropathy and ligamentum flavum thickening.  Small bilateral facet joint effusions/synovitis.  Moderate canal stenosis with partial effacement of the thecal sac and crowding of the cauda equina nerve roots.  Mild right and moderate left foraminal stenosis.     L4-5: Disc bulge with posterior disc uncovering.  Bilateral facet arthropathy and ligamentum flavum thickening.  Small right facet joint effusion/synovitis.  Mild canal stenosis.  Mild bilateral foraminal stenosis.     L5-S1: Disc bulge.  Bilateral facet arthropathy and ligamentum flavum thickening.  No significant canal stenosis.  Mild left foraminal stenosis.     Impression:     Lumbar levoscoliosis and multilevel degenerative changes as detailed above.  No significant changes from 01/21/2022.     Increased biliary ductal dilatation up to 12 mm, greater than expected after cholecystectomy.  In addition, the main pancreatic duct is mildly prominent up to 4 mm, which is new.  If LFTs are abnormal, consider MRI/MRCP or ERCP for further evaluation.      Past Medical History:   Diagnosis Date    Cataract     Depression     Gestational diabetes     Hyperlipidemia     Hypertension     IBS (irritable bowel syndrome)     HERMINIA (obstructive sleep apnea)     no cpap use    Thyroid disease      Past Surgical History:   Procedure Laterality Date    ADENOIDECTOMY      ARTHROPLASTY, KNEE, TOTAL, USING COMPUTER-ASSISTED NAVIGATION  Right 2023    Procedure: CONVERSION ARTHROPLASTY, KNEE, TOTAL, USING COMPUTER-ASSISTED NAVIGATION;  Surgeon: Virgil Scott MD;  Location: [facility] OR;  Service: Orthopedics;  Laterality: Right;  Same day discharge    ARTHROSCOPIC CHONDROPLASTY OF KNEE JOINT Right 10/19/2021    Procedure: ARTHROSCOPY, KNEE, WITH CHONDROPLASTY;  Surgeon: Trevin Huber MD;  Location: [facility] OR;  Service: Orthopedics;  Laterality: Right;    ARTHROSCOPY OF KNEE Right 10/19/2021    Procedure: ARTHROSCOPY, KNEE-RIGHT;  Surgeon: Trevin Huber MD;  Location: [facility] OR;  Service: Orthopedics;  Laterality: Right;    BLOCK, NERVE, GENICULAR Right 2024    Procedure: BLOCK, NERVE RIGHT GENICULAR;  Surgeon: Shoaib Aguirre MD;  Location: [facility] PAIN MGT;  Service: Pain Management;  Laterality: Right;  [phone]     SECTION      CHOLECYSTECTOMY      COLONOSCOPY N/A 2016    Procedure: COLONOSCOPY;  Surgeon: Heri Segura MD;  [facility]  Service: Endoscopy;  Laterality: N/A;    COLONOSCOPY N/A 2019    Procedure: COLONOSCOPY;  Surgeon: Joe Pitts MD;  [facility]  Service: Endoscopy;  Laterality: N/A;    CYSTOSCOPY  2022    Procedure: CYSTOSCOPY;  Surgeon: Lenny Moore MD;  [address];  Service: Urology;;    ESOPHAGOGASTRODUODENOSCOPY N/A 2020    Procedure: EGD (ESOPHAGOGASTRODUODENOSCOPY);  Surgeon: Tolu Rivas MD;  [facility]  Service: Endoscopy;  Laterality: N/A;  Pt. moved to 9:15am arrival time.. Instructed to not have anything after midnight.EC    EYE SURGERY      cataract resection right    INJECTION OF ANESTHETIC AGENT AROUND NERVE Bilateral 2022    Procedure: Block, Nerve MEDIAL BRANCH BLOCK BILATERAL L3,4,5;  Surgeon: Tara Gibbs MD;  Location: [facility] PAIN MGT;  Service: Pain Management;  Laterality: Bilateral;    INJECTION OF ANESTHETIC AGENT AROUND NERVE Bilateral 2/15/2022    Procedure:  BLOCK, NERVE, L3*L4-L5 MEDIAL BR ANCH 2 OF 2;  Surgeon: Tara Gibbs MD;  Location: [facility] PAIN MGT;  Service: Pain Management;  Laterality: Bilateral;    INJECTION OF BOTULINUM TOXIN TYPE A  6/21/2022    Procedure: BOTULINUM TOXIN INJECTION 100 units;  Surgeon: Lenny Moore MD;  [facility];  Service: Urology;;    INSERTION, NEUROSTIMULATOR, SPINAL CORD N/A 1/9/2023    Procedure: SPINAL CORD STIMULATOR IMPLANT [employer] RESEARCH;  Surgeon: Shoaib Aguirre MD;  Location: [facility] OR;  Service: Pain Management;  Laterality: N/A;    JOINT REPLACEMENT      partial right knee    KNEE ARTHROSCOPY W/ MENISCECTOMY Right 10/19/2021    Procedure: ARTHROSCOPY, KNEE, WITH MENISCECTOMY, PARTIAL LATERAL;  Surgeon: Trevin Huber MD;  Location: [facility] OR;  Service: Orthopedics;  Laterality: Right;    r hand surgery      RADIOFREQUENCY ABLATION Right 3/8/2022    Procedure: RADIOFREQUENCY ABLATION, L3-L5 1 OF 2;  Surgeon: Tara Gibbs MD;  Location: [facility] PAIN MGT;  Service: Pain Management;  Laterality: Right;    RADIOFREQUENCY ABLATION Left 3/22/2022    Procedure: RADIOFREQUENCY ABLATION, l3-+l5 2 of 2;  Surgeon: Tara Gibbs MD;  Location: [facility]IN [facility]GT;  Service: Pain Management;  Laterality: Left;    RADIOFREQUENCY ABLATION Right 3/15/2024    Procedure: RADIOFREQUENCY ABLATION RIGHT GENICULAR;  Surgeon: Shoaib Aguirre MD;  Location: [facility] PAIN MGT;  Service: Pain Management;  Laterality: Right;  [phone]    REMOVAL OF NEUROSTIMULATOR N/A 11/20/2023    Procedure: SCS IPG BATTERY REVISION;  Surgeon: Shoaib Aguirre MD;  Location: [facility] OR;  Service: Pain Management;  Laterality: N/A;    REVISION PROCEDURE INVOLVING SPINAL CORD NEUROSTIMULATOR N/A 1/27/2025    Procedure: SPINAL CORD STIMULATOR BATTERY REVISION [employer] REP;  Surgeon: Shoaib Aguirre MD;  Location: [facility] OR;  Service: Pain Management;  Laterality: N/A;    TONSILLECTOMY      TOTAL KNEE ARTHROPLASTY      partial knee  replacement    TRIAL OF SPINAL CORD NERVE STIMULATOR N/A 12/22/2022    Procedure: SPINAL CORD STIMULATOR TRIAL [employer] RESEARCH;  Surgeon: Shoaib Aguirre MD;  Location: [facility];  Service: Pain Management;  Laterality: N/A;    TUBAL LIGATION       Social History     Socioeconomic History    Marital status:     Number of children: 1   Occupational History    Occupation:      Employer: [employer]     Comment: retired   Tobacco Use    Smoking status: Never    Smokeless tobacco: Never   Substance and Sexual Activity    Alcohol use: Yes     Alcohol/week: 3.0 standard drinks of alcohol     Types: 3 Glasses of wine per week     Comment: weekends    Drug use: Yes     Types: Marijuana     Comment: occasionally    Sexual activity: Yes     Partners: Male   Other Topics Concern    Are you pregnant or think you may be? No    Breast-feeding No   Social History Narrative    Retired        Swims.       Stationary bike.            Social Drivers of Health     Financial Resource Strain: Low Risk  (3/21/2024)    Overall Financial Resource Strain (CARDIA)     Difficulty of Paying Living Expenses: Not hard at all   Food Insecurity: No Food Insecurity (3/21/2024)    Hunger Vital Sign     Worried About Running Out of Food in the Last Year: Never true     Ran Out of Food in the Last Year: Never true   Transportation Needs: No Transportation Needs (3/21/2024)    PRAPARE - Transportation     Lack of Transportation (Medical): No     Lack of Transportation (Non-Medical): No   Physical Activity: Sufficiently Active (3/21/2024)    Exercise Vital Sign     Days of Exercise per Week: 4 days     Minutes of Exercise per Session: 40 min   Stress: Stress Concern Present (3/21/2024)    [employer] - Occupational Stress Questionnaire     Feeling of Stress : Rather much   Housing Stability: Patient Declined (3/21/2024)    Housing Stability Vital Sign     Unable to Pay for Housing in the Last  Year: Patient declined     Number of Places Lived in the Last Year: 1     Unstable Housing in the Last Year: Patient declined     Family History   Problem Relation Name Age of Onset    Hypertension Mother      Irritable bowel syndrome Mother      Hyperlipidemia Mother      Heart disease Father          mi    Hypertension Father      Cancer Father          prostate    Heart attack Father      Heart failure Father      Hyperlipidemia Father      Alcohol abuse Sister nathanael     Depression Sister nathanael     Epilepsy Son ari     Irritable bowel syndrome Sister martin     Alcohol abuse Sister martin     Ovarian cancer Maternal Aunt      Breast cancer Paternal Aunt      Breast cancer Maternal Aunt      Melanoma Neg Hx      Colon cancer Neg Hx      Stomach cancer Neg Hx      Esophageal cancer Neg Hx      Liver disease Neg Hx      Crohn's disease Neg Hx      Ulcerative colitis Neg Hx      Celiac disease Neg Hx      Stroke Neg Hx         Review of patient's allergies indicates:  No Known Allergies    Current Outpatient Medications   Medication Sig    alosetron (LOTRONEX) 0.5 MG tablet Take 1 tablet (0.5 mg total) by mouth in the morning and 1 tablet (0.5 mg total) in the evening.    ALPRAZolam (XANAX) 1 MG tablet Take 1 tablet by mouth three times daily    ALPRAZolam (XANAX) 1 MG tablet Take one tablet by mouth three times daily    amLODIPine (NORVASC) 10 MG tablet Take 1 tablet (10 mg total) by mouth once daily.    atorvastatin (LIPITOR) 10 MG tablet Take 1 tablet (10 mg total) by mouth once daily.    EScitalopram oxalate (LEXAPRO) 5 MG Tab take 1 tablet by mouth once daily    estradioL (ESTRACE) 0.01 % (0.1 mg/gram) vaginal cream Insert 1 gram as directed vagianlly nightly for 2 to 4 weeks followed by 2-3x/week    HYDROcodone-acetaminophen (NORCO) 5-325 mg per tablet Take 1 tablet by mouth every 12 (twelve) hours as needed for Pain.    [START ON 3/13/2025] HYDROcodone-acetaminophen (NORCO) 5-325 mg per tablet Take 1  tablet by mouth every 12 (twelve) hours as needed for Pain.    levothyroxine (SYNTHROID) 100 MCG tablet TAKE 1 TABLET BY MOUTH EVERY MORNING    meloxicam (MOBIC) 15 MG tablet Take 1 tablet (15 mg total) by mouth once daily.    promethazine (PHENERGAN) 25 MG tablet Take 1 tablet by mouth every 12 hours as needed for nausea    ramelteon (ROZEREM) 8 mg tablet Take 1 tablet (8 mg total) by mouth every evening.    spironolactone (ALDACTONE) 25 MG tablet Take 1 tablet (25 mg total) by mouth once daily. For blood pressure    sumatriptan (IMITREX) 50 MG tablet 1 tab po at start of headache.  Repeat in 2 h prn    suzetrigine 50 mg Tab Take 50 mg by mouth 2 (two) times a day.    tirzepatide, weight loss, (ZEPBOUND) 5 mg/0.5 mL PnIj Inject 5 mg into the skin every 7 days.    tiZANidine (ZANAFLEX) 4 MG tablet Take 1 tablet (4 mg total) by mouth 3 (three) times daily as needed (muscle pain).    traZODone (DESYREL) 100 MG tablet take 1 to 2 tablets by mouth every evening for for sleep    tretinoin (RETIN-A) 0.025 % cream Compound tretinoin 0.025% / azelaic acid 8% / niacinamide 2% cream. Apply a pea-sized amount to entire face qhs.    valsartan (DIOVAN) 320 MG tablet Take 1 tablet (320 mg total) by mouth once daily.     No current facility-administered medications for this visit.       REVIEW OF SYSTEMS:    GENERAL: No fever. No chills. No fatigue. Denies weight loss. Denies weight gain.  HEENT: Denies headaches. Denies vision change. Denies eye pain. Denies double vision. Denies ear pain.   CV: Denies chest pain. HTN  PULM: Denies of shortness of breath.  GI: Denies constipation. No diarrhea. No abdominal pain. Denies nausea. Denies vomiting. No blood in stool.  HEME: Denies bleeding problems.  : Denies urgency. No painful urination. No blood in urine.  MS: See HPI  SKIN: Denies rash.   NEURO: Denies seizures. No weakness.  ENDO: Thyroid disorder.   PSYCH:  Depression    OBJECTIVE:       Vitals:    03/11/25 1413   BP: (!)  100/58   Pulse: 75   Weight: 69.2 kg (152 lb 8.9 oz)   PainSc:   8   PainLoc: Back         PHYSICAL EXAM:     GENERAL: Well appearing, in no acute distress, alert and oriented x3.  PSYCH:  Mood and affect appropriate.  SKIN: Skin color, texture, turgor normal, no rashes or lesions. SCS site well healed, small scab noted to former battery site.   RESP: Symmetric chest rise, no respiratory distress noted.   BACK: Straight leg raise in the sitting position is negative for radicular pain.   MSK: 5/5 strength in right ankle with plantar and dorsiflexion. 5/5 strength in left ankle with plantar and dorsiflexion. 5/5 strength with right knee flexion and extension. 5/5 strength with left knee flexion and extension.   GAIT: Normal.         ASSESSMENT: 70 y.o. year old female with low back and knee pain, consistent with the followin. Chronic pain syndrome        2. Other idiopathic scoliosis, thoracolumbar region  LIDOcaine (XYLOCAINE) 5 % Oint ointment      3. Lumbar spondylosis        4. Spinal cord stimulator status  LIDOcaine (XYLOCAINE) 5 % Oint ointment      5. Myofascial pain  cyclobenzaprine (FLEXERIL) 10 MG tablet            Plan:    - Previous imaging reviewed. Labs reviewed.     - She does have benefit with SCS.     - Continue Flexeril 10 mg TID PRN, refill provided.     - Trial Lidocaine ointment over former battery site.     - Pain contract signed 2024.    - Continue Norco 5/325 mg TID PRN, refill sent.     - The patient is here today for a refill of current pain medications and they believe these provide effective pain control and improvements in quality of life.  The patient notes no serious side effects, and feels the benefits outweigh the risks.  The patient was reminded of the pain contract that they signed previously as well as the risks and benefits of the medication including possible death.  The updated [place] [employer] prescription monitoring program was reviewed, and the patient  has been found to be compliant with current treatment plan.    - Continue home exercise routine.     - RTC in 3 months or sooner if needed.     The above plan and management options were discussed at length with patient. Patient is in agreement with the above and verbalized understanding.     Marlene Thorne NP  03/11/2025

## 2025-03-11 NOTE — PATIENT INSTRUCTIONS
If you are taking amlodipine 10 mg, reduce to 5 mg - let me know and I will send new rx.    If you are taking 5 mg - let me know    Make you sure are taking Valsartan 320 mg -1 a day.  If not, let me know.

## 2025-03-12 ENCOUNTER — PATIENT MESSAGE (OUTPATIENT)
Dept: INTERNAL MEDICINE | Facility: CLINIC | Age: 70
End: 2025-03-12
Payer: MEDICARE

## 2025-03-12 DIAGNOSIS — I10 HYPERTENSION, UNSPECIFIED TYPE: Primary | ICD-10-CM

## 2025-03-14 RX ORDER — AMLODIPINE BESYLATE 5 MG/1
5 TABLET ORAL DAILY
Qty: 90 TABLET | Refills: 3 | Status: SHIPPED | OUTPATIENT
Start: 2025-03-14 | End: 2026-03-14

## 2025-03-14 NOTE — TELEPHONE ENCOUNTER
Care Due:                  Date            Visit Type   Department     Provider  --------------------------------------------------------------------------------                                EP -                              PRIMARY      [facility] INTERNAL  Last Visit: 03-      CARE ([facility])   MEDICINE       DEX CANELA  Next Visit: None Scheduled  None         None Found                                                            Last  Test          Frequency    Reason                     Performed    Due Date  --------------------------------------------------------------------------------    HBA1C.......  6 months...  tirzepatide,.............  10-   04-    [facility] Embedded Care Due Messages. Reference number: [id number].   3/14/2025 9:26:01 AM CDT

## 2025-03-16 ENCOUNTER — PATIENT MESSAGE (OUTPATIENT)
Dept: SLEEP MEDICINE | Facility: CLINIC | Age: 70
End: 2025-03-16

## 2025-03-19 ENCOUNTER — PATIENT MESSAGE (OUTPATIENT)
Dept: DERMATOLOGY | Facility: CLINIC | Age: 70
End: 2025-03-19
Payer: MEDICARE

## 2025-03-24 RX ORDER — ATORVASTATIN CALCIUM 10 MG/1
10 TABLET, FILM COATED ORAL DAILY
Qty: 90 TABLET | Refills: 2 | Status: SHIPPED | OUTPATIENT
Start: 2025-03-24

## 2025-03-24 NOTE — TELEPHONE ENCOUNTER
Refill Decision Note   Tiarra Errol  is requesting a refill authorization.  Brief Assessment and Rationale for Refill:  Approve     Medication Therapy Plan:         Comments:     Note composed:1:59 AM 03/24/2025

## 2025-03-26 ENCOUNTER — PATIENT MESSAGE (OUTPATIENT)
Dept: REHABILITATION | Facility: OTHER | Age: 70
End: 2025-03-26
Payer: MEDICARE

## 2025-03-26 ENCOUNTER — TELEPHONE (OUTPATIENT)
Dept: DERMATOLOGY | Facility: CLINIC | Age: 70
End: 2025-03-26

## 2025-03-26 NOTE — TELEPHONE ENCOUNTER
Pt decided to keep her appt today----- Message from Nisa sent at 3/26/2025 12:14 PM CDT -----  Regarding: Appt  Contact: Pt [phone]  Pt is calling to speak to nurse about reschedule appt scheduled for 3/26/25 please call

## 2025-03-31 ENCOUNTER — PATIENT MESSAGE (OUTPATIENT)
Dept: INTERNAL MEDICINE | Facility: CLINIC | Age: 70
End: 2025-03-31
Payer: MEDICARE

## 2025-04-01 ENCOUNTER — PATIENT MESSAGE (OUTPATIENT)
Dept: INTERNAL MEDICINE | Facility: CLINIC | Age: 70
End: 2025-04-01
Payer: MEDICARE

## 2025-04-03 DIAGNOSIS — Z96.651 STATUS POST TOTAL RIGHT KNEE REPLACEMENT: Primary | ICD-10-CM

## 2025-04-04 ENCOUNTER — PATIENT MESSAGE (OUTPATIENT)
Dept: ORTHOPEDICS | Facility: CLINIC | Age: 70
End: 2025-04-04
Payer: MEDICARE

## 2025-04-07 ENCOUNTER — HOSPITAL ENCOUNTER (OUTPATIENT)
Dept: RADIOLOGY | Facility: HOSPITAL | Age: 70
Discharge: HOME | End: 2025-04-07
Attending: ORTHOPAEDIC SURGERY
Payer: MEDICARE

## 2025-04-07 ENCOUNTER — PATIENT MESSAGE (OUTPATIENT)
Dept: PAIN MEDICINE | Facility: CLINIC | Age: 70
End: 2025-04-07
Payer: MEDICARE

## 2025-04-07 ENCOUNTER — OFFICE VISIT (OUTPATIENT)
Dept: ORTHOPEDICS | Facility: CLINIC | Age: 70
End: 2025-04-07
Payer: MEDICARE

## 2025-04-07 VITALS — WEIGHT: 151.13 LBS | BODY MASS INDEX: 26.78 KG/M2 | HEIGHT: 63 IN

## 2025-04-07 DIAGNOSIS — G89.29 CHRONIC PAIN OF RIGHT KNEE: Primary | ICD-10-CM

## 2025-04-07 DIAGNOSIS — M25.561 CHRONIC PAIN OF RIGHT KNEE: Primary | ICD-10-CM

## 2025-04-07 DIAGNOSIS — M47.816 LUMBAR SPONDYLOSIS: Primary | ICD-10-CM

## 2025-04-07 DIAGNOSIS — Z96.651 STATUS POST TOTAL RIGHT KNEE REPLACEMENT: ICD-10-CM

## 2025-04-07 PROCEDURE — 73562 X-RAY EXAM OF KNEE 3: CPT | Mod: TC,RT

## 2025-04-07 PROCEDURE — 73560 X-RAY EXAM OF KNEE 1 OR 2: CPT | Mod: 26,59,LT, | Performed by: RADIOLOGY

## 2025-04-07 PROCEDURE — 99999 PR PBB SHADOW E&M-EST. PATIENT-LVL III: CPT | Mod: PBBFAC,,, | Performed by: ORTHOPAEDIC SURGERY

## 2025-04-07 PROCEDURE — 99213 OFFICE O/P EST LOW 20 MIN: CPT | Mod: PBBFAC,25 | Performed by: ORTHOPAEDIC SURGERY

## 2025-04-07 PROCEDURE — 99214 OFFICE O/P EST MOD 30 MIN: CPT | Mod: S$PBB,,, | Performed by: ORTHOPAEDIC SURGERY

## 2025-04-07 PROCEDURE — 73562 X-RAY EXAM OF KNEE 3: CPT | Mod: 26,RT,, | Performed by: RADIOLOGY

## 2025-04-07 NOTE — PROGRESS NOTES
"Subjective:      Patient ID: Tiarra Norton is a 70 y.o. female.    Chief Complaint: Pain of the Right Knee    HPI  Tiarra Norton has right knee pain.  Minimal relief with Meloxicam.  Negative infecton w/u.  MRI showed synovitis.  No loosening.  Rotation OK on CT.  Persistent diffuse constant pain.  Somewhat worse with activity.  History of back pain.     Review of Systems   Constitutional: Negative for chills, fever and night sweats.   HENT:  Negative for hearing loss.    Eyes:  Negative for blurred vision and double vision.   Cardiovascular:  Negative for chest pain, claudication and leg swelling.   Respiratory:  Negative for shortness of breath.    Endocrine: Negative for polydipsia, polyphagia and polyuria.   Hematologic/Lymphatic: Negative for adenopathy and bleeding problem. Does not bruise/bleed easily.   Skin:  Negative for poor wound healing.   Gastrointestinal:  Negative for diarrhea and heartburn.   Genitourinary:  Negative for bladder incontinence.   Neurological:  Negative for focal weakness, headaches, numbness, paresthesias and sensory change.   Psychiatric/Behavioral:  The patient is not nervous/anxious.    Allergic/Immunologic: Negative for persistent infections.         Objective:      Body mass index is 26.77 kg/m².  Vitals:    04/07/25 1309   Weight: 68.6 kg (151 lb 2 oz)   Height: 5' 3" (1.6 m)           General    Constitutional: She is oriented to person, place, and time. She appears well-developed and well-nourished.   HENT:   Head: Normocephalic and atraumatic.   Eyes: EOM are normal.   Cardiovascular:  Normal rate.            Pulmonary/Chest: Effort normal.   Neurological: She is alert and oriented to person, place, and time.   Psychiatric: She has a normal mood and affect. Her behavior is normal.           Right Knee Exam     Inspection   Erythema: absent  Scars: present  Swelling: absent  Effusion: absent  Deformity: absent  Bruising: absent    Tenderness   The patient is tender " to palpation of the medial retinaculum and lateral retinaculum.    Range of Motion   Extension:  0   Flexion:  120     Tests   Ligament Examination   Lachman: normal (-1 to 2mm)   MCL - Valgus: normal (0 to 2mm)  LCL - Varus: normal    Other   Popliteal (Baker's) Cyst: absent    Muscle Strength   Right Lower Extremity   Hip Abduction: 5/5   Quadriceps:  5/5   Hamstrin/5     Vascular Exam       Edema  Right Lower Leg: absent    Radiographs taken today were reviewed by me.  There is a prosthetic replacement of the right knee(s).  The prosthesis is well positioned.  There is not evidence of bone loss, osteolysis, or loosening. There is not a fracture.              Assessment:       Encounter Diagnosis   Name Primary?    Chronic pain of right knee Yes          Plan:       Tiarra was seen today for pain.    Diagnoses and all orders for this visit:    Chronic pain of right knee        This point I do not have any other possible explanations for the persistent knee pain.  I am going to send Dr. Aguirre a message to see if there are other options from a pain management perspective. Will also have her see Dr. Weiner.

## 2025-04-08 ENCOUNTER — OFFICE VISIT (OUTPATIENT)
Dept: INTERNAL MEDICINE | Facility: CLINIC | Age: 70
End: 2025-04-08
Payer: MEDICARE

## 2025-04-08 VITALS
DIASTOLIC BLOOD PRESSURE: 68 MMHG | SYSTOLIC BLOOD PRESSURE: 124 MMHG | TEMPERATURE: 99 F | HEIGHT: 63 IN | BODY MASS INDEX: 26.57 KG/M2 | WEIGHT: 149.94 LBS | OXYGEN SATURATION: 97 % | HEART RATE: 91 BPM | RESPIRATION RATE: 18 BRPM

## 2025-04-08 DIAGNOSIS — G47.33 OSA (OBSTRUCTIVE SLEEP APNEA): Primary | ICD-10-CM

## 2025-04-08 DIAGNOSIS — E66.3 OVERWEIGHT WITH BODY MASS INDEX (BMI) OF 27 TO 27.9 IN ADULT: ICD-10-CM

## 2025-04-08 DIAGNOSIS — I10 HYPERTENSION, UNSPECIFIED TYPE: ICD-10-CM

## 2025-04-08 PROCEDURE — 99999 PR PBB SHADOW E&M-EST. PATIENT-LVL IV: CPT | Mod: PBBFAC,,, | Performed by: PHYSICIAN ASSISTANT

## 2025-04-08 PROCEDURE — 99214 OFFICE O/P EST MOD 30 MIN: CPT | Mod: PBBFAC | Performed by: PHYSICIAN ASSISTANT

## 2025-04-08 RX ORDER — TIRZEPATIDE 2.5 MG/.5ML
2.5 INJECTION, SOLUTION SUBCUTANEOUS
Qty: 2 ML | Refills: 1 | Status: SHIPPED | OUTPATIENT
Start: 2025-04-08 | End: 2025-06-03

## 2025-04-08 NOTE — PROGRESS NOTES
Subjective:       Patient ID: Tiarra Norton is a 70 y.o. female.        Chief Complaint: Follow-up    Tiarra Norton is an established patient of Kaykay Perales MD here today for urgent care visit.    Overweight with BMI 27, sleep apnea, HTN -  Started mounjaro 2.5 mg/week 2/2025  Titrated to 5 mg/week 3/2025, s/p 2 injections  Began to feel weak, fatigued, no energy  Realized she was not eating much at all, would just forget to eat  I asked her to hold her next injection and she feels much better    Start weight 155#  Current weight 149#    Her son's wedding is coming up    HTN -   Amlodipine 5 mg daily  Spironolactone 25 mg daily  Valsartan 320 mg daily    Hypothyroidism -   Levothyroxine 100 mcg daily    HERMINIA on CPAP    Chronic back pain, stimulator removed    Chronic MDD -   Lexapro, xanax     Lipids -   Lipitor 10 mg daily           Review of Systems   Constitutional:  Positive for fatigue (now much better). Negative for chills, diaphoresis and fever.   HENT:  Negative for congestion and sore throat.    Eyes:  Negative for visual disturbance.   Respiratory:  Negative for cough, chest tightness and shortness of breath.    Cardiovascular:  Negative for chest pain, palpitations and leg swelling.   Gastrointestinal:  Negative for abdominal pain, blood in stool, constipation, diarrhea, nausea and vomiting.   Genitourinary:  Negative for dysuria, frequency, hematuria and urgency.   Musculoskeletal:  Negative for arthralgias and back pain.   Skin:  Negative for rash.   Neurological:  Positive for weakness (now much better). Negative for dizziness, syncope and headaches.   Psychiatric/Behavioral:  Negative for dysphoric mood and sleep disturbance. The patient is not nervous/anxious.        Objective:      Physical Exam  Vitals and nursing note reviewed.   Constitutional:       Appearance: Normal appearance. She is well-developed.   HENT:      Head: Normocephalic.      Right Ear: Tympanic membrane and  external ear normal.      Left Ear: Tympanic membrane and external ear normal.      Nose: No mucosal edema or rhinorrhea.      Mouth/Throat:      Pharynx: Oropharynx is clear.   Eyes:      Pupils: Pupils are equal, round, and reactive to light.   Cardiovascular:      Rate and Rhythm: Normal rate and regular rhythm.      Heart sounds: Normal heart sounds. No murmur heard.     No friction rub. No gallop.   Pulmonary:      Effort: Pulmonary effort is normal. No respiratory distress.      Breath sounds: Normal breath sounds.   Abdominal:      Palpations: Abdomen is soft.      Tenderness: There is no abdominal tenderness.   Skin:     General: Skin is warm and dry.   Neurological:      Mental Status: She is alert.         Assessment:       1. HERMINIA (obstructive sleep apnea)    2. BMI 27.0-27.9,adult    3. Overweight with body mass index (BMI) of 27 to 27.9 in adult    4. Hypertension, unspecified type        Plan:       Tiarra was seen today for follow-up.    Diagnoses and all orders for this visit:    HERMINIA (obstructive sleep apnea)  -     tirzepatide, weight loss, (ZEPBOUND) 2.5 mg/0.5 mL PnIj; Inject 2.5 mg into the skin every 7 days.    BMI 27.0-27.9,adult  -     tirzepatide, weight loss, (ZEPBOUND) 2.5 mg/0.5 mL PnIj; Inject 2.5 mg into the skin every 7 days.    Overweight with body mass index (BMI) of 27 to 27.9 in adult  -     tirzepatide, weight loss, (ZEPBOUND) 2.5 mg/0.5 mL PnIj; Inject 2.5 mg into the skin every 7 days.    Hypertension, unspecified type - stable and controlled, continue current regimen  BP Readings from Last 5 Encounters:   04/08/25 124/68   03/11/25 (!) 100/58   03/11/25 (!) 110/58   02/03/25 (!) 107/56   01/27/25 (!) 105/59      Will reduce dose of zepbound back to 2.5 mg/week and titrate more slowly  Importance of eating small amounts over course of day reviewed at length  She will contact me regarding next rx and dose adjustment  Feeling much better now after holding a dose    31 minutes spent  "on patient encounter    Visit today included increased complexity associated with the care of the episodic problem weakness addressed and managing the longitudinal care of the patient due to the serious and/or complex managed problem(s) overweight, hypertension, HERMINIA.    Pt has been given instructions populated from patient instructions database and has verbalized understanding of the after visit summary and information contained wherein.    Follow up with a primary care provider. May go to ER for acute shortness of breath, lightheadedness, fever, or any other emergent complaints or changes in condition.    "This note will be shared with the patient"    Future Appointments   Date Time Provider Department Center   4/9/2025  2:00 PM [facility] TANSEY MAMMO2 [facility] MAMMO [facility]   4/10/2025  8:00 AM Zi Anaya, PT [facility] OHBP [facility] [place]   4/24/2025  9:30 AM Bekah Nichols MD [facility] SLEEP [facility]   5/5/2025 10:20 AM SHILPA, DEXA1 [facility] BMD [facility]   6/9/2025 10:00 AM Kaykay Perales MD [facility] IM [facility] PCW   6/12/2025 11:00 AM Marlene Thorne NP [facility] PAINMGT [facility]                 "

## 2025-04-09 ENCOUNTER — PATIENT MESSAGE (OUTPATIENT)
Dept: INTERNAL MEDICINE | Facility: CLINIC | Age: 70
End: 2025-04-09
Payer: MEDICARE

## 2025-04-09 DIAGNOSIS — R11.0 NAUSEA: ICD-10-CM

## 2025-04-09 RX ORDER — PROMETHAZINE HYDROCHLORIDE 25 MG/1
TABLET ORAL
Qty: 30 TABLET | Refills: 2 | OUTPATIENT
Start: 2025-04-09

## 2025-04-09 RX ORDER — METHYLPREDNISOLONE 4 MG/1
TABLET ORAL
Qty: 21 EACH | Refills: 0 | Status: SHIPPED | OUTPATIENT
Start: 2025-04-09 | End: 2025-04-30

## 2025-04-09 NOTE — TELEPHONE ENCOUNTER
Refill Routing Note   Medication(s) are not appropriate for processing by [facility] for the following reason(s):      Medication outside of protocol  Non-participating provider    [facility] action(s):  Route Care Due:  None identified            Appointments  past 12m or future 3m with PCP    Date Provider   Last Visit   4/8/2025 Zuly Dai PA-C   Next Visit   Visit date not found Zuly Dai PA-C   ED visits in past 90 days: 0        Note composed:11:43 AM 04/09/2025

## 2025-04-10 ENCOUNTER — TELEPHONE (OUTPATIENT)
Dept: DERMATOLOGY | Facility: CLINIC | Age: 70
End: 2025-04-10
Payer: MEDICARE

## 2025-04-10 NOTE — TELEPHONE ENCOUNTER
Appointment has been made per pt request   ----- Message from Abimbola Tuttle sent at 4/10/2025  9:12 AM CDT -----  Regarding: sooner appt request  PATIENT CALLPt called regarding recently schedule appt in August. States that her wart-like lesion is getting larger and she'd like to be seen sooner. Offered today's 230pm slot, but she declined. Asking for any sooner availability than 08/12. Please call back at [phone]

## 2025-04-13 ENCOUNTER — PATIENT MESSAGE (OUTPATIENT)
Dept: INTERNAL MEDICINE | Facility: CLINIC | Age: 70
End: 2025-04-13
Payer: MEDICARE

## 2025-04-21 ENCOUNTER — PATIENT MESSAGE (OUTPATIENT)
Dept: PAIN MEDICINE | Facility: CLINIC | Age: 70
End: 2025-04-21
Payer: MEDICARE

## 2025-04-21 DIAGNOSIS — G89.29 CHRONIC PAIN OF RIGHT KNEE: ICD-10-CM

## 2025-04-21 DIAGNOSIS — Z96.89 SPINAL CORD STIMULATOR STATUS: ICD-10-CM

## 2025-04-21 DIAGNOSIS — G89.4 CHRONIC PAIN SYNDROME: ICD-10-CM

## 2025-04-21 DIAGNOSIS — M41.25 OTHER IDIOPATHIC SCOLIOSIS, THORACOLUMBAR REGION: ICD-10-CM

## 2025-04-21 DIAGNOSIS — Z96.651 HISTORY OF TOTAL KNEE ARTHROPLASTY, RIGHT: ICD-10-CM

## 2025-04-21 DIAGNOSIS — M25.561 CHRONIC PAIN OF RIGHT KNEE: ICD-10-CM

## 2025-04-21 DIAGNOSIS — M51.369 DDD (DEGENERATIVE DISC DISEASE), LUMBAR: ICD-10-CM

## 2025-04-21 RX ORDER — HYDROCODONE BITARTRATE AND ACETAMINOPHEN 5; 325 MG/1; MG/1
1 TABLET ORAL EVERY 12 HOURS PRN
Qty: 60 TABLET | Refills: 0 | Status: SHIPPED | OUTPATIENT
Start: 2025-04-21 | End: 2025-05-22

## 2025-04-21 NOTE — TELEPHONE ENCOUNTER
Patient requesting refill on HYDROcodone-acetaminophen (NORCO) 5-325 m   Last office visit 03/11/25   shows last refill on 03/14/25  Patient does have a pain contract on file with [facility]  Patient last UDS 01/23/24

## 2025-04-23 ENCOUNTER — PATIENT MESSAGE (OUTPATIENT)
Dept: INTERNAL MEDICINE | Facility: CLINIC | Age: 70
End: 2025-04-23
Payer: MEDICARE

## 2025-04-23 DIAGNOSIS — I10 HYPERTENSION, UNSPECIFIED TYPE: Primary | ICD-10-CM

## 2025-04-23 RX ORDER — VALSARTAN 320 MG/1
320 TABLET ORAL DAILY
Qty: 90 TABLET | Refills: 3 | Status: SHIPPED | OUTPATIENT
Start: 2025-04-23

## 2025-04-23 NOTE — TELEPHONE ENCOUNTER
No care due was identified.  [facility] Embedded Care Due Messages. Reference number: [id number].   4/23/2025 3:55:23 PM CDT

## 2025-04-23 NOTE — TELEPHONE ENCOUNTER
" LOV with Kaykay Perales MD , 3/11/2025  Instructions from recent visit states"Make you sure are taking Valsartan 320 mg -1 a day. If not, let me know. "    "

## 2025-04-24 ENCOUNTER — OFFICE VISIT (OUTPATIENT)
Dept: SLEEP MEDICINE | Facility: CLINIC | Age: 70
End: 2025-04-24
Attending: PSYCHIATRY & NEUROLOGY
Payer: MEDICARE

## 2025-04-24 DIAGNOSIS — G47.00 INSOMNIA, UNSPECIFIED TYPE: Primary | ICD-10-CM

## 2025-04-24 PROCEDURE — 98006 SYNCH AUDIO-VIDEO EST MOD 30: CPT | Mod: 95,,, | Performed by: PSYCHIATRY & NEUROLOGY

## 2025-04-24 NOTE — PROGRESS NOTES
"        4/23/2025     3:47 PM 1/29/2024     3:51 PM   EPWORTH SLEEPINESS SCALE TOTAL SCORE    Total score 4  6       Patient-reported       Tiarra Norton is a 70 y.o. female seen today for Insomnia follow up. Last seen on 1/30/2024      Quvviq - drowsy; Belsomra - Drowsy on the second attempt of resuming that medications.  Most recently prescribed - Ramelteon + 2 Trazodones - not sure if working  THC gummnies are "showing promise" - sleeps throughte night; 1 hr of morning drowsiness, but feels well after  Taking Leaxapro and PRN Alprazolam for anxiety,    Seeing a psychologist, but does not feel that her anxiety and depression are well controlled  Most recent sleep study was negative for  sleep disordered breathing or PLMS.    INTERVAL HISTORY:    1/30/2024:          Already completed CBTi course with Dr. Antony     Tiarra Norton is a 70 y.o. female is here to be evaluated for a sleep disorder; referred by No ref. provider found.  Difficulty falling and staying asleep  Already been through CBTi    TFailing Trazodone 100 mg x2 pills  Quiviviq worked great, but expensice  Bedroom is dark and cool and quiet.  White noise seems to helps.  Bedroom is cool  No electronics at night.   Drinks coke no cofee; drinks coke  after 7 PM      The patient feels rested upon awakening. Takes naps.     The patient  reports morning headaches and reports  dry mouth on awakening.   Tiarra Norton denies  nasal congestion.    The patient never had tonsillectomy, adenoidectomy or UPPP      Tiarra Norton  denies symptoms concerning for parasomnia except for occasional somniloquy.  The patient  denies auxiliary symptoms of narcolepsy including sleep onset paralysis, hypnagogic hallucinations, sleep attacks and cataplexy.    Tiarra Norton denied symptoms concerning for RLS; nocturnal leg movements have not been noticed.   The patient does not experience sleep related leg cramps.     Long family h/o " insomniavcs    Medications pertinent to sleep  disorders taken currently:   Trazodone 200 mg - not helping ehr staying asleep  Previous  medications taken  for sleep disorders:Vystaril - blurry vision; Quviq - worked well but expensive with Medicare       Tried and failed AMbien; Quvviq - drowsy; Belsomra - Drowsy.  Most recently prescribed - Ramelteon = 2 Trazodones.  THC gummnies      Sleep studies  2016 - AHI 3, SAo2 min - 87 %        Occupation:reetired   Bed partner: her  who has nacrolapsy and HERMINIA  Exercise routine: acrtive  Caffeine:  coke toll 7 PM beverages per day        4/23/2025     3:47 PM 1/29/2024     3:51 PM   EPWORTH SLEEPINESS SCALE TOTAL SCORE    Total score 4  6       Patient-reported             1/29/2024     3:51 PM   EPWORTH SLEEPINESS SCALE   Sitting and reading 1   Watching TV 1   Sitting, inactive in a public place (e.g. a theatre or a meeting) 0   As a passenger in a car for an hour without a break 2   Lying down to rest in the afternoon when circumstances permit 2   Sitting and talking to someone 0   Sitting quietly after a lunch without alcohol 0   In a car, while stopped for a few minutes in traffic 0   Total score 6           8/9/2024     8:21 AM   PHQ9   Little interest or pleasure in doing things    Feeling down, depressed, or hopeless    Trouble falling or staying asleep, or sleeping too much    Feeling tired or having little energy    Poor appetite or overeating    Feeling bad about yourself - or that you are a failure or have let yourself or your family down    Trouble concentrating on things, such as reading the newspaper or watching television    Moving or speaking so slowly that other people could have noticed. Or the opposite - being so fidgety or restless that you have been moving around a lot more than usual    PHQ-9 Total Score        Information is confidential and restricted. Go to Review Flowsheets to unlock data.         8/9/2024     8:20 AM 8/23/2022     9:16 AM  8/7/2022    11:12 AM   GAD7   1. Feeling nervous, anxious, or on edge?      2. Not being able to stop or control worrying?      3. Worrying too much about different things?      4. Trouble relaxing?      5. Being so restless that it is hard to sit still?      6. Becoming easily annoyed or irritable?      7. Feeling afraid as if something awful might happen?      8. If you checked off any problems, how difficult have these problems made it for you to do your work, take care of things at home, or get along with other people?      ONEIL-7 Score          Information is confidential and restricted. Go to Review Flowsheets to unlock data.           1/29/2024     3:51 PM   EPWORTH SLEEPINESS SCALE   Sitting and reading 1   Watching TV 1   Sitting, inactive in a public place (e.g. a theatre or a meeting) 0   As a passenger in a car for an hour without a break 2   Lying down to rest in the afternoon when circumstances permit 2   Sitting and talking to someone 0   Sitting quietly after a lunch without alcohol 0   In a car, while stopped for a few minutes in traffic 0   Total score 6         1/29/2024     3:58 PM   Sleep Clinic New Patient   What time do you go to bed on a week day? (Give a range) 9:30 - 10-30   What time do you go to bed on a day off? (Give a range) Same.  Im retired -- everyday is a day off   How long does it take you to fall asleep? (Give a range) 15 - 30 minutes   On average, how many times per night do you wake up? 3   How long does it take you to fall back into sleep? (Give a range) 30 minutes or I may not be able to go back to sleep   What time do you wake up to start your day on a week day? (Give a range) 2:00 - 4:00 AM   What time do you wake up to start your day on a day off? (Give a range) Same   What time do you get out of bed? (Give a range) 2:30 - 4:00   On average, how many hours do you sleep? 5   On average, how many naps do you take per day? None (unless im sick)   Rate your sleep quality from  0 to 5 (0-poor, 5-great). 3   Have you experienced:  N/a   How much weight have you lost or gained (in lbs.) in the last year? 5 lbs.   On average, how many times per night do you go to the bathroom?  1   Have you ever had a sleep study/CPAP machine/surgery for sleep apnea? Yes   Have you ever had a CPAP machine for sleep apnea? No   Have you ever had surgery for sleep apnea? No           1/29/2024     3:58 PM   Sleep Clinic ROS    Difficulty breathing through the nose?  Sometimes   Sore throat or dry mouth in the morning? Sometimes   Irregular or very fast heart beat?  No   Shortness of breath?  No   Acid reflux? No   Body aches and pains?  Yes   Morning headaches? Sometimes   Dizziness? Sometimes   Mood changes?  No   Do you exercise?  Sometimes   Do you feel like moving your legs a lot?  No       DME:         PAST MEDICAL HISTORY:    Active Ambulatory Problems     Diagnosis Date Noted    HTN (hypertension) 08/01/2012    Hypothyroid 12/10/2012    Unspecified vitamin D deficiency 12/10/2012    Elevated liver enzymes 12/10/2012    Primary osteoarthritis of right knee 06/11/2015    Fatty liver 11/16/2015    Hyperlipidemia 11/16/2015    Migraine without status migrainosus, not intractable 11/16/2015    Glucose intolerance (impaired glucose tolerance) 11/16/2015    Hyponatremia 11/22/2015    Anxiety 11/24/2015    Fatigue 03/01/2016    Intermittent diarrhea 03/01/2016    Sleep apnea     Decreased libido 05/06/2016    Sleep stage dysfunction     Pain 05/06/2019    Colon adenomas 12/06/2019    Abdominal pain 02/13/2020    Exocrine pancreatic insufficiency 01/27/2021    Irritable bowel syndrome with diarrhea 04/14/2021    Overweight (BMI 25.0-29.9) 04/14/2021    Poor posture 05/05/2021    Complex tear of lateral meniscus of right knee as current injury 10/19/2021    Decreased range of motion (ROM) of right knee 02/14/2022    Weakness of right lower extremity 02/14/2022    Decreased range of motion of trunk and back  05/17/2022    Decreased strength of trunk and back 05/17/2022    Idiopathic scoliosis of thoracolumbar spine 05/17/2022    Primary insomnia 07/18/2022    Back pain 08/10/2022    Impaired functional mobility, balance, gait, and endurance 09/23/2022    Posture imbalance 09/23/2022    Weakness of trunk musculature 09/23/2022    Chronic pain syndrome 12/21/2022    History of partial knee replacement 02/15/2023    Benzodiazepine dependence 02/27/2023    Aortic atherosclerosis 02/27/2023    Major depressive disorder, recurrent severe without psychotic features 08/19/2024     Resolved Ambulatory Problems     Diagnosis Date Noted    Depression 08/01/2012    BMI 31.0-31.9,adult 11/16/2015    Colon cancer screening 04/11/2016    Major depressive disorder, recurrent, mild 01/12/2018    Acute pain of right knee 09/29/2019    Other chest pain 10/20/2020    Myalgia 01/08/2021    Lack of coordination 01/08/2021    Decreased range of motion of trunk and back 05/05/2021    Impaired functional mobility and activity tolerance 09/23/2022    Fear of pain 09/23/2022    Alteration in performance of activities of daily living 09/23/2022    COVID 11/08/2022     Past Medical History:   Diagnosis Date    Cataract     Gestational diabetes     Hypertension     IBS (irritable bowel syndrome)     HERMINIA (obstructive sleep apnea)     Thyroid disease                 PAST SURGICAL HISTORY:    Past Surgical History:   Procedure Laterality Date    ADENOIDECTOMY      ARTHROPLASTY, KNEE, TOTAL, USING COMPUTER-ASSISTED NAVIGATION Right 5/18/2023    Procedure: CONVERSION ARTHROPLASTY, KNEE, TOTAL, USING COMPUTER-ASSISTED NAVIGATION;  Surgeon: Virgil Scott MD;  Location: [facility] OR;  Service: Orthopedics;  Laterality: Right;  Same day discharge    ARTHROSCOPIC CHONDROPLASTY OF KNEE JOINT Right 10/19/2021    Procedure: ARTHROSCOPY, KNEE, WITH CHONDROPLASTY;  Surgeon: Trevin Huber MD;  Location: [facility] OR;  Service: Orthopedics;  Laterality: Right;     ARTHROSCOPY OF KNEE Right 10/19/2021    Procedure: ARTHROSCOPY, KNEE-RIGHT;  Surgeon: Trevin Huber MD;  Location: [facility];  Service: Orthopedics;  Laterality: Right;    BLOCK, NERVE, GENICULAR Right 2024    Procedure: BLOCK, NERVE RIGHT GENICULAR;  Surgeon: Shoaib Aguirre MD;  Location: [facility] MGT;  Service: Pain Management;  Laterality: Right;  [phone]     SECTION      CHOLECYSTECTOMY      COLONOSCOPY N/A 2016    Procedure: COLONOSCOPY;  Surgeon: Heri Segura MD;  Location: [facility] (4TH FLR);  Service: Endoscopy;  Laterality: N/A;    COLONOSCOPY N/A 2019    Procedure: COLONOSCOPY;  Surgeon: Joe Pitts MD;  [facility]  Service: Endoscopy;  Laterality: N/A;    CYSTOSCOPY  2022    Procedure: CYSTOSCOPY;  Surgeon: Lenny Moore MD;  Location: [address];  Service: Urology;;    ESOPHAGOGASTRODUODENOSCOPY N/A 2020    Procedure: EGD (ESOPHAGOGASTRODUODENOSCOPY);  Surgeon: Tolu Rivas MD;  [facility]  Service: Endoscopy;  Laterality: N/A;  Pt. moved to 9:15am arrival time.. Instructed to not have anything after midnight.EC    EYE SURGERY      cataract resection right    INJECTION OF ANESTHETIC AGENT AROUND NERVE Bilateral 2022    Procedure: Block, Nerve MEDIAL BRANCH BLOCK BILATERAL L3,4,5;  Surgeon: Tara Gibbs MD;  Location: [facility] PAIN MGT;  Service: Pain Management;  Laterality: Bilateral;    INJECTION OF ANESTHETIC AGENT AROUND NERVE Bilateral 2/15/2022    Procedure: BLOCK, NERVE, L3*L4-L5 MEDIAL BR ANCH 2 OF 2;  Surgeon: Tara Gibbs MD;  Location: [facility] PAIN MGT;  Service: Pain Management;  Laterality: Bilateral;    INJECTION OF BOTULINUM TOXIN TYPE A  2022    Procedure: BOTULINUM TOXIN INJECTION 100 units;  Surgeon: Lenny Moore MD;  [address];  Service: Urology;;    INSERTION, NEUROSTIMULATOR, SPINAL CORD N/A 2023    Procedure: SPINAL CORD STIMULATOR  IMPLANT [place];  Surgeon: Shoaib Aguirre MD;  Location: [facility] OR;  Service: Pain Management;  Laterality: N/A;    JOINT REPLACEMENT      partial right knee    KNEE ARTHROSCOPY W/ MENISCECTOMY Right 10/19/2021    Procedure: ARTHROSCOPY, KNEE, WITH MENISCECTOMY, PARTIAL LATERAL;  Surgeon: Trevin Huber MD;  Location: [facility] OR;  Service: Orthopedics;  Laterality: Right;    r hand surgery      RADIOFREQUENCY ABLATION Right 3/8/2022    Procedure: RADIOFREQUENCY ABLATION, L3-L5 1 OF 2;  Surgeon: Tara Gibbs MD;  Location: [facility] PAIN MGT;  Service: Pain Management;  Laterality: Right;    RADIOFREQUENCY ABLATION Left 3/22/2022    Procedure: RADIOFREQUENCY ABLATION, l3-+l5 2 of 2;  Surgeon: Tara Gibbs MD;  Location: [facility] PAIN MGT;  Service: Pain Management;  Laterality: Left;    RADIOFREQUENCY ABLATION Right 3/15/2024    Procedure: RADIOFREQUENCY ABLATION RIGHT GENICULAR;  Surgeon: Shoaib Aguirre MD;  Location: [facility] PAIN MGT;  Service: Pain Management;  Laterality: Right;  [phone]    REMOVAL OF NEUROSTIMULATOR N/A 11/20/2023    Procedure: SCS IPG BATTERY REVISION;  Surgeon: Shoaib Aguirre MD;  Location: [facility] OR;  Service: Pain Management;  Laterality: N/A;    REVISION PROCEDURE INVOLVING SPINAL CORD NEUROSTIMULATOR N/A 1/27/2025    Procedure: SPINAL CORD STIMULATOR BATTERY REVISION [place] [facility];  Surgeon: Shoaib Aguirre MD;  Location: [facility] OR;  Service: Pain Management;  Laterality: N/A;    TONSILLECTOMY      TOTAL KNEE ARTHROPLASTY      partial knee replacement    TRIAL OF SPINAL CORD NERVE STIMULATOR N/A 12/22/2022    Procedure: SPINAL CORD STIMULATOR TRIAL [place];  Surgeon: Shoaib Aguirre MD;  Location: [facility] PAIN MGT;  Service: Pain Management;  Laterality: N/A;    TUBAL LIGATION           FAMILY HISTORY:                Family History   Problem Relation Name Age of Onset    Hypertension Mother      Irritable bowel syndrome Mother      Hyperlipidemia Mother       Heart disease Father          mi    Hypertension Father      Cancer Father          prostate    Heart attack Father      Heart failure Father      Hyperlipidemia Father      Alcohol abuse Sister nathanael     Depression Sister nathanael     Epilepsy Son ari     Irritable bowel syndrome Sister martin     Alcohol abuse Sister martin     Ovarian cancer Maternal Aunt      Breast cancer Paternal Aunt      Breast cancer Maternal Aunt      Melanoma Neg Hx      Colon cancer Neg Hx      Stomach cancer Neg Hx      Esophageal cancer Neg Hx      Liver disease Neg Hx      Crohn's disease Neg Hx      Ulcerative colitis Neg Hx      Celiac disease Neg Hx      Stroke Neg Hx         SOCIAL HISTORY:          Tobacco:   Social History     Tobacco Use   Smoking Status Never   Smokeless Tobacco Never       alcohol use:    Social History     Substance and Sexual Activity   Alcohol Use Yes    Alcohol/week: 3.0 standard drinks of alcohol    Types: 3 Glasses of wine per week    Comment: weekends                   ALLERGIES:  Review of patient's allergies indicates:  No Known Allergies    CURRENT MEDICATIONS:    Current Outpatient Medications   Medication Sig Dispense Refill    alosetron (LOTRONEX) 0.5 MG tablet Take 1 tablet (0.5 mg total) by mouth in the morning and 1 tablet (0.5 mg total) in the evening. 60 tablet 0    ALPRAZolam (XANAX) 1 MG tablet Take one tablet by mouth three times daily 90 tablet 2    ALPRAZolam (XANAX) 1 MG tablet Take 1 tablet by mouth three times daily. 90 tablet 2    amLODIPine (NORVASC) 5 MG tablet Take 1 tablet (5 mg total) by mouth once daily. 90 tablet 3    atorvastatin (LIPITOR) 10 MG tablet Take 1 tablet (10 mg total) by mouth once daily. 90 tablet 2    cyclobenzaprine (FLEXERIL) 10 MG tablet Take 1 tablet (10 mg total) by mouth 3 (three) times daily as needed for Muscle spasms. 90 tablet 1    EScitalopram oxalate (LEXAPRO) 5 MG Tab take 1 tablet by mouth once daily 90 tablet 1    estradioL (ESTRACE) 0.01 %  (0.1 mg/gram) vaginal cream Insert 1 gram as directed vagianlly nightly for 2 to 4 weeks followed by 2-3x/week 42.5 g 6    HYDROcodone-acetaminophen (NORCO) 5-325 mg per tablet Take 1 tablet by mouth every 12 (twelve) hours as needed for Pain. 60 tablet 0    levothyroxine (SYNTHROID) 100 MCG tablet TAKE 1 TABLET BY MOUTH EVERY MORNING 90 tablet 3    LIDOcaine (XYLOCAINE) 5 % Oint ointment Apply topically 3 (three) times daily as needed (pain). 30 g 2    meloxicam (MOBIC) 15 MG tablet Take 1 tablet (15 mg total) by mouth once daily. 30 tablet 1    methylPREDNISolone (MEDROL DOSEPACK) 4 mg tablet use as directed on package 21 each 0    promethazine (PHENERGAN) 25 MG tablet Take 1 tablet by mouth every 12 hours as needed for nausea 30 tablet 2    ramelteon (ROZEREM) 8 mg tablet Take 1 tablet (8 mg total) by mouth every evening. 30 tablet 5    spironolactone (ALDACTONE) 25 MG tablet Take 1 tablet (25 mg total) by mouth once daily. For blood pressure 90 tablet 2    sumatriptan (IMITREX) 50 MG tablet 1 tab po at start of headache.  Repeat in 2 h prn 36 tablet 3    suzetrigine 50 mg Tab Take 50 mg by mouth 2 (two) times a day. 60 tablet 1    tirzepatide, weight loss, (ZEPBOUND) 2.5 mg/0.5 mL PnIj Inject 2.5 mg into the skin every 7 days. 2 mL 1    traZODone (DESYREL) 100 MG tablet take 1 to 2 tablets by mouth every evening for for sleep 180 tablet 3    tretinoin (RETIN-A) 0.025 % cream Compound tretinoin 0.025% / azelaic acid 8% / niacinamide 2% cream. Apply a pea-sized amount to entire face qhs. 30 g 5    valsartan (DIOVAN) 320 MG tablet Take 1 tablet (320 mg total) by mouth once daily. 90 tablet 3     No current facility-administered medications for this visit.                    PHYSICAL EXAM:  There were no vitals taken for this visit.  GENERAL: Normal development, well groomed.  HEENT:   HEENT:  Conjunctivae are non-erythematous; Pupils equal, round, and reactive to light; Nose is symmetrical; Nasal mucosa is pink and  "moist; Septum is midline; Inferior turbinates are normal; Nasal airflow is normal; Posterior pharynx is pink; Modified Mallampati:II-III; Posterior palate is low; Tonsils not visualized; Uvula is normal and pink;Tongue is enlarged; Dentition is fair; No TMJ tenderness; Jaw opening and protrusion without click and without discomfort.  NECK: Supple. Neck circumference is 14 inches. No thyromegaly. No palpable nodes.     SKIN: On face and neck: No abrasions, no rashes, no lesions.  No subcutaneous nodules are palpable.  RESPIRATORY: Chest is clear to auscultation.  Normal chest expansion and non-labored breathing at rest.  CARDIOVASCULAR: Normal S1, S2.  No murmurs, gallops or rubs. No carotid bruits bilaterally.  No edema. No clubbing. No cyanosis.    NEURO: Oriented to time, place and person. Normal attention span and concentration. Gait normal.    PSYCH: Affect is full. Mood is normal  MUSCULOSKELETAL: Moves 4 extremities. Gait normal.           ASSESSMENT:    1. Insomnia NEC. Multi-factorial -  excess time in bed, poor sleep hygiene, and likely paradoxical insomnia play a role Already finished CBT.  Already completed CBTi. Good sleep hygiene. Orexin Reseptor Antagonists were causing too much morning drowsiness with prolonged use.             PLAN:  Discussed continue trying her current alternative THC /CBD route.  Out of "traditional " medications - will try low dose Seroquel  Continue avoiding BDZ, BDZRA, and antihis out  of cognitive concerns  Possibility of seeking other  psychotherapy modalities has been discussed.     During our discussion today, we talked about the etiology of  HERMINIA as well as the potential ramifications of untreated sleep apnea, which could include daytime sleepiness, hypertension, heart disease and/or stroke.  We discussed potential treatment options, which could include weight loss, body positioning, continuous positive airway pressure (CPAP), or referral for surgical consideration. " Meanwhile, she  is urged to avoid supine sleep, weight gain and alcoholic beverages since all of these can worsen HERMINIA.     The patient was given open opportunity to ask questions and/or express concerns about treatment plan. Two point patient identifier confirmed.       Precautions: The patient was advised to abstain from driving should he feel sleepy or drowsy.    Follow up: MD after the sleep study has been completed.     ESS (Boykins Sleepiness Scale) and other sleep medicine related questionnaires were reviewed with the patient.      46-minute visit. >50% spent counseling patient and coordination of care.  The patient was  cautioned against drowsy driving.

## 2025-05-02 ENCOUNTER — PATIENT MESSAGE (OUTPATIENT)
Dept: INTERNAL MEDICINE | Facility: CLINIC | Age: 70
End: 2025-05-02
Payer: MEDICARE

## 2025-05-02 NOTE — TELEPHONE ENCOUNTER
CHIEF COMPLAINT:    Chief Complaint   Patient presents with   • Knee Pain     LEFT KNEE PAIN X 6 WEEKS       History:      Adolfo Lux is a 48 year old male who presents to my office with pain in his left knee this been going on for the last 6 weeks.  Patient states that he noted that his knee gave out on him was just doing some routine stuff at home and he noted that his inside of the left knee was swollen.  Swelling gradually subside however he still sometimes feels that he gets pain and stiffness in his knee and specially in the morning since knee may be Staph.  He has no pains or aches anywhere else in the body.  He denies any fall or direct injury to his knee.      PAST MEDICAL HISTORY:      Hypertension                                                  Hyperlipidemia                                                Insomnia                                                      Allergic rhinitis, cause unspecified                          Chest pain, unspecified                                         Comment: neg endoscopy    Shoulder separation                                             Comment: right    MEDICATIONS:    Current Outpatient Medications   Medication Sig Dispense Refill   • bisoprolol (ZEBETA) 5 MG tablet TAKE 1 TABLET BY MOUTH DAILY 90 tablet 1   • cholecalciferol (VITAMIN D3) 1000 UNITS tablet Take 1,700 Units by mouth daily.     • Multiple Vitamins-Minerals (MULTIVITAMIN PO)      • fish oil-omega-3 fatty acids 1000 MG CAPS Take 1 capsule by mouth daily.     • meloxicam (MOBIC) 15 MG tablet Take 1 tablet by mouth daily. 10 tablet 1     No current facility-administered medications for this visit.        ALLERGIES:    ALLERGIES:   Allergen Reactions   • Ciprofloxacin      Heart racing and breathing difficulties   • Lisinopril GI UPSET and Cough     Erectile dysfunction   • Vicodin [Hydrocodone-Acetaminophen] CARDIAC DISTURBANCES           Review of systems:      Constitutional:  Patient  Appropriate response.  Thank you!   denies fever, chills, tiredness or malaise.    Eyes:  Denies change in visual acuity, pain, burning or itching.    Immunologic:  Denies hives, seasonal allergies.    Head, Ears, Nose, Throat:  Denies sinus problems, ear infections, nasal congestion or sore throat.    Respiratory:  Denies cough, shortness of breath.    Cardiovascular:  Denies chest pain, edema.    Gastrointestinal:  Denies abdominal pain, nausea, vomiting, bloody stools or diarrhea.    Genitourinary:  Denies urine retention, painful urination, urinary frequency, blood in urine or nocturia.    All other systems reviewed and negative      Physical ExamINATION:   Vital Signs:    Vitals:    10/25/19 1510   BP: 130/78   Pulse: 68   Resp: 16   Temp: 97.7 °F (36.5 °C)   Weight: 112.9 kg   Height: 5' 10.5\" (1.791 m)     Constitutional:  Alert and communicating well.   Integument:  Warm. Dry. No erythema. No rash.    Head, Ears, Nose, Throat:  Normocephalic. Atraumatic. Bilateral external ears normal. Oropharynx moist. No oral exudates. Nose normal.   Neck: Normal range of motion. No tenderness. Supple. No stridor.    Eyes:  PERRL(Pupils equal, round, reactive to light), EOMI(Extraocular movements intact). Conjunctivae normal. No discharge.    Cardiovascular:  Normal heart rate. Normal rhythm. No murmurs. No rubs. No gallops.    Respiratory:  Normal breath sounds. No respiratory distress. No wheezing. No chest tenderness.          ASSESSMENT AND PLAN:      1. Acute pain of left knee  I have prescribed meloxicam that he can take for the next 1 week to 10 days.  If his symptoms do not improve he will contact my office and I will write then recommend to be referred over to Orthopedics for further evaluation.  Patient was also advised to do extension and flexion exercises of the knee and also use ice packs for symptomatic relief.            Orders Placed This Encounter   • meloxicam (MOBIC) 15 MG tablet       No follow-ups on file.    Gatito Valero MD

## 2025-05-02 NOTE — TELEPHONE ENCOUNTER
Called to triage pt. Pt states she had an episode last night  where she had difficulty walking, talking, and confusion. Pt denies weakness or HA. Pt also states she didn't eat anything at all yesterday and she is on Zepbound. Pt states she has ate today and feels better. Pt states she feels like these sx are not emergent and she can wait and see PCP next week. Pt scheduled for appt with PCP as requested. Pt instructed to call 911 or have someone drive her to the ED immediately should she experience any symptoms like this again. Pt verbalized understanding.

## 2025-05-05 ENCOUNTER — OFFICE VISIT (OUTPATIENT)
Facility: CLINIC | Age: 70
End: 2025-05-05
Payer: MEDICARE

## 2025-05-05 ENCOUNTER — PATIENT MESSAGE (OUTPATIENT)
Dept: INTERNAL MEDICINE | Facility: CLINIC | Age: 70
End: 2025-05-05
Payer: MEDICARE

## 2025-05-05 DIAGNOSIS — F33.1 MDD (MAJOR DEPRESSIVE DISORDER), RECURRENT EPISODE, MODERATE: Primary | ICD-10-CM

## 2025-05-05 DIAGNOSIS — M25.561 CHRONIC PAIN OF RIGHT KNEE: ICD-10-CM

## 2025-05-05 DIAGNOSIS — G89.29 CHRONIC PAIN OF RIGHT KNEE: ICD-10-CM

## 2025-05-05 PROCEDURE — 90791 PSYCH DIAGNOSTIC EVALUATION: CPT | Mod: 95,,, | Performed by: SOCIAL WORKER

## 2025-05-05 NOTE — PROGRESS NOTES
The patient location is: Patient's home, [place], [place]  The chief complaint leading to consultation is: Adjustment, Depression    Visit type: audiovisual    Face to Face time with patient: 45 minutes   60 minutes of total time spent on the encounter, which includes face to face time and non-face to face time preparing to see the patient (eg, review of tests), Obtaining and/or reviewing separately obtained history, Documenting clinical information in the electronic or other health record, Independently interpreting results (not separately reported) and communicating results to the patient/family/caregiver, or Care coordination (not separately reported).     Each patient to whom he or she provides medical services by telemedicine is:  (1) informed of the relationship between the physician and patient and the respective role of any other health care provider with respect to management of the patient; and (2) notified that he or she may decline to receive medical services by telemedicine and may withdraw from such care at any time.    Notes:   [facility] Initial Visit (PhD/LCSW)     Patient Name: Tiarra Norton   Date:  05/05/2025   Site: Telemed  Referral source: Trevin Huber MD     Chief complaint/reason for encounter: Psychological Evaluation to assess suitability for admission to Sports Medicine Behavioral Health   Clinical status of patient: Outpatient  Met with: Patient  CPT Code: 44705     Before this evaluation was initiated, the purposes and process of the assessment and the limits of confidentiality were discussed with the patient who expressed understanding of these issues and verbally consented to proceed with the evaluation.     History of present illness: Ms.Laura Bernardo Norton  is a 70- year-old female who is pursuing psychotherapy to improve symptoms of chronic pain and severe anxiety, with stress levels increasing over the past six weeks. She expressed significant  "frustration and fatigue with her current limitations, stating she is "tired of saying she can't." Although she retired from her 35-year career as a  seven years ago, patient reported struggling with a lack of meaningful activity and purposefulness, which she feels may be contributing to her low mood in addition to her chronic pain. She also reported changes in her marital relationship. Patient expressed a desire to improve her overall mood, and increase her participation in value-based activities.      Functional Assessment (how presenting problem is affecting the following)   Sleep: The patient reported ongoing difficulties with sleep, stating that her current sleep medication is ineffective. She is under the care of a sleep specialist and continues to experience trouble sleeping through the night.      Work: Patient reported she is retired.      Close relationships: Patient reported experiencing significant isolation and feelings of abandonment by her friends. She stated that she must initiate contact in her relationships, which leads to the patient feeling "needy".     Family: Due to chronic back pain, the patient is unable to participate in family social activities as often as she would like. When patient does engage in social activities it is not as enjoyable due to chronic pain.      Recreation: Patient reported she is unable to engage in social activities due to chronic pain. Patient is limited to reading and watching tv.      Physical: Patient reported her chronic pain limits her mobility. Patient reported she is able to walk for 30 minutes, and finds this activity enjoyable.      Pain scale: 6/10     Medical history: Please review medical chart.      Psychiatric symptoms:  Depression: Endorsed. She endorsed episodes of depressed mood or depression-related anhedonia, lack of motivation, lethargy, difficulty concentrating, feelings of worthlessness or guilt, hopelessness, appetite changes, or " "psychomotor changes. She denied suicidal ideation.  Barb/Hypomania: Denied. She denied periods of elevated mood or abnormally increased energy or goal-directed activity.  Anxiety: Endorsed. She endorsed experiencing excessive, exaggerated anxiety that was unmanageable.  Thoughts: Denied delusions, hallucinations.  Suicidal thoughts/behaviors: Denied.  Self-injury: Denied.     Current psychosocial stressors: Health concerns, lack of mobility, familial strain, and lack of community   Report of coping skills: Calm leana  Support system:   Strengths and Liabilities:  Strengths reported "motivated to change". Liability reported "lack of community/ support system"     Current and past substance use:  Alcohol: once of month.  Denied history of abuse or dependency.  Drugs: Denied current use. Denied history of abuse or dependency.  Tobacco: Denied current use.  Caffeine: 1 soda daily, 1 cold brew daily     Current Psychiatric Treatment:  Medications: Xanax 3mg daily, lexapro 5mg daily Rahel Prabhakar,  Psychotherapy: Patient meets with Olvin Diaz LCSW weekly, virtually.     Psychiatric history:  Previous diagnosis: ONEIL, MDD  Previous hospitalizations: None reported   History of outpatient treatment: Patient reported she has participated in long term supportive therapy, virtually and in person, with Olvin Diaz LCSW (approximately over 5 years) to address depression and anxiety.  Previous suicide attempt: Denied.  Family history of psychiatric illness: Sisters (both substance abuse)     Trauma history: Denied.    Social history: Ms. Norton was born and raised in [place], by her biological parents along with two sisters. She described her childhood as could've been better. She denied childhood trauma, abuse, and neglect. She attended [employer] and earned her [email or web address] Doctor in Law. She is currently retired. She denied  service. She is not on disability. She has been  to her  for " 32 years. She has one adult son. She currently lives with , and her brother in law.    Access to guns: None reported     Mental Status Exam:  General appearance: Appears stated age, neatly dressed, well groomed  Speech: Normal rate, normal tone, normal pitch, normal volume  Level of cooperation: Cooperative  Thought processes: Logical, goal-directed  Mood: Euthymic  Thought content: No illusions, no visual hallucinations, no auditory hallucinations, no delusions, no active or passive homicidal thoughts, no active or passive suicidal ideation, no obsessions, no compulsions, no violence  Affect: Appropriate  Orientation: Oriented to person, place, and date  Memory:  Recent memory: 3 of 3 objects after brief delay  Remote memory: Intact  Attention span and concentration: Spelled HOUSE forward and backwards  Fund of general knowledge: 3 of 3 recent presidents  Abstract reasoning:   Similarities: Abstract  Proverbs: Abstract  Judgment and insight: Fair  Language: Intact     Diagnostic impression:    ICD-10-CM ICD-9-CM   1. MDD (major depressive disorder), recurrent episode, moderate  F33.1 296.32   2. Chronic pain of right knee  M25.561 719.46    G89.29 338.29            Plan: Ms.Laura Bernardo Norton  will proceed with treatment in sports medicine behavioral health to address symptoms of depression and chronic pain.       Length of Session: 45 minutes     Arian Weiner PsyD  Adult Psychology Fellow   [facility]

## 2025-05-07 ENCOUNTER — PATIENT MESSAGE (OUTPATIENT)
Facility: CLINIC | Age: 70
End: 2025-05-07
Payer: MEDICARE

## 2025-05-08 ENCOUNTER — HOSPITAL ENCOUNTER (OUTPATIENT)
Dept: RADIOLOGY | Facility: HOSPITAL | Age: 70
Discharge: HOME | End: 2025-05-08
Attending: INTERNAL MEDICINE
Payer: MEDICARE

## 2025-05-08 DIAGNOSIS — Z12.31 ENCOUNTER FOR SCREENING MAMMOGRAM FOR BREAST CANCER: ICD-10-CM

## 2025-05-08 PROCEDURE — 77067 SCR MAMMO BI INCL CAD: CPT | Mod: TC

## 2025-05-09 ENCOUNTER — TELEPHONE (OUTPATIENT)
Dept: OTOLARYNGOLOGY | Facility: CLINIC | Age: 70
End: 2025-05-09
Payer: MEDICARE

## 2025-05-13 ENCOUNTER — CLINICAL SUPPORT (OUTPATIENT)
Dept: OBSTETRICS AND GYNECOLOGY | Facility: CLINIC | Age: 70
End: 2025-05-13
Payer: MEDICARE

## 2025-05-13 DIAGNOSIS — N95.1 MENOPAUSAL SYMPTOMS: Primary | ICD-10-CM

## 2025-05-14 ENCOUNTER — PATIENT MESSAGE (OUTPATIENT)
Dept: PRIMARY CARE CLINIC | Facility: CLINIC | Age: 70
End: 2025-05-14

## 2025-05-14 ENCOUNTER — OFFICE VISIT (OUTPATIENT)
Dept: PRIMARY CARE CLINIC | Facility: CLINIC | Age: 70
End: 2025-05-14
Payer: MEDICARE

## 2025-05-14 ENCOUNTER — PATIENT MESSAGE (OUTPATIENT)
Dept: PAIN MEDICINE | Facility: CLINIC | Age: 70
End: 2025-05-14
Payer: MEDICARE

## 2025-05-14 VITALS
HEIGHT: 63 IN | WEIGHT: 152.31 LBS | HEART RATE: 84 BPM | OXYGEN SATURATION: 98 % | DIASTOLIC BLOOD PRESSURE: 84 MMHG | SYSTOLIC BLOOD PRESSURE: 126 MMHG | BODY MASS INDEX: 26.99 KG/M2

## 2025-05-14 DIAGNOSIS — G89.4 CHRONIC PAIN SYNDROME: ICD-10-CM

## 2025-05-14 DIAGNOSIS — Z96.651 HISTORY OF TOTAL KNEE ARTHROPLASTY, RIGHT: ICD-10-CM

## 2025-05-14 DIAGNOSIS — M41.25 OTHER IDIOPATHIC SCOLIOSIS, THORACOLUMBAR REGION: ICD-10-CM

## 2025-05-14 DIAGNOSIS — R21 RASH: ICD-10-CM

## 2025-05-14 DIAGNOSIS — H00.014 HORDEOLUM EXTERNUM OF LEFT UPPER EYELID: Primary | ICD-10-CM

## 2025-05-14 DIAGNOSIS — Z96.89 SPINAL CORD STIMULATOR STATUS: ICD-10-CM

## 2025-05-14 DIAGNOSIS — R05.9 COUGH, UNSPECIFIED TYPE: Primary | ICD-10-CM

## 2025-05-14 DIAGNOSIS — G89.29 CHRONIC PAIN OF RIGHT KNEE: ICD-10-CM

## 2025-05-14 DIAGNOSIS — M51.369 DDD (DEGENERATIVE DISC DISEASE), LUMBAR: ICD-10-CM

## 2025-05-14 DIAGNOSIS — M25.561 CHRONIC PAIN OF RIGHT KNEE: ICD-10-CM

## 2025-05-14 PROCEDURE — 99214 OFFICE O/P EST MOD 30 MIN: CPT | Mod: S$PBB,,, | Performed by: NURSE PRACTITIONER

## 2025-05-14 PROCEDURE — 99214 OFFICE O/P EST MOD 30 MIN: CPT | Mod: PBBFAC,PN | Performed by: NURSE PRACTITIONER

## 2025-05-14 PROCEDURE — 99999 PR PBB SHADOW E&M-EST. PATIENT-LVL IV: CPT | Mod: PBBFAC,,, | Performed by: NURSE PRACTITIONER

## 2025-05-14 RX ORDER — TRIAMCINOLONE ACETONIDE 0.25 MG/G
CREAM TOPICAL 2 TIMES DAILY
Qty: 15 G | Refills: 0 | Status: SHIPPED | OUTPATIENT
Start: 2025-05-14

## 2025-05-14 RX ORDER — ERYTHROMYCIN 5 MG/G
OINTMENT OPHTHALMIC NIGHTLY
Qty: 3.5 G | Refills: 0 | Status: SHIPPED | OUTPATIENT
Start: 2025-05-14

## 2025-05-14 RX ORDER — HYDROCODONE BITARTRATE AND ACETAMINOPHEN 5; 325 MG/1; MG/1
1 TABLET ORAL EVERY 12 HOURS PRN
Qty: 60 TABLET | Refills: 0 | Status: SHIPPED | OUTPATIENT
Start: 2025-05-14 | End: 2025-06-13

## 2025-05-14 NOTE — TELEPHONE ENCOUNTER
Patient requesting refill on Norco  Last office visit 3/11/25   shows last refill on 4/22/25  Patient have a pain contract on file with [facility]  Patient last UDS 1/23/24

## 2025-05-14 NOTE — PROGRESS NOTES
Personal Information    Full Name: Tiarra Norton 2/1/1985    PCP- Dr. Kaykay Perales     Medical History    Do you have a chronic medical condition? (e.g., diabetes, hypertension, thyroid issues)   If yes, please specify:   Yes - Hypothyroid and Scoliosis     2.   Do you have a history of hormone- related conditions? (e.g., endometriosis, breast cancer)   If yes, please explain:   Yes - Endometriosis     3.   Have you previously or are you currently taking hormone replacement therapy?   If yes, please list:   Yes - Previous HRT use 15+ yrs ago    Menstrual History    At what age did you begin menstruating?   12    2.   Have you experienced any changes in your menstrual cycle in the last year?   If yes, please describe:   No    3.   When was your last menstrual period or age of last period?   22 + yrs ago     4.   Have you had a hysterectomy?   If yes, at what age?  No    Symptoms Assesments      Are you experiencing any of the following symptoms:  Hot Flashes: Yes   Night Sweats: Yes   Vaginal Dryness or Pain with [place]y: Yes   Mood Swings: Yes   Anxiety or Depression: Yes Lexapro 5 mg daily & Xanax 1 mg (prn)  Sleep Disturbances: Yes   Fatigue: Yes   Weight Gain: No   Joint or Muscle Pain: Yes   Memory Issues or Brain Fog: No   Decreased Interest in Sex (Low Libido): Yes     Lifestyle Factors    How would you describe your diet?  Balanced- Lack of appetite     2.   How often do you exercise?   Rarely    3.   Do you smoke or consume alcohol?   If yes, please specify frequency:   Yes - Social drinker - Does not smoke     4.   How would you rate your stress levels?   High    Family History    Is there a family history of menopause-related conditions? (e.g., osteoporosis, breast cancer)  If yes, please specify  Yes - Mother Osteoporosis     2.   Is there a family history of heart disease?   If yes, please specify   Yes - Both parent - Heart Disease    --------------------------------------------------------------------------  Mammogram 5/2025  BMD 6/2025  Colonoscopy 8/2024  Annual/Pap 1/20218  ----------------------------------------------------------------------  Spoke with patient for a total of 30 minutes during virtual visit.  Patient was guided through expectations and treatment options.     Questions answered. Encouraged to send message or call office with any questions/concerns. Verbalized understanding.       Larissa

## 2025-05-14 NOTE — PROGRESS NOTES
[facility] Note    Chief Complaint      Chief Complaint   Patient presents with    Cough    Stye       History of Present Illness      Tiarra Norton is a 70 y.o. female who presents today for   Chief Complaint   Patient presents with    Cough    Stye         CHIEF COMPLAINT:  Patient presents with a persistent cough and a sty in the left eye.    HPI:  Patient reports a dry, non-productive cough ongoing for 3-4 weeks. She has difficulty stopping the cough except when choking. Patient was evaluated by a PA approximately 1 week after cough onset. She was advised to take seasonal allergy medication, which did not alleviate the cough.    Patient has a sty in her left eye, which is improving. She has been applying warm compresses to the affected eye as treatment. She has also been using Neomycin ophthalmic ointment.    Patient mentions developing a large wart on her leg and has an upcoming dermatologist appointment to address this issue.    Patient reports pruritus where her underwear leg meets the skin. She denies shaving in this area. The itch is described as more of a rash-type irritation rather than a raw sensation.    Patient denies nasal congestion and vaginal irritation.    MEDICAL HISTORY:  Patient has a history of a sty in her left eye and bronchitis.      ROS:  General: -fever, -chills, -fatigue, -weight gain, -weight loss  Eyes: -vision changes, -redness, -discharge  ENT: -ear pain, -nasal congestion, -sore throat  Cardiovascular: -chest pain, -palpitations, -lower extremity edema  Respiratory: +cough, -shortness of breath  Gastrointestinal: -abdominal pain, -nausea, -vomiting, -diarrhea, -constipation, -blood in stool  Genitourinary: -dysuria, -hematuria, -frequency  Musculoskeletal: -joint pain, -muscle pain  Skin: +rash, -lesion, +vaginal itching or burning  Neurological: -headache, -dizziness, -numbness, -tingling  Psychiatric: -anxiety, -depression, -sleep  "difficulty    Cough  Associated symptoms include a rash.        Review of Systems   Respiratory:  Positive for cough.    Skin:  Positive for rash.        + rash to inner right thigh at panty line   All 12 systems otherwise negative.       Family History:  family history includes Alcohol abuse in her sister and sister; Breast cancer in her maternal aunt and paternal aunt; Cancer in her father; Depression in her sister; Epilepsy in her son; Heart attack in her father; Heart disease in her father; Heart failure in her father; Hyperlipidemia in her father and mother; Hypertension in her father and mother; Irritable bowel syndrome in her mother and sister; Ovarian cancer in her maternal aunt.   Family history was reviewed with patient.     Medications:  Encounter Medications[1]    Allergies:  Review of patient's allergies indicates:  No Known Allergies    Health Maintenance:  Health Maintenance   Topic Date Due    Naloxone Prescription  Never done    Urine Drug Screen  07/23/2024    Lipid Panel  11/06/2025    Mammogram  05/08/2026    TETANUS VACCINE  11/15/2026    Colorectal Cancer Screening  08/14/2029    Hepatitis C Screening  Completed    Sign Pain Contract  Completed    Complete Opioid Risk Tool  Completed    Shingles Vaccine  Completed    Influenza Vaccine  Completed    COVID-19 Vaccine  Completed    RSV Vaccine (Age 60+ and Pregnant patients)  Completed    Pneumococcal Vaccines (Age 50+)  Completed    Hemoglobin A1c (Prediabetes)  Discontinued    DEXA Scan  Discontinued     Health Maintenance Topics with due status: Not Due       Topic Last Completion Date    TETANUS VACCINE 11/15/2016    Colorectal Cancer Screening 08/14/2024    Lipid Panel 11/06/2024    Mammogram 05/08/2025       Physical Exam      Vital Signs  Pulse: 84  SpO2: 98 %  BP: 126/84  BP Location: Left arm  Patient Position: Sitting  Pain Score: 0-No pain  Height and Weight  Height: 5' 3" (160 cm)  Weight: 69.1 kg (152 lb 5.4 oz)  BSA (Calculated - sq " m): 1.75 sq meters  BMI (Calculated): 27  Weight in (lb) to have BMI = 25: 140.8]    Physical Exam  Vitals reviewed.   Constitutional:       Appearance: Normal appearance. She is normal weight.   HENT:      Head: Normocephalic and atraumatic.      Nose: Nose normal.      Mouth/Throat:      Mouth: Mucous membranes are moist.      Pharynx: Oropharynx is clear.   Eyes:      Extraocular Movements: Extraocular movements intact.      Conjunctiva/sclera: Conjunctivae normal.      Pupils: Pupils are equal, round, and reactive to light.   Cardiovascular:      Rate and Rhythm: Normal rate and regular rhythm.      Pulses: Normal pulses.      Heart sounds: Normal heart sounds.   Pulmonary:      Effort: Pulmonary effort is normal.      Breath sounds: Normal breath sounds.   Musculoskeletal:         General: Normal range of motion.      Cervical back: Normal range of motion and neck supple.   Skin:     General: Skin is warm and dry.      Capillary Refill: Capillary refill takes less than 2 seconds.   Neurological:      General: No focal deficit present.      Mental Status: She is alert and oriented to person, place, and time. Mental status is at baseline.   Psychiatric:         Mood and Affect: Mood normal.         Behavior: Behavior normal.         Thought Content: Thought content normal.         Judgment: Judgment normal.            Assessment/Plan     Tiarra Norton is a 70 y.o.female with:    Hordeolum externum of left upper eyelid  -     erythromycin (ROMYCIN) ophthalmic ointment; Place into the left eye every evening.  Dispense: 3.5 g; Refill: 0    Rash  -     triamcinolone acetonide 0.025% (KENALOG) 0.025 % cream; Apply topically 2 (two) times daily.  Dispense: 15 g; Refill: 0        As above, continue current medications and maintain follow up with specialists.  Return to clinic as needed.    Greater than 50% of visit was spent face to face with patient.  All questions were answered to patient's satisfaction.           LAUREN Casas  [facility]                     [1]   Outpatient Encounter Medications as of 5/14/2025   Medication Sig Dispense Refill    alosetron (LOTRONEX) 0.5 MG tablet Take 1 tablet (0.5 mg total) by mouth in the morning and 1 tablet (0.5 mg total) in the evening. 60 tablet 0    ALPRAZolam (XANAX) 1 MG tablet Take 1 tablet by mouth three times daily. 90 tablet 2    amLODIPine (NORVASC) 5 MG tablet Take 1 tablet (5 mg total) by mouth once daily. 90 tablet 3    atorvastatin (LIPITOR) 10 MG tablet Take 1 tablet (10 mg total) by mouth once daily. 90 tablet 2    EScitalopram oxalate (LEXAPRO) 5 MG Tab take 1 tablet by mouth once daily 90 tablet 1    estradioL (ESTRACE) 0.01 % (0.1 mg/gram) vaginal cream Insert 1 gram as directed vagianlly nightly for 2 to 4 weeks followed by 2-3x/week 42.5 g 6    HYDROcodone-acetaminophen (NORCO) 5-325 mg per tablet Take 1 tablet by mouth every 12 (twelve) hours as needed for Pain. 60 tablet 0    levothyroxine (SYNTHROID) 100 MCG tablet TAKE 1 TABLET BY MOUTH EVERY MORNING 90 tablet 3    LIDOcaine (XYLOCAINE) 5 % Oint ointment Apply topically 3 (three) times daily as needed (pain). 30 g 2    promethazine (PHENERGAN) 25 MG tablet Take 1 tablet by mouth every 12 hours as needed for nausea 30 tablet 2    ramelteon (ROZEREM) 8 mg tablet Take 1 tablet (8 mg total) by mouth every evening. 30 tablet 5    spironolactone (ALDACTONE) 25 MG tablet Take 1 tablet (25 mg total) by mouth once daily. For blood pressure 90 tablet 2    sumatriptan (IMITREX) 50 MG tablet 1 tab po at start of headache.  Repeat in 2 h prn 36 tablet 3    suzetrigine 50 mg Tab Take 50 mg by mouth 2 (two) times a day. 60 tablet 1    tirzepatide, weight loss, (ZEPBOUND) 2.5 mg/0.5 mL PnIj Inject 2.5 mg into the skin every 7 days. 2 mL 1    traZODone (DESYREL) 100 MG tablet take 1 to 2 tablets by mouth every evening for for sleep 180 tablet 3    tretinoin (RETIN-A) 0.025 % cream Compound tretinoin  0.025% / azelaic acid 8% / niacinamide 2% cream. Apply a pea-sized amount to entire face qhs. 30 g 5    valsartan (DIOVAN) 320 MG tablet Take 1 tablet (320 mg total) by mouth once daily. 90 tablet 3    erythromycin (ROMYCIN) ophthalmic ointment Place into the left eye every evening. 3.5 g 0    triamcinolone acetonide 0.025% (KENALOG) 0.025 % cream Apply topically 2 (two) times daily. 15 g 0    [DISCONTINUED] ALPRAZolam (XANAX) 1 MG tablet Take one tablet by mouth three times daily (Patient not taking: Reported on 5/14/2025) 90 tablet 2    [DISCONTINUED] HYDROcodone-acetaminophen (NORCO) 5-325 mg per tablet Take 1 tablet by mouth every 12 (twelve) hours as needed for Pain. 60 tablet 0     No facility-administered encounter medications on file as of 5/14/2025.

## 2025-05-15 RX ORDER — PROMETHAZINE HYDROCHLORIDE AND DEXTROMETHORPHAN HYDROBROMIDE 6.25; 15 MG/5ML; MG/5ML
5 SYRUP ORAL EVERY 4 HOURS PRN
Qty: 118 ML | Refills: 0 | Status: SHIPPED | OUTPATIENT
Start: 2025-05-15 | End: 2025-05-25

## 2025-05-19 ENCOUNTER — PATIENT MESSAGE (OUTPATIENT)
Facility: CLINIC | Age: 70
End: 2025-05-19
Payer: MEDICARE

## 2025-05-19 RX ORDER — QUETIAPINE FUMARATE 25 MG/1
TABLET, FILM COATED ORAL
Qty: 30 TABLET | Refills: 5 | Status: SHIPPED | OUTPATIENT
Start: 2025-05-19

## 2025-05-27 ENCOUNTER — OFFICE VISIT (OUTPATIENT)
Dept: DERMATOLOGY | Facility: CLINIC | Age: 70
End: 2025-05-27
Payer: MEDICARE

## 2025-05-27 DIAGNOSIS — H00.014 HORDEOLUM EXTERNUM OF LEFT UPPER EYELID: Primary | ICD-10-CM

## 2025-05-27 DIAGNOSIS — D48.5 NEOPLASM OF UNCERTAIN BEHAVIOR OF SKIN: ICD-10-CM

## 2025-05-27 DIAGNOSIS — L82.0 INFLAMED SEBORRHEIC KERATOSIS: Primary | ICD-10-CM

## 2025-05-27 PROCEDURE — 11102 TANGNTL BX SKIN SINGLE LES: CPT | Mod: XS,S$GLB,, | Performed by: DERMATOLOGY

## 2025-05-27 PROCEDURE — 17110 DESTRUCTION B9 LES UP TO 14: CPT | Mod: S$GLB,,, | Performed by: DERMATOLOGY

## 2025-05-27 PROCEDURE — 88305 TISSUE EXAM BY PATHOLOGIST: CPT | Mod: TC | Performed by: DERMATOLOGY

## 2025-05-27 PROCEDURE — 99499 UNLISTED E&M SERVICE: CPT | Mod: S$GLB,,, | Performed by: DERMATOLOGY

## 2025-05-27 NOTE — PATIENT INSTRUCTIONS
Biopsy Wound Care Instructions    Leave the bandage on for 24 hours without getting it wet.   Clean the area once a day with a gentle soap and water, then pat dry and apply Vaseline and a bandaid.  The site should be kept moist with Vaseline at all times to improve healing. Reapply a thick coating as needed. Do not let the site air out or form a scab, as this will delay healing and worsen scarring.  If any bleeding or oozing occurs once you return home, apply firm pressure to the area for 30 minutes straight without peeking. If bleeding continues, call the office immediately.  Please message us via [facility], call us at [phone], or return to the office at any sign of increasing redness, swelling, tenderness, pain, heat, yellow drainage/discharge, or continued bleeding.      Receiving Your Pathology Results    Your pathology results will be released to you on [facility] at the same time that Dr. Cabrera receives them.   Dr. Cabrera will then message you with her interpretation of the results and/or with the plan going forward.  If you do not use [facility] or if your pathology results require more of an explanation, you will receive your results via a phone call.  If 2 weeks go by and you have not received your results, please message us via [facility] or call us at [phone] to inform us.      Hydrocolloid Bandage    A hydrocolloid bandage also can used in place of Vaseline and a bandaid. It is not to be used with Vaseline.  A hydrocolloid bandage is an occlusive, waterproof dressing that will protect the wound and provide an optimal healing environment.   These bandages can be found at your local pharmacy in the first aid section or on [employer] (see below for examples).  Apply the bandage to clean, dry skin, making sure that the wound bed is in the center of the bandage.   Once it is applied, press and hold your hand on top of the bandage to warm it and help it adhere to the skin.  Please note: The area  where the bandage adheres to the wound bed will become white and puffy. This is not infection and is a normal part of the healing process.  Do not change the bandage until the edges roll up and it starts to peel away.  When bandage is ready to be changed, remove carefully and slowly. Run it under water if necessary to help it release from the skin.  Wash wound with a gentle cleanser and fingertips.  Make sure area is completely dry before applying a new bandage.  Repeat process until fresh pink skin covers the wound bed or until bandage no longer becomes white and puffy.       Examples:  Nexcare Advanced Healing [place] Bandages  [facility] [employer] Clear Advanced Healing Hydrocolloid Bandages  Band-Aid® Hydro Seal All-Purpose Adhesive Bandages  [facility] Hydrocolloid Spot Bandages  DuoDERM® Extra Thin Dressing (can be cut to size)         CRYOSURGERY      Your doctor has used a method called cryosurgery to treat your skin condition. Cryosurgery refers to the use of very cold substances to treat a variety of skin conditions such as warts, precancerous skin lesions, molluscum contagiosum, sun spots, and several benign growths. The substance we use in cryosurgery is liquid nitrogen and is so cold (-195 degrees Celsius) that it burns when administered.     Following treatment in the office, the skin may immediately itch or burn and become red. You may find the area around the lesion is affected as well. It is sometimes necessary to treat not only the lesion, but also a small area of the surrounding normal skin to achieve a good response.     A blister, and even a blood-filled blister, may form after treatment.   This is a normal response. If the blister is painful, it is acceptable to sterilize a needle with rubbing alcohol and gently pop the blister. It is important that you gently wash the area with soap and warm water as the blister fluid may contain wart virus if a wart was treated. Do not remove the roof of the  blister.     The area treated can take anywhere from 1-3 weeks to heal. Healing time depends on the kind of skin lesion treated, the location, and how aggressively the lesion was treated. It is recommended that the areas treated are covered with Vaseline and a bandaid to improve healing. If a bandaid is not practical, Vaseline applied several times per day will do. Keeping these areas moist will speed the healing time.    Treatment with liquid nitrogen can leave a scar. In dark skin, it may be a light or dark scar; in light skin it may be a white or pink scar. These will generally fade with time, but may never go away completely.     If you have any concerns after your treatment, please message us via [facility] or call us at [phone].

## 2025-05-27 NOTE — PROGRESS NOTES
Patient Information  Name: Tiarra Norton  : 1955  MRN: [id number]     Referring Physician:  No ref. provider found   Primary Care Physician:  Kaykay Perales MD   Date of Visit: 25      Subjective:     History of Present lllness:    Tiarra Norton is a 70 y.o. female who presents with a chief complaint of wart and bumps on scalp.    Location: right upper thigh  Duration: mths  Signs/Symptoms: wart like growth  Relieving factors/Prior treatments: none    Location: scalp  Duration: years  Signs/Symptoms: bumps, itching, bleeding when scratched  Relieving factors/Prior treatments: none    Patient was last seen: 2025.  Prior notes by myself reviewed.   Clinical documentation obtained by nursing staff reviewed.    Review of Systems    Objective:   Physical Exam   Constitutional: She appears well-developed and well-nourished. No distress.   Neurological: She is alert and oriented to person, place, and time. She is not disoriented.   Psychiatric: She has a normal mood and affect.   Skin:   Areas Examined (abnormalities noted in diagram):   Scalp / Hair Palpated and Inspected  RUE Inspected                 Diagram Legend     Erythematous scaling macule/papule c/w actinic keratosis       Vascular papule c/w angioma      Pigmented verrucoid papule/plaque c/w seborrheic keratosis      Yellow umbilicated papule c/w sebaceous hyperplasia      Irregularly shaped tan macule c/w lentigo     1-2 mm smooth white papules consistent with Milia      Movable subcutaneous cyst with punctum c/w epidermal inclusion cyst      Subcutaneous movable cyst c/w pilar cyst      Firm pink to brown papule c/w dermatofibroma      Pedunculated fleshy papule(s) c/w skin tag(s)      Evenly pigmented macule c/w junctional nevus     Mildly variegated pigmented, slightly irregular-bordered macule c/w mildly atypical nevus      Flesh colored to evenly pigmented papule c/w intradermal nevus       Pink pearly papule/plaque c/w basal  cell carcinoma      Erythematous hyperkeratotic cursted plaque c/w SCC      Surgical scar with no sign of skin cancer recurrence      Open and closed comedones      Inflammatory papules and pustules      Verrucoid papule consistent consistent with wart     Erythematous eczematous patches and plaques     Dystrophic onycholytic nail with subungual debris c/w onychomycosis     Umbilicated papule    Erythematous-base heme-crusted tan verrucoid plaque consistent with inflamed seborrheic keratosis     Erythematous Silvery Scaling Plaque c/w Psoriasis     See annotation          [] Data reviewed  [] Prior external notes reviewed  [] Independent review of test  [] Management discussed with another provider  [] Independent historian    Assessment / Plan:      Pathology Orders:       Normal Orders This Visit    Specimen to Pathology, Dermatology     Questions:    Procedure Type: Dermatology and skin neoplasms    Number of Specimens: 1    ------------------------: -------------------------    Spec 1 Procedure: Shave Biopsy    Spec 1 Clinical Impression: r/o VV vs ISK vs SCC    Spec 1 Source: right anterior thigh    Clinical Information: see EPIC    Clinical History: see EPIC    Specimen Source: Skin    Release to patient: Immediate    Send normal result to authorizing provider's In Basket if patient is active on MyChart: Yes          Inflamed seborrheic keratosis  Cryosurgery procedure note:  Risk, benefits, and alternatives of cryosurgery are discussed with the patient, including but not limited to the risks of hypopigmentation, hyperpigmentation, scar, infection, recurrence of lesion(s), development of new lesion(s), and need for additional treatment of the lesion(s). Verbal consent obtained from patient. Liquid nitrogen cryosurgery applied to 7 lesion(s) to produce a freeze injury. Counseled patient that blisters may form, and instructed patient on wound care with gentle cleansing and use of Vaseline ointment to keep moist  until healed. Handout was provided, and patient was instructed to return to clinic in 1-2 months if lesions do not completely resolve.    Neoplasm of uncertain behavior of skin  -     Specimen to Pathology, Dermatology    Shave biopsy procedure note:  Risk, benefits, and alternatives of biopsy are discussed with the patient, including risk of infection, scar, recurrence, and need for additional treatment of site. The patient agrees to the procedure by verbal consent. The area is marked and prepped with alcohol.  Approximately 1 mL of lidocaine 1% with epinephrine is used for local anesthesia. A sharp blade is used to remove the lesion. The specimen is sent for pathology. Hemostasis is obtained with aluminum chloride and/or monopolar hyfrecation if needed. The area is then dressed and bandaged. The patient tolerated the procedure well without adverse event. Written instructions on wound care were given and were reviewed with the patient, who is to call for any signs of bleeding or infection. The patient will be notified of the pathology results.         Follow up dependent on pathology results.      Melissa Cabrera MD, FAAD  [facility]

## 2025-05-29 ENCOUNTER — TELEPHONE (OUTPATIENT)
Dept: OBSTETRICS AND GYNECOLOGY | Facility: CLINIC | Age: 70
End: 2025-05-29
Payer: MEDICARE

## 2025-05-29 NOTE — TELEPHONE ENCOUNTER
----- Message from Susana sent at 5/29/2025 12:59 PM CDT -----  Regarding: sooner appt request  Name of Who is Calling:pt  Yahir  What is the request in detail:pt had appt 8/5. She is struggling to sleep and is asking if she can please get a sooner appt for hormone consult Can the clinic reply by [facility]:What Number to Call Back if not in [facility]: [phone]

## 2025-05-29 NOTE — TELEPHONE ENCOUNTER
S/w patient and informed patient that no sooner appointments are available. Added patient to waitlist.

## 2025-05-30 ENCOUNTER — RESULTS FOLLOW-UP (OUTPATIENT)
Dept: DERMATOLOGY | Facility: CLINIC | Age: 70
End: 2025-05-30

## 2025-05-30 LAB
ESTROGEN SERPL-MCNC: NORMAL PG/ML
INSULIN SERPL-ACNC: NORMAL U[IU]/ML
LAB AP CLINICAL INFORMATION: NORMAL
LAB AP GROSS DESCRIPTION: NORMAL
LAB AP REPORT FOOTNOTES: NORMAL
T3RU NFR SERPL: NORMAL %

## 2025-05-30 NOTE — PROGRESS NOTES
Final Diagnosis   1.  Skin, right anterior thigh, shave biopsy:   - VERRUCA VULGARIS, INFLAMED.

## 2025-06-01 ENCOUNTER — PATIENT MESSAGE (OUTPATIENT)
Dept: INTERNAL MEDICINE | Facility: CLINIC | Age: 70
End: 2025-06-01
Payer: MEDICARE

## 2025-06-01 DIAGNOSIS — Z76.89 ENCOUNTER FOR NAIL CARE: Primary | ICD-10-CM

## 2025-06-02 ENCOUNTER — PATIENT MESSAGE (OUTPATIENT)
Dept: INTERNAL MEDICINE | Facility: CLINIC | Age: 70
End: 2025-06-02
Payer: MEDICARE

## 2025-06-02 ENCOUNTER — PATIENT MESSAGE (OUTPATIENT)
Dept: NEUROSURGERY | Facility: CLINIC | Age: 70
End: 2025-06-02
Payer: MEDICARE

## 2025-06-02 ENCOUNTER — PATIENT MESSAGE (OUTPATIENT)
Dept: PAIN MEDICINE | Facility: CLINIC | Age: 70
End: 2025-06-02
Payer: MEDICARE

## 2025-06-02 DIAGNOSIS — R11.0 NAUSEA: Primary | ICD-10-CM

## 2025-06-03 ENCOUNTER — TELEPHONE (OUTPATIENT)
Dept: NEUROSURGERY | Facility: CLINIC | Age: 70
End: 2025-06-03
Payer: MEDICARE

## 2025-06-03 ENCOUNTER — PATIENT MESSAGE (OUTPATIENT)
Dept: PRIMARY CARE CLINIC | Facility: CLINIC | Age: 70
End: 2025-06-03
Payer: MEDICARE

## 2025-06-03 DIAGNOSIS — R05.9 COUGH, UNSPECIFIED TYPE: ICD-10-CM

## 2025-06-03 RX ORDER — PROMETHAZINE HYDROCHLORIDE AND DEXTROMETHORPHAN HYDROBROMIDE 6.25; 15 MG/5ML; MG/5ML
5 SYRUP ORAL EVERY 4 HOURS PRN
Qty: 118 ML | Refills: 0 | Status: CANCELLED | OUTPATIENT
Start: 2025-06-03 | End: 2025-06-13

## 2025-06-03 RX ORDER — ONDANSETRON 4 MG/1
4 TABLET, ORALLY DISINTEGRATING ORAL EVERY 12 HOURS PRN
Qty: 20 TABLET | Refills: 0 | Status: SHIPPED | OUTPATIENT
Start: 2025-06-03

## 2025-06-04 DIAGNOSIS — R05.9 COUGH, UNSPECIFIED TYPE: Primary | ICD-10-CM

## 2025-06-04 RX ORDER — PROMETHAZINE HYDROCHLORIDE AND DEXTROMETHORPHAN HYDROBROMIDE 6.25; 15 MG/5ML; MG/5ML
5 SYRUP ORAL EVERY 4 HOURS PRN
Qty: 118 ML | Refills: 0 | Status: SHIPPED | OUTPATIENT
Start: 2025-06-04 | End: 2025-06-14

## 2025-06-09 ENCOUNTER — OFFICE VISIT (OUTPATIENT)
Dept: INTERNAL MEDICINE | Facility: CLINIC | Age: 70
End: 2025-06-09
Payer: MEDICARE

## 2025-06-09 ENCOUNTER — PATIENT MESSAGE (OUTPATIENT)
Dept: INTERNAL MEDICINE | Facility: CLINIC | Age: 70
End: 2025-06-09

## 2025-06-09 VITALS
WEIGHT: 152.31 LBS | HEIGHT: 63 IN | OXYGEN SATURATION: 98 % | DIASTOLIC BLOOD PRESSURE: 68 MMHG | HEART RATE: 73 BPM | BODY MASS INDEX: 26.99 KG/M2 | SYSTOLIC BLOOD PRESSURE: 114 MMHG

## 2025-06-09 DIAGNOSIS — M54.50 CHRONIC LOW BACK PAIN WITHOUT SCIATICA, UNSPECIFIED BACK PAIN LATERALITY: ICD-10-CM

## 2025-06-09 DIAGNOSIS — I10 HYPERTENSION, ESSENTIAL: ICD-10-CM

## 2025-06-09 DIAGNOSIS — E66.3 OVERWEIGHT WITH BODY MASS INDEX (BMI) OF 27 TO 27.9 IN ADULT: ICD-10-CM

## 2025-06-09 DIAGNOSIS — G47.33 OSA (OBSTRUCTIVE SLEEP APNEA): ICD-10-CM

## 2025-06-09 DIAGNOSIS — R11.0 NAUSEA: ICD-10-CM

## 2025-06-09 DIAGNOSIS — G89.29 CHRONIC LOW BACK PAIN WITHOUT SCIATICA, UNSPECIFIED BACK PAIN LATERALITY: ICD-10-CM

## 2025-06-09 PROCEDURE — 99999 PR PBB SHADOW E&M-EST. PATIENT-LVL IV: CPT | Mod: PBBFAC,,, | Performed by: INTERNAL MEDICINE

## 2025-06-09 PROCEDURE — 99214 OFFICE O/P EST MOD 30 MIN: CPT | Mod: PBBFAC | Performed by: INTERNAL MEDICINE

## 2025-06-09 RX ORDER — TIRZEPATIDE 2.5 MG/.5ML
2.5 INJECTION, SOLUTION SUBCUTANEOUS
Qty: 2 ML | Refills: 5 | Status: SHIPPED | OUTPATIENT
Start: 2025-06-09 | End: 2025-11-24

## 2025-06-09 NOTE — PROGRESS NOTES
Subjective     Patient ID: Tiarra Norton is a 70 y.o. female.    Chief Complaint: Follow-up    HPI  Tirzepatide follow up.     Didn't tolerate 5 mg dose.  Went back to 2.5 mg.       Helped IBS, but didn't lose much wt.  5 lbs at most - but yo-yo's.      Went back to 5 mg, but can't tolerate nausea.  Zofran helpful.      HERMINIA, so trizeptide covered.  BMI 26.9.      She would like to continue 2.5 mg as really helped IBS.  Nausea precludes higher dose.    Persistent back pain.  About to buy a scooter for [place] visits, sightseeing..     Pain is limiting and she may seek another opinion.    Long standing htn - well controlled.   She is taking amlodipine 5 mg and aldactone and valsartan.    She is back in pool.        Review of Systems   Constitutional:  Negative for fever and unexpected weight change.   HENT:  Negative for nasal congestion and postnasal drip.    Eyes:  Negative for pain, discharge and visual disturbance.   Respiratory:  Negative for cough, chest tightness, shortness of breath and wheezing.    Cardiovascular:  Negative for chest pain and leg swelling.   Gastrointestinal:  Negative for abdominal pain, constipation, diarrhea and nausea.   Genitourinary:  Negative for difficulty urinating, dysuria and hematuria.   Musculoskeletal:  Positive for back pain.   Integumentary:  Negative for rash.   Neurological:  Negative for headaches.   Psychiatric/Behavioral:  Negative for dysphoric mood and sleep disturbance. The patient is not nervous/anxious.           Objective     Physical Exam  Constitutional:       General: She is not in acute distress.     Appearance: Normal appearance. She is not ill-appearing, toxic-appearing or diaphoretic.   Neurological:      General: No focal deficit present.      Mental Status: She is alert and oriented to person, place, and time.   Psychiatric:         Mood and Affect: Mood normal.         Behavior: Behavior normal.         Thought Content: Thought content normal.             Assessment and Plan     1. BMI 27.0-27.9,adult  -     tirzepatide, weight loss, (ZEPBOUND) 2.5 mg/0.5 mL PnIj; Inject 2.5 mg into the skin every 7 days.  Dispense: 2 mL; Refill: 5    2. HERMINIA (obstructive sleep apnea)  -     tirzepatide, weight loss, (ZEPBOUND) 2.5 mg/0.5 mL PnIj; Inject 2.5 mg into the skin every 7 days.  Dispense: 2 mL; Refill: 5    3. Overweight with body mass index (BMI) of 27 to 27.9 in adult    4. Chronic low back pain without sciatica, unspecified back pain laterality    5. Nausea    6. Hypertension, essential          Continue with tirzepatide 2.5 mg.       Follow up in about 6 months (around 12/9/2025).

## 2025-06-11 ENCOUNTER — OFFICE VISIT (OUTPATIENT)
Dept: PODIATRY | Facility: CLINIC | Age: 70
End: 2025-06-11
Payer: MEDICARE

## 2025-06-11 ENCOUNTER — OFFICE VISIT (OUTPATIENT)
Dept: OPTOMETRY | Facility: CLINIC | Age: 70
End: 2025-06-11
Payer: MEDICARE

## 2025-06-11 VITALS
BODY MASS INDEX: 27.4 KG/M2 | HEART RATE: 73 BPM | DIASTOLIC BLOOD PRESSURE: 81 MMHG | SYSTOLIC BLOOD PRESSURE: 146 MMHG | WEIGHT: 154.63 LBS | HEIGHT: 63 IN

## 2025-06-11 DIAGNOSIS — Z76.89 ENCOUNTER FOR NAIL CARE: ICD-10-CM

## 2025-06-11 DIAGNOSIS — H00.014 HORDEOLUM EXTERNUM OF LEFT UPPER EYELID: ICD-10-CM

## 2025-06-11 DIAGNOSIS — L60.9 DISEASE OF NAIL: Primary | ICD-10-CM

## 2025-06-11 PROCEDURE — 99999 PR PBB SHADOW E&M-EST. PATIENT-LVL IV: CPT | Mod: PBBFAC,,, | Performed by: PODIATRIST

## 2025-06-11 PROCEDURE — 99214 OFFICE O/P EST MOD 30 MIN: CPT | Mod: PBBFAC,27,PN | Performed by: PODIATRIST

## 2025-06-11 PROCEDURE — 99212 OFFICE O/P EST SF 10 MIN: CPT | Mod: PBBFAC,PO | Performed by: OPTOMETRIST

## 2025-06-11 PROCEDURE — 99203 OFFICE O/P NEW LOW 30 MIN: CPT | Mod: S$PBB,,, | Performed by: OPTOMETRIST

## 2025-06-11 PROCEDURE — 87101 SKIN FUNGI CULTURE: CPT | Performed by: PODIATRIST

## 2025-06-11 PROCEDURE — 99999 PR PBB SHADOW E&M-EST. PATIENT-LVL II: CPT | Mod: PBBFAC,,, | Performed by: OPTOMETRIST

## 2025-06-11 PROCEDURE — 99214 OFFICE O/P EST MOD 30 MIN: CPT | Mod: S$PBB,,, | Performed by: PODIATRIST

## 2025-06-11 RX ORDER — AMOXICILLIN AND CLAVULANATE POTASSIUM 500; 125 MG/1; MG/1
1 TABLET, FILM COATED ORAL 2 TIMES DAILY
Qty: 20 TABLET | Refills: 0 | Status: SHIPPED | OUTPATIENT
Start: 2025-06-11 | End: 2025-06-21

## 2025-06-11 NOTE — PROGRESS NOTES
Subjective:      Patient ID: Tiarra Norton is a 70 y.o. female.    Chief Complaint:   Nail Care (Bl nail care)    Tiarra is a 70 y.o. female who presents to the clinic complaining of thick and discolored toenails     Hx seeing Dr. Cox 9/21/23   Rx tobrex drops  Rx sidra   Recommended nail procedure not performed     FINDINGS:  The main arteries of the lower extremities are patent.  Sample sampled peak systolic arterial velocities are within normal limits.  Primarily triphasic waveforms noted.  No tardus parvus.  No vascular outpouching or fluid collections.  Right SIDRA 1.1, left SIDRA 1.1; within normal limits.     Impression:     Patent lower extremity arterial system.  Sampled velocities and ABIs within normal limits.     Also sees derm 5/27/25    Pt rleates she does not like the way the nails look  She did have laser treament by podiatrist Dr. Ovalles  She relates Dr. Cox also cut the nails     She is getting regular pedicures  Also she is using a dremel    Past Medical History:   Diagnosis Date    Cataract     Depression     Gestational diabetes     Hyperlipidemia     Hypertension     IBS (irritable bowel syndrome)     HERMINIA (obstructive sleep apnea)     no cpap use    Thyroid disease      Past Surgical History:   Procedure Laterality Date    ADENOIDECTOMY      ARTHROPLASTY, KNEE, TOTAL, USING COMPUTER-ASSISTED NAVIGATION Right 5/18/2023    Procedure: CONVERSION ARTHROPLASTY, KNEE, TOTAL, USING COMPUTER-ASSISTED NAVIGATION;  Surgeon: Virgil Scott MD;  Location: [facility] OR;  Service: Orthopedics;  Laterality: Right;  Same day discharge    ARTHROSCOPIC CHONDROPLASTY OF KNEE JOINT Right 10/19/2021    Procedure: ARTHROSCOPY, KNEE, WITH CHONDROPLASTY;  Surgeon: Trevin Huber MD;  Location: [facility] OR;  Service: Orthopedics;  Laterality: Right;    ARTHROSCOPY OF KNEE Right 10/19/2021    Procedure: ARTHROSCOPY, KNEE-RIGHT;  Surgeon: Trevin Huber MD;  Location: [facility] OR;  Service: Orthopedics;  Laterality:  Right;    BLOCK, NERVE, GENICULAR Right 2024    Procedure: BLOCK, NERVE RIGHT GENICULAR;  Surgeon: Shoaib Aguirre MD;  Location: [facility] PAIN MGT;  Service: Pain Management;  Laterality: Right;  [phone]     SECTION      CHOLECYSTECTOMY      COLONOSCOPY N/A 2016    Procedure: COLONOSCOPY;  Surgeon: Heri Segura MD;  Location: [facility] ENDO (4TH FLR);  Service: Endoscopy;  Laterality: N/A;    COLONOSCOPY N/A 2019    Procedure: COLONOSCOPY;  Surgeon: Joe Pitts MD;  Location: [facility] ENDO (4TH FLR);  Service: Endoscopy;  Laterality: N/A;    CYSTOSCOPY  2022    Procedure: CYSTOSCOPY;  Surgeon: Lenny Moore MD;  Location: [address];  Service: Urology;;    ESOPHAGOGASTRODUODENOSCOPY N/A 2020    Procedure: EGD (ESOPHAGOGASTRODUODENOSCOPY);  Surgeon: Tolu Rivas MD;  Location: [facility] (4TH FLR);  Service: Endoscopy;  Laterality: N/A;  Pt. moved to 9:15am arrival time.. Instructed to not have anything after midnight.EC    EYE SURGERY      cataract resection right    INJECTION OF ANESTHETIC AGENT AROUND NERVE Bilateral 2022    Procedure: Block, Nerve MEDIAL BRANCH BLOCK BILATERAL L3,4,5;  Surgeon: Tara Gibbs MD;  Location: [facility] PAIN MGT;  Service: Pain Management;  Laterality: Bilateral;    INJECTION OF ANESTHETIC AGENT AROUND NERVE Bilateral 2/15/2022    Procedure: BLOCK, NERVE, L3*L4-L5 MEDIAL BR ANCH 2 OF 2;  Surgeon: Tara Gibbs MD;  Location: [facility] PAIN MGT;  Service: Pain Management;  Laterality: Bilateral;    INJECTION OF BOTULINUM TOXIN TYPE A  2022    Procedure: BOTULINUM TOXIN INJECTION 100 units;  Surgeon: Lenny Moore MD;  Location: [address];  Service: Urology;;    INSERTION, NEUROSTIMULATOR, SPINAL CORD N/A 2023    Procedure: SPINAL CORD STIMULATOR IMPLANT RxRevu;  Surgeon: Shoaib Aguirre MD;  Location: [facility];  Service: Pain Management;  Laterality: N/A;    JOINT REPLACEMENT      partial right  knee    KNEE ARTHROSCOPY W/ MENISCECTOMY Right 10/19/2021    Procedure: ARTHROSCOPY, KNEE, WITH MENISCECTOMY, PARTIAL LATERAL;  Surgeon: Trevin Huber MD;  Location: [facility] OR;  Service: Orthopedics;  Laterality: Right;    r hand surgery      RADIOFREQUENCY ABLATION Right 3/8/2022    Procedure: RADIOFREQUENCY ABLATION, L3-L5 1 OF 2;  Surgeon: Tara Gibbs MD;  Location: [facility] PAIN MGT;  Service: Pain Management;  Laterality: Right;    RADIOFREQUENCY ABLATION Left 3/22/2022    Procedure: RADIOFREQUENCY ABLATION, l3-+l5 2 of 2;  Surgeon: Tara Gibbs MD;  Location: [facility] PAIN MGT;  Service: Pain Management;  Laterality: Left;    RADIOFREQUENCY ABLATION Right 3/15/2024    Procedure: RADIOFREQUENCY ABLATION RIGHT GENICULAR;  Surgeon: Shoaib Aguirre MD;  Location: [facility] PAIN MGT;  Service: Pain Management;  Laterality: Right;  [phone]    REMOVAL OF NEUROSTIMULATOR N/A 11/20/2023    Procedure: SCS IPG BATTERY REVISION;  Surgeon: Shoaib Aguirre MD;  Location: [facility] OR;  Service: Pain Management;  Laterality: N/A;    REVISION PROCEDURE INVOLVING SPINAL CORD NEUROSTIMULATOR N/A 1/27/2025    Procedure: SPINAL CORD STIMULATOR BATTERY REVISION [facility];  Surgeon: Shoaib Aguirre MD;  Location: [facility] OR;  Service: Pain Management;  Laterality: N/A;    TONSILLECTOMY      TOTAL KNEE ARTHROPLASTY      partial knee replacement    TRIAL OF SPINAL CORD NERVE STIMULATOR N/A 12/22/2022    Procedure: SPINAL CORD STIMULATOR TRIAL [facility];  Surgeon: Shoaib Aguirre MD;  Location: [facility] PAIN MGT;  Service: Pain Management;  Laterality: N/A;    TUBAL LIGATION       Medications Ordered Prior to Encounter[1]  Review of patient's allergies indicates:  No Known Allergies    Review of Systems   Constitutional: Negative for chills, decreased appetite, fever, malaise/fatigue, night sweats, weight gain and weight loss.   Cardiovascular:  Negative for chest pain, claudication, dyspnea on exertion, leg swelling,  "palpitations and syncope.   Respiratory:  Negative for cough and shortness of breath.    Endocrine: Negative for cold intolerance and heat intolerance.   Hematologic/Lymphatic: Negative for bleeding problem. Does not bruise/bleed easily.   Skin:  Positive for nail changes. Negative for color change, dry skin, flushing, itching, poor wound healing, rash, skin cancer, suspicious lesions and unusual hair distribution.   Musculoskeletal:  Negative for arthritis, back pain, falls, gout, joint pain, joint swelling, muscle cramps, muscle weakness, myalgias, neck pain and stiffness.   Gastrointestinal:  Negative for diarrhea, nausea and vomiting.   Neurological:  Negative for dizziness, focal weakness, light-headedness, numbness, paresthesias, tremors, vertigo and weakness.   Psychiatric/Behavioral:  Negative for altered mental status and depression. The patient does not have insomnia.    Allergic/Immunologic: Negative.            Objective:       Vitals:    06/11/25 0955   BP: (!) 146/81   Pulse: 73   Weight: 70.1 kg (154 lb 10.4 oz)   Height: 5' 3" (1.6 m)   PainSc: 0-No pain   PainLoc: Foot   70.1 kg (154 lb 10.4 oz)     Physical Exam  Vitals reviewed.   Constitutional:       General: She is not in acute distress.     Appearance: She is well-developed. She is not ill-appearing, toxic-appearing or diaphoretic.      Comments: Proper supportive shoegear      Cardiovascular:      Pulses:           Dorsalis pedis pulses are 2+ on the right side and 2+ on the left side.        Posterior tibial pulses are 2+ on the right side and 2+ on the left side.   Musculoskeletal:         General: No tenderness or deformity.      Right lower leg: No edema.      Left lower leg: No edema.      Right ankle: Normal.      Right Achilles Tendon: Normal.      Left ankle: Normal.      Left Achilles Tendon: Normal.      Right foot: Decreased range of motion. No deformity.      Left foot: Decreased range of motion. No deformity.      Comments: " Flexible pes planus foot type w/ medial arch collapse and mild gastroc equinus       Reducible extensor and flexor contractures at the MTPJ and PIPJ of toes 2-5, bilat.          Feet:      Right foot:      Protective Sensation: 10 sites tested.  10 sites sensed.      Skin integrity: Dry skin present. No ulcer, blister, skin breakdown, erythema, warmth or callus.      Toenail Condition: Right toenails are abnormally thick.      Left foot:      Protective Sensation: 10 sites tested.  10 sites sensed.      Skin integrity: Dry skin present. No ulcer, blister, skin breakdown, erythema, warmth or callus.      Toenail Condition: Left toenails are abnormally thick.      Comments: Left hallux nail abnormally narrow/xerosis to nail beds  Thickened discolored to all nails  No soi    Skin:     General: Skin is warm and dry.      Capillary Refill: Capillary refill takes 2 to 3 seconds.      Coloration: Skin is not pale.      Findings: No erythema or rash.   Neurological:      Mental Status: She is alert and oriented to person, place, and time.      Sensory: No sensory deficit.      Gait: Gait is intact.   Psychiatric:         Attention and Perception: Attention normal.         Mood and Affect: Mood normal.         Speech: Speech normal.         Behavior: Behavior normal.         Thought Content: Thought content normal.         Cognition and Memory: Cognition normal.         Judgment: Judgment normal.               Assessment:       Encounter Diagnoses   Name Primary?    Encounter for nail care     Disease of nail Yes         Plan:       Tiarra was seen today for nail care.    Diagnoses and all orders for this visit:    Disease of nail  -     Fungal culture , skin, hair, or nails    Encounter for nail care  -     Ambulatory referral/consult to Podiatry      I counseled the patient on her conditions, their implications and medical management.    The area was cleansed with alcohol prep pad. With patient's permission, the affected  toenail was  aggressively reduced back to the proximal matrix region using sterile nail nippers to the soft tissue attachment to obtain nail specimen. Patient tolerated well. No blood was drawn. The specimen was placed in a sterile specimen container and appropriately label with patient confirmation      Recommend Stop self dremeling    Pedicure precautions    Will rx urea vs fungal medication     Discussed that cosmetic expectations may not be able to be met if nails are damaged.          Follow up in about 3 months (around 9/11/2025).          [1]   Current Outpatient Medications on File Prior to Visit   Medication Sig Dispense Refill    alosetron (LOTRONEX) 0.5 MG tablet Take 1 tablet (0.5 mg total) by mouth in the morning and 1 tablet (0.5 mg total) in the evening. 60 tablet 0    ALPRAZolam (XANAX) 1 MG tablet Take 1 tablet by mouth three times daily. 90 tablet 2    amLODIPine (NORVASC) 5 MG tablet Take 1 tablet (5 mg total) by mouth once daily. 90 tablet 3    atorvastatin (LIPITOR) 10 MG tablet Take 1 tablet (10 mg total) by mouth once daily. 90 tablet 2    erythromycin (ROMYCIN) ophthalmic ointment Place into the left eye every evening. 3.5 g 0    EScitalopram oxalate (LEXAPRO) 5 MG Tab take 1 tablet by mouth once daily 90 tablet 1    estradioL (ESTRACE) 0.01 % (0.1 mg/gram) vaginal cream Insert 1 gram as directed vagianlly nightly for 2 to 4 weeks followed by 2-3x/week 42.5 g 6    levothyroxine (SYNTHROID) 100 MCG tablet TAKE 1 TABLET BY MOUTH EVERY MORNING 90 tablet 3    LIDOcaine (XYLOCAINE) 5 % Oint ointment Apply topically 3 (three) times daily as needed (pain). 30 g 2    ondansetron (ZOFRAN-ODT) 4 MG TbDL Dissolve one tablet (4 mg total) by mouth every 12 (twelve) hours as needed (nausea). 20 tablet 0    promethazine (PHENERGAN) 25 MG tablet Take 1 tablet by mouth every 12 hours as needed for nausea 30 tablet 2    promethazine-dextromethorphan (PROMETHAZINE-DM) 6.25-15 mg/5 mL Syrp Take 5 mLs by mouth  every 4 (four) hours as needed (cough). 118 mL 0    QUEtiapine (SEROQUEL) 25 MG Tab Take one tablet by mouth nightly 30 tablet 5    ramelteon (ROZEREM) 8 mg tablet Take 1 tablet (8 mg total) by mouth every evening. 30 tablet 5    spironolactone (ALDACTONE) 25 MG tablet Take 1 tablet (25 mg total) by mouth once daily. For blood pressure 90 tablet 2    sumatriptan (IMITREX) 50 MG tablet 1 tab po at start of headache.  Repeat in 2 h prn 36 tablet 3    suzetrigine 50 mg Tab Take 50 mg by mouth 2 (two) times a day. 60 tablet 1    tirzepatide, weight loss, (ZEPBOUND) 2.5 mg/0.5 mL PnIj Inject 2.5 mg into the skin every 7 days. 2 mL 5    traZODone (DESYREL) 100 MG tablet take 1 to 2 tablets by mouth every evening for for sleep 180 tablet 3    tretinoin (RETIN-A) 0.025 % cream Compound tretinoin 0.025% / azelaic acid 8% / niacinamide 2% cream. Apply a pea-sized amount to entire face qhs. 30 g 5    triamcinolone acetonide 0.025% (KENALOG) 0.025 % cream Apply topically 2 (two) times daily. 15 g 0    valsartan (DIOVAN) 320 MG tablet Take 1 tablet (320 mg total) by mouth once daily. 90 tablet 3    amoxicillin-clavulanate 500-125mg (AUGMENTIN) 500-125 mg Tab Take 1 tablet (500 mg total) by mouth 2 (two) times daily. for 10 days 20 tablet 0     No current facility-administered medications on file prior to visit.

## 2025-06-11 NOTE — PROGRESS NOTES
HPI    69 Y/o female is here [facility] with C/o Stye OS upper eyelid pt say's she has   had the bump on eyelid for about 4 weeks. Pt has been using and oinment   and warm compress.   Pt denies pain and discomfort   No f/f     Eye med: Erythromycin OU BID   Last edited by Sunday Hyatt MA on 6/11/2025  2:12 PM.            Assessment /Plan     For exam results, see Encounter Report.    Hordeolum externum of left upper eyelid  -     Ambulatory referral/consult to Optometry  -     amoxicillin-clavulanate 500-125mg (AUGMENTIN) 500-125 mg Tab; Take 1 tablet (500 mg total) by mouth 2 (two) times daily. for 10 days  Dispense: 20 tablet; Refill: 0      Sp pciol OD  Ambly hx OS  Pt presents TODAY for cori X 2 KISHA    PLAN:    1) Hot compresses, 5 minutes at a time, QID, along with AUGMENTIN BID PO X 7-10 days  2) Discussed chalazion formation  3) Pt will call if not resolved in one week, and will schedule excision

## 2025-06-12 ENCOUNTER — OFFICE VISIT (OUTPATIENT)
Dept: PAIN MEDICINE | Facility: CLINIC | Age: 70
End: 2025-06-12
Payer: MEDICARE

## 2025-06-12 VITALS
HEIGHT: 63 IN | BODY MASS INDEX: 27.39 KG/M2 | SYSTOLIC BLOOD PRESSURE: 120 MMHG | RESPIRATION RATE: 19 BRPM | WEIGHT: 154.56 LBS | DIASTOLIC BLOOD PRESSURE: 79 MMHG | TEMPERATURE: 99 F | HEART RATE: 83 BPM

## 2025-06-12 DIAGNOSIS — Z96.651 HISTORY OF TOTAL KNEE ARTHROPLASTY, RIGHT: ICD-10-CM

## 2025-06-12 DIAGNOSIS — M47.816 LUMBAR SPONDYLOSIS: ICD-10-CM

## 2025-06-12 DIAGNOSIS — M51.369 DDD (DEGENERATIVE DISC DISEASE), LUMBAR: ICD-10-CM

## 2025-06-12 DIAGNOSIS — G89.29 CHRONIC PAIN OF RIGHT KNEE: ICD-10-CM

## 2025-06-12 DIAGNOSIS — G89.4 CHRONIC PAIN SYNDROME: Primary | ICD-10-CM

## 2025-06-12 DIAGNOSIS — M54.16 LUMBAR RADICULOPATHY, CHRONIC: ICD-10-CM

## 2025-06-12 DIAGNOSIS — M41.25 OTHER IDIOPATHIC SCOLIOSIS, THORACOLUMBAR REGION: ICD-10-CM

## 2025-06-12 DIAGNOSIS — Z96.89 SPINAL CORD STIMULATOR STATUS: ICD-10-CM

## 2025-06-12 DIAGNOSIS — M25.561 CHRONIC PAIN OF RIGHT KNEE: ICD-10-CM

## 2025-06-12 DIAGNOSIS — G89.4 CHRONIC PAIN SYNDROME: ICD-10-CM

## 2025-06-12 PROCEDURE — 99215 OFFICE O/P EST HI 40 MIN: CPT | Mod: PBBFAC | Performed by: NURSE PRACTITIONER

## 2025-06-12 PROCEDURE — 99999 PR PBB SHADOW E&M-EST. PATIENT-LVL V: CPT | Mod: PBBFAC,,, | Performed by: NURSE PRACTITIONER

## 2025-06-12 RX ORDER — HYDROCODONE BITARTRATE AND ACETAMINOPHEN 5; 325 MG/1; MG/1
1 TABLET ORAL EVERY 12 HOURS PRN
Qty: 60 TABLET | Refills: 0 | Status: SHIPPED | OUTPATIENT
Start: 2025-06-20 | End: 2025-07-20

## 2025-06-12 NOTE — PROGRESS NOTES
Chronic Pain-Established Visit    SUBJECTIVE:    Interval History 6/12/2025:  The patient returns to clinic today for follow up of back pain. She reports worsened low back pain. This is worse with prolonged standing, walking, and activity. She denies any radicular leg pain. She also notes pain higher into the mid back. She does have benefit with SCS. She is taking Norco as needed with benefit. She denies any adverse effects. She denies any other health changes. Her pain today is 7/10.    Interval History 3/11/2025:  The patient returns to clinic today for follow up of back pain. She continues to report pain near former battery site. She denies any pain near new battery site. She does have benefit with SCS. She is taking Norco as needed for severe pain with benefit. She denies any adverse effects. She denies any other health changes. Her pain today is 8/10.    Interval History 2/3/2025:  The patient is seen today for follow-up of back pain and postoperative evaluation after spinal cord stimulator (SCS) battery site revision. The revision was performed due to migration of the battery into the lower buttock. She reports that the new battery site feels much better, and she is optimistic that she will be pain-free once the surgical site fully heals. Previous imaging and lab results were reviewed.    Interval History 12/2/2024:  Ms. Mayen returns to clinic today for follow up of back pain via virtual visit. She reports worsened pain around her SCS battery site. She is having difficulty sleeping due to the pain. On a recent vacation, she had to sleep in a recliner. She also has pain with sitting on a airplane and in the car. She does have benefit with the device. She is interested in moving forward with battery revision. She is taking Norco as needed with benefit and without adverse effects. She denies any other health changes.     Interval History 10/15/2024:  The patient returns to clinic today for follow up of back  pain. She is s/p TPI around SCS battery site on 10/1/2024. She reports one day of relief. She continues to report pocket pain around her SCS site. She did meet with [employer] representative for programming. This has been beneficial. She also did dry needling with benefit. She does not wish to pursue battery revision at this time. She continues to take Norco as needed with benefit. She denies any adverse effects. She denies any other health changes. Her pain today is 6/10.    Interval History 8/19/2024:  The patient returns to clinic today for follow up of back pain. She continues to report pocket pain around her SCS site. She cannot lie flat on her back due to pain. She also notes pain with prolonged standing and activity. She does have benefit with SCS. She reports intermittent right knee pain. She is currently taking Norco as needed with some benefit. She is performing  home exercises. She denies any other health changes. Her pain today is 8/10.    Interval History 6/24/2024:  The patient returns to clinic today for follow up of back and knee pain via virtual visit. She continues to report low back pain. This is worse with prolonged standing. She denies any radicular leg pain. She did recently meet with [employer] representatives for programming. She reports limited relief with these changes. She is participating in physical therapy. She is taking Norco as needed with benefit. She denies any adverse effects. She denies any other health changes.     Interval History 3/25/2024:  The patient returns to clinic today for follow up of back and knee pain. She is s/p right genicular RFA on 3/15/2024. She reports 50% relief. She continues to report some right knee pain.She continues to report low back pain. She is using her SCS. She does have intermittent pain around IPG site. She is participating in physical therapy. She is taking Norco as needed with benefit. She denies any adverse effects. She denies  any other health changes. Her pain today is 4/10.    Interval History 1/23/2024:  Tiarra Norton presents for folow-up for lower back pain s/p IPG revision 11/20/2023.  She reports that the pocket pain is significantly improved, continues to get better.  She is just now feeling well enough to participate in physical therapy which she has today.  Today she also complains of right knee pain. She had a right TKA on 5/18/2023. .  The patient describes the pain as aching. The pain is constant. Exacerbating factors: bending, cooking, standing for a long time, walking.  Mitigating factors: ice, heat, medications.  The patient takes Tylenol 500 mg PRN with mild benefit.  They deny any perceived side effects.  She is hoping for an additional prescription for Norco 5-325 mg which she had taken over the recovery period from the IPG revision.  She states that the medication helps her with activity.  The symptoms interfere with ADLs.  The patient last had imaging Xray bilateral knees on 10/26/2023. See findings below.  The patient denies fever/night sweats, urinary incontinence, bowel incontinence, significant weight changes, significant motor weakness or changes, or loss of sensations.  Today's pain score is 3/10 for back pain and 7/10 for right knee pain.     RTA 5/18/23, activity restrictions from IPG revision was not able to do PT, starting PT for back and right knee back today.  Wondering if there are any procedures we can do for knee.     Wants Norco 5-325 mg-helps with activity  Pain ext and flex  Ptp superior anterior    Interval History 10/25/2023  69 y/o female with hx of chronic left sided low back pain near IPG, she is s/p Octad electrode x2  placement of pulse generator 3  on 1/9 she recently was provided a recent TPI near left low back she reports 0% relief following.  Pain is worse when standing walking performing ADLs.  She does state she gets 5 hours of uninterrupted sleep.  Like her left low back is weak  and aching.  Tingling any wearing her low back.  In the right side of her low back.  To discuss other pain interventions and the plan of care moving forward regard to her current subacute pain.  She denies any profound weakness denies any bowel bladder dysfunction denies any saddle anesthesia at this time.    Interval History 9/12/2023:  The patient returns to clinic today for follow up of battery site pain via virtual visit. She is s/p trigger point injections under ultrasound on 8/29/2023. She does feel some benefit. She is currently ill with Covid. She is currently running fever and having body aches. She denies any other health changes.     Interval History 8/11/2023:  The patient returns to clinic today for follow up of pain. She reports pain around her SCS battery site. She describes the pain as though her muscles feel weak and tired. She does report benefit with SCS. No difficulty with charging or programming. She has tried Lidoderm and Salonpas patches but these caused a rash. She is currently participating in physical therapy for her knee. She denies any other health changes. Her pain today is 4/10.    Interval History 4/14/2023:  The patient is here for follow up of back pain and pain at IPG site. She reports improvement since previous visit. She did have benefit with Lidoderm patches but had a rash so she stopped. Her pain has improved with Salon Pas patches. She is scheduled for knee replacement next month with Dr. Scott. Her pain today is 3/10.    Interval History 3/21/2023:  The patient presents to discuss pain to IPG site. She says that it has been present since surgery and has gradually worsened. She says that it feels like a tight and weak muscle. She has not tried any topical medications or muscle relaxants. No fever or malaise. She has not noticed any redness or swelling to the site. Her original pain has significantly improved from SCS implant. She is part of a research study. Her pain today is  6/10.    Interval History 2/17/2023:  The patient returns to clinic today for follow up of back pain. She reports benefit with [employer] SCS. She is in contact with representatives for programming. She is very happy with relief. She denies any fever, chills, or drainage. She continues to follow her activity restrictions. She denies any other health changes. Her pain today is 2/10.    Interval History 1/31/2023:  The patient returns to clinic today for wound check. She reports benefit with [employer] SCS. She is in contact with representatives for programming. She reports intermittent muscle soreness into her legs. She does have an upcoming appointment with representatives for programming. She denies any fever, chills, or drainage. She continues to follow her activity restrictions. She denies any other health changes. Her pain today is 3/10.    Interval History 1/17/2023:  The patient returns to clinic today for post op. She is s/p [employer] SCS implant on 1/9/2022. She reports benefit with the device at this time. She is meeting with the representatives today. She does report soreness to her incisions. She denies any fever, chills, or drainage. She did have issues with her dressing staying on. She has completed her antibiotics. She denies any other health changes. Her pain today is 2/10.    Interval History 12/29/2022:  The patient returns for post op appointment. She is s/p lumbar [employer] SCS trial with 90% relief. She has had very minimal back pain during the trial period. She says that she was more active over this time due to the Christmas holiday and had minimal symptoms. She is very happy with the relief. She would like to move forward with implant. She is part of [place] study. No fever or malaise during trial period. Her pain today is 1/10.    Interval History 10/14/22: Mrs. Norton presents today for follow up of chronic low back pain and to discuss the [employer] trial. She had her lumbar MRI  done last night without significant changes to her spine. She did have increased dilation of her bile duct. Her pain today is the same in character and severity as during her last visit with us and she rates it currently at a 7/10. She continues to do her stretches on her own which have helped some. She presents with some questions regarding the trial.     Interval History 9/21/2022:  The patient returns to clinic today for follow up of back pain via virtual visit. She received a letter from [facility] denying SCS trial. She is frustrated by this. She continues to report low back pain. She denies any radicular leg pain. Her pain is worse with bending and activity. She recently underwent med screening for the [employer] program. She is interested in pursuing this. She has tried Gabapentin and Lyrica in the past with side effects. She denies any other health changes. Her pain today is 7/10.     Interval History 8/22/22: Ms. Norton returns for follow up on her low back pain. At our last visit she felt cured by acupuncture. Unfortunately, this only lasted for 24 hours. She returns today looking for other options to make life livable. Patient claims all of her pain in the low back and middle back. We discussed that this will work best for her low back pain.     Interval History 7/25/22: Tiarra Norton returns for follow up. We previously have been discussing treatments for her low back pain that did not resolve with RFA. At our last visit I referred her to Dr. Antony for clearance for a SCS trial. She was considered to be a reasonable candidate. However, in the meantime, her PT referred her to acupuncture with Dr. Jacobs. She had one treatment and already notes 50% relief. She notes that she still has pain, especially with leaning forward, but feels that it is much better controlled. Their plan is one treatment per week but is approved for 20 sessions.     Interval History 6/15/22: Tiarra Norton returns for  follow up. She continues to feel like she has mild pain sitting or laying down, and has her worst pain with extreme leaning forward to pick something up off the floor. She also has difficulty leaning over her handlebars to ride her bike or leaning forward to fold clothes or standing up for any period of time. So, we determined that leaning forward is her worst issue. We did previouisly do lmbb and rfa, which has helped with extension, but it would not help with leaning forward. On review of her MRI she has significant multilevel DDD with Modic changes which likely account for her pain.     4/18/22 Interval History: Patient presents for telehealth visit for follow up following her b/l RFA at L3-L5. She reports 80% relief and is very pleased with her pain relief. Unfortunately, she is not back to doing what she wants due to right knee pain. She is seeing Dr. Huber for knee pain and is s/p right medial compartment partial knee replacement. She is currently in therapy for this. We discussed that we may be able to assist her with her knee pain as well.     HPI:  Original HISTORY OF PRESENT ILLNESS: Tiarra Norton presents to the clinic for the evaluation of the above pain. The pain started five years ago.     Original Pain Description:  The pain is located in the lower back and does radiate up towards her upper back.The pain is described as aching, dull and sharp. Initially starts as a dull, aching pain and it gets sharp once she bends down and moves around. Typically when she walks for more than five minutes, the sharp pain radiates up her back. Exacerbating factors: Standing, Bending, Touching, Walking, Night Time, Flexing and Lifting. Mitigating factors heat, ice, laying down and massage. Symptoms interfere with daily activity and sleeping. The patient feels like symptoms have been worsening. Patient denies night fever/night sweats, urinary incontinence, bowel incontinence, significant weight loss, significant  motor weakness and loss of sensations.    Original PAIN SCORES:  Best: Pain is 1  Worst: Pain is 10  Usually: Pain is 4  Current: Pain is 4        6/12/2025    11:10 AM 3/11/2025     2:14 PM 2/3/2025     1:05 PM   Last 3 PDI Scores   Pain Disability Index (PDI) 42 [phone]     6 weeks of Conservative therapy (PT/Chiro/Home Exercises with Dates)  Healthy back program--> has four sessions left for a total of 20 sessions   Last session was on 1/31/22   Stretches that [facility] taught at [facility] gives her relief       Medications:   Robaxin PRN    Ice and heat which has helped   Tried Gabapentin and Lyrica- made her loopy      Report: reviewed       Interventional Pain Procedures:   10/17/2023 TPI left side near IPG battery 0% relief  2/8/2022- Bilateral L3,4,5 MBB  2/15/2022- Bilateral L3,4,5 MBB  3/8/2022- Right L3,4,5 RFA- 80% relief for a few months  3/22/2022- Left L3,4,5 RFA- 80% relief for a few months  12/22/22 SCS trial- 90% relief  1/9/2023- [employer] SCS implant.   11/20/2023- IPG revision  2/16/2024- Right genicular nerve block  3/15/2024- Right genicular RFA    Imaging:    EXAMINATION:  XR KNEE 3 VIEW RIGHT     CLINICAL HISTORY:  Presence of right artificial knee joint     TECHNIQUE:  AP, lateral, and Merchant views of the right knee were performed.     COMPARISON:  Right knee radiograph dated 06/12/2023     FINDINGS:  Prior right knee arthroplasty.  Hardware projects in similar alignment without evidence for interval loosening or failure.  No periprosthetic fracture.     Impression:     As above.        Electronically signed by: Azael Wilkins  Date:                                            11/03/2023  Time:                                           11:05      10/13/22: MRI LUMBAR SPINE WITHOUT CONTRAST  FINDINGS:  Lumbar levoscoliosis centered at L2.  Minimal anterolisthesis at L4-5.     No compression fractures.  No marrow replacing lesions.     Multilevel degenerative changes with disc  desiccation and disc space narrowing, described in detail below.  Discogenic endplate edema at T12-L1.     The conus medullaris has a normal appearance and terminates at the L1 level.  Cauda equina nerve roots are unremarkable.  No epidural mass or collection.     The common duct is dilated up to 12 mm, previously 9 mm on 08/20/2022 CT.  Mild prominence of the main pancreatic duct up to 4 mm, which is new.  No definite filling defect in the bile ducts.  Paraspinal muscle atrophy.     SIGNIFICANT FINDINGS BY LEVEL:     T12-L1: Disc bulge.  Bilateral facet arthropathy and ligamentum flavum thickening.  Mild canal stenosis.  Mild bilateral foraminal stenosis.     L1-2: Disc bulge.  Bilateral facet arthropathy and ligamentum flavum thickening.  Mild canal stenosis.  Mild right foraminal stenosis.     L2-3: Disc bulge.  Bilateral facet arthropathy and ligamentum flavum thickening.  Mild canal stenosis.  Moderate right mild left foraminal stenosis.     L3-4: Disc bulge.  Bilateral facet arthropathy and ligamentum flavum thickening.  Small bilateral facet joint effusions/synovitis.  Moderate canal stenosis with partial effacement of the thecal sac and crowding of the cauda equina nerve roots.  Mild right and moderate left foraminal stenosis.     L4-5: Disc bulge with posterior disc uncovering.  Bilateral facet arthropathy and ligamentum flavum thickening.  Small right facet joint effusion/synovitis.  Mild canal stenosis.  Mild bilateral foraminal stenosis.     L5-S1: Disc bulge.  Bilateral facet arthropathy and ligamentum flavum thickening.  No significant canal stenosis.  Mild left foraminal stenosis.     Impression:     Lumbar levoscoliosis and multilevel degenerative changes as detailed above.  No significant changes from 01/21/2022.     Increased biliary ductal dilatation up to 12 mm, greater than expected after cholecystectomy.  In addition, the main pancreatic duct is mildly prominent up to 4 mm, which is new.  If  LFTs are abnormal, consider MRI/MRCP or ERCP for further evaluation.      Past Medical History:   Diagnosis Date    Cataract     Depression     Gestational diabetes     Hyperlipidemia     Hypertension     IBS (irritable bowel syndrome)     HERMINIA (obstructive sleep apnea)     no cpap use    Thyroid disease      Past Surgical History:   Procedure Laterality Date    ADENOIDECTOMY      ARTHROPLASTY, KNEE, TOTAL, USING COMPUTER-ASSISTED NAVIGATION Right 2023    Procedure: CONVERSION ARTHROPLASTY, KNEE, TOTAL, USING COMPUTER-ASSISTED NAVIGATION;  Surgeon: Virgil Scott MD;  Location: [facility] OR;  Service: Orthopedics;  Laterality: Right;  Same day discharge    ARTHROSCOPIC CHONDROPLASTY OF KNEE JOINT Right 10/19/2021    Procedure: ARTHROSCOPY, KNEE, WITH CHONDROPLASTY;  Surgeon: Trevin Huber MD;  Location: [facility] OR;  Service: Orthopedics;  Laterality: Right;    ARTHROSCOPY OF KNEE Right 10/19/2021    Procedure: ARTHROSCOPY, KNEE-RIGHT;  Surgeon: Trevin Huber MD;  Location: [facility] OR;  Service: Orthopedics;  Laterality: Right;    BLOCK, NERVE, GENICULAR Right 2024    Procedure: BLOCK, NERVE RIGHT GENICULAR;  Surgeon: Shoaib Aguirre MD;  Location: [facility] PAIN MGT;  Service: Pain Management;  Laterality: Right;  [phone]     SECTION      CHOLECYSTECTOMY      COLONOSCOPY N/A 2016    Procedure: COLONOSCOPY;  Surgeon: Heri Segura MD;  Location: [facility] (4TH FLR);  Service: Endoscopy;  Laterality: N/A;    COLONOSCOPY N/A 2019    Procedure: COLONOSCOPY;  Surgeon: Joe Pitts MD;  Location: [facility] (4TH FLR);  Service: Endoscopy;  Laterality: N/A;    CYSTOSCOPY  2022    Procedure: CYSTOSCOPY;  Surgeon: Lenny Moore MD;  Location: [facility] 1ST FLR;  Service: Urology;;    ESOPHAGOGASTRODUODENOSCOPY N/A 2020    Procedure: EGD (ESOPHAGOGASTRODUODENOSCOPY);  Surgeon: Tolu Rivas MD;  Location: [facility] (4TH FLR);  Service: Endoscopy;  Laterality: N/A;  Pt.  moved to 9:15am arrival time.. Instructed to not have anything after midnight.EC    EYE SURGERY      cataract resection right    INJECTION OF ANESTHETIC AGENT AROUND NERVE Bilateral 2/8/2022    Procedure: Block, Nerve MEDIAL BRANCH BLOCK BILATERAL L3,4,5;  Surgeon: Tara Gibbs MD;  Location: [facility] PAIN MGT;  Service: Pain Management;  Laterality: Bilateral;    INJECTION OF ANESTHETIC AGENT AROUND NERVE Bilateral 2/15/2022    Procedure: BLOCK, NERVE, L3*L4-L5 MEDIAL BR ANCH 2 OF 2;  Surgeon: Tara Gibbs MD;  Location: [facility] PAIN MGT;  Service: Pain Management;  Laterality: Bilateral;    INJECTION OF BOTULINUM TOXIN TYPE A  6/21/2022    Procedure: BOTULINUM TOXIN INJECTION 100 units;  Surgeon: Lenny Moore MD;  Location: [address];  Service: Urology;;    INSERTION, NEUROSTIMULATOR, SPINAL CORD N/A 1/9/2023    Procedure: SPINAL CORD STIMULATOR IMPLANT SquareClock;  Surgeon: Shoaib Aguirre MD;  Location: [facility] OR;  Service: Pain Management;  Laterality: N/A;    JOINT REPLACEMENT      partial right knee    KNEE ARTHROSCOPY W/ MENISCECTOMY Right 10/19/2021    Procedure: ARTHROSCOPY, KNEE, WITH MENISCECTOMY, PARTIAL LATERAL;  Surgeon: Trevin Huber MD;  Location: [facility] OR;  Service: Orthopedics;  Laterality: Right;    r hand surgery      RADIOFREQUENCY ABLATION Right 3/8/2022    Procedure: RADIOFREQUENCY ABLATION, L3-L5 1 OF 2;  Surgeon: Tara Gibbs MD;  Location: [facility] PAIN MGT;  Service: Pain Management;  Laterality: Right;    RADIOFREQUENCY ABLATION Left 3/22/2022    Procedure: RADIOFREQUENCY ABLATION, l3-+l5 2 of 2;  Surgeon: Tara Gibbs MD;  Location: [facility] PAIN MGT;  Service: Pain Management;  Laterality: Left;    RADIOFREQUENCY ABLATION Right 3/15/2024    Procedure: RADIOFREQUENCY ABLATION RIGHT GENICULAR;  Surgeon: Shoaib Aguirre MD;  Location: [facility] PAIN MGT;  Service: Pain Management;  Laterality: Right;  [phone]    REMOVAL OF NEUROSTIMULATOR N/A  11/20/2023    Procedure: SCS IPG BATTERY REVISION;  Surgeon: Shoaib Aguirre MD;  Location: [facility] OR;  Service: Pain Management;  Laterality: N/A;    REVISION PROCEDURE INVOLVING SPINAL CORD NEUROSTIMULATOR N/A 1/27/2025    Procedure: SPINAL CORD STIMULATOR BATTERY REVISION BOSTON REP;  Surgeon: Shoaib Aguirre MD;  Location: [facility] OR;  Service: Pain Management;  Laterality: N/A;    TONSILLECTOMY      TOTAL KNEE ARTHROPLASTY      partial knee replacement    TRIAL OF SPINAL CORD NERVE STIMULATOR N/A 12/22/2022    Procedure: SPINAL CORD STIMULATOR TRIAL BOSTON BRUNO RESEARCH;  Surgeon: Shoaib Aguirre MD;  Location: [facility] PAIN MGT;  Service: Pain Management;  Laterality: N/A;    TUBAL LIGATION       Social History     Socioeconomic History    Marital status:     Number of children: 1   Occupational History    Occupation:      Employer: [employer]     Comment: retired   Tobacco Use    Smoking status: Never    Smokeless tobacco: Never   Substance and Sexual Activity    Alcohol use: Yes     Alcohol/week: 3.0 standard drinks of alcohol     Types: 3 Glasses of wine per week     Comment: weekends    Drug use: Yes     Types: Marijuana     Comment: occasionally    Sexual activity: Yes     Partners: Male   Other Topics Concern    Are you pregnant or think you may be? No    Breast-feeding No   Social History Narrative    Retired        Swims.       Stationary bike.            Social Drivers of Health     Financial Resource Strain: Low Risk  (5/5/2025)    Overall Financial Resource Strain (CARDIA)     Difficulty of Paying Living Expenses: Not hard at all   Food Insecurity: No Food Insecurity (5/5/2025)    Hunger Vital Sign     Worried About Running Out of Food in the Last Year: Never true     Ran Out of Food in the Last Year: Never true   Transportation Needs: No Transportation Needs (5/5/2025)    PRAPARE - Transportation     Lack of Transportation (Medical): No     Lack of Transportation (Non-Medical):  No   Physical Activity: Insufficiently Active (5/5/2025)    Exercise Vital Sign     Days of Exercise per Week: 3 days     Minutes of Exercise per Session: 30 min   Stress: Stress Concern Present (5/5/2025)    [place] [facility] [employer] - Occupational Stress Questionnaire     Feeling of Stress : Very much   Housing Stability: Low Risk  (5/5/2025)    Housing Stability Vital Sign     Unable to Pay for Housing in the Last Year: No     Number of Times Moved in the Last Year: 0     Homeless in the Last Year: No     Family History   Problem Relation Name Age of Onset    Hypertension Mother      Irritable bowel syndrome Mother      Hyperlipidemia Mother      Heart disease Father          mi    Hypertension Father      Cancer Father          prostate    Heart attack Father      Heart failure Father      Hyperlipidemia Father      Alcohol abuse Sister nathanael     Depression Sister nathanael     Epilepsy Son ari     Irritable bowel syndrome Sister martin     Alcohol abuse Sister martin     Ovarian cancer Maternal Aunt      Breast cancer Paternal Aunt      Breast cancer Maternal Aunt      Melanoma Neg Hx      Colon cancer Neg Hx      Stomach cancer Neg Hx      Esophageal cancer Neg Hx      Liver disease Neg Hx      Crohn's disease Neg Hx      Ulcerative colitis Neg Hx      Celiac disease Neg Hx      Stroke Neg Hx         Review of patient's allergies indicates:  No Known Allergies    Current Outpatient Medications   Medication Sig    alosetron (LOTRONEX) 0.5 MG tablet Take 1 tablet (0.5 mg total) by mouth in the morning and 1 tablet (0.5 mg total) in the evening.    ALPRAZolam (XANAX) 1 MG tablet Take 1 tablet by mouth three times daily.    amLODIPine (NORVASC) 5 MG tablet Take 1 tablet (5 mg total) by mouth once daily.    amoxicillin-clavulanate 500-125mg (AUGMENTIN) 500-125 mg Tab Take 1 tablet (500 mg total) by mouth 2 (two) times daily. for 10 days    atorvastatin (LIPITOR) 10 MG tablet Take 1 tablet (10 mg  total) by mouth once daily.    erythromycin (ROMYCIN) ophthalmic ointment Place into the left eye every evening.    EScitalopram oxalate (LEXAPRO) 5 MG Tab take 1 tablet by mouth once daily    estradioL (ESTRACE) 0.01 % (0.1 mg/gram) vaginal cream Insert 1 gram as directed vagianlly nightly for 2 to 4 weeks followed by 2-3x/week    HYDROcodone-acetaminophen (NORCO) 5-325 mg per tablet Take 1 tablet by mouth every 12 (twelve) hours as needed for Pain.    levothyroxine (SYNTHROID) 100 MCG tablet TAKE 1 TABLET BY MOUTH EVERY MORNING    LIDOcaine (XYLOCAINE) 5 % Oint ointment Apply topically 3 (three) times daily as needed (pain).    ondansetron (ZOFRAN-ODT) 4 MG TbDL Dissolve one tablet (4 mg total) by mouth every 12 (twelve) hours as needed (nausea).    promethazine (PHENERGAN) 25 MG tablet Take 1 tablet by mouth every 12 hours as needed for nausea    promethazine-dextromethorphan (PROMETHAZINE-DM) 6.25-15 mg/5 mL Syrp Take 5 mLs by mouth every 4 (four) hours as needed (cough).    QUEtiapine (SEROQUEL) 25 MG Tab Take one tablet by mouth nightly    ramelteon (ROZEREM) 8 mg tablet Take 1 tablet (8 mg total) by mouth every evening.    spironolactone (ALDACTONE) 25 MG tablet Take 1 tablet (25 mg total) by mouth once daily. For blood pressure    sumatriptan (IMITREX) 50 MG tablet 1 tab po at start of headache.  Repeat in 2 h prn    suzetrigine 50 mg Tab Take 50 mg by mouth 2 (two) times a day.    tirzepatide, weight loss, (ZEPBOUND) 2.5 mg/0.5 mL PnIj Inject 2.5 mg into the skin every 7 days.    traZODone (DESYREL) 100 MG tablet take 1 to 2 tablets by mouth every evening for for sleep    tretinoin (RETIN-A) 0.025 % cream Compound tretinoin 0.025% / azelaic acid 8% / niacinamide 2% cream. Apply a pea-sized amount to entire face qhs.    triamcinolone acetonide 0.025% (KENALOG) 0.025 % cream Apply topically 2 (two) times daily.    valsartan (DIOVAN) 320 MG tablet Take 1 tablet (320 mg total) by mouth once daily.     No  "current facility-administered medications for this visit.       REVIEW OF SYSTEMS:    GENERAL: No fever. No chills. No fatigue. Denies weight loss. Denies weight gain.  HEENT: Denies headaches. Denies vision change. Denies eye pain. Denies double vision. Denies ear pain.   CV: Denies chest pain. HTN  PULM: Denies of shortness of breath.  GI: Denies constipation. No diarrhea. No abdominal pain. Denies nausea. Denies vomiting. No blood in stool.  HEME: Denies bleeding problems.  : Denies urgency. No painful urination. No blood in urine.  MS: See HPI  SKIN: Denies rash.   NEURO: Denies seizures. No weakness.  ENDO: Thyroid disorder.   PSYCH:  Depression    OBJECTIVE:       Vitals:    25 1109   BP: 120/79   Pulse: 83   Resp: 19   Temp: 99.1 °F (37.3 °C)   TempSrc: Oral   Weight: 70.1 kg (154 lb 8.7 oz)   Height: 5' 3" (1.6 m)   PainSc:   7   PainLoc: Back         PHYSICAL EXAM:     GENERAL: Well appearing, in no acute distress, alert and oriented x3.  PSYCH:  Mood and affect appropriate.  SKIN: Skin color, texture, turgor normal, no rashes or lesions.   RESP: Symmetric chest rise, no respiratory distress noted.   BACK: Straight leg raise in the sitting position is negative for radicular pain.   MSK: 5/5 strength in right ankle with plantar and dorsiflexion. 5/5 strength in left ankle with plantar and dorsiflexion. 5/5 strength with right knee flexion and extension. 5/5 strength with left knee flexion and extension.   GAIT: Normal.         ASSESSMENT: 70 y.o. year old female with low back and knee pain, consistent with the followin. Chronic pain syndrome        2. Other idiopathic scoliosis, thoracolumbar region  X-Ray Lumbar Complete Including Flex And Ext      3. Lumbar spondylosis  X-Ray Lumbar Complete Including Flex And Ext      4. Lumbar radiculopathy, chronic  MRI Lumbar Spine Without Contrast              Plan:    - Previous imaging reviewed. Labs reviewed.     - She does have benefit with SCS. "     - Obtain updated lumbar xray and MRI.     - Continue Flexeril 10 mg TID PRN, refill provided.     - Continue Lidocaine ointment over former battery site.     - Pain contract signed 1/23/2024.    - Continue Norco 5/325 mg TID PRN, refill sent.     - The patient is here today for a refill of current pain medications and they believe these provide effective pain control and improvements in quality of life.  The patient notes no serious side effects, and feels the benefits outweigh the risks.  The patient was reminded of the pain contract that they signed previously as well as the risks and benefits of the medication including possible death.  The updated [place] [employer] prescription monitoring program was reviewed, and the patient has been found to be compliant with current treatment plan.    - Continue home exercise routine.     - RTC after above imaging with Dr. Aguirre.      The above plan and management options were discussed at length with patient. Patient is in agreement with the above and verbalized understanding.     Marlene Thorne NP  06/12/2025

## 2025-06-17 LAB — FUNGUS SKIN CULT: ABNORMAL

## 2025-06-18 ENCOUNTER — PATIENT MESSAGE (OUTPATIENT)
Dept: PODIATRY | Facility: CLINIC | Age: 70
End: 2025-06-18
Payer: MEDICARE

## 2025-06-18 ENCOUNTER — PATIENT MESSAGE (OUTPATIENT)
Dept: INTERNAL MEDICINE | Facility: CLINIC | Age: 70
End: 2025-06-18
Payer: MEDICARE

## 2025-06-18 DIAGNOSIS — G43.909 MIGRAINE WITHOUT STATUS MIGRAINOSUS, NOT INTRACTABLE, UNSPECIFIED MIGRAINE TYPE: Primary | ICD-10-CM

## 2025-06-18 RX ORDER — SUMATRIPTAN SUCCINATE 50 MG/1
TABLET ORAL
Qty: 36 TABLET | Refills: 3 | Status: SHIPPED | OUTPATIENT
Start: 2025-06-18

## 2025-06-18 NOTE — TELEPHONE ENCOUNTER
Care Due:                  Date            Visit Type   Department     Provider  --------------------------------------------------------------------------------                                EP -                              PRIMARY      [facility]  Last Visit: 06-      CARE ([facility])   MEDICINE       Kaykay Perales  Next Visit: None Scheduled  None         None Found                                                            Last  Test          Frequency    Reason                     Performed    Due Date  --------------------------------------------------------------------------------    HBA1C.......  6 months...  tirzepatide,.............  10-   04-    [facility] Embedded Care Due Messages. Reference number: [id number].   6/18/2025 1:28:12 PM CDT

## 2025-06-18 NOTE — TELEPHONE ENCOUNTER
Patient requesting refill on Imitrex 50 mg  Pt's  LOV with Kaykay Perales MD , 6/9/2025  Medication pending

## 2025-06-19 ENCOUNTER — PATIENT MESSAGE (OUTPATIENT)
Dept: REHABILITATION | Facility: OTHER | Age: 70
End: 2025-06-19

## 2025-06-19 ENCOUNTER — PATIENT MESSAGE (OUTPATIENT)
Dept: PAIN MEDICINE | Facility: CLINIC | Age: 70
End: 2025-06-19
Payer: MEDICARE

## 2025-06-19 ENCOUNTER — CLINICAL SUPPORT (OUTPATIENT)
Dept: REHABILITATION | Facility: OTHER | Age: 70
End: 2025-06-19
Payer: MEDICARE

## 2025-06-19 DIAGNOSIS — M25.69 DECREASED RANGE OF MOTION OF TRUNK AND BACK: ICD-10-CM

## 2025-06-19 DIAGNOSIS — R29.898 WEAKNESS OF RIGHT LOWER EXTREMITY: ICD-10-CM

## 2025-06-19 DIAGNOSIS — M62.81 WEAKNESS OF TRUNK MUSCULATURE: ICD-10-CM

## 2025-06-19 DIAGNOSIS — R29.898 DECREASED STRENGTH OF TRUNK AND BACK: ICD-10-CM

## 2025-06-19 DIAGNOSIS — M25.661 DECREASED RANGE OF MOTION (ROM) OF RIGHT KNEE: Primary | ICD-10-CM

## 2025-06-19 PROCEDURE — 97110 THERAPEUTIC EXERCISES: CPT

## 2025-06-19 PROCEDURE — 97530 THERAPEUTIC ACTIVITIES: CPT

## 2025-06-19 NOTE — TELEPHONE ENCOUNTER
Staff spoke with patient to assist with rescheduling MRI and follow up with  due to her having a conflict in her schedule. Patient accepted new dates and times for appointments.

## 2025-06-22 ENCOUNTER — PATIENT MESSAGE (OUTPATIENT)
Dept: OPTOMETRY | Facility: CLINIC | Age: 70
End: 2025-06-22
Payer: MEDICARE

## 2025-06-23 ENCOUNTER — TELEPHONE (OUTPATIENT)
Dept: PODIATRY | Facility: CLINIC | Age: 70
End: 2025-06-23
Payer: MEDICARE

## 2025-06-23 ENCOUNTER — RESULTS FOLLOW-UP (OUTPATIENT)
Dept: PODIATRY | Facility: CLINIC | Age: 70
End: 2025-06-23

## 2025-06-23 DIAGNOSIS — B35.1 ONYCHOMYCOSIS DUE TO DERMATOPHYTE: Primary | ICD-10-CM

## 2025-06-23 RX ORDER — CICLOPIROX 80 MG/ML
SOLUTION TOPICAL NIGHTLY
Qty: 6.6 ML | Refills: 1 | Status: SHIPPED | OUTPATIENT
Start: 2025-06-23

## 2025-06-23 NOTE — TELEPHONE ENCOUNTER
----- Message from Yadira Morfin DPM sent at 6/23/2025  4:33 PM CDT -----  Please let patient know her nails sample is positive for fungus.    Penlac has been prescribed to her pharmacy.  If it is not covered by her insurance she can get an over-the-counter fungi nail or Opti nail    She should use this for 4-6 months to see improvement  ----- Message -----  From: Lab, Background User  Sent: 6/16/2025  10:17 AM CDT  To: Yadira Morfin DPM

## 2025-06-25 ENCOUNTER — PATIENT MESSAGE (OUTPATIENT)
Dept: REHABILITATION | Facility: OTHER | Age: 70
End: 2025-06-25
Payer: MEDICARE

## 2025-06-25 ENCOUNTER — PATIENT MESSAGE (OUTPATIENT)
Dept: INTERNAL MEDICINE | Facility: CLINIC | Age: 70
End: 2025-06-25
Payer: MEDICARE

## 2025-06-26 ENCOUNTER — PATIENT MESSAGE (OUTPATIENT)
Dept: INTERNAL MEDICINE | Facility: CLINIC | Age: 70
End: 2025-06-26
Payer: MEDICARE

## 2025-06-27 ENCOUNTER — HOSPITAL ENCOUNTER (OUTPATIENT)
Dept: RADIOLOGY | Facility: OTHER | Age: 70
Discharge: HOME | End: 2025-06-27
Attending: NURSE PRACTITIONER
Payer: MEDICARE

## 2025-06-27 ENCOUNTER — CLINICAL SUPPORT (OUTPATIENT)
Dept: REHABILITATION | Facility: OTHER | Age: 70
End: 2025-06-27
Payer: MEDICARE

## 2025-06-27 DIAGNOSIS — M54.16 LUMBAR RADICULOPATHY, CHRONIC: ICD-10-CM

## 2025-06-27 DIAGNOSIS — M41.25 OTHER IDIOPATHIC SCOLIOSIS, THORACOLUMBAR REGION: ICD-10-CM

## 2025-06-27 DIAGNOSIS — M62.81 WEAKNESS OF TRUNK MUSCULATURE: ICD-10-CM

## 2025-06-27 DIAGNOSIS — M25.661 DECREASED RANGE OF MOTION (ROM) OF RIGHT KNEE: Primary | ICD-10-CM

## 2025-06-27 DIAGNOSIS — M25.69 DECREASED RANGE OF MOTION OF TRUNK AND BACK: ICD-10-CM

## 2025-06-27 DIAGNOSIS — M47.816 LUMBAR SPONDYLOSIS: ICD-10-CM

## 2025-06-27 DIAGNOSIS — R29.898 WEAKNESS OF RIGHT LOWER EXTREMITY: ICD-10-CM

## 2025-06-27 DIAGNOSIS — R29.898 DECREASED STRENGTH OF TRUNK AND BACK: ICD-10-CM

## 2025-06-27 PROCEDURE — 72114 X-RAY EXAM L-S SPINE BENDING: CPT | Mod: 26,,, | Performed by: RADIOLOGY

## 2025-06-27 PROCEDURE — 72114 X-RAY EXAM L-S SPINE BENDING: CPT | Mod: TC,FY

## 2025-06-27 PROCEDURE — 97112 NEUROMUSCULAR REEDUCATION: CPT

## 2025-06-27 PROCEDURE — 97140 MANUAL THERAPY 1/> REGIONS: CPT

## 2025-06-27 PROCEDURE — 97110 THERAPEUTIC EXERCISES: CPT

## 2025-06-28 ENCOUNTER — HOSPITAL ENCOUNTER (OUTPATIENT)
Dept: RADIOLOGY | Facility: OTHER | Age: 70
Discharge: HOME | End: 2025-06-28
Attending: NURSE PRACTITIONER
Payer: MEDICARE

## 2025-06-28 PROCEDURE — 72148 MRI LUMBAR SPINE W/O DYE: CPT | Mod: 26,,, | Performed by: RADIOLOGY

## 2025-06-28 PROCEDURE — 72148 MRI LUMBAR SPINE W/O DYE: CPT | Mod: TC

## 2025-06-29 ENCOUNTER — PATIENT MESSAGE (OUTPATIENT)
Dept: PODIATRY | Facility: CLINIC | Age: 70
End: 2025-06-29
Payer: MEDICARE

## 2025-06-30 ENCOUNTER — PATIENT MESSAGE (OUTPATIENT)
Dept: INTERNAL MEDICINE | Facility: CLINIC | Age: 70
End: 2025-06-30
Payer: MEDICARE

## 2025-06-30 DIAGNOSIS — G89.29 CHRONIC LOW BACK PAIN WITHOUT SCIATICA, UNSPECIFIED BACK PAIN LATERALITY: Primary | ICD-10-CM

## 2025-06-30 DIAGNOSIS — M54.50 CHRONIC LOW BACK PAIN WITHOUT SCIATICA, UNSPECIFIED BACK PAIN LATERALITY: Primary | ICD-10-CM

## 2025-06-30 NOTE — PROGRESS NOTES
"  Outpatient Rehab    Physical Therapy Visit    Patient Name: Tiarra Norton  MRN: [id number]  YOB: 1955  Encounter Date: 6/27/2025    Therapy Diagnosis:   Encounter Diagnoses   Name Primary?    Decreased range of motion (ROM) of right knee Yes    Weakness of right lower extremity     Decreased range of motion of trunk and back     Decreased strength of trunk and back     Weakness of trunk musculature      Physician: Virgil Scott MD    Physician Orders: Eval and Treat  Medical Diagnosis: S/P right unicompartmental knee replacement  Surgical Diagnosis: Not applicable for this Episode   Surgical Date: Not applicable for this Episode  Days Since Last Surgery: Not applicable for this Episode    Visit # / Visits Authorized:  6 / 20  Insurance Authorization Period: 1/1/2025 to 12/31/2025  Date of Evaluation: 12/21/2023  Plan of Care Certification: 6/19/2025 to 9/19/2025      PT/PTA: PT   Number of PTA visits since last PT visit:0  Time In: 1330   Time Out: 1430  Total Time (in minutes): 60   Total Billable Time (in minutes): 55    FOTO:  Intake Score:  %  Survey Score 2:  %  Survey Score 3:  %    Precautions:       Subjective   Felt a little better after last time, but pain is the same today, she is waiting to transfer to aquatic therapy.  Pain reported as 7/10. Back and R knee    Objective            Treatment:  Therapeutic Exercise  TE 1: Recumbent bike x5'  TE 2: Prone quad stretch 2x30"  TE 3: Blas stretch 2x30"  TE 4: DKTC 15x5"  TE 5: EIL 15x5"  TE 6: Cat cow 15x5"  TE 7: Open books 15x5"  TE 8: Leg Press 100# 3x10  Balance/Neuromuscular Re-Education  NMR 1: SLB 3x30" ea  NMR 2: Heel raises 2x10  NMR 3: Side stepping GTB 3x8 steps  NMR 4: Matrix trunk extension 60# x15 0-50 deg  Modalities  Moist Heat (min): 5 to low back    Time Entry(in minutes):  Hot/Cold Pack Time Entry: 5  Neuromuscular Re-Education Time Entry: 15  Therapeutic Exercise Time Entry: 30  Number of Muscles Needled: 2  Needle " Insertions Time Entry: 10    Assessment & Plan   Assessment: Pt presents today with continued complaints of pain. Decreased pain reported after dry needling application. Continuation of flexibility and strengthening exercises with good tolerance. Continue to progress as able.   Evaluation/Treatment Tolerance: Patient limited by pain    The patient will continue to benefit from skilled outpatient physical therapy in order to address the deficits listed in the problem list on the initial evaluation, provide patient and family education, and maximize the patients level of independence in the home and community environments.     The patient's spiritual, cultural, and educational needs were considered, and the patient is agreeable to the plan of care and goals.           Plan: Continue treatment per established plan of care    Goals:   Goals:   GOALS: Short Term Goals:  4 weeks  1. Report decreased in pain at worse less than  <   / =  4  /10  to increase tolerance for functional activities.On going  2. Pt to increase B popliteal angle by greater than > or = 5 degrees in order to improve flexibility and posture. MET  3. Increased R LE MMT 1/2  to increase tolerance for ADL and work activities. MET  4. Pt to increase B Ely's Test by greater than  > or = 5degrees in order to improve flexibility and posture. MET  5. Pt to tolerate HEP to improve ROM and independence with ADL's. MET  6. Pt to improve range of motion by 25% to allow for improved functional mobility to allow for improvement in IADLs. MET     Long Term Goals: 8 weeks  1. Report decreased in pain at worse less than  <   / =  2  /10  to increase tolerance for functional mobility.  On going  2 .Pt to increase B popliteal angle by greater than > or = 10 degrees in order to improve flexibility and posture. On going  3. Increased R LE MMT 1 grade to increase tolerance for ADL and work activities.On going  4. Pt will report at 51% or better score on FOTO Lumbar spine  survey  to demonstrate decrease in disability and improvement in back pain.On going  5. Pt to be Independent with HEP to improve ROM and independence with ADL's. On going  6. Pt to increase B Ely's Test by greater than > or = 10 degrees in order to improve flexibility and posture. On going  7. Pt to demonstrate negative Bridge Test in order to show improved core strength for lumbar stabilization. On going  8. Pt to improve range of motion by 75% to allow for improved functional mobility to allow for improvement in IADLs. On going      Zi Anaya, PT

## 2025-06-30 NOTE — PROGRESS NOTES
"  Outpatient Rehab    Physical Therapy Progress Note : Updated Plan of Care    Patient Name: Tiarra Norton  MRN: [id number]  YOB: 1955  Encounter Date: 6/19/2025    Therapy Diagnosis:   Encounter Diagnoses   Name Primary?    Decreased range of motion (ROM) of right knee Yes    Weakness of right lower extremity     Decreased range of motion of trunk and back     Decreased strength of trunk and back     Weakness of trunk musculature      Physician: Virgil Scott MD    Physician Orders: Eval and Treat  Medical Diagnosis: S/P right unicompartmental knee replacement  Surgical Diagnosis: Not applicable for this Episode   Surgical Date: Not applicable for this Episode  Days Since Last Surgery: Not applicable for this Episode    Visit # / Visits Authorized:  6 / 20  Insurance Authorization Period: 1/1/2025 to 12/31/2025  Date of Evaluation: 12/21/2023   Plan of Care Certification:  6/19/2025 to 9/19/2025     PT/PTA: PT   Number of PTA visits since last PT visit:0  Time In: 0830   Time Out: 0930  Total Time (in minutes): 60   Total Billable Time (in minutes): 30    FOTO:  Intake Score:  %  Survey Score 2:  %  Survey Score 3:  %    Precautions:       Subjective   She has been unable to come for a few weeks due to illness and medication side effects. She has been in more pain since she was last here.  Pain reported as 8/10. Back and R knee    Objective            Treatment:  Therapeutic Exercise  TE 1: Recumbent bike x5'  TE 2: Prone quad stretch 2x30"  TE 3: Blas stretch 2x30"  TE 4: DKTC 15x5"  TE 5: EIL 15x5"  TE 6: Cat cow 15x5"  TE 7: Open books 15x5"  TE 8: Leg Press 100# 3x10  Balance/Neuromuscular Re-Education  NMR 1: SLB 3x30" ea  NMR 2: Heel raises 2x10  NMR 3: Side stepping GTB 3x8 steps  NMR 4: Matrix trunk extension 60# x15 0-50 deg  Therapeutic Activity  TA 1: objective measures and reassessment  Modalities  Moist Heat (min): 5 to low bacl  Dry Needling: dry needling of bilateral lumbar " paraspinals L1-L5 x10 min    Time Entry(in minutes):  Hot/Cold Pack Time Entry: 5  Neuromuscular Re-Education Time Entry: 10  Therapeutic Activity Time Entry: 10  Therapeutic Exercise Time Entry: 25  Number of Muscles Needled: 2  Needle Insertions Time Entry: 10    Assessment & Plan   Assessment  Pt presents today for first followup after extended time off due to illness and dealing with medication side effects. She continues to demonstrate decreased range of motion of lumbar spine in all planes, decreased range of motion of right knee, weakness of trunk and back, bilateral lower extremity weakness, and decreased tolerance to ADL's, leisure, and functional mobility activities due to pain. Pt will continue to benefit from skilled therapy to address described deficits. She would benefit from transfer to aquatic  therapy, but will be seen in outpatient therapy until that transition can be made.   Evaluation/Treatment Tolerance: Patient tolerated treatment well    The patient will continue to benefit from skilled outpatient physical therapy in order to address the deficits listed in the problem list on the initial evaluation, provide patient and family education, and maximize the patients level of independence in the home and community environments.     The patient's spiritual, cultural, and educational needs were considered, and the patient is agreeable to the plan of care and goals.           Goals:   GOALS: Short Term Goals:  4 weeks  1. Report decreased in pain at worse less than  <   / =  4  /10  to increase tolerance for functional activities.On going  2. Pt to increase B popliteal angle by greater than > or = 5 degrees in order to improve flexibility and posture. MET  3. Increased R LE MMT 1/2  to increase tolerance for ADL and work activities. MET  4. Pt to increase B Ely's Test by greater than  > or = 5degrees in order to improve flexibility and posture. MET  5. Pt to tolerate HEP to improve ROM and  independence with ADL's. MET  6. Pt to improve range of motion by 25% to allow for improved functional mobility to allow for improvement in IADLs. MET     Long Term Goals: 8 weeks  1. Report decreased in pain at worse less than  <   / =  2  /10  to increase tolerance for functional mobility.  On going  2 .Pt to increase B popliteal angle by greater than > or = 10 degrees in order to improve flexibility and posture. On going  3. Increased R LE MMT 1 grade to increase tolerance for ADL and work activities.On going  4. Pt will report at 51% or better score on FOTO Lumbar spine survey  to demonstrate decrease in disability and improvement in back pain.On going  5. Pt to be Independent with HEP to improve ROM and independence with ADL's. On going  6. Pt to increase B Ely's Test by greater than > or = 10 degrees in order to improve flexibility and posture. On going  7. Pt to demonstrate negative Bridge Test in order to show improved core strength for lumbar stabilization. On going  8. Pt to improve range of motion by 75% to allow for improved functional mobility to allow for improvement in IADLs. On going    Zi Anaya, PT

## 2025-06-30 NOTE — TELEPHONE ENCOUNTER
LOV with Kaykay Perales MD , 6/9/2025    Patient requesting referral for aquatic therapy  Order pended if appropriate    Note dated 6/9/25:  Persistent back pain.  About to buy a scooter for museum visits, sightseeing..     Pain is limiting and she may seek another opinion.

## 2025-07-01 ENCOUNTER — PATIENT MESSAGE (OUTPATIENT)
Dept: INTERNAL MEDICINE | Facility: CLINIC | Age: 70
End: 2025-07-01
Payer: MEDICARE

## 2025-07-01 ENCOUNTER — CLINICAL SUPPORT (OUTPATIENT)
Dept: REHABILITATION | Facility: OTHER | Age: 70
End: 2025-07-01
Payer: MEDICARE

## 2025-07-01 ENCOUNTER — PATIENT MESSAGE (OUTPATIENT)
Dept: PAIN MEDICINE | Facility: CLINIC | Age: 70
End: 2025-07-01
Payer: MEDICARE

## 2025-07-01 DIAGNOSIS — G89.29 CHRONIC LOW BACK PAIN WITHOUT SCIATICA, UNSPECIFIED BACK PAIN LATERALITY: Primary | ICD-10-CM

## 2025-07-01 DIAGNOSIS — M41.25 OTHER IDIOPATHIC SCOLIOSIS, THORACOLUMBAR REGION: ICD-10-CM

## 2025-07-01 DIAGNOSIS — G89.4 CHRONIC PAIN SYNDROME: Primary | ICD-10-CM

## 2025-07-01 DIAGNOSIS — M54.50 CHRONIC LOW BACK PAIN WITHOUT SCIATICA, UNSPECIFIED BACK PAIN LATERALITY: Primary | ICD-10-CM

## 2025-07-01 DIAGNOSIS — M62.81 WEAKNESS OF TRUNK MUSCULATURE: Primary | ICD-10-CM

## 2025-07-01 PROBLEM — M25.69 DECREASED RANGE OF MOTION OF TRUNK AND BACK: Status: RESOLVED | Noted: 2022-05-17 | Resolved: 2025-07-01

## 2025-07-01 PROBLEM — R29.898 WEAKNESS OF RIGHT LOWER EXTREMITY: Status: RESOLVED | Noted: 2022-02-14 | Resolved: 2025-07-01

## 2025-07-01 PROBLEM — R29.898 DECREASED STRENGTH OF TRUNK AND BACK: Status: RESOLVED | Noted: 2022-05-17 | Resolved: 2025-07-01

## 2025-07-01 PROBLEM — R52 PAIN: Status: RESOLVED | Noted: 2019-05-06 | Resolved: 2025-07-01

## 2025-07-01 PROBLEM — Z74.09 IMPAIRED FUNCTIONAL MOBILITY, BALANCE, GAIT, AND ENDURANCE: Status: RESOLVED | Noted: 2022-09-23 | Resolved: 2025-07-01

## 2025-07-01 PROBLEM — M25.661 DECREASED RANGE OF MOTION (ROM) OF RIGHT KNEE: Status: RESOLVED | Noted: 2022-02-14 | Resolved: 2025-07-01

## 2025-07-01 PROCEDURE — 97110 THERAPEUTIC EXERCISES: CPT | Performed by: PHYSICAL THERAPIST

## 2025-07-01 PROCEDURE — 97140 MANUAL THERAPY 1/> REGIONS: CPT | Performed by: PHYSICAL THERAPIST

## 2025-07-01 NOTE — PROGRESS NOTES
"  Outpatient Rehab    Physical Therapy Visit    Patient Name: Tiarra Norton  MRN: [id number]  YOB: 1955  Encounter Date: 7/1/2025    Therapy Diagnosis:   Encounter Diagnosis   Name Primary?    Weakness of trunk musculature Yes     Physician: Virgil Scott MD    Physician Orders: Eval and Treat  Medical Diagnosis: S/P right unicompartmental knee replacement  Surgical Diagnosis: Not applicable for this Episode   Surgical Date: Not applicable for this Episode  Days Since Last Surgery: Not applicable for this Episode    Visit # / Visits Authorized:  7 / 20  Insurance Authorization Period: 1/1/2025 to 12/31/2025  Date of Evaluation: 12/21/2023  Plan of Care Certification: 6/19/2025 to 9/19/2025      PT/PTA:  PT  Number of PTA visits since last PT visit: NA  Time In: 1100   Time Out: 1200  Total Time (in minutes): 60   Total Billable Time (in minutes):      Subjective   Patient reports that she did not have a good day yesterday and her back is still bothering her today. Patient reports that her back is feeling better today, but still bothering her a lot..  Pain reported as 6/10.      Objective            Treatment:  Therapeutic Exercise  TE 1: Recumbent bike x5' for joint nutrition  TE 4: Standing lateral weight shift 2 x 10 5 second holds each side  TE 6: Torso rotation machine x 10 reps 5 second holds each side  TE 7: Sidelying open books 15x5"  TE 9: LTR x 2 minutes 5 second holds 2 rounds- one round at end of treatment  TE 10: 2 rounds: BKFO GTB x 10 reps 5 second holds and 10 bridges 3 second holds  Manual Therapy  MT 1: STM to B lumbar paraspinals in sidelying  MT 2: lumbar sideglides grade 2/3 on both sides  Balance/Neuromuscular Re-Education  NMR 2: Paloff press red thick band x 10 reps 5 second holds  NMR 4: Matrix trunk extension 60# x15 0-50 deg; warm up 30# x 5 reps      Time Entry(in minutes):       Assessment & Plan   Assessment: Patient demonstrated good tolerance to strengthening and " lumbar mobility work. Patient having a lot of lower back pain, but she is able to perform all there ex with modifications.       The patient will continue to benefit from skilled outpatient physical therapy in order to address the deficits listed in the problem list on the initial evaluation, provide patient and family education, and maximize the patients level of independence in the home and community environments.     The patient's spiritual, cultural, and educational needs were considered, and the patient is agreeable to the plan of care and goals.           Plan: Continue treatment per established plan of care    Goals:     Frandy Segovia, PT, DPT

## 2025-07-01 NOTE — TELEPHONE ENCOUNTER
LOV with Kaykay Perales MD , 6/9/2025    PT referral pended for aquatic therapy in message dated 6/30/25.   Patient requesting specific orders

## 2025-07-02 ENCOUNTER — CLINICAL SUPPORT (OUTPATIENT)
Dept: REHABILITATION | Facility: HOSPITAL | Age: 70
End: 2025-07-02
Attending: NURSE PRACTITIONER
Payer: MEDICARE

## 2025-07-02 ENCOUNTER — DOCUMENTATION ONLY (OUTPATIENT)
Dept: REHABILITATION | Facility: OTHER | Age: 70
End: 2025-07-02
Payer: MEDICARE

## 2025-07-02 DIAGNOSIS — M41.25 OTHER IDIOPATHIC SCOLIOSIS, THORACOLUMBAR REGION: ICD-10-CM

## 2025-07-02 DIAGNOSIS — M25.561 CHRONIC PAIN OF RIGHT KNEE: Primary | ICD-10-CM

## 2025-07-02 DIAGNOSIS — G89.29 CHRONIC PAIN OF RIGHT KNEE: Primary | ICD-10-CM

## 2025-07-02 DIAGNOSIS — R29.898 WEAKNESS OF BOTH LOWER EXTREMITIES: ICD-10-CM

## 2025-07-02 DIAGNOSIS — M53.86 DECREASED RANGE OF MOTION OF LUMBAR SPINE: ICD-10-CM

## 2025-07-02 PROCEDURE — 97112 NEUROMUSCULAR REEDUCATION: CPT | Mod: PO

## 2025-07-02 PROCEDURE — 97161 PT EVAL LOW COMPLEX 20 MIN: CPT | Mod: PO

## 2025-07-02 PROCEDURE — 97530 THERAPEUTIC ACTIVITIES: CPT | Mod: PO

## 2025-07-02 NOTE — PROGRESS NOTES
Outpatient Rehab    Physical Therapy Evaluation    Patient Name: Tiarra Norton  MRN: [id number]  YOB: 1955  Encounter Date: 7/2/2025    Therapy Diagnosis:   Encounter Diagnoses   Name Primary?    Other idiopathic scoliosis, thoracolumbar region     Chronic pain of right knee Yes    Decreased range of motion of lumbar spine     Weakness of both lower extremities      Physician: Roxanne Freeman NP    Physician Orders: Eval and Treat  Medical Diagnosis: Other idiopathic scoliosis, thoracolumbar region  Surgical Diagnosis: Not applicable for this Episode   Surgical Date: Not applicable for this Episode  Days Since Last Surgery: Not applicable for this Episode    Visit # / Visits Authorized:  1 / 1  Insurance Authorization Period: 7/2/2025 to 12/31/2025  Date of Evaluation: 7/2/2025  Plan of Care Certification: 7/2/2025 to 9/15/2025     Time In: 1002   Time Out: 1102  Total Time (in minutes): 60   Total Billable Time (in minutes): 60    Intake Outcome Measure for FOTO Survey    Therapist reviewed FOTO scores for Tiarra Norton on 7/2/2025.   FOTO report - see Media section or FOTO account episode details.     Intake Score: 47%    Precautions:       Subjective   History of Present Illness  Tiarra is a 70 y.o. female who reports to physical therapy with a chief concern of LBP.     The patient reports a medical diagnosis of M41.25 (ICD-10-CM) - Other idiopathic scoliosis, thoracolumbar region.    Diagnostic tests related to this condition: X-ray and MRI studies.   MRI Studies Details: see imagining report  X-Ray Details: see imagining report    Dominant Hand: Right  History of Present Condition/Illness: Pt reported long standing history of LBP with minimal relief and further issues following pain modulator device implantation and replacement in another position. Pt had minimal success with [facility] Back program in [facility] and will self discharge and will try to improve her back and knee pain with new  PT in [place] along with a trial of aquatic therapy program .     Activities of Daily Living  Social history was obtained from Patient.          Patient Responsibilities: Community mobility, Home management, Other (Comment), Driving, Laundry, Financial management, Meal prep, Personal ADL, Health management, Shopping, Yard work    Previously independent with activities of daily living? Yes     Currently independent with activities of daily living? Yes          Previously independent with instrumental activities of daily living? Yes     Currently independent with instrumental activities of daily living? Yes              Pain     Patient reports a current pain level of 6/10. Pain at best is reported as 2/10. Pain at worst is reported as 8/10.   Location: L>R lumbar pain  Pain Qualities: Tightness  Pain-Aggravating Factors: Standing         Review of Systems  Patient denies: Bladder Incontinence, Bowel Incontinence, Lower Extremity Neurological Deficits, and Saddle Numbness        Treatment History  Treatments  Discharged From Past 30 Days: Outpatient therapy  Previously Received Treatments: Yes  Previous Treatments: Physical therapy, Rest  Currently Receiving Treatments: No    Living Arrangements  Living Situation  Housing: Home independently  Living Arrangements: Family members  Support Systems: Family members, Friends/neighbors    Home Setup  Type of Structure: House  Home Access: Level entry  Number of Levels in Home: Multi-level  Patient is able to live on main floor of home: Yes  Primary Bedroom: 2nd floor  Primary Bathroom: 2nd floor    Equipment/Treatments  Patient Expressive Communication Modes: Verbal        Employment  Employment Status: Employed part-time    and volunteer work       Past Medical History/Physical Systems Review:   Tiarra Haughtonfabrizio Norton  has a past medical history of Cataract, Depression, Gestational diabetes, Hyperlipidemia, Hypertension, IBS (irritable bowel syndrome), HERMINIA (obstructive  sleep apnea), and Thyroid disease.    Tiarra Norton  has a past surgical history that includes  section; Tubal ligation; Tonsillectomy; Adenoidectomy; Cholecystectomy; Eye surgery; r hand surgery; Total knee arthroplasty; Colonoscopy (N/A, 2016); Colonoscopy (N/A, 2019); Esophagogastroduodenoscopy (N/A, 2020); Joint replacement; Arthroscopy of knee (Right, 10/19/2021); Arthroscopic chondroplasty of knee joint (Right, 10/19/2021); Knee arthroscopy w/ meniscectomy (Right, 10/19/2021); Injection of anesthetic agent around nerve (Bilateral, 2022); Injection of anesthetic agent around nerve (Bilateral, 2/15/2022); Radiofrequency ablation (Right, 3/8/2022); Radiofrequency ablation (Left, 3/22/2022); Injection of botulinum toxin type A (2022); Cystoscopy (2022); Trial of spinal cord nerve stimulator (N/A, 2022); insertion, neurostimulator, spinal cord (N/A, 2023); arthroplasty, knee, total, using computer-assisted navigation (Right, 2023); Removal of neurostimulator (N/A, 2023); block, nerve, genicular (Right, 2024); Radiofrequency ablation (Right, 3/15/2024); and Revision procedure involving spinal cord neurostimulator (N/A, 2025).    Tiarra has a current medication list which includes the following prescription(s): alosetron, alprazolam, alprazolam, amlodipine, atorvastatin, ciclopirox, erythromycin, escitalopram oxalate, estradiol, hydrocodone-acetaminophen, levothyroxine, lidocaine, ondansetron, promethazine, quetiapine, ramelteon, spironolactone, sumatriptan, suzetrigine, zepbound, trazodone, tretinoin, triamcinolone acetonide 0.025%, and valsartan.    Review of patient's allergies indicates:  No Known Allergies     Objective   Communication  The patient's current expressive communication modes are Verbal.          Spinal Mobility  Hypomobile: Lumbosacral       Spinal Muscle Palpation  Right Spinal Muscle Palpation  Abnormal: Lumbar/Sacral  Right  Lumbar/Sacral Muscle Palpation Observations: iliacus, psoas, gluteus medius, adductors, QL       Left Spinal Muscle Palpation  Abnormal: Lumbar/Sacral  Left Lumbar/Sacral Muscle Palpation Observations: iliacus, psoas, gluteus medius, adductors, QL       Hip Palpation  Right Hip Palpation  Abnormal: Lumbar/Sacral Muscle  Right Lumbar/Sacral Muscle Palpation Observations: iliacus, psoas, gluteus medius, adductors, QL       Left Hip Palpation  Abnormal: Lumbar/Sacral Muscle  Left Lumbar/Sacral Muscle Palpation Observations: iliacus, psoas, gluteus medius, adductors, QL             Hip Range of Motion   Right Hip   Active (deg) Passive (deg) Pain   Flexion 110       Extension 10   Yes   ABduction         ADduction         External Rotation 90/90         External Rotation Prone         Internal Rotation 90/90         Internal Rotation Prone             Left Hip   Active (deg) Passive (deg) Pain   Flexion 110       Extension 15       ABduction         ADduction         External Rotation 90/90         External Rotation Prone         Internal Rotation 90/90         Internal Rotation Prone                            Hip Strength - Planes of Motion   Right Strength Right Pain Left Strength Left  Pain   Flexion (L2) 4+   4+     Extension 4   4+     ABduction 4   4     ADduction 4+   4+     Internal Rotation 4+   4+     External Rotation 4   4         Knee Strength   Right Strength Right Pain Left Strength Left  Pain   Flexion (S2) 4+ Yes 5     Prone Flexion           Extension (L3) 4+ Yes 5                    Gait Analysis  Base of Support: Narrow  Gait Pattern: Antalgic  Walking Speed: Decreased    Right Side Walking Observations  Decreased: Stance Time and Step Length       Left Side Walking Observations  Decreased: Step Length            Treatment:  Therapeutic Exercise  TE 1: frog stretch 3 x 30 secs  TE 2: LTR stretch 3 x 30 secs  TE 3: hip flexor stretch 2 x 60 secs  Manual Therapy  MT 1: long axis traction  MT 2: short  axis traction  MT 3: MFR STM to gluteus medius, iliacus, psoas and adductor  MT 10: Patient received Dry needling using a semi-standardized protocol targeting trigger points in the following structures:  Needles were R hip gluteus medius and TFL left in in situ for 8 min with electric stimulation set at 2 hz and 150 chu sec, with a continuous, biphasic waveform. After the session, the needles were removed with no adverse events.  Balance/Neuromuscular Re-Education  NMR 1: PPT x 20  NMR 2: LTR x 30 small amplitude  NMR 3: bridges + hip adduction ball 2 x 10  NMR 4: supine clams 3 x 10 with Tband green  NMR 5: bridges with abduction 2 x 10 Tband green  NMR 6: BKFO 2 x 10 GTB  Therapeutic Activity  TA 1: reciprocal marching, toe taps laterally and hip extension x 10 NP today  TA 2: sit to stand x 10 NP today  TA 3: pt education with HEP, POC and pain management  Other Activities  Activity 1: PT tech assist as needed for 1:1    Time Entry(in minutes):  PT Evaluation (Low) Time Entry: 30  Manual Therapy Time Entry: 5  Neuromuscular Re-Education Time Entry: 10  Therapeutic Activity Time Entry: 10  Therapeutic Exercise Time Entry: 5    Assessment & Plan   Assessment  Tiarra presents with a condition of Low complexity.   Presentation of Symptoms: Stable  Will Comorbidities Impact Care: No       Functional Limitations: Activity tolerance, Sitting tolerance, Standing tolerance, Pain with ADLs/IADLs, Decreased ambulation distance/endurance, Painful locomotion/ambulation, Participating in leisure activities, Range of motion, Functional mobility, Ambulating on uneven surfaces  Impairments: Pain with functional activity, Impaired physical strength, Abnormal or restricted range of motion, Abnormal muscle tone, Activity intolerance, Abnormal gait  Personal Factors Affecting Prognosis: Pain    Patient Goal for Therapy (PT): to return recrreational exercise for 30 min  Prognosis: Good  Assessment Details: pt with decreased overall  functional mobility due to pain with decreased overall tolerance, strength and range.  pt was given education with HEP, findings and POC and was receptive. pt had mild relief with intervention and HEP in session. pt will benefit from skilled PT to progress POC and continued education with POC.  Pt reported minimal success with PT services upt to this point along with nerve ablation, ASIA and PT.  Pt requested attempts with all interventions available to include aquatic therapy which will allow for trial period.  Pt had good success and relief with session today to decreased muscle guarding and adductor, hip flexor and extensor strain. Pt had good insight with HEP and POC and familiar with exercise due to prior PT services.      Plan  From a physical therapy perspective, the patient would benefit from: Skilled Rehab Services    Planned therapy interventions include: Therapeutic activities, Therapeutic exercise, Manual therapy, Neuromuscular re-education, and Aquatic therapy.    Planned modalities to include: Cryotherapy (cold pack), Electrical stimulation - passive/unattended, Thermotherapy (hot pack), and Other (Comment). dry needling      Visit Frequency: 2 times Per Week for 10 Weeks.       This plan was discussed with Patient.   Discussion participants: Agreed Upon Plan of Care  Plan details: HEP, pain management and POC          The patient's spiritual, cultural, and educational needs were considered, and the patient is agreeable to the plan of care and goals.     Education  Education was done with Patient. The patient's learning style includes Demonstration and Listening. The patient Demonstrates understanding and Verbalizes understanding.         HEP, plan of care and pain management       Goals:   Active       Ambulation/movement       Patient will ambulate 500' distance using no device with no assistance with trunk rotation and hip extension bilaterally with increased step length.        Start:  07/02/25     Expected End:  09/15/25               Functional outcome       Patient will show a significant change in +10% patient-reported outcome tool to demonstrate subjective improvement       Start:  07/02/25    Expected End:  08/15/25            Patient stated goal: to return recrreational exercise for 30 min        Start:  07/02/25    Expected End:  09/15/25            Patient will demonstrate independence in home program for support of progression       Start:  07/02/25    Expected End:  08/15/25               Pain       Patient will report pain of 0/10 demonstrating a reduction of overall pain       Start:  07/02/25    Expected End:  08/15/25               Range of Motion       Patient will achieve bilateral spinal rotation ROM 60 degrees       Start:  07/02/25    Expected End:  08/15/25            Patient will achieve bilateral hip extension ROM 15 degrees pain free       Start:  07/02/25    Expected End:  09/15/25               Strength       Patient will achieve bilateral hip extension strength of 4+/5       Start:  07/02/25    Expected End:  09/15/25            Patient will achieve bilateral hip abduction strength of 4+/5       Start:  07/02/25    Expected End:  09/15/25                Solange Luis PT

## 2025-07-02 NOTE — PROGRESS NOTES
Talked with PT who evaluated patient today for aquatic therapy. She reports patient told her she is discharging from the [facility] clinic and will continue further with aquatic therapy instead.

## 2025-07-03 ENCOUNTER — PATIENT MESSAGE (OUTPATIENT)
Dept: REHABILITATION | Facility: OTHER | Age: 70
End: 2025-07-03
Payer: MEDICARE

## 2025-07-03 ENCOUNTER — TELEPHONE (OUTPATIENT)
Dept: PODIATRY | Facility: CLINIC | Age: 70
End: 2025-07-03
Payer: MEDICARE

## 2025-07-03 NOTE — TELEPHONE ENCOUNTER
I spoke with patient patient asked that her appointment stay as scheduled . She will look around to see if she can find someone that will be covered by her insurance. Please advise patient of our procedure B policy. She is not covered by insurance for nail care or callus care.      If she would like to be seen before her September appt followup ( fungal nail treatment.).. please adivse her of the cost and book as a proc b.   Thanks

## 2025-07-05 ENCOUNTER — PATIENT MESSAGE (OUTPATIENT)
Dept: PODIATRY | Facility: CLINIC | Age: 70
End: 2025-07-05
Payer: MEDICARE

## 2025-07-07 ENCOUNTER — PATIENT MESSAGE (OUTPATIENT)
Dept: INTERNAL MEDICINE | Facility: CLINIC | Age: 70
End: 2025-07-07
Payer: MEDICARE

## 2025-07-08 ENCOUNTER — HOSPITAL ENCOUNTER (OUTPATIENT)
Dept: RADIOLOGY | Facility: CLINIC | Age: 70
Discharge: HOME | End: 2025-07-08
Attending: INTERNAL MEDICINE
Payer: MEDICARE

## 2025-07-08 DIAGNOSIS — E28.39 ESTROGEN DEFICIENCY: ICD-10-CM

## 2025-07-08 NOTE — TELEPHONE ENCOUNTER
LOV with Kaykay Perales MD , 6/9/2025    Patient requesting a podiatry recommendation for nail fungus

## 2025-07-09 ENCOUNTER — CLINICAL SUPPORT (OUTPATIENT)
Dept: REHABILITATION | Facility: HOSPITAL | Age: 70
End: 2025-07-09
Payer: MEDICARE

## 2025-07-09 ENCOUNTER — PATIENT MESSAGE (OUTPATIENT)
Dept: DERMATOLOGY | Facility: CLINIC | Age: 70
End: 2025-07-09
Payer: MEDICARE

## 2025-07-09 DIAGNOSIS — R29.898 WEAKNESS OF BOTH LOWER EXTREMITIES: ICD-10-CM

## 2025-07-09 DIAGNOSIS — G89.29 CHRONIC PAIN OF RIGHT KNEE: Primary | ICD-10-CM

## 2025-07-09 DIAGNOSIS — M53.86 DECREASED RANGE OF MOTION OF LUMBAR SPINE: ICD-10-CM

## 2025-07-09 DIAGNOSIS — M25.561 CHRONIC PAIN OF RIGHT KNEE: Primary | ICD-10-CM

## 2025-07-09 PROCEDURE — 97113 AQUATIC THERAPY/EXERCISES: CPT | Mod: PO,CQ

## 2025-07-09 NOTE — PROGRESS NOTES
Outpatient Rehab    Physical Therapy Visit    Patient Name: Tiarra Norton  MRN: [id number]  YOB: 1955  Encounter Date: 7/9/2025    Therapy Diagnosis:   Encounter Diagnoses   Name Primary?    Chronic pain of right knee Yes    Decreased range of motion of lumbar spine     Weakness of both lower extremities      Physician: Roxanne Freeman NP    Physician Orders: Eval and Treat  Medical Diagnosis: Other idiopathic scoliosis, thoracolumbar region  Surgical Diagnosis: Not applicable for this Episode   Surgical Date: Not applicable for this Episode  Days Since Last Surgery: Not applicable for this Episode    Visit # / Visits Authorized:  1 / 20  Insurance Authorization Period: 1/1/2025 to 12/31/2025  Date of Evaluation: 7/2/2025  Plan of Care Certification: 7/2/2025 to 9/15/2025      PT/PTA:     Number of PTA visits since last PT visit:   Time In: 1300   Time Out: 1400  Total Time (in minutes): 60   Total Billable Time (in minutes): 60    FOTO:  Intake Score:  %  Survey Score 2:  %  Survey Score 3:  %    Precautions:       Subjective   Tiarra reports 5/10 back pn upon arrival today. She states that she is performing HEP w/o difficulty..  Pain reported as 5/10. Back and R knee 5/10 pre-tx, 0/10 post    Objective            Treatment:  Therapeutic Exercise  TE 1: AMB x 3 min each FWD 1.2 mph, BWD 0.6 mph, Side 0.7 mph  TE 2: HSS 3 x 30 sec, Quad stretch 3 x 30 sec  TE 3: Mini Squat with QS 15x, Heel Raise with GS 15x  TE 4: Hip ext with HS Curl, 15x, Hip flex with LAQ 15x  TE 5: Lunges Side/FWD 10x each  TE 6: Shld flex/ext 15x, Shld Horiz ABD/ADD 15x  TE 7: Horiz Row orange t-band 15x  TE 8: Lat Pull orange t-band 15x  TE 9: Marching 3 min      Time Entry(in minutes):  Aquatic Therapy Time Entry: 60    Assessment & Plan   Assessment: Tiarra shalini today's tx with initiation of aquatic ther ex well with benefit of relief from pn sx post tx as above. She was able to perform all activities w/o difficulty or  complaint. She required few VCs for proper form, posture and core stab tech post instruction.   Evaluation/Treatment Tolerance: Patient tolerated treatment well    The patient will continue to benefit from skilled outpatient physical therapy in order to address the deficits listed in the problem list on the initial evaluation, provide patient and family education, and maximize the patients level of independence in the home and community environments.     The patient's spiritual, cultural, and educational needs were considered, and the patient is agreeable to the plan of care and goals.           Plan: Continue treatment per established plan of care    Goals:   Active       Ambulation/movement       Patient will ambulate 500' distance using no device with no assistance with trunk rotation and hip extension bilaterally with increased step length.        Start:  07/02/25    Expected End:  09/15/25               Functional outcome       Patient will show a significant change in +10% patient-reported outcome tool to demonstrate subjective improvement       Start:  07/02/25    Expected End:  08/15/25            Patient stated goal: to return recrreational exercise for 30 min        Start:  07/02/25    Expected End:  09/15/25            Patient will demonstrate independence in home program for support of progression       Start:  07/02/25    Expected End:  08/15/25               Pain       Patient will report pain of 0/10 demonstrating a reduction of overall pain       Start:  07/02/25    Expected End:  08/15/25               Range of Motion       Patient will achieve bilateral spinal rotation ROM 60 degrees       Start:  07/02/25    Expected End:  08/15/25            Patient will achieve bilateral hip extension ROM 15 degrees pain free       Start:  07/02/25    Expected End:  09/15/25               Strength       Patient will achieve bilateral hip extension strength of 4+/5       Start:  07/02/25    Expected End:   09/15/25            Patient will achieve bilateral hip abduction strength of 4+/5       Start:  07/02/25    Expected End:  09/15/25                Caio Bush PTA

## 2025-07-10 ENCOUNTER — PATIENT MESSAGE (OUTPATIENT)
Dept: PAIN MEDICINE | Facility: CLINIC | Age: 70
End: 2025-07-10
Payer: MEDICARE

## 2025-07-10 ENCOUNTER — OFFICE VISIT (OUTPATIENT)
Dept: PAIN MEDICINE | Facility: CLINIC | Age: 70
End: 2025-07-10
Payer: MEDICARE

## 2025-07-10 ENCOUNTER — LAB VISIT (OUTPATIENT)
Dept: LAB | Facility: OTHER | Age: 70
End: 2025-07-10
Attending: NURSE PRACTITIONER
Payer: MEDICARE

## 2025-07-10 VITALS
WEIGHT: 156.75 LBS | BODY MASS INDEX: 27.77 KG/M2 | HEART RATE: 61 BPM | SYSTOLIC BLOOD PRESSURE: 135 MMHG | OXYGEN SATURATION: 97 % | DIASTOLIC BLOOD PRESSURE: 70 MMHG | HEIGHT: 63 IN

## 2025-07-10 DIAGNOSIS — Z79.891 ENCOUNTER FOR LONG-TERM OPIATE ANALGESIC USE: ICD-10-CM

## 2025-07-10 DIAGNOSIS — M47.816 LUMBAR SPONDYLOSIS: Primary | ICD-10-CM

## 2025-07-10 DIAGNOSIS — Z96.651 HISTORY OF TOTAL KNEE ARTHROPLASTY, RIGHT: ICD-10-CM

## 2025-07-10 DIAGNOSIS — M79.18 MYOFASCIAL PAIN: ICD-10-CM

## 2025-07-10 PROCEDURE — 99214 OFFICE O/P EST MOD 30 MIN: CPT | Mod: PBBFAC | Performed by: NURSE PRACTITIONER

## 2025-07-10 PROCEDURE — 99999 PR PBB SHADOW E&M-EST. PATIENT-LVL IV: CPT | Mod: PBBFAC,,, | Performed by: NURSE PRACTITIONER

## 2025-07-10 PROCEDURE — 80364 OPIOID &OPIATE ANALOG 5/MORE: CPT

## 2025-07-10 PROCEDURE — 99214 OFFICE O/P EST MOD 30 MIN: CPT | Mod: S$PBB,,, | Performed by: NURSE PRACTITIONER

## 2025-07-10 NOTE — PROGRESS NOTES
Chronic Pain-Established Visit    SUBJECTIVE:    Interval History 7/10/2025:  The patient presents for follow up of chronic lower back pain. She reports pain across her whole back, with the left side more affected. Pain is severe on some days, rating 5-6/10 when standing. She has difficulty standing for prolonged periods. She has been attending PT. She is currently transitioning to aquatherapy in [place] due to long wait times at [place]. She has a spinal cord stimulator, which does somewhat help her pain. She is currently taking Norco for pain management with benefit. She had an updated MRI since previous visit with no acute changes.    Interval History 6/12/2025:  The patient returns to clinic today for follow up of back pain. She reports worsened low back pain. This is worse with prolonged standing, walking, and activity. She denies any radicular leg pain. She also notes pain higher into the mid back. She does have benefit with SCS. She is taking Norco as needed with benefit. She denies any adverse effects. She denies any other health changes. Her pain today is 7/10.    Interval History 3/11/2025:  The patient returns to clinic today for follow up of back pain. She continues to report pain near former battery site. She denies any pain near new battery site. She does have benefit with SCS. She is taking Norco as needed for severe pain with benefit. She denies any adverse effects. She denies any other health changes. Her pain today is 8/10.    Interval History 2/3/2025:  The patient is seen today for follow-up of back pain and postoperative evaluation after spinal cord stimulator (SCS) battery site revision. The revision was performed due to migration of the battery into the lower buttock. She reports that the new battery site feels much better, and she is optimistic that she will be pain-free once the surgical site fully heals. Previous imaging and lab results were reviewed.    Interval History 12/2/2024:  Ms.  Tiarra returns to clinic today for follow up of back pain via virtual visit. She reports worsened pain around her SCS battery site. She is having difficulty sleeping due to the pain. On a recent vacation, she had to sleep in a recliner. She also has pain with sitting on a airplane and in the car. She does have benefit with the device. She is interested in moving forward with battery revision. She is taking Norco as needed with benefit and without adverse effects. She denies any other health changes.     Interval History 10/15/2024:  The patient returns to clinic today for follow up of back pain. She is s/p TPI around SCS battery site on 10/1/2024. She reports one day of relief. She continues to report pocket pain around her SCS site. She did meet with [employer] representative for programming. This has been beneficial. She also did dry needling with benefit. She does not wish to pursue battery revision at this time. She continues to take Norco as needed with benefit. She denies any adverse effects. She denies any other health changes. Her pain today is 6/10.    Interval History 8/19/2024:  The patient returns to clinic today for follow up of back pain. She continues to report pocket pain around her SCS site. She cannot lie flat on her back due to pain. She also notes pain with prolonged standing and activity. She does have benefit with SCS. She reports intermittent right knee pain. She is currently taking Norco as needed with some benefit. She is performing  home exercises. She denies any other health changes. Her pain today is 8/10.    Interval History 6/24/2024:  The patient returns to clinic today for follow up of back and knee pain via virtual visit. She continues to report low back pain. This is worse with prolonged standing. She denies any radicular leg pain. She did recently meet with [employer] representatives for programming. She reports limited relief with these changes. She is  participating in physical therapy. She is taking Norco as needed with benefit. She denies any adverse effects. She denies any other health changes.     Interval History 3/25/2024:  The patient returns to clinic today for follow up of back and knee pain. She is s/p right genicular RFA on 3/15/2024. She reports 50% relief. She continues to report some right knee pain.She continues to report low back pain. She is using her SCS. She does have intermittent pain around IPG site. She is participating in physical therapy. She is taking Norco as needed with benefit. She denies any adverse effects. She denies any other health changes. Her pain today is 4/10.    Interval History 1/23/2024:  Tiarra Norton presents for folow-up for lower back pain s/p IPG revision 11/20/2023.  She reports that the pocket pain is significantly improved, continues to get better.  She is just now feeling well enough to participate in physical therapy which she has today.  Today she also complains of right knee pain. She had a right TKA on 5/18/2023. .  The patient describes the pain as aching. The pain is constant. Exacerbating factors: bending, cooking, standing for a long time, walking.  Mitigating factors: ice, heat, medications.  The patient takes Tylenol 500 mg PRN with mild benefit.  They deny any perceived side effects.  She is hoping for an additional prescription for Norco 5-325 mg which she had taken over the recovery period from the IPG revision.  She states that the medication helps her with activity.  The symptoms interfere with ADLs.  The patient last had imaging Xray bilateral knees on 10/26/2023. See findings below.  The patient denies fever/night sweats, urinary incontinence, bowel incontinence, significant weight changes, significant motor weakness or changes, or loss of sensations.  Today's pain score is 3/10 for back pain and 7/10 for right knee pain.     RTA 5/18/23, activity restrictions from IPG revision was not able to  do PT, starting PT for back and right knee back today.  Wondering if there are any procedures we can do for knee.     Wants Norco 5-325 mg-helps with activity  Pain ext and flex  Ptp superior anterior    Interval History 10/25/2023  67 y/o female with hx of chronic left sided low back pain near IPG, she is s/p Octad electrode x2  placement of pulse generator 3  on 1/9 she recently was provided a recent TPI near left low back she reports 0% relief following.  Pain is worse when standing walking performing ADLs.  She does state she gets 5 hours of uninterrupted sleep.  Like her left low back is weak and aching.  Tingling any wearing her low back.  In the right side of her low back.  To discuss other pain interventions and the plan of care moving forward regard to her current subacute pain.  She denies any profound weakness denies any bowel bladder dysfunction denies any saddle anesthesia at this time.    Interval History 9/12/2023:  The patient returns to clinic today for follow up of battery site pain via virtual visit. She is s/p trigger point injections under ultrasound on 8/29/2023. She does feel some benefit. She is currently ill with Covid. She is currently running fever and having body aches. She denies any other health changes.     Interval History 8/11/2023:  The patient returns to clinic today for follow up of pain. She reports pain around her SCS battery site. She describes the pain as though her muscles feel weak and tired. She does report benefit with SCS. No difficulty with charging or programming. She has tried Lidoderm and Salonpas patches but these caused a rash. She is currently participating in physical therapy for her knee. She denies any other health changes. Her pain today is 4/10.    Interval History 4/14/2023:  The patient is here for follow up of back pain and pain at IPG site. She reports improvement since previous visit. She did have benefit with Lidoderm patches but had a rash so she  stopped. Her pain has improved with Salon Pas patches. She is scheduled for knee replacement next month with Dr. Scott. Her pain today is 3/10.    Interval History 3/21/2023:  The patient presents to discuss pain to IPG site. She says that it has been present since surgery and has gradually worsened. She says that it feels like a tight and weak muscle. She has not tried any topical medications or muscle relaxants. No fever or malaise. She has not noticed any redness or swelling to the site. Her original pain has significantly improved from SCS implant. She is part of a research study. Her pain today is 6/10.    Interval History 2/17/2023:  The patient returns to clinic today for follow up of back pain. She reports benefit with [employer] SCS. She is in contact with representatives for programming. She is very happy with relief. She denies any fever, chills, or drainage. She continues to follow her activity restrictions. She denies any other health changes. Her pain today is 2/10.    Interval History 1/31/2023:  The patient returns to clinic today for wound check. She reports benefit with [employer] SCS. She is in contact with representatives for programming. She reports intermittent muscle soreness into her legs. She does have an upcoming appointment with representatives for programming. She denies any fever, chills, or drainage. She continues to follow her activity restrictions. She denies any other health changes. Her pain today is 3/10.    Interval History 1/17/2023:  The patient returns to clinic today for post op. She is s/p [employer] SCS implant on 1/9/2022. She reports benefit with the device at this time. She is meeting with the representatives today. She does report soreness to her incisions. She denies any fever, chills, or drainage. She did have issues with her dressing staying on. She has completed her antibiotics. She denies any other health changes. Her pain today is  2/10.    Interval History 12/29/2022:  The patient returns for post op appointment. She is s/p lumbar [place] SCS trial with 90% relief. She has had very minimal back pain during the trial period. She says that she was more active over this time due to the Christmas holiday and had minimal symptoms. She is very happy with the relief. She would like to move forward with implant. She is part of [place] study. No fever or malaise during trial period. Her pain today is 1/10.    Interval History 10/14/22: Mrs. Norton presents today for follow up of chronic low back pain and to discuss the [place] trial. She had her lumbar MRI done last night without significant changes to her spine. She did have increased dilation of her bile duct. Her pain today is the same in character and severity as during her last visit with us and she rates it currently at a 7/10. She continues to do her stretches on her own which have helped some. She presents with some questions regarding the trial.     Interval History 9/21/2022:  The patient returns to clinic today for follow up of back pain via virtual visit. She received a letter from [employer] denying SCS trial. She is frustrated by this. She continues to report low back pain. She denies any radicular leg pain. Her pain is worse with bending and activity. She recently underwent med screening for the [employer] [facility] program. She is interested in pursuing this. She has tried Gabapentin and Lyrica in the past with side effects. She denies any other health changes. Her pain today is 7/10.     Interval History 8/22/22: Ms. Norton returns for follow up on her low back pain. At our last visit she felt cured by acupuncture. Unfortunately, this only lasted for 24 hours. She returns today looking for other options to make life livable. Patient claims all of her pain in the low back and middle back. We discussed that this will work best for her low back pain.     Interval History 7/25/22: Tiarra Chang  Errol returns for follow up. We previously have been discussing treatments for her low back pain that did not resolve with RFA. At our last visit I referred her to Dr. Antony for clearance for a SCS trial. She was considered to be a reasonable candidate. However, in the meantime, her PT referred her to acupuncture with Dr. Jacobs. She had one treatment and already notes 50% relief. She notes that she still has pain, especially with leaning forward, but feels that it is much better controlled. Their plan is one treatment per week but is approved for 20 sessions.     Interval History 6/15/22: Tiarra Norton returns for follow up. She continues to feel like she has mild pain sitting or laying down, and has her worst pain with extreme leaning forward to pick something up off the floor. She also has difficulty leaning over her handlebars to ride her bike or leaning forward to fold clothes or standing up for any period of time. So, we determined that leaning forward is her worst issue. We did previouisly do lmbb and rfa, which has helped with extension, but it would not help with leaning forward. On review of her MRI she has significant multilevel DDD with Modic changes which likely account for her pain.     4/18/22 Interval History: Patient presents for telehealth visit for follow up following her b/l RFA at L3-L5. She reports 80% relief and is very pleased with her pain relief. Unfortunately, she is not back to doing what she wants due to right knee pain. She is seeing Dr. Huber for knee pain and is s/p right medial compartment partial knee replacement. She is currently in therapy for this. We discussed that we may be able to assist her with her knee pain as well.     HPI:  Original HISTORY OF PRESENT ILLNESS: Tiarra Norton presents to the clinic for the evaluation of the above pain. The pain started five years ago.     Original Pain Description:  The pain is located in the lower back and does radiate up towards  her upper back.The pain is described as aching, dull and sharp. Initially starts as a dull, aching pain and it gets sharp once she bends down and moves around. Typically when she walks for more than five minutes, the sharp pain radiates up her back. Exacerbating factors: Standing, Bending, Touching, Walking, Night Time, Flexing and Lifting. Mitigating factors heat, ice, laying down and massage. Symptoms interfere with daily activity and sleeping. The patient feels like symptoms have been worsening. Patient denies night fever/night sweats, urinary incontinence, bowel incontinence, significant weight loss, significant motor weakness and loss of sensations.    Original PAIN SCORES:  Best: Pain is 1  Worst: Pain is 10  Usually: Pain is 4  Current: Pain is 4        7/10/2025    11:06 AM 6/12/2025    11:10 AM 3/11/2025     2:14 PM   Last 3 PDI Scores   Pain Disability Index (PDI) 36 [phone] 56     6 weeks of Conservative therapy (PT/Chiro/Home Exercises with Dates)  Healthy back program--> has four sessions left for a total of 20 sessions   Last session was on 1/31/22   Stretches that they taught at [employer] gives her relief       Medications:   Robaxin PRN    Ice and heat which has helped   Tried Gabapentin and Lyrica- made her loopy      Report: reviewed       Interventional Pain Procedures:   10/17/2023 TPI left side near IPG battery 0% relief  2/8/2022- Bilateral L3,4,5 MBB  2/15/2022- Bilateral L3,4,5 MBB  3/8/2022- Right L3,4,5 RFA- 80% relief for a few months  3/22/2022- Left L3,4,5 RFA- 80% relief for a few months  12/22/22 SCS trial- 90% relief  1/9/2023- [employer] SCS implant.   11/20/2023- IPG revision  2/16/2024- Right genicular nerve block  3/15/2024- Right genicular RFA    Imaging:    EXAMINATION:  XR KNEE 3 VIEW RIGHT     CLINICAL HISTORY:  Presence of right artificial knee joint     TECHNIQUE:  AP, lateral, and Merchant views of the right knee were performed.     COMPARISON:  Right knee radiograph  dated 06/12/2023     FINDINGS:  Prior right knee arthroplasty.  Hardware projects in similar alignment without evidence for interval loosening or failure.  No periprosthetic fracture.     Impression:     As above.        Electronically signed by: Azael Wilkins  Date:                                            11/03/2023  Time:                                           11:05    MRI Lumbar Spine Without Contrast  Order: [id number]   Status: Final result       Next appt: 07/15/2025 at 11:00 AM in [facility]patient [facility]hab ([facility], [facility])       Dx: Lumbar radiculopathy, chronic    Test Result Released: Yes (seen)    0 Result Notes  Details    Reading Physician Reading Date Result Priority   Keon Madison MD  [phone]  6/29/2025 Routine     Narrative & Impression  EXAMINATION:  MRI LUMBAR SPINE WITHOUT CONTRAST     CLINICAL HISTORY:  Lumbar radiculopathy, symptoms persist with conservative treatment; Radiculopathy, lumbar region     TECHNIQUE:  Multiplanar, multisequence MR images were acquired from the thoracolumbar junction to the sacrum without the administration of contrast.     COMPARISON:  06/27/2025     FINDINGS:  Alignment: Scoliosis convex LEFT.  Minimal grade 1 anterolisthesis L4 on L5     Vertebrae: 5 lumbar-type vertebral bodies. No aggressive marrow replacement process or fracture.Modic Changes: Type 2 (fat endplate signal) level: L1-L2-L3     Discs: Disc space narrowing [id number]-U6-B5     Cord: Normal. Conus terminates at L1-L2.  Neurostimulator.     Degenerative findings:     T12-L1: Annular bulging of disc material.Facet arthropathy.  No significant central canal narrowing . No significant neural foraminal narrowing.     L1-L2: Broad-based bulging of disc material.     L2-L3: Broad-based disc bulge, facet degenerative change and ligamentum flavum thickening which contribute to   mild central canal narrowing . No significant neural foraminal narrowing.     L3-L4: Low volume facet effusions.  Broad-based  disc bulge, facet degenerative change and ligamentum flavum thickening which contribute to   moderate central canal narrowing . Mild bilateral neural foraminal narrowing.     L4-L5: Mild diffuse annular bulging/uncovering of disc material.Facet hypertrophy.  No significant central canal narrowing . No significant neural foraminal narrowing.     L5-S1: Facet arthropathy.  No significant central canal narrowing . No significant neural foraminal narrowing.     Paraspinal muscles & soft tissues: Unremarkable.     Impression:     Scoliosis with multilevel lumbar spondylosis most evident T12-L3 with up to moderate central canal narrowing L3-L4.  Level by level details as per above.        Electronically signed by:Keon Madison MD  Date:                                            06/29/2025  Time:                                           08:55        Exam Ended: 06/28/25 11:16 CDT Last Resulted: 06/29/25 08:55 CDT       Order Details        View Encounter        Lab and Collection Details        Routing        Result History     View All Conversations on this Encounter     Result Care Coordination      Patient Communication     Add Comments   Seen Back to Top          MRI Lumbar Spine Without Contrast: Patient Communication     Add Comments   Seen     External Result Report    External Result Report     Narrative & Impression    EXAMINATION:  MRI LUMBAR SPINE WITHOUT CONTRAST     CLINICAL HISTORY:  Lumbar radiculopathy, symptoms persist with conservative treatment; Radiculopathy, lumbar region     TECHNIQUE:  Multiplanar, multisequence MR images were acquired from the thoracolumbar junction to the sacrum without the administration of contrast.     COMPARISON:  06/27/2025     FINDINGS:  Alignment: Scoliosis convex LEFT.  Minimal grade 1 anterolisthesis L4 on L5     Vertebrae: 5 lumbar-type vertebral bodies. No aggressive marrow replacement process or fracture.Modic Changes: Type 2 (fat endplate signal) level: L1-L2-L3      Discs: Disc space narrowing [id number]-E3     Cord: Normal. Conus terminates at L1-L2.  Neurostimulator.     Degenerative findings:     T12-L1: Annular bulging of disc material.Facet arthropathy.  No significant central canal narrowing . No significant neural foraminal narrowing.     L1-L2: Broad-based bulging of disc material.     L2-L3: Broad-based disc bulge, facet degenerative change and ligamentum flavum thickening which contribute to   mild central canal narrowing . No significant neural foraminal narrowing.     L3-L4: Low volume facet effusions.  Broad-based disc bulge, facet degenerative change and ligamentum flavum thickening which contribute to   moderate central canal narrowing . Mild bilateral neural foraminal narrowing.     L4-L5: Mild diffuse annular bulging/uncovering of disc material.Facet hypertrophy.  No significant central canal narrowing . No significant neural foraminal narrowing.     L5-S1: Facet arthropathy.  No significant central canal narrowing . No significant neural foraminal narrowing.     Paraspinal muscles & soft tissues: Unremarkable.     Impression:     Scoliosis with multilevel lumbar spondylosis most evident T12-L3 with up to moderate central canal narrowing L3-L4.  Level by level details as per above.             Past Medical History:   Diagnosis Date    Cataract     Depression     Gestational diabetes     Hyperlipidemia     Hypertension     IBS (irritable bowel syndrome)     HERMINIA (obstructive sleep apnea)     no cpap use    Thyroid disease      Past Surgical History:   Procedure Laterality Date    ADENOIDECTOMY      ARTHROPLASTY, KNEE, TOTAL, USING COMPUTER-ASSISTED NAVIGATION Right 5/18/2023    Procedure: CONVERSION ARTHROPLASTY, KNEE, TOTAL, USING COMPUTER-ASSISTED NAVIGATION;  Surgeon: Virgil Scott MD;  Location: [facility];  Service: Orthopedics;  Laterality: Right;  Same day discharge    ARTHROSCOPIC CHONDROPLASTY OF KNEE JOINT Right 10/19/2021    Procedure: ARTHROSCOPY,  KNEE, WITH CHONDROPLASTY;  Surgeon: Trevin Huber MD;  Location: [facility] OR;  Service: Orthopedics;  Laterality: Right;    ARTHROSCOPY OF KNEE Right 10/19/2021    Procedure: ARTHROSCOPY, KNEE-RIGHT;  Surgeon: Trevin Huber MD;  Location: [facility] OR;  Service: Orthopedics;  Laterality: Right;    BLOCK, NERVE, GENICULAR Right 2024    Procedure: BLOCK, NERVE RIGHT GENICULAR;  Surgeon: Shoaib Aguirre MD;  Location: [facility] PAIN MGT;  Service: Pain Management;  Laterality: Right;  [phone]     SECTION      CHOLECYSTECTOMY      COLONOSCOPY N/A 2016    Procedure: COLONOSCOPY;  Surgeon: Heri Segura MD;  Location: [facility] (4TH FLR);  Service: Endoscopy;  Laterality: N/A;    COLONOSCOPY N/A 2019    Procedure: COLONOSCOPY;  Surgeon: Joe Pitts MD;  Location: [facility] (4TH FLR);  Service: Endoscopy;  Laterality: N/A;    CYSTOSCOPY  2022    Procedure: CYSTOSCOPY;  Surgeon: Lenny Moore MD;  Location: [facility] 1ST FLR;  Service: Urology;;    ESOPHAGOGASTRODUODENOSCOPY N/A 2020    Procedure: EGD (ESOPHAGOGASTRODUODENOSCOPY);  Surgeon: Tolu Rivas MD;  Location: [facility] (4TH FLR);  Service: Endoscopy;  Laterality: N/A;  Pt. moved to 9:15am arrival time.. Instructed to not have anything after midnight.EC    EYE SURGERY      cataract resection right    INJECTION OF ANESTHETIC AGENT AROUND NERVE Bilateral 2022    Procedure: Block, Nerve MEDIAL BRANCH BLOCK BILATERAL L3,4,5;  Surgeon: Tara Gibbs MD;  Location: [facility] PAIN [facility]GT;  Service: Pain Management;  Laterality: Bilateral;    INJECTION OF ANESTHETIC AGENT AROUND NERVE Bilateral 2/15/2022    Procedure: BLOCK, NERVE, L3*L4-L5 MEDIAL BR ANCH 2 OF 2;  Surgeon: Tara Gibbs MD;  Location: [facility] PAIN [facility]GT;  Service: Pain Management;  Laterality: Bilateral;    INJECTION OF BOTULINUM TOXIN TYPE A  2022    Procedure: BOTULINUM TOXIN INJECTION 100 units;  Surgeon: Lenny Moore MD;  Location: [facility]  OR 1ST FLR;  Service: Urology;;    INSERTION, NEUROSTIMULATOR, SPINAL CORD N/A 1/9/2023    Procedure: SPINAL CORD STIMULATOR IMPLANT [place];  Surgeon: Shoaib Aguirre MD;  Location: [facility] OR;  Service: Pain Management;  Laterality: N/A;    JOINT REPLACEMENT      partial right knee    KNEE ARTHROSCOPY W/ MENISCECTOMY Right 10/19/2021    Procedure: ARTHROSCOPY, KNEE, WITH MENISCECTOMY, PARTIAL LATERAL;  Surgeon: Trevin Huber MD;  [facility] [facility];  Service: Orthopedics;  Laterality: Right;    r hand surgery      RADIOFREQUENCY ABLATION Right 3/8/2022    Procedure: RADIOFREQUENCY ABLATION, L3-L5 1 OF 2;  Surgeon: Tara Gibbs MD;  Location: [facility] PAIN MGT;  Service: Pain Management;  Laterality: Right;    RADIOFREQUENCY ABLATION Left 3/22/2022    Procedure: RADIOFREQUENCY ABLATION, l3-+l5 2 of 2;  Surgeon: Tara Gibbs MD;  Location: [facility] PAIN [facility]GT;  Service: Pain Management;  Laterality: Left;    RADIOFREQUENCY ABLATION Right 3/15/2024    Procedure: RADIOFREQUENCY ABLATION RIGHT GENICULAR;  Surgeon: Shoaib Aguirre MD;  Location: [facility] PAIN [facility]GT;  Service: Pain Management;  Laterality: Right;  [phone]    REMOVAL OF NEUROSTIMULATOR N/A 11/20/2023    Procedure: SCS IPG BATTERY REVISION;  Surgeon: Shoaib Aguirre MD;  Location: [facility] OR;  Service: Pain Management;  Laterality: N/A;    REVISION PROCEDURE INVOLVING SPINAL CORD NEUROSTIMULATOR N/A 1/27/2025    Procedure: SPINAL CORD STIMULATOR BATTERY REVISION [facility];  Surgeon: Shoaib Aguirre MD;  Location: [facility] OR;  Service: Pain Management;  Laterality: N/A;    TONSILLECTOMY      TOTAL KNEE ARTHROPLASTY      partial knee replacement    TRIAL OF SPINAL CORD NERVE STIMULATOR N/A 12/22/2022    Procedure: SPINAL CORD STIMULATOR TRIAL [place];  Surgeon: Shoaib Aguirre MD;  Location: [facility] PAIN [facility]GT;  Service: Pain Management;  Laterality: N/A;    TUBAL LIGATION       Social History     Socioeconomic History    Marital  status:     Number of children: 1   Occupational History    Occupation:      Employer: [employer]     Comment: retired   Tobacco Use    Smoking status: Never    Smokeless tobacco: Never   Substance and Sexual Activity    Alcohol use: Yes     Alcohol/week: 3.0 standard drinks of alcohol     Types: 3 Glasses of wine per week     Comment: weekends    Drug use: Yes     Types: Marijuana     Comment: occasionally    Sexual activity: Yes     Partners: Male   Other Topics Concern    Are you pregnant or think you may be? No    Breast-feeding No   Social History Narrative    Retired        Swims.       Stationary bike.            Social Drivers of Health     Financial Resource Strain: Low Risk  (5/5/2025)    Overall Financial Resource Strain (CARDIA)     Difficulty of Paying Living Expenses: Not hard at all   Food Insecurity: No Food Insecurity (5/5/2025)    Hunger Vital Sign     Worried About Running Out of Food in the Last Year: Never true     Ran Out of Food in the Last Year: Never true   Transportation Needs: No Transportation Needs (5/5/2025)    PRAPARE - Transportation     Lack of Transportation (Medical): No     Lack of Transportation (Non-Medical): No   Physical Activity: Insufficiently Active (5/5/2025)    Exercise Vital Sign     Days of Exercise per Week: 3 days     Minutes of Exercise per Session: 30 min   Stress: Stress Concern Present (5/5/2025)    [place] [employer] - Occupational Stress Questionnaire     Feeling of Stress : Very much   Housing Stability: Low Risk  (5/5/2025)    Housing Stability Vital Sign     Unable to Pay for Housing in the Last Year: No     Number of Times Moved in the Last Year: 0     Homeless in the Last Year: No     Family History   Problem Relation Name Age of Onset    Hypertension Mother      Irritable bowel syndrome Mother      Hyperlipidemia Mother      Heart disease Father          mi    Hypertension Father      Cancer Father           prostate    Heart attack Father      Heart failure Father      Hyperlipidemia Father      Alcohol abuse Sister nathanael     Depression Sister nathanael     Epilepsy Son ari     Irritable bowel syndrome Sister martin     Alcohol abuse Sister martin     Ovarian cancer Maternal Aunt      Breast cancer Paternal Aunt      Breast cancer Maternal Aunt      Melanoma Neg Hx      Colon cancer Neg Hx      Stomach cancer Neg Hx      Esophageal cancer Neg Hx      Liver disease Neg Hx      Crohn's disease Neg Hx      Ulcerative colitis Neg Hx      Celiac disease Neg Hx      Stroke Neg Hx         Review of patient's allergies indicates:  No Known Allergies    Current Outpatient Medications   Medication Sig    alosetron (LOTRONEX) 0.5 MG tablet Take 1 tablet (0.5 mg total) by mouth in the morning and 1 tablet (0.5 mg total) in the evening.    ALPRAZolam (XANAX) 1 MG tablet Take 1 tablet by mouth three times daily.    ALPRAZolam (XANAX) 1 MG tablet Take 1 tablet by mouth three times daily.    amLODIPine (NORVASC) 5 MG tablet Take 1 tablet (5 mg total) by mouth once daily.    atorvastatin (LIPITOR) 10 MG tablet Take 1 tablet (10 mg total) by mouth once daily.    ciclopirox (PENLAC) 8 % Soln Apply topically nightly. Remove once a week with nail polish remover    erythromycin (ROMYCIN) ophthalmic ointment Place into the left eye every evening.    EScitalopram oxalate (LEXAPRO) 5 MG Tab Take one tablet by mouth once daily    estradioL (ESTRACE) 0.01 % (0.1 mg/gram) vaginal cream Insert 1 gram as directed vagianlly nightly for 2 to 4 weeks followed by 2-3x/week    HYDROcodone-acetaminophen (NORCO) 5-325 mg per tablet Take 1 tablet by mouth every 12 (twelve) hours as needed for Pain.    levothyroxine (SYNTHROID) 100 MCG tablet TAKE 1 TABLET BY MOUTH EVERY MORNING    LIDOcaine (XYLOCAINE) 5 % Oint ointment Apply topically 3 (three) times daily as needed (pain).    ondansetron (ZOFRAN-ODT) 4 MG TbDL Dissolve one tablet (4 mg total) by mouth  "every 12 (twelve) hours as needed (nausea).    promethazine (PHENERGAN) 25 MG tablet Take 1 tablet by mouth every 12 hours as needed for nausea    ramelteon (ROZEREM) 8 mg tablet Take 1 tablet (8 mg total) by mouth every evening.    spironolactone (ALDACTONE) 25 MG tablet Take 1 tablet (25 mg total) by mouth once daily. For blood pressure    sumatriptan (IMITREX) 50 MG tablet TAKE 1 TABLET BY MOUTH AT START OF HEADACHE.  REPEAT IN 2 HOURS IF NEEDED    suzetrigine 50 mg Tab Take 50 mg by mouth 2 (two) times a day.    tirzepatide, weight loss, (ZEPBOUND) 2.5 mg/0.5 mL PnIj Inject 2.5 mg into the skin every 7 days.    traZODone (DESYREL) 100 MG tablet take 1 to 2 tablets by mouth every evening for for sleep    tretinoin (RETIN-A) 0.025 % cream Compound tretinoin 0.025% / azelaic acid 8% / niacinamide 2% cream. Apply a pea-sized amount to entire face qhs.    triamcinolone acetonide 0.025% (KENALOG) 0.025 % cream Apply topically 2 (two) times daily.    valsartan (DIOVAN) 320 MG tablet Take 1 tablet (320 mg total) by mouth once daily.    QUEtiapine (SEROQUEL) 25 MG Tab Take one tablet by mouth nightly     No current facility-administered medications for this visit.       REVIEW OF SYSTEMS:    GENERAL: No fever. No chills. No fatigue. Denies weight loss. Denies weight gain.  HEENT: Denies headaches. Denies vision change. Denies eye pain. Denies double vision. Denies ear pain.   CV: Denies chest pain. HTN  PULM: Denies of shortness of breath.  GI: Denies constipation. No diarrhea. No abdominal pain. Denies nausea. Denies vomiting. No blood in stool.  HEME: Denies bleeding problems.  : Denies urgency. No painful urination. No blood in urine.  MS: See HPI  SKIN: Denies rash.   NEURO: Denies seizures. No weakness.  ENDO: Thyroid disorder.   PSYCH:  Depression    OBJECTIVE:       Vitals:    07/10/25 1106   BP: 135/70   Pulse: 61   SpO2: 97%   Weight: 71.1 kg (156 lb 12 oz)   Height: 5' 3" (1.6 m)   PainSc:   6   PainLoc: Back "         PHYSICAL EXAM:     GENERAL: Well appearing, in no acute distress, alert and oriented x3.  PSYCH:  Mood and affect appropriate.  SKIN: Skin color, texture, turgor normal, no rashes or lesions.   RESP: Symmetric chest rise, no respiratory distress noted.   BACK: Straight leg raise in the sitting position is negative for radicular pain. Full ROM with pain on extension. Positive facet loading bilaterally.  MSK: 5/5 strength in right ankle with plantar and dorsiflexion. 5/5 strength in left ankle with plantar and dorsiflexion. 5/5 strength with right knee flexion and extension. 5/5 strength with left knee flexion and extension.   GAIT: Normal.       ASSESSMENT: 70 y.o. year old female with low back and knee pain, consistent with the followin. Lumbar spondylosis  Procedure Order to Pain Management      2. Encounter for long-term opiate analgesic use  [facility] Drug Screen      3. Myofascial pain        4. History of total knee arthroplasty, right              Plan:    - Previous imaging reviewed. Labs reviewed.     - She does have benefit with SCS for leg pain.    - I will schedule the patient for repeat bilateral MBB at L3 to L5. Pain is axial in nature and non-radiating. She has completed over 6 weeks of PT in the past 12 weeks.    - Continue Flexeril 10 mg TID PRN, refill provided.     - Continue Lidocaine ointment over former battery site.     - Pain contract signed 2024.    - Continue Norco 5/325 mg TID PRN.    - The patient is here today for a refill of current pain medications and they believe these provide effective pain control and improvements in quality of life.  The patient notes no serious side effects, and feels the benefits outweigh the risks.  The patient was reminded of the pain contract that they signed previously as well as the risks and benefits of the medication including possible death.  The updated [place] [employer] prescription monitoring program was reviewed, and  the patient has been found to be compliant with current treatment plan.    - Continue home exercise routine.     - RTC after completion of procedures.    The above plan and management options were discussed at length with patient. Patient is in agreement with the above and verbalized understanding.     MIGUEL Dwyer  07/10/2025

## 2025-07-10 NOTE — H&P (VIEW-ONLY)
Chronic Pain-Established Visit    SUBJECTIVE:    Interval History 7/10/2025:  The patient presents for follow up of chronic lower back pain. She reports pain across her whole back, with the left side more affected. Pain is severe on some days, rating 5-6/10 when standing. She has difficulty standing for prolonged periods. She has been attending PT. She is currently transitioning to aquatherapy in [place] due to long wait times at [facility]. She has a spinal cord stimulator, which does somewhat help her pain. She is currently taking Norco for pain management with benefit. She had an updated MRI since previous visit with no acute changes.    Interval History 6/12/2025:  The patient returns to clinic today for follow up of back pain. She reports worsened low back pain. This is worse with prolonged standing, walking, and activity. She denies any radicular leg pain. She also notes pain higher into the mid back. She does have benefit with SCS. She is taking Norco as needed with benefit. She denies any adverse effects. She denies any other health changes. Her pain today is 7/10.    Interval History 3/11/2025:  The patient returns to clinic today for follow up of back pain. She continues to report pain near former battery site. She denies any pain near new battery site. She does have benefit with SCS. She is taking Norco as needed for severe pain with benefit. She denies any adverse effects. She denies any other health changes. Her pain today is 8/10.    Interval History 2/3/2025:  The patient is seen today for follow-up of back pain and postoperative evaluation after spinal cord stimulator (SCS) battery site revision. The revision was performed due to migration of the battery into the lower buttock. She reports that the new battery site feels much better, and she is optimistic that she will be pain-free once the surgical site fully heals. Previous imaging and lab results were reviewed.    Interval History 12/2/2024:  Ms.  Tiarra returns to clinic today for follow up of back pain via virtual visit. She reports worsened pain around her SCS battery site. She is having difficulty sleeping due to the pain. On a recent vacation, she had to sleep in a recliner. She also has pain with sitting on a airplane and in the car. She does have benefit with the device. She is interested in moving forward with battery revision. She is taking Norco as needed with benefit and without adverse effects. She denies any other health changes.     Interval History 10/15/2024:  The patient returns to clinic today for follow up of back pain. She is s/p TPI around SCS battery site on 10/1/2024. She reports one day of relief. She continues to report pocket pain around her SCS site. She did meet with [employer] representative for programming. This has been beneficial. She also did dry needling with benefit. She does not wish to pursue battery revision at this time. She continues to take Norco as needed with benefit. She denies any adverse effects. She denies any other health changes. Her pain today is 6/10.    Interval History 8/19/2024:  The patient returns to clinic today for follow up of back pain. She continues to report pocket pain around her SCS site. She cannot lie flat on her back due to pain. She also notes pain with prolonged standing and activity. She does have benefit with SCS. She reports intermittent right knee pain. She is currently taking Norco as needed with some benefit. She is performing  home exercises. She denies any other health changes. Her pain today is 8/10.    Interval History 6/24/2024:  The patient returns to clinic today for follow up of back and knee pain via virtual visit. She continues to report low back pain. This is worse with prolonged standing. She denies any radicular leg pain. She did recently meet with [employer] representatives for programming. She reports limited relief with these changes. She is  participating in physical therapy. She is taking Norco as needed with benefit. She denies any adverse effects. She denies any other health changes.     Interval History 3/25/2024:  The patient returns to clinic today for follow up of back and knee pain. She is s/p right genicular RFA on 3/15/2024. She reports 50% relief. She continues to report some right knee pain.She continues to report low back pain. She is using her SCS. She does have intermittent pain around IPG site. She is participating in physical therapy. She is taking Norco as needed with benefit. She denies any adverse effects. She denies any other health changes. Her pain today is 4/10.    Interval History 1/23/2024:  Tiarra Norton presents for folow-up for lower back pain s/p IPG revision 11/20/2023.  She reports that the pocket pain is significantly improved, continues to get better.  She is just now feeling well enough to participate in physical therapy which she has today.  Today she also complains of right knee pain. She had a right TKA on 5/18/2023. .  The patient describes the pain as aching. The pain is constant. Exacerbating factors: bending, cooking, standing for a long time, walking.  Mitigating factors: ice, heat, medications.  The patient takes Tylenol 500 mg PRN with mild benefit.  They deny any perceived side effects.  She is hoping for an additional prescription for Norco 5-325 mg which she had taken over the recovery period from the IPG revision.  She states that the medication helps her with activity.  The symptoms interfere with ADLs.  The patient last had imaging Xray bilateral knees on 10/26/2023. See findings below.  The patient denies fever/night sweats, urinary incontinence, bowel incontinence, significant weight changes, significant motor weakness or changes, or loss of sensations.  Today's pain score is 3/10 for back pain and 7/10 for right knee pain.     RTA 5/18/23, activity restrictions from IPG revision was not able to  do PT, starting PT for back and right knee back today.  Wondering if there are any procedures we can do for knee.     Wants Norco 5-325 mg-helps with activity  Pain ext and flex  Ptp superior anterior    Interval History 10/25/2023  69 y/o female with hx of chronic left sided low back pain near IPG, she is s/p Octad electrode x2  placement of pulse generator 3  on 1/9 she recently was provided a recent TPI near left low back she reports 0% relief following.  Pain is worse when standing walking performing ADLs.  She does state she gets 5 hours of uninterrupted sleep.  Like her left low back is weak and aching.  Tingling any wearing her low back.  In the right side of her low back.  To discuss other pain interventions and the plan of care moving forward regard to her current subacute pain.  She denies any profound weakness denies any bowel bladder dysfunction denies any saddle anesthesia at this time.    Interval History 9/12/2023:  The patient returns to clinic today for follow up of battery site pain via virtual visit. She is s/p trigger point injections under ultrasound on 8/29/2023. She does feel some benefit. She is currently ill with Covid. She is currently running fever and having body aches. She denies any other health changes.     Interval History 8/11/2023:  The patient returns to clinic today for follow up of pain. She reports pain around her SCS battery site. She describes the pain as though her muscles feel weak and tired. She does report benefit with SCS. No difficulty with charging or programming. She has tried Lidoderm and Salonpas patches but these caused a rash. She is currently participating in physical therapy for her knee. She denies any other health changes. Her pain today is 4/10.    Interval History 4/14/2023:  The patient is here for follow up of back pain and pain at IPG site. She reports improvement since previous visit. She did have benefit with Lidoderm patches but had a rash so she  stopped. Her pain has improved with Salon Pas patches. She is scheduled for knee replacement next month with Dr. Scott. Her pain today is 3/10.    Interval History 3/21/2023:  The patient presents to discuss pain to IPG site. She says that it has been present since surgery and has gradually worsened. She says that it feels like a tight and weak muscle. She has not tried any topical medications or muscle relaxants. No fever or malaise. She has not noticed any redness or swelling to the site. Her original pain has significantly improved from SCS implant. She is part of a research study. Her pain today is 6/10.    Interval History 2/17/2023:  The patient returns to clinic today for follow up of back pain. She reports benefit with [employer] SCS. She is in contact with representatives for programming. She is very happy with relief. She denies any fever, chills, or drainage. She continues to follow her activity restrictions. She denies any other health changes. Her pain today is 2/10.    Interval History 1/31/2023:  The patient returns to clinic today for wound check. She reports benefit with [employer] [place] [employer] SCS. She is in contact with representatives for programming. She reports intermittent muscle soreness into her legs. She does have an upcoming appointment with representatives for programming. She denies any fever, chills, or drainage. She continues to follow her activity restrictions. She denies any other health changes. Her pain today is 3/10.    Interval History 1/17/2023:  The patient returns to clinic today for post op. She is s/p [employer] SCS implant on 1/9/2022. She reports benefit with the device at this time. She is meeting with the representatives today. She does report soreness to her incisions. She denies any fever, chills, or drainage. She did have issues with her dressing staying on. She has completed her antibiotics. She denies any other health changes. Her pain today is  2/10.    Interval History 12/29/2022:  The patient returns for post op appointment. She is s/p lumbar [place] SCS trial with 90% relief. She has had very minimal back pain during the trial period. She says that she was more active over this time due to the Christmas holiday and had minimal symptoms. She is very happy with the relief. She would like to move forward with implant. She is part of [place] study. No fever or malaise during trial period. Her pain today is 1/10.    Interval History 10/14/22: Mrs. Norton presents today for follow up of chronic low back pain and to discuss the [place] trial. She had her lumbar MRI done last night without significant changes to her spine. She did have increased dilation of her bile duct. Her pain today is the same in character and severity as during her last visit with us and she rates it currently at a 7/10. She continues to do her stretches on her own which have helped some. She presents with some questions regarding the trial.     Interval History 9/21/2022:  The patient returns to clinic today for follow up of back pain via virtual visit. She received a letter from [employer] denying SCS trial. She is frustrated by this. She continues to report low back pain. She denies any radicular leg pain. Her pain is worse with bending and activity. She recently underwent med screening for the [facility] program. She is interested in pursuing this. She has tried Gabapentin and Lyrica in the past with side effects. She denies any other health changes. Her pain today is 7/10.     Interval History 8/22/22: Ms. Norton returns for follow up on her low back pain. At our last visit she felt cured by acupuncture. Unfortunately, this only lasted for 24 hours. She returns today looking for other options to make life livable. Patient claims all of her pain in the low back and middle back. We discussed that this will work best for her low back pain.     Interval History 7/25/22: Tiarra Chang  Errol returns for follow up. We previously have been discussing treatments for her low back pain that did not resolve with RFA. At our last visit I referred her to Dr. Antony for clearance for a SCS trial. She was considered to be a reasonable candidate. However, in the meantime, her PT referred her to acupuncture with Dr. Jacobs. She had one treatment and already notes 50% relief. She notes that she still has pain, especially with leaning forward, but feels that it is much better controlled. Their plan is one treatment per week but is approved for 20 sessions.     Interval History 6/15/22: Tiarra Norton returns for follow up. She continues to feel like she has mild pain sitting or laying down, and has her worst pain with extreme leaning forward to pick something up off the floor. She also has difficulty leaning over her handlebars to ride her bike or leaning forward to fold clothes or standing up for any period of time. So, we determined that leaning forward is her worst issue. We did previouisly do lmbb and rfa, which has helped with extension, but it would not help with leaning forward. On review of her MRI she has significant multilevel DDD with Modic changes which likely account for her pain.     4/18/22 Interval History: Patient presents for telehealth visit for follow up following her b/l RFA at L3-L5. She reports 80% relief and is very pleased with her pain relief. Unfortunately, she is not back to doing what she wants due to right knee pain. She is seeing Dr. Huber for knee pain and is s/p right medial compartment partial knee replacement. She is currently in therapy for this. We discussed that we may be able to assist her with her knee pain as well.     HPI:  Original HISTORY OF PRESENT ILLNESS: Tiarra Norton presents to the clinic for the evaluation of the above pain. The pain started five years ago.     Original Pain Description:  The pain is located in the lower back and does radiate up towards  her upper back.The pain is described as aching, dull and sharp. Initially starts as a dull, aching pain and it gets sharp once she bends down and moves around. Typically when she walks for more than five minutes, the sharp pain radiates up her back. Exacerbating factors: Standing, Bending, Touching, Walking, Night Time, Flexing and Lifting. Mitigating factors heat, ice, laying down and massage. Symptoms interfere with daily activity and sleeping. The patient feels like symptoms have been worsening. Patient denies night fever/night sweats, urinary incontinence, bowel incontinence, significant weight loss, significant motor weakness and loss of sensations.    Original PAIN SCORES:  Best: Pain is 1  Worst: Pain is 10  Usually: Pain is 4  Current: Pain is 4        7/10/2025    11:06 AM 6/12/2025    11:10 AM 3/11/2025     2:14 PM   Last 3 PDI Scores   Pain Disability Index (PDI) 36 [phone] 56     6 weeks of Conservative therapy (PT/Chiro/Home Exercises with Dates)  Healthy back program--> has four sessions left for a total of 20 sessions   Last session was on 1/31/22   Stretches that they taught at [employer] gives her relief       Medications:   Robaxin PRN    Ice and heat which has helped   Tried Gabapentin and Lyrica- made her loopy      Report: reviewed       Interventional Pain Procedures:   10/17/2023 TPI left side near IPG battery 0% relief  2/8/2022- Bilateral L3,4,5 MBB  2/15/2022- Bilateral L3,4,5 MBB  3/8/2022- Right L3,4,5 RFA- 80% relief for a few months  3/22/2022- Left L3,4,5 RFA- 80% relief for a few months  12/22/22 SCS trial- 90% relief  1/9/2023- [employer] SCS implant.   11/20/2023- IPG revision  2/16/2024- Right genicular nerve block  3/15/2024- Right genicular RFA    Imaging:    EXAMINATION:  XR KNEE 3 VIEW RIGHT     CLINICAL HISTORY:  Presence of right artificial knee joint     TECHNIQUE:  AP, lateral, and Merchant views of the right knee were performed.     COMPARISON:  Right knee radiograph  dated 06/12/2023     FINDINGS:  Prior right knee arthroplasty.  Hardware projects in similar alignment without evidence for interval loosening or failure.  No periprosthetic fracture.     Impression:     As above.        Electronically signed by: Azael Wilkins  Date:                                            11/03/2023  Time:                                           11:05    MRI Lumbar Spine Without Contrast  Order: [id number]   Status: Final result       Next appt: 07/15/2025 at 11:00 AM in Outpatient Rehab ([facility], [facility])       Dx: Lumbar radiculopathy, chronic    Test Result Released: Yes (seen)    0 Result Notes  Details    Reading Physician Reading Date Result Priority   Keon Madison MD  [phone]  6/29/2025 Routine     Narrative & Impression  EXAMINATION:  MRI LUMBAR SPINE WITHOUT CONTRAST     CLINICAL HISTORY:  Lumbar radiculopathy, symptoms persist with conservative treatment; Radiculopathy, lumbar region     TECHNIQUE:  Multiplanar, multisequence MR images were acquired from the thoracolumbar junction to the sacrum without the administration of contrast.     COMPARISON:  06/27/2025     FINDINGS:  Alignment: Scoliosis convex LEFT.  Minimal grade 1 anterolisthesis L4 on L5     Vertebrae: 5 lumbar-type vertebral bodies. No aggressive marrow replacement process or fracture.Modic Changes: Type 2 (fat endplate signal) level: L1-L2-L3     Discs: Disc space narrowing [id number]     Cord: Normal. Conus terminates at L1-L2.  Neurostimulator.     Degenerative findings:     T12-L1: Annular bulging of disc material.Facet arthropathy.  No significant central canal narrowing . No significant neural foraminal narrowing.     L1-L2: Broad-based bulging of disc material.     L2-L3: Broad-based disc bulge, facet degenerative change and ligamentum flavum thickening which contribute to   mild central canal narrowing . No significant neural foraminal narrowing.     L3-L4: Low volume facet effusions.  Broad-based  disc bulge, facet degenerative change and ligamentum flavum thickening which contribute to   moderate central canal narrowing . Mild bilateral neural foraminal narrowing.     L4-L5: Mild diffuse annular bulging/uncovering of disc material.Facet hypertrophy.  No significant central canal narrowing . No significant neural foraminal narrowing.     L5-S1: Facet arthropathy.  No significant central canal narrowing . No significant neural foraminal narrowing.     Paraspinal muscles & soft tissues: Unremarkable.     Impression:     Scoliosis with multilevel lumbar spondylosis most evident T12-L3 with up to moderate central canal narrowing L3-L4.  Level by level details as per above.        Electronically signed by:Keon Madison MD  Date:                                            06/29/2025  Time:                                           08:55        Exam Ended: 06/28/25 11:16 CDT Last Resulted: 06/29/25 08:55 CDT       Order Details        View Encounter        Lab and Collection Details        Routing        Result History     View All Conversations on this Encounter     Result Care Coordination      Patient Communication     Add Comments   Seen Back to Top          MRI Lumbar Spine Without Contrast: Patient Communication     Add Comments   Seen     External Result Report    External Result Report     Narrative & Impression    EXAMINATION:  MRI LUMBAR SPINE WITHOUT CONTRAST     CLINICAL HISTORY:  Lumbar radiculopathy, symptoms persist with conservative treatment; Radiculopathy, lumbar region     TECHNIQUE:  Multiplanar, multisequence MR images were acquired from the thoracolumbar junction to the sacrum without the administration of contrast.     COMPARISON:  06/27/2025     FINDINGS:  Alignment: Scoliosis convex LEFT.  Minimal grade 1 anterolisthesis L4 on L5     Vertebrae: 5 lumbar-type vertebral bodies. No aggressive marrow replacement process or fracture.Modic Changes: Type 2 (fat endplate signal) level: L1-L2-L3      Discs: Disc space narrowing [id number]     Cord: Normal. Conus terminates at L1-L2.  Neurostimulator.     Degenerative findings:     T12-L1: Annular bulging of disc material.Facet arthropathy.  No significant central canal narrowing . No significant neural foraminal narrowing.     L1-L2: Broad-based bulging of disc material.     L2-L3: Broad-based disc bulge, facet degenerative change and ligamentum flavum thickening which contribute to   mild central canal narrowing . No significant neural foraminal narrowing.     L3-L4: Low volume facet effusions.  Broad-based disc bulge, facet degenerative change and ligamentum flavum thickening which contribute to   moderate central canal narrowing . Mild bilateral neural foraminal narrowing.     L4-L5: Mild diffuse annular bulging/uncovering of disc material.Facet hypertrophy.  No significant central canal narrowing . No significant neural foraminal narrowing.     L5-S1: Facet arthropathy.  No significant central canal narrowing . No significant neural foraminal narrowing.     Paraspinal muscles & soft tissues: Unremarkable.     Impression:     Scoliosis with multilevel lumbar spondylosis most evident T12-L3 with up to moderate central canal narrowing L3-L4.  Level by level details as per above.             Past Medical History:   Diagnosis Date    Cataract     Depression     Gestational diabetes     Hyperlipidemia     Hypertension     IBS (irritable bowel syndrome)     HERMINIA (obstructive sleep apnea)     no cpap use    Thyroid disease      Past Surgical History:   Procedure Laterality Date    ADENOIDECTOMY      ARTHROPLASTY, KNEE, TOTAL, USING COMPUTER-ASSISTED NAVIGATION Right 5/18/2023    Procedure: CONVERSION ARTHROPLASTY, KNEE, TOTAL, USING COMPUTER-ASSISTED NAVIGATION;  Surgeon: Virgil Scott MD;  Location: [facility];  Service: Orthopedics;  Laterality: Right;  Same day discharge    ARTHROSCOPIC CHONDROPLASTY OF KNEE JOINT Right 10/19/2021    Procedure: ARTHROSCOPY,  KNEE, WITH CHONDROPLASTY;  Surgeon: Trevin Huber MD;  Location: [facility] OR;  Service: Orthopedics;  Laterality: Right;    ARTHROSCOPY OF KNEE Right 10/19/2021    Procedure: ARTHROSCOPY, KNEE-RIGHT;  Surgeon: Trevin Huber MD;  Location: [facility] OR;  Service: Orthopedics;  Laterality: Right;    BLOCK, NERVE, GENICULAR Right 2024    Procedure: BLOCK, NERVE RIGHT GENICULAR;  Surgeon: Shoaib Aguirre MD;  Location: [facility] PAIN MGT;  Service: Pain Management;  Laterality: Right;  [phone]     SECTION      CHOLECYSTECTOMY      COLONOSCOPY N/A 2016    Procedure: COLONOSCOPY;  Surgeon: Heri Segura MD;  Location: [facility] (4TH FLR);  Service: Endoscopy;  Laterality: N/A;    COLONOSCOPY N/A 2019    Procedure: COLONOSCOPY;  Surgeon: Joe Pitts MD;  Location: [facility] (4TH FLR);  Service: Endoscopy;  Laterality: N/A;    CYSTOSCOPY  2022    Procedure: CYSTOSCOPY;  Surgeon: Lenny Moore MD;  Location: [facility] 1ST FLR;  Service: Urology;;    ESOPHAGOGASTRODUODENOSCOPY N/A 2020    Procedure: EGD (ESOPHAGOGASTRODUODENOSCOPY);  Surgeon: Tolu Rivas MD;  Location: [facility] (4TH FLR);  Service: Endoscopy;  Laterality: N/A;  Pt. moved to 9:15am arrival time.. Instructed to not have anything after midnight.EC    EYE SURGERY      cataract resection right    INJECTION OF ANESTHETIC AGENT AROUND NERVE Bilateral 2022    Procedure: Block, Nerve MEDIAL BRANCH BLOCK BILATERAL L3,4,5;  Surgeon: Tara Gibbs MD;  Location: [facility] PAIN [facility]GT;  Service: Pain Management;  Laterality: Bilateral;    INJECTION OF ANESTHETIC AGENT AROUND NERVE Bilateral 2/15/2022    Procedure: BLOCK, NERVE, L3*L4-L5 MEDIAL BR ANCH 2 OF 2;  Surgeon: Tara Gibbs MD;  Location: [facility] PAIN [facility]GT;  Service: Pain Management;  Laterality: Bilateral;    INJECTION OF BOTULINUM TOXIN TYPE A  2022    Procedure: BOTULINUM TOXIN INJECTION 100 units;  Surgeon: Lenny Moore MD;  Location: [facility]  OR 1ST FLR;  Service: Urology;;    INSERTION, NEUROSTIMULATOR, SPINAL CORD N/A 1/9/2023    Procedure: SPINAL CORD STIMULATOR IMPLANT Immure Records;  Surgeon: Shoaib Aguirre MD;  Location: [facility] OR;  Service: Pain Management;  Laterality: N/A;    JOINT REPLACEMENT      partial right knee    KNEE ARTHROSCOPY W/ MENISCECTOMY Right 10/19/2021    Procedure: ARTHROSCOPY, KNEE, WITH MENISCECTOMY, PARTIAL LATERAL;  Surgeon: Trevin Huber MD;  Location: [facility] OR;  Service: Orthopedics;  Laterality: Right;    r hand surgery      RADIOFREQUENCY ABLATION Right 3/8/2022    Procedure: RADIOFREQUENCY ABLATION, L3-L5 1 OF 2;  Surgeon: Tara Gibbs MD;  Location: [facility] PAIN MGT;  Service: Pain Management;  Laterality: Right;    RADIOFREQUENCY ABLATION Left 3/22/2022    Procedure: RADIOFREQUENCY ABLATION, l3-+l5 2 of 2;  Surgeon: Tara Gibbs MD;  Location: [facility] PAIN MGT;  Service: Pain Management;  Laterality: Left;    RADIOFREQUENCY ABLATION Right 3/15/2024    Procedure: RADIOFREQUENCY ABLATION RIGHT GENICULAR;  Surgeon: Shoaib Aguirre MD;  Location: [facility] PAIN MGT;  Service: Pain Management;  Laterality: Right;  [phone]    REMOVAL OF NEUROSTIMULATOR N/A 11/20/2023    Procedure: SCS IPG BATTERY REVISION;  Surgeon: Shoaib Aguirre MD;  Location: [facility] OR;  Service: Pain Management;  Laterality: N/A;    REVISION PROCEDURE INVOLVING SPINAL CORD NEUROSTIMULATOR N/A 1/27/2025    Procedure: SPINAL CORD STIMULATOR BATTERY REVISION [facility];  Surgeon: Shoaib Aguirre MD;  Location: [facility] OR;  Service: Pain Management;  Laterality: N/A;    TONSILLECTOMY      TOTAL KNEE ARTHROPLASTY      partial knee replacement    TRIAL OF SPINAL CORD NERVE STIMULATOR N/A 12/22/2022    Procedure: SPINAL CORD STIMULATOR TRIAL [facility];  Surgeon: Shoaib Aguirre MD;  Location: [facility] PAIN MGT;  Service: Pain Management;  Laterality: N/A;    TUBAL LIGATION       Social History     Socioeconomic History    Marital  status:     Number of children: 1   Occupational History    Occupation:      Employer: [employer]     Comment: retired   Tobacco Use    Smoking status: Never    Smokeless tobacco: Never   Substance and Sexual Activity    Alcohol use: Yes     Alcohol/week: 3.0 standard drinks of alcohol     Types: 3 Glasses of wine per week     Comment: weekends    Drug use: Yes     Types: Marijuana     Comment: occasionally    Sexual activity: Yes     Partners: Male   Other Topics Concern    Are you pregnant or think you may be? No    Breast-feeding No   Social History Narrative    Retired        Swims.       Stationary bike.            Social Drivers of Health     Financial Resource Strain: Low Risk  (5/5/2025)    Overall Financial Resource Strain (CARDIA)     Difficulty of Paying Living Expenses: Not hard at all   Food Insecurity: No Food Insecurity (5/5/2025)    Hunger Vital Sign     Worried About Running Out of Food in the Last Year: Never true     Ran Out of Food in the Last Year: Never true   Transportation Needs: No Transportation Needs (5/5/2025)    PRAPARE - Transportation     Lack of Transportation (Medical): No     Lack of Transportation (Non-Medical): No   Physical Activity: Insufficiently Active (5/5/2025)    Exercise Vital Sign     Days of Exercise per Week: 3 days     Minutes of Exercise per Session: 30 min   Stress: Stress Concern Present (5/5/2025)    [place] [employer] - Occupational Stress Questionnaire     Feeling of Stress : Very much   Housing Stability: Low Risk  (5/5/2025)    Housing Stability Vital Sign     Unable to Pay for Housing in the Last Year: No     Number of Times Moved in the Last Year: 0     Homeless in the Last Year: No     Family History   Problem Relation Name Age of Onset    Hypertension Mother      Irritable bowel syndrome Mother      Hyperlipidemia Mother      Heart disease Father          mi    Hypertension Father      Cancer Father           prostate    Heart attack Father      Heart failure Father      Hyperlipidemia Father      Alcohol abuse Sister nathanael     Depression Sister nathanael     Epilepsy Son ari     Irritable bowel syndrome Sister martin     Alcohol abuse Sister martin     Ovarian cancer Maternal Aunt      Breast cancer Paternal Aunt      Breast cancer Maternal Aunt      Melanoma Neg Hx      Colon cancer Neg Hx      Stomach cancer Neg Hx      Esophageal cancer Neg Hx      Liver disease Neg Hx      Crohn's disease Neg Hx      Ulcerative colitis Neg Hx      Celiac disease Neg Hx      Stroke Neg Hx         Review of patient's allergies indicates:  No Known Allergies    Current Outpatient Medications   Medication Sig    alosetron (LOTRONEX) 0.5 MG tablet Take 1 tablet (0.5 mg total) by mouth in the morning and 1 tablet (0.5 mg total) in the evening.    ALPRAZolam (XANAX) 1 MG tablet Take 1 tablet by mouth three times daily.    ALPRAZolam (XANAX) 1 MG tablet Take 1 tablet by mouth three times daily.    amLODIPine (NORVASC) 5 MG tablet Take 1 tablet (5 mg total) by mouth once daily.    atorvastatin (LIPITOR) 10 MG tablet Take 1 tablet (10 mg total) by mouth once daily.    ciclopirox (PENLAC) 8 % Soln Apply topically nightly. Remove once a week with nail polish remover    erythromycin (ROMYCIN) ophthalmic ointment Place into the left eye every evening.    EScitalopram oxalate (LEXAPRO) 5 MG Tab Take one tablet by mouth once daily    estradioL (ESTRACE) 0.01 % (0.1 mg/gram) vaginal cream Insert 1 gram as directed vagianlly nightly for 2 to 4 weeks followed by 2-3x/week    HYDROcodone-acetaminophen (NORCO) 5-325 mg per tablet Take 1 tablet by mouth every 12 (twelve) hours as needed for Pain.    levothyroxine (SYNTHROID) 100 MCG tablet TAKE 1 TABLET BY MOUTH EVERY MORNING    LIDOcaine (XYLOCAINE) 5 % Oint ointment Apply topically 3 (three) times daily as needed (pain).    ondansetron (ZOFRAN-ODT) 4 MG TbDL Dissolve one tablet (4 mg total) by mouth  "every 12 (twelve) hours as needed (nausea).    promethazine (PHENERGAN) 25 MG tablet Take 1 tablet by mouth every 12 hours as needed for nausea    ramelteon (ROZEREM) 8 mg tablet Take 1 tablet (8 mg total) by mouth every evening.    spironolactone (ALDACTONE) 25 MG tablet Take 1 tablet (25 mg total) by mouth once daily. For blood pressure    sumatriptan (IMITREX) 50 MG tablet TAKE 1 TABLET BY MOUTH AT START OF HEADACHE.  REPEAT IN 2 HOURS IF NEEDED    suzetrigine 50 mg Tab Take 50 mg by mouth 2 (two) times a day.    tirzepatide, weight loss, (ZEPBOUND) 2.5 mg/0.5 mL PnIj Inject 2.5 mg into the skin every 7 days.    traZODone (DESYREL) 100 MG tablet take 1 to 2 tablets by mouth every evening for for sleep    tretinoin (RETIN-A) 0.025 % cream Compound tretinoin 0.025% / azelaic acid 8% / niacinamide 2% cream. Apply a pea-sized amount to entire face qhs.    triamcinolone acetonide 0.025% (KENALOG) 0.025 % cream Apply topically 2 (two) times daily.    valsartan (DIOVAN) 320 MG tablet Take 1 tablet (320 mg total) by mouth once daily.    QUEtiapine (SEROQUEL) 25 MG Tab Take one tablet by mouth nightly     No current facility-administered medications for this visit.       REVIEW OF SYSTEMS:    GENERAL: No fever. No chills. No fatigue. Denies weight loss. Denies weight gain.  HEENT: Denies headaches. Denies vision change. Denies eye pain. Denies double vision. Denies ear pain.   CV: Denies chest pain. HTN  PULM: Denies of shortness of breath.  GI: Denies constipation. No diarrhea. No abdominal pain. Denies nausea. Denies vomiting. No blood in stool.  HEME: Denies bleeding problems.  : Denies urgency. No painful urination. No blood in urine.  MS: See HPI  SKIN: Denies rash.   NEURO: Denies seizures. No weakness.  ENDO: Thyroid disorder.   PSYCH:  Depression    OBJECTIVE:       Vitals:    07/10/25 1106   BP: 135/70   Pulse: 61   SpO2: 97%   Weight: 71.1 kg (156 lb 12 oz)   Height: 5' 3" (1.6 m)   PainSc:   6   PainLoc: Back "         PHYSICAL EXAM:     GENERAL: Well appearing, in no acute distress, alert and oriented x3.  PSYCH:  Mood and affect appropriate.  SKIN: Skin color, texture, turgor normal, no rashes or lesions.   RESP: Symmetric chest rise, no respiratory distress noted.   BACK: Straight leg raise in the sitting position is negative for radicular pain. Full ROM with pain on extension. Positive facet loading bilaterally.  MSK: 5/5 strength in right ankle with plantar and dorsiflexion. 5/5 strength in left ankle with plantar and dorsiflexion. 5/5 strength with right knee flexion and extension. 5/5 strength with left knee flexion and extension.   GAIT: Normal.       ASSESSMENT: 70 y.o. year old female with low back and knee pain, consistent with the followin. Lumbar spondylosis  Procedure Order to Pain Management      2. Encounter for long-term opiate analgesic use  Pain Clinic Drug Screen      3. Myofascial pain        4. History of total knee arthroplasty, right              Plan:    - Previous imaging reviewed. Labs reviewed.     - She does have benefit with SCS for leg pain.    - Will schedule patient for bilateral RFA at L3 to L5. She previously underwent lumbar RFAs in 2022 with 80% relief for over 1 year. Pain is currently axial. She has completed over 6 weeks of PT in the past 12 weeks.    - Continue Flexeril 10 mg TID PRN, refill provided.     - Continue Lidocaine ointment over former battery site.     - Pain contract signed 2024.    - Continue Norco 5/325 mg TID PRN.    - The patient is here today for a refill of current pain medications and they believe these provide effective pain control and improvements in quality of life.  The patient notes no serious side effects, and feels the benefits outweigh the risks.  The patient was reminded of the pain contract that they signed previously as well as the risks and benefits of the medication including possible death.  The updated [place] Board   Pharmacy prescription monitoring program was reviewed, and the patient has been found to be compliant with current treatment plan.    - Continue home exercise routine.     - RTC after completion of procedures.    The above plan and management options were discussed at length with patient. Patient is in agreement with the above and verbalized understanding.     Jeannette Gomez, MIGUEL  07/10/2025

## 2025-07-11 ENCOUNTER — CLINICAL SUPPORT (OUTPATIENT)
Dept: REHABILITATION | Facility: HOSPITAL | Age: 70
End: 2025-07-11
Payer: MEDICARE

## 2025-07-11 ENCOUNTER — PATIENT MESSAGE (OUTPATIENT)
Dept: PAIN MEDICINE | Facility: CLINIC | Age: 70
End: 2025-07-11
Payer: MEDICARE

## 2025-07-11 DIAGNOSIS — M53.86 DECREASED RANGE OF MOTION OF LUMBAR SPINE: ICD-10-CM

## 2025-07-11 DIAGNOSIS — G89.29 CHRONIC PAIN OF RIGHT KNEE: Primary | ICD-10-CM

## 2025-07-11 DIAGNOSIS — R29.898 WEAKNESS OF BOTH LOWER EXTREMITIES: ICD-10-CM

## 2025-07-11 DIAGNOSIS — M25.561 CHRONIC PAIN OF RIGHT KNEE: Primary | ICD-10-CM

## 2025-07-11 PROCEDURE — 97113 AQUATIC THERAPY/EXERCISES: CPT | Mod: PO,CQ

## 2025-07-11 NOTE — PROGRESS NOTES
Outpatient Rehab    Physical Therapy Visit    Patient Name: Tiarra Norton  MRN: [id number]  YOB: 1955  Encounter Date: 7/11/2025    Therapy Diagnosis:   Encounter Diagnoses   Name Primary?    Chronic pain of right knee Yes    Decreased range of motion of lumbar spine     Weakness of both lower extremities      Physician: Roxanne Freeman NP    Physician Orders: Eval and Treat  Medical Diagnosis: Other idiopathic scoliosis, thoracolumbar region  Surgical Diagnosis: Not applicable for this Episode   Surgical Date: Not applicable for this Episode  Days Since Last Surgery: Not applicable for this Episode    Visit # / Visits Authorized:  2 / 20  Insurance Authorization Period: 1/1/2025 to 12/31/2025  Date of Evaluation: 7/2/2025  Plan of Care Certification: 7/2/2025 to 9/15/2025      PT/PTA:     Number of PTA visits since last PT visit:   Time In: 1300   Time Out: 1400  Total Time (in minutes): 60   Total Billable Time (in minutes): 60    FOTO:  Intake Score:  %  Survey Score 2:  %  Survey Score 3:  %    Precautions:       Subjective   Tiarra that her back is feeling better today 4/10 back pn upon arrival today. She states that she is performing HEP w/o difficulty..    Back 4/10 and R knee 5/10 pre-tx, 0/10 post    Objective            Treatment:  Therapeutic Exercise  TE 1: AMB x 3 min each FWD 1.2 mph, BWD 0.6 mph, Side 0.7 mph  TE 2: HSS 3 x 30 sec, Quad stretch 3 x 30 sec  TE 3: Mini Squat with QS 20x, Heel Raise with GS 20x  TE 4: Hip ext with HS Curl, 20x, Hip flex with LAQ 20x  TE 5: Lunges Side/FWD 20x each  TE 6: Shld flex/ext 20x, Shld Horiz ABD/ADD 20x  TE 7: Horiz Row orange t-band 20x  TE 8: Lat Pull orange t-band 20x  TE 9: Marching 3 min      Time Entry(in minutes):  Aquatic Therapy Time Entry: 60    Assessment & Plan   Assessment: Tiarra shalini today's tx with progression of aquatic ther ex well with benefit of relief from pn sx post tx as above. She was able to progress with reps with all  activities w/o difficulty or complaint. She required few VCs for proper form, posture and core stab tech post instruction.   Evaluation/Treatment Tolerance: Patient tolerated treatment well    The patient will continue to benefit from skilled outpatient physical therapy in order to address the deficits listed in the problem list on the initial evaluation, provide patient and family education, and maximize the patients level of independence in the home and community environments.     The patient's spiritual, cultural, and educational needs were considered, and the patient is agreeable to the plan of care and goals.           Plan: Continue treatment per established plan of care    Goals:   Active       Ambulation/movement       Patient will ambulate 500' distance using no device with no assistance with trunk rotation and hip extension bilaterally with increased step length.        Start:  07/02/25    Expected End:  09/15/25               Functional outcome       Patient will show a significant change in +10% patient-reported outcome tool to demonstrate subjective improvement       Start:  07/02/25    Expected End:  08/15/25            Patient stated goal: to return recrreational exercise for 30 min        Start:  07/02/25    Expected End:  09/15/25            Patient will demonstrate independence in home program for support of progression       Start:  07/02/25    Expected End:  08/15/25               Pain       Patient will report pain of 0/10 demonstrating a reduction of overall pain       Start:  07/02/25    Expected End:  08/15/25               Range of Motion       Patient will achieve bilateral spinal rotation ROM 60 degrees       Start:  07/02/25    Expected End:  08/15/25            Patient will achieve bilateral hip extension ROM 15 degrees pain free       Start:  07/02/25    Expected End:  09/15/25               Strength       Patient will achieve bilateral hip extension strength of 4+/5       Start:   07/02/25    Expected End:  09/15/25            Patient will achieve bilateral hip abduction strength of 4+/5       Start:  07/02/25    Expected End:  09/15/25                Caio Bush PTA

## 2025-07-13 ENCOUNTER — RESULTS FOLLOW-UP (OUTPATIENT)
Dept: INTERNAL MEDICINE | Facility: CLINIC | Age: 70
End: 2025-07-13
Payer: MEDICARE

## 2025-07-13 DIAGNOSIS — M51.369 DDD (DEGENERATIVE DISC DISEASE), LUMBAR: ICD-10-CM

## 2025-07-13 DIAGNOSIS — G89.4 CHRONIC PAIN SYNDROME: ICD-10-CM

## 2025-07-13 DIAGNOSIS — Z96.89 SPINAL CORD STIMULATOR STATUS: ICD-10-CM

## 2025-07-13 DIAGNOSIS — G89.29 CHRONIC PAIN OF RIGHT KNEE: ICD-10-CM

## 2025-07-13 DIAGNOSIS — M41.25 OTHER IDIOPATHIC SCOLIOSIS, THORACOLUMBAR REGION: ICD-10-CM

## 2025-07-13 DIAGNOSIS — M25.561 CHRONIC PAIN OF RIGHT KNEE: ICD-10-CM

## 2025-07-13 DIAGNOSIS — Z96.651 HISTORY OF TOTAL KNEE ARTHROPLASTY, RIGHT: ICD-10-CM

## 2025-07-15 ENCOUNTER — CLINICAL SUPPORT (OUTPATIENT)
Dept: REHABILITATION | Facility: HOSPITAL | Age: 70
End: 2025-07-15
Payer: MEDICARE

## 2025-07-15 DIAGNOSIS — R29.898 WEAKNESS OF BOTH LOWER EXTREMITIES: ICD-10-CM

## 2025-07-15 DIAGNOSIS — M53.86 DECREASED RANGE OF MOTION OF LUMBAR SPINE: ICD-10-CM

## 2025-07-15 DIAGNOSIS — G89.29 CHRONIC PAIN OF RIGHT KNEE: ICD-10-CM

## 2025-07-15 DIAGNOSIS — M25.561 CHRONIC PAIN OF RIGHT KNEE: ICD-10-CM

## 2025-07-15 DIAGNOSIS — M62.81 WEAKNESS OF TRUNK MUSCULATURE: Primary | ICD-10-CM

## 2025-07-15 LAB
1OH-MIDAZOLAM UR QL SCN: NOT DETECTED
6MAM UR QL: NOT DETECTED
7AMINOCLONAZEPAM UR QL: NOT DETECTED
A-OH ALPRAZ UR QL: PRESENT
ALPRAZ UR QL: PRESENT
AMPHET UR QL SCN: NOT DETECTED
ANNOTATION COMMENT IMP: ABNORMAL
AR NOROXYMORPHONE (CUTOFF 100 NG/ML): NOT DETECTED
AR OXYMORPHONE (CUTOFF 40 NG/ML): NOT DETECTED
BARBITURATES UR QL: NEGATIVE
BUPRENORPHINE UR QL: NOT DETECTED
BZE UR QL: NEGATIVE
CARBOXYTHC UR QL: ABNORMAL
CARISOPRODOL UR QL: NEGATIVE
CLONAZEPAM UR QL: NOT DETECTED
CODEINE UR QL: NOT DETECTED
CREAT UR-MCNC: <20 MG/DL
DIAZEPAM UR QL: NOT DETECTED
ETHYL GLUCURONIDE UR QL: NEGATIVE
FENTANYL UR QL: NOT DETECTED
GABAPENTIN UR QL CFM: NOT DETECTED
HYDROCODONE UR QL: NOT DETECTED
HYDROMORPHONE UR QL: NOT DETECTED
LORAZEPAM UR QL: NOT DETECTED
MDA UR QL: NOT DETECTED
MDEA UR QL: NOT DETECTED
MDMA UR QL: NOT DETECTED
ME-PHENIDATE UR QL: NOT DETECTED
METHADONE UR QL: NEGATIVE
METHAMPHET UR QL: NOT DETECTED
MIDAZOLAM UR QL SCN: NOT DETECTED
MORPHINE UR QL: NOT DETECTED
NALOXONE UR QL CFM: NOT DETECTED
NORBUPRENORPHINE UR QL CFM: NOT DETECTED
NORDIAZEPAM UR QL: NOT DETECTED
NORFENTANYL UR QL: NOT DETECTED
NORMEPERIDINE UR QL CFM: NOT DETECTED
NOROXYCODONE UR QL CFM: NOT DETECTED
NOROXYMORPHONE UR QL SCN: NOT DETECTED
OXAZEPAM UR QL: NOT DETECTED
OXYCODONE UR QL: NOT DETECTED
PATHOLOGY STUDY: ABNORMAL
PCP UR QL: NEGATIVE
PHENTERMINE UR QL: NOT DETECTED
PREGABALIN UR QL CFM: NOT DETECTED
SERVICE CMNT-IMP: ABNORMAL
TAPENTADOL UR QL SCN: NOT DETECTED
TAPENTADOL UR QL SCN: NOT DETECTED
TEMAZEPAM UR QL: NOT DETECTED
TRAMADOL UR QL: NEGATIVE
ZOLPIDEM PHENYL-4-CARB UR QL SCN: NOT DETECTED
ZOLPIDEM UR QL: NOT DETECTED

## 2025-07-15 PROCEDURE — 97140 MANUAL THERAPY 1/> REGIONS: CPT | Mod: PO

## 2025-07-15 PROCEDURE — 97112 NEUROMUSCULAR REEDUCATION: CPT | Mod: PO

## 2025-07-15 PROCEDURE — 97530 THERAPEUTIC ACTIVITIES: CPT | Mod: PO

## 2025-07-15 RX ORDER — HYDROCODONE BITARTRATE AND ACETAMINOPHEN 5; 325 MG/1; MG/1
1 TABLET ORAL EVERY 12 HOURS PRN
Qty: 60 TABLET | Refills: 0 | Status: SHIPPED | OUTPATIENT
Start: 2025-07-18 | End: 2025-08-17

## 2025-07-16 ENCOUNTER — TELEPHONE (OUTPATIENT)
Dept: PODIATRY | Facility: CLINIC | Age: 70
End: 2025-07-16
Payer: MEDICARE

## 2025-07-16 ENCOUNTER — OFFICE VISIT (OUTPATIENT)
Dept: PRIMARY CARE CLINIC | Facility: CLINIC | Age: 70
End: 2025-07-16
Payer: MEDICARE

## 2025-07-16 ENCOUNTER — PATIENT MESSAGE (OUTPATIENT)
Dept: PODIATRY | Facility: CLINIC | Age: 70
End: 2025-07-16
Payer: MEDICARE

## 2025-07-16 VITALS
HEART RATE: 70 BPM | HEIGHT: 63 IN | OXYGEN SATURATION: 96 % | SYSTOLIC BLOOD PRESSURE: 120 MMHG | TEMPERATURE: 99 F | DIASTOLIC BLOOD PRESSURE: 70 MMHG | WEIGHT: 154.31 LBS | BODY MASS INDEX: 27.34 KG/M2

## 2025-07-16 DIAGNOSIS — R21 RASH AND NONSPECIFIC SKIN ERUPTION: ICD-10-CM

## 2025-07-16 PROCEDURE — 99214 OFFICE O/P EST MOD 30 MIN: CPT | Mod: PBBFAC,PN | Performed by: STUDENT IN AN ORGANIZED HEALTH CARE EDUCATION/TRAINING PROGRAM

## 2025-07-16 PROCEDURE — 99214 OFFICE O/P EST MOD 30 MIN: CPT | Mod: S$PBB,,, | Performed by: STUDENT IN AN ORGANIZED HEALTH CARE EDUCATION/TRAINING PROGRAM

## 2025-07-16 PROCEDURE — 99999 PR PBB SHADOW E&M-EST. PATIENT-LVL IV: CPT | Mod: PBBFAC,,, | Performed by: STUDENT IN AN ORGANIZED HEALTH CARE EDUCATION/TRAINING PROGRAM

## 2025-07-16 RX ORDER — HYDROXYZINE HYDROCHLORIDE 25 MG/1
25 TABLET, FILM COATED ORAL 3 TIMES DAILY PRN
Qty: 30 TABLET | Refills: 0 | Status: SHIPPED | OUTPATIENT
Start: 2025-07-16

## 2025-07-16 RX ORDER — TRIAMCINOLONE ACETONIDE 1 MG/G
CREAM TOPICAL 2 TIMES DAILY
Qty: 80 G | Refills: 0 | Status: SHIPPED | OUTPATIENT
Start: 2025-07-16 | End: 2025-07-30

## 2025-07-16 NOTE — PROGRESS NOTES
Outpatient Rehab    Physical Therapy Visit    Patient Name: Tiarra Norton  MRN: [id number]  YOB: 1955  Encounter Date: 7/15/2025    Therapy Diagnosis:   Encounter Diagnoses   Name Primary?    Weakness of trunk musculature Yes    Chronic pain of right knee     Decreased range of motion of lumbar spine     Weakness of both lower extremities      Physician: Roxanne Freeman NP    Physician Orders: Eval and Treat  Medical Diagnosis: Other idiopathic scoliosis, thoracolumbar region  Surgical Diagnosis: Not applicable for this Episode   Surgical Date: Not applicable for this Episode  Days Since Last Surgery: Not applicable for this Episode    Visit # / Visits Authorized:  3 / 20  Insurance Authorization Period: 1/1/2025 to 12/31/2025  Date of Evaluation: 7/2/2025  Plan of Care Certification: 7/2/2025 to 9/15/2025      PT/PTA:     Number of PTA visits since last PT visit:   Time In: 1055   Time Out: 1150  Total Time (in minutes): 55   Total Billable Time (in minutes): 38    FOTO:  Intake Score:  %  Survey Score 2:  %  Survey Score 3:  %    Precautions:       Subjective   pool exercise feels good but pretty easy..  Pain reported as 4/10. Back 4/10 and R knee 4/10 pre-tx, 0/10 post    Objective            Treatment:  Therapeutic Exercise  TE 1: frog stretch 3 x 30 secs  TE 2: LTR stretch 3 x 30 secs  TE 3: hip flexor stretch 2 x 60 secs  Manual Therapy  MT 1: long axis traction  MT 2: short axis traction  MT 3: MFR STM to gluteus medius, iliacus, psoas and adductor  MT 10: Patient received Dry needling using a semi-standardized protocol targeting trigger points in the following structures:  Needles were R hip gluteus medius and TFL left in in situ for 8 min with electric stimulation set at 2 hz and 150 chu sec, with a continuous, biphasic waveform. After the session, the needles were removed with no adverse events. NP  Balance/Neuromuscular Re-Education  NMR 1: PPT x 20  NMR 2: LTR x 30 small  amplitude  NMR 3: bridges + hip adduction ball 2 x 10  NMR 4: supine clams 3 x 10 with Tband green  NMR 5: bridges with abduction 2 x 10 Tband green  NMR 6: BKFO 2 x 10 GTB  NMR 7: nu step NP  NMR 8: recumbent bike for reciprocal hip knee flexion and extension for control level. 1.5 x 8 min  Therapeutic Activity  TA 1: reciprocal marching, toe taps laterally and hip extension 2x 10  TA 2: sit to stand x 10  TA 3: pt education with HEP, POC and pain management  Other Activities  Activity 1: PT tech assist as needed for 1:1      Time Entry(in minutes):  Manual Therapy Time Entry: 5  Neuromuscular Re-Education Time Entry: 30  Therapeutic Activity Time Entry: 10  Therapeutic Exercise Time Entry: 10    Assessment & Plan   Assessment: PT and patient discussed POC and agreeable to split time equally between aquatic and land program to improve pain and increase tolerance and strength. Pt had good relief with HEP and with execution in session.        The patient will continue to benefit from skilled outpatient physical therapy in order to address the deficits listed in the problem list on the initial evaluation, provide patient and family education, and maximize the patients level of independence in the home and community environments.     The patient's spiritual, cultural, and educational needs were considered, and the patient is agreeable to the plan of care and goals.     Education  Education was done with Patient. The patient's learning style includes Demonstration and Listening. The patient Demonstrates understanding and Verbalizes understanding.         HEP, plan of care and pain management       Plan: Continue treatment per established plan of care    Goals:   Active       Ambulation/movement       Patient will ambulate 500' distance using no device with no assistance with trunk rotation and hip extension bilaterally with increased step length.  (Progressing)       Start:  07/02/25    Expected End:  09/15/25                Functional outcome       Patient will show a significant change in +10% patient-reported outcome tool to demonstrate subjective improvement (Progressing)       Start:  07/02/25    Expected End:  08/15/25            Patient stated goal: to return recrreational exercise for 30 min  (Progressing)       Start:  07/02/25    Expected End:  09/15/25            Patient will demonstrate independence in home program for support of progression (Progressing)       Start:  07/02/25    Expected End:  08/15/25               Pain       Patient will report pain of 0/10 demonstrating a reduction of overall pain (Progressing)       Start:  07/02/25    Expected End:  08/15/25               Range of Motion       Patient will achieve bilateral spinal rotation ROM 60 degrees (Progressing)       Start:  07/02/25    Expected End:  08/15/25            Patient will achieve bilateral hip extension ROM 15 degrees pain free (Progressing)       Start:  07/02/25    Expected End:  09/15/25               Strength       Patient will achieve bilateral hip extension strength of 4+/5 (Progressing)       Start:  07/02/25    Expected End:  09/15/25            Patient will achieve bilateral hip abduction strength of 4+/5 (Progressing)       Start:  07/02/25    Expected End:  09/15/25                Solange Luis PT

## 2025-07-16 NOTE — TELEPHONE ENCOUNTER
Reached out to patient per provider request to inform patient she is a PROC B patient and explaine the information that goes into PROC B. Patient voiced understanding to conversation. Patient states she does not mind paying the 80 dollars for the visit.

## 2025-07-16 NOTE — PROGRESS NOTES
Primary Care  Office Visit - In Person  7/16/2025      HPI    Patient is a 70 y.o.   Tiarra Norton  has a past medical history of Cataract, Depression, Gestational diabetes, Hyperlipidemia, Hypertension, IBS (irritable bowel syndrome), HERMINIA (obstructive sleep apnea), and Thyroid disease.    History of Present Illness    CHIEF COMPLAINT:  Patient presents today for a rash on both legs.    SKIN RASH:  She reports a rash on anterior aspects of lower extremities b/l that began Sunday. The rash is characterized by multiple small red bumps that are intensely itchy. She denies pain associated with the rash. She denies recent outdoor exposure, new medications, new household products, and similar symptoms among household contacts. She participated in water PT twice last week but does not believe this is related to the rash. She denies any systemic symptoms or widespread involvement beyond the legs.      Active Medications:  Current Outpatient Medications   Medication Instructions    alosetron (LOTRONEX) 0.5 MG tablet Take 1 tablet (0.5 mg total) by mouth in the morning and 1 tablet (0.5 mg total) in the evening.    ALPRAZolam (XANAX) 1 MG tablet Take 1 tablet by mouth three times daily.    ALPRAZolam (XANAX) 1 MG tablet Take 1 tablet by mouth three times daily.    amLODIPine (NORVASC) 5 mg, Oral, Daily    atorvastatin (LIPITOR) 10 mg, Oral, Daily    ciclopirox (PENLAC) 8 % Soln Topical (Top), Nightly, Remove once a week with nail polish remover    cycloSPORINE (RESTASIS) 0.05 % ophthalmic emulsion Instill 1 drop in each eye twice daily    erythromycin (ROMYCIN) ophthalmic ointment Left Eye, Nightly    EScitalopram oxalate (LEXAPRO) 5 MG Tab Take one tablet by mouth once daily    estradioL (ESTRACE) 0.01 % (0.1 mg/gram) vaginal cream Insert 1 gram as directed vagianlly nightly for 2 to 4 weeks followed by 2-3x/week    [START ON 7/18/2025] HYDROcodone-acetaminophen (NORCO) 5-325 mg per tablet 1 tablet, Oral, Every 12 hours  "PRN    hydrOXYzine HCL (ATARAX) 25 mg, Oral, 3 times daily PRN    JOURNAVX 50 mg, Oral, 2 times daily    levothyroxine (SYNTHROID) 100 MCG tablet TAKE 1 TABLET BY MOUTH EVERY MORNING    LIDOcaine (XYLOCAINE) 5 % Oint ointment Topical (Top), 3 times daily PRN    ondansetron (ZOFRAN-ODT) 4 MG TbDL Dissolve one tablet (4 mg total) by mouth every 12 (twelve) hours as needed (nausea).    promethazine (PHENERGAN) 25 MG tablet Take 1 tablet by mouth every 12 hours as needed for nausea    ramelteon (ROZEREM) 8 mg, Oral, Nightly    spironolactone (ALDACTONE) 25 mg, Oral, Daily, For blood pressure    sumatriptan (IMITREX) 50 MG tablet TAKE 1 TABLET BY MOUTH AT START OF HEADACHE.  REPEAT IN 2 HOURS IF NEEDED    traZODone (DESYREL) 100 MG tablet take 1 to 2 tablets by mouth every evening for for sleep    tretinoin (RETIN-A) 0.025 % cream Compound tretinoin 0.025% / azelaic acid 8% / niacinamide 2% cream. Apply a pea-sized amount to entire face qhs.    triamcinolone acetonide 0.1% (KENALOG) 0.1 % cream Topical (Top), 2 times daily    valsartan (DIOVAN) 320 mg, Oral, Daily    ZEPBOUND 2.5 mg, Subcutaneous, Every 7 days       Vitals:    07/16/25 1417   BP: 120/70   BP Location: Right arm   Patient Position: Sitting   Pulse: 70   Temp: 98.8 °F (37.1 °C)   SpO2: 96%   Weight: 70 kg (154 lb 5.2 oz)   Height: 5' 3" (1.6 m)       Physical Exam  Skin:     Comments: Red macular rash             Assessment & Plan     RASH AND NONSPECIFIC SKIN ERUPTION:  Ddx contact dermatitis based on the localized distribution.  Recommended topical corticosteroid cream for affected areas and oral antihistamine for pruritus relief. Cautioned against concurrent use with other sedating medications.   Advised patient to avoid scratching and trim nails to minimize trauma.  Dermatology referral if condition persists         Orders Placed This Encounter    triamcinolone acetonide 0.1% (KENALOG) 0.1 % cream    hydrOXYzine HCL (ATARAX) 25 MG tablet "         Previous labs, records, and notes reviewed and considered for their impact on clinical decision making today.                Upcoming Scheduled Appointments and Follow Up:    Future Appointments   Date Time Provider Department Center   7/17/2025  9:00 AM Alen Figueredo NP [facility] IM [address] PCW   7/18/2025 11:00 AM Virgil Escoto, PTA [facility] OP RH [place]   7/21/2025 12:30 PM Veronica Tucker, PAJEFFY [facility] ORTHO [address]   7/22/2025 10:00 AM Solange Luis, PT [facility] OP RH [place]   8/1/2025  9:00 AM Melissa Cabrera MD [facility] DERM [facility]   8/5/2025  1:00 PM Roslyn Davidson MD [facility] WOM WEL [facility]   9/8/2025  2:45 PM Shawn Osuna MD [facility] ENT [facility]   9/15/2025 10:00 AM Yadira Morfin DPM [facility] ALICIA [facility]    10/13/2025  3:00 PM Taras Hays MD [facility] OPHTHAL [place]       Follow Up [facility] (with who? when?): No follow-ups on file.      Extended Emergency Contact Information  Primary Emergency Contact: Uri Talbert  Address: [address] [place] [address]  Home Phone: [phone]  Mobile Phone: [phone]  Relation: Spouse   needed? No      Maris Mccarthy MD   Internal Medicine  7/16/2025 - 2:41 PM    I spent a total of 20 minutes on the day of the visit.This includes face to face time and non-face to face time preparing to see the patient (eg, review of tests), obtaining and/or reviewing separately obtained history, documenting clinical information in the electronic or other health record, independently interpreting results and communicating results to the patient/family/caregiver, or care coordinator.    This note was generated with the assistance of ambient listening technology. Verbal consent was obtained by the patient and accompanying visitor(s) for the recording of patient appointment to facilitate this note. I attest to having reviewed and edited the generated note for accuracy, though some syntax  or spelling errors may persist. Please contact the author of this note for any clarification.      While patients have the right to access their medical record, it is essential to recognize that progress notes primarily serve as a means of communication among healthcare professionals.

## 2025-07-18 ENCOUNTER — CLINICAL SUPPORT (OUTPATIENT)
Dept: REHABILITATION | Facility: HOSPITAL | Age: 70
End: 2025-07-18
Payer: MEDICARE

## 2025-07-18 DIAGNOSIS — G89.29 CHRONIC PAIN OF RIGHT KNEE: Primary | ICD-10-CM

## 2025-07-18 DIAGNOSIS — M53.86 DECREASED RANGE OF MOTION OF LUMBAR SPINE: ICD-10-CM

## 2025-07-18 DIAGNOSIS — M25.561 CHRONIC PAIN OF RIGHT KNEE: Primary | ICD-10-CM

## 2025-07-18 DIAGNOSIS — R29.898 WEAKNESS OF BOTH LOWER EXTREMITIES: ICD-10-CM

## 2025-07-18 PROCEDURE — 97110 THERAPEUTIC EXERCISES: CPT | Mod: PO,CQ

## 2025-07-18 PROCEDURE — 97140 MANUAL THERAPY 1/> REGIONS: CPT | Mod: PO,CQ

## 2025-07-18 PROCEDURE — 97112 NEUROMUSCULAR REEDUCATION: CPT | Mod: PO,CQ

## 2025-07-18 NOTE — PROGRESS NOTES
Outpatient Rehab    Physical Therapy Visit    Patient Name: Tiarra Norton  MRN: [id number]  YOB: 1955  Encounter Date: 7/18/2025    Therapy Diagnosis:   Encounter Diagnoses   Name Primary?    Chronic pain of right knee Yes    Decreased range of motion of lumbar spine     Weakness of both lower extremities      Physician: Roxanne Freeman NP    Physician Orders: Eval and Treat  Medical Diagnosis: Other idiopathic scoliosis, thoracolumbar region  Surgical Diagnosis: Not applicable for this Episode   Surgical Date: Not applicable for this Episode  Days Since Last Surgery: Not applicable for this Episode    Visit # / Visits Authorized:  4 / 20  Insurance Authorization Period: 1/1/2025 to 12/31/2025  Date of Evaluation: 7/2/2025  Plan of Care Certification: 7/2/2025 to 9/15/2025      PT/PTA: PTA   Number of PTA visits since last PT visit:1  Time In: 1050   Time Out: 1150  Total Time (in minutes): 60   Total Billable Time (in minutes): 40    FOTO:  Intake Score: 47%  Survey Score 2:  %  Survey Score 3:  %    Precautions:       Subjective   Pt reports continued moderate lower back pain but notes elevated L knee pain upon arrival for tolday's visit..  Pain reported as 5/10. Back 5/10, R knee 4/10, and L knee 8/10    Objective            Treatment:  Therapeutic Exercise  TE 1: frog stretch 3 x 30 secs  TE 2: LTR stretch 3 x 30 secs  TE 3: hip flexor stretch 2 x 60 secs  Manual Therapy  MT 1: long axis traction  MT 2: short axis traction  MT 3: MFR STM to gluteus medius, iliacus, psoas and adductor  MT 10: Patient received Dry needling using a semi-standardized protocol targeting trigger points in the following structures:  Needles were R hip gluteus medius and TFL left in in situ for 8 min with electric stimulation set at 2 hz and 150 chu sec, with a continuous, biphasic waveform. After the session, the needles were removed with no adverse events. NP  Balance/Neuromuscular Re-Education  NMR 1: PPT x  20  NMR 2: LTR x 30 small amplitude  NMR 3: bridges + hip adduction ball 2 x 10  NMR 4: supine clams 3 x 10 with Tband green  NMR 5: bridges with abduction 2 x 10 Tband green  NMR 6: BKFO 2 x 10 GTB  NMR 7: nu step NP  NMR 8: recumbent bike for reciprocal hip knee flexion and extension for control level. 1.5 x 8 min  Therapeutic Activity  TA 1: reciprocal marching, toe taps laterally and hip extension 2x 10-NP  TA 2: sit to stand x 10-NP  TA 3: pt education with HEP, POC and pain management  Other Activities  Activity 1: PT tech assist as needed for 1:1      Time Entry(in minutes):  Manual Therapy Time Entry: 15  Neuromuscular Re-Education Time Entry: 30  Therapeutic Exercise Time Entry: 15    Assessment & Plan   Assessment: Pt returned reporting continued moderate lower back pain levels but recently has been experiencing elevated L knee pain levels. Manual LE distraction and STM to distal quad, rectus fem, and IT band performed with pt noting good relief in L knee pain. Overall good tolerance with appropriate increase in muscular fatigue. Will continue to split time equally between land and aquatic PT.       The patient will continue to benefit from skilled outpatient physical therapy in order to address the deficits listed in the problem list on the initial evaluation, provide patient and family education, and maximize the patients level of independence in the home and community environments.     The patient's spiritual, cultural, and educational needs were considered, and the patient is agreeable to the plan of care and goals.             Plan: Continue treatment per established plan of care    Goals:   Active       Ambulation/movement       Patient will ambulate 500' distance using no device with no assistance with trunk rotation and hip extension bilaterally with increased step length.  (Progressing)       Start:  07/02/25    Expected End:  09/15/25               Functional outcome       Patient will show a  significant change in +10% patient-reported outcome tool to demonstrate subjective improvement (Progressing)       Start:  07/02/25    Expected End:  08/15/25            Patient stated goal: to return recrreational exercise for 30 min  (Progressing)       Start:  07/02/25    Expected End:  09/15/25            Patient will demonstrate independence in home program for support of progression (Progressing)       Start:  07/02/25    Expected End:  08/15/25               Pain       Patient will report pain of 0/10 demonstrating a reduction of overall pain (Progressing)       Start:  07/02/25    Expected End:  08/15/25               Range of Motion       Patient will achieve bilateral spinal rotation ROM 60 degrees (Progressing)       Start:  07/02/25    Expected End:  08/15/25            Patient will achieve bilateral hip extension ROM 15 degrees pain free (Progressing)       Start:  07/02/25    Expected End:  09/15/25               Strength       Patient will achieve bilateral hip extension strength of 4+/5 (Progressing)       Start:  07/02/25    Expected End:  09/15/25            Patient will achieve bilateral hip abduction strength of 4+/5 (Progressing)       Start:  07/02/25    Expected End:  09/15/25                Virgil Escoto, PTA

## 2025-07-21 ENCOUNTER — HOSPITAL ENCOUNTER (OUTPATIENT)
Dept: RADIOLOGY | Facility: HOSPITAL | Age: 70
Discharge: HOME | End: 2025-07-21
Attending: PHYSICIAN ASSISTANT
Payer: MEDICARE

## 2025-07-21 ENCOUNTER — OFFICE VISIT (OUTPATIENT)
Dept: ORTHOPEDICS | Facility: CLINIC | Age: 70
End: 2025-07-21
Payer: MEDICARE

## 2025-07-21 VITALS — WEIGHT: 154.31 LBS | BODY MASS INDEX: 27.34 KG/M2 | HEIGHT: 63 IN

## 2025-07-21 DIAGNOSIS — M25.562 ACUTE PAIN OF LEFT KNEE: Primary | ICD-10-CM

## 2025-07-21 DIAGNOSIS — M76.892 TENDONITIS OF KNEE, LEFT: ICD-10-CM

## 2025-07-21 DIAGNOSIS — M25.562 ACUTE PAIN OF LEFT KNEE: ICD-10-CM

## 2025-07-21 PROCEDURE — 73564 X-RAY EXAM KNEE 4 OR MORE: CPT | Mod: TC,LT

## 2025-07-21 PROCEDURE — 99214 OFFICE O/P EST MOD 30 MIN: CPT | Mod: PBBFAC,25 | Performed by: PHYSICIAN ASSISTANT

## 2025-07-21 PROCEDURE — 99999 PR PBB SHADOW E&M-EST. PATIENT-LVL IV: CPT | Mod: PBBFAC,,, | Performed by: PHYSICIAN ASSISTANT

## 2025-07-21 PROCEDURE — 99213 OFFICE O/P EST LOW 20 MIN: CPT | Mod: S$PBB,,, | Performed by: PHYSICIAN ASSISTANT

## 2025-07-21 PROCEDURE — 73564 X-RAY EXAM KNEE 4 OR MORE: CPT | Mod: 26,LT,, | Performed by: RADIOLOGY

## 2025-07-21 RX ORDER — MELOXICAM 15 MG/1
15 TABLET ORAL DAILY
Qty: 30 TABLET | Refills: 0 | Status: SHIPPED | OUTPATIENT
Start: 2025-07-21 | End: 2025-08-22

## 2025-07-22 ENCOUNTER — CLINICAL SUPPORT (OUTPATIENT)
Dept: REHABILITATION | Facility: HOSPITAL | Age: 70
End: 2025-07-22
Payer: MEDICARE

## 2025-07-22 DIAGNOSIS — G89.29 CHRONIC PAIN OF RIGHT KNEE: Primary | ICD-10-CM

## 2025-07-22 DIAGNOSIS — M53.86 DECREASED RANGE OF MOTION OF LUMBAR SPINE: ICD-10-CM

## 2025-07-22 DIAGNOSIS — M25.561 CHRONIC PAIN OF RIGHT KNEE: Primary | ICD-10-CM

## 2025-07-22 DIAGNOSIS — R29.898 WEAKNESS OF BOTH LOWER EXTREMITIES: ICD-10-CM

## 2025-07-22 PROCEDURE — 97112 NEUROMUSCULAR REEDUCATION: CPT | Mod: PO

## 2025-07-22 NOTE — PROGRESS NOTES
Subjective     Patient ID: Tiarra Norton is a 70 y.o. female.    Chief Complaint: Pain of the Left Knee      HPI    History of Present Illness    CHIEF COMPLAINT:  - Left knee pain    HPI:  Tiarra presents with a-traumatic left knee pain. She initially experienced sharp, stabbing pain over her kneecap, particularly when going downstairs. Pain across her kneecap has resolved. She is currently undergoing PT for her right knee. During a recent therapy session, she informed her therapist about the left knee pain. The therapist performed some exercises and recommended using a lacrosse ball to massage the outside of her leg, which was very sore. This intervention appears to have been effective in reducing her symptoms. She mentions taking medication for the pain but does not specify which one. She denies using ice or heat on the affected area.    She reports ongoing pain in her right knee and mentions having degenerative disc disease in her back. She expresses frustration with her multiple joint issues, stating that aging is challenging and difficult. She is currently driving to [facility] twice weekly for PT, including aquatic therapy, which is helping.    She denies any catching, locking, or getting stuck in the knee.    PREVIOUS TREATMENTS:  - PT for right knee: Ongoing  - Exercises prescribed by therapist for left knee pain  - Use of lacrosse ball to rub outside of left leg for soreness    MEDICATIONS:  - Norco  - Mobic (meloxicam): Found to be effective  - Z-bound: Discontinued due to intolerance    SURGICAL HISTORY:  - Right knee replacement: Approximately 3 years ago, She reports ongoing pain post-surgery      ROS:  Constitutional: -chills, -fever  Cardiovascular: -chest pain  Respiratory: -cough, -shortness of breath  Skin: -color change, -dry skin, -itching, -rash  Neurological: -dizziness  Psychiatric/Behavioral: -altered mental status, -nervousness, -anxiety  Musculoskeletal: +limb pain, +joint pain, +pain  with movement, +back pain    All other systems reviewed and are negative.         Review of Systems   Constitutional: Negative for chills and fever.   Cardiovascular:  Negative for chest pain.   Respiratory:  Negative for cough and shortness of breath.    Skin:  Negative for color change, dry skin, itching, nail changes, poor wound healing and rash.   Musculoskeletal:  Positive for joint pain.   Neurological:  Negative for dizziness.   Psychiatric/Behavioral:  Negative for altered mental status. The patient is not nervous/anxious.    All other systems reviewed and are negative.         Objective     General    Constitutional: She is oriented to person, place, and time. She appears well-developed and well-nourished. No distress.   HENT:   Head: Atraumatic.   Eyes: Conjunctivae are normal.   Cardiovascular:  Normal rate.            Pulmonary/Chest: Effort normal.   Neurological: She is alert and oriented to person, place, and time.   Psychiatric: She has a normal mood and affect. Her behavior is normal.             Left Knee Exam     Range of Motion   The patient has normal left knee ROM.    Tests   Stability   Lachman: normal (-1 to 2mm)   PCL-Posterior Drawer: normal (0 to 2mm)  MCL - Valgus: normal (0 to 2mm)  LCL - Varus: normal (0 to 2mm)    Comments:  TTP to distal IT band area.     Muscle Strength   Left Lower Extremity   Quadriceps:  4/5   Hamstrin/5 and 5/5       Physical Exam  Constitutional:       General: She is not in acute distress.     Appearance: She is well-developed and well-nourished. She is not diaphoretic.   HENT:      Head: Atraumatic.   Eyes:      Conjunctiva/sclera: Conjunctivae normal.   Cardiovascular:      Rate and Rhythm: Normal rate.   Pulmonary:      Effort: Pulmonary effort is normal.   Neurological:      Mental Status: She is alert and oriented to person, place, and time.   Psychiatric:         Mood and Affect: Mood and affect normal.         Behavior: Behavior normal.           Physical Exam    IMAGING:  - XR Left Knee: There is DJD and a varus deformity. No fracture, dislocation, bone destruction, or OCD seen.             Assessment and Plan     Encounter Diagnoses   Name Primary?    Acute pain of left knee Yes    Tendonitis of knee, left          Assessment & Plan     Apply ice to affected area.   Continue lacrosse ball for IT band massage.  - Quad stretgthening   Meloxicam for 10 days then discontinue.   - return to clinic as needed.              This note was generated with the assistance of ambient listening technology. Verbal consent was obtained by the patient and accompanying visitor(s) for the recording of patient appointment to facilitate this note. I attest to having reviewed and edited the generated note for accuracy, though some syntax or spelling errors may persist. Please contact the author of this note for any clarification.

## 2025-07-24 NOTE — PROGRESS NOTES
Outpatient Rehab    Physical Therapy Visit    Patient Name: Tiarra Norton  MRN: [id number]  YOB: 1955  Encounter Date: 7/22/2025    Therapy Diagnosis:   Encounter Diagnoses   Name Primary?    Chronic pain of right knee Yes    Decreased range of motion of lumbar spine     Weakness of both lower extremities      Physician: Roxanne Freeman NP    Physician Orders: Eval and Treat  Medical Diagnosis: Other idiopathic scoliosis, thoracolumbar region  Surgical Diagnosis: Not applicable for this Episode   Surgical Date: Not applicable for this Episode  Days Since Last Surgery: Not applicable for this Episode    Visit # / Visits Authorized:  5 / 20  Insurance Authorization Period: 1/1/2025 to 12/31/2025  Date of Evaluation: 7/2/2025  Plan of Care Certification: 7/2/2025 to 9/15/2025      PT/PTA:     Number of PTA visits since last PT visit:   Time In: 1000   Time Out: 1100  Total Time (in minutes): 60   Total Billable Time (in minutes): 35    FOTO:  Intake Score:  %  Survey Score 2:  %  Survey Score 3:  %    Precautions:       Subjective   good overall today just moderate back pain.  Pain reported as 5/10. Back 5/10, R knee 4/10, and L knee 5/10    Objective            Treatment:  Therapeutic Exercise  TE 1: frog stretch 3 x 30 secs  TE 2: LTR stretch 3 x 30 secs  TE 3: hip flexor stretch 2 x 60 secs  Manual Therapy  MT 1: long axis traction  MT 2: short axis traction  MT 3: MFR STM to gluteus medius, iliacus, psoas and adductor  MT 10: Patient received Dry needling using a semi-standardized protocol targeting trigger points in the following structures:  Needles were R hip gluteus medius and TFL left in in situ for 8 min with electric stimulation set at 2 hz and 150 chu sec, with a continuous, biphasic waveform. After the session, the needles were removed with no adverse events. NP  Balance/Neuromuscular Re-Education  NMR 1: PPT x 20  NMR 2: LTR x 30 small amplitude  NMR 3: bridges + hip adduction ball 2 x  10  NMR 4: supine clams 3 x 10 with Tband green  NMR 5: bridges with abduction 2 x 10 Tband green  NMR 6: BKFO 2 x 10 GTB  NMR 7: nu step NP  NMR 8: recumbent bike for reciprocal hip knee flexion and extension for control level. 1.5 x 8 min  Therapeutic Activity  TA 1: reciprocal marching, toe taps laterally and hip extension 2x 10 NP  TA 2: sit to stand x 10-NP  TA 3: pt education with HEP, POC and pain management  Other Activities  Activity 1: PT tech assist as needed for 1:1      Time Entry(in minutes):  Hot/Cold Pack Time Entry: 5  Manual Therapy Time Entry: 5  Neuromuscular Re-Education Time Entry: 35  Therapeutic Exercise Time Entry: 15    Assessment & Plan   Assessment: Pt had good tolerance with HEP and min cues needed to keep PPT on bridges with elevation to tolerance to work within relative range with minimal pain.        The patient will continue to benefit from skilled outpatient physical therapy in order to address the deficits listed in the problem list on the initial evaluation, provide patient and family education, and maximize the patients level of independence in the home and community environments.     The patient's spiritual, cultural, and educational needs were considered, and the patient is agreeable to the plan of care and goals.             Plan: Continue treatment per established plan of care    Goals:   Active       Ambulation/movement       Patient will ambulate 500' distance using no device with no assistance with trunk rotation and hip extension bilaterally with increased step length.  (Progressing)       Start:  07/02/25    Expected End:  09/15/25               Functional outcome       Patient will show a significant change in +10% patient-reported outcome tool to demonstrate subjective improvement (Progressing)       Start:  07/02/25    Expected End:  08/15/25            Patient stated goal: to return recrreational exercise for 30 min  (Progressing)       Start:  07/02/25    Expected  End:  09/15/25            Patient will demonstrate independence in home program for support of progression (Progressing)       Start:  07/02/25    Expected End:  08/15/25               Pain       Patient will report pain of 0/10 demonstrating a reduction of overall pain (Progressing)       Start:  07/02/25    Expected End:  08/15/25               Range of Motion       Patient will achieve bilateral spinal rotation ROM 60 degrees (Progressing)       Start:  07/02/25    Expected End:  08/15/25            Patient will achieve bilateral hip extension ROM 15 degrees pain free (Progressing)       Start:  07/02/25    Expected End:  09/15/25               Strength       Patient will achieve bilateral hip extension strength of 4+/5 (Progressing)       Start:  07/02/25    Expected End:  09/15/25            Patient will achieve bilateral hip abduction strength of 4+/5 (Progressing)       Start:  07/02/25    Expected End:  09/15/25                Solange Luis, PT

## 2025-07-25 ENCOUNTER — PATIENT MESSAGE (OUTPATIENT)
Dept: PAIN MEDICINE | Facility: OTHER | Age: 70
End: 2025-07-25
Payer: MEDICARE

## 2025-07-25 ENCOUNTER — PATIENT MESSAGE (OUTPATIENT)
Dept: INTERNAL MEDICINE | Facility: CLINIC | Age: 70
End: 2025-07-25
Payer: MEDICARE

## 2025-07-29 ENCOUNTER — CLINICAL SUPPORT (OUTPATIENT)
Dept: REHABILITATION | Facility: HOSPITAL | Age: 70
End: 2025-07-29
Payer: MEDICARE

## 2025-07-29 DIAGNOSIS — M53.86 DECREASED RANGE OF MOTION OF LUMBAR SPINE: ICD-10-CM

## 2025-07-29 DIAGNOSIS — R29.898 WEAKNESS OF BOTH LOWER EXTREMITIES: ICD-10-CM

## 2025-07-29 DIAGNOSIS — M25.561 CHRONIC PAIN OF RIGHT KNEE: Primary | ICD-10-CM

## 2025-07-29 DIAGNOSIS — G89.29 CHRONIC PAIN OF RIGHT KNEE: Primary | ICD-10-CM

## 2025-07-29 PROCEDURE — 97112 NEUROMUSCULAR REEDUCATION: CPT | Mod: PO

## 2025-07-29 PROCEDURE — 97140 MANUAL THERAPY 1/> REGIONS: CPT | Mod: PO

## 2025-07-29 NOTE — PROGRESS NOTES
[facility] Hormone Consult Preparation Sheet  Name,  and location ([place]) was documented at time of visit    Patient Name: Tiarra Norton 70 y.o.postmenopausal woman who presents for a hormone consult.  She has occ night sweats.  She has sleep disturbances with a chronic history of insomnia.  She was previously seen by sleep medicine physician but they told her there was nothing else they could do.  She has tried CBT-I for her insomnia.  She goes to bed at 10:30PM and wakes up at 2:30AM.  She has no trouble falling asleep but does have trouble styaing asleep.  She denies snoring.  She does not use electronic devices before or in bed.  She reports daytime fatigue.  She also reports vaginal dryness and pain with intercourse with entry.  She does use lubricants which provides some relief.  She also has decreased desire due to pain.  She has never tried vaginal estrogen in the past.      Provider: Roslyn Davidson  CC and Symptoms: No chief complaint on file.      Patient History:  Problem List[1]    Patient Medications:  Current Outpatient Medications on File Prior to Visit   Medication Sig    alosetron (LOTRONEX) 0.5 MG tablet Take 1 tablet (0.5 mg total) by mouth in the morning and 1 tablet (0.5 mg total) in the evening.    ALPRAZolam (XANAX) 1 MG tablet Take 1 tablet by mouth three times daily.    ALPRAZolam (XANAX) 1 MG tablet Take 1 tablet by mouth three times daily.    amLODIPine (NORVASC) 5 MG tablet Take 1 tablet (5 mg total) by mouth once daily.    atorvastatin (LIPITOR) 10 MG tablet Take 1 tablet (10 mg total) by mouth once daily.    ciclopirox (PENLAC) 8 % Soln Apply topically nightly. Remove once a week with nail polish remover    cycloSPORINE (RESTASIS) 0.05 % ophthalmic emulsion Instill 1 drop in each eye twice daily    erythromycin (ROMYCIN) ophthalmic ointment Place into the left eye every evening.    EScitalopram oxalate (LEXAPRO) 5 MG Tab Take one tablet by mouth once daily     estradioL (ESTRACE) 0.01 % (0.1 mg/gram) vaginal cream Insert 1 gram as directed vagianlly nightly for 2 to 4 weeks followed by 2-3x/week    HYDROcodone-acetaminophen (NORCO) 5-325 mg per tablet Take 1 tablet by mouth every 12 (twelve) hours as needed for Pain.    hydrOXYzine HCL (ATARAX) 25 MG tablet Take 1 tablet (25 mg total) by mouth 3 (three) times daily as needed for Itching.    levothyroxine (SYNTHROID) 100 MCG tablet TAKE 1 TABLET BY MOUTH EVERY MORNING    LIDOcaine (XYLOCAINE) 5 % Oint ointment Apply topically 3 (three) times daily as needed (pain).    meloxicam (MOBIC) 15 MG tablet Take 1 tablet (15 mg total) by mouth once daily.    ondansetron (ZOFRAN-ODT) 4 MG TbDL Dissolve one tablet (4 mg total) by mouth every 12 (twelve) hours as needed (nausea).    promethazine (PHENERGAN) 25 MG tablet Take 1 tablet by mouth every 12 hours as needed for nausea    ramelteon (ROZEREM) 8 mg tablet Take 1 tablet (8 mg total) by mouth every evening.    spironolactone (ALDACTONE) 25 MG tablet Take 1 tablet (25 mg total) by mouth once daily. For blood pressure    sumatriptan (IMITREX) 50 MG tablet TAKE 1 TABLET BY MOUTH AT START OF HEADACHE.  REPEAT IN 2 HOURS IF NEEDED    suzetrigine 50 mg Tab Take 50 mg by mouth 2 (two) times a day.    tirzepatide, weight loss, (ZEPBOUND) 2.5 mg/0.5 mL PnIj Inject 2.5 mg into the skin every 7 days.    traZODone (DESYREL) 100 MG tablet take 1 to 2 tablets by mouth every evening for for sleep    tretinoin (RETIN-A) 0.025 % cream Compound tretinoin 0.025% / azelaic acid 8% / niacinamide 2% cream. Apply a pea-sized amount to entire face qhs.    triamcinolone acetonide 0.1% (KENALOG) 0.1 % cream Apply topically 2 (two) times daily. for 14 days    valsartan (DIOVAN) 320 MG tablet Take 1 tablet (320 mg total) by mouth once daily.     No current facility-administered medications on file prior to visit.       Patient Imaging and Procedures  LMP: No LMP recorded. Patient is postmenopausal.  PAP:  "1/29/2018  HPV: No results found for: "HPV"  Mammo: 5/2025 WNL  Colonoscopy: Last Colonoscopy completed on 12/6/2019  BMI: There is no height or weight on file to calculate BMI.    Recent Labs:  Estradiol:   Estradiol   Date Value Ref Range Status   02/15/2016 13 See Text pg/mL Final     Comment:     Estradiol Reference Ranges (Female):  Follicular phase:  pg/mL  Midcycle:          pg/mL  Luteal phase:      pg/mL  Post-menopausal(Not on HRT): <10-28 pg/mL  Post-menopausal(On HRT): < pg/mL  Males: 11-44 pg/mL       Testosterone:   Lab Results   Component Value Date    TESTOSTERONE 0.5 10/08/2020     FSH: No results found for: "FSH"  CBC:   Lab Results   Component Value Date    WBC 5.30 05/12/2023    HGB 12.9 05/12/2023    HCT 40.7 05/12/2023    MCV 94 05/12/2023     05/12/2023       CMP:  Sodium   Date Value Ref Range Status   11/06/2024 139 136 - 145 mmol/L Final   11/06/2024 139 136 - 145 mmol/L Final     Potassium   Date Value Ref Range Status   11/06/2024 3.9 3.5 - 5.1 mmol/L Final   11/06/2024 3.9 3.5 - 5.1 mmol/L Final     Chloride   Date Value Ref Range Status   11/06/2024 103 95 - 110 mmol/L Final   11/06/2024 103 95 - 110 mmol/L Final     CO2   Date Value Ref Range Status   11/06/2024 25 23 - 29 mmol/L Final   11/06/2024 25 23 - 29 mmol/L Final     Glucose   Date Value Ref Range Status   11/06/2024 94 70 - 110 mg/dL Final   11/06/2024 94 70 - 110 mg/dL Final     BUN   Date Value Ref Range Status   11/06/2024 10 8 - 23 mg/dL Final   11/06/2024 10 8 - 23 mg/dL Final     Creatinine   Date Value Ref Range Status   11/06/2024 0.9 0.5 - 1.4 mg/dL Final   11/06/2024 0.9 0.5 - 1.4 mg/dL Final     Calcium   Date Value Ref Range Status   11/06/2024 9.7 8.7 - 10.5 mg/dL Final   11/06/2024 9.7 8.7 - 10.5 mg/dL Final     Total Protein   Date Value Ref Range Status   11/06/2024 6.8 6.0 - 8.4 g/dL Final     Albumin   Date Value Ref Range Status   11/06/2024 4.2 3.5 - 5.2 g/dL Final     Total " Bilirubin   Date Value Ref Range Status   11/06/2024 0.7 0.1 - 1.0 mg/dL Final     Comment:     For infants and newborns, interpretation of results should be based  on gestational age, weight and in agreement with clinical  observations.    Premature Infant recommended reference ranges:  Up to 24 hours.............<8.0 mg/dL  Up to 48 hours............<12.0 mg/dL  3-5 days..................<15.0 mg/dL  6-29 days.................<15.0 mg/dL       Alkaline Phosphatase   Date Value Ref Range Status   11/06/2024 95 40 - 150 U/L Final     AST   Date Value Ref Range Status   11/06/2024 25 10 - 40 U/L Final     ALT   Date Value Ref Range Status   11/06/2024 16 10 - 44 U/L Final     Anion Gap   Date Value Ref Range Status   11/06/2024 11 8 - 16 mmol/L Final   11/06/2024 11 8 - 16 mmol/L Final     eGFR if    Date Value Ref Range Status   02/02/2022 >60.0 >60 mL/min/1.73 m^2 Final     eGFR if non    Date Value Ref Range Status   02/02/2022 >60.0 >60 mL/min/1.73 m^2 Final     Comment:     Calculation used to obtain the estimated glomerular filtration  rate (eGFR) is the CKD-EPI equation.        Lipids:   Cholesterol   Date Value Ref Range Status   11/06/2024 195 120 - 199 mg/dL Final     Comment:     The National Cholesterol Education Program (NCEP) has set the  following guidelines (reference ranges) for Cholesterol:  Optimal.....................<200 mg/dL  Borderline High.............200-239 mg/dL  High........................> or = 240 mg/dL       Triglycerides   Date Value Ref Range Status   11/06/2024 50 30 - 150 mg/dL Final     Comment:     The National Cholesterol Education Program (NCEP) has set the  following guidelines (reference values) for triglycerides:  Normal......................<150 mg/dL  Borderline High.............150-199 mg/dL  High........................200-499 mg/dL       HDL   Date Value Ref Range Status   11/06/2024 99 (H) 40 - 75 mg/dL Final     Comment:     The  National Cholesterol Education Program (NCEP) has set the  following guidelines (reference values) for HDL Cholesterol:  Low...............<40 mg/dL  Optimal...........>60 mg/dL       LDL Cholesterol   Date Value Ref Range Status   11/06/2024 86.0 63.0 - 159.0 mg/dL Final     Comment:     The National Cholesterol Education Program (NCEP) has set the  following guidelines (reference values) for LDL Cholesterol:  Optimal.......................<130 mg/dL  Borderline High...............130-159 mg/dL  High..........................160-189 mg/dL  Very High.....................>190 mg/dL       HDL/Cholesterol Ratio   Date Value Ref Range Status   11/06/2024 50.8 (H) 20.0 - 50.0 % Final     Total Cholesterol/HDL Ratio   Date Value Ref Range Status   11/06/2024 2.0 2.0 - 5.0 Final     Non-HDL Cholesterol   Date Value Ref Range Status   11/06/2024 96 mg/dL Final     Comment:     Risk category and Non-HDL cholesterol goals:  Coronary heart disease (CHD)or equivalent (10-year risk of CHD >20%):  Non-HDL cholesterol goal     <130 mg/dL  Two or more CHD risk factors and 10-year risk of CHD <= 20%:  Non-HDL cholesterol goal     <160 mg/dL  0 to 1 CHD risk factor:  Non-HDL cholesterol goal     <190 mg/dL       A1C:   Lab Results   Component Value Date    HGBA1C 5.5 10/22/2018       Assessment & Plan   Vasomotor Symptoms   Patient has occ might sweats with sleep disturbances   ASCVD risk 9.7%  ? Rx: Prometrium 100mg 1 hour before bedtime tailored to patient preference and risk profile.   ? Reviewed hysterectomy status to determine need for progesterone.   ? Discussed progesterone's dual role in maintaining uterine lining (if uterus intact) and addressing night sweats/sleep issues. Stressed the importance of consistent use to avoid breakthrough symptoms or endometrial issues.       Vaginal Dryness   ? Emphasized benefits of local vaginal estrogen for genitourinary syndrome of menopause (GSM)   ? Provided instructions for application  (e.g., cream, ring, or tablet) and stressed consistent use for optimal results.   Rx Estrace cream 1g vaginally daily for 2 weeks, then twice a week    Libido   ? Reviewed testosterone therapy as an option for hypoactive sexual desire disorder (HSDD): ? Delivery methods (gel, injection, pellets) and appropriate dosing for women as well as risks and benefits of each method   ? Risks (e.g., acne, hair growth, hair loss, deepening of the voice) and off-label status (not FDA-approved for women).   ? Patient informed of [employer] cost estimation for gel   ? Ordered baseline labs (testosterone, SHBG, CBC, lipid panel) and advised follow-up labs in 8-12 weeks to monitor levels and efficacy.   Rx Testim 1% 1 pea size amount to inner thigh daily    Sleep   ? Provided education on sleep hygiene: consistent sleep/wake times, limiting caffeine/alcohol, and avoiding screens before bed.   ? Encouraged stimulus control (bed for sleep and sex only) and relaxation techniques   ? Highlighted the impact of poor sleep on physical and mental health, including mood, memory, and overall quality of life.   Recommend magnesium glycinate 500mg 1 hour before bedtime      Additional Recommendations   ? Discussed routine follow-up to assess symptom management and adjust hormone therapy or lifestyle interventions as needed.   Follow up in 2-3 months       [1]   Patient Active Problem List  Diagnosis    HTN (hypertension)    Hypothyroid    Unspecified vitamin D deficiency    Elevated liver enzymes    Primary osteoarthritis of right knee    Fatty liver    Hyperlipidemia    Migraine without status migrainosus, not intractable    Glucose intolerance (impaired glucose tolerance)    Hyponatremia    Anxiety    Fatigue    Intermittent diarrhea    Sleep apnea    Decreased libido    Sleep stage dysfunction    Colon adenomas    Abdominal pain    Exocrine pancreatic insufficiency    Irritable bowel syndrome with diarrhea    Overweight (BMI 25.0-29.9)    Poor  posture    Complex tear of lateral meniscus of right knee as current injury    Primary insomnia    Back pain    Posture imbalance    Weakness of trunk musculature    Chronic pain syndrome    History of partial knee replacement    Benzodiazepine dependence    Aortic atherosclerosis    Major depressive disorder, recurrent severe without psychotic features    Chronic pain of right knee    Decreased range of motion of lumbar spine    Weakness of both lower extremities

## 2025-07-30 ENCOUNTER — PATIENT MESSAGE (OUTPATIENT)
Dept: PAIN MEDICINE | Facility: OTHER | Age: 70
End: 2025-07-30

## 2025-07-30 ENCOUNTER — TELEPHONE (OUTPATIENT)
Dept: DERMATOLOGY | Facility: CLINIC | Age: 70
End: 2025-07-30
Payer: MEDICARE

## 2025-07-30 DIAGNOSIS — M47.816 LUMBAR SPONDYLOSIS: Primary | ICD-10-CM

## 2025-07-30 NOTE — PROGRESS NOTES
Outpatient Rehab    Physical Therapy Visit    Patient Name: Tiarra Norton  MRN: [id number]  YOB: 1955  Encounter Date: 7/29/2025    Therapy Diagnosis:   Encounter Diagnoses   Name Primary?    Chronic pain of right knee Yes    Decreased range of motion of lumbar spine     Weakness of both lower extremities      Physician: Roxanne Freeman NP    Physician Orders: Eval and Treat  Medical Diagnosis: Other idiopathic scoliosis, thoracolumbar region  Surgical Diagnosis: Not applicable for this Episode   Surgical Date: Not applicable for this Episode  Days Since Last Surgery: Not applicable for this Episode    Visit # / Visits Authorized:  6 / 20  Insurance Authorization Period: 1/1/2025 to 12/31/2025  Date of Evaluation: 7/2/2025  Plan of Care Certification: 7/2/2025 to 9/15/2025      PT/PTA:     Number of PTA visits since last PT visit:   Time In: 1000   Time Out: 1100  Total Time (in minutes): 60   Total Billable Time (in minutes): 38    FOTO:  Intake Score:  %  Survey Score 2:  %  Survey Score 3:  %    Precautions:       Subjective   really bad pain with travel and flight and sleeping arrangements..  Pain reported as 6/10. Back 6/10, R knee 4/10, and L knee 4/10    Objective            Treatment:  Therapeutic Exercise  TE 1: frog stretch 3 x 30 secs  TE 2: LTR stretch 3 x 30 secs  TE 3: hip flexor stretch 2 x 60 secs  Manual Therapy  MT 1: long axis traction  MT 2: short axis traction  MT 3: MFR STM to gluteus medius, iliacus, psoas and adductor  MT 10: Patient received Dry needling using a semi-standardized protocol targeting trigger points in the following structures:  Needles were R hip gluteus medius and TFL left in in situ for 8 min with electric stimulation set at 2 hz and 150 chu sec, with a continuous, biphasic waveform. After the session, the needles were removed with no adverse events. NP  Balance/Neuromuscular Re-Education  NMR 1: PPT x 20  NMR 2: LTR x 30 small amplitude  NMR 3: bridges +  hip adduction ball 2 x 10  NMR 4: supine clams 3 x 10 with Tband green  NMR 5: bridges with abduction 2 x 10 Tband green  NMR 6: BKFO 2 x 10 GTB  NMR 7: nu step NP  NMR 8: recumbent bike for reciprocal hip knee flexion and extension for control level. 1.5 x 8 min  Therapeutic Activity  TA 1: reciprocal marching, toe taps laterally and hip extension 2x 10 NP  TA 2: sit to stand x 10-NP  TA 3: pt education with HEP, POC and pain management  Other Activities  Activity 1: PT tech assist as needed for 1:1      Time Entry(in minutes):  Manual Therapy Time Entry: 10  Neuromuscular Re-Education Time Entry: 35  Therapeutic Exercise Time Entry: 15    Assessment & Plan   Assessment: Pt had good relief with manual and follow up supine exercise and stretch with no pain during gait during session and pain improved with session with improved gait skills.  Pt had pain due to travel and sleeping arrangement on trip with poor recovery with walking and standing.   Evaluation/Treatment Tolerance: Patient limited by pain    The patient will continue to benefit from skilled outpatient physical therapy in order to address the deficits listed in the problem list on the initial evaluation, provide patient and family education, and maximize the patients level of independence in the home and community environments.     The patient's spiritual, cultural, and educational needs were considered, and the patient is agreeable to the plan of care and goals.     Education  Education was done with Patient. The patient's learning style includes Demonstration and Listening. The patient Demonstrates understanding and Verbalizes understanding.         HEP, plan of care and pain management         Plan: Continue treatment per established plan of care    Goals:   Active       Ambulation/movement       Patient will ambulate 500' distance using no device with no assistance with trunk rotation and hip extension bilaterally with increased step length.   (Progressing)       Start:  07/02/25    Expected End:  09/15/25               Functional outcome       Patient will show a significant change in +10% patient-reported outcome tool to demonstrate subjective improvement (Progressing)       Start:  07/02/25    Expected End:  08/15/25            Patient stated goal: to return recrreational exercise for 30 min  (Progressing)       Start:  07/02/25    Expected End:  09/15/25            Patient will demonstrate independence in home program for support of progression (Progressing)       Start:  07/02/25    Expected End:  08/15/25               Pain       Patient will report pain of 0/10 demonstrating a reduction of overall pain (Progressing)       Start:  07/02/25    Expected End:  08/15/25               Range of Motion       Patient will achieve bilateral spinal rotation ROM 60 degrees (Progressing)       Start:  07/02/25    Expected End:  08/15/25            Patient will achieve bilateral hip extension ROM 15 degrees pain free (Progressing)       Start:  07/02/25    Expected End:  09/15/25               Strength       Patient will achieve bilateral hip extension strength of 4+/5 (Progressing)       Start:  07/02/25    Expected End:  09/15/25            Patient will achieve bilateral hip abduction strength of 4+/5 (Progressing)       Start:  07/02/25    Expected End:  09/15/25                Solange Luis PT

## 2025-07-30 NOTE — TELEPHONE ENCOUNTER
Copied from CRM #[id number]. Topic: Appointments - Appointment Scheduling  >> Jul 30, 2025  3:16 PM Renita wrote:  Pt is calling to speak to someone in the office to r/s her appt that she is currently scheduled for; no available appts in Epic. Please call to advise. Thanks.         Patient's DX: spot on scalp         [phone]    Appointment has been made per pt request      Have not received a response from the pt - sent letter to schedule

## 2025-07-31 ENCOUNTER — CLINICAL SUPPORT (OUTPATIENT)
Dept: REHABILITATION | Facility: HOSPITAL | Age: 70
End: 2025-07-31
Payer: MEDICARE

## 2025-07-31 ENCOUNTER — PATIENT MESSAGE (OUTPATIENT)
Dept: INTERNAL MEDICINE | Facility: CLINIC | Age: 70
End: 2025-07-31
Payer: MEDICARE

## 2025-07-31 DIAGNOSIS — G89.29 CHRONIC PAIN OF RIGHT KNEE: Primary | ICD-10-CM

## 2025-07-31 DIAGNOSIS — M62.81 WEAKNESS OF TRUNK MUSCULATURE: ICD-10-CM

## 2025-07-31 DIAGNOSIS — R29.898 WEAKNESS OF BOTH LOWER EXTREMITIES: ICD-10-CM

## 2025-07-31 DIAGNOSIS — M53.86 DECREASED RANGE OF MOTION OF LUMBAR SPINE: ICD-10-CM

## 2025-07-31 DIAGNOSIS — M25.561 CHRONIC PAIN OF RIGHT KNEE: Primary | ICD-10-CM

## 2025-07-31 PROCEDURE — 97113 AQUATIC THERAPY/EXERCISES: CPT | Mod: PO,CQ

## 2025-07-31 NOTE — PATIENT INSTRUCTIONS
Forward Walk    Walk forward with one leg. Strike pool bottom with heel. Rolling over foot, bring other leg forward. You can also try backwards.  Session: Walk  minutes. Do  sessions per week.      Walk Backward    Walking backward, maintain proper posture. Keep step length equal and opposite arm and leg motion.  Session: Walk  minutes. Do  sessions per week.      Side Step    Step to the side then bring other leg to it. Reverse directions.  Session: Walk  minutes to each side. Do  sessions per week.      Standing: Unilateral    Stand, one heel on lowest step, leg straight, standing leg slightly bent. Slowly lean forward, keeping back straight. Hold 30 seconds.  Repeat  times each leg per session. Do  sessions per week.        Quads / HF, Standing    Stand, holding on tot he pool wall. Flex the involved knee by placing on the second step. Lean back until you feel a stretch in the front of the thigh. Maintain hip extension. Hold 30 seconds.  Repeat  times each leg per session. Do  sessions per week.      Calf Stretch    Stand facing the pool wall, holding onto the edge of the pool with elbows straight and legs together. Take one step forward with the uninvolved leg, touching the toes against the pool wall, while bending the elbows as the trunk moves forward. Keep the heel of the back foot on the floor. Push the hips forward and the back heel down. Hold 30 seconds.  Repeat  times each leg per session. Do  sessions per week.      Hip Flexor Stretch    Place hand on wall for support. Place one leg in front of other, keeping the back knee straight and the front knee bent. Press the hip of the back leg forward while keeping the trunk upright, feeling the stretching in the front of the hip. Hold 30 seconds.   Repeat  times each leg per session. Do  sessions per week.      Calf Raise: Bilateral (Standing)    In the water, stand on  and rise on ball of foot. Do not let ankle roll out. Slowly return to start and  repeat.  Repeat  times each leg per session. Do  sessions per week.      Standing Glute Squeeze    Isometrically contract the buttocks, squeezing them together tightly. Hold for a count of 5, then relax.  Repeat  times each leg per session. Do  sessions per week.      Hip Extension, Knee Bent    Stand with the stomach against the pool wall. Holding onto the edge, slowly flex the knee and extend the leg backward, maintaining neutral hip alignment.  Repeat  times each leg per session. Do  sessions per week.      Hamstring Pull-Back    Stand with the back against the pool wall, hold onto the wall, and flex the hip to 90 degrees. Flex and extend the knee while maintaining hip flexion.  Repeat  times each leg per session. Do  sessions per week.      Hip Flexion, Knee Straight    Stand with feet together with one side toward the pool wall, holding on with the near arm. Gently raise the opposite leg straight out in front of the body, flexing only from the hip. Keep trunk upright and the head facing forward. Knee of the support leg should be slightly bent. Return to starting position.  Repeat  times each leg per session. Do  sessions per week.      Hip Extension, Knee Straight    Stand with feet together with one side toward the pool wall, holding on with the near arm. Gently raise the opposite leg straight backward, extending only from the hip, tightening the buttock. Keep trunk upright and the head facing forward. Knee of the support leg should be slightly bent. Return to starting position.  Repeat  times each LE per session. Do  sessions per week.      Hip Lateral Abduction / Adduction    Stand holding onto the pool wall, an arm's length away. Gently raise leg out to side. Keep knee straight. Return to start.  Repeat sequence  times each leg per session. Do  sessions per week.      Knee Flexion / Extension    Stand facing the pool wall with the fronts of the thighs and hips touching the wall. Keep the trunk upright with  the knees and hips in vertical alignment. Gently flex the knee as far as possible, pressing the heel toward the buttocks.  Repeat sequence  times each leg per session. Do  sessions per week.      Seated Leg Extension    Assume vertical position. Flex the hip to 90 degrees (seated position). Keeping the thighs together, alternately flex and extend the knees.  Repeat  times each leg per set. Do  sets per session. Do  sessions per week.      Lunge Forward    With front foot on low step, lunge forward, bending both knees. Return by straightening knee and pushing back with forward foot.  Repeat  times each side per session. Do  sessions per week.      Hip Circumduction     Stand holding onto the pool wall, an arm's length away. Gently raise one leg to the side. Move the leg in a clockwise circular pattern from the hip, keeping the leg straight. Keep the trunk upright and the head forward. Repeat in a counterclockwise direction.  Repeat  times each side per session. Do  sessions per week.      Front to Back Weight Shifting    Stand with one foot in front of the other. Shift body weight from the front leg to the back leg. Shift the body weight from the back leg to the front leg. Repeat with other foot forward. Gradually progress to a normal step length.  Repeat  times each leg per set. Do  sets per session. Do  sessions per week.      Lateral Lunge    Hands on hips or in front of you, back straight, step to side and bend your knee. Return to start and lunge to the other side.  Do  times alternating legs per set. Do  sets per session. Do  set per week.      Sit Cycle    Assume vertical position. Flex the hips to 90 degrees (seated position). Performa a cycling action with the legs, keeping the trunk vertical and the legs in front of the body. Use Noodle or sit on steps.  Repeat  times each leg per set. Do  sets per session. Do  sessions per week.      Marching    Lift right leg toward chest with knee bent. Return to start  and alternate legs.  Repeat  times each leg per set. Do  sets per session. Do  sessions per week.      Resistive Shoulder Flexion    Hold resistive devices in the hands at the sides, palms forward. Raise the involved arm in front of the body and lift it towards the surface of the water, keeping the elbow straight but not locked. Return to the original position.  Repeat  times each leg per set. Do  sets per session. Do  sessions per week.      Resistive Shoulder Extension    Hold resistive device in the hands at the sides, palms back. Lift the involved arm posteriorly toward the surface of the water, keeping the elbow straight but not locked. Return to original position.  Repeat  times each leg per set. Do  sets per session. Do  sessions per week.      Resistive  Horizontal Abduction and Adduction    Hold resistive paddles in front of the body at shoulder level, with the elbows slightly flexed. With palms facing posteriorly, move the hands away from the midline of the body (abduction). Turn the palms forward and adduct the arms to return to the original position.  Repeat  times each leg per set. Do  sets per session. Do  sessions per week.      Rowing    Secure stretch bands to the ladder or any other fixed object in the pool and face the ladder, holding the handles in both hands. Place one foot on the ladder for support. Gently pull the hands back at the chest level, leading with the elbows and adducting the scapulae. Return to original position.  Repeat  times each leg per set. Do  sets per session. Do  sessions per week.      Upright Rows    Stand on one end of the stretch band and hold the handle with both hands. Leading with the elbow, pull the hands toward the surface of the water, keeping the hands close to the body. Return to the original position.  Repeat  times each leg per set. Do  sets per session. Do  sessions per week.      Combined Elbow Bend    Stand with the hands at the side and the palms facing  forward. Flex the elbows until the hands touch the shoulder. Turn the hands until the palms are facing down. Extend the elbows, keeping them close to the body.  Repeat  times each leg per set. Do  sets per session. Do  sessions per week.      Resistive Shoulder Abduction and Adduction    Hold resistive devices out to the sides slightly below shoulder level. Pull the arms toward the hips (adduction) maintaining elbow extension throughout the exercise. Do not twist or rotate the trunk. Return to the original position (abduction).  Repeat  times each leg per set. Do  sets per session. Do  sessions per week.      Lateral Pull-Downs    Using flotation bells, stand with feet shoulder-width apart, the knees slightly bent, and the arms in front of the body. Maintain elbow flexion throughout the exercise, and do not twist or rotate the trunk. Slowly pull the arms down toward the sides. Return to the starting position.  Repeat  times each leg per set. Do  sets per session. Do  sessions per week.      Standing Crunches    Stand upright with the pelvis tilted back. Hold a ball or flotation device snugly against the chest. Gently contract the abdominal muscles to flex the spine. Hold the contraction for 4 counts, then relax for 4 counts. Maintain pelvic tilt throughout.  Repeat  times each leg per set. Do  sets per session. Do  sessions per week.      Triceps Extension    Stand upright with the elbow on the involved side flexed, holding a flotation device in the hand with the palm down. Hold onto the edge of the pool with the opposite hand. Keeping the elbow close to the side of the body, extend the forearm at the elbow. Return to the original position.  Repeat  times each leg per set. Do  sets per session. Do  sessions per week.      Resistive Band Crunches    Secure a stretch band to the pool ladder or any other stable attachment. Stand with the feet shoulder-width apart facing the ladder, and hold the stretch band with stable  (unmoving) flexed arms. Slowly contract the abdominals to flex the spine, pulling on the band. Hold  seconds, then relax.  Repeat  times each leg per set. Do  sets per session. Do  sessions per week.      Push Offs    Stand with the feet shoulder-width apart facing the pool wall. Hold onto the edge of the pool wall with both hands at arm's length. Keeping the body straight (no arch in the back), lean forward bending the arms until the chest touches or is close to the wall. Do not move the feet and keep heels on the can. Push off with both hands to the starting position. All movements should flow smoothly and continuously.  Repeat  times each leg per set. Do  sets per session. Do  sessions per week.      Cross Shoulder Stretch    Reach across the chest with the involved arm. Place other hand above the elbow and gently pull the arm across the chest. Hold 30 seconds, the relax. Repeat on the other side.  Repeat  times each leg per set. Do  sets per session. Do  sessions per week.      Thigh Side Bends     Stand with the feet shoulder-width apart and the hands at the sides. Slowly slide the palm of the hand on the uninvolved side down the side of the leg to the knee, laterally flexing the spine. Return the original position.  Repeat  times each leg per set. Do  sets per session. Do  sessions per week.      Shoulder Extension, Elbows Bent    Place the back against a parallel bar. Hold onto the bar with both hands, palms back. Keep the feet shoulder-width apart. Gently lower the body by bending the knees until a stretch is felt in the shoulders. Hold  seconds then relax.  Repeat  times each leg per set. Do  sets per session. Do  sessions per week.      Side Arm Pull    Secure a stretch band to the ladder or any other fixed object in the pool, and stand with the uninvolved side of the body towards the ladder. Place the uninvolved hand on the ladder for support. Reach across the body with the other hand, grasp the handles  of the stretch band, and gently abduct the arm, keeping the elbow straight. Return to the original start position.  Repeat  times each leg per set. Do  sets per session. Do  sessions per week.      Wand Shoulder Stretch    Hold the wand with both hands behind the back and shoulder-width apart. Lift the arms upward until a stretch is felt in the involved shoulder. Hold  seconds, then lower arms and relax.  Repeat  times each leg per set. Do  sets per session. Do sessions per week.    Copyright © [employer]. All rights reserved.

## 2025-07-31 NOTE — PROGRESS NOTES
"  Outpatient Rehab    Physical Therapy Visit    Patient Name: Tiarra Norton  MRN: [id number]  YOB: 1955  Encounter Date: 7/31/2025    Therapy Diagnosis:   Encounter Diagnoses   Name Primary?    Chronic pain of right knee Yes    Decreased range of motion of lumbar spine     Weakness of both lower extremities     Weakness of trunk musculature      Physician: Roxanne Freeman NP    Physician Orders: Eval and Treat  Medical Diagnosis: Other idiopathic scoliosis, thoracolumbar region  Surgical Diagnosis: Not applicable for this Episode   Surgical Date: Not applicable for this Episode  Days Since Last Surgery: Not applicable for this Episode    Visit # / Visits Authorized:  7 / 20  Insurance Authorization Period: 1/1/2025 to 12/31/2025  Date of Evaluation: 12/21/2023 7/2/2025  Plan of Care Certification: 6/19/2025 to 9/19/2025 7/2/2025 to 9/15/2025      PT/PTA:     Number of PTA visits since last PT visit:   Time In: 1100   Time Out: 1200  Total Time (in minutes): 60   Total Billable Time (in minutes): 60    FOTO:  Intake Score (%): 47  Survey Score 2 (%): Not applicable for this Episode  Survey Score 3 (%): Not applicable for this Episode    Precautions:         Subjective   Tiarra states, "My back is killing me" upon arrival today. Reports returning from trip with long flight. 8/10 back pn 6/10 R knee pn.    Back 8/10 pre-tx, 5/10 post R knee 6/10    Objective            Treatment:       Time Entry(in minutes):  Aquatic Therapy Time Entry: 60    Assessment & Plan   Assessment: Tiarra shalini today's tx with aquatic ther ex well with benefit of decreased LB pn sx post tx as above. She was able to perform all activities w/o difficulty or complaint. She was not progressed on this date due to level of pn reported upon arrival and length of time since last aquatic grant. She required few VCs for proper form, posture and core stab tech post instruction.   Evaluation/Treatment Tolerance: Patient tolerated treatment " well    The patient will continue to benefit from skilled outpatient physical therapy in order to address the deficits listed in the problem list on the initial evaluation, provide patient and family education, and maximize the patients level of independence in the home and community environments.     The patient's spiritual, cultural, and educational needs were considered, and the patient is agreeable to the plan of care and goals.           Plan: Continue treatment per established plan of care    Goals:   Active       Ambulation/movement       Patient will ambulate 500' distance using no device with no assistance with trunk rotation and hip extension bilaterally with increased step length.  (Progressing)       Start:  07/02/25    Expected End:  09/15/25               Functional outcome       Patient will show a significant change in +10% patient-reported outcome tool to demonstrate subjective improvement (Progressing)       Start:  07/02/25    Expected End:  08/15/25            Patient stated goal: to return recrreational exercise for 30 min  (Progressing)       Start:  07/02/25    Expected End:  09/15/25            Patient will demonstrate independence in home program for support of progression (Progressing)       Start:  07/02/25    Expected End:  08/15/25               Pain       Patient will report pain of 0/10 demonstrating a reduction of overall pain (Progressing)       Start:  07/02/25    Expected End:  08/15/25               Range of Motion       Patient will achieve bilateral spinal rotation ROM 60 degrees (Progressing)       Start:  07/02/25    Expected End:  08/15/25            Patient will achieve bilateral hip extension ROM 15 degrees pain free (Progressing)       Start:  07/02/25    Expected End:  09/15/25               Strength       Patient will achieve bilateral hip extension strength of 4+/5 (Progressing)       Start:  07/02/25    Expected End:  09/15/25            Patient will achieve bilateral  hip abduction strength of 4+/5 (Progressing)       Start:  07/02/25    Expected End:  09/15/25                Caio Bush PTA

## 2025-08-01 ENCOUNTER — HOSPITAL ENCOUNTER (OUTPATIENT)
Facility: OTHER | Age: 70
Discharge: HOME | End: 2025-08-01
Attending: ANESTHESIOLOGY | Admitting: ANESTHESIOLOGY
Payer: MEDICARE

## 2025-08-01 VITALS
BODY MASS INDEX: 26.58 KG/M2 | HEART RATE: 64 BPM | SYSTOLIC BLOOD PRESSURE: 130 MMHG | RESPIRATION RATE: 18 BRPM | TEMPERATURE: 98 F | WEIGHT: 150 LBS | DIASTOLIC BLOOD PRESSURE: 62 MMHG | HEIGHT: 63 IN | OXYGEN SATURATION: 98 %

## 2025-08-01 DIAGNOSIS — G89.29 CHRONIC PAIN: ICD-10-CM

## 2025-08-01 DIAGNOSIS — M47.816 LUMBAR SPONDYLOSIS: Primary | ICD-10-CM

## 2025-08-01 PROCEDURE — 64635 DESTROY LUMB/SAC FACET JNT: CPT | Mod: 50 | Performed by: ANESTHESIOLOGY

## 2025-08-01 PROCEDURE — 64636 DESTROY L/S FACET JNT ADDL: CPT | Mod: 50 | Performed by: ANESTHESIOLOGY

## 2025-08-01 PROCEDURE — 64635 DESTROY LUMB/SAC FACET JNT: CPT | Mod: 50,,, | Performed by: ANESTHESIOLOGY

## 2025-08-01 PROCEDURE — 64636 DESTROY L/S FACET JNT ADDL: CPT | Mod: 50,,, | Performed by: ANESTHESIOLOGY

## 2025-08-01 PROCEDURE — 99153 MOD SED SAME PHYS/QHP EA: CPT | Performed by: ANESTHESIOLOGY

## 2025-08-01 PROCEDURE — 63600175 PHARM REV CODE 636 W HCPCS: Performed by: ANESTHESIOLOGY

## 2025-08-01 PROCEDURE — 99152 MOD SED SAME PHYS/QHP 5/>YRS: CPT | Performed by: ANESTHESIOLOGY

## 2025-08-01 PROCEDURE — 99152 MOD SED SAME PHYS/QHP 5/>YRS: CPT | Mod: ,,, | Performed by: ANESTHESIOLOGY

## 2025-08-01 RX ORDER — SODIUM CHLORIDE 9 MG/ML
INJECTION, SOLUTION INTRAVENOUS CONTINUOUS
Status: DISCONTINUED | OUTPATIENT
Start: 2025-08-01 | End: 2025-08-01 | Stop reason: HOSPADM

## 2025-08-01 RX ORDER — BUPIVACAINE HYDROCHLORIDE 2.5 MG/ML
INJECTION, SOLUTION EPIDURAL; INFILTRATION; INTRACAUDAL; PERINEURAL
Status: DISCONTINUED | OUTPATIENT
Start: 2025-08-01 | End: 2025-08-01 | Stop reason: HOSPADM

## 2025-08-01 RX ORDER — MIDAZOLAM HYDROCHLORIDE 1 MG/ML
INJECTION INTRAMUSCULAR; INTRAVENOUS
Status: DISCONTINUED | OUTPATIENT
Start: 2025-08-01 | End: 2025-08-01 | Stop reason: HOSPADM

## 2025-08-01 RX ORDER — LIDOCAINE HYDROCHLORIDE 20 MG/ML
INJECTION, SOLUTION INFILTRATION; PERINEURAL
Status: DISCONTINUED | OUTPATIENT
Start: 2025-08-01 | End: 2025-08-01 | Stop reason: HOSPADM

## 2025-08-01 RX ORDER — FENTANYL CITRATE 50 UG/ML
INJECTION, SOLUTION INTRAMUSCULAR; INTRAVENOUS
Status: DISCONTINUED | OUTPATIENT
Start: 2025-08-01 | End: 2025-08-01 | Stop reason: HOSPADM

## 2025-08-01 RX ORDER — DEXAMETHASONE SODIUM PHOSPHATE 10 MG/ML
INJECTION, SOLUTION INTRA-ARTICULAR; INTRALESIONAL; INTRAMUSCULAR; INTRAVENOUS; SOFT TISSUE
Status: DISCONTINUED | OUTPATIENT
Start: 2025-08-01 | End: 2025-08-01 | Stop reason: HOSPADM

## 2025-08-01 NOTE — OP NOTE
Therapeutic Lumbar Medial Branch Radiofrequency Ablation under Fluoroscopy     The procedure, risks, benefits, and options were discussed with the patient. There are no contraindications to the procedure. The patent expressed understanding and agreed to the procedure. Informed written consent was obtained prior to the start of the procedure and can be found in the patient's chart.        PATIENT NAME: Tiarra Norton   MRN: [id number]     DATE OF PROCEDURE: 08/01/2025     PROCEDURE:  Bilateral L3, L4, and L5 Lumbar Radiofrequency Ablation under Fluoroscopy    PRE-OP DIAGNOSIS: Lumbar spondylosis [M47.816] Lumbar spondylosis [M47.816]    POST-OP DIAGNOSIS: Same    PHYSICIAN: Shoaib Aguirre MD    ASSISTANTS:   Oneil Juarez MD, Kevin DO Klopfenstein, Jennifer MD    MEDICATIONS INJECTED:  Preservative-free Decadron 10mg with 9cc of Bupivicaine 0.25%    LOCAL ANESTHETIC INJECTED:   Xylocaine 2%    SEDATION: Versed 2mg and Fentanyl 150 mcg                                                                                                                                                                                     Conscious sedation ordered by M.D. Patient re-evaluation prior to administration of conscious sedation. No changes noted in patient's status from initial evaluation. The patient's vital signs were monitored by RN and patient remained hemodynamically stable throughout the procedure.    Event Time In   Sedation Start 1138   Sedation End 1202       ESTIMATED BLOOD LOSS:  None    COMPLICATIONS:  None     INTERVAL HISTORY: Patient has clinical and imaging findings suggestive of facet mediated pain. Patients has completed 2 previous diagnostic medial branch blocks at specified levels with at least 80% relief for the expected duration of the local anesthetic utilized.    TECHNIQUE: Time-out was performed to identify the patient and procedure to be performed. With the patient laying in a prone position, the  surgical area was prepped and draped in the usual sterile fashion using ChloraPrep and fenestrated drape. The levels were determined under fluoroscopic guidance. Skin anesthesia was achieved by injecting Lidocaine 2% over the injection sites. A 18 gauge 10mm curved active tip needle was introduced to the anatomic local of the medial branch at each of the above levels using AP, lateral and/or contralateral oblique fluoroscopic imaging. Then sensory and motor testing was performed to confirm that the needle tips were in the correct location. After negative aspiration for blood or CSF was confirmed, 1 mL of the lidocaine 2% listed above was injected slowly at each site. This was followed by thermal lesioning at 80 degrees celsius for 90 seconds. That was followed by slowly injecting 1 mL of the medication mixture listed above at each site. The needles were removed and bleeding was nil. A sterile dressing was applied. No specimens collected. The patient tolerated the procedure well and did not have any procedure related motor deficit at the conclusion of the procedure.    The patient was monitored after the procedure in the recovery area. They were given post-procedure and discharge instructions to follow at home. The patient was discharged in a stable condition.    Rahel Lin MD     I reviewed and edited the fellow's note. I conducted my own interview and physical examination. I agree with the findings. I was present and supervising all critical portions of the procedure.     Shoaib Aguirre MD

## 2025-08-01 NOTE — DISCHARGE INSTRUCTIONS

## 2025-08-01 NOTE — DISCHARGE SUMMARY
Discharge Note  Short Stay      SUMMARY     Admit Date: 8/1/2025    Attending Physician:Shoaib Aguirre MD       Discharge Physician: Rahel Lin      Discharge Date: 8/1/2025 11:39 AM    Procedure(s) (LRB):  RADIOFREQUENCY ABLATION BILATERAL L3, 4, 5 (Bilateral)    Final Diagnosis: Lumbar spondylosis [M47.816]    Disposition: Home or self care    Patient Instructions:   Current Discharge Medication List        CONTINUE these medications which have NOT CHANGED    Details   alosetron (LOTRONEX) 0.5 MG tablet Take 1 tablet (0.5 mg total) by mouth in the morning and 1 tablet (0.5 mg total) in the evening.  Qty: 60 tablet, Refills: 0      !! ALPRAZolam (XANAX) 1 MG tablet Take 1 tablet by mouth three times daily.  Qty: 90 tablet, Refills: 2      !! ALPRAZolam (XANAX) 1 MG tablet Take 1 tablet by mouth three times daily.  Qty: 90 tablet, Refills: 2      amLODIPine (NORVASC) 5 MG tablet Take 1 tablet (5 mg total) by mouth once daily.  Qty: 90 tablet, Refills: 3    Comments: .  Associated Diagnoses: Hypertension, unspecified type      atorvastatin (LIPITOR) 10 MG tablet Take 1 tablet (10 mg total) by mouth once daily.  Qty: 90 tablet, Refills: 2      ciclopirox (PENLAC) 8 % Soln Apply topically nightly. Remove once a week with nail polish remover  Qty: 6.6 mL, Refills: 1    Associated Diagnoses: Onychomycosis due to dermatophyte      cycloSPORINE (RESTASIS) 0.05 % ophthalmic emulsion Instill 1 drop in each eye twice daily  Qty: 60 each, Refills: 11    Comments: generic OK if covered 1 drop in each eye twice daily Pat.Sammy.Prog.BIN:[id number],GRP:[email or web address],MBRID:[id number]      erythromycin (ROMYCIN) ophthalmic ointment Place into the left eye every evening.  Qty: 3.5 g, Refills: 0    Associated Diagnoses: Hordeolum externum of left upper eyelid      EScitalopram oxalate (LEXAPRO) 5 MG Tab Take one tablet by mouth once daily  Qty: 90 tablet, Refills: 1      estradioL (ESTRACE) 0.01 % (0.1 mg/gram) vaginal cream Insert 1  gram as directed vagianlly nightly for 2 to 4 weeks followed by 2-3x/week  Qty: 42.5 g, Refills: 6    Associated Diagnoses: Vaginal dryness, menopausal      HYDROcodone-acetaminophen (NORCO) 5-325 mg per tablet Take 1 tablet by mouth every 12 (twelve) hours as needed for Pain.  Qty: 60 tablet, Refills: 0    Comments: Quantity prescribed more than 7 day supply? Yes, quantity medically necessary  Associated Diagnoses: Chronic pain syndrome; Chronic pain of right knee; History of total knee arthroplasty, right; Other idiopathic scoliosis, thoracolumbar region; DDD (degenerative disc disease), lumbar; Spinal cord stimulator status      hydrOXYzine HCL (ATARAX) 25 MG tablet Take 1 tablet (25 mg total) by mouth 3 (three) times daily as needed for Itching.  Qty: 30 tablet, Refills: 0    Associated Diagnoses: Rash and nonspecific skin eruption      levothyroxine (SYNTHROID) 100 MCG tablet TAKE 1 TABLET BY MOUTH EVERY MORNING  Qty: 90 tablet, Refills: 3      LIDOcaine (XYLOCAINE) 5 % Oint ointment Apply topically 3 (three) times daily as needed (pain).  Qty: 30 g, Refills: 2    Comments: Patient cannot tolerate patches due to adhesive. md ok ointment per secure chat  Associated Diagnoses: Other idiopathic scoliosis, thoracolumbar region; Spinal cord stimulator status      meloxicam (MOBIC) 15 MG tablet Take 1 tablet (15 mg total) by mouth once daily.  Qty: 30 tablet, Refills: 0      ondansetron (ZOFRAN-ODT) 4 MG TbDL Dissolve one tablet (4 mg total) by mouth every 12 (twelve) hours as needed (nausea).  Qty: 20 tablet, Refills: 0    Associated Diagnoses: Nausea      promethazine (PHENERGAN) 25 MG tablet Take 1 tablet by mouth every 12 hours as needed for nausea  Qty: 30 tablet, Refills: 2    Associated Diagnoses: Nausea      ramelteon (ROZEREM) 8 mg tablet Take 1 tablet (8 mg total) by mouth every evening.  Qty: 30 tablet, Refills: 5    Associated Diagnoses: Insomnia, unspecified type      spironolactone (ALDACTONE) 25 MG  tablet Take 1 tablet (25 mg total) by mouth once daily. For blood pressure  Qty: 90 tablet, Refills: 2    Comments: .  Associated Diagnoses: Hypertension, unspecified type      sumatriptan (IMITREX) 50 MG tablet TAKE 1 TABLET BY MOUTH AT START OF HEADACHE.  REPEAT IN 2 HOURS IF NEEDED  Qty: 36 tablet, Refills: 3    Associated Diagnoses: Migraine without status migrainosus, not intractable, unspecified migraine type      suzetrigine 50 mg Tab Take 50 mg by mouth 2 (two) times a day.  Qty: 60 tablet, Refills: 1      tirzepatide, weight loss, (ZEPBOUND) 2.5 mg/0.5 mL PnIj Inject 2.5 mg into the skin every 7 days.  Qty: 2 mL, Refills: 5    Associated Diagnoses: HERMINIA (obstructive sleep apnea); BMI 27.0-27.9,adult      traZODone (DESYREL) 100 MG tablet take 1 to 2 tablets by mouth every evening for for sleep  Qty: 180 tablet, Refills: 3    Associated Diagnoses: Insomnia, unspecified type      tretinoin (RETIN-A) 0.025 % cream Compound tretinoin 0.025% / azelaic acid 8% / niacinamide 2% cream. Apply a pea-sized amount to entire face qhs.  Qty: 30 g, Refills: 5    Associated Diagnoses: Actinic elastosis      triamcinolone acetonide 0.1% (KENALOG) 0.1 % cream Apply topically 2 (two) times daily. for 14 days  Qty: 80 g, Refills: 0      valsartan (DIOVAN) 320 MG tablet Take 1 tablet (320 mg total) by mouth once daily.  Qty: 90 tablet, Refills: 3    Comments: .  Associated Diagnoses: Hypertension, unspecified type       !! - Potential duplicate medications found. Please discuss with provider.              Discharge Diagnosis: Lumbar spondylosis [M47.816]  Condition on Discharge: Stable with no complications to procedure   Diet on Discharge: Same as before.  Activity: as per instruction sheet.  Discharge to: Home with a responsible adult.  Follow up: 2-4 weeks       Please call my office or pager at [phone] if experienced any weakness or loss of sensation, fever > 101.5, pain uncontrolled with oral medications, persistent  nausea/vomiting/or diarrhea, redness or drainage from the incisions, or any other worrisome concerns. If physician on call was not reached or could not communicate with our office for any reason please go to the nearest emergency department

## 2025-08-05 ENCOUNTER — PATIENT MESSAGE (OUTPATIENT)
Dept: PRIMARY CARE CLINIC | Facility: CLINIC | Age: 70
End: 2025-08-05
Payer: MEDICARE

## 2025-08-05 ENCOUNTER — PATIENT MESSAGE (OUTPATIENT)
Dept: NEUROSURGERY | Facility: CLINIC | Age: 70
End: 2025-08-05
Payer: MEDICARE

## 2025-08-05 ENCOUNTER — CLINICAL SUPPORT (OUTPATIENT)
Dept: REHABILITATION | Facility: HOSPITAL | Age: 70
End: 2025-08-05
Payer: MEDICARE

## 2025-08-05 DIAGNOSIS — M53.86 DECREASED RANGE OF MOTION OF LUMBAR SPINE: ICD-10-CM

## 2025-08-05 DIAGNOSIS — R21 RASH AND NONSPECIFIC SKIN ERUPTION: ICD-10-CM

## 2025-08-05 DIAGNOSIS — M25.561 CHRONIC PAIN OF RIGHT KNEE: Primary | ICD-10-CM

## 2025-08-05 DIAGNOSIS — R29.898 WEAKNESS OF BOTH LOWER EXTREMITIES: ICD-10-CM

## 2025-08-05 DIAGNOSIS — G89.29 CHRONIC PAIN OF RIGHT KNEE: Primary | ICD-10-CM

## 2025-08-05 PROCEDURE — 97112 NEUROMUSCULAR REEDUCATION: CPT | Mod: PO

## 2025-08-05 PROCEDURE — 97110 THERAPEUTIC EXERCISES: CPT | Mod: PO

## 2025-08-05 PROCEDURE — 97140 MANUAL THERAPY 1/> REGIONS: CPT | Mod: PO

## 2025-08-05 RX ORDER — HYDROXYZINE HYDROCHLORIDE 25 MG/1
25 TABLET, FILM COATED ORAL 3 TIMES DAILY PRN
Qty: 30 TABLET | Refills: 0 | Status: SHIPPED | OUTPATIENT
Start: 2025-08-05

## 2025-08-05 NOTE — PROGRESS NOTES
Outpatient Rehab    Physical Therapy Visit    Patient Name: Tiarra Norton  MRN: [id number]  YOB: 1955  Encounter Date: 8/5/2025    Therapy Diagnosis:   Encounter Diagnoses   Name Primary?    Chronic pain of right knee Yes    Decreased range of motion of lumbar spine     Weakness of both lower extremities      Physician: Roxanne Freeman NP    Physician Orders: Eval and Treat  Medical Diagnosis: Other idiopathic scoliosis, thoracolumbar region  Surgical Diagnosis: Not applicable for this Episode   Surgical Date: Not applicable for this Episode  Days Since Last Surgery: Not applicable for this Episode    Visit # / Visits Authorized:  8 / 20  Insurance Authorization Period: 1/1/2025 to 12/31/2025  Date of Evaluation: 7/2/2025  Plan of Care Certification: 7/2/2025 to 9/15/2025      PT/PTA:     Number of PTA visits since last PT visit:   Time In: 0953   Time Out: 1055  Total Time (in minutes): 62   Total Billable Time (in minutes): 62    FOTO:  Intake Score:  %  Survey Score 2:  %  Survey Score 3:  %    Precautions:       Subjective   having a bad day today and knee too.  Pain reported as 6/10. Back 6/10 pre-tx, 2/10 post R knee 6/10 and post 2/10    Objective            Treatment:  Therapeutic Exercise  TE 1: frog stretch 3 x 30 secs  TE 2: LTR stretch 3 x 30 secs  TE 3: hip flexor stretch 2 x 60 secs  Manual Therapy  MT 1: long axis traction  MT 2: short axis traction  MT 3: MFR STM to gluteus medius, iliacus, psoas and adductor  MT 10: Patient received Dry needling using a semi-standardized protocol targeting trigger points in the following structures:  Needles were R hip gluteus medius and TFL left in in situ for 8 min with electric stimulation set at 2 hz and 150 chu sec, with a continuous, biphasic waveform. After the session, the needles were removed with no adverse events. NP  Balance/Neuromuscular Re-Education  NMR 1: PPT x 20  NMR 2: LTR x 30 small amplitude  NMR 3: bridges + hip adduction  ball 2 x 10  NMR 4: supine clams 3 x 10 with Tband green  NMR 5: bridges with abduction 2 x 10 Tband green  NMR 6: BKFO 2 x 10 GTB  NMR 7: nu step 8 min  NMR 8: recumbent bike for reciprocal hip knee flexion and extension for control level. 1.5 x 8 min NP  Therapeutic Activity  TA 1: reciprocal marching, toe taps laterally and hip extension 2x 10 NP  TA 2: sit to stand x 10-NP  TA 3: pt education with HEP, POC and pain management  Other Activities  Activity 1: PT tech assist as needed for 1:1      Time Entry(in minutes):  Manual Therapy Time Entry: 15  Neuromuscular Re-Education Time Entry: 35  Therapeutic Exercise Time Entry: 11    Assessment & Plan   Assessment: Pt session was limited to supine for increase pain control with excessive guarding on R QL and R iliac and psoas today on arrival with flexed posture, hip hiking and antalgic gait. Pt had reduced antalgic gait and more loading phase on WB today. Pt has good insight with POC and overall is progressing with tolerance on land and in the pool.   Evaluation/Treatment Tolerance: Patient limited by pain    The patient will continue to benefit from skilled outpatient physical therapy in order to address the deficits listed in the problem list on the initial evaluation, provide patient and family education, and maximize the patients level of independence in the home and community environments.     The patient's spiritual, cultural, and educational needs were considered, and the patient is agreeable to the plan of care and goals.     Education  Education was done with Patient. The patient's learning style includes Demonstration and Listening. The patient Demonstrates understanding and Verbalizes understanding.         HEP, plan of care and pain management         Plan:      Goals:   Active       Ambulation/movement       Patient will ambulate 500' distance using no device with no assistance with trunk rotation and hip extension bilaterally with increased step  length.  (Progressing)       Start:  07/02/25    Expected End:  09/15/25               Functional outcome       Patient will show a significant change in +10% patient-reported outcome tool to demonstrate subjective improvement (Progressing)       Start:  07/02/25    Expected End:  08/15/25            Patient stated goal: to return recrreational exercise for 30 min  (Progressing)       Start:  07/02/25    Expected End:  09/15/25            Patient will demonstrate independence in home program for support of progression (Progressing)       Start:  07/02/25    Expected End:  08/15/25               Pain       Patient will report pain of 0/10 demonstrating a reduction of overall pain (Progressing)       Start:  07/02/25    Expected End:  08/15/25               Range of Motion       Patient will achieve bilateral spinal rotation ROM 60 degrees (Progressing)       Start:  07/02/25    Expected End:  08/15/25            Patient will achieve bilateral hip extension ROM 15 degrees pain free (Progressing)       Start:  07/02/25    Expected End:  09/15/25               Strength       Patient will achieve bilateral hip extension strength of 4+/5 (Progressing)       Start:  07/02/25    Expected End:  09/15/25            Patient will achieve bilateral hip abduction strength of 4+/5 (Progressing)       Start:  07/02/25    Expected End:  09/15/25                Solange Luis PT

## 2025-08-06 ENCOUNTER — PATIENT MESSAGE (OUTPATIENT)
Dept: INTERNAL MEDICINE | Facility: CLINIC | Age: 70
End: 2025-08-06
Payer: MEDICARE

## 2025-08-06 ENCOUNTER — OFFICE VISIT (OUTPATIENT)
Dept: OBSTETRICS AND GYNECOLOGY | Facility: CLINIC | Age: 70
End: 2025-08-06
Payer: MEDICARE

## 2025-08-06 ENCOUNTER — PATIENT MESSAGE (OUTPATIENT)
Dept: OBSTETRICS AND GYNECOLOGY | Facility: CLINIC | Age: 70
End: 2025-08-06

## 2025-08-06 DIAGNOSIS — N95.1 VASOMOTOR SYMPTOMS DUE TO MENOPAUSE: Primary | ICD-10-CM

## 2025-08-06 DIAGNOSIS — R53.83 FATIGUE, UNSPECIFIED TYPE: ICD-10-CM

## 2025-08-06 DIAGNOSIS — N94.19 DYSPAREUNIA DUE TO MEDICAL CONDITION IN FEMALE: ICD-10-CM

## 2025-08-06 DIAGNOSIS — N95.1 INSOMNIA ASSOCIATED WITH MENOPAUSE: ICD-10-CM

## 2025-08-06 DIAGNOSIS — G47.33 OSA (OBSTRUCTIVE SLEEP APNEA): ICD-10-CM

## 2025-08-06 DIAGNOSIS — N95.1 VAGINAL DRYNESS, MENOPAUSAL: ICD-10-CM

## 2025-08-06 PROCEDURE — 98006 SYNCH AUDIO-VIDEO EST MOD 30: CPT | Mod: 95,,, | Performed by: OBSTETRICS & GYNECOLOGY

## 2025-08-06 RX ORDER — PROGESTERONE 100 MG/1
100 CAPSULE ORAL NIGHTLY
Qty: 90 CAPSULE | Refills: 3 | Status: SHIPPED | OUTPATIENT
Start: 2025-08-06

## 2025-08-06 RX ORDER — TIRZEPATIDE 2.5 MG/.5ML
2.5 INJECTION, SOLUTION SUBCUTANEOUS
Qty: 2 ML | Refills: 5 | Status: SHIPPED | OUTPATIENT
Start: 2025-08-06 | End: 2026-01-21

## 2025-08-06 RX ORDER — TESTOSTERONE 50 MG/5G
0.5 GEL TRANSDERMAL DAILY
Qty: 150 G | Refills: 0 | Status: SHIPPED | OUTPATIENT
Start: 2025-08-06

## 2025-08-06 RX ORDER — ESTRADIOL 0.1 MG/G
1 CREAM VAGINAL
Qty: 42.5 G | Refills: 4 | Status: SHIPPED | OUTPATIENT
Start: 2025-08-07 | End: 2026-08-07

## 2025-08-07 ENCOUNTER — CLINICAL SUPPORT (OUTPATIENT)
Dept: REHABILITATION | Facility: HOSPITAL | Age: 70
End: 2025-08-07
Payer: MEDICARE

## 2025-08-07 DIAGNOSIS — M25.561 CHRONIC PAIN OF RIGHT KNEE: Primary | ICD-10-CM

## 2025-08-07 DIAGNOSIS — R29.898 WEAKNESS OF BOTH LOWER EXTREMITIES: ICD-10-CM

## 2025-08-07 DIAGNOSIS — G89.29 CHRONIC PAIN OF RIGHT KNEE: Primary | ICD-10-CM

## 2025-08-07 DIAGNOSIS — M53.86 DECREASED RANGE OF MOTION OF LUMBAR SPINE: ICD-10-CM

## 2025-08-07 PROCEDURE — 97113 AQUATIC THERAPY/EXERCISES: CPT | Mod: PO,CQ

## 2025-08-07 NOTE — PROGRESS NOTES
Outpatient Rehab    Physical Therapy Visit    Patient Name: Tiarra Norton  MRN: [id number]  YOB: 1955  Encounter Date: 8/7/2025    Therapy Diagnosis:   Encounter Diagnoses   Name Primary?    Chronic pain of right knee Yes    Decreased range of motion of lumbar spine     Weakness of both lower extremities      Physician: Roxanne Freeman NP    Physician Orders: Eval and Treat  Medical Diagnosis: Other idiopathic scoliosis, thoracolumbar region  Surgical Diagnosis: Not applicable for this Episode   Surgical Date: Not applicable for this Episode  Days Since Last Surgery: Not applicable for this Episode    Visit # / Visits Authorized:  9 / 20  Insurance Authorization Period: 1/1/2025 to 12/31/2025  Date of Evaluation: 7/2/2025  Plan of Care Certification: 7/2/2025 to 9/15/2025      PT/PTA:     Number of PTA visits since last PT visit:   Time In: 1000   Time Out: 1100  Total Time (in minutes): 60   Total Billable Time (in minutes): 60    FOTO:  Intake Score (%): 47  Survey Score 2 (%): Not applicable for this Episode  Survey Score 3 (%): Not applicable for this Episode    Precautions:         Subjective   Tiarra reports back and knee pn upon arrival.    Back 7/10 pre-tx, 2/10 post R knee 4/10 and post 4/10    Objective            Treatment:  Therapeutic Exercise  TE 1: AMB x 3 min each FWD 1.2 mph, BWD 0.6 mph, Side 0.7 mph  TE 2: HSS 3 x 30 sec, Quad stretch 3 x 30 sec  TE 3: Mini Squat with QS 20x, Heel Raise with GS 20x  TE 4: Hip ext with HS Curl, 20x, Hip flex with LAQ 20x  TE 5: Lunges Side/FWD 20x each  TE 6: Shld flex/ext 20x, Shld Horiz ABD/ADD 20x  TE 7: Horiz Row orange t-band 20x  TE 8: Lat Pull orange t-band 20x  TE 9: Marching 5 min      Time Entry(in minutes):  Aquatic Therapy Time Entry: 60    Assessment & Plan   Assessment: Tiarra shalini today's tx with aquatic ther ex well with benefit of decreased LB pn sx post tx as above. She was able to perform all activities w/o difficulty or  complaint. She was not progressed on this date due to level of pn reported upon arrival and length of time since last aquatic grant. She required few VCs for proper form, posture and core stab tech post instruction.   Evaluation/Treatment Tolerance: Patient tolerated treatment well    The patient will continue to benefit from skilled outpatient physical therapy in order to address the deficits listed in the problem list on the initial evaluation, provide patient and family education, and maximize the patients level of independence in the home and community environments.     The patient's spiritual, cultural, and educational needs were considered, and the patient is agreeable to the plan of care and goals.           Plan: Continue treatment per established plan of care    Goals:   Active       Ambulation/movement       Patient will ambulate 500' distance using no device with no assistance with trunk rotation and hip extension bilaterally with increased step length.  (Progressing)       Start:  07/02/25    Expected End:  09/15/25               Functional outcome       Patient will show a significant change in +10% patient-reported outcome tool to demonstrate subjective improvement (Progressing)       Start:  07/02/25    Expected End:  08/15/25            Patient stated goal: to return recrreational exercise for 30 min  (Progressing)       Start:  07/02/25    Expected End:  09/15/25            Patient will demonstrate independence in home program for support of progression (Progressing)       Start:  07/02/25    Expected End:  08/15/25               Pain       Patient will report pain of 0/10 demonstrating a reduction of overall pain (Progressing)       Start:  07/02/25    Expected End:  08/15/25               Range of Motion       Patient will achieve bilateral spinal rotation ROM 60 degrees (Progressing)       Start:  07/02/25    Expected End:  08/15/25            Patient will achieve bilateral hip extension ROM 15  degrees pain free (Progressing)       Start:  07/02/25    Expected End:  09/15/25               Strength       Patient will achieve bilateral hip extension strength of 4+/5 (Progressing)       Start:  07/02/25    Expected End:  09/15/25            Patient will achieve bilateral hip abduction strength of 4+/5 (Progressing)       Start:  07/02/25    Expected End:  09/15/25                Caio Bush PTA

## 2025-08-10 ENCOUNTER — PATIENT MESSAGE (OUTPATIENT)
Dept: PAIN MEDICINE | Facility: CLINIC | Age: 70
End: 2025-08-10
Payer: MEDICARE

## 2025-08-10 DIAGNOSIS — G89.29 CHRONIC PAIN OF RIGHT KNEE: ICD-10-CM

## 2025-08-10 DIAGNOSIS — M51.369 DDD (DEGENERATIVE DISC DISEASE), LUMBAR: ICD-10-CM

## 2025-08-10 DIAGNOSIS — M41.25 OTHER IDIOPATHIC SCOLIOSIS, THORACOLUMBAR REGION: ICD-10-CM

## 2025-08-10 DIAGNOSIS — M25.561 CHRONIC PAIN OF RIGHT KNEE: ICD-10-CM

## 2025-08-10 DIAGNOSIS — Z96.651 HISTORY OF TOTAL KNEE ARTHROPLASTY, RIGHT: ICD-10-CM

## 2025-08-10 DIAGNOSIS — G89.4 CHRONIC PAIN SYNDROME: ICD-10-CM

## 2025-08-10 DIAGNOSIS — Z96.89 SPINAL CORD STIMULATOR STATUS: ICD-10-CM

## 2025-08-11 ENCOUNTER — OFFICE VISIT (OUTPATIENT)
Dept: NEUROSURGERY | Facility: CLINIC | Age: 70
End: 2025-08-11
Payer: MEDICARE

## 2025-08-11 VITALS
TEMPERATURE: 98 F | HEART RATE: 65 BPM | WEIGHT: 149.94 LBS | DIASTOLIC BLOOD PRESSURE: 77 MMHG | BODY MASS INDEX: 26.56 KG/M2 | SYSTOLIC BLOOD PRESSURE: 123 MMHG

## 2025-08-11 DIAGNOSIS — M54.9 CHRONIC BACK PAIN, UNSPECIFIED BACK LOCATION, UNSPECIFIED BACK PAIN LATERALITY: ICD-10-CM

## 2025-08-11 DIAGNOSIS — M41.25 OTHER IDIOPATHIC SCOLIOSIS, THORACOLUMBAR REGION: ICD-10-CM

## 2025-08-11 DIAGNOSIS — M41.9 SCOLIOSIS, UNSPECIFIED SCOLIOSIS TYPE, UNSPECIFIED SPINAL REGION: Primary | ICD-10-CM

## 2025-08-11 DIAGNOSIS — G89.29 CHRONIC BACK PAIN, UNSPECIFIED BACK LOCATION, UNSPECIFIED BACK PAIN LATERALITY: ICD-10-CM

## 2025-08-11 PROCEDURE — 99213 OFFICE O/P EST LOW 20 MIN: CPT | Mod: S$PBB,,, | Performed by: NURSE PRACTITIONER

## 2025-08-11 PROCEDURE — 99999 PR PBB SHADOW E&M-EST. PATIENT-LVL III: CPT | Mod: PBBFAC,,, | Performed by: NURSE PRACTITIONER

## 2025-08-11 PROCEDURE — 99213 OFFICE O/P EST LOW 20 MIN: CPT | Mod: PBBFAC | Performed by: NURSE PRACTITIONER

## 2025-08-11 RX ORDER — HYDROCODONE BITARTRATE AND ACETAMINOPHEN 5; 325 MG/1; MG/1
1 TABLET ORAL EVERY 12 HOURS PRN
Qty: 60 TABLET | Refills: 0 | Status: SHIPPED | OUTPATIENT
Start: 2025-08-15 | End: 2025-09-14

## 2025-08-12 ENCOUNTER — CLINICAL SUPPORT (OUTPATIENT)
Dept: REHABILITATION | Facility: HOSPITAL | Age: 70
End: 2025-08-12
Payer: MEDICARE

## 2025-08-12 DIAGNOSIS — R29.898 WEAKNESS OF BOTH LOWER EXTREMITIES: ICD-10-CM

## 2025-08-12 DIAGNOSIS — G89.29 CHRONIC PAIN OF RIGHT KNEE: Primary | ICD-10-CM

## 2025-08-12 DIAGNOSIS — M25.561 CHRONIC PAIN OF RIGHT KNEE: Primary | ICD-10-CM

## 2025-08-12 DIAGNOSIS — M53.86 DECREASED RANGE OF MOTION OF LUMBAR SPINE: ICD-10-CM

## 2025-08-12 PROCEDURE — 97112 NEUROMUSCULAR REEDUCATION: CPT | Mod: PO

## 2025-08-12 PROCEDURE — 97530 THERAPEUTIC ACTIVITIES: CPT | Mod: PO

## 2025-08-13 ENCOUNTER — PATIENT MESSAGE (OUTPATIENT)
Dept: OBSTETRICS AND GYNECOLOGY | Facility: CLINIC | Age: 70
End: 2025-08-13
Payer: MEDICARE

## 2025-08-18 ENCOUNTER — PATIENT MESSAGE (OUTPATIENT)
Dept: PAIN MEDICINE | Facility: CLINIC | Age: 70
End: 2025-08-18
Payer: MEDICARE

## 2025-08-18 PROBLEM — M25.561 CHRONIC PAIN OF RIGHT KNEE: Status: RESOLVED | Noted: 2025-07-02 | Resolved: 2025-08-18

## 2025-08-18 PROBLEM — G89.29 CHRONIC PAIN OF RIGHT KNEE: Status: RESOLVED | Noted: 2025-07-02 | Resolved: 2025-08-18

## 2025-08-19 ENCOUNTER — CLINICAL SUPPORT (OUTPATIENT)
Dept: REHABILITATION | Facility: HOSPITAL | Age: 70
End: 2025-08-19
Payer: MEDICARE

## 2025-08-19 DIAGNOSIS — M53.86 DECREASED RANGE OF MOTION OF LUMBAR SPINE: Primary | ICD-10-CM

## 2025-08-19 DIAGNOSIS — R29.898 WEAKNESS OF BOTH LOWER EXTREMITIES: ICD-10-CM

## 2025-08-19 PROCEDURE — 97112 NEUROMUSCULAR REEDUCATION: CPT | Mod: KX,PO

## 2025-08-19 PROCEDURE — 97530 THERAPEUTIC ACTIVITIES: CPT | Mod: KX,PO

## 2025-08-19 PROCEDURE — 97140 MANUAL THERAPY 1/> REGIONS: CPT | Mod: KX,PO

## 2025-08-20 ENCOUNTER — PATIENT MESSAGE (OUTPATIENT)
Dept: OBSTETRICS AND GYNECOLOGY | Facility: CLINIC | Age: 70
End: 2025-08-20
Payer: MEDICARE

## 2025-08-21 ENCOUNTER — OFFICE VISIT (OUTPATIENT)
Dept: PAIN MEDICINE | Facility: CLINIC | Age: 70
End: 2025-08-21
Payer: MEDICARE

## 2025-08-21 DIAGNOSIS — Z96.651 HISTORY OF TOTAL KNEE ARTHROPLASTY, RIGHT: ICD-10-CM

## 2025-08-21 DIAGNOSIS — Z79.891 ENCOUNTER FOR LONG-TERM OPIATE ANALGESIC USE: ICD-10-CM

## 2025-08-21 DIAGNOSIS — G89.4 CHRONIC PAIN SYNDROME: Primary | ICD-10-CM

## 2025-08-21 DIAGNOSIS — Z96.89 SPINAL CORD STIMULATOR STATUS: ICD-10-CM

## 2025-08-21 DIAGNOSIS — M54.16 LUMBAR RADICULOPATHY, CHRONIC: ICD-10-CM

## 2025-08-21 DIAGNOSIS — M47.816 LUMBAR SPONDYLOSIS: ICD-10-CM

## 2025-08-21 DIAGNOSIS — M79.18 MYOFASCIAL PAIN: ICD-10-CM

## 2025-08-21 PROCEDURE — 98005 SYNCH AUDIO-VIDEO EST LOW 20: CPT | Mod: 95,,, | Performed by: NURSE PRACTITIONER

## 2025-08-21 RX ORDER — CYCLOBENZAPRINE HCL 10 MG
10 TABLET ORAL 3 TIMES DAILY PRN
Qty: 90 TABLET | Refills: 0 | Status: SHIPPED | OUTPATIENT
Start: 2025-08-21 | End: 2025-09-21

## 2025-08-26 ENCOUNTER — PATIENT MESSAGE (OUTPATIENT)
Dept: OBSTETRICS AND GYNECOLOGY | Facility: CLINIC | Age: 70
End: 2025-08-26
Payer: MEDICARE

## 2025-08-26 ENCOUNTER — CLINICAL SUPPORT (OUTPATIENT)
Dept: REHABILITATION | Facility: HOSPITAL | Age: 70
End: 2025-08-26
Payer: MEDICARE

## 2025-08-26 DIAGNOSIS — R29.898 WEAKNESS OF BOTH LOWER EXTREMITIES: ICD-10-CM

## 2025-08-26 DIAGNOSIS — M53.86 DECREASED RANGE OF MOTION OF LUMBAR SPINE: Primary | ICD-10-CM

## 2025-08-26 PROCEDURE — 97530 THERAPEUTIC ACTIVITIES: CPT | Mod: PO

## 2025-08-26 PROCEDURE — 97140 MANUAL THERAPY 1/> REGIONS: CPT | Mod: PO

## 2025-08-26 PROCEDURE — 97112 NEUROMUSCULAR REEDUCATION: CPT | Mod: PO

## 2025-09-04 ENCOUNTER — HOSPITAL ENCOUNTER (OUTPATIENT)
Dept: RADIOLOGY | Facility: HOSPITAL | Age: 70
Discharge: HOME | End: 2025-09-04
Attending: NURSE PRACTITIONER
Payer: MEDICARE

## 2025-09-04 ENCOUNTER — CLINICAL SUPPORT (OUTPATIENT)
Dept: REHABILITATION | Facility: HOSPITAL | Age: 70
End: 2025-09-04
Payer: MEDICARE

## 2025-09-04 DIAGNOSIS — M41.25 OTHER IDIOPATHIC SCOLIOSIS, THORACOLUMBAR REGION: ICD-10-CM

## 2025-09-04 DIAGNOSIS — M41.9 SCOLIOSIS, UNSPECIFIED SCOLIOSIS TYPE, UNSPECIFIED SPINAL REGION: ICD-10-CM

## 2025-09-04 DIAGNOSIS — M53.86 DECREASED RANGE OF MOTION OF LUMBAR SPINE: Primary | ICD-10-CM

## 2025-09-04 DIAGNOSIS — R29.898 WEAKNESS OF BOTH LOWER EXTREMITIES: ICD-10-CM

## 2025-09-04 PROCEDURE — 72141 MRI NECK SPINE W/O DYE: CPT | Mod: TC

## 2025-09-04 PROCEDURE — 72146 MRI CHEST SPINE W/O DYE: CPT | Mod: TC

## 2025-09-04 PROCEDURE — 72146 MRI CHEST SPINE W/O DYE: CPT | Mod: 26,,, | Performed by: RADIOLOGY

## 2025-09-04 PROCEDURE — 72082 X-RAY EXAM ENTIRE SPI 2/3 VW: CPT | Mod: 26,,, | Performed by: RADIOLOGY

## 2025-09-04 PROCEDURE — 72082 X-RAY EXAM ENTIRE SPI 2/3 VW: CPT | Mod: TC

## 2025-09-04 PROCEDURE — 72141 MRI NECK SPINE W/O DYE: CPT | Mod: 26,,, | Performed by: RADIOLOGY

## 2025-09-04 PROCEDURE — 97113 AQUATIC THERAPY/EXERCISES: CPT | Mod: PO,CQ

## (undated) DEVICE — GOWN X-LG STERILE BACK

## (undated) DEVICE — Device

## (undated) DEVICE — APPLICATOR CHLORAPREP ORN 26ML

## (undated) DEVICE — WRENCH HEX END 7.5CM

## (undated) DEVICE — GLOVE BIOGEL SKINSENSE PI 6.5

## (undated) DEVICE — SYR B-D DISP CONTROL 10CC100/C

## (undated) DEVICE — DRAPE INCISE IOBAN 2 23X17IN

## (undated) DEVICE — DRESSING AQUACEL AG FOAM 4X4

## (undated) DEVICE — NDL HYPO REG 25G X 1 1/2

## (undated) DEVICE — DRAPE C-ARMOR EQUIPMENT COVER

## (undated) DEVICE — SUT STRATAFIX 1 PDS CT-1

## (undated) DEVICE — GLOVE BIOGEL SKINSENSE PI 7.5

## (undated) DEVICE — NDL SPINAL 18GX3.5 SPINOCAN

## (undated) DEVICE — DRESSING AQUACEL FOAM 5 X 5

## (undated) DEVICE — DRAPE STERI INSTRUMENT 1018

## (undated) DEVICE — KIT VIZADISC KNEE TRACKING

## (undated) DEVICE — NDL 18GA X1 1/2 REG BEVEL

## (undated) DEVICE — BANDAGE ESMARK 6INX3YD

## (undated) DEVICE — GLOVE BIOGEL SKINSENSE PI 6.0

## (undated) DEVICE — DRAPE SURG W/TWL 17 5/8X23

## (undated) DEVICE — DRAPE PLASTIC U 60X72

## (undated) DEVICE — SOL IRR NACL .9% 3000ML

## (undated) DEVICE — DRESSING LEUKOPLAST FLEX 1X3IN

## (undated) DEVICE — KIT DRAPE RIO ONE PIECE W/POCK

## (undated) DEVICE — DRAPE STERI-DRAPE 1000 17X11IN

## (undated) DEVICE — DRESSING AQUACEL AG ADV 3.5X12

## (undated) DEVICE — BETADINE OPTHALMIC SOL 5% 30ML

## (undated) DEVICE — TIP SUCTION YANKAUER

## (undated) DEVICE — UNDERGLOVES BIOGEL PI SZ 7 LF

## (undated) DEVICE — SOL IRR SOD CHL .9% POUR

## (undated) DEVICE — TUBE SET INFLOW/OUTFLOW

## (undated) DEVICE — SYS REVOLUTION CEMENT MIXING

## (undated) DEVICE — SUT VICRYL PLUS 2-0 CT1 18

## (undated) DEVICE — TRAY DRY SKIN SCRUB PREP

## (undated) DEVICE — STRIP MEDI WND CLSR 1/2X4IN

## (undated) DEVICE — PIN BONE 4X110MM
Type: IMPLANTABLE DEVICE | Site: KNEE | Status: NON-FUNCTIONAL
Removed: 2023-05-18

## (undated) DEVICE — BLADE ELECTRO EDGE INSULATED

## (undated) DEVICE — GLOVE BIOGEL SKINSENSE PI 8.0

## (undated) DEVICE — COVER SNAP 36IN X 30IN

## (undated) DEVICE — TAPE SURG DURAPORE 2 X10YD

## (undated) DEVICE — CHLORAPREP W TINT 26ML APPL

## (undated) DEVICE — DRAPE LAP T SHT W/ INSTR PAD

## (undated) DEVICE — GOWN ECLIPSE REINF LVL4 TWL LG

## (undated) DEVICE — SYR 50CC LL

## (undated) DEVICE — SUT VICRYL+ 2-0 SH 18IN

## (undated) DEVICE — SYR SALINE FLSH PRFL STRL 10ML

## (undated) DEVICE — PROBE ARTHO ENERGY 90 DEG

## (undated) DEVICE — KIT REMOTE CONTROL FREELINK

## (undated) DEVICE — UNDERGLOVES BIOGEL PI SIZE 8

## (undated) DEVICE — DRAPE C ARM 42 X 120 10/BX

## (undated) DEVICE — TIP YANKAUERS BULB NO VENT

## (undated) DEVICE — SUT MCRYL PLUS 4-0 PS2 27IN

## (undated) DEVICE — COUNT NDL FOAM MAGNET 40COUNT

## (undated) DEVICE — 28CM STRAW TUNNELING TOOL

## (undated) DEVICE — KIT TOTAL HIP HPOFH OMC

## (undated) DEVICE — UNDERGLOVES BIOGEL PI SIZE 7.5

## (undated) DEVICE — SOL IRR WATER STRL 3000 ML

## (undated) DEVICE — BINDER ABDOMINAL 9 30-45

## (undated) DEVICE — BRUSH SCRUB SURGICALW/BETADINE

## (undated) DEVICE — SUT ETHILON 3-0 PS2 18 BLK

## (undated) DEVICE — DRAPE THREE-QTR REINF 53X77IN

## (undated) DEVICE — ELECTRODE BLADE INSULATED 1 IN

## (undated) DEVICE — TUNNELING TOOL

## (undated) DEVICE — PACK CYSTO

## (undated) DEVICE — ELECTRODE REM PLYHSV RETURN 9

## (undated) DEVICE — SHAVER ULTRAFFR 4.2MM

## (undated) DEVICE — TOURNIQUET SB QC DP 34X4IN

## (undated) DEVICE — DRESSING AQUACEL FOAM 3 X 3

## (undated) DEVICE — SYR IRRIGATION BULB STER 60ML

## (undated) DEVICE — TRAY ARTHROSCOPY 2/CS

## (undated) DEVICE — SYR LUER SLIP GLASS 5ML

## (undated) DEVICE — SUT VICRYL+ 1 CT1 18IN

## (undated) DEVICE — SYR DISP LL 5CC

## (undated) DEVICE — GAUZE SPONGE 4X4 12PLY

## (undated) DEVICE — SYR 10CC LUER LOCK

## (undated) DEVICE — SYS CLSR DERMABOND PRINEO 22CM

## (undated) DEVICE — SUT VICRYL 2-0 8-18 CP-2

## (undated) DEVICE — SEE MEDLINE ITEM 146298

## (undated) DEVICE — SUT 2/0 30IN SILK BLK BRAI

## (undated) DEVICE — SUT SILK 3-0 SH 18IN BLACK

## (undated) DEVICE — UNDERGLOVE BIOGEL PI SZ 6.5 LF

## (undated) DEVICE — SUT MONOCRYL 4-0 PS-2

## (undated) DEVICE — UNDERGLOVES BIOGEL PI SIZE 8.5

## (undated) DEVICE — BRUSH SCRUB HIBICLENS 4%

## (undated) DEVICE — DRAPE T EXTRM SURG 121X128X90

## (undated) DEVICE — DRESSING XEROFORM FOIL PK 1X8

## (undated) DEVICE — DRAPE STERI U-SHAPED 47X51IN

## (undated) DEVICE — SUT SILK 2-0 SH 18IN BLACK

## (undated) DEVICE — DRAPE XRAY EQUIPMENT UNIV

## (undated) DEVICE — BLADE MAKO STANDARD

## (undated) DEVICE — BLADE SAGITTAL 18 X 1.27 X 90M

## (undated) DEVICE — DRESSING AQUACEL AG ADV 3.5X6

## (undated) DEVICE — STRIP STERI REIN CLSR 1/2X2IN

## (undated) DEVICE — SUT D SPECIAL VICRYL 2-0

## (undated) DEVICE — SET CYSTO IRRIGATION UNIV SPIK

## (undated) DEVICE — TRAY CYSTO BASIN

## (undated) DEVICE — SOL NACL IRR 3000ML

## (undated) DEVICE — ELECTRODE BLD EXT INSUL 1

## (undated) DEVICE — SUT VICRYL PLUS 3-0 SH 18IN